# Patient Record
Sex: FEMALE | Race: BLACK OR AFRICAN AMERICAN | NOT HISPANIC OR LATINO | URBAN - METROPOLITAN AREA
[De-identification: names, ages, dates, MRNs, and addresses within clinical notes are randomized per-mention and may not be internally consistent; named-entity substitution may affect disease eponyms.]

---

## 2022-12-04 ENCOUNTER — INPATIENT (INPATIENT)
Facility: HOSPITAL | Age: 57
LOS: 8 days | Discharge: HOME | DRG: 811 | End: 2022-12-13
Attending: STUDENT IN AN ORGANIZED HEALTH CARE EDUCATION/TRAINING PROGRAM | Admitting: HOSPITALIST
Payer: COMMERCIAL

## 2022-12-04 VITALS
DIASTOLIC BLOOD PRESSURE: 94 MMHG | SYSTOLIC BLOOD PRESSURE: 155 MMHG | WEIGHT: 197.98 LBS | OXYGEN SATURATION: 99 % | HEART RATE: 105 BPM | RESPIRATION RATE: 18 BRPM | TEMPERATURE: 98 F | HEIGHT: 64 IN

## 2022-12-04 PROCEDURE — 99285 EMERGENCY DEPT VISIT HI MDM: CPT

## 2022-12-04 RX ORDER — HYDROMORPHONE HYDROCHLORIDE 2 MG/ML
1 INJECTION INTRAMUSCULAR; INTRAVENOUS; SUBCUTANEOUS ONCE
Refills: 0 | Status: DISCONTINUED | OUTPATIENT
Start: 2022-12-04 | End: 2022-12-04

## 2022-12-04 RX ORDER — SODIUM CHLORIDE 9 MG/ML
1000 INJECTION INTRAMUSCULAR; INTRAVENOUS; SUBCUTANEOUS ONCE
Refills: 0 | Status: COMPLETED | OUTPATIENT
Start: 2022-12-04 | End: 2022-12-04

## 2022-12-04 NOTE — ED PROVIDER NOTE - PROGRESS NOTE DETAILS
Marva attending- improvement in pain with second round of pain medication we will continue to monitor and reassess otherwise labs unremarkable thus far Magdi Carr MD PGY-1: Pt reassessed. Pain is back to a 10/10

## 2022-12-04 NOTE — ED PROVIDER NOTE - NS ED ROS FT
ROS:  GENERAL: No fever, no chills  EYES: no change in vision  HEENT: no trouble swallowing, no trouble speaking  CARDIAC: no chest pain  PULMONARY: no cough, no shortness of breath  GI: no abdominal pain, no nausea, no vomiting, no diarrhea, no constipation  : No dysuria, no frequency, no change in appearance, or odor of urine  SKIN: no rashes  NEURO: no headache, no weakness  MSK: left arm left hip pain   ~Daniel Durham D.O. -ED Attending

## 2022-12-04 NOTE — ED PROVIDER NOTE - OBJECTIVE STATEMENT
57yF w/PMHx of sickle cell disease, htn, retinal detachment s/p repair presents for left shoulder, arm, and hip pain thinking it is 2/2 to sickle cell crisis. Pain started a couple hours ago and was not relieved by tramadol, unsure of the dose. Pain is sharp 10/10 and worse with any movement. Pt's last crisis was 2 years ago and had to get admitted for it. Pt states she normally has to get Dilaudid for the pain. Follows with [facility] sickle cell clinic with Karen Bliss 57yF w/PMHx of sickle cell disease, htn, retinal detachment s/p repair presents for left shoulder, arm, and hip pain thinking it is 2/2 to sickle cell crisis. Pain started a couple hours ago and was not relieved by tramadol, unsure of the dose. Pain is sharp 10/10 and worse with any movement. Pt's last crisis was 2 years ago and had to get admitted for it. Pt states she normally has to get Dilaudid for the pain. Follows with [facility] sickle cell clinic with Karen Garcia, NP at [facility]. Pt endorsed congestion, cough, rhinorrhea for 4 days and her left sided arm and hip pain has been going on for 2 days. Denies sob, cp, fever, chills. Pt's mom was also sick.

## 2022-12-04 NOTE — ED PROVIDER NOTE - PHYSICAL EXAMINATION
Physical Exam:  Gen: NAD, AOx3, non-toxic appearing  Head: normal appearing  HEENT: normal conjunctiva, oral mucosa moist  Lung:  no respiratory distress, speaking in full sentences, clear to ascultation bilaterally     CV: regular rate and rhythm   Abd: soft, ND, NT  MSK: no visible deformities, no ttp throughout All extremities with full range of motion chest and pelvis stable neurovascular intact distally in all 4 extremities no leg swelling, No CTL spine tenderness palpation midline  Neuro: No focal deficits  Skin: Warm  Psych: Appearing in pain  ~Daniel Durham D.O. -ED Attending

## 2022-12-04 NOTE — ED PROVIDER NOTE - ATTENDING CONTRIBUTION TO CARE
I, Daniel Durham, performed a history and physical exam of the patient and discussed their management with the resident and/or advanced care provider. I reviewed the resident and/or advanced care provider's note and agree with the documented findings and plan of care. I was present and available for all procedures.    57yF w/PMHx of sickle cell disease, htn, retinal detachment s/p repair presents for left shoulder, arm, and hip pain thinking it is 2/2 to sickle cell crisis. Pain started a couple hours ago and was not relieved by tramadol, unsure of the dose. Pain is sharp 10/10 and worse with any movement. Pt's last crisis was 2 years ago and had to get admitted for it. Pt states she normally has to get Dilaudid for the pain. Follows with [facility] sickle cell clinic, Hemoglobin electrophoresis available on HIV consistent with sickle cell disease    See my full note for review of system and physical exam    Concerning for sickle cell crisis pain otherwise unlikely infectious or metabolic in etiology unlikely ACS PE pneumonia pneumothorax dissection AAA unlikely severe AVN of hip or shoulder otherwise will obtain x-rays screening blood work pain medications reassess for disposition pending work-up and reevaluation's discussed with patient agreeable for plan

## 2022-12-05 DIAGNOSIS — Z98.890 OTHER SPECIFIED POSTPROCEDURAL STATES: Chronic | ICD-10-CM

## 2022-12-05 DIAGNOSIS — Z98.891 HISTORY OF UTERINE SCAR FROM PREVIOUS SURGERY: Chronic | ICD-10-CM

## 2022-12-05 DIAGNOSIS — D57.00 HB-SS DISEASE WITH CRISIS, UNSPECIFIED: ICD-10-CM

## 2022-12-05 DIAGNOSIS — Z98.82 BREAST IMPLANT STATUS: Chronic | ICD-10-CM

## 2022-12-05 DIAGNOSIS — I10 ESSENTIAL (PRIMARY) HYPERTENSION: ICD-10-CM

## 2022-12-05 DIAGNOSIS — U07.1 COVID-19: ICD-10-CM

## 2022-12-05 LAB
ALBUMIN SERPL ELPH-MCNC: 4.4 G/DL — SIGNIFICANT CHANGE UP (ref 3.3–5)
ALP SERPL-CCNC: 111 U/L — SIGNIFICANT CHANGE UP (ref 40–120)
ALT FLD-CCNC: 17 U/L — SIGNIFICANT CHANGE UP (ref 10–45)
ANION GAP SERPL CALC-SCNC: 15 MMOL/L — SIGNIFICANT CHANGE UP (ref 5–17)
APPEARANCE UR: CLEAR — SIGNIFICANT CHANGE UP
AST SERPL-CCNC: 44 U/L — HIGH (ref 10–40)
BACTERIA # UR AUTO: NEGATIVE — SIGNIFICANT CHANGE UP
BILIRUB SERPL-MCNC: 0.8 MG/DL — SIGNIFICANT CHANGE UP (ref 0.2–1.2)
BILIRUB UR-MCNC: NEGATIVE — SIGNIFICANT CHANGE UP
BUN SERPL-MCNC: 12 MG/DL — SIGNIFICANT CHANGE UP (ref 7–23)
CALCIUM SERPL-MCNC: 9.1 MG/DL — SIGNIFICANT CHANGE UP (ref 8.4–10.5)
CHLORIDE SERPL-SCNC: 103 MMOL/L — SIGNIFICANT CHANGE UP (ref 96–108)
CO2 SERPL-SCNC: 21 MMOL/L — LOW (ref 22–31)
COLOR SPEC: SIGNIFICANT CHANGE UP
CREAT SERPL-MCNC: 0.55 MG/DL — SIGNIFICANT CHANGE UP (ref 0.5–1.3)
DIFF PNL FLD: NEGATIVE — SIGNIFICANT CHANGE UP
EGFR: 108 ML/MIN/1.73M2 — SIGNIFICANT CHANGE UP
EPI CELLS # UR: 3 /HPF — SIGNIFICANT CHANGE UP
FLUAV AG NPH QL: SIGNIFICANT CHANGE UP
FLUBV AG NPH QL: SIGNIFICANT CHANGE UP
GLUCOSE SERPL-MCNC: 96 MG/DL — SIGNIFICANT CHANGE UP (ref 70–99)
GLUCOSE UR QL: NEGATIVE — SIGNIFICANT CHANGE UP
HCT VFR BLD CALC: 35.3 % — SIGNIFICANT CHANGE UP (ref 34.5–45)
HGB BLD-MCNC: 13 G/DL — SIGNIFICANT CHANGE UP (ref 11.5–15.5)
HGB S BLD QL: POSITIVE
HYALINE CASTS # UR AUTO: 0 /LPF — SIGNIFICANT CHANGE UP (ref 0–2)
KETONES UR-MCNC: NEGATIVE — SIGNIFICANT CHANGE UP
LEUKOCYTE ESTERASE UR-ACNC: NEGATIVE — SIGNIFICANT CHANGE UP
MCHC RBC-ENTMCNC: 33.5 PG — SIGNIFICANT CHANGE UP (ref 27–34)
MCHC RBC-ENTMCNC: 36.8 GM/DL — HIGH (ref 32–36)
MCV RBC AUTO: 91 FL — SIGNIFICANT CHANGE UP (ref 80–100)
NITRITE UR-MCNC: NEGATIVE — SIGNIFICANT CHANGE UP
NRBC # BLD: 2 /100 WBCS — HIGH (ref 0–0)
PH UR: 6.5 — SIGNIFICANT CHANGE UP (ref 5–8)
PLATELET # BLD AUTO: 234 K/UL — SIGNIFICANT CHANGE UP (ref 150–400)
POTASSIUM SERPL-MCNC: 4 MMOL/L — SIGNIFICANT CHANGE UP (ref 3.5–5.3)
POTASSIUM SERPL-SCNC: 4 MMOL/L — SIGNIFICANT CHANGE UP (ref 3.5–5.3)
PROT SERPL-MCNC: 8.5 G/DL — HIGH (ref 6–8.3)
PROT UR-MCNC: ABNORMAL
RBC # BLD: 3.88 M/UL — SIGNIFICANT CHANGE UP (ref 3.8–5.2)
RBC # BLD: 3.88 M/UL — SIGNIFICANT CHANGE UP (ref 3.8–5.2)
RBC # FLD: 14.9 % — HIGH (ref 10.3–14.5)
RBC CASTS # UR COMP ASSIST: 1 /HPF — SIGNIFICANT CHANGE UP (ref 0–4)
RETICS #: 79.9 K/UL — SIGNIFICANT CHANGE UP (ref 25–125)
RETICS/RBC NFR: 2.1 % — SIGNIFICANT CHANGE UP (ref 0.5–2.5)
RSV RNA NPH QL NAA+NON-PROBE: SIGNIFICANT CHANGE UP
SARS-COV-2 RNA SPEC QL NAA+PROBE: DETECTED
SODIUM SERPL-SCNC: 139 MMOL/L — SIGNIFICANT CHANGE UP (ref 135–145)
SOLUBILITY: POSITIVE
SP GR SPEC: 1.01 — SIGNIFICANT CHANGE UP (ref 1.01–1.02)
UROBILINOGEN FLD QL: NEGATIVE — SIGNIFICANT CHANGE UP
WBC # BLD: 12.04 K/UL — HIGH (ref 3.8–10.5)
WBC # FLD AUTO: 12.04 K/UL — HIGH (ref 3.8–10.5)
WBC UR QL: 1 /HPF — SIGNIFICANT CHANGE UP (ref 0–5)

## 2022-12-05 PROCEDURE — 71045 X-RAY EXAM CHEST 1 VIEW: CPT | Mod: 26

## 2022-12-05 PROCEDURE — 99223 1ST HOSP IP/OBS HIGH 75: CPT

## 2022-12-05 PROCEDURE — 83020 HEMOGLOBIN ELECTROPHORESIS: CPT | Mod: 26

## 2022-12-05 RX ORDER — ENOXAPARIN SODIUM 100 MG/ML
40 INJECTION SUBCUTANEOUS EVERY 24 HOURS
Refills: 0 | Status: DISCONTINUED | OUTPATIENT
Start: 2022-12-05 | End: 2022-12-13

## 2022-12-05 RX ORDER — SODIUM CHLORIDE 9 MG/ML
1000 INJECTION, SOLUTION INTRAVENOUS
Refills: 0 | Status: DISCONTINUED | OUTPATIENT
Start: 2022-12-05 | End: 2022-12-05

## 2022-12-05 RX ORDER — REMDESIVIR 5 MG/ML
INJECTION INTRAVENOUS
Refills: 0 | Status: COMPLETED | OUTPATIENT
Start: 2022-12-05 | End: 2022-12-07

## 2022-12-05 RX ORDER — BUDESONIDE AND FORMOTEROL FUMARATE DIHYDRATE 160; 4.5 UG/1; UG/1
2 AEROSOL RESPIRATORY (INHALATION)
Refills: 0 | Status: DISCONTINUED | OUTPATIENT
Start: 2022-12-05 | End: 2022-12-13

## 2022-12-05 RX ORDER — SENNA PLUS 8.6 MG/1
2 TABLET ORAL AT BEDTIME
Refills: 0 | Status: DISCONTINUED | OUTPATIENT
Start: 2022-12-05 | End: 2022-12-13

## 2022-12-05 RX ORDER — FOLIC ACID 0.8 MG
1 TABLET ORAL DAILY
Refills: 0 | Status: DISCONTINUED | OUTPATIENT
Start: 2022-12-05 | End: 2022-12-13

## 2022-12-05 RX ORDER — REMDESIVIR 5 MG/ML
100 INJECTION INTRAVENOUS EVERY 24 HOURS
Refills: 0 | Status: COMPLETED | OUTPATIENT
Start: 2022-12-06 | End: 2022-12-07

## 2022-12-05 RX ORDER — NICOTINE POLACRILEX 2 MG
1 GUM BUCCAL DAILY
Refills: 0 | Status: DISCONTINUED | OUTPATIENT
Start: 2022-12-05 | End: 2022-12-13

## 2022-12-05 RX ORDER — HYDROMORPHONE HYDROCHLORIDE 2 MG/ML
1 INJECTION INTRAMUSCULAR; INTRAVENOUS; SUBCUTANEOUS ONCE
Refills: 0 | Status: DISCONTINUED | OUTPATIENT
Start: 2022-12-05 | End: 2022-12-05

## 2022-12-05 RX ORDER — HYDROMORPHONE HYDROCHLORIDE 2 MG/ML
6 INJECTION INTRAMUSCULAR; INTRAVENOUS; SUBCUTANEOUS EVERY 4 HOURS
Refills: 0 | Status: DISCONTINUED | OUTPATIENT
Start: 2022-12-05 | End: 2022-12-05

## 2022-12-05 RX ORDER — REMDESIVIR 5 MG/ML
200 INJECTION INTRAVENOUS EVERY 24 HOURS
Refills: 0 | Status: COMPLETED | OUTPATIENT
Start: 2022-12-05 | End: 2022-12-05

## 2022-12-05 RX ORDER — HYDROMORPHONE HYDROCHLORIDE 2 MG/ML
5 INJECTION INTRAMUSCULAR; INTRAVENOUS; SUBCUTANEOUS EVERY 4 HOURS
Refills: 0 | Status: DISCONTINUED | OUTPATIENT
Start: 2022-12-05 | End: 2022-12-05

## 2022-12-05 RX ORDER — KETOROLAC TROMETHAMINE 30 MG/ML
15 SYRINGE (ML) INJECTION ONCE
Refills: 0 | Status: DISCONTINUED | OUTPATIENT
Start: 2022-12-05 | End: 2022-12-05

## 2022-12-05 RX ORDER — LISINOPRIL 2.5 MG/1
40 TABLET ORAL DAILY
Refills: 0 | Status: DISCONTINUED | OUTPATIENT
Start: 2022-12-05 | End: 2022-12-13

## 2022-12-05 RX ORDER — ONDANSETRON 8 MG/1
4 TABLET, FILM COATED ORAL EVERY 6 HOURS
Refills: 0 | Status: DISCONTINUED | OUTPATIENT
Start: 2022-12-05 | End: 2022-12-07

## 2022-12-05 RX ORDER — SODIUM CHLORIDE 9 MG/ML
1000 INJECTION, SOLUTION INTRAVENOUS
Refills: 0 | Status: DISCONTINUED | OUTPATIENT
Start: 2022-12-05 | End: 2022-12-06

## 2022-12-05 RX ORDER — CHLORHEXIDINE GLUCONATE 213 G/1000ML
1 SOLUTION TOPICAL DAILY
Refills: 0 | Status: DISCONTINUED | OUTPATIENT
Start: 2022-12-05 | End: 2022-12-13

## 2022-12-05 RX ORDER — INFLUENZA VIRUS VACCINE 15; 15; 15; 15 UG/.5ML; UG/.5ML; UG/.5ML; UG/.5ML
0.5 SUSPENSION INTRAMUSCULAR ONCE
Refills: 0 | Status: COMPLETED | OUTPATIENT
Start: 2022-12-05 | End: 2022-12-13

## 2022-12-05 RX ORDER — HYDROMORPHONE HYDROCHLORIDE 2 MG/ML
1 INJECTION INTRAMUSCULAR; INTRAVENOUS; SUBCUTANEOUS EVERY 4 HOURS
Refills: 0 | Status: DISCONTINUED | OUTPATIENT
Start: 2022-12-05 | End: 2022-12-05

## 2022-12-05 RX ORDER — AMLODIPINE BESYLATE 2.5 MG/1
10 TABLET ORAL DAILY
Refills: 0 | Status: DISCONTINUED | OUTPATIENT
Start: 2022-12-05 | End: 2022-12-13

## 2022-12-05 RX ORDER — POLYETHYLENE GLYCOL 3350 17 G/17G
17 POWDER, FOR SOLUTION ORAL DAILY
Refills: 0 | Status: DISCONTINUED | OUTPATIENT
Start: 2022-12-05 | End: 2022-12-07

## 2022-12-05 RX ORDER — HYDROXYUREA 500 MG/1
500 CAPSULE ORAL
Refills: 0 | Status: DISCONTINUED | OUTPATIENT
Start: 2022-12-05 | End: 2022-12-13

## 2022-12-05 RX ORDER — HYDROMORPHONE HYDROCHLORIDE 2 MG/ML
6 INJECTION INTRAMUSCULAR; INTRAVENOUS; SUBCUTANEOUS EVERY 4 HOURS
Refills: 0 | Status: DISCONTINUED | OUTPATIENT
Start: 2022-12-05 | End: 2022-12-06

## 2022-12-05 RX ADMIN — Medication 15 MILLIGRAM(S): at 03:51

## 2022-12-05 RX ADMIN — HYDROMORPHONE HYDROCHLORIDE 1 MILLIGRAM(S): 2 INJECTION INTRAMUSCULAR; INTRAVENOUS; SUBCUTANEOUS at 01:06

## 2022-12-05 RX ADMIN — LISINOPRIL 40 MILLIGRAM(S): 2.5 TABLET ORAL at 11:33

## 2022-12-05 RX ADMIN — SODIUM CHLORIDE 75 MILLILITER(S): 9 INJECTION, SOLUTION INTRAVENOUS at 14:08

## 2022-12-05 RX ADMIN — HYDROMORPHONE HYDROCHLORIDE 1 MILLIGRAM(S): 2 INJECTION INTRAMUSCULAR; INTRAVENOUS; SUBCUTANEOUS at 20:04

## 2022-12-05 RX ADMIN — SODIUM CHLORIDE 100 MILLILITER(S): 9 INJECTION, SOLUTION INTRAVENOUS at 10:45

## 2022-12-05 RX ADMIN — Medication 1 PATCH: at 11:32

## 2022-12-05 RX ADMIN — SODIUM CHLORIDE 1000 MILLILITER(S): 9 INJECTION INTRAMUSCULAR; INTRAVENOUS; SUBCUTANEOUS at 00:16

## 2022-12-05 RX ADMIN — Medication 1 MILLIGRAM(S): at 11:34

## 2022-12-05 RX ADMIN — HYDROMORPHONE HYDROCHLORIDE 6 MILLIGRAM(S): 2 INJECTION INTRAMUSCULAR; INTRAVENOUS; SUBCUTANEOUS at 16:04

## 2022-12-05 RX ADMIN — BUDESONIDE AND FORMOTEROL FUMARATE DIHYDRATE 2 PUFF(S): 160; 4.5 AEROSOL RESPIRATORY (INHALATION) at 17:50

## 2022-12-05 RX ADMIN — HYDROMORPHONE HYDROCHLORIDE 1 MILLIGRAM(S): 2 INJECTION INTRAMUSCULAR; INTRAVENOUS; SUBCUTANEOUS at 00:13

## 2022-12-05 RX ADMIN — HYDROMORPHONE HYDROCHLORIDE 1 MILLIGRAM(S): 2 INJECTION INTRAMUSCULAR; INTRAVENOUS; SUBCUTANEOUS at 08:19

## 2022-12-05 RX ADMIN — HYDROMORPHONE HYDROCHLORIDE 1 MILLIGRAM(S): 2 INJECTION INTRAMUSCULAR; INTRAVENOUS; SUBCUTANEOUS at 03:52

## 2022-12-05 RX ADMIN — ONDANSETRON 4 MILLIGRAM(S): 8 TABLET, FILM COATED ORAL at 20:04

## 2022-12-05 RX ADMIN — ENOXAPARIN SODIUM 40 MILLIGRAM(S): 100 INJECTION SUBCUTANEOUS at 21:13

## 2022-12-05 RX ADMIN — SENNA PLUS 2 TABLET(S): 8.6 TABLET ORAL at 21:13

## 2022-12-05 RX ADMIN — HYDROXYUREA 500 MILLIGRAM(S): 500 CAPSULE ORAL at 17:49

## 2022-12-05 RX ADMIN — Medication 15 MILLIGRAM(S): at 01:06

## 2022-12-05 RX ADMIN — REMDESIVIR 500 MILLIGRAM(S): 5 INJECTION INTRAVENOUS at 09:42

## 2022-12-05 RX ADMIN — HYDROMORPHONE HYDROCHLORIDE 1 MILLIGRAM(S): 2 INJECTION INTRAMUSCULAR; INTRAVENOUS; SUBCUTANEOUS at 10:47

## 2022-12-05 NOTE — PROGRESS NOTE ADULT - SUBJECTIVE AND OBJECTIVE BOX
[facility] Division of Hospital Medicine  Rayray Quintero MD  Available via MS Teams  Pager: [phone]    SUBJECTIVE / OVERNIGHT EVENTS: Patient seen and examined at bedside. No acute overnight events. Pt states pain is well controlled with the Dilaudid, reports occasional shoulder/joint pain. Denies chest pain fevers, chills, cough, HA, dizziness, syncope, chest palpitations, abd pain, n/v/d, urinary or bowel changes, active sites of bleeding or any other symptoms at this time.    ADDITIONAL REVIEW OF SYSTEMS: +joint pain     MEDICATIONS  (STANDING):  amLODIPine   Tablet 10 milliGRAM(s) Oral daily  budesonide 160 MICROgram(s)/formoterol 4.5 MICROgram(s) Inhaler 2 Puff(s) Inhalation two times a day  dextrose 5% + sodium chloride 0.45%. 1000 milliLiter(s) (100 mL/Hr) IV Continuous <Continuous>  enoxaparin Injectable 40 milliGRAM(s) SubCutaneous every 24 hours  folic acid 1 milliGRAM(s) Oral daily  hydroxyurea 500 milliGRAM(s) Oral two times a day  lisinopril 40 milliGRAM(s) Oral daily  nicotine - 21 mG/24Hr(s) Patch 1 Patch Transdermal daily  remdesivir  IVPB   IV Intermittent     MEDICATIONS  (PRN):  HYDROmorphone   Tablet 5 milliGRAM(s) Oral every 4 hours PRN Severe Pain (7 - 10)  HYDROmorphone  Injectable 1 milliGRAM(s) IV Push every 4 hours PRN mod to severe pain      I&O's Summary      PHYSICAL EXAM:  Vital Signs Last 24 Hrs  T(C): 36.8 (05 Dec 2022 09:40), Max: 36.8 (04 Dec 2022 20:50)  T(F): 98.2 (05 Dec 2022 09:40), Max: 98.3 (04 Dec 2022 20:50)  HR: 74 (05 Dec 2022 11:30) (69 - 105)  BP: 121/86 (05 Dec 2022 11:30) (121/86 - 160/100)  BP(mean): --  RR: 18 (05 Dec 2022 11:30) (18 - 18)  SpO2: 99% (05 Dec 2022 11:30) (97% - 100%)    Parameters below as of 05 Dec 2022 11:30  Patient On (Oxygen Delivery Method): room air      CONSTITUTIONAL: NAD, well-developed, well-groomed  EYES: PERRLA; conjunctiva and sclera clear  ENMT: Moist oral mucosa, no pharyngeal injection or exudates; normal dentition  NECK: Supple, no palpable masses; no thyromegaly  RESPIRATORY: Normal respiratory effort; lungs are clear to auscultation bilaterally  CARDIOVASCULAR: Regular rate and rhythm, normal S1 and S2, no murmur/rub/gallop; No lower extremity edema; Peripheral pulses are 2+ bilaterally  ABDOMEN: Nontender to palpation, normoactive bowel sounds, no rebound/guarding; No hepatosplenomegaly  MUSCULOSKELETAL:   no clubbing or cyanosis of digits; no joint swelling or tenderness to palpation  PSYCH: A+O to person, place, and time; affect appropriate  NEUROLOGY: CN 2-12 are intact and symmetric; no gross sensory deficits   SKIN: No rashes; no palpable lesions    LABS:                        13.0   12.04 )-----------( 234      ( 05 Dec 2022 00:00 )             35.3     12-    139  |  103  |  12  ----------------------------<  96  4.0   |  21<L>  |  0.55    Ca    9.1      05 Dec 2022 00:00    TPro  8.5<H>  /  Alb  4.4  /  TBili  0.8  /  DBili  x   /  AST  44<H>  /  ALT  17  /  AlkPhos  111  12-05          Urinalysis Basic - ( 05 Dec 2022 01:31 )    Color: Light Yellow / Appearance: Clear / S.010 / pH: x  Gluc: x / Ketone: Negative  / Bili: Negative / Urobili: Negative   Blood: x / Protein: 30 mg/dL / Nitrite: Negative   Leuk Esterase: Negative / RBC: 1 /hpf / WBC 1 /HPF   Sq Epi: x / Non Sq Epi: 3 /hpf / Bacteria: Negative            RADIOLOGY & ADDITIONAL TESTS:  New Results Reviewed Today:   New Imaging Personally Reviewed Today:  New Electrocardiogram Personally Reviewed Today:  Prior or Outpatient Records Reviewed Today:    COMMUNICATION:  Care Discussed with Consultants/Other Providers and Details of Discussion:  Discussions with Patient/Family:  PCP Communication:

## 2022-12-05 NOTE — H&P ADULT - NSICDXFAMILYHX_GEN_ALL_CORE_FT
FAMILY HISTORY:  Child  Still living? Yes, Estimated age: Age Unknown  FHx: cerebral palsy, Age at diagnosis: Age Unknown

## 2022-12-05 NOTE — H&P ADULT - NSHPSOCIALHISTORY_GEN_ALL_CORE
Social History:    Marital Status: (  ) , ( x ) Single, (  ) , (  ) , (  )   # of Children: 4  Lives with: (  ) alone, ( x ) children, (  ) spouse, (  ) parents, (  ) siblings, (  ) friends, (  ) other:   Occupation:     Substance Use/Illicit Drugs: (  ) never used vs other:   Tobacco Usage: (  ) never smoked, (  ) former smoker, ( x ) current smoker and Total Pack-Years: 7 pk yrs  Last Alcohol Usage/Frequency/Amount/Withdrawal/Hx of Abuse:  1 week ago  Foreign travel:   Animal exposure:

## 2022-12-05 NOTE — H&P ADULT - PROBLEM SELECTOR PLAN 1
- IVF  - start dilaudid 1 q4h PRN for now  - will need PCA pump in AM  - no fevers yet, no lung opacities  - cont hydrea, folate

## 2022-12-05 NOTE — PHARMACOTHERAPY INTERVENTION NOTE - COMMENTS
CARSON LEE, 57y Female with admission for COVID-19 infection, symptoms on admission consistent with mild COVID-19, non-hypoxic. Patient started on 5 day course of remdesivir.    Recommendation(s):  1) Discussed with ACP Uttam, suggested to limit remdesivir duration to 3 days given mild presentation and not hypoxic, ACP agreed, I changed the order on his behalf (last dose 12/7).    With kind regards,  Aleksandr Taylor, PharmD, BCIDP  Infectious Diseases Clinical Pharmacist  Available on Microsoft Teams  .

## 2022-12-05 NOTE — PROGRESS NOTE ADULT - ASSESSMENT
57F with PMhx SS disease, HTN, retinal detachment s/p repair admitted for covid and sickle cell crisis.

## 2022-12-05 NOTE — H&P ADULT - HISTORY OF PRESENT ILLNESS
57F c hx SS disease, htn, retinal detachment s/p repair, pw 4 days cough, congestion and 2 days of worsening left arm, left neck, hip, thigh, back pain.    Pt reports her current pain is located where she usually gets her sickle crisis pain. Pt reports having a sickle crisis once every 2-3 yrs. Pt reports being in usual state of health until about 4 days ago, when she started to have a dry cough, runny nose, lightheadedness, chills. Pt states her mom is also sick. Pt is modern vaccinated x4. Denies fevers, diarrhea, urinary symptoms. Pt is not sure if her arm pain is worse with exertion. Pt has never had a stress test before. Pt denies any exacerbating factors. Dilaudid seems to help the pain a little. 57F c hx SS disease, htn, retinal detachment s/p repair, pw 4 days cough, congestion and 2 days of worsening left arm, left neck, hip, thigh, back pain.    Pt reports her current pain is located where she usually gets her sickle crisis pain. Pt reports having a sickle crisis once every 2-3 yrs. Pt reports being in usual state of health until about 4 days ago, when she started to have a dry cough, runny nose, lightheadedness, chills. Pt states her mom is also sick. Pt is modern vaccinated x4. Denies fevers, diarrhea, urinary symptoms. Pt is not sure if her arm pain is worse with exertion. Pt has never had a stress test before. Pt denies any exacerbating factors. Dilaudid seems to help the pain a little.    ISTOP Reference #: [id number]  No meds listed.  Pt has been in [place] in recent past though

## 2022-12-05 NOTE — H&P ADULT - PROBLEM SELECTOR PLAN 2
- will start remdesivir 2/2 high risk factor for progression  - will start lovenox ppx  - no need for decadron yet

## 2022-12-05 NOTE — H&P ADULT - NSHPREVIEWOFSYSTEMS_GEN_ALL_CORE
REVIEW OF SYSTEMS:  CONSTITUTIONAL: +weakness. No fevers. +chills. No rigors. No poor appetite.  EYES: No blurry or double vision. No eye pain.  ENT: No hearing difficulty. No sore throat. +Sinusitis/rhinorrhea.   NECK: No pain. No stiffness/rigidity.  CARDIAC: No chest pain. No palpitations. +lightheadedness. No syncope.  RESPIRATORY: +cough. No SOB.   GASTROINTESTINAL: No abdominal pain. No nausea. No vomiting. No diarrhea. No constipation.   GENITOURINARY: No dysuria. No frequency. No oliguria.  NEUROLOGICAL: No numbness/tingling. No focal weakness. No headache. No unsteady gait.  BACK: +back pain. No flank pain.  EXTREMITIES: No lower extremity edema. Full ROM. +joint pain.   SKIN: No rashes. No itching. No other lesions.  PSYCHIATRIC: No depression. +anxiety.   ALLERGIC: No lip swelling. No hives.  All other review of systems is negative unless indicated above.  Unless indicated above, unable to assess ROS 2/2

## 2022-12-05 NOTE — PROGRESS NOTE ADULT - PROBLEM SELECTOR PLAN 1
Pain well controlled with Dilaudid 1mg Q4h IV PRN in ED, patient not hypoxic. No signs of distress, patient     - C/w Dilaudid 1 q4h IVP PRN in ED and transition to Dilaudid 5mg PO Q4 on floors   - C/w IVF 1/2NS +D5 @ 75cc/hr for net 2L /day   - Encourage hydration   - Patient refusing PCA pump at this time  - Afebrile, CXR negative   - C/w hydroxyurea, folate  - Monitor electrolytes, Cr   - Monitor vitals, O2 sat Pain well controlled with Dilaudid 1mg Q4h IV PRN in ED, patient not hypoxic. No signs of distress, patient     - C/w Dilaudid 1 q4h IVP PRN in ED and transition to Dilaudid 6mg PO Q4 on floors   - C/w IVF 1/2NS +D5 @ 75cc/hr for net 2L /day   - Encourage hydration   - Patient refusing PCA pump at this time  - Afebrile, CXR negative   - C/w hydroxyurea, folate  - Monitor electrolytes, Cr   - Monitor vitals, O2 sat

## 2022-12-05 NOTE — H&P ADULT - NSHPLABSRESULTS_GEN_ALL_CORE
Personally reviewed old records.  Personally reviewed labs.  Personally reviewed imaging.  Personally reviewed EKG. NSR rate 87, qtc 502. No ST change or TWI. Q wave in AVR.                          13.0   12.04 )-----------( 234      ( 05 Dec 2022 00:00 )             35.3           139  |  103  |  12  ----------------------------<  96  4.0   |  21<L>  |  0.55    Ca    9.1      05 Dec 2022 00:00    TPro  8.5<H>  /  Alb  4.4  /  TBili  0.8  /  DBili  x   /  AST  44<H>  /  ALT  17  /  AlkPhos  111              LIVER FUNCTIONS - ( 05 Dec 2022 00:00 )  Alb: 4.4 g/dL / Pro: 8.5 g/dL / ALK PHOS: 111 U/L / ALT: 17 U/L / AST: 44 U/L / GGT: x                 Urinalysis Basic - ( 05 Dec 2022 01:31 )    Color: Light Yellow / Appearance: Clear / S.010 / pH: x  Gluc: x / Ketone: Negative  / Bili: Negative / Urobili: Negative   Blood: x / Protein: 30 mg/dL / Nitrite: Negative   Leuk Esterase: Negative / RBC: 1 /hpf / WBC 1 /HPF   Sq Epi: x / Non Sq Epi: 3 /hpf / Bacteria: Negative

## 2022-12-05 NOTE — PROGRESS NOTE ADULT - PROBLEM SELECTOR PLAN 2
- C/w Remdesivir 2/2 high risk factor for progression  - VTE Ppx: lovenox ppx  - Not hypoxic-> no need for decadron yet

## 2022-12-05 NOTE — H&P ADULT - NSHPPHYSICALEXAM_GEN_ALL_CORE
PHYSICAL EXAM:   GENERAL: Alert. Not confused. No acute distress. Not thin. Not cachectic. Not obese.  HEAD:  Atraumatic. Normocephalic.  EYES: EOMI. PERRLA. Normal conjunctiva/sclera.  ENT: Neck supple. No JVD. Moist oral mucosa. Not edentulous. No thrush.  LYMPH: Normal supraclavicular/cervical lymph nodes.   CARDIAC: Not tachy, Not miguel. Regular rhythm. Not irregularly irregular. S1. S2. No murmur. No rub. No distant heart sounds.  LUNG/CHEST: CTAB. BS equal bilaterally. No wheezes. No rales. No rhonchi.  ABDOMEN: Soft. No tenderness. No distension. No fluid wave. Normal bowel sounds.  BACK: No midline/vertebral tenderness. No flank tenderness.  VASCULAR: +2 b/l radial or ulnar pulses. Palpable DP pulses.  EXTREMITIES:  No clubbing. No cyanosis. No edema. Moving all 4.  NEUROLOGY: A&Ox3. Non-focal exam. Cranial nerves intact. Normal speech. Sensation intact.  PSYCH: Normal behavior. Normal affect.  SKIN: No jaundice. No erythema. No rash/lesion.  ICU Vital Signs Last 24 Hrs  T(C): 36.7 (05 Dec 2022 01:00), Max: 36.8 (04 Dec 2022 20:50)  T(F): 98 (05 Dec 2022 01:00), Max: 98.3 (04 Dec 2022 20:50)  HR: 84 (05 Dec 2022 01:00) (84 - 105)  BP: 160/100 (05 Dec 2022 03:50) (155/94 - 160/100)  BP(mean): --  ABP: --  ABP(mean): --  RR: 18 (05 Dec 2022 01:00) (18 - 18)  SpO2: 99% (05 Dec 2022 01:00) (99% - 99%)    O2 Parameters below as of 05 Dec 2022 01:00  Patient On (Oxygen Delivery Method): room air          I&O's Summary

## 2022-12-05 NOTE — ED ADULT NURSE NOTE - OBJECTIVE STATEMENT
57 year old female with a PMH SCC and HTN comes to the ED c/o left sided body pain, predominantly in the shoulder, arm and hip beginning few hours prior. Pt states pain is 10/10, sharp, radiating throughout left side of body and endorses pain is similar to SCC in past. Last crisis was approximately 2 years ago and resulted in hospital admission for pain control. Pt endorses taking tramadol for pain PTA with minimal results. Pt is a/ox4, VSS, sensory/motor function intact, speaking coherently, follows commands and c/o 10/10 pain on the pain scale at this time. Pt denies CP/SOB, f/c, n/v/d, dizziness/lightheadedness, numbness/tingling/weakness, abdominal pain, back pain at this time.

## 2022-12-06 LAB
ALBUMIN SERPL ELPH-MCNC: 4 G/DL — SIGNIFICANT CHANGE UP (ref 3.3–5)
ALP SERPL-CCNC: 105 U/L — SIGNIFICANT CHANGE UP (ref 40–120)
ALT FLD-CCNC: 16 U/L — SIGNIFICANT CHANGE UP (ref 10–45)
ANION GAP SERPL CALC-SCNC: 12 MMOL/L — SIGNIFICANT CHANGE UP (ref 5–17)
AST SERPL-CCNC: 22 U/L — SIGNIFICANT CHANGE UP (ref 10–40)
BASOPHILS # BLD AUTO: 0.03 K/UL — SIGNIFICANT CHANGE UP (ref 0–0.2)
BASOPHILS NFR BLD AUTO: 0.4 % — SIGNIFICANT CHANGE UP (ref 0–2)
BILIRUB SERPL-MCNC: 1.2 MG/DL — SIGNIFICANT CHANGE UP (ref 0.2–1.2)
BLD GP AB SCN SERPL QL: NEGATIVE — SIGNIFICANT CHANGE UP
BUN SERPL-MCNC: 7 MG/DL — SIGNIFICANT CHANGE UP (ref 7–23)
CALCIUM SERPL-MCNC: 9.2 MG/DL — SIGNIFICANT CHANGE UP (ref 8.4–10.5)
CHLORIDE SERPL-SCNC: 99 MMOL/L — SIGNIFICANT CHANGE UP (ref 96–108)
CO2 SERPL-SCNC: 27 MMOL/L — SIGNIFICANT CHANGE UP (ref 22–31)
CREAT SERPL-MCNC: 0.65 MG/DL — SIGNIFICANT CHANGE UP (ref 0.5–1.3)
EGFR: 104 ML/MIN/1.73M2 — SIGNIFICANT CHANGE UP
EOSINOPHIL # BLD AUTO: 0.06 K/UL — SIGNIFICANT CHANGE UP (ref 0–0.5)
EOSINOPHIL NFR BLD AUTO: 0.8 % — SIGNIFICANT CHANGE UP (ref 0–6)
GLUCOSE SERPL-MCNC: 100 MG/DL — HIGH (ref 70–99)
HAPTOGLOB SERPL-MCNC: 157 MG/DL — SIGNIFICANT CHANGE UP (ref 34–200)
HCT VFR BLD CALC: 34.3 % — LOW (ref 34.5–45)
HGB BLD-MCNC: 12.7 G/DL — SIGNIFICANT CHANGE UP (ref 11.5–15.5)
IMM GRANULOCYTES NFR BLD AUTO: 0.6 % — SIGNIFICANT CHANGE UP (ref 0–0.9)
LDH SERPL L TO P-CCNC: 303 U/L — HIGH (ref 50–242)
LYMPHOCYTES # BLD AUTO: 2.24 K/UL — SIGNIFICANT CHANGE UP (ref 1–3.3)
LYMPHOCYTES # BLD AUTO: 28.9 % — SIGNIFICANT CHANGE UP (ref 13–44)
MAGNESIUM SERPL-MCNC: 2.1 MG/DL — SIGNIFICANT CHANGE UP (ref 1.6–2.6)
MCHC RBC-ENTMCNC: 33.2 PG — SIGNIFICANT CHANGE UP (ref 27–34)
MCHC RBC-ENTMCNC: 37 GM/DL — HIGH (ref 32–36)
MCV RBC AUTO: 89.8 FL — SIGNIFICANT CHANGE UP (ref 80–100)
MONOCYTES # BLD AUTO: 0.7 K/UL — SIGNIFICANT CHANGE UP (ref 0–0.9)
MONOCYTES NFR BLD AUTO: 9 % — SIGNIFICANT CHANGE UP (ref 2–14)
MRSA PCR RESULT.: SIGNIFICANT CHANGE UP
NEUTROPHILS # BLD AUTO: 4.66 K/UL — SIGNIFICANT CHANGE UP (ref 1.8–7.4)
NEUTROPHILS NFR BLD AUTO: 60.3 % — SIGNIFICANT CHANGE UP (ref 43–77)
NRBC # BLD: 2 /100 WBCS — HIGH (ref 0–0)
PHOSPHATE SERPL-MCNC: 3.3 MG/DL — SIGNIFICANT CHANGE UP (ref 2.5–4.5)
PLATELET # BLD AUTO: 289 K/UL — SIGNIFICANT CHANGE UP (ref 150–400)
POTASSIUM SERPL-MCNC: 3.7 MMOL/L — SIGNIFICANT CHANGE UP (ref 3.5–5.3)
POTASSIUM SERPL-SCNC: 3.7 MMOL/L — SIGNIFICANT CHANGE UP (ref 3.5–5.3)
PROT SERPL-MCNC: 8.1 G/DL — SIGNIFICANT CHANGE UP (ref 6–8.3)
RBC # BLD: 3.82 M/UL — SIGNIFICANT CHANGE UP (ref 3.8–5.2)
RBC # BLD: 3.82 M/UL — SIGNIFICANT CHANGE UP (ref 3.8–5.2)
RBC # FLD: 14.7 % — HIGH (ref 10.3–14.5)
RETICS #: 84.8 K/UL — SIGNIFICANT CHANGE UP (ref 25–125)
RETICS/RBC NFR: 2.2 % — SIGNIFICANT CHANGE UP (ref 0.5–2.5)
RH IG SCN BLD-IMP: POSITIVE — SIGNIFICANT CHANGE UP
S AUREUS DNA NOSE QL NAA+PROBE: SIGNIFICANT CHANGE UP
SODIUM SERPL-SCNC: 138 MMOL/L — SIGNIFICANT CHANGE UP (ref 135–145)
WBC # BLD: 7.74 K/UL — SIGNIFICANT CHANGE UP (ref 3.8–10.5)
WBC # FLD AUTO: 7.74 K/UL — SIGNIFICANT CHANGE UP (ref 3.8–10.5)

## 2022-12-06 PROCEDURE — 99233 SBSQ HOSP IP/OBS HIGH 50: CPT

## 2022-12-06 RX ORDER — HYDROMORPHONE HYDROCHLORIDE 2 MG/ML
2 INJECTION INTRAMUSCULAR; INTRAVENOUS; SUBCUTANEOUS ONCE
Refills: 0 | Status: DISCONTINUED | OUTPATIENT
Start: 2022-12-06 | End: 2022-12-06

## 2022-12-06 RX ORDER — HYDROMORPHONE HYDROCHLORIDE 2 MG/ML
1 INJECTION INTRAMUSCULAR; INTRAVENOUS; SUBCUTANEOUS EVERY 4 HOURS
Refills: 0 | Status: DISCONTINUED | OUTPATIENT
Start: 2022-12-06 | End: 2022-12-08

## 2022-12-06 RX ORDER — HYDROMORPHONE HYDROCHLORIDE 2 MG/ML
1 INJECTION INTRAMUSCULAR; INTRAVENOUS; SUBCUTANEOUS EVERY 4 HOURS
Refills: 0 | Status: DISCONTINUED | OUTPATIENT
Start: 2022-12-06 | End: 2022-12-06

## 2022-12-06 RX ORDER — SODIUM CHLORIDE 9 MG/ML
1000 INJECTION, SOLUTION INTRAVENOUS
Refills: 0 | Status: DISCONTINUED | OUTPATIENT
Start: 2022-12-06 | End: 2022-12-07

## 2022-12-06 RX ADMIN — BUDESONIDE AND FORMOTEROL FUMARATE DIHYDRATE 2 PUFF(S): 160; 4.5 AEROSOL RESPIRATORY (INHALATION) at 05:10

## 2022-12-06 RX ADMIN — HYDROMORPHONE HYDROCHLORIDE 2 MILLIGRAM(S): 2 INJECTION INTRAMUSCULAR; INTRAVENOUS; SUBCUTANEOUS at 02:56

## 2022-12-06 RX ADMIN — SODIUM CHLORIDE 75 MILLILITER(S): 9 INJECTION, SOLUTION INTRAVENOUS at 21:20

## 2022-12-06 RX ADMIN — HYDROMORPHONE HYDROCHLORIDE 1 MILLIGRAM(S): 2 INJECTION INTRAMUSCULAR; INTRAVENOUS; SUBCUTANEOUS at 12:05

## 2022-12-06 RX ADMIN — HYDROMORPHONE HYDROCHLORIDE 1 MILLIGRAM(S): 2 INJECTION INTRAMUSCULAR; INTRAVENOUS; SUBCUTANEOUS at 21:40

## 2022-12-06 RX ADMIN — HYDROXYUREA 500 MILLIGRAM(S): 500 CAPSULE ORAL at 17:17

## 2022-12-06 RX ADMIN — HYDROXYUREA 500 MILLIGRAM(S): 500 CAPSULE ORAL at 05:10

## 2022-12-06 RX ADMIN — LISINOPRIL 40 MILLIGRAM(S): 2.5 TABLET ORAL at 05:10

## 2022-12-06 RX ADMIN — BUDESONIDE AND FORMOTEROL FUMARATE DIHYDRATE 2 PUFF(S): 160; 4.5 AEROSOL RESPIRATORY (INHALATION) at 17:18

## 2022-12-06 RX ADMIN — ONDANSETRON 4 MILLIGRAM(S): 8 TABLET, FILM COATED ORAL at 17:45

## 2022-12-06 RX ADMIN — AMLODIPINE BESYLATE 10 MILLIGRAM(S): 2.5 TABLET ORAL at 05:10

## 2022-12-06 RX ADMIN — HYDROMORPHONE HYDROCHLORIDE 1 MILLIGRAM(S): 2 INJECTION INTRAMUSCULAR; INTRAVENOUS; SUBCUTANEOUS at 21:20

## 2022-12-06 RX ADMIN — HYDROMORPHONE HYDROCHLORIDE 1 MILLIGRAM(S): 2 INJECTION INTRAMUSCULAR; INTRAVENOUS; SUBCUTANEOUS at 07:59

## 2022-12-06 RX ADMIN — Medication 1 PATCH: at 12:34

## 2022-12-06 RX ADMIN — HYDROMORPHONE HYDROCHLORIDE 1 MILLIGRAM(S): 2 INJECTION INTRAMUSCULAR; INTRAVENOUS; SUBCUTANEOUS at 17:51

## 2022-12-06 RX ADMIN — Medication 100 MILLIGRAM(S): at 17:47

## 2022-12-06 RX ADMIN — SODIUM CHLORIDE 75 MILLILITER(S): 9 INJECTION, SOLUTION INTRAVENOUS at 12:22

## 2022-12-06 RX ADMIN — HYDROMORPHONE HYDROCHLORIDE 2 MILLIGRAM(S): 2 INJECTION INTRAMUSCULAR; INTRAVENOUS; SUBCUTANEOUS at 03:00

## 2022-12-06 RX ADMIN — ONDANSETRON 4 MILLIGRAM(S): 8 TABLET, FILM COATED ORAL at 03:00

## 2022-12-06 RX ADMIN — ONDANSETRON 4 MILLIGRAM(S): 8 TABLET, FILM COATED ORAL at 09:43

## 2022-12-06 RX ADMIN — HYDROMORPHONE HYDROCHLORIDE 1 MILLIGRAM(S): 2 INJECTION INTRAMUSCULAR; INTRAVENOUS; SUBCUTANEOUS at 08:20

## 2022-12-06 RX ADMIN — Medication 1 PATCH: at 11:56

## 2022-12-06 RX ADMIN — ENOXAPARIN SODIUM 40 MILLIGRAM(S): 100 INJECTION SUBCUTANEOUS at 21:21

## 2022-12-06 RX ADMIN — Medication 1 PATCH: at 20:18

## 2022-12-06 RX ADMIN — SODIUM CHLORIDE 75 MILLILITER(S): 9 INJECTION, SOLUTION INTRAVENOUS at 17:42

## 2022-12-06 RX ADMIN — SENNA PLUS 2 TABLET(S): 8.6 TABLET ORAL at 21:20

## 2022-12-06 RX ADMIN — CHLORHEXIDINE GLUCONATE 1 APPLICATION(S): 213 SOLUTION TOPICAL at 11:57

## 2022-12-06 RX ADMIN — Medication 1 PATCH: at 06:23

## 2022-12-06 RX ADMIN — HYDROMORPHONE HYDROCHLORIDE 1 MILLIGRAM(S): 2 INJECTION INTRAMUSCULAR; INTRAVENOUS; SUBCUTANEOUS at 12:34

## 2022-12-06 RX ADMIN — REMDESIVIR 500 MILLIGRAM(S): 5 INJECTION INTRAVENOUS at 10:27

## 2022-12-06 RX ADMIN — HYDROMORPHONE HYDROCHLORIDE 1 MILLIGRAM(S): 2 INJECTION INTRAMUSCULAR; INTRAVENOUS; SUBCUTANEOUS at 17:19

## 2022-12-06 RX ADMIN — Medication 1 MILLIGRAM(S): at 11:51

## 2022-12-06 NOTE — PROGRESS NOTE ADULT - PROBLEM SELECTOR PLAN 2
- will start remdesivir 2/2 high risk factor for progression  - will start lovenox ppx  - no need for decadron yet - C/w Remdesivir 2/2 high risk factor for progression  - VTE Ppx: lovenox ppx  - Not hypoxic-> no need for decadron yet. -C/w Remdesivir 2/2 high risk factor for progression (Day 2/5)  -VTE Ppx: lovenox ppx  -Not hypoxic-> no need for decadron

## 2022-12-06 NOTE — PROGRESS NOTE ADULT - PROBLEM SELECTOR PLAN 1
- IVF  - start dilaudid 1 q4h PRN for now  - will need PCA pump in AM  - no fevers yet, no lung opacities  - cont hydrea, folate - C/w Dilaudid 1 q4h IVP PRN  - C/w IVF 1/2NS +D5 @ 75cc/hr for net 2L /day   - Encourage hydration   - Patient refusing PCA pump at this time  - Afebrile, CXR negative   - C/w hydroxyurea, folate  - Monitor electrolytes, Cr   - Monitor vitals, O2 sat. -C/w Dilaudid 1 q4h IVP PRN  -C/w IVF 1/2NS +D5 @ 75cc/hr for net 2L /day   -Encourage hydration   -Afebrile, CXR negative   -C/w hydroxyurea, folate  -Monitor electrolytes, Cr   -Monitor vitals, O2 sat.

## 2022-12-06 NOTE — PROGRESS NOTE ADULT - SUBJECTIVE AND OBJECTIVE BOX
SUBJECTIVE  Pt seen bedside. Complaining of pain, wondering why she is having a sickle cell crisis now as she hasn't had one for years. Has a dry cough and nasal congestion. Explained likely 2/2 covid. No other complaints. Denies Fevers, chills, SOB, cough,  dizziness, nausea, vomiting, dysuria, changes in urination, changes in BM     OBJECTIVE:  ICU Vital Signs Last 24 Hrs  T(C): 36.7 (06 Dec 2022 12:00), Max: 37 (05 Dec 2022 19:44)  T(F): 98 (06 Dec 2022 12:00), Max: 98.6 (05 Dec 2022 19:44)  HR: 79 (06 Dec 2022 12:00) (67 - 82)  BP: 135/90 (06 Dec 2022 12:00) (135/90 - 152/90)  BP(mean): --  ABP: --  ABP(mean): --  RR: 16 (06 Dec 2022 12:00) (16 - 18)  SpO2: 100% (06 Dec 2022 12:00) (98% - 100%)    O2 Parameters below as of 06 Dec 2022 12:00  Patient On (Oxygen Delivery Method): room air              - @ 07:01  -  12-06 @ 16:03  --------------------------------------------------------  IN: 475 mL / OUT: 0 mL / NET: 475 mL      CAPILLARY BLOOD GLUCOSE          PHYSICAL EXAM:  General: Well-groomed, NAD, laying in bed, on ___O2  HEENT: PERRLA, EOMI, non-icteric  Neck:  symmetric,  JVD absent  Respiratory: Clear to ascultation bilaterally, no crackles/rales, no Resp distress; no accessory muscle use  Cardiovascular:  RRR, no murmurs/rubs/gallops  Abdomen: Soft, NT, ND  Extremities: No edema noted  Skin: No rashes or lesions noted  Neurological: Sensation grossly intact; strength 5/5 in all extremities.  Psychiatry: AOx3, appropriate insight/judgement, appropriate affect, recent/remote memory intact    PRN Meds:  guaiFENesin Oral Liquid (Sugar-Free) 100 milliGRAM(s) Oral every 6 hours PRN  HYDROmorphone  Injectable 1 milliGRAM(s) IV Push every 4 hours PRN  ondansetron Injectable 4 milliGRAM(s) IV Push every 6 hours PRN  polyethylene glycol 3350 17 Gram(s) Oral daily PRN      LABS:                        12.7   7.74  )-----------( 289      ( 06 Dec 2022 06:55 )             34.3     Hgb Trend: 12.7<--, 13.0<--  12    138  |  99  |  7   ----------------------------<  100<H>  3.7   |  27  |  0.65    Ca    9.2      06 Dec 2022 06:53  Phos  3.3       Mg     2.1         TPro  8.1  /  Alb  4.0  /  TBili  1.2  /  DBili  x   /  AST  22  /  ALT  16  /  AlkPhos  105      Creatinine Trend: 0.65<--, 0.55<--    Urinalysis Basic - ( 05 Dec 2022 01:31 )    Color: Light Yellow / Appearance: Clear / S.010 / pH: x  Gluc: x / Ketone: Negative  / Bili: Negative / Urobili: Negative   Blood: x / Protein: 30 mg/dL / Nitrite: Negative   Leuk Esterase: Negative / RBC: 1 /hpf / WBC 1 /HPF   Sq Epi: x / Non Sq Epi: 3 /hpf / Bacteria: Negative            MICROBIOLOGY:       RADIOLOGY:  [ ] Reviewed and interpreted by me    EKG: SUBJECTIVE  Pt seen bedside. Complaining of pain, wondering why she is having a sickle cell crisis now as she hasn't had one for years. Has a dry cough and nasal congestion. Explained likely 2/2 covid. No other complaints. Denies Fevers, chills, SOB, cough,  dizziness, nausea, vomiting, dysuria, changes in urination, changes in BM     OBJECTIVE:  ICU Vital Signs Last 24 Hrs  T(C): 36.7 (06 Dec 2022 12:00), Max: 37 (05 Dec 2022 19:44)  T(F): 98 (06 Dec 2022 12:00), Max: 98.6 (05 Dec 2022 19:44)  HR: 79 (06 Dec 2022 12:00) (67 - 82)  BP: 135/90 (06 Dec 2022 12:00) (135/90 - 152/90)  BP(mean): --  ABP: --  ABP(mean): --  RR: 16 (06 Dec 2022 12:00) (16 - 18)  SpO2: 100% (06 Dec 2022 12:00) (98% - 100%)    O2 Parameters below as of 06 Dec 2022 12:00  Patient On (Oxygen Delivery Method): room air              - @ 07:01  -  12-06 @ 16:03  --------------------------------------------------------  IN: 475 mL / OUT: 0 mL / NET: 475 mL      CAPILLARY BLOOD GLUCOSE          PHYSICAL EXAM:  General: Well-groomed, NAD, laying in bed, on RA  HEENT: PERRLA, EOMI, non-icteric  Neck:  symmetric,  JVD absent  Respiratory: Clear to ascultation bilaterally, no crackles/rales, no Resp distress; no accessory muscle use  Cardiovascular:  RRR, no murmurs/rubs/gallops  Abdomen: Soft, NT, ND  Extremities: No edema noted  Skin: No rashes or lesions noted  Neurological: Sensation grossly intact; strength 5/5 in all extremities.  Psychiatry: AOx3, appropriate insight/judgement, appropriate affect, recent/remote memory intact    PRN Meds:  guaiFENesin Oral Liquid (Sugar-Free) 100 milliGRAM(s) Oral every 6 hours PRN  HYDROmorphone  Injectable 1 milliGRAM(s) IV Push every 4 hours PRN  ondansetron Injectable 4 milliGRAM(s) IV Push every 6 hours PRN  polyethylene glycol 3350 17 Gram(s) Oral daily PRN      LABS:                        12.7   7.74  )-----------( 289      ( 06 Dec 2022 06:55 )             34.3     Hgb Trend: 12.7<--, 13.0<--  12    138  |  99  |  7   ----------------------------<  100<H>  3.7   |  27  |  0.65    Ca    9.2      06 Dec 2022 06:53  Phos  3.3       Mg     2.1         TPro  8.1  /  Alb  4.0  /  TBili  1.2  /  DBili  x   /  AST  22  /  ALT  16  /  AlkPhos  105      Creatinine Trend: 0.65<--, 0.55<--    Urinalysis Basic - ( 05 Dec 2022 01:31 )    Color: Light Yellow / Appearance: Clear / S.010 / pH: x  Gluc: x / Ketone: Negative  / Bili: Negative / Urobili: Negative   Blood: x / Protein: 30 mg/dL / Nitrite: Negative   Leuk Esterase: Negative / RBC: 1 /hpf / WBC 1 /HPF   Sq Epi: x / Non Sq Epi: 3 /hpf / Bacteria: Negative            MICROBIOLOGY:       RADIOLOGY:  [ ] Reviewed and interpreted by me    EKG:

## 2022-12-07 DIAGNOSIS — Z29.9 ENCOUNTER FOR PROPHYLACTIC MEASURES, UNSPECIFIED: ICD-10-CM

## 2022-12-07 DIAGNOSIS — H54.7 UNSPECIFIED VISUAL LOSS: ICD-10-CM

## 2022-12-07 LAB
ALBUMIN SERPL ELPH-MCNC: 4 G/DL — SIGNIFICANT CHANGE UP (ref 3.3–5)
ALP SERPL-CCNC: 95 U/L — SIGNIFICANT CHANGE UP (ref 40–120)
ALT FLD-CCNC: 14 U/L — SIGNIFICANT CHANGE UP (ref 10–45)
ANION GAP SERPL CALC-SCNC: 12 MMOL/L — SIGNIFICANT CHANGE UP (ref 5–17)
AST SERPL-CCNC: 18 U/L — SIGNIFICANT CHANGE UP (ref 10–40)
BASOPHILS # BLD AUTO: 0.02 K/UL — SIGNIFICANT CHANGE UP (ref 0–0.2)
BASOPHILS NFR BLD AUTO: 0.3 % — SIGNIFICANT CHANGE UP (ref 0–2)
BILIRUB SERPL-MCNC: 1.2 MG/DL — SIGNIFICANT CHANGE UP (ref 0.2–1.2)
BUN SERPL-MCNC: 7 MG/DL — SIGNIFICANT CHANGE UP (ref 7–23)
CALCIUM SERPL-MCNC: 9.1 MG/DL — SIGNIFICANT CHANGE UP (ref 8.4–10.5)
CHLORIDE SERPL-SCNC: 103 MMOL/L — SIGNIFICANT CHANGE UP (ref 96–108)
CO2 SERPL-SCNC: 25 MMOL/L — SIGNIFICANT CHANGE UP (ref 22–31)
CREAT SERPL-MCNC: 0.7 MG/DL — SIGNIFICANT CHANGE UP (ref 0.5–1.3)
EGFR: 102 ML/MIN/1.73M2 — SIGNIFICANT CHANGE UP
EOSINOPHIL # BLD AUTO: 0.07 K/UL — SIGNIFICANT CHANGE UP (ref 0–0.5)
EOSINOPHIL NFR BLD AUTO: 0.9 % — SIGNIFICANT CHANGE UP (ref 0–6)
GLUCOSE SERPL-MCNC: 110 MG/DL — HIGH (ref 70–99)
HCT VFR BLD CALC: 33.2 % — LOW (ref 34.5–45)
HGB BLD-MCNC: 12.4 G/DL — SIGNIFICANT CHANGE UP (ref 11.5–15.5)
IMM GRANULOCYTES NFR BLD AUTO: 0.5 % — SIGNIFICANT CHANGE UP (ref 0–0.9)
LYMPHOCYTES # BLD AUTO: 1.97 K/UL — SIGNIFICANT CHANGE UP (ref 1–3.3)
LYMPHOCYTES # BLD AUTO: 26.5 % — SIGNIFICANT CHANGE UP (ref 13–44)
MAGNESIUM SERPL-MCNC: 2.1 MG/DL — SIGNIFICANT CHANGE UP (ref 1.6–2.6)
MCHC RBC-ENTMCNC: 33.7 PG — SIGNIFICANT CHANGE UP (ref 27–34)
MCHC RBC-ENTMCNC: 37.3 GM/DL — HIGH (ref 32–36)
MCV RBC AUTO: 90.2 FL — SIGNIFICANT CHANGE UP (ref 80–100)
MONOCYTES # BLD AUTO: 0.8 K/UL — SIGNIFICANT CHANGE UP (ref 0–0.9)
MONOCYTES NFR BLD AUTO: 10.8 % — SIGNIFICANT CHANGE UP (ref 2–14)
NEUTROPHILS # BLD AUTO: 4.52 K/UL — SIGNIFICANT CHANGE UP (ref 1.8–7.4)
NEUTROPHILS NFR BLD AUTO: 61 % — SIGNIFICANT CHANGE UP (ref 43–77)
NRBC # BLD: 3 /100 WBCS — HIGH (ref 0–0)
PHOSPHATE SERPL-MCNC: 4 MG/DL — SIGNIFICANT CHANGE UP (ref 2.5–4.5)
PLATELET # BLD AUTO: 265 K/UL — SIGNIFICANT CHANGE UP (ref 150–400)
POTASSIUM SERPL-MCNC: 3.6 MMOL/L — SIGNIFICANT CHANGE UP (ref 3.5–5.3)
POTASSIUM SERPL-SCNC: 3.6 MMOL/L — SIGNIFICANT CHANGE UP (ref 3.5–5.3)
PROT SERPL-MCNC: 7.6 G/DL — SIGNIFICANT CHANGE UP (ref 6–8.3)
RBC # BLD: 3.68 M/UL — LOW (ref 3.8–5.2)
RBC # FLD: 14.7 % — HIGH (ref 10.3–14.5)
SODIUM SERPL-SCNC: 140 MMOL/L — SIGNIFICANT CHANGE UP (ref 135–145)
WBC # BLD: 7.42 K/UL — SIGNIFICANT CHANGE UP (ref 3.8–10.5)
WBC # FLD AUTO: 7.42 K/UL — SIGNIFICANT CHANGE UP (ref 3.8–10.5)

## 2022-12-07 PROCEDURE — 93010 ELECTROCARDIOGRAM REPORT: CPT

## 2022-12-07 PROCEDURE — 99232 SBSQ HOSP IP/OBS MODERATE 35: CPT

## 2022-12-07 RX ORDER — ONDANSETRON 8 MG/1
4 TABLET, FILM COATED ORAL EVERY 6 HOURS
Refills: 0 | Status: DISCONTINUED | OUTPATIENT
Start: 2022-12-07 | End: 2022-12-13

## 2022-12-07 RX ORDER — SODIUM CHLORIDE 0.65 %
1 AEROSOL, SPRAY (ML) NASAL DAILY
Refills: 0 | Status: DISCONTINUED | OUTPATIENT
Start: 2022-12-07 | End: 2022-12-13

## 2022-12-07 RX ORDER — ACETAMINOPHEN 500 MG
650 TABLET ORAL EVERY 6 HOURS
Refills: 0 | Status: DISCONTINUED | OUTPATIENT
Start: 2022-12-07 | End: 2022-12-13

## 2022-12-07 RX ORDER — SODIUM CHLORIDE 9 MG/ML
1000 INJECTION, SOLUTION INTRAVENOUS
Refills: 0 | Status: DISCONTINUED | OUTPATIENT
Start: 2022-12-07 | End: 2022-12-08

## 2022-12-07 RX ORDER — PANTOPRAZOLE SODIUM 20 MG/1
40 TABLET, DELAYED RELEASE ORAL
Refills: 0 | Status: DISCONTINUED | OUTPATIENT
Start: 2022-12-07 | End: 2022-12-13

## 2022-12-07 RX ORDER — CALCIUM CARBONATE 500(1250)
1 TABLET ORAL
Refills: 0 | Status: DISCONTINUED | OUTPATIENT
Start: 2022-12-07 | End: 2022-12-13

## 2022-12-07 RX ORDER — POLYETHYLENE GLYCOL 3350 17 G/17G
17 POWDER, FOR SOLUTION ORAL DAILY
Refills: 0 | Status: DISCONTINUED | OUTPATIENT
Start: 2022-12-07 | End: 2022-12-13

## 2022-12-07 RX ADMIN — HYDROMORPHONE HYDROCHLORIDE 1 MILLIGRAM(S): 2 INJECTION INTRAMUSCULAR; INTRAVENOUS; SUBCUTANEOUS at 18:29

## 2022-12-07 RX ADMIN — HYDROMORPHONE HYDROCHLORIDE 1 MILLIGRAM(S): 2 INJECTION INTRAMUSCULAR; INTRAVENOUS; SUBCUTANEOUS at 09:54

## 2022-12-07 RX ADMIN — CHLORHEXIDINE GLUCONATE 1 APPLICATION(S): 213 SOLUTION TOPICAL at 22:01

## 2022-12-07 RX ADMIN — HYDROMORPHONE HYDROCHLORIDE 1 MILLIGRAM(S): 2 INJECTION INTRAMUSCULAR; INTRAVENOUS; SUBCUTANEOUS at 10:30

## 2022-12-07 RX ADMIN — HYDROMORPHONE HYDROCHLORIDE 1 MILLIGRAM(S): 2 INJECTION INTRAMUSCULAR; INTRAVENOUS; SUBCUTANEOUS at 22:09

## 2022-12-07 RX ADMIN — BUDESONIDE AND FORMOTEROL FUMARATE DIHYDRATE 2 PUFF(S): 160; 4.5 AEROSOL RESPIRATORY (INHALATION) at 18:02

## 2022-12-07 RX ADMIN — AMLODIPINE BESYLATE 10 MILLIGRAM(S): 2.5 TABLET ORAL at 05:11

## 2022-12-07 RX ADMIN — Medication 1 PATCH: at 06:31

## 2022-12-07 RX ADMIN — BUDESONIDE AND FORMOTEROL FUMARATE DIHYDRATE 2 PUFF(S): 160; 4.5 AEROSOL RESPIRATORY (INHALATION) at 05:11

## 2022-12-07 RX ADMIN — HYDROMORPHONE HYDROCHLORIDE 1 MILLIGRAM(S): 2 INJECTION INTRAMUSCULAR; INTRAVENOUS; SUBCUTANEOUS at 05:10

## 2022-12-07 RX ADMIN — ENOXAPARIN SODIUM 40 MILLIGRAM(S): 100 INJECTION SUBCUTANEOUS at 21:46

## 2022-12-07 RX ADMIN — Medication 1 PATCH: at 19:32

## 2022-12-07 RX ADMIN — Medication 1 PATCH: at 13:04

## 2022-12-07 RX ADMIN — Medication 1 TABLET(S): at 23:22

## 2022-12-07 RX ADMIN — SODIUM CHLORIDE 75 MILLILITER(S): 9 INJECTION, SOLUTION INTRAVENOUS at 18:01

## 2022-12-07 RX ADMIN — HYDROXYUREA 500 MILLIGRAM(S): 500 CAPSULE ORAL at 18:02

## 2022-12-07 RX ADMIN — SODIUM CHLORIDE 75 MILLILITER(S): 9 INJECTION, SOLUTION INTRAVENOUS at 05:10

## 2022-12-07 RX ADMIN — Medication 1 PATCH: at 11:56

## 2022-12-07 RX ADMIN — Medication 1 SPRAY(S): at 13:04

## 2022-12-07 RX ADMIN — POLYETHYLENE GLYCOL 3350 17 GRAM(S): 17 POWDER, FOR SOLUTION ORAL at 10:21

## 2022-12-07 RX ADMIN — HYDROMORPHONE HYDROCHLORIDE 1 MILLIGRAM(S): 2 INJECTION INTRAMUSCULAR; INTRAVENOUS; SUBCUTANEOUS at 18:03

## 2022-12-07 RX ADMIN — REMDESIVIR 500 MILLIGRAM(S): 5 INJECTION INTRAVENOUS at 10:18

## 2022-12-07 RX ADMIN — Medication 100 MILLIGRAM(S): at 09:55

## 2022-12-07 RX ADMIN — HYDROMORPHONE HYDROCHLORIDE 1 MILLIGRAM(S): 2 INJECTION INTRAMUSCULAR; INTRAVENOUS; SUBCUTANEOUS at 22:27

## 2022-12-07 RX ADMIN — LISINOPRIL 40 MILLIGRAM(S): 2.5 TABLET ORAL at 05:10

## 2022-12-07 RX ADMIN — HYDROMORPHONE HYDROCHLORIDE 1 MILLIGRAM(S): 2 INJECTION INTRAMUSCULAR; INTRAVENOUS; SUBCUTANEOUS at 14:44

## 2022-12-07 RX ADMIN — HYDROXYUREA 500 MILLIGRAM(S): 500 CAPSULE ORAL at 05:10

## 2022-12-07 RX ADMIN — Medication 1 MILLIGRAM(S): at 13:05

## 2022-12-07 RX ADMIN — HYDROMORPHONE HYDROCHLORIDE 1 MILLIGRAM(S): 2 INJECTION INTRAMUSCULAR; INTRAVENOUS; SUBCUTANEOUS at 15:14

## 2022-12-07 RX ADMIN — HYDROMORPHONE HYDROCHLORIDE 1 MILLIGRAM(S): 2 INJECTION INTRAMUSCULAR; INTRAVENOUS; SUBCUTANEOUS at 05:28

## 2022-12-07 RX ADMIN — SENNA PLUS 2 TABLET(S): 8.6 TABLET ORAL at 21:44

## 2022-12-07 NOTE — CONSULT NOTE ADULT - SUBJECTIVE AND OBJECTIVE BOX
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 Patient reports worsening of vision in the left eye for the last week. She states that she has had bleeding in the eyes before from sickle cell, has had lasers in both eyes to control the sickle cell. Usually she sees blood for a week then it gets better. However this time it has not gotten any better. Endorses floaters, denies eye pain. Seeing "streaks of blood" in the left eye which has happened before.    PAST MEDICAL & SURGICAL HISTORY:  Sickle cell disease      HTN (hypertension)      H/O abdominoplasty      S/P breast augmentation      S/P         Past Ocular History: retinal detachment OD s/p repair; "lasers" in both eyes for sickle cell  Ophthalmic Medications: none  FAMILY HISTORY:  FHx: cerebral palsy (Child)      Social History: none    MEDICATIONS  (STANDING):  amLODIPine   Tablet 10 milliGRAM(s) Oral daily  budesonide 160 MICROgram(s)/formoterol 4.5 MICROgram(s) Inhaler 2 Puff(s) Inhalation two times a day  chlorhexidine 2% Cloths 1 Application(s) Topical daily  dextrose 5% + sodium chloride 0.45%. 1000 milliLiter(s) (75 mL/Hr) IV Continuous <Continuous>  enoxaparin Injectable 40 milliGRAM(s) SubCutaneous every 24 hours  folic acid 1 milliGRAM(s) Oral daily  hydroxyurea 500 milliGRAM(s) Oral two times a day  influenza   Vaccine 0.5 milliLiter(s) IntraMuscular once  lisinopril 40 milliGRAM(s) Oral daily  nicotine - 21 mG/24Hr(s) Patch 1 Patch Transdermal daily  polyethylene glycol 3350 17 Gram(s) Oral daily  senna 2 Tablet(s) Oral at bedtime    MEDICATIONS  (PRN):  acetaminophen     Tablet .. 650 milliGRAM(s) Oral every 6 hours PRN Moderate Pain (4 - 6)  guaiFENesin Oral Liquid (Sugar-Free) 100 milliGRAM(s) Oral every 6 hours PRN Cough  HYDROmorphone  Injectable 1 milliGRAM(s) IV Push every 4 hours PRN Severe Pain (7 - 10)  ondansetron Injectable 4 milliGRAM(s) IV Push every 6 hours PRN Nausea and/or Vomiting  sodium chloride 0.65% Nasal 1 Spray(s) Both Nostrils daily PRN Nasal Congestion    Allergies & Intolerances:   clindamycin (Angioedema)  doxycycline (Angioedema)  linezolid (Angioedema)    Review of Systems:  Constitutional: No fever, chills  Eyes: No blurry vision, flashes, floaters, FBS, erythema, discharge, double vision, OU  Neuro: No tremors  Cardiovascular: No chest pain, palpitations  Respiratory: No SOB, no cough  GI: No nausea, vomiting, abdominal pain  : No dysuria  Skin: no rash  Psych: no depression  Endocrine: no polyuria, polydipsia  Heme/lymph: no swelling    VITALS: T(C): 37.3 (22 @ 04:37)  T(F): 99.2 (22 @ 04:37), Max: 99.2 (22 @ 04:37)  HR: 94 (22 @ 04:37) (88 - 94)  BP: 144/95 (22 @ 04:37) (144/95 - 147/69)  RR:  (18 - 20)  SpO2:  (96% - 99%)  Wt(kg): --  General: AAO x 3, appropriate mood and affect    Ophthalmology Exam:  Visual acuity (sc): 20/50 PH 20/40 OD, HM OS  Pupils: PERRL OU, RAPD OD  Ttono: 19 OD, 18 OS  Extraocular movements (EOMs): Full OU, no pain, no diplopia    Pen Light Exam (PLE)  External: Flat OU  Lids/Lashes/Lacrimal Ducts: Flat OU    Sclera/Conjunctiva: W+Q OU  Cornea: Cl OU  Anterior Chamber: D+F OU    Iris: Flat OU; iridectomy 6'o clock OD  Lens: PCIOL OD, NS OS    Fundus Exam: dilated with 1% tropicamide and 2.5% phenylephrine  Approval obtained from primary team for dilation  Patient aware that pupils can remained dilated for at least 4-6 hours  Exam performed with 20D lens    Vitreous: wnl OU  Disc, cup/disc: sharp and pink, 0.4 OU  Macula: wnl OU  Vessels: wnl OU  Periphery: wnl OU   [facility] DEPARTMENT OF OPHTHALMOLOGY - INITIAL ADULT CONSULT  -----------------------------------------------------------------------------------------------------------------  Errol Hayward MD PGY 3  [phone]  -----------------------------------------------------------------------------------------------------------------    HPI: 57F with history of sickle cell disease, retinal detachment right eye s/p repair, 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 Patient reports worsening of vision in the left eye for the last week. She states that she has had bleeding in the eyes before from sickle cell, has had lasers in both eyes to control the sickle cell. Usually she sees blood for a week then it gets better. However this time it has not gotten any better. Endorses floaters, denies eye pain. Seeing "streaks of blood" in the left eye which has happened before.    PAST MEDICAL & SURGICAL HISTORY:  Sickle cell disease      HTN (hypertension)      H/O abdominoplasty      S/P breast augmentation      S/P         Past Ocular History: retinal detachment OD s/p repair; "lasers" in both eyes for sickle cell  Ophthalmic Medications: none  FAMILY HISTORY:  FHx: cerebral palsy (Child)      Social History: none    MEDICATIONS  (STANDING):  amLODIPine   Tablet 10 milliGRAM(s) Oral daily  budesonide 160 MICROgram(s)/formoterol 4.5 MICROgram(s) Inhaler 2 Puff(s) Inhalation two times a day  chlorhexidine 2% Cloths 1 Application(s) Topical daily  dextrose 5% + sodium chloride 0.45%. 1000 milliLiter(s) (75 mL/Hr) IV Continuous <Continuous>  enoxaparin Injectable 40 milliGRAM(s) SubCutaneous every 24 hours  folic acid 1 milliGRAM(s) Oral daily  hydroxyurea 500 milliGRAM(s) Oral two times a day  influenza   Vaccine 0.5 milliLiter(s) IntraMuscular once  lisinopril 40 milliGRAM(s) Oral daily  nicotine - 21 mG/24Hr(s) Patch 1 Patch Transdermal daily  polyethylene glycol 3350 17 Gram(s) Oral daily  senna 2 Tablet(s) Oral at bedtime    MEDICATIONS  (PRN):  acetaminophen     Tablet .. 650 milliGRAM(s) Oral every 6 hours PRN Moderate Pain (4 - 6)  guaiFENesin Oral Liquid (Sugar-Free) 100 milliGRAM(s) Oral every 6 hours PRN Cough  HYDROmorphone  Injectable 1 milliGRAM(s) IV Push every 4 hours PRN Severe Pain (7 - 10)  ondansetron Injectable 4 milliGRAM(s) IV Push every 6 hours PRN Nausea and/or Vomiting  sodium chloride 0.65% Nasal 1 Spray(s) Both Nostrils daily PRN Nasal Congestion    Allergies & Intolerances:   clindamycin (Angioedema)  doxycycline (Angioedema)  linezolid (Angioedema)    Review of Systems:  Constitutional: No fever, chills  Eyes: No blurry vision, flashes, floaters, FBS, erythema, discharge, double vision, OU  Neuro: No tremors  Cardiovascular: No chest pain, palpitations  Respiratory: No SOB, no cough  GI: No nausea, vomiting, abdominal pain  : No dysuria  Skin: no rash  Psych: no depression  Endocrine: no polyuria, polydipsia  Heme/lymph: no swelling    VITALS: T(C): 37.3 (22 @ 04:37)  T(F): 99.2 (22 @ 04:37), Max: 99.2 (22 @ 04:37)  HR: 94 (22 @ 04:37) (88 - 94)  BP: 144/95 (22 @ 04:37) (144/95 - 147/69)  RR:  (18 - 20)  SpO2:  (96% - 99%)  Wt(kg): --  General: AAO x 3, appropriate mood and affect    Ophthalmology Exam:  Visual acuity (sc): 20/50 PH 20/30 OD, HM OS  Pupils: PERRL OU, RAPD OD  Ttono: 19 OD, 18 OS  Extraocular movements (EOMs): Full OU, no pain, no diplopia    Pen Light Exam (PLE)  External: Flat OU  Lids/Lashes/Lacrimal Ducts: Flat OU    Sclera/Conjunctiva: W+Q OU  Cornea: Cl OU  Anterior Chamber: D+F OU    Iris: Flat OU; iridectomy 6'o clock OD  Lens: PCIOL OD, NS OS    Fundus Exam: dilated with 1% tropicamide and 2.5% phenylephrine  Approval obtained from primary team for dilation  Patient aware that pupils can remained dilated for at least 4-6 hours  Exam performed with 20D lens    Vitreous: wnl OU  Disc, cup/disc: sharp and pink, 0.4 OU  Macula: wnl OU  Vessels: wnl OU  Periphery: wnl OU   [facility] DEPARTMENT OF OPHTHALMOLOGY - INITIAL ADULT CONSULT  -----------------------------------------------------------------------------------------------------------------  Errol Hayward MD PGY 3  [phone]  -----------------------------------------------------------------------------------------------------------------    HPI: 57F with history of sickle cell disease, retinal detachment right eye s/p repair, 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 Patient reports worsening of vision in the left eye for the last week. She states that she has had bleeding in the eyes before from sickle cell, has had lasers in both eyes to control the sickle cell. Usually she sees blood for a week then it gets better. However this time it has not gotten any better. Endorses floaters, denies eye pain. Seeing "streaks of blood" in the left eye which has happened before.    PAST MEDICAL & SURGICAL HISTORY:  Sickle cell disease      HTN (hypertension)      H/O abdominoplasty      S/P breast augmentation      S/P         Past Ocular History: retinal detachment OD s/p repair; "lasers" in both eyes for sickle cell  Ophthalmic Medications: none  FAMILY HISTORY:  FHx: cerebral palsy (Child)      Social History: none    MEDICATIONS  (STANDING):  amLODIPine   Tablet 10 milliGRAM(s) Oral daily  budesonide 160 MICROgram(s)/formoterol 4.5 MICROgram(s) Inhaler 2 Puff(s) Inhalation two times a day  chlorhexidine 2% Cloths 1 Application(s) Topical daily  dextrose 5% + sodium chloride 0.45%. 1000 milliLiter(s) (75 mL/Hr) IV Continuous <Continuous>  enoxaparin Injectable 40 milliGRAM(s) SubCutaneous every 24 hours  folic acid 1 milliGRAM(s) Oral daily  hydroxyurea 500 milliGRAM(s) Oral two times a day  influenza   Vaccine 0.5 milliLiter(s) IntraMuscular once  lisinopril 40 milliGRAM(s) Oral daily  nicotine - 21 mG/24Hr(s) Patch 1 Patch Transdermal daily  polyethylene glycol 3350 17 Gram(s) Oral daily  senna 2 Tablet(s) Oral at bedtime    MEDICATIONS  (PRN):  acetaminophen     Tablet .. 650 milliGRAM(s) Oral every 6 hours PRN Moderate Pain (4 - 6)  guaiFENesin Oral Liquid (Sugar-Free) 100 milliGRAM(s) Oral every 6 hours PRN Cough  HYDROmorphone  Injectable 1 milliGRAM(s) IV Push every 4 hours PRN Severe Pain (7 - 10)  ondansetron Injectable 4 milliGRAM(s) IV Push every 6 hours PRN Nausea and/or Vomiting  sodium chloride 0.65% Nasal 1 Spray(s) Both Nostrils daily PRN Nasal Congestion    Allergies & Intolerances:   clindamycin (Angioedema)  doxycycline (Angioedema)  linezolid (Angioedema)    Review of Systems:  Constitutional: No fever, chills  Eyes: No blurry vision, flashes, floaters, FBS, erythema, discharge, double vision, OU  Neuro: No tremors  Cardiovascular: No chest pain, palpitations  Respiratory: No SOB, no cough  GI: No nausea, vomiting, abdominal pain  : No dysuria  Skin: no rash  Psych: no depression  Endocrine: no polyuria, polydipsia  Heme/lymph: no swelling    VITALS: T(C): 37.3 (22 @ 04:37)  T(F): 99.2 (22 @ 04:37), Max: 99.2 (22 @ 04:37)  HR: 94 (22 @ 04:37) (88 - 94)  BP: 144/95 (22 @ 04:37) (144/95 - 147/69)  RR:  (18 - 20)  SpO2:  (96% - 99%)  Wt(kg): --  General: AAO x 3, appropriate mood and affect    Ophthalmology Exam:  Visual acuity (sc): 20/50 PH 20/30 OD, HM OS  Pupils: PERRL OU, RAPD OD  Ttono: 19 OD, 18 OS  Extraocular movements (EOMs): Full OU, no pain, no diplopia    Pen Light Exam (PLE)  External: Flat OU  Lids/Lashes/Lacrimal Ducts: Flat OU    Sclera/Conjunctiva: W+Q OU  Cornea: Cl OU  Anterior Chamber: D+F OU    Iris: Flat OU; iridectomy 6'o clock OD  Lens: PCIOL OD, NS OS    Fundus Exam: dilated with 1% tropicamide and 2.5% phenylephrine  Approval obtained from primary team for dilation  Patient aware that pupils can remained dilated for at least 4-6 hours  Exam performed with 20D lens    Vitreous: wnl OD, poor view OS  Disc, cup/disc: sharp and pink, 0.4 OD, poor view OS  Macula: wnl OD, poor view OS  Vessels: wnl OD, poor view OS  Periphery: 360 PRP scars OD, poor view OS    B-scan OS: vitreous debris likely hemorrhage, no RD or mass 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 Patient reports worsening of vision in the left eye for the last week. She states that she has had bleeding in the eyes before from sickle cell, has had lasers in both eyes to control the sickle cell. Usually she sees blood for a week then it gets better. However this time it has not gotten any better. Endorses floaters, denies eye pain. Seeing "streaks of blood" in the left eye which has happened before.    PAST MEDICAL & SURGICAL HISTORY:  Sickle cell disease      HTN (hypertension)      H/O abdominoplasty      S/P breast augmentation      S/P         Past Ocular History: retinal detachment OD s/p repair; "lasers" in both eyes for sickle cell  Ophthalmic Medications: none  FAMILY HISTORY:  FHx: cerebral palsy (Child)      Social History: none    MEDICATIONS  (STANDING):  amLODIPine   Tablet 10 milliGRAM(s) Oral daily  budesonide 160 MICROgram(s)/formoterol 4.5 MICROgram(s) Inhaler 2 Puff(s) Inhalation two times a day  chlorhexidine 2% Cloths 1 Application(s) Topical daily  dextrose 5% + sodium chloride 0.45%. 1000 milliLiter(s) (75 mL/Hr) IV Continuous <Continuous>  enoxaparin Injectable 40 milliGRAM(s) SubCutaneous every 24 hours  folic acid 1 milliGRAM(s) Oral daily  hydroxyurea 500 milliGRAM(s) Oral two times a day  influenza   Vaccine 0.5 milliLiter(s) IntraMuscular once  lisinopril 40 milliGRAM(s) Oral daily  nicotine - 21 mG/24Hr(s) Patch 1 Patch Transdermal daily  polyethylene glycol 3350 17 Gram(s) Oral daily  senna 2 Tablet(s) Oral at bedtime    MEDICATIONS  (PRN):  acetaminophen     Tablet .. 650 milliGRAM(s) Oral every 6 hours PRN Moderate Pain (4 - 6)  guaiFENesin Oral Liquid (Sugar-Free) 100 milliGRAM(s) Oral every 6 hours PRN Cough  HYDROmorphone  Injectable 1 milliGRAM(s) IV Push every 4 hours PRN Severe Pain (7 - 10)  ondansetron Injectable 4 milliGRAM(s) IV Push every 6 hours PRN Nausea and/or Vomiting  sodium chloride 0.65% Nasal 1 Spray(s) Both Nostrils daily PRN Nasal Congestion    Allergies & Intolerances:   clindamycin (Angioedema)  doxycycline (Angioedema)  linezolid (Angioedema)    Review of Systems:  Constitutional: No fever, chills  Eyes: No blurry vision, flashes, floaters, FBS, erythema, discharge, double vision, OU  Neuro: No tremors  Cardiovascular: No chest pain, palpitations  Respiratory: No SOB, no cough  GI: No nausea, vomiting, abdominal pain  : No dysuria  Skin: no rash  Psych: no depression  Endocrine: no polyuria, polydipsia  Heme/lymph: no swelling    VITALS: T(C): 37.3 (22 @ 04:37)  T(F): 99.2 (22 @ 04:37), Max: 99.2 (22 @ 04:37)  HR: 94 (22 @ 04:37) (88 - 94)  BP: 144/95 (22 @ 04:37) (144/95 - 147/69)  RR:  (18 - 20)  SpO2:  (96% - 99%)  Wt(kg): --  General: AAO x 3, appropriate mood and affect    Ophthalmology Exam:  Visual acuity (sc): 20/50 PH 20/30 OD, HM OS  Pupils: PERRL OU, RAPD OD  Ttono: 19 OD, 18 OS  Extraocular movements (EOMs): Full OU, no pain, no diplopia    Pen Light Exam (PLE)  External: Flat OU  Lids/Lashes/Lacrimal Ducts: Flat OU    Sclera/Conjunctiva: W+Q OU  Cornea: Cl OU  Anterior Chamber: D+F OU    Iris: Flat OU; iridectomy 6'o clock OD  Lens: PCIOL OD, NS OS    Fundus Exam: dilated with 1% tropicamide and 2.5% phenylephrine  Approval obtained from primary team for dilation  Patient aware that pupils can remained dilated for at least 4-6 hours  Exam performed with 20D lens    Vitreous: wnl OD, poor view OS  Disc, cup/disc: sharp and pink, 0.4 OD, poor view OS  Macula: wnl OD, poor view OS  Vessels: wnl OD, poor view OS  Periphery: 360 PRP scars and chorioretinal atrophy superotemporally OD, poor view OS    B-scan OS: vitreous debris likely hemorrhage, no RD or mass

## 2022-12-07 NOTE — PROGRESS NOTE ADULT - PROBLEM SELECTOR PLAN 2
-C/w Remdesivir 2/2 high risk factor for progression (Day 2/5)  -VTE Ppx: lovenox ppx  -Not hypoxic-> no need for decadron 5 days of decreased visual acuity in left eye, worsening  Episodes in past associated with SC crises  Concern for vaso-occlusive crisis  -ophtho consulted

## 2022-12-07 NOTE — PROGRESS NOTE ADULT - PROBLEM SELECTOR PLAN 1
-C/w Dilaudid 1 q4h IVP PRN  -C/w IVF 1/2NS +D5 @ 75cc/hr for net 2L /day   -Encourage hydration   -Afebrile, CXR negative   -C/w hydroxyurea, folate  -Monitor electrolytes, Cr   -Monitor vitals, O2 sat. -C/w Dilaudid 1 q4h IVP PRN     -bowel regimen while on opioids  -C/w IVF 1/2NS +D5 @ 75cc/hr for net 2L /day   -Encourage hydration   -Afebrile, CXR negative   -C/w hydroxyurea, folate  -Monitor electrolytes, Cr   -Monitor vitals, O2 sat.

## 2022-12-07 NOTE — PROGRESS NOTE ADULT - SUBJECTIVE AND OBJECTIVE BOX
Sultan Moy MD  PGY 1 Department of Internal Medicine        Patient is a 57y old  Female who presents with a chief complaint of cough, congestion, left arm, left neck, hip, thigh, back pain (06 Dec 2022 09:26)      SUBJECTIVE / OVERNIGHT EVENTS: Pt seen and examined. No acute overnight events. Denies fevers, chills, CP, SOB, Abdominal pain, N/V, Constipation, Diarrhea        MEDICATIONS  (STANDING):  amLODIPine   Tablet 10 milliGRAM(s) Oral daily  budesonide 160 MICROgram(s)/formoterol 4.5 MICROgram(s) Inhaler 2 Puff(s) Inhalation two times a day  chlorhexidine 2% Cloths 1 Application(s) Topical daily  dextrose 5% + sodium chloride 0.45%. 1000 milliLiter(s) (75 mL/Hr) IV Continuous <Continuous>  enoxaparin Injectable 40 milliGRAM(s) SubCutaneous every 24 hours  folic acid 1 milliGRAM(s) Oral daily  hydroxyurea 500 milliGRAM(s) Oral two times a day  influenza   Vaccine 0.5 milliLiter(s) IntraMuscular once  lisinopril 40 milliGRAM(s) Oral daily  nicotine - 21 mG/24Hr(s) Patch 1 Patch Transdermal daily  remdesivir  IVPB 100 milliGRAM(s) IV Intermittent every 24 hours  remdesivir  IVPB   IV Intermittent   senna 2 Tablet(s) Oral at bedtime    MEDICATIONS  (PRN):  guaiFENesin Oral Liquid (Sugar-Free) 100 milliGRAM(s) Oral every 6 hours PRN Cough  HYDROmorphone  Injectable 1 milliGRAM(s) IV Push every 4 hours PRN Severe Pain (7 - 10)  ondansetron Injectable 4 milliGRAM(s) IV Push every 6 hours PRN Nausea and/or Vomiting  polyethylene glycol 3350 17 Gram(s) Oral daily PRN Constipation      I&O's Summary    06 Dec 2022 07:01  -  07 Dec 2022 04:22  --------------------------------------------------------  IN: 475 mL / OUT: 0 mL / NET: 475 mL        Vital Signs Last 24 Hrs  T(C): 37.2 (06 Dec 2022 21:36), Max: 37.2 (06 Dec 2022 21:36)  T(F): 99 (06 Dec 2022 21:36), Max: 99 (06 Dec 2022 21:36)  HR: 88 (06 Dec 2022 21:36) (78 - 88)  BP: 147/69 (06 Dec 2022 21:36) (135/90 - 147/69)  BP(mean): --  RR: 20 (06 Dec 2022 21:36) (16 - 20)  SpO2: 96% (06 Dec 2022 21:36) (96% - 100%)    Parameters below as of 06 Dec 2022 21:36  Patient On (Oxygen Delivery Method): room air        CAPILLARY BLOOD GLUCOSE          PHYSICAL EXAM:  General: Well-groomed, NAD, laying in bed, on RA  HEENT: PERRLA, EOMI, non-icteric  Neck:  symmetric,  JVD absent  Respiratory: Clear to ascultation bilaterally, no crackles/rales, no Resp distress; no accessory muscle use  Cardiovascular:  RRR, no murmurs/rubs/gallops  Abdomen: Soft, NT, ND  Extremities: No edema noted  Skin: No rashes or lesions noted  Neurological: Sensation grossly intact; strength 5/5 in all extremities.  Psychiatry: AOx3, appropriate insight/judgement, appropriate affect, recent/remote memory intact       LABS:                        12.7   7.74  )-----------( 289      ( 06 Dec 2022 06:55 )             34.3     Auto Eosinophil # 0.06  / Auto Eosinophil % 0.8   / Auto Neutrophil # 4.66  / Auto Neutrophil % 60.3  / BANDS % x        12-06    138  |  99  |  7   ----------------------------<  100<H>  3.7   |  27  |  0.65    Ca    9.2      06 Dec 2022 06:53  Mg     2.1     12-06  Phos  3.3     12-06  TPro  8.1  /  Alb  4.0  /  TBili  1.2  /  DBili  x   /  AST  22  /  ALT  16  /  AlkPhos  105  12-06                  RADIOLOGY & ADDITIONAL TESTS:    Imaging Personally Reviewed:    Consultant(s) Notes Reviewed:      Care Discussed with Consultants/Other Providers:   Sultan Moy MD  PGY 1 Department of Internal Medicine        Patient is a 57y old  Female who presents with a chief complaint of cough, congestion, left arm, left neck, hip, thigh, back pain (06 Dec 2022 09:26)      SUBJECTIVE / OVERNIGHT EVENTS: Pt seen and examined. No acute overnight events. Notes vomited multiple times yesterday but nothing overnight. Still nauseous. Has only been eating liquid due to nausea but will try solid food today. Pain is improved but still significant. Still has cough and nasal congestion though improving. Notes ear congestion as well. 5 days ago, started to have decreased visual acuity in left eye that has now worsened. Has had prior episodes like this in past associated with sickle cell crises.         MEDICATIONS  (STANDING):  amLODIPine   Tablet 10 milliGRAM(s) Oral daily  budesonide 160 MICROgram(s)/formoterol 4.5 MICROgram(s) Inhaler 2 Puff(s) Inhalation two times a day  chlorhexidine 2% Cloths 1 Application(s) Topical daily  dextrose 5% + sodium chloride 0.45%. 1000 milliLiter(s) (75 mL/Hr) IV Continuous <Continuous>  enoxaparin Injectable 40 milliGRAM(s) SubCutaneous every 24 hours  folic acid 1 milliGRAM(s) Oral daily  hydroxyurea 500 milliGRAM(s) Oral two times a day  influenza   Vaccine 0.5 milliLiter(s) IntraMuscular once  lisinopril 40 milliGRAM(s) Oral daily  nicotine - 21 mG/24Hr(s) Patch 1 Patch Transdermal daily  remdesivir  IVPB 100 milliGRAM(s) IV Intermittent every 24 hours  remdesivir  IVPB   IV Intermittent   senna 2 Tablet(s) Oral at bedtime    MEDICATIONS  (PRN):  guaiFENesin Oral Liquid (Sugar-Free) 100 milliGRAM(s) Oral every 6 hours PRN Cough  HYDROmorphone  Injectable 1 milliGRAM(s) IV Push every 4 hours PRN Severe Pain (7 - 10)  ondansetron Injectable 4 milliGRAM(s) IV Push every 6 hours PRN Nausea and/or Vomiting  polyethylene glycol 3350 17 Gram(s) Oral daily PRN Constipation      I&O's Summary    06 Dec 2022 07:01  -  07 Dec 2022 04:22  --------------------------------------------------------  IN: 475 mL / OUT: 0 mL / NET: 475 mL        Vital Signs Last 24 Hrs  T(C): 37.2 (06 Dec 2022 21:36), Max: 37.2 (06 Dec 2022 21:36)  T(F): 99 (06 Dec 2022 21:36), Max: 99 (06 Dec 2022 21:36)  HR: 88 (06 Dec 2022 21:36) (78 - 88)  BP: 147/69 (06 Dec 2022 21:36) (135/90 - 147/69)  BP(mean): --  RR: 20 (06 Dec 2022 21:36) (16 - 20)  SpO2: 96% (06 Dec 2022 21:36) (96% - 100%)    Parameters below as of 06 Dec 2022 21:36  Patient On (Oxygen Delivery Method): room air        CAPILLARY BLOOD GLUCOSE          PHYSICAL EXAM:  General: Well-groomed, NAD, laying in bed, on RA  HEENT: PERRLA, EOMI, non-icteric  Neck:  symmetric,  JVD absent  Respiratory: Clear to ascultation bilaterally, no crackles/rales, no Resp distress; no accessory muscle use  Cardiovascular:  RRR, no murmurs/rubs/gallops  Abdomen: Soft, NT, ND  Extremities: No edema noted  Skin: No rashes or lesions noted  Neurological: Sensation grossly intact; strength 5/5 in all extremities.  Psychiatry: AOx3, appropriate insight/judgement, appropriate affect, recent/remote memory intact       LABS:                        12.7   7.74  )-----------( 289      ( 06 Dec 2022 06:55 )             34.3     Auto Eosinophil # 0.06  / Auto Eosinophil % 0.8   / Auto Neutrophil # 4.66  / Auto Neutrophil % 60.3  / BANDS % x        12-06    138  |  99  |  7   ----------------------------<  100<H>  3.7   |  27  |  0.65    Ca    9.2      06 Dec 2022 06:53  Mg     2.1     12-06  Phos  3.3     12-06  TPro  8.1  /  Alb  4.0  /  TBili  1.2  /  DBili  x   /  AST  22  /  ALT  16  /  AlkPhos  105  12-06                  RADIOLOGY & ADDITIONAL TESTS:    Imaging Personally Reviewed:    Consultant(s) Notes Reviewed:      Care Discussed with Consultants/Other Providers:

## 2022-12-07 NOTE — CONSULT NOTE ADULT - ASSESSMENT
INCOMPLETE NOTE, FINAL RECS TO FOLLOW    Assessment and Recommendations:  57y female w/ pmhx/ochx of sickle cell disease, retinal detachment right eye s/p repair, retinal lasers in both eyes for sickle cell retinopathy consulted for blurry vision of the left eye  # Sickle cell retinopathy, both eyes  - s/p laser treatment both eyes and retinal detachment repair of right eye  - Vision 20/50 right eye pinhole 20/30, hand motion in the left eye  - Anterior segment exam unremarkable  - Posterior segment exam shows ***  - Avoid blood thinners/NSAIDs unless medically necessary  - Findings and plan discussed with patient and primary team.    Patient seen and discussed with  ***    Outpatient follow-up: Patient should follow-up with his/her ophthalmologist or with [facility] Department of Ophthalmology at the address below     [address] [place] [address]. [address]  [place] [address]  [phone]     Assessment and Recommendations:  57y female w/ pmhx/ochx of sickle cell disease, retinal detachment right eye s/p repair, retinal lasers in both eyes for sickle cell retinopathy consulted for blurry vision of the left eye.  # Sickle cell retinopathy, both eyes  # Vitreous hemorrhage, left eye  - s/p laser treatment both eyes and retinal detachment repair of right eye  - Vision 20/50 right eye pinhole 20/30, hand motion in the left eye  - Anterior segment exam unremarkable  - Posterior segment exam shows haze view in the left eye, PRP scars in the right eye  - B-scan OS: vitreous debris likely hemorrhage, no RD or mass  - Head of bed elevation  - Avoid blood thinners/NSAIDs unless medically necessary  - Findings and plan discussed with patient and primary team.    Patient seen and discussed with Dr. Kelly; discussed with Dr Lindo (retina)    Outpatient follow-up: Patient should follow-up with his/her ophthalmologist or with [facility] Department of Ophthalmology at the address below     [address] [place] [address]. [address]  [place] [address]  [phone]

## 2022-12-07 NOTE — CONSULT NOTE ADULT - ATTENDING COMMENTS
I have seen and examined patient and agree with the note above. Patient with vitreous hemorrhage left eye without evidence for retinal detachment on B-scan. Advised that patient see her retina specialist within 1 week of discharge. 60 min spent on this patient encounter.

## 2022-12-07 NOTE — PROGRESS NOTE ADULT - PROBLEM SELECTOR PLAN 3
-cont lisinopril, norvasc -C/w Remdesivir 2/2 high risk factor for progression (Day 3/5)  -VTE Ppx: lovenox ppx  -Not hypoxic-> no need for decadron

## 2022-12-08 LAB
ANION GAP SERPL CALC-SCNC: 11 MMOL/L — SIGNIFICANT CHANGE UP (ref 5–17)
BUN SERPL-MCNC: 7 MG/DL — SIGNIFICANT CHANGE UP (ref 7–23)
CALCIUM SERPL-MCNC: 9.2 MG/DL — SIGNIFICANT CHANGE UP (ref 8.4–10.5)
CHLORIDE SERPL-SCNC: 102 MMOL/L — SIGNIFICANT CHANGE UP (ref 96–108)
CO2 SERPL-SCNC: 24 MMOL/L — SIGNIFICANT CHANGE UP (ref 22–31)
CREAT SERPL-MCNC: 0.61 MG/DL — SIGNIFICANT CHANGE UP (ref 0.5–1.3)
EGFR: 106 ML/MIN/1.73M2 — SIGNIFICANT CHANGE UP
GLUCOSE SERPL-MCNC: 106 MG/DL — HIGH (ref 70–99)
HCT VFR BLD CALC: 31 % — LOW (ref 34.5–45)
HEMOGLOBIN INTERPRETATION: SIGNIFICANT CHANGE UP
HGB A MFR BLD: 0 % — LOW (ref 95.8–98)
HGB A2 MFR BLD: 4.3 % — HIGH (ref 2–3.2)
HGB BLD-MCNC: 11.5 G/DL — SIGNIFICANT CHANGE UP (ref 11.5–15.5)
HGB C MFR BLD: 42.8 % — HIGH
HGB F MFR BLD: 2.9 % — HIGH (ref 0–1)
HGB S MFR BLD: 50 % — HIGH
MAGNESIUM SERPL-MCNC: 2 MG/DL — SIGNIFICANT CHANGE UP (ref 1.6–2.6)
MCHC RBC-ENTMCNC: 33 PG — SIGNIFICANT CHANGE UP (ref 27–34)
MCHC RBC-ENTMCNC: 37.1 GM/DL — HIGH (ref 32–36)
MCV RBC AUTO: 89.1 FL — SIGNIFICANT CHANGE UP (ref 80–100)
NRBC # BLD: 2 /100 WBCS — HIGH (ref 0–0)
PHOSPHATE SERPL-MCNC: 4.2 MG/DL — SIGNIFICANT CHANGE UP (ref 2.5–4.5)
PLATELET # BLD AUTO: 239 K/UL — SIGNIFICANT CHANGE UP (ref 150–400)
POTASSIUM SERPL-MCNC: 3.6 MMOL/L — SIGNIFICANT CHANGE UP (ref 3.5–5.3)
POTASSIUM SERPL-SCNC: 3.6 MMOL/L — SIGNIFICANT CHANGE UP (ref 3.5–5.3)
RBC # BLD: 3.48 M/UL — LOW (ref 3.8–5.2)
RBC # FLD: 14.6 % — HIGH (ref 10.3–14.5)
SODIUM SERPL-SCNC: 137 MMOL/L — SIGNIFICANT CHANGE UP (ref 135–145)
WBC # BLD: 7.86 K/UL — SIGNIFICANT CHANGE UP (ref 3.8–10.5)
WBC # FLD AUTO: 7.86 K/UL — SIGNIFICANT CHANGE UP (ref 3.8–10.5)

## 2022-12-08 PROCEDURE — 99233 SBSQ HOSP IP/OBS HIGH 50: CPT

## 2022-12-08 RX ORDER — KETOROLAC TROMETHAMINE 30 MG/ML
15 SYRINGE (ML) INJECTION EVERY 6 HOURS
Refills: 0 | Status: DISCONTINUED | OUTPATIENT
Start: 2022-12-08 | End: 2022-12-08

## 2022-12-08 RX ORDER — HYDROMORPHONE HYDROCHLORIDE 2 MG/ML
1 INJECTION INTRAMUSCULAR; INTRAVENOUS; SUBCUTANEOUS ONCE
Refills: 0 | Status: DISCONTINUED | OUTPATIENT
Start: 2022-12-08 | End: 2022-12-08

## 2022-12-08 RX ORDER — HYDROMORPHONE HYDROCHLORIDE 2 MG/ML
1 INJECTION INTRAMUSCULAR; INTRAVENOUS; SUBCUTANEOUS
Refills: 0 | Status: DISCONTINUED | OUTPATIENT
Start: 2022-12-08 | End: 2022-12-08

## 2022-12-08 RX ORDER — HYDROMORPHONE HYDROCHLORIDE 2 MG/ML
4 INJECTION INTRAMUSCULAR; INTRAVENOUS; SUBCUTANEOUS EVERY 4 HOURS
Refills: 0 | Status: DISCONTINUED | OUTPATIENT
Start: 2022-12-08 | End: 2022-12-09

## 2022-12-08 RX ORDER — ACETAMINOPHEN 500 MG
1000 TABLET ORAL ONCE
Refills: 0 | Status: DISCONTINUED | OUTPATIENT
Start: 2022-12-08 | End: 2022-12-13

## 2022-12-08 RX ORDER — DIPHENHYDRAMINE HCL 50 MG
25 CAPSULE ORAL EVERY 6 HOURS
Refills: 0 | Status: DISCONTINUED | OUTPATIENT
Start: 2022-12-08 | End: 2022-12-08

## 2022-12-08 RX ADMIN — Medication 25 MILLIGRAM(S): at 06:00

## 2022-12-08 RX ADMIN — HYDROXYUREA 500 MILLIGRAM(S): 500 CAPSULE ORAL at 06:01

## 2022-12-08 RX ADMIN — Medication 10 MILLIGRAM(S): at 09:43

## 2022-12-08 RX ADMIN — SENNA PLUS 2 TABLET(S): 8.6 TABLET ORAL at 23:17

## 2022-12-08 RX ADMIN — PANTOPRAZOLE SODIUM 40 MILLIGRAM(S): 20 TABLET, DELAYED RELEASE ORAL at 06:05

## 2022-12-08 RX ADMIN — HYDROMORPHONE HYDROCHLORIDE 4 MILLIGRAM(S): 2 INJECTION INTRAMUSCULAR; INTRAVENOUS; SUBCUTANEOUS at 15:50

## 2022-12-08 RX ADMIN — HYDROXYUREA 500 MILLIGRAM(S): 500 CAPSULE ORAL at 18:16

## 2022-12-08 RX ADMIN — Medication 25 MILLIGRAM(S): at 12:13

## 2022-12-08 RX ADMIN — SODIUM CHLORIDE 75 MILLILITER(S): 9 INJECTION, SOLUTION INTRAVENOUS at 06:02

## 2022-12-08 RX ADMIN — HYDROMORPHONE HYDROCHLORIDE 1 MILLIGRAM(S): 2 INJECTION INTRAMUSCULAR; INTRAVENOUS; SUBCUTANEOUS at 04:58

## 2022-12-08 RX ADMIN — HYDROMORPHONE HYDROCHLORIDE 1 MILLIGRAM(S): 2 INJECTION INTRAMUSCULAR; INTRAVENOUS; SUBCUTANEOUS at 09:44

## 2022-12-08 RX ADMIN — BUDESONIDE AND FORMOTEROL FUMARATE DIHYDRATE 2 PUFF(S): 160; 4.5 AEROSOL RESPIRATORY (INHALATION) at 06:02

## 2022-12-08 RX ADMIN — Medication 1 PATCH: at 12:14

## 2022-12-08 RX ADMIN — HYDROMORPHONE HYDROCHLORIDE 1 MILLIGRAM(S): 2 INJECTION INTRAMUSCULAR; INTRAVENOUS; SUBCUTANEOUS at 18:30

## 2022-12-08 RX ADMIN — HYDROMORPHONE HYDROCHLORIDE 1 MILLIGRAM(S): 2 INJECTION INTRAMUSCULAR; INTRAVENOUS; SUBCUTANEOUS at 03:18

## 2022-12-08 RX ADMIN — Medication 100 MILLIGRAM(S): at 18:16

## 2022-12-08 RX ADMIN — HYDROMORPHONE HYDROCHLORIDE 1 MILLIGRAM(S): 2 INJECTION INTRAMUSCULAR; INTRAVENOUS; SUBCUTANEOUS at 03:51

## 2022-12-08 RX ADMIN — HYDROMORPHONE HYDROCHLORIDE 1 MILLIGRAM(S): 2 INJECTION INTRAMUSCULAR; INTRAVENOUS; SUBCUTANEOUS at 12:40

## 2022-12-08 RX ADMIN — ENOXAPARIN SODIUM 40 MILLIGRAM(S): 100 INJECTION SUBCUTANEOUS at 23:17

## 2022-12-08 RX ADMIN — HYDROMORPHONE HYDROCHLORIDE 4 MILLIGRAM(S): 2 INJECTION INTRAMUSCULAR; INTRAVENOUS; SUBCUTANEOUS at 21:00

## 2022-12-08 RX ADMIN — BUDESONIDE AND FORMOTEROL FUMARATE DIHYDRATE 2 PUFF(S): 160; 4.5 AEROSOL RESPIRATORY (INHALATION) at 18:16

## 2022-12-08 RX ADMIN — HYDROMORPHONE HYDROCHLORIDE 1 MILLIGRAM(S): 2 INJECTION INTRAMUSCULAR; INTRAVENOUS; SUBCUTANEOUS at 18:15

## 2022-12-08 RX ADMIN — Medication 1 PATCH: at 06:15

## 2022-12-08 RX ADMIN — HYDROMORPHONE HYDROCHLORIDE 1 MILLIGRAM(S): 2 INJECTION INTRAMUSCULAR; INTRAVENOUS; SUBCUTANEOUS at 05:12

## 2022-12-08 RX ADMIN — HYDROMORPHONE HYDROCHLORIDE 1 MILLIGRAM(S): 2 INJECTION INTRAMUSCULAR; INTRAVENOUS; SUBCUTANEOUS at 12:16

## 2022-12-08 RX ADMIN — HYDROMORPHONE HYDROCHLORIDE 4 MILLIGRAM(S): 2 INJECTION INTRAMUSCULAR; INTRAVENOUS; SUBCUTANEOUS at 20:23

## 2022-12-08 RX ADMIN — Medication 100 MILLIGRAM(S): at 09:43

## 2022-12-08 RX ADMIN — CHLORHEXIDINE GLUCONATE 1 APPLICATION(S): 213 SOLUTION TOPICAL at 22:23

## 2022-12-08 RX ADMIN — Medication 1 MILLIGRAM(S): at 12:15

## 2022-12-08 RX ADMIN — AMLODIPINE BESYLATE 10 MILLIGRAM(S): 2.5 TABLET ORAL at 06:05

## 2022-12-08 RX ADMIN — HYDROMORPHONE HYDROCHLORIDE 1 MILLIGRAM(S): 2 INJECTION INTRAMUSCULAR; INTRAVENOUS; SUBCUTANEOUS at 10:00

## 2022-12-08 RX ADMIN — HYDROMORPHONE HYDROCHLORIDE 4 MILLIGRAM(S): 2 INJECTION INTRAMUSCULAR; INTRAVENOUS; SUBCUTANEOUS at 15:21

## 2022-12-08 RX ADMIN — LISINOPRIL 40 MILLIGRAM(S): 2.5 TABLET ORAL at 06:01

## 2022-12-08 RX ADMIN — POLYETHYLENE GLYCOL 3350 17 GRAM(S): 17 POWDER, FOR SOLUTION ORAL at 12:15

## 2022-12-08 NOTE — PROGRESS NOTE ADULT - PROBLEM SELECTOR PLAN 2
5 days of decreased visual acuity in left eye, worsening  Episodes in past associated with SC crises  Concern for vaso-occlusive crisis  -ophtho consulted # SS retinopathy   # L vitreous hemorrhage   5 days of decreased visual acuity in left eye, worsening  Episodes in past associated with SC crises  Concern for vaso-occlusive crisis  -ophtho consulted- recommend OP followup, avoid NSAIDs

## 2022-12-08 NOTE — PROGRESS NOTE ADULT - ASSESSMENT
57F c hx SS disease, htn, retinal detachment s/p repair, pw covid and sickle cell crisis 57F c hx SS disease, htn, retinal detachment s/p repair, pw covid and sickle cell crisis. Dispo pending optimized pain control.

## 2022-12-08 NOTE — PROGRESS NOTE ADULT - PROBLEM SELECTOR PLAN 1
-C/w Dilaudid 1 q4h IVP PRN     -bowel regimen while on opioids  -C/w IVF 1/2NS +D5 @ 75cc/hr for net 2L /day   -Encourage hydration   -Afebrile, CXR negative   -C/w hydroxyurea, folate  -Monitor electrolytes, Cr   -Monitor vitals, O2 sat. -s/p IVF 1/2NS +D5 @ 75cc/hr for net 2L /day   -C/w Dilaudid 1 q4h IVP PRN-- will attempt to transition to PO today; patient not interested in PCA pump at this time      -bowel regimen while on opioids  -Encourage hydration   -Afebrile, CXR negative   -C/w hydroxyurea, folate  -Monitor electrolytes, Cr   -Monitor vitals, O2 sat.

## 2022-12-08 NOTE — PROGRESS NOTE ADULT - SUBJECTIVE AND OBJECTIVE BOX
PROGRESS NOTE:     Patient is a 57y old  Female who presents with a chief complaint of cough, congestion, left arm, left neck, hip, thigh, back pain (07 Dec 2022 13:18)      SUBJECTIVE / OVERNIGHT EVENTS:    ADDITIONAL REVIEW OF SYSTEMS:    MEDICATIONS  (STANDING):  acetaminophen   IVPB .. 1000 milliGRAM(s) IV Intermittent once  amLODIPine   Tablet 10 milliGRAM(s) Oral daily  budesonide 160 MICROgram(s)/formoterol 4.5 MICROgram(s) Inhaler 2 Puff(s) Inhalation two times a day  chlorhexidine 2% Cloths 1 Application(s) Topical daily  dextrose 5% + sodium chloride 0.45%. 1000 milliLiter(s) (75 mL/Hr) IV Continuous <Continuous>  enoxaparin Injectable 40 milliGRAM(s) SubCutaneous every 24 hours  folic acid 1 milliGRAM(s) Oral daily  hydroxyurea 500 milliGRAM(s) Oral two times a day  influenza   Vaccine 0.5 milliLiter(s) IntraMuscular once  lisinopril 40 milliGRAM(s) Oral daily  nicotine - 21 mG/24Hr(s) Patch 1 Patch Transdermal daily  pantoprazole    Tablet 40 milliGRAM(s) Oral before breakfast  polyethylene glycol 3350 17 Gram(s) Oral daily  senna 2 Tablet(s) Oral at bedtime    MEDICATIONS  (PRN):  acetaminophen     Tablet .. 650 milliGRAM(s) Oral every 6 hours PRN Moderate Pain (4 - 6)  calcium carbonate    500 mG (Tums) Chewable 1 Tablet(s) Chew four times a day PRN Dyspepsia  diphenhydrAMINE 25 milliGRAM(s) Oral every 6 hours PRN Rash and/or Itching  guaiFENesin Oral Liquid (Sugar-Free) 100 milliGRAM(s) Oral every 6 hours PRN Cough  HYDROmorphone  Injectable 1 milliGRAM(s) IV Push every 4 hours PRN Severe Pain (7 - 10)  HYDROmorphone  Injectable 1 milliGRAM(s) IV Push every 2 hours PRN For breakthrough pain  ketorolac   Injectable 15 milliGRAM(s) IV Push every 6 hours PRN Severe Pain (7 - 10)  ondansetron Injectable 4 milliGRAM(s) IV Push every 6 hours PRN Nausea and/or Vomiting  sodium chloride 0.65% Nasal 1 Spray(s) Both Nostrils daily PRN Nasal Congestion      CAPILLARY BLOOD GLUCOSE        I&O's Summary    07 Dec 2022 07:01  -  08 Dec 2022 07:00  --------------------------------------------------------  IN: 240 mL / OUT: 0 mL / NET: 240 mL        PHYSICAL EXAM:  Vital Signs Last 24 Hrs  T(C): 36.8 (08 Dec 2022 03:34), Max: 37.2 (07 Dec 2022 21:24)  T(F): 98.2 (08 Dec 2022 03:34), Max: 98.9 (07 Dec 2022 21:24)  HR: 70 (08 Dec 2022 03:34) (62 - 78)  BP: 130/90 (08 Dec 2022 03:34) (130/90 - 145/95)  BP(mean): --  RR: 20 (08 Dec 2022 03:34) (18 - 20)  SpO2: 99% (08 Dec 2022 03:34) (98% - 99%)    Parameters below as of 08 Dec 2022 03:34  Patient On (Oxygen Delivery Method): room air        GENERAL: No acute distress, well-developed  HEAD:  Atraumatic, Normocephalic  EYES: EOMI, PERRLA, conjunctiva and sclera clear  NECK: Supple, no lymphadenopathy, no JVD  CHEST/LUNG: CTAB; No wheezes, rales, or rhonchi  HEART: Regular rate and rhythm; No murmurs, rubs, or gallops  ABDOMEN: Soft, non-tender, non-distended; normal bowel sounds, no organomegaly  EXTREMITIES:  2+ peripheral pulses b/l, No clubbing, cyanosis, or edema  NEUROLOGY: A&O x 3, no focal deficits  SKIN: No rashes or lesions    LABS:                        12.4   7.42  )-----------( 265      ( 07 Dec 2022 07:12 )             33.2     12-07    140  |  103  |  7   ----------------------------<  110<H>  3.6   |  25  |  0.70    Ca    9.1      07 Dec 2022 07:11  Phos  4.0     12-07  Mg     2.1     12-07    TPro  7.6  /  Alb  4.0  /  TBili  1.2  /  DBili  x   /  AST  18  /  ALT  14  /  AlkPhos  95  12-07                RADIOLOGY & ADDITIONAL TESTS:  Results Reviewed:   Imaging Personally Reviewed:  Electrocardiogram Personally Reviewed:    COORDINATION OF CARE:  Care Discussed with Consultants/Other Providers [Y/N]:  Prior or Outpatient Records Reviewed [Y/N]:   PROGRESS NOTE:     Patient is a 57y old  Female who presents with a chief complaint of cough, congestion, left arm, left neck, hip, thigh, back pain (07 Dec 2022 13:18)      SUBJECTIVE / OVERNIGHT EVENTS:  No acute events overnight. Itchy- resolved with benadryl. Patient examined at bedside this AM. Report pain down L side of body and R upper extremity unchanged from yesterday. Reports inability to see out of left eye unchanged from yesterday. Reports congestion and productive cough. No BM reported. Denies CP, SOB, abdominal pain, leg pain, n/v/d. All other ROS negative.     MEDICATIONS  (STANDING):  acetaminophen   IVPB .. 1000 milliGRAM(s) IV Intermittent once  amLODIPine   Tablet 10 milliGRAM(s) Oral daily  budesonide 160 MICROgram(s)/formoterol 4.5 MICROgram(s) Inhaler 2 Puff(s) Inhalation two times a day  chlorhexidine 2% Cloths 1 Application(s) Topical daily  dextrose 5% + sodium chloride 0.45%. 1000 milliLiter(s) (75 mL/Hr) IV Continuous <Continuous>  enoxaparin Injectable 40 milliGRAM(s) SubCutaneous every 24 hours  folic acid 1 milliGRAM(s) Oral daily  hydroxyurea 500 milliGRAM(s) Oral two times a day  influenza   Vaccine 0.5 milliLiter(s) IntraMuscular once  lisinopril 40 milliGRAM(s) Oral daily  nicotine - 21 mG/24Hr(s) Patch 1 Patch Transdermal daily  pantoprazole    Tablet 40 milliGRAM(s) Oral before breakfast  polyethylene glycol 3350 17 Gram(s) Oral daily  senna 2 Tablet(s) Oral at bedtime    MEDICATIONS  (PRN):  acetaminophen     Tablet .. 650 milliGRAM(s) Oral every 6 hours PRN Moderate Pain (4 - 6)  calcium carbonate    500 mG (Tums) Chewable 1 Tablet(s) Chew four times a day PRN Dyspepsia  diphenhydrAMINE 25 milliGRAM(s) Oral every 6 hours PRN Rash and/or Itching  guaiFENesin Oral Liquid (Sugar-Free) 100 milliGRAM(s) Oral every 6 hours PRN Cough  HYDROmorphone  Injectable 1 milliGRAM(s) IV Push every 4 hours PRN Severe Pain (7 - 10)  HYDROmorphone  Injectable 1 milliGRAM(s) IV Push every 2 hours PRN For breakthrough pain  ketorolac   Injectable 15 milliGRAM(s) IV Push every 6 hours PRN Severe Pain (7 - 10)  ondansetron Injectable 4 milliGRAM(s) IV Push every 6 hours PRN Nausea and/or Vomiting  sodium chloride 0.65% Nasal 1 Spray(s) Both Nostrils daily PRN Nasal Congestion      CAPILLARY BLOOD GLUCOSE        I&O's Summary    07 Dec 2022 07:01  -  08 Dec 2022 07:00  --------------------------------------------------------  IN: 240 mL / OUT: 0 mL / NET: 240 mL        PHYSICAL EXAM:  Vital Signs Last 24 Hrs  T(C): 36.8 (08 Dec 2022 03:34), Max: 37.2 (07 Dec 2022 21:24)  T(F): 98.2 (08 Dec 2022 03:34), Max: 98.9 (07 Dec 2022 21:24)  HR: 70 (08 Dec 2022 03:34) (62 - 78)  BP: 130/90 (08 Dec 2022 03:34) (130/90 - 145/95)  BP(mean): --  RR: 20 (08 Dec 2022 03:34) (18 - 20)  SpO2: 99% (08 Dec 2022 03:34) (98% - 99%)    Parameters below as of 08 Dec 2022 03:34  Patient On (Oxygen Delivery Method): room air        GENERAL: No acute distress, well-developed  HEAD:  Atraumatic, Normocephalic  EYES: EOMI, PERRLA, conjunctiva and sclera clear  NECK: Supple, no lymphadenopathy, no JVD  CHEST/LUNG: CTAB; No wheezes, rales, or rhonchi  HEART: Regular rate and rhythm; No murmurs, rubs, or gallops  ABDOMEN: Soft, non-tender, non-distended; normal bowel sounds, no organomegaly  EXTREMITIES:  2+ peripheral pulses b/l, No clubbing, cyanosis, or edema  NEUROLOGY: A&O x 3, no focal deficits  SKIN: No rashes or lesions    LABS:                        12.4   7.42  )-----------( 265      ( 07 Dec 2022 07:12 )             33.2     12-07    140  |  103  |  7   ----------------------------<  110<H>  3.6   |  25  |  0.70    Ca    9.1      07 Dec 2022 07:11  Phos  4.0     12-07  Mg     2.1     12-07    TPro  7.6  /  Alb  4.0  /  TBili  1.2  /  DBili  x   /  AST  18  /  ALT  14  /  AlkPhos  95  12-07                RADIOLOGY & ADDITIONAL TESTS:  Results Reviewed:   Imaging Personally Reviewed:  Electrocardiogram Personally Reviewed:    COORDINATION OF CARE:  Care Discussed with Consultants/Other Providers [Y/N]:  Prior or Outpatient Records Reviewed [Y/N]:

## 2022-12-08 NOTE — PROGRESS NOTE ADULT - PROBLEM SELECTOR PLAN 3
-C/w Remdesivir 2/2 high risk factor for progression (Day 3/5)  -VTE Ppx: lovenox ppx  -Not hypoxic-> no need for decadron -C/w Remdesivir 2/2 high risk factor for progression (Day 4/5)  -VTE Ppx: lovenox ppx  -Not hypoxic-> no need for decadron

## 2022-12-09 LAB
ALBUMIN SERPL ELPH-MCNC: 4 G/DL — SIGNIFICANT CHANGE UP (ref 3.3–5)
ALP SERPL-CCNC: 102 U/L — SIGNIFICANT CHANGE UP (ref 40–120)
ALT FLD-CCNC: 14 U/L — SIGNIFICANT CHANGE UP (ref 10–45)
ANION GAP SERPL CALC-SCNC: 12 MMOL/L — SIGNIFICANT CHANGE UP (ref 5–17)
AST SERPL-CCNC: 18 U/L — SIGNIFICANT CHANGE UP (ref 10–40)
BASOPHILS # BLD AUTO: 0 K/UL — SIGNIFICANT CHANGE UP (ref 0–0.2)
BASOPHILS NFR BLD AUTO: 0 % — SIGNIFICANT CHANGE UP (ref 0–2)
BILIRUB SERPL-MCNC: 1.1 MG/DL — SIGNIFICANT CHANGE UP (ref 0.2–1.2)
BUN SERPL-MCNC: 13 MG/DL — SIGNIFICANT CHANGE UP (ref 7–23)
CALCIUM SERPL-MCNC: 9.3 MG/DL — SIGNIFICANT CHANGE UP (ref 8.4–10.5)
CHLORIDE SERPL-SCNC: 101 MMOL/L — SIGNIFICANT CHANGE UP (ref 96–108)
CO2 SERPL-SCNC: 24 MMOL/L — SIGNIFICANT CHANGE UP (ref 22–31)
CREAT SERPL-MCNC: 0.71 MG/DL — SIGNIFICANT CHANGE UP (ref 0.5–1.3)
EGFR: 100 ML/MIN/1.73M2 — SIGNIFICANT CHANGE UP
EOSINOPHIL # BLD AUTO: 0.19 K/UL — SIGNIFICANT CHANGE UP (ref 0–0.5)
EOSINOPHIL NFR BLD AUTO: 2.6 % — SIGNIFICANT CHANGE UP (ref 0–6)
GLUCOSE SERPL-MCNC: 91 MG/DL — SIGNIFICANT CHANGE UP (ref 70–99)
HCT VFR BLD CALC: 31 % — LOW (ref 34.5–45)
HGB BLD-MCNC: 11.3 G/DL — LOW (ref 11.5–15.5)
LYMPHOCYTES # BLD AUTO: 2.63 K/UL — SIGNIFICANT CHANGE UP (ref 1–3.3)
LYMPHOCYTES # BLD AUTO: 35.1 % — SIGNIFICANT CHANGE UP (ref 13–44)
MAGNESIUM SERPL-MCNC: 2.1 MG/DL — SIGNIFICANT CHANGE UP (ref 1.6–2.6)
MANUAL SMEAR VERIFICATION: SIGNIFICANT CHANGE UP
MCHC RBC-ENTMCNC: 33.6 PG — SIGNIFICANT CHANGE UP (ref 27–34)
MCHC RBC-ENTMCNC: 36.5 GM/DL — HIGH (ref 32–36)
MCV RBC AUTO: 92.3 FL — SIGNIFICANT CHANGE UP (ref 80–100)
MONOCYTES # BLD AUTO: 1.05 K/UL — HIGH (ref 0–0.9)
MONOCYTES NFR BLD AUTO: 14 % — SIGNIFICANT CHANGE UP (ref 2–14)
NEUTROPHILS # BLD AUTO: 3.48 K/UL — SIGNIFICANT CHANGE UP (ref 1.8–7.4)
NEUTROPHILS NFR BLD AUTO: 46.5 % — SIGNIFICANT CHANGE UP (ref 43–77)
NRBC # BLD: 3 /100 — HIGH (ref 0–0)
PHOSPHATE SERPL-MCNC: 4.5 MG/DL — SIGNIFICANT CHANGE UP (ref 2.5–4.5)
PLAT MORPH BLD: NORMAL — SIGNIFICANT CHANGE UP
PLATELET # BLD AUTO: 290 K/UL — SIGNIFICANT CHANGE UP (ref 150–400)
POTASSIUM SERPL-MCNC: 3.7 MMOL/L — SIGNIFICANT CHANGE UP (ref 3.5–5.3)
POTASSIUM SERPL-SCNC: 3.7 MMOL/L — SIGNIFICANT CHANGE UP (ref 3.5–5.3)
PROT SERPL-MCNC: 7.5 G/DL — SIGNIFICANT CHANGE UP (ref 6–8.3)
RBC # BLD: 3.36 M/UL — LOW (ref 3.8–5.2)
RBC # FLD: 14.6 % — HIGH (ref 10.3–14.5)
RBC BLD AUTO: SIGNIFICANT CHANGE UP
SODIUM SERPL-SCNC: 137 MMOL/L — SIGNIFICANT CHANGE UP (ref 135–145)
VARIANT LYMPHS # BLD: 1.8 % — SIGNIFICANT CHANGE UP (ref 0–6)
WBC # BLD: 7.48 K/UL — SIGNIFICANT CHANGE UP (ref 3.8–10.5)
WBC # FLD AUTO: 7.48 K/UL — SIGNIFICANT CHANGE UP (ref 3.8–10.5)

## 2022-12-09 PROCEDURE — 99232 SBSQ HOSP IP/OBS MODERATE 35: CPT

## 2022-12-09 RX ORDER — HYDROMORPHONE HYDROCHLORIDE 2 MG/ML
4 INJECTION INTRAMUSCULAR; INTRAVENOUS; SUBCUTANEOUS ONCE
Refills: 0 | Status: DISCONTINUED | OUTPATIENT
Start: 2022-12-09 | End: 2022-12-09

## 2022-12-09 RX ORDER — MORPHINE SULFATE 50 MG/1
2 CAPSULE, EXTENDED RELEASE ORAL ONCE
Refills: 0 | Status: DISCONTINUED | OUTPATIENT
Start: 2022-12-09 | End: 2022-12-09

## 2022-12-09 RX ORDER — HYDROMORPHONE HYDROCHLORIDE 2 MG/ML
1 INJECTION INTRAMUSCULAR; INTRAVENOUS; SUBCUTANEOUS ONCE
Refills: 0 | Status: DISCONTINUED | OUTPATIENT
Start: 2022-12-09 | End: 2022-12-09

## 2022-12-09 RX ORDER — HYDROMORPHONE HYDROCHLORIDE 2 MG/ML
2 INJECTION INTRAMUSCULAR; INTRAVENOUS; SUBCUTANEOUS EVERY 4 HOURS
Refills: 0 | Status: DISCONTINUED | OUTPATIENT
Start: 2022-12-09 | End: 2022-12-10

## 2022-12-09 RX ORDER — HYDROMORPHONE HYDROCHLORIDE 2 MG/ML
4 INJECTION INTRAMUSCULAR; INTRAVENOUS; SUBCUTANEOUS EVERY 4 HOURS
Refills: 0 | Status: DISCONTINUED | OUTPATIENT
Start: 2022-12-09 | End: 2022-12-10

## 2022-12-09 RX ADMIN — MORPHINE SULFATE 2 MILLIGRAM(S): 50 CAPSULE, EXTENDED RELEASE ORAL at 22:43

## 2022-12-09 RX ADMIN — Medication 100 MILLIGRAM(S): at 01:18

## 2022-12-09 RX ADMIN — HYDROMORPHONE HYDROCHLORIDE 4 MILLIGRAM(S): 2 INJECTION INTRAMUSCULAR; INTRAVENOUS; SUBCUTANEOUS at 01:19

## 2022-12-09 RX ADMIN — HYDROMORPHONE HYDROCHLORIDE 4 MILLIGRAM(S): 2 INJECTION INTRAMUSCULAR; INTRAVENOUS; SUBCUTANEOUS at 14:22

## 2022-12-09 RX ADMIN — BUDESONIDE AND FORMOTEROL FUMARATE DIHYDRATE 2 PUFF(S): 160; 4.5 AEROSOL RESPIRATORY (INHALATION) at 07:05

## 2022-12-09 RX ADMIN — ENOXAPARIN SODIUM 40 MILLIGRAM(S): 100 INJECTION SUBCUTANEOUS at 20:23

## 2022-12-09 RX ADMIN — HYDROXYUREA 500 MILLIGRAM(S): 500 CAPSULE ORAL at 07:05

## 2022-12-09 RX ADMIN — POLYETHYLENE GLYCOL 3350 17 GRAM(S): 17 POWDER, FOR SOLUTION ORAL at 11:16

## 2022-12-09 RX ADMIN — AMLODIPINE BESYLATE 10 MILLIGRAM(S): 2.5 TABLET ORAL at 07:03

## 2022-12-09 RX ADMIN — HYDROMORPHONE HYDROCHLORIDE 4 MILLIGRAM(S): 2 INJECTION INTRAMUSCULAR; INTRAVENOUS; SUBCUTANEOUS at 22:18

## 2022-12-09 RX ADMIN — Medication 1 MILLIGRAM(S): at 11:16

## 2022-12-09 RX ADMIN — Medication 1 PATCH: at 09:52

## 2022-12-09 RX ADMIN — HYDROMORPHONE HYDROCHLORIDE 4 MILLIGRAM(S): 2 INJECTION INTRAMUSCULAR; INTRAVENOUS; SUBCUTANEOUS at 22:43

## 2022-12-09 RX ADMIN — HYDROMORPHONE HYDROCHLORIDE 4 MILLIGRAM(S): 2 INJECTION INTRAMUSCULAR; INTRAVENOUS; SUBCUTANEOUS at 12:38

## 2022-12-09 RX ADMIN — Medication 1 PATCH: at 17:08

## 2022-12-09 RX ADMIN — LISINOPRIL 40 MILLIGRAM(S): 2.5 TABLET ORAL at 07:04

## 2022-12-09 RX ADMIN — Medication 1 PATCH: at 11:00

## 2022-12-09 RX ADMIN — HYDROMORPHONE HYDROCHLORIDE 1 MILLIGRAM(S): 2 INJECTION INTRAMUSCULAR; INTRAVENOUS; SUBCUTANEOUS at 03:18

## 2022-12-09 RX ADMIN — HYDROMORPHONE HYDROCHLORIDE 4 MILLIGRAM(S): 2 INJECTION INTRAMUSCULAR; INTRAVENOUS; SUBCUTANEOUS at 09:57

## 2022-12-09 RX ADMIN — Medication 1 PATCH: at 11:17

## 2022-12-09 RX ADMIN — Medication 100 MILLIGRAM(S): at 14:42

## 2022-12-09 RX ADMIN — MORPHINE SULFATE 2 MILLIGRAM(S): 50 CAPSULE, EXTENDED RELEASE ORAL at 22:07

## 2022-12-09 RX ADMIN — HYDROMORPHONE HYDROCHLORIDE 4 MILLIGRAM(S): 2 INJECTION INTRAMUSCULAR; INTRAVENOUS; SUBCUTANEOUS at 16:14

## 2022-12-09 RX ADMIN — PANTOPRAZOLE SODIUM 40 MILLIGRAM(S): 20 TABLET, DELAYED RELEASE ORAL at 07:03

## 2022-12-09 NOTE — PROGRESS NOTE ADULT - SUBJECTIVE AND OBJECTIVE BOX
PROGRESS NOTE:     Patient is a 57y old  Female who presents with a chief complaint of cough, congestion, left arm, left neck, hip, thigh, back pain (08 Dec 2022 07:08)      SUBJECTIVE / OVERNIGHT EVENTS:    ADDITIONAL REVIEW OF SYSTEMS:    MEDICATIONS  (STANDING):  acetaminophen   IVPB .. 1000 milliGRAM(s) IV Intermittent once  amLODIPine   Tablet 10 milliGRAM(s) Oral daily  budesonide 160 MICROgram(s)/formoterol 4.5 MICROgram(s) Inhaler 2 Puff(s) Inhalation two times a day  chlorhexidine 2% Cloths 1 Application(s) Topical daily  enoxaparin Injectable 40 milliGRAM(s) SubCutaneous every 24 hours  folic acid 1 milliGRAM(s) Oral daily  hydroxyurea 500 milliGRAM(s) Oral two times a day  influenza   Vaccine 0.5 milliLiter(s) IntraMuscular once  lisinopril 40 milliGRAM(s) Oral daily  nicotine - 21 mG/24Hr(s) Patch 1 Patch Transdermal daily  pantoprazole    Tablet 40 milliGRAM(s) Oral before breakfast  polyethylene glycol 3350 17 Gram(s) Oral daily  senna 2 Tablet(s) Oral at bedtime    MEDICATIONS  (PRN):  acetaminophen     Tablet .. 650 milliGRAM(s) Oral every 6 hours PRN Moderate Pain (4 - 6)  bisacodyl Suppository 10 milliGRAM(s) Rectal daily PRN Constipation  calcium carbonate    500 mG (Tums) Chewable 1 Tablet(s) Chew four times a day PRN Dyspepsia  guaiFENesin Oral Liquid (Sugar-Free) 100 milliGRAM(s) Oral every 6 hours PRN Cough  HYDROmorphone   Tablet 4 milliGRAM(s) Oral once PRN Severe Pain (7 - 10)  HYDROmorphone   Tablet 4 milliGRAM(s) Oral every 4 hours PRN Severe Pain (7 - 10)  ondansetron Injectable 4 milliGRAM(s) IV Push every 6 hours PRN Nausea and/or Vomiting  sodium chloride 0.65% Nasal 1 Spray(s) Both Nostrils daily PRN Nasal Congestion      CAPILLARY BLOOD GLUCOSE        I&O's Summary      PHYSICAL EXAM:  Vital Signs Last 24 Hrs  T(C): 36.6 (09 Dec 2022 04:53), Max: 37.6 (08 Dec 2022 21:37)  T(F): 97.8 (09 Dec 2022 04:53), Max: 99.6 (08 Dec 2022 21:37)  HR: 75 (09 Dec 2022 04:53) (75 - 93)  BP: 115/83 (09 Dec 2022 04:53) (115/83 - 147/93)  BP(mean): --  RR: 18 (09 Dec 2022 04:53) (18 - 20)  SpO2: 94% (09 Dec 2022 04:53) (94% - 97%)    Parameters below as of 09 Dec 2022 04:53  Patient On (Oxygen Delivery Method): room air        GENERAL: No acute distress, well-developed  HEAD:  Atraumatic, Normocephalic  EYES: EOMI, PERRLA, conjunctiva and sclera clear  NECK: Supple, no lymphadenopathy, no JVD  CHEST/LUNG: CTAB; No wheezes, rales, or rhonchi  HEART: Regular rate and rhythm; No murmurs, rubs, or gallops  ABDOMEN: Soft, non-tender, non-distended; normal bowel sounds, no organomegaly  EXTREMITIES:  2+ peripheral pulses b/l, No clubbing, cyanosis, or edema  NEUROLOGY: A&O x 3, no focal deficits  SKIN: No rashes or lesions    LABS:                        11.5   7.86  )-----------( 239      ( 08 Dec 2022 07:12 )             31.0     12-08    137  |  102  |  7   ----------------------------<  106<H>  3.6   |  24  |  0.61    Ca    9.2      08 Dec 2022 07:12  Phos  4.2     12-08  Mg     2.0     12-08                  RADIOLOGY & ADDITIONAL TESTS:  Results Reviewed:   Imaging Personally Reviewed:  Electrocardiogram Personally Reviewed:    COORDINATION OF CARE:  Care Discussed with Consultants/Other Providers [Y/N]:  Prior or Outpatient Records Reviewed [Y/N]:   PROGRESS NOTE:     Patient is a 57y old  Female who presents with a chief complaint of cough, congestion, left arm, left neck, hip, thigh, back pain (08 Dec 2022 07:08)      SUBJECTIVE / OVERNIGHT EVENTS:  No events overnight. Examined at bedside this AM. Patient states she is feeling much better compared to yesterday. Reports persistent cough and L sided body pain that is improved from yesterday. BM yesterday. Denies CP, SOB, abdominal pain, leg pain, n/v/c/d. All other ROS negative.       MEDICATIONS  (STANDING):  acetaminophen   IVPB .. 1000 milliGRAM(s) IV Intermittent once  amLODIPine   Tablet 10 milliGRAM(s) Oral daily  budesonide 160 MICROgram(s)/formoterol 4.5 MICROgram(s) Inhaler 2 Puff(s) Inhalation two times a day  chlorhexidine 2% Cloths 1 Application(s) Topical daily  enoxaparin Injectable 40 milliGRAM(s) SubCutaneous every 24 hours  folic acid 1 milliGRAM(s) Oral daily  hydroxyurea 500 milliGRAM(s) Oral two times a day  influenza   Vaccine 0.5 milliLiter(s) IntraMuscular once  lisinopril 40 milliGRAM(s) Oral daily  nicotine - 21 mG/24Hr(s) Patch 1 Patch Transdermal daily  pantoprazole    Tablet 40 milliGRAM(s) Oral before breakfast  polyethylene glycol 3350 17 Gram(s) Oral daily  senna 2 Tablet(s) Oral at bedtime    MEDICATIONS  (PRN):  acetaminophen     Tablet .. 650 milliGRAM(s) Oral every 6 hours PRN Moderate Pain (4 - 6)  bisacodyl Suppository 10 milliGRAM(s) Rectal daily PRN Constipation  calcium carbonate    500 mG (Tums) Chewable 1 Tablet(s) Chew four times a day PRN Dyspepsia  guaiFENesin Oral Liquid (Sugar-Free) 100 milliGRAM(s) Oral every 6 hours PRN Cough  HYDROmorphone   Tablet 4 milliGRAM(s) Oral once PRN Severe Pain (7 - 10)  HYDROmorphone   Tablet 4 milliGRAM(s) Oral every 4 hours PRN Severe Pain (7 - 10)  ondansetron Injectable 4 milliGRAM(s) IV Push every 6 hours PRN Nausea and/or Vomiting  sodium chloride 0.65% Nasal 1 Spray(s) Both Nostrils daily PRN Nasal Congestion      CAPILLARY BLOOD GLUCOSE        I&O's Summary      PHYSICAL EXAM:  Vital Signs Last 24 Hrs  T(C): 36.6 (09 Dec 2022 04:53), Max: 37.6 (08 Dec 2022 21:37)  T(F): 97.8 (09 Dec 2022 04:53), Max: 99.6 (08 Dec 2022 21:37)  HR: 75 (09 Dec 2022 04:53) (75 - 93)  BP: 115/83 (09 Dec 2022 04:53) (115/83 - 147/93)  BP(mean): --  RR: 18 (09 Dec 2022 04:53) (18 - 20)  SpO2: 94% (09 Dec 2022 04:53) (94% - 97%)    Parameters below as of 09 Dec 2022 04:53  Patient On (Oxygen Delivery Method): room air        GENERAL: No acute distress, well-developed  HEAD:  Atraumatic, Normocephalic  EYES: EOMI, PERRLA, conjunctiva and sclera clear  NECK: Supple, no lymphadenopathy, no JVD  CHEST/LUNG: CTAB; No wheezes, rales, or rhonchi  HEART: Regular rate and rhythm; No murmurs, rubs, or gallops  ABDOMEN: Soft, non-tender, non-distended; normal bowel sounds, no organomegaly  EXTREMITIES:  2+ peripheral pulses b/l, No clubbing, cyanosis, or edema  NEUROLOGY: A&O x 3, no focal deficits  SKIN: No rashes or lesions    LABS:                        11.5   7.86  )-----------( 239      ( 08 Dec 2022 07:12 )             31.0     12-08    137  |  102  |  7   ----------------------------<  106<H>  3.6   |  24  |  0.61    Ca    9.2      08 Dec 2022 07:12  Phos  4.2     12-08  Mg     2.0     12-08                  RADIOLOGY & ADDITIONAL TESTS:  Results Reviewed:   Imaging Personally Reviewed:  Electrocardiogram Personally Reviewed:    COORDINATION OF CARE:  Care Discussed with Consultants/Other Providers [Y/N]:  Prior or Outpatient Records Reviewed [Y/N]:

## 2022-12-09 NOTE — PROGRESS NOTE ADULT - PROBLEM SELECTOR PLAN 3
-C/w Remdesivir 2/2 high risk factor for progression (Day 4/5)  -VTE Ppx: lovenox ppx  -Not hypoxic-> no need for decadron -C/w Remdesivir 2/2 high risk factor for progression (Day 5/5)  -VTE Ppx: lovenox ppx  -Not hypoxic-> no need for decadron

## 2022-12-09 NOTE — PROGRESS NOTE ADULT - PROBLEM SELECTOR PLAN 1
-s/p IVF 1/2NS +D5 @ 75cc/hr for net 2L /day   -C/w Dilaudid 1 q4h IVP PRN-- will attempt to transition to PO today; patient not interested in PCA pump at this time      -bowel regimen while on opioids  -Encourage hydration   -Afebrile, CXR negative   -C/w hydroxyurea, folate  -Monitor electrolytes, Cr   -Monitor vitals, O2 sat. -s/p IVF 1/2NS +D5 @ 75cc/hr for net 2L /day   -C/w Dilaudid 4 q4h PO PRN      -bowel regimen while on opioids  -Encourage hydration   -Afebrile, CXR negative   -C/w hydroxyurea, folate  -Monitor electrolytes, Cr   -Monitor vitals, O2 sat.

## 2022-12-09 NOTE — PROGRESS NOTE ADULT - ASSESSMENT
57F c hx SS disease, htn, retinal detachment s/p repair, pw covid and sickle cell crisis. Dispo pending optimized pain control.

## 2022-12-09 NOTE — PROGRESS NOTE ADULT - PROBLEM SELECTOR PLAN 2
# SS retinopathy   # L vitreous hemorrhage   5 days of decreased visual acuity in left eye, worsening  Episodes in past associated with SC crises  Concern for vaso-occlusive crisis  -ophtho consulted- recommend OP followup, avoid NSAIDs

## 2022-12-10 ENCOUNTER — TRANSCRIPTION ENCOUNTER (OUTPATIENT)
Age: 57
End: 2022-12-10

## 2022-12-10 LAB
ANION GAP SERPL CALC-SCNC: 11 MMOL/L — SIGNIFICANT CHANGE UP (ref 5–17)
BUN SERPL-MCNC: 17 MG/DL — SIGNIFICANT CHANGE UP (ref 7–23)
CALCIUM SERPL-MCNC: 9.2 MG/DL — SIGNIFICANT CHANGE UP (ref 8.4–10.5)
CHLORIDE SERPL-SCNC: 103 MMOL/L — SIGNIFICANT CHANGE UP (ref 96–108)
CO2 SERPL-SCNC: 24 MMOL/L — SIGNIFICANT CHANGE UP (ref 22–31)
CREAT SERPL-MCNC: 0.74 MG/DL — SIGNIFICANT CHANGE UP (ref 0.5–1.3)
EGFR: 95 ML/MIN/1.73M2 — SIGNIFICANT CHANGE UP
GLUCOSE SERPL-MCNC: 91 MG/DL — SIGNIFICANT CHANGE UP (ref 70–99)
HCT VFR BLD CALC: 32 % — LOW (ref 34.5–45)
HGB BLD-MCNC: 11.8 G/DL — SIGNIFICANT CHANGE UP (ref 11.5–15.5)
MAGNESIUM SERPL-MCNC: 2.3 MG/DL — SIGNIFICANT CHANGE UP (ref 1.6–2.6)
MCHC RBC-ENTMCNC: 33.1 PG — SIGNIFICANT CHANGE UP (ref 27–34)
MCHC RBC-ENTMCNC: 36.9 GM/DL — HIGH (ref 32–36)
MCV RBC AUTO: 89.6 FL — SIGNIFICANT CHANGE UP (ref 80–100)
NRBC # BLD: 1 /100 WBCS — HIGH (ref 0–0)
PHOSPHATE SERPL-MCNC: 4.4 MG/DL — SIGNIFICANT CHANGE UP (ref 2.5–4.5)
PLATELET # BLD AUTO: 289 K/UL — SIGNIFICANT CHANGE UP (ref 150–400)
POTASSIUM SERPL-MCNC: 4.5 MMOL/L — SIGNIFICANT CHANGE UP (ref 3.5–5.3)
POTASSIUM SERPL-SCNC: 4.5 MMOL/L — SIGNIFICANT CHANGE UP (ref 3.5–5.3)
RBC # BLD: 3.57 M/UL — LOW (ref 3.8–5.2)
RBC # FLD: 14.6 % — HIGH (ref 10.3–14.5)
SODIUM SERPL-SCNC: 138 MMOL/L — SIGNIFICANT CHANGE UP (ref 135–145)
WBC # BLD: 7.95 K/UL — SIGNIFICANT CHANGE UP (ref 3.8–10.5)
WBC # FLD AUTO: 7.95 K/UL — SIGNIFICANT CHANGE UP (ref 3.8–10.5)

## 2022-12-10 PROCEDURE — 99232 SBSQ HOSP IP/OBS MODERATE 35: CPT | Mod: GC

## 2022-12-10 RX ORDER — HYDROMORPHONE HYDROCHLORIDE 2 MG/ML
1 INJECTION INTRAMUSCULAR; INTRAVENOUS; SUBCUTANEOUS ONCE
Refills: 0 | Status: DISCONTINUED | OUTPATIENT
Start: 2022-12-10 | End: 2022-12-10

## 2022-12-10 RX ORDER — HYDROMORPHONE HYDROCHLORIDE 2 MG/ML
4 INJECTION INTRAMUSCULAR; INTRAVENOUS; SUBCUTANEOUS EVERY 4 HOURS
Refills: 0 | Status: DISCONTINUED | OUTPATIENT
Start: 2022-12-10 | End: 2022-12-10

## 2022-12-10 RX ORDER — FLUTICASONE PROPIONATE 50 MCG
1 SPRAY, SUSPENSION NASAL
Refills: 0 | Status: DISCONTINUED | OUTPATIENT
Start: 2022-12-10 | End: 2022-12-13

## 2022-12-10 RX ORDER — SODIUM CHLORIDE 9 MG/ML
1000 INJECTION, SOLUTION INTRAVENOUS
Refills: 0 | Status: DISCONTINUED | OUTPATIENT
Start: 2022-12-10 | End: 2022-12-11

## 2022-12-10 RX ORDER — HYDROMORPHONE HYDROCHLORIDE 2 MG/ML
4 INJECTION INTRAMUSCULAR; INTRAVENOUS; SUBCUTANEOUS EVERY 4 HOURS
Refills: 0 | Status: DISCONTINUED | OUTPATIENT
Start: 2022-12-10 | End: 2022-12-13

## 2022-12-10 RX ORDER — DIPHENHYDRAMINE HCL 50 MG
25 CAPSULE ORAL ONCE
Refills: 0 | Status: COMPLETED | OUTPATIENT
Start: 2022-12-10 | End: 2022-12-10

## 2022-12-10 RX ADMIN — HYDROMORPHONE HYDROCHLORIDE 1 MILLIGRAM(S): 2 INJECTION INTRAMUSCULAR; INTRAVENOUS; SUBCUTANEOUS at 22:33

## 2022-12-10 RX ADMIN — BUDESONIDE AND FORMOTEROL FUMARATE DIHYDRATE 2 PUFF(S): 160; 4.5 AEROSOL RESPIRATORY (INHALATION) at 18:00

## 2022-12-10 RX ADMIN — ENOXAPARIN SODIUM 40 MILLIGRAM(S): 100 INJECTION SUBCUTANEOUS at 21:54

## 2022-12-10 RX ADMIN — Medication 1 PATCH: at 19:53

## 2022-12-10 RX ADMIN — Medication 1 SPRAY(S): at 18:00

## 2022-12-10 RX ADMIN — PANTOPRAZOLE SODIUM 40 MILLIGRAM(S): 20 TABLET, DELAYED RELEASE ORAL at 06:04

## 2022-12-10 RX ADMIN — HYDROMORPHONE HYDROCHLORIDE 4 MILLIGRAM(S): 2 INJECTION INTRAMUSCULAR; INTRAVENOUS; SUBCUTANEOUS at 06:35

## 2022-12-10 RX ADMIN — Medication 25 MILLIGRAM(S): at 06:03

## 2022-12-10 RX ADMIN — SODIUM CHLORIDE 75 MILLILITER(S): 9 INJECTION, SOLUTION INTRAVENOUS at 17:59

## 2022-12-10 RX ADMIN — HYDROMORPHONE HYDROCHLORIDE 4 MILLIGRAM(S): 2 INJECTION INTRAMUSCULAR; INTRAVENOUS; SUBCUTANEOUS at 12:06

## 2022-12-10 RX ADMIN — HYDROXYUREA 500 MILLIGRAM(S): 500 CAPSULE ORAL at 17:59

## 2022-12-10 RX ADMIN — HYDROMORPHONE HYDROCHLORIDE 1 MILLIGRAM(S): 2 INJECTION INTRAMUSCULAR; INTRAVENOUS; SUBCUTANEOUS at 15:26

## 2022-12-10 RX ADMIN — BUDESONIDE AND FORMOTEROL FUMARATE DIHYDRATE 2 PUFF(S): 160; 4.5 AEROSOL RESPIRATORY (INHALATION) at 06:04

## 2022-12-10 RX ADMIN — HYDROMORPHONE HYDROCHLORIDE 1 MILLIGRAM(S): 2 INJECTION INTRAMUSCULAR; INTRAVENOUS; SUBCUTANEOUS at 22:53

## 2022-12-10 RX ADMIN — LISINOPRIL 40 MILLIGRAM(S): 2.5 TABLET ORAL at 06:04

## 2022-12-10 RX ADMIN — HYDROMORPHONE HYDROCHLORIDE 4 MILLIGRAM(S): 2 INJECTION INTRAMUSCULAR; INTRAVENOUS; SUBCUTANEOUS at 02:33

## 2022-12-10 RX ADMIN — Medication 1 PATCH: at 11:57

## 2022-12-10 RX ADMIN — HYDROMORPHONE HYDROCHLORIDE 4 MILLIGRAM(S): 2 INJECTION INTRAMUSCULAR; INTRAVENOUS; SUBCUTANEOUS at 06:02

## 2022-12-10 RX ADMIN — AMLODIPINE BESYLATE 10 MILLIGRAM(S): 2.5 TABLET ORAL at 06:03

## 2022-12-10 RX ADMIN — Medication 1 PATCH: at 11:11

## 2022-12-10 RX ADMIN — Medication 1 MILLIGRAM(S): at 11:58

## 2022-12-10 RX ADMIN — HYDROXYUREA 500 MILLIGRAM(S): 500 CAPSULE ORAL at 06:02

## 2022-12-10 RX ADMIN — POLYETHYLENE GLYCOL 3350 17 GRAM(S): 17 POWDER, FOR SOLUTION ORAL at 11:58

## 2022-12-10 RX ADMIN — HYDROMORPHONE HYDROCHLORIDE 4 MILLIGRAM(S): 2 INJECTION INTRAMUSCULAR; INTRAVENOUS; SUBCUTANEOUS at 03:04

## 2022-12-10 RX ADMIN — Medication 1 PATCH: at 06:09

## 2022-12-10 RX ADMIN — CHLORHEXIDINE GLUCONATE 1 APPLICATION(S): 213 SOLUTION TOPICAL at 11:43

## 2022-12-10 RX ADMIN — Medication 200 MILLIGRAM(S): at 21:50

## 2022-12-10 RX ADMIN — Medication 100 MILLIGRAM(S): at 17:59

## 2022-12-10 RX ADMIN — SENNA PLUS 2 TABLET(S): 8.6 TABLET ORAL at 21:50

## 2022-12-10 RX ADMIN — Medication 100 MILLIGRAM(S): at 03:06

## 2022-12-10 NOTE — DISCHARGE NOTE PROVIDER - NSDCCPCAREPLAN_GEN_ALL_CORE_FT
PRINCIPAL DISCHARGE DIAGNOSIS  Diagnosis: Sickle cell pain crisis  Assessment and Plan of Treatment: You came to the hospital for pain and were admitted for sickle cell pain crisis. You were given IV fluids and IV pain medications and your symptoms improved. You will be discharged with a short course of opioid medication that you can take for pain. Please follow up with your primary care provider within 3-5 days of discharge. If you develop worsening of your symptoms or any other concerning symptoms, please return to the nearest emergency department.      SECONDARY DISCHARGE DIAGNOSES  Diagnosis: Reduced visual acuity  Assessment and Plan of Treatment: While you were in the hospital, you had blurry vision of your left eye. This is likely due to your underlying sickle cell disease and vitreous hemorrhage. The eye doctor saw you in the hospital and recommended you follow up with them for this condition at their clinic. Please avoid taking any blood thinner medications or NSAIDs as this could make your symptoms worse. Please follow up with the ophthalmologist within 2-4 weeks. If you develop any sudden worsening of your symptoms, please return to the nearest emergency department.    Diagnosis: 2019 novel coronavirus disease (COVID-19)  Assessment and Plan of Treatment: While you were in the hospital, you were found to be positive for COVID-19. You completed a 5 day course of REMDESIVIR with improvment in your symptoms. If you develop any worsening shortness of breath or difficulty breathing, please return to your nearest emergency department.     PRINCIPAL DISCHARGE DIAGNOSIS  Diagnosis: Sickle cell pain crisis  Assessment and Plan of Treatment: You came to the hospital for pain and were admitted for sickle cell pain crisis. You were given IV fluids and IV pain medications and your symptoms improved. You will be discharged with a short course of opioid medication that you can take for pain. Please avoid NSAIDs such as motrin or advil, as per the eye doctors because of the vitreous hemorrhage they saw on your exam. It is stable and you would have to follow up with the eye doctor after you leave the hospital. Please follow up with your primary care provider within 3-5 days of discharge. If you develop worsening of your symptoms or any other concerning symptoms, please return to the nearest emergency department.      SECONDARY DISCHARGE DIAGNOSES  Diagnosis: Reduced visual acuity  Assessment and Plan of Treatment: While you were in the hospital, you had blurry vision of your left eye. This is likely due to your underlying sickle cell disease and vitreous hemorrhage. The eye doctor saw you in the hospital and recommended you follow up with them for this condition at their clinic. Please avoid taking any blood thinner medications or NSAIDs as this could make your symptoms worse. Please follow up with the ophthalmologist within 2-4 weeks. If you develop any sudden worsening of your symptoms, please return to the nearest emergency department.    Diagnosis: 2019 novel coronavirus disease (COVID-19)  Assessment and Plan of Treatment: While you were in the hospital, you were found to be positive for COVID-19. You completed a 5 day course of REMDESIVIR with improvment in your symptoms. If you develop any worsening shortness of breath or difficulty breathing, please return to your nearest emergency department.

## 2022-12-10 NOTE — DISCHARGE NOTE PROVIDER - NSFOLLOWUPCLINICS_GEN_ALL_ED_FT
[facility]  Ophthalmology  [address] [place] [address] [address]  [place] [address]  Phone: [phone]  Fax:   Follow Up Time: 2 weeks

## 2022-12-10 NOTE — DISCHARGE NOTE PROVIDER - HOSPITAL COURSE
57F c hx SS disease, htn, retinal detachment s/p repair, pw 4 days cough, congestion and 2 days of worsening left arm, left neck, hip, thigh, back pain.    Pt reports her current pain is located where she usually gets her sickle crisis pain. Pt reports having a sickle crisis once every 2-3 yrs. Pt reports being in usual state of health until about 4 days ago, when she started to have a dry cough, runny nose, lightheadedness, chills. Pt states her mom is also sick. Pt is modern vaccinated x4. Denies fevers, diarrhea, urinary symptoms. Pt is not sure if her arm pain is worse with exertion. Pt has never had a stress test before. Pt denies any exacerbating factors. Dilaudid seems to help the pain a little.    In ED, patient found incidentally to be COVID -19 +. Admitted for Sickle Cell Pain Crisis. Continued with home doses of hydroxurea and folate. Given IVF and IV Dilaudid - transitioned to PO. Patient's symptoms improved and discharged on short course of po Dilaudid with plan for close outpatient follow up. Given 5 day course of remdesivir for COVID-19 infection- no indication for steroids during this hospitalization. Seen by ophthalmology for blurry vision in left eye secondary to sickle cell retinopathy and chronic vitreous hemorrhage - recommended outpatient follow up. Patient clinically stable and ready for discharge.

## 2022-12-10 NOTE — DISCHARGE NOTE PROVIDER - NSDCMRMEDTOKEN_GEN_ALL_CORE_FT
folic acid 1 mg oral tablet: 1 tab(s) orally once a day  Hydrea 500 mg oral capsule: 1 cap(s) orally 2 times a day  lisinopril 40 mg oral tablet: 1 tab(s) orally once a day  Norvasc 10 mg oral tablet: 1 tab(s) orally once a day  Symbicort 160 mcg-4.5 mcg/inh inhalation aerosol: 2 puff(s) inhaled 2 times a day   folic acid 1 mg oral tablet: 1 tab(s) orally once a day  Hydrea 500 mg oral capsule: 1 cap(s) orally 2 times a day  HYDROmorphone 4 mg oral tablet: 1 tab(s) orally every 6 to 8 hours, As Needed -Severe Pain (7 - 10) MDD:16 mg  lisinopril 40 mg oral tablet: 1 tab(s) orally once a day  Norvasc 10 mg oral tablet: 1 tab(s) orally once a day  Symbicort 160 mcg-4.5 mcg/inh inhalation aerosol: 2 puff(s) inhaled 2 times a day   folic acid 1 mg oral tablet: 1 tab(s) orally once a day  Hydrea 500 mg oral capsule: 1 cap(s) orally 2 times a day  HYDROmorphone 4 mg oral tablet: 1 tab(s) orally every 6 to 8 hours, As Needed -Severe Pain (7 - 10) MDD:16 mg  lisinopril 40 mg oral tablet: 1 tab(s) orally once a day  Nicoderm C-Q Clear 21 mg/24 hr transdermal film, extended release: 1 patch transdermal once a day   Norvasc 10 mg oral tablet: 1 tab(s) orally once a day  Symbicort 160 mcg-4.5 mcg/inh inhalation aerosol: 2 puff(s) inhaled 2 times a day

## 2022-12-10 NOTE — PROGRESS NOTE ADULT - PROBLEM SELECTOR PLAN 3
-C/w Remdesivir 2/2 high risk factor for progression (Day 5/5)  -VTE Ppx: lovenox ppx  -Not hypoxic-> no need for decadron -s/p 5 day course of Remdesivir 2/2 high risk factor for progression   -VTE Ppx: lovenox ppx  -Not hypoxic-> no need for decadron

## 2022-12-10 NOTE — CHART NOTE - NSCHARTNOTEFT_GEN_A_CORE
Called to pt's bedside due to pain. Pt stated she was in 9/10 pain and did not receive her 7 pm dose. Pt insisted that because her pain was not controlled, she needs IV dilaudid. Was signed out that it is OK to give IV dilaudid, so ordered 1 mg IV dilaudid for pt to be comfortable through the night.

## 2022-12-10 NOTE — PROGRESS NOTE ADULT - PROBLEM SELECTOR PLAN 5
Diet: DASH  DVT PPx: Lovenox  Dispo: pending clinical course Diet: DASH  DVT PPx: Lovenox  Dispo: pending clinical course, likely today

## 2022-12-10 NOTE — DISCHARGE NOTE PROVIDER - CARE PROVIDER_API CALL
MARY ORONA  Internal Medicine  [address] [place] [address]  Phone: [phone]  Fax: [phone]  Follow Up Time: 1 week

## 2022-12-10 NOTE — PROGRESS NOTE ADULT - PROBLEM SELECTOR PLAN 1
-s/p IVF 1/2NS +D5 @ 75cc/hr for net 2L /day   -C/w Dilaudid 4 q4h PO PRN      -bowel regimen while on opioids  -Encourage hydration   -Afebrile, CXR negative   -C/w hydroxyurea, folate  -Monitor electrolytes, Cr   -Monitor vitals, O2 sat.

## 2022-12-10 NOTE — DISCHARGE NOTE PROVIDER - NSDCFUADDAPPT_GEN_ALL_CORE_FT
Please see your primary care provider after you leave the hospital.    Please see the eye doctor after you leave the hospital.

## 2022-12-10 NOTE — PROGRESS NOTE ADULT - SUBJECTIVE AND OBJECTIVE BOX
PROGRESS NOTE:     Patient is a 57y old  Female who presents with a chief complaint of cough, congestion, left arm, left neck, hip, thigh, back pain (09 Dec 2022 07:12)      SUBJECTIVE / OVERNIGHT EVENTS:    ADDITIONAL REVIEW OF SYSTEMS:    MEDICATIONS  (STANDING):  acetaminophen   IVPB .. 1000 milliGRAM(s) IV Intermittent once  amLODIPine   Tablet 10 milliGRAM(s) Oral daily  budesonide 160 MICROgram(s)/formoterol 4.5 MICROgram(s) Inhaler 2 Puff(s) Inhalation two times a day  chlorhexidine 2% Cloths 1 Application(s) Topical daily  enoxaparin Injectable 40 milliGRAM(s) SubCutaneous every 24 hours  folic acid 1 milliGRAM(s) Oral daily  HYDROmorphone   Tablet 4 milliGRAM(s) Oral every 4 hours  hydroxyurea 500 milliGRAM(s) Oral two times a day  influenza   Vaccine 0.5 milliLiter(s) IntraMuscular once  lisinopril 40 milliGRAM(s) Oral daily  nicotine - 21 mG/24Hr(s) Patch 1 Patch Transdermal daily  pantoprazole    Tablet 40 milliGRAM(s) Oral before breakfast  polyethylene glycol 3350 17 Gram(s) Oral daily  senna 2 Tablet(s) Oral at bedtime    MEDICATIONS  (PRN):  acetaminophen     Tablet .. 650 milliGRAM(s) Oral every 6 hours PRN Moderate Pain (4 - 6)  bisacodyl Suppository 10 milliGRAM(s) Rectal daily PRN Constipation  calcium carbonate    500 mG (Tums) Chewable 1 Tablet(s) Chew four times a day PRN Dyspepsia  guaiFENesin Oral Liquid (Sugar-Free) 100 milliGRAM(s) Oral every 6 hours PRN Cough  HYDROmorphone   Tablet 2 milliGRAM(s) Oral every 4 hours PRN Breakthrough  ondansetron Injectable 4 milliGRAM(s) IV Push every 6 hours PRN Nausea and/or Vomiting  sodium chloride 0.65% Nasal 1 Spray(s) Both Nostrils daily PRN Nasal Congestion      CAPILLARY BLOOD GLUCOSE        I&O's Summary    09 Dec 2022 07:01  -  10 Dec 2022 07:00  --------------------------------------------------------  IN: 240 mL / OUT: 0 mL / NET: 240 mL        PHYSICAL EXAM:  Vital Signs Last 24 Hrs  T(C): 36.4 (10 Dec 2022 04:13), Max: 37.1 (09 Dec 2022 21:13)  T(F): 97.5 (10 Dec 2022 04:13), Max: 98.7 (09 Dec 2022 21:13)  HR: 78 (10 Dec 2022 04:13) (78 - 96)  BP: 116/74 (10 Dec 2022 04:13) (116/74 - 139/88)  BP(mean): --  RR: 18 (10 Dec 2022 04:13) (18 - 20)  SpO2: 98% (10 Dec 2022 04:13) (94% - 98%)    Parameters below as of 10 Dec 2022 04:13  Patient On (Oxygen Delivery Method): room air        GENERAL: No acute distress, well-developed  HEAD:  Atraumatic, Normocephalic  EYES: EOMI, PERRLA, conjunctiva and sclera clear  NECK: Supple, no lymphadenopathy, no JVD  CHEST/LUNG: CTAB; No wheezes, rales, or rhonchi  HEART: Regular rate and rhythm; No murmurs, rubs, or gallops  ABDOMEN: Soft, non-tender, non-distended; normal bowel sounds, no organomegaly  EXTREMITIES:  2+ peripheral pulses b/l, No clubbing, cyanosis, or edema  NEUROLOGY: A&O x 3, no focal deficits  SKIN: No rashes or lesions    LABS:                        11.3   7.48  )-----------( 290      ( 09 Dec 2022 07:14 )             31.0     12-09    137  |  101  |  13  ----------------------------<  91  3.7   |  24  |  0.71    Ca    9.3      09 Dec 2022 07:14  Phos  4.5     12-09  Mg     2.1     12-09    TPro  7.5  /  Alb  4.0  /  TBili  1.1  /  DBili  x   /  AST  18  /  ALT  14  /  AlkPhos  102  12-09                RADIOLOGY & ADDITIONAL TESTS:  Results Reviewed:   Imaging Personally Reviewed:  Electrocardiogram Personally Reviewed:    COORDINATION OF CARE:  Care Discussed with Consultants/Other Providers [Y/N]:  Prior or Outpatient Records Reviewed [Y/N]:   PROGRESS NOTE:     Patient is a 57y old  Female who presents with a chief complaint of cough, congestion, left arm, left neck, hip, thigh, back pain (09 Dec 2022 07:12)      SUBJECTIVE / OVERNIGHT EVENTS:  No events overnight. Patient examined at bedside this morning. Reports bilateral myalgias and cough. Denies CP, SOB, abdominal pain, leg pain, n/v/d/c. All other ROS negative.       MEDICATIONS  (STANDING):  acetaminophen   IVPB .. 1000 milliGRAM(s) IV Intermittent once  amLODIPine   Tablet 10 milliGRAM(s) Oral daily  budesonide 160 MICROgram(s)/formoterol 4.5 MICROgram(s) Inhaler 2 Puff(s) Inhalation two times a day  chlorhexidine 2% Cloths 1 Application(s) Topical daily  enoxaparin Injectable 40 milliGRAM(s) SubCutaneous every 24 hours  folic acid 1 milliGRAM(s) Oral daily  HYDROmorphone   Tablet 4 milliGRAM(s) Oral every 4 hours  hydroxyurea 500 milliGRAM(s) Oral two times a day  influenza   Vaccine 0.5 milliLiter(s) IntraMuscular once  lisinopril 40 milliGRAM(s) Oral daily  nicotine - 21 mG/24Hr(s) Patch 1 Patch Transdermal daily  pantoprazole    Tablet 40 milliGRAM(s) Oral before breakfast  polyethylene glycol 3350 17 Gram(s) Oral daily  senna 2 Tablet(s) Oral at bedtime    MEDICATIONS  (PRN):  acetaminophen     Tablet .. 650 milliGRAM(s) Oral every 6 hours PRN Moderate Pain (4 - 6)  bisacodyl Suppository 10 milliGRAM(s) Rectal daily PRN Constipation  calcium carbonate    500 mG (Tums) Chewable 1 Tablet(s) Chew four times a day PRN Dyspepsia  guaiFENesin Oral Liquid (Sugar-Free) 100 milliGRAM(s) Oral every 6 hours PRN Cough  HYDROmorphone   Tablet 2 milliGRAM(s) Oral every 4 hours PRN Breakthrough  ondansetron Injectable 4 milliGRAM(s) IV Push every 6 hours PRN Nausea and/or Vomiting  sodium chloride 0.65% Nasal 1 Spray(s) Both Nostrils daily PRN Nasal Congestion      CAPILLARY BLOOD GLUCOSE        I&O's Summary    09 Dec 2022 07:01  -  10 Dec 2022 07:00  --------------------------------------------------------  IN: 240 mL / OUT: 0 mL / NET: 240 mL        PHYSICAL EXAM:  Vital Signs Last 24 Hrs  T(C): 36.4 (10 Dec 2022 04:13), Max: 37.1 (09 Dec 2022 21:13)  T(F): 97.5 (10 Dec 2022 04:13), Max: 98.7 (09 Dec 2022 21:13)  HR: 78 (10 Dec 2022 04:13) (78 - 96)  BP: 116/74 (10 Dec 2022 04:13) (116/74 - 139/88)  BP(mean): --  RR: 18 (10 Dec 2022 04:13) (18 - 20)  SpO2: 98% (10 Dec 2022 04:13) (94% - 98%)    Parameters below as of 10 Dec 2022 04:13  Patient On (Oxygen Delivery Method): room air        GENERAL: No acute distress, well-developed  HEAD:  Atraumatic, Normocephalic  EYES: EOMI, PERRLA, conjunctiva and sclera clear  NECK: Supple, no lymphadenopathy, no JVD  CHEST/LUNG: CTAB; No wheezes, rales, or rhonchi  HEART: Regular rate and rhythm; No murmurs, rubs, or gallops  ABDOMEN: Soft, non-tender, non-distended; normal bowel sounds, no organomegaly  EXTREMITIES:  2+ peripheral pulses b/l, No clubbing, cyanosis, or edema  NEUROLOGY: A&O x 3, no focal deficits  SKIN: No rashes or lesions    LABS:                        11.3   7.48  )-----------( 290      ( 09 Dec 2022 07:14 )             31.0     12-09    137  |  101  |  13  ----------------------------<  91  3.7   |  24  |  0.71    Ca    9.3      09 Dec 2022 07:14  Phos  4.5     12-09  Mg     2.1     12-09    TPro  7.5  /  Alb  4.0  /  TBili  1.1  /  DBili  x   /  AST  18  /  ALT  14  /  AlkPhos  102  12-09

## 2022-12-11 LAB
ANION GAP SERPL CALC-SCNC: 11 MMOL/L — SIGNIFICANT CHANGE UP (ref 5–17)
BUN SERPL-MCNC: 11 MG/DL — SIGNIFICANT CHANGE UP (ref 7–23)
CALCIUM SERPL-MCNC: 9.2 MG/DL — SIGNIFICANT CHANGE UP (ref 8.4–10.5)
CHLORIDE SERPL-SCNC: 105 MMOL/L — SIGNIFICANT CHANGE UP (ref 96–108)
CO2 SERPL-SCNC: 24 MMOL/L — SIGNIFICANT CHANGE UP (ref 22–31)
CREAT SERPL-MCNC: 0.7 MG/DL — SIGNIFICANT CHANGE UP (ref 0.5–1.3)
EGFR: 102 ML/MIN/1.73M2 — SIGNIFICANT CHANGE UP
GLUCOSE SERPL-MCNC: 104 MG/DL — HIGH (ref 70–99)
HCT VFR BLD CALC: 32 % — LOW (ref 34.5–45)
HGB BLD-MCNC: 11.7 G/DL — SIGNIFICANT CHANGE UP (ref 11.5–15.5)
MAGNESIUM SERPL-MCNC: 2 MG/DL — SIGNIFICANT CHANGE UP (ref 1.6–2.6)
MCHC RBC-ENTMCNC: 33.1 PG — SIGNIFICANT CHANGE UP (ref 27–34)
MCHC RBC-ENTMCNC: 36.6 GM/DL — HIGH (ref 32–36)
MCV RBC AUTO: 90.7 FL — SIGNIFICANT CHANGE UP (ref 80–100)
NRBC # BLD: 2 /100 WBCS — HIGH (ref 0–0)
PHOSPHATE SERPL-MCNC: 4.2 MG/DL — SIGNIFICANT CHANGE UP (ref 2.5–4.5)
PLATELET # BLD AUTO: 300 K/UL — SIGNIFICANT CHANGE UP (ref 150–400)
POTASSIUM SERPL-MCNC: 4.1 MMOL/L — SIGNIFICANT CHANGE UP (ref 3.5–5.3)
POTASSIUM SERPL-SCNC: 4.1 MMOL/L — SIGNIFICANT CHANGE UP (ref 3.5–5.3)
RBC # BLD: 3.53 M/UL — LOW (ref 3.8–5.2)
RBC # FLD: 14.8 % — HIGH (ref 10.3–14.5)
SODIUM SERPL-SCNC: 140 MMOL/L — SIGNIFICANT CHANGE UP (ref 135–145)
WBC # BLD: 7.53 K/UL — SIGNIFICANT CHANGE UP (ref 3.8–10.5)
WBC # FLD AUTO: 7.53 K/UL — SIGNIFICANT CHANGE UP (ref 3.8–10.5)

## 2022-12-11 PROCEDURE — 99232 SBSQ HOSP IP/OBS MODERATE 35: CPT | Mod: GC

## 2022-12-11 RX ORDER — HYDROMORPHONE HYDROCHLORIDE 2 MG/ML
0.5 INJECTION INTRAMUSCULAR; INTRAVENOUS; SUBCUTANEOUS EVERY 4 HOURS
Refills: 0 | Status: DISCONTINUED | OUTPATIENT
Start: 2022-12-11 | End: 2022-12-12

## 2022-12-11 RX ORDER — SODIUM CHLORIDE 9 MG/ML
1000 INJECTION, SOLUTION INTRAVENOUS
Refills: 0 | Status: DISCONTINUED | OUTPATIENT
Start: 2022-12-11 | End: 2022-12-12

## 2022-12-11 RX ORDER — HYDROMORPHONE HYDROCHLORIDE 2 MG/ML
0.5 INJECTION INTRAMUSCULAR; INTRAVENOUS; SUBCUTANEOUS ONCE
Refills: 0 | Status: DISCONTINUED | OUTPATIENT
Start: 2022-12-11 | End: 2022-12-11

## 2022-12-11 RX ADMIN — SODIUM CHLORIDE 75 MILLILITER(S): 9 INJECTION, SOLUTION INTRAVENOUS at 17:52

## 2022-12-11 RX ADMIN — SODIUM CHLORIDE 75 MILLILITER(S): 9 INJECTION, SOLUTION INTRAVENOUS at 05:15

## 2022-12-11 RX ADMIN — HYDROXYUREA 500 MILLIGRAM(S): 500 CAPSULE ORAL at 17:46

## 2022-12-11 RX ADMIN — Medication 1 PATCH: at 11:34

## 2022-12-11 RX ADMIN — BUDESONIDE AND FORMOTEROL FUMARATE DIHYDRATE 2 PUFF(S): 160; 4.5 AEROSOL RESPIRATORY (INHALATION) at 05:12

## 2022-12-11 RX ADMIN — CHLORHEXIDINE GLUCONATE 1 APPLICATION(S): 213 SOLUTION TOPICAL at 11:07

## 2022-12-11 RX ADMIN — Medication 200 MILLIGRAM(S): at 22:07

## 2022-12-11 RX ADMIN — PANTOPRAZOLE SODIUM 40 MILLIGRAM(S): 20 TABLET, DELAYED RELEASE ORAL at 05:14

## 2022-12-11 RX ADMIN — LISINOPRIL 40 MILLIGRAM(S): 2.5 TABLET ORAL at 05:11

## 2022-12-11 RX ADMIN — HYDROMORPHONE HYDROCHLORIDE 4 MILLIGRAM(S): 2 INJECTION INTRAMUSCULAR; INTRAVENOUS; SUBCUTANEOUS at 17:54

## 2022-12-11 RX ADMIN — Medication 200 MILLIGRAM(S): at 13:30

## 2022-12-11 RX ADMIN — BUDESONIDE AND FORMOTEROL FUMARATE DIHYDRATE 2 PUFF(S): 160; 4.5 AEROSOL RESPIRATORY (INHALATION) at 17:48

## 2022-12-11 RX ADMIN — Medication 1 MILLIGRAM(S): at 11:04

## 2022-12-11 RX ADMIN — HYDROMORPHONE HYDROCHLORIDE 4 MILLIGRAM(S): 2 INJECTION INTRAMUSCULAR; INTRAVENOUS; SUBCUTANEOUS at 11:11

## 2022-12-11 RX ADMIN — HYDROMORPHONE HYDROCHLORIDE 0.5 MILLIGRAM(S): 2 INJECTION INTRAMUSCULAR; INTRAVENOUS; SUBCUTANEOUS at 22:07

## 2022-12-11 RX ADMIN — Medication 1 PATCH: at 11:06

## 2022-12-11 RX ADMIN — SENNA PLUS 2 TABLET(S): 8.6 TABLET ORAL at 22:07

## 2022-12-11 RX ADMIN — POLYETHYLENE GLYCOL 3350 17 GRAM(S): 17 POWDER, FOR SOLUTION ORAL at 11:04

## 2022-12-11 RX ADMIN — HYDROMORPHONE HYDROCHLORIDE 0.5 MILLIGRAM(S): 2 INJECTION INTRAMUSCULAR; INTRAVENOUS; SUBCUTANEOUS at 14:10

## 2022-12-11 RX ADMIN — HYDROMORPHONE HYDROCHLORIDE 4 MILLIGRAM(S): 2 INJECTION INTRAMUSCULAR; INTRAVENOUS; SUBCUTANEOUS at 11:40

## 2022-12-11 RX ADMIN — HYDROXYUREA 500 MILLIGRAM(S): 500 CAPSULE ORAL at 05:13

## 2022-12-11 RX ADMIN — Medication 1 SPRAY(S): at 05:12

## 2022-12-11 RX ADMIN — HYDROMORPHONE HYDROCHLORIDE 4 MILLIGRAM(S): 2 INJECTION INTRAMUSCULAR; INTRAVENOUS; SUBCUTANEOUS at 18:34

## 2022-12-11 RX ADMIN — HYDROMORPHONE HYDROCHLORIDE 4 MILLIGRAM(S): 2 INJECTION INTRAMUSCULAR; INTRAVENOUS; SUBCUTANEOUS at 06:27

## 2022-12-11 RX ADMIN — HYDROMORPHONE HYDROCHLORIDE 4 MILLIGRAM(S): 2 INJECTION INTRAMUSCULAR; INTRAVENOUS; SUBCUTANEOUS at 05:55

## 2022-12-11 RX ADMIN — Medication 1 PATCH: at 06:00

## 2022-12-11 RX ADMIN — SODIUM CHLORIDE 75 MILLILITER(S): 9 INJECTION, SOLUTION INTRAVENOUS at 13:31

## 2022-12-11 RX ADMIN — Medication 1 PATCH: at 19:55

## 2022-12-11 RX ADMIN — HYDROMORPHONE HYDROCHLORIDE 0.5 MILLIGRAM(S): 2 INJECTION INTRAMUSCULAR; INTRAVENOUS; SUBCUTANEOUS at 22:28

## 2022-12-11 RX ADMIN — ENOXAPARIN SODIUM 40 MILLIGRAM(S): 100 INJECTION SUBCUTANEOUS at 22:07

## 2022-12-11 RX ADMIN — Medication 200 MILLIGRAM(S): at 05:11

## 2022-12-11 RX ADMIN — HYDROMORPHONE HYDROCHLORIDE 0.5 MILLIGRAM(S): 2 INJECTION INTRAMUSCULAR; INTRAVENOUS; SUBCUTANEOUS at 13:48

## 2022-12-11 RX ADMIN — Medication 1 SPRAY(S): at 17:47

## 2022-12-11 RX ADMIN — AMLODIPINE BESYLATE 10 MILLIGRAM(S): 2.5 TABLET ORAL at 05:11

## 2022-12-11 NOTE — PROGRESS NOTE ADULT - PROBLEM SELECTOR PLAN 5
Diet: DASH  DVT PPx: Lovenox  Dispo: pending clinical course, likely today Diet: DASH  DVT PPx: Lovenox  Dispo: pending clinical course

## 2022-12-11 NOTE — PROGRESS NOTE ADULT - SUBJECTIVE AND OBJECTIVE BOX
PROGRESS NOTE:     Patient is a 57y old  Female who presents with a chief complaint of cough, congestion, left arm, left neck, hip, thigh, back pain (10 Dec 2022 12:38)      SUBJECTIVE / OVERNIGHT EVENTS:    ADDITIONAL REVIEW OF SYSTEMS:    MEDICATIONS  (STANDING):  acetaminophen   IVPB .. 1000 milliGRAM(s) IV Intermittent once  amLODIPine   Tablet 10 milliGRAM(s) Oral daily  benzonatate 200 milliGRAM(s) Oral every 8 hours  budesonide 160 MICROgram(s)/formoterol 4.5 MICROgram(s) Inhaler 2 Puff(s) Inhalation two times a day  chlorhexidine 2% Cloths 1 Application(s) Topical daily  dextrose 5% + sodium chloride 0.45%. 1000 milliLiter(s) (75 mL/Hr) IV Continuous <Continuous>  enoxaparin Injectable 40 milliGRAM(s) SubCutaneous every 24 hours  fluticasone propionate 50 MICROgram(s)/spray Nasal Spray 1 Spray(s) Both Nostrils two times a day  folic acid 1 milliGRAM(s) Oral daily  hydroxyurea 500 milliGRAM(s) Oral two times a day  influenza   Vaccine 0.5 milliLiter(s) IntraMuscular once  lisinopril 40 milliGRAM(s) Oral daily  nicotine - 21 mG/24Hr(s) Patch 1 Patch Transdermal daily  pantoprazole    Tablet 40 milliGRAM(s) Oral before breakfast  polyethylene glycol 3350 17 Gram(s) Oral daily  senna 2 Tablet(s) Oral at bedtime    MEDICATIONS  (PRN):  acetaminophen     Tablet .. 650 milliGRAM(s) Oral every 6 hours PRN Moderate Pain (4 - 6)  bisacodyl Suppository 10 milliGRAM(s) Rectal daily PRN Constipation  calcium carbonate    500 mG (Tums) Chewable 1 Tablet(s) Chew four times a day PRN Dyspepsia  guaiFENesin Oral Liquid (Sugar-Free) 100 milliGRAM(s) Oral every 6 hours PRN Cough  HYDROmorphone   Tablet 4 milliGRAM(s) Oral every 4 hours PRN Severe Pain (7 - 10)  ondansetron Injectable 4 milliGRAM(s) IV Push every 6 hours PRN Nausea and/or Vomiting  sodium chloride 0.65% Nasal 1 Spray(s) Both Nostrils daily PRN Nasal Congestion      CAPILLARY BLOOD GLUCOSE        I&O's Summary    10 Dec 2022 07:01  -  11 Dec 2022 07:00  --------------------------------------------------------  IN: 900 mL / OUT: 0 mL / NET: 900 mL        PHYSICAL EXAM:  Vital Signs Last 24 Hrs  T(C): 36.9 (11 Dec 2022 05:10), Max: 37.1 (10 Dec 2022 14:40)  T(F): 98.5 (11 Dec 2022 05:10), Max: 98.8 (10 Dec 2022 14:40)  HR: 95 (11 Dec 2022 05:10) (88 - 95)  BP: 140/95 (11 Dec 2022 05:10) (130/80 - 140/95)  BP(mean): --  RR: 18 (11 Dec 2022 05:10) (18 - 18)  SpO2: 100% (11 Dec 2022 05:10) (97% - 100%)    Parameters below as of 11 Dec 2022 05:10  Patient On (Oxygen Delivery Method): room air        CONSTITUTIONAL: NAD, well-developed  RESPIRATORY: Normal respiratory effort; lungs are clear to auscultation bilaterally  CARDIOVASCULAR: Regular rate and rhythm, normal S1 and S2, no murmur/rub/gallop; No lower extremity edema; Peripheral pulses are 2+ bilaterally  ABDOMEN: Nontender to palpation, normoactive bowel sounds, no rebound/guarding  MUSCULOSKELETAL: no clubbing or cyanosis of digits; no joint swelling or tenderness to palpation  PSYCH: A+O to person, place, and time; affect appropriate    LABS:                        11.7   7.53  )-----------( 300      ( 11 Dec 2022 07:15 )             32.0     12-11    140  |  105  |  11  ----------------------------<  104<H>  4.1   |  24  |  0.70    Ca    9.2      11 Dec 2022 07:17  Phos  4.2     12-11  Mg     2.0     12-11                  RADIOLOGY & ADDITIONAL TESTS:  Results Reviewed:   Imaging Personally Reviewed:  Electrocardiogram Personally Reviewed:    COORDINATION OF CARE:  Care Discussed with Consultants/Other Providers [Y/N]:  Prior or Outpatient Records Reviewed [Y/N]:   PROGRESS NOTE:     Patient is a 57y old  Female who presents with a chief complaint of cough, congestion, left arm, left neck, hip, thigh, back pain (10 Dec 2022 12:38)      SUBJECTIVE / OVERNIGHT EVENTS: Patient received IV dilaudidx1 overnight. This AM patient seen and examined at bedside. Patient reports continued pain and states that the IV pain medications overnight helped.     ADDITIONAL REVIEW OF SYSTEMS: Negative     MEDICATIONS  (STANDING):  acetaminophen   IVPB .. 1000 milliGRAM(s) IV Intermittent once  amLODIPine   Tablet 10 milliGRAM(s) Oral daily  benzonatate 200 milliGRAM(s) Oral every 8 hours  budesonide 160 MICROgram(s)/formoterol 4.5 MICROgram(s) Inhaler 2 Puff(s) Inhalation two times a day  chlorhexidine 2% Cloths 1 Application(s) Topical daily  dextrose 5% + sodium chloride 0.45%. 1000 milliLiter(s) (75 mL/Hr) IV Continuous <Continuous>  enoxaparin Injectable 40 milliGRAM(s) SubCutaneous every 24 hours  fluticasone propionate 50 MICROgram(s)/spray Nasal Spray 1 Spray(s) Both Nostrils two times a day  folic acid 1 milliGRAM(s) Oral daily  hydroxyurea 500 milliGRAM(s) Oral two times a day  influenza   Vaccine 0.5 milliLiter(s) IntraMuscular once  lisinopril 40 milliGRAM(s) Oral daily  nicotine - 21 mG/24Hr(s) Patch 1 Patch Transdermal daily  pantoprazole    Tablet 40 milliGRAM(s) Oral before breakfast  polyethylene glycol 3350 17 Gram(s) Oral daily  senna 2 Tablet(s) Oral at bedtime    MEDICATIONS  (PRN):  acetaminophen     Tablet .. 650 milliGRAM(s) Oral every 6 hours PRN Moderate Pain (4 - 6)  bisacodyl Suppository 10 milliGRAM(s) Rectal daily PRN Constipation  calcium carbonate    500 mG (Tums) Chewable 1 Tablet(s) Chew four times a day PRN Dyspepsia  guaiFENesin Oral Liquid (Sugar-Free) 100 milliGRAM(s) Oral every 6 hours PRN Cough  HYDROmorphone   Tablet 4 milliGRAM(s) Oral every 4 hours PRN Severe Pain (7 - 10)  ondansetron Injectable 4 milliGRAM(s) IV Push every 6 hours PRN Nausea and/or Vomiting  sodium chloride 0.65% Nasal 1 Spray(s) Both Nostrils daily PRN Nasal Congestion      CAPILLARY BLOOD GLUCOSE        I&O's Summary    10 Dec 2022 07:01  -  11 Dec 2022 07:00  --------------------------------------------------------  IN: 900 mL / OUT: 0 mL / NET: 900 mL        PHYSICAL EXAM:  Vital Signs Last 24 Hrs  T(C): 36.9 (11 Dec 2022 05:10), Max: 37.1 (10 Dec 2022 14:40)  T(F): 98.5 (11 Dec 2022 05:10), Max: 98.8 (10 Dec 2022 14:40)  HR: 95 (11 Dec 2022 05:10) (88 - 95)  BP: 140/95 (11 Dec 2022 05:10) (130/80 - 140/95)  BP(mean): --  RR: 18 (11 Dec 2022 05:10) (18 - 18)  SpO2: 100% (11 Dec 2022 05:10) (97% - 100%)    Parameters below as of 11 Dec 2022 05:10  Patient On (Oxygen Delivery Method): room air    GENERAL: No acute distress, well-developed  HEAD:  Atraumatic, Normocephalic  EYES: EOMI, PERRLA, conjunctiva and sclera clear  NECK: Supple, no lymphadenopathy, no JVD  CHEST/LUNG: CTAB; No wheezes, rales, or rhonchi  HEART: Regular rate and rhythm; No murmurs, rubs, or gallops  ABDOMEN: Soft, non-tender, non-distended; normal bowel sounds, no organomegaly  EXTREMITIES:  2+ peripheral pulses b/l, No clubbing, cyanosis, or edema  NEUROLOGY: A&O x 3, no focal deficits  SKIN: No rashes or lesions    LABS:                        11.7   7.53  )-----------( 300      ( 11 Dec 2022 07:15 )             32.0     12-11    140  |  105  |  11  ----------------------------<  104<H>  4.1   |  24  |  0.70    Ca    9.2      11 Dec 2022 07:17  Phos  4.2     12-11  Mg     2.0     12-11                  RADIOLOGY & ADDITIONAL TESTS:  Results Reviewed:   Imaging Personally Reviewed:  Electrocardiogram Personally Reviewed:    COORDINATION OF CARE:  Care Discussed with Consultants/Other Providers [Y/N]:  Prior or Outpatient Records Reviewed [Y/N]:

## 2022-12-11 NOTE — PROGRESS NOTE ADULT - PROBLEM SELECTOR PLAN 3
-s/p 5 day course of Remdesivir 2/2 high risk factor for progression   -VTE Ppx: lovenox ppx  -Not hypoxic-> no need for decadron

## 2022-12-12 ENCOUNTER — TRANSCRIPTION ENCOUNTER (OUTPATIENT)
Age: 57
End: 2022-12-12

## 2022-12-12 LAB
ANION GAP SERPL CALC-SCNC: 11 MMOL/L — SIGNIFICANT CHANGE UP (ref 5–17)
BUN SERPL-MCNC: 10 MG/DL — SIGNIFICANT CHANGE UP (ref 7–23)
CALCIUM SERPL-MCNC: 9.3 MG/DL — SIGNIFICANT CHANGE UP (ref 8.4–10.5)
CHLORIDE SERPL-SCNC: 103 MMOL/L — SIGNIFICANT CHANGE UP (ref 96–108)
CO2 SERPL-SCNC: 24 MMOL/L — SIGNIFICANT CHANGE UP (ref 22–31)
CREAT SERPL-MCNC: 0.83 MG/DL — SIGNIFICANT CHANGE UP (ref 0.5–1.3)
EGFR: 83 ML/MIN/1.73M2 — SIGNIFICANT CHANGE UP
GLUCOSE SERPL-MCNC: 95 MG/DL — SIGNIFICANT CHANGE UP (ref 70–99)
HCT VFR BLD CALC: 31.7 % — LOW (ref 34.5–45)
HGB BLD-MCNC: 11.7 G/DL — SIGNIFICANT CHANGE UP (ref 11.5–15.5)
MAGNESIUM SERPL-MCNC: 2.2 MG/DL — SIGNIFICANT CHANGE UP (ref 1.6–2.6)
MCHC RBC-ENTMCNC: 33 PG — SIGNIFICANT CHANGE UP (ref 27–34)
MCHC RBC-ENTMCNC: 36.9 GM/DL — HIGH (ref 32–36)
MCV RBC AUTO: 89.3 FL — SIGNIFICANT CHANGE UP (ref 80–100)
NRBC # BLD: 2 /100 WBCS — HIGH (ref 0–0)
PHOSPHATE SERPL-MCNC: 4.3 MG/DL — SIGNIFICANT CHANGE UP (ref 2.5–4.5)
PLATELET # BLD AUTO: 308 K/UL — SIGNIFICANT CHANGE UP (ref 150–400)
POTASSIUM SERPL-MCNC: 4.3 MMOL/L — SIGNIFICANT CHANGE UP (ref 3.5–5.3)
POTASSIUM SERPL-SCNC: 4.3 MMOL/L — SIGNIFICANT CHANGE UP (ref 3.5–5.3)
RBC # BLD: 3.55 M/UL — LOW (ref 3.8–5.2)
RBC # FLD: 14.8 % — HIGH (ref 10.3–14.5)
SODIUM SERPL-SCNC: 138 MMOL/L — SIGNIFICANT CHANGE UP (ref 135–145)
WBC # BLD: 7.19 K/UL — SIGNIFICANT CHANGE UP (ref 3.8–10.5)
WBC # FLD AUTO: 7.19 K/UL — SIGNIFICANT CHANGE UP (ref 3.8–10.5)

## 2022-12-12 PROCEDURE — 99232 SBSQ HOSP IP/OBS MODERATE 35: CPT | Mod: GC

## 2022-12-12 RX ORDER — HYDROMORPHONE HYDROCHLORIDE 2 MG/ML
0.25 INJECTION INTRAMUSCULAR; INTRAVENOUS; SUBCUTANEOUS EVERY 4 HOURS
Refills: 0 | Status: DISCONTINUED | OUTPATIENT
Start: 2022-12-12 | End: 2022-12-13

## 2022-12-12 RX ORDER — SODIUM CHLORIDE 9 MG/ML
1000 INJECTION, SOLUTION INTRAVENOUS
Refills: 0 | Status: DISCONTINUED | OUTPATIENT
Start: 2022-12-12 | End: 2022-12-13

## 2022-12-12 RX ADMIN — HYDROMORPHONE HYDROCHLORIDE 4 MILLIGRAM(S): 2 INJECTION INTRAMUSCULAR; INTRAVENOUS; SUBCUTANEOUS at 05:51

## 2022-12-12 RX ADMIN — HYDROMORPHONE HYDROCHLORIDE 4 MILLIGRAM(S): 2 INJECTION INTRAMUSCULAR; INTRAVENOUS; SUBCUTANEOUS at 20:44

## 2022-12-12 RX ADMIN — Medication 1 PATCH: at 11:06

## 2022-12-12 RX ADMIN — HYDROXYUREA 500 MILLIGRAM(S): 500 CAPSULE ORAL at 05:50

## 2022-12-12 RX ADMIN — HYDROMORPHONE HYDROCHLORIDE 4 MILLIGRAM(S): 2 INJECTION INTRAMUSCULAR; INTRAVENOUS; SUBCUTANEOUS at 12:35

## 2022-12-12 RX ADMIN — Medication 1 SPRAY(S): at 05:52

## 2022-12-12 RX ADMIN — HYDROMORPHONE HYDROCHLORIDE 4 MILLIGRAM(S): 2 INJECTION INTRAMUSCULAR; INTRAVENOUS; SUBCUTANEOUS at 16:12

## 2022-12-12 RX ADMIN — SENNA PLUS 2 TABLET(S): 8.6 TABLET ORAL at 21:26

## 2022-12-12 RX ADMIN — ENOXAPARIN SODIUM 40 MILLIGRAM(S): 100 INJECTION SUBCUTANEOUS at 21:27

## 2022-12-12 RX ADMIN — Medication 1 MILLIGRAM(S): at 09:30

## 2022-12-12 RX ADMIN — PANTOPRAZOLE SODIUM 40 MILLIGRAM(S): 20 TABLET, DELAYED RELEASE ORAL at 05:50

## 2022-12-12 RX ADMIN — LISINOPRIL 40 MILLIGRAM(S): 2.5 TABLET ORAL at 05:51

## 2022-12-12 RX ADMIN — HYDROMORPHONE HYDROCHLORIDE 0.25 MILLIGRAM(S): 2 INJECTION INTRAMUSCULAR; INTRAVENOUS; SUBCUTANEOUS at 22:49

## 2022-12-12 RX ADMIN — BUDESONIDE AND FORMOTEROL FUMARATE DIHYDRATE 2 PUFF(S): 160; 4.5 AEROSOL RESPIRATORY (INHALATION) at 05:52

## 2022-12-12 RX ADMIN — POLYETHYLENE GLYCOL 3350 17 GRAM(S): 17 POWDER, FOR SOLUTION ORAL at 09:30

## 2022-12-12 RX ADMIN — HYDROMORPHONE HYDROCHLORIDE 0.25 MILLIGRAM(S): 2 INJECTION INTRAMUSCULAR; INTRAVENOUS; SUBCUTANEOUS at 22:34

## 2022-12-12 RX ADMIN — HYDROMORPHONE HYDROCHLORIDE 4 MILLIGRAM(S): 2 INJECTION INTRAMUSCULAR; INTRAVENOUS; SUBCUTANEOUS at 20:14

## 2022-12-12 RX ADMIN — Medication 1 PATCH: at 05:58

## 2022-12-12 RX ADMIN — BUDESONIDE AND FORMOTEROL FUMARATE DIHYDRATE 2 PUFF(S): 160; 4.5 AEROSOL RESPIRATORY (INHALATION) at 17:15

## 2022-12-12 RX ADMIN — SODIUM CHLORIDE 75 MILLILITER(S): 9 INJECTION, SOLUTION INTRAVENOUS at 17:16

## 2022-12-12 RX ADMIN — Medication 1 PATCH: at 09:32

## 2022-12-12 RX ADMIN — Medication 200 MILLIGRAM(S): at 05:51

## 2022-12-12 RX ADMIN — HYDROMORPHONE HYDROCHLORIDE 4 MILLIGRAM(S): 2 INJECTION INTRAMUSCULAR; INTRAVENOUS; SUBCUTANEOUS at 06:26

## 2022-12-12 RX ADMIN — HYDROMORPHONE HYDROCHLORIDE 4 MILLIGRAM(S): 2 INJECTION INTRAMUSCULAR; INTRAVENOUS; SUBCUTANEOUS at 13:06

## 2022-12-12 RX ADMIN — HYDROMORPHONE HYDROCHLORIDE 4 MILLIGRAM(S): 2 INJECTION INTRAMUSCULAR; INTRAVENOUS; SUBCUTANEOUS at 15:33

## 2022-12-12 RX ADMIN — Medication 200 MILLIGRAM(S): at 12:35

## 2022-12-12 RX ADMIN — AMLODIPINE BESYLATE 10 MILLIGRAM(S): 2.5 TABLET ORAL at 05:51

## 2022-12-12 RX ADMIN — Medication 200 MILLIGRAM(S): at 21:26

## 2022-12-12 RX ADMIN — HYDROXYUREA 500 MILLIGRAM(S): 500 CAPSULE ORAL at 17:17

## 2022-12-12 RX ADMIN — CHLORHEXIDINE GLUCONATE 1 APPLICATION(S): 213 SOLUTION TOPICAL at 09:33

## 2022-12-12 RX ADMIN — Medication 1 SPRAY(S): at 17:15

## 2022-12-12 NOTE — PROGRESS NOTE ADULT - SUBJECTIVE AND OBJECTIVE BOX
Patient is a 57y old  Female who presents with a chief complaint of cough, congestion, left arm, left neck, hip, thigh, back pain (11 Dec 2022 08:20)      INTERVAL HPI/OVERNIGHT EVENTS:      REVIEW OF SYSTEMS:  CONSTITUTIONAL: No fever, weight loss, or fatigue  EYES: No eye pain, visual disturbances, or discharge  ENMT:  No difficulty hearing, tinnitus, vertigo; No sinus or throat pain  NECK: No pain or stiffness  BREASTS: No pain, masses, or nipple discharge  RESPIRATORY: No cough, wheezing, chills or hemoptysis; No shortness of breath  CARDIOVASCULAR: No chest pain, palpitations, dizziness, or leg swelling  GASTROINTESTINAL: No abdominal or epigastric pain. No nausea, vomiting, or hematemesis; No diarrhea or constipation. No melena or hematochezia.  GENITOURINARY: No dysuria, frequency, hematuria, or incontinence  NEUROLOGICAL: No headaches, memory loss, loss of strength, numbness, or tremors  SKIN: No itching, burning, rashes, or lesions   LYMPH NODES: No enlarged glands  ENDOCRINE: No heat or cold intolerance; No hair loss  MUSCULOSKELETAL: No joint pain or swelling; No muscle, back, or extremity pain  PSYCHIATRIC: No depression, anxiety, mood swings, or difficulty sleeping  HEME/LYMPH: No easy bruising, or bleeding gums  ALLERY AND IMMUNOLOGIC: No hives or eczema  FAMILY HISTORY:  FHx: cerebral palsy (Child)      T(C): 36.9 (12-12-22 @ 04:34), Max: 36.9 (12-12-22 @ 04:34)  HR: 82 (12-12-22 @ 04:34) (82 - 97)  BP: 135/91 (12-12-22 @ 04:34) (135/91 - 148/91)  RR: 18 (12-12-22 @ 04:34) (18 - 18)  SpO2: 98% (12-12-22 @ 04:34) (98% - 100%)  Wt(kg): --Vital Signs Last 24 Hrs  T(C): 36.9 (12 Dec 2022 04:34), Max: 36.9 (12 Dec 2022 04:34)  T(F): 98.5 (12 Dec 2022 04:34), Max: 98.5 (12 Dec 2022 04:34)  HR: 82 (12 Dec 2022 04:34) (82 - 97)  BP: 135/91 (12 Dec 2022 04:34) (135/91 - 148/91)  BP(mean): --  RR: 18 (12 Dec 2022 04:34) (18 - 18)  SpO2: 98% (12 Dec 2022 04:34) (98% - 100%)    Parameters below as of 12 Dec 2022 04:34  Patient On (Oxygen Delivery Method): room air        PHYSICAL EXAM:  GENERAL: NAD, well-groomed, well-developed  HEAD:  Atraumatic, Normocephalic  EYES: EOMI, PERRLA, conjunctiva and sclera clear  ENMT: No tonsillar erythema, exudates, or enlargement; Moist mucous membranes, Good dentition, No lesions  NECK: Supple, No JVD, Normal thyroid  NERVOUS SYSTEM:  Alert & Oriented X3, Good concentration; Motor Strength 5/5 B/L upper and lower extremities; DTRs 2+ intact and symmetric  CHEST/LUNG: Clear to percussion bilaterally; No rales, rhonchi, wheezing, or rubs  HEART: Regular rate and rhythm; No murmurs, rubs, or gallops  ABDOMEN: Soft, Nontender, Nondistended; Bowel sounds present  EXTREMITIES:  2+ Peripheral Pulses, No clubbing, cyanosis, or edema  LYMPH: No lymphadenopathy noted  SKIN: No rashes or lesions    Consultant(s) Notes Reviewed:  [x ] YES  [ ] NO  Care Discussed with Consultants/Other Providers [ x] YES  [ ] NO    LABS:          RADIOLOGY & ADDITIONAL TESTS:    Imaging Personally Reviewed:  [ ] YES  [ ] NO  acetaminophen     Tablet .. 650 milliGRAM(s) Oral every 6 hours PRN  acetaminophen   IVPB .. 1000 milliGRAM(s) IV Intermittent once  amLODIPine   Tablet 10 milliGRAM(s) Oral daily  benzonatate 200 milliGRAM(s) Oral every 8 hours  bisacodyl Suppository 10 milliGRAM(s) Rectal daily PRN  budesonide 160 MICROgram(s)/formoterol 4.5 MICROgram(s) Inhaler 2 Puff(s) Inhalation two times a day  calcium carbonate    500 mG (Tums) Chewable 1 Tablet(s) Chew four times a day PRN  chlorhexidine 2% Cloths 1 Application(s) Topical daily  dextrose 5% + sodium chloride 0.45%. 1000 milliLiter(s) IV Continuous <Continuous>  enoxaparin Injectable 40 milliGRAM(s) SubCutaneous every 24 hours  fluticasone propionate 50 MICROgram(s)/spray Nasal Spray 1 Spray(s) Both Nostrils two times a day  folic acid 1 milliGRAM(s) Oral daily  guaiFENesin Oral Liquid (Sugar-Free) 100 milliGRAM(s) Oral every 6 hours PRN  HYDROmorphone   Tablet 4 milliGRAM(s) Oral every 4 hours PRN  HYDROmorphone  Injectable 0.5 milliGRAM(s) IV Push every 4 hours PRN  hydroxyurea 500 milliGRAM(s) Oral two times a day  influenza   Vaccine 0.5 milliLiter(s) IntraMuscular once  lisinopril 40 milliGRAM(s) Oral daily  nicotine - 21 mG/24Hr(s) Patch 1 Patch Transdermal daily  ondansetron Injectable 4 milliGRAM(s) IV Push every 6 hours PRN  pantoprazole    Tablet 40 milliGRAM(s) Oral before breakfast  polyethylene glycol 3350 17 Gram(s) Oral daily  senna 2 Tablet(s) Oral at bedtime  sodium chloride 0.65% Nasal 1 Spray(s) Both Nostrils daily PRN      HEALTH ISSUES - PROBLEM Dx:  Sickle cell crisis    2019 novel coronavirus disease (COVID-19)    HTN (hypertension)    Reduced visual acuity    Need for prophylactic measure           Patient is a 57y old  Female who presents with a chief complaint of cough, congestion, left arm, left neck, hip, thigh, back pain (11 Dec 2022 08:20)      INTERVAL HPI/OVERNIGHT EVENTS: Pt required IV dilaudid at night. This morning the pt feels hopeful that she would be able to leave the hospital today.      REVIEW OF SYSTEMS:  CONSTITUTIONAL: No fever, weight loss, or fatigue  EYES: No eye pain, visual disturbances, or discharge  ENMT:  No difficulty hearing, tinnitus, vertigo; No sinus or throat pain  NECK: No pain or stiffness  BREASTS: No pain, masses, or nipple discharge  RESPIRATORY: No cough, wheezing, chills or hemoptysis; No shortness of breath  CARDIOVASCULAR: No chest pain, palpitations, dizziness, or leg swelling  GASTROINTESTINAL: No abdominal or epigastric pain. No nausea, vomiting, or hematemesis; No diarrhea or constipation. No melena or hematochezia.  GENITOURINARY: No dysuria, frequency, hematuria, or incontinence  NEUROLOGICAL: No headaches, memory loss, loss of strength, numbness, or tremors  SKIN: No itching, burning, rashes, or lesions   LYMPH NODES: No enlarged glands  ENDOCRINE: No heat or cold intolerance; No hair loss  MUSCULOSKELETAL: No joint pain or swelling; No muscle, back, or extremity pain  PSYCHIATRIC: No depression, anxiety, mood swings, or difficulty sleeping  HEME/LYMPH: No easy bruising, or bleeding gums  ALLERY AND IMMUNOLOGIC: No hives or eczema  FAMILY HISTORY:  FHx: cerebral palsy (Child)      T(C): 36.9 (12-12-22 @ 04:34), Max: 36.9 (12-12-22 @ 04:34)  HR: 82 (12-12-22 @ 04:34) (82 - 97)  BP: 135/91 (12-12-22 @ 04:34) (135/91 - 148/91)  RR: 18 (12-12-22 @ 04:34) (18 - 18)  SpO2: 98% (12-12-22 @ 04:34) (98% - 100%)  Wt(kg): --Vital Signs Last 24 Hrs  T(C): 36.9 (12 Dec 2022 04:34), Max: 36.9 (12 Dec 2022 04:34)  T(F): 98.5 (12 Dec 2022 04:34), Max: 98.5 (12 Dec 2022 04:34)  HR: 82 (12 Dec 2022 04:34) (82 - 97)  BP: 135/91 (12 Dec 2022 04:34) (135/91 - 148/91)  BP(mean): --  RR: 18 (12 Dec 2022 04:34) (18 - 18)  SpO2: 98% (12 Dec 2022 04:34) (98% - 100%)    Parameters below as of 12 Dec 2022 04:34  Patient On (Oxygen Delivery Method): room air        PHYSICAL EXAM:  GENERAL: NAD, well-groomed, well-developed  HEAD:  Atraumatic, Normocephalic  EYES: EOMI, PERRLA, conjunctiva and sclera clear  ENMT: No tonsillar erythema, exudates, or enlargement; Moist mucous membranes, Good dentition, No lesions  NECK: Supple, No JVD, Normal thyroid  NERVOUS SYSTEM:  Alert & Oriented X3, Good concentration; Motor Strength 5/5 B/L upper and lower extremities; DTRs 2+ intact and symmetric  CHEST/LUNG: Clear to percussion bilaterally; No rales, rhonchi, wheezing, or rubs  HEART: Regular rate and rhythm; No murmurs, rubs, or gallops  ABDOMEN: Soft, Nontender, Nondistended; Bowel sounds present  EXTREMITIES:  2+ Peripheral Pulses, No clubbing, cyanosis, or edema  LYMPH: No lymphadenopathy noted  SKIN: No rashes or lesions    Consultant(s) Notes Reviewed:  [x ] YES  [ ] NO  Care Discussed with Consultants/Other Providers [ x] YES  [ ] NO    LABS:          RADIOLOGY & ADDITIONAL TESTS:    Imaging Personally Reviewed:  [ ] YES  [ ] NO  acetaminophen     Tablet .. 650 milliGRAM(s) Oral every 6 hours PRN  acetaminophen   IVPB .. 1000 milliGRAM(s) IV Intermittent once  amLODIPine   Tablet 10 milliGRAM(s) Oral daily  benzonatate 200 milliGRAM(s) Oral every 8 hours  bisacodyl Suppository 10 milliGRAM(s) Rectal daily PRN  budesonide 160 MICROgram(s)/formoterol 4.5 MICROgram(s) Inhaler 2 Puff(s) Inhalation two times a day  calcium carbonate    500 mG (Tums) Chewable 1 Tablet(s) Chew four times a day PRN  chlorhexidine 2% Cloths 1 Application(s) Topical daily  dextrose 5% + sodium chloride 0.45%. 1000 milliLiter(s) IV Continuous <Continuous>  enoxaparin Injectable 40 milliGRAM(s) SubCutaneous every 24 hours  fluticasone propionate 50 MICROgram(s)/spray Nasal Spray 1 Spray(s) Both Nostrils two times a day  folic acid 1 milliGRAM(s) Oral daily  guaiFENesin Oral Liquid (Sugar-Free) 100 milliGRAM(s) Oral every 6 hours PRN  HYDROmorphone   Tablet 4 milliGRAM(s) Oral every 4 hours PRN  HYDROmorphone  Injectable 0.5 milliGRAM(s) IV Push every 4 hours PRN  hydroxyurea 500 milliGRAM(s) Oral two times a day  influenza   Vaccine 0.5 milliLiter(s) IntraMuscular once  lisinopril 40 milliGRAM(s) Oral daily  nicotine - 21 mG/24Hr(s) Patch 1 Patch Transdermal daily  ondansetron Injectable 4 milliGRAM(s) IV Push every 6 hours PRN  pantoprazole    Tablet 40 milliGRAM(s) Oral before breakfast  polyethylene glycol 3350 17 Gram(s) Oral daily  senna 2 Tablet(s) Oral at bedtime  sodium chloride 0.65% Nasal 1 Spray(s) Both Nostrils daily PRN      HEALTH ISSUES - PROBLEM Dx:  Sickle cell crisis    2019 novel coronavirus disease (COVID-19)    HTN (hypertension)    Reduced visual acuity    Need for prophylactic measure

## 2022-12-12 NOTE — DISCHARGE NOTE NURSING/CASE MANAGEMENT/SOCIAL WORK - NSDCPEEMAIL_GEN_ALL_CORE
[facility] for Tobacco Control email [email or web address]

## 2022-12-12 NOTE — DISCHARGE NOTE NURSING/CASE MANAGEMENT/SOCIAL WORK - NSDCVIVACCINE_GEN_ALL_CORE_FT
No Vaccines Administered. influenza, injectable, quadrivalent, preservative free; 13-Dec-2022 10:45; Gina Gambino (RN); [employer]; [id number] (Exp. Date: 23-Jun-2023); IntraMuscular; Deltoid Left.; 0.5 milliLiter(s); VIS (VIS Published: 06-Aug-2021, VIS Presented: 13-Dec-2022);

## 2022-12-12 NOTE — DISCHARGE NOTE NURSING/CASE MANAGEMENT/SOCIAL WORK - NSDCPEFALRISK_GEN_ALL_CORE
For information on Fall & Injury Prevention, visit: [email or web address] OR  [email or web address] OR  https://www.cdc.gov/steadi/patient.html

## 2022-12-12 NOTE — DISCHARGE NOTE NURSING/CASE MANAGEMENT/SOCIAL WORK - PATIENT PORTAL LINK FT
You can access the FollowMyHealth Patient Portal offered by [facility] by registering at the following website: [email or web address]. By joining [facility]’s FollowMyHealth portal, you will also be able to view your health information using other applications (apps) compatible with our system.

## 2022-12-12 NOTE — DISCHARGE NOTE NURSING/CASE MANAGEMENT/SOCIAL WORK - NSDCPEWEB_GEN_ALL_CORE
[facility] for Tobacco Control website --- [email or web address] --- [email or web address]

## 2022-12-13 VITALS
RESPIRATION RATE: 20 BRPM | TEMPERATURE: 99 F | HEART RATE: 93 BPM | OXYGEN SATURATION: 97 % | SYSTOLIC BLOOD PRESSURE: 145 MMHG | DIASTOLIC BLOOD PRESSURE: 89 MMHG

## 2022-12-13 LAB
ANION GAP SERPL CALC-SCNC: 12 MMOL/L — SIGNIFICANT CHANGE UP (ref 5–17)
BUN SERPL-MCNC: 11 MG/DL — SIGNIFICANT CHANGE UP (ref 7–23)
CALCIUM SERPL-MCNC: 9.4 MG/DL — SIGNIFICANT CHANGE UP (ref 8.4–10.5)
CHLORIDE SERPL-SCNC: 103 MMOL/L — SIGNIFICANT CHANGE UP (ref 96–108)
CO2 SERPL-SCNC: 22 MMOL/L — SIGNIFICANT CHANGE UP (ref 22–31)
CREAT SERPL-MCNC: 0.63 MG/DL — SIGNIFICANT CHANGE UP (ref 0.5–1.3)
EGFR: 105 ML/MIN/1.73M2 — SIGNIFICANT CHANGE UP
GLUCOSE SERPL-MCNC: 94 MG/DL — SIGNIFICANT CHANGE UP (ref 70–99)
HCT VFR BLD CALC: 32.5 % — LOW (ref 34.5–45)
HGB BLD-MCNC: 12 G/DL — SIGNIFICANT CHANGE UP (ref 11.5–15.5)
MAGNESIUM SERPL-MCNC: 2 MG/DL — SIGNIFICANT CHANGE UP (ref 1.6–2.6)
MCHC RBC-ENTMCNC: 33.2 PG — SIGNIFICANT CHANGE UP (ref 27–34)
MCHC RBC-ENTMCNC: 36.9 GM/DL — HIGH (ref 32–36)
MCV RBC AUTO: 90 FL — SIGNIFICANT CHANGE UP (ref 80–100)
NRBC # BLD: 1 /100 WBCS — HIGH (ref 0–0)
PHOSPHATE SERPL-MCNC: 4 MG/DL — SIGNIFICANT CHANGE UP (ref 2.5–4.5)
PLATELET # BLD AUTO: 293 K/UL — SIGNIFICANT CHANGE UP (ref 150–400)
POTASSIUM SERPL-MCNC: 4.2 MMOL/L — SIGNIFICANT CHANGE UP (ref 3.5–5.3)
POTASSIUM SERPL-SCNC: 4.2 MMOL/L — SIGNIFICANT CHANGE UP (ref 3.5–5.3)
RBC # BLD: 3.61 M/UL — LOW (ref 3.8–5.2)
RBC # FLD: 15 % — HIGH (ref 10.3–14.5)
SODIUM SERPL-SCNC: 137 MMOL/L — SIGNIFICANT CHANGE UP (ref 135–145)
WBC # BLD: 9.35 K/UL — SIGNIFICANT CHANGE UP (ref 3.8–10.5)
WBC # FLD AUTO: 9.35 K/UL — SIGNIFICANT CHANGE UP (ref 3.8–10.5)

## 2022-12-13 PROCEDURE — 80048 BASIC METABOLIC PNL TOTAL CA: CPT

## 2022-12-13 PROCEDURE — 93005 ELECTROCARDIOGRAM TRACING: CPT

## 2022-12-13 PROCEDURE — 96374 THER/PROPH/DIAG INJ IV PUSH: CPT

## 2022-12-13 PROCEDURE — 96375 TX/PRO/DX INJ NEW DRUG ADDON: CPT

## 2022-12-13 PROCEDURE — 99285 EMERGENCY DEPT VISIT HI MDM: CPT

## 2022-12-13 PROCEDURE — 86901 BLOOD TYPING SEROLOGIC RH(D): CPT

## 2022-12-13 PROCEDURE — 71045 X-RAY EXAM CHEST 1 VIEW: CPT

## 2022-12-13 PROCEDURE — 80053 COMPREHEN METABOLIC PANEL: CPT

## 2022-12-13 PROCEDURE — 83020 HEMOGLOBIN ELECTROPHORESIS: CPT

## 2022-12-13 PROCEDURE — 99239 HOSP IP/OBS DSCHRG MGMT >30: CPT | Mod: GC

## 2022-12-13 PROCEDURE — 86900 BLOOD TYPING SEROLOGIC ABO: CPT

## 2022-12-13 PROCEDURE — 85045 AUTOMATED RETICULOCYTE COUNT: CPT

## 2022-12-13 PROCEDURE — 86850 RBC ANTIBODY SCREEN: CPT

## 2022-12-13 PROCEDURE — 90686 IIV4 VACC NO PRSV 0.5 ML IM: CPT

## 2022-12-13 PROCEDURE — 84100 ASSAY OF PHOSPHORUS: CPT

## 2022-12-13 PROCEDURE — 83735 ASSAY OF MAGNESIUM: CPT

## 2022-12-13 PROCEDURE — 94640 AIRWAY INHALATION TREATMENT: CPT

## 2022-12-13 PROCEDURE — 87637 SARSCOV2&INF A&B&RSV AMP PRB: CPT

## 2022-12-13 PROCEDURE — 87641 MR-STAPH DNA AMP PROBE: CPT

## 2022-12-13 PROCEDURE — 83615 LACTATE (LD) (LDH) ENZYME: CPT

## 2022-12-13 PROCEDURE — 85025 COMPLETE CBC W/AUTO DIFF WBC: CPT

## 2022-12-13 PROCEDURE — 85027 COMPLETE CBC AUTOMATED: CPT

## 2022-12-13 PROCEDURE — 36415 COLL VENOUS BLD VENIPUNCTURE: CPT

## 2022-12-13 PROCEDURE — 81001 URINALYSIS AUTO W/SCOPE: CPT

## 2022-12-13 PROCEDURE — 83010 ASSAY OF HAPTOGLOBIN QUANT: CPT

## 2022-12-13 PROCEDURE — 87640 STAPH A DNA AMP PROBE: CPT

## 2022-12-13 RX ORDER — NICOTINE POLACRILEX 2 MG
1 GUM BUCCAL
Qty: 14 | Refills: 0
Start: 2022-12-13 | End: 2022-12-26

## 2022-12-13 RX ORDER — HYDROMORPHONE HYDROCHLORIDE 2 MG/ML
1 INJECTION INTRAMUSCULAR; INTRAVENOUS; SUBCUTANEOUS
Qty: 12 | Refills: 0
Start: 2022-12-13 | End: 2022-12-15

## 2022-12-13 RX ADMIN — HYDROMORPHONE HYDROCHLORIDE 4 MILLIGRAM(S): 2 INJECTION INTRAMUSCULAR; INTRAVENOUS; SUBCUTANEOUS at 13:00

## 2022-12-13 RX ADMIN — BUDESONIDE AND FORMOTEROL FUMARATE DIHYDRATE 2 PUFF(S): 160; 4.5 AEROSOL RESPIRATORY (INHALATION) at 18:07

## 2022-12-13 RX ADMIN — Medication 1 PATCH: at 18:04

## 2022-12-13 RX ADMIN — Medication 1 PATCH: at 07:00

## 2022-12-13 RX ADMIN — BUDESONIDE AND FORMOTEROL FUMARATE DIHYDRATE 2 PUFF(S): 160; 4.5 AEROSOL RESPIRATORY (INHALATION) at 06:13

## 2022-12-13 RX ADMIN — HYDROXYUREA 500 MILLIGRAM(S): 500 CAPSULE ORAL at 18:08

## 2022-12-13 RX ADMIN — Medication 1 SPRAY(S): at 18:06

## 2022-12-13 RX ADMIN — HYDROXYUREA 500 MILLIGRAM(S): 500 CAPSULE ORAL at 06:12

## 2022-12-13 RX ADMIN — INFLUENZA VIRUS VACCINE 0.5 MILLILITER(S): 15; 15; 15; 15 SUSPENSION INTRAMUSCULAR at 10:45

## 2022-12-13 RX ADMIN — CHLORHEXIDINE GLUCONATE 1 APPLICATION(S): 213 SOLUTION TOPICAL at 12:59

## 2022-12-13 RX ADMIN — Medication 200 MILLIGRAM(S): at 06:11

## 2022-12-13 RX ADMIN — POLYETHYLENE GLYCOL 3350 17 GRAM(S): 17 POWDER, FOR SOLUTION ORAL at 12:59

## 2022-12-13 RX ADMIN — SODIUM CHLORIDE 75 MILLILITER(S): 9 INJECTION, SOLUTION INTRAVENOUS at 13:01

## 2022-12-13 RX ADMIN — LISINOPRIL 40 MILLIGRAM(S): 2.5 TABLET ORAL at 06:11

## 2022-12-13 RX ADMIN — Medication 1 MILLIGRAM(S): at 12:58

## 2022-12-13 RX ADMIN — Medication 200 MILLIGRAM(S): at 12:59

## 2022-12-13 RX ADMIN — Medication 1 SPRAY(S): at 06:13

## 2022-12-13 RX ADMIN — Medication 1 PATCH: at 12:59

## 2022-12-13 RX ADMIN — AMLODIPINE BESYLATE 10 MILLIGRAM(S): 2.5 TABLET ORAL at 06:12

## 2022-12-13 RX ADMIN — HYDROMORPHONE HYDROCHLORIDE 4 MILLIGRAM(S): 2 INJECTION INTRAMUSCULAR; INTRAVENOUS; SUBCUTANEOUS at 14:00

## 2022-12-13 RX ADMIN — PANTOPRAZOLE SODIUM 40 MILLIGRAM(S): 20 TABLET, DELAYED RELEASE ORAL at 06:11

## 2022-12-13 RX ADMIN — HYDROMORPHONE HYDROCHLORIDE 4 MILLIGRAM(S): 2 INJECTION INTRAMUSCULAR; INTRAVENOUS; SUBCUTANEOUS at 06:12

## 2022-12-13 RX ADMIN — HYDROMORPHONE HYDROCHLORIDE 4 MILLIGRAM(S): 2 INJECTION INTRAMUSCULAR; INTRAVENOUS; SUBCUTANEOUS at 06:42

## 2022-12-13 RX ADMIN — Medication 1 PATCH: at 09:30

## 2022-12-13 NOTE — PROGRESS NOTE ADULT - REASON FOR ADMISSION
cough, congestion, left arm, left neck, hip, thigh, back pain

## 2022-12-13 NOTE — PROGRESS NOTE ADULT - PROBLEM SELECTOR PROBLEM 1
Sickle cell crisis

## 2022-12-13 NOTE — PROGRESS NOTE ADULT - PROBLEM SELECTOR PLAN 4
-cont lisinopril, norvasc

## 2022-12-13 NOTE — PROGRESS NOTE ADULT - SUBJECTIVE AND OBJECTIVE BOX
PROGRESS NOTE:     Patient is a 57y old  Female who presents with a chief complaint of cough, congestion, left arm, left neck, hip, thigh, back pain (12 Dec 2022 06:16)      SUBJECTIVE / OVERNIGHT EVENTS:    ADDITIONAL REVIEW OF SYSTEMS:    MEDICATIONS  (STANDING):  acetaminophen   IVPB .. 1000 milliGRAM(s) IV Intermittent once  amLODIPine   Tablet 10 milliGRAM(s) Oral daily  benzonatate 200 milliGRAM(s) Oral every 8 hours  budesonide 160 MICROgram(s)/formoterol 4.5 MICROgram(s) Inhaler 2 Puff(s) Inhalation two times a day  chlorhexidine 2% Cloths 1 Application(s) Topical daily  dextrose 5% + sodium chloride 0.45%. 1000 milliLiter(s) (75 mL/Hr) IV Continuous <Continuous>  enoxaparin Injectable 40 milliGRAM(s) SubCutaneous every 24 hours  fluticasone propionate 50 MICROgram(s)/spray Nasal Spray 1 Spray(s) Both Nostrils two times a day  folic acid 1 milliGRAM(s) Oral daily  hydroxyurea 500 milliGRAM(s) Oral two times a day  influenza   Vaccine 0.5 milliLiter(s) IntraMuscular once  lisinopril 40 milliGRAM(s) Oral daily  nicotine - 21 mG/24Hr(s) Patch 1 Patch Transdermal daily  pantoprazole    Tablet 40 milliGRAM(s) Oral before breakfast  polyethylene glycol 3350 17 Gram(s) Oral daily  senna 2 Tablet(s) Oral at bedtime    MEDICATIONS  (PRN):  acetaminophen     Tablet .. 650 milliGRAM(s) Oral every 6 hours PRN Moderate Pain (4 - 6)  bisacodyl Suppository 10 milliGRAM(s) Rectal daily PRN Constipation  calcium carbonate    500 mG (Tums) Chewable 1 Tablet(s) Chew four times a day PRN Dyspepsia  guaiFENesin Oral Liquid (Sugar-Free) 100 milliGRAM(s) Oral every 6 hours PRN Cough  HYDROmorphone   Tablet 4 milliGRAM(s) Oral every 4 hours PRN Severe Pain (7 - 10)  HYDROmorphone  Injectable 0.25 milliGRAM(s) IV Push every 4 hours PRN breakthrough  ondansetron Injectable 4 milliGRAM(s) IV Push every 6 hours PRN Nausea and/or Vomiting  sodium chloride 0.65% Nasal 1 Spray(s) Both Nostrils daily PRN Nasal Congestion      CAPILLARY BLOOD GLUCOSE        I&O's Summary      PHYSICAL EXAM:  Vital Signs Last 24 Hrs  T(C): 37.2 (13 Dec 2022 05:03), Max: 37.2 (13 Dec 2022 05:03)  T(F): 99 (13 Dec 2022 05:03), Max: 99 (13 Dec 2022 05:03)  HR: 98 (13 Dec 2022 05:03) (86 - 98)  BP: 136/82 (13 Dec 2022 05:03) (136/82 - 137/97)  BP(mean): --  RR: 18 (13 Dec 2022 05:03) (18 - 20)  SpO2: 96% (13 Dec 2022 05:03) (95% - 96%)    Parameters below as of 13 Dec 2022 05:03  Patient On (Oxygen Delivery Method): room air        GENERAL: No acute distress, well-developed  HEAD:  Atraumatic, Normocephalic  EYES: EOMI, PERRLA, conjunctiva and sclera clear  NECK: Supple, no lymphadenopathy, no JVD  CHEST/LUNG: CTAB; No wheezes, rales, or rhonchi  HEART: Regular rate and rhythm; No murmurs, rubs, or gallops  ABDOMEN: Soft, non-tender, non-distended; normal bowel sounds, no organomegaly  EXTREMITIES:  2+ peripheral pulses b/l, No clubbing, cyanosis, or edema  NEUROLOGY: A&O x 3, no focal deficits  SKIN: No rashes or lesions    LABS:                        11.7   7.19  )-----------( 308      ( 12 Dec 2022 07:17 )             31.7     12-12    138  |  103  |  10  ----------------------------<  95  4.3   |  24  |  0.83    Ca    9.3      12 Dec 2022 07:14  Phos  4.3     12-12  Mg     2.2     12-12                  RADIOLOGY & ADDITIONAL TESTS:  Results Reviewed:   Imaging Personally Reviewed:  Electrocardiogram Personally Reviewed:    COORDINATION OF CARE:  Care Discussed with Consultants/Other Providers [Y/N]:  Prior or Outpatient Records Reviewed [Y/N]:

## 2022-12-13 NOTE — PROGRESS NOTE ADULT - ATTENDING COMMENTS
57F with PMH of SS disease, HTN, presents with pain and URI symptoms admitted for sickle cell crisis, also found to be covid positive.    - satting well on room air, plan for remdesivir 3-5 day course  - for SS crisis, CXR clear. Continue pain control with IV meds, will transition to po. Refused pca pump    D/W HS4
above plans discussed with Dr. Petty    # SCD crisis  # COVID 19 PNA    - sickle cell crisis 2/2 COVID 19 PNA s/p course of Remdesivir with overall improvement in pain control    Patient is stable for discharge with close follow up with hematology  41 minutes spent on discharge process    Corina Mueller MD  Division of Hospital Medicine  Contact via Microsoft Teams  Office: [phone]
above plans discussed with Dr. Petty/Dr. Gonzalez    # SCD crisis  # COVID 19 PNA    - sickle cell crisis 2/2 COVID 19 PNA s/p course of Remdesivir with overall improvement in pain control  - daughter will be home with the patient to help     Patient is stable for discharge with close follow up with hematology  41 minutes spent on discharge process    Corina Mueller MD  Division of Hospital Medicine  Contact via Microsoft Teams  Office: [phone]
57F with PMH of SS disease, HTN, presents with pain and URI symptoms admitted for sickle cell crisis, also found to be covid positive.    - satting well on room air, s/p 3 day course of remdesivir  - for SS crisis, CXR clear. Continue pain control with pain meds, tolerating po pain meds with IV for breakthrough  - Blurry vision stable, ophtho recs appreciated  - will restarted IV hydration  - Incentive spirometry  - hopeful for discharge tomorrow
57F with PMH of SS disease, HTN, presents with pain and URI symptoms admitted for sickle cell crisis, also found to be covid positive.    - satting well on room air, s/p 3 day course of remdesivir  - for SS crisis, CXR clear. Continue pain control with pain meds, tolerating po pain meds with IV for breakthrough  - Blurry vision stable, ophtho recs appreciated  - plan for dc once pain is under control    D/W HS4
57F with PMH of SS disease, HTN, presents with pain and URI symptoms admitted for sickle cell crisis, also found to be covid positive.    - satting well on room air, s/p 3 day course of remdesivir  - for SS crisis, CXR clear. Continue pain control with pain meds, tolerating po pain meds with IV for breakthrough  - Blurry vision stable, ophtho recs appreciated  - will restart IV hydration  - Incentive spirometry
57F with PMH of SS disease, HTN, presents with pain and URI symptoms admitted for sickle cell crisis, also found to be covid positive.    - satting well on room air, plan for remdesivir 5 day course  - for SS crisis, CXR clear. Continue pain control with pain meds, transition from IV to po today  - Blurry vision stable, ophtho recs appreciated    D/W HS4
57F with PMH of SS disease, HTN, presents with pain and URI symptoms admitted for sickle cell crisis, also found to be covid positive.    - satting well on room air, plan for remdesivir 3-5 day course  - for SS crisis, CXR clear. Continue pain control with IV meds, will transition to po  - Ophtho consult for blurry vision    D/W HS4

## 2023-05-05 PROBLEM — Z00.00 ENCOUNTER FOR PREVENTIVE HEALTH EXAMINATION: Status: ACTIVE | Noted: 2023-05-05

## 2023-05-12 ENCOUNTER — APPOINTMENT (OUTPATIENT)
Dept: CT IMAGING | Facility: CLINIC | Age: 58
End: 2023-05-12
Payer: COMMERCIAL

## 2023-05-12 ENCOUNTER — OUTPATIENT (OUTPATIENT)
Dept: OUTPATIENT SERVICES | Facility: HOSPITAL | Age: 58
LOS: 1 days | End: 2023-05-12
Payer: COMMERCIAL

## 2023-05-12 DIAGNOSIS — Z00.8 ENCOUNTER FOR OTHER GENERAL EXAMINATION: ICD-10-CM

## 2023-05-12 DIAGNOSIS — Z98.82 BREAST IMPLANT STATUS: Chronic | ICD-10-CM

## 2023-05-12 DIAGNOSIS — Z98.890 OTHER SPECIFIED POSTPROCEDURAL STATES: Chronic | ICD-10-CM

## 2023-05-12 DIAGNOSIS — Z98.891 HISTORY OF UTERINE SCAR FROM PREVIOUS SURGERY: Chronic | ICD-10-CM

## 2023-05-12 PROCEDURE — 75574 CT ANGIO HRT W/3D IMAGE: CPT

## 2023-05-12 PROCEDURE — 75574 CT ANGIO HRT W/3D IMAGE: CPT | Mod: 26

## 2023-05-12 RX ORDER — BUDESONIDE AND FORMOTEROL FUMARATE DIHYDRATE 160; 4.5 UG/1; UG/1
2 AEROSOL RESPIRATORY (INHALATION)
Qty: 0 | Refills: 0 | DISCHARGE

## 2023-05-12 RX ORDER — LISINOPRIL 2.5 MG/1
1 TABLET ORAL
Qty: 0 | Refills: 0 | DISCHARGE

## 2023-05-12 RX ORDER — HYDROXYUREA 500 MG/1
1 CAPSULE ORAL
Qty: 0 | Refills: 0 | DISCHARGE

## 2023-05-12 RX ORDER — AMLODIPINE BESYLATE 2.5 MG/1
1 TABLET ORAL
Qty: 0 | Refills: 0 | DISCHARGE

## 2023-05-12 RX ORDER — FOLIC ACID 0.8 MG
1 TABLET ORAL
Qty: 0 | Refills: 0 | DISCHARGE

## 2023-05-22 PROBLEM — D57.1 SICKLE-CELL DISEASE WITHOUT CRISIS: Chronic | Status: ACTIVE | Noted: 2022-12-05

## 2023-05-22 PROBLEM — I10 ESSENTIAL (PRIMARY) HYPERTENSION: Chronic | Status: ACTIVE | Noted: 2022-12-05

## 2023-05-30 ENCOUNTER — TRANSCRIPTION ENCOUNTER (OUTPATIENT)
Age: 58
End: 2023-05-30

## 2023-05-30 ENCOUNTER — OUTPATIENT (OUTPATIENT)
Dept: OUTPATIENT SERVICES | Facility: HOSPITAL | Age: 58
LOS: 1 days | End: 2023-05-30
Payer: COMMERCIAL

## 2023-05-30 ENCOUNTER — APPOINTMENT (OUTPATIENT)
Dept: MRI IMAGING | Facility: CLINIC | Age: 58
End: 2023-05-30

## 2023-05-30 DIAGNOSIS — Z98.82 BREAST IMPLANT STATUS: Chronic | ICD-10-CM

## 2023-05-30 DIAGNOSIS — Z00.8 ENCOUNTER FOR OTHER GENERAL EXAMINATION: ICD-10-CM

## 2023-05-30 DIAGNOSIS — Z98.89 OTHER SPECIFIED POSTPROCEDURAL STATES: Chronic | ICD-10-CM

## 2023-05-30 DIAGNOSIS — Z98.891 HISTORY OF UTERINE SCAR FROM PREVIOUS SURGERY: Chronic | ICD-10-CM

## 2023-05-30 DIAGNOSIS — Z98.890 OTHER SPECIFIED POSTPROCEDURAL STATES: Chronic | ICD-10-CM

## 2023-05-30 PROCEDURE — 75561 CARDIAC MRI FOR MORPH W/DYE: CPT | Mod: 26

## 2023-07-26 ENCOUNTER — APPOINTMENT (OUTPATIENT)
Dept: HEART FAILURE | Facility: CLINIC | Age: 58
End: 2023-07-26
Payer: COMMERCIAL

## 2023-07-26 ENCOUNTER — NON-APPOINTMENT (OUTPATIENT)
Age: 58
End: 2023-07-26

## 2023-07-26 VITALS — BODY MASS INDEX: 34.49 KG/M2 | HEIGHT: 64 IN | WEIGHT: 202 LBS

## 2023-07-26 VITALS — HEART RATE: 94 BPM | SYSTOLIC BLOOD PRESSURE: 130 MMHG | OXYGEN SATURATION: 97 % | DIASTOLIC BLOOD PRESSURE: 89 MMHG

## 2023-07-26 PROCEDURE — 36415 COLL VENOUS BLD VENIPUNCTURE: CPT

## 2023-07-26 PROCEDURE — 93000 ELECTROCARDIOGRAM COMPLETE: CPT

## 2023-07-26 PROCEDURE — 99205 OFFICE O/P NEW HI 60 MIN: CPT | Mod: 25

## 2023-07-26 RX ORDER — HYDROXYUREA 500 MG/1
500 CAPSULE ORAL DAILY
Refills: 0 | Status: ACTIVE | COMMUNITY
Start: 2023-07-26

## 2023-08-01 NOTE — CARDIOLOGY SUMMARY
[de-identified] : 7/26/23 - NSR, HR 93, no Q waves noted [de-identified] : 5/23/23 (Dr. Raza's office) - EF approx 45% (although appears closer to 50%), LVEDD 4.7 cm, E/e' 11, nl IVC, no TR to assess RVSP [de-identified] : 5/30/23 - MRI - EF 40%, no LGE  5/23 - CTA negative

## 2023-08-01 NOTE — ASSESSMENT
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 Currently reports NYHA class II symptoms but appears compensated and normotensive. Unclear etiology of her dyspnea but possibly driven by cardiomyopathy; low suspicion of pulmonary HTN (considering sickle cell can cause this) as no RV dysfunction/severe TR. There's a discrepancy in her EF as her TTE on 5/23 appears closer to 50% and cardiac MRI is 40%.   1. HFmrEF  - on Entresto 24/26 twice/day; will increase to 49/51 twice/day pending labs - c/w efe 25mg daily  - on coreg 3.125 mg twice/day; increase to 6.25 mg twice/day - discussed disease process - keep log of BP/weight - labs checked in office and will review with patient - will review cardiac MRI further to further clarify EF  RTC with NP 6 weeks and me in 12 weeks

## 2023-08-01 NOTE — HISTORY OF PRESENT ILLNESS
[FreeTextEntry1] : Ms. Rose is a 56 y/o F w/ h/o Sickle cell disease (has crisis every 2-3 years; on hydroxyurea), prior Covid (12/22) c/b Sickle cell crisis s/p Remdesivir, HFmrEF/NICM (EF 40%; Dx 5/2023) who presents for establishment of care. Referred by Dr. Raza. Accompanied over phone with sister-in-law; Dr. Rose (cardiologist at [facility]).  Per patient, had a sickle crisis several years prior with troponin release. Hadn't had any follow-up with a cardiologist. Reports having persistent cough last May in [place] and was admitted to the hospital. Was admitted to the ICU and was found to have unilateral pleural effusion with concern for empyema. Underwent right sided thoracotomy. Reports having recurrent effusions during that time. Was hospitalized for approx 3 months. Hadn't seen pulmonary since.  In December 2022, noted to have significant pain throughout body and was found to have Sickle crisis. Incidentally noted to have Covid. CXR notably was clear. Was given Remdesivir.   Had incidentally seen Dr. Raza recently when had brought her son for cardiac clearance. Had an echo that showed LV dysfunction recently with segmental LV dysfunction. Underwent CTA 2 months prior showed no CAD. Underwent a cardiac MRI 5/30 which showed EF 40% w/o LGE. Was started on GDMT which she is tolerating.   Last saw Dr. Raza 2 weeks prior.   Reports having some progressively dyspnea starting January. Able to walk fairly unlimited but at a slower pace. Has occasional orthopnea and uses 2 pillows (for comfort). Denies bendopnea but does get lightheaded. Does report having dyspnea with stairs (11 stairs).   Checks BP at home - ranges 120-145/. Adherent to low sodium diet.

## 2023-09-05 LAB
ALBUMIN SERPL ELPH-MCNC: 4.6 G/DL
ALP BLD-CCNC: 108 U/L
ALT SERPL-CCNC: 15 U/L
ANION GAP SERPL CALC-SCNC: 16 MMOL/L
AST SERPL-CCNC: 19 U/L
BILIRUB SERPL-MCNC: 0.7 MG/DL
BUN SERPL-MCNC: 11 MG/DL
CALCIUM SERPL-MCNC: 9.6 MG/DL
CHLORIDE SERPL-SCNC: 103 MMOL/L
CHOLEST SERPL-MCNC: 201 MG/DL
CO2 SERPL-SCNC: 21 MMOL/L
CREAT SERPL-MCNC: 0.88 MG/DL
EGFR: 77 ML/MIN/1.73M2
ESTIMATED AVERAGE GLUCOSE: 77 MG/DL
GLUCOSE SERPL-MCNC: 70 MG/DL
HBA1C MFR BLD HPLC: 4.3 %
HDLC SERPL-MCNC: 54 MG/DL
HIV1+2 AB SPEC QL IA.RAPID: NONREACTIVE
LDLC SERPL CALC-MCNC: 121 MG/DL
NONHDLC SERPL-MCNC: 147 MG/DL
NT-PROBNP SERPL-MCNC: <36 PG/ML
POTASSIUM SERPL-SCNC: 5.1 MMOL/L
PROT SERPL-MCNC: 8.1 G/DL
SODIUM SERPL-SCNC: 140 MMOL/L
TRIGL SERPL-MCNC: 150 MG/DL
TSH SERPL-ACNC: 0.76 UIU/ML

## 2023-09-20 ENCOUNTER — APPOINTMENT (OUTPATIENT)
Dept: HEART FAILURE | Facility: CLINIC | Age: 58
End: 2023-09-20

## 2023-09-20 ENCOUNTER — NON-APPOINTMENT (OUTPATIENT)
Age: 58
End: 2023-09-20

## 2023-10-12 ENCOUNTER — NON-APPOINTMENT (OUTPATIENT)
Age: 58
End: 2023-10-12

## 2023-10-12 LAB
ALBUMIN SERPL ELPH-MCNC: 4.2 G/DL
ALP BLD-CCNC: 84 U/L
ALT SERPL-CCNC: 10 U/L
ANION GAP SERPL CALC-SCNC: 13 MMOL/L
AST SERPL-CCNC: 13 U/L
BILIRUB SERPL-MCNC: 0.9 MG/DL
BUN SERPL-MCNC: 17 MG/DL
CALCIUM SERPL-MCNC: 9.4 MG/DL
CHLORIDE SERPL-SCNC: 107 MMOL/L
CO2 SERPL-SCNC: 22 MMOL/L
CREAT SERPL-MCNC: 0.92 MG/DL
EGFR: 73 ML/MIN/1.73M2
NT-PROBNP SERPL-MCNC: 55 PG/ML
POTASSIUM SERPL-SCNC: 4.5 MMOL/L
PROT SERPL-MCNC: 7.1 G/DL
SODIUM SERPL-SCNC: 142 MMOL/L

## 2023-10-18 ENCOUNTER — APPOINTMENT (OUTPATIENT)
Dept: HEART FAILURE | Facility: CLINIC | Age: 58
End: 2023-10-18

## 2023-11-15 ENCOUNTER — NON-APPOINTMENT (OUTPATIENT)
Age: 58
End: 2023-11-15

## 2023-11-15 ENCOUNTER — APPOINTMENT (OUTPATIENT)
Dept: HEART FAILURE | Facility: CLINIC | Age: 58
End: 2023-11-15
Payer: COMMERCIAL

## 2023-11-15 VITALS
OXYGEN SATURATION: 99 % | DIASTOLIC BLOOD PRESSURE: 87 MMHG | SYSTOLIC BLOOD PRESSURE: 126 MMHG | HEIGHT: 64 IN | HEART RATE: 91 BPM

## 2023-11-15 VITALS — BODY MASS INDEX: 34.67 KG/M2 | WEIGHT: 202 LBS

## 2023-11-15 PROCEDURE — 36415 COLL VENOUS BLD VENIPUNCTURE: CPT

## 2023-11-15 PROCEDURE — 93000 ELECTROCARDIOGRAM COMPLETE: CPT

## 2023-11-15 PROCEDURE — 99214 OFFICE O/P EST MOD 30 MIN: CPT | Mod: 25

## 2023-11-15 RX ORDER — FOLIC ACID 1 MG/1
1 TABLET ORAL DAILY
Qty: 90 | Refills: 1 | Status: ACTIVE | COMMUNITY
Start: 2023-07-26 | End: 1900-01-01

## 2023-11-16 LAB
ALBUMIN SERPL ELPH-MCNC: 4.5 G/DL
ALP BLD-CCNC: 87 U/L
ALT SERPL-CCNC: 16 U/L
ANION GAP SERPL CALC-SCNC: 13 MMOL/L
AST SERPL-CCNC: 18 U/L
BILIRUB SERPL-MCNC: 0.9 MG/DL
BUN SERPL-MCNC: 17 MG/DL
CALCIUM SERPL-MCNC: 9.4 MG/DL
CHLORIDE SERPL-SCNC: 106 MMOL/L
CO2 SERPL-SCNC: 21 MMOL/L
CREAT SERPL-MCNC: 0.84 MG/DL
EGFR: 82 ML/MIN/1.73M2
ESTIMATED AVERAGE GLUCOSE: 77 MG/DL
HBA1C MFR BLD HPLC: 4.3 %
HCT VFR BLD CALC: 34.8 %
HGB BLD-MCNC: 12.6 G/DL
MCHC RBC-ENTMCNC: 35.7 PG
MCHC RBC-ENTMCNC: 36.2 GM/DL
MCV RBC AUTO: 98.6 FL
NT-PROBNP SERPL-MCNC: 43 PG/ML
PLATELET # BLD AUTO: 184 K/UL
POTASSIUM SERPL-SCNC: 5.1 MMOL/L
PROT SERPL-MCNC: 7.5 G/DL
RBC # BLD: 3.53 M/UL
RBC # FLD: 15.2 %
SODIUM SERPL-SCNC: 141 MMOL/L
WBC # FLD AUTO: 7.78 K/UL

## 2023-11-16 RX ORDER — SPIRONOLACTONE 25 MG/1
25 TABLET ORAL
Qty: 90 | Refills: 2 | Status: ACTIVE | COMMUNITY
Start: 2023-07-26 | End: 1900-01-01

## 2024-01-24 ENCOUNTER — LABORATORY RESULT (OUTPATIENT)
Age: 59
End: 2024-01-24

## 2024-01-24 ENCOUNTER — APPOINTMENT (OUTPATIENT)
Dept: HEART FAILURE | Facility: CLINIC | Age: 59
End: 2024-01-24
Payer: COMMERCIAL

## 2024-01-24 ENCOUNTER — NON-APPOINTMENT (OUTPATIENT)
Age: 59
End: 2024-01-24

## 2024-01-24 VITALS — BODY MASS INDEX: 33.29 KG/M2 | HEIGHT: 64 IN | WEIGHT: 195 LBS

## 2024-01-24 VITALS — DIASTOLIC BLOOD PRESSURE: 76 MMHG | HEART RATE: 75 BPM | OXYGEN SATURATION: 98 % | SYSTOLIC BLOOD PRESSURE: 106 MMHG

## 2024-01-24 DIAGNOSIS — I50.20 UNSPECIFIED SYSTOLIC (CONGESTIVE) HEART FAILURE: ICD-10-CM

## 2024-01-24 PROCEDURE — 93000 ELECTROCARDIOGRAM COMPLETE: CPT

## 2024-01-24 PROCEDURE — 99214 OFFICE O/P EST MOD 30 MIN: CPT | Mod: 25

## 2024-01-24 NOTE — HISTORY OF PRESENT ILLNESS
[FreeTextEntry1] : Ms. 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 Other comorbidity includes unliateral pleural effusion s/p thoracotomy (5/22). Referred by Dr. Raza. Of note, sister-in-law is Dr. Rose (cardiologist at [facility]).  For full initial details, please refer to note from 7/26/23.   Since last visit in November, has been well. Unfortunately her son had a cardiac arrest at [facility] required ECMO but ultimately passed.   Has not had any ER visits or hospitalizations.   Reports having some progressively dyspnea starting January but has improved. Reports some fatigue but notes she doesn't sleep well at night and has not gone for sleep studies. Sine her son's passing, has been going through grief process.   Able to walk fairly unlimited greater than 5 blocks and can climb 2 flights of stairs without dyspnea. Sleeps with 2 pillows with no orthopnea an Denies bendopnea but does get lightheaded.Feels off-balance when walking.   Checks BP at home - ranges 128-136. Adherent to low sodium diet.   Denies chest pain, palpitations, dizziness/LH, syncope and she does not have an ICD.

## 2024-01-24 NOTE — ADDENDUM
[FreeTextEntry1] : Labs reviewed and called with results notable for K 5.1, normal BUN/creat 17/0.84 and low serum pro BNP 43. Will decrease efe to 12.5 mg daily from 25 mg daily and will increase Entresto to  mg bid. Instructed to decrease K in diet and will repeat labs in 1-2 weeks on higher dose of Entresto. If K is till elevated, will d/c efe. Will schedule TTE for the same day as next appt.

## 2024-01-24 NOTE — ASSESSMENT
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 Currently reports NYHA class II symptoms but appears compensated and normotensive. Unclear etiology of her dyspnea but possibly driven by cardiomyopathy; low suspicion of pulmonary HTN (considering sickle cell can cause this) as no RV dysfunction/severe TR. There's a discrepancy in her EF as her TTE on 5/23 appears closer to 50% and cardiac MRI is 40%.  1. HFmrEF - on Entresto 49/51 twice/day, will check labs today and if K and renal function are stable, will increase to  mg bid  - c/w efe 25mg daily - on Coreg 25 mg twice/day - discussed disease process - keep log of BP/weight - Dr. Leo reviewed cardiac MRI further to further clarify EF with no LGE - pt asked for a referral for GI for colonoscopy due to family history  - will schedule home sleep studies next visit as patient states she snores - will repeat TTE on GDMT  Follow up in office with Dr. Leo in 2 months, will call with labs  RTC with NP 6 weeks and me in 12 weeks.

## 2024-01-24 NOTE — PHYSICAL EXAM

## 2024-01-24 NOTE — CARDIOLOGY SUMMARY
[de-identified] : 1/24/24 -  10/18/23 - NSR 76, NSST  7/26/23 - NSR, HR 93, no Q waves noted [de-identified] : 5/23/23 (Dr. Raza's office) - EF approx 45% (although appears closer to 50%), LVEDD 4.7 cm, E/e' 11, nl IVC, no TR to assess RVSP [de-identified] : 5/30/23 - MRI - EF 40%, no LGE (reviewed and closer to 45%)  5/23 - coronary CT negative for CAD

## 2024-01-24 NOTE — REVIEW OF SYSTEMS
[FreeTextEntry2] : see HPI [FreeTextEntry1] : 14 point ROS negative or noncontributory except as stated in HPI

## 2024-01-25 LAB
ALBUMIN SERPL ELPH-MCNC: 4.4 G/DL
ALP BLD-CCNC: 73 U/L
ALT SERPL-CCNC: 24 U/L
ANION GAP SERPL CALC-SCNC: 16 MMOL/L
AST SERPL-CCNC: 23 U/L
BASOPHILS # BLD AUTO: 0.06 K/UL
BASOPHILS NFR BLD AUTO: 0.9 %
BILIRUB SERPL-MCNC: 1 MG/DL
BUN SERPL-MCNC: 18 MG/DL
CALCIUM SERPL-MCNC: 8.8 MG/DL
CHLORIDE SERPL-SCNC: 103 MMOL/L
CO2 SERPL-SCNC: 21 MMOL/L
CREAT SERPL-MCNC: 1.05 MG/DL
EGFR: 62 ML/MIN/1.73M2
EOSINOPHIL # BLD AUTO: 0.06 K/UL
EOSINOPHIL NFR BLD AUTO: 0.9 %
GLUCOSE SERPL-MCNC: 71 MG/DL
HCT VFR BLD CALC: 31 %
HGB BLD-MCNC: 11.4 G/DL
LYMPHOCYTES # BLD AUTO: 2.45 K/UL
LYMPHOCYTES NFR BLD AUTO: 38.2 %
MAN DIFF?: NORMAL
MCHC RBC-ENTMCNC: 36.7 PG
MCHC RBC-ENTMCNC: 36.8 GM/DL
MCV RBC AUTO: 99.7 FL
MONOCYTES # BLD AUTO: 0.56 K/UL
MONOCYTES NFR BLD AUTO: 8.7 %
NEUTROPHILS # BLD AUTO: 3.18 K/UL
NEUTROPHILS NFR BLD AUTO: 49.5 %
NT-PROBNP SERPL-MCNC: 127 PG/ML
PLATELET # BLD AUTO: 194 K/UL
POTASSIUM SERPL-SCNC: 4.9 MMOL/L
PROT SERPL-MCNC: 7.3 G/DL
RBC # BLD: 3.11 M/UL
RBC # FLD: 14.4 %
SODIUM SERPL-SCNC: 140 MMOL/L
WBC # FLD AUTO: 6.42 K/UL

## 2024-01-31 ENCOUNTER — APPOINTMENT (OUTPATIENT)
Dept: CV DIAGNOSITCS | Facility: HOSPITAL | Age: 59
End: 2024-01-31

## 2024-02-07 RX ORDER — CARVEDILOL 12.5 MG/1
12.5 TABLET, FILM COATED ORAL TWICE DAILY
Qty: 180 | Refills: 3 | Status: ACTIVE | COMMUNITY
Start: 2023-07-26 | End: 1900-01-01

## 2024-04-22 ENCOUNTER — APPOINTMENT (OUTPATIENT)
Dept: HEART FAILURE | Facility: CLINIC | Age: 59
End: 2024-04-22

## 2024-06-24 RX ORDER — SACUBITRIL AND VALSARTAN 49; 51 MG/1; MG/1
49-51 TABLET, FILM COATED ORAL
Qty: 60 | Refills: 5 | Status: ACTIVE | COMMUNITY
Start: 2023-07-26 | End: 1900-01-01

## 2024-07-24 ENCOUNTER — APPOINTMENT (OUTPATIENT)
Dept: HEART FAILURE | Facility: CLINIC | Age: 59
End: 2024-07-24

## 2024-07-24 NOTE — HISTORY OF PRESENT ILLNESS
[FreeTextEntry1] : Ms. Rose is a 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%; LVEDD 4.7 cm Dx 5/2023) who presents for follow-up of care. Other comorbidity includes unilateral pleural effusion s/p thoracotomy (5/22). Referred by Dr. Raza. Of note, sister-in-law is Dr. Rose (cardiologist at [facility]).  For full initial details, please refer to note from 7/26/23.   Since last visit in January had been fairly well. Noted some fatigue in April and low BP so Entresto was reduced to 49/51 twice/day. Underwent colonoscopy w/o issues.   Has not had any ER visits or hospitalizations.   Reports some fatigue but notes she doesn't sleep well at night and has not gone for sleep studies. Since her son's passing (November 2023; cardiac arrest d/t unknown cause), has been going through grief process.   Able to walk fairly unlimited greater than 5 blocks and can climb 2 flights of stairs without dyspnea. Sleeps with 2 pillows with no orthopnea an Denies bendopnea but does get lightheaded.Feels off-balance when walking.   Checks BP at home - ranges 128-136. Adherent to low sodium diet.   Denies chest pain, palpitations, dizziness/LH, syncope and she does not have an ICD.

## 2024-07-24 NOTE — ASSESSMENT
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 Currently reports NYHA class II symptoms but appears compensated and normotensive. Unclear etiology of her dyspnea but possibly driven by cardiomyopathy; low suspicion of pulmonary HTN (considering sickle cell can cause this) as no RV dysfunction/severe TR. There's a discrepancy in her EF as her TTE on 5/23 appears closer to 50% and cardiac MRI is 40%.  1. HFmrEF - on Entresto  mg bid  - c/w efe 25mg daily - on Coreg 25 mg twice/day - discussed disease process - keep log of BP/weight - repeat TTE on GDMT  RTC with NP 3 months and me in 6 months

## 2024-07-24 NOTE — CARDIOLOGY SUMMARY
[de-identified] : 7/24/24 -  1/24/24 - unchanged 10/18/23 - NSR 76, NSST  7/26/23 - NSR, HR 93, no Q waves noted [de-identified] : 5/23/23 (Dr. Raza's office) - EF approx 45% (although appears closer to 50%), LVEDD 4.7 cm, E/e' 11, nl IVC, no TR to assess RVSP [de-identified] : 5/30/23 - MRI - EF 40%, no LGE (reviewed and closer to 45%)  5/23 - coronary CT negative for CAD

## 2024-07-24 NOTE — PHYSICAL EXAM

## 2024-09-08 ENCOUNTER — NON-APPOINTMENT (OUTPATIENT)
Age: 59
End: 2024-09-08

## 2024-09-09 ENCOUNTER — NON-APPOINTMENT (OUTPATIENT)
Age: 59
End: 2024-09-09

## 2024-09-09 ENCOUNTER — APPOINTMENT (OUTPATIENT)
Dept: HEART FAILURE | Facility: CLINIC | Age: 59
End: 2024-09-09
Payer: COMMERCIAL

## 2024-09-09 VITALS
HEART RATE: 54 BPM | TEMPERATURE: 98 F | RESPIRATION RATE: 16 BRPM | OXYGEN SATURATION: 99 % | BODY MASS INDEX: 29.87 KG/M2 | WEIGHT: 174 LBS | DIASTOLIC BLOOD PRESSURE: 85 MMHG | SYSTOLIC BLOOD PRESSURE: 128 MMHG

## 2024-09-09 PROCEDURE — 93000 ELECTROCARDIOGRAM COMPLETE: CPT

## 2024-09-09 PROCEDURE — 99214 OFFICE O/P EST MOD 30 MIN: CPT | Mod: 25

## 2024-09-09 NOTE — PHYSICAL EXAM
[Well Developed] : well developed [Well Nourished] : well nourished [No Acute Distress] : no acute distress [Normal Venous Pressure] : normal venous pressure [No Carotid Bruit] : no carotid bruit [Normal S1, S2] : normal S1, S2 [No Murmur] : no murmur [No Rub] : no rub [No Gallop] : no gallop [Clear Lung Fields] : clear lung fields [Good Air Entry] : good air entry [No Respiratory Distress] : no respiratory distress  [Soft] : abdomen soft [Non Tender] : non-tender [No Masses/organomegaly] : no masses/organomegaly [Normal Bowel Sounds] : normal bowel sounds [Normal Gait] : normal gait [No Edema] : no edema [No Cyanosis] : no cyanosis [No Clubbing] : no clubbing [No Varicosities] : no varicosities [No Skin Lesions] : no skin lesions [Alert and Oriented] : alert and oriented [Normal memory] : normal memory

## 2024-09-10 LAB
ANION GAP SERPL CALC-SCNC: 12 MMOL/L
BUN SERPL-MCNC: 20 MG/DL
CALCIUM SERPL-MCNC: 9.9 MG/DL
CHLORIDE SERPL-SCNC: 109 MMOL/L
CO2 SERPL-SCNC: 22 MMOL/L
CREAT SERPL-MCNC: 1.07 MG/DL
EGFR: 61 ML/MIN/1.73M2
GLUCOSE SERPL-MCNC: 85 MG/DL
NT-PROBNP SERPL-MCNC: 117 PG/ML
POTASSIUM SERPL-SCNC: 4.6 MMOL/L
SODIUM SERPL-SCNC: 144 MMOL/L

## 2024-09-10 RX ORDER — SACUBITRIL AND VALSARTAN 97; 103 MG/1; MG/1
97-103 TABLET, FILM COATED ORAL
Qty: 60 | Refills: 5 | Status: ACTIVE | COMMUNITY
Start: 2024-09-09 | End: 1900-01-01

## 2024-09-10 NOTE — CARDIOLOGY SUMMARY
[de-identified] : 9/9/24 Sinus Bradycardia HR 54 7/24/24 -  1/24/24 - unchanged 10/18/23 - NSR 76, NSST  7/26/23 - NSR, HR 93, no Q waves noted [de-identified] : 5/23/23 (Dr. Raza's office) - EF approx 45% (although appears closer to 50%), LVEDD 4.7 cm, E/e' 11, nl IVC, no TR to assess RVSP [de-identified] : 5/30/23 - MRI - EF 40%, no LGE (reviewed and closer to 45%)  5/23 - coronary CT negative for CAD

## 2024-09-10 NOTE — ASSESSMENT
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 Currently reports NYHA class II symptoms but appears compensated and normotensive. Unclear etiology of her dyspnea but possibly driven by cardiomyopathy; low suspicion of pulmonary HTN (considering sickle cell can cause this) as no RV dysfunction/severe TR. There's a discrepancy in her EF as her TTE on 5/23 appears closer to 50% and cardiac MRI is 40%.  1. HFmrEF - retrial higher dose of Entresto  mg bid - advised to increase fluids - c/w efe 12.5mg daily (previously K 5.1 on higher dose) Reviewed low potassium diet  - on Coreg 25 mg twice/day - discussed disease process - keep log of BP/weight - repeat TTE on GDMT reordered for eval  RTC with Dr Leo in 3 months

## 2024-09-10 NOTE — HISTORY OF PRESENT ILLNESS
[FreeTextEntry1] : Ms. Rose is a 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%; LVEDD 4.7 cm Dx 5/2023) who presents for follow-up of care. Other comorbidity includes unilateral pleural effusion s/p thoracotomy (5/22). Referred by Dr. Raza. Of note, sister-in-law is Dr. Rose (cardiologist at [facility]).  For full initial details, please refer to note from 7/26/23.   Since last visit Entresto was reduced to 49/51 twice/day ^ hypotension on higher dose. She checks her BP at home with readings in 120s to 140s.  Adherent to low sodium diet. Her appetite decreased drinking fluids 2L.  She received rx for efe 25 mg which she instead of her usual 12.5 mg dose (unclear where rx change was initiated). 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 Still with fatigue but noted with anemia and referred by PCP to Seeing hematology for anemia- planned iron infusions and she is in process of scheduling colonoscopy   Able to walk fairly unlimited greater than 6-10 blocks and can climb 2 flights of stairs without dyspnea. Sleeps with 2 pillows with no orthopnea an Denies bendopnea but does get lightheaded. Feels off-balance when walking.   Has not had any ER visits or hospitalizations.   Denies chest pain, palpitations, dizziness/LH, syncope and she does not have an ICD.

## 2024-09-10 NOTE — ADDENDUM
[FreeTextEntry1] : 9/10/2024: Called pt with f/u labs K 4.6 Cr 1.07 . Advised to increase Entresto  mg  BID and con spironolactone 12.5  QD. Low potassium diet reviewed. Recheck BMP 2 weeks after starting higher dose of entresto

## 2024-09-10 NOTE — ASSESSMENT
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 Currently reports NYHA class II symptoms but appears compensated and normotensive. Unclear etiology of her dyspnea but possibly driven by cardiomyopathy; low suspicion of pulmonary HTN (considering sickle cell can cause this) as no RV dysfunction/severe TR. There's a discrepancy in her EF as her TTE on 5/23 appears closer to 50% and cardiac MRI is 40%.  1. HFmrEF - retrial higher dose of Entresto  mg bid - advised to increase fluids - c/w efe 12.5mg daily (previously K 5.1 on higher dose) Reviewed low potassium diet  - on Coreg 25 mg twice/day - discussed disease process - keep log of BP/weight - repeat TTE on GDMT reordered for eval  RTC with Dr Leo in 3 months

## 2024-09-10 NOTE — CARDIOLOGY SUMMARY
[de-identified] : 9/9/24 Sinus Bradycardia HR 54 7/24/24 -  1/24/24 - unchanged 10/18/23 - NSR 76, NSST  7/26/23 - NSR, HR 93, no Q waves noted [de-identified] : 5/23/23 (Dr. Raza's office) - EF approx 45% (although appears closer to 50%), LVEDD 4.7 cm, E/e' 11, nl IVC, no TR to assess RVSP [de-identified] : 5/30/23 - MRI - EF 40%, no LGE (reviewed and closer to 45%)  5/23 - coronary CT negative for CAD

## 2024-09-10 NOTE — HISTORY OF PRESENT ILLNESS
[FreeTextEntry1] : Ms. Rose is a 58 y/o F w/ h/o Sickle cell disease (has crisis every 2-3 years; on hydroxyurea), prior Covid (12/22) c/b Sickle cell crisis s/p Remdesivir, HFmrEF/NICM (EF 40%; LVEDD 4.7 cm Dx 5/2023) who presents for follow-up of care. Other comorbidity includes unilateral pleural effusion s/p thoracotomy (5/22). Referred by Dr. Raza. Of note, sister-in-law is Dr. Rose (cardiologist at [facility]).  For full initial details, please refer to note from 7/26/23.   Since last visit Entresto was reduced to 49/51 twice/day ^ hypotension on higher dose. She checks her BP at home with readings in 120s to 140s.  Adherent to low sodium diet. Her appetite decreased drinking fluids 2L.  She received rx for efe 25 mg which she instead of her usual 12.5 mg dose (unclear where rx change was initiated). 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 Still with fatigue but noted with anemia and referred by PCP to Seeing hematology for anemia- planned iron infusions and she is in process of scheduling colonoscopy   Able to walk fairly unlimited greater than 6-10 blocks and can climb 2 flights of stairs without dyspnea. Sleeps with 2 pillows with no orthopnea an Denies bendopnea but does get lightheaded. Feels off-balance when walking.   Has not had any ER visits or hospitalizations.   Denies chest pain, palpitations, dizziness/LH, syncope and she does not have an ICD.

## 2024-09-19 NOTE — CONSULT NOTE ADULT - ATTENDING SUPERVISION STATEMENT
COLBY IS DOING VERY WELL!  SCHEDULE WITH ENDOCRINOLOGY FOR DARK PUBIC HAIR.    APPLY AQUAPHOR TO ADHESIONS WITH GENTLE PRESSURE.  FOLLOW UP WITH UROLOGY as RECOMMENDED.      FLU VACCINE DECLINED.    SAFETY DISCUSSED.      YOUR CHILD'S EXAM SHOWS FEATURES OF SEASONAL ALLERGIES.  YOU MAY USE AN ORAL ANTIHISTAMINE (LIKE CLARITIN OR ZYRTEC (GENERICS ARE OKAY)).  GIVE 5 MG ONCE DAILY IF YOUR CHILD IS LESS THAN 6 YEARS OLD.  GIVE 10 MG ONCE DAILY IF YOU CHILD IS 6 YEARS OLD OR OLDER.  ZYRTEC SHOULD BE GIVEN IN THE EVENING.  YOU ALSO MAY TREAT THE ALLERGY SYMPTOMS WITH A STEROID NASAL SPRAY (LIKE FLONASE OR SENSIMIST).  INSTILL 1 SPRAY IN EACH NOSTRIL ONCE DAILY.    YOU MAY USE ALLERGY EYE DROPS (LIKE ZADITOR) FOR BOTHERSOME EYE SYMPTOMS (FOLLOW PACKAGE DIRECTIONS).  CONTINUE THE MEDICATIONS DAILY THROUGH THE ALLERGY SEASON IF HELPING.  STOP THE MEDICATIONS ONCE THE ALLERGY SEASON IS OVER.  YOU MAY RESTART THE MEDICATIONS FOR THE NEXT ALLERGY SEASON.  PLEASE CALL IF NOT IMPROVING IN 1-2 WEEKS, HAVING INCREASING SYMPTOMS, OR YOU HAVE QUESTIONS/CONCERNS.    THE FOLLOWING ARE WAYS TO MINIMIZE THE SPREAD OF ALLERGENS:  -REMOVE SHOES/FOOTWEAR AT THE DOOR (TO AVOID TRACKING ALLERGENS THROUGHOUT THE HOME)  -CHANGE CLOTHES ONCE INSIDE TO AVOID TRANSFERRING ALLERGENS ONTO FURNITURE/PEPE AND TO LIMIT ONGOING EXPOSURE ONCE INSIDE (TOUCHING CLOTHES THEN TOUCHING FACE)  -WASH HANDS BEFORE EATING (TO AVOID INGESTING ALLERGENS)  -BATHE NIGHTLY BEFORE GOING INTO BED (TO AVOID GETTING ALLERGENS INTO BED/ONTO SHEETS)  -USE AIR CONDITIONING WHENEVER POSSIBLE INSTEAD OF OPENING WINDOWS (TO KEEP ALLERGENS OUTSIDE)    KEEP UP THE GREAT WORK AND CALL WITH CONCERNS.           Resident

## 2024-10-29 ENCOUNTER — APPOINTMENT (OUTPATIENT)
Dept: CARDIOLOGY | Facility: CLINIC | Age: 59
End: 2024-10-29
Payer: COMMERCIAL

## 2024-10-29 PROCEDURE — 93306 TTE W/DOPPLER COMPLETE: CPT

## 2024-12-09 ENCOUNTER — APPOINTMENT (OUTPATIENT)
Dept: CV DIAGNOSITCS | Facility: HOSPITAL | Age: 59
End: 2024-12-09

## 2025-01-03 ENCOUNTER — INPATIENT (INPATIENT)
Facility: HOSPITAL | Age: 60
LOS: 0 days | Discharge: HOME | End: 2025-01-03
Attending: STUDENT IN AN ORGANIZED HEALTH CARE EDUCATION/TRAINING PROGRAM | Admitting: STUDENT IN AN ORGANIZED HEALTH CARE EDUCATION/TRAINING PROGRAM
Payer: COMMERCIAL

## 2025-01-03 ENCOUNTER — RESULT REVIEW (OUTPATIENT)
Age: 60
End: 2025-01-03

## 2025-01-03 ENCOUNTER — TRANSCRIPTION ENCOUNTER (OUTPATIENT)
Age: 60
End: 2025-01-03

## 2025-01-03 ENCOUNTER — INPATIENT (INPATIENT)
Facility: HOSPITAL | Age: 60
LOS: 33 days | Discharge: NURSING, REHAB OR LONG-TERM CARE | DRG: 293 | End: 2025-02-06
Attending: THORACIC SURGERY (CARDIOTHORACIC VASCULAR SURGERY) | Admitting: THORACIC SURGERY (CARDIOTHORACIC VASCULAR SURGERY)
Payer: COMMERCIAL

## 2025-01-03 VITALS — HEART RATE: 124 BPM | OXYGEN SATURATION: 100 %

## 2025-01-03 VITALS — HEART RATE: 127 BPM | TEMPERATURE: 99 F | RESPIRATION RATE: 20 BRPM | OXYGEN SATURATION: 96 %

## 2025-01-03 VITALS — OXYGEN SATURATION: 97 % | HEART RATE: 121 BPM

## 2025-01-03 DIAGNOSIS — Z98.890 OTHER SPECIFIED POSTPROCEDURAL STATES: Chronic | ICD-10-CM

## 2025-01-03 DIAGNOSIS — R41.82 ALTERED MENTAL STATUS, UNSPECIFIED: ICD-10-CM

## 2025-01-03 DIAGNOSIS — Z98.891 HISTORY OF UTERINE SCAR FROM PREVIOUS SURGERY: Chronic | ICD-10-CM

## 2025-01-03 DIAGNOSIS — Z98.89 OTHER SPECIFIED POSTPROCEDURAL STATES: Chronic | ICD-10-CM

## 2025-01-03 DIAGNOSIS — R57.0 CARDIOGENIC SHOCK: ICD-10-CM

## 2025-01-03 DIAGNOSIS — Z98.82 BREAST IMPLANT STATUS: Chronic | ICD-10-CM

## 2025-01-03 DIAGNOSIS — R09.89 OTHER SPECIFIED SYMPTOMS AND SIGNS INVOLVING THE CIRCULATORY AND RESPIRATORY SYSTEMS: ICD-10-CM

## 2025-01-03 LAB
ADD ON TEST-SPECIMEN IN LAB: SIGNIFICANT CHANGE UP
ALBUMIN SERPL ELPH-MCNC: 2.9 G/DL — LOW (ref 3.3–5)
ALBUMIN SERPL ELPH-MCNC: 3 G/DL — LOW (ref 3.3–5)
ALBUMIN SERPL ELPH-MCNC: 3 G/DL — LOW (ref 3.3–5)
ALP SERPL-CCNC: 208 U/L — HIGH (ref 40–120)
ALP SERPL-CCNC: 222 U/L — HIGH (ref 40–120)
ALP SERPL-CCNC: 223 U/L — HIGH (ref 40–120)
ALT FLD-CCNC: 51 U/L — HIGH (ref 4–33)
ALT FLD-CCNC: 51 U/L — HIGH (ref 4–33)
ALT FLD-CCNC: 65 U/L — HIGH (ref 10–45)
AMMONIA BLD-MCNC: 19 UMOL/L — SIGNIFICANT CHANGE UP (ref 11–55)
ANION GAP SERPL CALC-SCNC: 16 MMOL/L — HIGH (ref 7–14)
ANION GAP SERPL CALC-SCNC: 17 MMOL/L — HIGH (ref 7–14)
ANION GAP SERPL CALC-SCNC: 17 MMOL/L — SIGNIFICANT CHANGE UP (ref 5–17)
ANISOCYTOSIS BLD QL: SIGNIFICANT CHANGE UP
APPEARANCE UR: ABNORMAL
APPEARANCE UR: CLEAR — SIGNIFICANT CHANGE UP
APTT BLD: 24.2 SEC — LOW (ref 24.5–35.6)
APTT BLD: 24.6 SEC — SIGNIFICANT CHANGE UP (ref 24.5–35.6)
APTT BLD: 25.7 SEC — SIGNIFICANT CHANGE UP (ref 24.5–35.6)
AST SERPL-CCNC: 68 U/L — HIGH (ref 4–32)
AST SERPL-CCNC: 74 U/L — HIGH (ref 4–32)
AST SERPL-CCNC: 85 U/L — HIGH (ref 10–40)
BACTERIA # UR AUTO: NEGATIVE /HPF — SIGNIFICANT CHANGE UP
BACTERIA # UR AUTO: NEGATIVE /HPF — SIGNIFICANT CHANGE UP
BASE EXCESS BLDMV CALC-SCNC: -6.2 MMOL/L — LOW (ref -3–3)
BASE EXCESS BLDMV CALC-SCNC: -6.4 MMOL/L — LOW (ref -3–3)
BASE EXCESS BLDMV CALC-SCNC: -7.3 MMOL/L — LOW (ref -3–3)
BASE EXCESS BLDV CALC-SCNC: -6.8 MMOL/L — LOW (ref -2–3)
BASOPHILS # BLD AUTO: 0 K/UL — SIGNIFICANT CHANGE UP (ref 0–0.2)
BASOPHILS # BLD AUTO: 0.03 K/UL — SIGNIFICANT CHANGE UP (ref 0–0.2)
BASOPHILS NFR BLD AUTO: 0 % — SIGNIFICANT CHANGE UP (ref 0–2)
BASOPHILS NFR BLD AUTO: 0.2 % — SIGNIFICANT CHANGE UP (ref 0–2)
BILIRUB DIRECT SERPL-MCNC: 0.7 MG/DL — HIGH (ref 0–0.3)
BILIRUB INDIRECT FLD-MCNC: 0.9 MG/DL — SIGNIFICANT CHANGE UP (ref 0–1)
BILIRUB SERPL-MCNC: 1.6 MG/DL — HIGH (ref 0.2–1.2)
BILIRUB SERPL-MCNC: 1.6 MG/DL — HIGH (ref 0.2–1.2)
BILIRUB SERPL-MCNC: 2.2 MG/DL — HIGH (ref 0.2–1.2)
BILIRUB SERPL-MCNC: 2.4 MG/DL — HIGH (ref 0.2–1.2)
BILIRUB UR-MCNC: NEGATIVE — SIGNIFICANT CHANGE UP
BILIRUB UR-MCNC: NEGATIVE — SIGNIFICANT CHANGE UP
BLD GP AB SCN SERPL QL: NEGATIVE — SIGNIFICANT CHANGE UP
BLOOD GAS ARTERIAL - LYTES,HGB,ICA,LACT RESULT: SIGNIFICANT CHANGE UP
BLOOD GAS VENOUS COMPREHENSIVE RESULT: SIGNIFICANT CHANGE UP
BUN SERPL-MCNC: 33 MG/DL — HIGH (ref 7–23)
BUN SERPL-MCNC: 34 MG/DL — HIGH (ref 7–23)
BUN SERPL-MCNC: 35 MG/DL — HIGH (ref 7–23)
CALCIUM SERPL-MCNC: 7.4 MG/DL — LOW (ref 8.4–10.5)
CALCIUM SERPL-MCNC: 7.7 MG/DL — LOW (ref 8.4–10.5)
CALCIUM SERPL-MCNC: 7.8 MG/DL — LOW (ref 8.4–10.5)
CAST: 7 /LPF — HIGH (ref 0–4)
CAST: 8 /LPF — HIGH (ref 0–4)
CHLORIDE SERPL-SCNC: 103 MMOL/L — SIGNIFICANT CHANGE UP (ref 96–108)
CHLORIDE SERPL-SCNC: 103 MMOL/L — SIGNIFICANT CHANGE UP (ref 98–107)
CHLORIDE SERPL-SCNC: 104 MMOL/L — SIGNIFICANT CHANGE UP (ref 98–107)
CK MB BLD-MCNC: 2.7 % — HIGH (ref 0–2.5)
CK MB CFR SERPL CALC: 4.6 NG/ML — SIGNIFICANT CHANGE UP
CK SERPL-CCNC: 173 U/L — HIGH (ref 25–170)
CO2 BLDMV-SCNC: 18 MMOL/L — LOW (ref 21–29)
CO2 BLDMV-SCNC: 19 MMOL/L — LOW (ref 21–29)
CO2 BLDMV-SCNC: 19 MMOL/L — LOW (ref 21–29)
CO2 BLDV-SCNC: 19 MMOL/L — LOW (ref 22–26)
CO2 SERPL-SCNC: 15 MMOL/L — LOW (ref 22–31)
CO2 SERPL-SCNC: 15 MMOL/L — LOW (ref 22–31)
CO2 SERPL-SCNC: 17 MMOL/L — LOW (ref 22–31)
COLOR SPEC: YELLOW — SIGNIFICANT CHANGE UP
COLOR SPEC: YELLOW — SIGNIFICANT CHANGE UP
CORTIS F PM SERPL-MCNC: 27.4 UG/DL — HIGH (ref 2.7–10.5)
CREAT SERPL-MCNC: 1.47 MG/DL — HIGH (ref 0.5–1.3)
CREAT SERPL-MCNC: 1.49 MG/DL — HIGH (ref 0.5–1.3)
CREAT SERPL-MCNC: 1.57 MG/DL — HIGH (ref 0.5–1.3)
D DIMER BLD IA.RAPID-MCNC: 4738 NG/ML DDU — HIGH
DACRYOCYTES BLD QL SMEAR: SLIGHT — SIGNIFICANT CHANGE UP
DIFF PNL FLD: ABNORMAL
DIFF PNL FLD: ABNORMAL
EGFR: 38 ML/MIN/1.73M2 — LOW
EGFR: 41 ML/MIN/1.73M2 — LOW
EGFR: 41 ML/MIN/1.73M2 — LOW
EOSINOPHIL # BLD AUTO: 0 K/UL — SIGNIFICANT CHANGE UP (ref 0–0.5)
EOSINOPHIL # BLD AUTO: 0.03 K/UL — SIGNIFICANT CHANGE UP (ref 0–0.5)
EOSINOPHIL NFR BLD AUTO: 0 % — SIGNIFICANT CHANGE UP (ref 0–6)
EOSINOPHIL NFR BLD AUTO: 0.2 % — SIGNIFICANT CHANGE UP (ref 0–6)
FIBRINOGEN PPP-MCNC: 476 MG/DL — HIGH (ref 200–465)
FLUAV AG NPH QL: SIGNIFICANT CHANGE UP
FLUBV AG NPH QL: SIGNIFICANT CHANGE UP
GAS PNL BLDA: SIGNIFICANT CHANGE UP
GAS PNL BLDMV: SIGNIFICANT CHANGE UP
GAS PNL BLDV: SIGNIFICANT CHANGE UP
GAS PNL BLDV: SIGNIFICANT CHANGE UP
GIANT PLATELETS BLD QL SMEAR: PRESENT — SIGNIFICANT CHANGE UP
GLUCOSE BLDC GLUCOMTR-MCNC: 156 MG/DL — HIGH (ref 70–99)
GLUCOSE SERPL-MCNC: 121 MG/DL — HIGH (ref 70–99)
GLUCOSE SERPL-MCNC: 143 MG/DL — HIGH (ref 70–99)
GLUCOSE SERPL-MCNC: 152 MG/DL — HIGH (ref 70–99)
GLUCOSE UR QL: NEGATIVE MG/DL — SIGNIFICANT CHANGE UP
GLUCOSE UR QL: NEGATIVE MG/DL — SIGNIFICANT CHANGE UP
HAPTOGLOB SERPL-MCNC: 47 MG/DL — SIGNIFICANT CHANGE UP (ref 34–200)
HCO3 BLDMV-SCNC: 17 MMOL/L — LOW (ref 20–28)
HCO3 BLDMV-SCNC: 18 MMOL/L — LOW (ref 20–28)
HCO3 BLDMV-SCNC: 18 MMOL/L — LOW (ref 20–28)
HCO3 BLDV-SCNC: 18 MMOL/L — LOW (ref 22–29)
HCT VFR BLD CALC: 20.1 % — CRITICAL LOW (ref 34.5–45)
HCT VFR BLD CALC: 21.1 % — LOW (ref 34.5–45)
HCT VFR BLD CALC: 22.5 % — LOW (ref 34.5–45)
HEPARIN-PF4 AB RESULT: <0.6 U/ML — SIGNIFICANT CHANGE UP (ref 0–0.9)
HGB BLD-MCNC: 7.5 G/DL — LOW (ref 11.5–15.5)
HGB BLD-MCNC: 7.6 G/DL — LOW (ref 11.5–15.5)
HGB BLD-MCNC: 8.3 G/DL — LOW (ref 11.5–15.5)
HOROWITZ INDEX BLDMV+IHG-RTO: 30 — SIGNIFICANT CHANGE UP
HOROWITZ INDEX BLDMV+IHG-RTO: 30 — SIGNIFICANT CHANGE UP
HOROWITZ INDEX BLDMV+IHG-RTO: 50 — SIGNIFICANT CHANGE UP
HOROWITZ INDEX BLDV+IHG-RTO: 30 — SIGNIFICANT CHANGE UP
HYPOCHROMIA BLD QL: SLIGHT — SIGNIFICANT CHANGE UP
IANC: 9.3 K/UL — HIGH (ref 1.8–7.4)
IMM GRANULOCYTES NFR BLD AUTO: 1.9 % — HIGH (ref 0–0.9)
INR BLD: 1.17 RATIO — HIGH (ref 0.85–1.16)
INR BLD: 1.24 RATIO — HIGH (ref 0.85–1.16)
INR BLD: 1.29 RATIO — HIGH (ref 0.85–1.16)
KETONES UR-MCNC: NEGATIVE MG/DL — SIGNIFICANT CHANGE UP
KETONES UR-MCNC: NEGATIVE MG/DL — SIGNIFICANT CHANGE UP
LDH SERPL L TO P-CCNC: 1156 U/L — HIGH (ref 50–242)
LDH SERPL L TO P-CCNC: 1197 U/L — HIGH (ref 135–225)
LEUKOCYTE ESTERASE UR-ACNC: ABNORMAL
LEUKOCYTE ESTERASE UR-ACNC: NEGATIVE — SIGNIFICANT CHANGE UP
LYMPHOCYTES # BLD AUTO: 14 % — SIGNIFICANT CHANGE UP (ref 13–44)
LYMPHOCYTES # BLD AUTO: 2.23 K/UL — SIGNIFICANT CHANGE UP (ref 1–3.3)
LYMPHOCYTES # BLD AUTO: 2.7 K/UL — SIGNIFICANT CHANGE UP (ref 1–3.3)
LYMPHOCYTES # BLD AUTO: 20.2 % — SIGNIFICANT CHANGE UP (ref 13–44)
MACROCYTES BLD QL: SLIGHT — SIGNIFICANT CHANGE UP
MAGNESIUM SERPL-MCNC: 1.9 MG/DL — SIGNIFICANT CHANGE UP (ref 1.6–2.6)
MAGNESIUM SERPL-MCNC: 2.2 MG/DL — SIGNIFICANT CHANGE UP (ref 1.6–2.6)
MAGNESIUM SERPL-MCNC: 2.6 MG/DL — SIGNIFICANT CHANGE UP (ref 1.6–2.6)
MANUAL SMEAR VERIFICATION: SIGNIFICANT CHANGE UP
MCHC RBC-ENTMCNC: 30.4 PG — SIGNIFICANT CHANGE UP (ref 27–34)
MCHC RBC-ENTMCNC: 30.7 PG — SIGNIFICANT CHANGE UP (ref 27–34)
MCHC RBC-ENTMCNC: 31 PG — SIGNIFICANT CHANGE UP (ref 27–34)
MCHC RBC-ENTMCNC: 36 G/DL — SIGNIFICANT CHANGE UP (ref 32–36)
MCHC RBC-ENTMCNC: 36.9 G/DL — HIGH (ref 32–36)
MCHC RBC-ENTMCNC: 37.3 G/DL — HIGH (ref 32–36)
MCV RBC AUTO: 83.1 FL — SIGNIFICANT CHANGE UP (ref 80–100)
MCV RBC AUTO: 83.3 FL — SIGNIFICANT CHANGE UP (ref 80–100)
MCV RBC AUTO: 84.4 FL — SIGNIFICANT CHANGE UP (ref 80–100)
METAMYELOCYTES # FLD: 0.9 % — HIGH (ref 0–0)
METAMYELOCYTES NFR BLD: 0.9 % — HIGH (ref 0–0)
MICROCYTES BLD QL: SLIGHT — SIGNIFICANT CHANGE UP
MONOCYTES # BLD AUTO: 1.05 K/UL — HIGH (ref 0–0.9)
MONOCYTES # BLD AUTO: 1.2 K/UL — HIGH (ref 0–0.9)
MONOCYTES NFR BLD AUTO: 7.5 % — SIGNIFICANT CHANGE UP (ref 2–14)
MONOCYTES NFR BLD AUTO: 7.9 % — SIGNIFICANT CHANGE UP (ref 2–14)
MYELOCYTES NFR BLD: 2.8 % — HIGH (ref 0–0)
NEUTROPHILS # BLD AUTO: 11.63 K/UL — HIGH (ref 1.8–7.4)
NEUTROPHILS # BLD AUTO: 9.3 K/UL — HIGH (ref 1.8–7.4)
NEUTROPHILS NFR BLD AUTO: 67.3 % — SIGNIFICANT CHANGE UP (ref 43–77)
NEUTROPHILS NFR BLD AUTO: 69.6 % — SIGNIFICANT CHANGE UP (ref 43–77)
NEUTS BAND # BLD: 5.6 % — SIGNIFICANT CHANGE UP (ref 0–8)
NEUTS BAND NFR BLD: 5.6 % — SIGNIFICANT CHANGE UP (ref 0–8)
NITRITE UR-MCNC: NEGATIVE — SIGNIFICANT CHANGE UP
NITRITE UR-MCNC: NEGATIVE — SIGNIFICANT CHANGE UP
NRBC # BLD: 18 /100 WBCS — HIGH (ref 0–0)
NRBC # BLD: 24 /100 WBCS — HIGH (ref 0–0)
NRBC # BLD: 38 /100 WBCS — HIGH (ref 0–0)
NRBC # FLD: 2.45 K/UL — HIGH (ref 0–0)
NRBC # FLD: 3.31 K/UL — HIGH (ref 0–0)
NRBC BLD-RTO: 38 /100 WBCS — HIGH (ref 0–0)
NT-PROBNP SERPL-SCNC: HIGH PG/ML
O2 CT VFR BLD CALC: 30 MMHG — SIGNIFICANT CHANGE UP (ref 30–65)
O2 CT VFR BLD CALC: 32 MMHG — SIGNIFICANT CHANGE UP (ref 30–65)
O2 CT VFR BLD CALC: 34 MMHG — SIGNIFICANT CHANGE UP (ref 30–65)
OVALOCYTES BLD QL SMEAR: SLIGHT — SIGNIFICANT CHANGE UP
PCO2 BLDMV: 30 MMHG — SIGNIFICANT CHANGE UP (ref 30–65)
PCO2 BLDMV: 30 MMHG — SIGNIFICANT CHANGE UP (ref 30–65)
PCO2 BLDMV: 33 MMHG — SIGNIFICANT CHANGE UP (ref 30–65)
PCO2 BLDV: 32 MMHG — LOW (ref 39–42)
PF4 HEPARIN CMPLX AB SER-ACNC: NEGATIVE — SIGNIFICANT CHANGE UP
PH BLDMV: 7.35 — SIGNIFICANT CHANGE UP (ref 7.32–7.45)
PH BLDMV: 7.36 — SIGNIFICANT CHANGE UP (ref 7.32–7.45)
PH BLDMV: 7.38 — SIGNIFICANT CHANGE UP (ref 7.32–7.45)
PH BLDV: 7.35 — SIGNIFICANT CHANGE UP (ref 7.32–7.43)
PH UR: 5.5 — SIGNIFICANT CHANGE UP (ref 5–8)
PH UR: 6 — SIGNIFICANT CHANGE UP (ref 5–8)
PHOSPHATE SERPL-MCNC: 4.1 MG/DL — SIGNIFICANT CHANGE UP (ref 2.5–4.5)
PHOSPHATE SERPL-MCNC: 4.6 MG/DL — HIGH (ref 2.5–4.5)
PHOSPHATE SERPL-MCNC: 5.4 MG/DL — HIGH (ref 2.5–4.5)
PLAT MORPH BLD: ABNORMAL
PLATELET # BLD AUTO: 43 K/UL — LOW (ref 150–400)
PLATELET # BLD AUTO: 44 K/UL — LOW (ref 150–400)
PLATELET # BLD AUTO: 47 K/UL — LOW (ref 150–400)
PO2 BLDV: 41 MMHG — SIGNIFICANT CHANGE UP (ref 25–45)
POLYCHROMASIA BLD QL SMEAR: SLIGHT — SIGNIFICANT CHANGE UP
POTASSIUM SERPL-MCNC: 4 MMOL/L — SIGNIFICANT CHANGE UP (ref 3.5–5.3)
POTASSIUM SERPL-MCNC: 4.3 MMOL/L — SIGNIFICANT CHANGE UP (ref 3.5–5.3)
POTASSIUM SERPL-MCNC: 4.5 MMOL/L — SIGNIFICANT CHANGE UP (ref 3.5–5.3)
POTASSIUM SERPL-SCNC: 4 MMOL/L — SIGNIFICANT CHANGE UP (ref 3.5–5.3)
POTASSIUM SERPL-SCNC: 4.3 MMOL/L — SIGNIFICANT CHANGE UP (ref 3.5–5.3)
POTASSIUM SERPL-SCNC: 4.5 MMOL/L — SIGNIFICANT CHANGE UP (ref 3.5–5.3)
PROMYELOCYTES # FLD: 0.9 % — HIGH (ref 0–0)
PROMYELOCYTES NFR BLD: 0.9 % — HIGH (ref 0–0)
PROT SERPL-MCNC: 5.9 G/DL — LOW (ref 6–8.3)
PROT SERPL-MCNC: 6 G/DL — SIGNIFICANT CHANGE UP (ref 6–8.3)
PROT SERPL-MCNC: 6 G/DL — SIGNIFICANT CHANGE UP (ref 6–8.3)
PROT UR-MCNC: 100 MG/DL
PROT UR-MCNC: 30 MG/DL
PROTHROM AB SERPL-ACNC: 13.5 SEC — HIGH (ref 9.9–13.4)
PROTHROM AB SERPL-ACNC: 14.3 SEC — HIGH (ref 9.9–13.4)
PROTHROM AB SERPL-ACNC: 14.8 SEC — HIGH (ref 9.9–13.4)
RBC # BLD: 2.42 M/UL — LOW (ref 3.8–5.2)
RBC # BLD: 2.5 M/UL — LOW (ref 3.8–5.2)
RBC # BLD: 2.7 M/UL — LOW (ref 3.8–5.2)
RBC # FLD: 18.2 % — HIGH (ref 10.3–14.5)
RBC # FLD: 18.6 % — HIGH (ref 10.3–14.5)
RBC # FLD: 18.6 % — HIGH (ref 10.3–14.5)
RBC BLD AUTO: ABNORMAL
RBC CASTS # UR COMP ASSIST: 1 /HPF — SIGNIFICANT CHANGE UP (ref 0–4)
RBC CASTS # UR COMP ASSIST: 3 /HPF — SIGNIFICANT CHANGE UP (ref 0–4)
RETICS #: 409.6 K/UL — HIGH (ref 25–125)
RETICS/RBC NFR: 15.1 % — HIGH (ref 0.5–2.5)
REVIEW: SIGNIFICANT CHANGE UP
REVIEW: SIGNIFICANT CHANGE UP
RH IG SCN BLD-IMP: POSITIVE — SIGNIFICANT CHANGE UP
RSV RNA NPH QL NAA+NON-PROBE: SIGNIFICANT CHANGE UP
SAO2 % BLDMV: 39.1 — LOW (ref 60–90)
SAO2 % BLDMV: 39.2 — LOW (ref 60–90)
SAO2 % BLDMV: 44.8 — LOW (ref 60–90)
SAO2 % BLDV: 57.8 % — LOW (ref 67–88)
SARS-COV-2 RNA SPEC QL NAA+PROBE: SIGNIFICANT CHANGE UP
SODIUM SERPL-SCNC: 135 MMOL/L — SIGNIFICANT CHANGE UP (ref 135–145)
SODIUM SERPL-SCNC: 136 MMOL/L — SIGNIFICANT CHANGE UP (ref 135–145)
SODIUM SERPL-SCNC: 136 MMOL/L — SIGNIFICANT CHANGE UP (ref 135–145)
SP GR SPEC: 1.01 — SIGNIFICANT CHANGE UP (ref 1–1.03)
SP GR SPEC: 1.02 — SIGNIFICANT CHANGE UP (ref 1–1.03)
SQUAMOUS # UR AUTO: 1 /HPF — SIGNIFICANT CHANGE UP (ref 0–5)
SQUAMOUS # UR AUTO: 2 /HPF — SIGNIFICANT CHANGE UP (ref 0–5)
TARGETS BLD QL SMEAR: SLIGHT — SIGNIFICANT CHANGE UP
TROPONIN T, HIGH SENSITIVITY RESULT: 273 NG/L — CRITICAL HIGH
TROPONIN T, HIGH SENSITIVITY RESULT: 314 NG/L — CRITICAL HIGH
TSH SERPL-MCNC: 0.55 UIU/ML — SIGNIFICANT CHANGE UP (ref 0.27–4.2)
UROBILINOGEN FLD QL: 1 MG/DL — SIGNIFICANT CHANGE UP (ref 0.2–1)
UROBILINOGEN FLD QL: 1 MG/DL — SIGNIFICANT CHANGE UP (ref 0.2–1)
VANCOMYCIN FLD-MCNC: 16.8 UG/ML — SIGNIFICANT CHANGE UP
VANCOMYCIN TROUGH SERPL-MCNC: 11 UG/ML — SIGNIFICANT CHANGE UP (ref 10–20)
VARIANT LYMPHS # BLD: 1 % — SIGNIFICANT CHANGE UP (ref 0–6)
VARIANT LYMPHS NFR BLD MANUAL: 1 % — SIGNIFICANT CHANGE UP (ref 0–6)
WBC # BLD: 13.36 K/UL — HIGH (ref 3.8–10.5)
WBC # BLD: 13.72 K/UL — HIGH (ref 3.8–10.5)
WBC # BLD: 15.95 K/UL — HIGH (ref 3.8–10.5)
WBC # FLD AUTO: 13.36 K/UL — HIGH (ref 3.8–10.5)
WBC # FLD AUTO: 13.72 K/UL — HIGH (ref 3.8–10.5)
WBC # FLD AUTO: 15.95 K/UL — HIGH (ref 3.8–10.5)
WBC UR QL: 1 /HPF — SIGNIFICANT CHANGE UP (ref 0–5)
WBC UR QL: 1 /HPF — SIGNIFICANT CHANGE UP (ref 0–5)

## 2025-01-03 PROCEDURE — 36620 INSERTION CATHETER ARTERY: CPT

## 2025-01-03 PROCEDURE — 71045 X-RAY EXAM CHEST 1 VIEW: CPT | Mod: 26,77

## 2025-01-03 PROCEDURE — 99292 CRITICAL CARE ADDL 30 MIN: CPT

## 2025-01-03 PROCEDURE — 36556 INSERT NON-TUNNEL CV CATH: CPT

## 2025-01-03 PROCEDURE — 83020 HEMOGLOBIN ELECTROPHORESIS: CPT | Mod: 26

## 2025-01-03 PROCEDURE — 71045 X-RAY EXAM CHEST 1 VIEW: CPT | Mod: 26,76,77

## 2025-01-03 PROCEDURE — 99292 CRITICAL CARE ADDL 30 MIN: CPT | Mod: GC,25

## 2025-01-03 PROCEDURE — 99291 CRITICAL CARE FIRST HOUR: CPT | Mod: GC,25

## 2025-01-03 PROCEDURE — 99291 CRITICAL CARE FIRST HOUR: CPT

## 2025-01-03 PROCEDURE — 93010 ELECTROCARDIOGRAM REPORT: CPT

## 2025-01-03 PROCEDURE — 93010 ELECTROCARDIOGRAM REPORT: CPT | Mod: 77

## 2025-01-03 PROCEDURE — 71045 X-RAY EXAM CHEST 1 VIEW: CPT | Mod: 26

## 2025-01-03 PROCEDURE — 93306 TTE W/DOPPLER COMPLETE: CPT | Mod: 26

## 2025-01-03 RX ORDER — NOREPINEPHRINE BITARTRATE 1 MG/ML
0.56 INJECTION INTRAVENOUS
Qty: 0 | Refills: 0 | DISCHARGE

## 2025-01-03 RX ORDER — FUROSEMIDE 20 MG
40 TABLET ORAL ONCE
Refills: 0 | Status: COMPLETED | OUTPATIENT
Start: 2025-01-03 | End: 2025-01-03

## 2025-01-03 RX ORDER — SACUBITRIL AND VALSARTAN 24; 26 MG/1; MG/1
1 TABLET, FILM COATED ORAL
Refills: 0 | DISCHARGE

## 2025-01-03 RX ORDER — AZTREONAM 500 MG
VIAL (EA) INJECTION
Refills: 0 | Status: DISCONTINUED | OUTPATIENT
Start: 2025-01-03 | End: 2025-01-03

## 2025-01-03 RX ORDER — BACTERIOSTATIC SODIUM CHLORIDE 0.9 %
3 VIAL (ML) INJECTION EVERY 8 HOURS
Refills: 0 | Status: DISCONTINUED | OUTPATIENT
Start: 2025-01-03 | End: 2025-01-07

## 2025-01-03 RX ORDER — DOBUTAMINE 12.5 MG/ML
2.5 INJECTION, SOLUTION, CONCENTRATE INTRAVENOUS
Qty: 0 | Refills: 0 | DISCHARGE

## 2025-01-03 RX ORDER — CHLORHEXIDINE GLUCONATE 1.2 MG/ML
1 RINSE ORAL
Refills: 0 | Status: DISCONTINUED | OUTPATIENT
Start: 2025-01-03 | End: 2025-01-03

## 2025-01-03 RX ORDER — BUMETANIDE 2 MG/1
2 TABLET ORAL ONCE
Refills: 0 | Status: COMPLETED | OUTPATIENT
Start: 2025-01-03 | End: 2025-01-03

## 2025-01-03 RX ORDER — VITAMIN A 10000 UNIT
1 TABLET ORAL DAILY
Refills: 0 | Status: DISCONTINUED | OUTPATIENT
Start: 2025-01-03 | End: 2025-01-03

## 2025-01-03 RX ORDER — HYDROXYUREA 500 MG/1
1 CAPSULE ORAL
Refills: 0 | DISCHARGE

## 2025-01-03 RX ORDER — DM/PSEUDOEPHED/ACETAMINOPHEN 10-30-250
25 CAPSULE ORAL
Refills: 0 | Status: DISCONTINUED | OUTPATIENT
Start: 2025-01-03 | End: 2025-01-07

## 2025-01-03 RX ORDER — CHLORHEXIDINE GLUCONATE 1.2 MG/ML
15 RINSE ORAL EVERY 12 HOURS
Refills: 0 | Status: DISCONTINUED | OUTPATIENT
Start: 2025-01-03 | End: 2025-01-03

## 2025-01-03 RX ORDER — PROPOFOL 10 MG/ML
20 INJECTION, EMULSION INTRAVENOUS
Qty: 1000 | Refills: 0 | Status: DISCONTINUED | OUTPATIENT
Start: 2025-01-03 | End: 2025-01-03

## 2025-01-03 RX ORDER — DOBUTAMINE 12.5 MG/ML
2.5 INJECTION, SOLUTION, CONCENTRATE INTRAVENOUS
Qty: 1000 | Refills: 0 | Status: DISCONTINUED | OUTPATIENT
Start: 2025-01-03 | End: 2025-01-03

## 2025-01-03 RX ORDER — LORAZEPAM 1 MG/1
2 TABLET ORAL ONCE
Refills: 0 | Status: DISCONTINUED | OUTPATIENT
Start: 2025-01-03 | End: 2025-01-03

## 2025-01-03 RX ORDER — HEPARIN SODIUM,PORCINE 10000/ML
5000 VIAL (ML) INJECTION ONCE
Refills: 0 | Status: COMPLETED | OUTPATIENT
Start: 2025-01-03 | End: 2025-01-03

## 2025-01-03 RX ORDER — AZTREONAM 500 MG
1000 VIAL (EA) INJECTION EVERY 12 HOURS
Refills: 0 | Status: DISCONTINUED | OUTPATIENT
Start: 2025-01-04 | End: 2025-01-04

## 2025-01-03 RX ORDER — DM/PSEUDOEPHED/ACETAMINOPHEN 10-30-250
50 CAPSULE ORAL
Refills: 0 | Status: DISCONTINUED | OUTPATIENT
Start: 2025-01-03 | End: 2025-01-07

## 2025-01-03 RX ORDER — DOBUTAMINE 12.5 MG/ML
2.5 INJECTION, SOLUTION, CONCENTRATE INTRAVENOUS
Qty: 500 | Refills: 0 | Status: DISCONTINUED | OUTPATIENT
Start: 2025-01-03 | End: 2025-01-03

## 2025-01-03 RX ORDER — BUMETANIDE 2 MG/1
2 TABLET ORAL
Qty: 20 | Refills: 0 | Status: DISCONTINUED | OUTPATIENT
Start: 2025-01-03 | End: 2025-01-03

## 2025-01-03 RX ORDER — VASOPRESSIN 20 UNIT/ML
0.04 AMPUL (ML) INJECTION
Qty: 0 | Refills: 0 | DISCHARGE

## 2025-01-03 RX ORDER — CARVEDILOL 25 MG/1
1 TABLET, FILM COATED ORAL
Refills: 0 | DISCHARGE

## 2025-01-03 RX ORDER — FENTANYL 75 UG/H
50 PATCH, EXTENDED RELEASE TRANSDERMAL ONCE
Refills: 0 | Status: DISCONTINUED | OUTPATIENT
Start: 2025-01-03 | End: 2025-01-03

## 2025-01-03 RX ORDER — PHENYLEPHRINE HCL IN 0.9% NACL 0.4MG/10ML
200 SYRINGE (ML) INTRAVENOUS ONCE
Refills: 0 | Status: COMPLETED | OUTPATIENT
Start: 2025-01-03 | End: 2025-01-03

## 2025-01-03 RX ORDER — MIDAZOLAM HYDROCHLORIDE 1 MG/ML
0.07 INJECTION INTRAMUSCULAR; INTRAVENOUS
Qty: 100 | Refills: 0 | Status: DISCONTINUED | OUTPATIENT
Start: 2025-01-03 | End: 2025-01-03

## 2025-01-03 RX ORDER — CHLOROTHIAZIDE 250 MG
1000 TABLET ORAL ONCE
Refills: 0 | Status: COMPLETED | OUTPATIENT
Start: 2025-01-03 | End: 2025-01-03

## 2025-01-03 RX ORDER — HEPARIN SODIUM 1000 [USP'U]/ML
5000 INJECTION, SOLUTION INTRAVENOUS; SUBCUTANEOUS EVERY 8 HOURS
Refills: 0 | Status: DISCONTINUED | OUTPATIENT
Start: 2025-01-03 | End: 2025-01-03

## 2025-01-03 RX ORDER — BUMETANIDE 2 MG/1
2 TABLET ORAL
Qty: 0 | Refills: 0 | DISCHARGE

## 2025-01-03 RX ORDER — CHOLECALCIFEROL (VITAMIN D3) 10 MCG
1 TABLET ORAL
Refills: 0 | DISCHARGE

## 2025-01-03 RX ORDER — ANTISEPTIC SURGICAL SCRUB 0.04 MG/ML
15 SOLUTION TOPICAL EVERY 12 HOURS
Refills: 0 | Status: DISCONTINUED | OUTPATIENT
Start: 2025-01-03 | End: 2025-01-04

## 2025-01-03 RX ORDER — AZTREONAM 500 MG
1000 VIAL (EA) INJECTION ONCE
Refills: 0 | Status: DISCONTINUED | OUTPATIENT
Start: 2025-01-03 | End: 2025-01-04

## 2025-01-03 RX ORDER — HYDROXYUREA 500 MG/1
500 CAPSULE ORAL
Refills: 0 | Status: DISCONTINUED | OUTPATIENT
Start: 2025-01-03 | End: 2025-01-03

## 2025-01-03 RX ORDER — CLONAZEPAM 2 MG
1 TABLET ORAL
Refills: 0 | DISCHARGE

## 2025-01-03 RX ORDER — POLYSORBATE 80 100 MG/10ML
1 SOLUTION/ DROPS OPHTHALMIC EVERY 12 HOURS
Refills: 0 | Status: DISCONTINUED | OUTPATIENT
Start: 2025-01-03 | End: 2025-01-03

## 2025-01-03 RX ORDER — MAGNESIUM SULFATE 500 MG/ML
1 INJECTION, SOLUTION INTRAMUSCULAR; INTRAVENOUS ONCE
Refills: 0 | Status: COMPLETED | OUTPATIENT
Start: 2025-01-03 | End: 2025-01-03

## 2025-01-03 RX ORDER — PHENYLEPHRINE HCL IN 0.9% NACL 0.4MG/10ML
200 SYRINGE (ML) INTRAVENOUS ONCE
Refills: 0 | Status: DISCONTINUED | OUTPATIENT
Start: 2025-01-03 | End: 2025-01-03

## 2025-01-03 RX ORDER — MIDAZOLAM HYDROCHLORIDE 1 MG/ML
0.15 INJECTION INTRAMUSCULAR; INTRAVENOUS
Qty: 0 | Refills: 0 | DISCHARGE

## 2025-01-03 RX ORDER — AZTREONAM 1 G/1
INJECTION, POWDER, LYOPHILIZED, FOR SOLUTION INTRAMUSCULAR; INTRAVENOUS
Refills: 0 | Status: DISCONTINUED | OUTPATIENT
Start: 2025-01-03 | End: 2025-01-03

## 2025-01-03 RX ORDER — SPIRONOLACTONE 50 MG/1
12.5 TABLET ORAL
Refills: 0 | DISCHARGE

## 2025-01-03 RX ORDER — AZTREONAM 1 G/1
1000 INJECTION, POWDER, LYOPHILIZED, FOR SOLUTION INTRAMUSCULAR; INTRAVENOUS ONCE
Refills: 0 | Status: DISCONTINUED | OUTPATIENT
Start: 2025-01-03 | End: 2025-01-03

## 2025-01-03 RX ORDER — VITAMIN A 10000 UNIT
1 TABLET ORAL
Refills: 0 | DISCHARGE

## 2025-01-03 RX ORDER — SODIUM CHLORIDE 9 MG/ML
10 INJECTION, SOLUTION INTRAMUSCULAR; INTRAVENOUS; SUBCUTANEOUS
Refills: 0 | Status: DISCONTINUED | OUTPATIENT
Start: 2025-01-03 | End: 2025-01-03

## 2025-01-03 RX ORDER — FENTANYL CITRATE 50 UG/ML
50 INJECTION INTRAMUSCULAR; INTRAVENOUS ONCE
Refills: 0 | Status: DISCONTINUED | OUTPATIENT
Start: 2025-01-03 | End: 2025-01-03

## 2025-01-03 RX ORDER — PROPOFOL 10 MG/ML
25 INJECTION, EMULSION INTRAVENOUS
Qty: 500 | Refills: 0 | Status: DISCONTINUED | OUTPATIENT
Start: 2025-01-03 | End: 2025-01-04

## 2025-01-03 RX ORDER — VANCOMYCIN HYDROCHLORIDE 50 MG/ML
1000 KIT ORAL ONCE
Refills: 0 | Status: COMPLETED | OUTPATIENT
Start: 2025-01-03 | End: 2025-01-03

## 2025-01-03 RX ORDER — PROPOFOL 10 MG/ML
30 INJECTION, EMULSION INTRAVENOUS
Qty: 0 | Refills: 0 | DISCHARGE

## 2025-01-03 RX ORDER — NOREPINEPHRINE BITARTRATE 1 MG/ML
0.58 INJECTION, SOLUTION, CONCENTRATE INTRAVENOUS
Qty: 16 | Refills: 0 | Status: DISCONTINUED | OUTPATIENT
Start: 2025-01-03 | End: 2025-01-05

## 2025-01-03 RX ORDER — LORATADINE 5 MG/5ML
1 SOLUTION ORAL
Refills: 0 | DISCHARGE

## 2025-01-03 RX ORDER — AZTREONAM 1 G/1
1 INJECTION, POWDER, LYOPHILIZED, FOR SOLUTION INTRAMUSCULAR; INTRAVENOUS
Qty: 0 | Refills: 0 | DISCHARGE

## 2025-01-03 RX ORDER — BUMETANIDE 2 MG/1
2 TABLET ORAL
Qty: 20 | Refills: 0 | Status: DISCONTINUED | OUTPATIENT
Start: 2025-01-03 | End: 2025-01-05

## 2025-01-03 RX ORDER — AZTREONAM 1 G/1
1000 INJECTION, POWDER, LYOPHILIZED, FOR SOLUTION INTRAMUSCULAR; INTRAVENOUS EVERY 12 HOURS
Refills: 0 | Status: DISCONTINUED | OUTPATIENT
Start: 2025-01-04 | End: 2025-01-03

## 2025-01-03 RX ORDER — DOBUTAMINE HCL 250MG/20ML
4 VIAL (ML) INTRAVENOUS
Qty: 500 | Refills: 0 | Status: DISCONTINUED | OUTPATIENT
Start: 2025-01-03 | End: 2025-01-07

## 2025-01-03 RX ORDER — SODIUM BICARBONATE 84 MG/ML
50 INJECTION, SOLUTION INTRAVENOUS ONCE
Refills: 0 | Status: DISCONTINUED | OUTPATIENT
Start: 2025-01-03 | End: 2025-01-03

## 2025-01-03 RX ORDER — ACETAMINOPHEN 80 MG/.8ML
1000 SOLUTION/ DROPS ORAL ONCE
Refills: 0 | Status: COMPLETED | OUTPATIENT
Start: 2025-01-03 | End: 2025-01-03

## 2025-01-03 RX ORDER — AZTREONAM 500 MG
VIAL (EA) INJECTION
Refills: 0 | Status: DISCONTINUED | OUTPATIENT
Start: 2025-01-03 | End: 2025-01-04

## 2025-01-03 RX ORDER — CITALOPRAM HYDROBROMIDE 40 MG
1 TABLET ORAL
Refills: 0 | DISCHARGE

## 2025-01-03 RX ORDER — EPINEPHRINE 5 MG/ML
0.02 VIAL (ML) INJECTION
Qty: 4 | Refills: 0 | Status: DISCONTINUED | OUTPATIENT
Start: 2025-01-03 | End: 2025-01-04

## 2025-01-03 RX ORDER — NOREPINEPHRINE BITARTRATE 1 MG/ML
0.24 INJECTION INTRAVENOUS
Qty: 16 | Refills: 0 | Status: DISCONTINUED | OUTPATIENT
Start: 2025-01-03 | End: 2025-01-03

## 2025-01-03 RX ORDER — ROCURONIUM BROMIDE 10 MG/ML
100 INJECTION INTRAVENOUS ONCE
Refills: 0 | Status: COMPLETED | OUTPATIENT
Start: 2025-01-03 | End: 2025-01-04

## 2025-01-03 RX ADMIN — CHLORHEXIDINE GLUCONATE 1 APPLICATION(S): 1.2 RINSE ORAL at 14:44

## 2025-01-03 RX ADMIN — FENTANYL CITRATE 50 MICROGRAM(S): 50 INJECTION INTRAMUSCULAR; INTRAVENOUS at 22:30

## 2025-01-03 RX ADMIN — Medication 40 MILLIGRAM(S): at 17:35

## 2025-01-03 RX ADMIN — DOBUTAMINE 6.27 MICROGRAM(S)/KG/MIN: 12.5 INJECTION, SOLUTION, CONCENTRATE INTRAVENOUS at 14:42

## 2025-01-03 RX ADMIN — ACETAMINOPHEN 400 MILLIGRAM(S): 80 SOLUTION/ DROPS ORAL at 17:36

## 2025-01-03 RX ADMIN — Medication 3 MILLILITER(S): at 22:47

## 2025-01-03 RX ADMIN — MIDAZOLAM HYDROCHLORIDE 5.85 MG/KG/HR: 1 INJECTION INTRAMUSCULAR; INTRAVENOUS at 14:43

## 2025-01-03 RX ADMIN — FENTANYL 50 MICROGRAM(S): 75 PATCH, EXTENDED RELEASE TRANSDERMAL at 17:56

## 2025-01-03 RX ADMIN — FENTANYL CITRATE 50 MICROGRAM(S): 50 INJECTION INTRAMUSCULAR; INTRAVENOUS at 22:45

## 2025-01-03 RX ADMIN — FENTANYL 50 MICROGRAM(S): 75 PATCH, EXTENDED RELEASE TRANSDERMAL at 17:41

## 2025-01-03 RX ADMIN — LORAZEPAM 2 MILLIGRAM(S): 1 TABLET ORAL at 14:44

## 2025-01-03 RX ADMIN — MAGNESIUM SULFATE 100 GRAM(S): 500 INJECTION, SOLUTION INTRAMUSCULAR; INTRAVENOUS at 17:42

## 2025-01-03 RX ADMIN — Medication 100 MILLIGRAM(S): at 22:00

## 2025-01-03 RX ADMIN — BUMETANIDE 2 MILLIGRAM(S): 2 TABLET ORAL at 17:37

## 2025-01-03 RX ADMIN — Medication 200 MICROGRAM(S): at 18:52

## 2025-01-03 RX ADMIN — ACETAMINOPHEN 1000 MILLIGRAM(S): 80 SOLUTION/ DROPS ORAL at 17:51

## 2025-01-03 NOTE — H&P ADULT - HISTORY OF PRESENT ILLNESS
59F PMH Sickle cell disease (on hydroxyurea), HTN, 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 Pt initially presented to [facility] for SOB and SS crisis; course c/b witnessed seizure, intubated and sedated, and transferred to [facility] for further management. Keppra was discontinued at [facility] due to negative EEG. At [facility] MICU pt was found to have RV failure possibly iso pulm HTN 2/2 increased tidal volumes on the ventilator. On addition, pt possibly received a lot of volume iso SS crisis. Neuro was consulted for seizures and EEG technician was called for vEEG placement. Pt is in profound cardiogenic shock. Pt has been decompensating, despite Dobutamine gtt, Bumex gtt, Levophed, and addition of Vasopressin. Vasopressors requirements have gone up. NS shock team was called and pt is being transferred to NS for further management.

## 2025-01-03 NOTE — H&P ADULT - NSHPPHYSICALEXAM_GEN_ALL_CORE
VITALS:   T(C): 38 (01-03-25 @ 13:26), Max: 38 (01-03-25 @ 13:26)  HR: 120 (01-03-25 @ 14:56) (120 - 124)  BP: 116/100 (01-03-25 @ 13:30) (116/100 - 118/94)  RR: 25 (01-03-25 @ 13:30) (25 - 25)  SpO2: 100% (01-03-25 @ 14:56) (100% - 100%)      GEN: intubated and sedated  HEENT: downward gaze  CV: tachycardic, regular rhythm, no mrg  PULM: lungs CTAB, no rales, rhonchi, or wheezes, on volume A/C  GI: soft, nontender, mildly distended, BSx4  MSK: extremities atraumatic, no cyanosis or clubbing, no edema  SKIN: warm, dry, no rashes or lesions  VASC: 2+ peripheral pulses  NEURO: sedated

## 2025-01-03 NOTE — H&P ADULT - ASSESSMENT
59F PMH Sickle cell disease (on hydroxyurea), HTN, HFimpEF/NICM (EF 58% 10/2024) presenting as a transfer from [facility]. Pt initially presented to [facility] for SOB and SS crisis; course c/b witnessed seizure, intubated and sedated, and transferred to [facility] for further management. Keppra was discontinued at [facility] due to negative EEG. At [facility] MICU pt was found to have RV failure possibly iso pulm HTN 2/2 increased tidal volumes on the ventilator. On addition, pt possibly received a lot of volume iso SS crisis. Neuro was consulted for seizures and EEG technician was called for vEEG placement. Pt is in profound cardiogenic shock. Pt has been decompensating, despite Dobutamine gtt, Bumex gtt, Levophed, and addition of Vasopressin. Vasopressors requirements have gone up. NS shock team was called and pt is being transferred to NS for further management. Patient was cannulated on peripheral VA ECMO.     #Neuro  - Trend Neuro exam  - Hold sedation after procedure    #CV  - Continue Dobutamine  - Cannulating for VA ECMO  - Cont Levo/Vaso    #Pulm   - ACVC  - Mara @ 20    #Renal  - ATN   - Continue Bumex gtt @ 4    #Heme  - AC when able    #ID  - Avycaz and Vanco   - PAN Cultured

## 2025-01-03 NOTE — PROCEDURE NOTE - NSPOSTCAREGUIDE_GEN_A_CORE
Keep the cast/splint/dressing clean and dry/Care for catheter as per unit/ICU protocols
Keep the cast/splint/dressing clean and dry/Care for catheter as per unit/ICU protocols

## 2025-01-03 NOTE — H&P ADULT - NSICDXPASTMEDICALHX_GEN_ALL_CORE_FT
PAST MEDICAL HISTORY:  Essential hypertension     HTN (hypertension)     Sickle cell disease     Sickle-cell-hemoglobin C disease with crisis

## 2025-01-03 NOTE — AIRWAY PLACEMENT NOTE ADULT - AIRWAY COMMENTS:
Received patient from OS with Dr. Guzman at bedside  - intubated with ETT 7.5, 24cm @ lip. No skin breakdown noted.

## 2025-01-03 NOTE — H&P ADULT - ASSESSMENT
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%; LVEDD 4.7 cm Dx 5/2023)    NEURO    CARD  #HFmrEF    PULM    GI    RENAL    ENDO    HEME    ID    ETHICS 59F PMH Sickle cell disease (on hydroxyurea), HTN, HFimpEF/NICM (EF 58% 10/2024) presenting as a transfer from [facility] course c/b witnessed seizure, intubated and sedated, now found to have RV failure possibly iso pulm HTN 2/2 increased tidal volumes    NEURO  #Intubated/Sedated  - pt arrived on versed, plan for EEG, will wean and transition to propofol as pt currently overbreathing vent    #Seizure  - pt had witnessed seizure with tongue biting and urinary incontinence  - was started on keppra at OSH, now d/c'ed due to no seizures on EEG at OSH  - plan for vEEG  - Neuro consult  - Will upload imaging  - TSH, ammonia wnl    CARD  #HFimpEF  #RV failure  - prior outpatient echo 10/2024 with EF 58%, normal RV function, no significant valvular abnormalities  - Follows outpatient with Dr. Zi Leo, on GDMT (entresto, coreg, spironolactone)  - POCUS 1/3 demonstrating LV wall thickening, RV dysfunction, PASP ~50mmhg  - Trops elevated 900s at OSH, will trend in our labs  - pt euvolemic on exam, will keep net even    #Shock 2/2 RV failure  #vasoplegic shock  - on dobutamine   - on levophed  - pt will require A-line    PULM  #Pulm HTN  #r/o PE  #RV failure  - initially presented with shortness of breath, former smoker, not on any home meds  - OSH echo w/ positive Bourgeois sign  - CTA from OSH reports no evidence of PE or parenchymal disease, will review with house radiology  - on arrival patient on 10cc/kg tidal volume (500cc for ideal body weight 50kg), possibly leading to RV failure 2/2 increased intravascular pulmonary pressure  - will decrease TV to 6cc/kg ideal body weight, decreased PEEP and increased RR  - RR24, , PEEP 5, fio2 30%  - will check serial gases    GI  - Diet: Tube feeds via NG tube (nepro)  - trend LFTs    RENAL  #DYLON 2/2 cardiorenal syndrome   #Lactic Acidosis  - has yanci, will replace  - f/u U/A    ENDO  - A1c  - FS    HEME  - sub q heparin  - c/w folic acid, hydroxyurea  - Anemia, transfuse > 7  - Thrombocytopenia, will trend  - trend hemolysis labs (retic, ldh, hapto, bili, cbc)    ID  - Full RVP  - vanc level  - Aztreonam  - f/u UCx, BCx, sputum Cx, MRSA  - Pt with anaphylaxis to penicillin, clindamycin, doxycycline    ETHICS  - Full Code  - Daughter: Chilango [phone] 59F PMH Sickle cell disease (on hydroxyurea), HTN, HFimpEF/NICM (EF 58% 10/2024) presenting as a transfer from [facility] initially presented for SOB course c/b witnessed seizure, intubated and sedated, now found to have RV failure possibly iso pulm HTN 2/2 increased tidal volumes    NEURO  #Intubated/Sedated  - pt arrived on versed, plan for EEG, will wean and transition to propofol as pt currently overbreathing vent    #Seizure  - pt had witnessed seizure with tongue biting and urinary incontinence  - was started on keppra at OSH, now d/c'ed due to no seizures on EEG at OSH  - plan for vEEG  - Neuro consult  - Will upload imaging  - TSH, ammonia wnl    #Urine tox  - positive for cannabinoids    CARD  #HFimpEF  #RV failure  - prior outpatient echo 10/2024 with EF 58%, normal RV function, no significant valvular abnormalities  - Follows outpatient with Dr. Zi Leo, on GDMT (entresto, coreg, spironolactone)  - POCUS 1/3 demonstrating LV wall thickening, RV dysfunction, PASP ~50mmhg  - Trops elevated 900s at OSH, will trend in our labs  - pt euvolemic on exam, will keep net even    #Shock 2/2 RV failure  #vasoplegic shock  - on dobutamine   - on levophed  - Left Axillary A-Line 1/3    PULM  #Pulm HTN  #r/o PE  #RV failure  - initially presented with shortness of breath, former smoker, not on any home meds  - OSH echo w/ positive Bourgeois sign  - CTA from OSH reports no evidence of PE or parenchymal disease, will review with house radiology  - on arrival patient on 10cc/kg tidal volume (500cc for ideal body weight 50kg), possibly leading to RV failure 2/2 increased intravascular pulmonary pressure  - will decrease TV to 6cc/kg ideal body weight, decreased PEEP and increased RR  - RR24, , PEEP 5, fio2 30%  - will check serial gases    GI  - Diet: Tube feeds via NG tube (nepro)  - trend LFTs    RENAL  #DYLON 2/2 cardiorenal syndrome   #Lactic Acidosis  - has pete, will replace  - f/u U/A    ENDO  - A1c  - FS    HEME  - sub q heparin  - c/w folic acid, hydroxyurea  - Anemia, transfuse > 7  - Thrombocytopenia, will trend  - trend hemolysis labs (retic, ldh, hapto, bili, cbc)    ID  - Full RVP  - vanc level  - Aztreonam  - f/u UCx, BCx, sputum Cx, MRSA  - Pt with anaphylaxis to penicillin, clindamycin, doxycycline    LINES  - Left IJ Central Line OSH  - Left Axillary A-Line placed 1/3  - PIV  - NG Tube  - Pete plan to exchange 1/3    ETHICS  - Full Code  - Daughter: Chilango [phone] 59F PMH Sickle cell disease (on hydroxyurea), HTN, HFimpEF/NICM (EF 58% 10/2024) presenting as a transfer from [facility] initially presented for SOB course c/b witnessed seizure, intubated and sedated, now found to have RV failure possibly iso pulm HTN 2/2 increased tidal volumes    NEURO  #Intubated/Sedated  - pt arrived on versed, plan for EEG, will wean and transition to propofol as pt currently overbreathing vent    #Seizure  - pt had witnessed seizure with tongue biting and urinary incontinence  - was started on keppra at OSH, now d/c'ed due to no seizures on EEG at OSH  - plan for vEEG  - Neuro consult  - Will upload imaging  - TSH, ammonia wnl    #Urine tox  - positive for cannabinoids    CARD  #HFimpEF  #RV failure  - prior outpatient echo 10/2024 with EF 58%, normal RV function, no significant valvular abnormalities  - Follows outpatient with Dr. Zi Leo, on GDMT (entresto, coreg, spironolactone)  - will reach out to Dr. Raza  - POCUS 1/3 demonstrating LV wall thickening, RV dysfunction, PASP ~50mmhg  - Trops elevated 900s at OSH, will trend in our labs  - pt euvolemic on exam, will keep net even  - elevated proBNP    #Shock 2/2 RV failure  #vasoplegic shock  - on dobutamine   - on levophed  - on vaso  - Left Axillary A-Line 1/3    PULM  #likely Pulm HTN  #r/o PE  #RV failure  - initially presented with shortness of breath, former smoker, not on any COPD meds  - OSH echo w/ positive Bourgeois sign  - CTA from OSH reports no evidence of PE or parenchymal disease, will review with house radiology  - on arrival patient on 10cc/kg tidal volume (500cc for ideal body weight 50kg), possibly leading to RV failure 2/2 increased intravascular pulmonary pressure  - will decrease TV to 6cc/kg ideal body weight, decreased PEEP and increased RR  - RR24, , PEEP 5, fio2 30%  - will check serial gases    GI  - Diet: Tube feeds via NG tube (nepro)  - trend LFTs  - trend bili  - repeat RUQ sono    RENAL  #DYLON 2/2 cardiorenal syndrome   #Lactic Acidosis  - has pete, will replace  - U/A w/ trace LEC, no bacteria    ENDO  - A1c, TSH  - FS achs    HEME  - sub q heparin  - c/w folic acid, hydroxyurea  - Anemia, transfuse > 7, s/p 1u prbc at OSH (6.5 -> 8.3)  - Thrombocytopenia, will trend  - trend hemolysis labs (retic, ldh, hapto, bili, cbc)    ID  - Full RVP  - vanc level  - Aztreonam  - f/u UCx, BCx, sputum Cx, MRSA  - Pt with anaphylaxis to penicillin, clindamycin, doxycycline  - trend fever, WBC    LINES  - Left IJ Central Line OSH  - Left Axillary A-Line placed 1/3  - PIV  - NG Tube  - Pete plan to exchange 1/3    ETHICS  - Full Code  - Daughter: Chilango [phone]

## 2025-01-03 NOTE — H&P ADULT - NSVTERISKASSESS_GEN_ALL_CORE FT
Continue PTA levothyroxine 50 mcg daily   Medical Assessment Completed on: 03-Jan-2025 23:23 Bed/Stretcher in lowest position, wheels locked, appropriate side rails in place/Call bell, personal items and telephone in reach/Instruct patient to call for assistance before getting out of bed/chair/stretcher/Non-slip footwear applied when patient is off stretcher/[place] to call system/Physically safe environment - no spills, clutter or unnecessary equipment/Purposeful proactive rounding/Room/bathroom lighting operational, light cord in reach

## 2025-01-03 NOTE — H&P ADULT - ATTENDING COMMENTS
59F PMH Sickle cell disease (on hydroxyurea), HTN, HFimpEF/NICM (EF 58% 10/2024), previous thoracotomy but otherwise highly functional patient who presented to an OSH for SS crisis.     Patient presented to [facility] hospital, initially for SS pain,. was discharged from the ED and returned the next day for SS crisis but also some SOB. Patient was admitted to [facility], She had a RRT for worsening SOB, followed by Sz likely activity and post ictal status with worsening mental status and respiratory status. patient was subsequently intubated.     Post intubation patient was found to be in shock. TTE with RV dysfunction and Bourgeois's sign. CTA from OSH performed without significant pulmonary disease, and No PE. Of note patient was on 10mg/kg TV on ventilator with a peep of 8 at this time. Patient was transferred to [facility] as a family request with family concern for poor ICU care at [facility]    Additional workup at [facility], patient was started on versed gtt and keppra, MRI/MRV was negative. EEG with sz and keppra was discontinued. US of liver with acute pathology.     On arrival to [facility] MICU patient was found have severe RV dilation, and decreased RV function with moderate pHTN but with very low PV acceleration time. On arrival to the ICU was in shock on Levo of .3 and  of 5 with tachycarida. However with increasing pressor requirement despite decreased in TV from 10cc/kg to more lung protective ventilation. CTA was uploaded and reviewed with radiology. Stat TTE confirming pocus. Also in worsening renal failure and lactic acidosis. Currently on minimal vent settings with normal PaO2. Given acute RV failure, Attempted to transition to milrinone from  but with worsening hypotension. Given acute RV failure and likely cardiogenic shock [facility] shock team was consulted. Patient is now pending transfer to [facility] for potential [facility] ecmo.     # Acute RV failure  # pHTN  # DLYON  # Shock on pressors, likely cardiogenic from RV failure  # Acute hypoxemic respiratory failure  # Seizures  # Lactic acidosis  # Transaminitis  # Thrombocytopenia  - Seizures, reported MRA/MRV negative. 59F PMH Sickle cell disease (on hydroxyurea), HTN, HFimpEF/NICM (EF 58% 10/2024), previous thoracotomy but otherwise highly functional patient who presented to an OSH for SS crisis.     Patient presented to [facility] hospital, initially for SS pain,. was discharged from the ED and returned the next day for SS crisis but also some SOB. Patient was admitted to [facility], She had a RRT for worsening SOB, followed by Sz likely activity and post ictal status with worsening mental status and respiratory status. patient was subsequently intubated.     Post intubation patient was found to be in shock. TTE with RV dysfunction and Bourgeois's sign. CTA from OSH performed without significant pulmonary disease, and No PE. Of note patient was on 10mg/kg TV on ventilator with a peep of 8 at this time. Patient was transferred to [facility] as a family request with family concern for poor ICU care at [facility]    Additional workup at [facility], patient was started on versed gtt and keppra, MRI/MRV was negative. EEG with sz and keppra was discontinued. US of liver with acute pathology.     On arrival to [facility] MICU patient was found have severe RV dilation, and decreased RV function with moderate pHTN but with very low PV acceleration time. On arrival to the ICU was in shock on Levo of .3 and  of 5 with tachycarida. However with increasing pressor requirement despite decreased in TV from 10cc/kg to more lung protective ventilation. CTA was uploaded and reviewed with radiology. Stat TTE confirming pocus. Also in worsening renal failure and lactic acidosis. Currently on minimal vent settings with normal PaO2. Given acute RV failure, Attempted to transition to milrinone from  but with worsening hypotension. Given acute RV failure and likely cardiogenic shock [facility] shock team was consulted. Patient is now pending transfer to [facility] for potential [facility] ecmo.     # Acute RV failure  # pHTN  # DYLON  # Shock on pressors, likely cardiogenic from RV failure  # Acute hypoxemic respiratory failure  # Seizures  # Lactic acidosis  # Transaminitis  # Thrombocytopenia  # Fever  - Seizures, reported MRA/MRV negative. EEG at OSH also negative. --> will obtain neuro consult, vEEG  - C/W versed, propofol for vent dysnchrony.   - Decreased TV from 10cc/kg to 6cc/kg. C/W vent at current setting. Adjusted baseline on blood gas. Minimal FIO2 requirement  - Unclear etiology for acute RV failure, possibly 2/2 to high TV ventilation, no PE on CTA, reviewed with our chest radiology,   - C/W levo, , Vaso. Attempted milrinone but was hypotensive. Titrate off as possible but with increasing pressure requirements. POCUS with severe RV dysfunction, and moderate pulmonary pressures. Confirmed with official TTE.  - Acute renal failure likely 2/2 to cardio renal, given TTE findings will attempt to remove fluids. Start bumex gtt. Costa in place. strict IOS  - Permissive tachycardia given severe RV failure  - Trend lactate likely 2/2 to cardiogenic shock.  - Transaminitis, will repeat US here, maybe 2/2 to hemolysis from SSD. Check Hg electrophoresis  - Transfuse PRN  - Low plt, was normal on admission, will check HIT, JODIE. Hold SQH for now. Transfuse PRN.   - DVT ppx- SCD  - POCUS, clear lungs. Severe RV failure, pHTN, low PV acceleration time.   - Dispo- full code. D/W multiple family at bedside. Brother who is an [phone] Mother [phone]. Family aware of transfer to [facility] and potential need for mechanical support. 59F PMH Sickle cell disease (on hydroxyurea), HTN, HFimpEF/NICM (EF 58% 10/2024), previous thoracotomy but otherwise highly functional patient who presented to an OSH for SS crisis.     Patient presented to [facility] hospital, initially for SS pain,. was discharged from the ED and returned the next day for SS crisis but also some SOB. Patient was admitted to [facility], She had a RRT for worsening SOB, followed by Sz likely activity and post ictal status with worsening mental status and respiratory status. patient was subsequently intubated.     Post intubation patient was found to be in shock. TTE with RV dysfunction and Bourgeois's sign. CTA from OSH performed without significant pulmonary disease, and No PE. Of note patient was on 10mg/kg TV on ventilator with a peep of 8 at this time. Patient was transferred to [facility] as a family request with family concern for poor ICU care at [facility]    Additional workup at [facility], patient was started on versed gtt and keppra, MRI/MRV was negative. EEG with sz and keppra was discontinued. US of liver with acute pathology.     On arrival to [facility] MICU patient was found have severe RV dilation, and decreased RV function with moderate pHTN but with very low PV acceleration time. On arrival to the ICU was in shock on Levo of .3 and  of 5 with tachycarida. However with increasing pressor requirement despite decreased in TV from 10cc/kg to more lung protective ventilation. CTA was uploaded and reviewed with radiology. Stat TTE confirming pocus. Also in worsening renal failure and lactic acidosis. Currently on minimal vent settings with normal PaO2. Given acute RV failure, Attempted to transition to milrinone from  but with worsening hypotension. Given acute RV failure and likely cardiogenic shock [facility] shock team was consulted. Patient is now pending transfer to [facility] for potential VA ecmo.     # Acute RV failure  # pHTN  # DYLON  # Shock on pressors, likely cardiogenic from RV failure  # Acute hypoxemic respiratory failure  # Seizures  # Lactic acidosis  # Transaminitis  # Thrombocytopenia  # Fever  - Seizures, reported MRA/MRV negative. EEG at OSH also negative. --> will obtain neuro consult, vEEG  - C/W versed, propofol for vent dysnchrony.   - Decreased TV from 10cc/kg to 6cc/kg. C/W vent at current setting. Adjusted baseline on blood gas. Minimal FIO2 requirement  - Unclear etiology for acute RV failure, possibly 2/2 to high TV ventilation, no PE on CTA, reviewed with our chest radiology,   - C/W levo, , Vaso. Attempted milrinone but was hypotensive. Titrate off as possible but with increasing pressure requirements. POCUS with severe RV dysfunction, and moderate pulmonary pressures. Confirmed with official TTE.  - Alines placed for invasive hemodynamic monitoring.   - Acute renal failure likely 2/2 to cardio renal, given TTE findings will attempt to remove fluids. Start bumex gtt. Costa in place. strict IOS  - Permissive tachycardia given severe RV failure  - Trend lactate likely 2/2 to cardiogenic shock.  - Transaminitis, will repeat US here, maybe 2/2 to hemolysis from SSD. Check Hg electrophoresis  - Transfuse PRN  - Low plt, was normal on admission, will check HIT, JODIE. Hold SQH for now. Transfuse PRN.   - Low grade fevers, was on vanc and aztreonam. Will c/w, check vanc level given DYLON. Will repeat cx, RVP.   - DVT ppx- SCD  - POCUS, clear lungs. Severe RV failure, pHTN, low PV acceleration time.   - Dispo- full code. D/W multiple family at bedside. Brother who is an -[phone] Mother [phone]. Family aware of transfer to [facility] and potential need for mechanical support. 59F PMH Sickle cell disease (on hydroxyurea), HTN, HFimpEF/NICM (EF 58% 10/2024), previous thoracotomy but otherwise highly functional patient who presented to an OSH for SS crisis.     Patient presented to [facility] hospital, initially for SS pain,. was discharged from the ED and returned the next day for SS crisis but also some SOB. Patient was admitted to [facility], She had a RRT for worsening SOB, followed by Sz likely activity and post ictal status with worsening mental status and respiratory status. patient was subsequently intubated.     Post intubation patient was found to be in shock. TTE with RV dysfunction and Bourgeois's sign. CTA from OSH performed without significant pulmonary disease, and No PE. Of note patient was on 10mg/kg TV on ventilator with a peep of 8 at this time. Patient was transferred to [facility] as a family request with family concern for poor ICU care at [facility]    Additional workup at [facility], patient was started on versed gtt and keppra, CTH/CTH angio was negative. EEG with sz and keppra was discontinued. US of liver with acute pathology.     On arrival to [facility] MICU patient was found have severe RV dilation, and decreased RV function with moderate pHTN but with very low PV acceleration time. On arrival to the ICU was in shock on Levo of .3 and  of 5 with tachycarida. However with increasing pressor requirement despite decreased in TV from 10cc/kg to more lung protective ventilation. CTA was uploaded and reviewed with radiology. Stat TTE confirming pocus. Also in worsening renal failure and lactic acidosis. Currently on minimal vent settings with normal PaO2. Given acute RV failure, Attempted to transition to milrinone from  but with worsening hypotension. Given acute RV failure and likely cardiogenic shock [facility] shock team was consulted. Patient is now pending transfer to [facility] for potential VA ecmo.     # Acute RV failure  # pHTN  # DYLON  # Shock on pressors, likely cardiogenic from RV failure  # Acute hypoxemic respiratory failure  # Seizures  # Lactic acidosis  # Transaminitis  # Thrombocytopenia  # Fever  - Seizures, reported CTH and CT Angio negative. EEG at OSH also negative. --> will obtain neuro consult, vEEG  - C/W versed, propofol for vent dysnchrony.   - Decreased TV from 10cc/kg to 6cc/kg. C/W vent at current setting. Adjusted baseline on blood gas. Minimal FIO2 requirement  - Unclear etiology for acute RV failure, possibly 2/2 to high TV ventilation, no PE on CTA, reviewed with our chest radiology,   - C/W levo, , Vaso. Attempted milrinone but was hypotensive. Titrate off as possible but with increasing pressure requirements. POCUS with severe RV dysfunction, and moderate pulmonary pressures. Confirmed with official TTE.  - Alines placed for invasive hemodynamic monitoring.   - Acute renal failure likely 2/2 to cardio renal, given TTE findings will attempt to remove fluids. Start bumex gtt. Costa in place. strict IOS  - Permissive tachycardia given severe RV failure  - Trend lactate likely 2/2 to cardiogenic shock.  - Transaminitis, will repeat US here, maybe 2/2 to hemolysis from SSD. Check Hg electrophoresis  - Transfuse PRN  - Low plt, was normal on admission, will check HIT, JODIE. Hold SQH for now. Transfuse PRN.   - Low grade fevers, was on vanc and aztreonam. Will c/w, check vanc level given DYLON. Will repeat cx, RVP.   - DVT ppx- SCD  - POCUS, clear lungs. Severe RV failure, pHTN, low PV acceleration time.   - Dispo- full code. D/W multiple family at bedside. Brother who is an -[phone] Mother [phone]. Family aware of transfer to [facility] and potential need for mechanical support.

## 2025-01-03 NOTE — H&P ADULT - NSHPPHYSICALEXAM_GEN_ALL_CORE
General: Intubated and sedated  HEENT:  NC/AT  Neuro: Sedated, over breathing the ventilator   Respiratory: B/L BS CTA, no wheeze, no rhonchi noted  Cardiovascular: ST, normal S1S2, no murmur noted  GI: Abd soft, NT/ND, +BSx4Q +BM  Peripheral Vascular:  B/L LE neg edema, 2+ peripheral pulses, no cyanosis, varicosities/PVD noted  Skin: Normal exam to inspection and palpation. no bleeding, no hematoma.

## 2025-01-03 NOTE — DISCHARGE NOTE PROVIDER - NSDCMRMEDTOKEN_GEN_ALL_CORE_FT
bumetanide: 2 milligram(s) by continuous intravenous infusion every 1 to 2 hours  DOBUTamine: 2.5 by continuous intravenous infusion  Midazolam: 1.5 by continuous intravenous infusion  norepinephrine: 0.56 by continuous intravenous infusion  Propofol: 30 mcg/kg by continuous intravenous infusion every 1 to 2 hours  vasopressin: 0.04 by continuous intravenous infusion   Aztreonam: 1 gram(s) intravenous every 8 hours Empiric dosing  bumetanide: 2 milligram(s) by continuous intravenous infusion every 1 to 2 hours  DOBUTamine: 2.5 mcg/kg by continuous intravenous infusion every 1 to 2 hours  Midazolam: 0.15 mg/kg by continuous intravenous infusion every 1 to 2 hours  norepinephrine: 0.56 mcg/kg by continuous intravenous infusion every 1 to 2 hours  Propofol: 30 mcg/kg by continuous intravenous infusion every 1 to 2 hours  vasopressin: 0.04 unit(s) by continuous intravenous infusion every 1 to 2 hours   Aztreonam: 1 gram(s) intravenous every 12 hours Empiric dosing  bumetanide: 2 milligram(s) by continuous intravenous infusion every 1 to 2 hours  DOBUTamine: 2.5 mcg/kg by continuous intravenous infusion every 1 to 2 hours  Midazolam: 0.15 mg/kg by continuous intravenous infusion every 1 to 2 hours  norepinephrine: 0.56 mcg/kg by continuous intravenous infusion every 1 to 2 hours  Propofol: 30 mcg/kg by continuous intravenous infusion every 1 to 2 hours  vasopressin: 0.04 unit(s) by continuous intravenous infusion every 1 to 2 hours

## 2025-01-03 NOTE — PROCEDURE NOTE - NSPROCDETAILS_GEN_ALL_CORE
US used for guidance/location identified, draped/prepped, sterile technique used, needle inserted/introduced/positive blood return obtained via catheter/connected to a pressurized flush line/sutured in place/hemostasis with direct pressure, dressing applied/Seldinger technique/all materials/supplies accounted for at end of procedure
guidewire recovered/lumen(s) aspirated and flushed/sterile dressing applied/sterile technique, catheter placed/ultrasound guidance with use of sterile gel and probe cove

## 2025-01-03 NOTE — DISCHARGE NOTE PROVIDER - NSDCCPCAREPLAN_GEN_ALL_CORE_FT
PRINCIPAL DISCHARGE DIAGNOSIS  Diagnosis: RVF (right ventricular failure)  Assessment and Plan of Treatment:       SECONDARY DISCHARGE DIAGNOSES  Diagnosis: Cardiogenic shock  Assessment and Plan of Treatment:     Diagnosis: NICM (nonischemic cardiomyopathy)  Assessment and Plan of Treatment:     Diagnosis: Sickle cell anemia  Assessment and Plan of Treatment:     Diagnosis: Hypertension  Assessment and Plan of Treatment:

## 2025-01-03 NOTE — DISCHARGE NOTE PROVIDER - CARE PROVIDER_API CALL
Wu Parra  Thoracic and Cardiac Surgery  [address] [place] [id number]  Phone: [phone]  Fax: [phone]  Follow Up Time:

## 2025-01-03 NOTE — H&P ADULT - HISTORY OF PRESENT ILLNESS
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 On 12/31, patient returned to hospital for similar complaints. On 01/02 was found unresponsive requiring intubation. Concern for seizure given she had tongue biting and urinary incontinence. Seizure was possibly caused by clonazepam withdrawal per OSH. Was keppra loaded. Found to have elevated troponins ~900s iso demand ischemia, no acute ekg changes. Pt was found to be hypotensive, tachycardic, TTE with Bourgeois sign, CTA negative for PE or parenchymal disease. Pt also anemic 6.5 s/p 1 u prbcs. Started on levo, versed, precedex w/ volume AC rr 14, tv 500, 50%, peep 8.

## 2025-01-03 NOTE — DISCHARGE NOTE PROVIDER - HOSPITAL COURSE
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 Pt initially presented to [facility] for SOB and SS crisis; course c/b witnessed seizure, intubated and sedated, and transferred to [facility] for further management. Keppra was discontinued at [facility] due to negative EEG. At [facility] MICU pt was found to have RV failure possibly iso pulm HTN 2/2 increased tidal volumes on the ventilator. On addition, pt possibly received a lot of volume iso SS crisis. Neuro was consulted for seizures and EEG technician was called for vEEG placement. Pt is in profound cardiogenic shock. Pt has been decompensating, despite Dobutamine gtt, Bumex gtt, Levophed, and addition of Vasopressin. Vasopressors requirements have gone up. NS shock team was called and pt is being transferred to NS for R heart cath and possible VA ECMO.

## 2025-01-03 NOTE — H&P ADULT - NSHPLABSRESULTS_GEN_ALL_CORE
LABS:                          8.3    13.36 )-----------( 44       ( 03 Jan 2025 14:00 )             22.5     01-03    136  |  103  |  33[H]  ----------------------------<  121[H]  4.0   |  17[L]  |  1.47[H]    Ca    7.8[L]      03 Jan 2025 14:00  Phos  4.1     01-03  Mg     1.90     01-03    TPro  6.0  /  Alb  3.0[L]  /  TBili  1.6[H]  /  DBili  x   /  AST  68[H]  /  ALT  51[H]  /  AlkPhos  222[H]  01-03    PT/INR - ( 03 Jan 2025 14:00 )   PT: 13.5 sec;   INR: 1.17 ratio         PTT - ( 03 Jan 2025 14:00 )  PTT:24.2 sec

## 2025-01-03 NOTE — PROGRESS NOTE ADULT - SUBJECTIVE AND OBJECTIVE BOX
Patient seen and examined at the bedside.    Remains critically ill on continuous ICU monitoring.      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 Pt initially presented to [facility] for SOB and SS crisis; course c/b witnessed seizure, intubated and sedated, and transferred to [facility] for further management. Keppra was discontinued at [facility] due to negative EEG. At [facility] MICU pt was found to have RV failure possibly iso pulm HTN 2/2 increased tidal volumes on the ventilator. On addition, pt possibly received a lot of volume iso SS crisis. Neuro was consulted for seizures and EEG technician was called for vEEG placement. Pt is in profound cardiogenic shock. Pt has been decompensating, despite Dobutamine gtt, Bumex gtt, Levophed, and addition of Vasopressin. Vasopressors requirements have gone up. NS shock team was called and pt is being transferred to NS for further management.       24 Hour events:    Required escalation to VA ECMO  Pressors titrated down but still on vaso@0.1, on @5, epi@0.02.   Lactate trending to 3 from 6, Mvo2 77 from 39 post ECMO.  Versed and propofol drips were discontinued on arrival - breathing over the vent with RR going to 35 to 40 with asynchrony but not moving to pain, pupils pinpoint, no cough/gag  Placed on VEEG, plans for repeat CTH this morning  Broad spectrum ABX with vanco/cefepime  Hematology consulted - plans for RBC exchange transfusion today    Brother, sister and daughter updated     Objective:  Vital Signs Last 24 Hrs  T(C): 37.1 (2025 04:00), Max: 38.3 (2025 16:00)  T(F): 98.8 (2025 04:00), Max: 100.9 (2025 16:00)  HR: 114 (2025 07:15) (105 - 133)  BP: 124/71 (2025 16:00) (82/71 - 124/71)  BP(mean): 85 (2025 16:00) (66 - 107)  RR: 17 (2025 07:15) (5 - 52)  SpO2: 96% (2025 06:23) (79% - 100%)    Parameters below as of 2025 05:09  Patient On (Oxygen Delivery Method): ventilator        Mode: AC/ CMV (Assist Control/ Continuous Mandatory Ventilation)  RR (machine): 20  TV (machine): 380  FiO2: 40  PEEP: 8  ITime: 1  MAP: 11  PIP: 20              Physical Exam:   General: off sedation , breathing over the vent but other no cough/gag,  Neurology: breathing over the vent but other no cough/gag, not moving to pain, pupils pinpoint  Respiratory: Clear bilaterally   CV: Sinus tachy  Abdominal: Soft, Nondistended  Extremities: cold ext  -------------------------------------------------------------------------------------------------------------------------------    Labs:                        8.1    16.61 )-----------( 108      ( 2025 01:12 )             22.4     01-04    142  |  103  |  34[H]  ----------------------------<  130[H]  3.9   |  22  |  1.41[H]    Ca    8.7      2025 01:12  Phos  6.3     -04  Mg     2.0     -04    TPro  6.1  /  Alb  3.0[L]  /  TBili  3.2[H]  /  DBili  x   /  AST  108[H]  /  ALT  89[H]  /  AlkPhos  209[H]  -04    LIVER FUNCTIONS - ( 2025 01:12 )  Alb: 3.0 g/dL / Pro: 6.1 g/dL / ALK PHOS: 209 U/L / ALT: 89 U/L / AST: 108 U/L / GGT: x           PT/INR - ( 2025 01:12 )   PT: 15.5 sec;   INR: 1.35 ratio         PTT - ( 2025 01:12 )  PTT:34.1 sec  ABG - ( 2025 06:03 )  pH, Arterial: 7.54  pH, Blood: x     /  pCO2: 25    /  pO2: 183   / HCO3: 21    / Base Excess: -0.7  /  SaO2: 100.0             ------------------------------------------------------------------------------------------------------------------------------  Assessment:    Sickle cell disease  Cardiogenic shock  RV failure  Pulmonary hypertension  Septic shock  Encephalopathy  h/o seizure at outside hospital  Acute kidney injury  Lactic acidosis  Trasnaminitis    Plan:   ***Neuro***  Breathing over the vent but otherwise not moving to pain, no gag/cough reflex  Versed drip was discontinued  CTH at OSH was negative per report  Consult neurology, f/u VEEG results, repeat CT head today. Did require sedation subsequently for vent asynchrony    ***Cardiovascular***  Cardiogenic shock, RV failure, pulmonary HTN  Required escalation to VA ECMO support - 3300pm, 3.2lpm sweep 1.5  @5mcg/kg/min, epi added @0.02mcg/kg//min  Required frequent titrating of pressors on vasopressin@0.1u/min and norepinephrine weaned to off after ECMO.  Lactate trending down, MVo2 77 after ECMO cannulation from 39, thermodilution CI numbers are very low  CTA chest negative for PE, unclear etiology for cardiogenic shock, planned for RBC exchange after discussion with hematology team.        ***Pulmonary***  Acute hypoxemic respiratory failure - AC/PC  with Vt 350ml FIo2 40%, ideal body weight 50kg  Monitor ABG, CXR  Vent bundle, f/u sputum cultures  Required fentanyl, propofol and dose rocuronium for vent asynchrony      ***GI***  NPO for now.  OGT to low continuous suction  Protonix stress ulcer prophylaxis       ***Renal***  Started on Bumex drip with elevated CVP, RV failure.   Trend Creatinine   Costa catheter for strict I/O measurements.       ***ID***  f/u blood and sputum cultures  Broad spectrum ABX with vanco/cefepime for now.        ***Endocrine***  Hyperglycemia- Insulin infusion.         ***Hematology***    H/o sickle cell disease - monitor LDH, retic, haptoglobin  Hematology team consulted to assess if patient needs RBC exchange - LIJ catheter placed, planned for RBC exchange  F/u Hb electrophoresis           Patient requires continuous monitoring with bedside rhythm monitoring, pulse oximetry monitoring, and continuous central venous and arterial pressure monitoring; and intermittent blood gas analysis. Care plan discussed with the ICU care team.   Patient remains critical, at risk for life threatening decompensation.    I have spent 108 minutes providing critical care management to this patient.   Patient seen and examined at the bedside.    Remains critically ill on continuous ICU monitoring.      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 Pt initially presented to [facility] for SOB and SS crisis; course c/b witnessed seizure, intubated and sedated, and transferred to [facility] for further management. Keppra was discontinued at [facility] due to negative EEG. At [facility] MICU pt was found to have RV failure possibly iso pulm HTN 2/2 increased tidal volumes on the ventilator. On addition, pt possibly received a lot of volume iso SS crisis. Neuro was consulted for seizures and EEG technician was called for vEEG placement. Pt is in profound cardiogenic shock. Pt has been decompensating, despite Dobutamine gtt, Bumex gtt, Levophed, and addition of Vasopressin. Vasopressors requirements have gone up. NS shock team was called and pt is being transferred to NS for further management.       24 Hour events:  Mvo2 remains ~44 after trial of Mara with ongoing  and epi drips for ionotropic support. BUmex drip was increased with urine output ~300ml/h, CVP and PA pressures trended down with Mara however Mvo2 remained low with elevated lactate.    Required escalation to VA ECMO upon arrival, Pressors titrated down post cannulation - but still on vaso@0.1, on @5, epi@0.02.   Lactate trending to 3 from 6, Mvo2 77 from 39 post ECMO.  Versed and propofol drips were discontinued on arrival - breathing over the vent with RR going to 35 to 40 with asynchrony but not moving to pain, pupils pinpoint, no cough/gag  Placed on VEEG, plans for repeat CTH this morning  Broad spectrum ABX with vanco/cefepime  Hematology consulted - plans for RBC exchange transfusion today    Brother, sister and daughter updated     Objective:  Vital Signs Last 24 Hrs  T(C): 37.1 (2025 04:00), Max: 38.3 (2025 16:00)  T(F): 98.8 (2025 04:00), Max: 100.9 (2025 16:00)  HR: 114 (2025 07:15) (105 - 133)  BP: 124/71 (2025 16:00) (82/71 - 124/71)  BP(mean): 85 (2025 16:00) (66 - 107)  RR: 17 (2025 07:15) (5 - 52)  SpO2: 96% (2025 06:23) (79% - 100%)    Parameters below as of 2025 05:09  Patient On (Oxygen Delivery Method): ventilator        Mode: AC/ CMV (Assist Control/ Continuous Mandatory Ventilation)  RR (machine): 20  TV (machine): 380  FiO2: 40  PEEP: 8  ITime: 1  MAP: 11  PIP: 20              Physical Exam:   General: off sedation , breathing over the vent but other no cough/gag,  Neurology: breathing over the vent but other no cough/gag, not moving to pain, pupils pinpoint  Respiratory: Clear bilaterally   CV: Sinus tachy  Abdominal: Soft, Nondistended  Extremities: cold ext  -------------------------------------------------------------------------------------------------------------------------------    Labs:                        8.1    16.61 )-----------( 108      ( 2025 01:12 )             22.4     01-04    142  |  103  |  34[H]  ----------------------------<  130[H]  3.9   |  22  |  1.41[H]    Ca    8.7      2025 01:12  Phos  6.3     01-04  Mg     2.0     01-04    TPro  6.1  /  Alb  3.0[L]  /  TBili  3.2[H]  /  DBili  x   /  AST  108[H]  /  ALT  89[H]  /  AlkPhos  209[H]  01-04    LIVER FUNCTIONS - ( 2025 01:12 )  Alb: 3.0 g/dL / Pro: 6.1 g/dL / ALK PHOS: 209 U/L / ALT: 89 U/L / AST: 108 U/L / GGT: x           PT/INR - ( 2025 01:12 )   PT: 15.5 sec;   INR: 1.35 ratio         PTT - ( 2025 01:12 )  PTT:34.1 sec  ABG - ( 2025 06:03 )  pH, Arterial: 7.54  pH, Blood: x     /  pCO2: 25    /  pO2: 183   / HCO3: 21    / Base Excess: -0.7  /  SaO2: 100.0             ------------------------------------------------------------------------------------------------------------------------------  Assessment:    Sickle cell disease  Cardiogenic shock  RV failure  Pulmonary hypertension  Septic shock  Encephalopathy  h/o seizure at outside hospital  Acute kidney injury  Lactic acidosis  Trasnaminitis    Plan:   ***Neuro***  Breathing over the vent but otherwise not moving to pain, no gag/cough reflex  Versed drip was discontinued  CTH at OSH was negative per report  Consult neurology, f/u VEEG results, repeat CT head today. Did require sedation subsequently for vent asynchrony    ***Cardiovascular***  Cardiogenic shock, RV failure, pulmonary HTN  Required escalation to VA ECMO support - 3300pm, 3.2lpm sweep 1.5  @5mcg/kg/min, epi added @0.02mcg/kg//min  Required frequent titrating of pressors on vasopressin@0.1u/min and norepinephrine weaned to off after ECMO.  Lactate trending down, MVo2 77 after ECMO cannulation from 39, thermodilution CI numbers are very low  CTA chest negative for PE, unclear etiology for cardiogenic shock, planned for RBC exchange after discussion with hematology team.        ***Pulmonary***  Acute hypoxemic respiratory failure - AC/PC  with Vt 350ml FIo2 40%, ideal body weight 50kg  Monitor ABG, CXR  Vent bundle, f/u sputum cultures  Required fentanyl, propofol and dose rocuronium for vent asynchrony      ***GI***  NPO for now.  OGT to low continuous suction  Protonix stress ulcer prophylaxis       ***Renal***  Started on Bumex drip with elevated CVP, RV failure.   Trend Creatinine   Costa catheter for strict I/O measurements.       ***ID***  f/u blood and sputum cultures  Broad spectrum ABX with vanco/cefepime for now.        ***Endocrine***  Hyperglycemia- Insulin infusion.         ***Hematology***    H/o sickle cell disease - monitor LDH, retic, haptoglobin  Hematology team consulted to assess if patient needs RBC exchange - LIJ catheter placed, planned for RBC exchange  F/u Hb electrophoresis           Patient requires continuous monitoring with bedside rhythm monitoring, pulse oximetry monitoring, and continuous central venous and arterial pressure monitoring; and intermittent blood gas analysis. Care plan discussed with the ICU care team.   Patient remains critical, at risk for life threatening decompensation.    I have spent 108 minutes providing critical care management to this patient.

## 2025-01-04 ENCOUNTER — RESULT REVIEW (OUTPATIENT)
Age: 60
End: 2025-01-04

## 2025-01-04 DIAGNOSIS — R56.9 UNSPECIFIED CONVULSIONS: ICD-10-CM

## 2025-01-04 DIAGNOSIS — R57.0 CARDIOGENIC SHOCK: ICD-10-CM

## 2025-01-04 DIAGNOSIS — D57.1 SICKLE-CELL DISEASE WITHOUT CRISIS: ICD-10-CM

## 2025-01-04 DIAGNOSIS — D72.829 ELEVATED WHITE BLOOD CELL COUNT, UNSPECIFIED: ICD-10-CM

## 2025-01-04 LAB
A1C WITH ESTIMATED AVERAGE GLUCOSE RESULT: 4.6 % — SIGNIFICANT CHANGE UP (ref 4–5.6)
ADD ON TEST-SPECIMEN IN LAB: SIGNIFICANT CHANGE UP
ALBUMIN SERPL ELPH-MCNC: 3 G/DL — LOW (ref 3.3–5)
ALBUMIN SERPL ELPH-MCNC: 3 G/DL — LOW (ref 3.3–5)
ALP SERPL-CCNC: 125 U/L — HIGH (ref 40–120)
ALP SERPL-CCNC: 209 U/L — HIGH (ref 40–120)
ALT FLD-CCNC: 62 U/L — HIGH (ref 10–45)
ALT FLD-CCNC: 89 U/L — HIGH (ref 10–45)
ANION GAP SERPL CALC-SCNC: 17 MMOL/L — SIGNIFICANT CHANGE UP (ref 5–17)
ANION GAP SERPL CALC-SCNC: 18 MMOL/L — HIGH (ref 5–17)
APTT BLD: 34.1 SEC — SIGNIFICANT CHANGE UP (ref 24.5–35.6)
APTT BLD: 37.2 SEC — HIGH (ref 24.5–35.6)
APTT BLD: 39.2 SEC — HIGH (ref 24.5–35.6)
APTT BLD: 40.4 SEC — HIGH (ref 24.5–35.6)
AST SERPL-CCNC: 108 U/L — HIGH (ref 10–40)
AST SERPL-CCNC: 80 U/L — HIGH (ref 10–40)
BASE EXCESS BLDMV CALC-SCNC: -1.3 MMOL/L — SIGNIFICANT CHANGE UP (ref -3–3)
BASE EXCESS BLDMV CALC-SCNC: -2.7 MMOL/L — SIGNIFICANT CHANGE UP (ref -3–3)
BASE EXCESS BLDMV CALC-SCNC: -5.6 MMOL/L — LOW (ref -3–3)
BASE EXCESS BLDMV CALC-SCNC: 0 MMOL/L — SIGNIFICANT CHANGE UP (ref -3–3)
BASE EXCESS BLDMV CALC-SCNC: 1.3 MMOL/L — SIGNIFICANT CHANGE UP (ref -3–3)
BASE EXCESS BLDMV CALC-SCNC: 2 MMOL/L — SIGNIFICANT CHANGE UP (ref -3–3)
BASE EXCESS BLDMV CALC-SCNC: 3.9 MMOL/L — HIGH (ref -3–3)
BASOPHILS # BLD AUTO: 0 K/UL — SIGNIFICANT CHANGE UP (ref 0–0.2)
BASOPHILS # BLD AUTO: 0.1 K/UL — SIGNIFICANT CHANGE UP (ref 0–0.2)
BASOPHILS NFR BLD AUTO: 0 % — SIGNIFICANT CHANGE UP (ref 0–2)
BASOPHILS NFR BLD AUTO: 0.6 % — SIGNIFICANT CHANGE UP (ref 0–2)
BILIRUB SERPL-MCNC: 3.2 MG/DL — HIGH (ref 0.2–1.2)
BILIRUB SERPL-MCNC: 3.9 MG/DL — HIGH (ref 0.2–1.2)
BLOOD GAS ECMO POST MEMBRANE - ARTERIAL RESULT: SIGNIFICANT CHANGE UP
BLOOD GAS ECMO POST MEMBRANE - ARTERIAL RESULT: SIGNIFICANT CHANGE UP
BUN SERPL-MCNC: 34 MG/DL — HIGH (ref 7–23)
BUN SERPL-MCNC: 37 MG/DL — HIGH (ref 7–23)
CA-I BLD-SCNC: 1.07 MMOL/L — LOW (ref 1.15–1.33)
CALCIUM SERPL-MCNC: 8.7 MG/DL — SIGNIFICANT CHANGE UP (ref 8.4–10.5)
CALCIUM SERPL-MCNC: 9 MG/DL — SIGNIFICANT CHANGE UP (ref 8.4–10.5)
CHLORIDE SERPL-SCNC: 100 MMOL/L — SIGNIFICANT CHANGE UP (ref 96–108)
CHLORIDE SERPL-SCNC: 103 MMOL/L — SIGNIFICANT CHANGE UP (ref 96–108)
CO2 BLDMV-SCNC: 24 MMOL/L — SIGNIFICANT CHANGE UP (ref 21–29)
CO2 BLDMV-SCNC: 26 MMOL/L — SIGNIFICANT CHANGE UP (ref 21–29)
CO2 BLDMV-SCNC: 26 MMOL/L — SIGNIFICANT CHANGE UP (ref 21–29)
CO2 BLDMV-SCNC: 27 MMOL/L — SIGNIFICANT CHANGE UP (ref 21–29)
CO2 BLDMV-SCNC: 27 MMOL/L — SIGNIFICANT CHANGE UP (ref 21–29)
CO2 BLDMV-SCNC: 29 MMOL/L — SIGNIFICANT CHANGE UP (ref 21–29)
CO2 BLDMV-SCNC: 30 MMOL/L — HIGH (ref 21–29)
CO2 SERPL-SCNC: 20 MMOL/L — LOW (ref 22–31)
CO2 SERPL-SCNC: 22 MMOL/L — SIGNIFICANT CHANGE UP (ref 22–31)
CREAT SERPL-MCNC: 1.25 MG/DL — SIGNIFICANT CHANGE UP (ref 0.5–1.3)
CREAT SERPL-MCNC: 1.41 MG/DL — HIGH (ref 0.5–1.3)
EGFR: 44 ML/MIN/1.73M2 — LOW
EGFR: 50 ML/MIN/1.73M2 — LOW
EOSINOPHIL # BLD AUTO: 0 K/UL — SIGNIFICANT CHANGE UP (ref 0–0.5)
EOSINOPHIL # BLD AUTO: 0.06 K/UL — SIGNIFICANT CHANGE UP (ref 0–0.5)
EOSINOPHIL NFR BLD AUTO: 0 % — SIGNIFICANT CHANGE UP (ref 0–6)
EOSINOPHIL NFR BLD AUTO: 0.4 % — SIGNIFICANT CHANGE UP (ref 0–6)
ESTIMATED AVERAGE GLUCOSE: 85 MG/DL — SIGNIFICANT CHANGE UP (ref 68–114)
FIBRINOGEN PPP-MCNC: 417 MG/DL — SIGNIFICANT CHANGE UP (ref 200–445)
FIBRINOGEN PPP-MCNC: 483 MG/DL — HIGH (ref 200–445)
FIBRINOGEN PPP-MCNC: 565 MG/DL — HIGH (ref 200–445)
GAS PNL BLDA: SIGNIFICANT CHANGE UP
GAS PNL BLDMV: SIGNIFICANT CHANGE UP
GIANT PLATELETS BLD QL SMEAR: PRESENT — SIGNIFICANT CHANGE UP
GLUCOSE BLDC GLUCOMTR-MCNC: 101 MG/DL — HIGH (ref 70–99)
GLUCOSE BLDC GLUCOMTR-MCNC: 106 MG/DL — HIGH (ref 70–99)
GLUCOSE BLDC GLUCOMTR-MCNC: 107 MG/DL — HIGH (ref 70–99)
GLUCOSE BLDC GLUCOMTR-MCNC: 108 MG/DL — HIGH (ref 70–99)
GLUCOSE BLDC GLUCOMTR-MCNC: 111 MG/DL — HIGH (ref 70–99)
GLUCOSE BLDC GLUCOMTR-MCNC: 115 MG/DL — HIGH (ref 70–99)
GLUCOSE BLDC GLUCOMTR-MCNC: 120 MG/DL — HIGH (ref 70–99)
GLUCOSE BLDC GLUCOMTR-MCNC: 125 MG/DL — HIGH (ref 70–99)
GLUCOSE BLDC GLUCOMTR-MCNC: 130 MG/DL — HIGH (ref 70–99)
GLUCOSE BLDC GLUCOMTR-MCNC: 140 MG/DL — HIGH (ref 70–99)
GLUCOSE BLDC GLUCOMTR-MCNC: 146 MG/DL — HIGH (ref 70–99)
GLUCOSE BLDC GLUCOMTR-MCNC: 150 MG/DL — HIGH (ref 70–99)
GLUCOSE BLDC GLUCOMTR-MCNC: 169 MG/DL — HIGH (ref 70–99)
GLUCOSE BLDC GLUCOMTR-MCNC: 176 MG/DL — HIGH (ref 70–99)
GLUCOSE BLDC GLUCOMTR-MCNC: 176 MG/DL — HIGH (ref 70–99)
GLUCOSE BLDC GLUCOMTR-MCNC: 187 MG/DL — HIGH (ref 70–99)
GLUCOSE BLDC GLUCOMTR-MCNC: 98 MG/DL — SIGNIFICANT CHANGE UP (ref 70–99)
GLUCOSE SERPL-MCNC: 130 MG/DL — HIGH (ref 70–99)
GLUCOSE SERPL-MCNC: 132 MG/DL — HIGH (ref 70–99)
GRAM STN FLD: ABNORMAL
HAPTOGLOB SERPL-MCNC: 34 MG/DL — SIGNIFICANT CHANGE UP (ref 34–200)
HAPTOGLOB SERPL-MCNC: 42 MG/DL — SIGNIFICANT CHANGE UP (ref 34–200)
HAPTOGLOB SERPL-MCNC: 54 MG/DL — SIGNIFICANT CHANGE UP (ref 34–200)
HAV IGM SER-ACNC: SIGNIFICANT CHANGE UP
HBV CORE IGM SER-ACNC: SIGNIFICANT CHANGE UP
HBV SURFACE AG SER-ACNC: SIGNIFICANT CHANGE UP
HCO3 BLDMV-SCNC: 23 MMOL/L — SIGNIFICANT CHANGE UP (ref 20–28)
HCO3 BLDMV-SCNC: 24 MMOL/L — SIGNIFICANT CHANGE UP (ref 20–28)
HCO3 BLDMV-SCNC: 24 MMOL/L — SIGNIFICANT CHANGE UP (ref 20–28)
HCO3 BLDMV-SCNC: 25 MMOL/L — SIGNIFICANT CHANGE UP (ref 20–28)
HCO3 BLDMV-SCNC: 26 MMOL/L — SIGNIFICANT CHANGE UP (ref 20–28)
HCO3 BLDMV-SCNC: 28 MMOL/L — SIGNIFICANT CHANGE UP (ref 20–28)
HCO3 BLDMV-SCNC: 28 MMOL/L — SIGNIFICANT CHANGE UP (ref 20–28)
HCT VFR BLD CALC: 22.4 % — LOW (ref 34.5–45)
HCT VFR BLD CALC: 29.5 % — LOW (ref 34.5–45)
HCT VFR BLD CALC: 29.6 % — LOW (ref 34.5–45)
HCV AB S/CO SERPL IA: 0.09 S/CO — SIGNIFICANT CHANGE UP (ref 0–0.99)
HCV AB SERPL-IMP: SIGNIFICANT CHANGE UP
HEPARINASE TEG R TIME: >17 MIN — HIGH (ref 4.3–8.3)
HGB BLD-MCNC: 10.2 G/DL — LOW (ref 11.5–15.5)
HGB BLD-MCNC: 10.6 G/DL — LOW (ref 11.5–15.5)
HGB BLD-MCNC: 8.1 G/DL — LOW (ref 11.5–15.5)
HOROWITZ INDEX BLDMV+IHG-RTO: 100 — SIGNIFICANT CHANGE UP
IMM GRANULOCYTES NFR BLD AUTO: 5.8 % — HIGH (ref 0–0.9)
INR BLD: 1.28 RATIO — HIGH (ref 0.85–1.16)
INR BLD: 1.35 RATIO — HIGH (ref 0.85–1.16)
INR BLD: 1.44 RATIO — HIGH (ref 0.85–1.16)
LDH SERPL L TO P-CCNC: 1165 U/L — HIGH (ref 50–242)
LDH SERPL L TO P-CCNC: 792 U/L — HIGH (ref 50–242)
LYMPHOCYTES # BLD AUTO: 1.54 K/UL — SIGNIFICANT CHANGE UP (ref 1–3.3)
LYMPHOCYTES # BLD AUTO: 16.6 % — SIGNIFICANT CHANGE UP (ref 13–44)
LYMPHOCYTES # BLD AUTO: 2.75 K/UL — SIGNIFICANT CHANGE UP (ref 1–3.3)
LYMPHOCYTES # BLD AUTO: 21.1 % — SIGNIFICANT CHANGE UP (ref 13–44)
MAGNESIUM SERPL-MCNC: 1.9 MG/DL — SIGNIFICANT CHANGE UP (ref 1.6–2.6)
MAGNESIUM SERPL-MCNC: 2 MG/DL — SIGNIFICANT CHANGE UP (ref 1.6–2.6)
MANUAL SMEAR VERIFICATION: SIGNIFICANT CHANGE UP
MCHC RBC-ENTMCNC: 30.1 PG — SIGNIFICANT CHANGE UP (ref 27–34)
MCHC RBC-ENTMCNC: 30.8 PG — SIGNIFICANT CHANGE UP (ref 27–34)
MCHC RBC-ENTMCNC: 31.4 PG — SIGNIFICANT CHANGE UP (ref 27–34)
MCHC RBC-ENTMCNC: 34.6 G/DL — SIGNIFICANT CHANGE UP (ref 32–36)
MCHC RBC-ENTMCNC: 35.8 G/DL — SIGNIFICANT CHANGE UP (ref 32–36)
MCHC RBC-ENTMCNC: 36.2 G/DL — HIGH (ref 32–36)
MCV RBC AUTO: 83.3 FL — SIGNIFICANT CHANGE UP (ref 80–100)
MCV RBC AUTO: 87.6 FL — SIGNIFICANT CHANGE UP (ref 80–100)
MCV RBC AUTO: 89.1 FL — SIGNIFICANT CHANGE UP (ref 80–100)
METAMYELOCYTES # FLD: 1.8 % — HIGH (ref 0–0)
METAMYELOCYTES NFR BLD: 1.8 % — HIGH (ref 0–0)
MONOCYTES # BLD AUTO: 0.07 K/UL — SIGNIFICANT CHANGE UP (ref 0–0.9)
MONOCYTES # BLD AUTO: 1.01 K/UL — HIGH (ref 0–0.9)
MONOCYTES NFR BLD AUTO: 0.9 % — LOW (ref 2–14)
MONOCYTES NFR BLD AUTO: 6.1 % — SIGNIFICANT CHANGE UP (ref 2–14)
MRSA PCR RESULT.: SIGNIFICANT CHANGE UP
MRSA PCR RESULT.: SIGNIFICANT CHANGE UP
MYELOCYTES NFR BLD: 0.9 % — HIGH (ref 0–0)
NEUTROPHILS # BLD AUTO: 11.73 K/UL — HIGH (ref 1.8–7.4)
NEUTROPHILS # BLD AUTO: 5.5 K/UL — SIGNIFICANT CHANGE UP (ref 1.8–7.4)
NEUTROPHILS NFR BLD AUTO: 61.5 % — SIGNIFICANT CHANGE UP (ref 43–77)
NEUTROPHILS NFR BLD AUTO: 70.5 % — SIGNIFICANT CHANGE UP (ref 43–77)
NEUTS BAND # BLD: 13.8 % — HIGH (ref 0–8)
NEUTS BAND NFR BLD: 13.8 % — HIGH (ref 0–8)
NRBC # BLD: 28 /100 WBCS — HIGH (ref 0–0)
NRBC # BLD: 32 /100 WBCS — HIGH (ref 0–0)
NRBC # BLD: 63 /100 WBCS — HIGH (ref 0–0)
NRBC BLD-RTO: 28 /100 WBCS — HIGH (ref 0–0)
NRBC BLD-RTO: 32 /100 WBCS — HIGH (ref 0–0)
NRBC BLD-RTO: 63 /100 WBCS — HIGH (ref 0–0)
O2 CT VFR BLD CALC: 40 MMHG — SIGNIFICANT CHANGE UP (ref 30–65)
O2 CT VFR BLD CALC: 45 MMHG — SIGNIFICANT CHANGE UP (ref 30–65)
O2 CT VFR BLD CALC: 47 MMHG — SIGNIFICANT CHANGE UP (ref 30–65)
O2 CT VFR BLD CALC: 48 MMHG — SIGNIFICANT CHANGE UP (ref 30–65)
O2 CT VFR BLD CALC: 50 MMHG — SIGNIFICANT CHANGE UP (ref 30–65)
O2 CT VFR BLD CALC: 51 MMHG — SIGNIFICANT CHANGE UP (ref 30–65)
O2 CT VFR BLD CALC: 54 MMHG — SIGNIFICANT CHANGE UP (ref 30–65)
PCO2 BLDMV: 40 MMHG — SIGNIFICANT CHANGE UP (ref 30–65)
PCO2 BLDMV: 40 MMHG — SIGNIFICANT CHANGE UP (ref 30–65)
PCO2 BLDMV: 42 MMHG — SIGNIFICANT CHANGE UP (ref 30–65)
PCO2 BLDMV: 43 MMHG — SIGNIFICANT CHANGE UP (ref 30–65)
PCO2 BLDMV: 43 MMHG — SIGNIFICANT CHANGE UP (ref 30–65)
PCO2 BLDMV: 47 MMHG — SIGNIFICANT CHANGE UP (ref 30–65)
PCO2 BLDMV: 64 MMHG — SIGNIFICANT CHANGE UP (ref 30–65)
PH BLDMV: 7.18 — CRITICAL LOW (ref 7.32–7.45)
PH BLDMV: 7.36 — SIGNIFICANT CHANGE UP (ref 7.32–7.45)
PH BLDMV: 7.36 — SIGNIFICANT CHANGE UP (ref 7.32–7.45)
PH BLDMV: 7.38 — SIGNIFICANT CHANGE UP (ref 7.32–7.45)
PH BLDMV: 7.38 — SIGNIFICANT CHANGE UP (ref 7.32–7.45)
PH BLDMV: 7.42 — SIGNIFICANT CHANGE UP (ref 7.32–7.45)
PH BLDMV: 7.44 — SIGNIFICANT CHANGE UP (ref 7.32–7.45)
PHOSPHATE SERPL-MCNC: 6.2 MG/DL — HIGH (ref 2.5–4.5)
PHOSPHATE SERPL-MCNC: 6.3 MG/DL — HIGH (ref 2.5–4.5)
PLAT MORPH BLD: NORMAL — SIGNIFICANT CHANGE UP
PLATELET # BLD AUTO: 108 K/UL — LOW (ref 150–400)
PLATELET # BLD AUTO: 34 K/UL — LOW (ref 150–400)
PLATELET # BLD AUTO: 35 K/UL — LOW (ref 150–400)
POTASSIUM SERPL-MCNC: 3.9 MMOL/L — SIGNIFICANT CHANGE UP (ref 3.5–5.3)
POTASSIUM SERPL-MCNC: 4.3 MMOL/L — SIGNIFICANT CHANGE UP (ref 3.5–5.3)
POTASSIUM SERPL-SCNC: 3.9 MMOL/L — SIGNIFICANT CHANGE UP (ref 3.5–5.3)
POTASSIUM SERPL-SCNC: 4.3 MMOL/L — SIGNIFICANT CHANGE UP (ref 3.5–5.3)
PROCALCITONIN SERPL-MCNC: 5.23 NG/ML — HIGH (ref 0.02–0.1)
PROT SERPL-MCNC: 5.5 G/DL — LOW (ref 6–8.3)
PROT SERPL-MCNC: 6.1 G/DL — SIGNIFICANT CHANGE UP (ref 6–8.3)
PROTHROM AB SERPL-ACNC: 14.6 SEC — HIGH (ref 9.9–13.4)
PROTHROM AB SERPL-ACNC: 15.5 SEC — HIGH (ref 9.9–13.4)
PROTHROM AB SERPL-ACNC: 16.4 SEC — HIGH (ref 9.9–13.4)
RAPIDTEG MAXIMUM AMPLITUDE: <40 MM — LOW (ref 52–70)
RBC # BLD: 2.69 M/UL — LOW (ref 3.8–5.2)
RBC # BLD: 2.78 M/UL — LOW (ref 3.8–5.2)
RBC # BLD: 3.27 M/UL — LOW (ref 3.8–5.2)
RBC # BLD: 3.31 M/UL — LOW (ref 3.8–5.2)
RBC # BLD: 3.38 M/UL — LOW (ref 3.8–5.2)
RBC # FLD: 15.5 % — HIGH (ref 10.3–14.5)
RBC # FLD: 15.7 % — HIGH (ref 10.3–14.5)
RBC # FLD: 17.3 % — HIGH (ref 10.3–14.5)
RBC BLD AUTO: SIGNIFICANT CHANGE UP
RETICS #: 139 K/UL — HIGH (ref 25–125)
RETICS #: 322.2 K/UL — HIGH (ref 25–125)
RETICS/RBC NFR: 11.6 % — HIGH (ref 0.5–2.5)
RETICS/RBC NFR: 4.3 % — HIGH (ref 0.5–2.5)
S AUREUS DNA NOSE QL NAA+PROBE: DETECTED
S AUREUS DNA NOSE QL NAA+PROBE: DETECTED
SAO2 % BLDMV: 74.1 — SIGNIFICANT CHANGE UP (ref 60–90)
SAO2 % BLDMV: 75.3 — SIGNIFICANT CHANGE UP (ref 60–90)
SAO2 % BLDMV: 76.9 — SIGNIFICANT CHANGE UP (ref 60–90)
SAO2 % BLDMV: 77.9 — SIGNIFICANT CHANGE UP (ref 60–90)
SAO2 % BLDMV: 81.4 — SIGNIFICANT CHANGE UP (ref 60–90)
SAO2 % BLDMV: 84.2 — SIGNIFICANT CHANGE UP (ref 60–90)
SAO2 % BLDMV: 84.6 — SIGNIFICANT CHANGE UP (ref 60–90)
SODIUM SERPL-SCNC: 138 MMOL/L — SIGNIFICANT CHANGE UP (ref 135–145)
SODIUM SERPL-SCNC: 142 MMOL/L — SIGNIFICANT CHANGE UP (ref 135–145)
SPECIMEN SOURCE: SIGNIFICANT CHANGE UP
TEG FUNCTIONAL FIBRINOGEN: 14 MM — LOW (ref 15–32)
TEG MAXIMUM AMPLITUDE: <40 MM — LOW (ref 52–69)
TEG REACTION TIME: >17 MIN — HIGH (ref 4.6–9.1)
TROPONIN T, HIGH SENSITIVITY RESULT: 201 NG/L — HIGH (ref 0–51)
VANCOMYCIN TROUGH SERPL-MCNC: 11.7 UG/ML — SIGNIFICANT CHANGE UP (ref 10–20)
WBC # BLD: 16.61 K/UL — HIGH (ref 3.8–10.5)
WBC # BLD: 6.67 K/UL — SIGNIFICANT CHANGE UP (ref 3.8–10.5)
WBC # BLD: 7.31 K/UL — SIGNIFICANT CHANGE UP (ref 3.8–10.5)
WBC # FLD AUTO: 16.61 K/UL — HIGH (ref 3.8–10.5)
WBC # FLD AUTO: 6.67 K/UL — SIGNIFICANT CHANGE UP (ref 3.8–10.5)
WBC # FLD AUTO: 7.31 K/UL — SIGNIFICANT CHANGE UP (ref 3.8–10.5)

## 2025-01-04 PROCEDURE — 99221 1ST HOSP IP/OBS SF/LOW 40: CPT | Mod: 25

## 2025-01-04 PROCEDURE — 36620 INSERTION CATHETER ARTERY: CPT | Mod: RT

## 2025-01-04 PROCEDURE — 33952 ECMO/ECLS INSJ PRPH CANNULA: CPT

## 2025-01-04 PROCEDURE — 93970 EXTREMITY STUDY: CPT | Mod: 26,59

## 2025-01-04 PROCEDURE — 36556 INSERT NON-TUNNEL CV CATH: CPT | Mod: RT

## 2025-01-04 PROCEDURE — 95720 EEG PHY/QHP EA INCR W/VEEG: CPT

## 2025-01-04 PROCEDURE — 74018 RADEX ABDOMEN 1 VIEW: CPT | Mod: 26

## 2025-01-04 PROCEDURE — 99292 CRITICAL CARE ADDL 30 MIN: CPT

## 2025-01-04 PROCEDURE — 93306 TTE W/DOPPLER COMPLETE: CPT | Mod: 26

## 2025-01-04 PROCEDURE — 99291 CRITICAL CARE FIRST HOUR: CPT | Mod: 25

## 2025-01-04 PROCEDURE — 99291 CRITICAL CARE FIRST HOUR: CPT

## 2025-01-04 PROCEDURE — 33947 ECMO/ECLS INITIATION ARTERY: CPT

## 2025-01-04 PROCEDURE — 71045 X-RAY EXAM CHEST 1 VIEW: CPT | Mod: 26,76

## 2025-01-04 PROCEDURE — 93970 EXTREMITY STUDY: CPT | Mod: 26

## 2025-01-04 PROCEDURE — 36512 APHERESIS RBC: CPT

## 2025-01-04 PROCEDURE — 99223 1ST HOSP IP/OBS HIGH 75: CPT

## 2025-01-04 PROCEDURE — 70450 CT HEAD/BRAIN W/O DYE: CPT | Mod: 26

## 2025-01-04 RX ORDER — POTASSIUM CHLORIDE 750 MG/1
10 TABLET, EXTENDED RELEASE ORAL
Refills: 0 | Status: COMPLETED | OUTPATIENT
Start: 2025-01-04 | End: 2025-01-04

## 2025-01-04 RX ORDER — FENTANYL CITRATE 50 UG/ML
100 INJECTION INTRAMUSCULAR; INTRAVENOUS ONCE
Refills: 0 | Status: DISCONTINUED | OUTPATIENT
Start: 2025-01-04 | End: 2025-01-04

## 2025-01-04 RX ORDER — HEPARIN SODIUM,PORCINE 10000/ML
600 VIAL (ML) INJECTION
Qty: 25000 | Refills: 0 | Status: DISCONTINUED | OUTPATIENT
Start: 2025-01-04 | End: 2025-01-04

## 2025-01-04 RX ORDER — POTASSIUM CHLORIDE 750 MG/1
10 TABLET, EXTENDED RELEASE ORAL ONCE
Refills: 0 | Status: COMPLETED | OUTPATIENT
Start: 2025-01-04 | End: 2025-01-04

## 2025-01-04 RX ORDER — FENTANYL CITRATE 50 UG/ML
25 INJECTION INTRAMUSCULAR; INTRAVENOUS
Refills: 0 | Status: DISCONTINUED | OUTPATIENT
Start: 2025-01-04 | End: 2025-01-05

## 2025-01-04 RX ORDER — ALBUMIN HUMAN 50 G/1000ML
100 SOLUTION INTRAVENOUS ONCE
Refills: 0 | Status: COMPLETED | OUTPATIENT
Start: 2025-01-04 | End: 2025-01-04

## 2025-01-04 RX ORDER — ANTISEPTIC SURGICAL SCRUB 0.04 MG/ML
15 SOLUTION TOPICAL EVERY 12 HOURS
Refills: 0 | Status: DISCONTINUED | OUTPATIENT
Start: 2025-01-04 | End: 2025-01-06

## 2025-01-04 RX ORDER — PIPERACILLIN SODIUM AND TAZOBACTAM SODIUM 2; 250 G/50ML; MG/50ML
3.38 INJECTION, POWDER, FOR SOLUTION INTRAVENOUS ONCE
Refills: 0 | Status: DISCONTINUED | OUTPATIENT
Start: 2025-01-04 | End: 2025-01-04

## 2025-01-04 RX ORDER — PANTOPRAZOLE 20 MG/1
40 TABLET, DELAYED RELEASE ORAL EVERY 12 HOURS
Refills: 0 | Status: DISCONTINUED | OUTPATIENT
Start: 2025-01-04 | End: 2025-01-07

## 2025-01-04 RX ORDER — PIPERACILLIN SODIUM AND TAZOBACTAM SODIUM 2; 250 G/50ML; MG/50ML
3.38 INJECTION, POWDER, FOR SOLUTION INTRAVENOUS ONCE
Refills: 0 | Status: COMPLETED | OUTPATIENT
Start: 2025-01-04 | End: 2025-01-04

## 2025-01-04 RX ORDER — MEROPENEM 500 MG/20ML
1000 INJECTION INTRAVENOUS EVERY 12 HOURS
Refills: 0 | Status: DISCONTINUED | OUTPATIENT
Start: 2025-01-04 | End: 2025-01-05

## 2025-01-04 RX ORDER — SUGAMMADEX 100 MG/ML
100 INJECTION, SOLUTION INTRAVENOUS ONCE
Refills: 0 | Status: COMPLETED | OUTPATIENT
Start: 2025-01-04 | End: 2025-01-04

## 2025-01-04 RX ORDER — CEFEPIME HCL 1 G
2000 IV SOLUTION, PIGGYBACK, BOTTLE (EA) INTRAVENOUS ONCE
Refills: 0 | Status: COMPLETED | OUTPATIENT
Start: 2025-01-04 | End: 2025-01-04

## 2025-01-04 RX ORDER — IPRATROPIUM BROMIDE AND ALBUTEROL SULFATE .5; 2.5 MG/3ML; MG/3ML
3 SOLUTION RESPIRATORY (INHALATION) EVERY 6 HOURS
Refills: 0 | Status: DISCONTINUED | OUTPATIENT
Start: 2025-01-04 | End: 2025-01-07

## 2025-01-04 RX ORDER — CEFEPIME HCL 1 G
2000 IV SOLUTION, PIGGYBACK, BOTTLE (EA) INTRAVENOUS EVERY 12 HOURS
Refills: 0 | Status: DISCONTINUED | OUTPATIENT
Start: 2025-01-04 | End: 2025-01-04

## 2025-01-04 RX ORDER — FENTANYL CITRATE 50 UG/ML
25 INJECTION INTRAMUSCULAR; INTRAVENOUS ONCE
Refills: 0 | Status: DISCONTINUED | OUTPATIENT
Start: 2025-01-04 | End: 2025-01-04

## 2025-01-04 RX ORDER — PROPOFOL 10 MG/ML
35 INJECTION, EMULSION INTRAVENOUS
Qty: 1000 | Refills: 0 | Status: DISCONTINUED | OUTPATIENT
Start: 2025-01-04 | End: 2025-01-05

## 2025-01-04 RX ORDER — CEFEPIME HCL 1 G
IV SOLUTION, PIGGYBACK, BOTTLE (EA) INTRAVENOUS
Refills: 0 | Status: DISCONTINUED | OUTPATIENT
Start: 2025-01-04 | End: 2025-01-04

## 2025-01-04 RX ORDER — ANTISEPTIC SURGICAL SCRUB 0.04 MG/ML
15 SOLUTION TOPICAL EVERY 12 HOURS
Refills: 0 | Status: DISCONTINUED | OUTPATIENT
Start: 2025-01-04 | End: 2025-01-04

## 2025-01-04 RX ORDER — SODIUM BICARBONATE 42 MG/ML
50 INJECTION, SOLUTION INTRAVENOUS ONCE
Refills: 0 | Status: COMPLETED | OUTPATIENT
Start: 2025-01-04 | End: 2025-01-04

## 2025-01-04 RX ORDER — ANTISEPTIC SURGICAL SCRUB 0.04 MG/ML
1 SOLUTION TOPICAL DAILY
Refills: 0 | Status: DISCONTINUED | OUTPATIENT
Start: 2025-01-04 | End: 2025-01-07

## 2025-01-04 RX ORDER — PIPERACILLIN SODIUM AND TAZOBACTAM SODIUM 2; 250 G/50ML; MG/50ML
3.38 INJECTION, POWDER, FOR SOLUTION INTRAVENOUS EVERY 8 HOURS
Refills: 0 | Status: DISCONTINUED | OUTPATIENT
Start: 2025-01-04 | End: 2025-01-04

## 2025-01-04 RX ORDER — ALBUMIN HUMAN 50 G/1000ML
250 SOLUTION INTRAVENOUS ONCE
Refills: 0 | Status: COMPLETED | OUTPATIENT
Start: 2025-01-04 | End: 2025-01-04

## 2025-01-04 RX ORDER — BACTERIOSTATIC SODIUM CHLORIDE 0.9 %
1000 VIAL (ML) INJECTION
Refills: 0 | Status: DISCONTINUED | OUTPATIENT
Start: 2025-01-04 | End: 2025-01-07

## 2025-01-04 RX ORDER — HEPARIN SODIUM,PORCINE 10000/ML
650 VIAL (ML) INJECTION
Qty: 25000 | Refills: 0 | Status: DISCONTINUED | OUTPATIENT
Start: 2025-01-04 | End: 2025-01-05

## 2025-01-04 RX ORDER — EPINEPHRINE 5 MG/ML
0.01 VIAL (ML) INJECTION
Qty: 4 | Refills: 0 | Status: DISCONTINUED | OUTPATIENT
Start: 2025-01-04 | End: 2025-01-07

## 2025-01-04 RX ORDER — DEXMEDETOMIDINE HYDROCHLORIDE 4 UG/ML
0.5 INJECTION, SOLUTION INTRAVENOUS
Qty: 200 | Refills: 0 | Status: DISCONTINUED | OUTPATIENT
Start: 2025-01-04 | End: 2025-01-05

## 2025-01-04 RX ORDER — VANCOMYCIN HYDROCHLORIDE 50 MG/ML
1250 KIT ORAL EVERY 12 HOURS
Refills: 0 | Status: DISCONTINUED | OUTPATIENT
Start: 2025-01-04 | End: 2025-01-05

## 2025-01-04 RX ADMIN — Medication 12.5 MICROGRAM(S)/KG/MIN: at 07:35

## 2025-01-04 RX ADMIN — Medication 3 MILLILITER(S): at 13:36

## 2025-01-04 RX ADMIN — POTASSIUM CHLORIDE 50 MILLIEQUIVALENT(S): 750 TABLET, EXTENDED RELEASE ORAL at 20:35

## 2025-01-04 RX ADMIN — DEXMEDETOMIDINE HYDROCHLORIDE 10.5 MICROGRAM(S)/KG/HR: 4 INJECTION, SOLUTION INTRAVENOUS at 07:34

## 2025-01-04 RX ADMIN — IPRATROPIUM BROMIDE AND ALBUTEROL SULFATE 3 MILLILITER(S): .5; 2.5 SOLUTION RESPIRATORY (INHALATION) at 05:02

## 2025-01-04 RX ADMIN — Medication 3 MILLILITER(S): at 07:18

## 2025-01-04 RX ADMIN — SUGAMMADEX 100 MILLIGRAM(S): 100 INJECTION, SOLUTION INTRAVENOUS at 16:30

## 2025-01-04 RX ADMIN — VANCOMYCIN HYDROCHLORIDE 250 MILLIGRAM(S): KIT at 01:30

## 2025-01-04 RX ADMIN — Medication 6 UNIT(S)/MIN: at 07:34

## 2025-01-04 RX ADMIN — Medication 1 DROP(S): at 23:00

## 2025-01-04 RX ADMIN — Medication 5000 UNIT(S): at 01:28

## 2025-01-04 RX ADMIN — ALBUMIN HUMAN 125 MILLILITER(S): 50 SOLUTION INTRAVENOUS at 04:30

## 2025-01-04 RX ADMIN — Medication 1 UNIT(S)/HR: at 07:32

## 2025-01-04 RX ADMIN — ALBUMIN HUMAN 125 MILLILITER(S): 50 SOLUTION INTRAVENOUS at 04:41

## 2025-01-04 RX ADMIN — Medication 12.5 MICROGRAM(S)/KG/MIN: at 20:00

## 2025-01-04 RX ADMIN — POTASSIUM CHLORIDE 50 MILLIEQUIVALENT(S): 750 TABLET, EXTENDED RELEASE ORAL at 09:01

## 2025-01-04 RX ADMIN — Medication 6 UNIT(S)/HR: at 07:30

## 2025-01-04 RX ADMIN — SODIUM BICARBONATE 50 MILLIEQUIVALENT(S): 42 INJECTION, SOLUTION INTRAVENOUS at 01:21

## 2025-01-04 RX ADMIN — POTASSIUM CHLORIDE 50 MILLIEQUIVALENT(S): 750 TABLET, EXTENDED RELEASE ORAL at 02:35

## 2025-01-04 RX ADMIN — IPRATROPIUM BROMIDE AND ALBUTEROL SULFATE 3 MILLILITER(S): .5; 2.5 SOLUTION RESPIRATORY (INHALATION) at 11:10

## 2025-01-04 RX ADMIN — POTASSIUM CHLORIDE 50 MILLIEQUIVALENT(S): 750 TABLET, EXTENDED RELEASE ORAL at 10:00

## 2025-01-04 RX ADMIN — ANTISEPTIC SURGICAL SCRUB 15 MILLILITER(S): 0.04 SOLUTION TOPICAL at 07:18

## 2025-01-04 RX ADMIN — NOREPINEPHRINE BITARTRATE 45.5 MICROGRAM(S)/KG/MIN: 1 INJECTION, SOLUTION, CONCENTRATE INTRAVENOUS at 07:35

## 2025-01-04 RX ADMIN — FENTANYL CITRATE 100 MICROGRAM(S): 50 INJECTION INTRAMUSCULAR; INTRAVENOUS at 00:30

## 2025-01-04 RX ADMIN — Medication 6 UNIT(S)/MIN: at 20:00

## 2025-01-04 RX ADMIN — FENTANYL CITRATE 100 MICROGRAM(S): 50 INJECTION INTRAMUSCULAR; INTRAVENOUS at 04:30

## 2025-01-04 RX ADMIN — FENTANYL CITRATE 100 MICROGRAM(S): 50 INJECTION INTRAMUSCULAR; INTRAVENOUS at 00:00

## 2025-01-04 RX ADMIN — ALBUMIN HUMAN 50 MILLILITER(S): 50 SOLUTION INTRAVENOUS at 08:25

## 2025-01-04 RX ADMIN — MEROPENEM 100 MILLIGRAM(S): 500 INJECTION INTRAVENOUS at 18:31

## 2025-01-04 RX ADMIN — IPRATROPIUM BROMIDE AND ALBUTEROL SULFATE 3 MILLILITER(S): .5; 2.5 SOLUTION RESPIRATORY (INHALATION) at 23:43

## 2025-01-04 RX ADMIN — Medication 200 GRAM(S): at 01:21

## 2025-01-04 RX ADMIN — Medication 6.27 MICROGRAM(S)/KG/MIN: at 07:31

## 2025-01-04 RX ADMIN — POTASSIUM CHLORIDE 50 MILLIEQUIVALENT(S): 750 TABLET, EXTENDED RELEASE ORAL at 21:57

## 2025-01-04 RX ADMIN — Medication 200 GRAM(S): at 09:01

## 2025-01-04 RX ADMIN — FENTANYL CITRATE 100 MICROGRAM(S): 50 INJECTION INTRAMUSCULAR; INTRAVENOUS at 04:00

## 2025-01-04 RX ADMIN — VANCOMYCIN HYDROCHLORIDE 166.67 MILLIGRAM(S): KIT at 20:36

## 2025-01-04 RX ADMIN — Medication 1 DROP(S): at 18:31

## 2025-01-04 RX ADMIN — FENTANYL CITRATE 25 MICROGRAM(S): 50 INJECTION INTRAMUSCULAR; INTRAVENOUS at 22:09

## 2025-01-04 RX ADMIN — ANTISEPTIC SURGICAL SCRUB 1 APPLICATION(S): 0.04 SOLUTION TOPICAL at 20:36

## 2025-01-04 RX ADMIN — PANTOPRAZOLE 40 MILLIGRAM(S): 20 TABLET, DELAYED RELEASE ORAL at 08:15

## 2025-01-04 RX ADMIN — PROPOFOL 17.6 MICROGRAM(S)/KG/MIN: 10 INJECTION, EMULSION INTRAVENOUS at 08:02

## 2025-01-04 RX ADMIN — PROPOFOL 17.6 MICROGRAM(S)/KG/MIN: 10 INJECTION, EMULSION INTRAVENOUS at 08:00

## 2025-01-04 RX ADMIN — Medication 1 DROP(S): at 07:17

## 2025-01-04 RX ADMIN — Medication 3 MILLILITER(S): at 21:00

## 2025-01-04 RX ADMIN — Medication 1 DROP(S): at 11:59

## 2025-01-04 RX ADMIN — PROPOFOL 17.6 MICROGRAM(S)/KG/MIN: 10 INJECTION, EMULSION INTRAVENOUS at 08:01

## 2025-01-04 RX ADMIN — PANTOPRAZOLE 40 MILLIGRAM(S): 20 TABLET, DELAYED RELEASE ORAL at 20:36

## 2025-01-04 RX ADMIN — ANTISEPTIC SURGICAL SCRUB 15 MILLILITER(S): 0.04 SOLUTION TOPICAL at 18:33

## 2025-01-04 RX ADMIN — POTASSIUM CHLORIDE 50 MILLIEQUIVALENT(S): 750 TABLET, EXTENDED RELEASE ORAL at 21:02

## 2025-01-04 RX ADMIN — FENTANYL CITRATE 25 MICROGRAM(S): 50 INJECTION INTRAMUSCULAR; INTRAVENOUS at 21:39

## 2025-01-04 RX ADMIN — ALBUMIN HUMAN 50 MILLILITER(S): 50 SOLUTION INTRAVENOUS at 12:00

## 2025-01-04 RX ADMIN — IPRATROPIUM BROMIDE AND ALBUTEROL SULFATE 3 MILLILITER(S): .5; 2.5 SOLUTION RESPIRATORY (INHALATION) at 17:16

## 2025-01-04 RX ADMIN — Medication 200 GRAM(S): at 18:30

## 2025-01-04 RX ADMIN — Medication 100 MILLIGRAM(S): at 01:30

## 2025-01-04 RX ADMIN — ROCURONIUM BROMIDE 100 MILLIGRAM(S): 10 INJECTION INTRAVENOUS at 04:00

## 2025-01-04 NOTE — CONSULT NOTE ADULT - ASSESSMENT
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 Pt initially presented to [facility] for SOB and SS crisis; course c/b witnessed seizure, intubated and sedated, and transferred to [facility] for further management. Keppra was discontinued at [facility] due to negative EEG. At [facility] MICU pt was found to have RV failure possibly iso pulm HTN 2/2 increased tidal volumes on the ventilator. possibly due to volume overload due to IVF iso SS crisis.     Pt is in profound cardiogenic shock. Pt has been decompensating, despite Dobutamine gtt, Bumex gtt, Levophed, and addition of Vasopressin. Vasopressors requirements have gone up. NS shock team was called and pt transferred to NS for further management. Patient was cannulated on peripheral VA ECMO.     Our Transfusion Medicine team is consulted for emergent RBC exchange to minimize the risk of complications related to SCD as this patient is critically ill with multi-organ failure, on ECMO. The patient also has a history of having a seizure, etiology unclear.    We will initiate emergent RBC exchange with a target FCR of approximately 30% and an end hematocrit of 30%. Informed consent obtained from the patient's brother (who is a physician).    The patient does have a history of previous RBC transfusion but no history of transfusion reactions.

## 2025-01-04 NOTE — CONSULT NOTE ADULT - ASSESSMENT
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 Pt initially presented to [facility] for SOB and SS crisis; course c/b witnessed seizure, intubated and sedated, and transferred to [facility] for further management. Keppra was discontinued at [facility] due to negative EEG. At [facility] MICU pt was found to have RV failure possibly iso pulm HTN 2/2 increased tidal volumes on the ventilator, possibly due to volume overload due to IVF iso SS crisis.     Pt is in profound cardiogenic shock. Pt has been decompensating, despite Dobutamine gtt, Bumex gtt, Levophed, and addition of Vasopressin. Vasopressors requirements have gone up. NS shock team was called and pt transferred to NS for further management. Patient was cannulated on peripheral VA ECMO.                                                                                                            # Hb SC disease  - patient with 1-2 sickle pain crisis per year and on hydrea, had retinal detachment s/p repair                           - patient had f/up with cardio in Sept 2024: per the note, unclear etiology of her dyspnea but possibly driven by cardiomyopathy; low suspicion of pulmonary HTN as no RV dysfunction/severe TR.  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 Pt initially presented to [facility] for SOB and SS crisis; course c/b witnessed seizure, intubated and sedated, and transferred to [facility] for further management. Keppra was discontinued at [facility] due to negative EEG. At [facility] MICU pt was found to have RV failure possibly iso pulm HTN 2/2 increased tidal volumes on the ventilator, possibly due to volume overload due to IVF iso SS crisis.     Pt is in profound cardiogenic shock. Pt has been decompensating, despite Dobutamine gtt, Bumex gtt, Levophed, and addition of Vasopressin. Vasopressors requirements have gone up. NS shock team was called and pt transferred to NS for further management. Patient was cannulated on peripheral VA ECMO.                                                                                                            # Hb SC disease  - patient with 1-2 sickle pain crisis per year and on hydrea, had retinal detachment s/p repair                           - patient had f/up with cardio in Sept 2024: per the note, unclear etiology of her dyspnea but possibly driven by cardiomyopathy; low suspicion of pulmonary HTN as no RV dysfunction/severe TR.  She is found to have new RV failure, requiring ECMO  - Blood bank transfusion medicine team consulted for emergent RBC exchange given critically ill with multi-organ failure  - Retic count elevated to 11.6%, LDH 1000s, bili 3.9. CXR was clear not indicative of acute chest. Smear (prior to exchange) was reviewed: Hypochromic RBCs with normochromic RBC reflecting transfused blood. Severeal nucleated RBC suggesting stressed bone marrow. Very few target cells and rare sickle cell. No schistocytes to suggest hemolysis.      Recommendations  - Follow up Hb electrophoresis prior to RBC exchange  - Repeat Hgb electrophoresis after RBC exchange  - Repeat labs, retic, LDH, Hgb with improvement. Bili is 3.9, stable. Labs and smear does not seem to indicate hemolysis to suggest sickle cell crisis  - Thrombocytopenia from acute illness and ECMO. Given patient is on heparin gtt for ECOM, recommend 1U plt transfusion to maintain plt > 50k   - Hold hydroxyurea for now while thrombocytopenic      Discussed with ICU team, as well as family at bedside     Jonny Gabriel MD MS  Hematology/Oncology Fellow PGY-4  [employer] at [facility] | [facility] | [facility]  Available on Teams

## 2025-01-04 NOTE — PHYSICAL THERAPY INITIAL EVALUATION ADULT - PERTINENT HX OF CURRENT PROBLEM, REHAB EVAL
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 Pt initially presented to [facility] for SOB and SS crisis; course c/b witnessed seizure, intubated and sedated, and transferred to [facility] for further management. Keppra was discontinued at [facility] due to negative EEG. At [facility] MICU pt was found to have RV failure possibly iso pulm HTN 2/2 increased tidal volumes on the ventilator. On addition, pt possibly received a lot of volume iso SS crisis. Neuro was consulted for seizures and EEG technician was called for vEEG placement. Pt is in profound cardiogenic shock. Pt has been decompensating, despite Dobutamine gtt, Bumex gtt, Levophed, and addition of Vasopressin. Vasopressors requirements have gone up. NS shock team was called and pt is being transferred to NS for further management. Patient was cannulated on peripheral VA ECMO. 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 Pt initially presented to [facility] for SOB and SS crisis; course c/b witnessed seizure, intubated and sedated, and transferred to [facility] for further management. Keppra was discontinued at [facility] due to negative EEG. At [facility] MICU pt was found to have RV failure possibly iso pulm HTN 2/2 increased tidal volumes on the ventilator. On addition, pt possibly received a lot of volume iso SS crisis. Neuro was consulted for seizures and EEG technician was called for vEEG placement. Pt is in profound cardiogenic shock. Pt has been decompensating, despite Dobutamine gtt, Bumex gtt, Levophed, and addition of Vasopressin. Vasopressors requirements have gone up. NS shock team was called and pt is being transferred to NS for further management. Patient was cannulated on peripheral VA ECMO on 1/4. Now s/p VA ECMO decannulation on 1/7/25, planned for trach on 1/16

## 2025-01-04 NOTE — PATIENT PROFILE ADULT - FALL HARM RISK - HARM RISK INTERVENTIONS

## 2025-01-04 NOTE — PROVIDER CONTACT NOTE (CHANGE IN STATUS NOTIFICATION) - ACTION/TREATMENT ORDERED:
No further interventions at this time, routine checks of pulses and skin assessment, pt maintained on inotropes and pressors as ordered, RPM maintained 3300 Sweep 1 Flows 2.8-3.2L

## 2025-01-04 NOTE — PHYSICAL THERAPY INITIAL EVALUATION ADULT - STRENGTHENING, PT EVAL
GOAL: Pt will improve bilateral LE strength to ~3-/5 for increased limb stability, to improve gait and facilitate stair negotiation in 4 weeks.

## 2025-01-04 NOTE — CONSULT NOTE ADULT - ASSESSMENT
59y (1965) man with a PMHx significant for sickle cell disease (on hydroxyurea), HTN, HFimpEF/NICM (EF 58% 10/2024)originally presented to [facility] (1/3) as a transfer from [facility]. Pt initially presented to [facility] (1/2) for SOB and SS crisis; course c/b witnessed seizure, intubated and sedated, and transferred to [facility] hospital for further management. Keppra was discontinued at [facility] due to negative EEG. At [facility] MICU pt was found to have RV failure possibly iso pulm HTN 2/2 increased tidal volumes on the ventilator. On addition, pt possibly received a lot of volume iso SS crisis. Patient in profound cardiogenic shock. Pt has been decompensating, despite Dobutamine gtt, Bumex gtt, Levophed, and addition of Vasopressin. Vasopressors requirements have gone up. Patient transferred to [facility] for R heart cath and possible VA ECMO. Patient was cannulated on peripheral VA ECMO (1/4). Neurology consulted  (1/4) for alleged witnessed seizure at [facility]. During encounter patient is on Epinephrine vasopressin, dobutamine, insulin, heparin and precedex gtt. Neuro exam is limited due to intubated and on sedation (propofol stopped upon exam & on precedex 0.5). No response to verbal, tactile, noxious stimuli. no verbal ouput. B/l Light reflex intact but no blink to threat b/l, absent corneal reflex, oculocephalic,cough or gag reflex. CTH & CTA H/N reviewed from [facility] (no report). CTH at [facility] is a poor study and unable to reliably interpret. CTA H/N to my eye vessels seem patient. EEG shows burst-attenuation/suppression pattern (R>L) but no clear epileptic discharges recorded. No seizures recorded.    Impression: reported seizure like activity at [facility] though apparent EEG was unremarkable and keppra was discontinued. Patient found down for unknown reason, seizure is on the differential. First 5 hrs of eeg at [facility] shows no epileptiform findings    Plan  [] f/u CT head ordered by CTICU  [] continue video EEG  [] taper off sedation when able, reassess at that time   [] MRI brain with and without with able    Case discussed with Dr. Lagos (read the EEG)  To be seen in AM by neuro team

## 2025-01-04 NOTE — PROVIDER CONTACT NOTE (CHANGE IN STATUS NOTIFICATION) - ASSESSMENT
MIXED ON ADMISSION: 39.1   MIXED POST DIURESIS & NITRIC OXIDE 44.8   INCREASING PRESSOR REQUIREMENTS, SEE IN FLOW SHEET AS DOCUMENTED, DOUBLE INOTROPES  NITRIC OXIDE 40ppm   Intubated, unresponsive, unequal pupils
Left Dorsal Pedal pulse, Posterior Tibial and Popiteal absent on doppler, unable to palpate. Cool below knee. No low flow alarms, oxygenator clear, maintained on 650u of heparin at this time. Low dose vasopressin, maintained on inotropes as ordered.
Pulsatility loss of L arterial line, pending placement of right arterial line, no low flow alarms at this time. Pulses are not dopplerable, skin color pale, increasingly dusky and mottled.

## 2025-01-04 NOTE — PHYSICAL THERAPY INITIAL EVALUATION ADULT - MANUAL MUSCLE TESTING RESULTS, REHAB EVAL
No purposeful movement noted in all 4 extremities, pt responsive to pain to B/L UEs and LEs/grossly assessed due to

## 2025-01-04 NOTE — EEG REPORT - NS EEG TEXT BOX
REPORT OF CONTINUOUS VIDEO EEG    [facility]: [address], [place] [address], Phone: [phone]  [facility]: [address] [place] [address], Phone: [phone]  [facility]: [address] [place] [address], Phone: [phone]    Patient Name: Katerin Rose   Age: 59 year, : 1965  Olson: 4 CTU 1    Study Date: 2025	Start Time: 02:36     End Date:  2025	End Time: 08:00     Study Duration: 4 hr  46 min    Study Information:  -------------------------------------------------------------------------------------------------------  EEG Recording Technique:  The patient underwent continuous Video-EEG monitoring, using Telemetry System hardware on the XLTek Digital System. EEG and video data were stored on a computer hard drive with important events saved in digital archive files. The material was reviewed by a physician (electroencephalographer / epileptologist) on a daily basis. Moreno and seizure detection algorithms were utilized and reviewed. An EEG Technician attended to the patient, and was available throughout daytime work hours.  The epilepsy center neurologist was available in person or on call 24-hours per day.    EEG Placement and Labeling of Electrodes:  The EEG was performed utilizing 20 channel referential EEG connections (coronal over temporal over parasagittal montage) using all standard 10-20 electrode placements with EKG, with additional electrodes placed in the inferior temporal region using the modified 10-10 montage electrode placements for elective admissions, or if deemed necessary. Recording was at a sampling rate of 256 samples per second per channel. Time synchronized digital video recording was done simultaneously with EEG recording. A low light infrared camera was used for low light recording.     -------------------------------------------------------------------------------------------------------  History: sickle crisis, cardiogenic shock, reported seizure.    Medication  Vaseline  Zemuron  Diprivan (Propofol)  Levophed    -------------------------------------------------------------------------------------------------------  Interpretation:    [[[Abbreviation Key:  PDR=alpha rhythm/posterior dominant rhythm. A-P=anterior posterior.  Amplitude: ‘very low’:<20; ‘low’:20-49; ‘medium’:; ‘high’:>150uV.  Persistence for periodic/rhythmic patterns (% of epoch) ‘rare’:<1%; ‘occasional’:1-10%; ‘frequent’:10-50%; ‘abundant’:50-90%; ‘continuous’:>90%.  Persistence for sporadic discharges: ‘rare’:<1/hr; ‘occasional’:1/min-1/hr; ‘frequent’:>1/min; ‘abundant’:>1/10 sec.  RPP=rhythmic and periodic patterns; GRDA=generalized rhythmic delta activity; FIRDA=frontal intermittent GRDA; LRDA=lateralized rhythmic delta activity; TIRDA=temporal intermittent rhythmic delta activity;  LPD=PLED=lateralized periodic discharges; GPD=generalized periodic discharges; BIPDs =bilateral independent periodic discharges; Mf=multifocal; SIRPDs=stimulus induced rhythmic, periodic, or ictal appearing discharges; BIRDs=brief potentially ictal rhythmic discharges >4 Hz, lasting .5-10s; PFA (paroxysmal bursts >13 Hz or =8 Hz <10s).  Modifiers: +F=with fast component; +S=with spike component; +R=with rhythmic component.  S-B=burst suppression pattern.  Max=maximal. N1-drowsy; N2-stage II sleep; N3-slow wave sleep. SSS/BETS=small sharp spikes/benign epileptiform transients of sleep. HV=hyperventilation; PS=photic stimulation]]]    Daily EEG Visual Analysis    FINDINGS:      Background: the background is predominantly burst-attenuation pattern, more pronounced over the right hemisphere. Attenuation become more pronounced over course of recording. By end of recording at 08:00, 1 sec burst followed by 7-10 sec attenuation.   Symmetry: asymmetric, relative absence of faster frequencies over right hemisphere  Continuous: discontinuous  PDR: absent  Reactivity: absent  Voltage: normal, [defined typically between 20-150uV]  Anterior Posterior Gradient: absent  Breach: absent    Background Slowing:  Generalized slowing: predominantly mix of delta and theta  Focal slowing: greater discontinuity and periods of attenuation over the right hemisphere.    State Changes:   Drowsiness and sleep did not occur    Sporadic Epileptiform Discharges:   None    Rhythmic and Periodic Patterns (RPPs):  None     Electrographic and Electroclinical seizures:  None    Other Clinical Events:  None    Activation Procedures:   -Hyperventilation was not performed.    -Photic stimulation was not performed.    Artifacts:  Intermittent myogenic and movement artifacts were noted.    ECG:  The heart rate on single channel ECG was predominantly between 110-120 BPM.  -------------------------------------------------------------------------------------------------------  EEG Classification:  Abnormal EEG in comatose state(s).  1.	Burst-attenuation/suppression pattern  2.	Asymmetry, greater discontinuity right hemisphere  3.	Asymmetry, relative absence of faster frequencies, right hemisphere.    -------------------------------------------------------------------------------------------------------  EEG Impression / Clinical Correlate:  Abnormal prolonged EEG study. At the outset there is evidence of moderately severe diffuse cerebral dysfunction, worse in the right hemisphere.  Burst suppression background and corresponding degree of cerebral dysfunction increase over course of the almost 5h recording. Finding of diffuse cerebral dysfunction is not specific for etiology and may be result – at least in part – of sedative medication use.  Finding of asymmetries affecting right hemisphere may reflect underlying structural abnormalities, but may also be seen postictally ipsilateral to the seizure focus.  No clear epileptic discharges recorded. No seizures recorded.    -------------------------------------------------------------------------------------------------------     Tarik Lagos MD, PhD  Director, Epilepsy Division, [facility]  ------------------------------------  EEG Reading Room: [phone]  On Call Service After Hours: [phone]

## 2025-01-04 NOTE — PHYSICAL THERAPY INITIAL EVALUATION ADULT - ADDITIONAL COMMENTS
Social hx obtained from chart review/case management note. Patient is single and resides in a house with her daughter and son. +2-3 DONNA with a railing. Prior to hospitalization, patient was independent with ADLS. Patient was ambulating (I) with SC

## 2025-01-04 NOTE — PHYSICAL THERAPY INITIAL EVALUATION ADULT - PASSIVE RANGE OF MOTION EXAMINATION, REHAB EVAL
Left UE Passive ROM was WFL (within functional limits)/Right UE Passive ROM was WFL (within functional limits)/Left LE Passive ROM was WFL (within functional limits)/Right LE Passive ROM was WFL (within functional limits)

## 2025-01-04 NOTE — PATIENT PROFILE ADULT - PACKS YRS CALCULATION
[address], [address] [place] [address]  [phone]    H&P Note   Belkys Marquez   MRN: [id number]     : 1975        HPI: Belkys Marquez is a 41 y.o. female who presents for initial screening colonoscopy via direct scheduling. Patient was given a 1 day prep. The patient is not on anticoagulation. Prior colonoscopies: none  PSH: no abdominal surgeries  FH: negative for colon cancer, colon polyps, GI cancers, IBD        Past Medical History:   Diagnosis Date    Anxiety     Asthma     has prn inhaler- last use reported 2022    COVID-19 2022    denies hospitalization    GERD (gastroesophageal reflux disease)     protonix daily, well controlled    History of anemia 2019    corrected after 12 months oral Fe- no hx of blood transfusions    Hypertension, essential     Palpitations     Dr Yoav Olivera    Seizure [facility])     1 episode, never happened again     History reviewed. No pertinent surgical history. Current Facility-Administered Medications   Medication Dose Route Frequency    lidocaine 1 % injection 1 mL  1 mL IntraDERmal Once PRN    lactated ringers IV soln infusion   IntraVENous Continuous    sodium chloride flush 0.9 % injection 5-40 mL  5-40 mL IntraVENous 2 times per day    sodium chloride flush 0.9 % injection 5-40 mL  5-40 mL IntraVENous PRN    0.9 % sodium chloride infusion   IntraVENous PRN    sodium chloride flush 0.9 % injection 5-40 mL  5-40 mL IntraVENous 2 times per day    sodium chloride flush 0.9 % injection 5-40 mL  5-40 mL IntraVENous PRN    0.9 % sodium chloride infusion   IntraVENous PRN     ALLERGIES:  Patient has no known allergies.     Social History     Socioeconomic History    Marital status:      Spouse name: None    Number of children: None    Years of education: None    Highest education level: None   Tobacco Use    Smoking status: Never    Smokeless tobacco: Never   Vaping Use 1

## 2025-01-04 NOTE — PHYSICAL THERAPY INITIAL EVALUATION ADULT - FOLLOWS COMMANDS/ANSWERS QUESTIONS, REHAB EVAL
pt alert eyes open t/o however unable to shake head yes/no to commands at this time, responsive to pain only/unable to follow commands/unable to answer questions

## 2025-01-04 NOTE — CONSULT NOTE ADULT - SUBJECTIVE AND OBJECTIVE BOX
HPI (admission)    59F PMH Sickle cell disease (on hydroxyurea), HTN, 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 Pt initially presented to [facility] for SOB and SS crisis; course c/b witnessed seizure, intubated and sedated, and transferred to [facility] for further management. Keppra was discontinued at [facility] due to negative EEG. At [facility] MICU pt was found to have RV failure possibly iso pulm HTN 2/2 increased tidal volumes on the ventilator. On addition, pt possibly received a lot of volume iso SS crisis. Neuro was consulted for seizures and EEG technician was called for vEEG placement. Pt is in profound cardiogenic shock. Pt has been decompensating, despite Dobutamine gtt, Bumex gtt, Levophed, and addition of Vasopressin. Vasopressors requirements have gone up. NS shock team was called and pt is being transferred to NS for further management.  (2025 23:18)      PAST MEDICAL & SURGICAL HISTORY:    Sickle-cell-hemoglobin C disease with crisis      Essential hypertension      Sickle cell disease      HTN (hypertension)      H/O:       H/O abdominoplasty      S/P breast augmentation      S/P           Allergies    doxycycline (Angioedema)  clindamycin (Angioedema)  linezolid (Angioedema)    Intolerances        MEDICATIONS  (STANDING):  albuterol/ipratropium for Nebulization 3 milliLiter(s) Nebulizer every 6 hours  artificial tears (preservative free) Ophthalmic Solution 1 Drop(s) Both EYES four times a day  buMETAnide Infusion 2 mG/Hr (10 mL/Hr) IV Continuous <Continuous>  chlorhexidine 0.12% Liquid 15 milliLiter(s) Oral Mucosa every 12 hours  dexMEDEtomidine Infusion 0.5 MICROgram(s)/kG/Hr (10.5 mL/Hr) IV Continuous <Continuous>  dextrose 50% Injectable 50 milliLiter(s) IV Push every 15 minutes  dextrose 50% Injectable 25 milliLiter(s) IV Push every 15 minutes  DOBUTamine Infusion 5 MICROgram(s)/kG/Min (12.5 mL/Hr) IV Continuous <Continuous>  EPINEPHrine    Infusion 0.02 MICROgram(s)/kG/Min (6.27 mL/Hr) IV Continuous <Continuous>  heparin  Infusion 600 Unit(s)/Hr (6 mL/Hr) IV Continuous <Continuous>  insulin regular Infusion 1 Unit(s)/Hr (1 mL/Hr) IV Continuous <Continuous>  norepinephrine Infusion 0.58 MICROgram(s)/kG/Min (45.5 mL/Hr) IV Continuous <Continuous>  pantoprazole  Injectable 40 milliGRAM(s) IV Push every 12 hours  piperacillin/tazobactam IVPB. 3.375 Gram(s) IV Intermittent once  piperacillin/tazobactam IVPB.- 3.375 Gram(s) IV Intermittent once  piperacillin/tazobactam IVPB.. 3.375 Gram(s) IV Intermittent every 8 hours  propofol Infusion 35 MICROgram(s)/kG/Min (17.6 mL/Hr) IV Continuous <Continuous>  sodium chloride 0.9% lock flush 3 milliLiter(s) IV Push every 8 hours  sodium chloride 0.9%. 1000 milliLiter(s) (10 mL/Hr) IV Continuous <Continuous>  vasopressin Infusion 0.04 Unit(s)/Min (6 mL/Hr) IV Continuous <Continuous>    MEDICATIONS  (PRN):  fentaNYL    Injectable 25 MICROGram(s) IV Push every 2 hours PRN Severe Pain (7 - 10)      FAMILY HISTORY:  FHx: cerebral palsy (Child)        SOCIAL HISTORY: No EtOH, no tobacco    REVIEW OF SYSTEMS: Unable to obtain as patient intubated and sedated     Height (cm): 162 ( @ 20:47)  Weight (kg): 83.6 ( @ 20:47), 83.6 ( @ 13:35)  BMI (kg/m2): 31.9 ( @ 20:47), 31.9 ( @ 20:47)  BSA (m2): 1.88 ( @ 20:47), 1.88 ( @ 20:47)    T(F): 98.6 (25 @ 08:00), Max: 100.9 (25 @ 16:00)  HR: 112 (25 @ 10:15)  BP: 124/71 (25 @ 16:00)  RR: 10 (25 @ 10:15)  SpO2: 96% (25 @ 09:06)  Wt(kg): --    GENERAL: NAD   HEAD:  Atraumatic, Normocephalic  EYES: EOMI, PERRLA, conjunctiva and sclera clear  NECK: Left and right IJ   CHEST/LUNG: Clear to auscultation bilaterally  HEART: Regular rate and rhythm; No murmurs, rubs, or gallops  ABDOMEN: Soft, Nontender, Nondistended  NEUROLOGY: intubated and sedated   SKIN: No rashes or lesions                          8.1    16.61 )-----------( 108      ( 2025 01:12 )               22.4     142  |  103  |  34[H]  ----------------------------<  130[H]  3.9   |  22  |  1.41[H]    Ca    8.7      2025 01:12  Phos  6.3       Mg     2.0         TPro  6.1  /  Alb  3.0[L]  /  TBili  3.2[H]  /  DBili  x   /  AST  108[H]  /  ALT  89[H]  /  AlkPhos  209[H]        Magnesium: 2.0 mg/dL ( @ 01:12)  Phosphorus: 6.3 mg/dL ( @ 01:12)  Lactate Dehydrogenase, Serum: 1165 U/L ( @ 01:12)  Lactate Dehydrogenase, Serum: 1156 U/L ( @ 21:34)  Magnesium: 2.2 mg/dL ( @ 21:34)  Phosphorus: 5.4 mg/dL ( @ 21:34)  Magnesium: 2.60 mg/dL ( @ 18:45)  Phosphorus: 4.6 mg/dL ( @ 18:45)  Magnesium: 1.90 mg/dL ( @ 14:00)  Phosphorus: 4.1 mg/dL ( @ 14:00)  Lactate Dehydrogenase, Serum: 1197 U/L ( @ 14:00)      PT/INR - ( 2025 01:12 )   PT: 15.5 sec;   INR: 1.35 ratio         PTT - ( 2025 01:12 )  PTT:34.1 sec

## 2025-01-04 NOTE — PROVIDER CONTACT NOTE (CHANGE IN STATUS NOTIFICATION) - SITUATION
Pulsatility loss off L arterial line, pending placement of right arterial line, no low flow alarms at this time. Pulses are not dopplerable, skin color pale, increasingly dusky and mottled.
Left Dorsal Pedal pulse, Posterior Tibial and Popiteal absent on doppler, unable to palpate.
MIXED ON ADMISSION: 39.1   MIXED POST DIURESIS & NITRIC OXIDE 44.8

## 2025-01-04 NOTE — CONSULT NOTE ADULT - CRITICAL CARE ATTENDING COMMENT
59/F with Sicklecell disease, H/o HF midrange EF, with improved EF on GDMT, admitted with sickle cell crisis to [facility] and c/b episode of unresponsiveness with worsening hemodynamics and concern for shock. 59/F with Sicklecell disease, H/o HF midrange EF, with improved EF on GDMT, admitted with sickle cell crisis to [facility] and c/b episode of unresponsiveness with worsening hemodynamics and concern for shock.  Shock call initiated due to increasing doses of pressor requirement with inotropes, DYLON/Transaminitis and lactic acidosis,.   Pt transferred from [facility] to [facility] last night, given continued worsening of hemodynamics  placed on VA ECMO    Imp: Mixed shock  Severe RV failure  ? etiology  SIckle cell crisis  DYLON  Transaminitis  Lactic acidosis  Thrombocytopenia    Plan:  on VA ECMO at 3300 with flow 3lits  wean pressors for MAP 65  cont   and low dose epi, cont Mara  A line with good pulsatility  TTE this am with decompressed RV, LV with good contractility, AV opening every beat , no LV apical thrombus  [place] RA 7, PA: (26)  good urine outpt  cont heparin for AC  cont broad spectrum abx  cont vent sup[port  Heme onc recs appreciated  planned for RBC exchange  cont supportive care    plan discussed in MDT with CTS and CTICU team

## 2025-01-04 NOTE — PROGRESS NOTE ADULT - SUBJECTIVE AND OBJECTIVE BOX
----------------------------------------------------------------------------------------------------  ECMO Daily Management Note   ----------------------------------------------------------------------------------------------------  INTERVAL EVENTS:   Transfused with prbcs & plts overnight   Continue on inotropes & pressors     ----------------------------------------------------------------------------------------------------  60 yo F with Refractory cardiogenic shock s/p VA ECMO initiated on 1/4/25            Cannulae:   25Fr R Fem Venous,  19Fr L Fem Arterial with 6Fr Distal reperfusion catheter     Continue current support  Wean trial as tolerated   Anticoagulation w/ Heparin gtt (PTT 50-60) goal 0.2-0.4    =============================================================  Weight (kg): 83.6 (01-03-25)        Height (cm): 162 (01-03-25)   BSA (m2): 1.88 (01-03-25)        BMI (kg/m2): 31.9 (01-03-25)    ECMO  RPM:  3300 (04 Jan 2025 06:00)      Pump Flow (Lpm):  2.89 (04 Jan 2025 06:00)              Sweep  (L/min):   1 (04 Jan 2025 06:00)       FiO2 (%):  1 (04 Jan 2025 06:00)  Pre-membrane -  Pressure (mm/Hg):   172 (04 Jan 2025 06:00)      Post-membrane - Pressure (mm/Hg):  157 (04 Jan 2025 06:00)   ( 04 Jan 2025 00:29 )  pH: 7.31  /  pCO2: 31    / pO2: 388   /  HCO3: 16    /  Base Excess: -9.4  /  SaO2: 100.0          buMETAnide Infusion 2 mG/Hr IV Continuous <Continuous>  DOBUTamine Infusion 5 MICROgram(s)/kG/Min IV Continuous <Continuous>  EPINEPHrine    Infusion 0.02 MICROgram(s)/kG/Min IV Continuous <Continuous>  norepinephrine Infusion 0.58 MICROgram(s)/kG/Min IV Continuous <Continuous>  vasopressin Infusion 0.04 Unit(s)/Min IV Continuous <Continuous>    Vent Mode: AC/ CMV (Assist Control/ Continuous Mandatory Ventilation)  RR (machine): 20, TV (machine): 380, FiO2: 40, PEEP: 8, MAP: 11, PIP: 20    (04 Jan 2025 06:03) pH, Art: 7.54/pCO2: 25/pO2: 183/HCO3: 21/BE: -0.7/SaO2: 100.0/LAC: 2.7, --   (04 Jan 2025 04:07) pH, Art: 7.38/pCO2: 38/pO2: 342/HCO3: 22/BE: -2.4/SaO2: 99.3/LAC: 3.1, --   (04 Jan 2025 01:10) pH, Art: 7.49/pCO2: 32/pO2: 194/HCO3: 24/BE: 1.3/SaO2: 99.8/LAC: 4.2, --   (03 Jan 2025 23:33) pH, Art: 7.39/pCO2: 27/pO2: 143/HCO3: 16/BE: -7.8/SaO2: 98.6/LAC: 4.1, --   (03 Jan 2025 22:55) pH, Art: 7.41/pCO2: 25/pO2: 116/HCO3: 16/BE: -7.9/SaO2: 97.6/LAC: 4.7, --     (04 Jan 2025 06:03) pH, Clifford: 7.36/pCO2: 40/pO2:  47/HCO3: 23/BE: -2.7/MVO2: 75.3/SvO2: /LAC: ,   (04 Jan 2025 04:07) pH, Clifford: 7.18/pCO2: 64/pO2:  54/HCO3: 24/BE: -5.6/MVO2: 76.9/SvO2: /LAC: ,   (04 Jan 2025 01:10) pH, Clifford: 7.38/pCO2: 43/pO2:  51/HCO3: 25/BE: 0.0/MVO2: 77.9/SvO2: /LAC: ,   (03 Jan 2025 23:33) pH, Clifford: 7.38/pCO2: 30/pO2:  34/HCO3: 18/BE: -6.2/MVO2: 44.8/SvO2: /LAC: ,   (03 Jan 2025 22:55) pH, Clifford: 7.36/pCO2: 30/pO2:  32/HCO3: 17/BE: -7.3/MVO2: 39.2/SvO2: /LAC: ,     ----------------------------------------------------------------------------------------------------  ANTICOAGULATION  heparin  Infusion 600 Unit(s)/Hr IV Continuous <Continuous>    (04 Jan 2025 01:12)  aPTT: 34.1  Xa: 0.25  PT: 15.5  INR: 1.35   Fibrinogen: 565   Platelets: 108   (03 Jan 2025 21:34)  aPTT: 25.7  Xa: 0.06  PT: 14.8  INR: 1.29   Fibrinogen: -----  Platelets: 47    (03 Jan 2025 18:45)  aPTT: 24.6  Xa: -----  PT: 14.3  INR: 1.24   Fibrinogen: -----  Platelets: 43    (03 Jan 2025 14:00)  aPTT: 24.2  Xa: -----  PT: 13.5  INR: 1.17   Fibrinogen: 476   Platelets: 44      (04 Jan 2025 01:12)  Hemoglobin: 8.1     LDH: 1165    Haptoglobin: -----   PFH:  -----  (03 Jan 2025 21:34)  Hemoglobin: 7.5     LDH: 1156    Haptoglobin: 54     PFH:  -----  (03 Jan 2025 18:45)  Hemoglobin: 7.6     LDH: -----    Haptoglobin: -----   PFH:  -----  (03 Jan 2025 14:00)  Hemoglobin: 8.3     LDH: 1197    Haptoglobin: 47     PFH:  -----    ----------------------------------------------------------------------------------------------------  Daily ECMO Checklist:   Progress report received from perfusionist   Circuit checked/inspected   Emergency equipment and supplies checked   Cannulation site inspection     I have reviewed all of the above information as well as pertinent labs/imaging/testing and care plan with the bedside nurse, perfusionist and care team members and provided my recommendations for support.   ECMO Management was separate from critical care billing time.

## 2025-01-04 NOTE — PROVIDER CONTACT NOTE (CHANGE IN STATUS NOTIFICATION) - ACTION/TREATMENT ORDERED:
VA ECMO inserted @0000, Mixed 77.9 post insertion. Decreasing pressors requirements at this time. Flow 3.2 L 3300 RPM SWEEP OF 1.5 at this time. Will trend numbers as ordered per provider. 2 RNs at bedside.

## 2025-01-04 NOTE — PROVIDER CONTACT NOTE (CHANGE IN STATUS NOTIFICATION) - ACTION/TREATMENT ORDERED:
Per MD Danielson, duplex of L lower extremity ordered, up titrate 750u heparin, send coags now. JODIE to be sent. Midnight labs to be sent. Assess extremities per policy. Per MD Danielson, duplex of L lower extremity ordered, up titrate 750u heparin, send coags now. JODIE to be sent. MD Danielson at bedside assess reperfusion cannula, blood return present, no resistance. Midnight labs to be sent. Assess extremities per policy.

## 2025-01-04 NOTE — CONSULT NOTE ADULT - ASSESSMENT
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 Course c/b episodes of unresponsiveness, seizure requiring intubation. Was transferred to [facility] for further workup. Gordy was d/c at [facility] d/t negative EEG. At [facility] MICU, patient was found to have RV failure possibly iso pulm HTN 2/2 increased tidal volumes on the ventilator. On addition, pt possibly received a lot of volume i/s/o SS crisis. Neuro was consulted for seizures and was placed on vEEG. She was found to have worsening hemoydtnamics, despite on inotrope, pressors and diuretics, and concerns for shocks. Shock call was made and patient was transferred to [facility] 1/3/25 for further workup.     She is now cannulated for VA ECMO on 1/4/25. Currently volume up and is on bumex gtt. Being supported with , vasopressin and epinephrine. Also noted to have leukocytosis and is on antibiotics. Has EEG going and will continue to monitor neuro status.   Prognosis is guarded    Hemodynamics  1/4/25 (VA ECMO,  CVP 12-14, PA 31/21/28)    Cardiac Studies:  -TTE 1/4/25: LVEF <20%, ECMO cannula in RV, mildly enlarged RV size and reduced RV systolic function, no pericardial effusion, no MR, trace TR, IVC nml size   -TTE 1/3/25: LVIDd 3.1cm, LVEF appears grossly normal, RV severly enlarged size, reduced function, TAPSE 1.1cm, nml RA, trace pericardial effusion, mod/severe TR PASP 45,

## 2025-01-04 NOTE — CHART NOTE - NSCHARTNOTEFT_GEN_A_CORE
60 yo F transferred to CTU with cardiogenic shock   TTE findings consistent with severe RV dysfunction; CTA at OSH negative for PE  Patient with significant vasopressor requirement and elevated lactate  Neuro status unclear; but documented recent seizure  Discussed with family the clinical picture and rationale regarding possible VA-ECMO as well as its limitations.  After this discussion, consent was obtained   Please see operative dictation for procedural details.

## 2025-01-04 NOTE — PHYSICAL THERAPY INITIAL EVALUATION ADULT - CRITERIA FOR SKILLED THERAPEUTIC INTERVENTIONS
impairments found/functional limitations in following categories impairments found/functional limitations in following categories/anticipated equipment needs at discharge/anticipated discharge recommendation

## 2025-01-04 NOTE — PROVIDER CONTACT NOTE (CHANGE IN STATUS NOTIFICATION) - BACKGROUND
Pt transferred from [facility] s/p seizure febrile SICKLE CELL CRISIS w/ RV failure, reduced EF
Pt VA ECMO
Pt transferred from [facility] for eval for severe RV failure, Hx of sickle cell disease

## 2025-01-04 NOTE — CONSULT NOTE ADULT - SUBJECTIVE AND OBJECTIVE BOX
Patient is a 59y old  Female who presents with a chief complaint of Mixed Shock (2025 07:37)      HPI:  59F PMH Sickle cell disease (on hydroxyurea), HTN, HFimpEF/NICM (EF 58% 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 Pt initially presented to [facility] for SOB and SS crisis; course c/b witnessed seizure, intubated and sedated, and transferred to [facility] for further management. Keppra was discontinued at [facility] due to negative EEG. At [facility] MICU pt was found to have RV failure possibly iso pulm HTN 2/2 increased tidal volumes on the ventilator. On addition, pt possibly received a lot of volume iso SS crisis. Neuro was consulted for seizures and EEG technician was called for vEEG placement. Pt is in profound cardiogenic shock. Pt has been decompensating, despite Dobutamine gtt, Bumex gtt, Levophed, and addition of Vasopressin. Vasopressors requirements have gone up. [facility] shock team was called and pt is being transferred to [facility] for further management.  (2025 23:18)        PAST MEDICAL & SURGICAL HISTORY:  Sickle-cell-hemoglobin C disease with crisis      Essential hypertension      Sickle cell disease      HTN (hypertension)      H/O:       H/O abdominoplasty      S/P breast augmentation      S/P           MEDICATIONS  (STANDING):  albuterol/ipratropium for Nebulization 3 milliLiter(s) Nebulizer every 6 hours  artificial tears (preservative free) Ophthalmic Solution 1 Drop(s) Both EYES four times a day  buMETAnide Infusion 2 mG/Hr (10 mL/Hr) IV Continuous <Continuous>  cefepime   IVPB 2000 milliGRAM(s) IV Intermittent every 12 hours  cefepime   IVPB      chlorhexidine 0.12% Liquid 15 milliLiter(s) Oral Mucosa every 12 hours  dexMEDEtomidine Infusion 0.5 MICROgram(s)/kG/Hr (10.5 mL/Hr) IV Continuous <Continuous>  dextrose 50% Injectable 50 milliLiter(s) IV Push every 15 minutes  dextrose 50% Injectable 25 milliLiter(s) IV Push every 15 minutes  DOBUTamine Infusion 5 MICROgram(s)/kG/Min (12.5 mL/Hr) IV Continuous <Continuous>  EPINEPHrine    Infusion 0.02 MICROgram(s)/kG/Min (6.27 mL/Hr) IV Continuous <Continuous>  heparin  Infusion 600 Unit(s)/Hr (6 mL/Hr) IV Continuous <Continuous>  insulin regular Infusion 1 Unit(s)/Hr (1 mL/Hr) IV Continuous <Continuous>  norepinephrine Infusion 0.58 MICROgram(s)/kG/Min (45.5 mL/Hr) IV Continuous <Continuous>  pantoprazole  Injectable 40 milliGRAM(s) IV Push every 12 hours  propofol Infusion 35 MICROgram(s)/kG/Min (17.6 mL/Hr) IV Continuous <Continuous>  sodium chloride 0.9% lock flush 3 milliLiter(s) IV Push every 8 hours  sodium chloride 0.9%. 1000 milliLiter(s) (10 mL/Hr) IV Continuous <Continuous>  vasopressin Infusion 0.04 Unit(s)/Min (6 mL/Hr) IV Continuous <Continuous>    MEDICATIONS  (PRN):  fentaNYL    Injectable 25 MICROGram(s) IV Push every 2 hours PRN Severe Pain (7 - 10)      FAMILY HISTORY:  FHx: cerebral palsy (Child)          Allergies    doxycycline (Angioedema)  clindamycin (Angioedema)  linezolid (Angioedema)    Intolerances        REVIEW OF SYSTEMS:  Unable to obtain      Vital Signs Last 24 Hrs  T(C): 37 (2025 08:00), Max: 38.3 (2025 16:00)  T(F): 98.6 (2025 08:00), Max: 100.9 (2025 16:00)  HR: 114 (2025 09:06) (105 - 133)  BP: 124/71 (2025 16:00) (82/71 - 124/71)  BP(mean): 85 (2025 16:00) (66 - 107)  RR: 14 (2025 09:00) (5 - 52)  SpO2: 96% (2025 09:06) (79% - 100%)    Parameters below as of 2025 08:00  Patient On (Oxygen Delivery Method): ventilator    O2 Concentration (%): 40                          8.1    16.61 )-----------( 108      ( 2025 01:12 )             22.4     Reticulocyte Percent: 11.6 % ( @ 02:36)  Reticulocyte Percent: 15.1 % ( @ 14:00)    Hematocrit: 22.4 % ( @ 01:12)  Hematocrit: 20.1 % ( @ 21:34)  Hematocrit: 21.1 % ( @ 18:45)  Hematocrit: 22.5 % ( @ 14:00)        142  |  103  |  34[H]  ----------------------------<  130[H]  3.9   |  22  |  1.41[H]    Ca    8.7      2025 01:12  Phos  6.3       Mg     2.0         TPro  6.1  /  Alb  3.0[L]  /  TBili  3.2[H]  /  DBili  x   /  AST  108[H]  /  ALT  89[H]  /  AlkPhos  209[H]        Bilirubin Direct: 0.7 mg/dL ( @ 14:00)    Lactate Dehydrogenase, Serum: 1165 U/L ( @ 01:12)  Lactate Dehydrogenase, Serum: 1156 U/L ( @ 21:34)  Lactate Dehydrogenase, Serum: 1197 U/L ( @ 14:00)    Haptoglobin, Serum: 54 mg/dL ( @ 21:34)  Haptoglobin, Serum: 47 mg/dL ( @ 14:00)        PT/INR - ( 2025 01:12 )   PT: 15.5 sec;   INR: 1.35 ratio         PTT - ( 2025 01:12 )  PTT:34.1 sec

## 2025-01-04 NOTE — PROGRESS NOTE ADULT - SUBJECTIVE AND OBJECTIVE BOX
Patient seen and examined at the bedside.    Remained critically ill on continuous ICU monitoring.      Brief Summary:  60 yo F with Sickle cell disease and NICM now with Cardiogenic shock s/p VA ECMO on 1/4/25.      24 Hour events:  Transfused with pltx1 and Albumin 50mLx2 in CTU      OBJECTIVE:  Vital Signs Last 24 Hrs  T(C): 37.2 (04 Jan 2025 16:00), Max: 37.4 (03 Jan 2025 20:45)  T(F): 99 (04 Jan 2025 16:00), Max: 99.3 (03 Jan 2025 20:45)  HR: 124 (04 Jan 2025 19:00) (94 - 133)  BP: --  BP(mean): --  RR: 17 (04 Jan 2025 19:00) (5 - 52)  SpO2: 100% (04 Jan 2025 19:00) (79% - 100%)    Parameters below as of 04 Jan 2025 17:36  Patient On (Oxygen Delivery Method): ventilator        Mode: AC/ CMV (Assist Control/ Continuous Mandatory Ventilation)  RR (machine): 10  TV (machine): 380  FiO2: 40  PEEP: 8  ITime: 1  MAP: 10  PC: 8  PIP: 16              Physical Exam:   General: sedated, OOBTC  Neurology: intact  ENT: trachea midline  Respiratory: on vent  CV: Sinus Tachycardia  Abdominal: Soft, NT  : Costa  Extremities: warm, well perfused, moving all extremities   Skin: No Rashes, Hematoma, Ecchymosis         -------------------------------------------------------------------------------------------------------------------------------    Labs:                        10.6   7.31  )-----------( 35       ( 04 Jan 2025 12:10 )             29.6     01-04    138  |  100  |  37[H]  ----------------------------<  132[H]  4.3   |  20[L]  |  1.25    Ca    9.0      04 Jan 2025 12:10  Phos  6.2     01-04  Mg     1.9     01-04    TPro  5.5[L]  /  Alb  3.0[L]  /  TBili  3.9[H]  /  DBili  x   /  AST  80[H]  /  ALT  62[H]  /  AlkPhos  125[H]  01-04    LIVER FUNCTIONS - ( 04 Jan 2025 12:10 )  Alb: 3.0 g/dL / Pro: 5.5 g/dL / ALK PHOS: 125 U/L / ALT: 62 U/L / AST: 80 U/L / GGT: x           PT/INR - ( 04 Jan 2025 12:10 )   PT: 16.4 sec;   INR: 1.44 ratio         PTT - ( 04 Jan 2025 18:17 )  PTT:37.2 sec  ABG - ( 04 Jan 2025 18:35 )  pH, Arterial: 7.48  pH, Blood: x     /  pCO2: 33    /  pO2: 160   / HCO3: 25    / Base Excess: 1.4   /  SaO2: 99.2              ------------------------------------------------------------------------------------------------------------------------------  Assessment:  60 yo F w/ PMHx of Sickle cell disease (on hydroxyurea), HTN, 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 Pt initially presented to [facility] for SOB and SS crisis; course c/b witnessed seizure, intubated and sedated, and transferred to [facility] for further management. Now in cardiogenic shock, transferred to [facility] for further management. Cannulated for VA ECMO on 1/4/25.    Cardiogenic shock  Acute respiratory failure  Acute blood loss anemia  Thrombocytopenia  Hyperglycemia  Plan:   ***Neuro***  Sedation as needed while intubated.   Postoperative analgesia as appropriate   Monitor mental status, optimize sleep/wake schedule   1/3 - CT Head performed today, Negative     ***Cardiovascular***  Invasive hemodynamic monitoring, assess perfusion indices   At risk for hemodynamic instability and cardiac arrhythmias. Monitor hemodynamics   IVF for perioperative hypovolemia.  Ongoing titration of vasopressors and norepinephrine to maintain SB  Dobutamine and Epinephrine for inotropic support, titrate for central venous O2 saturation  Restart home meds when able   1/3 - ECHO performed today, f/u results  Send labs for Myocarditis workup, f/u results  Electrophoresis to be repeated, f/u results    ***Pulmonary***  Pressure Control 8/8/10 40%, Continue vent settings  Nitric started at 20PPM, weaned and is now at 10PPM  Postoperative acute respiratory Failure  Wean supplemental O2 to maintain SPO2  Wean ventilator as tolerated.   Encourage IS/deep breathing  Continue bronchodilator as needed.    ***GI***  Start Trickle Feeds (Vital 1.5) at 10p  Stress ulcer prophylaxis as appropriate   Bowel regimen.    ***Renal***  Costa catheter for strict I/O measurements.   Replete electrolytes as indicated.  Ongoing diuresis with Bumex gtt    ***ID***  Empiric Meropenem  MRSA swab came back positive, send vanco levels to assess standing vanco    ***Endocrine***  Stress Hyperglycemia, insulin as needed to maintain euglycemia.   Monitor blood sugars      ***Hematology***  At risk for bleeding and coagulopathy s/p cardiac surgery   Normocytic anemia, likely due to acute blood loss, trend H&H and transfuse as needed   VTE prophylaxis as appropriate    ***Misc***  Encourage OOB  PT/OT      Patient requires continuous monitoring with bedside rhythm monitoring, pulse oximetry monitoring, and continuous central venous and arterial pressure monitoring; and intermittent blood gas analysis. Care plan discussed with the ICU care team.   Patient remained critical, at risk for life threatening decompensation.    I have spent 50 minutes providing critical care management to this patient.    By signing my name below, I, Edith Ledesma, attest that this documentation has been prepared under the direction and in the presence of Presley Danielson MD  Electronically signed: Edith Ledesma, 01-04-25 @ 19:11    IPresley MD, personally performed the services described in this documentation. all medical record entries made by the scribe were at my direction and in my presence. I have reviewed the chart and agree that the record reflects my personal performance and is accurate and complete  Electronically signed: Presley Danielson MD Patient seen and examined at the bedside.    Remained critically ill on continuous ICU monitoring.      Brief Summary:  58 yo F with Sickle cell disease and NICM now with Cardiogenic shock s/p VA ECMO on 1/4/25.      24 Hour events:  Transfused with pltx1 and Albumin 50mLx2 in CTU  Remains on vent and VA ECMO, asynchronous was paralyzed last night and reversed during the day  Adjusted vent for better vent synchrony  Overnight, lost signals on LLE, LE cool, distal perfusion cannula not kinked  LUE twitching, still on EEG    OBJECTIVE:  Vital Signs Last 24 Hrs  T(C): 37.2 (04 Jan 2025 16:00), Max: 37.4 (03 Jan 2025 20:45)  T(F): 99 (04 Jan 2025 16:00), Max: 99.3 (03 Jan 2025 20:45)  HR: 124 (04 Jan 2025 19:00) (94 - 133)  BP: --  BP(mean): --  RR: 17 (04 Jan 2025 19:00) (5 - 52)  SpO2: 100% (04 Jan 2025 19:00) (79% - 100%)    Parameters below as of 04 Jan 2025 17:36  Patient On (Oxygen Delivery Method): ventilator        Mode: AC/ CMV (Assist Control/ Continuous Mandatory Ventilation)  RR (machine): 10  TV (machine): 380  FiO2: 40  PEEP: 8  ITime: 1  MAP: 10  PC: 8  PIP: 16              Physical Exam:   General: sedated  Neurology: no corneal, slight gag, not moving extremities  ENT: trachea midline  Respiratory: on vent  CV: Sinus Tachycardia  Abdominal: Soft, NT  : Costa  Extremities: LLE warm and perfused, RLE cool with no signals  ECMO Sites:  right fem venous with some oozing around site, right fem arterial with no hematoma  Skin: No Rashes, Hematoma, Ecchymosis         -------------------------------------------------------------------------------------------------------------------------------    Labs:                        10.6   7.31  )-----------( 35       ( 04 Jan 2025 12:10 )             29.6     01-04    138  |  100  |  37[H]  ----------------------------<  132[H]  4.3   |  20[L]  |  1.25    Ca    9.0      04 Jan 2025 12:10  Phos  6.2     01-04  Mg     1.9     01-04    TPro  5.5[L]  /  Alb  3.0[L]  /  TBili  3.9[H]  /  DBili  x   /  AST  80[H]  /  ALT  62[H]  /  AlkPhos  125[H]  01-04    LIVER FUNCTIONS - ( 04 Jan 2025 12:10 )  Alb: 3.0 g/dL / Pro: 5.5 g/dL / ALK PHOS: 125 U/L / ALT: 62 U/L / AST: 80 U/L / GGT: x           PT/INR - ( 04 Jan 2025 12:10 )   PT: 16.4 sec;   INR: 1.44 ratio         PTT - ( 04 Jan 2025 18:17 )  PTT:37.2 sec  ABG - ( 04 Jan 2025 18:35 )  pH, Arterial: 7.48  pH, Blood: x     /  pCO2: 33    /  pO2: 160   / HCO3: 25    / Base Excess: 1.4   /  SaO2: 99.2              ------------------------------------------------------------------------------------------------------------------------------  Assessment:  58 yo F w/ PMHx of Sickle cell disease (on hydroxyurea), HTN, 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 Pt initially presented to [facility] for SOB and SS crisis; course c/b witnessed seizure, intubated and sedated, and transferred to [facility] for further management. Now in cardiogenic shock, transferred to [facility] for further management. Cannulated for VA ECMO on 1/4/25.    Cardiogenic shock  Acute respiratory failure  Acute blood loss anemia  Thrombocytopenia  Hyperglycemia    Plan:   ***Neuro***  Sedation off pending neuro assessment  Monitor mental status, optimize sleep/wake schedule   CT Head performed today, no acute findings    ***Cardiovascular***  Invasive hemodynamic monitoring, assess perfusion indices    At risk for hemodynamic instability and cardiac arrhythmias, monitor hemodynamics and telemetry   Shock, ongoing resuscitation, volume/titration of vasopressors to maintain MAP > 65mmHg   On inotropes, ongoing titration to maintain C.I. > 2, mixed venous O2 saturation > 55, central venous O2 saturation > 65   On VA ECMO with flows 2.8-3.0, 3300 RPM, sweep 1, 100% FiO2, delta P 18  Reviewed images of TTE, RV dilated with McConnells sign    ***Pulm***  Acute hypoxic respiratory failure  Wean ventilator as able   Lung protective ventilation   Wean supplemental O2 to maintain SpO2 > 92%   On Mara, currently weaning  Chest PT/pulmonary toilet    Continue bronchodilators    ***GI***  Starting trickle TEN  Stress ulcer prophylaxis as appropriate   Bowel regimen   PRN antiemetics as needed     ***Renal***  Costa catheter for strict I/O measurements, monitor urine output   Monitor and replete electrolytes as needed   Elevated Cr, unclear if baseline CKD or DYLON, avoid nephrotoxic agents, trend BUN/Cr   Diuresis as appropriate, bumex infusion off    ***ID***  On empiric vancomycin and sean, follow cultures and discontinue as appropriate   Follow up cultures     ***Endocrine***  Stress Hyperglycemia, insulin as needed to maintain euglycemia.   Monitor blood sugars      ***Hematology***  At risk for bleeding and coagulopathy while anticoagulated on ECMO  Hx sickle cell disease s/p exchange transfusion, heme recs appreciated  Concern for fat emoblism given acute heart failure requiring mechanical support, neurologic findings, and thrombocytopenia, no obvious evidence of petechial rash  Normocytic anemia, likely due to acute blood loss and chronic anemia from sickle cell, trend H&H and transfuse as needed   VTE prophylaxis as appropriate    ***Misc***  Encourage OOB  PT/OT      Patient requires continuous monitoring with bedside rhythm monitoring, pulse oximetry monitoring, and continuous central venous and arterial pressure monitoring; and intermittent blood gas analysis. Care plan discussed with the ICU care team.   Patient remained critical, at risk for life threatening decompensation.    I have spent 50 minutes providing critical care management to this patient.    By signing my name below, I, Edithstevie Ledesma, attest that this documentation has been prepared under the direction and in the presence of Presley Danielson MD  Electronically signed: Edith Ledesma, 01-04-25 @ 19:11    I, Presley Danielson MD, personally performed the services described in this documentation. all medical record entries made by the scribe were at my direction and in my presence. I have reviewed the chart and agree that the record reflects my personal performance and is accurate and complete  Electronically signed: Presley Danielson MD Patient seen and examined at the bedside.    Remained critically ill on continuous ICU monitoring.      Brief Summary:  58 yo F with Sickle cell disease and NICM now with Cardiogenic shock s/p VA ECMO on 1/4/25.      24 Hour events:  Transfused with pltx1 and Albumin 50mLx2 in CTU  Remains on vent and VA ECMO, asynchronous was paralyzed last night and reversed during the day  Adjusted vent for better vent synchrony  Overnight, lost signals on LLE, LE cool, distal perfusion cannula not kinked  LUE twitching, still on EEG    OBJECTIVE:  Vital Signs Last 24 Hrs  T(C): 37.2 (04 Jan 2025 16:00), Max: 37.4 (03 Jan 2025 20:45)  T(F): 99 (04 Jan 2025 16:00), Max: 99.3 (03 Jan 2025 20:45)  HR: 124 (04 Jan 2025 19:00) (94 - 133)  BP: --  BP(mean): --  RR: 17 (04 Jan 2025 19:00) (5 - 52)  SpO2: 100% (04 Jan 2025 19:00) (79% - 100%)    Parameters below as of 04 Jan 2025 17:36  Patient On (Oxygen Delivery Method): ventilator        Mode: AC/ CMV (Assist Control/ Continuous Mandatory Ventilation)  RR (machine): 10  TV (machine): 380  FiO2: 40  PEEP: 8  ITime: 1  MAP: 10  PC: 8  PIP: 16              Physical Exam:   General: sedated  Neurology: no corneal, slight gag, not moving extremities  ENT: trachea midline  Respiratory: on vent  CV: Sinus Tachycardia  Abdominal: Soft, NT  : Costa  Extremities: LLE warm and perfused, RLE cool with no signals  ECMO Sites:  right fem venous with some oozing around site, right fem arterial with no hematoma  Skin: No Rashes, Hematoma, Ecchymosis         -------------------------------------------------------------------------------------------------------------------------------    Labs:                        10.6   7.31  )-----------( 35       ( 04 Jan 2025 12:10 )             29.6     01-04    138  |  100  |  37[H]  ----------------------------<  132[H]  4.3   |  20[L]  |  1.25    Ca    9.0      04 Jan 2025 12:10  Phos  6.2     01-04  Mg     1.9     01-04    TPro  5.5[L]  /  Alb  3.0[L]  /  TBili  3.9[H]  /  DBili  x   /  AST  80[H]  /  ALT  62[H]  /  AlkPhos  125[H]  01-04    LIVER FUNCTIONS - ( 04 Jan 2025 12:10 )  Alb: 3.0 g/dL / Pro: 5.5 g/dL / ALK PHOS: 125 U/L / ALT: 62 U/L / AST: 80 U/L / GGT: x           PT/INR - ( 04 Jan 2025 12:10 )   PT: 16.4 sec;   INR: 1.44 ratio         PTT - ( 04 Jan 2025 18:17 )  PTT:37.2 sec  ABG - ( 04 Jan 2025 18:35 )  pH, Arterial: 7.48  pH, Blood: x     /  pCO2: 33    /  pO2: 160   / HCO3: 25    / Base Excess: 1.4   /  SaO2: 99.2              ------------------------------------------------------------------------------------------------------------------------------  Assessment:  58 yo F w/ PMHx of Sickle cell disease (on hydroxyurea), HTN, 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 Pt initially presented to [facility] for SOB and SS crisis; course c/b witnessed seizure, intubated and sedated, and transferred to [facility] for further management. Now in cardiogenic shock, transferred to [facility] for further management. Cannulated for VA ECMO on 1/4/25.    Cardiogenic shock  Acute respiratory failure  Acute blood loss anemia  Thrombocytopenia  Hyperglycemia    Plan:   ***Neuro***  Sedation off pending neuro assessment  Monitor mental status, optimize sleep/wake schedule   CT Head performed today, no acute findings    ***Cardiovascular***  Invasive hemodynamic monitoring, assess perfusion indices    At risk for hemodynamic instability and cardiac arrhythmias, monitor hemodynamics and telemetry   Shock, ongoing resuscitation, volume/titration of vasopressors to maintain MAP > 65mmHg   On inotropes, ongoing titration to maintain C.I. > 2, mixed venous O2 saturation > 55, central venous O2 saturation > 65   On VA ECMO with flows 2.8-3.0, 3300 RPM, sweep 1, 100% FiO2, delta P 18  Reviewed images of TTE, RV dilated with McConnells sign  New findings of LLE ischemia, will get arterial duplex of extremities and aim for higher PTT goal, will trend CK and lactates    ***Pulm***  Acute hypoxic respiratory failure  Wean ventilator as able   Lung protective ventilation   Wean supplemental O2 to maintain SpO2 > 92%   On Mara, currently weaning  Chest PT/pulmonary toilet    Continue bronchodilators    ***GI***  Starting trickle TEN  Stress ulcer prophylaxis as appropriate   Bowel regimen   PRN antiemetics as needed     ***Renal***  Costa catheter for strict I/O measurements, monitor urine output   Monitor and replete electrolytes as needed   Elevated Cr, unclear if baseline CKD or DYLON, avoid nephrotoxic agents, trend BUN/Cr   Diuresis as appropriate, bumex infusion off    ***ID***  On empiric vancomycin and sean, follow cultures and discontinue as appropriate   Follow up cultures     ***Endocrine***  Stress Hyperglycemia, insulin as needed to maintain euglycemia.   Monitor blood sugars      ***Hematology***  At risk for bleeding and coagulopathy while anticoagulated on ECMO  Hx sickle cell disease s/p exchange transfusion, heme recs appreciated  Holding home hydroxyurea in setting of thrombocytopenia  Concern for fat emoblism given acute heart failure requiring mechanical support, neurologic findings, and thrombocytopenia, no obvious evidence of petechial rash  Normocytic anemia, likely due to acute blood loss and chronic anemia from sickle cell, trend H&H and transfuse as needed   Thrombocytopenia, trend platelets and monitor for bleeding       Patient requires continuous monitoring with bedside rhythm monitoring, pulse oximetry monitoring, and continuous central venous and arterial pressure monitoring; and intermittent blood gas analysis. Care plan discussed with the ICU care team.   Patient remained critical, at risk for life threatening decompensation.    I have spent 65 minutes providing critical care management to this patient.    By signing my name below, I, Edith Ledesma, attest that this documentation has been prepared under the direction and in the presence of Presley Danielson MD  Electronically signed: Edith Ledesma, 01-04-25 @ 19:11    I, Presley Danielson MD, personally performed the services described in this documentation. all medical record entries made by the scribe were at my direction and in my presence. I have reviewed the chart and agree that the record reflects my personal performance and is accurate and complete  Electronically signed: Presley Danielson MD

## 2025-01-04 NOTE — CONSULT NOTE ADULT - PROBLEM SELECTOR RECOMMENDATION 4
-was noted to have seizure at [facility], EEG neg and Keppra d/c  -EEG currently on in the CTU  -neuro to f/u Detail Level: Simple Plan: Stop all current prescription and OTC topicals except gentle face wash and moisturizer. \\n\\nGiven severity of acne and to prevent flare, will plan to start 30 mg isotretinoin once daily with largest meal of the day (rather than 40 mg which would be 0.5 mg/kg). Discussed benefit of low dose prednisone during first month but he and mother declined.\\n\\nRTC 31 days and will plan to increase to 60 mg daily if tolerating then eventually 80 mg. Render In Strict Bullet Format?: No

## 2025-01-04 NOTE — PROGRESS NOTE ADULT - SUBJECTIVE AND OBJECTIVE BOX
Patient seen and examined at the bedside.    Remains critically ill on continuous ICU monitoring.      Brief Summary:  60 yo F with Cardiogenic shock s/p VA ECMO on 1/4/25.    24 Hour events:  Transfused with 1u prbcs & 1u plts overnight in CTU    Objective:  Vital Signs Last 24 Hrs  T(C): 37.1 (04 Jan 2025 04:00), Max: 38.3 (03 Jan 2025 16:00)  T(F): 98.8 (04 Jan 2025 04:00), Max: 100.9 (03 Jan 2025 16:00)  HR: 116 (04 Jan 2025 06:23) (105 - 133)  BP: 124/71 (03 Jan 2025 16:00) (82/71 - 124/71)  BP(mean): 85 (03 Jan 2025 16:00) (66 - 107)  RR: 19 (04 Jan 2025 06:00) (11 - 52)  SpO2: 96% (04 Jan 2025 06:23) (79% - 100%)    Parameters below as of 04 Jan 2025 05:09  Patient On (Oxygen Delivery Method): ventilator    Mode: AC/ CMV (Assist Control/ Continuous Mandatory Ventilation)  RR (machine): 20  TV (machine): 380  FiO2: 40  PEEP: 8  ITime: 1  MAP: 11  PIP: 20              Physical Exam:   General: Intubated  Neurology: Sedated.  Respiratory: Clear bilaterally   CV: Sinus Tachycardia   Abdominal: Soft, Nontender  Extremities: Warm, well-perfused  -------------------------------------------------------------------------------------------------------------------------------    Labs:                        8.1    16.61 )-----------( 108      ( 04 Jan 2025 01:12 )             22.4     01-04    142  |  103  |  34[H]  ----------------------------<  130[H]  3.9   |  22  |  1.41[H]    Ca    8.7      04 Jan 2025 01:12  Phos  6.3     01-04  Mg     2.0     01-04    TPro  6.1  /  Alb  3.0[L]  /  TBili  3.2[H]  /  DBili  x   /  AST  108[H]  /  ALT  89[H]  /  AlkPhos  209[H]  01-04    LIVER FUNCTIONS - ( 04 Jan 2025 01:12 )  Alb: 3.0 g/dL / Pro: 6.1 g/dL / ALK PHOS: 209 U/L / ALT: 89 U/L / AST: 108 U/L / GGT: x           PT/INR - ( 04 Jan 2025 01:12 )   PT: 15.5 sec;   INR: 1.35 ratio         PTT - ( 04 Jan 2025 01:12 )  PTT:34.1 sec  ABG - ( 04 Jan 2025 06:03 )  pH, Arterial: 7.54  pH, Blood: x     /  pCO2: 25    /  pO2: 183   / HCO3: 21    / Base Excess: -0.7  /  SaO2: 100.0     ------------------------------------------------------------------------------------------------------------------------------  Assessment:  60 yo F w/ PMHx of Sickle cell disease (on hydroxyurea), HTN, HFimpEF/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 Pt initially presented to [facility] for SOB and SS crisis; course c/b witnessed seizure, intubated and sedated, and transferred to [facility] for further management.    Cardiogenic shock s/p VA ECMO on 1/4/25  Hemodynamic instability  Hypovolemia  Lactic acidosis  Post op respiratory insufficiency  Acute blood loss anemia  Thrombocytopenia  Stress hyperglycemia    Plan:   ***Neuro***  Sedated with Precedex & Propofol  Postoperative acute pain control with prns.    ***Cardiovascular***  At high risk for hemodynamic instability and cardiac arrhythmias.  Trend Lactate  Wean pressors for MAP > 65 mm Hg  Continue Dobutamine & Epi  VA ECMO 3300 RPMs, flow of 2.89, sweep of 1    ***Pulmonary***  Mara 20 ppms  Wean ventilator as tolerated.   Deep breathing and coughing exercises.  Wean oxygen as able.  Bronchodilator as needed.    ***GI***  NPO for now.  Protonix for stress ulcer prophylaxis     ***Renal***  Trend Creatinine   Costa catheter for strict I/O measurements.   Replete electrolytes as indicated.  Diuresing with Bumex infusion    ***ID***  cefepime  for .   WBC 16.61, continue to trend leukocytosis    ***Endocrine***  Hyperglycemia- Insulin infusion.     ***Hematology***  Acute blood loss anemia and thrombocytopenia - transfused with multiple products overnight, 1u pbrcs & 1u plts in CTU  Heparin infusion for DVT prophylaxis             Patient requires continuous monitoring with bedside rhythm monitoring, pulse oximetry monitoring, and continuous central venous and arterial pressure monitoring; and intermittent blood gas analysis. Care plan discussed with the ICU care team.   Patient remains critical, at risk for life threatening decompensation.    I have spent 30 minutes providing critical care management to this patient.    By signing my name below, I, Kiet Ramirez, attest that this documentation has been prepared under the direction and in the presence of Jyothi Comer MD  Electronically signed: Kiet Ramirez, 01-04-25 @ 06:25    I, Jyothi Comer MD, personally performed the services described in this documentation. all medical record entries made by the scribe were at my direction and in my presence. I have reviewed the chart and agree that the record reflects my personal performance and is accurate and complete  Electronically signed: Jyothi Comer MD Patient seen and examined at the bedside.    Remains critically ill on continuous ICU monitoring.      Brief Summary:  60 yo F with Cardiogenic shock s/p VA ECMO on 1/4/25.    24 Hour events:  Transfused with 1u prbcs & 1u plts overnight in CTU    Objective:  Vital Signs Last 24 Hrs  T(C): 37.1 (04 Jan 2025 04:00), Max: 38.3 (03 Jan 2025 16:00)  T(F): 98.8 (04 Jan 2025 04:00), Max: 100.9 (03 Jan 2025 16:00)  HR: 116 (04 Jan 2025 06:23) (105 - 133)  BP: 124/71 (03 Jan 2025 16:00) (82/71 - 124/71)  BP(mean): 85 (03 Jan 2025 16:00) (66 - 107)  RR: 19 (04 Jan 2025 06:00) (11 - 52)  SpO2: 96% (04 Jan 2025 06:23) (79% - 100%)    Parameters below as of 04 Jan 2025 05:09  Patient On (Oxygen Delivery Method): ventilator    Mode: AC/ CMV (Assist Control/ Continuous Mandatory Ventilation)  RR (machine): 20  TV (machine): 380  FiO2: 40  PEEP: 8  ITime: 1  MAP: 11  PIP: 20              Physical Exam:   General: Intubated  Neurology: Sedated.  Respiratory: Clear bilaterally   CV: Sinus Tachycardia   Abdominal: Soft, Nontender  Extremities: Warm, well-perfused  -------------------------------------------------------------------------------------------------------------------------------    Labs:                        8.1    16.61 )-----------( 108      ( 04 Jan 2025 01:12 )             22.4     01-04    142  |  103  |  34[H]  ----------------------------<  130[H]  3.9   |  22  |  1.41[H]    Ca    8.7      04 Jan 2025 01:12  Phos  6.3     01-04  Mg     2.0     01-04    TPro  6.1  /  Alb  3.0[L]  /  TBili  3.2[H]  /  DBili  x   /  AST  108[H]  /  ALT  89[H]  /  AlkPhos  209[H]  01-04    LIVER FUNCTIONS - ( 04 Jan 2025 01:12 )  Alb: 3.0 g/dL / Pro: 6.1 g/dL / ALK PHOS: 209 U/L / ALT: 89 U/L / AST: 108 U/L / GGT: x           PT/INR - ( 04 Jan 2025 01:12 )   PT: 15.5 sec;   INR: 1.35 ratio         PTT - ( 04 Jan 2025 01:12 )  PTT:34.1 sec  ABG - ( 04 Jan 2025 06:03 )  pH, Arterial: 7.54  pH, Blood: x     /  pCO2: 25    /  pO2: 183   / HCO3: 21    / Base Excess: -0.7  /  SaO2: 100.0     ------------------------------------------------------------------------------------------------------------------------------  Assessment:  60 yo F w/ PMHx of Sickle cell disease (on hydroxyurea), HTN, 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 Pt initially presented to [facility] for SOB and SS crisis; course c/b witnessed seizure, intubated and sedated, and transferred to [facility] for further management.    Cardiogenic shock s/p VA ECMO on 1/4/25  Hemodynamic instability  Hypovolemia  Lactic acidosis  Post op respiratory insufficiency  Acute blood loss anemia  Thrombocytopenia  Stress hyperglycemia    Plan:   ***Neuro***  Sedated with Precedex & Propofol  Postoperative acute pain control with prns.    ***Cardiovascular***  At high risk for hemodynamic instability and cardiac arrhythmias.  Trend Lactate  Wean pressors for MAP > 65 mm Hg  Continue Dobutamine & Epi  VA ECMO 3300 RPMs, flow of 2.89, sweep of 1    ***Pulmonary***  Mara 20 ppms  Wean ventilator as tolerated.   Deep breathing and coughing exercises.  Wean oxygen as able.  Bronchodilator as needed.    ***GI***  NPO for now.  Protonix for stress ulcer prophylaxis     ***Renal***  Trend Creatinine   Costa catheter for strict I/O measurements.   Replete electrolytes as indicated.  Diuresing with Bumex infusion    ***ID***  Empiric dosing with Cefepime for VA ECMO.   WBC 16.61, continue to trend leukocytosis    ***Endocrine***  Hyperglycemia - Insulin infusion.     ***Hematology***  Acute blood loss anemia and thrombocytopenia - transfused with multiple products overnight, 1u pbrcs & 1u plts in CTU  Heparin infusion for DVT prophylaxis             Patient requires continuous monitoring with bedside rhythm monitoring, pulse oximetry monitoring, and continuous central venous and arterial pressure monitoring; and intermittent blood gas analysis. Care plan discussed with the ICU care team.   Patient remains critical, at risk for life threatening decompensation.    I have spent 30 minutes providing critical care management to this patient.    By signing my name below, I, Kiet Ramirez, attest that this documentation has been prepared under the direction and in the presence of Jyothi Comer MD  Electronically signed: Kiet Ramirez, 01-04-25 @ 06:25    I, Jyothi Comer MD, personally performed the services described in this documentation. all medical record entries made by the scribe were at my direction and in my presence. I have reviewed the chart and agree that the record reflects my personal performance and is accurate and complete  Electronically signed: Jyothi Comer MD Patient seen and examined at the bedside.    Remains critically ill on continuous ICU monitoring.      Brief Summary:  60 yo F with Sickle cell anemia and NICM now with Cardiogenic shock s/p VA ECMO on 1/4/25.    24 Hour events:  Transfused with 1u prbcs & 1u plts overnight in CTU    Objective:  Vital Signs Last 24 Hrs  T(C): 37.1 (04 Jan 2025 04:00), Max: 38.3 (03 Jan 2025 16:00)  T(F): 98.8 (04 Jan 2025 04:00), Max: 100.9 (03 Jan 2025 16:00)  HR: 116 (04 Jan 2025 06:23) (105 - 133)  BP: 124/71 (03 Jan 2025 16:00) (82/71 - 124/71)  BP(mean): 85 (03 Jan 2025 16:00) (66 - 107)  RR: 19 (04 Jan 2025 06:00) (11 - 52)  SpO2: 96% (04 Jan 2025 06:23) (79% - 100%)    Parameters below as of 04 Jan 2025 05:09  Patient On (Oxygen Delivery Method): ventilator    Mode: AC/ CMV (Assist Control/ Continuous Mandatory Ventilation)  RR (machine): 20  TV (machine): 380  FiO2: 40  PEEP: 8  ITime: 1  MAP: 11  PIP: 20              Physical Exam:   General: Intubated  Neurology: Sedated.  Respiratory: Breath sounds bilaterally   CV: Sinus Tachycardia   Abdominal: Soft, Nontender  Extremities: Warm, well-perfused  Costa  -------------------------------------------------------------------------------------------------------------------------------    Labs:                        8.1    16.61 )-----------( 108      ( 04 Jan 2025 01:12 )             22.4     01-04    142  |  103  |  34[H]  ----------------------------<  130[H]  3.9   |  22  |  1.41[H]    Ca    8.7      04 Jan 2025 01:12  Phos  6.3     01-04  Mg     2.0     01-04    TPro  6.1  /  Alb  3.0[L]  /  TBili  3.2[H]  /  DBili  x   /  AST  108[H]  /  ALT  89[H]  /  AlkPhos  209[H]  01-04    LIVER FUNCTIONS - ( 04 Jan 2025 01:12 )  Alb: 3.0 g/dL / Pro: 6.1 g/dL / ALK PHOS: 209 U/L / ALT: 89 U/L / AST: 108 U/L / GGT: x           PT/INR - ( 04 Jan 2025 01:12 )   PT: 15.5 sec;   INR: 1.35 ratio         PTT - ( 04 Jan 2025 01:12 )  PTT:34.1 sec  ABG - ( 04 Jan 2025 06:03 )  pH, Arterial: 7.54  pH, Blood: x     /  pCO2: 25    /  pO2: 183   / HCO3: 21    / Base Excess: -0.7  /  SaO2: 100.0     ------------------------------------------------------------------------------------------------------------------------------  Assessment:  60 yo F w/ PMHx of Sickle cell disease (on hydroxyurea), HTN, 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 Pt initially presented to [facility] for SOB and SS crisis; course c/b witnessed seizure, intubated and sedated, and transferred to [facility] for further management. Now in cardiogenic shock, transferred to [facility] for furtherm management. Cannulated for VA ECMO on 1/4/25.    Cardiogenic shock  Acute respiratory failure  Acute blood loss anemia  Thrombocytopenia  Hyperglycemia    Plan:   ***Neuro***  Sedated with Precedex and Propofol  Paralyzed with Rocuronium for vent synchrony.  Will assess neuro status when able.  EEG today.  Postoperative acute pain control with prns.    ***Cardiovascular***  Remains on Dobutamine, Epinephrine, and VA ECMO support.  Trend Lactate  Wean pressors for MAP > 65 mm Hg  VA ECMO 3300 RPMs, flow of 2.89, sweep of 1  Wean nitric oxide.    ***Pulmonary***  Full vent support.  Deep breathing and coughing exercises.    ***GI***  NPO for now. Possible trickle tube feeds later today.  Protonix for stress ulcer prophylaxis     ***Renal***  Trend Creatinine   Costa catheter for strict I/O measurements.   Replete electrolytes as indicated.  Diuresed with Bumex infusion - now on hold.    ***ID***  Empiric Vanco / Zosyn  WBC 16.61, trend leukocytosis    ***Endocrine***  Hyperglycemia - Insulin infusion.     ***Hematology***  Acute blood loss anemia and thrombocytopenia - transfused overnight.  Sickle Cell - Red cell exchange this morning.  Will follow up Heme recs - appreciate emergent consult.  Heparin infusion for anticoagulation.      Care plan discussed with the ICU care team.   Patient remains critical, at risk for life threatening decompensation.    I have spent 55 minutes providing critical care management to this patient.    By signing my name below, I, Kiet Ramirez, attest that this documentation has been prepared under the direction and in the presence of Jyothi Comer MD  Electronically signed: Kiet Ramirez, 01-04-25 @ 06:25    I, Jyothi Comer MD, personally performed the services described in this documentation. All medical record entries made by the scribe were at my direction and in my presence. I have reviewed the chart and agree that the record reflects my personal performance and is accurate and complete  Electronically signed: Jyothi Comer MD Patient seen and examined at the bedside.    Remains critically ill on continuous ICU monitoring.      Brief Summary:  58 yo F with Sickle cell disease and NICM now with Cardiogenic shock s/p VA ECMO on 1/4/25.    24 Hour events:  Transfused with 1u prbcs & 1u plts overnight in CTU    Objective:  Vital Signs Last 24 Hrs  T(C): 37.1 (04 Jan 2025 04:00), Max: 38.3 (03 Jan 2025 16:00)  T(F): 98.8 (04 Jan 2025 04:00), Max: 100.9 (03 Jan 2025 16:00)  HR: 116 (04 Jan 2025 06:23) (105 - 133)  BP: 124/71 (03 Jan 2025 16:00) (82/71 - 124/71)  BP(mean): 85 (03 Jan 2025 16:00) (66 - 107)  RR: 19 (04 Jan 2025 06:00) (11 - 52)  SpO2: 96% (04 Jan 2025 06:23) (79% - 100%)    Parameters below as of 04 Jan 2025 05:09  Patient On (Oxygen Delivery Method): ventilator    Mode: AC/ CMV (Assist Control/ Continuous Mandatory Ventilation)  RR (machine): 20  TV (machine): 380  FiO2: 40  PEEP: 8  ITime: 1  MAP: 11  PIP: 20              Physical Exam:   General: Intubated  Neurology: Sedated.  Respiratory: Breath sounds bilaterally   CV: Sinus Tachycardia   Abdominal: Soft, Nontender  Extremities: Warm, well-perfused  Costa  -------------------------------------------------------------------------------------------------------------------------------    Labs:                        8.1    16.61 )-----------( 108      ( 04 Jan 2025 01:12 )             22.4     01-04    142  |  103  |  34[H]  ----------------------------<  130[H]  3.9   |  22  |  1.41[H]    Ca    8.7      04 Jan 2025 01:12  Phos  6.3     01-04  Mg     2.0     01-04    TPro  6.1  /  Alb  3.0[L]  /  TBili  3.2[H]  /  DBili  x   /  AST  108[H]  /  ALT  89[H]  /  AlkPhos  209[H]  01-04    LIVER FUNCTIONS - ( 04 Jan 2025 01:12 )  Alb: 3.0 g/dL / Pro: 6.1 g/dL / ALK PHOS: 209 U/L / ALT: 89 U/L / AST: 108 U/L / GGT: x           PT/INR - ( 04 Jan 2025 01:12 )   PT: 15.5 sec;   INR: 1.35 ratio         PTT - ( 04 Jan 2025 01:12 )  PTT:34.1 sec  ABG - ( 04 Jan 2025 06:03 )  pH, Arterial: 7.54  pH, Blood: x     /  pCO2: 25    /  pO2: 183   / HCO3: 21    / Base Excess: -0.7  /  SaO2: 100.0     ------------------------------------------------------------------------------------------------------------------------------  Assessment:  58 yo F w/ PMHx of Sickle cell disease (on hydroxyurea), HTN, 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 Pt initially presented to [facility] for SOB and SS crisis; course c/b witnessed seizure, intubated and sedated, and transferred to [facility] for further management. Now in cardiogenic shock, transferred to [facility] for further management. Cannulated for VA ECMO on 1/4/25.    Cardiogenic shock  Acute respiratory failure  Acute blood loss anemia  Thrombocytopenia  Hyperglycemia    Plan:   ***Neuro***  Sedated with Precedex and Propofol  Paralyzed with Rocuronium for vent synchrony.  Will assess neuro status when able. Plan for portable head CT this afternoon.  EEG today.  Postoperative acute pain control with prns.    ***Cardiovascular***  Remains on Dobutamine, Epinephrine, and VA ECMO support.  Trend Lactate  Wean pressors for MAP > 65 mm Hg  VA ECMO 3300 RPMs, flow of 2.89, sweep of 1  Wean nitric oxide.    ***Pulmonary***  Full vent support.  Deep breathing and coughing exercises.    ***GI***  NPO for now. Possible trickle tube feeds later today.  Protonix for stress ulcer prophylaxis     ***Renal***  Trend Creatinine   Costa catheter for strict I/O measurements.   Replete electrolytes as indicated.  Diuresed with Bumex infusion - now on hold.    ***ID***  Empiric Vanco / Zosyn  WBC 16.61, trend leukocytosis    ***Endocrine***  Hyperglycemia - Insulin infusion.     ***Hematology***  Acute blood loss anemia and thrombocytopenia - transfused overnight.  Sickle Cell - Red cell exchange this morning.  Will follow up Heme recs - appreciate emergent consult. Will continue to hold Hydroxyurea in setting of thrombocytopenia.  Heparin infusion for anticoagulation.      Care plan discussed with the ICU care team.   Patient remains critical, at risk for life threatening decompensation.    I have spent 55 minutes providing critical care management to this patient.    By signing my name below, I, Kiet Ramirez, attest that this documentation has been prepared under the direction and in the presence of Jyothi Comer MD  Electronically signed: Kiet Ramirez, 01-04-25 @ 06:25    I, Jyothi Comer MD, personally performed the services described in this documentation. All medical record entries made by the scribe were at my direction and in my presence. I have reviewed the chart and agree that the record reflects my personal performance and is accurate and complete  Electronically signed: Jyothi Comer MD

## 2025-01-04 NOTE — PHYSICAL THERAPY INITIAL EVALUATION ADULT - TRANSFER TRAINING, PT EVAL
GOAL: Pt will perform ALL transfers with Min A w/use of appropriate assistive device as needed, in 4 weeks.

## 2025-01-04 NOTE — CONSULT NOTE ADULT - SUBJECTIVE AND OBJECTIVE BOX
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 Pt initially presented to [facility] for SOB and SS crisis; course c/b witnessed seizure, intubated and sedated, and transferred to [facility] for further management. Keppra was discontinued at [facility] due to negative EEG. At [facility] MICU pt was found to have RV failure possibly iso pulm HTN 2/2 increased tidal volumes on the ventilator. On addition, pt possibly received a lot of volume iso SS crisis. Neuro was consulted for seizures and EEG technician was called for vEEG placement. Pt is in profound cardiogenic shock. Pt has been decompensating, despite Dobutamine gtt, Bumex gtt, Levophed, and addition of Vasopressin. Vasopressors requirements have gone up. NS shock team was called and pt is being transferred to NS for further management.  (2025 23:18)      PAST MEDICAL & SURGICAL HISTORY:  Sickle-cell-hemoglobin C disease with crisis      Essential hypertension      Sickle cell disease      HTN (hypertension)      H/O:       H/O abdominoplasty      S/P breast augmentation      S/P           Review of Systems:  14 point ROS done and found to be negative or noncontributory other than noted in HPI.    MEDICATIONS  (STANDING):  albuterol/ipratropium for Nebulization 3 milliLiter(s) Nebulizer every 6 hours  artificial tears (preservative free) Ophthalmic Solution 1 Drop(s) Both EYES four times a day  buMETAnide Infusion 2 mG/Hr (10 mL/Hr) IV Continuous <Continuous>  cefepime   IVPB 2000 milliGRAM(s) IV Intermittent every 12 hours  cefepime   IVPB      chlorhexidine 0.12% Liquid 15 milliLiter(s) Oral Mucosa every 12 hours  dexMEDEtomidine Infusion 0.5 MICROgram(s)/kG/Hr (10.5 mL/Hr) IV Continuous <Continuous>  dextrose 50% Injectable 50 milliLiter(s) IV Push every 15 minutes  dextrose 50% Injectable 25 milliLiter(s) IV Push every 15 minutes  DOBUTamine Infusion 5 MICROgram(s)/kG/Min (12.5 mL/Hr) IV Continuous <Continuous>  EPINEPHrine    Infusion 0.02 MICROgram(s)/kG/Min (6.27 mL/Hr) IV Continuous <Continuous>  fentaNYL    Injectable 25 MICROGram(s) IV Push once  heparin  Infusion 600 Unit(s)/Hr (6 mL/Hr) IV Continuous <Continuous>  insulin regular Infusion 1 Unit(s)/Hr (1 mL/Hr) IV Continuous <Continuous>  norepinephrine Infusion 0.58 MICROgram(s)/kG/Min (45.5 mL/Hr) IV Continuous <Continuous>  pantoprazole  Injectable 40 milliGRAM(s) IV Push every 12 hours  propofol Infusion 35 MICROgram(s)/kG/Min (17.6 mL/Hr) IV Continuous <Continuous>  sodium chloride 0.9% lock flush 3 milliLiter(s) IV Push every 8 hours  sodium chloride 0.9%. 1000 milliLiter(s) (10 mL/Hr) IV Continuous <Continuous>  vasopressin Infusion 0.04 Unit(s)/Min (6 mL/Hr) IV Continuous <Continuous>    MEDICATIONS  (PRN):  fentaNYL    Injectable 25 MICROGram(s) IV Push every 2 hours PRN Severe Pain (7 - 10)      HOME MEDICATIONS:    Allergies    doxycycline (Angioedema)  clindamycin (Angioedema)  linezolid (Angioedema)    Intolerances        SOCIAL HISTORY:    FAMILY HISTORY:  FHx: cerebral palsy (Child)        Vital Signs Last 24 Hrs  T(C): 37.1 (2025 04:00), Max: 38.3 (2025 16:00)  T(F): 98.8 (2025 04:00), Max: 100.9 (2025 16:00)  HR: 114 (2025 07:15) (105 - 133)  BP: 124/71 (2025 16:00) (82/71 - 124/71)  BP(mean): 85 (2025 16:00) (66 - 107)  RR: 17 (2025 07:15) (5 - 52)  SpO2: 96% (2025 06:23) (79% - 100%)    Parameters below as of 2025 05:09  Patient On (Oxygen Delivery Method): ventilator        Tele:    General: No distress. Comfortable.  HEENT: EOM intact.  Neck: Neck supple. JVP not elevated. No masses  Chest: Clear to auscultation bilaterally  CV: Normal S1 and S2. No murmurs, rub, or gallops. Radial pulses normal.  Abdomen: Soft, non-distended, non-tender  Skin: No rashes or skin breakdown  Neurology: Alert and oriented times three. Sensation intact  Psych: Affect normal    LABS:                        8.1    16.61 )-----------( 108      ( 2025 01:12 )             22.4     -    142  |  103  |  34[H]  ----------------------------<  130[H]  3.9   |  22  |  1.41[H]    Ca    8.7      2025 01:12  Phos  6.3     -  Mg     2.0     -    TPro  6.1  /  Alb  3.0[L]  /  TBili  3.2[H]  /  DBili  x   /  AST  108[H]  /  ALT  89[H]  /  AlkPhos  209[H]  -04    PT/INR - ( 2025 01:12 )   PT: 15.5 sec;   INR: 1.35 ratio         PTT - ( 2025 01:12 )  PTT:34.1 sec  Urinalysis Basic - ( 2025 01:12 )    Color: x / Appearance: x / SG: x / pH: x  Gluc: 130 mg/dL / Ketone: x  / Bili: x / Urobili: x   Blood: x / Protein: x / Nitrite: x   Leuk Esterase: x / RBC: x / WBC x   Sq Epi: x / Non Sq Epi: x / Bacteria: x        RADIOLOGY & ADDITIONAL STUDIES: HPI:  59F PMH Sickle cell disease (on hydroxyurea), HTN, 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 Pt initially presented to [facility] for SOB and SS crisis; course c/b witnessed seizure, intubated and sedated, and transferred to [facility] for further management. Keppra was discontinued at [facility] due to negative EEG. At [facility] MICU pt was found to have RV failure possibly iso pulm HTN 2/2 increased tidal volumes on the ventilator. On addition, pt possibly received a lot of volume iso SS crisis. Neuro was consulted for seizures and EEG technician was called for vEEG placement. Pt is in profound cardiogenic shock. Pt has been decompensating, despite Dobutamine gtt, Bumex gtt, Levophed, and addition of Vasopressin. Vasopressors requirements have gone up. NS shock team was called and pt is being transferred to NS for further management.  (2025 23:18)      PAST MEDICAL & SURGICAL HISTORY:  Sickle-cell-hemoglobin C disease with crisis      Essential hypertension      Sickle cell disease      HTN (hypertension)      H/O:       H/O abdominoplasty      S/P breast augmentation      S/P           Review of Systems:  14 point ROS done and found to be negative or noncontributory other than noted in HPI.    MEDICATIONS  (STANDING):  albuterol/ipratropium for Nebulization 3 milliLiter(s) Nebulizer every 6 hours  artificial tears (preservative free) Ophthalmic Solution 1 Drop(s) Both EYES four times a day  buMETAnide Infusion 2 mG/Hr (10 mL/Hr) IV Continuous <Continuous>  cefepime   IVPB 2000 milliGRAM(s) IV Intermittent every 12 hours  cefepime   IVPB      chlorhexidine 0.12% Liquid 15 milliLiter(s) Oral Mucosa every 12 hours  dexMEDEtomidine Infusion 0.5 MICROgram(s)/kG/Hr (10.5 mL/Hr) IV Continuous <Continuous>  dextrose 50% Injectable 50 milliLiter(s) IV Push every 15 minutes  dextrose 50% Injectable 25 milliLiter(s) IV Push every 15 minutes  DOBUTamine Infusion 5 MICROgram(s)/kG/Min (12.5 mL/Hr) IV Continuous <Continuous>  EPINEPHrine    Infusion 0.02 MICROgram(s)/kG/Min (6.27 mL/Hr) IV Continuous <Continuous>  fentaNYL    Injectable 25 MICROGram(s) IV Push once  heparin  Infusion 600 Unit(s)/Hr (6 mL/Hr) IV Continuous <Continuous>  insulin regular Infusion 1 Unit(s)/Hr (1 mL/Hr) IV Continuous <Continuous>  norepinephrine Infusion 0.58 MICROgram(s)/kG/Min (45.5 mL/Hr) IV Continuous <Continuous>  pantoprazole  Injectable 40 milliGRAM(s) IV Push every 12 hours  propofol Infusion 35 MICROgram(s)/kG/Min (17.6 mL/Hr) IV Continuous <Continuous>  sodium chloride 0.9% lock flush 3 milliLiter(s) IV Push every 8 hours  sodium chloride 0.9%. 1000 milliLiter(s) (10 mL/Hr) IV Continuous <Continuous>  vasopressin Infusion 0.04 Unit(s)/Min (6 mL/Hr) IV Continuous <Continuous>    MEDICATIONS  (PRN):  fentaNYL    Injectable 25 MICROGram(s) IV Push every 2 hours PRN Severe Pain (7 - 10)      HOME MEDICATIONS:    Allergies    doxycycline (Angioedema)  clindamycin (Angioedema)  linezolid (Angioedema)    Intolerances        SOCIAL HISTORY:    FAMILY HISTORY:  FHx: cerebral palsy (Child)        Vital Signs Last 24 Hrs  T(C): 37.1 (2025 04:00), Max: 38.3 (2025 16:00)  T(F): 98.8 (2025 04:00), Max: 100.9 (2025 16:00)  HR: 114 (2025 07:15) (105 - 133)  BP: 124/71 (2025 16:00) (82/71 - 124/71)  BP(mean): 85 (2025 16:00) (66 - 107)  RR: 17 (2025 07:15) (5 - 52)  SpO2: 96% (2025 06:23) (79% - 100%)    Parameters below as of 2025 05:09  Patient On (Oxygen Delivery Method): ventilator    VA ECMO, 25, RPM 2200    Tele: ST 115s    General: Sedated.  Neck: Difficult to asssess, but appears JVP elevated  Chest: Mechanically vented  CV: on VA ECMO  Abdomen: Soft, non-distended, non-tender  Skin: No rashes or skin breakdown  Neurology: sedated  Psych: sedated    LABS:                        8.1    16.61 )-----------( 108      ( 2025 01:12 )             22.4     -    142  |  103  |  34[H]  ----------------------------<  130[H]  3.9   |  22  |  1.41[H]    Ca    8.7      2025 01:12  Phos  6.3     -  Mg     2.0         TPro  6.1  /  Alb  3.0[L]  /  TBili  3.2[H]  /  DBili  x   /  AST  108[H]  /  ALT  89[H]  /  AlkPhos  209[H]  -    PT/INR - ( 2025 01:12 )   PT: 15.5 sec;   INR: 1.35 ratio         PTT - ( 2025 01:12 )  PTT:34.1 sec  Urinalysis Basic - ( 2025 01:12 )    Color: x / Appearance: x / SG: x / pH: x  Gluc: 130 mg/dL / Ketone: x  / Bili: x / Urobili: x   Blood: x / Protein: x / Nitrite: x   Leuk Esterase: x / RBC: x / WBC x   Sq Epi: x / Non Sq Epi: x / Bacteria: x        RADIOLOGY & ADDITIONAL STUDIES:

## 2025-01-04 NOTE — PHYSICAL THERAPY INITIAL EVALUATION ADULT - GAIT TRAINING, PT EVAL
GOAL: Pt will ambulate 5 ft bed to chair with Min A w/use of appropriate assistive device in 4 weeks

## 2025-01-04 NOTE — CHART NOTE - NSCHARTNOTEFT_GEN_A_CORE
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 Unclear cause of shock but patient has had h/o non ischemic cardiomyopathy which might have been tipped over from possible fluid overload at outside hospital.          Initially presented to [place] ED on 12/30 for back, head and shoulder pain resembling sickle cell pain crisis, was given benadyl, dilaudid and tylenol and was discharged. On 12/31, patient returned to hospital for similar complaints. On 01/02 was found unresponsive requiring intubation. There was a concern for seizure given she had tongue biting and urinary incontinence. Seizure was thought to be caused by clonazepam withdrawal per OSH. She was also found to have elevated troponins ~900s likely due to demand ischemia. She was placed on ECMO on after admission to [facility] and intubated on pressors.                                                                                                                       # Hb SC disease  - patient with 1-2 sickle pain crisis per year and on hydrea, had retinal detachment s/p repair                           - patient had f/up with cardio in Sept 2024: per the note, unclear etiology of her dyspnea but possibly driven by cardiomyopathy; low suspicion of pulmonary HTN as no RV dysfunction/severe TR. However, this admission, she had signs of RV failure; unclear why. Recommended checking Hb electrophoresis.   - there is no clear cause for sudden onset RV failure and seizure; will initiate exchange transfusion.  - Case discussed with blood bank and Dr. Carissa Cole catheter placed by the CTU team; pending confirmation

## 2025-01-04 NOTE — CHART NOTE - NSCHARTNOTEFT_GEN_A_CORE
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 Pt initially presented to [facility] for SOB and SS crisis; course c/b witnessed seizure, intubated and sedated, and transferred to [facility] for further management. Keppra was discontinued at [facility] due to negative EEG. At [facility] MICU pt was found to have RV failure.     Pt was in profound cardiogenic shock. Pt has been decompensating, despite Dobutamine gtt, Bumex gtt, Levophed, and addition of Vasopressin. Vasopressors requirements have gone up. NS shock team was called and pt transferred to NS for further management. Patient was cannulated on peripheral VA ECMO.     Our Transfusion Medicine team is consulted for emergent RBC exchange to minimize the risk of complications related to SCD as this patient is critically ill with multi-organ failure, on ECMO. The patient also has a history of having a seizure, etiology unclear.    An emergent RBC exchange with a target FCR of 30% and an end hematocrit of 30% was performed.    The patient tolerated the procedure well. No additional RBC exchanges are scheduled at this time.

## 2025-01-04 NOTE — CONSULT NOTE ADULT - SUBJECTIVE AND OBJECTIVE BOX
Neurology - Consult Note    -  Spectra: [phone] ([facility]), [phone] ([facility])  -    HPI: Patient CARSON LEE is a 59y (1965) man with a PMHx significant for sickle cell disease (on hydroxyurea), HTN, HFimpEF/NICM (EF 58% 10/2024)originally presented to [facility] (1/3) as a transfer from [facility]. Pt initially presented to [facility] (1/2) for SOB and SS crisis; course c/b witnessed seizure, intubated and sedated, and transferred to [facility] for further management. Keppra was discontinued at [facility] due to negative EEG. At [facility] MICU pt was found to have RV failure possibly iso pulm HTN 2/2 increased tidal volumes on the ventilator. On addition, pt possibly received a lot of volume iso SS crisis. Patient in profound cardiogenic shock. Pt has been decompensating, despite Dobutamine gtt, Bumex gtt, Levophed, and addition of Vasopressin. Vasopressors requirements have gone up. Patient transferred to [facility] for R heart cath and possible VA ECMO. Patient was cannulated on peripheral [facility] ECMO (1/4). Neurology consulted  (1/4) for alleged witnessed seizure at [facility].     Review of Systems: unable to obtain due to intubated and recently off sedation    Allergies:  doxycycline (Angioedema)  clindamycin (Angioedema)  linezolid (Angioedema)    PMHx/PSHx/Family Hx: As above, otherwise see below   Sickle-cell-hemoglobin C disease with crisis  Essential hypertension  Sickle cell trait  Sickle cell disease  HTN (hypertension)    Social Hx: No current use of tobacco, alcohol, or illicit drugs    Medications:  MEDICATIONS  (STANDING):  albuterol/ipratropium for Nebulization 3 milliLiter(s) Nebulizer every 6 hours  artificial tears (preservative free) Ophthalmic Solution 1 Drop(s) Both EYES four times a day  buMETAnide Infusion 2 mG/Hr (10 mL/Hr) IV Continuous <Continuous>  chlorhexidine 0.12% Liquid 15 milliLiter(s) Oral Mucosa every 12 hours  dexMEDEtomidine Infusion 0.5 MICROgram(s)/kG/Hr (10.5 mL/Hr) IV Continuous <Continuous>  dextrose 50% Injectable 50 milliLiter(s) IV Push every 15 minutes  dextrose 50% Injectable 25 milliLiter(s) IV Push every 15 minutes  DOBUTamine Infusion 5 MICROgram(s)/kG/Min (12.5 mL/Hr) IV Continuous <Continuous>  EPINEPHrine    Infusion 0.02 MICROgram(s)/kG/Min (6.27 mL/Hr) IV Continuous <Continuous>  heparin  Infusion 600 Unit(s)/Hr (6 mL/Hr) IV Continuous <Continuous>  insulin regular Infusion 1 Unit(s)/Hr (1 mL/Hr) IV Continuous <Continuous>  meropenem  IVPB 1000 milliGRAM(s) IV Intermittent every 12 hours  norepinephrine Infusion 0.58 MICROgram(s)/kG/Min (45.5 mL/Hr) IV Continuous <Continuous>  pantoprazole  Injectable 40 milliGRAM(s) IV Push every 12 hours  propofol Infusion 35 MICROgram(s)/kG/Min (17.6 mL/Hr) IV Continuous <Continuous>  sodium chloride 0.9% lock flush 3 milliLiter(s) IV Push every 8 hours  sodium chloride 0.9%. 1000 milliLiter(s) (10 mL/Hr) IV Continuous <Continuous>  vasopressin Infusion 0.04 Unit(s)/Min (6 mL/Hr) IV Continuous <Continuous>  MEDICATIONS  (PRN):  fentaNYL    Injectable 25 MICROGram(s) IV Push every 2 hours PRN Severe Pain (7 - 10)    Vitals:  T(C): 37.1 (01-04-25 @ 12:00), Max: 38.3 (01-03-25 @ 16:00)  HR: 114 (01-04-25 @ 13:45) (102 - 133)  BP: 124/71 (01-03-25 @ 16:00) (82/71 - 124/71)  RR: 26 (01-04-25 @ 13:45) (5 - 52)  SpO2: 96% (01-04-25 @ 11:29) (79% - 100%)    INCOMPLETE  Neurologic Exam: limited due to intubated and recently off sedation  Mental status - Awake, Alert, Oriented to person, place, and time. Speech fluent, repetition and naming intact. Follows simple and complex commands. Attention/concentration, recent and remote memory (including registration and recall), and fund of knowledge intact    Cranial nerves - PERRLA, VFF, EOMI, face sensation (V1-V3) intact b/l, facial strength intact without asymmetry b/l, hearing intact b/l, palate with symmetric elevation, trapezius OR sternocleidomastiod 5/5 strength b/l, tongue midline on protrusion with full lateral movement    Motor - Normal bulk and tone throughout. No pronator drift.  Strength testing            Deltoid      Biceps      Triceps     Wrist Extension    Wrist Flexion     Interossei         R            5                 5               5                     5                              5                        5                 5  L             5                 5               5                     5                              5                        5                 5              Hip Flexion    Hip Extension    Knee Flexion    Knee Extension    Dorsiflexion    Plantar Flexion  R              5                           5                       5                           5                            5                          5  L              5                           5                        5                           5                            5                          5    Sensation - Light touch/temperature OR pain/vibration intact throughout    DTR's -             Biceps      Triceps     Brachioradialis      Patellar    Ankle    Toes/plantar response  R             2+             2+                  2+                       2+            2+                 Down  L              2+             2+                 2+                        2+           2+                 Down    Coordination - Finger to Nose intact b/l. No tremors appreciated    Gait and station - Normal casual gait. Romberg (-)    Labs:                        10.6   7.31  )-----------( 35       ( 04 Jan 2025 12:10 )             29.6     01-04    138  |  100  |  37[H]  ----------------------------<  132[H]  4.3   |  20[L]  |  1.25    Ca    9.0      04 Jan 2025 12:10  Phos  6.2     01-04  Mg     1.9     01-04    TPro  5.5[L]  /  Alb  3.0[L]  /  TBili  3.9[H]  /  DBili  x   /  AST  80[H]  /  ALT  62[H]  /  AlkPhos  125[H]  01-04    CAPILLARY BLOOD GLUCOSE  107 (04 Jan 2025 14:00)    POCT Blood Glucose.: 107 mg/dL (04 Jan 2025 13:48)    LIVER FUNCTIONS - ( 04 Jan 2025 12:10 )  Alb: 3.0 g/dL / Pro: 5.5 g/dL / ALK PHOS: 125 U/L / ALT: 62 U/L / AST: 80 U/L / GGT: x           Culture - Sputum (collected 04 Jan 2025 03:38)  Source: ET Tube ET Tube  Gram Stain (04 Jan 2025 13:03):    Moderate polymorphonuclear leukocytes per low power field    Rare Squamous epithelial cells per low power field    Few Gram Positive Cocci in Clusters seen per oil power field    Rare Gram Negative Rods seen per oil power field    Rare Gram Positive Rods seen per oil power field    PT/INR - ( 04 Jan 2025 12:10 )   PT: 16.4 sec;   INR: 1.44 ratio    PTT - ( 04 Jan 2025 12:10 )  PTT:40.4 sec    Radiology:  TTE 1/4/25: LVEF <20%, ECMO cannula in RV, mildly enlarged RV size and reduced RV systolic function, no pericardial effusion, no MR, trace TR, IVC nml size   TTE 1/3/25: LVIDd 3.1cm, LVEF appears grossly normal, RV severly enlarged size, reduced function, TAPSE 1.1cm, nml RA, trace pericardial effusion, mod/severe TR PASP 45,       EEG 1/4/25 5 hr study  1.	Burst-attenuation/suppression pattern  2.	Asymmetry, greater discontinuity right hemisphere  3.	Asymmetry, relative absence of faster frequencies, right hemisphere.  Impression:  Abnormal prolonged EEG study. At the outset there is evidence of moderately severe diffuse cerebral dysfunction, worse in the right hemisphere.  Burst suppression background and corresponding degree of cerebral dysfunction increase over course of the almost 5h recording. Finding of diffuse cerebral dysfunction is not specific for etiology and may be result – at least in part – of sedative medication use.  Finding of asymmetries affecting right hemisphere may reflect underlying structural abnormalities, but may also be seen postictally ipsilateral to the seizure focus.  No clear epileptic discharges recorded. No seizures recorded.   Neurology - Consult Note    -  Spectra: [phone] ([facility]), [phone] ([facility])  -    HPI: Patient CARSON LEE is a 59y (1965) man with a PMHx significant for sickle cell disease (on hydroxyurea), HTN, HFimpEF/NICM (EF 58% 10/2024)[facility] as a transfer from [facility]. Pt initially presented to [facility] (1/2) for SOB and SS crisis; course c/b witnessed seizure, intubated and sedated, and transferred to [facility] for further management. Keppra was discontinued at [facility] due to negative EEG. At [facility] MICU pt was found to have RV failure possibly iso pulm HTN 2/2 increased tidal volumes on the ventilator. On addition, pt possibly received a lot of volume iso SS crisis. Patient in profound cardiogenic shock. Pt has been decompensating, despite Dobutamine gtt, Bumex gtt, Levophed, and addition of Vasopressin. Vasopressors requirements have gone up. Patient transferred to [facility] for R heart cath and possible VA ECMO. Patient was cannulated on peripheral [facility] ECMO (1/4). Neurology consulted  (1/4) for alleged witnessed seizure at [facility].     Review of Systems: unable to obtain due to intubated & on sedation    Allergies:  doxycycline (Angioedema)  clindamycin (Angioedema)  linezolid (Angioedema)    PMHx/PSHx/Family Hx: As above, otherwise see below   Sickle-cell-hemoglobin C disease with crisis  Essential hypertension  Sickle cell trait  Sickle cell disease  HTN (hypertension)    Social Hx: No current use of tobacco, alcohol, or illicit drugs    Medications:  MEDICATIONS  (STANDING):  albuterol/ipratropium for Nebulization 3 milliLiter(s) Nebulizer every 6 hours  artificial tears (preservative free) Ophthalmic Solution 1 Drop(s) Both EYES four times a day  buMETAnide Infusion 2 mG/Hr (10 mL/Hr) IV Continuous <Continuous>  chlorhexidine 0.12% Liquid 15 milliLiter(s) Oral Mucosa every 12 hours  dexMEDEtomidine Infusion 0.5 MICROgram(s)/kG/Hr (10.5 mL/Hr) IV Continuous <Continuous>  dextrose 50% Injectable 50 milliLiter(s) IV Push every 15 minutes  dextrose 50% Injectable 25 milliLiter(s) IV Push every 15 minutes  DOBUTamine Infusion 5 MICROgram(s)/kG/Min (12.5 mL/Hr) IV Continuous <Continuous>  EPINEPHrine    Infusion 0.02 MICROgram(s)/kG/Min (6.27 mL/Hr) IV Continuous <Continuous>  heparin  Infusion 600 Unit(s)/Hr (6 mL/Hr) IV Continuous <Continuous>  insulin regular Infusion 1 Unit(s)/Hr (1 mL/Hr) IV Continuous <Continuous>  meropenem  IVPB 1000 milliGRAM(s) IV Intermittent every 12 hours  norepinephrine Infusion 0.58 MICROgram(s)/kG/Min (45.5 mL/Hr) IV Continuous <Continuous>  pantoprazole  Injectable 40 milliGRAM(s) IV Push every 12 hours  propofol Infusion 35 MICROgram(s)/kG/Min (17.6 mL/Hr) IV Continuous <Continuous>  sodium chloride 0.9% lock flush 3 milliLiter(s) IV Push every 8 hours  sodium chloride 0.9%. 1000 milliLiter(s) (10 mL/Hr) IV Continuous <Continuous>  vasopressin Infusion 0.04 Unit(s)/Min (6 mL/Hr) IV Continuous <Continuous>  MEDICATIONS  (PRN):  fentaNYL    Injectable 25 MICROGram(s) IV Push every 2 hours PRN Severe Pain (7 - 10)    Vitals:  T(C): 37.1 (01-04-25 @ 12:00), Max: 38.3 (01-03-25 @ 16:00)  HR: 114 (01-04-25 @ 13:45) (102 - 133)  BP: 124/71 (01-03-25 @ 16:00) (82/71 - 124/71)  RR: 26 (01-04-25 @ 13:45) (5 - 52)  SpO2: 96% (01-04-25 @ 11:29) (79% - 100%)      Neurologic Exam: limited due to intubated and on sedation (propofol stopped upon exam & on precedex 0.5)  Mental status - right eye slightly open. no response to verbal, tactile, noxious stimuli. no verbal ouput  Cranial nerves - PERRLA, no blink to threat b/l, absent corneal reflex, oculocephalic,cough or gag reflex    Motor - flaccid tone & no movement in all extremities,  Sensation - no response to noxious stimuli in all extremities & supraoribtal b/l    DTR's -             Biceps      Brachioradialis      Patellar    Ankle    Toes/plantar response  R             0                     1+               0             0              mute  L            0                    1+                  0           0                mtue    Coordination - MENDEL    Gait and station - MENDEL  Labs:                        10.6   7.31  )-----------( 35       ( 04 Jan 2025 12:10 )             29.6     01-04    138  |  100  |  37[H]  ----------------------------<  132[H]  4.3   |  20[L]  |  1.25    Ca    9.0      04 Jan 2025 12:10  Phos  6.2     01-04  Mg     1.9     01-04    TPro  5.5[L]  /  Alb  3.0[L]  /  TBili  3.9[H]  /  DBili  x   /  AST  80[H]  /  ALT  62[H]  /  AlkPhos  125[H]  01-04    CAPILLARY BLOOD GLUCOSE  107 (04 Jan 2025 14:00)    POCT Blood Glucose.: 107 mg/dL (04 Jan 2025 13:48)    LIVER FUNCTIONS - ( 04 Jan 2025 12:10 )  Alb: 3.0 g/dL / Pro: 5.5 g/dL / ALK PHOS: 125 U/L / ALT: 62 U/L / AST: 80 U/L / GGT: x           Culture - Sputum (collected 04 Jan 2025 03:38)  Source: ET Tube ET Tube  Gram Stain (04 Jan 2025 13:03):    Moderate polymorphonuclear leukocytes per low power field    Rare Squamous epithelial cells per low power field    Few Gram Positive Cocci in Clusters seen per oil power field    Rare Gram Negative Rods seen per oil power field    Rare Gram Positive Rods seen per oil power field    PT/INR - ( 04 Jan 2025 12:10 )   PT: 16.4 sec;   INR: 1.44 ratio    PTT - ( 04 Jan 2025 12:10 )  PTT:40.4 sec    Radiology:  TTE 1/4/25: LVEF <20%, ECMO cannula in RV, mildly enlarged RV size and reduced RV systolic function, no pericardial effusion, no MR, trace TR, IVC nml size   TTE 1/3/25: LVIDd 3.1cm, LVEF appears grossly normal, RV severly enlarged size, reduced function, TAPSE 1.1cm, nml RA, trace pericardial effusion, mod/severe TR PASP 45,       EEG 1/4/25 5 hr study  1.	Burst-attenuation/suppression pattern  2.	Asymmetry, greater discontinuity right hemisphere  3.	Asymmetry, relative absence of faster frequencies, right hemisphere.  Impression:  Abnormal prolonged EEG study. At the outset there is evidence of moderately severe diffuse cerebral dysfunction, worse in the right hemisphere.  Burst suppression background and corresponding degree of cerebral dysfunction increase over course of the almost 5h recording. Finding of diffuse cerebral dysfunction is not specific for etiology and may be result – at least in part – of sedative medication use.  Finding of asymmetries affecting right hemisphere may reflect underlying structural abnormalities, but may also be seen postictally ipsilateral to the seizure focus.  No clear epileptic discharges recorded. No seizures recorded.

## 2025-01-04 NOTE — PROVIDER CONTACT NOTE (CHANGE IN STATUS NOTIFICATION) - RECOMMENDATIONS
MD Tenure to bedside, reassess with doppler, unable to find. Possible duplex to be ordered.
Adhere to PA reccs, ECMO warmer put to 36 C, PA Iliana to bedside, pressors maintained.
PROVIDERS TO BEDSIDE, consulted with MD Parra, planned for VA ECMO cannulation at bedside. See documentation.

## 2025-01-05 LAB
ADD ON TEST-SPECIMEN IN LAB: SIGNIFICANT CHANGE UP
ALBUMIN SERPL ELPH-MCNC: 2.9 G/DL — LOW (ref 3.3–5)
ALBUMIN SERPL ELPH-MCNC: 3.4 G/DL — SIGNIFICANT CHANGE UP (ref 3.3–5)
ALP SERPL-CCNC: 124 U/L — HIGH (ref 40–120)
ALP SERPL-CCNC: 127 U/L — HIGH (ref 40–120)
ALT FLD-CCNC: 69 U/L — HIGH (ref 10–45)
ALT FLD-CCNC: 72 U/L — HIGH (ref 10–45)
ANION GAP SERPL CALC-SCNC: 14 MMOL/L — SIGNIFICANT CHANGE UP (ref 5–17)
ANION GAP SERPL CALC-SCNC: 18 MMOL/L — HIGH (ref 5–17)
APTT BLD: 41.8 SEC — HIGH (ref 24.5–35.6)
APTT BLD: 42.2 SEC — HIGH (ref 24.5–35.6)
APTT BLD: 43.1 SEC — HIGH (ref 24.5–35.6)
APTT BLD: 45.4 SEC — HIGH (ref 24.5–35.6)
AST SERPL-CCNC: 76 U/L — HIGH (ref 10–40)
AST SERPL-CCNC: 80 U/L — HIGH (ref 10–40)
BASE EXCESS BLDMV CALC-SCNC: 0.8 MMOL/L — SIGNIFICANT CHANGE UP (ref -3–3)
BASE EXCESS BLDMV CALC-SCNC: 2.9 MMOL/L — SIGNIFICANT CHANGE UP (ref -3–3)
BASE EXCESS BLDMV CALC-SCNC: 5.6 MMOL/L — HIGH (ref -3–3)
BASE EXCESS BLDMV CALC-SCNC: 5.8 MMOL/L — HIGH (ref -3–3)
BASE EXCESS BLDMV CALC-SCNC: 6.4 MMOL/L — HIGH (ref -3–3)
BASE EXCESS BLDV CALC-SCNC: 5.8 MMOL/L — HIGH (ref -2–3)
BASOPHILS # BLD AUTO: 0 K/UL — SIGNIFICANT CHANGE UP (ref 0–0.2)
BASOPHILS # BLD AUTO: 0.05 K/UL — SIGNIFICANT CHANGE UP (ref 0–0.2)
BASOPHILS NFR BLD AUTO: 0 % — SIGNIFICANT CHANGE UP (ref 0–2)
BASOPHILS NFR BLD AUTO: 0.4 % — SIGNIFICANT CHANGE UP (ref 0–2)
BILIRUB DIRECT SERPL-MCNC: 3.7 MG/DL — HIGH (ref 0–0.3)
BILIRUB INDIRECT FLD-MCNC: 1.7 MG/DL — HIGH (ref 0.2–1)
BILIRUB SERPL-MCNC: 5.2 MG/DL — HIGH (ref 0.2–1.2)
BILIRUB SERPL-MCNC: 5.4 MG/DL — HIGH (ref 0.2–1.2)
BILIRUB SERPL-MCNC: 6.1 MG/DL — HIGH (ref 0.2–1.2)
BLOOD GAS ECMO POST MEMBRANE - ARTERIAL RESULT: SIGNIFICANT CHANGE UP
BLOOD GAS ECMO PRE MEMBRANE - VENOUS RESULT: SIGNIFICANT CHANGE UP
BUN SERPL-MCNC: 33 MG/DL — HIGH (ref 7–23)
BUN SERPL-MCNC: 35 MG/DL — HIGH (ref 7–23)
CALCIUM SERPL-MCNC: 9 MG/DL — SIGNIFICANT CHANGE UP (ref 8.4–10.5)
CALCIUM SERPL-MCNC: 9 MG/DL — SIGNIFICANT CHANGE UP (ref 8.4–10.5)
CHLORIDE SERPL-SCNC: 102 MMOL/L — SIGNIFICANT CHANGE UP (ref 96–108)
CHLORIDE SERPL-SCNC: 104 MMOL/L — SIGNIFICANT CHANGE UP (ref 96–108)
CK SERPL-CCNC: 1219 U/L — HIGH (ref 25–170)
CK SERPL-CCNC: 1656 U/L — HIGH (ref 25–170)
CK SERPL-CCNC: 1738 U/L — HIGH (ref 25–170)
CK SERPL-CCNC: 931 U/L — HIGH (ref 25–170)
CLOSURE TME COLL+EPINEP BLD: 85 K/UL — LOW (ref 150–400)
CO2 BLDMV-SCNC: 26 MMOL/L — SIGNIFICANT CHANGE UP (ref 21–29)
CO2 BLDMV-SCNC: 29 MMOL/L — SIGNIFICANT CHANGE UP (ref 21–29)
CO2 BLDMV-SCNC: 31 MMOL/L — HIGH (ref 21–29)
CO2 BLDMV-SCNC: 31 MMOL/L — HIGH (ref 21–29)
CO2 BLDMV-SCNC: 33 MMOL/L — HIGH (ref 21–29)
CO2 BLDV-SCNC: 31 MMOL/L — HIGH (ref 22–26)
CO2 SERPL-SCNC: 22 MMOL/L — SIGNIFICANT CHANGE UP (ref 22–31)
CO2 SERPL-SCNC: 25 MMOL/L — SIGNIFICANT CHANGE UP (ref 22–31)
CREAT SERPL-MCNC: 1.08 MG/DL — SIGNIFICANT CHANGE UP (ref 0.5–1.3)
CREAT SERPL-MCNC: 1.2 MG/DL — SIGNIFICANT CHANGE UP (ref 0.5–1.3)
EGFR: 53 ML/MIN/1.73M2 — LOW
EGFR: 60 ML/MIN/1.73M2 — SIGNIFICANT CHANGE UP
EOSINOPHIL # BLD AUTO: 0 K/UL — SIGNIFICANT CHANGE UP (ref 0–0.5)
EOSINOPHIL # BLD AUTO: 0.01 K/UL — SIGNIFICANT CHANGE UP (ref 0–0.5)
EOSINOPHIL NFR BLD AUTO: 0 % — SIGNIFICANT CHANGE UP (ref 0–6)
EOSINOPHIL NFR BLD AUTO: 0.1 % — SIGNIFICANT CHANGE UP (ref 0–6)
FIBRINOGEN PPP-MCNC: 518 MG/DL — HIGH (ref 200–445)
FIBRINOGEN PPP-MCNC: 668 MG/DL — HIGH (ref 200–445)
GAS PNL BLDA: SIGNIFICANT CHANGE UP
GAS PNL BLDMV: SIGNIFICANT CHANGE UP
GIANT PLATELETS BLD QL SMEAR: PRESENT — SIGNIFICANT CHANGE UP
GLUCOSE BLDC GLUCOMTR-MCNC: 102 MG/DL — HIGH (ref 70–99)
GLUCOSE BLDC GLUCOMTR-MCNC: 108 MG/DL — HIGH (ref 70–99)
GLUCOSE BLDC GLUCOMTR-MCNC: 109 MG/DL — HIGH (ref 70–99)
GLUCOSE BLDC GLUCOMTR-MCNC: 110 MG/DL — HIGH (ref 70–99)
GLUCOSE BLDC GLUCOMTR-MCNC: 110 MG/DL — HIGH (ref 70–99)
GLUCOSE BLDC GLUCOMTR-MCNC: 112 MG/DL — HIGH (ref 70–99)
GLUCOSE BLDC GLUCOMTR-MCNC: 113 MG/DL — HIGH (ref 70–99)
GLUCOSE BLDC GLUCOMTR-MCNC: 116 MG/DL — HIGH (ref 70–99)
GLUCOSE BLDC GLUCOMTR-MCNC: 119 MG/DL — HIGH (ref 70–99)
GLUCOSE BLDC GLUCOMTR-MCNC: 122 MG/DL — HIGH (ref 70–99)
GLUCOSE BLDC GLUCOMTR-MCNC: 123 MG/DL — HIGH (ref 70–99)
GLUCOSE BLDC GLUCOMTR-MCNC: 135 MG/DL — HIGH (ref 70–99)
GLUCOSE BLDC GLUCOMTR-MCNC: 142 MG/DL — HIGH (ref 70–99)
GLUCOSE BLDC GLUCOMTR-MCNC: 145 MG/DL — HIGH (ref 70–99)
GLUCOSE BLDC GLUCOMTR-MCNC: 147 MG/DL — HIGH (ref 70–99)
GLUCOSE BLDC GLUCOMTR-MCNC: 148 MG/DL — HIGH (ref 70–99)
GLUCOSE BLDC GLUCOMTR-MCNC: 166 MG/DL — HIGH (ref 70–99)
GLUCOSE BLDC GLUCOMTR-MCNC: 99 MG/DL — SIGNIFICANT CHANGE UP (ref 70–99)
GLUCOSE SERPL-MCNC: 124 MG/DL — HIGH (ref 70–99)
GLUCOSE SERPL-MCNC: 97 MG/DL — SIGNIFICANT CHANGE UP (ref 70–99)
GRAM STN FLD: SIGNIFICANT CHANGE UP
HAPTOGLOB SERPL-MCNC: 70 MG/DL — SIGNIFICANT CHANGE UP (ref 34–200)
HCO3 BLDMV-SCNC: 25 MMOL/L — SIGNIFICANT CHANGE UP (ref 20–28)
HCO3 BLDMV-SCNC: 28 MMOL/L — SIGNIFICANT CHANGE UP (ref 20–28)
HCO3 BLDMV-SCNC: 30 MMOL/L — HIGH (ref 20–28)
HCO3 BLDMV-SCNC: 30 MMOL/L — HIGH (ref 20–28)
HCO3 BLDMV-SCNC: 31 MMOL/L — HIGH (ref 20–28)
HCO3 BLDV-SCNC: 30 MMOL/L — HIGH (ref 22–29)
HCT VFR BLD CALC: 23.9 % — LOW (ref 34.5–45)
HCT VFR BLD CALC: 27.5 % — LOW (ref 34.5–45)
HGB BLD-MCNC: 10.1 G/DL — LOW (ref 11.5–15.5)
HGB BLD-MCNC: 8.8 G/DL — LOW (ref 11.5–15.5)
HOROWITZ INDEX BLDMV+IHG-RTO: 100 — SIGNIFICANT CHANGE UP
HOROWITZ INDEX BLDV+IHG-RTO: 100 — SIGNIFICANT CHANGE UP
IMM GRANULOCYTES NFR BLD AUTO: 6.1 % — HIGH (ref 0–0.9)
INR BLD: 1.1 RATIO — SIGNIFICANT CHANGE UP (ref 0.85–1.16)
INR BLD: 1.19 RATIO — HIGH (ref 0.85–1.16)
LDH SERPL L TO P-CCNC: 879 U/L — HIGH (ref 50–242)
LYMPHOCYTES # BLD AUTO: 0.77 K/UL — LOW (ref 1–3.3)
LYMPHOCYTES # BLD AUTO: 0.8 K/UL — LOW (ref 1–3.3)
LYMPHOCYTES # BLD AUTO: 6.5 % — LOW (ref 13–44)
LYMPHOCYTES # BLD AUTO: 7.5 % — LOW (ref 13–44)
MAGNESIUM SERPL-MCNC: 1.8 MG/DL — SIGNIFICANT CHANGE UP (ref 1.6–2.6)
MANUAL SMEAR VERIFICATION: SIGNIFICANT CHANGE UP
MCHC RBC-ENTMCNC: 30.3 PG — SIGNIFICANT CHANGE UP (ref 27–34)
MCHC RBC-ENTMCNC: 30.9 PG — SIGNIFICANT CHANGE UP (ref 27–34)
MCHC RBC-ENTMCNC: 36.7 G/DL — HIGH (ref 32–36)
MCHC RBC-ENTMCNC: 36.8 G/DL — HIGH (ref 32–36)
MCV RBC AUTO: 82.6 FL — SIGNIFICANT CHANGE UP (ref 80–100)
MCV RBC AUTO: 83.9 FL — SIGNIFICANT CHANGE UP (ref 80–100)
MONOCYTES # BLD AUTO: 0.75 K/UL — SIGNIFICANT CHANGE UP (ref 0–0.9)
MONOCYTES # BLD AUTO: 0.89 K/UL — SIGNIFICANT CHANGE UP (ref 0–0.9)
MONOCYTES NFR BLD AUTO: 6.4 % — SIGNIFICANT CHANGE UP (ref 2–14)
MONOCYTES NFR BLD AUTO: 8.4 % — SIGNIFICANT CHANGE UP (ref 2–14)
NEUTROPHILS # BLD AUTO: 8.91 K/UL — HIGH (ref 1.8–7.4)
NEUTROPHILS # BLD AUTO: 9.46 K/UL — HIGH (ref 1.8–7.4)
NEUTROPHILS NFR BLD AUTO: 75.7 % — SIGNIFICANT CHANGE UP (ref 43–77)
NEUTROPHILS NFR BLD AUTO: 80.5 % — HIGH (ref 43–77)
NEUTS BAND # BLD: 8.4 % — HIGH (ref 0–8)
NEUTS BAND NFR BLD: 8.4 % — HIGH (ref 0–8)
NRBC # BLD: 114 /100 WBCS — HIGH (ref 0–0)
NRBC # BLD: 42 /100 WBCS — HIGH (ref 0–0)
NRBC BLD-RTO: 114 /100 WBCS — HIGH (ref 0–0)
NRBC BLD-RTO: 42 /100 WBCS — HIGH (ref 0–0)
O2 CT VFR BLD CALC: 43 MMHG — SIGNIFICANT CHANGE UP (ref 30–65)
O2 CT VFR BLD CALC: 44 MMHG — SIGNIFICANT CHANGE UP (ref 30–65)
O2 CT VFR BLD CALC: 45 MMHG — SIGNIFICANT CHANGE UP (ref 30–65)
O2 CT VFR BLD CALC: 47 MMHG — SIGNIFICANT CHANGE UP (ref 30–65)
O2 CT VFR BLD CALC: 48 MMHG — SIGNIFICANT CHANGE UP (ref 30–65)
PCO2 BLDMV: 39 MMHG — SIGNIFICANT CHANGE UP (ref 30–65)
PCO2 BLDMV: 40 MMHG — SIGNIFICANT CHANGE UP (ref 30–65)
PCO2 BLDMV: 41 MMHG — SIGNIFICANT CHANGE UP (ref 30–65)
PCO2 BLDMV: 43 MMHG — SIGNIFICANT CHANGE UP (ref 30–65)
PCO2 BLDMV: 45 MMHG — SIGNIFICANT CHANGE UP (ref 30–65)
PCO2 BLDV: 40 MMHG — SIGNIFICANT CHANGE UP (ref 39–42)
PH BLDMV: 7.42 — SIGNIFICANT CHANGE UP (ref 7.32–7.45)
PH BLDMV: 7.42 — SIGNIFICANT CHANGE UP (ref 7.32–7.45)
PH BLDMV: 7.45 — SIGNIFICANT CHANGE UP (ref 7.32–7.45)
PH BLDMV: 7.47 — HIGH (ref 7.32–7.45)
PH BLDMV: 7.48 — HIGH (ref 7.32–7.45)
PH BLDV: 7.48 — HIGH (ref 7.32–7.43)
PHOSPHATE SERPL-MCNC: 5 MG/DL — HIGH (ref 2.5–4.5)
PLAT MORPH BLD: ABNORMAL
PLATELET # BLD AUTO: 79 K/UL — LOW (ref 150–400)
PLATELET # BLD AUTO: 89 K/UL — LOW (ref 150–400)
PO2 BLDV: 49 MMHG — HIGH (ref 25–45)
POTASSIUM SERPL-MCNC: 3.9 MMOL/L — SIGNIFICANT CHANGE UP (ref 3.5–5.3)
POTASSIUM SERPL-MCNC: 4.1 MMOL/L — SIGNIFICANT CHANGE UP (ref 3.5–5.3)
POTASSIUM SERPL-SCNC: 3.9 MMOL/L — SIGNIFICANT CHANGE UP (ref 3.5–5.3)
POTASSIUM SERPL-SCNC: 4.1 MMOL/L — SIGNIFICANT CHANGE UP (ref 3.5–5.3)
PROT SERPL-MCNC: 5.4 G/DL — LOW (ref 6–8.3)
PROT SERPL-MCNC: 6.1 G/DL — SIGNIFICANT CHANGE UP (ref 6–8.3)
PROTHROM AB SERPL-ACNC: 12.6 SEC — SIGNIFICANT CHANGE UP (ref 9.9–13.4)
PROTHROM AB SERPL-ACNC: 13.5 SEC — HIGH (ref 9.9–13.4)
RBC # BLD: 2.85 M/UL — LOW (ref 3.8–5.2)
RBC # BLD: 3.33 M/UL — LOW (ref 3.8–5.2)
RBC # FLD: 15.5 % — HIGH (ref 10.3–14.5)
RBC # FLD: 15.9 % — HIGH (ref 10.3–14.5)
RBC BLD AUTO: SIGNIFICANT CHANGE UP
SAO2 % BLDMV: 76 — SIGNIFICANT CHANGE UP (ref 60–90)
SAO2 % BLDMV: 76.9 — SIGNIFICANT CHANGE UP (ref 60–90)
SAO2 % BLDMV: 80.6 — SIGNIFICANT CHANGE UP (ref 60–90)
SAO2 % BLDMV: 80.6 — SIGNIFICANT CHANGE UP (ref 60–90)
SAO2 % BLDMV: 84.2 — SIGNIFICANT CHANGE UP (ref 60–90)
SAO2 % BLDV: 83.3 % — SIGNIFICANT CHANGE UP (ref 67–88)
SODIUM SERPL-SCNC: 142 MMOL/L — SIGNIFICANT CHANGE UP (ref 135–145)
SODIUM SERPL-SCNC: 143 MMOL/L — SIGNIFICANT CHANGE UP (ref 135–145)
SPECIMEN SOURCE: SIGNIFICANT CHANGE UP
UFH PPP CHRO-ACNC: 0.28 IU/ML — LOW (ref 0.3–0.7)
VANCOMYCIN TROUGH SERPL-MCNC: 13.7 UG/ML — SIGNIFICANT CHANGE UP (ref 10–20)
VANCOMYCIN TROUGH SERPL-MCNC: 20 UG/ML — SIGNIFICANT CHANGE UP (ref 10–20)
WBC # BLD: 10.6 K/UL — HIGH (ref 3.8–10.5)
WBC # BLD: 11.76 K/UL — HIGH (ref 3.8–10.5)
WBC # FLD AUTO: 10.6 K/UL — HIGH (ref 3.8–10.5)
WBC # FLD AUTO: 11.76 K/UL — HIGH (ref 3.8–10.5)

## 2025-01-05 PROCEDURE — 99291 CRITICAL CARE FIRST HOUR: CPT | Mod: 25

## 2025-01-05 PROCEDURE — 93308 TTE F-UP OR LMTD: CPT | Mod: 26

## 2025-01-05 PROCEDURE — 93010 ELECTROCARDIOGRAM REPORT: CPT

## 2025-01-05 PROCEDURE — 93325 DOPPLER ECHO COLOR FLOW MAPG: CPT | Mod: 26

## 2025-01-05 PROCEDURE — 71045 X-RAY EXAM CHEST 1 VIEW: CPT | Mod: 26

## 2025-01-05 PROCEDURE — 33949 ECMO/ECLS DAILY MGMT ARTERY: CPT

## 2025-01-05 PROCEDURE — 93321 DOPPLER ECHO F-UP/LMTD STD: CPT | Mod: 26

## 2025-01-05 PROCEDURE — 93926 LOWER EXTREMITY STUDY: CPT | Mod: 26,LT

## 2025-01-05 PROCEDURE — 99223 1ST HOSP IP/OBS HIGH 75: CPT

## 2025-01-05 PROCEDURE — 99291 CRITICAL CARE FIRST HOUR: CPT

## 2025-01-05 PROCEDURE — 99292 CRITICAL CARE ADDL 30 MIN: CPT

## 2025-01-05 PROCEDURE — 95720 EEG PHY/QHP EA INCR W/VEEG: CPT

## 2025-01-05 RX ORDER — HEPARIN SODIUM,PORCINE 10000/ML
850 VIAL (ML) INJECTION
Qty: 25000 | Refills: 0 | Status: DISCONTINUED | OUTPATIENT
Start: 2025-01-05 | End: 2025-01-07

## 2025-01-05 RX ORDER — POTASSIUM CHLORIDE 750 MG/1
10 TABLET, EXTENDED RELEASE ORAL ONCE
Refills: 0 | Status: COMPLETED | OUTPATIENT
Start: 2025-01-05 | End: 2025-01-05

## 2025-01-05 RX ORDER — FENTANYL CITRATE 50 UG/ML
25 INJECTION INTRAMUSCULAR; INTRAVENOUS ONCE
Refills: 0 | Status: DISCONTINUED | OUTPATIENT
Start: 2025-01-05 | End: 2025-01-05

## 2025-01-05 RX ORDER — DEXMEDETOMIDINE HYDROCHLORIDE 4 UG/ML
0.2 INJECTION, SOLUTION INTRAVENOUS
Qty: 200 | Refills: 0 | Status: DISCONTINUED | OUTPATIENT
Start: 2025-01-05 | End: 2025-01-07

## 2025-01-05 RX ORDER — ACETAMINOPHEN 160 MG/5ML
1000 SUSPENSION ORAL ONCE
Refills: 0 | Status: COMPLETED | OUTPATIENT
Start: 2025-01-05 | End: 2025-01-05

## 2025-01-05 RX ORDER — VANCOMYCIN HYDROCHLORIDE 50 MG/ML
1000 KIT ORAL EVERY 12 HOURS
Refills: 0 | Status: DISCONTINUED | OUTPATIENT
Start: 2025-01-05 | End: 2025-01-06

## 2025-01-05 RX ORDER — SENNOSIDES 8.6 MG
2 TABLET ORAL AT BEDTIME
Refills: 0 | Status: DISCONTINUED | OUTPATIENT
Start: 2025-01-05 | End: 2025-01-07

## 2025-01-05 RX ORDER — MAGNESIUM SULFATE 0.8 MEQ/ML
2 AMPUL (ML) INJECTION ONCE
Refills: 0 | Status: COMPLETED | OUTPATIENT
Start: 2025-01-05 | End: 2025-01-05

## 2025-01-05 RX ORDER — MEROPENEM 500 MG/20ML
1000 INJECTION INTRAVENOUS EVERY 8 HOURS
Refills: 0 | Status: DISCONTINUED | OUTPATIENT
Start: 2025-01-05 | End: 2025-01-07

## 2025-01-05 RX ORDER — MUPIROCIN 2 G/100G
1 CREAM TOPICAL
Refills: 0 | Status: DISCONTINUED | OUTPATIENT
Start: 2025-01-05 | End: 2025-01-07

## 2025-01-05 RX ORDER — POLYETHYLENE GLYCOL 3350 17 G/17G
17 POWDER, FOR SOLUTION ORAL DAILY
Refills: 0 | Status: DISCONTINUED | OUTPATIENT
Start: 2025-01-05 | End: 2025-01-07

## 2025-01-05 RX ADMIN — FENTANYL CITRATE 25 MICROGRAM(S): 50 INJECTION INTRAMUSCULAR; INTRAVENOUS at 06:41

## 2025-01-05 RX ADMIN — POTASSIUM CHLORIDE 50 MILLIEQUIVALENT(S): 750 TABLET, EXTENDED RELEASE ORAL at 09:40

## 2025-01-05 RX ADMIN — POTASSIUM CHLORIDE 50 MILLIEQUIVALENT(S): 750 TABLET, EXTENDED RELEASE ORAL at 15:32

## 2025-01-05 RX ADMIN — MUPIROCIN 1 APPLICATION(S): 2 CREAM TOPICAL at 18:00

## 2025-01-05 RX ADMIN — VANCOMYCIN HYDROCHLORIDE 250 MILLIGRAM(S): KIT at 20:06

## 2025-01-05 RX ADMIN — ANTISEPTIC SURGICAL SCRUB 1 APPLICATION(S): 0.04 SOLUTION TOPICAL at 21:00

## 2025-01-05 RX ADMIN — Medication 1 DROP(S): at 05:07

## 2025-01-05 RX ADMIN — DEXMEDETOMIDINE HYDROCHLORIDE 10.5 MICROGRAM(S)/KG/HR: 4 INJECTION, SOLUTION INTRAVENOUS at 05:11

## 2025-01-05 RX ADMIN — Medication 8.5 UNIT(S)/HR: at 05:11

## 2025-01-05 RX ADMIN — MEROPENEM 100 MILLIGRAM(S): 500 INJECTION INTRAVENOUS at 21:03

## 2025-01-05 RX ADMIN — FENTANYL CITRATE 25 MICROGRAM(S): 50 INJECTION INTRAMUSCULAR; INTRAVENOUS at 20:00

## 2025-01-05 RX ADMIN — ANTISEPTIC SURGICAL SCRUB 15 MILLILITER(S): 0.04 SOLUTION TOPICAL at 05:07

## 2025-01-05 RX ADMIN — Medication 25 GRAM(S): at 01:24

## 2025-01-05 RX ADMIN — Medication 1 UNIT(S)/HR: at 20:04

## 2025-01-05 RX ADMIN — FENTANYL CITRATE 25 MICROGRAM(S): 50 INJECTION INTRAMUSCULAR; INTRAVENOUS at 19:30

## 2025-01-05 RX ADMIN — Medication 6 UNIT(S)/MIN: at 20:04

## 2025-01-05 RX ADMIN — FENTANYL CITRATE 25 MICROGRAM(S): 50 INJECTION INTRAMUSCULAR; INTRAVENOUS at 00:35

## 2025-01-05 RX ADMIN — POTASSIUM CHLORIDE 50 MILLIEQUIVALENT(S): 750 TABLET, EXTENDED RELEASE ORAL at 16:16

## 2025-01-05 RX ADMIN — ANTISEPTIC SURGICAL SCRUB 15 MILLILITER(S): 0.04 SOLUTION TOPICAL at 18:00

## 2025-01-05 RX ADMIN — Medication 1 DROP(S): at 18:00

## 2025-01-05 RX ADMIN — IPRATROPIUM BROMIDE AND ALBUTEROL SULFATE 3 MILLILITER(S): .5; 2.5 SOLUTION RESPIRATORY (INHALATION) at 23:17

## 2025-01-05 RX ADMIN — Medication 8.5 UNIT(S)/HR: at 07:41

## 2025-01-05 RX ADMIN — FENTANYL CITRATE 25 MICROGRAM(S): 50 INJECTION INTRAMUSCULAR; INTRAVENOUS at 22:15

## 2025-01-05 RX ADMIN — FENTANYL CITRATE 25 MICROGRAM(S): 50 INJECTION INTRAMUSCULAR; INTRAVENOUS at 04:00

## 2025-01-05 RX ADMIN — ACETAMINOPHEN 1000 MILLIGRAM(S): 160 SUSPENSION ORAL at 13:30

## 2025-01-05 RX ADMIN — FENTANYL CITRATE 25 MICROGRAM(S): 50 INJECTION INTRAMUSCULAR; INTRAVENOUS at 21:59

## 2025-01-05 RX ADMIN — Medication 1 UNIT(S)/HR: at 07:42

## 2025-01-05 RX ADMIN — FENTANYL CITRATE 25 MICROGRAM(S): 50 INJECTION INTRAMUSCULAR; INTRAVENOUS at 08:45

## 2025-01-05 RX ADMIN — Medication 3.14 MICROGRAM(S)/KG/MIN: at 20:05

## 2025-01-05 RX ADMIN — FENTANYL CITRATE 25 MICROGRAM(S): 50 INJECTION INTRAMUSCULAR; INTRAVENOUS at 04:30

## 2025-01-05 RX ADMIN — IPRATROPIUM BROMIDE AND ALBUTEROL SULFATE 3 MILLILITER(S): .5; 2.5 SOLUTION RESPIRATORY (INHALATION) at 05:00

## 2025-01-05 RX ADMIN — MEROPENEM 100 MILLIGRAM(S): 500 INJECTION INTRAVENOUS at 16:57

## 2025-01-05 RX ADMIN — Medication 1 DROP(S): at 23:00

## 2025-01-05 RX ADMIN — FENTANYL CITRATE 25 MICROGRAM(S): 50 INJECTION INTRAMUSCULAR; INTRAVENOUS at 06:51

## 2025-01-05 RX ADMIN — Medication 12.5 MICROGRAM(S)/KG/MIN: at 20:05

## 2025-01-05 RX ADMIN — Medication 3 MILLILITER(S): at 21:00

## 2025-01-05 RX ADMIN — Medication 200 GRAM(S): at 04:20

## 2025-01-05 RX ADMIN — Medication 1 DROP(S): at 12:35

## 2025-01-05 RX ADMIN — NOREPINEPHRINE BITARTRATE 45.5 MICROGRAM(S)/KG/MIN: 1 INJECTION, SOLUTION, CONCENTRATE INTRAVENOUS at 05:09

## 2025-01-05 RX ADMIN — Medication 1 UNIT(S)/HR: at 05:11

## 2025-01-05 RX ADMIN — Medication 12.5 MICROGRAM(S)/KG/MIN: at 07:41

## 2025-01-05 RX ADMIN — Medication 100 GRAM(S): at 12:35

## 2025-01-05 RX ADMIN — FENTANYL CITRATE 25 MICROGRAM(S): 50 INJECTION INTRAMUSCULAR; INTRAVENOUS at 02:51

## 2025-01-05 RX ADMIN — Medication 6 UNIT(S)/MIN: at 07:41

## 2025-01-05 RX ADMIN — POLYETHYLENE GLYCOL 3350 17 GRAM(S): 17 POWDER, FOR SOLUTION ORAL at 21:03

## 2025-01-05 RX ADMIN — ACETAMINOPHEN 400 MILLIGRAM(S): 160 SUSPENSION ORAL at 13:00

## 2025-01-05 RX ADMIN — PANTOPRAZOLE 40 MILLIGRAM(S): 20 TABLET, DELAYED RELEASE ORAL at 08:09

## 2025-01-05 RX ADMIN — Medication 3 MILLILITER(S): at 13:50

## 2025-01-05 RX ADMIN — POTASSIUM CHLORIDE 50 MILLIEQUIVALENT(S): 750 TABLET, EXTENDED RELEASE ORAL at 10:11

## 2025-01-05 RX ADMIN — MEROPENEM 100 MILLIGRAM(S): 500 INJECTION INTRAVENOUS at 05:07

## 2025-01-05 RX ADMIN — Medication 2 TABLET(S): at 21:03

## 2025-01-05 RX ADMIN — NOREPINEPHRINE BITARTRATE 45.5 MICROGRAM(S)/KG/MIN: 1 INJECTION, SOLUTION, CONCENTRATE INTRAVENOUS at 20:05

## 2025-01-05 RX ADMIN — Medication 6.27 MICROGRAM(S)/KG/MIN: at 07:42

## 2025-01-05 RX ADMIN — IPRATROPIUM BROMIDE AND ALBUTEROL SULFATE 3 MILLILITER(S): .5; 2.5 SOLUTION RESPIRATORY (INHALATION) at 11:21

## 2025-01-05 RX ADMIN — FENTANYL CITRATE 25 MICROGRAM(S): 50 INJECTION INTRAMUSCULAR; INTRAVENOUS at 09:15

## 2025-01-05 RX ADMIN — FENTANYL CITRATE 25 MICROGRAM(S): 50 INJECTION INTRAMUSCULAR; INTRAVENOUS at 00:20

## 2025-01-05 RX ADMIN — Medication 6.27 MICROGRAM(S)/KG/MIN: at 05:10

## 2025-01-05 RX ADMIN — PANTOPRAZOLE 40 MILLIGRAM(S): 20 TABLET, DELAYED RELEASE ORAL at 20:21

## 2025-01-05 RX ADMIN — POTASSIUM CHLORIDE 50 MILLIEQUIVALENT(S): 750 TABLET, EXTENDED RELEASE ORAL at 08:40

## 2025-01-05 RX ADMIN — FENTANYL CITRATE 25 MICROGRAM(S): 50 INJECTION INTRAMUSCULAR; INTRAVENOUS at 02:21

## 2025-01-05 RX ADMIN — Medication 8.5 UNIT(S)/HR: at 21:03

## 2025-01-05 RX ADMIN — Medication 3 MILLILITER(S): at 05:00

## 2025-01-05 NOTE — PROGRESS NOTE ADULT - PROBLEM SELECTOR PLAN 1
-on VA ECMO 1/4/25   -currently on  5 mcg/kg/min  -also on vaso, and epinephrine  -Monitor strict I/Os and keep lytes K>4 and Mg>2  -continue to monitor perfusion markers (LFTs, lactate, renal function, etc)  -currently mechanically vented  -diuretics: on bumex gtt  -wean nitric oxide. -on VA ECMO 1/4/25   -currently on  5 mcg/kg/min  -also on vaso, and epinephrine  -Monitor strict I/Os and keep lytes K>4 and Mg>2  -continue to monitor perfusion markers (LFTs, lactate, renal function, etc)  -currently mechanically vented  -diuretics: on bumex gtt  -c/w Mara -on VA ECMO 1/4/25   -inotrope: currently on  5 mcg/kg/min  -also on Mara, vaso, and epinephrine - wean epi d/t tachycardia  -Monitor strict I/Os and keep lytes K>4 and Mg>2  -continue to monitor perfusion markers (LFTs, lactate, renal function, etc)  -currently mechanically vented  -diuretics: on bumex gtt, continue with goal CVP <10

## 2025-01-05 NOTE — PROGRESS NOTE ADULT - SUBJECTIVE AND OBJECTIVE BOX
Subjective:    Medications:  albuterol/ipratropium for Nebulization 3 milliLiter(s) Nebulizer every 6 hours  artificial tears (preservative free) Ophthalmic Solution 1 Drop(s) Both EYES four times a day  buMETAnide Infusion 2 mG/Hr IV Continuous <Continuous>  chlorhexidine 0.12% Liquid 15 milliLiter(s) Oral Mucosa every 12 hours  chlorhexidine 2% Cloths 1 Application(s) Topical daily  dexMEDEtomidine Infusion 0.5 MICROgram(s)/kG/Hr IV Continuous <Continuous>  dextrose 50% Injectable 50 milliLiter(s) IV Push every 15 minutes  dextrose 50% Injectable 25 milliLiter(s) IV Push every 15 minutes  DOBUTamine Infusion 5 MICROgram(s)/kG/Min IV Continuous <Continuous>  EPINEPHrine    Infusion 0.02 MICROgram(s)/kG/Min IV Continuous <Continuous>  fentaNYL    Injectable 25 MICROGram(s) IV Push every 2 hours PRN  heparin  Infusion 850 Unit(s)/Hr IV Continuous <Continuous>  insulin regular Infusion 1 Unit(s)/Hr IV Continuous <Continuous>  meropenem  IVPB 1000 milliGRAM(s) IV Intermittent every 12 hours  norepinephrine Infusion 0.58 MICROgram(s)/kG/Min IV Continuous <Continuous>  pantoprazole  Injectable 40 milliGRAM(s) IV Push every 12 hours  propofol Infusion 35 MICROgram(s)/kG/Min IV Continuous <Continuous>  sodium chloride 0.9% lock flush 3 milliLiter(s) IV Push every 8 hours  sodium chloride 0.9%. 1000 milliLiter(s) IV Continuous <Continuous>  vancomycin  IVPB 1250 milliGRAM(s) IV Intermittent every 12 hours  vasopressin Infusion 0.04 Unit(s)/Min IV Continuous <Continuous>      Physical Exam:    Vitals:  Vital Signs Last 24 Hours  T(C): 37.8 (25 @ 04:00), Max: 37.8 (25 @ 04:00)  HR: 122 (25 @ 07:00) (55 - 133)  BP: --  RR: 10 (25 @ 07:00) (9 - 34)  SpO2: 85% (25 @ 07:00) (78% - 100%)    Weight in k.4 ( @ 00:00)    I&O's Summary    2025 07:01  -  2025 07:00  --------------------------------------------------------  IN: 2801.5 mL / OUT: 4625 mL / NET: -1823.5 mL        Tele:    General: No distress. Comfortable.  HEENT: EOM intact.  Neck: Neck supple. JVP not elevated. No masses  Chest: Clear to auscultation bilaterally  CV: Normal S1 and S2. No murmurs, rub, or gallops. Radial pulses normal.  Abdomen: Soft, non-distended, non-tender  Skin: No rashes or skin breakdown  Neurology: Alert and oriented times three. Sensation intact  Psych: Affect normal    Labs:                        10.1   11.76 )-----------( 89       ( 2025 00:31 )             27.5         142  |  102  |  35[H]  ----------------------------<  97  4.1   |  22  |  1.20    Ca    9.0      2025 00:37  Phos  5.0       Mg     1.8         TPro  6.1  /  Alb  3.4  /  TBili  6.1[H]  /  DBili  x   /  AST  76[H]  /  ALT  69[H]  /  AlkPhos  127[H]      PT/INR - ( 2025 04:06 )   PT: 13.5 sec;   INR: 1.19 ratio         PTT - ( 2025 04:06 )  PTT:42.2 sec  CARDIAC MARKERS ( 2025 14:00 )  x     / x     / x     / x     / 4.6 ng/mL        Oxygen Saturation, Mixed: 76.9 ( @ 06:04)  Oxygen Saturation, Mixed: 80.6 ( @ 00:00)  Oxygen Saturation, Mixed: 81.4 ( @ 20:03)  Oxygen Saturation, Mixed: 84.6 ( @ 18:35)  Oxygen Saturation, Mixed: 84.2 ( @ 16:10)  Oxygen Saturation, Mixed: 74.1 ( @ 12:00)  Oxygen Saturation, Mixed: 75.3 ( @ 06:03)  Oxygen Saturation, Mixed: 76.9 ( @ 04:07)  Oxygen Saturation, Mixed: 77.9 ( @ 01:10)  Oxygen Saturation, Mixed: 44.8 ( @ 23:33)  Oxygen Saturation, Mixed: 39.2 ( @ 22:55)  Oxygen Saturation, Mixed: 39.1 ( @ 21:55)    Lactate Dehydrogenase, Serum: 879 U/L ( @ 00:37)  Lactate Dehydrogenase, Serum: 792 U/L ( @ 12:10)  Lactate Dehydrogenase, Serum: 1165 U/L ( @ 01:12)  Lactate Dehydrogenase, Serum: 1156 U/L ( @ 21:34)  Lactate Dehydrogenase, Serum: 1197 U/L ( @ 14:00)       Subjective:  -intubated and on VA ECMO    Medications:  albuterol/ipratropium for Nebulization 3 milliLiter(s) Nebulizer every 6 hours  artificial tears (preservative free) Ophthalmic Solution 1 Drop(s) Both EYES four times a day  buMETAnide Infusion 2 mG/Hr IV Continuous <Continuous>  chlorhexidine 0.12% Liquid 15 milliLiter(s) Oral Mucosa every 12 hours  chlorhexidine 2% Cloths 1 Application(s) Topical daily  dexMEDEtomidine Infusion 0.5 MICROgram(s)/kG/Hr IV Continuous <Continuous>  dextrose 50% Injectable 50 milliLiter(s) IV Push every 15 minutes  dextrose 50% Injectable 25 milliLiter(s) IV Push every 15 minutes  DOBUTamine Infusion 5 MICROgram(s)/kG/Min IV Continuous <Continuous>  EPINEPHrine    Infusion 0.02 MICROgram(s)/kG/Min IV Continuous <Continuous>  fentaNYL    Injectable 25 MICROGram(s) IV Push every 2 hours PRN  heparin  Infusion 850 Unit(s)/Hr IV Continuous <Continuous>  insulin regular Infusion 1 Unit(s)/Hr IV Continuous <Continuous>  meropenem  IVPB 1000 milliGRAM(s) IV Intermittent every 12 hours  norepinephrine Infusion 0.58 MICROgram(s)/kG/Min IV Continuous <Continuous>  pantoprazole  Injectable 40 milliGRAM(s) IV Push every 12 hours  propofol Infusion 35 MICROgram(s)/kG/Min IV Continuous <Continuous>  sodium chloride 0.9% lock flush 3 milliLiter(s) IV Push every 8 hours  sodium chloride 0.9%. 1000 milliLiter(s) IV Continuous <Continuous>  vancomycin  IVPB 1250 milliGRAM(s) IV Intermittent every 12 hours  vasopressin Infusion 0.04 Unit(s)/Min IV Continuous <Continuous>      Physical Exam:    Vitals:  Vital Signs Last 24 Hours  T(C): 37.8 (25 @ 04:00), Max: 37.8 (25 @ 04:00)  HR: 122 (25 @ 07:00) (55 - 133)  BP: --  RR: 10 (25 @ 07:00) (9 - 34)  SpO2: 85% (25 @ 07:00) (78% - 100%)    Weight in k.4 ( @ 00:00)    I&O's Summary    2025 07:01  -  2025 07:00  --------------------------------------------------------  IN: 2801.5 mL / OUT: 4625 mL / NET: -1823.5 mL      VA ECMO, 25, RPM 2200    Tele: ST 115s    General: Sedated.  Neck: Difficult to asssess, but appears JVP elevated  Chest: Mechanically vented  CV: on VA ECMO  Abdomen: Soft, non-distended, non-tender  Skin: No rashes or skin breakdown  Neurology: sedated  Psych: sedated    Labs:                        10.1   11.76 )-----------( 89       ( 2025 00:31 )             27.5         142  |  102  |  35[H]  ----------------------------<  97  4.1   |  22  |  1.20    Ca    9.0      2025 00:37  Phos  5.0       Mg     1.8         TPro  6.1  /  Alb  3.4  /  TBili  6.1[H]  /  DBili  x   /  AST  76[H]  /  ALT  69[H]  /  AlkPhos  127[H]      PT/INR - ( 2025 04:06 )   PT: 13.5 sec;   INR: 1.19 ratio         PTT - ( 2025 04:06 )  PTT:42.2 sec  CARDIAC MARKERS ( 2025 14:00 )  x     / x     / x     / x     / 4.6 ng/mL        Oxygen Saturation, Mixed: 76.9 ( @ 06:04)  Oxygen Saturation, Mixed: 80.6 ( @ 00:00)  Oxygen Saturation, Mixed: 81.4 ( @ 20:03)  Oxygen Saturation, Mixed: 84.6 ( @ 18:35)  Oxygen Saturation, Mixed: 84.2 ( @ 16:10)  Oxygen Saturation, Mixed: 74.1 ( @ 12:00)  Oxygen Saturation, Mixed: 75.3 ( @ 06:03)  Oxygen Saturation, Mixed: 76.9 ( @ 04:07)  Oxygen Saturation, Mixed: 77.9 ( @ 01:10)  Oxygen Saturation, Mixed: 44.8 ( @ 23:33)  Oxygen Saturation, Mixed: 39.2 ( @ 22:55)  Oxygen Saturation, Mixed: 39.1 ( @ 21:55)    Lactate Dehydrogenase, Serum: 879 U/L ( @ 00:37)  Lactate Dehydrogenase, Serum: 792 U/L ( @ 12:10)  Lactate Dehydrogenase, Serum: 1165 U/L ( @ 01:12)  Lactate Dehydrogenase, Serum: 1156 U/L ( @ 21:34)  Lactate Dehydrogenase, Serum: 1197 U/L ( @ 14:00)

## 2025-01-05 NOTE — PROGRESS NOTE ADULT - PROBLEM SELECTOR PLAN 2
-has know hx of SCD and takes hydroxyurea at home  -received 1pRBC and 1pPLT   -plan for red cell exchange today  -heme to f/u  -on heparin for AC. -has know hx of SCD and takes hydroxyurea at home  -received 1pRBC and 1pPLT 1/4/25  -had RBC exchanged 1/4/25  -heme to f/u  -on heparin for AC.

## 2025-01-05 NOTE — PROGRESS NOTE ADULT - SUBJECTIVE AND OBJECTIVE BOX
Patient seen and examined at the bedside.    Remains critically ill on continuous ICU monitoring.      Brief Summary:  58 yo F with Sickle cell disease and NICM now with Cardiogenic shock s/p VA ECMO on 1/4/25.    24 Hour events:  Transfused with 1u prbcs & 1u plts yesterday in CTU        Objective:  Vital Signs Last 24 Hrs  T(C): 37.8 (05 Jan 2025 04:00), Max: 37.8 (05 Jan 2025 04:00)  T(F): 100 (05 Jan 2025 04:00), Max: 100 (05 Jan 2025 04:00)  HR: 133 (05 Jan 2025 06:00) (55 - 133)  BP: --  BP(mean): --  RR: 27 (05 Jan 2025 06:00) (5 - 34)  SpO2: 85% (05 Jan 2025 06:00) (78% - 100%)    Parameters below as of 05 Jan 2025 05:30  Patient On (Oxygen Delivery Method): ventilator        Mode: AC/ CMV (Assist Control/ Continuous Mandatory Ventilation)  RR (machine): 12  TV (machine): 420  FiO2: 40  PEEP: 8  ITime: 1  MAP: 11  PC: 8  PIP: 21              Physical Exam:   General: Intubated  Neurology: Sedated.  Respiratory: Breath sounds bilaterally   CV: Sinus Tachycardia   VA ECMO 3300 rpms, flow of 3.2, sweep of 1   Abdominal: Soft, Nontender  Extremities: Warm, well-perfused  Costa  -------------------------------------------------------------------------------------------------------------------------------    Labs:                        10.1   11.76 )-----------( 89       ( 05 Jan 2025 00:31 )             27.5     01-05    142  |  102  |  35[H]  ----------------------------<  97  4.1   |  22  |  1.20    Ca    9.0      05 Jan 2025 00:37  Phos  5.0     01-05  Mg     1.8     01-05    TPro  6.1  /  Alb  3.4  /  TBili  6.1[H]  /  DBili  x   /  AST  76[H]  /  ALT  69[H]  /  AlkPhos  127[H]  01-05    LIVER FUNCTIONS - ( 05 Jan 2025 00:37 )  Alb: 3.4 g/dL / Pro: 6.1 g/dL / ALK PHOS: 127 U/L / ALT: 69 U/L / AST: 76 U/L / GGT: x           PT/INR - ( 05 Jan 2025 04:06 )   PT: 13.5 sec;   INR: 1.19 ratio         PTT - ( 05 Jan 2025 04:06 )  PTT:42.2 sec  ABG - ( 05 Jan 2025 06:04 )  pH, Arterial: 7.49  pH, Blood: x     /  pCO2: 35    /  pO2: 106   / HCO3: 27    / Base Excess: 3.3   /  SaO2: 99.9    ------------------------------------------------------------------------------------------------------------------------------  Assessment:  58 yo F w/ PMHx of Sickle cell disease (on hydroxyurea), HTN, 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 Pt initially presented to [facility] for SOB and SS crisis; course c/b witnessed seizure, intubated and sedated, and transferred to [facility] for further management. Now in cardiogenic shock, transferred to [facility] for further management. Cannulated for VA ECMO on 1/4/25.    Cardiogenic shock  Acute respiratory failure  Acute blood loss anemia  Thrombocytopenia  Hyperglycemia    Plan:   ***Neuro***  Sedated with Precedex and Propofol  Paralyzed with Rocuronium for vent synchrony.  Will assess neuro status when able. Plan for portable head CT this afternoon.  EEG today.  Postoperative acute pain control with prns.    ***Cardiovascular***  Remains on Dobutamine, Epinephrine and VA ECMO support.  Trend Lactate  Wean pressors for MAP > 65 mm Hg  VA ECMO 3300 RPMs, flow of 2.89, sweep of 1  Wean nitric oxide.    ***Pulmonary***  Full vent support.  Deep breathing and coughing exercises.    ***GI***  NPO for now. On trickle feeds.   Protonix for stress ulcer prophylaxis     ***Renal***  Trend Creatinine   Costa catheter for strict I/O measurements.   Replete electrolytes as indicated.  Diuresed with Bumex infusion - now on hold.    ***ID***  Empiric Vanco / Meropenum   WBC 16.61, trend leukocytosis    ***Endocrine***  Hyperglycemia - Insulin infusion.     ***Hematology***  Acute blood loss anemia and thrombocytopenia - transfused overnight.  Sickle Cell - Red cell exchange this morning.  Will follow up Heme recs - appreciate emergent consult. Will continue to hold Hydroxyurea in setting of thrombocytopenia.  Heparin infusion for anticoagulation.          Care plan discussed with the ICU care team.   Patient remains critical, at risk for life threatening decompensation.    I have spent 30 minutes providing critical care management to this patient.    By signing my name below, I, Gabriella Lopez, attest that this documentation has been prepared under the direction and in the presence of Jyothi Comer MD.  Electronically signed: Gabriella Lopez, 01-05-25 @ 06:25    IJyothi,  personally performed the services described in this documentation. all medical record entries made by the scribe were at my direction and in my presence. I have reviewed the chart and agree that the record reflects my personal performance and is accurate and complete  Electronically signed: Jyothi Comer MD. Patient seen and examined at the bedside.    Remains critically ill on continuous ICU monitoring.      Brief Summary:  58 yo F with Sickle cell disease and NICM now with Cardiogenic shock s/p VA ECMO on 1/4/25.    24 Hour events:  Sedation held for neuro exam - moving some to noxious stimuli  CT head negative for acute pathology  Lost signals in left leg overnight - Heparin increased, now with signals.      Objective:  Vital Signs Last 24 Hrs  T(C): 37.8 (05 Jan 2025 04:00), Max: 37.8 (05 Jan 2025 04:00)  T(F): 100 (05 Jan 2025 04:00), Max: 100 (05 Jan 2025 04:00)  HR: 133 (05 Jan 2025 06:00) (55 - 133)  BP: --  BP(mean): --  RR: 27 (05 Jan 2025 06:00) (5 - 34)  SpO2: 85% (05 Jan 2025 06:00) (78% - 100%)    Parameters below as of 05 Jan 2025 05:30  Patient On (Oxygen Delivery Method): ventilator      Mode: AC/ CMV (Assist Control/ Continuous Mandatory Ventilation)  RR (machine): 12  TV (machine): 420  FiO2: 40  PEEP: 8  ITime: 1  MAP: 11  PC: 8  PIP: 21              Physical Exam:   General: Intubated  Neurology: Withdrawing to noxious stimuli in all extremities, +cough, +gag  Respiratory: Breath sounds bilaterally   CV: Sinus Tachycardia   VA ECMO 3300 rpms, flow of 3.2, sweep of 1   Abdominal: Soft, Nontender  Extremities: Warm, well-perfused  Costa  -------------------------------------------------------------------------------------------------------------------------------    Labs:                        10.1   11.76 )-----------( 89       ( 05 Jan 2025 00:31 )             27.5     01-05    142  |  102  |  35[H]  ----------------------------<  97  4.1   |  22  |  1.20    Ca    9.0      05 Jan 2025 00:37  Phos  5.0     01-05  Mg     1.8     01-05    TPro  6.1  /  Alb  3.4  /  TBili  6.1[H]  /  DBili  x   /  AST  76[H]  /  ALT  69[H]  /  AlkPhos  127[H]  01-05    LIVER FUNCTIONS - ( 05 Jan 2025 00:37 )  Alb: 3.4 g/dL / Pro: 6.1 g/dL / ALK PHOS: 127 U/L / ALT: 69 U/L / AST: 76 U/L / GGT: x           PT/INR - ( 05 Jan 2025 04:06 )   PT: 13.5 sec;   INR: 1.19 ratio         PTT - ( 05 Jan 2025 04:06 )  PTT:42.2 sec  ABG - ( 05 Jan 2025 06:04 )  pH, Arterial: 7.49  pH, Blood: x     /  pCO2: 35    /  pO2: 106   / HCO3: 27    / Base Excess: 3.3   /  SaO2: 99.9    ------------------------------------------------------------------------------------------------------------------------------  Assessment:  58 yo F w/ PMHx of Sickle cell disease (on hydroxyurea), HTN, 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 Pt initially presented to [facility] for SOB and SS crisis; course c/b witnessed seizure, intubated and sedated, and transferred to [facility] for further management. Now in cardiogenic shock, transferred to [facility] for further management. Cannulated for VA ECMO on 1/4/25.    Cardiogenic shock  Acute respiratory failure  Acute blood loss anemia  Thrombocytopenia  Hyperglycemia      Plan:   ***Neuro***  Off sedation currently.  Head CT without abnormality.  EEG today.  Postoperative acute pain control with prns.    ***Cardiovascular***  Remains on Dobutamine, Epinephrine and VA ECMO support.  Will wean Epinephrine in setting of tachycardia.  Trend Lactate  Wean pressors for MAP > 65 mm Hg  VA ECMO decreased to 3000, Flow 2.6  Continue nitric oxide at 10 ppm  Monitor extremity pulses, nonivasive LE arterial studies pending.    ***Pulmonary***  Full vent support.    ***GI***  Increase tube feeds slowly  Protonix for stress ulcer prophylaxis     ***Renal***  Trend Creatinine   Costa catheter for strict I/O measurements.   Hypomagnesemia - repleted.  Diuresing without diuretics.    ***ID***  Empiric Vanco / Meropenum   Trend leukocytosis    ***Endocrine***  Hyperglycemia - Insulin infusion.     ***Hematology***  Acute blood loss anemia and thrombocytopenia  Sickle Cell - s/p Red cell exchange yesterday  Will follow up Heme recs - appreciate emergent consult. Will continue to hold Hydroxyurea in setting of thrombocytopenia.  Heparin infusion for anticoagulation.        Care plan discussed with the ICU care team.   Patient remains critical, at risk for life threatening decompensation.    I have spent 55 minutes providing critical care management to this patient.    By signing my name below, I, Gabriella Lopez, attest that this documentation has been prepared under the direction and in the presence of Jyothi Comer MD.  Electronically signed: Gabriella Lopez, 01-05-25 @ 06:25    I, Jyothi Comer,  personally performed the services described in this documentation. all medical record entries made by the scribe were at my direction and in my presence. I have reviewed the chart and agree that the record reflects my personal performance and is accurate and complete  Electronically signed: Jyothi Comer MD. Patient seen and examined at the bedside.    Remains critically ill on continuous ICU monitoring.      Brief Summary:  60 yo F with Sickle cell disease and NICM now with Cardiogenic shock s/p VA ECMO on 1/4/25.    24 Hour events:  Sedation held for neuro exam - reacting some to noxious stimuli  CT head negative for acute pathology  Lost signals in left leg overnight - Heparin increased, now with signals.      Objective:  Vital Signs Last 24 Hrs  T(C): 37.8 (05 Jan 2025 04:00), Max: 37.8 (05 Jan 2025 04:00)  T(F): 100 (05 Jan 2025 04:00), Max: 100 (05 Jan 2025 04:00)  HR: 133 (05 Jan 2025 06:00) (55 - 133)  BP: --  BP(mean): --  RR: 27 (05 Jan 2025 06:00) (5 - 34)  SpO2: 85% (05 Jan 2025 06:00) (78% - 100%)    Parameters below as of 05 Jan 2025 05:30  Patient On (Oxygen Delivery Method): ventilator      Mode: AC/ CMV (Assist Control/ Continuous Mandatory Ventilation)  RR (machine): 12  TV (machine): 420  FiO2: 40  PEEP: 8  ITime: 1  MAP: 11  PC: 8  PIP: 21              Physical Exam:   General: Intubated  Neurology: Withdrawing to noxious stimuli in all extremities, +cough, +gag  Respiratory: Breath sounds bilaterally   CV: Sinus Tachycardia   VA ECMO 3300 rpms, flow of 3.2, sweep of 1   Abdominal: Soft, Nontender  Extremities: Warm, well-perfused  Costa  -------------------------------------------------------------------------------------------------------------------------------    Labs:                        10.1   11.76 )-----------( 89       ( 05 Jan 2025 00:31 )             27.5     01-05    142  |  102  |  35[H]  ----------------------------<  97  4.1   |  22  |  1.20    Ca    9.0      05 Jan 2025 00:37  Phos  5.0     01-05  Mg     1.8     01-05    TPro  6.1  /  Alb  3.4  /  TBili  6.1[H]  /  DBili  x   /  AST  76[H]  /  ALT  69[H]  /  AlkPhos  127[H]  01-05    LIVER FUNCTIONS - ( 05 Jan 2025 00:37 )  Alb: 3.4 g/dL / Pro: 6.1 g/dL / ALK PHOS: 127 U/L / ALT: 69 U/L / AST: 76 U/L / GGT: x           PT/INR - ( 05 Jan 2025 04:06 )   PT: 13.5 sec;   INR: 1.19 ratio         PTT - ( 05 Jan 2025 04:06 )  PTT:42.2 sec  ABG - ( 05 Jan 2025 06:04 )  pH, Arterial: 7.49  pH, Blood: x     /  pCO2: 35    /  pO2: 106   / HCO3: 27    / Base Excess: 3.3   /  SaO2: 99.9    ------------------------------------------------------------------------------------------------------------------------------  Assessment:  60 yo F w/ PMHx of Sickle cell disease (on hydroxyurea), HTN, 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 Pt initially presented to [facility] for SOB and SS crisis; course c/b witnessed seizure, intubated and sedated, and transferred to [facility] for further management. Now in cardiogenic shock, transferred to [facility] for further management. Cannulated for VA ECMO on 1/4/25.    Cardiogenic shock  Acute respiratory failure  Acute blood loss anemia  Thrombocytopenia  Hyperglycemia      Plan:   ***Neuro***  Off sedation currently.  Head CT without abnormality.  EEG today.  Postoperative acute pain control with prns.    ***Cardiovascular***  Remains on Dobutamine, Epinephrine and VA ECMO support.  Will wean Epinephrine in setting of tachycardia.  Trend Lactate  Wean pressors for MAP > 65 mm Hg  VA ECMO decreased to 3000, Flow 2.6  Continue nitric oxide at 10 ppm  Monitor extremity pulses, nonivasive LE arterial studies pending.    ***Pulmonary***  Full vent support.    ***GI***  Increase tube feeds slowly  Protonix for stress ulcer prophylaxis     ***Renal***  Trend Creatinine   Costa catheter for strict I/O measurements.   Hypomagnesemia - repleted.  Diuresing without diuretics.    ***ID***  Empiric Vanco / Meropenum   Trend leukocytosis    ***Endocrine***  Hyperglycemia - Insulin infusion.     ***Hematology***  Acute blood loss anemia and thrombocytopenia  Sickle Cell - s/p Red cell exchange yesterday  Will follow up Heme recs - appreciate emergent consult. Will continue to hold Hydroxyurea in setting of thrombocytopenia.  Heparin infusion for anticoagulation.        Care plan discussed with the ICU care team.   Patient remains critical, at risk for life threatening decompensation.    I have spent 55 minutes providing critical care management to this patient.    By signing my name below, I, Gabriella Lopez, attest that this documentation has been prepared under the direction and in the presence of Jyothi Comer MD.  Electronically signed: Gabriella Lopez, 01-05-25 @ 06:25    I, Jyothi Comer,  personally performed the services described in this documentation. all medical record entries made by the scribe were at my direction and in my presence. I have reviewed the chart and agree that the record reflects my personal performance and is accurate and complete  Electronically signed: Jyothi Comer MD.

## 2025-01-05 NOTE — PROGRESS NOTE ADULT - SUBJECTIVE AND OBJECTIVE BOX
----------------------------------------------------------------------------------------------------  ECMO Daily Management Note   ----------------------------------------------------------------------------------------------------  INTERVAL EVENTS:   Transfused with 1u prbcs & 1u plts yesterday in CTU  ----------------------------------------------------------------------------------------------------  59yF   VA ECMO  for:  Refractory cardiogenic shock       Date of initiation:  1/4/25      Cannulae:   25Fr R Fem Venous,  19Fr L Fem Arterial with 6Fr Distal reperfusion catheter     Continue current support,   Wean trial as tolerated   Anticoagulation w/hep gtt, goal Xa 0.2-0.4, PTT 50-60  =============================================================  Weight (kg): 83.6 (01-03-25)        Height (cm): 162 (01-03-25)   BSA (m2): 1.88 (01-03-25)        BMI (kg/m2): 31.9 (01-03-25)    ECMO  RPM:  3300 (05 Jan 2025 06:00)      Pump Flow (Lpm):  3.2 (05 Jan 2025 06:00)              Sweep  (L/min):   1 (05 Jan 2025 06:00)       FiO2 (%):  1 (05 Jan 2025 06:00)  Pre-membrane -  Pressure (mm/Hg):   148 (05 Jan 2025 06:00)      Post-membrane - Pressure (mm/Hg):  129 (05 Jan 2025 06:00)   ( 04 Jan 2025 07:29 )  pH: 7.42  /  pCO2: 33    / pO2: 445   /  HCO3: 21    /  Base Excess: -2.3  /  SaO2: 99.9           buMETAnide Infusion 2 mG/Hr IV Continuous <Continuous>  DOBUTamine Infusion 5 MICROgram(s)/kG/Min IV Continuous <Continuous>  EPINEPHrine    Infusion 0.02 MICROgram(s)/kG/Min IV Continuous <Continuous>  norepinephrine Infusion 0.58 MICROgram(s)/kG/Min IV Continuous <Continuous>  vasopressin Infusion 0.04 Unit(s)/Min IV Continuous <Continuous>    Vent Mode: AC/ CMV (Assist Control/ Continuous Mandatory Ventilation)  RR (machine): 12, TV (machine): 420, FiO2: 40, PEEP: 8, MAP: 11, PC: 8, PIP: 21    (05 Jan 2025 06:04) pH, Art: 7.49/pCO2: 35/pO2: 106/HCO3: 27/BE: 3.3/SaO2: 99.9/LAC: 1.9, --   (05 Jan 2025 04:00) pH, Art: 7.51/pCO2: 35/pO2: 157/HCO3: 28/BE: 4.7/SaO2: 98.6/LAC: 1.7, --   (05 Jan 2025 02:05) pH, Art: 7.52/pCO2: 31/pO2: 156/HCO3: 25/BE: 2.7/SaO2: 99.3/LAC: 2.0, --   (05 Jan 2025 00:00) pH, Art: 7.51/pCO2: 32/pO2: 398/HCO3: 26/BE: 2.7/SaO2: 99.0/LAC: 2.1, --   (04 Jan 2025 20:03) pH, Art: 7.52/pCO2: 33/pO2: 155/HCO3: 27/BE: 4.1/SaO2: 98.6/LAC: 1.5, --     (05 Jan 2025 06:04) pH, Clifford: 7.42/pCO2: 39/pO2:  45/HCO3: 25/BE: 0.8/MVO2: 76.9/SvO2: /LAC: ,   (05 Jan 2025 00:00) pH, Clifford: 7.42/pCO2: 43/pO2:  47/HCO3: 28/BE: 2.9/MVO2: 80.6/SvO2: /LAC: ,   (04 Jan 2025 20:03) pH, Clifford: 7.44/pCO2: 42/pO2:  45/HCO3: 28/BE: 3.9/MVO2: 81.4/SvO2: /LAC: ,   (04 Jan 2025 18:35) pH, Clifford: 7.42/pCO2: 40/pO2:  48/HCO3: 26/BE: 1.3/MVO2: 84.6/SvO2: /LAC: ,   (04 Jan 2025 16:10) pH, Clifford: 7.38/pCO2: 47/pO2:  50/HCO3: 28/BE: 2.0/MVO2: 84.2/SvO2: /LAC: ,     ----------------------------------------------------------------------------------------------------  ANTICOAGULATION  heparin  Infusion 850 Unit(s)/Hr IV Continuous <Continuous>    (05 Jan 2025 04:06)  aPTT: 42.2  Xa: 0.28  PT: 13.5  INR: 1.19   Fibrinogen: 518   Platelets: -----  (05 Jan 2025 00:31)  aPTT: -----  Xa: -----  PT: -----  INR: -----   Fibrinogen: -----  Platelets: 89    (04 Jan 2025 23:26)  aPTT: 39.2  Xa: 0.25  PT: 14.6  INR: 1.28   Fibrinogen: 483   Platelets: -----  (04 Jan 2025 18:17)  aPTT: 37.2  Xa: 0.21  PT: -----  INR: -----   Fibrinogen: -----  Platelets: -----    (05 Jan 2025 00:51)  Hemoglobin: -----    LDH: -----    Haptoglobin: 70     PFH:  -----  (05 Jan 2025 00:37)  Hemoglobin: -----    LDH: 879     Haptoglobin: -----   PFH:  -----  (05 Jan 2025 00:31)  Hemoglobin: 10.1    LDH: -----    Haptoglobin: -----   PFH:  -----  (04 Jan 2025 12:10)  Hemoglobin: 10.6    LDH: 792     Haptoglobin: 34     PFH:  -----    ----------------------------------------------------------------------------------------------------  Daily ECMO Checklist:   Progress report received from perfusionist   Circuit checked/inspected   Emergency equipment and supplies checked   Cannulation site inspection     I have reviewed all of the above information as well as pertinent labs/imaging/testing and care plan with the bedside nurse, perfusionist and care team members and provided my recommendations for support.   ECMO Management was separate from critical care billing time.     ----------------------------------------------------------------------------------------------------  ECMO Daily Management Note   ----------------------------------------------------------------------------------------------------  INTERVAL EVENTS:     Loss of signals in left leg overnight - present this morning.  More reflexes today.  Weaning pressors.  Oozing from cannula sites - sutures placed.  ----------------------------------------------------------------------------------------------------  60 yo F with Sickle Cell Disease   VA ECMO for:  Refractory cardiogenic shock       Date of initiation:  1/4/25      Cannulae:  25Fr R Fem Venous,  17Fr L Fem Arterial with 6Fr Distal reperfusion catheter     Flow decreased to ~ 2.6  Wean pressors as able.  Likely will transition to RVAD support this week.  Anticoagulation with Heparin infusion, goal anti-Xa 0.2-0.3  =============================================================  Weight (kg): 83.6 (01-03-25)        Height (cm): 162 (01-03-25)   BSA (m2): 1.88 (01-03-25)        BMI (kg/m2): 31.9 (01-03-25)    ECMO  RPM:  3300 (05 Jan 2025 06:00)      Pump Flow (Lpm):  3.2 (05 Jan 2025 06:00)              Sweep  (L/min):   1 (05 Jan 2025 06:00)       FiO2 (%):  1 (05 Jan 2025 06:00)  Pre-membrane -  Pressure (mm/Hg):   148 (05 Jan 2025 06:00)      Post-membrane - Pressure (mm/Hg):  129 (05 Jan 2025 06:00)   ( 04 Jan 2025 07:29 )  pH: 7.42  /  pCO2: 33    / pO2: 445   /  HCO3: 21    /  Base Excess: -2.3  /  SaO2: 99.9           buMETAnide Infusion 2 mG/Hr IV Continuous <Continuous>  DOBUTamine Infusion 5 MICROgram(s)/kG/Min IV Continuous <Continuous>  EPINEPHrine    Infusion 0.02 MICROgram(s)/kG/Min IV Continuous <Continuous>  norepinephrine Infusion 0.58 MICROgram(s)/kG/Min IV Continuous <Continuous>  vasopressin Infusion 0.04 Unit(s)/Min IV Continuous <Continuous>    Vent Mode: AC/ CMV (Assist Control/ Continuous Mandatory Ventilation)  RR (machine): 12, TV (machine): 420, FiO2: 40, PEEP: 8, MAP: 11, PC: 8, PIP: 21    (05 Jan 2025 06:04) pH, Art: 7.49/pCO2: 35/pO2: 106/HCO3: 27/BE: 3.3/SaO2: 99.9/LAC: 1.9, --   (05 Jan 2025 04:00) pH, Art: 7.51/pCO2: 35/pO2: 157/HCO3: 28/BE: 4.7/SaO2: 98.6/LAC: 1.7, --   (05 Jan 2025 02:05) pH, Art: 7.52/pCO2: 31/pO2: 156/HCO3: 25/BE: 2.7/SaO2: 99.3/LAC: 2.0, --   (05 Jan 2025 00:00) pH, Art: 7.51/pCO2: 32/pO2: 398/HCO3: 26/BE: 2.7/SaO2: 99.0/LAC: 2.1, --   (04 Jan 2025 20:03) pH, Art: 7.52/pCO2: 33/pO2: 155/HCO3: 27/BE: 4.1/SaO2: 98.6/LAC: 1.5, --     (05 Jan 2025 06:04) pH, Clifford: 7.42/pCO2: 39/pO2:  45/HCO3: 25/BE: 0.8/MVO2: 76.9/SvO2: /LAC: ,   (05 Jan 2025 00:00) pH, Clifford: 7.42/pCO2: 43/pO2:  47/HCO3: 28/BE: 2.9/MVO2: 80.6/SvO2: /LAC: ,   (04 Jan 2025 20:03) pH, Clifford: 7.44/pCO2: 42/pO2:  45/HCO3: 28/BE: 3.9/MVO2: 81.4/SvO2: /LAC: ,   (04 Jan 2025 18:35) pH, Clifford: 7.42/pCO2: 40/pO2:  48/HCO3: 26/BE: 1.3/MVO2: 84.6/SvO2: /LAC: ,   (04 Jan 2025 16:10) pH, Clifford: 7.38/pCO2: 47/pO2:  50/HCO3: 28/BE: 2.0/MVO2: 84.2/SvO2: /LAC: ,     ----------------------------------------------------------------------------------------------------  ANTICOAGULATION  heparin  Infusion 850 Unit(s)/Hr IV Continuous <Continuous>    (05 Jan 2025 04:06)  aPTT: 42.2  Xa: 0.28  PT: 13.5  INR: 1.19   Fibrinogen: 518   Platelets: -----  (05 Jan 2025 00:31)  aPTT: -----  Xa: -----  PT: -----  INR: -----   Fibrinogen: -----  Platelets: 89    (04 Jan 2025 23:26)  aPTT: 39.2  Xa: 0.25  PT: 14.6  INR: 1.28   Fibrinogen: 483   Platelets: -----  (04 Jan 2025 18:17)  aPTT: 37.2  Xa: 0.21  PT: -----  INR: -----   Fibrinogen: -----  Platelets: -----    (05 Jan 2025 00:51)  Hemoglobin: -----    LDH: -----    Haptoglobin: 70     PFH:  -----  (05 Jan 2025 00:37)  Hemoglobin: -----    LDH: 879     Haptoglobin: -----   PFH:  -----  (05 Jan 2025 00:31)  Hemoglobin: 10.1    LDH: -----    Haptoglobin: -----   PFH:  -----  (04 Jan 2025 12:10)  Hemoglobin: 10.6    LDH: 792     Haptoglobin: 34     PFH:  -----    ----------------------------------------------------------------------------------------------------  Daily ECMO Checklist:   Progress report received from perfusionist   Circuit checked/inspected   Emergency equipment and supplies checked   Cannulation site inspection     I have reviewed all of the above information as well as pertinent labs/imaging/testing and care plan with the bedside nurse, perfusionist and care team members and provided my recommendations for support.   ECMO Management was separate from critical care billing time.

## 2025-01-05 NOTE — PROGRESS NOTE ADULT - SUBJECTIVE AND OBJECTIVE BOX
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 Pt initially presented to [facility] for SOB and SS crisis; course c/b witnessed seizure, intubated and sedated, and transferred to [facility] for further management. Keppra was discontinued at [facility] due to negative EEG. At [facility] MICU pt was found to have RV failure possibly iso pulm HTN 2/2 increased tidal volumes on the ventilator. On addition, pt possibly received a lot of volume iso SS crisis. Neuro was consulted for seizures and EEG technician was called for vEEG placement. Pt is in profound cardiogenic shock. Pt has been decompensating, despite Dobutamine gtt, Bumex gtt, Levophed, and addition of Vasopressin. Vasopressors requirements have gone up. NS shock team was called and pt is being transferred to NS for further management.  (03 Jan 2025 23:18)      Patient seen and examined at the bedside.    Remained critically ill on continuous ICU monitoring.    OBJECTIVE:  Vital Signs Last 24 Hrs  T(C): 37.9 (05 Jan 2025 16:00), Max: 38.2 (05 Jan 2025 12:00)  T(F): 100.2 (05 Jan 2025 16:00), Max: 100.8 (05 Jan 2025 12:00)  HR: 112 (05 Jan 2025 18:45) (55 - 137)  BP: --  BP(mean): --  RR: 15 (05 Jan 2025 18:45) (4 - 34)  SpO2: 100% (05 Jan 2025 18:45) (74% - 100%)    Parameters below as of 05 Jan 2025 16:00  Patient On (Oxygen Delivery Method): ventilator    O2 Concentration (%): 40    Physical Exam:   General: Intubated  Neurology: Withdrawing to noxious stimuli in all extremities, +cough, +gag  Respiratory: Breath sounds bilaterally   CV: Sinus Tachycardia   VA ECMO 3000 rpms, flow of 2.2, sweep of 1   Abdominal: Soft, Nontender  Extremities: Warm, well-perfused  Costa      Assessment:  58 yo F w/ PMHx of Sickle cell disease (on hydroxyurea), HTN, HFimpEF/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 Pt initially presented to [facility] for SOB and SS crisis; course c/b witnessed seizure, intubated and sedated, and transferred to [facility] for further management. Now in cardiogenic shock, transferred to [facility] for further management. Cannulated for VA ECMO on 1/4/25.    Cardiogenic shock  Acute respiratory failure  Acute blood loss anemia  Thrombocytopenia  Hyperglycemia       Plan:   ***Neuro***  Addressed analgesic regimen to optimize function and sedated for ventilatory synchrony.    ***Cardiovascular***  Patient admitted for cardiogenic shock s/p VA ECMO insertion 1/4/25. Inotropic support with IV Dobutamine and Epinephrine infusion for acute systolic heart failure/cardiogenic shock/vasogenic shock. IV Vasopressin and Levophed for hypovolemic/vasogenic shock. Invasive hemodynamic monitoring with a central venous catheter & an A-line were required for the continuous central venous and MAP/BP monitoring to ensure adequate cardiovascular support. Currently on VA ECMO at 3000 rpms, flow of 2.2, sweep of 1.       ***Pulmonary***  Respiratory status required full ventilatory support, close monitoring of respiratory rate and breathing pattern, the following of ABG’s with A-line monitoring, continuous pulse oximetry monitoring    Mode: AC/ CMV (Assist Control/ Continuous Mandatory Ventilation)  RR (machine): 12  TV (machine): 420  FiO2: 40  PEEP: 8  ITime: 1  MAP: 11  PIP: 21      ***Heme***  Transfused 1 plt and 1 prbc yesterday, underwent platelet exchange today. Monitor hemoglobin and hematocrit levels. Heparin continued for venous thromboembolism prophylaxis in addition to sequential compression devices      ***GI***  On TF's. Protonix for stress ulcer prophylaxis.       ***Renal***  Optimize renal perfusion with adequate volume resuscitation and continued monitoring of urine output, fluid balance, electrolytes, and BUN/Creatinine.     ***ID***  Afebrile, white blood count within normal limits. Continue trending white blood count and monitoring fever curve. On Meropenum and Vancomycin infusions for       ***Endocrine***  Metabolic stability, stress hyperglycemia required an IV regular Insulin drip while following serial glucose levels to help achieve and maintain euglycemia.          Patient requires continuous monitoring with bedside rhythm monitoring, pulse oximetry monitoring, and continuous central venous and arterial pressure monitoring; and intermittent blood gas analysis. Care plan discussed with the ICU care team.   Patient remained critical, at risk for life threatening decompensation.    I have spent 30 minutes providing critical care management to this patient.    By signing my name below, I, Gabriella Lopez, attest that this documentation has been prepared under the direction and in the presence of Susan Goodrich MD   Electronically signed: Omar Anton, 01-05-25 @ 18:52    I, Susan Goodrich, personally performed the services described in this documentation. all medical record entries made by the scribe were at my direction and in my presence. 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 Pt initially presented to [facility] for SOB and SS crisis; course c/b witnessed seizure, intubated and sedated, and transferred to [facility] for further management. Keppra was discontinued at [facility] due to negative EEG. At [facility] MICU pt was found to have RV failure possibly iso pulm HTN 2/2 increased tidal volumes on the ventilator. On addition, pt possibly received a lot of volume iso SS crisis. Neuro was consulted for seizures and EEG technician was called for vEEG placement. Pt is in profound cardiogenic shock. Pt has been decompensating, despite Dobutamine gtt, Bumex gtt, Levophed, and addition of Vasopressin. Vasopressors requirements have gone up. NS shock team was called and pt is being transferred to NS for further management.  (03 Jan 2025 23:18)      Patient seen and examined at the bedside.    Remained critically ill on continuous ICU monitoring.    OBJECTIVE:  Vital Signs Last 24 Hrs  T(C): 37.9 (05 Jan 2025 16:00), Max: 38.2 (05 Jan 2025 12:00)  T(F): 100.2 (05 Jan 2025 16:00), Max: 100.8 (05 Jan 2025 12:00)  HR: 112 (05 Jan 2025 18:45) (55 - 137)  BP: --  BP(mean): --  RR: 15 (05 Jan 2025 18:45) (4 - 34)  SpO2: 100% (05 Jan 2025 18:45) (74% - 100%)    Parameters below as of 05 Jan 2025 16:00  Patient On (Oxygen Delivery Method): ventilator    O2 Concentration (%): 40    Physical Exam:   General: Intubated  Neurology: Withdrawing to noxious stimuli in all extremities, +cough, +gag  Respiratory: Breath sounds bilaterally   CV: Sinus Tachycardia   VA ECMO 3000 rpms, flow of 2.2, sweep of 1   Abdominal: Soft, Nontender  Extremities: Warm, well-perfused  Costa      Assessment:  58 yo F w/ PMHx of Sickle cell disease (on hydroxyurea), HTN, HFimpEF/NICM (EF 58% 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 Pt initially presented to [facility] for SOB and SS crisis; course c/b witnessed seizure, intubated and sedated, and transferred to [facility] for further management. Now in cardiogenic shock, transferred to [facility] for further management. Cannulated for VA ECMO on 1/4/25.    Cardiogenic shock  Acute respiratory failure  Acute blood loss anemia  Thrombocytopenia  Hyperglycemia       Plan:   ***Neuro***  Addressed analgesic regimen to optimize function and sedated for ventilatory synchrony.    ***Cardiovascular***  Patient admitted for cardiogenic shock s/p VA ECMO insertion 1/4/25. Inotropic support with IV Dobutamine and Epinephrine infusion for acute systolic heart failure/cardiogenic shock/vasogenic shock. IV Vasopressin and Levophed for hypovolemic/vasogenic shock. Invasive hemodynamic monitoring with a central venous catheter & an A-line were required for the continuous central venous and MAP/BP monitoring to ensure adequate cardiovascular support. Currently on VA ECMO at 3000 rpms, flow of 2.2, sweep of 1.       ***Pulmonary***  Respiratory status required full ventilatory support, close monitoring of respiratory rate and breathing pattern, the following of ABG’s with A-line monitoring, continuous pulse oximetry monitoring    Mode: AC/ CMV (Assist Control/ Continuous Mandatory Ventilation)  RR (machine): 12  TV (machine): 420  FiO2: 40  PEEP: 8  ITime: 1  MAP: 11  PIP: 21      ***Heme***  Transfused 1 plt and 1 prbc yesterday, underwent platelet exchange today. Monitor hemoglobin and hematocrit levels. Heparin continued for venous thromboembolism prophylaxis in addition to sequential compression devices      ***GI***  On TF's. Protonix for stress ulcer prophylaxis.       ***Renal***  Optimize renal perfusion with adequate volume resuscitation and continued monitoring of urine output, fluid balance, electrolytes, and BUN/Creatinine.     ***ID***  Afebrile, white blood count within normal limits. Continue trending white blood count and monitoring fever curve. On Meropenum and Vancomycin infusions for       ***Endocrine***  Metabolic stability, stress hyperglycemia required an IV regular Insulin drip while following serial glucose levels to help achieve and maintain euglycemia.          Patient requires continuous monitoring with bedside rhythm monitoring, pulse oximetry monitoring, and continuous central venous and arterial pressure monitoring; and intermittent blood gas analysis. Care plan discussed with the ICU care team.   Patient remained critical, at risk for life threatening decompensation.    I have spent 50 minutes providing critical care management to this patient.    By signing my name below, I, Gabriella Lopez, attest that this documentation has been prepared under the direction and in the presence of Susan Goodrich MD   Electronically signed: Omar Anton, 01-05-25 @ 18:52    I, Susan Goodrich, personally performed the services described in this documentation. all medical record entries made by the scribe were at my direction and in my presence. 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 Pt initially presented to [facility] for SOB and SS crisis; course c/b witnessed seizure, intubated and sedated, and transferred to [facility] for further management. Keppra was discontinued at [facility] due to negative EEG. At [facility] MICU pt was found to have RV failure possibly iso pulm HTN 2/2 increased tidal volumes on the ventilator. On addition, pt possibly received a lot of volume iso SS crisis. Neuro was consulted for seizures and EEG technician was called for vEEG placement. Pt is in profound cardiogenic shock. Pt has been decompensating, despite Dobutamine gtt, Bumex gtt, Levophed, and addition of Vasopressin. Vasopressors requirements have gone up. NS shock team was called and pt is being transferred to NS for further management.  (03 Jan 2025 23:18)    Patient brought to CTU 1/03 and cannulated for VA ECMO. TTE showedan EF of 20% with a dilated RV with reduced function. Patient has been off of continuous sedation, but has received Fentanyl intermittently for perceived pain. Patient exhibits withdrawal to painful stimuli, she does not follow commands.    Patient seen and examined at the bedside.    Remained critically ill on continuous ICU monitoring.    OBJECTIVE:  Vital Signs Last 24 Hrs  T(C): 37.9 (05 Jan 2025 16:00), Max: 38.2 (05 Jan 2025 12:00)  T(F): 100.2 (05 Jan 2025 16:00), Max: 100.8 (05 Jan 2025 12:00)  HR: 112 (05 Jan 2025 18:45) (55 - 137)  BP: --  BP(mean): --  RR: 15 (05 Jan 2025 18:45) (4 - 34)  SpO2: 100% (05 Jan 2025 18:45) (74% - 100%)    Parameters below as of 05 Jan 2025 16:00  Patient On (Oxygen Delivery Method): ventilator    O2 Concentration (%): 40    Physical Exam:   General: Obese female, breathing in sync with the ventilator  Neurology: Pupils 2.5 mm reactive, Withdrawing to noxious stimuli in all extremities, +cough, +gag  Respiratory: Breath sounds bilaterally   CV: Sinus Tachycardia   VA ECMO 3000 rpms, flow of 2.2, sweep of 1   Abdominal: Soft, Nontender  Extremities: Warm, well-perfused  [place]      Assessment:  58 yo F w/ PMHx of Sickle cell disease (on hydroxyurea), HTN, HFimpEF/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 Pt initially presented to [facility] for SOB and SS crisis; course c/b witnessed seizure, intubated and sedated, and transferred to [facility] for further management. Now in cardiogenic shock, transferred to [facility] for further management. Cannulated for VA ECMO on 1/4/25.    Cardiogenic shock  Acute respiratory failure  Acute blood loss anemia  Thrombocytopenia  Hyperglycemia       Plan:   ***Neuro***  Unclear etiology for abnormal mental status. Patient does not appear conscious and I have discontinued Fentanyl in order to better monitor neurologic status. CT of head and EEG negative. This still could be related to sickle cell disease of fat embolism. Plan to follow up with hematology and neurology.    ***Cardiovascular***  Patient admitted for cardiogenic shock s/p VA ECMO insertion 1/4/25. Patient continues on inotropic support with IV Dobutamine and Epinephrine infusions for acute systolic heart failure/cardiogenic shock. IV Vasopressin also required to maintain adequate blood pressure. Etiology of cardiogenic shock unclear, recent ECHO shows improved LV function. Patients with sickle cell disease can have diastolic dysfunction, pulmonary hypertension and RV dysfunction as well as fat embolism which could be a contributing cause. Invasive hemodynamic monitoring with a central venous catheter & an A-line were required for the continuous central venous and MAP/BP monitoring to ensure adequate cardiovascular support. Currently on VA ECMO at 3000 rpms, flow of 2.2, sweep of 1. Plan to follow up with heart failure service regarding etiology of heart failure and shock.      ***Pulmonary***  Respiratory status required full ventilatory support, close monitoring of respiratory rate and breathing pattern, the following of ABG’s with A-line monitoring, continuous pulse oximetry monitoring    Mode: AC/ CMV (Assist Control/ Continuous Mandatory Ventilation)  RR (machine): 12  TV (machine): 420  FiO2: 40  PEEP: 8  ITime: 1  MAP: 11  PIP: 21      ***Heme***  Patient with sickle cell disease which has been managed on hydroxyurea. Recent illness thought possibly be due to SS crisis, although as per hematology, current smear is not consistent with hemolysis. Transfused 1 plt and 1 prbc yesterday, underwent red cell exchange today. Monitor hemoglobin and hematocrit levels. On IV Heparin for ECMO and also will suffice for venous thromboembolism prophylaxis in addition to sequential compression devices      ***GI***  On enteral feeding at 20 cc/hr. Protonix for stress ulcer prophylaxis.       ***Renal***  Optimize renal perfusion with hemodynamic support and continued monitoring of urine output, fluid balance, electrolytes, and BUN/Creatinine. Bumex discontinued due to negative fluid balance and concern for DYLON.    ***ID***  Afebrile, white blood count within normal limits. Continue trending white blood count and monitoring fever curve. On treatment with empiric Meropenum and Vancomycin.      ***Endocrine***  Metabolic stability, stress hyperglycemia required an IV regular Insulin drip while following serial glucose levels to help achieve and maintain euglycemia.          Patient requires continuous monitoring with bedside rhythm monitoring, pulse oximetry monitoring, and continuous central venous and arterial pressure monitoring; and intermittent blood gas analysis. Care plan discussed with the ICU care team.   Patient remained critical, at risk for life threatening decompensation.    I have spent 55 minutes providing critical care management to this patient.    By signing my name below, I, Gabriella Lopez, attest that this documentation has been prepared under the direction and in the presence of Susan Goodrich MD   Electronically signed: Omar Anton, 01-05-25 @ 18:52    I, Susan Goodrich, personally performed the services described in this documentation. all medical record entries made by the blasibe were at my direction and in my presence. I have reviewed the chart and agree that the record reflects my personal performance and is accurate and complete  Electronically signed: Susan Goodrich MD

## 2025-01-05 NOTE — PROGRESS NOTE ADULT - ASSESSMENT
She is a 59yr F, with hx of NICM/HFimpEF (LVEF 58% 10/2024), with PMH of SCD (on hydroxyurea) initially presented to [facility] for SOB and sickle cell crisis. Course c/b episodes of unresponsiveness, seizure requiring intubation. Was transferred to [facility] for further workup. Gordy was d/c at [facility] d/t negative EEG. At [facility] MICU, patient was found to have RV failure possibly iso pulm HTN 2/2 increased tidal volumes on the ventilator. On addition, pt possibly received a lot of volume i/s/o SS crisis. Neuro was consulted for seizures and was placed on vEEG. She was found to have worsening hemoydtnamics, despite on inotrope, pressors and diuretics, and concerns for shocks. Shock call was made and patient was transferred to [facility] 1/3/25 for further workup.     She is now cannulated for VA ECMO on 1/4/25. Currently volume up and is on bumex gtt. Being supported with , vasopressin and epinephrine. Also noted to have leukocytosis and is on antibiotics. Has EEG going and will continue to monitor neuro status.   Prognosis is guarded    Hemodynamics  1/4/25 (VA ECMO,  CVP 12-14, PA 31/21/28)    Cardiac Studies:  -TTE 1/4/25: LVEF <20%, ECMO cannula in RV, mildly enlarged RV size and reduced RV systolic function, no pericardial effusion, no MR, trace TR, IVC nml size   -TTE 1/3/25: LVIDd 3.1cm, LVEF appears grossly normal, RV severly enlarged size, reduced function, TAPSE 1.1cm, nml RA, trace pericardial effusion, mod/severe TR PASP 45,  She is a 59yr F, with hx of NICM/HFimpEF (LVEF 58% 10/2024), with PMH of SCD (on hydroxyurea) initially presented to [facility] for SOB and sickle cell crisis. Course c/b episodes of unresponsiveness, seizure requiring intubation. Was transferred to [facility] for further workup. Gordy was d/c at [facility] d/t negative EEG. At [facility] MICU, patient was found to have RV failure possibly iso pulm HTN 2/2 increased tidal volumes on the ventilator. On addition, pt possibly received a lot of volume i/s/o SS crisis. Neuro was consulted for seizures and was placed on vEEG. She was found to have worsening hemoydtnamics, despite on inotrope, pressors and diuretics, and concerns for shocks. Shock call was made and patient was transferred to [facility] 1/3/25 for further workup.     She is now cannulated for VA ECMO on 1/4/25. on bumex gtt. Being supported with , vasopressin and epinephrine. Also noted to have leukocytosis and is on antibiotics. Has EEG going and will continue to monitor neuro status.   Prognosis is guarded    Hemodynamics  1/4/25 (VA ECMO,  CVP 12-14, PA 31/21/28)    Cardiac Studies:  -TTE 1/4/25: LVEF <20%, ECMO cannula in RV, mildly enlarged RV size and reduced RV systolic function, no pericardial effusion, no MR, trace TR, IVC nml size   -TTE 1/3/25: LVIDd 3.1cm, LVEF appears grossly normal, RV severly enlarged size, reduced function, TAPSE 1.1cm, nml RA, trace pericardial effusion, mod/severe TR PASP 45,  She is a 59yr F, with hx of NICM/HFimpEF (LVEF 58% 10/2024), with PMH of SCD (on hydroxyurea) initially presented to [facility] for SOB and sickle cell crisis. Course c/b episodes of unresponsiveness, seizure requiring intubation. Was transferred to [facility] for further workup. Gordy was d/c at [facility] d/t negative EEG. At [facility] MICU, patient was found to have RV failure possibly iso pulm HTN 2/2 increased tidal volumes on the ventilator. On addition, pt possibly received a lot of volume i/s/o SS crisis. Neuro was consulted for seizures and was placed on vEEG. She was found to have worsening hemoydtnamics, despite on inotrope, pressors and diuretics, and concerns for shocks. Shock call was made and patient was transferred to [facility] 1/3/25 for further workup.     She is now cannulated for VA ECMO on 1/4/25. on bumex gtt making good UOP. Being supported with , vasopressin and epinephrine. Noted to have tachycardia in the 130s and plan to wean epi. Also noted to have leukocytosis and is on antibiotics. Has EEG ongoing. Continue supportive care at this time.       Hemodynamics  1/5/25 (VA ECMO, CVP 7, PA 36/18/26)  1/4/25 (VA ECMO,  CVP 12-14, PA 31/21/28)    Cardiac Studies:  -TTE 1/4/25: LVEF <20%, ECMO cannula in RV, mildly enlarged RV size and reduced RV systolic function, no pericardial effusion, no MR, trace TR, IVC nml size   -TTE 1/3/25: LVIDd 3.1cm, LVEF appears grossly normal, RV severly enlarged size, reduced function, TAPSE 1.1cm, nml RA, trace pericardial effusion, mod/severe TR PASP 45,

## 2025-01-05 NOTE — PROGRESS NOTE ADULT - PROBLEM SELECTOR PLAN 3
-noted to have WBC 16.61  -on antibiotics cefepime and vanco  -full infectious workup. -noted to have WBC 16.61, downtrending. Today 11.76  -on antibiotics cefepime and vanco  -full infectious workup

## 2025-01-05 NOTE — EEG REPORT - NS EEG TEXT BOX
CARSON LEE [id number]     Study Date: 		01-04-25 (0800) - 01-05-25 (0400)   Duration: 18hr   --------------------------------------------------------------------------------------------------  History:  CC/ HPI Patient is a 59y old  Female who presents with a chief complaint of Mixed Shock (05 Jan 2025 07:26)    MEDICATIONS  (STANDING):      --------------------------------------------------------------------------------------------------  Study Interpretation:    [[[Abbreviation Key:  PDR=alpha rhythm/posterior dominant rhythm. A-P=anterior posterior gradient.  Amplitude: ‘very low’:<20; ‘low’:20-50; ‘medium’:; ‘high’:>200uV.  Persistence for periodic/rhythmic patterns (% of epoch) ‘rare’:<1%; ‘occasional’:1-10%; ‘frequent’:10-50%; ‘abundant’:50-90%; ‘continuous’:>90%.  Persistence for sporadic discharges: ‘rare’:<1/hr; ‘occasional’:1/min-1/hr; ‘frequent’:>1/min; ‘abundant’:>1/10 sec.  GRDA=generalized rhythmic delta activity; FIRDA=frontal intermittent GRDA; LRDA=lateralized rhythmic delta activity; TIRDA=temporal intermittent rhythmic delta activity;  LPD=PLED=lateralized periodic discharges; GPD=generalized periodic discharges; BiPDs=BiPLEDs=bilateral independent periodic epileptiform discharges; SIRPID=stimulus induced rhythmic, periodic, or ictal appearing discharges; BIRDs=brief potentially ictal rhythmic discharges >4 Hz, lasting .5-10s; PFA=paroxysmal bursts of beta/gamma; LVFA=low voltage fast activity.  Modifiers: +F=with fast component; +S=with spike component; +R=with rhythmic component.  S-B=burst suppression pattern.  Max=maximal. N1-drowsy; N2-stage II sleep; N3-slow wave sleep. SSS/BETS=small sharp spikes/benign epileptiform transients of sleep. HV=hyperventilation; PS=photic stimulation]]]    Daily EEG Visual Analysis    FINDINGS:      Background:  Continuity:  initially burst-suppression pattern, with up to 8 seconds periods of suppression  Symmetry: asymmetric   PDR: none  Reactivity: present  Voltage: voltage attenuation over the right hemisphere   Anterior Posterior Gradient: absent  Other background findings: none  Breach: absent    Background Slowing:  Generalized slowing: diffuse polymorphic delta>theta slowing; initial burst-suppression pattern   Focal slowing: voltage attenuation over the right hemisphere    State Changes:   -N2 sleep transients were not recorded.    Sporadic Epileptiform Discharges:    None    Rhythmic and Periodic Patterns (RPPs):  Intermittent generalized periodic discharges with triphasic morphology at times sharply contoured, 1-1.5Hz, blunted over the right hemisphere    Electrographic and Electroclinical seizures:  None    Other Clinical Events:  Event button push from 2132 for shivering occurred without associated epileptiform correlate     Activation Procedures:   -Hyperventilation was not performed.    -Photic stimulation was not performed.      Artifacts:  Intermittent myogenic and movement artifacts were noted.    ECG:  The heart rate on single channel ECG was unable to be discerned     EEG Classification / Summary:  Abnormal  EEG in a lethargic patient:   1. Intermittent generalized periodic discharges with triphasic morphology, at times sharply contoured, 1-1.5hz, blunted over the right hemisphere    2. Continuous voltage attenuation over the right hemisphere   3. Generalized background slowing, moderate-severe, with initial burst-suppression pattern, subsequently resolved       Clinical Impression:   1. Triphasic waves are classically associated with encephalopathy (infectious, metabolic, toxic in etiology), however, can carry epileptiform potential at frequencies 2+Hz (not seen on this recording).   2. Structural abnormality in the right hemisphere   3. Moderate-severe generalized background slowing, resolving, likely in the setting of weaning sedation.   4. Event button push from 2132 for shivering occurred without associated epileptiform correlate   5. There were no seizures recorded.     *PRELIM READ, ATTENDING REVIEW PENDING*           -------------------------------------------------------------------------------------------------------  [facility] EEG Reading Room Ph#: [phone]  Epilepsy Answering Service after 5PM and before 8:30AM: Ph#: [phone]    Woodrow Vernon DO   Epilepsy Fellow    CARSON LEE [facility]-[id number]     Study Date: 		01-04-25 (0800) - 01-05-25 (0400)   Duration: 18hr   --------------------------------------------------------------------------------------------------  History:  CC/ HPI Patient is a 59y old  Female who presents with a chief complaint of Mixed Shock (05 Jan 2025 07:26)    --------------------------------------------------------------------------------------------------  Study Interpretation:    [[[Abbreviation Key:  PDR=alpha rhythm/posterior dominant rhythm. A-P=anterior posterior gradient.  Amplitude: ‘very low’:<20; ‘low’:20-50; ‘medium’:; ‘high’:>200uV.  Persistence for periodic/rhythmic patterns (% of epoch) ‘rare’:<1%; ‘occasional’:1-10%; ‘frequent’:10-50%; ‘abundant’:50-90%; ‘continuous’:>90%.  Persistence for sporadic discharges: ‘rare’:<1/hr; ‘occasional’:1/min-1/hr; ‘frequent’:>1/min; ‘abundant’:>1/10 sec.  GRDA=generalized rhythmic delta activity; FIRDA=frontal intermittent GRDA; LRDA=lateralized rhythmic delta activity; TIRDA=temporal intermittent rhythmic delta activity;  LPD=PLED=lateralized periodic discharges; GPD=generalized periodic discharges; BiPDs=BiPLEDs=bilateral independent periodic epileptiform discharges; SIRPID=stimulus induced rhythmic, periodic, or ictal appearing discharges; BIRDs=brief potentially ictal rhythmic discharges >4 Hz, lasting .5-10s; PFA=paroxysmal bursts of beta/gamma; LVFA=low voltage fast activity.  Modifiers: +F=with fast component; +S=with spike component; +R=with rhythmic component.  S-B=burst suppression pattern.  Max=maximal. N1-drowsy; N2-stage II sleep; N3-slow wave sleep. SSS/BETS=small sharp spikes/benign epileptiform transients of sleep. HV=hyperventilation; PS=photic stimulation]]]    Daily EEG Visual Analysis    FINDINGS:      Background:  Continuity:  initially burst-suppression pattern, with up to 8 seconds periods of suppression  Symmetry: asymmetric   PDR: none  Reactivity: present  Voltage: voltage attenuation over the right hemisphere   Anterior Posterior Gradient: absent  Other background findings: none  Breach: absent    Background Slowing:  Generalized slowing: diffuse polymorphic delta>theta slowing; initial burst-suppression pattern from 0800~1400; resolved when record resumes at ~1600.  Focal slowing: voltage attenuation over the right hemisphere    State Changes:   -N2 sleep transients were not recorded.    Sporadic Epileptiform Discharges:    None    Rhythmic and Periodic Patterns (RPPs):  Intermittent generalized periodic discharges with triphasic morphology at times sharply contoured, 1-1.5Hz, blunted over the right hemisphere    Electrographic and Electroclinical seizures:  None    Other Clinical Events:  Event button push from 2132 for shivering occurred without associated epileptiform correlate     Activation Procedures:   -Hyperventilation was not performed.    -Photic stimulation was not performed.      Artifacts:  Intermittent myogenic and movement artifacts were noted.    ECG:  The heart rate on single channel ECG was unable to be discerned     -------------------------------------------------------------------------------------------------------  EEG Classification / Summary:  Abnormal  EEG in a lethargic patient:   1. Intermittent generalized periodic discharges with triphasic morphology, at times sharply contoured, 1-1.5hz, blunted over the right hemisphere    2. Continuous voltage attenuation over the right hemisphere   3. Generalized background slowing, moderate-severe  4. resolved - burst-suppression   -------------------------------------------------------------------------------------------------------  Clinical Impression:   1. Triphasic waves are classically associated with encephalopathy (infectious, metabolic, toxic in etiology), however, can carry epileptiform potential at frequencies 2+Hz (not seen on this recording).   2. Structural abnormality in the right hemisphere   3. Moderate-severe generalized background slowing, resolving, likely in the setting of weaning sedation.   4. Event button push from 2132 for shivering occurred without associated epileptiform correlate   5. There were no seizures recorded.     Resolved findings:  *burst supression from 0800~1400, record is interrupted, and background appears continuous when record resumes at ~1600.  -------------------------------------------------------------------------------------------------------  [facility] EEG Reading Room Ph#: [phone]  Epilepsy Answering Service after 5PM and before 8:30AM: Ph#: [phone]    Woodrow Vernon DO   Epilepsy Fellow     Tarik Lagos MD PhD  Director, Epilepsy Division, [facility]

## 2025-01-05 NOTE — PROGRESS NOTE ADULT - PROBLEM SELECTOR PLAN 4
-was noted to have seizure at [facility], EEG neg and Keppra d/c  -EEG currently on in the CTU  -neuro to f/u. -was noted to have seizure at [facility], EEG neg and Keppra d/c  -EEG currently on   -neuro to f/u.

## 2025-01-05 NOTE — PROGRESS NOTE ADULT - CRITICAL CARE ATTENDING COMMENT
59/F with Sicklecell disease, H/o HF midrange EF, with improved EF on GDMT, admitted with sickle cell crisis to [facility] and c/b episode of unresponsiveness with worsening hemodynamics and concern for shock.  Shock call initiated due to increasing doses of pressor requirement with inotropes, DYLON/Transaminitis and lactic acidosis,.   Pt transferred from [facility] to [facility] last night, given continued worsening of hemodynamics  placed on VA ECMO    Imp: Mixed shock  Severe RV failure  ? etiology PE vs Fat embolism vs sickle cell crisis  Sickle cell crisis  DYLON  Transaminitis  Lactic acidosis  Thrombocytopenia  Anemia    Plan:  on VA ECMO at 3000 with flow 2.64lits with stable MAP  wean pressors for MAP 65  cont   and low dose epi, cont Mara  TTE  with hyperdynamic LV and RV still dilated, AV opening everybeat  - wean epi for tachycardia   A line with good pulsatility    Nash RA 7, PA: 36/18(26)  good urine outpt on bumex drip  cont heparin for AC  cont broad spectrum abx  cont vent support  Heme onc recs appreciated  completed  RBC exchange  cont supportive care    plan discussed in MDT with CTS and CTICU team

## 2025-01-06 ENCOUNTER — RESULT REVIEW (OUTPATIENT)
Age: 60
End: 2025-01-06

## 2025-01-06 DIAGNOSIS — Z51.5 ENCOUNTER FOR PALLIATIVE CARE: ICD-10-CM

## 2025-01-06 LAB
-  CLINDAMYCIN: SIGNIFICANT CHANGE UP
-  ERYTHROMYCIN: SIGNIFICANT CHANGE UP
-  GENTAMICIN: SIGNIFICANT CHANGE UP
-  OXACILLIN: SIGNIFICANT CHANGE UP
-  PENICILLIN: SIGNIFICANT CHANGE UP
-  RIFAMPIN: SIGNIFICANT CHANGE UP
-  STAPHYLOCOCCUS EPIDERMIDIS: SIGNIFICANT CHANGE UP
-  TETRACYCLINE: SIGNIFICANT CHANGE UP
-  TRIMETHOPRIM/SULFAMETHOXAZOLE: SIGNIFICANT CHANGE UP
-  VANCOMYCIN: SIGNIFICANT CHANGE UP
ALBUMIN SERPL ELPH-MCNC: 2.8 G/DL — LOW (ref 3.3–5)
ALP SERPL-CCNC: 127 U/L — HIGH (ref 40–120)
ALT FLD-CCNC: 84 U/L — HIGH (ref 10–45)
ANION GAP SERPL CALC-SCNC: 13 MMOL/L — SIGNIFICANT CHANGE UP (ref 5–17)
APTT BLD: 39.7 SEC — HIGH (ref 24.5–35.6)
AST SERPL-CCNC: 91 U/L — HIGH (ref 10–40)
BASE EXCESS BLDMV CALC-SCNC: 6.6 MMOL/L — HIGH (ref -3–3)
BASE EXCESS BLDMV CALC-SCNC: 7 MMOL/L — HIGH (ref -3–3)
BASOPHILS # BLD AUTO: 0.07 K/UL — SIGNIFICANT CHANGE UP (ref 0–0.2)
BASOPHILS NFR BLD AUTO: 0.6 % — SIGNIFICANT CHANGE UP (ref 0–2)
BILIRUB SERPL-MCNC: 4 MG/DL — HIGH (ref 0.2–1.2)
BLOOD GAS ECMO POST MEMBRANE - ARTERIAL RESULT: SIGNIFICANT CHANGE UP
BUN SERPL-MCNC: 32 MG/DL — HIGH (ref 7–23)
CALCIUM SERPL-MCNC: 8.7 MG/DL — SIGNIFICANT CHANGE UP (ref 8.4–10.5)
CHLORIDE SERPL-SCNC: 105 MMOL/L — SIGNIFICANT CHANGE UP (ref 96–108)
CK MB BLD-MCNC: NEGATIVE TITER — SIGNIFICANT CHANGE UP
CK SERPL-CCNC: 1561 U/L — HIGH (ref 25–170)
CK SERPL-CCNC: 1662 U/L — HIGH (ref 25–170)
CO2 BLDMV-SCNC: 33 MMOL/L — HIGH (ref 21–29)
CO2 BLDMV-SCNC: 34 MMOL/L — HIGH (ref 21–29)
CO2 SERPL-SCNC: 25 MMOL/L — SIGNIFICANT CHANGE UP (ref 22–31)
COXSACKIE TYPE A-24: NEGATIVE TITER — SIGNIFICANT CHANGE UP
CREAT SERPL-MCNC: 1 MG/DL — SIGNIFICANT CHANGE UP (ref 0.5–1.3)
CULTURE RESULTS: ABNORMAL
CV A24 IGG TITR SER IF: NEGATIVE TITER — SIGNIFICANT CHANGE UP
CV A7 AB SER-ACNC: NEGATIVE TITER — SIGNIFICANT CHANGE UP
CV A9 AB TITR FLD: NEGATIVE TITER — SIGNIFICANT CHANGE UP
EGFR: 66 ML/MIN/1.73M2 — SIGNIFICANT CHANGE UP
EOSINOPHIL # BLD AUTO: 0.03 K/UL — SIGNIFICANT CHANGE UP (ref 0–0.5)
EOSINOPHIL NFR BLD AUTO: 0.3 % — SIGNIFICANT CHANGE UP (ref 0–6)
FIBRINOGEN PPP-MCNC: 648 MG/DL — HIGH (ref 200–445)
GAS PNL BLDA: SIGNIFICANT CHANGE UP
GAS PNL BLDMV: SIGNIFICANT CHANGE UP
GLUCOSE BLDC GLUCOMTR-MCNC: 105 MG/DL — HIGH (ref 70–99)
GLUCOSE BLDC GLUCOMTR-MCNC: 108 MG/DL — HIGH (ref 70–99)
GLUCOSE BLDC GLUCOMTR-MCNC: 110 MG/DL — HIGH (ref 70–99)
GLUCOSE BLDC GLUCOMTR-MCNC: 111 MG/DL — HIGH (ref 70–99)
GLUCOSE BLDC GLUCOMTR-MCNC: 112 MG/DL — HIGH (ref 70–99)
GLUCOSE BLDC GLUCOMTR-MCNC: 118 MG/DL — HIGH (ref 70–99)
GLUCOSE BLDC GLUCOMTR-MCNC: 119 MG/DL — HIGH (ref 70–99)
GLUCOSE BLDC GLUCOMTR-MCNC: 127 MG/DL — HIGH (ref 70–99)
GLUCOSE BLDC GLUCOMTR-MCNC: 130 MG/DL — HIGH (ref 70–99)
GLUCOSE BLDC GLUCOMTR-MCNC: 141 MG/DL — HIGH (ref 70–99)
GLUCOSE BLDC GLUCOMTR-MCNC: 161 MG/DL — HIGH (ref 70–99)
GLUCOSE BLDC GLUCOMTR-MCNC: 169 MG/DL — HIGH (ref 70–99)
GLUCOSE BLDC GLUCOMTR-MCNC: 170 MG/DL — HIGH (ref 70–99)
GLUCOSE BLDC GLUCOMTR-MCNC: 78 MG/DL — SIGNIFICANT CHANGE UP (ref 70–99)
GLUCOSE SERPL-MCNC: 99 MG/DL — SIGNIFICANT CHANGE UP (ref 70–99)
GRAM STN FLD: ABNORMAL
HAPTOGLOB SERPL-MCNC: 122 MG/DL — SIGNIFICANT CHANGE UP (ref 34–200)
HCO3 BLDMV-SCNC: 31 MMOL/L — HIGH (ref 20–28)
HCO3 BLDMV-SCNC: 31 MMOL/L — HIGH (ref 20–28)
HCO3 BLDMV-SCNC: 32 MMOL/L — HIGH (ref 20–28)
HCO3 BLDMV-SCNC: 32 MMOL/L — HIGH (ref 20–28)
HCT VFR BLD CALC: 25.4 % — LOW (ref 34.5–45)
HCT VFR BLD CALC: 27.6 % — LOW (ref 34.5–45)
HGB A MFR BLD: 84 % — LOW (ref 95.8–98)
HGB A2 MFR BLD: 3 % — SIGNIFICANT CHANGE UP (ref 2–3.2)
HGB BLD-MCNC: 9.1 G/DL — LOW (ref 11.5–15.5)
HGB BLD-MCNC: 9.7 G/DL — LOW (ref 11.5–15.5)
HGB C MFR BLD: 6.3 % — HIGH
HGB F MFR BLD: 0 % — SIGNIFICANT CHANGE UP (ref 0–1)
HGB OTHER MFR BLD ELPH: 0 % — SIGNIFICANT CHANGE UP
HGB S MFR BLD: 6.7 % — HIGH
HOROWITZ INDEX BLDMV+IHG-RTO: 100 — SIGNIFICANT CHANGE UP
IMM GRANULOCYTES NFR BLD AUTO: 6.3 % — HIGH (ref 0–0.9)
INR BLD: 1.05 RATIO — SIGNIFICANT CHANGE UP (ref 0.85–1.16)
LDH SERPL L TO P-CCNC: 865 U/L — HIGH (ref 50–242)
LYMPHOCYTES # BLD AUTO: 0.83 K/UL — LOW (ref 1–3.3)
LYMPHOCYTES # BLD AUTO: 7.1 % — LOW (ref 13–44)
MAGNESIUM SERPL-MCNC: 2.6 MG/DL — SIGNIFICANT CHANGE UP (ref 1.6–2.6)
MCHC RBC-ENTMCNC: 28.8 PG — SIGNIFICANT CHANGE UP (ref 27–34)
MCHC RBC-ENTMCNC: 29.6 PG — SIGNIFICANT CHANGE UP (ref 27–34)
MCHC RBC-ENTMCNC: 35.1 G/DL — SIGNIFICANT CHANGE UP (ref 32–36)
MCHC RBC-ENTMCNC: 35.8 G/DL — SIGNIFICANT CHANGE UP (ref 32–36)
MCV RBC AUTO: 81.9 FL — SIGNIFICANT CHANGE UP (ref 80–100)
MCV RBC AUTO: 82.7 FL — SIGNIFICANT CHANGE UP (ref 80–100)
METHOD TYPE: SIGNIFICANT CHANGE UP
METHOD TYPE: SIGNIFICANT CHANGE UP
MONOCYTES # BLD AUTO: 0.73 K/UL — SIGNIFICANT CHANGE UP (ref 0–0.9)
MONOCYTES NFR BLD AUTO: 6.3 % — SIGNIFICANT CHANGE UP (ref 2–14)
NEUTROPHILS # BLD AUTO: 9.23 K/UL — HIGH (ref 1.8–7.4)
NEUTROPHILS NFR BLD AUTO: 79.4 % — HIGH (ref 43–77)
NRBC # BLD: 38 /100 WBCS — HIGH (ref 0–0)
NRBC # BLD: 45 /100 WBCS — HIGH (ref 0–0)
NRBC BLD-RTO: 38 /100 WBCS — HIGH (ref 0–0)
NRBC BLD-RTO: 45 /100 WBCS — HIGH (ref 0–0)
O2 CT VFR BLD CALC: 41 MMHG — SIGNIFICANT CHANGE UP (ref 30–65)
O2 CT VFR BLD CALC: 44 MMHG — SIGNIFICANT CHANGE UP (ref 30–65)
O2 CT VFR BLD CALC: 46 MMHG — SIGNIFICANT CHANGE UP (ref 30–65)
O2 CT VFR BLD CALC: 50 MMHG — SIGNIFICANT CHANGE UP (ref 30–65)
ORGANISM # SPEC MICROSCOPIC CNT: ABNORMAL
ORGANISM # SPEC MICROSCOPIC CNT: ABNORMAL
PCO2 BLDMV: 42 MMHG — SIGNIFICANT CHANGE UP (ref 30–65)
PCO2 BLDMV: 44 MMHG — SIGNIFICANT CHANGE UP (ref 30–65)
PCO2 BLDMV: 47 MMHG — SIGNIFICANT CHANGE UP (ref 30–65)
PCO2 BLDMV: 50 MMHG — SIGNIFICANT CHANGE UP (ref 30–65)
PH BLDMV: 7.42 — SIGNIFICANT CHANGE UP (ref 7.32–7.45)
PH BLDMV: 7.44 — SIGNIFICANT CHANGE UP (ref 7.32–7.45)
PH BLDMV: 7.46 — HIGH (ref 7.32–7.45)
PH BLDMV: 7.48 — HIGH (ref 7.32–7.45)
PHOSPHATE SERPL-MCNC: 3.1 MG/DL — SIGNIFICANT CHANGE UP (ref 2.5–4.5)
PLATELET # BLD AUTO: 71 K/UL — LOW (ref 150–400)
PLATELET # BLD AUTO: 75 K/UL — LOW (ref 150–400)
POTASSIUM SERPL-MCNC: 3.6 MMOL/L — SIGNIFICANT CHANGE UP (ref 3.5–5.3)
POTASSIUM SERPL-SCNC: 3.6 MMOL/L — SIGNIFICANT CHANGE UP (ref 3.5–5.3)
PROT SERPL-MCNC: 5.5 G/DL — LOW (ref 6–8.3)
PROTHROM AB SERPL-ACNC: 12.1 SEC — SIGNIFICANT CHANGE UP (ref 9.9–13.4)
RBC # BLD: 3.07 M/UL — LOW (ref 3.8–5.2)
RBC # BLD: 3.37 M/UL — LOW (ref 3.8–5.2)
RBC # FLD: 16.5 % — HIGH (ref 10.3–14.5)
RBC # FLD: 17.7 % — HIGH (ref 10.3–14.5)
SAO2 % BLDMV: 73.3 — SIGNIFICANT CHANGE UP (ref 60–90)
SAO2 % BLDMV: 78.9 — SIGNIFICANT CHANGE UP (ref 60–90)
SAO2 % BLDMV: 79.6 — SIGNIFICANT CHANGE UP (ref 60–90)
SAO2 % BLDMV: 84.4 — SIGNIFICANT CHANGE UP (ref 60–90)
SODIUM SERPL-SCNC: 143 MMOL/L — SIGNIFICANT CHANGE UP (ref 135–145)
SPECIMEN SOURCE: SIGNIFICANT CHANGE UP
VANCOMYCIN TROUGH SERPL-MCNC: 17.2 UG/ML — SIGNIFICANT CHANGE UP (ref 10–20)
VANCOMYCIN TROUGH SERPL-MCNC: 19.2 UG/ML — SIGNIFICANT CHANGE UP (ref 10–20)
WBC # BLD: 11.62 K/UL — HIGH (ref 3.8–10.5)
WBC # BLD: 12.23 K/UL — HIGH (ref 3.8–10.5)
WBC # FLD AUTO: 11.62 K/UL — HIGH (ref 3.8–10.5)
WBC # FLD AUTO: 12.23 K/UL — HIGH (ref 3.8–10.5)

## 2025-01-06 PROCEDURE — 99291 CRITICAL CARE FIRST HOUR: CPT

## 2025-01-06 PROCEDURE — 93325 DOPPLER ECHO COLOR FLOW MAPG: CPT | Mod: 26

## 2025-01-06 PROCEDURE — 99292 CRITICAL CARE ADDL 30 MIN: CPT

## 2025-01-06 PROCEDURE — G0545: CPT

## 2025-01-06 PROCEDURE — 95720 EEG PHY/QHP EA INCR W/VEEG: CPT

## 2025-01-06 PROCEDURE — 93308 TTE F-UP OR LMTD: CPT | Mod: 26

## 2025-01-06 PROCEDURE — 33949 ECMO/ECLS DAILY MGMT ARTERY: CPT

## 2025-01-06 PROCEDURE — 71045 X-RAY EXAM CHEST 1 VIEW: CPT | Mod: 26

## 2025-01-06 PROCEDURE — 99223 1ST HOSP IP/OBS HIGH 75: CPT

## 2025-01-06 PROCEDURE — 99233 SBSQ HOSP IP/OBS HIGH 50: CPT

## 2025-01-06 PROCEDURE — 93010 ELECTROCARDIOGRAM REPORT: CPT

## 2025-01-06 PROCEDURE — 99291 CRITICAL CARE FIRST HOUR: CPT | Mod: 25

## 2025-01-06 RX ORDER — ANTISEPTIC SURGICAL SCRUB 0.04 MG/ML
15 SOLUTION TOPICAL EVERY 12 HOURS
Refills: 0 | Status: DISCONTINUED | OUTPATIENT
Start: 2025-01-06 | End: 2025-01-07

## 2025-01-06 RX ORDER — POTASSIUM CHLORIDE 750 MG/1
10 TABLET, EXTENDED RELEASE ORAL
Refills: 0 | Status: COMPLETED | OUTPATIENT
Start: 2025-01-06 | End: 2025-01-06

## 2025-01-06 RX ORDER — BUMETANIDE 2 MG/1
2 TABLET ORAL ONCE
Refills: 0 | Status: COMPLETED | OUTPATIENT
Start: 2025-01-06 | End: 2025-01-06

## 2025-01-06 RX ORDER — POTASSIUM CHLORIDE 750 MG/1
10 TABLET, EXTENDED RELEASE ORAL
Refills: 0 | Status: DISCONTINUED | OUTPATIENT
Start: 2025-01-06 | End: 2025-01-06

## 2025-01-06 RX ORDER — CLONAZEPAM 2 MG
0.5 TABLET ORAL AT BEDTIME
Refills: 0 | Status: DISCONTINUED | OUTPATIENT
Start: 2025-01-06 | End: 2025-01-07

## 2025-01-06 RX ORDER — VANCOMYCIN HYDROCHLORIDE 50 MG/ML
750 KIT ORAL EVERY 12 HOURS
Refills: 0 | Status: DISCONTINUED | OUTPATIENT
Start: 2025-01-06 | End: 2025-01-07

## 2025-01-06 RX ORDER — FENTANYL CITRATE 50 UG/ML
25 INJECTION INTRAMUSCULAR; INTRAVENOUS ONCE
Refills: 0 | Status: DISCONTINUED | OUTPATIENT
Start: 2025-01-06 | End: 2025-01-06

## 2025-01-06 RX ORDER — VANCOMYCIN HYDROCHLORIDE 50 MG/ML
750 KIT ORAL ONCE
Refills: 0 | Status: COMPLETED | OUTPATIENT
Start: 2025-01-06 | End: 2025-01-06

## 2025-01-06 RX ORDER — SODIUM NITROPRUSSIDE 0.2 MG/ML
0.3 INJECTION, SOLUTION INTRAVENOUS
Qty: 100 | Refills: 0 | Status: DISCONTINUED | OUTPATIENT
Start: 2025-01-06 | End: 2025-01-07

## 2025-01-06 RX ADMIN — MEROPENEM 100 MILLIGRAM(S): 500 INJECTION INTRAVENOUS at 13:45

## 2025-01-06 RX ADMIN — PANTOPRAZOLE 40 MILLIGRAM(S): 20 TABLET, DELAYED RELEASE ORAL at 06:01

## 2025-01-06 RX ADMIN — MUPIROCIN 1 APPLICATION(S): 2 CREAM TOPICAL at 05:16

## 2025-01-06 RX ADMIN — Medication 3 MILLILITER(S): at 21:00

## 2025-01-06 RX ADMIN — MEROPENEM 100 MILLIGRAM(S): 500 INJECTION INTRAVENOUS at 05:16

## 2025-01-06 RX ADMIN — SODIUM NITROPRUSSIDE 3.76 MICROGRAM(S)/KG/MIN: 0.2 INJECTION, SOLUTION INTRAVENOUS at 21:16

## 2025-01-06 RX ADMIN — DEXMEDETOMIDINE HYDROCHLORIDE 4.18 MICROGRAM(S)/KG/HR: 4 INJECTION, SOLUTION INTRAVENOUS at 21:16

## 2025-01-06 RX ADMIN — PANTOPRAZOLE 40 MILLIGRAM(S): 20 TABLET, DELAYED RELEASE ORAL at 17:02

## 2025-01-06 RX ADMIN — Medication 6 UNIT(S)/MIN: at 21:16

## 2025-01-06 RX ADMIN — Medication 1 DROP(S): at 05:16

## 2025-01-06 RX ADMIN — ANTISEPTIC SURGICAL SCRUB 1 APPLICATION(S): 0.04 SOLUTION TOPICAL at 21:00

## 2025-01-06 RX ADMIN — Medication 0.5 MILLIGRAM(S): at 21:15

## 2025-01-06 RX ADMIN — Medication 3 MILLILITER(S): at 05:30

## 2025-01-06 RX ADMIN — Medication 1 DROP(S): at 13:13

## 2025-01-06 RX ADMIN — POTASSIUM CHLORIDE 50 MILLIEQUIVALENT(S): 750 TABLET, EXTENDED RELEASE ORAL at 02:03

## 2025-01-06 RX ADMIN — VANCOMYCIN HYDROCHLORIDE 250 MILLIGRAM(S): KIT at 22:03

## 2025-01-06 RX ADMIN — MUPIROCIN 1 APPLICATION(S): 2 CREAM TOPICAL at 17:02

## 2025-01-06 RX ADMIN — IPRATROPIUM BROMIDE AND ALBUTEROL SULFATE 3 MILLILITER(S): .5; 2.5 SOLUTION RESPIRATORY (INHALATION) at 17:26

## 2025-01-06 RX ADMIN — BUMETANIDE 2 MILLIGRAM(S): 2 TABLET ORAL at 08:43

## 2025-01-06 RX ADMIN — IPRATROPIUM BROMIDE AND ALBUTEROL SULFATE 3 MILLILITER(S): .5; 2.5 SOLUTION RESPIRATORY (INHALATION) at 12:22

## 2025-01-06 RX ADMIN — IPRATROPIUM BROMIDE AND ALBUTEROL SULFATE 3 MILLILITER(S): .5; 2.5 SOLUTION RESPIRATORY (INHALATION) at 23:01

## 2025-01-06 RX ADMIN — FENTANYL CITRATE 25 MICROGRAM(S): 50 INJECTION INTRAMUSCULAR; INTRAVENOUS at 08:13

## 2025-01-06 RX ADMIN — POTASSIUM CHLORIDE 50 MILLIEQUIVALENT(S): 750 TABLET, EXTENDED RELEASE ORAL at 18:10

## 2025-01-06 RX ADMIN — ANTISEPTIC SURGICAL SCRUB 15 MILLILITER(S): 0.04 SOLUTION TOPICAL at 05:17

## 2025-01-06 RX ADMIN — MEROPENEM 100 MILLIGRAM(S): 500 INJECTION INTRAVENOUS at 21:15

## 2025-01-06 RX ADMIN — Medication 8.5 UNIT(S)/HR: at 21:16

## 2025-01-06 RX ADMIN — VANCOMYCIN HYDROCHLORIDE 250 MILLIGRAM(S): KIT at 12:06

## 2025-01-06 RX ADMIN — POTASSIUM CHLORIDE 50 MILLIEQUIVALENT(S): 750 TABLET, EXTENDED RELEASE ORAL at 01:30

## 2025-01-06 RX ADMIN — BUMETANIDE 2 MILLIGRAM(S): 2 TABLET ORAL at 17:01

## 2025-01-06 RX ADMIN — Medication 2 TABLET(S): at 21:15

## 2025-01-06 RX ADMIN — FENTANYL CITRATE 25 MICROGRAM(S): 50 INJECTION INTRAMUSCULAR; INTRAVENOUS at 08:45

## 2025-01-06 RX ADMIN — POLYETHYLENE GLYCOL 3350 17 GRAM(S): 17 POWDER, FOR SOLUTION ORAL at 13:13

## 2025-01-06 RX ADMIN — Medication 3 MILLILITER(S): at 13:13

## 2025-01-06 RX ADMIN — Medication 12.5 MICROGRAM(S)/KG/MIN: at 21:16

## 2025-01-06 RX ADMIN — Medication 1 DROP(S): at 17:01

## 2025-01-06 RX ADMIN — POTASSIUM CHLORIDE 50 MILLIEQUIVALENT(S): 750 TABLET, EXTENDED RELEASE ORAL at 18:11

## 2025-01-06 RX ADMIN — Medication 1 UNIT(S)/HR: at 21:15

## 2025-01-06 RX ADMIN — IPRATROPIUM BROMIDE AND ALBUTEROL SULFATE 3 MILLILITER(S): .5; 2.5 SOLUTION RESPIRATORY (INHALATION) at 05:39

## 2025-01-06 RX ADMIN — ANTISEPTIC SURGICAL SCRUB 15 MILLILITER(S): 0.04 SOLUTION TOPICAL at 17:02

## 2025-01-06 NOTE — ADVANCED PRACTICE NURSE CONSULT - RECOMMEDATIONS
Impression         B/L Buttocks & Sacrum- deep tissue injury, present on admission.    B/L Heels - Blanchable erythema, intact skin with no open wounds or ulcerations       Recommendations           1. Bilateral sacrum/buttock - deep tissue injury             Topical therapy- sacral/bilateral buttocks- cleanse w/incontinent cleanser, pat dry & apply Triad twice daily & PRN Soiling. Monitor for changes             2. Right and Left heel       Elevate heels; apply Complete Cair air fluidized boots; ensure that the soles of the feet are not resting on the foot board of the bed.           3. Incontinent management - incontinent cleanser, pads, chris care BID         4. Maintain on an alternating air with low air loss surface          5. Turn & reposition every 2 hr; Use positioning pillow to turn and reposition, soft pillow between bony prominences; continue measures to decrease friction/shear/pressure.          6. Nutrition optimization.      7. Place waffle cushion when out of bed to chair

## 2025-01-06 NOTE — PROGRESS NOTE ADULT - CRITICAL CARE ATTENDING COMMENT
59yrs,   Known sickle cell disease ? Sc. Patient has history of 2-3 per year sickle crisis with bone pain generally at time of concurrent URTI.   Managed on hydroxurea.   Patient known to have low EF since May 2023. Followed by Dr Leo. Coronary CT negative. cMRI no LGE. LVEF 45%.   home meds; entresto mod dose, corg 12.5 bid.   Admitted [facility] SOB, sickle cells crisis. Acute neurological event- unresponsive. Some question of seizure.   Intubated, Kepra. EEG negative.   Jan 3rd Transferred [facility]: TTE showed normal LV and severe RV dysfunction. Dilated IVC> PASP 45.   Developed CS. Inotropes pressors diuretics. TTE shows severe BIV dysfunction.   Jan 4th Transferred to [facility]: VA ECMO no venting.   Emergent RBC exchange.   Now: hemodynamically stable. Off pressors low dose . Fully pulsatile. TTE 12.5 shows recovered LV function.   Low grade fever 1.5: Terrell/vanco. Of note BC from 1.3.25 Staph Epi   Patient has not woken up since initial presentation. Head CT and EEG negative. (MRI at [facility] reportedly normal)   meds:  5, heparin (39.7), terrell, vanco, PPI, GABRIELLA 10  ECMO 3000, Flow 2.3-2.5   CVP 10, PA 33/19 25, 122/90 105  I/O: +1.3  01-06    143  |  105  |  32[H]  ----------------------------<  99  3.6   |  25  |  1.00    Ca    8.7      06 Jan 2025 00:31  Phos  3.1     01-06  Mg     2.6     01-06    TPro  5.5[L]  /  Alb  2.8[L]  /  TBili  4.0[H]  /  DBili  x   /  AST  91[H]  /  ALT  84[H]  /  AlkPhos  127[H]  01-06                        9.1    11.62 )-----------( 75       ( 06 Jan 2025 00:30 )             25.4   CK: 1560, peak 1738  Hs trop 201,   ECQ580  Lactate 1.4  Discussed on MDR.   Likely sickle cell crisis leading to systemic deterioration. ? Fat embolus.   Cardiac function has improved on limited TTE. patient is pulsatile.   Plan:   Proposed ECMO wean.   No change in .   HDZN/Nitrates for BP control.   Maintain I=O  Antibiotics as per ID  Appreciate hematology f/u.   Alexei Lepe

## 2025-01-06 NOTE — CONSULT NOTE ADULT - ASSESSMENT
59F PMH Sickle cell disease (on hydroxyurea), HTN, 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 Pt initially presented to [facility] for SOB and SS crisis; course c/b witnessed seizure, intubated and sedated, and transferred to [facility] for further management. Keppra was discontinued at [facility] due to negative EEG. At [facility] MICU pt was found to have RV failure possibly iso pulm HTN 2/2 increased tidal volumes on the ventilator. On addition, pt possibly received a lot of volume iso SS crisis. Neuro was consulted for seizures and EEG technician was called for vEEG placement. Pt is in profound cardiogenic shock. Pt has been decompensating, despite Dobutamine gtt, Bumex gtt, Levophed, and addition of Vasopressin. Vasopressors requirements have gone up. NS shock team was called and pt is being transferred to NS for further management.     Patient with multiple medications allergies listed  MSSA in sputum culture from ET tube  Coag negative Staph epi. in single blood culture drawn 1/3 with repeat blood cultures x2 sets pending  given dose of Vancomycin    # sickle cell SC disease  # multiple medication allergies and patient is intubated and unable to answer specific questions about allergy types  # multiple lines, ECMO,    Would:  Continue Vancomycin dosing by level trough 10-15 for next dose  Repeat blood specimens to look for bacteremia clearance  Source- lines?          Pedro Shepherd MD  Can be called via Teams  After 5pm/weekends [phone]   59F PMH Sickle cell disease (on hydroxyurea), HTN, HFimpEF/NICM (EF 58% 10/2024) presenting as a transfer from [facility]. Pt initially presented to [facility] for SOB and SS crisis; course c/b witnessed seizure, intubated and sedated, and transferred to [facility] for further management. Keppra was discontinued at [facility] due to negative EEG. At [facility] MICU pt was found to have RV failure possibly iso pulm HTN 2/2 increased tidal volumes on the ventilator. On addition, pt possibly received a lot of volume iso SS crisis. Neuro was consulted for seizures and EEG technician was called for vEEG placement. Pt is in profound cardiogenic shock. Pt has been decompensating, despite Dobutamine gtt, Bumex gtt, Levophed, and addition of Vasopressin. Vasopressors requirements have gone up. NS shock team was called and pt is being transferred to NS for further management.     Patient with multiple medications allergies listed  MSSA in sputum culture from ET tube  Coag negative Staph epi. in single blood culture drawn 1/3 with repeat blood cultures x2 sets pending  given dose of Vancomycin    # sickle cell SC disease  # multiple medication allergies and patient is intubated and unable to answer specific questions about allergy types  # multiple lines, ECMO,    Would:  Continue Vancomycin dosing by level trough 10-15 for next dose  Repeat blood specimens to look for bacteremia clearance  Source- lines?  change lines as feasible  would try to obtain CT lung/abdomen/pelvis to help identify source          Pedro Shepherd MD  Can be called via Teams  After 5pm/weekends [phone]

## 2025-01-06 NOTE — DIETITIAN INITIAL EVALUATION ADULT - OTHER INFO
Dosing weight: 184.3lb/83.6kg (1/4, bed).  Daily weights: 74.1kg (1/6), 78.4kg (1/5).  IBW: 120lb.  Weight history per [facility]: 174lb (9/9/24), 195lb (1/24/24), 202lb (7/26/23).  Current weight fluctuations likely in setting of fluid shifts. RD to continue to monitor weight trends as available/able.    Nutrition-Related Concerns  -shock. VA ECMO cannulated 1/4.  -Epinephrine and Vasopressin currently @ 0 per flowsheets.  -dobutamine  -insulin infusion for glycemic control  -hx sickle cell disease

## 2025-01-06 NOTE — CONSULT NOTE ADULT - SUBJECTIVE AND OBJECTIVE BOX
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 Pt initially presented to [facility] for SOB and SS crisis; course c/b witnessed seizure, intubated and sedated, and transferred to [facility] for further management. Keppra was discontinued at [facility] due to negative EEG. At [facility] MICU pt was found to have RV failure possibly iso pulm HTN 2/2 increased tidal volumes on the ventilator. On addition, pt possibly received a lot of volume iso SS crisis. Neuro was consulted for seizures and EEG technician was called for vEEG placement. Pt is in profound cardiogenic shock. Pt has been decompensating, despite Dobutamine gtt, Bumex gtt, Levophed, and addition of Vasopressin. Vasopressors requirements have gone up. [facility] shock team was called and pt is being transferred to [facility] for further management.  (2025 23:18)      PAST MEDICAL & SURGICAL HISTORY:  Sickle-cell-hemoglobin C disease with crisis      Essential hypertension      Sickle cell disease      HTN (hypertension)      H/O:       H/O abdominoplasty      S/P breast augmentation      S/P           SOCIAL:    FAMILY HISTORY:  FHx: cerebral palsy (Child)      REVIEW OF SYSTEMS  General:	Denies any malaise fatigue or chills. Fevers absent    Skin:No rash  	  Ophthalmologic:Denies any visual complaints,discharge redness or photophobia  	  ENMT:No nasal discharge,headache,sinus congestion or throat pain.No dental complaints    Respiratory and Thorax:No cough,sputum or chest pain.Denies shortness of breath  	  Cardiovascular:	No chest pain,palpitaions or dizziness    Gastrointestinal:	NO nausea,abdominal pain or diarrhea.    Genitourinary:	No dysuria,frequency. No flank pain    Musculoskeletal:	No joint swelling or pain.No weakness    Neurological:No confusion,diziness.No extremity weakness.No bladder or bowel incontinence	    Psychiatric:No delusions or hallucinations	    Hematology/Lymphatics:	No LN swelling.No gum bleeding     Endocrine:	No recent weight gain or loss.No abnormal heat/cold intolerance    Allergic/Immunologic:	No hives or rash   Allergies    doxycycline (Angioedema)  penicillin (Unknown)  clindamycin (Angioedema)  linezolid (Angioedema)    Intolerances        ANTIMICROBIALS:    meropenem  IVPB 1000 milliGRAM(s) IV Intermittent every 8 hours  vancomycin  IVPB 750 milliGRAM(s) IV Intermittent every 12 hours      Vital Signs Last 24 Hrs  T(C): 37.4 (2025 16:00), Max: 37.5 (2025 20:00)  T(F): 99.3 (2025 16:00), Max: 99.5 (2025 20:00)  HR: 102 (2025 16:00) (72 - 124)  BP: --  BP(mean): --  RR: 14 (2025 16:00) (4 - 29)  SpO2: 99% (2025 16:00) (86% - 100%)    Parameters below as of 2025 12:47  Patient On (Oxygen Delivery Method): ventilator        PHYSICAL EXAM:Pleasant patient in no acute distress.      Constitutional:Comfortable.Awake and alert  No cachexia     Eyes:PERRL EOMI.NO discharge or conjunctival injection    ENMT:No sinus tenderness.No thrush.No pharyngeal exudate or erythema.Fair dental hygiene    Neck:Supple,No LN,no JVD      Respiratory:Good air entry bilaterally,CTA    Cardiovascular:S1 S2 wnl, No murmurs,rub or gallops    Gastrointestinal:Soft BS(+) no tenderness no masses ,No rebound or guarding    Genitourinary:No CVA tendereness     Rectal:    Extremities:No cyanosis,clubbing or edema.    Vascular:peripheral pulses felt    Neurological:AAO X 3,No grossly focal deficits    Skin:No rash     Lymph Nodes:No palpable LNs    Musculoskeletal:No joint swelling or LOM    Psychiatric:Affect normal.                              9.7    12.23 )-----------( 71       ( 2025 14:26 )             27.6       01-06    143  |  105  |  32[H]  ----------------------------<  99  3.6   |  25  |  1.00    Ca    8.7      2025 00:31  Phos  3.1     01-06  Mg     2.6     01-06    TPro  5.5[L]  /  Alb  2.8[L]  /  TBili  4.0[H]  /  DBili  x   /  AST  91[H]  /  ALT  84[H]  /  AlkPhos  127[H]        RECENT CULTURES:   @ 14:23  ET Tube ET Tube  --  --  --    No growth to date  --   @ 03:38  ET Tube ET Tube  Staphylococcus aureus  Staphylococcus aureus  NESHA    Moderate Staphylococcus aureus  Commensal fredi consistent with body site  --   @ 21:56  .Blood BLOOD  Blood Culture PCR  Blood Culture PCR  PCR    Growth in aerobic bottle: Gram Positive Cocci in Clusters  Direct identification is available within approximately 3-5  hours either by Blood Panel Multiplexed PCR or Direct  MALDI-TOF. Details: [email or web address]  --   @ 17:50  .Blood BLOOD  --  --  --    No growth at 48 Hours  --   @ 14:00  .Blood BLOOD  --  --  --    No growth at 48 Hours  --      RADIOLOGY:  < from: Xray Chest 1 View- PORTABLE-Routine (25 @ 02:38) >  FINDINGS:  An endotracheal tube is seen with its tip 3 cm above the kendal. An NG   tube is seen in the stomach.  A right IJ Warren-Renetta catheter seen with its tip in the right pulmonary   artery. An ECMO catheter seen overlying the right atrium.  A left IJ dialysis catheter seen with its tip at the SVC/RI junction.  The heart is not well assessed on an AP film.  Mild hazy opacity at the right lung base.  The left lung is clear.  There is no pneumothorax.    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 Pt initially presented to [facility] for SOB and SS crisis; course c/b witnessed seizure, intubated and sedated, and transferred to [facility] for further management. Keppra was discontinued at [facility] due to negative EEG. At [facility] MICU pt was found to have RV failure possibly iso pulm HTN 2/2 increased tidal volumes on the ventilator. On addition, pt possibly received a lot of volume iso SS crisis. Neuro was consulted for seizures and EEG technician was called for vEEG placement. Pt is in profound cardiogenic shock. Pt has been decompensating, despite Dobutamine gtt, Bumex gtt, Levophed, and addition of Vasopressin. Vasopressors requirements have gone up. NS shock team was called and pt is being transferred to NS for further management.  (2025 23:18)      PAST MEDICAL & SURGICAL HISTORY:  Sickle-cell-hemoglobin C disease with crisis      Essential hypertension      Sickle cell disease      HTN (hypertension)      H/O:       H/O abdominoplasty      S/P breast augmentation      S/P           SOCIAL:    FAMILY HISTORY:  FHx: cerebral palsy (Child)      REVIEW OF SYSTEMS  General:	Denies any malaise fatigue or chills. Fevers absent    Skin:No rash  	  Ophthalmologic:Denies any visual complaints,discharge redness or photophobia  	  ENMT:No nasal discharge,headache,sinus congestion or throat pain.No dental complaints    Respiratory and Thorax:No cough,sputum or chest pain.Denies shortness of breath  	  Cardiovascular:	No chest pain,palpitaions or dizziness    Gastrointestinal:	NO nausea,abdominal pain or diarrhea.    Genitourinary:	No dysuria,frequency. No flank pain    Musculoskeletal:	No joint swelling or pain.No weakness    Neurological:No confusion,diziness.No extremity weakness.No bladder or bowel incontinence	    Psychiatric:No delusions or hallucinations	    Hematology/Lymphatics:	No LN swelling.No gum bleeding     Endocrine:	No recent weight gain or loss.No abnormal heat/cold intolerance    Allergic/Immunologic:	No hives or rash   Allergies    doxycycline (Angioedema)  penicillin (Unknown)  clindamycin (Angioedema)  linezolid (Angioedema)    Intolerances        ANTIMICROBIALS:    meropenem  IVPB 1000 milliGRAM(s) IV Intermittent every 8 hours  vancomycin  IVPB 750 milliGRAM(s) IV Intermittent every 12 hours      Vital Signs Last 24 Hrs  T(C): 37.4 (2025 16:00), Max: 37.5 (2025 20:00)  T(F): 99.3 (2025 16:00), Max: 99.5 (2025 20:00)  HR: 102 (2025 16:00) (72 - 124)  BP: --  BP(mean): --  RR: 14 (2025 16:00) (4 - 29)  SpO2: 99% (2025 16:00) (86% - 100%)    Parameters below as of 2025 12:47  Patient On (Oxygen Delivery Method): ventilator        PHYSICAL EXAM: intubated, sedated, EEG in progress      Constitutional:Comfortable.sedated  No cachexia     Eyes:PERRL EOMI.NO discharge or conjunctival injection    ENMT:  et tube  Ruy HD catheter    Neck:Supple,No LN,no JVD      Respiratory:Good air entry bilaterally,CTA    Cardiovascular:S1 S2 wnl, No murmurs,rub or gallops    Gastrointestinal:Soft BS(+) no tenderness no masses ,No rebound or guarding    Genitourinary:No CVA tendereness     Rectal:    Extremities:No cyanosis,clubbing or edema.  ECMO cannula    Vascular:peripheral pulses felt    Neurological EEG in progress    Skin:No rash     Lymph Nodes:No palpable LNs    Musculoskeletal:No joint swelling or LOM    Psychiatric:Affect normal.                              9.7    12.23 )-----------( 71       ( 2025 14:26 )             27.6       01-06    143  |  105  |  32[H]  ----------------------------<  99  3.6   |  25  |  1.00    Ca    8.7      2025 00:31  Phos  3.1     01-06  Mg     2.6     01-06    TPro  5.5[L]  /  Alb  2.8[L]  /  TBili  4.0[H]  /  DBili  x   /  AST  91[H]  /  ALT  84[H]  /  AlkPhos  127[H]        RECENT CULTURES:   @ 14:23  ET Tube ET Tube  --  --  --    No growth to date  --   @ 03:38  ET Tube ET Tube  Staphylococcus aureus  Staphylococcus aureus  NESHA    Moderate Staphylococcus aureus  Commensal fredi consistent with body site  --   @ 21:56  .Blood BLOOD  Blood Culture PCR  Blood Culture PCR  PCR    Growth in aerobic bottle: Gram Positive Cocci in Clusters  Direct identification is available within approximately 3-5  hours either by Blood Panel Multiplexed PCR or Direct  MALDI-TOF. Details: [email or web address]  --   @ 17:50  .Blood BLOOD  --  --  --    No growth at 48 Hours  --   @ 14:00  .Blood BLOOD  --  --  --    No growth at 48 Hours  --      RADIOLOGY:  < from: Xray Chest 1 View- PORTABLE-Routine (25 @ 02:38) >  FINDINGS:  An endotracheal tube is seen with its tip 3 cm above the kendal. An NG   tube is seen in the stomach.  A right IJ Hamilton-Renetta catheter seen with its tip in the right pulmonary   artery. An ECMO catheter seen overlying the right atrium.  A left IJ dialysis catheter seen with its tip at the SVC/RI junction.  The heart is not well assessed on an AP film.  Mild hazy opacity at the right lung base.  The left lung is clear.  There is no pneumothorax.    IMPRESSION:    Hazy opacity at the right lung base which may represent layering effusion   or atelectasis    --- End of Report ---    < end of copied text >      IMPRESSION:    Rx:

## 2025-01-06 NOTE — PROGRESS NOTE ADULT - SUBJECTIVE AND OBJECTIVE BOX
Patient seen and examined at the bedside.    Remains critically ill on continuous ICU monitoring, at risk for life threatening decompensation.  All Labs, data reviewed. Plan of care discussed in length during multi-disciplinary ICU rounds.       Brief Summary:  60 yo F with Sickle cell disease and NICM now with Cardiogenic shock s/p VA ECMO on 1/4/25.      24 Hour events:  Sedation held for neuro exam - reacting some to noxious stimuli  CT head negative for acute pathology  Lost signals in left leg overnight - Heparin increased, now with signals.      Objective:  Vital Signs Last 24 Hrs  T(C): 37.4 (06 Jan 2025 04:00), Max: 38.2 (05 Jan 2025 12:00)  T(F): 99.3 (06 Jan 2025 04:00), Max: 100.8 (05 Jan 2025 12:00)  HR: 87 (06 Jan 2025 06:15) (72 - 137)  BP: --  BP(mean): --  RR: 12 (06 Jan 2025 06:15) (4 - 34)  SpO2: 99% (06 Jan 2025 06:15) (74% - 100%)    Parameters below as of 06 Jan 2025 06:02  Patient On (Oxygen Delivery Method): ventilator    Mode: AC/ CMV (Assist Control/ Continuous Mandatory Ventilation)  RR (machine): 12  TV (machine): 420  FiO2: 40  PEEP: 8  ITime: 0.8  MAP: 10  PIP: 19              Physical Exam:   General: Intubated  Neurology: Withdrawing to noxious stimuli in all extremities, +cough, +gag  Respiratory: Breath sounds bilaterally   CV: Sinus Tachycardia   VA ECMO 3300 rpms, flow of 3.2, sweep of 1   Abdominal: Soft, Nontender  Extremities: Warm, well-perfused  Costa  -------------------------------------------------------------------------------------------------------------------------------    Labs:                        9.1    11.62 )-----------( 75       ( 06 Jan 2025 00:30 )             25.4     01-06    143  |  105  |  32[H]  ----------------------------<  99  3.6   |  25  |  1.00    Ca    8.7      06 Jan 2025 00:31  Phos  3.1     01-06  Mg     2.6     01-06    TPro  5.5[L]  /  Alb  2.8[L]  /  TBili  4.0[H]  /  DBili  x   /  AST  91[H]  /  ALT  84[H]  /  AlkPhos  127[H]  01-06    LIVER FUNCTIONS - ( 06 Jan 2025 00:31 )  Alb: 2.8 g/dL / Pro: 5.5 g/dL / ALK PHOS: 127 U/L / ALT: 84 U/L / AST: 91 U/L / GGT: x           PT/INR - ( 06 Jan 2025 00:30 )   PT: 12.1 sec;   INR: 1.05 ratio         PTT - ( 06 Jan 2025 00:30 )  PTT:39.7 sec  ABG - ( 05 Jan 2025 23:33 )  pH, Arterial: 7.51  pH, Blood: x     /  pCO2: 36    /  pO2: 186   / HCO3: 29    / Base Excess: 5.4   /  SaO2: 98.7      ALL MEDICATIONS   MEDICATIONS  (STANDING):  albuterol/ipratropium for Nebulization 3 milliLiter(s) Nebulizer every 6 hours  artificial tears (preservative free) Ophthalmic Solution 1 Drop(s) Both EYES four times a day  chlorhexidine 0.12% Liquid 15 milliLiter(s) Oral Mucosa every 12 hours  chlorhexidine 2% Cloths 1 Application(s) Topical daily  dexMEDEtomidine Infusion 0.2 MICROgram(s)/kG/Hr (4.18 mL/Hr) IV Continuous <Continuous>  dextrose 50% Injectable 50 milliLiter(s) IV Push every 15 minutes  dextrose 50% Injectable 25 milliLiter(s) IV Push every 15 minutes  DOBUTamine Infusion 5 MICROgram(s)/kG/Min (12.5 mL/Hr) IV Continuous <Continuous>  EPINEPHrine    Infusion 0.01 MICROgram(s)/kG/Min (3.14 mL/Hr) IV Continuous <Continuous>  heparin  Infusion 850 Unit(s)/Hr (8.5 mL/Hr) IV Continuous <Continuous>  insulin regular Infusion 1 Unit(s)/Hr (1 mL/Hr) IV Continuous <Continuous>  meropenem  IVPB 1000 milliGRAM(s) IV Intermittent every 8 hours  mupirocin 2% Nasal 1 Application(s) Both Nostrils two times a day  pantoprazole  Injectable 40 milliGRAM(s) IV Push every 12 hours  polyethylene glycol 3350 17 Gram(s) Oral daily  senna 2 Tablet(s) Oral at bedtime  sodium chloride 0.9% lock flush 3 milliLiter(s) IV Push every 8 hours  sodium chloride 0.9%. 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. Pt initially presented to [facility] for SOB and SS crisis; course c/b witnessed seizure, intubated and sedated, and transferred to [facility] for further management. Now in cardiogenic shock, transferred to [facility] for further management. Cannulated for VA ECMO on 1/4/25.    Cardiogenic shock  Acute respiratory failure  Acute blood loss anemia  Thrombocytopenia  Hyperglycemia      Plan:   ***Neuro***  Off sedation currently.  Head CT without abnormality.  EEG today.  Postoperative acute pain control with prns.    ***Cardiovascular***  Remains on Dobutamine, Epinephrine and VA ECMO support.  Will wean Epinephrine in setting of tachycardia.  Trend Lactate  Wean pressors for MAP > 65 mm Hg  VA ECMO decreased to 3000, Flow 2.6  Continue nitric oxide at 10 ppm  Monitor extremity pulses, nonivasive LE arterial studies pending.    ***Pulmonary***  Full vent support.    ***GI***  Increase tube feeds slowly  Protonix for stress ulcer prophylaxis     ***Renal***  Trend Creatinine   Costa catheter for strict I/O measurements.   Hypomagnesemia - repleted.  Diuresing without diuretics.    ***ID***  Empiric Vanco / Meropenum   Trend leukocytosis    ***Endocrine***  Hyperglycemia - Insulin infusion.     ***Hematology***  Acute blood loss anemia and thrombocytopenia  Sickle Cell - s/p Red cell exchange yesterday  Will follow up Heme recs - appreciate emergent consult. Will continue to hold Hydroxyurea in setting of thrombocytopenia.  Heparin infusion for anticoagulation.        IYana MD, personally performed the services described in this documentation. all medical record entries made by the scribe were at my direction and in my presence. I have reviewed the chart and agree that the record reflects my personal performance and is accurate and complete  Electronically signed:   Yana Patricia MD  CT ICU attending     ICU time: ** mins     By signing my name below, I, Jacky Brown, attest that this documentation has been prepared under the direction and in the presence of Yana Patricia MD  Electronically signed: Jacky Brown, 01-06-25 @ 06:21             Patient seen and examined at the bedside.    Remains critically ill on continuous ICU monitoring, at risk for life threatening decompensation.  All Labs, data reviewed. Plan of care discussed in length during multi-disciplinary ICU rounds.     Brief Summary:  58 yo F with Sickle cell disease and NICM now with Cardiogenic shock s/p VA ECMO on 1/4/25.    24 Hour events:  mental status, slowly improving  +corneal, gag and cough, responds to name  Tolerated wean on ecmo at the bed site  Possible decannulation tomorrow in OR    Objective:  Vital Signs Last 24 Hrs  T(C): 37.4 (06 Jan 2025 04:00), Max: 38.2 (05 Jan 2025 12:00)  T(F): 99.3 (06 Jan 2025 04:00), Max: 100.8 (05 Jan 2025 12:00)  HR: 87 (06 Jan 2025 06:15) (72 - 137)  BP: --  BP(mean): --  RR: 12 (06 Jan 2025 06:15) (4 - 34)  SpO2: 99% (06 Jan 2025 06:15) (74% - 100%)    Parameters below as of 06 Jan 2025 06:02  Patient On (Oxygen Delivery Method): ventilator    Mode: AC/ CMV (Assist Control/ Continuous Mandatory Ventilation)  RR (machine): 12  TV (machine): 420  FiO2: 40  PEEP: 8  ITime: 0.8  MAP: 10  PIP: 19              Physical Exam:   General: Intubated  Neurology: Withdrawing to noxious stimuli in all extremities, +cough, +gag  Respiratory: Breath sounds bilaterally   CV: Sinus Tachycardia   VA ECMO 3300 rpms, flow of 3.2, sweep of 1   Abdominal: Soft, Nontender  Extremities: Warm, well-perfused  Costa  -------------------------------------------------------------------------------------------------------------------------------    Labs:                        9.1    11.62 )-----------( 75       ( 06 Jan 2025 00:30 )             25.4     01-06    143  |  105  |  32[H]  ----------------------------<  99  3.6   |  25  |  1.00    Ca    8.7      06 Jan 2025 00:31  Phos  3.1     01-06  Mg     2.6     01-06    TPro  5.5[L]  /  Alb  2.8[L]  /  TBili  4.0[H]  /  DBili  x   /  AST  91[H]  /  ALT  84[H]  /  AlkPhos  127[H]  01-06    LIVER FUNCTIONS - ( 06 Jan 2025 00:31 )  Alb: 2.8 g/dL / Pro: 5.5 g/dL / ALK PHOS: 127 U/L / ALT: 84 U/L / AST: 91 U/L / GGT: x           PT/INR - ( 06 Jan 2025 00:30 )   PT: 12.1 sec;   INR: 1.05 ratio         PTT - ( 06 Jan 2025 00:30 )  PTT:39.7 sec  ABG - ( 05 Jan 2025 23:33 )  pH, Arterial: 7.51  pH, Blood: x     /  pCO2: 36    /  pO2: 186   / HCO3: 29    / Base Excess: 5.4   /  SaO2: 98.7      ALL MEDICATIONS   MEDICATIONS  (STANDING):  albuterol/ipratropium for Nebulization 3 milliLiter(s) Nebulizer every 6 hours  artificial tears (preservative free) Ophthalmic Solution 1 Drop(s) Both EYES four times a day  chlorhexidine 0.12% Liquid 15 milliLiter(s) Oral Mucosa every 12 hours  chlorhexidine 2% Cloths 1 Application(s) Topical daily  dexMEDEtomidine Infusion 0.2 MICROgram(s)/kG/Hr (4.18 mL/Hr) IV Continuous <Continuous>  dextrose 50% Injectable 50 milliLiter(s) IV Push every 15 minutes  dextrose 50% Injectable 25 milliLiter(s) IV Push every 15 minutes  DOBUTamine Infusion 5 MICROgram(s)/kG/Min (12.5 mL/Hr) IV Continuous <Continuous>  EPINEPHrine    Infusion 0.01 MICROgram(s)/kG/Min (3.14 mL/Hr) IV Continuous <Continuous>  heparin  Infusion 850 Unit(s)/Hr (8.5 mL/Hr) IV Continuous <Continuous>  insulin regular Infusion 1 Unit(s)/Hr (1 mL/Hr) IV Continuous <Continuous>  meropenem  IVPB 1000 milliGRAM(s) IV Intermittent every 8 hours  mupirocin 2% Nasal 1 Application(s) Both Nostrils two times a day  pantoprazole  Injectable 40 milliGRAM(s) IV Push every 12 hours  polyethylene glycol 3350 17 Gram(s) Oral daily  senna 2 Tablet(s) Oral at bedtime  sodium chloride 0.9% lock flush 3 milliLiter(s) IV Push every 8 hours  sodium chloride 0.9%. 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 Pt initially presented to [facility] for SOB and SS crisis; course c/b witnessed seizure, intubated and sedated, and transferred to [facility] for further management. Now in cardiogenic shock, transferred to [facility] for further management. Cannulated for VA ECMO on 1/4/25.    Cardiogenic shock  Acute respiratory failure  Acute blood loss anemia  Thrombocytopenia  Hyperglycemia      Plan:   ***Neuro***  On low dose prcedex  +corneal, gag and cough, responds to name  CT and MRI unremarkable  neurology following  EEG negative for seizure activity    ***Cardiovascular***  Remains on Dobutamine,  VA ECMO support.  stable on current settings  epi gtt is off, Mara wean to 5 ppm  Tolerated wean trail at bed site  Possible decannulation tomorrow  Cannula sites oozing, monitor closely     ***Pulmonary***  Full vent support  PS as tolerates  Nebs    ***GI***  Increase tube feeds slowly  Protonix for stress ulcer prophylaxis   bowel regimen  Tren LFTs, shock liver improving    ***Renal***  DYLON  Trend Creatinine   Costa catheter for strict I/O measurements.   Bumex BID    ***ID***  MSSA bacteremia, BAL + MSSA  On vancomycin  On empiric meropenum  Trend leukocytosis    ***Endocrine***  Hyperglycemia - Insulin infusion.     ***Hematology***  Acute blood loss anemia and thrombocytopenia  Sickle Cell - s/p Red cell exchange , pending electrophoresis results  Keep Plats >50, Hg >8  Heparin infusion for anticoagulation.    I, Yana Patricia MD, personally performed the services described in this documentation. all medical record entries made by the scribe were at my direction and in my presence. I have reviewed the chart and agree that the record reflects my personal performance and is accurate and complete  Electronically signed:   Yana Patricia MD  CT ICU attending     ICU time: 50 mins     By signing my name below, I, Jacky Brown, attest that this documentation has been prepared under the direction and in the presence of Yana Patricia MD  Electronically signed: Jacky Brown, 01-06-25 @ 06:21

## 2025-01-06 NOTE — CONSULT NOTE ADULT - ASSESSMENT
59F PMH Sickle cell disease (on hydroxyurea), HTN, HFimpEF/NICM (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 Pt initially presented to [facility] for SOB and SS crisis; course c/b witnessed seizure, intubated and sedated, and transferred to [facility] for further management. Keppra was discontinued at [facility] due to negative EEG. At [facility] MICU pt was found to have RV failure possibly iso pulm HTN 2/2 increased tidal volumes on the ventilator, possibly due to volume overload due to IVF iso SS crisis.     Pt is in profound cardiogenic shock. Pt has been decompensating, despite Dobutamine gtt, Bumex gtt, Levophed, and addition of Vasopressin. Vasopressors requirements have gone up. NS shock team was called and pt transferred to NS for further management. Patient was cannulated on peripheral [facility] ECMO.

## 2025-01-06 NOTE — EEG REPORT - NS EEG TEXT BOX
This is a Continuous Video EEG.     Recording Technique: The patient underwent continuous video-EEG monitoring, using Telemetry System hardware on the [employer].  EEG and video data were stored on a computer hard drive with important events saved in digital archive files. The material was reviewed by a physician (electroencephalographer/epileptologist) on a daily basis.  [place] and seizure detection algorithms were utilized and reviewed.  An EEG Technician attended to the patient for 8 to 10 hours per day. The patient was observed by the epilepsy nursing staff 24 hours per day. The epilepsy center neurologist was available in person or on call 24 hours per day..     Placement and Labeling of Electrodes: The EEG was performed utilizing at least 20 channel referential EEG connections (coronal over temporal over parasagittal montage) with inferior temporal electrodes when indicting and using all standard 10-20 electrode placements with EKG, with additional electrodes placed in the inferior temporal region using the modified 10-10 montage electrode placements for elective admissions, or if deemed necessary.  Recording was at  a sampling rate of 256 samples per second per channel. Time synchronized digital video recording was done simultaneously with EEG recording.  A low light infrared camera was used for low light recording..    EEG REPORT:     CARSON LEE MRN-[id number]     Study Date: 		01-05-25 (0800) - 01-06-25 (0800)   Duration: 24hr   --------------------------------------------------------------------------------------------------  History:  CC/ HPI Patient is a 59y old  Female who presents with a chief complaint of Mixed Shock (05 Jan 2025 07:26)    --------------------------------------------------------------------------------------------------  Study Interpretation:    [[[Abbreviation Key:  PDR=alpha rhythm/posterior dominant rhythm. A-P=anterior posterior gradient.  Amplitude: ‘very low’:<20; ‘low’:20-50; ‘medium’:; ‘high’:>200uV.  Persistence for periodic/rhythmic patterns (% of epoch) ‘rare’:<1%; ‘occasional’:1-10%; ‘frequent’:10-50%; ‘abundant’:50-90%; ‘continuous’:>90%.  Persistence for sporadic discharges: ‘rare’:<1/hr; ‘occasional’:1/min-1/hr; ‘frequent’:>1/min; ‘abundant’:>1/10 sec.  GRDA=generalized rhythmic delta activity; FIRDA=frontal intermittent GRDA; LRDA=lateralized rhythmic delta activity; TIRDA=temporal intermittent rhythmic delta activity;  LPD=PLED=lateralized periodic discharges; GPD=generalized periodic discharges; BiPDs=BiPLEDs=bilateral independent periodic epileptiform discharges; SIRPID=stimulus induced rhythmic, periodic, or ictal appearing discharges; BIRDs=brief potentially ictal rhythmic discharges >4 Hz, lasting .5-10s; PFA=paroxysmal bursts of beta/gamma; LVFA=low voltage fast activity.  Modifiers: +F=with fast component; +S=with spike component; +R=with rhythmic component.  S-B=burst suppression pattern.  Max=maximal. N1-drowsy; N2-stage II sleep; N3-slow wave sleep. SSS/BETS=small sharp spikes/benign epileptiform transients of sleep. HV=hyperventilation; PS=photic stimulation]]]    Daily EEG Visual Analysis    FINDINGS:      Background:  Continuity: Continuous  Symmetry: asymmetric   PDR: none  Reactivity: present  Voltage: voltage attenuation over the right hemisphere  Anterior Posterior Gradient: absent  Other background findings: none  Breach: absent    Background Slowing:  Generalized slowing: Background is diffusely slow consisting of polymorphic delta theta activity up to 5 Hz    Focal slowing: voltage attenuation over the right hemisphere    State Changes:   -N2 sleep transients were not recorded.    Sporadic Epileptiform Discharges:    None    Rhythmic and Periodic Patterns (RPPs):  Intermittent generalized periodic discharges with triphasic morphology at times sharply contoured, 1-1.5Hz, blunted over the right hemisphere    Electrographic and Electroclinical seizures:  None    Other Clinical Events:  Event button pushed multiple times but without EEG correlate    Activation Procedures:   -Hyperventilation was not performed.    -Photic stimulation was not performed.      Artifacts:  Intermittent myogenic and movement artifacts were noted.    ECG:  The heart rate on single channel ECG was unable to be discerned     -------------------------------------------------------------------------------------------------------  EEG Classification / Summary:  Abnormal  EEG in a lethargic patient:   1. Intermittent generalized periodic discharges with triphasic morphology, at times sharply contoured, 1-1.5hz, blunted over the right hemisphere    2. Continuous voltage attenuation over the right hemisphere   3. Generalized background slowing, moderate    -------------------------------------------------------------------------------------------------------  Clinical Impression:   1. Triphasic waves are classically associated with encephalopathy (infectious, metabolic, toxic in etiology), however, can carry epileptiform potential at frequencies 2+Hz (not seen on this recording).   2. Structural abnormality in the right hemisphere   3. Moderate  generalized background slowing, resolving, likely in the setting of weaning sedation.   4. There were no seizures recorded.       -------------------------------------------------------------------------------------------------------  ENID Landa  Attending Physician, [facility]    ------------------------------------  EEG Reading Room: [phone]  On Call Service After Hours: [phone]

## 2025-01-06 NOTE — CONSULT NOTE ADULT - PROBLEM SELECTOR RECOMMENDATION 9
-on VA ECMO 1/4/25   -currently on  5 mcg/kg/min  -also on vaso, and epinephrine  -Monitor strict I/Os and keep lytes K>4 and Mg>2  -continue to monitor perfusion markers (LFTs, lactate, renal function, etc)  -currently mechanically vented  -diuretics: on bumex gtt  -wean nitric oxide
pt requiring VA ECMO  management per heart failure and CT surgery

## 2025-01-06 NOTE — ADVANCED PRACTICE NURSE CONSULT - ASSESSMENT
Arrived on unit, patient was found lying in a low air loss pressure redistribution support surface style bed.  Mrs. Rose was unable to turn independently, staff assist x 2 achieved turning, once turned, able to view her skin. Patient with a Costa urinary catheter.    Skin assessment reveals; B/L Buttocks/ Sacrum non-blanchable deep red / maroon / purple discoloration, consistent with a deep tissue injury, size approximately 9.0 cm x 8.5 cm x 0.0 cm, present on admission. Cleansed with incontinence cleanser, pat dry, and applied Triad wound paste at bed side.     Bilateral heels with blanchable erythema and intact skin. No open wounds or ulcerations. Wearing Complete Cair air fluidized boots and Allevyn foam borders for padded protection.    RNs were educated on the importance of turning and positioning every 2 hours. The importance of keeping her skin clean and dry and to offload feet/heels, and optimal nutrition.        When the consultation was completed, the patient was left in an up- right position receiving hemodialysis, with side rails up, call bell within reach, and bed in lowest position. Discussed plan of care with RN  Arrived on unit, patient was found lying in a low air loss pressure redistribution support surface style bed.  Mrs. Rose was unable to turn independently, staff assist x 2 achieved turning, once turned, able to view her skin. Patient with a Costa urinary catheter.    Skin assessment reveals; B/L Buttocks/ Sacrum non-blanchable deep red / maroon / purple discoloration, consistent with a deep tissue injury, size approximately 9.0 cm x 8.5 cm x 0.0 cm, present on admission. Cleansed with incontinence cleanser, pat dry, and applied Triad wound paste at bed side.     Bilateral heels with blanchable erythema and intact skin. No open wounds or ulcerations. Wearing Complete Cair air fluidized boots and Allevyn foam borders for padded protection.    RNs were educated on the importance of turning and positioning every 2 hours. The importance of keeping her skin clean and dry and to offload feet/heels, and optimal nutrition.        When the consultation was completed, the patient was left in an up- right position receiving hemodialysis, with side rails up, call bell within reach, and bed in lowest position. Discussed plan of care with Jia Carbajal (RN).  Arrived on unit, patient was found lying in a low air loss pressure redistribution support surface style bed.  Mrs. Rose was unable to turn independently, staff assist x 2 achieved turning, once turned, chris care was provided, and able to view her skin. Patient with a Costa urinary catheter.    Skin assessment reveals; B/L Buttocks/ Sacrum non-blanchable deep red / maroon / purple discoloration, consistent with a deep tissue injury, size approximately 9.0 cm x 8.5 cm x 0.0 cm, present on admission. Cleansed with incontinence cleanser, pat dry, and applied Triad wound paste at bed side.   Discussed the importance to RN of close monitoring due to the potential for rapid deterioration and the development of a full-thickness wound, and strategies to completely offload pressure from the affected area. RN in understanding of same.  Bilateral heels with blanchable erythema and intact skin. No open wounds or ulcerations. Wearing Complete Cair air fluidized boots and Allevyn foam borders for padded protection.    RNs were educated on the importance of turning and positioning every 2 hours. The importance of keeping her skin clean and dry and to offload feet/heels, and optimal nutrition.        When the consultation was completed, the patient was left in an up- right position receiving hemodialysis, with side rails up, call bell within reach, and bed in lowest position. Discussed plan of care with Jia Carbajal (RN).  Arrived on unit, patient was found lying in a low air loss pressure redistribution support surface style bed, on ECMO.  Mrs. Rose was unable to turn independently, staff assist x 2 achieved turning, once turned, chris care was provided, and able to view her skin. Patient with a Costa urinary catheter.    Skin assessment reveals; B/L Buttocks/ Sacrum non-blanchable deep red / maroon / purple discoloration, consistent with a deep tissue injury, size approximately 9.0 cm x 8.5 cm x 0.0 cm, present on admission. Cleansed with incontinence cleanser, pat dry, and applied Triad wound paste at bed side.   Discussed the importance to RN of close monitoring due to the potential for rapid deterioration and the development of a full-thickness wound, and strategies to completely offload pressure from the affected area. RN in understanding of same.  Bilateral heels with blanchable erythema and intact skin. No open wounds or ulcerations. Wearing Complete Cair air fluidized boots and Allevyn foam borders for padded protection.    RNs were educated on the importance of turning and positioning every 2 hours. The importance of keeping her skin clean and dry and to offload feet/heels, and optimal nutrition.        When the consultation was completed, the patient was left in an up- right position receiving hemodialysis, with side rails up, call bell within reach, and bed in lowest position. Discussed plan of care with Jia Carbajal (RN).

## 2025-01-06 NOTE — ADVANCED PRACTICE NURSE CONSULT - REASON FOR CONSULT
Wound consultation requested to assess; sacrum suspected deep tissue injury, present on admission .  bilateral heel suspected deep tissue injury, present on admission .  right buttocks suspected deep tissue injury, present on admission .  left buttocks suspected deep tissue injury, present on admission .  HPI:  Wound consultation requested to assess; sacrum suspected deep tissue injury, present on admission .  bilateral heel suspected deep tissue injury, present on admission .  right buttocks suspected deep tissue injury, present on admission .  left buttocks suspected deep tissue injury, present on admission .  HPI: 59F PMH Sickle cell disease (on hydroxyurea), HTN, HFimpEF/NICM (EF 58% 10/2024) presenting as a transfer from [facility]. Pt initially presented to [facility] for SOB and SS crisis; course c/b witnessed seizure, intubated and sedated, and transferred to [facility] for further management. Keppra was discontinued at [facility] due to negative EEG. At [facility] MICU pt was found to have RV failure possibly iso pulm HTN 2/2 increased tidal volumes on the ventilator. On addition, pt possibly received a lot of volume iso SS crisis. Neuro was consulted for seizures and EEG technician was called for vEEG placement. Pt is in profound cardiogenic shock. Pt has been decompensating, despite Dobutamine gtt, Bumex gtt, Levophed, and addition of Vasopressin. Vasopressors requirements have gone up. NS shock team was called and pt is being transferred to NS for further management.

## 2025-01-06 NOTE — CONSULT NOTE ADULT - PROBLEM SELECTOR RECOMMENDATION 2
-has know hx of SCD and takes hydroxyurea at home  -received 1pRBC and 1pPLT   -plan for red cell exchange today  -heme to f/u  -on heparin for AC
management per primary team and hematology

## 2025-01-06 NOTE — DIETITIAN INITIAL EVALUATION ADULT - ORAL INTAKE PTA/DIET HISTORY
Unable to obtain diet/weight history from patient at this time. Unclear if patient following any specific diets at home, taking any vitamins/supplements/oral nutrition supplements. No known food allergies per chart.

## 2025-01-06 NOTE — PROGRESS NOTE ADULT - ATTENDING COMMENTS
59F PMH Sickle cell disease (on hydroxyurea), HTN, HFimpEF/NICM (EF 58% 10/2024) presenting as a transfer from [facility]. Pt initially presented to [facility] for SOB and SS crisis; course c/b witnessed seizure, intubated and sedated, and transferred to [facility] for further management. Keppra was discontinued at [facility] due to negative EEG. At [facility] MICU pt was found to have RV failure possibly iso pulm HTN 2/2 increased tidal volumes on the ventilator, possibly due to volume overload due to IVF iso SS crisis.     Pt is in profound cardiogenic shock. Pt has been decompensating, despite Dobutamine gtt, Bumex gtt, Levophed, and addition of Vasopressin. Vasopressors requirements have gone up. NS shock team was called and pt transferred to NS for further management. Patient was cannulated on peripheral [facility] ECMO.                                                                                                            #Hb SC disease  #Cardiogenic shock on ECMO  - patient with 1-2 sickle pain crisis per year and on hydrea, had retinal detachment s/p repair                           - patient had f/up with cardio in Sept 2024: per the note, unclear etiology of her dyspnea but possibly driven by cardiomyopathy; low suspicion of pulmonary HTN as no RV dysfunction/severe TR.  She is found to have new RV failure, requiring ECMO  - Blood bank transfusion medicine team consulted for emergent RBC exchange given critically ill with multi-organ failure  - Retic count elevated to 11.6%, LDH 1000s, bili 3.9. CXR was clear not indicative of acute chest.   - S/p exchange 1/4/2024    Recommendations  - pending hemoglobin electrophoresis, no plan for repeat exchange today; will follow up results of hemoglobin electrophoresis post red cell exchange 1/4/24  - Repeat hemolysis labs, (CBC, CMP, LDH, reticulocyte counts)   - Thrombocytopenia from acute illness and ECMO. Given patient is on heparin gtt for ECOM, recommend transfusion to maintain plt > 50k   - Discuss with blood bank and heme team prior to RBC transfusions to minimize risk of antibody development/transfusion reactions, aim to keep hb >7-8  - Agree with holding hydroxyurea for now while thrombocytopenic   - Hematology team to follow

## 2025-01-06 NOTE — DIETITIAN INITIAL EVALUATION ADULT - REASON INDICATOR FOR ASSESSMENT
RD assessment warranted for: ICU length of stay. Chart reviewed, events noted.  Source: medical record, RN.  Patient unable to participate in nutrition interview at this time.

## 2025-01-06 NOTE — CONSULT NOTE ADULT - PROBLEM SELECTOR RECOMMENDATION 3
Plan for family meeting at 12:30 PM tomorrow.    Selene Lott MD  Geriatrics and Palliative Medicine Attending  [facility] pager: [phone] Plan for family meeting at 12:30 PM tomorrow. Will continue to follow for GOC and support in setting of VA ECMO.    Selene Lott MD  Geriatrics and Palliative Medicine Attending  [facility] pager: [phone]

## 2025-01-06 NOTE — DIETITIAN INITIAL EVALUATION ADULT - ENTERAL
To better meet pt's estimated protein-energy needs, recommend changing formula to Vital AF 1.2. When enteral nutrition ready to be advanced, recommend advancing by 10ml q6hr as tolerated to goal rate Vital AF 1.2 @ 60ml/hr x 24hr to provide 1440ml formula, 1728kcal, 108g protein, and 1168ml free water; would meet 31.8kcal/kg and 1.99g/kg protein based on IBW 120lb/54.4kg. Defer additional free water flushes to medical team.

## 2025-01-06 NOTE — PROGRESS NOTE ADULT - SUBJECTIVE AND OBJECTIVE BOX
----------------------------------------------------------------------------------------------------  ECMO Daily Management Note   ----------------------------------------------------------------------------------------------------  INTERVAL EVENTS:     Loss of signals in left leg overnight - present this morning.  More reflexes today.  Weaning pressors.  Oozing from cannula sites - sutures placed.  ----------------------------------------------------------------------------------------------------  60 yo F with Sickle Cell Disease   VA ECMO for:  Refractory cardiogenic shock       Date of initiation:  1/4/25      Cannulae:  25Fr R Fem Venous,  17Fr L Fem Arterial with 6Fr Distal reperfusion catheter     Flow decreased to ~ 2.6  Wean pressors as able.  Likely will transition to RVAD support this week.  Anticoagulation with Heparin infusion, goal anti-Xa 0.2-0.3  =============================================================  Weight (kg): 83.6 (01-03-25)        Height (cm): 162 (01-03-25)   BSA (m2): 1.88 (01-03-25)        BMI (kg/m2): 31.9 (01-03-25)    ECMO  RPM:  3000 (06 Jan 2025 06:00)      Pump Flow (Lpm):  2.54 (06 Jan 2025 06:00)              Sweep  (L/min):   1 (06 Jan 2025 06:00)       FiO2 (%):  1 (06 Jan 2025 06:00)  Pre-membrane -  Pressure (mm/Hg):   134 (06 Jan 2025 06:00)  05 Jan 2025 10:29  pH: 7.37  / pCO2: 51    /  pO2: 66    /  HCO3: 30    /   Base Excess: 3.5   / SvO2: 93.6       Post-membrane - Pressure (mm/Hg):  118 (06 Jan 2025 06:00)   ( 05 Jan 2025 10:29 )  pH: 7.43  /  pCO2: 42    / pO2: 509   /  HCO3: 28    /  Base Excess: 3.2   /  SaO2: 99.1           DOBUTamine Infusion 5 MICROgram(s)/kG/Min IV Continuous <Continuous>  EPINEPHrine    Infusion 0.01 MICROgram(s)/kG/Min IV Continuous <Continuous>  vasopressin Infusion 0.04 Unit(s)/Min IV Continuous <Continuous>    Vent Mode: AC/ CMV (Assist Control/ Continuous Mandatory Ventilation)  RR (machine): 12, TV (machine): 420, FiO2: 40, PEEP: 8, MAP: 10, PIP: 19    (05 Jan 2025 23:33) pH, Art: 7.51/pCO2: 36/pO2: 186/HCO3: 29/BE: 5.4/SaO2: 98.7/LAC: 1.2, --   (05 Jan 2025 16:09) pH, Art: 7.49/pCO2: 37/pO2: 169/HCO3: 28/BE: 4.6/SaO2: 99.3/LAC: 1.1, --   (05 Jan 2025 12:13) pH, Art: 7.51/pCO2: 34/pO2: 154/HCO3: 27/BE: 4.0/SaO2: 100.0/LAC: 1.2, --   (05 Jan 2025 11:15) pH, Art: 7.51/pCO2: 35/pO2: 163/HCO3: 28/BE: 4.7/SaO2: 99.9/LAC: 1.0, --   (05 Jan 2025 07:55) pH, Art: 7.50/pCO2: 36/pO2: 158/HCO3: 28/BE: 4.7/SaO2: 99.7/LAC: 1.3, --     (05 Jan 2025 23:33) pH, Clifford: 7.48/pCO2: 42/pO2:  50/HCO3: 31/BE: 7.0/MVO2: 84.4/SvO2: /LAC: ,   (05 Jan 2025 16:09) pH, Clifford: 7.47/pCO2: 41/pO2:  44/HCO3: 30/BE: 5.6/MVO2: 80.6/SvO2: /LAC: ,   (05 Jan 2025 12:13) pH, Clifford: 7.48/pCO2: 40/pO2: 49 /HCO3: 30/BE: 5.8/MVO2: /SvO2: 83.3/LAC: ,   (05 Jan 2025 11:15) pH, Clifford: 7.48/pCO2: 40/pO2:  48/HCO3: 30/BE: 5.8/MVO2: 84.2/SvO2: /LAC: ,   (05 Jan 2025 07:55) pH, Clifford: 7.45/pCO2: 45/pO2:  43/HCO3: 31/BE: 6.4/MVO2: 76.0/SvO2: /LAC: ,     ----------------------------------------------------------------------------------------------------  ANTICOAGULATION  heparin  Infusion 850 Unit(s)/Hr IV Continuous <Continuous>    (06 Jan 2025 00:30)  aPTT: 39.7  Xa: 0.31  PT: 12.1  INR: 1.05   Fibrinogen: 648   Platelets: 75    (05 Jan 2025 22:01)  aPTT: 41.8  Xa: 0.28  PT: 12.6  INR: 1.10   Fibrinogen: 668   Platelets: -----  (05 Jan 2025 16:12)  aPTT: 43.1  Xa: 0.28  PT: -----  INR: -----   Fibrinogen: -----  Platelets: -----  (05 Jan 2025 12:40)  aPTT: -----  Xa: -----  PT: -----  INR: -----   Fibrinogen: -----  Platelets: 79      (06 Jan 2025 00:31)  Hemoglobin: -----    LDH: 865     Haptoglobin: 122    PFH:  -----  (06 Jan 2025 00:30)  Hemoglobin: 9.1     LDH: -----    Haptoglobin: -----   PFH:  -----  (05 Jan 2025 12:40)  Hemoglobin: 8.8     LDH: -----    Haptoglobin: -----   PFH:  -----  (05 Jan 2025 00:51)  Hemoglobin: -----    LDH: -----    Haptoglobin: 70     PFH:  -----    ----------------------------------------------------------------------------------------------------  Daily ECMO Checklist:   Progress report received from perfusionist   Circuit checked/inspected   Emergency equipment and supplies checked   Cannulation site inspection     I have reviewed all of the above information as well as pertinent labs/imaging/testing and care plan with the bedside nurse, perfusionist and care team members and provided my recommendations for support.   ECMO Management was separate from critical care billing time.     ----------------------------------------------------------------------------------------------------  ECMO Daily Management Note   ----------------------------------------------------------------------------------------------------  INTERVAL EVENTS:   Off Epi gtt, off pressors  Oozing from cannula sites - sutures placed.  ----------------------------------------------------------------------------------------------------  58 yo F with Sickle Cell Disease   VA ECMO for:  Refractory cardiogenic shock       Date of initiation:  1/4/25      Cannulae:  25Fr R Fem Venous,  17Fr L Fem Arterial with 6Fr Distal reperfusion catheter     Flow decreased to ~ 2.6  Wean pressors as able.  Likely will transition to RVAD support this week.  Anticoagulation with Heparin infusion, goal anti-Xa 0.2-0.3  =============================================================  Weight (kg): 83.6 (01-03-25)        Height (cm): 162 (01-03-25)   BSA (m2): 1.88 (01-03-25)        BMI (kg/m2): 31.9 (01-03-25)    ECMO  RPM:  3000 (06 Jan 2025 06:00)      Pump Flow (Lpm):  2.54 (06 Jan 2025 06:00)              Sweep  (L/min):   1 (06 Jan 2025 06:00)       FiO2 (%):  1 (06 Jan 2025 06:00)  Pre-membrane -  Pressure (mm/Hg):   134 (06 Jan 2025 06:00)  05 Jan 2025 10:29  pH: 7.37  / pCO2: 51    /  pO2: 66    /  HCO3: 30    /   Base Excess: 3.5   / SvO2: 93.6       Post-membrane - Pressure (mm/Hg):  118 (06 Jan 2025 06:00)   ( 05 Jan 2025 10:29 )  pH: 7.43  /  pCO2: 42    / pO2: 509   /  HCO3: 28    /  Base Excess: 3.2   /  SaO2: 99.1           DOBUTamine Infusion 5 MICROgram(s)/kG/Min IV Continuous <Continuous>  EPINEPHrine    Infusion 0.01 MICROgram(s)/kG/Min IV Continuous <Continuous>  vasopressin Infusion 0.04 Unit(s)/Min IV Continuous <Continuous>    Vent Mode: AC/ CMV (Assist Control/ Continuous Mandatory Ventilation)  RR (machine): 12, TV (machine): 420, FiO2: 40, PEEP: 8, MAP: 10, PIP: 19    (05 Jan 2025 23:33) pH, Art: 7.51/pCO2: 36/pO2: 186/HCO3: 29/BE: 5.4/SaO2: 98.7/LAC: 1.2, --   (05 Jan 2025 16:09) pH, Art: 7.49/pCO2: 37/pO2: 169/HCO3: 28/BE: 4.6/SaO2: 99.3/LAC: 1.1, --   (05 Jan 2025 12:13) pH, Art: 7.51/pCO2: 34/pO2: 154/HCO3: 27/BE: 4.0/SaO2: 100.0/LAC: 1.2, --   (05 Jan 2025 11:15) pH, Art: 7.51/pCO2: 35/pO2: 163/HCO3: 28/BE: 4.7/SaO2: 99.9/LAC: 1.0, --   (05 Jan 2025 07:55) pH, Art: 7.50/pCO2: 36/pO2: 158/HCO3: 28/BE: 4.7/SaO2: 99.7/LAC: 1.3, --     (05 Jan 2025 23:33) pH, Clifford: 7.48/pCO2: 42/pO2:  50/HCO3: 31/BE: 7.0/MVO2: 84.4/SvO2: /LAC: ,   (05 Jan 2025 16:09) pH, Clifford: 7.47/pCO2: 41/pO2:  44/HCO3: 30/BE: 5.6/MVO2: 80.6/SvO2: /LAC: ,   (05 Jan 2025 12:13) pH, Clifford: 7.48/pCO2: 40/pO2: 49 /HCO3: 30/BE: 5.8/MVO2: /SvO2: 83.3/LAC: ,   (05 Jan 2025 11:15) pH, Clifford: 7.48/pCO2: 40/pO2:  48/HCO3: 30/BE: 5.8/MVO2: 84.2/SvO2: /LAC: ,   (05 Jan 2025 07:55) pH, Clifford: 7.45/pCO2: 45/pO2:  43/HCO3: 31/BE: 6.4/MVO2: 76.0/SvO2: /LAC: ,     ----------------------------------------------------------------------------------------------------  ANTICOAGULATION  heparin  Infusion 850 Unit(s)/Hr IV Continuous <Continuous>    (06 Jan 2025 00:30)  aPTT: 39.7  Xa: 0.31  PT: 12.1  INR: 1.05   Fibrinogen: 648   Platelets: 75    (05 Jan 2025 22:01)  aPTT: 41.8  Xa: 0.28  PT: 12.6  INR: 1.10   Fibrinogen: 668   Platelets: -----  (05 Jan 2025 16:12)  aPTT: 43.1  Xa: 0.28  PT: -----  INR: -----   Fibrinogen: -----  Platelets: -----  (05 Jan 2025 12:40)  aPTT: -----  Xa: -----  PT: -----  INR: -----   Fibrinogen: -----  Platelets: 79      (06 Jan 2025 00:31)  Hemoglobin: -----    LDH: 865     Haptoglobin: 122    PFH:  -----  (06 Jan 2025 00:30)  Hemoglobin: 9.1     LDH: -----    Haptoglobin: -----   PFH:  -----  (05 Jan 2025 12:40)  Hemoglobin: 8.8     LDH: -----    Haptoglobin: -----   PFH:  -----  (05 Jan 2025 00:51)  Hemoglobin: -----    LDH: -----    Haptoglobin: 70     PFH:  -----    ----------------------------------------------------------------------------------------------------  Daily ECMO Checklist:   Progress report received from perfusionist   Circuit checked/inspected   Emergency equipment and supplies checked   Cannulation site inspection     I have reviewed all of the above information as well as pertinent labs/imaging/testing and care plan with the bedside nurse, perfusionist and care team members and provided my recommendations for support.   ECMO Management was separate from critical care billing time.

## 2025-01-06 NOTE — DIETITIAN INITIAL EVALUATION ADULT - HEIGHT FOR BMI (CENTIMETERS)
Hypertension is stable.  Continue current treatment regimen.  Blood pressure will be reassessed at the next regular appointment.   165.1

## 2025-01-06 NOTE — DIETITIAN INITIAL EVALUATION ADULT - NSFNSPHYEXAMSKINFT_GEN_A_CORE
left earlobe suspected deep tissue injury  left forehead suspected deep tissue injury  right forehead suspected deep tissue injury  sacrum suspected deep tissue injury  bilateral heel suspected deep tissue injury  right buttocks suspected deep tissue injury  left buttocks suspected deep tissue injury

## 2025-01-06 NOTE — PROGRESS NOTE ADULT - SUBJECTIVE AND OBJECTIVE BOX
ADVANCED HEART FAILURE & TRANSPLANT  - PROGRESS NOTE  *To reach the NS1 Team from 8am to 5pm, please call [phone].    ___________________________________________________________________________    Medications:  albuterol/ipratropium for Nebulization 3 milliLiter(s) Nebulizer every 6 hours  artificial tears (preservative free) Ophthalmic Solution 1 Drop(s) Both EYES four times a day  chlorhexidine 0.12% Liquid 15 milliLiter(s) Oral Mucosa every 12 hours  chlorhexidine 2% Cloths 1 Application(s) Topical daily  dexMEDEtomidine Infusion 0.2 MICROgram(s)/kG/Hr IV Continuous <Continuous>  dextrose 50% Injectable 50 milliLiter(s) IV Push every 15 minutes  dextrose 50% Injectable 25 milliLiter(s) IV Push every 15 minutes  DOBUTamine Infusion 5 MICROgram(s)/kG/Min IV Continuous <Continuous>  EPINEPHrine    Infusion 0.01 MICROgram(s)/kG/Min IV Continuous <Continuous>  heparin  Infusion 850 Unit(s)/Hr IV Continuous <Continuous>  insulin regular Infusion 1 Unit(s)/Hr IV Continuous <Continuous>  meropenem  IVPB 1000 milliGRAM(s) IV Intermittent every 8 hours  mupirocin 2% Nasal 1 Application(s) Both Nostrils two times a day  pantoprazole  Injectable 40 milliGRAM(s) IV Push every 12 hours  polyethylene glycol 3350 17 Gram(s) Oral daily  senna 2 Tablet(s) Oral at bedtime  sodium chloride 0.9% lock flush 3 milliLiter(s) IV Push every 8 hours  sodium chloride 0.9%. 1000 milliLiter(s) IV Continuous <Continuous>  vancomycin  IVPB 1000 milliGRAM(s) IV Intermittent every 12 hours  vasopressin Infusion 0.04 Unit(s)/Min IV Continuous <Continuous>      Physical Exam:    Vitals:  Vital Signs Last 24 Hours  T(C): 37.5 (25 @ 08:00), Max: 38.2 (25 @ 12:00)  HR: 115 (25 @ 08:15) (72 - 137)  BP: --  RR: 9 (25 @ 08:15) (4 - 34)  SpO2: 100% (25 @ 08:15) (75% - 100%)    Weight in k.1 ( @ 00:00)    I&O's Summary    2025 07:01  -  2025 07:00  --------------------------------------------------------  IN: 2930.4 mL / OUT: 1642 mL / NET: 1288.4 mL    2025 07:01  -  2025 08:17  --------------------------------------------------------  IN: 66.6 mL / OUT: 100 mL / NET: -33.4 mL      Labs:                        9.1    11.62 )-----------( 75       ( 2025 00:30 )             25.4         143  |  105  |  32[H]  ----------------------------<  99  3.6   |  25  |  1.00    Ca    8.7      2025 00:31  Phos  3.1       Mg     2.6         TPro  5.5[L]  /  Alb  2.8[L]  /  TBili  4.0[H]  /  DBili  x   /  AST  91[H]  /  ALT  84[H]  /  AlkPhos  127[H]      PT/INR - ( 2025 00:30 )   PT: 12.1 sec;   INR: 1.05 ratio       PTT - ( 2025 00:30 )  PTT:39.7 sec    Oxygen Saturation, Mixed: 84.4 ( @ 23:33)  Oxygen Saturation, Mixed: 80.6 ( @ 16:09)  Oxygen Saturation, Mixed: 84.2 ( @ 11:15)  Oxygen Saturation, Mixed: 76.0 ( @ 07:55)  Oxygen Saturation, Mixed: 76.9 ( @ 06:04)    Lactate Dehydrogenase, Serum: 865 U/L ( @ 00:31)  Lactate Dehydrogenase, Serum: 879 U/L ( @ 00:37)  Lactate Dehydrogenase, Serum: 792 U/L ( @ 12:10)  Lactate Dehydrogenase, Serum: 1165 U/L ( @ 01:12)  Lactate Dehydrogenase, Serum: 1156 U/L ( @ 21:34)  Lactate Dehydrogenase, Serum: 1197 U/L ( @ 14:00)

## 2025-01-06 NOTE — PROGRESS NOTE ADULT - SUBJECTIVE AND OBJECTIVE BOX
Hematology Follow-up    INTERVAL HPI/OVERNIGHT EVENTS:  Patient S&E at bedside. No o/n events, patient resting comfortably. No complaints at this time. Patient denies fever, chills, dizziness, weakness, CP, palpitations, SOB, cough, N/V/D/C, dysuria, changes in bowel movements, LE edema.    VITAL SIGNS:  T(F): 99.1 (01-06-25 @ 12:00)  HR: 86 (01-06-25 @ 11:45)  BP: --  RR: 14 (01-06-25 @ 11:45)  SpO2: 99% (01-06-25 @ 11:45)  Wt(kg): --    PHYSICAL EXAM:    Constitutional: AAOx3, NAD,   Eyes: PERRL, EOMI, sclera non-icteric  Neck: supple, no masses, no JVD  Respiratory: CTA b/l, good air entry b/l, no wheezing, rhonchi, rales, with normal respiratory effort and no intercostal retractions  Cardiovascular: RRR, normal S1S2, no M/R/G  Gastrointestinal: soft, NTND, no masses palpable, BS normal in all four quadrants, no HSM  Extremities:  no c/c/e  Neurological: Grossly intact  Skin: Normal temperature    MEDICATIONS  (STANDING):  albuterol/ipratropium for Nebulization 3 milliLiter(s) Nebulizer every 6 hours  artificial tears (preservative free) Ophthalmic Solution 1 Drop(s) Both EYES four times a day  chlorhexidine 0.12% Liquid 15 milliLiter(s) Oral Mucosa every 12 hours  chlorhexidine 2% Cloths 1 Application(s) Topical daily  clonazePAM  Tablet 0.5 milliGRAM(s) Oral at bedtime  dexMEDEtomidine Infusion 0.2 MICROgram(s)/kG/Hr (4.18 mL/Hr) IV Continuous <Continuous>  dextrose 50% Injectable 50 milliLiter(s) IV Push every 15 minutes  dextrose 50% Injectable 25 milliLiter(s) IV Push every 15 minutes  DOBUTamine Infusion 5 MICROgram(s)/kG/Min (12.5 mL/Hr) IV Continuous <Continuous>  EPINEPHrine    Infusion 0.01 MICROgram(s)/kG/Min (3.14 mL/Hr) IV Continuous <Continuous>  heparin  Infusion 850 Unit(s)/Hr (8.5 mL/Hr) IV Continuous <Continuous>  insulin regular Infusion 1 Unit(s)/Hr (1 mL/Hr) IV Continuous <Continuous>  meropenem  IVPB 1000 milliGRAM(s) IV Intermittent every 8 hours  mupirocin 2% Nasal 1 Application(s) Both Nostrils two times a day  pantoprazole  Injectable 40 milliGRAM(s) IV Push every 12 hours  polyethylene glycol 3350 17 Gram(s) Oral daily  senna 2 Tablet(s) Oral at bedtime  sodium chloride 0.9% lock flush 3 milliLiter(s) IV Push every 8 hours  sodium chloride 0.9%. 1000 milliLiter(s) (10 mL/Hr) IV Continuous <Continuous>  vancomycin  IVPB 750 milliGRAM(s) IV Intermittent every 12 hours  vasopressin Infusion 0.04 Unit(s)/Min (6 mL/Hr) IV Continuous <Continuous>    MEDICATIONS  (PRN):      doxycycline (Angioedema)  penicillin (Unknown)  clindamycin (Angioedema)  linezolid (Angioedema)      LABS:                        9.1    11.62 )-----------( 75       ( 06 Jan 2025 00:30 )             25.4     01-06    143  |  105  |  32[H]  ----------------------------<  99  3.6   |  25  |  1.00    Ca    8.7      06 Jan 2025 00:31  Phos  3.1     01-06  Mg     2.6     01-06    TPro  5.5[L]  /  Alb  2.8[L]  /  TBili  4.0[H]  /  DBili  x   /  AST  91[H]  /  ALT  84[H]  /  AlkPhos  127[H]  01-06    PT/INR - ( 06 Jan 2025 00:30 )   PT: 12.1 sec;   INR: 1.05 ratio         PTT - ( 06 Jan 2025 00:30 )  PTT:39.7 sec Lactate Dehydrogenase, Serum: 865 U/L (01-06 @ 00:31)  Haptoglobin, Serum: 122 mg/dL (01-06 @ 00:31)    Urinalysis Basic - ( 06 Jan 2025 00:31 )    Color: x / Appearance: x / SG: x / pH: x  Gluc: 99 mg/dL / Ketone: x  / Bili: x / Urobili: x   Blood: x / Protein: x / Nitrite: x   Leuk Esterase: x / RBC: x / WBC x   Sq Epi: x / Non Sq Epi: x / Bacteria: x        RADIOLOGY & ADDITIONAL TESTS:  Studies reviewed.   Hematology Follow-up    INTERVAL HPI/OVERNIGHT EVENTS:  Patient seen and evaluated at bedside, ill appearing, on ecmo, multiple pressors.     VITAL SIGNS:  T(F): 99.1 (01-06-25 @ 12:00)  HR: 86 (01-06-25 @ 11:45)  BP: --  RR: 14 (01-06-25 @ 11:45)  SpO2: 99% (01-06-25 @ 11:45)  Wt(kg): --    GENERAL: ill appearing  HEAD:  Atraumatic, Normocephalic  EYES: EOMI, conjunctiva and sclera clear  NECK: Left and right IJ   CHEST/LUNG: Clear to auscultation bilaterally  HEART: Regular rate and rhythm  ABDOMEN: Soft, Nontender, Nondistended  NEUROLOGY: intubated and sedated     SKIN: No rashes or lesions  MEDICATIONS  (STANDING):  albuterol/ipratropium for Nebulization 3 milliLiter(s) Nebulizer every 6 hours  artificial tears (preservative free) Ophthalmic Solution 1 Drop(s) Both EYES four times a day  chlorhexidine 0.12% Liquid 15 milliLiter(s) Oral Mucosa every 12 hours  chlorhexidine 2% Cloths 1 Application(s) Topical daily  clonazePAM  Tablet 0.5 milliGRAM(s) Oral at bedtime  dexMEDEtomidine Infusion 0.2 MICROgram(s)/kG/Hr (4.18 mL/Hr) IV Continuous <Continuous>  dextrose 50% Injectable 50 milliLiter(s) IV Push every 15 minutes  dextrose 50% Injectable 25 milliLiter(s) IV Push every 15 minutes  DOBUTamine Infusion 5 MICROgram(s)/kG/Min (12.5 mL/Hr) IV Continuous <Continuous>  EPINEPHrine    Infusion 0.01 MICROgram(s)/kG/Min (3.14 mL/Hr) IV Continuous <Continuous>  heparin  Infusion 850 Unit(s)/Hr (8.5 mL/Hr) IV Continuous <Continuous>  insulin regular Infusion 1 Unit(s)/Hr (1 mL/Hr) IV Continuous <Continuous>  meropenem  IVPB 1000 milliGRAM(s) IV Intermittent every 8 hours  mupirocin 2% Nasal 1 Application(s) Both Nostrils two times a day  pantoprazole  Injectable 40 milliGRAM(s) IV Push every 12 hours  polyethylene glycol 3350 17 Gram(s) Oral daily  senna 2 Tablet(s) Oral at bedtime  sodium chloride 0.9% lock flush 3 milliLiter(s) IV Push every 8 hours  sodium chloride 0.9%. 1000 milliLiter(s) (10 mL/Hr) IV Continuous <Continuous>  vancomycin  IVPB 750 milliGRAM(s) IV Intermittent every 12 hours  vasopressin Infusion 0.04 Unit(s)/Min (6 mL/Hr) IV Continuous <Continuous>    MEDICATIONS  (PRN):      doxycycline (Angioedema)  penicillin (Unknown)  clindamycin (Angioedema)  linezolid (Angioedema)      LABS:                        9.1    11.62 )-----------( 75       ( 06 Jan 2025 00:30 )             25.4     01-06    143  |  105  |  32[H]  ----------------------------<  99  3.6   |  25  |  1.00    Ca    8.7      06 Jan 2025 00:31  Phos  3.1     01-06  Mg     2.6     01-06    TPro  5.5[L]  /  Alb  2.8[L]  /  TBili  4.0[H]  /  DBili  x   /  AST  91[H]  /  ALT  84[H]  /  AlkPhos  127[H]  01-06    PT/INR - ( 06 Jan 2025 00:30 )   PT: 12.1 sec;   INR: 1.05 ratio         PTT - ( 06 Jan 2025 00:30 )  PTT:39.7 sec Lactate Dehydrogenase, Serum: 865 U/L (01-06 @ 00:31)  Haptoglobin, Serum: 122 mg/dL (01-06 @ 00:31)    Urinalysis Basic - ( 06 Jan 2025 00:31 )    Color: x / Appearance: x / SG: x / pH: x  Gluc: 99 mg/dL / Ketone: x  / Bili: x / Urobili: x   Blood: x / Protein: x / Nitrite: x   Leuk Esterase: x / RBC: x / WBC x   Sq Epi: x / Non Sq Epi: x / Bacteria: x        RADIOLOGY & ADDITIONAL TESTS:  Studies reviewed.

## 2025-01-06 NOTE — PROGRESS NOTE ADULT - REASON FOR ADMISSION
Mixed Shock Transfer to [facility], VA ECMO cannulation VA ECMO, cardiogenic shock, respiratory failure

## 2025-01-06 NOTE — DIETITIAN INITIAL EVALUATION ADULT - ADD RECOMMEND
-Recommend adding multivitamin and vitamin C for wound healing pending no medical contraindications.

## 2025-01-06 NOTE — CONSULT NOTE ADULT - SUBJECTIVE AND OBJECTIVE BOX
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 Pt initially presented to [facility] for SOB and SS crisis; course c/b witnessed seizure, intubated and sedated, and transferred to [facility] for further management. Keppra was discontinued at [facility] due to negative EEG. At [facility] MICU pt was found to have RV failure possibly iso pulm HTN 2/2 increased tidal volumes on the ventilator. On addition, pt possibly received a lot of volume iso SS crisis. Neuro was consulted for seizures and EEG technician was called for vEEG placement. Pt is in profound cardiogenic shock. Pt has been decompensating, despite Dobutamine gtt, Bumex gtt, Levophed, and addition of Vasopressin. Vasopressors requirements have gone up. NS shock team was called and pt is being transferred to NS for further management.  (2025 23:18)    PERTINENT PM/SXH:   Sickle-cell-hemoglobin C disease with crisis    Essential hypertension    No pertinent past medical history    Sickle cell trait    Sickle cell disease    HTN (hypertension)    H/O:     H/O abdominoplasty    S/P breast augmentation    S/P     FAMILY HISTORY:  FHx: cerebral palsy (Child)    SOCIAL HISTORY:   Significant other/partner[ ]  Children[x]  Sabianism/Spirituality:  Substance hx:  [ ]   Tobacco hx:  [ ]   Alcohol hx: [ ]   Home Opioid hx:  [ ] I-Stop Reference No:  Living Situation: [ x]Home  [ ]Long term care  [ ]Rehab [ ]Other    ADVANCE DIRECTIVES:    DNR/MOLST  [ ]  Living Will  [ ]   DECISION MAKER(s):  [ ] Health Care Proxy(s)  [x ] Surrogate(s)  [ ] Guardian           Name(s): Phone Number(s):  Farhat Quintana [phone]  BASELINE (I)ADL(s) (prior to admission):  [place]: [x ]Total  [ ] Moderate [ ]Dependent    Allergies    doxycycline (Angioedema)  penicillin (Unknown)  clindamycin (Angioedema)  linezolid (Angioedema)    Intolerances    MEDICATIONS  (STANDING):  albuterol/ipratropium for Nebulization 3 milliLiter(s) Nebulizer every 6 hours  artificial tears (preservative free) Ophthalmic Solution 1 Drop(s) Both EYES four times a day  buMETAnide Injectable 2 milliGRAM(s) IV Push once  chlorhexidine 0.12% Liquid 15 milliLiter(s) Oral Mucosa every 12 hours  chlorhexidine 2% Cloths 1 Application(s) Topical daily  clonazePAM  Tablet 0.5 milliGRAM(s) Oral at bedtime  dexMEDEtomidine Infusion 0.2 MICROgram(s)/kG/Hr (4.18 mL/Hr) IV Continuous <Continuous>  dextrose 50% Injectable 50 milliLiter(s) IV Push every 15 minutes  dextrose 50% Injectable 25 milliLiter(s) IV Push every 15 minutes  DOBUTamine Infusion 5 MICROgram(s)/kG/Min (12.5 mL/Hr) IV Continuous <Continuous>  EPINEPHrine    Infusion 0.01 MICROgram(s)/kG/Min (3.14 mL/Hr) IV Continuous <Continuous>  heparin  Infusion 850 Unit(s)/Hr (8.5 mL/Hr) IV Continuous <Continuous>  insulin regular Infusion 1 Unit(s)/Hr (1 mL/Hr) IV Continuous <Continuous>  meropenem  IVPB 1000 milliGRAM(s) IV Intermittent every 8 hours  mupirocin 2% Nasal 1 Application(s) Both Nostrils two times a day  nitroprusside Infusion 0.3 MICROgram(s)/kG/Min (3.76 mL/Hr) IV Continuous <Continuous>  pantoprazole  Injectable 40 milliGRAM(s) IV Push every 12 hours  polyethylene glycol 3350 17 Gram(s) Oral daily  potassium chloride  10 mEq/100 mL IVPB 10 milliEquivalent(s) IV Intermittent every 1 hour  senna 2 Tablet(s) Oral at bedtime  sodium chloride 0.9% lock flush 3 milliLiter(s) IV Push every 8 hours  sodium chloride 0.9%. 1000 milliLiter(s) (10 mL/Hr) IV Continuous <Continuous>  vancomycin  IVPB 750 milliGRAM(s) IV Intermittent every 12 hours  vasopressin Infusion 0.04 Unit(s)/Min (6 mL/Hr) IV Continuous <Continuous>    MEDICATIONS  (PRN):      ITEMS NOT CHECKED ARE NOT PRESENT  PRESENT SYMPTOMS: [ x]Unable to self-report see CPOT, PAINADs, RDOS  Source if other than patient:  [ ]Family   [ ]Team     Pain: [ ]yes [ ]no  QOL impact -   Location -                    Aggravating factors -  Quality -  Radiation -  Timing-  Severity (0-10 scale):  Minimal acceptable level (0-10 scale):       Dyspnea:                           [ ]Mild [ ]Moderate [ ]Severe  Anxiety:                             [ ]Mild [ ]Moderate [ ]Severe  Fatigue:                             [ ]Mild [ ]Moderate [ ]Severe  Nausea:                             [ ]Mild [ ]Moderate [ ]Severe  Loss of appetite:              [ ]Mild [ ]Moderate [ ]Severe  Constipation:                    [ ]Mild [ ]Moderate [ ]Severe    PCSSQ [Palliative Care Spiritual Screening Question]   Severity (0-10):  Score of 4 or > indicate consideration of Chaplaincy referral.  Chaplaincy Referral: [ ] yes [ ] refused [ ] following [x ] deferred    Caregiver [place]? : [ ] yes [ ] no [x ] deferred:  Social work referral [ ] Patient & Family Centered Care Referral [ ]     Anticipatory Grief Present?: [ ] yes [ ] no  [x ] deferred: Palliative Social work referral [ ]  Patient & Family Centered Care Referral [ ]       Other Symptoms:  [ ]All other review of systems negative   [ x] Unable to obtain due to poor mentation    PHYSICAL EXAM:  Vital Signs Last 24 Hrs  T(C): 37.4 (2025 16:00), Max: 37.5 (2025 20:00)  T(F): 99.3 (2025 16:00), Max: 99.5 (2025 20:00)  HR: 102 (2025 16:00) (72 - 124)  BP: --  BP(mean): --  RR: 14 (2025 16:00) (5 - 29)  SpO2: 99% (2025 16:00) (86% - 100%)    Parameters below as of 2025 12:47  Patient On (Oxygen Delivery Method): ventilator     I&O's Summary    2025 07:01  -  2025 07:00  --------------------------------------------------------  IN: 2930.4 mL / OUT: 1642 mL / NET: 1288.4 mL    2025 07:01  -  2025 16:16  --------------------------------------------------------  IN: 1051.1 mL / OUT: 1895 mL / NET: -843.9 mL        GENERAL:  [x]Alert  [x]Oriented x 3  [ ]Lethargic  [ ]Cachexia  [ ]Unarousable  [x]Verbal  [ ]Non-Verbal  Behavioral:   [ ]Anxiety  [ ]Delirium [ ]Agitation [ ]Other  HEENT:  [x]Normal   [ ]Dry mouth   [ ]ET Tube/Trach  [ ]Oral lesions  PULMONARY:   [x]Clear [ ]Tachypnea  [ ]Audible excessive secretions   [ ]Rhonchi        [ ]Right [ ]Left [ ]Bilateral  [ ]Crackles        [ ]Right [ ]Left [ ]Bilateral  [ ]Wheezing     [ ]Right [ ]Left [ ]Bilateral  [ ]Diminished BS [ ] Right [ ]Left [ ]Bilateral  CARDIOVASCULAR:    [x]Regular [ ]Irregular [ ]Tachy  [ ]Marek [ ]Murmur [ ]Other  GASTROINTESTINAL:  [x]Soft  [ ]Distended   [x]+BS  [x]Non tender [ ]Tender  [ ]PEG [ ]OGT/ NGT   Last BM:    GENITOURINARY:  [x]Normal [ ]Incontinent   [ ]Oliguria/Anuria   [ ]Costa  MUSCULOSKELETAL:   [ ]Normal   [x]Weakness  [ ]Bed/Wheelchair bound [ ]Edema  NEUROLOGIC:   [x]No focal deficits  [ ] Cognitive impairment  [ ] Dysphagia [ ]Dysarthria [ ] Paresis [ ]Other   SKIN:   [x]Normal  [ ]Rash   [ ]Pressure ulcer(s) [ ]y [ ]n present on admission    CRITICAL CARE:  [ ] Shock Present  [ ]Septic [ ]Cardiogenic [ ]Neurologic [ ]Hypovolemic  [ ]  Vasopressors [ ]  Inotropes   [ ]Respiratory failure present [ ]Mechanical ventilation [ ]Non-invasive ventilatory support [ ]High flow  Mode: AC/ CMV (Assist Control/ Continuous Mandatory Ventilation), RR (machine): 14, TV (machine): 420, FiO2: 40, PEEP: 10, ITime: 1.2, MAP: 13, PC: 10, PIP: 20  [ ]Acute  [ ]Chronic [ ]Hypoxic  [ ]Hypercarbic [ ]Other  [ ]Other organ failure     LABS:                        9.7    12.23 )-----------( 71       ( 2025 14:26 )             27.6       143  |  105  |  32[H]  ----------------------------<  99  3.6   |  25  |  1.00    Ca    8.7      2025 00:31  Phos  3.1       Mg     2.6         TPro  5.5[L]  /  Alb  2.8[L]  /  TBili  4.0[H]  /  DBili  x   /  AST  91[H]  /  ALT  84[H]  /  AlkPhos  127[H]    PT/INR - ( 2025 00:30 )   PT: 12.1 sec;   INR: 1.05 ratio         PTT - ( 2025 00:30 )  PTT:39.7 sec    Urinalysis Basic - ( 2025 00:31 )    Color: x / Appearance: x / SG: x / pH: x  Gluc: 99 mg/dL / Ketone: x  / Bili: x / Urobili: x   Blood: x / Protein: x / Nitrite: x   Leuk Esterase: x / RBC: x / WBC x   Sq Epi: x / Non Sq Epi: x / Bacteria: x      RADIOLOGY & ADDITIONAL STUDIES:  < from: Xray Chest 1 View- PORTABLE-Routine (25 @ 02:38) >  IMPRESSION:    Hazy opacity at the right lung base which may represent layering effusion   or atelectasis    --- End of Report ---            TAJ OTT MD; Attending Radiologist  This document has been electronically signed.   2:08PM    < end of copied text >      PROTEIN CALORIE MALNUTRITION PRESENT: [ ]mild [ ]moderate [ ]severe [ ]underweight [ ]morbid obesity  https://www.andeal.org/vault/8470/web/files/ONC/Table_Clinical%20Characteristics%20to%20Document%20Malnutrition-White%20JV%20et%20al%2020.pdf    Height (cm): 162 (25 @ 20:47)  Weight (kg): 83.6 (25 @ 20:47), 83.6 (25 @ 13:35)  BMI (kg/m2): 31.9 (25 @ 20:47), 31.9 (25 @ 20:47)    [ ]PPSV2 < or = to 30% [ ]significant weight loss  [ ]poor nutritional intake  [ ]anasarca[ ]Artificial Nutrition      Other REFERRALS:  [ ]Hospice  [ ]Child Life  [ ]Social Work  [ ]Case management [ ]Holistic Therapy    Date of Service: 25 @ 16:16    HPI:  59F PMH Sickle cell disease (on hydroxyurea), HTN, HFimpEF/NICM (EF 58% 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 Pt initially presented to [facility] for SOB and SS crisis; course c/b witnessed seizure, intubated and sedated, and transferred to [facility] for further management. Keppra was discontinued at [facility] due to negative EEG. At [facility] MICU pt was found to have RV failure possibly iso pulm HTN 2/2 increased tidal volumes on the ventilator. On addition, pt possibly received a lot of volume iso SS crisis. Neuro was consulted for seizures and EEG technician was called for vEEG placement. Pt is in profound cardiogenic shock. Pt has been decompensating, despite Dobutamine gtt, Bumex gtt, Levophed, and addition of Vasopressin. Vasopressors requirements have gone up. NS shock team was called and pt is being transferred to NS for further management.  (2025 23:18)    PERTINENT PM/SXH:   Sickle-cell-hemoglobin C disease with crisis    Essential hypertension    No pertinent past medical history    Sickle cell trait    Sickle cell disease    HTN (hypertension)    H/O:     H/O abdominoplasty    S/P breast augmentation    S/P     FAMILY HISTORY:  FHx: cerebral palsy (Child)    SOCIAL HISTORY:   Significant other/partner[ ]  Children[x]  Rastafarian/Spirituality:  Substance hx:  [ ]   Tobacco hx:  [ ]   Alcohol hx: [ ]   Home Opioid hx:  [ ] I-Stop Reference No:  Living Situation: [ x]Home  [ ]Long term care  [ ]Rehab [ ]Other    ADVANCE DIRECTIVES:    DNR/MOLST  [ ]  Living Will  [ ]   DECISION MAKER(s):  [ ] Health Care Proxy(s)  [x ] Surrogate(s)  [ ] Guardian           Name(s): Phone Number(s):  Farhat Quintana [phone]  BASELINE (I)ADL(s) (prior to admission):  [place]: [x ]Total  [ ] Moderate [ ]Dependent    Allergies    doxycycline (Angioedema)  penicillin (Unknown)  clindamycin (Angioedema)  linezolid (Angioedema)    Intolerances    MEDICATIONS  (STANDING):  albuterol/ipratropium for Nebulization 3 milliLiter(s) Nebulizer every 6 hours  artificial tears (preservative free) Ophthalmic Solution 1 Drop(s) Both EYES four times a day  buMETAnide Injectable 2 milliGRAM(s) IV Push once  chlorhexidine 0.12% Liquid 15 milliLiter(s) Oral Mucosa every 12 hours  chlorhexidine 2% Cloths 1 Application(s) Topical daily  clonazePAM  Tablet 0.5 milliGRAM(s) Oral at bedtime  dexMEDEtomidine Infusion 0.2 MICROgram(s)/kG/Hr (4.18 mL/Hr) IV Continuous <Continuous>  dextrose 50% Injectable 50 milliLiter(s) IV Push every 15 minutes  dextrose 50% Injectable 25 milliLiter(s) IV Push every 15 minutes  DOBUTamine Infusion 5 MICROgram(s)/kG/Min (12.5 mL/Hr) IV Continuous <Continuous>  EPINEPHrine    Infusion 0.01 MICROgram(s)/kG/Min (3.14 mL/Hr) IV Continuous <Continuous>  heparin  Infusion 850 Unit(s)/Hr (8.5 mL/Hr) IV Continuous <Continuous>  insulin regular Infusion 1 Unit(s)/Hr (1 mL/Hr) IV Continuous <Continuous>  meropenem  IVPB 1000 milliGRAM(s) IV Intermittent every 8 hours  mupirocin 2% Nasal 1 Application(s) Both Nostrils two times a day  nitroprusside Infusion 0.3 MICROgram(s)/kG/Min (3.76 mL/Hr) IV Continuous <Continuous>  pantoprazole  Injectable 40 milliGRAM(s) IV Push every 12 hours  polyethylene glycol 3350 17 Gram(s) Oral daily  potassium chloride  10 mEq/100 mL IVPB 10 milliEquivalent(s) IV Intermittent every 1 hour  senna 2 Tablet(s) Oral at bedtime  sodium chloride 0.9% lock flush 3 milliLiter(s) IV Push every 8 hours  sodium chloride 0.9%. 1000 milliLiter(s) (10 mL/Hr) IV Continuous <Continuous>  vancomycin  IVPB 750 milliGRAM(s) IV Intermittent every 12 hours  vasopressin Infusion 0.04 Unit(s)/Min (6 mL/Hr) IV Continuous <Continuous>    MEDICATIONS  (PRN):      ITEMS NOT CHECKED ARE NOT PRESENT  PRESENT SYMPTOMS: [ x]Unable to self-report see CPOT, PAINADs, RDOS  Source if other than patient:  [ ]Family   [ ]Team     Pain: [ ]yes [ ]no  QOL impact -   Location -                    Aggravating factors -  Quality -  Radiation -  Timing-  Severity (0-10 scale):  Minimal acceptable level (0-10 scale):       Dyspnea:                           [ ]Mild [ ]Moderate [ ]Severe  Anxiety:                             [ ]Mild [ ]Moderate [ ]Severe  Fatigue:                             [ ]Mild [ ]Moderate [ ]Severe  Nausea:                             [ ]Mild [ ]Moderate [ ]Severe  Loss of appetite:              [ ]Mild [ ]Moderate [ ]Severe  Constipation:                    [ ]Mild [ ]Moderate [ ]Severe    PCSSQ [Palliative Care Spiritual Screening Question]   Severity (0-10):  Score of 4 or > indicate consideration of Chaplaincy referral.  Chaplaincy Referral: [ ] yes [ ] refused [ ] following [x ] deferred    Caregiver [place]? : [ ] yes [ ] no [x ] deferred:  Social work referral [ ] Patient & Family Centered Care Referral [ ]     Anticipatory Grief Present?: [ ] yes [ ] no  [x ] deferred: Palliative Social work referral [ ]  Patient & Family Centered Care Referral [ ]       Other Symptoms:  [ ]All other review of systems negative   [ x] Unable to obtain due to poor mentation    PHYSICAL EXAM:  Vital Signs Last 24 Hrs  T(C): 37.4 (2025 16:00), Max: 37.5 (2025 20:00)  T(F): 99.3 (2025 16:00), Max: 99.5 (2025 20:00)  HR: 102 (2025 16:00) (72 - 124)  BP: --  BP(mean): --  RR: 14 (2025 16:00) (5 - 29)  SpO2: 99% (2025 16:00) (86% - 100%)    Parameters below as of 2025 12:47  Patient On (Oxygen Delivery Method): ventilator     I&O's Summary    2025 07:01  -  2025 07:00  --------------------------------------------------------  IN: 2930.4 mL / OUT: 1642 mL / NET: 1288.4 mL    2025 07:01  -  2025 16:16  --------------------------------------------------------  IN: 1051.1 mL / OUT: 1895 mL / NET: -843.9 mL        GENERAL:  Intubated and sedated  [ ]Alert  []Oriented x 3  [ ]Lethargic  [ ]Cachexia  [ ]Unarousable  [ ]Verbal  [ ]Non-Verbal  Behavioral:   [ ]Anxiety  [ ]Delirium [ ]Agitation [ ]Other  HEENT:  [x]Normal   [ ]Dry mouth   [x ]ET Tube/Trach  [ ]Oral lesions  PULMONARY:   [x]Clear [ ]Tachypnea  [ ]Audible excessive secretions   [ ]Rhonchi        [ ]Right [ ]Left [ ]Bilateral  [ ]Crackles        [ ]Right [ ]Left [ ]Bilateral  [ ]Wheezing     [ ]Right [ ]Left [ ]Bilateral  [ ]Diminished BS [ ] Right [ ]Left [ ]Bilateral  CARDIOVASCULAR:    +VA ECMO  [ ]Regular [ ]Irregular [ ]Tachy  [ ]Marek [ ]Murmur [ ]Other  GASTROINTESTINAL:  [x]Soft  [ ]Distended   [x]+BS  [x]Non tender [ ]Tender  [ ]PEG [ ]OGT/ NGT   Last BM:    GENITOURINARY:  []Normal [ ]Incontinent   [ ]Oliguria/Anuria   [x ]Costa  MUSCULOSKELETAL:   [ ]Normal   [x]Weakness  [ ]Bed/Wheelchair bound [ ]Edema  NEUROLOGIC:   [ ]No focal deficits  [ ] Cognitive impairment  [ ] Dysphagia [ ]Dysarthria [ ] Paresis [ ]Other   SKIN:   [x]Normal  [ ]Rash   [ ]Pressure ulcer(s) [ ]y [ ]n present on admission    CRITICAL CARE:  [ ] Shock Present  [ ]Septic [ ]Cardiogenic [ ]Neurologic [ ]Hypovolemic  [ ]  Vasopressors [ ]  Inotropes   [x ]Respiratory failure present [x ]Mechanical ventilation [ ]Non-invasive ventilatory support [ ]High flow  Mode: AC/ CMV (Assist Control/ Continuous Mandatory Ventilation), RR (machine): 14, TV (machine): 420, FiO2: 40, PEEP: 10, ITime: 1.2, MAP: 13, PC: 10, PIP: 20  [ ]Acute  [ ]Chronic [ ]Hypoxic  [ ]Hypercarbic [ ]Other  [ ]Other organ failure     LABS:                        9.7    12.23 )-----------( 71       ( 2025 14:26 )             27.6       143  |  105  |  32[H]  ----------------------------<  99  3.6   |  25  |  1.00    Ca    8.7      2025 00:31  Phos  3.1       Mg     2.6         TPro  5.5[L]  /  Alb  2.8[L]  /  TBili  4.0[H]  /  DBili  x   /  AST  91[H]  /  ALT  84[H]  /  AlkPhos  127[H]    PT/INR - ( 2025 00:30 )   PT: 12.1 sec;   INR: 1.05 ratio         PTT - ( 2025 00:30 )  PTT:39.7 sec    Urinalysis Basic - ( 2025 00:31 )    Color: x / Appearance: x / SG: x / pH: x  Gluc: 99 mg/dL / Ketone: x  / Bili: x / Urobili: x   Blood: x / Protein: x / Nitrite: x   Leuk Esterase: x / RBC: x / WBC x   Sq Epi: x / Non Sq Epi: x / Bacteria: x      RADIOLOGY & ADDITIONAL STUDIES:  < from: Xray Chest 1 View- PORTABLE-Routine (25 @ 02:38) >  IMPRESSION:    Hazy opacity at the right lung base which may represent layering effusion   or atelectasis    --- End of Report ---            TAJ OTT MD; Attending Radiologist  This document has been electronically signed.   2:08PM    < end of copied text >      PROTEIN CALORIE MALNUTRITION PRESENT: [ ]mild [ ]moderate [ ]severe [ ]underweight [ ]morbid obesity  https://www.andeal.org/vault/2440/web/files/ONC/Table_Clinical%20Characteristics%20to%20Document%20Malnutrition-White%20JV%20et%20al%2020.pdf    Height (cm): 162 (25 @ 20:47)  Weight (kg): 83.6 (25 @ 20:47), 83.6 (25 @ 13:35)  BMI (kg/m2): 31.9 (25 @ 20:47), 31.9 (25 @ 20:47)    [ ]PPSV2 < or = to 30% [ ]significant weight loss  [ ]poor nutritional intake  [ ]anasarca[ ]Artificial Nutrition      Other REFERRALS:  [ ]Hospice  [ ]Child Life  [ ]Social Work  [ ]Case management [ ]Holistic Therapy

## 2025-01-06 NOTE — DIETITIAN INITIAL EVALUATION ADULT - PERTINENT LABORATORY DATA
01-06    143  |  105  |  32[H]  ----------------------------<  99  3.6   |  25  |  1.00    Ca    8.7      06 Jan 2025 00:31  Phos  3.1     01-06  Mg     2.6     01-06    TPro  5.5[L]  /  Alb  2.8[L]  /  TBili  4.0[H]  /  DBili  x   /  AST  91[H]  /  ALT  84[H]  /  AlkPhos  127[H]  01-06    POCT Blood Glucose.: 127 mg/dL (01-06-25 @ 09:04)  POCT Blood Glucose.: 141 mg/dL (01-06-25 @ 08:00)  POCT Blood Glucose.: 78 mg/dL (01-06-25 @ 06:40)  POCT Blood Glucose.: 112 mg/dL (01-06-25 @ 06:07)  POCT Blood Glucose.: 111 mg/dL (01-06-25 @ 05:11)  POCT Blood Glucose.: 130 mg/dL (01-06-25 @ 03:49)  POCT Blood Glucose.: 118 mg/dL (01-06-25 @ 02:56)  POCT Blood Glucose.: 119 mg/dL (01-06-25 @ 01:48)  POCT Blood Glucose.: 108 mg/dL (01-06-25 @ 00:47)  POCT Blood Glucose.: 113 mg/dL (01-05-25 @ 23:47)  POCT Blood Glucose.: 110 mg/dL (01-05-25 @ 22:46)  POCT Blood Glucose.: 166 mg/dL (01-05-25 @ 21:49)  POCT Blood Glucose.: 145 mg/dL (01-05-25 @ 20:11)  POCT Blood Glucose.: 147 mg/dL (01-05-25 @ 18:04)  POCT Blood Glucose.: 148 mg/dL (01-05-25 @ 16:05)  POCT Blood Glucose.: 142 mg/dL (01-05-25 @ 14:00)  POCT Blood Glucose.: 135 mg/dL (01-05-25 @ 12:12)  A1C with Estimated Average Glucose Result: 4.6 % (01-03-25 @ 21:34)

## 2025-01-06 NOTE — DIETITIAN INITIAL EVALUATION ADULT - PERTINENT MEDS FT
MEDICATIONS  (STANDING):  albuterol/ipratropium for Nebulization 3 milliLiter(s) Nebulizer every 6 hours  artificial tears (preservative free) Ophthalmic Solution 1 Drop(s) Both EYES four times a day  chlorhexidine 0.12% Liquid 15 milliLiter(s) Oral Mucosa every 12 hours  chlorhexidine 2% Cloths 1 Application(s) Topical daily  clonazePAM  Tablet 0.5 milliGRAM(s) Oral at bedtime  dexMEDEtomidine Infusion 0.2 MICROgram(s)/kG/Hr (4.18 mL/Hr) IV Continuous <Continuous>  dextrose 50% Injectable 50 milliLiter(s) IV Push every 15 minutes  dextrose 50% Injectable 25 milliLiter(s) IV Push every 15 minutes  DOBUTamine Infusion 5 MICROgram(s)/kG/Min (12.5 mL/Hr) IV Continuous <Continuous>  EPINEPHrine    Infusion 0.01 MICROgram(s)/kG/Min (3.14 mL/Hr) IV Continuous <Continuous>  heparin  Infusion 850 Unit(s)/Hr (8.5 mL/Hr) IV Continuous <Continuous>  insulin regular Infusion 1 Unit(s)/Hr (1 mL/Hr) IV Continuous <Continuous>  meropenem  IVPB 1000 milliGRAM(s) IV Intermittent every 8 hours  mupirocin 2% Nasal 1 Application(s) Both Nostrils two times a day  pantoprazole  Injectable 40 milliGRAM(s) IV Push every 12 hours  polyethylene glycol 3350 17 Gram(s) Oral daily  senna 2 Tablet(s) Oral at bedtime  sodium chloride 0.9% lock flush 3 milliLiter(s) IV Push every 8 hours  sodium chloride 0.9%. 1000 milliLiter(s) (10 mL/Hr) IV Continuous <Continuous>  vancomycin  IVPB 750 milliGRAM(s) IV Intermittent every 12 hours  vasopressin Infusion 0.04 Unit(s)/Min (6 mL/Hr) IV Continuous <Continuous>

## 2025-01-06 NOTE — DIETITIAN INITIAL EVALUATION ADULT - OTHER CALCULATIONS
Yimi state equation: 1512 kcal.  Defer fluid needs to team.  Estimated nutrient needs based on IBW 120lb/54.4kg with consideration for BMI

## 2025-01-06 NOTE — DIETITIAN INITIAL EVALUATION ADULT - NSFNSGIIOFT_GEN_A_CORE
No documented BMs since admit.   01-05-25 @ 07:01  -  01-06-25 @ 07:00  --------------------------------------------------------  OUT:  Total OUT: 0 mL    Total NET: 440 mL      01-06-25 @ 07:01  -  01-06-25 @ 10:45  --------------------------------------------------------  OUT:  Total OUT: 0 mL    Total NET: 60 mL

## 2025-01-06 NOTE — DIETITIAN INITIAL EVALUATION ADULT - NS FNS DIET ORDER
Diet, NPO with Tube Feed:   Tube Feeding Modality: Orogastric  Vital 1.5 [place] (VITAL1.5RTH)  Total Volume for 24 Hours (mL): 480  Continuous  Starting Tube Feed Rate {mL per Hour}: 20  Increase Tube Feed Rate by (mL): 0  Until Goal Tube Feed Rate (mL per Hour): 20  Tube Feed Duration (in Hours): 24  Tube Feed Start Time: 10:15 (01-05-25 @ 10:08) [Active]

## 2025-01-06 NOTE — PROGRESS NOTE ADULT - ASSESSMENT
59F PMH Sickle cell disease (on hydroxyurea), HTN, HFimpEF/NICM (EF 58% 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 Pt initially presented to [facility] for SOB and SS crisis; course c/b witnessed seizure, intubated and sedated, and transferred to [facility] for further management. Keppra was discontinued at [facility] due to negative EEG. At [facility] MICU pt was found to have RV failure possibly iso pulm HTN 2/2 increased tidal volumes on the ventilator, possibly due to volume overload due to IVF iso SS crisis.     Pt is in profound cardiogenic shock. Pt has been decompensating, despite Dobutamine gtt, Bumex gtt, Levophed, and addition of Vasopressin. Vasopressors requirements have gone up. NS shock team was called and pt transferred to NS for further management. Patient was cannulated on peripheral VA ECMO.                                                                                                            # Hb SC disease  - patient with 1-2 sickle pain crisis per year and on hydrea, had retinal detachment s/p repair                           - patient had f/up with cardio in Sept 2024: per the note, unclear etiology of her dyspnea but possibly driven by cardiomyopathy; low suspicion of pulmonary HTN as no RV dysfunction/severe TR.  She is found to have new RV failure, requiring ECMO  - Blood bank transfusion medicine team consulted for emergent RBC exchange given critically ill with multi-organ failure  - Retic count elevated to 11.6%, LDH 1000s, bili 3.9. CXR was clear not indicative of acute chest. Smear (prior to exchange) was reviewed: Hypochromic RBCs with normochromic RBC reflecting transfused blood. Severeal nucleated RBC suggesting stressed bone marrow. Very few target cells and rare sickle cell. No schistocytes to suggest hemolysis.    - S/p exchange 1/4/2024    Recommendations  - pending hemoglobin electrophoresis   - Repeat labs, retic, LDH, Hgb with improvement. Bili is 3.9, stable. Labs and smear does not seem to indicate hemolysis to suggest sickle cell crisis  - Thrombocytopenia from acute illness and ECMO. Given patient is on heparin gtt for ECOM, recommend 1U plt transfusion to maintain plt > 50k   - Hold hydroxyurea for now while thrombocytopenic        **INCOMPLETE NOTE***     59F PMH Sickle cell disease (on hydroxyurea), HTN, HFimpEF/NICM (EF 58% 10/2024) presenting as a transfer from [facility]. Pt initially presented to [facility] for SOB and SS crisis; course c/b witnessed seizure, intubated and sedated, and transferred to [facility] for further management. Keppra was discontinued at [facility] due to negative EEG. At [facility] MICU pt was found to have RV failure possibly iso pulm HTN 2/2 increased tidal volumes on the ventilator, possibly due to volume overload due to IVF iso SS crisis.     Pt is in profound cardiogenic shock. Pt has been decompensating, despite Dobutamine gtt, Bumex gtt, Levophed, and addition of Vasopressin. Vasopressors requirements have gone up. NS shock team was called and pt transferred to NS for further management. Patient was cannulated on peripheral VA ECMO.                                                                                                            #Hb SC disease  #Cardiogenic shock on ECMO  - patient with 1-2 sickle pain crisis per year and on hydrea, had retinal detachment s/p repair                           - patient had f/up with cardio in Sept 2024: per the note, unclear etiology of her dyspnea but possibly driven by cardiomyopathy; low suspicion of pulmonary HTN as no RV dysfunction/severe TR.  She is found to have new RV failure, requiring ECMO  - Blood bank transfusion medicine team consulted for emergent RBC exchange given critically ill with multi-organ failure  - Retic count elevated to 11.6%, LDH 1000s, bili 3.9. CXR was clear not indicative of acute chest. Smear (prior to exchange) was reviewed: Hypochromic RBCs with normochromic RBC reflecting transfused blood. Several nucleated RBC suggesting stressed bone marrow. Very few target cells and rare sickle cell. No schistocytes to suggest hemolysis.    - S/p exchange 1/4/2024    Recommendations  - pending hemoglobin electrophoresis, no plan for repeat exchange today; will follow up results of hemoglobin electrophoresis post red cell exchange 1/4/24  - Repeat hemolysis labs, (CBC, CMP, LDH, reticulocyte counts)   - Thrombocytopenia from acute illness and ECMO. Given patient is on heparin gtt for ECOM, recommend transfusion to maintain plt > 50k   - Discuss with blood bank and heme team prior to RBC transfusions to minimize risk of antibody development/transfusion reactions, aim to keep hb >7-8  - Agree with holding hydroxyurea for now while thrombocytopenic   - Hematology team to follow    Note not finalized until signed by attending.   Please do not hesitate to page with questions.     Veronica Haas MD  Hematology/Oncology Fellow PGY5  Available on [employer] Teams   Pager: [phone]  For weekends and evenings (5 pm - 8 am), please page fellow on call.

## 2025-01-06 NOTE — PROGRESS NOTE ADULT - SUBJECTIVE AND OBJECTIVE BOX
Patient seen and examined at the bedside.    Remained critically ill on continuous ICU monitoring.    OBJECTIVE:  Vital Signs Last 24 Hrs  T(C): 37.1 (06 Jan 2025 18:00), Max: 37.5 (05 Jan 2025 20:00)  T(F): 98.8 (06 Jan 2025 18:00), Max: 99.5 (05 Jan 2025 20:00)  HR: 108 (06 Jan 2025 19:15) (72 - 123)  BP: --  BP(mean): --  RR: 15 (06 Jan 2025 19:15) (5 - 30)  SpO2: 100% (06 Jan 2025 19:15) (86% - 100%)    Parameters below as of 06 Jan 2025 20:00  Patient On (Oxygen Delivery Method): ventilator    O2 Concentration (%): 40      Physical Exam:   General: Obese female, breathing in sync with the ventilator  Neurology: Pupils 2.5 mm reactive, Withdrawing to noxious stimuli in all extremities, +cough, +gag  Respiratory: Breath sounds bilaterally   CV: Sinus Tachycardia   VA ECMO 3000 rpms, flow of 2.3, sweep of 1   Abdominal: Soft, Nontender  Extremities: Warm, well-perfused  Costa      Assessment:  60 yo F w/ PMHx of Sickle cell disease (on hydroxyurea), HTN, HFimpEF/NICM (EF 58% 10/2024) presenting as a transfer from [facility]. Pt initially presented to [facility] for SOB and SS crisis; course c/b witnessed seizure, intubated and sedated, and transferred to [facility] for further management. Now in cardiogenic shock, transferred to [facility] for further management. Cannulated for VA ECMO on 1/4/25.    Cardiogenic shock  Acute respiratory failure  Acute blood loss anemia  Thrombocytopenia  Hyperglycemia        Plan:   ***Neuro***  [x] Sedated with  [x] Precedex   Post operative neuro assessment   On clonazepam     ***Cardiovascular***  Invasive hemodynamic monitoring, assess perfusion indices   SR / CVP 2 / MAP 96/ PAP 14 / Hct 27.6 / Lactate 1.3  [x] Dobutamine 5 mcg/kg/min  [x] Nipride 0.3 mcg/kg/min   [x] Epinephrine 0.01 mcg/kg/min  [x] Vasopressin 0.04 units/min   [x] Heparin gtt for AC therapy   [x] VA ECMO 3300 rpms, flow of 2.3, sweep of 1   Reassessment of hemodynamics post resuscitation   Monitor chest tube outputs  Serial EKG and cardiac enzymes     ***Pulmonary***  Post op vent management   Titration of FiO2 and PEEP, follow SpO2, CXR, blood gasses     Mode: AC/ CMV (Assist Control/ Continuous Mandatory Ventilation)  RR (machine): 14  TV (machine): 420  FiO2: 40  PEEP: 10  ITime: 1.2  MAP: 13  PC: 10  PIP: 20              ***GI***  [x] Diet: On TF's   [x] Protonix   Bowel regimen with Miralax and Senna     ***Renal***  On Bumex bid for diuresis   Continue to monitor I/Os, BUN/Creatinine.   Replete lytes PRN  Costa present     ***ID***  MSSA bacteremia, BAL + MSSA  On vancomycin  On empiric meropenum  Trend leukocytosis      ***Endocrine***  [x] Stress Hyperglycemia : HbA1c  4.6%                - [x] Insulin gtt             - Need tight glycemic control to prevent wound infection.      Patient requires continuous monitoring with bedside rhythm monitoring, pulse oximetry monitoring, and continuous central venous and arterial pressure monitoring; and intermittent blood gas analysis. Care plan discussed with the ICU care team.   Patient remained critical, at risk for life threatening decompensation.    I have spent 55 minutes providing critical care management to this patient.    By signing my name below, I, Gabriella Lopez, attest that this documentation has been prepared under the direction and in the presence of Conor Pressley NP.   Electronically signed: Omar Anton, 01-06-25 @ 19:29    I, Conor Pressley , personally performed the services described in this documentation. all medical record entries made by the blasibe were at my direction and in my presence. I have reviewed the chart and agree that the record reflects my personal performance and is accurate and complete  Electronically signed: Conor Pressley NP

## 2025-01-07 ENCOUNTER — APPOINTMENT (OUTPATIENT)
Dept: CARDIOTHORACIC SURGERY | Facility: HOSPITAL | Age: 60
End: 2025-01-07

## 2025-01-07 ENCOUNTER — RESULT REVIEW (OUTPATIENT)
Age: 60
End: 2025-01-07

## 2025-01-07 ENCOUNTER — TRANSCRIPTION ENCOUNTER (OUTPATIENT)
Age: 60
End: 2025-01-07

## 2025-01-07 LAB
ALBUMIN SERPL ELPH-MCNC: 2.5 G/DL — LOW (ref 3.3–5)
ALBUMIN SERPL ELPH-MCNC: 2.5 G/DL — LOW (ref 3.3–5)
ALP SERPL-CCNC: 112 U/L — SIGNIFICANT CHANGE UP (ref 40–120)
ALP SERPL-CCNC: 132 U/L — HIGH (ref 40–120)
ALT FLD-CCNC: 105 U/L — HIGH (ref 10–45)
ALT FLD-CCNC: 79 U/L — HIGH (ref 10–45)
ANION GAP SERPL CALC-SCNC: 11 MMOL/L — SIGNIFICANT CHANGE UP (ref 5–17)
ANION GAP SERPL CALC-SCNC: 12 MMOL/L — SIGNIFICANT CHANGE UP (ref 5–17)
ANISOCYTOSIS BLD QL: SLIGHT — SIGNIFICANT CHANGE UP
APTT BLD: 34.8 SEC — SIGNIFICANT CHANGE UP (ref 24.5–35.6)
APTT BLD: 40.7 SEC — HIGH (ref 24.5–35.6)
AST SERPL-CCNC: 60 U/L — HIGH (ref 10–40)
AST SERPL-CCNC: 90 U/L — HIGH (ref 10–40)
BASE EXCESS BLDMV CALC-SCNC: 4.2 MMOL/L — HIGH (ref -3–3)
BASE EXCESS BLDMV CALC-SCNC: 6.6 MMOL/L — HIGH (ref -3–3)
BASE EXCESS BLDMV CALC-SCNC: 7.1 MMOL/L — HIGH (ref -3–3)
BASE EXCESS BLDMV CALC-SCNC: 9.4 MMOL/L — HIGH (ref -3–3)
BASOPHILS # BLD AUTO: 0.01 K/UL — SIGNIFICANT CHANGE UP (ref 0–0.2)
BASOPHILS NFR BLD AUTO: 0.1 % — SIGNIFICANT CHANGE UP (ref 0–2)
BILIRUB SERPL-MCNC: 2.2 MG/DL — HIGH (ref 0.2–1.2)
BILIRUB SERPL-MCNC: 2.3 MG/DL — HIGH (ref 0.2–1.2)
BLOOD GAS ECMO POST MEMBRANE - ARTERIAL RESULT: SIGNIFICANT CHANGE UP
BLOOD GAS ECMO POST MEMBRANE - ARTERIAL RESULT: SIGNIFICANT CHANGE UP
BUN SERPL-MCNC: 30 MG/DL — HIGH (ref 7–23)
BUN SERPL-MCNC: 30 MG/DL — HIGH (ref 7–23)
BURR CELLS BLD QL SMEAR: PRESENT — SIGNIFICANT CHANGE UP
CALCIUM SERPL-MCNC: 8.5 MG/DL — SIGNIFICANT CHANGE UP (ref 8.4–10.5)
CALCIUM SERPL-MCNC: 8.6 MG/DL — SIGNIFICANT CHANGE UP (ref 8.4–10.5)
CHLORIDE SERPL-SCNC: 105 MMOL/L — SIGNIFICANT CHANGE UP (ref 96–108)
CHLORIDE SERPL-SCNC: 106 MMOL/L — SIGNIFICANT CHANGE UP (ref 96–108)
CK SERPL-CCNC: 925 U/L — HIGH (ref 25–170)
CO2 BLDMV-SCNC: 31 MMOL/L — HIGH (ref 21–29)
CO2 BLDMV-SCNC: 33 MMOL/L — HIGH (ref 21–29)
CO2 BLDMV-SCNC: 33 MMOL/L — HIGH (ref 21–29)
CO2 BLDMV-SCNC: 37 MMOL/L — HIGH (ref 21–29)
CO2 SERPL-SCNC: 27 MMOL/L — SIGNIFICANT CHANGE UP (ref 22–31)
CO2 SERPL-SCNC: 28 MMOL/L — SIGNIFICANT CHANGE UP (ref 22–31)
CREAT SERPL-MCNC: 0.89 MG/DL — SIGNIFICANT CHANGE UP (ref 0.5–1.3)
CREAT SERPL-MCNC: 0.92 MG/DL — SIGNIFICANT CHANGE UP (ref 0.5–1.3)
CULTURE RESULTS: ABNORMAL
CULTURE RESULTS: NO GROWTH — SIGNIFICANT CHANGE UP
CV B1 AB TITR FLD: NEGATIVE — SIGNIFICANT CHANGE UP
CV B2 AB TITR FLD: NEGATIVE — SIGNIFICANT CHANGE UP
CV B3 AB TITR FLD: NEGATIVE — SIGNIFICANT CHANGE UP
CV B4 AB TITR FLD: HIGH
CV B5 AB TITR FLD: HIGH
CV B6 AB TITR FLD: NEGATIVE — SIGNIFICANT CHANGE UP
EGFR: 73 ML/MIN/1.73M2 — SIGNIFICANT CHANGE UP
EGFR: 76 ML/MIN/1.73M2 — SIGNIFICANT CHANGE UP
ELLIPTOCYTES BLD QL SMEAR: SLIGHT — SIGNIFICANT CHANGE UP
EOSINOPHIL # BLD AUTO: 0.04 K/UL — SIGNIFICANT CHANGE UP (ref 0–0.5)
EOSINOPHIL NFR BLD AUTO: 0.3 % — SIGNIFICANT CHANGE UP (ref 0–6)
FIBRINOGEN PPP-MCNC: 565 MG/DL — HIGH (ref 200–445)
FIBRINOGEN PPP-MCNC: 702 MG/DL — HIGH (ref 200–445)
GAS PNL BLDA: SIGNIFICANT CHANGE UP
GAS PNL BLDMV: SIGNIFICANT CHANGE UP
GAS PNL BLDV: SIGNIFICANT CHANGE UP
GIANT PLATELETS BLD QL SMEAR: PRESENT — SIGNIFICANT CHANGE UP
GLUCOSE BLDC GLUCOMTR-MCNC: 104 MG/DL — HIGH (ref 70–99)
GLUCOSE BLDC GLUCOMTR-MCNC: 111 MG/DL — HIGH (ref 70–99)
GLUCOSE BLDC GLUCOMTR-MCNC: 112 MG/DL — HIGH (ref 70–99)
GLUCOSE BLDC GLUCOMTR-MCNC: 117 MG/DL — HIGH (ref 70–99)
GLUCOSE BLDC GLUCOMTR-MCNC: 121 MG/DL — HIGH (ref 70–99)
GLUCOSE BLDC GLUCOMTR-MCNC: 124 MG/DL — HIGH (ref 70–99)
GLUCOSE BLDC GLUCOMTR-MCNC: 125 MG/DL — HIGH (ref 70–99)
GLUCOSE BLDC GLUCOMTR-MCNC: 125 MG/DL — HIGH (ref 70–99)
GLUCOSE BLDC GLUCOMTR-MCNC: 129 MG/DL — HIGH (ref 70–99)
GLUCOSE BLDC GLUCOMTR-MCNC: 131 MG/DL — HIGH (ref 70–99)
GLUCOSE BLDC GLUCOMTR-MCNC: 134 MG/DL — HIGH (ref 70–99)
GLUCOSE BLDC GLUCOMTR-MCNC: 141 MG/DL — HIGH (ref 70–99)
GLUCOSE BLDC GLUCOMTR-MCNC: 142 MG/DL — HIGH (ref 70–99)
GLUCOSE BLDC GLUCOMTR-MCNC: 142 MG/DL — HIGH (ref 70–99)
GLUCOSE BLDC GLUCOMTR-MCNC: 148 MG/DL — HIGH (ref 70–99)
GLUCOSE BLDC GLUCOMTR-MCNC: 158 MG/DL — HIGH (ref 70–99)
GLUCOSE BLDC GLUCOMTR-MCNC: 158 MG/DL — HIGH (ref 70–99)
GLUCOSE BLDC GLUCOMTR-MCNC: 91 MG/DL — SIGNIFICANT CHANGE UP (ref 70–99)
GLUCOSE SERPL-MCNC: 116 MG/DL — HIGH (ref 70–99)
GLUCOSE SERPL-MCNC: 145 MG/DL — HIGH (ref 70–99)
HAPTOGLOB SERPL-MCNC: 157 MG/DL — SIGNIFICANT CHANGE UP (ref 34–200)
HCO3 BLDMV-SCNC: 29 MMOL/L — HIGH (ref 20–28)
HCO3 BLDMV-SCNC: 32 MMOL/L — HIGH (ref 20–28)
HCO3 BLDMV-SCNC: 32 MMOL/L — HIGH (ref 20–28)
HCO3 BLDMV-SCNC: 35 MMOL/L — HIGH (ref 20–28)
HCT VFR BLD CALC: 22.6 % — LOW (ref 34.5–45)
HCT VFR BLD CALC: 25.7 % — LOW (ref 34.5–45)
HEMOGLOBIN INTERPRETATION: SIGNIFICANT CHANGE UP
HEMOGLOBIN INTERPRETATION: SIGNIFICANT CHANGE UP
HEPARINASE TEG R TIME: 8.5 MIN — HIGH (ref 4.3–8.3)
HGB A MFR BLD: 34.2 % — LOW (ref 95.8–98)
HGB A MFR BLD: 83.4 % — LOW (ref 95.8–98)
HGB A2 MFR BLD: 2.9 % — SIGNIFICANT CHANGE UP (ref 2–3.2)
HGB A2 MFR BLD: 3.7 % — HIGH (ref 2–3.2)
HGB BLD-MCNC: 7.9 G/DL — LOW (ref 11.5–15.5)
HGB BLD-MCNC: 9 G/DL — LOW (ref 11.5–15.5)
HGB C MFR BLD: 28.9 % — HIGH
HGB C MFR BLD: 6.6 % — HIGH
HGB F MFR BLD: 0.5 % — SIGNIFICANT CHANGE UP (ref 0–1)
HGB S MFR BLD: 32.7 % — HIGH
HGB S MFR BLD: 7.1 % — HIGH
HOROWITZ INDEX BLDMV+IHG-RTO: 40 — SIGNIFICANT CHANGE UP
HOROWITZ INDEX BLDMV+IHG-RTO: 50 — SIGNIFICANT CHANGE UP
IMM GRANULOCYTES NFR BLD AUTO: 9.3 % — HIGH (ref 0–0.9)
INR BLD: 0.97 RATIO — SIGNIFICANT CHANGE UP (ref 0.85–1.16)
INR BLD: 1.04 RATIO — SIGNIFICANT CHANGE UP (ref 0.85–1.16)
LDH SERPL L TO P-CCNC: 828 U/L — HIGH (ref 50–242)
LYMPHOCYTES # BLD AUTO: 0.45 K/UL — LOW (ref 1–3.3)
LYMPHOCYTES # BLD AUTO: 3.6 % — LOW (ref 13–44)
MACROCYTES BLD QL: SLIGHT — SIGNIFICANT CHANGE UP
MAGNESIUM SERPL-MCNC: 2.3 MG/DL — SIGNIFICANT CHANGE UP (ref 1.6–2.6)
MANUAL SMEAR VERIFICATION: SIGNIFICANT CHANGE UP
MCHC RBC-ENTMCNC: 28.8 PG — SIGNIFICANT CHANGE UP (ref 27–34)
MCHC RBC-ENTMCNC: 29 PG — SIGNIFICANT CHANGE UP (ref 27–34)
MCHC RBC-ENTMCNC: 35 G/DL — SIGNIFICANT CHANGE UP (ref 32–36)
MCHC RBC-ENTMCNC: 35 G/DL — SIGNIFICANT CHANGE UP (ref 32–36)
MCV RBC AUTO: 82.5 FL — SIGNIFICANT CHANGE UP (ref 80–100)
MCV RBC AUTO: 82.9 FL — SIGNIFICANT CHANGE UP (ref 80–100)
METAMYELOCYTES # FLD: 2.6 % — HIGH (ref 0–0)
METAMYELOCYTES NFR BLD: 2.6 % — HIGH (ref 0–0)
MICROCYTES BLD QL: SLIGHT — SIGNIFICANT CHANGE UP
MONOCYTES # BLD AUTO: 1.06 K/UL — HIGH (ref 0–0.9)
MONOCYTES NFR BLD AUTO: 8.6 % — SIGNIFICANT CHANGE UP (ref 2–14)
MYELOCYTES NFR BLD: 3.5 % — HIGH (ref 0–0)
NEUTROPHILS # BLD AUTO: 9.68 K/UL — HIGH (ref 1.8–7.4)
NEUTROPHILS NFR BLD AUTO: 78.1 % — HIGH (ref 43–77)
NEUTS BAND # BLD: 3.5 % — SIGNIFICANT CHANGE UP (ref 0–8)
NEUTS BAND NFR BLD: 3.5 % — SIGNIFICANT CHANGE UP (ref 0–8)
NRBC # BLD: 19 /100 WBCS — HIGH (ref 0–0)
NRBC # BLD: 32 /100 WBCS — HIGH (ref 0–0)
NRBC # BLD: 52 /100 WBCS — HIGH (ref 0–0)
NRBC BLD-RTO: 19 /100 WBCS — HIGH (ref 0–0)
NRBC BLD-RTO: 32 /100 WBCS — HIGH (ref 0–0)
NRBC BLD-RTO: 52 /100 WBCS — HIGH (ref 0–0)
O2 CT VFR BLD CALC: 44 MMHG — SIGNIFICANT CHANGE UP (ref 30–65)
O2 CT VFR BLD CALC: 45 MMHG — SIGNIFICANT CHANGE UP (ref 30–65)
ORGANISM # SPEC MICROSCOPIC CNT: ABNORMAL
ORGANISM # SPEC MICROSCOPIC CNT: ABNORMAL
OVALOCYTES BLD QL SMEAR: SLIGHT — SIGNIFICANT CHANGE UP
PCO2 BLDMV: 44 MMHG — SIGNIFICANT CHANGE UP (ref 30–65)
PCO2 BLDMV: 45 MMHG — SIGNIFICANT CHANGE UP (ref 30–65)
PCO2 BLDMV: 47 MMHG — SIGNIFICANT CHANGE UP (ref 30–65)
PCO2 BLDMV: 52 MMHG — SIGNIFICANT CHANGE UP (ref 30–65)
PH BLDMV: 7.43 — SIGNIFICANT CHANGE UP (ref 7.32–7.45)
PH BLDMV: 7.44 — SIGNIFICANT CHANGE UP (ref 7.32–7.45)
PH BLDMV: 7.44 — SIGNIFICANT CHANGE UP (ref 7.32–7.45)
PH BLDMV: 7.46 — HIGH (ref 7.32–7.45)
PHOSPHATE SERPL-MCNC: 3.1 MG/DL — SIGNIFICANT CHANGE UP (ref 2.5–4.5)
PLAT MORPH BLD: NORMAL — SIGNIFICANT CHANGE UP
PLATELET # BLD AUTO: 179 K/UL — SIGNIFICANT CHANGE UP (ref 150–400)
PLATELET # BLD AUTO: 64 K/UL — LOW (ref 150–400)
POLYCHROMASIA BLD QL SMEAR: SLIGHT — SIGNIFICANT CHANGE UP
POTASSIUM SERPL-MCNC: 4.1 MMOL/L — SIGNIFICANT CHANGE UP (ref 3.5–5.3)
POTASSIUM SERPL-MCNC: 4.1 MMOL/L — SIGNIFICANT CHANGE UP (ref 3.5–5.3)
POTASSIUM SERPL-SCNC: 4.1 MMOL/L — SIGNIFICANT CHANGE UP (ref 3.5–5.3)
POTASSIUM SERPL-SCNC: 4.1 MMOL/L — SIGNIFICANT CHANGE UP (ref 3.5–5.3)
PROMYELOCYTES # FLD: 0.9 % — HIGH (ref 0–0)
PROMYELOCYTES NFR BLD: 0.9 % — HIGH (ref 0–0)
PROT SERPL-MCNC: 5.1 G/DL — LOW (ref 6–8.3)
PROT SERPL-MCNC: 5.7 G/DL — LOW (ref 6–8.3)
PROTHROM AB SERPL-ACNC: 11.1 SEC — SIGNIFICANT CHANGE UP (ref 9.9–13.4)
PROTHROM AB SERPL-ACNC: 11.9 SEC — SIGNIFICANT CHANGE UP (ref 9.9–13.4)
RAPIDTEG MAXIMUM AMPLITUDE: 64.2 MM — SIGNIFICANT CHANGE UP (ref 52–70)
RBC # BLD: 2.74 M/UL — LOW (ref 3.8–5.2)
RBC # BLD: 3.1 M/UL — LOW (ref 3.8–5.2)
RBC # FLD: 16.7 % — HIGH (ref 10.3–14.5)
RBC # FLD: 17.8 % — HIGH (ref 10.3–14.5)
RBC BLD AUTO: ABNORMAL
SAO2 % BLDMV: 75.6 — SIGNIFICANT CHANGE UP (ref 60–90)
SAO2 % BLDMV: 77.4 — SIGNIFICANT CHANGE UP (ref 60–90)
SAO2 % BLDMV: 77.6 — SIGNIFICANT CHANGE UP (ref 60–90)
SAO2 % BLDMV: 84.1 — SIGNIFICANT CHANGE UP (ref 60–90)
SCHISTOCYTES BLD QL AUTO: SLIGHT — SIGNIFICANT CHANGE UP
SODIUM SERPL-SCNC: 143 MMOL/L — SIGNIFICANT CHANGE UP (ref 135–145)
SODIUM SERPL-SCNC: 146 MMOL/L — HIGH (ref 135–145)
SPECIMEN SOURCE: SIGNIFICANT CHANGE UP
SPECIMEN SOURCE: SIGNIFICANT CHANGE UP
TARGETS BLD QL SMEAR: SIGNIFICANT CHANGE UP
TEG FUNCTIONAL FIBRINOGEN: 34.1 MM — HIGH (ref 15–32)
TEG MAXIMUM AMPLITUDE: 56.9 MM — SIGNIFICANT CHANGE UP (ref 52–69)
TEG REACTION TIME: >17 MIN — HIGH (ref 4.6–9.1)
VANCOMYCIN TROUGH SERPL-MCNC: 17.5 UG/ML — SIGNIFICANT CHANGE UP (ref 10–20)
VANCOMYCIN TROUGH SERPL-MCNC: 17.5 UG/ML — SIGNIFICANT CHANGE UP (ref 10–20)
WBC # BLD: 10.76 K/UL — HIGH (ref 3.8–10.5)
WBC # BLD: 12.57 K/UL — HIGH (ref 3.8–10.5)
WBC # FLD AUTO: 10.76 K/UL — HIGH (ref 3.8–10.5)
WBC # FLD AUTO: 12.57 K/UL — HIGH (ref 3.8–10.5)

## 2025-01-07 PROCEDURE — 99291 CRITICAL CARE FIRST HOUR: CPT

## 2025-01-07 PROCEDURE — 33984 ECMO/ECLS RMVL PRPH CANNULA: CPT

## 2025-01-07 PROCEDURE — 99292 CRITICAL CARE ADDL 30 MIN: CPT

## 2025-01-07 PROCEDURE — 71045 X-RAY EXAM CHEST 1 VIEW: CPT | Mod: 26

## 2025-01-07 PROCEDURE — 95720 EEG PHY/QHP EA INCR W/VEEG: CPT

## 2025-01-07 PROCEDURE — 88305 TISSUE EXAM BY PATHOLOGIST: CPT | Mod: 26

## 2025-01-07 PROCEDURE — 99233 SBSQ HOSP IP/OBS HIGH 50: CPT | Mod: GC

## 2025-01-07 PROCEDURE — 83020 HEMOGLOBIN ELECTROPHORESIS: CPT | Mod: 26

## 2025-01-07 PROCEDURE — 99233 SBSQ HOSP IP/OBS HIGH 50: CPT

## 2025-01-07 PROCEDURE — 93010 ELECTROCARDIOGRAM REPORT: CPT

## 2025-01-07 DEVICE — LIGATING CLIPS WECK HORIZON MEDIUM (BLUE) 24
Type: IMPLANTABLE DEVICE | Status: NON-FUNCTIONAL
Removed: 2025-01-07

## 2025-01-07 DEVICE — SURGICEL FIBRILLAR 2 X 4"
Type: IMPLANTABLE DEVICE | Status: NON-FUNCTIONAL
Removed: 2025-01-07

## 2025-01-07 DEVICE — LIGATING CLIPS WECK HORIZON SMALL-WIDE (RED) 24
Type: IMPLANTABLE DEVICE | Status: NON-FUNCTIONAL
Removed: 2025-01-07

## 2025-01-07 RX ORDER — LEVETIRACETAM 750 MG/1
1000 TABLET, FILM COATED ORAL ONCE
Refills: 0 | Status: DISCONTINUED | OUTPATIENT
Start: 2025-01-07 | End: 2025-01-07

## 2025-01-07 RX ORDER — SENNOSIDES 8.6 MG
2 TABLET ORAL AT BEDTIME
Refills: 0 | Status: DISCONTINUED | OUTPATIENT
Start: 2025-01-07 | End: 2025-01-10

## 2025-01-07 RX ORDER — BUMETANIDE 2 MG/1
1 TABLET ORAL ONCE
Refills: 0 | Status: COMPLETED | OUTPATIENT
Start: 2025-01-07 | End: 2025-01-07

## 2025-01-07 RX ORDER — DOBUTAMINE HCL 250MG/20ML
2 VIAL (ML) INTRAVENOUS
Qty: 500 | Refills: 0 | Status: DISCONTINUED | OUTPATIENT
Start: 2025-01-07 | End: 2025-01-09

## 2025-01-07 RX ORDER — POLYETHYLENE GLYCOL 3350 17 G/17G
17 POWDER, FOR SOLUTION ORAL DAILY
Refills: 0 | Status: DISCONTINUED | OUTPATIENT
Start: 2025-01-07 | End: 2025-01-10

## 2025-01-07 RX ORDER — LEVETIRACETAM 750 MG/1
500 TABLET, FILM COATED ORAL ONCE
Refills: 0 | Status: DISCONTINUED | OUTPATIENT
Start: 2025-01-07 | End: 2025-01-07

## 2025-01-07 RX ORDER — IPRATROPIUM BROMIDE AND ALBUTEROL SULFATE .5; 2.5 MG/3ML; MG/3ML
3 SOLUTION RESPIRATORY (INHALATION) EVERY 6 HOURS
Refills: 0 | Status: DISCONTINUED | OUTPATIENT
Start: 2025-01-07 | End: 2025-01-20

## 2025-01-07 RX ORDER — LEVETIRACETAM 750 MG/1
1000 TABLET, FILM COATED ORAL EVERY 12 HOURS
Refills: 0 | Status: DISCONTINUED | OUTPATIENT
Start: 2025-01-08 | End: 2025-01-15

## 2025-01-07 RX ORDER — PANTOPRAZOLE 20 MG/1
40 TABLET, DELAYED RELEASE ORAL EVERY 12 HOURS
Refills: 0 | Status: DISCONTINUED | OUTPATIENT
Start: 2025-01-07 | End: 2025-02-06

## 2025-01-07 RX ORDER — LEVETIRACETAM 750 MG/1
500 TABLET, FILM COATED ORAL EVERY 12 HOURS
Refills: 0 | Status: DISCONTINUED | OUTPATIENT
Start: 2025-01-07 | End: 2025-01-07

## 2025-01-07 RX ORDER — VANCOMYCIN HYDROCHLORIDE 50 MG/ML
750 KIT ORAL EVERY 12 HOURS
Refills: 0 | Status: DISCONTINUED | OUTPATIENT
Start: 2025-01-07 | End: 2025-01-10

## 2025-01-07 RX ORDER — BUMETANIDE 2 MG/1
2 TABLET ORAL ONCE
Refills: 0 | Status: COMPLETED | OUTPATIENT
Start: 2025-01-07 | End: 2025-01-07

## 2025-01-07 RX ORDER — VANCOMYCIN HYDROCHLORIDE 50 MG/ML
1000 KIT ORAL EVERY 12 HOURS
Refills: 0 | Status: DISCONTINUED | OUTPATIENT
Start: 2025-01-07 | End: 2025-01-07

## 2025-01-07 RX ORDER — CLONAZEPAM 2 MG
0.5 TABLET ORAL AT BEDTIME
Refills: 0 | Status: DISCONTINUED | OUTPATIENT
Start: 2025-01-07 | End: 2025-01-07

## 2025-01-07 RX ORDER — SODIUM NITROPRUSSIDE 0.2 MG/ML
0.3 INJECTION, SOLUTION INTRAVENOUS
Qty: 100 | Refills: 0 | Status: DISCONTINUED | OUTPATIENT
Start: 2025-01-07 | End: 2025-01-11

## 2025-01-07 RX ORDER — DM/PSEUDOEPHED/ACETAMINOPHEN 10-30-250
25 CAPSULE ORAL
Refills: 0 | Status: DISCONTINUED | OUTPATIENT
Start: 2025-02-04 | End: 2025-02-06

## 2025-01-07 RX ORDER — LEVETIRACETAM 750 MG/1
1000 TABLET, FILM COATED ORAL EVERY 12 HOURS
Refills: 0 | Status: CANCELLED | OUTPATIENT
Start: 2025-01-08 | End: 2025-01-07

## 2025-01-07 RX ORDER — ANTISEPTIC SURGICAL SCRUB 0.04 MG/ML
15 SOLUTION TOPICAL EVERY 12 HOURS
Refills: 0 | Status: DISCONTINUED | OUTPATIENT
Start: 2025-01-07 | End: 2025-01-09

## 2025-01-07 RX ORDER — DEXMEDETOMIDINE HYDROCHLORIDE 4 UG/ML
0.2 INJECTION, SOLUTION INTRAVENOUS
Qty: 200 | Refills: 0 | Status: DISCONTINUED | OUTPATIENT
Start: 2025-01-07 | End: 2025-01-14

## 2025-01-07 RX ORDER — MEROPENEM 500 MG/20ML
1000 INJECTION INTRAVENOUS EVERY 8 HOURS
Refills: 0 | Status: COMPLETED | OUTPATIENT
Start: 2025-01-07 | End: 2025-01-14

## 2025-01-07 RX ORDER — ACETAMINOPHEN 160 MG/5ML
1000 SUSPENSION ORAL ONCE
Refills: 0 | Status: COMPLETED | OUTPATIENT
Start: 2025-01-07 | End: 2025-01-07

## 2025-01-07 RX ADMIN — POLYETHYLENE GLYCOL 3350 17 GRAM(S): 17 POWDER, FOR SOLUTION ORAL at 18:06

## 2025-01-07 RX ADMIN — Medication 1 DROP(S): at 11:01

## 2025-01-07 RX ADMIN — IPRATROPIUM BROMIDE AND ALBUTEROL SULFATE 3 MILLILITER(S): .5; 2.5 SOLUTION RESPIRATORY (INHALATION) at 23:02

## 2025-01-07 RX ADMIN — ANTISEPTIC SURGICAL SCRUB 15 MILLILITER(S): 0.04 SOLUTION TOPICAL at 05:00

## 2025-01-07 RX ADMIN — Medication 1 DROP(S): at 00:30

## 2025-01-07 RX ADMIN — Medication 4 MILLIGRAM(S): at 14:00

## 2025-01-07 RX ADMIN — IPRATROPIUM BROMIDE AND ALBUTEROL SULFATE 3 MILLILITER(S): .5; 2.5 SOLUTION RESPIRATORY (INHALATION) at 05:09

## 2025-01-07 RX ADMIN — Medication 12.5 MICROGRAM(S)/KG/MIN: at 20:16

## 2025-01-07 RX ADMIN — Medication 3 MILLILITER(S): at 13:58

## 2025-01-07 RX ADMIN — PANTOPRAZOLE 40 MILLIGRAM(S): 20 TABLET, DELAYED RELEASE ORAL at 18:05

## 2025-01-07 RX ADMIN — MEROPENEM 100 MILLIGRAM(S): 500 INJECTION INTRAVENOUS at 05:00

## 2025-01-07 RX ADMIN — BUMETANIDE 1 MILLIGRAM(S): 2 TABLET ORAL at 03:36

## 2025-01-07 RX ADMIN — MEROPENEM 100 MILLIGRAM(S): 500 INJECTION INTRAVENOUS at 22:25

## 2025-01-07 RX ADMIN — IPRATROPIUM BROMIDE AND ALBUTEROL SULFATE 3 MILLILITER(S): .5; 2.5 SOLUTION RESPIRATORY (INHALATION) at 17:49

## 2025-01-07 RX ADMIN — VANCOMYCIN HYDROCHLORIDE 250 MILLIGRAM(S): KIT at 09:02

## 2025-01-07 RX ADMIN — LEVETIRACETAM 500 MILLIGRAM(S): 750 TABLET, FILM COATED ORAL at 12:43

## 2025-01-07 RX ADMIN — Medication 1 DROP(S): at 23:24

## 2025-01-07 RX ADMIN — ANTISEPTIC SURGICAL SCRUB 15 MILLILITER(S): 0.04 SOLUTION TOPICAL at 18:06

## 2025-01-07 RX ADMIN — Medication 8.5 UNIT(S)/HR: at 09:03

## 2025-01-07 RX ADMIN — SODIUM NITROPRUSSIDE 3.76 MICROGRAM(S)/KG/MIN: 0.2 INJECTION, SOLUTION INTRAVENOUS at 09:04

## 2025-01-07 RX ADMIN — VANCOMYCIN HYDROCHLORIDE 150 MILLIGRAM(S): KIT at 20:16

## 2025-01-07 RX ADMIN — Medication 6 UNIT(S)/MIN: at 20:16

## 2025-01-07 RX ADMIN — DEXMEDETOMIDINE HYDROCHLORIDE 4.18 MICROGRAM(S)/KG/HR: 4 INJECTION, SOLUTION INTRAVENOUS at 09:03

## 2025-01-07 RX ADMIN — Medication 2 TABLET(S): at 23:25

## 2025-01-07 RX ADMIN — LEVETIRACETAM 1000 MILLIGRAM(S): 750 TABLET, FILM COATED ORAL at 14:11

## 2025-01-07 RX ADMIN — MUPIROCIN 1 APPLICATION(S): 2 CREAM TOPICAL at 05:01

## 2025-01-07 RX ADMIN — FENTANYL CITRATE 50 MICROGRAM(S): 50 INJECTION INTRAMUSCULAR; INTRAVENOUS at 22:00

## 2025-01-07 RX ADMIN — Medication 1 DROP(S): at 18:06

## 2025-01-07 RX ADMIN — Medication 200 GRAM(S): at 00:30

## 2025-01-07 RX ADMIN — FENTANYL CITRATE 50 MICROGRAM(S): 50 INJECTION INTRAMUSCULAR; INTRAVENOUS at 22:15

## 2025-01-07 RX ADMIN — Medication 1 UNIT(S)/HR: at 09:02

## 2025-01-07 RX ADMIN — Medication 2 MILLIGRAM(S): at 20:16

## 2025-01-07 RX ADMIN — Medication 1 DROP(S): at 05:01

## 2025-01-07 RX ADMIN — ACETAMINOPHEN 1000 MILLIGRAM(S): 160 SUSPENSION ORAL at 20:35

## 2025-01-07 RX ADMIN — Medication 12.5 MICROGRAM(S)/KG/MIN: at 09:03

## 2025-01-07 RX ADMIN — Medication 3 MILLILITER(S): at 05:21

## 2025-01-07 RX ADMIN — MEROPENEM 100 MILLIGRAM(S): 500 INJECTION INTRAVENOUS at 13:32

## 2025-01-07 RX ADMIN — Medication 1 UNIT(S)/HR: at 20:17

## 2025-01-07 RX ADMIN — ACETAMINOPHEN 400 MILLIGRAM(S): 160 SUSPENSION ORAL at 20:15

## 2025-01-07 RX ADMIN — Medication 6 UNIT(S)/MIN: at 09:02

## 2025-01-07 RX ADMIN — BUMETANIDE 2 MILLIGRAM(S): 2 TABLET ORAL at 18:28

## 2025-01-07 RX ADMIN — PANTOPRAZOLE 40 MILLIGRAM(S): 20 TABLET, DELAYED RELEASE ORAL at 05:00

## 2025-01-07 RX ADMIN — IPRATROPIUM BROMIDE AND ALBUTEROL SULFATE 3 MILLILITER(S): .5; 2.5 SOLUTION RESPIRATORY (INHALATION) at 11:25

## 2025-01-07 NOTE — PROGRESS NOTE ADULT - SUBJECTIVE AND OBJECTIVE BOX
Neurology Progress Note    SUBJECTIVE/OBJECTIVE/INTERVAL EVENTS: Patient seen and examined at bedside w/ neuro attending and team.     INTERVAL HISTORY: Pt continues to be intubated and on mild sedation with Precedex. Pt prepped for OR today for decannulation of ECMO.     REVIEW OF SYSTEMS: Unable to assess  VITALS & EXAMINATION:  Vital Signs Last 24 Hrs  T(C): 37.8 (07 Jan 2025 12:00), Max: 37.8 (07 Jan 2025 12:00)  T(F): 100 (07 Jan 2025 12:00), Max: 100 (07 Jan 2025 12:00)  HR: 133 (07 Jan 2025 14:00) (76 - 146)  BP: --  BP(mean): --  RR: 19 (07 Jan 2025 14:00) (13 - 30)  SpO2: 100% (07 Jan 2025 14:00) (96% - 100%)    Parameters below as of 07 Jan 2025 12:00  Patient On (Oxygen Delivery Method): ventilator    O2 Concentration (%): 40    General:  Constitutional: Female, appears stated age, nontoxic, not in distress,    Head: Normocephalic;   Eyes: clear sclera;   Extremities: No cyanosis;   Resp: breathing comfortably  Neck: no nuchal rigidity  Fundoscopic exam:      Neurological (>12):  Neurologic Exam: limited due to intubation and on sedation ( precedex 0.2)  Mental status - eyes mildly open with right gaze preference  Cranial nerves - PERRLA, no blink to threat b/l,     Motor - flaccid tone   Sensation - mild response to noxious stimuli in all extremities & supraoribtal b/l    DTR's -             Biceps      Brachioradialis      Patellar    Ankle    Toes/plantar response  R             0                     1+               0             0              mute  L            0                    1+                  0           0                mtue    Coordination - MENDEL  Gait and station - MENDEL        LABORATORY:  CBC                       9.0    10.76 )-----------( 64       ( 07 Jan 2025 00:20 )             25.7     Chem 01-07    143  |  105  |  30[H]  ----------------------------<  116[H]  4.1   |  27  |  0.92    Ca    8.5      07 Jan 2025 00:26  Phos  3.1     01-07  Mg     2.3     01-07    TPro  5.7[L]  /  Alb  2.5[L]  /  TBili  2.2[H]  /  DBili  x   /  AST  90[H]  /  ALT  105[H]  /  AlkPhos  132[H]  01-07    LFTs LIVER FUNCTIONS - ( 07 Jan 2025 00:26 )  Alb: 2.5 g/dL / Pro: 5.7 g/dL / ALK PHOS: 132 U/L / ALT: 105 U/L / AST: 90 U/L / GGT: x           Coagulopathy PT/INR - ( 07 Jan 2025 00:20 )   PT: 11.1 sec;   INR: 0.97 ratio         PTT - ( 07 Jan 2025 00:20 )  PTT:34.8 sec  Lipid Panel   A1c   Cardiac enzymes     U/A Urinalysis Basic - ( 07 Jan 2025 00:26 )    Color: x / Appearance: x / SG: x / pH: x  Gluc: 116 mg/dL / Ketone: x  / Bili: x / Urobili: x   Blood: x / Protein: x / Nitrite: x   Leuk Esterase: x / RBC: x / WBC x   Sq Epi: x / Non Sq Epi: x / Bacteria: x      CSF  Immunological  Other    STUDIES & IMAGING: (EEG, CT, MR, U/S, TTE/QUENTIN):    EEG Classification / Summary:  Abnormal  EEG in a lethargic patient:   Sharp waves independently more frequent over the left frontal, and at times right frontal regions.  Bilateral independent posterior quadrant quasiperiodic spiking with arousal/stimulation near 1.5hz  Intermittent left temporal LRDA to 1.5hz  Intermittent generalized periodic discharges with triphasic morphology, at times sharply contoured, 1-1.5hz, blunted over the right hemisphere    Continuous voltage attenuation over the right hemisphere   Generalized background slowing, moderate    -------------------------------------------------------------------------------------------------------  Clinical Impression:   Multifocal cortical irritability and risk of seizures from multiple regions (bilateral frontal, posterior quadrant regions).  More apparent likely due to reduction of sedation.  Triphasic waves are classically associated with encephalopathy (infectious, metabolic, toxic in etiology)  Structural abnormality in the right hemisphere   Moderate  generalized background slowing

## 2025-01-07 NOTE — PROGRESS NOTE ADULT - PROBLEM SELECTOR PLAN 2
-has know hx of SCD and takes hydroxyurea at home  -received 1pRBC and 1pPLT 1/4/25  -had RBC exchanged 1/4/25  -heme to f/u  -on heparin for AC.

## 2025-01-07 NOTE — PROGRESS NOTE ADULT - ATTENDING COMMENTS
59F PMH Sickle cell disease (on hydroxyurea), HTN, 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 Pt initially presented to [facility] for SOB and SS crisis; course c/b witnessed seizure, intubated and sedated, and transferred to [facility] for further management. Keppra was discontinued at [facility] due to negative EEG. At [facility] MICU pt was found to have RV failure possibly iso pulm HTN 2/2 increased tidal volumes on the ventilator, possibly due to volume overload due to IVF iso SS crisis.     Pt is in profound cardiogenic shock. Pt has been decompensating, despite Dobutamine gtt, Bumex gtt, Levophed, and addition of Vasopressin. Vasopressors requirements have gone up. NS shock team was called and pt transferred to NS for further management. Patient was cannulated on peripheral VA ECMO.                                                                                                            #Hb SC disease  #Cardiogenic shock   #Unclear trigger for unresponsiveness   - patient with 1-2 sickle pain crisis per year and on hydrea, had retinal detachment s/p repair                             Recommendations  - pending hemoglobin electrophoresis, no plan for repeat exchange today; will follow up results of hemoglobin electrophoresis post red cell exchange 1/4/24,  - Discuss with blood bank and heme team prior to RBC transfusions to minimize risk of antibody development/transfusion reactions, aim to keep hb >7-8  - Agree with holding hydroxyurea for now while thrombocytopenic   - Repeat hemolysis labs, (CBC, CMP, LDH, reticulocyte count) daily, prefer pediatric tubes.   - Hematology team to follow.

## 2025-01-07 NOTE — PROGRESS NOTE ADULT - PROBLEM SELECTOR PLAN 1
-on VA ECMO 1/4/25   -inotrope: currently on  5 mcg/kg/min  -also on Mara, vaso, and epinephrine - wean epi d/t tachycardia  -Monitor strict I/Os and keep lytes K>4 and Mg>2  -continue to monitor perfusion markers (LFTs, lactate, renal function, etc)  -currently mechanically vented  -diuretics: on bumex gtt, continue with goal CVP <10

## 2025-01-07 NOTE — PRE-ANESTHESIA EVALUATION ADULT - NSRADCARDRESULTSFT_GEN_ALL_CORE
< from: TTE Limited W or WO Ultrasound Enhancing Agent (01.06.25 @ 10:18) >    CONCLUSIONS:      1. Limited TTE performed for ECMO weaning.   2. Technically difficult image quality.   3. At baseline ECMO settings (3000 RPM, 2.4 LPM): LVIDD measures 4.3 cm, interventricular septum appears midline and the aortic valve appears to open with every beat. RV systolic function is mild to moderately reduced.   4. When weaned to 2500 RPM and 1.6 LPM, the LVIDD measures 4.03 cm, the interventricular septum appears midline. The aortic valve is not well visualized but appears to be opening with every beat. RV systolic function is mild to moderately reduced.   5. When weaned to 2100 RPM and 1.0 LPM, the LVIDD measures 4.11 cm, the interventricular septum appears midline. The aortic valve is not well visualized. RV systolic function remains mild to moderately reduced.   6. Findings were reviewed with the team in real time.    < end of copied text >

## 2025-01-07 NOTE — PROGRESS NOTE ADULT - ASSESSMENT
59y (1965) man with a PMHx significant for sickle cell disease (on hydroxyurea), HTN, HFimpEF/NICM (EF 58% 10/2024)originally presented to [facility] (1/3) as a transfer from [facility]. Pt initially presented to [facility] (1/2) for SOB and SS crisis; course c/b witnessed seizure, intubated and sedated, and transferred to [facility] hospital for further management. Keppra was discontinued at [facility] due to negative EEG. At [facility] MICU pt was found to have RV failure possibly iso pulm HTN 2/2 increased tidal volumes on the ventilator. On addition, pt possibly received a lot of volume iso SS crisis. Patient in profound cardiogenic shock. Pt has been decompensating, despite Dobutamine gtt, Bumex gtt, Levophed, and addition of Vasopressin. Vasopressors requirements have gone up. Patient transferred to [facility] for R heart cath and possible VA ECMO. Patient was cannulated on peripheral VA ECMO (1/4). Neurology consulted  (1/4) for possible seizure at [facility].     Impression:   Reported seizure like activity at [facility] and worsening cardiac function at OSH causing RV strain and requiring ECMO at [facility]. Pt planned for decannulation today and will follow neurologically.      Recommendations  [] Resume video EEG after procedure  [] Give ativan 4mg once now  [] Increase Keppra: Keppra 1500mg now, and Keppra 1000mg BID moving forward  [] taper off sedation when able, reassess at that time   [] MRI brain with and without after procedure  [] Monitor gaze preference and seizure activity clinically    Seen with general neurology attending.   Pending chart attestation.    59y (1965) man with a PMHx significant for sickle cell disease (on hydroxyurea), HTN, HFimpEF/NICM (EF 58% 10/2024)originally presented to [facility] (1/3) as a transfer from [facility]. Pt initially presented to [facility] (1/2) for SOB and SS crisis; course c/b witnessed seizure, intubated and sedated, and transferred to [facility] hospital for further management. Keppra was discontinued at [facility] due to negative EEG. At [facility] MICU pt was found to have RV failure possibly iso pulm HTN 2/2 increased tidal volumes on the ventilator. On addition, pt possibly received a lot of volume iso SS crisis. Patient in profound cardiogenic shock. Pt has been decompensating, despite Dobutamine gtt, Bumex gtt, Levophed, and addition of Vasopressin. Vasopressors requirements have gone up. Patient transferred to [facility] for R heart cath and possible VA ECMO. Patient was cannulated on peripheral VA ECMO (1/4). Neurology consulted  (1/4) for possible seizure at [facility].     Impression:   Reported seizure like activity at [facility] and worsening cardiac function at OSH causing RV strain and requiring ECMO at [facility]. Pt planned for decannulation today and will follow neurologically.      Recommendations  [] Resume video EEG after procedure  [] Give ativan 4mg once now  [] Keppra 1500 mg extras dose and patient already received 500 mg and increase to 1000mg BID moving forward  [] taper off sedation when able, reassess at that time   [] MRI brain with and without after procedure  [] Monitor gaze preference and seizure activity clinically    Seen with general neurology attending.   Pending chart attestation.

## 2025-01-07 NOTE — PROGRESS NOTE ADULT - SUBJECTIVE AND OBJECTIVE BOX
Patient seen and examined at the bedside.    Remains critically ill on continuous ICU monitoring, at risk for life threatening decompensation.  All Labs, data reviewed. Plan of care discussed in length during multi-disciplinary ICU rounds.       Brief Summary:  58 yo F with Sickle cell disease and NICM now with Cardiogenic shock s/p VA ECMO on 1/4/25.    24 Hour events:  mental status, slowly improving  +corneal, gag and cough, responds to name  Tolerated wean on ecmo at the bed site  Possible decannulation tomorrow in OR    Objective:  Vital Signs Last 24 Hrs  T(C): 37.2 (07 Jan 2025 04:00), Max: 37.5 (06 Jan 2025 08:00)  T(F): 99 (07 Jan 2025 04:00), Max: 99.5 (06 Jan 2025 08:00)  HR: 126 (07 Jan 2025 06:00) (72 - 126)  BP: --  BP(mean): --  RR: 22 (07 Jan 2025 06:00) (6 - 30)  SpO2: 100% (07 Jan 2025 06:00) (86% - 100%)    Parameters below as of 07 Jan 2025 05:36  Patient On (Oxygen Delivery Method): ventilator    Mode: AC/ CMV (Assist Control/ Continuous Mandatory Ventilation)  RR (machine): 14  FiO2: 40  PEEP: 10  ITime: 1.1  MAP: 12  PC: 10  PIP: 22    Physical Exam:  General: Intubated  Neurology: Withdrawing to noxious stimuli in all extremities, +cough, +gag  Respiratory: Breath sounds bilaterally   CV: Sinus Tachycardia   VA ECMO 3300 rpms, flow of 3.2, sweep of 1   Abdominal: Soft, Nontender  Extremities: Warm, well-perfused  Costa  -------------------------------------------------------------------------------------------------------------------------------    Labs:                        9.0    10.76 )-----------( 64       ( 07 Jan 2025 00:20 )             25.7     01-07    143  |  105  |  30[H]  ----------------------------<  116[H]  4.1   |  27  |  0.92    Ca    8.5      07 Jan 2025 00:26  Phos  3.1     01-07  Mg     2.3     01-07    TPro  5.7[L]  /  Alb  2.5[L]  /  TBili  2.2[H]  /  DBili  x   /  AST  90[H]  /  ALT  105[H]  /  AlkPhos  132[H]  01-07    LIVER FUNCTIONS - ( 07 Jan 2025 00:26 )  Alb: 2.5 g/dL / Pro: 5.7 g/dL / ALK PHOS: 132 U/L / ALT: 105 U/L / AST: 90 U/L / GGT: x           PT/INR - ( 07 Jan 2025 00:20 )   PT: 11.1 sec;   INR: 0.97 ratio       PTT - ( 07 Jan 2025 00:20 )  PTT:34.8 sec  ABG - ( 07 Jan 2025 05:35 )  pH, Arterial: 7.55  pH, Blood: x     /  pCO2: 37    /  pO2: 88    / HCO3: 32    / Base Excess: 9.3   /  SaO2: 98.7      ALL MEDICATIONS   MEDICATIONS  (STANDING):  albuterol/ipratropium for Nebulization 3 milliLiter(s) Nebulizer every 6 hours  artificial tears (preservative free) Ophthalmic Solution 1 Drop(s) Both EYES four times a day  chlorhexidine 0.12% Liquid 15 milliLiter(s) Oral Mucosa every 12 hours  chlorhexidine 2% Cloths 1 Application(s) Topical daily  clonazePAM  Tablet 0.5 milliGRAM(s) Oral at bedtime  dexMEDEtomidine Infusion 0.2 MICROgram(s)/kG/Hr (4.18 mL/Hr) IV Continuous <Continuous>  dextrose 50% Injectable 50 milliLiter(s) IV Push every 15 minutes  dextrose 50% Injectable 25 milliLiter(s) IV Push every 15 minutes  DOBUTamine Infusion 5 MICROgram(s)/kG/Min (12.5 mL/Hr) IV Continuous <Continuous>  EPINEPHrine    Infusion 0.01 MICROgram(s)/kG/Min (3.14 mL/Hr) IV Continuous <Continuous>  heparin  Infusion 850 Unit(s)/Hr (8.5 mL/Hr) IV Continuous <Continuous>  insulin regular Infusion 1 Unit(s)/Hr (1 mL/Hr) IV Continuous <Continuous>  meropenem  IVPB 1000 milliGRAM(s) IV Intermittent every 8 hours  mupirocin 2% Nasal 1 Application(s) Both Nostrils two times a day  nitroprusside Infusion 0.3 MICROgram(s)/kG/Min (3.76 mL/Hr) IV Continuous <Continuous>  pantoprazole  Injectable 40 milliGRAM(s) IV Push every 12 hours  polyethylene glycol 3350 17 Gram(s) Oral daily  senna 2 Tablet(s) Oral at bedtime  sodium chloride 0.9% lock flush 3 milliLiter(s) IV Push every 8 hours  sodium chloride 0.9%. 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 Pt initially presented to [facility] for SOB and SS crisis; course c/b witnessed seizure, intubated and sedated, and transferred to [facility] for further management. Now in cardiogenic shock, transferred to [facility] for further management. Cannulated for VA ECMO on 1/4/25.    Cardiogenic shock  Acute respiratory failure  Acute blood loss anemia  Thrombocytopenia  Hyperglycemia    Plan:   ***Neuro***  On low dose prcedex  +corneal, gag and cough, responds to name  CT and MRI unremarkable  neurology following  EEG negative for seizure activity    ***Cardiovascular***  Remains on Dobutamine,  VA ECMO support.  stable on current settings  epi gtt is off, Mara wean to 5 ppm  Tolerated wean trail at bed site  Possible decannulation tomorrow  Cannula sites oozing, monitor closely     ***Pulmonary***  Full vent support  PS as tolerates  Nebs    ***GI***  Increase tube feeds slowly  Protonix for stress ulcer prophylaxis   bowel regimen  Tren LFTs, shock liver improving    ***Renal***  DYLON  Trend Creatinine   Costa catheter for strict I/O measurements.   Bumex BID    ***ID***  MSSA bacteremia, BAL + MSSA  On vancomycin  On empiric meropenum  Trend leukocytosis    ***Endocrine***  Hyperglycemia - Insulin infusion.     ***Hematology***  Acute blood loss anemia and thrombocytopenia  Sickle Cell - s/p Red cell exchange , pending electrophoresis results  Keep Plats >50, Hg >8  Heparin infusion for anticoagulation.        I, Yana Patricia MD, personally performed the services described in this documentation. All medical record entries made by the scribe were at my direction and in my presence. I have reviewed the chart and agree that the record reflects my personal performance and is accurate and complete  Electronically signed:   Yana Patricia MD  CT ICU attending     ICU time: ** mins     By signing my name below, I, Norris Oliver, attest that this documentation has been prepared under the direction and in the presence of Yana Patricia MD  Electronically signed: Norris Oliver, 01-07-25 @ 06:19             Patient seen and examined at the bedside.    Remains critically ill on continuous ICU monitoring, at risk for life threatening decompensation.  All Labs, data reviewed. Plan of care discussed in length during multi-disciplinary ICU rounds.     Brief Summary:  58 yo F with Sickle cell disease and NICM now with Cardiogenic shock s/p VA ECMO on 1/4/25.    24 Hour events:  EEG recorded epileptiform spikes, started on Keppra  As per Neurology ativan 4 mg added.  VA ecmo decannulation in OR    Objective:  Vital Signs Last 24 Hrs  T(C): 37.2 (07 Jan 2025 04:00), Max: 37.5 (06 Jan 2025 08:00)  T(F): 99 (07 Jan 2025 04:00), Max: 99.5 (06 Jan 2025 08:00)  HR: 126 (07 Jan 2025 06:00) (72 - 126)  BP: --  BP(mean): --  RR: 22 (07 Jan 2025 06:00) (6 - 30)  SpO2: 100% (07 Jan 2025 06:00) (86% - 100%)    Parameters below as of 07 Jan 2025 05:36  Patient On (Oxygen Delivery Method): ventilator    Mode: AC/ CMV (Assist Control/ Continuous Mandatory Ventilation)  RR (machine): 14  FiO2: 40  PEEP: 10  ITime: 1.1  MAP: 12  PC: 10  PIP: 22    Physical Exam:  General: Intubated  Neurology: Withdrawing to noxious stimuli in all extremities, +cough, +gag  Respiratory: Breath sounds bilaterally   CV: Sinus Tachycardia   VA ECMO 3300 rpms, flow of 3.2, sweep of 1   Abdominal: Soft, Nontender  Extremities: Warm, well-perfused  Costa  -------------------------------------------------------------------------------------------------------------------------------    Labs:                        9.0    10.76 )-----------( 64       ( 07 Jan 2025 00:20 )             25.7     01-07    143  |  105  |  30[H]  ----------------------------<  116[H]  4.1   |  27  |  0.92    Ca    8.5      07 Jan 2025 00:26  Phos  3.1     01-07  Mg     2.3     01-07    TPro  5.7[L]  /  Alb  2.5[L]  /  TBili  2.2[H]  /  DBili  x   /  AST  90[H]  /  ALT  105[H]  /  AlkPhos  132[H]  01-07    LIVER FUNCTIONS - ( 07 Jan 2025 00:26 )  Alb: 2.5 g/dL / Pro: 5.7 g/dL / ALK PHOS: 132 U/L / ALT: 105 U/L / AST: 90 U/L / GGT: x           PT/INR - ( 07 Jan 2025 00:20 )   PT: 11.1 sec;   INR: 0.97 ratio       PTT - ( 07 Jan 2025 00:20 )  PTT:34.8 sec  ABG - ( 07 Jan 2025 05:35 )  pH, Arterial: 7.55  pH, Blood: x     /  pCO2: 37    /  pO2: 88    / HCO3: 32    / Base Excess: 9.3   /  SaO2: 98.7      ALL MEDICATIONS   MEDICATIONS  (STANDING):  albuterol/ipratropium for Nebulization 3 milliLiter(s) Nebulizer every 6 hours  artificial tears (preservative free) Ophthalmic Solution 1 Drop(s) Both EYES four times a day  chlorhexidine 0.12% Liquid 15 milliLiter(s) Oral Mucosa every 12 hours  chlorhexidine 2% Cloths 1 Application(s) Topical daily  clonazePAM  Tablet 0.5 milliGRAM(s) Oral at bedtime  dexMEDEtomidine Infusion 0.2 MICROgram(s)/kG/Hr (4.18 mL/Hr) IV Continuous <Continuous>  dextrose 50% Injectable 50 milliLiter(s) IV Push every 15 minutes  dextrose 50% Injectable 25 milliLiter(s) IV Push every 15 minutes  DOBUTamine Infusion 5 MICROgram(s)/kG/Min (12.5 mL/Hr) IV Continuous <Continuous>  EPINEPHrine    Infusion 0.01 MICROgram(s)/kG/Min (3.14 mL/Hr) IV Continuous <Continuous>  heparin  Infusion 850 Unit(s)/Hr (8.5 mL/Hr) IV Continuous <Continuous>  insulin regular Infusion 1 Unit(s)/Hr (1 mL/Hr) IV Continuous <Continuous>  meropenem  IVPB 1000 milliGRAM(s) IV Intermittent every 8 hours  mupirocin 2% Nasal 1 Application(s) Both Nostrils two times a day  nitroprusside Infusion 0.3 MICROgram(s)/kG/Min (3.76 mL/Hr) IV Continuous <Continuous>  pantoprazole  Injectable 40 milliGRAM(s) IV Push every 12 hours  polyethylene glycol 3350 17 Gram(s) Oral daily  senna 2 Tablet(s) Oral at bedtime  sodium chloride 0.9% lock flush 3 milliLiter(s) IV Push every 8 hours  sodium chloride 0.9%. 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 Pt initially presented to [facility] for SOB and SS crisis; course c/b witnessed seizure, intubated and sedated, and transferred to [facility] for further management. Now in cardiogenic shock, transferred to [facility] for further management. Cannulated for VA ECMO on 1/4/25.    Cardiogenic shock  Acute respiratory failure  Acute blood loss anemia  Thrombocytopenia  Hyperglycemia    Plan:   ***Neuro***  On low dose prcedex  +corneal, gag and cough, responds to name  Previus CT and MRI unremerkable  Seizure activity recorded today while off sedation  Started on Keppra and Ativan dose  EEG continue, Possible MRI tomorrow    ***Cardiovascular***  Remains on Dobutamine,   VA ecmo decannulation in OR  On Lo, will wean slowly , now on 20 ppm    ***Pulmonary***  Acute hypoxic respiratory failure  Full vent support  PS as tolerates  Nebs    ***GI***  Tube feeds at goal after OR  Protonix for stress ulcer prophylaxis   bowel regimen  Tren LFTs, shock liver improving    ***Renal***  DYLON  Trend Creatinine   Costa catheter for strict I/O measurements.   Bumex BID    ***ID***  MSSA bacteremia, BAL + MSSA  On vancomycin  On empiric meropenum  Trend leukocytosis    ***Endocrine***  Hyperglycemia - Insulin infusion.     ***Hematology***  Acute blood loss anemia and thrombocytopenia  Sickle Cell - s/p Red cell exchange , pending electrophoresis results  Keep Plats >50, Hg >8  2U prbc and 2 U platelets in OR  Heparin infusion on hold      I, Yana Patricia MD, personally performed the services described in this documentation. All medical record entries made by the scribe were at my direction and in my presence. I have reviewed the chart and agree that the record reflects my personal performance and is accurate and complete  Electronically signed:   Yana Patricia MD  CT ICU attending     ICU time: 50 mins     By signing my name below, I, Norris Oliver, attest that this documentation has been prepared under the direction and in the presence of Yana Patricia MD  Electronically signed: Norris Oliver, 01-07-25 @ 06:19

## 2025-01-07 NOTE — PROGRESS NOTE ADULT - ASSESSMENT
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 Pt initially presented to [facility] for SOB and SS crisis; course c/b witnessed seizure, intubated and sedated, and transferred to [facility] for further management. Keppra was discontinued at [facility] due to negative EEG. At [facility] MICU pt was found to have RV failure possibly iso pulm HTN 2/2 increased tidal volumes on the ventilator, possibly due to volume overload due to IVF iso SS crisis.     Pt is in profound cardiogenic shock. Pt has been decompensating, despite Dobutamine gtt, Bumex gtt, Levophed, and addition of Vasopressin. Vasopressors requirements have gone up. NS shock team was called and pt transferred to NS for further management. Patient was cannulated on peripheral VA ECMO.                                                                                                            #Hb SC disease  #Cardiogenic shock on ECMO  #Unclear trigger for unresponsiveness   - patient with 1-2 sickle pain crisis per year and on hydrea, had retinal detachment s/p repair                           - patient had f/up with cardio in Sept 2024: per the note, unclear etiology of her dyspnea but possibly driven by cardiomyopathy; low suspicion of pulmonary HTN as no RV dysfunction/severe TR.  She is found to have new RV failure, requiring ECMO  - Blood bank transfusion medicine team consulted for emergent RBC exchange given critically ill with multi-organ failure  - Retic count elevated to 11.6%, LDH 1000s, bili 3.9. CXR was clear not indicative of acute chest. Smear (prior to exchange) was reviewed: Hypochromic RBCs with normochromic RBC reflecting transfused blood. Several nucleated RBC suggesting stressed bone marrow. Very few target cells and rare sickle cell. No schistocytes to suggest hemolysis.    - S/p exchange 1/4/2024, pending decannulation likely today 1/7/2025  - Pending neuro workup; thus far EEG negative  - thus far CTA chest negative, prior CT head and MR head negative,    Recommendations  - pending hemoglobin electrophoresis, no plan for repeat exchange today; will follow up results of hemoglobin electrophoresis post red cell exchange 1/4/24 and consider repeat exchange post decannulation   - Thrombocytopenia from acute illness and ECMO. Given patient is on heparin gtt for ECOM, recommend transfusion to maintain plt > 50k   - Discuss with blood bank and heme team prior to RBC transfusions to minimize risk of antibody development/transfusion reactions, aim to keep hb >7-8  - Agree with holding hydroxyurea for now while thrombocytopenic   - Repeat hemolysis labs, (CBC, CMP, LDH, reticulocyte count) daily, prefer pediatric tubes.   - Hematology team to follow; plan discussed with brother Jojo at bedside.     **INCOMPLETE NOTE**    Note not finalized until signed by attending.   Please do not hesitate to page with questions.     Veronica Haas MD  Hematology/Oncology Fellow PGY5  Available on [employer] Teams   Pager: [phone]  For weekends and evenings (5 pm - 8 am), please page fellow on call.  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 Pt initially presented to [facility] for SOB and SS crisis; course c/b witnessed seizure, intubated and sedated, and transferred to [facility] for further management. Keppra was discontinued at [facility] due to negative EEG. At [facility] MICU pt was found to have RV failure possibly iso pulm HTN 2/2 increased tidal volumes on the ventilator, possibly due to volume overload due to IVF iso SS crisis.     Pt is in profound cardiogenic shock. Pt has been decompensating, despite Dobutamine gtt, Bumex gtt, Levophed, and addition of Vasopressin. Vasopressors requirements have gone up. NS shock team was called and pt transferred to NS for further management. Patient was cannulated on peripheral VA ECMO.                                                                                                            #Hb SC disease  #Cardiogenic shock on ECMO  #Unclear trigger for unresponsiveness   - patient with 1-2 sickle pain crisis per year and on hydrea, had retinal detachment s/p repair                           - patient had f/up with cardio in Sept 2024: per the note, unclear etiology of her dyspnea but possibly driven by cardiomyopathy; low suspicion of pulmonary HTN as no RV dysfunction/severe TR.  She is found to have new RV failure, requiring ECMO  - Blood bank transfusion medicine team consulted for emergent RBC exchange given critically ill with multi-organ failure  - Retic count elevated to 11.6%, LDH 1000s, bili 3.9. CXR was clear not indicative of acute chest. Smear (prior to exchange) was reviewed: Hypochromic RBCs with normochromic RBC reflecting transfused blood. Several nucleated RBC suggesting stressed bone marrow. Very few target cells and rare sickle cell. No schistocytes to suggest hemolysis.    - S/p exchange 1/4/2024, pending decannulation likely today 1/7/2025  - Pending neuro workup; thus far EEG negative  - thus far CTA chest negative, prior CT head and MR head negative,    Recommendations  - pending hemoglobin electrophoresis, no plan for repeat exchange today; will follow up results of hemoglobin electrophoresis post red cell exchange 1/4/24 and consider repeat exchange post decannulation   - Thrombocytopenia from acute illness and ECMO. Given patient is on heparin gtt for ECOM, recommend transfusion to maintain plt > 50k   - Discuss with blood bank and heme team prior to RBC transfusions to minimize risk of antibody development/transfusion reactions, aim to keep hb >7-8  - Agree with holding hydroxyurea for now while thrombocytopenic   - Repeat hemolysis labs, (CBC, CMP, LDH, reticulocyte count) daily, prefer pediatric tubes.   - Hematology team to follow; plan discussed with brother Jojo at bedside.     Note not finalized until signed by attending.   Please do not hesitate to page with questions.     Veronica Haas MD  Hematology/Oncology Fellow PGY5  Available on [employer] Teams   Pager: [phone]  For weekends and evenings (5 pm - 8 am), please page fellow on call.

## 2025-01-07 NOTE — PROGRESS NOTE ADULT - SUBJECTIVE AND OBJECTIVE BOX
Hematology Follow-up    INTERVAL HPI/OVERNIGHT EVENTS:  Patient seen and evaluated at bedside, accompanied by brother. Pending ecmo decannulation today, appears very ill.    VITAL SIGNS:  T(F): 100 (01-07-25 @ 12:00)  HR: 130 (01-07-25 @ 12:00)  BP: --  RR: 24 (01-07-25 @ 12:00)  SpO2: 100% (01-07-25 @ 12:00)  Wt(kg): --    PHYSICAL EXAM:    Constitutional: AAOx3, NAD,   Eyes: PERRL, EOMI, sclera non-icteric  Neck: supple, no masses, no JVD  Respiratory: CTA b/l, good air entry b/l, no wheezing, rhonchi, rales, with normal respiratory effort and no intercostal retractions  Cardiovascular: RRR, normal S1S2, no M/R/G  Gastrointestinal: soft, NTND, no masses palpable, BS normal in all four quadrants, no HSM  Extremities:  no c/c/e  Neurological: Grossly intact  Skin: Normal temperature    MEDICATIONS  (STANDING):  albuterol/ipratropium for Nebulization 3 milliLiter(s) Nebulizer every 6 hours  artificial tears (preservative free) Ophthalmic Solution 1 Drop(s) Both EYES four times a day  chlorhexidine 0.12% Liquid 15 milliLiter(s) Oral Mucosa every 12 hours  chlorhexidine 2% Cloths 1 Application(s) Topical daily  clonazePAM  Tablet 0.5 milliGRAM(s) Oral at bedtime  dexMEDEtomidine Infusion 0.2 MICROgram(s)/kG/Hr (4.18 mL/Hr) IV Continuous <Continuous>  dextrose 50% Injectable 50 milliLiter(s) IV Push every 15 minutes  dextrose 50% Injectable 25 milliLiter(s) IV Push every 15 minutes  DOBUTamine Infusion 4 MICROgram(s)/kG/Min (10 mL/Hr) IV Continuous <Continuous>  heparin  Infusion 850 Unit(s)/Hr (8.5 mL/Hr) IV Continuous <Continuous>  insulin regular Infusion 1 Unit(s)/Hr (1 mL/Hr) IV Continuous <Continuous>  levETIRAcetam   Injectable 500 milliGRAM(s) IV Push every 12 hours  meropenem  IVPB 1000 milliGRAM(s) IV Intermittent every 8 hours  mupirocin 2% Nasal 1 Application(s) Both Nostrils two times a day  nitroprusside Infusion 0.3 MICROgram(s)/kG/Min (3.76 mL/Hr) IV Continuous <Continuous>  pantoprazole  Injectable 40 milliGRAM(s) IV Push every 12 hours  polyethylene glycol 3350 17 Gram(s) Oral daily  senna 2 Tablet(s) Oral at bedtime  sodium chloride 0.9% lock flush 3 milliLiter(s) IV Push every 8 hours  sodium chloride 0.9%. 1000 milliLiter(s) (10 mL/Hr) IV Continuous <Continuous>  vancomycin  IVPB 750 milliGRAM(s) IV Intermittent every 12 hours  vasopressin Infusion 0.04 Unit(s)/Min (6 mL/Hr) IV Continuous <Continuous>    MEDICATIONS  (PRN):      doxycycline (Angioedema)  penicillin (Unknown)  clindamycin (Angioedema)  linezolid (Angioedema)      LABS:                        9.0    10.76 )-----------( 64       ( 07 Jan 2025 00:20 )             25.7     01-07    143  |  105  |  30[H]  ----------------------------<  116[H]  4.1   |  27  |  0.92    Ca    8.5      07 Jan 2025 00:26  Phos  3.1     01-07  Mg     2.3     01-07    TPro  5.7[L]  /  Alb  2.5[L]  /  TBili  2.2[H]  /  DBili  x   /  AST  90[H]  /  ALT  105[H]  /  AlkPhos  132[H]  01-07    PT/INR - ( 07 Jan 2025 00:20 )   PT: 11.1 sec;   INR: 0.97 ratio         PTT - ( 07 Jan 2025 00:20 )  PTT:34.8 sec Lactate Dehydrogenase, Serum: 828 U/L (01-07 @ 00:26)    Urinalysis Basic - ( 07 Jan 2025 00:26 )    Color: x / Appearance: x / SG: x / pH: x  Gluc: 116 mg/dL / Ketone: x  / Bili: x / Urobili: x   Blood: x / Protein: x / Nitrite: x   Leuk Esterase: x / RBC: x / WBC x   Sq Epi: x / Non Sq Epi: x / Bacteria: x        RADIOLOGY & ADDITIONAL TESTS:  Studies reviewed.   Hematology Follow-up    INTERVAL HPI/OVERNIGHT EVENTS:  Patient seen and evaluated at bedside, accompanied by brother. Pending ecmo decannulation today, appears very ill.    VITAL SIGNS:  T(F): 100 (01-07-25 @ 12:00)  HR: 130 (01-07-25 @ 12:00)  BP: --  RR: 24 (01-07-25 @ 12:00)  SpO2: 100% (01-07-25 @ 12:00)  Wt(kg): --    PHYSICAL EXAM:    GENERAL: ill appearing  HEAD:  Atraumatic, Normocephalic  EYES: EOMI, conjunctiva and sclera clear  NECK: Left and right IJ   CHEST/LUNG: Clear to auscultation bilaterally  HEART: Regular rate and rhythm  ABDOMEN: Soft, Nontender, Nondistended  NEUROLOGY: intubated and sedated     MEDICATIONS  (STANDING):  albuterol/ipratropium for Nebulization 3 milliLiter(s) Nebulizer every 6 hours  artificial tears (preservative free) Ophthalmic Solution 1 Drop(s) Both EYES four times a day  chlorhexidine 0.12% Liquid 15 milliLiter(s) Oral Mucosa every 12 hours  chlorhexidine 2% Cloths 1 Application(s) Topical daily  clonazePAM  Tablet 0.5 milliGRAM(s) Oral at bedtime  dexMEDEtomidine Infusion 0.2 MICROgram(s)/kG/Hr (4.18 mL/Hr) IV Continuous <Continuous>  dextrose 50% Injectable 50 milliLiter(s) IV Push every 15 minutes  dextrose 50% Injectable 25 milliLiter(s) IV Push every 15 minutes  DOBUTamine Infusion 4 MICROgram(s)/kG/Min (10 mL/Hr) IV Continuous <Continuous>  heparin  Infusion 850 Unit(s)/Hr (8.5 mL/Hr) IV Continuous <Continuous>  insulin regular Infusion 1 Unit(s)/Hr (1 mL/Hr) IV Continuous <Continuous>  levETIRAcetam   Injectable 500 milliGRAM(s) IV Push every 12 hours  meropenem  IVPB 1000 milliGRAM(s) IV Intermittent every 8 hours  mupirocin 2% Nasal 1 Application(s) Both Nostrils two times a day  nitroprusside Infusion 0.3 MICROgram(s)/kG/Min (3.76 mL/Hr) IV Continuous <Continuous>  pantoprazole  Injectable 40 milliGRAM(s) IV Push every 12 hours  polyethylene glycol 3350 17 Gram(s) Oral daily  senna 2 Tablet(s) Oral at bedtime  sodium chloride 0.9% lock flush 3 milliLiter(s) IV Push every 8 hours  sodium chloride 0.9%. 1000 milliLiter(s) (10 mL/Hr) IV Continuous <Continuous>  vancomycin  IVPB 750 milliGRAM(s) IV Intermittent every 12 hours  vasopressin Infusion 0.04 Unit(s)/Min (6 mL/Hr) IV Continuous <Continuous>    MEDICATIONS  (PRN):      doxycycline (Angioedema)  penicillin (Unknown)  clindamycin (Angioedema)  linezolid (Angioedema)      LABS:                        9.0    10.76 )-----------( 64       ( 07 Jan 2025 00:20 )             25.7     01-07    143  |  105  |  30[H]  ----------------------------<  116[H]  4.1   |  27  |  0.92    Ca    8.5      07 Jan 2025 00:26  Phos  3.1     01-07  Mg     2.3     01-07    TPro  5.7[L]  /  Alb  2.5[L]  /  TBili  2.2[H]  /  DBili  x   /  AST  90[H]  /  ALT  105[H]  /  AlkPhos  132[H]  01-07    PT/INR - ( 07 Jan 2025 00:20 )   PT: 11.1 sec;   INR: 0.97 ratio         PTT - ( 07 Jan 2025 00:20 )  PTT:34.8 sec Lactate Dehydrogenase, Serum: 828 U/L (01-07 @ 00:26)    Urinalysis Basic - ( 07 Jan 2025 00:26 )    Color: x / Appearance: x / SG: x / pH: x  Gluc: 116 mg/dL / Ketone: x  / Bili: x / Urobili: x   Blood: x / Protein: x / Nitrite: x   Leuk Esterase: x / RBC: x / WBC x   Sq Epi: x / Non Sq Epi: x / Bacteria: x        RADIOLOGY & ADDITIONAL TESTS:  Studies reviewed.

## 2025-01-07 NOTE — EEG REPORT - NS EEG TEXT BOX
This is a Continuous Video EEG.     Recording Technique: The patient underwent continuous video-EEG monitoring, using Telemetry System hardware on the [employer].  EEG and video data were stored on a computer hard drive with important events saved in digital archive files. The material was reviewed by a physician (electroencephalographer/epileptologist) on a daily basis.  [place] and seizure detection algorithms were utilized and reviewed.  An EEG Technician attended to the patient for 8 to 10 hours per day. The patient was observed by the epilepsy nursing staff 24 hours per day. The epilepsy center neurologist was available in person or on call 24 hours per day..     Placement and Labeling of Electrodes: The EEG was performed utilizing at least 20 channel referential EEG connections (coronal over temporal over parasagittal montage) with inferior temporal electrodes when indicting and using all standard 10-20 electrode placements with EKG, with additional electrodes placed in the inferior temporal region using the modified 10-10 montage electrode placements for elective admissions, or if deemed necessary.  Recording was at  a sampling rate of 256 samples per second per channel. Time synchronized digital video recording was done simultaneously with EEG recording.  A low light infrared camera was used for low light recording..    EEG REPORT:     CARSON LEE MRN-[id number]     Study Date: 		01-06-25 (0800) - 01-07-25 (0800)   Duration: 24hr   --------------------------------------------------------------------------------------------------  History:  CC/ HPI Patient is a 59y old  Female who presents with a chief complaint of Mixed Shock (05 Jan 2025 07:26)    MEDICATIONS  (STANDING):  clonazePAM  Tablet 0.5 milliGRAM(s) Oral at bedtime  dexMEDEtomidine Infusion 0.2 MICROgram(s)/kG/Hr (4.18 mL/Hr) IV Continuous <Continuous>  DOBUTamine Infusion 5 MICROgram(s)/kG/Min (12.5 mL/Hr) IV Continuous <Continuous>  heparin  Infusion 850 Unit(s)/Hr (8.5 mL/Hr) IV Continuous <Continuous>  insulin regular Infusion 1 Unit(s)/Hr (1 mL/Hr) IV Continuous <Continuous>  meropenem  IVPB 1000 milliGRAM(s) IV Intermittent every 8 hours  mupirocin 2% Nasal 1 Application(s) Both Nostrils two times a day  nitroprusside Infusion 0.3 MICROgram(s)/kG/Min (3.76 mL/Hr) IV Continuous <Continuous>  pantoprazole  Injectable 40 milliGRAM(s) IV Push every 12 hours  polyethylene glycol 3350 17 Gram(s) Oral daily  senna 2 Tablet(s) Oral at bedtime  sodium chloride 0.9% lock flush 3 milliLiter(s) IV Push every 8 hours  sodium chloride 0.9%. 1000 milliLiter(s) (10 mL/Hr) IV Continuous <Continuous>  vancomycin  IVPB 750 milliGRAM(s) IV Intermittent every 12 hours  vasopressin Infusion 0.04 Unit(s)/Min (6 mL/Hr) IV Continuous <Continuous>      --------------------------------------------------------------------------------------------------  Study Interpretation:    [[[Abbreviation Key:  PDR=alpha rhythm/posterior dominant rhythm. A-P=anterior posterior gradient.  Amplitude: ‘very low’:<20; ‘low’:20-50; ‘medium’:; ‘high’:>200uV.  Persistence for periodic/rhythmic patterns (% of epoch) ‘rare’:<1%; ‘occasional’:1-10%; ‘frequent’:10-50%; ‘abundant’:50-90%; ‘continuous’:>90%.  Persistence for sporadic discharges: ‘rare’:<1/hr; ‘occasional’:1/min-1/hr; ‘frequent’:>1/min; ‘abundant’:>1/10 sec.  GRDA=generalized rhythmic delta activity; FIRDA=frontal intermittent GRDA; LRDA=lateralized rhythmic delta activity; TIRDA=temporal intermittent rhythmic delta activity;  LPD=PLED=lateralized periodic discharges; GPD=generalized periodic discharges; BiPDs=BiPLEDs=bilateral independent periodic epileptiform discharges; SIRPID=stimulus induced rhythmic, periodic, or ictal appearing discharges; BIRDs=brief potentially ictal rhythmic discharges >4 Hz, lasting .5-10s; PFA=paroxysmal bursts of beta/gamma; LVFA=low voltage fast activity.  Modifiers: +F=with fast component; +S=with spike component; +R=with rhythmic component.  S-B=burst suppression pattern.  Max=maximal. N1-drowsy; N2-stage II sleep; N3-slow wave sleep. SSS/BETS=small sharp spikes/benign epileptiform transients of sleep. HV=hyperventilation; PS=photic stimulation]]]    Daily EEG Visual Analysis    FINDINGS:      Background:  Continuity: Continuous  Symmetry: asymmetric   PDR: none  Reactivity: present  Voltage: nl  Anterior Posterior Gradient: absent  Other background findings: none  Breach: absent    Background Slowing:  Generalized slowing: Background is diffusely slow consisting of polymorphic delta theta activity     Focal slowing: voltage attenuation over the right hemisphere    State Changes:   -Clinical sleep with attenuation of periodic/rhythmic activity, mostly theta, and rudimentary spindles    Sporadic Epileptiform Discharges:    Frequent left frontal maximal sharp waves Fp1 F3  Occasional right frontal sharp waves max  F4  Bilateral independent posterior quadrant spiking with arousal/stimulation quasiperiodic near 1.5hz    Rhythmic and Periodic Patterns (RPPs):  With arousal: Intermittent generalized periodic discharges with triphasic morphology at times sharply contoured, 1-1.5Hz, blunted over the right hemisphere  Left LRDA to 1.5hz max temporally    Electrographic and Electroclinical seizures:  None    Other Clinical Events:  Event button pushed multiple times but without EEG correlate    Activation Procedures:   -Hyperventilation was not performed.    -Photic stimulation was not performed.      Artifacts:  Intermittent myogenic and movement artifacts were noted.    ECG:  The heart rate on single channel ECG was unable to be discerned     -------------------------------------------------------------------------------------------------------  EEG Classification / Summary:  Abnormal  EEG in a lethargic patient:   Sharp waves independently more frequent over the left frontal, and at times right frontal regions.  Bilateral independent posterior quadrant quasiperiodic spiking with arousal/stimulation near 1.5hz  Intermittent left temporal LRDA to 1.5hz  Intermittent generalized periodic discharges with triphasic morphology, at times sharply contoured, 1-1.5hz, blunted over the right hemisphere    Continuous voltage attenuation over the right hemisphere   Generalized background slowing, moderate    -------------------------------------------------------------------------------------------------------  Clinical Impression:   Multifocal cortical irritability and risk of seizures from multiple regions (bilateral frontal, posterior quadrant regions).  More apparent likely due to reduction of sedation.  Triphasic waves are classically associated with encephalopathy (infectious, metabolic, toxic in etiology)  Structural abnormality in the right hemisphere   Moderate  generalized background slowing  There were no seizures recorded.     MD LEIF Cartagena  Director, Continuous EEG Monitoring Program, [facility]   and Epilepsy Fellowship ,   Department of Neurology, [employer] [employer]    [facility] EEG Reading Room Ph#: [phone]  Epilepsy Answering Service after 5PM and before 8:30AM: Ph#: [phone]

## 2025-01-07 NOTE — PROGRESS NOTE ADULT - ATTENDING COMMENTS
During the evaluation, the patient's multiple family members were present, and I appreciated their involvement and support.    Detailed neuro exam:  ROS: Unable to obtain due to the patient is currently orally intubated.  Please note, neuro exam is very limited due to the patient on mechanical ventilation via orally intubated and on the Precedex.  General: Patient is comfortably lying on bed without any acute distress.  Mental status: On verbal command, patient unable to follow any simple or complex commands.  Cranial exams: Brainstem reflexes are intact cornea, conjunctiva and gag reflexes. Face looks symmetric. Pupils are symmetric, reactive to light bilaterally.  Power: On noxious stimuli the patient had 0/5 bilateral four extremities.  Sensation: On noxious stimuli patient grimace at all four extremities.  Coordination and gait: Patient did not participate in the setting of comatose.     A/P:  Neurology consulted because of seizure. Etiology of seizure is uncertain and patient requires further workup to rule out treatable etiologies.  During my evaluation, patient found to have right gaze deviation and EEG consistent with bilateral multifocal epileptiform potentials. Clinically suspicious for seizure. Ativan 4 mg stat, Keppra 1500 mg once as extras dose and patient already received 500 mg and increase to 1000mg BID as maintenance dose.   Patient would benefit from MRI brain with and without contrast once patient stable.  Patient would benefit repeat VEEG 24 hours.   Continue medical management, neuro- check and fall precaution.  GI and DVT prophylaxis.    Patient is at high risk for complications and morbidity or mortality. There is high probability of imminent or life threatening deterioration in the patient's condition.  Patient is unable or incompetent to participate in giving a history and/or making decisions and discussion is necessary for determining treatment decisions.  I discussed the diagnosis, treatment plan and prognosis with the patient's family. Risk benefit and alternatives to the treatment were discussed. All questions and concerns were addressed. The patient's family demonstrated good understanding of the treatment plan.  My cumulative time taking care of this critically ill patient is 60 minutes  If you have any further questions, please do not hesitate to contact our team.  Thank you for allowing us to participate in this patient care.

## 2025-01-07 NOTE — PRE-ANESTHESIA EVALUATION ADULT - NSANTHPMHFT_GEN_ALL_CORE
8 yo F with Sickle cell disease and NICM now with Cardiogenic shock s/p VA ECMO on 1/4/25.    VA ECMO 3300 rpms, flow of 3.2, sweep of 1    on DObutamine and GABRIELLA 5 ppm 10 yo F with Sickle cell disease and NICM now with Cardiogenic shock s/p VA ECMO on 1/4/25.    VA ECMO 3300 rpms, flow of 3.2, sweep of 1    on DObutamine and GABRIELLA 5 ppm, Vaso

## 2025-01-07 NOTE — PROGRESS NOTE ADULT - ASSESSMENT
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 Course c/b episodes of unresponsiveness, seizure requiring intubation. Was transferred to [facility] for further workup. Gordy was d/c at [facility] d/t negative EEG. At [facility] MICU, patient was found to have RV failure possibly iso pulm HTN 2/2 increased tidal volumes on the ventilator. On addition, pt possibly received a lot of volume i/s/o SS crisis. Neuro was consulted for seizures and was placed on vEEG. She was found to have worsening hemodynamics despite on inotrope, pressors and diuretics, and concerns for shocks. Shock call was made and patient was transferred to [facility] 1/3/25 for further workup.     She was cannulated for VA ECMO on 25. on bumex gtt making good UOP. Remains on , weaned off vaso/epi. Also noted to have leukocytosis and is on antibiotics. Has EEG ongoing. Continue supportive care at this time.       Hemodynamics  25 (VA ECMO, CVP 7, PA 36/18/26)  25 (VA ECMO,  CVP 12-14, PA 31//28)    Cardiac Studies:  -TTE 25: LVEF <20%, ECMO cannula in RV, mildly enlarged RV size and reduced RV systolic function, no pericardial effusion, no MR, trace TR, IVC nml size   -TTE 1/3/25: LVIDd 3.1cm, LVEF appears grossly normal, RV severly enlarged size, reduced function, TAPSE 1.1cm, nml RA, trace pericardial effusion, mod/severe TR PASP 45,

## 2025-01-07 NOTE — PRE-ANESTHESIA EVALUATION ADULT - NSANTHPEFT_GEN_ALL_CORE
Physical Exam:  General: Intubated  Neurology: Withdrawing to noxious stimuli in all extremities, +cough, +gag  Respiratory: Breath sounds bilaterally   CV: Sinus Tachycardia   Extremities: Warm, well-perfused General: Intubated  Neurology: sedated, not following commands.   Respiratory: Breath sounds bilaterally   CV: Sinus Tachycardia   Extremities: Warm, well-perfused  peripheral ECMO

## 2025-01-07 NOTE — PROGRESS NOTE ADULT - PROBLEM SELECTOR PLAN 4
-was noted to have seizure at [facility], EEG neg and Keppra d/c  -EEG currently on   -neuro to f/u.

## 2025-01-07 NOTE — PRE-ANESTHESIA EVALUATION ADULT - NSANTHADDINFOFT_GEN_ALL_CORE
discussed with neurology, concern for seizure activity on EEG. patient given antiseizure medications including keppra and ativan.  Ok to proceed to OR per neurology.

## 2025-01-07 NOTE — BRIEF OPERATIVE NOTE - OPERATION/FINDINGS
Pt is now s/p VA ECMO decannulation. Case was complicated by hematoma formation in Left thigh (site of arterial cannula). 2U pRBC, 2 platelet and 5U cryo given. Lines unchanged.

## 2025-01-07 NOTE — PROGRESS NOTE ADULT - CRITICAL CARE ATTENDING COMMENT
meds:  5, nipride alt vaso, heparin (34), sean, vanco, gay 5ppm bumex PRN   no significant change in MS. Spontaneous eye movements. Corneal and gag reflexes.   Dr Patricia discussed case with neurology  and neuroradiology: CT scan and MRI not consistent with fat embolus (both scans unremarkable).   EEG: encephalopathy.   Afebrile , MAPS , 92/-133/ afebrile  ECMO 3000 Flow 2.6-2.7   CVP 7, PA 27/12 19,  I/O: -1.2  01-07    143  |  105  |  30[H]  ----------------------------<  116[H]  4.1   |  27  |  0.92    Ca    8.5      07 Jan 2025 00:26  Phos  3.1     01-07  Mg     2.3     01-07    TPro  5.7[L]  /  Alb  2.5[L]  /  TBili  2.2[H]  /  DBili  x   /  AST  90[H]  /  ALT  105[H]  /  AlkPhos  132[H]  01-07  LFTS improving                           9.0    10.76 )-----------( 64       ( 07 Jan 2025 00:20 )             25.7   , 865   1.5 BC X2 negative   Discussed on MDR.   Plan:   For ECMO decannulation today.   Continue to use nipride to target goal MAPS 70-75.   Continue  and GAY for now.   PRN diuretics to keep CVP 8-10 after decannulation.   TTE off ECMO tomorrow  Alexei Lepe

## 2025-01-07 NOTE — PROGRESS NOTE ADULT - PROBLEM SELECTOR PLAN 3
-noted to have WBC 16.61, downtrending. Today 11.76  -on antibiotics cefepime and vanco  -full infectious workup

## 2025-01-07 NOTE — PROGRESS NOTE ADULT - SUBJECTIVE AND OBJECTIVE BOX
ADVANCED HEART FAILURE & TRANSPLANT  - PROGRESS NOTE  *To reach the NS1 Team from 8am to 5pm, please call [phone].    ___________________________________________________________________________  Subjective:    Medications:  albuterol/ipratropium for Nebulization 3 milliLiter(s) Nebulizer every 6 hours  artificial tears (preservative free) Ophthalmic Solution 1 Drop(s) Both EYES four times a day  chlorhexidine 0.12% Liquid 15 milliLiter(s) Oral Mucosa every 12 hours  chlorhexidine 2% Cloths 1 Application(s) Topical daily  clonazePAM  Tablet 0.5 milliGRAM(s) Oral at bedtime  dexMEDEtomidine Infusion 0.2 MICROgram(s)/kG/Hr IV Continuous <Continuous>  dextrose 50% Injectable 50 milliLiter(s) IV Push every 15 minutes  dextrose 50% Injectable 25 milliLiter(s) IV Push every 15 minutes  DOBUTamine Infusion 5 MICROgram(s)/kG/Min IV Continuous <Continuous>  heparin  Infusion 850 Unit(s)/Hr IV Continuous <Continuous>  insulin regular Infusion 1 Unit(s)/Hr IV Continuous <Continuous>  meropenem  IVPB 1000 milliGRAM(s) IV Intermittent every 8 hours  mupirocin 2% Nasal 1 Application(s) Both Nostrils two times a day  nitroprusside Infusion 0.3 MICROgram(s)/kG/Min IV Continuous <Continuous>  pantoprazole  Injectable 40 milliGRAM(s) IV Push every 12 hours  polyethylene glycol 3350 17 Gram(s) Oral daily  senna 2 Tablet(s) Oral at bedtime  sodium chloride 0.9% lock flush 3 milliLiter(s) IV Push every 8 hours  sodium chloride 0.9%. 1000 milliLiter(s) IV Continuous <Continuous>  vancomycin  IVPB 750 milliGRAM(s) IV Intermittent every 12 hours  vasopressin Infusion 0.04 Unit(s)/Min IV Continuous <Continuous>      Physical Exam:    Vitals:  Vital Signs Last 24 Hours  T(C): 37.5 (25 @ 08:00), Max: 37.5 (25 @ 08:00)  HR: 110 (25 @ 08:15) (72 - 136)  BP: --  RR: 19 (25 @ 08:15) (12 - 30)  SpO2: 100% (25 @ 08:15) (86% - 100%)    Weight in k.8 ( @ 00:00)    I&O's Summary    2025 07:01  -  2025 07:00  --------------------------------------------------------  IN: 2726.2 mL / OUT: 3935 mL / NET: -1208.8 mL        Tele:    General: No distress. Comfortable.  HEENT: EOM intact.  Neck: Neck supple. JVP not elevated. No masses  Chest: Clear to auscultation bilaterally  CV: Normal S1 and S2. No murmurs, rub, or gallops. Radial pulses normal.  Abdomen: Soft, non-distended, non-tender  Skin: No rashes or skin breakdown  Neurology: Alert and oriented times three. Sensation intact  Psych: Affect normal    Labs:                        9.0    10.76 )-----------( 64       ( 2025 00:20 )             25.7         143  |  105  |  30[H]  ----------------------------<  116[H]  4.1   |  27  |  0.92    Ca    8.5      2025 00:26  Phos  3.1       Mg     2.3         TPro  5.7[L]  /  Alb  2.5[L]  /  TBili  2.2[H]  /  DBili  x   /  AST  90[H]  /  ALT  105[H]  /  AlkPhos  132[H]      PT/INR - ( 2025 00:20 )   PT: 11.1 sec;   INR: 0.97 ratio       PTT - ( 2025 00:20 )  PTT:34.8 sec    Oxygen Saturation, Mixed: 77.6 ( @ 23:55)  Oxygen Saturation, Mixed: 78.9 ( @ 19:30)  Oxygen Saturation, Mixed: 73.3 ( @ 10:35)  Oxygen Saturation, Mixed: 79.6 ( @ 09:00)  Oxygen Saturation, Mixed: 84.4 ( @ 23:33)  Oxygen Saturation, Mixed: 80.6 ( @ 16:09)  Oxygen Saturation, Mixed: 84.2 ( @ 11:15)  Oxygen Saturation, Mixed: 76.0 ( @ 07:55)    Lactate Dehydrogenase, Serum: 828 U/L ( @ 00:26)  Lactate Dehydrogenase, Serum: 865 U/L ( @ 00:31)  Lactate Dehydrogenase, Serum: 879 U/L ( @ 00:37)  Lactate Dehydrogenase, Serum: 792 U/L ( @ 12:10)   ADVANCED HEART FAILURE & TRANSPLANT  - PROGRESS NOTE  *To reach the NS1 Team from 8am to 5pm, please call [phone].    ___________________________________________________________________________  Subjective:  - s/p ecmo turndown  with plan for OR today for ecmo decannulation  - oozing at cannula sites  - not following commands off precedex, remains intubated    Medications:  albuterol/ipratropium for Nebulization 3 milliLiter(s) Nebulizer every 6 hours  artificial tears (preservative free) Ophthalmic Solution 1 Drop(s) Both EYES four times a day  chlorhexidine 0.12% Liquid 15 milliLiter(s) Oral Mucosa every 12 hours  chlorhexidine 2% Cloths 1 Application(s) Topical daily  clonazePAM  Tablet 0.5 milliGRAM(s) Oral at bedtime  dexMEDEtomidine Infusion 0.2 MICROgram(s)/kG/Hr IV Continuous <Continuous>  dextrose 50% Injectable 50 milliLiter(s) IV Push every 15 minutes  dextrose 50% Injectable 25 milliLiter(s) IV Push every 15 minutes  DOBUTamine Infusion 5 MICROgram(s)/kG/Min IV Continuous <Continuous>  heparin  Infusion 850 Unit(s)/Hr IV Continuous <Continuous>  insulin regular Infusion 1 Unit(s)/Hr IV Continuous <Continuous>  meropenem  IVPB 1000 milliGRAM(s) IV Intermittent every 8 hours  mupirocin 2% Nasal 1 Application(s) Both Nostrils two times a day  nitroprusside Infusion 0.3 MICROgram(s)/kG/Min IV Continuous <Continuous>  pantoprazole  Injectable 40 milliGRAM(s) IV Push every 12 hours  polyethylene glycol 3350 17 Gram(s) Oral daily  senna 2 Tablet(s) Oral at bedtime  sodium chloride 0.9% lock flush 3 milliLiter(s) IV Push every 8 hours  sodium chloride 0.9%. 1000 milliLiter(s) IV Continuous <Continuous>  vancomycin  IVPB 750 milliGRAM(s) IV Intermittent every 12 hours  vasopressin Infusion 0.04 Unit(s)/Min IV Continuous <Continuous>      Physical Exam:    Vitals:  Vital Signs Last 24 Hours  T(C): 37.5 (25 @ 08:00), Max: 37.5 (25 @ 08:00)  HR: 110 (25 @ 08:15) (72 - 136)  BP: 92//91 MAPs   RR: 19 (25 @ 08:15) (12 - 30)  SpO2: 100% (25 @ 08:15) (86% - 100%) on Mara 5 ppm    Weight in k.8 ( @ 00:00)    I&O's Summary    2025 07:01  -  2025 07:00  --------------------------------------------------------  IN: 2726.2 mL / OUT: 3935 mL / NET: -1208.8 mL    Tele: ST    General: Intubated, no acute distress  HEENT: moving eyes spontaneously  Neck: Neck supple. JVP not elevated. No masses  Chest: Clear to auscultation bilaterally, compliant with vent  CV: S1S2, No LE edema, dopplerable LE pulses, L slightly cooler than R  Abdomen: Soft, non-distended, non-tender  Skin: oozing at cannulation sites  Neurology: not withdrawing to painful stimuli or following commands, some spontaneous eye movements  Psych: MENDEL    Labs:                        9.0    10.76 )-----------( 64       ( 2025 00:20 )             25.7         143  |  105  |  30[H]  ----------------------------<  116[H]  4.1   |  27  |  0.92    Ca    8.5      2025 00:26  Phos  3.1       Mg     2.3         TPro  5.7[L]  /  Alb  2.5[L]  /  TBili  2.2[H]  /  DBili  x   /  AST  90[H]  /  ALT  105[H]  /  AlkPhos  132[H]      PT/INR - ( 2025 00:20 )   PT: 11.1 sec;   INR: 0.97 ratio       PTT - ( 2025 00:20 )  PTT:34.8 sec    Oxygen Saturation, Mixed: 77.6 ( @ 23:55)  Oxygen Saturation, Mixed: 78.9 ( @ 19:30)  Oxygen Saturation, Mixed: 73.3 ( @ 10:35)  Oxygen Saturation, Mixed: 79.6 ( @ 09:00)  Oxygen Saturation, Mixed: 84.4 ( @ 23:33)  Oxygen Saturation, Mixed: 80.6 ( @ 16:09)  Oxygen Saturation, Mixed: 84.2 ( @ 11:15)  Oxygen Saturation, Mixed: 76.0 ( @ 07:55)    Lactate Dehydrogenase, Serum: 828 U/L ( @ 00:26)  Lactate Dehydrogenase, Serum: 865 U/L ( @ 00:31)  Lactate Dehydrogenase, Serum: 879 U/L ( @ 00:37)  Lactate Dehydrogenase, Serum: 792 U/L ( @ 12:10)

## 2025-01-08 ENCOUNTER — RESULT REVIEW (OUTPATIENT)
Age: 60
End: 2025-01-08

## 2025-01-08 LAB
ALBUMIN SERPL ELPH-MCNC: 2.7 G/DL — LOW (ref 3.3–5)
ALBUMIN SERPL ELPH-MCNC: 3.1 G/DL — LOW (ref 3.3–5)
ALP SERPL-CCNC: 120 U/L — SIGNIFICANT CHANGE UP (ref 40–120)
ALP SERPL-CCNC: 135 U/L — HIGH (ref 40–120)
ALT FLD-CCNC: 75 U/L — HIGH (ref 10–45)
ALT FLD-CCNC: 87 U/L — HIGH (ref 10–45)
ANION GAP SERPL CALC-SCNC: 11 MMOL/L — SIGNIFICANT CHANGE UP (ref 5–17)
ANION GAP SERPL CALC-SCNC: 13 MMOL/L — SIGNIFICANT CHANGE UP (ref 5–17)
APTT BLD: 23.4 SEC — LOW (ref 24.5–35.6)
APTT BLD: 24.4 SEC — LOW (ref 24.5–35.6)
AST SERPL-CCNC: 50 U/L — HIGH (ref 10–40)
AST SERPL-CCNC: 63 U/L — HIGH (ref 10–40)
BASE EXCESS BLDMV CALC-SCNC: 10 MMOL/L — HIGH (ref -3–3)
BASE EXCESS BLDMV CALC-SCNC: 10.6 MMOL/L — HIGH (ref -3–3)
BASE EXCESS BLDMV CALC-SCNC: 10.6 MMOL/L — HIGH (ref -3–3)
BASE EXCESS BLDMV CALC-SCNC: 11.2 MMOL/L — HIGH (ref -3–3)
BASE EXCESS BLDMV CALC-SCNC: 11.2 MMOL/L — HIGH (ref -3–3)
BASE EXCESS BLDMV CALC-SCNC: 13.7 MMOL/L — HIGH (ref -3–3)
BASE EXCESS BLDMV CALC-SCNC: 9.7 MMOL/L — HIGH (ref -3–3)
BASE EXCESS BLDV CALC-SCNC: 8.8 MMOL/L — HIGH (ref -2–3)
BILIRUB SERPL-MCNC: 2.4 MG/DL — HIGH (ref 0.2–1.2)
BILIRUB SERPL-MCNC: 2.4 MG/DL — HIGH (ref 0.2–1.2)
BUN SERPL-MCNC: 27 MG/DL — HIGH (ref 7–23)
BUN SERPL-MCNC: 29 MG/DL — HIGH (ref 7–23)
CALCIUM SERPL-MCNC: 8.4 MG/DL — SIGNIFICANT CHANGE UP (ref 8.4–10.5)
CALCIUM SERPL-MCNC: 8.7 MG/DL — SIGNIFICANT CHANGE UP (ref 8.4–10.5)
CHLORIDE SERPL-SCNC: 104 MMOL/L — SIGNIFICANT CHANGE UP (ref 96–108)
CHLORIDE SERPL-SCNC: 106 MMOL/L — SIGNIFICANT CHANGE UP (ref 96–108)
CO2 BLDMV-SCNC: 36 MMOL/L — HIGH (ref 21–29)
CO2 BLDMV-SCNC: 36 MMOL/L — HIGH (ref 21–29)
CO2 BLDMV-SCNC: 37 MMOL/L — HIGH (ref 21–29)
CO2 BLDMV-SCNC: 37 MMOL/L — HIGH (ref 21–29)
CO2 BLDMV-SCNC: 38 MMOL/L — HIGH (ref 21–29)
CO2 BLDMV-SCNC: 38 MMOL/L — HIGH (ref 21–29)
CO2 BLDMV-SCNC: 40 MMOL/L — HIGH (ref 21–29)
CO2 BLDV-SCNC: 36 MMOL/L — HIGH (ref 22–26)
CO2 SERPL-SCNC: 28 MMOL/L — SIGNIFICANT CHANGE UP (ref 22–31)
CO2 SERPL-SCNC: 31 MMOL/L — SIGNIFICANT CHANGE UP (ref 22–31)
CREAT SERPL-MCNC: 0.93 MG/DL — SIGNIFICANT CHANGE UP (ref 0.5–1.3)
CREAT SERPL-MCNC: 0.98 MG/DL — SIGNIFICANT CHANGE UP (ref 0.5–1.3)
CULTURE RESULTS: SIGNIFICANT CHANGE UP
CULTURE RESULTS: SIGNIFICANT CHANGE UP
EGFR: 67 ML/MIN/1.73M2 — SIGNIFICANT CHANGE UP
EGFR: 72 ML/MIN/1.73M2 — SIGNIFICANT CHANGE UP
GAS PNL BLDA: SIGNIFICANT CHANGE UP
GAS PNL BLDMV: SIGNIFICANT CHANGE UP
GAS PNL BLDV: SIGNIFICANT CHANGE UP
GLUCOSE BLDC GLUCOMTR-MCNC: 115 MG/DL — HIGH (ref 70–99)
GLUCOSE BLDC GLUCOMTR-MCNC: 123 MG/DL — HIGH (ref 70–99)
GLUCOSE BLDC GLUCOMTR-MCNC: 125 MG/DL — HIGH (ref 70–99)
GLUCOSE BLDC GLUCOMTR-MCNC: 128 MG/DL — HIGH (ref 70–99)
GLUCOSE BLDC GLUCOMTR-MCNC: 128 MG/DL — HIGH (ref 70–99)
GLUCOSE BLDC GLUCOMTR-MCNC: 130 MG/DL — HIGH (ref 70–99)
GLUCOSE BLDC GLUCOMTR-MCNC: 135 MG/DL — HIGH (ref 70–99)
GLUCOSE BLDC GLUCOMTR-MCNC: 140 MG/DL — HIGH (ref 70–99)
GLUCOSE BLDC GLUCOMTR-MCNC: 142 MG/DL — HIGH (ref 70–99)
GLUCOSE SERPL-MCNC: 108 MG/DL — HIGH (ref 70–99)
GLUCOSE SERPL-MCNC: 117 MG/DL — HIGH (ref 70–99)
HCO3 BLDMV-SCNC: 34 MMOL/L — HIGH (ref 20–28)
HCO3 BLDMV-SCNC: 35 MMOL/L — HIGH (ref 20–28)
HCO3 BLDMV-SCNC: 36 MMOL/L — HIGH (ref 20–28)
HCO3 BLDMV-SCNC: 39 MMOL/L — HIGH (ref 20–28)
HCO3 BLDV-SCNC: 34 MMOL/L — HIGH (ref 22–29)
HCT VFR BLD CALC: 23.9 % — LOW (ref 34.5–45)
HCT VFR BLD CALC: 26.3 % — LOW (ref 34.5–45)
HEMOGLOBIN INTERPRETATION: SIGNIFICANT CHANGE UP
HGB A MFR BLD: 76.8 % — LOW (ref 95.8–98)
HGB A2 MFR BLD: 3.6 % — HIGH (ref 2–3.2)
HGB BLD-MCNC: 8.2 G/DL — LOW (ref 11.5–15.5)
HGB BLD-MCNC: 9 G/DL — LOW (ref 11.5–15.5)
HGB C MFR BLD: 10.3 % — HIGH
HGB S MFR BLD: 9.3 % — HIGH
HOROWITZ INDEX BLDMV+IHG-RTO: 40 — SIGNIFICANT CHANGE UP
HOROWITZ INDEX BLDMV+IHG-RTO: 50 — SIGNIFICANT CHANGE UP
INR BLD: 1.01 RATIO — SIGNIFICANT CHANGE UP (ref 0.85–1.16)
INR BLD: 1.02 RATIO — SIGNIFICANT CHANGE UP (ref 0.85–1.16)
MAGNESIUM SERPL-MCNC: 2.3 MG/DL — SIGNIFICANT CHANGE UP (ref 1.6–2.6)
MCHC RBC-ENTMCNC: 28.4 PG — SIGNIFICANT CHANGE UP (ref 27–34)
MCHC RBC-ENTMCNC: 28.7 PG — SIGNIFICANT CHANGE UP (ref 27–34)
MCHC RBC-ENTMCNC: 34.2 G/DL — SIGNIFICANT CHANGE UP (ref 32–36)
MCHC RBC-ENTMCNC: 34.3 G/DL — SIGNIFICANT CHANGE UP (ref 32–36)
MCV RBC AUTO: 83 FL — SIGNIFICANT CHANGE UP (ref 80–100)
MCV RBC AUTO: 83.6 FL — SIGNIFICANT CHANGE UP (ref 80–100)
NRBC # BLD: 12 /100 WBCS — HIGH (ref 0–0)
NRBC # BLD: 19 /100 WBCS — HIGH (ref 0–0)
NRBC BLD-RTO: 12 /100 WBCS — HIGH (ref 0–0)
NRBC BLD-RTO: 19 /100 WBCS — HIGH (ref 0–0)
O2 CT VFR BLD CALC: 42 MMHG — SIGNIFICANT CHANGE UP (ref 30–65)
O2 CT VFR BLD CALC: 42 MMHG — SIGNIFICANT CHANGE UP (ref 30–65)
O2 CT VFR BLD CALC: 43 MMHG — SIGNIFICANT CHANGE UP (ref 30–65)
O2 CT VFR BLD CALC: 43 MMHG — SIGNIFICANT CHANGE UP (ref 30–65)
O2 CT VFR BLD CALC: 46 MMHG — SIGNIFICANT CHANGE UP (ref 30–65)
PCO2 BLDMV: 45 MMHG — SIGNIFICANT CHANGE UP (ref 30–65)
PCO2 BLDMV: 47 MMHG — SIGNIFICANT CHANGE UP (ref 30–65)
PCO2 BLDMV: 48 MMHG — SIGNIFICANT CHANGE UP (ref 30–65)
PCO2 BLDMV: 48 MMHG — SIGNIFICANT CHANGE UP (ref 30–65)
PCO2 BLDMV: 49 MMHG — SIGNIFICANT CHANGE UP (ref 30–65)
PCO2 BLDMV: 49 MMHG — SIGNIFICANT CHANGE UP (ref 30–65)
PCO2 BLDMV: 50 MMHG — SIGNIFICANT CHANGE UP (ref 30–65)
PCO2 BLDV: 48 MMHG — HIGH (ref 39–42)
PH BLDMV: 7.48 — HIGH (ref 7.32–7.45)
PH BLDMV: 7.49 — HIGH (ref 7.32–7.45)
PH BLDMV: 7.5 — HIGH (ref 7.32–7.45)
PH BLDV: 7.46 — HIGH (ref 7.32–7.43)
PHOSPHATE SERPL-MCNC: 2.5 MG/DL — SIGNIFICANT CHANGE UP (ref 2.5–4.5)
PLATELET # BLD AUTO: 142 K/UL — LOW (ref 150–400)
PLATELET # BLD AUTO: 162 K/UL — SIGNIFICANT CHANGE UP (ref 150–400)
PO2 BLDV: 48 MMHG — HIGH (ref 25–45)
POTASSIUM SERPL-MCNC: 3.8 MMOL/L — SIGNIFICANT CHANGE UP (ref 3.5–5.3)
POTASSIUM SERPL-MCNC: 4 MMOL/L — SIGNIFICANT CHANGE UP (ref 3.5–5.3)
POTASSIUM SERPL-SCNC: 3.8 MMOL/L — SIGNIFICANT CHANGE UP (ref 3.5–5.3)
POTASSIUM SERPL-SCNC: 4 MMOL/L — SIGNIFICANT CHANGE UP (ref 3.5–5.3)
PROCALCITONIN SERPL-MCNC: 1.97 NG/ML — HIGH (ref 0.02–0.1)
PROT SERPL-MCNC: 5.6 G/DL — LOW (ref 6–8.3)
PROT SERPL-MCNC: 5.7 G/DL — LOW (ref 6–8.3)
PROTHROM AB SERPL-ACNC: 11.6 SEC — SIGNIFICANT CHANGE UP (ref 9.9–13.4)
PROTHROM AB SERPL-ACNC: 11.7 SEC — SIGNIFICANT CHANGE UP (ref 9.9–13.4)
RBC # BLD: 2.86 M/UL — LOW (ref 3.8–5.2)
RBC # BLD: 3.17 M/UL — LOW (ref 3.8–5.2)
RBC # FLD: 16.7 % — HIGH (ref 10.3–14.5)
RBC # FLD: 17 % — HIGH (ref 10.3–14.5)
SAO2 % BLDMV: 73.9 — SIGNIFICANT CHANGE UP (ref 60–90)
SAO2 % BLDMV: 75.5 — SIGNIFICANT CHANGE UP (ref 60–90)
SAO2 % BLDMV: 76.2 — SIGNIFICANT CHANGE UP (ref 60–90)
SAO2 % BLDMV: 76.5 — SIGNIFICANT CHANGE UP (ref 60–90)
SAO2 % BLDMV: 78.9 — SIGNIFICANT CHANGE UP (ref 60–90)
SAO2 % BLDMV: 79.2 — SIGNIFICANT CHANGE UP (ref 60–90)
SAO2 % BLDMV: 84.1 — SIGNIFICANT CHANGE UP (ref 60–90)
SAO2 % BLDV: 82.2 % — SIGNIFICANT CHANGE UP (ref 67–88)
SODIUM SERPL-SCNC: 145 MMOL/L — SIGNIFICANT CHANGE UP (ref 135–145)
SODIUM SERPL-SCNC: 148 MMOL/L — HIGH (ref 135–145)
SPECIMEN SOURCE: SIGNIFICANT CHANGE UP
SPECIMEN SOURCE: SIGNIFICANT CHANGE UP
UNFRACTIONATED HEPARIN INTERPRETATION: SIGNIFICANT CHANGE UP
UNFRACTIONATED HEPARIN INTERPRETATION: SIGNIFICANT CHANGE UP
UNFRACTIONATED HEPARIN RESULT: NEGATIVE — SIGNIFICANT CHANGE UP
UNFRACTIONATED HEPARIN RESULT: NEGATIVE — SIGNIFICANT CHANGE UP
UNFRACTIONATED HEPARIN-HIGH DOSE: 3 % — SIGNIFICANT CHANGE UP
UNFRACTIONATED HEPARIN-HIGH DOSE: 9 % — SIGNIFICANT CHANGE UP
UNFRACTIONATED HEPARIN-LOW DOSE: 1 % — SIGNIFICANT CHANGE UP
UNFRACTIONATED HEPARIN-LOW DOSE: 2 % — SIGNIFICANT CHANGE UP
VANCOMYCIN TROUGH SERPL-MCNC: 14.6 UG/ML — SIGNIFICANT CHANGE UP (ref 10–20)
VANCOMYCIN TROUGH SERPL-MCNC: 22 UG/ML — HIGH (ref 10–20)
WBC # BLD: 11.58 K/UL — HIGH (ref 3.8–10.5)
WBC # BLD: 11.87 K/UL — HIGH (ref 3.8–10.5)
WBC # FLD AUTO: 11.58 K/UL — HIGH (ref 3.8–10.5)
WBC # FLD AUTO: 11.87 K/UL — HIGH (ref 3.8–10.5)

## 2025-01-08 PROCEDURE — 99233 SBSQ HOSP IP/OBS HIGH 50: CPT

## 2025-01-08 PROCEDURE — 71045 X-RAY EXAM CHEST 1 VIEW: CPT | Mod: 26

## 2025-01-08 PROCEDURE — 99233 SBSQ HOSP IP/OBS HIGH 50: CPT | Mod: GC

## 2025-01-08 PROCEDURE — 93308 TTE F-UP OR LMTD: CPT | Mod: 26

## 2025-01-08 PROCEDURE — 95720 EEG PHY/QHP EA INCR W/VEEG: CPT

## 2025-01-08 PROCEDURE — 93325 DOPPLER ECHO COLOR FLOW MAPG: CPT | Mod: 26

## 2025-01-08 PROCEDURE — 93931 UPPER EXTREMITY STUDY: CPT | Mod: 26,RT

## 2025-01-08 PROCEDURE — 99291 CRITICAL CARE FIRST HOUR: CPT

## 2025-01-08 PROCEDURE — G0545: CPT

## 2025-01-08 PROCEDURE — 93010 ELECTROCARDIOGRAM REPORT: CPT

## 2025-01-08 PROCEDURE — 99223 1ST HOSP IP/OBS HIGH 75: CPT

## 2025-01-08 PROCEDURE — 74018 RADEX ABDOMEN 1 VIEW: CPT | Mod: 26

## 2025-01-08 PROCEDURE — 93321 DOPPLER ECHO F-UP/LMTD STD: CPT | Mod: 26

## 2025-01-08 PROCEDURE — 71045 X-RAY EXAM CHEST 1 VIEW: CPT | Mod: 26,77,76

## 2025-01-08 PROCEDURE — 31624 DX BRONCHOSCOPE/LAVAGE: CPT

## 2025-01-08 RX ORDER — FENTANYL CITRATE 50 UG/ML
50 INJECTION INTRAMUSCULAR; INTRAVENOUS ONCE
Refills: 0 | Status: DISCONTINUED | OUTPATIENT
Start: 2025-01-08 | End: 2025-01-08

## 2025-01-08 RX ORDER — FENTANYL CITRATE 50 UG/ML
25 INJECTION INTRAMUSCULAR; INTRAVENOUS ONCE
Refills: 0 | Status: DISCONTINUED | OUTPATIENT
Start: 2025-01-08 | End: 2025-01-08

## 2025-01-08 RX ORDER — VANCOMYCIN HYDROCHLORIDE 50 MG/ML
125 KIT ORAL EVERY 12 HOURS
Refills: 0 | Status: DISCONTINUED | OUTPATIENT
Start: 2025-01-08 | End: 2025-01-15

## 2025-01-08 RX ORDER — MUPIROCIN 2 G/100G
1 CREAM TOPICAL
Refills: 0 | Status: COMPLETED | OUTPATIENT
Start: 2025-01-08 | End: 2025-01-11

## 2025-01-08 RX ORDER — FENTANYL CITRATE 50 UG/ML
100 INJECTION INTRAMUSCULAR; INTRAVENOUS ONCE
Refills: 0 | Status: DISCONTINUED | OUTPATIENT
Start: 2025-01-08 | End: 2025-01-08

## 2025-01-08 RX ORDER — FENTANYL CITRATE 50 UG/ML
50 INJECTION INTRAMUSCULAR; INTRAVENOUS ONCE
Refills: 0 | Status: DISCONTINUED | OUTPATIENT
Start: 2025-01-08 | End: 2025-01-07

## 2025-01-08 RX ORDER — PROPOFOL 10 MG/ML
5 INJECTION, EMULSION INTRAVENOUS
Qty: 500 | Refills: 0 | Status: DISCONTINUED | OUTPATIENT
Start: 2025-01-08 | End: 2025-01-08

## 2025-01-08 RX ORDER — POTASSIUM CHLORIDE 750 MG/1
10 TABLET, EXTENDED RELEASE ORAL
Refills: 0 | Status: COMPLETED | OUTPATIENT
Start: 2025-01-08 | End: 2025-01-09

## 2025-01-08 RX ORDER — BUMETANIDE 2 MG/1
2 TABLET ORAL EVERY 12 HOURS
Refills: 0 | Status: DISCONTINUED | OUTPATIENT
Start: 2025-01-08 | End: 2025-01-10

## 2025-01-08 RX ORDER — BACTERIOSTATIC SODIUM CHLORIDE 0.9 %
4 VIAL (ML) INJECTION EVERY 12 HOURS
Refills: 0 | Status: COMPLETED | OUTPATIENT
Start: 2025-01-08 | End: 2025-01-11

## 2025-01-08 RX ORDER — ANTISEPTIC SURGICAL SCRUB 0.04 MG/ML
1 SOLUTION TOPICAL
Refills: 0 | Status: DISCONTINUED | OUTPATIENT
Start: 2025-01-08 | End: 2025-02-06

## 2025-01-08 RX ORDER — ALBUMIN HUMAN 50 G/1000ML
100 SOLUTION INTRAVENOUS EVERY 6 HOURS
Refills: 0 | Status: COMPLETED | OUTPATIENT
Start: 2025-01-08 | End: 2025-01-09

## 2025-01-08 RX ORDER — POTASSIUM CHLORIDE 750 MG/1
10 TABLET, EXTENDED RELEASE ORAL
Refills: 0 | Status: COMPLETED | OUTPATIENT
Start: 2025-01-08 | End: 2025-01-08

## 2025-01-08 RX ADMIN — FENTANYL CITRATE 25 MICROGRAM(S): 50 INJECTION INTRAMUSCULAR; INTRAVENOUS at 09:20

## 2025-01-08 RX ADMIN — IPRATROPIUM BROMIDE AND ALBUTEROL SULFATE 3 MILLILITER(S): .5; 2.5 SOLUTION RESPIRATORY (INHALATION) at 05:07

## 2025-01-08 RX ADMIN — Medication 4 MILLILITER(S): at 17:44

## 2025-01-08 RX ADMIN — FENTANYL CITRATE 50 MICROGRAM(S): 50 INJECTION INTRAMUSCULAR; INTRAVENOUS at 11:10

## 2025-01-08 RX ADMIN — VANCOMYCIN HYDROCHLORIDE 125 MILLIGRAM(S): KIT at 20:57

## 2025-01-08 RX ADMIN — MEROPENEM 100 MILLIGRAM(S): 500 INJECTION INTRAVENOUS at 13:26

## 2025-01-08 RX ADMIN — Medication 1 DROP(S): at 17:35

## 2025-01-08 RX ADMIN — DEXMEDETOMIDINE HYDROCHLORIDE 4.18 MICROGRAM(S)/KG/HR: 4 INJECTION, SOLUTION INTRAVENOUS at 19:52

## 2025-01-08 RX ADMIN — ALBUMIN HUMAN 50 MILLILITER(S): 50 SOLUTION INTRAVENOUS at 18:32

## 2025-01-08 RX ADMIN — Medication 1 DROP(S): at 13:25

## 2025-01-08 RX ADMIN — IPRATROPIUM BROMIDE AND ALBUTEROL SULFATE 3 MILLILITER(S): .5; 2.5 SOLUTION RESPIRATORY (INHALATION) at 23:02

## 2025-01-08 RX ADMIN — BUMETANIDE 2 MILLIGRAM(S): 2 TABLET ORAL at 10:30

## 2025-01-08 RX ADMIN — FENTANYL CITRATE 100 MICROGRAM(S): 50 INJECTION INTRAMUSCULAR; INTRAVENOUS at 11:20

## 2025-01-08 RX ADMIN — FENTANYL CITRATE 50 MICROGRAM(S): 50 INJECTION INTRAMUSCULAR; INTRAVENOUS at 11:40

## 2025-01-08 RX ADMIN — ANTISEPTIC SURGICAL SCRUB 15 MILLILITER(S): 0.04 SOLUTION TOPICAL at 17:34

## 2025-01-08 RX ADMIN — Medication 12.5 MICROGRAM(S)/KG/MIN: at 07:40

## 2025-01-08 RX ADMIN — FENTANYL CITRATE 25 MICROGRAM(S): 50 INJECTION INTRAMUSCULAR; INTRAVENOUS at 09:05

## 2025-01-08 RX ADMIN — PANTOPRAZOLE 40 MILLIGRAM(S): 20 TABLET, DELAYED RELEASE ORAL at 17:34

## 2025-01-08 RX ADMIN — FENTANYL CITRATE 100 MICROGRAM(S): 50 INJECTION INTRAMUSCULAR; INTRAVENOUS at 11:35

## 2025-01-08 RX ADMIN — ANTISEPTIC SURGICAL SCRUB 15 MILLILITER(S): 0.04 SOLUTION TOPICAL at 05:10

## 2025-01-08 RX ADMIN — LEVETIRACETAM 1000 MILLIGRAM(S): 750 TABLET, FILM COATED ORAL at 13:27

## 2025-01-08 RX ADMIN — ANTISEPTIC SURGICAL SCRUB 1 APPLICATION(S): 0.04 SOLUTION TOPICAL at 20:58

## 2025-01-08 RX ADMIN — FENTANYL CITRATE 50 MICROGRAM(S): 50 INJECTION INTRAMUSCULAR; INTRAVENOUS at 11:25

## 2025-01-08 RX ADMIN — Medication 6 UNIT(S)/MIN: at 07:40

## 2025-01-08 RX ADMIN — BUMETANIDE 2 MILLIGRAM(S): 2 TABLET ORAL at 17:34

## 2025-01-08 RX ADMIN — MEROPENEM 100 MILLIGRAM(S): 500 INJECTION INTRAVENOUS at 05:10

## 2025-01-08 RX ADMIN — VANCOMYCIN HYDROCHLORIDE 250 MILLIGRAM(S): KIT at 18:31

## 2025-01-08 RX ADMIN — IPRATROPIUM BROMIDE AND ALBUTEROL SULFATE 3 MILLILITER(S): .5; 2.5 SOLUTION RESPIRATORY (INHALATION) at 11:32

## 2025-01-08 RX ADMIN — MEROPENEM 100 MILLIGRAM(S): 500 INJECTION INTRAVENOUS at 21:13

## 2025-01-08 RX ADMIN — LEVETIRACETAM 1000 MILLIGRAM(S): 750 TABLET, FILM COATED ORAL at 02:04

## 2025-01-08 RX ADMIN — Medication 2 TABLET(S): at 21:11

## 2025-01-08 RX ADMIN — POTASSIUM CHLORIDE 50 MILLIEQUIVALENT(S): 750 TABLET, EXTENDED RELEASE ORAL at 23:18

## 2025-01-08 RX ADMIN — POTASSIUM CHLORIDE 50 MILLIEQUIVALENT(S): 750 TABLET, EXTENDED RELEASE ORAL at 18:31

## 2025-01-08 RX ADMIN — Medication 12.5 MICROGRAM(S)/KG/MIN: at 19:52

## 2025-01-08 RX ADMIN — POTASSIUM CHLORIDE 50 MILLIEQUIVALENT(S): 750 TABLET, EXTENDED RELEASE ORAL at 18:55

## 2025-01-08 RX ADMIN — Medication 1 UNIT(S)/HR: at 19:51

## 2025-01-08 RX ADMIN — PANTOPRAZOLE 40 MILLIGRAM(S): 20 TABLET, DELAYED RELEASE ORAL at 05:10

## 2025-01-08 RX ADMIN — Medication 6 UNIT(S)/MIN: at 19:51

## 2025-01-08 RX ADMIN — POLYETHYLENE GLYCOL 3350 17 GRAM(S): 17 POWDER, FOR SOLUTION ORAL at 13:26

## 2025-01-08 RX ADMIN — Medication 1 DROP(S): at 05:11

## 2025-01-08 RX ADMIN — IPRATROPIUM BROMIDE AND ALBUTEROL SULFATE 3 MILLILITER(S): .5; 2.5 SOLUTION RESPIRATORY (INHALATION) at 17:43

## 2025-01-08 RX ADMIN — MUPIROCIN 1 APPLICATION(S): 2 CREAM TOPICAL at 17:35

## 2025-01-08 RX ADMIN — ALBUMIN HUMAN 50 MILLILITER(S): 50 SOLUTION INTRAVENOUS at 10:30

## 2025-01-08 NOTE — PROGRESS NOTE ADULT - ASSESSMENT
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 Pt initially presented to [facility] for SOB and SS crisis; course c/b witnessed seizure, intubated and sedated, and transferred to [facility] for further management. Keppra was discontinued at [facility] due to negative EEG. At [facility] MICU pt was found to have RV failure possibly iso pulm HTN 2/2 increased tidal volumes on the ventilator. On addition, pt possibly received a lot of volume iso SS crisis. Neuro was consulted for seizures and EEG technician was called for vEEG placement. Pt is in profound cardiogenic shock. Pt has been decompensating, despite Dobutamine gtt, Bumex gtt, Levophed, and addition of Vasopressin. Vasopressors requirements have gone up. NS shock team was called and pt is being transferred to NS for further management.     Patient with multiple medications allergies listed  MSSA in sputum culture from ET tube  Coag negative Staph epi. in single blood culture drawn 1/3 with repeat blood cultures x2 sets pending  given dose of Vancomycin    # sickle cell SC disease  # multiple medication allergies and patient is intubated and unable to answer specific questions about allergy types  # multiple lines, ECMO,    Would:  Continue Vancomycin dosing by level trough 10-15 for next dose  Repeat blood specimens to look for bacteremia clearance  Source- lines?  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 Pt initially presented to [facility] for SOB and SS crisis; course c/b witnessed seizure, intubated and sedated, and transferred to [facility] for further management. Keppra was discontinued at [facility] due to negative EEG. At [facility] MICU pt was found to have RV failure possibly iso pulm HTN 2/2 increased tidal volumes on the ventilator. On addition, pt possibly received a lot of volume iso SS crisis. Neuro was consulted for seizures and EEG technician was called for vEEG placement. Pt is in profound cardiogenic shock. Pt has been decompensating, despite Dobutamine gtt, Bumex gtt, Levophed, and addition of Vasopressin. Vasopressors requirements have gone up. NS shock team was called and pt is being transferred to NS for further management.     Patient with multiple medications allergies listed  MSSA in sputum culture from ET tube  Coag negative Staph epi. in single blood culture drawn 1/3 with repeat blood cultures x2 sets pending  given dose of Vancomycin    # sickle cell SC disease  # multiple medication allergies and patient is intubated and unable to answer specific questions about allergy types  # multiple lines, ECMO,  # mental status- secondary to small CVAs?, doubt infection but encephalitis studies sent and pending    Would:  Encephalitis studies sent:  CMV viral load  EBV viral load  West Nile serology and PCR  Lyme serology  Babesia PCR  acute hepatitis serology negative  Coxsackie A nonreactive, B low titers    Staph epi bacteremia in a single set of cultures  repeat specimens sent on 1/7 are no growth  likely procurement contaminant          Pedro Shepherd MD  Can be called via Teams  After 5pm/weekends [phone]   59F PMH Sickle cell disease (on hydroxyurea), HTN, HFimpEF/NICM (EF 58% 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 Pt initially presented to [facility] for SOB and SS crisis; course c/b witnessed seizure, intubated and sedated, and transferred to [facility] for further management. Keppra was discontinued at [facility] due to negative EEG. At [facility] MICU pt was found to have RV failure possibly iso pulm HTN 2/2 increased tidal volumes on the ventilator. On addition, pt possibly received a lot of volume iso SS crisis. Neuro was consulted for seizures and EEG technician was called for vEEG placement. Pt is in profound cardiogenic shock. Pt has been decompensating, despite Dobutamine gtt, Bumex gtt, Levophed, and addition of Vasopressin. Vasopressors requirements have gone up. NS shock team was called and pt is being transferred to NS for further management.     Patient with multiple medications allergies listed  MSSA in sputum culture from ET tube  Coag negative Staph epi. in single blood culture drawn 1/3 with repeat blood cultures x2 sets pending  given dose of Vancomycin    # sickle cell SC disease  # multiple medication allergies and patient is intubated and unable to answer specific questions about allergy types  # multiple lines, ECMO,  # mental status- secondary to small CVAs?, doubt infection but encephalitis studies sent and pending    Would:  Encephalitis studies sent:  CMV viral load  HSV 1/2 PCR  EBV viral load  West Nile serology and PCR  Lyme serology  Babesia PCR    acute hepatitis serology negative  Coxsackie A nonreactive, B low titers    Staph epi bacteremia in a single set of cultures  repeat specimens sent on 1/7 are no growth  likely procurement contaminant          Pedro Shepherd MD  Can be called via Teams  After 5pm/weekends [phone]

## 2025-01-08 NOTE — PROGRESS NOTE ADULT - ASSESSMENT
She is a 59yr F, with hx of NICM/HFimpEF (LVEF 58% 10/2024), with PMH of SCD (on hydroxyurea) initially presented to [facility] for SOB and sickle cell crisis. Course c/b episodes of unresponsiveness, seizure requiring intubation. Was transferred to [facility] for further workup. Gordy was d/c at [facility] d/t negative EEG. At [facility] MICU, patient was found to have RV failure possibly iso pulm HTN 2/2 increased tidal volumes on the ventilator. On addition, pt possibly received a lot of volume i/s/o SS crisis. Neuro was consulted for seizures and was placed on vEEG. She was found to have worsening hemoydtnamics, despite on inotrope, pressors and diuretics, and concerns for shocks. Shock call was made and patient was transferred to [facility] 1/3/25 for further workup.     She is was cannulated for VA ECMO on 25. She's was requiring epi, vaso and dobutamine. She was diuresed with a bumex gtt. She underwent emergent RBC exchange on , following which pressors were able to be weaned off. She underwent turn down study and was decannulated from ECMO on . She remains on dobutamine. Post ECMO decannulation noted to have normal LV function with LVEF 55% on QUENTIN. She's additionally being treated with broad spectrum antibiotics for 1/2 BCx of 1/3 growing Staph epi, subsequent cultures negative. sputum  showing staph aureus.     She's hypertensive this morning off ecmo, on dobutamine with repeat TTE  showing normal biventricular function. She's euvolemic with CVP 7. She remains intubated with poor neurologic status, now with suspected new onset seizures per neuro based upon EEG findings and R gaze deviation on . Head CT/MRI unremarkable, no radiological evidence of fat embolism. She's awaiting repeat head MRI.      Hemodynamics  25 ( 5) CVP 7, PA 28/15/21, mVO2 79, CO/CI 7.2/3.8,   25 (VA ECMO, CVP 7, PA 36/18/26)  25 (VA ECMO,  CVP 12-14, PA 31/21/28)    Cardiac Studies:  25 TTE: normal LV size and function, normal RV size and function, TAPSE 2, normal appearing aortic valve, mild TR  -TTE 25: LVEF <20%, ECMO cannula in RV, mildly enlarged RV size and reduced RV systolic function, no pericardial effusion, no MR, trace TR, IVC nml size   -TTE 1/3/25: LVIDd 3.1cm, LVEF appears grossly normal, RV severly enlarged size, reduced function, TAPSE 1.1cm, nml RA, trace pericardial effusion, mod/severe TR PASP 45,

## 2025-01-08 NOTE — PROGRESS NOTE ADULT - PROBLEM SELECTOR PLAN 1
-on VA ECMO 1/4/25, decannulated 1/7/25  -inotrope: currently on  5 mcg/kg/min  -also on Mara with plan to wean  - can d/c swan and keep central line to monitor CVP and central saturation  -currently mechanically vented  -diuretics: diuretics PRN to keep CVP 8-10

## 2025-01-08 NOTE — PROGRESS NOTE ADULT - SUBJECTIVE AND OBJECTIVE BOX
Patient seen and examined at the bedside.    Remains critically ill on continuous ICU monitoring, at risk for life threatening decompensation.  All Labs, data reviewed. Plan of care discussed in length during multi-disciplinary ICU rounds.       Brief Summary:  58 yo F with Sickle cell disease and NICM now with Cardiogenic shock s/p VA ECMO on 1/4/25.    24 Hour events:  EEG recorded epileptiform spikes, started on Keppra  As per Neurology ativan 4 mg added.  VA ecmo decannulation in OR yesterday        Objective:  Vital Signs Last 24 Hrs  T(C): 37.9 (08 Jan 2025 04:00), Max: 37.9 (08 Jan 2025 04:00)  T(F): 100.2 (08 Jan 2025 04:00), Max: 100.2 (08 Jan 2025 04:00)  HR: 114 (08 Jan 2025 06:08) (100 - 146)  BP: --  BP(mean): --  RR: 28 (08 Jan 2025 06:00) (11 - 29)  SpO2: 100% (08 Jan 2025 06:08) (97% - 100%)    Parameters below as of 08 Jan 2025 06:08  Patient On (Oxygen Delivery Method): ventilator        Mode: AC/ CMV (Assist Control/ Continuous Mandatory Ventilation)  RR (machine): 14  TV (machine): 450  FiO2: 50  PEEP: 8  ITime: 1  MAP: 12  PC: 10  PIP: 21              Physical Exam:   General: Intubated  Neurology: Withdrawing to noxious stimuli in all extremities, +cough, +gag  Respiratory: Breath sounds bilaterally   CV: Sinus Tachycardia   VA ECMO 3300 rpms, flow of 3.2, sweep of 1   Abdominal: Soft, Nontender  Extremities: Warm, well-perfused  Costa  -------------------------------------------------------------------------------------------------------------------------------    Labs:                        9.0    11.58 )-----------( 162      ( 08 Jan 2025 01:03 )             26.3     01-08    145  |  104  |  29[H]  ----------------------------<  108[H]  4.0   |  28  |  0.98    Ca    8.4      08 Jan 2025 01:01  Phos  2.5     01-08  Mg     2.3     01-08    TPro  5.6[L]  /  Alb  2.7[L]  /  TBili  2.4[H]  /  DBili  x   /  AST  63[H]  /  ALT  87[H]  /  AlkPhos  120  01-08    LIVER FUNCTIONS - ( 08 Jan 2025 01:01 )  Alb: 2.7 g/dL / Pro: 5.6 g/dL / ALK PHOS: 120 U/L / ALT: 87 U/L / AST: 63 U/L / GGT: x           PT/INR - ( 08 Jan 2025 03:25 )   PT: 11.7 sec;   INR: 1.02 ratio         PTT - ( 08 Jan 2025 03:25 )  PTT:24.4 sec  ABG - ( 08 Jan 2025 04:18 )  pH, Arterial: 7.53  pH, Blood: x     /  pCO2: 42    /  pO2: 146   / HCO3: 35    / Base Excess: 11.3  /  SaO2: 99.1        ALL MEDICATIONS   MEDICATIONS  (STANDING):  albuterol/ipratropium for Nebulization 3 milliLiter(s) Nebulizer every 6 hours  artificial tears (preservative free) Ophthalmic Solution 1 Drop(s) Both EYES four times a day  chlorhexidine 0.12% Liquid 15 milliLiter(s) Oral Mucosa every 12 hours  dexMEDEtomidine Infusion 0.2 MICROgram(s)/kG/Hr (4.18 mL/Hr) IV Continuous <Continuous>  DOBUTamine Infusion 5 MICROgram(s)/kG/Min (12.5 mL/Hr) IV Continuous <Continuous>  insulin regular Infusion 1 Unit(s)/Hr (1 mL/Hr) IV Continuous <Continuous>  levETIRAcetam   Injectable 1000 milliGRAM(s) IV Push every 12 hours  meropenem  IVPB 1000 milliGRAM(s) IV Intermittent every 8 hours  nitroprusside Infusion 0.3 MICROgram(s)/kG/Min (3.76 mL/Hr) IV Continuous <Continuous>  pantoprazole  Injectable 40 milliGRAM(s) IV Push every 12 hours  polyethylene glycol 3350 17 Gram(s) Oral daily  senna 2 Tablet(s) Oral at bedtime  vancomycin  IVPB 750 milliGRAM(s) IV Intermittent every 12 hours  vasopressin Infusion 0.04 Unit(s)/Min (6 mL/Hr) IV Continuous <Continuous>    MEDICATIONS  (PRN):    ------------------------------------------------------------------------------------------------------------------------------  Assessment:  58 yo F w/ PMHx of Sickle cell disease (on hydroxyurea), HTN, 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 Pt initially presented to [facility] for SOB and SS crisis; course c/b witnessed seizure, intubated and sedated, and transferred to [facility] for further management. Now in cardiogenic shock, transferred to [facility] for further management. Cannulated for VA ECMO on 1/4/25.    Cardiogenic shock  Acute respiratory failure  Acute blood loss anemia  Thrombocytopenia  Hyperglycemia    Plan:   ***Neuro***  On low dose prcedex  +corneal, gag and cough, responds to name  Previus CT and MRI unremerkable  Seizure activity recorded today while off sedation  Started on Keppra and Ativan dose  EEG continue, Possible MRI tomorrow    ***Cardiovascular***  Remains on Dobutamine,   On Vaso and Nipride  VA ecmo decannulation in OR  On Lo, will wean slowly , now on 20 ppm    ***Pulmonary***  Acute hypoxic respiratory failure  Full vent support  PS as tolerates  Nebs    ***GI***  Tube feeds at goal after OR  Protonix for stress ulcer prophylaxis   bowel regimen  Tren LFTs, shock liver improving    ***Renal***  DYLON  Trend Creatinine   Costa catheter for strict I/O measurements.   Bumex BID    ***ID***  MSSA bacteremia, BAL + MSSA  On vancomycin  On empiric meropenum  Trend leukocytosis    ***Endocrine***  Hyperglycemia - Insulin infusion.     ***Hematology***  Acute blood loss anemia and thrombocytopenia  Sickle Cell - s/p Red cell exchange , pending electrophoresis results  Keep Plats >50, Hg >8  2U prbc and 2 U platelets in OR  Heparin infusion on hold        I, Yana Patricia MD, personally performed the services described in this documentation. all medical record entries made by the scribe were at my direction and in my presence. I have reviewed the chart and agree that the record reflects my personal performance and is accurate and complete  Electronically signed:   Yana Patricia MD  CT ICU attending     ICU time: ** mins     By signing my name below, I, Barrett Sanchez, attest that this documentation has been prepared under the direction and in the presence of Yana Patricia MD  Electronically signed: Barrett Sanchez, 01-08-25 @ 06:21             Patient seen and examined at the bedside.    Remains critically ill on continuous ICU monitoring, at risk for life threatening decompensation.  All Labs, data reviewed. Plan of care discussed in length during multi-disciplinary ICU rounds.     Brief Summary:  60 yo F with Sickle cell disease and NICM now with Cardiogenic shock s/p VA ECMO on 1/4/25.  VA ecmo decannulation on 1/7/25     24 Hour events:  EEG recorded epileptiform spikes, started on Keppra, Ativan  MRI will schedule for tomorrow  Will send CMV, HSV, PCR  On dobutamine, wean as able  Mara at 5 ppm wean as able  S/p bronch, follow BAL    Objective:  Vital Signs Last 24 Hrs  T(C): 37.9 (08 Jan 2025 04:00), Max: 37.9 (08 Jan 2025 04:00)  T(F): 100.2 (08 Jan 2025 04:00), Max: 100.2 (08 Jan 2025 04:00)  HR: 114 (08 Jan 2025 06:08) (100 - 146)  BP: --  BP(mean): --  RR: 28 (08 Jan 2025 06:00) (11 - 29)  SpO2: 100% (08 Jan 2025 06:08) (97% - 100%)    Parameters below as of 08 Jan 2025 06:08  Patient On (Oxygen Delivery Method): ventilator    Mode: AC/ CMV (Assist Control/ Continuous Mandatory Ventilation)  RR (machine): 14  TV (machine): 450  FiO2: 50  PEEP: 8  ITime: 1  MAP: 12  PC: 10  PIP: 21            Physical Exam:   General: Intubated  Neurology: Withdrawing to noxious stimuli in all extremities, +cough, +gag  Respiratory: Breath sounds bilaterally   CV: Sinus Tachycardia   VA ECMO 3300 rpms, flow of 3.2, sweep of 1   Abdominal: Soft, Nontender  Extremities: Warm, well-perfused  Costa  -------------------------------------------------------------------------------------------------------------------------------    Labs:                        9.0    11.58 )-----------( 162      ( 08 Jan 2025 01:03 )             26.3     01-08    145  |  104  |  29[H]  ----------------------------<  108[H]  4.0   |  28  |  0.98    Ca    8.4      08 Jan 2025 01:01  Phos  2.5     01-08  Mg     2.3     01-08    TPro  5.6[L]  /  Alb  2.7[L]  /  TBili  2.4[H]  /  DBili  x   /  AST  63[H]  /  ALT  87[H]  /  AlkPhos  120  01-08    LIVER FUNCTIONS - ( 08 Jan 2025 01:01 )  Alb: 2.7 g/dL / Pro: 5.6 g/dL / ALK PHOS: 120 U/L / ALT: 87 U/L / AST: 63 U/L / GGT: x         PT/INR - ( 08 Jan 2025 03:25 )   PT: 11.7 sec;   INR: 1.02 ratio     PTT - ( 08 Jan 2025 03:25 )  PTT:24.4 sec  ABG - ( 08 Jan 2025 04:18 )  pH, Arterial: 7.53  pH, Blood: x     /  pCO2: 42    /  pO2: 146   / HCO3: 35    / Base Excess: 11.3  /  SaO2: 99.1      ALL MEDICATIONS   MEDICATIONS  (STANDING):  albuterol/ipratropium for Nebulization 3 milliLiter(s) Nebulizer every 6 hours  artificial tears (preservative free) Ophthalmic Solution 1 Drop(s) Both EYES four times a day  chlorhexidine 0.12% Liquid 15 milliLiter(s) Oral Mucosa every 12 hours  dexMEDEtomidine Infusion 0.2 MICROgram(s)/kG/Hr (4.18 mL/Hr) IV Continuous <Continuous>  DOBUTamine Infusion 5 MICROgram(s)/kG/Min (12.5 mL/Hr) IV Continuous <Continuous>  insulin regular Infusion 1 Unit(s)/Hr (1 mL/Hr) IV Continuous <Continuous>  levETIRAcetam   Injectable 1000 milliGRAM(s) IV Push every 12 hours  meropenem  IVPB 1000 milliGRAM(s) IV Intermittent every 8 hours  nitroprusside Infusion 0.3 MICROgram(s)/kG/Min (3.76 mL/Hr) IV Continuous <Continuous>  pantoprazole  Injectable 40 milliGRAM(s) IV Push every 12 hours  polyethylene glycol 3350 17 Gram(s) Oral daily  senna 2 Tablet(s) Oral at bedtime  vancomycin  IVPB 750 milliGRAM(s) IV Intermittent every 12 hours  vasopressin Infusion 0.04 Unit(s)/Min (6 mL/Hr) IV Continuous <Continuous>    MEDICATIONS  (PRN):    ------------------------------------------------------------------------------------------------------------------------------  Assessment:  60 yo F w/ PMHx of Sickle cell disease (on hydroxyurea), HTN, 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 Pt initially presented to [facility] for SOB and SS crisis; course c/b witnessed seizure, intubated and sedated, and transferred to [facility] for further management. Now in cardiogenic shock, transferred to [facility] for further management. Cannulated for VA ECMO on 1/4/25.    Cardiogenic shock  Acute respiratory failure  Acute blood loss anemia  Thrombocytopenia  Hyperglycemia    Plan:   ***Neuro***  Multifactorial encephalopathy  Seizure activity on EEG  +corneal, gag and cough, responds to name  Previus CT and MRI unremarkable  Will repeat MRI  Keppra  BID, received Ativan    ***Cardiovascular***  Remains on Dobutamine wean as able   On Mara for RV support, wean by tomorrow am  On vaso on and off, Keep Maps >65   Albumin 25 %  and Bumex  Monitor hemodynamic closely, signs of hypoperfusion     ***Pulmonary***  Acute hypoxic respiratory failure  Full vent support  PS as tolerates  Nebs  Follow BAL, thick secretion on a left side, suctioned lavaged    ***GI***  Tube feeds at goal  Protonix for stress ulcer prophylaxis   bowel regimen optimise  Tren LFTs, shock liver improving    ***Renal***  DYLON  Trend Creatinine   Costa catheter for strict I/O measurements.   Bumex BID  Albumin 25% for hypoalbuminemia    ***ID***  MSSA bacteremia, BAL + MSSA  On vancomycin  On empiric meropenum  Trend leukocytosis    ***Endocrine***  Hyperglycemia - Insulin infusion.     ***Hematology***  Acute blood loss anemia and thrombocytopenia  Sickle Cell - s/p Red cell exchange , pending electrophoresis results  Keep Plats >50, Hg >8  2U prbc and 2 U platelets in OR  Heparin SC for ppx    I, Yana Patricia MD, personally performed the services described in this documentation. all medical record entries made by the scribe were at my direction and in my presence. I have reviewed the chart and agree that the record reflects my personal performance and is accurate and complete  Electronically signed:   Yana Patricia MD  CT ICU attending     ICU time: 55 mins     By signing my name below, I, Barrett Sanchez, attest that this documentation has been prepared under the direction and in the presence of Yana Patricia MD  Electronically signed: Barrett Sanchez, 01-08-25 @ 06:21             well-nourished/well-developed/distress due to pain

## 2025-01-08 NOTE — CONSULT NOTE ADULT - ATTENDING COMMENTS
as above  duplex neg for underlying hematoma  no signs malperfusion  cont monitor
59-yr-old woman with SCD (Hgb-SS disease) transferred from [facility] to [facility] to [facility] for worsening overall condition, RV failure, seizure, multiple pressors support on ECHMO, HD, consulted for need for red cell exchange. Patient's LDH was 1100, high retic counts, mild transaminitis, insignificant bilirubinemia. The cause(s) of her worsening could not be explained well, decision of an exchange transfusion is made. Patient is s/p exchange. Not sure if it has helped her.  F/u the hemoglobin electrophoresis. Management as per ICU team. Of note, peripheral smear showed a lot of nucleated RBC and remarkable hypochromia; target cells as expected, and rare sickle cells.
Patient seen and examined - history reviewed - agree with documented exam. EEG continues to show no electrographic seizures - triphasic wave, generalized slowing and some asymmetry.  Head CT 1/4 reviewed - no mass, blood, or swelling.  Will continue to monitor neurologic exam as sedation (remains on Precedex) weans and medical condition evolves.  Agree with recommendation for MRI when medically stable.

## 2025-01-08 NOTE — PROGRESS NOTE ADULT - ASSESSMENT
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 Pt initially presented to [facility] for SOB and SS crisis; course c/b witnessed seizure, intubated and sedated, and transferred to [facility] for further management. Keppra was discontinued at [facility] due to negative EEG. At [facility] MICU pt was found to have RV failure possibly iso pulm HTN 2/2 increased tidal volumes on the ventilator, possibly due to volume overload due to IVF iso SS crisis.     Pt is in profound cardiogenic shock. Pt has been decompensating, despite Dobutamine gtt, Bumex gtt, Levophed, and addition of Vasopressin. Vasopressors requirements have gone up. NS shock team was called and pt transferred to NS for further management. Patient was cannulated on peripheral VA ECMO.                                                                                                            #Hb SC disease  #Cardiogenic shock on ECMO  #Unclear trigger for unresponsiveness   - patient with 1-2 sickle pain crisis per year and on hydrea, had retinal detachment s/p repair                           - patient had f/up with cardio in Sept 2024: per the note, unclear etiology of her dyspnea but possibly driven by cardiomyopathy; low suspicion of pulmonary HTN as no RV dysfunction/severe TR.  She is found to have new RV failure, requiring ECMO  - Blood bank transfusion medicine team consulted for emergent RBC exchange given critically ill with multi-organ failure  - Retic count elevated to 11.6%, LDH 1000s, bili 3.9. CXR was clear not indicative of acute chest. Smear (prior to exchange) was reviewed: Hypochromic RBCs with normochromic RBC reflecting transfused blood. Several nucleated RBC suggesting stressed bone marrow. Very few target cells and rare sickle cell. No schistocytes to suggest hemolysis.    - S/p exchange 1/4/2024, pending decannulation likely today 1/7/2025  - Pending neuro workup; thus far EEG negative  - thus far CTA chest negative, prior CT head and MR head negative,    Recommendations  - pending hemoglobin electrophoresis, no plan for repeat exchange today; will follow up results of hemoglobin electrophoresis post red cell exchange 1/4/24 and consider repeat exchange post decannulation   - Thrombocytopenia from acute illness and ECMO. Given patient is on heparin gtt for ECOM, recommend transfusion to maintain plt > 50k   - Discuss with blood bank and heme team prior to RBC transfusions to minimize risk of antibody development/transfusion reactions, aim to keep hb >7-8  - Agree with holding hydroxyurea for now while thrombocytopenic   - Repeat hemolysis labs, (CBC, CMP, LDH, reticulocyte count) daily, prefer pediatric tubes.   - Hematology team to follow; plan discussed with brother Jojo at bedside.     **INCOMPLETE NOTE***    Note not finalized until signed by attending.   Please do not hesitate to page with questions.     Veronica Haas MD  Hematology/Oncology Fellow PGY5  Available on Microsoft Teams   Pager: [phone]  For weekends and evenings (5 pm - 8 am), please page fellow on call.    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 Pt initially presented to [facility] for SOB and SS crisis; course c/b witnessed seizure, intubated and sedated, and transferred to [facility] for further management. Keppra was discontinued at [facility] due to negative EEG. At [facility] MICU pt was found to have RV failure possibly iso pulm HTN 2/2 increased tidal volumes on the ventilator, possibly due to volume overload due to IVF iso SS crisis.     Pt is in profound cardiogenic shock. Pt has been decompensating, despite Dobutamine gtt, Bumex gtt, Levophed, and addition of Vasopressin. Vasopressors requirements have gone up. NS shock team was called and pt transferred to NS for further management. Patient was cannulated on peripheral VA ECMO.                                                                                                            #Hb SC disease  #Cardiogenic shock on ECMO  #Unclear trigger for unresponsiveness   - patient with 1-2 sickle pain crisis per year and on hydrea, had retinal detachment s/p repair                           - patient had f/up with cardio in Sept 2024: per the note, unclear etiology of her dyspnea but possibly driven by cardiomyopathy; low suspicion of pulmonary HTN as no RV dysfunction/severe TR.  She is found to have new RV failure, requiring ECMO  - Blood bank transfusion medicine team consulted for emergent RBC exchange given critically ill with multi-organ failure  - Retic count elevated to 11.6%, LDH 1000s, bili 3.9. CXR was clear not indicative of acute chest. Smear (prior to exchange) was reviewed: Hypochromic RBCs with normochromic RBC reflecting transfused blood. Several nucleated RBC suggesting stressed bone marrow. Very few target cells and rare sickle cell. No schistocytes to suggest hemolysis.    - S/p exchange 1/4/2024, s/p ECMO decannulation 1/7/2025 c/b groin hematoma and received 2 unit prbc, platelet and cryo.    Recommendations  - Pending hemoglobin electrophoresis, no plan for repeat exchange today; will follow up results of hemoglobin electrophoresis post red cell exchange 1/4/24 and consider repeat exchange post decannulation; blood bank aware to help with exchange recommendations.   - Thrombocytopenia from acute illness and ECMO. Given patient is on heparin gtt for ECOM, recommend transfusion to maintain plt > 50k   - Discuss with blood bank and heme team prior to RBC transfusions to minimize risk of antibody development/transfusion reactions, aim to keep hb >7-8  - Pending full neurology eval, appreciate care by critical care, cards/heart failure   - Agree with holding hydroxyurea for now while thrombocytopenic   - Repeat hemolysis labs, (CBC, CMP, LDH, reticulocyte count) daily, prefer pediatric tubes.   - Hematology team to follow; plan discussed with brother Jojo at bedside.     Note not finalized until signed by attending.   Please do not hesitate to page with questions.     Veronica Haas MD  Hematology/Oncology Fellow PGY5  Available on Microsoft Teams   Pager: [phone]  For weekends and evenings (5 pm - 8 am), please page fellow on call.    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 Pt initially presented to [facility] for SOB and SS crisis; course c/b witnessed seizure, intubated and sedated, and transferred to [facility] for further management. Keppra was discontinued at [facility] due to negative EEG. At [facility] MICU pt was found to have RV failure possibly iso pulm HTN 2/2 increased tidal volumes on the ventilator, possibly due to volume overload due to IVF iso SS crisis.     Pt is in profound cardiogenic shock. Pt has been decompensating, despite Dobutamine gtt, Bumex gtt, Levophed, and addition of Vasopressin. Vasopressors requirements have gone up. NS shock team was called and pt transferred to NS for further management. Patient was cannulated on peripheral VA ECMO.                                                                                                            #Hb SC disease  #Cardiogenic shock  ECMO DC 1/7/25.  #Unclear trigger for unresponsiveness   - patient with 1-2 sickle pain crisis per year and on hydrea, had retinal detachment s/p repair                           - patient had f/up with cardio in Sept 2024: per the note, unclear etiology of her dyspnea but possibly driven by cardiomyopathy; low suspicion of pulmonary HTN as no RV dysfunction/severe TR.  She is found to have new RV failure, requiring ECMO  - S/p exchange 1/4/2024, s/p ECMO decannulation 1/7/2025 c/b groin hematoma and received 2 unit prbc, platelet and cryo.    Recommendations  - Pending hemoglobin electrophoresis, no plan for repeat exchange yet.  - Discuss with blood bank and heme team prior to RBC transfusions to minimize risk of antibody development/transfusion reactions, aim to keep hb >7-8  -Neurology consult appreciated.   - Agree with holding hydroxyurea for now while thrombocytopenic   - Repeat hemolysis labs, (CBC, CMP, LDH, reticulocyte count) daily, prefer pediatric tubes.   - Hematology team to follow; plan discussed with brother Jojo at bedside.

## 2025-01-08 NOTE — CONSULT NOTE ADULT - ASSESSMENT
59yFemale sickle cell and NICM now with cardiogenic shock s/p VA ECMO on 1/4/25, which has now been decannulated on 1/7/25, with b/l UE axillary A lines. Vascular surgery consulted for c/f RUE axillary swelling.     Recommendations  - monitor RUE  59yFemale sickle cell and NICM now with cardiogenic shock s/p VA ECMO on 1/4/25, which has now been decannulated on 1/7/25, with b/l UE axillary A lines. Vascular surgery consulted for c/f RUE axillary swelling.     Recommendations  - monitor RUE, currently with dopplerable signals  - duplex without signs of peudoaneurysm or hematoma  - keep axillary a line today  - appreciate care per CTICU    Patient seen and examined and plan discussed with fellow on behalf of attending    Vascular Surgery   [phone]

## 2025-01-08 NOTE — PROGRESS NOTE ADULT - PROBLEM SELECTOR PLAN 2
-has know hx of SC sickle cell disease and takes hydroxyurea at home, currently off  -received 1pRBC and 1pPLT 1/4/25  -had RBC exchanged 1/4/25, electrophorsis with 6% HgbS, improved from 50%  -heme to f/u

## 2025-01-08 NOTE — CONSULT NOTE ADULT - SUBJECTIVE AND OBJECTIVE BOX
Surgery Consult Note  Attending: Sukhdeep   Service: Vascular Surgery    HPI: 59yFemale sickle cell and NICM now with cardiogenic shock s/p VA ECMO on 25, which has now been decannulated on 25. Per the primary, the pt was on therapeutic AC until yesterday but today the family noticed swelling in the R arm near the axilla. Given the presence of R ax A line and new swelling, vascular surgery consulted for c/f axillary hematoma and limb ischemia.    Patient seen and examined at bedside. Patient is currently on an EEG monitor and is unable to follow commands.      PAST MEDICAL HISTORY:  PAST MEDICAL & SURGICAL HISTORY:  Sickle-cell-hemoglobin C disease with crisis      Essential hypertension      Sickle cell disease      HTN (hypertension)      H/O:       H/O abdominoplasty      S/P breast augmentation      S/P           ALLERGIES:  Allergies    doxycycline (Angioedema)  penicillin (Unknown)  clindamycin (Angioedema)  linezolid (Angioedema)    Intolerances        SOCIAL HISTORY: Negative for tobacco, etoh, or drug use    FAMILY HISTORY:  FAMILY HISTORY:  FHx: cerebral palsy (Child)        PHYSICAL EXAM:  General: lying in bed   Pulmonary: intubated   Extremities: appears warm and well perfused, R anterior axilla with overlying ecchymosis, but no palpable hematoma, RUE brachial, radial, ulnar, palmar arch signals, LUE radial, brachial pulse, b/l UE good cap refill, warm, LE groin site soft,     VITAL SIGNS:  Vital Signs Last 24 Hrs  T(C): 36.9 (2025 16:00), Max: 37.9 (2025 04:00)  T(F): 98.4 (2025 16:00), Max: 100.2 (2025 04:00)  HR: 58 (2025 16:45) (58 - 144)  BP: --  BP(mean): --  RR: 14 (2025 16:45) (10 - 41)  SpO2: 100% (2025 16:45) (97% - 100%)    Parameters below as of 2025 16:00  Patient On (Oxygen Delivery Method): ventilator    O2 Concentration (%): 50    I&O's Summary    2025 07:01  -  2025 07:00  --------------------------------------------------------  IN: 1676.7 mL / OUT: 2495 mL / NET: -818.3 mL    2025 07:01  -  2025 16:57  --------------------------------------------------------  IN: 401.3 mL / OUT: 1735 mL / NET: -1333.7 mL        LABS:                        8.2    11.87 )-----------( 142      ( 2025 14:21 )             23.9     01-08    148[H]  |  106  |  27[H]  ----------------------------<  117[H]  3.8   |  31  |  0.93    Ca    8.7      2025 14:21  Phos  2.5     01-08  Mg     2.3     01-08    TPro  5.7[L]  /  Alb  3.1[L]  /  TBili  2.4[H]  /  DBili  x   /  AST  50[H]  /  ALT  75[H]  /  AlkPhos  135[H]  01-08    PT/INR - ( 2025 05:23 )   PT: 11.6 sec;   INR: 1.01 ratio         PTT - ( 2025 05:23 )  PTT:23.4 sec  Urinalysis Basic - ( 2025 14:21 )    Color: x / Appearance: x / SG: x / pH: x  Gluc: 117 mg/dL / Ketone: x  / Bili: x / Urobili: x   Blood: x / Protein: x / Nitrite: x   Leuk Esterase: x / RBC: x / WBC x   Sq Epi: x / Non Sq Epi: x / Bacteria: x      CAPILLARY BLOOD GLUCOSE  123 (2025 16:00)  128 (2025 14:00)  135 (2025 12:00)  140 (2025 08:00)  125 (2025 19:00)  134 (2025 18:00)      POCT Blood Glucose.: 123 mg/dL (2025 16:21)  POCT Blood Glucose.: 128 mg/dL (2025 14:02)  POCT Blood Glucose.: 135 mg/dL (2025 12:10)  POCT Blood Glucose.: 130 mg/dL (2025 10:02)  POCT Blood Glucose.: 140 mg/dL (2025 07:50)  POCT Blood Glucose.: 142 mg/dL (2025 05:56)  POCT Blood Glucose.: 128 mg/dL (2025 02:10)  POCT Blood Glucose.: 125 mg/dL (2025 18:47)  POCT Blood Glucose.: 134 mg/dL (2025 17:58)    LIVER FUNCTIONS - ( 2025 14:21 )  Alb: 3.1 g/dL / Pro: 5.7 g/dL / ALK PHOS: 135 U/L / ALT: 75 U/L / AST: 50 U/L / GGT: x             CULTURES:  Culture Results:   No growth at 24 hours ( @ 10:17)  Culture Results:   No growth at 24 hours ( @ 10:17)      RADIOLOGY & ADDITIONAL STUDIES:

## 2025-01-08 NOTE — PROGRESS NOTE ADULT - CRITICAL CARE ATTENDING COMMENT
meds:  5, vaso/nipride meds:  5, vaso/nipride bumex infusion sean vanco GABRIELLA  s/p decannulation. post QUENTIN shows normal LV function mild mod RV dysfunction   concern for new seizure activity overnight.   no change in MS overall  green tinge sputum.   diuretics initiated.   CVP 7, PA 28/15 21 PA 79   , 90/-105/ afebrile.   I/O: -800  01-08    145  |  104  |  29[H]  ----------------------------<  108[H]  4.0   |  28  |  0.98    Ca    8.4      08 Jan 2025 01:01  Phos  2.5     01-08  Mg     2.3     01-08    TPro  5.6[L]  /  Alb  2.7[L]  /  TBili  2.4[H]  /  DBili  x   /  AST  63[H]  /  ALT  87[H]  /  AlkPhos  120  01-08                          9.0    11.58 )-----------( 162      ( 08 Jan 2025 01:03 )             26.3   discussed on MDR  Continue supportive care  Neurology recommending repeat MRI   continue  slow wean HARJEET Lepe

## 2025-01-08 NOTE — EEG REPORT - NS EEG TEXT BOX
This is a Continuous Video EEG.     Recording Technique: The patient underwent continuous video-EEG monitoring, using Telemetry System hardware on the [employer].  EEG and video data were stored on a computer hard drive with important events saved in digital archive files. The material was reviewed by a physician (electroencephalographer/epileptologist) on a daily basis.  [place] and seizure detection algorithms were utilized and reviewed.  An EEG Technician attended to the patient for 8 to 10 hours per day. The patient was observed by the epilepsy nursing staff 24 hours per day. The epilepsy center neurologist was available in person or on call 24 hours per day..     Placement and Labeling of Electrodes: The EEG was performed utilizing at least 20 channel referential EEG connections (coronal over temporal over parasagittal montage) with inferior temporal electrodes when indicting and using all standard 10-20 electrode placements with EKG, with additional electrodes placed in the inferior temporal region using the modified 10-10 montage electrode placements for elective admissions, or if deemed necessary.  Recording was at  a sampling rate of 256 samples per second per channel. Time synchronized digital video recording was done simultaneously with EEG recording.  A low light infrared camera was used for low light recording..    EEG REPORT:     CARSON LEE MRN-[id number]     Study Date: 		01-07-25 (0800-10:30) - 01-07-25 (18:07-08:00)   Duration: 16hr   --------------------------------------------------------------------------------------------------  History:  CC/ HPI Patient is a 59y old  Female who presents with a chief complaint of Mixed Shock (05 Jan 2025 07:26)    MEDICATIONS  (STANDING):  albumin human 25% IVPB 100 milliLiter(s) IV Intermittent every 6 hours  albuterol/ipratropium for Nebulization 3 milliLiter(s) Nebulizer every 6 hours  artificial tears (preservative free) Ophthalmic Solution 1 Drop(s) Both EYES four times a day  buMETAnide Injectable 2 milliGRAM(s) IV Push every 12 hours  dexMEDEtomidine Infusion 0.2 MICROgram(s)/kG/Hr (4.18 mL/Hr) IV Continuous <Continuous>  DOBUTamine Infusion 5 MICROgram(s)/kG/Min (12.5 mL/Hr) IV Continuous <Continuous>  fentaNYL    Injectable 50 MICROGram(s) IV Push once  insulin regular Infusion 1 Unit(s)/Hr (1 mL/Hr) IV Continuous <Continuous>  levETIRAcetam   Injectable 1000 milliGRAM(s) IV Push every 12 hours  meropenem  IVPB 1000 milliGRAM(s) IV Intermittent every 8 hours  mupirocin 2% Nasal 1 Application(s) Both Nostrils two times a day  nitroprusside Infusion 0.3 MICROgram(s)/kG/Min (3.76 mL/Hr) IV Continuous <Continuous>  pantoprazole  Injectable 40 milliGRAM(s) IV Push every 12 hours  polyethylene glycol 3350 17 Gram(s) Oral daily  propofol Infusion 5 MICROgram(s)/kG/Min (2.51 mL/Hr) IV Continuous <Continuous>  senna 2 Tablet(s) Oral at bedtime  vancomycin    Solution 125 milliGRAM(s) Oral every 12 hours  vancomycin  IVPB 750 milliGRAM(s) IV Intermittent every 12 hours  vasopressin Infusion 0.04 Unit(s)/Min (6 mL/Hr) IV Continuous <Continuous>        --------------------------------------------------------------------------------------------------  Study Interpretation:    [[[Abbreviation Key:  PDR=alpha rhythm/posterior dominant rhythm. A-P=anterior posterior gradient.  Amplitude: ‘very low’:<20; ‘low’:20-50; ‘medium’:; ‘high’:>200uV.  Persistence for periodic/rhythmic patterns (% of epoch) ‘rare’:<1%; ‘occasional’:1-10%; ‘frequent’:10-50%; ‘abundant’:50-90%; ‘continuous’:>90%.  Persistence for sporadic discharges: ‘rare’:<1/hr; ‘occasional’:1/min-1/hr; ‘frequent’:>1/min; ‘abundant’:>1/10 sec.  GRDA=generalized rhythmic delta activity; FIRDA=frontal intermittent GRDA; LRDA=lateralized rhythmic delta activity; TIRDA=temporal intermittent rhythmic delta activity;  LPD=PLED=lateralized periodic discharges; GPD=generalized periodic discharges; BiPDs=BiPLEDs=bilateral independent periodic epileptiform discharges; SIRPID=stimulus induced rhythmic, periodic, or ictal appearing discharges; BIRDs=brief potentially ictal rhythmic discharges >4 Hz, lasting .5-10s; PFA=paroxysmal bursts of beta/gamma; LVFA=low voltage fast activity.  Modifiers: +F=with fast component; +S=with spike component; +R=with rhythmic component.  S-B=burst suppression pattern.  Max=maximal. N1-drowsy; N2-stage II sleep; N3-slow wave sleep. SSS/BETS=small sharp spikes/benign epileptiform transients of sleep. HV=hyperventilation; PS=photic stimulation]]]    Daily EEG Visual Analysis    FINDINGS:      Background:  Continuity: Continuous  Symmetry: yes  PDR: none  Reactivity: present  Voltage: nl  Anterior Posterior Gradient: absent  Other background findings: none  Breach: absent    Background Slowing:  Generalized slowing: Background is diffusely slow consisting of polymorphic delta theta activity   Focal slowing:  shifting delta/theta, voltages appear more symmetric    State Changes:   -Clinical sleep with attenuation of periodic/rhythmic activity, mostly theta, and rudimentary spindles    Sporadic Epileptiform Discharges:    Frequent left frontal maximal sharp waves Fp1 F3  Occasional right frontal sharp waves max  F4  Bilateral independent posterior quadrant spiking with arousal/stimulation quasiperiodic near 1.5hz    Rhythmic and Periodic Patterns (RPPs):  With arousal: Intermittent generalized periodic discharges with triphasic morphology at times sharply contoured, 1-1.5Hz, blunted over the right hemisphere  Left LRDA to 1.5hz max temporally    Electrographic and Electroclinical seizures:  None    Other Clinical Events:  None    Activation Procedures:   -Hyperventilation was not performed.    -Photic stimulation was not performed.      Artifacts:  Intermittent myogenic and movement artifacts were noted.    ECG:  The heart rate on single channel ECG was unable to be discerned     -------------------------------------------------------------------------------------------------------  EEG Classification / Summary:  Abnormal  EEG in a lethargic patient:   Sharp waves independently more frequent over the left frontal, and at times right frontal regions.  Bilateral independent posterior quadrant quasiperiodic spiking with arousal/stimulation near 1.5hz  Intermittent left temporal LRDA to 1.5hz  Intermittent generalized periodic discharges with triphasic morphology, at times sharply contoured, 1-1.5hz, blunted over the right hemisphere    Generalized background slowing, moderate.   Background / spike burden somewhat improved in latter portions of study.    -------------------------------------------------------------------------------------------------------  Clinical Impression:   Multifocal cortical irritability and risk of seizures from multiple regions (bilateral frontal, posterior quadrant regions).    Triphasic waves are classically associated with encephalopathy (infectious, metabolic, toxic in etiology).  Moderate  generalized background slowing  Voltage asymmetry in the right hemisphere improved  There were no seizures recorded.     Background / periodic activity / spike burden somewhat improved in latter portions of study and overnight.    MD LEIF Cartagena  Director, Continuous EEG Monitoring Program, [facility]   and Epilepsy Fellowship ,   Department of Neurology, [facility] [facility] [employer]    [facility] EEG Reading Room Ph#: [phone]  Epilepsy Answering Service after 5PM and before 8:30AM: Ph#: [phone]

## 2025-01-08 NOTE — CHART NOTE - NSCHARTNOTEFT_GEN_A_CORE
GAP continues to follow this case of patient s/p VA ECMO decannulation with overall complex status. FM arranged x2 this week but family unable to attend either meeting at time scheduled. GAP remains available to participate in GOC discussion and support as needed, and will remain available for FM should follow up be scheduled.

## 2025-01-08 NOTE — PROGRESS NOTE ADULT - ATTENDING COMMENTS
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 Pt initially presented to [facility] for SOB and SS crisis; course c/b witnessed seizure, intubated and sedated, and transferred to [facility] for further management. Keppra was discontinued at [facility] due to negative EEG. At [facility] MICU pt was found to have RV failure possibly iso pulm HTN 2/2 increased tidal volumes on the ventilator, possibly due to volume overload due to IVF iso SS crisis.     Pt is in profound cardiogenic shock. Pt has been decompensating, despite Dobutamine gtt, Bumex gtt, Levophed, and addition of Vasopressin. Vasopressors requirements have gone up. NS shock team was called and pt transferred to NS for further management. Patient was cannulated on peripheral VA ECMO.                                                                                                            #Hb SC disease  #Cardiogenic shock  ECMO DC 1/7/25.  #Unclear trigger for unresponsiveness   - patient with 1-2 sickle pain crisis per year and on hydrea, had retinal detachment s/p repair                           - patient had f/up with cardio in Sept 2024: per the note, unclear etiology of her dyspnea but possibly driven by cardiomyopathy; low suspicion of pulmonary HTN as no RV dysfunction/severe TR.  She is found to have new RV failure, requiring ECMO  - S/p exchange 1/4/2024, s/p ECMO decannulation 1/7/2025 c/b groin hematoma and received 2 unit prbc, platelet and cryo.    Recommendations  - Pending hemoglobin electrophoresis, no plan for repeat exchange yet.  - Discuss with blood bank and heme team prior to RBC transfusions to minimize risk of antibody development/transfusion reactions, aim to keep hb >7-8  -Neurology consult appreciated.   - Agree with holding hydroxyurea for now while thrombocytopenic   - Repeat hemolysis labs, (CBC, CMP, LDH, reticulocyte count) daily, prefer pediatric tubes.   - Hematology team to follow; plan discussed with brother Jojo at bedside.

## 2025-01-08 NOTE — PROGRESS NOTE ADULT - SUBJECTIVE AND OBJECTIVE BOX
Neurology Progress Note    SUBJECTIVE/OBJECTIVE/INTERVAL EVENTS: Patient seen and examined at bedside w/ neuro attending and team.     INTERVAL HISTORY: Pt s/p ECMO decannulation. Pt continues to be intubated on Precedex drip .5. Exam marginally improved from yesterday.     REVIEW OF SYSTEMS: Unable to assess as pt is intubated and sedated    VITALS & EXAMINATION:  Vital Signs Last 24 Hrs  T(C): 37.8 (08 Jan 2025 08:00), Max: 37.9 (08 Jan 2025 04:00)  T(F): 100 (08 Jan 2025 08:00), Max: 100.2 (08 Jan 2025 04:00)  HR: 106 (08 Jan 2025 10:00) (100 - 146)  BP: --  BP(mean): --  RR: 14 (08 Jan 2025 10:00) (10 - 41)  SpO2: 100% (08 Jan 2025 10:00) (97% - 100%)    Parameters below as of 08 Jan 2025 08:00  Patient On (Oxygen Delivery Method): ventilator    O2 Concentration (%): 50    General:  Constitutional: Female, appears stated age, nontoxic, not in distress,    Head: Normocephalic;   Eyes: clear sclera;   Extremities: No cyanosis;   Resp: breathing comfortably  Neck: no nuchal rigidity       Neurological (>12):  Neurologic Exam: limited due to intubation and on sedation ( precedex 0.5), Oculocephalic reflex present  Mental status - eyes mildly open with improved and no longer has right gaze preference.   Cranial nerves - PERRLA,     Motor - flaccid tone   Sensation - mild response to noxious stimuli in all extremities, greater in lower extremities    DTR's -             Biceps      Brachioradialis      Patellar    Ankle    Toes/plantar response  R             0                     1+               0             0              mute  L            0                    1+                  0           0                mtue    Coordination - MENDEL  Gait and station - MENDEL        LABORATORY:  CBC                       9.0    11.58 )-----------( 162      ( 08 Jan 2025 01:03 )             26.3     Chem 01-08    145  |  104  |  29[H]  ----------------------------<  108[H]  4.0   |  28  |  0.98    Ca    8.4      08 Jan 2025 01:01  Phos  2.5     01-08  Mg     2.3     01-08    TPro  5.6[L]  /  Alb  2.7[L]  /  TBili  2.4[H]  /  DBili  x   /  AST  63[H]  /  ALT  87[H]  /  AlkPhos  120  01-08    LFTs LIVER FUNCTIONS - ( 08 Jan 2025 01:01 )  Alb: 2.7 g/dL / Pro: 5.6 g/dL / ALK PHOS: 120 U/L / ALT: 87 U/L / AST: 63 U/L / GGT: x           Coagulopathy PT/INR - ( 08 Jan 2025 05:23 )   PT: 11.6 sec;   INR: 1.01 ratio         PTT - ( 08 Jan 2025 05:23 )  PTT:23.4 sec  Lipid Panel   A1c   Cardiac enzymes     U/A Urinalysis Basic - ( 08 Jan 2025 01:01 )    Color: x / Appearance: x / SG: x / pH: x  Gluc: 108 mg/dL / Ketone: x  / Bili: x / Urobili: x   Blood: x / Protein: x / Nitrite: x   Leuk Esterase: x / RBC: x / WBC x   Sq Epi: x / Non Sq Epi: x / Bacteria: x      CSF  Immunological  Other    STUDIES & IMAGING: (EEG, CT, MR, U/S, TTE/QUENTIN):    EEG 1/8  EEG Classification / Summary:  Abnormal  EEG in a lethargic patient:   Sharp waves independently more frequent over the left frontal, and at times right frontal regions.  Bilateral independent posterior quadrant quasiperiodic spiking with arousal/stimulation near 1.5hz  Intermittent left temporal LRDA to 1.5hz  Intermittent generalized periodic discharges with triphasic morphology, at times sharply contoured, 1-1.5hz, blunted over the right hemisphere    Generalized background slowing, moderate.   Background / spike burden somewhat improved in latter portions of study.    -------------------------------------------------------------------------------------------------------  Clinical Impression:   Multifocal cortical irritability and risk of seizures from multiple regions (bilateral frontal, posterior quadrant regions).    Triphasic waves are classically associated with encephalopathy (infectious, metabolic, toxic in etiology).  Moderate  generalized background slowing  Voltage asymmetry in the right hemisphere improved  There were no seizures recorded.     Background / periodic activity / spike burden somewhat improved in latter portions of study and overnight.

## 2025-01-08 NOTE — PROGRESS NOTE ADULT - ATTENDING COMMENTS
During the evaluation, the patient's nurse present, and I appreciate that.     Detailed neuro exam:  ROS: Unable to obtain due to the patient is currently orally intubated.  Please note, neuro exam is very limited due to the patient on mechanical ventilation via orally intubated and on the sedation.  General: Patient is comfortably lying on bed without any acute distress.  Mental status: On verbal command, patient unable to follow any simple or complex commands.  Cranial exams: Brainstem reflexes are intact cornea, conjunctiva and gag reflexes. Face looks symmetric. Pupils are symmetric, reactive to light bilaterally.  Power: On noxious stimuli the patient had 1/5 bilateral four extremities.  Sensation: On noxious stimuli patient grimace at all four extremities.  Coordination and gait: Patient did not participate in the setting of comatose.     A/P:  Neurology consulted because of seizure. Etiology of seizure is uncertain and patient requires further workup to rule out treatable etiologies.  During my evaluation on 01/07/2024, patient found to have right gaze deviation and EEG consistent with bilateral multifocal epileptiform potentials. Clinically suspicious for seizure. Ativan 4 mg stat, Keppra 1500 mg once as extras dose and patient already received 500 mg and increase to 1000mg BID as maintenance dose. Today on 01/08/2024, on exam no gaze deviation and neuro exam in limited due to the sedation.  EEG is improved in term of Background / periodic activity / spike burden.   Patient would benefit from MRI brain with and without contrast once patient stable.  EEG can be discontinue.   Continue Keppra 1 gm BID.   Continue medical management, neuro- check and fall precaution.  GI and DVT prophylaxis.    Patient is at high risk for complications and morbidity or mortality. There is high probability of imminent or life threatening deterioration in the patient's condition.  Patient is unable or incompetent to participate in giving a history and/or making decisions and discussion is necessary for determining treatment decisions.  My cumulative time taking care of this critically ill patient is 55 minutes  If you have any further questions, please do not hesitate to contact our team.  Thank you for allowing us to participate in this patient care.

## 2025-01-08 NOTE — PROGRESS NOTE ADULT - PROBLEM SELECTOR PLAN 4
-was noted to have seizure at [facility], EEG neg and Keppra d/c  -EEG had been negative with findings on 1/7 suspicious for sz  - given ativan and keppra  - awaiting repeat head MRI   -neuro to f/u.

## 2025-01-08 NOTE — PROGRESS NOTE ADULT - SUBJECTIVE AND OBJECTIVE BOX
Hematology Follow-up    INTERVAL HPI/OVERNIGHT EVENTS:  Patient S&E at bedside. No o/n events, patient resting comfortably. No complaints at this time. Patient denies fever, chills, dizziness, weakness, CP, palpitations, SOB, cough, N/V/D/C, dysuria, changes in bowel movements, LE edema.    VITAL SIGNS:  T(F): 100 (01-08-25 @ 08:00)  HR: 106 (01-08-25 @ 10:00)  BP: --  RR: 14 (01-08-25 @ 10:00)  SpO2: 100% (01-08-25 @ 10:00)  Wt(kg): --    PHYSICAL EXAM:    Constitutional: AAOx3, NAD,   Eyes: PERRL, EOMI, sclera non-icteric  Neck: supple, no masses, no JVD  Respiratory: CTA b/l, good air entry b/l, no wheezing, rhonchi, rales, with normal respiratory effort and no intercostal retractions  Cardiovascular: RRR, normal S1S2, no M/R/G  Gastrointestinal: soft, NTND, no masses palpable, BS normal in all four quadrants, no HSM  Extremities:  no c/c/e  Neurological: Grossly intact  Skin: Normal temperature    MEDICATIONS  (STANDING):  albuterol/ipratropium for Nebulization 3 milliLiter(s) Nebulizer every 6 hours  artificial tears (preservative free) Ophthalmic Solution 1 Drop(s) Both EYES four times a day  chlorhexidine 0.12% Liquid 15 milliLiter(s) Oral Mucosa every 12 hours  chlorhexidine 2% Cloths 1 Application(s) Topical <User Schedule>  dexMEDEtomidine Infusion 0.2 MICROgram(s)/kG/Hr (4.18 mL/Hr) IV Continuous <Continuous>  DOBUTamine Infusion 5 MICROgram(s)/kG/Min (12.5 mL/Hr) IV Continuous <Continuous>  fentaNYL    Injectable 50 MICROGram(s) IV Push once  insulin regular Infusion 1 Unit(s)/Hr (1 mL/Hr) IV Continuous <Continuous>  levETIRAcetam   Injectable 1000 milliGRAM(s) IV Push every 12 hours  meropenem  IVPB 1000 milliGRAM(s) IV Intermittent every 8 hours  mupirocin 2% Nasal 1 Application(s) Both Nostrils two times a day  nitroprusside Infusion 0.3 MICROgram(s)/kG/Min (3.76 mL/Hr) IV Continuous <Continuous>  pantoprazole  Injectable 40 milliGRAM(s) IV Push every 12 hours  polyethylene glycol 3350 17 Gram(s) Oral daily  propofol Infusion 5 MICROgram(s)/kG/Min (2.51 mL/Hr) IV Continuous <Continuous>  senna 2 Tablet(s) Oral at bedtime  vancomycin    Solution 125 milliGRAM(s) Oral every 12 hours  vancomycin  IVPB 750 milliGRAM(s) IV Intermittent every 12 hours  vasopressin Infusion 0.04 Unit(s)/Min (6 mL/Hr) IV Continuous <Continuous>    MEDICATIONS  (PRN):      doxycycline (Angioedema)  penicillin (Unknown)  clindamycin (Angioedema)  linezolid (Angioedema)      LABS:                        9.0    11.58 )-----------( 162      ( 08 Jan 2025 01:03 )             26.3     01-08    145  |  104  |  29[H]  ----------------------------<  108[H]  4.0   |  28  |  0.98    Ca    8.4      08 Jan 2025 01:01  Phos  2.5     01-08  Mg     2.3     01-08    TPro  5.6[L]  /  Alb  2.7[L]  /  TBili  2.4[H]  /  DBili  x   /  AST  63[H]  /  ALT  87[H]  /  AlkPhos  120  01-08    PT/INR - ( 08 Jan 2025 05:23 )   PT: 11.6 sec;   INR: 1.01 ratio         PTT - ( 08 Jan 2025 05:23 )  PTT:23.4 sec   Urinalysis Basic - ( 08 Jan 2025 01:01 )    Color: x / Appearance: x / SG: x / pH: x  Gluc: 108 mg/dL / Ketone: x  / Bili: x / Urobili: x   Blood: x / Protein: x / Nitrite: x   Leuk Esterase: x / RBC: x / WBC x   Sq Epi: x / Non Sq Epi: x / Bacteria: x        RADIOLOGY & ADDITIONAL TESTS:  Studies reviewed.   Hematology Follow-up    INTERVAL HPI/OVERNIGHT EVENTS:  Patient seen and evaluated at bedside, s/p decannulation 1/8 c/b hematoma requiring prbc, platelet and cryo, sedated appears very ill.    VITAL SIGNS:  T(F): 100 (01-08-25 @ 08:00)  HR: 106 (01-08-25 @ 10:00)  BP: --  RR: 14 (01-08-25 @ 10:00)  SpO2: 100% (01-08-25 @ 10:00)  Wt(kg): --    GENERAL: ill appearing  HEAD:  Atraumatic, Normocephalic  EYES: EOMI, conjunctiva and sclera clear  NECK:+ IJ   CHEST/LUNG: Clear to auscultation bilaterally  HEART: Regular rate and rhythm  ABDOMEN: Soft, Nontender, Nondistended  NEUROLOGY: intubated and sedated     MEDICATIONS  (STANDING):  albuterol/ipratropium for Nebulization 3 milliLiter(s) Nebulizer every 6 hours  artificial tears (preservative free) Ophthalmic Solution 1 Drop(s) Both EYES four times a day  chlorhexidine 0.12% Liquid 15 milliLiter(s) Oral Mucosa every 12 hours  chlorhexidine 2% Cloths 1 Application(s) Topical <User Schedule>  dexMEDEtomidine Infusion 0.2 MICROgram(s)/kG/Hr (4.18 mL/Hr) IV Continuous <Continuous>  DOBUTamine Infusion 5 MICROgram(s)/kG/Min (12.5 mL/Hr) IV Continuous <Continuous>  fentaNYL    Injectable 50 MICROGram(s) IV Push once  insulin regular Infusion 1 Unit(s)/Hr (1 mL/Hr) IV Continuous <Continuous>  levETIRAcetam   Injectable 1000 milliGRAM(s) IV Push every 12 hours  meropenem  IVPB 1000 milliGRAM(s) IV Intermittent every 8 hours  mupirocin 2% Nasal 1 Application(s) Both Nostrils two times a day  nitroprusside Infusion 0.3 MICROgram(s)/kG/Min (3.76 mL/Hr) IV Continuous <Continuous>  pantoprazole  Injectable 40 milliGRAM(s) IV Push every 12 hours  polyethylene glycol 3350 17 Gram(s) Oral daily  propofol Infusion 5 MICROgram(s)/kG/Min (2.51 mL/Hr) IV Continuous <Continuous>  senna 2 Tablet(s) Oral at bedtime  vancomycin    Solution 125 milliGRAM(s) Oral every 12 hours  vancomycin  IVPB 750 milliGRAM(s) IV Intermittent every 12 hours  vasopressin Infusion 0.04 Unit(s)/Min (6 mL/Hr) IV Continuous <Continuous>    MEDICATIONS  (PRN):      doxycycline (Angioedema)  penicillin (Unknown)  clindamycin (Angioedema)  linezolid (Angioedema)      LABS:                        9.0    11.58 )-----------( 162      ( 08 Jan 2025 01:03 )             26.3     01-08    145  |  104  |  29[H]  ----------------------------<  108[H]  4.0   |  28  |  0.98    Ca    8.4      08 Jan 2025 01:01  Phos  2.5     01-08  Mg     2.3     01-08    TPro  5.6[L]  /  Alb  2.7[L]  /  TBili  2.4[H]  /  DBili  x   /  AST  63[H]  /  ALT  87[H]  /  AlkPhos  120  01-08    PT/INR - ( 08 Jan 2025 05:23 )   PT: 11.6 sec;   INR: 1.01 ratio         PTT - ( 08 Jan 2025 05:23 )  PTT:23.4 sec   Urinalysis Basic - ( 08 Jan 2025 01:01 )    Color: x / Appearance: x / SG: x / pH: x  Gluc: 108 mg/dL / Ketone: x  / Bili: x / Urobili: x   Blood: x / Protein: x / Nitrite: x   Leuk Esterase: x / RBC: x / WBC x   Sq Epi: x / Non Sq Epi: x / Bacteria: x        RADIOLOGY & ADDITIONAL TESTS:  Studies reviewed.

## 2025-01-08 NOTE — PROGRESS NOTE ADULT - SUBJECTIVE AND OBJECTIVE BOX
INFECTIOUS DISEASES FOLLOW UP-- Ruthie Shepherd  [phone]    This is a follow up note for this  59yFemale with  Cardiogenic shock        ROS:  CONSTITUTIONAL:  No fever, good appetite  CARDIOVASCULAR:  No chest pain or palpitations  RESPIRATORY:  No dyspnea  GASTROINTESTINAL:  No nausea, vomiting, diarrhea, or abdominal pain  GENITOURINARY:  No dysuria  NEUROLOGIC:  No headache,     Allergies    doxycycline (Angioedema)  penicillin (Unknown)  clindamycin (Angioedema)  linezolid (Angioedema)    Intolerances        ANTIBIOTICS/RELEVANT:  antimicrobials  meropenem  IVPB 1000 milliGRAM(s) IV Intermittent every 8 hours  vancomycin    Solution 125 milliGRAM(s) Oral every 12 hours  vancomycin  IVPB 750 milliGRAM(s) IV Intermittent every 12 hours    immunologic:    OTHER:  albumin human 25% IVPB 100 milliLiter(s) IV Intermittent every 6 hours  albuterol/ipratropium for Nebulization 3 milliLiter(s) Nebulizer every 6 hours  artificial tears (preservative free) Ophthalmic Solution 1 Drop(s) Both EYES four times a day  buMETAnide Injectable 2 milliGRAM(s) IV Push every 12 hours  chlorhexidine 0.12% Liquid 15 milliLiter(s) Oral Mucosa every 12 hours  chlorhexidine 2% Cloths 1 Application(s) Topical <User Schedule>  dexMEDEtomidine Infusion 0.2 MICROgram(s)/kG/Hr IV Continuous <Continuous>  DOBUTamine Infusion 5 MICROgram(s)/kG/Min IV Continuous <Continuous>  insulin regular Infusion 1 Unit(s)/Hr IV Continuous <Continuous>  levETIRAcetam   Injectable 1000 milliGRAM(s) IV Push every 12 hours  mupirocin 2% Nasal 1 Application(s) Both Nostrils two times a day  nitroprusside Infusion 0.3 MICROgram(s)/kG/Min IV Continuous <Continuous>  pantoprazole  Injectable 40 milliGRAM(s) IV Push every 12 hours  polyethylene glycol 3350 17 Gram(s) Oral daily  senna 2 Tablet(s) Oral at bedtime  sodium chloride 3%  Inhalation 4 milliLiter(s) Inhalation every 12 hours  vasopressin Infusion 0.04 Unit(s)/Min IV Continuous <Continuous>      Objective:  Vital Signs Last 24 Hrs  T(C): 36.9 (08 Jan 2025 16:00), Max: 37.9 (08 Jan 2025 04:00)  T(F): 98.4 (08 Jan 2025 16:00), Max: 100.2 (08 Jan 2025 04:00)  HR: 82 (08 Jan 2025 16:15) (68 - 144)  BP: --  BP(mean): --  RR: 14 (08 Jan 2025 16:15) (10 - 41)  SpO2: 100% (08 Jan 2025 16:15) (97% - 100%)    Parameters below as of 08 Jan 2025 16:00  Patient On (Oxygen Delivery Method): ventilator    O2 Concentration (%): 50    PHYSICAL EXAM:  Constitutional:no acute distress  Eyes:VICKY, EOMI  Ear/Nose/Throat: no oral lesions, 	  Respiratory: clear BL  Cardiovascular: S1S2  Gastrointestinal:soft, (+) BS, no tenderness  Extremities:no e/e/c  No Lymphadenopathy  IV sites not inflammed.    LABS:                        8.2    11.87 )-----------( 142      ( 08 Jan 2025 14:21 )             23.9     01-08    148[H]  |  106  |  27[H]  ----------------------------<  117[H]  3.8   |  31  |  0.93    Ca    8.7      08 Jan 2025 14:21  Phos  2.5     01-08  Mg     2.3     01-08    TPro  5.7[L]  /  Alb  3.1[L]  /  TBili  2.4[H]  /  DBili  x   /  AST  50[H]  /  ALT  75[H]  /  AlkPhos  135[H]  01-08    PT/INR - ( 08 Jan 2025 05:23 )   PT: 11.6 sec;   INR: 1.01 ratio         PTT - ( 08 Jan 2025 05:23 )  PTT:23.4 sec  Urinalysis Basic - ( 08 Jan 2025 14:21 )    Color: x / Appearance: x / SG: x / pH: x  Gluc: 117 mg/dL / Ketone: x  / Bili: x / Urobili: x   Blood: x / Protein: x / Nitrite: x   Leuk Esterase: x / RBC: x / WBC x   Sq Epi: x / Non Sq Epi: x / Bacteria: x        MICROBIOLOGY:            RECENT CULTURES:  01-07 @ 10:17  .Blood BLOOD  --  --  --    No growth at 24 hours  --  01-05 @ 14:23  ET Tube ET Tube  --  --  --    No growth  --  01-05 @ 14:00  .Blood BLOOD  --  --  --    No growth at 48 Hours  --  01-05 @ 12:00  .Blood BLOOD  --  --  --    No growth at 48 Hours  --  01-04 @ 03:38  ET Tube ET Tube  Staphylococcus aureus  Staphylococcus aureus  NESHA    Moderate Staphylococcus aureus  Commensal fredi consistent with body site  --  01-03 @ 21:56  .Blood BLOOD  Blood Culture PCR  Blood Culture PCR  PCR    Growth in aerobic bottle: Staphylococcus epidermidis  Isolation of Coagulase negative Staphylococcus from single blood culture  sets may represent  contamination. Contact the Microbiology Department at [phone] if  susceptibility testing is needed.  clinically indicated.  Direct identification is available within approximately 3-5  hours either by Blood Panel Multiplexed PCR or Direct  MALDI-TOF. Details: [email or web address]  --  01-03 @ 17:50  .Blood BLOOD  --  --  --    No growth at 4 days  --  01-03 @ 14:00  .Blood BLOOD  --  --  --    No growth at 4 days  --      RADIOLOGY & ADDITIONAL STUDIES:    < from: Xray Chest 1 View- PORTABLE-Urgent (Xray Chest 1 View- PORTABLE-Urgent .) (01.08.25 @ 07:04) >    IMPRESSION:  Endotracheal tube tip  is 1.3 cm above the level of the kendal.    --- End of Report ---    < end of copied text >   INFECTIOUS DISEASES FOLLOW UP-- Ruthie Shepherd  [phone]    This is a follow up note for this  59yFemale with  Cardiogenic shock  remains intubated, non interactive EEG in place  right Ruy LIJ HD catheter  low grade fevers      ROS:  CONSTITUTIONAL:  No fever, good appetite  CARDIOVASCULAR:  No chest pain or palpitations  RESPIRATORY:  No dyspnea  GASTROINTESTINAL:  No nausea, vomiting, diarrhea, or abdominal pain  GENITOURINARY:  No dysuria  NEUROLOGIC:  No headache,     Allergies    doxycycline (Angioedema)  penicillin (Unknown)  clindamycin (Angioedema)  linezolid (Angioedema)    Intolerances        ANTIBIOTICS/RELEVANT:  antimicrobials  meropenem  IVPB 1000 milliGRAM(s) IV Intermittent every 8 hours  vancomycin    Solution 125 milliGRAM(s) Oral every 12 hours  vancomycin  IVPB 750 milliGRAM(s) IV Intermittent every 12 hours    immunologic:    OTHER:  albumin human 25% IVPB 100 milliLiter(s) IV Intermittent every 6 hours  albuterol/ipratropium for Nebulization 3 milliLiter(s) Nebulizer every 6 hours  artificial tears (preservative free) Ophthalmic Solution 1 Drop(s) Both EYES four times a day  buMETAnide Injectable 2 milliGRAM(s) IV Push every 12 hours  chlorhexidine 0.12% Liquid 15 milliLiter(s) Oral Mucosa every 12 hours  chlorhexidine 2% Cloths 1 Application(s) Topical <User Schedule>  dexMEDEtomidine Infusion 0.2 MICROgram(s)/kG/Hr IV Continuous <Continuous>  DOBUTamine Infusion 5 MICROgram(s)/kG/Min IV Continuous <Continuous>  insulin regular Infusion 1 Unit(s)/Hr IV Continuous <Continuous>  levETIRAcetam   Injectable 1000 milliGRAM(s) IV Push every 12 hours  mupirocin 2% Nasal 1 Application(s) Both Nostrils two times a day  nitroprusside Infusion 0.3 MICROgram(s)/kG/Min IV Continuous <Continuous>  pantoprazole  Injectable 40 milliGRAM(s) IV Push every 12 hours  polyethylene glycol 3350 17 Gram(s) Oral daily  senna 2 Tablet(s) Oral at bedtime  sodium chloride 3%  Inhalation 4 milliLiter(s) Inhalation every 12 hours  vasopressin Infusion 0.04 Unit(s)/Min IV Continuous <Continuous>      Objective:  Vital Signs Last 24 Hrs  T(C): 36.9 (08 Jan 2025 16:00), Max: 37.9 (08 Jan 2025 04:00)  T(F): 98.4 (08 Jan 2025 16:00), Max: 100.2 (08 Jan 2025 04:00)  HR: 82 (08 Jan 2025 16:15) (68 - 144)  BP: --  BP(mean): --  RR: 14 (08 Jan 2025 16:15) (10 - 41)  SpO2: 100% (08 Jan 2025 16:15) (97% - 100%)    Parameters below as of 08 Jan 2025 16:00  Patient On (Oxygen Delivery Method): ventilator    O2 Concentration (%): 50    PHYSICAL EXAM:  Constitutional:no acute distress  Eyes:VICKY, EOMI  Ear/Nose/Throat: no oral lesions, 	  Respiratory: clear BL  Cardiovascular: S1S2  Gastrointestinal:soft, (+) BS, no tenderness  Extremities:no e/e/c  No Lymphadenopathy  IV sites not inflammed.    LABS:                        8.2    11.87 )-----------( 142      ( 08 Jan 2025 14:21 )             23.9     01-08    148[H]  |  106  |  27[H]  ----------------------------<  117[H]  3.8   |  31  |  0.93    Ca    8.7      08 Jan 2025 14:21  Phos  2.5     01-08  Mg     2.3     01-08    TPro  5.7[L]  /  Alb  3.1[L]  /  TBili  2.4[H]  /  DBili  x   /  AST  50[H]  /  ALT  75[H]  /  AlkPhos  135[H]  01-08    PT/INR - ( 08 Jan 2025 05:23 )   PT: 11.6 sec;   INR: 1.01 ratio         PTT - ( 08 Jan 2025 05:23 )  PTT:23.4 sec  Urinalysis Basic - ( 08 Jan 2025 14:21 )    Color: x / Appearance: x / SG: x / pH: x  Gluc: 117 mg/dL / Ketone: x  / Bili: x / Urobili: x   Blood: x / Protein: x / Nitrite: x   Leuk Esterase: x / RBC: x / WBC x   Sq Epi: x / Non Sq Epi: x / Bacteria: x        MICROBIOLOGY:            RECENT CULTURES:  01-07 @ 10:17  .Blood BLOOD  --  --  --    No growth at 24 hours  --  01-05 @ 14:23  ET Tube ET Tube  --  --  --    No growth  --  01-05 @ 14:00  .Blood BLOOD  --  --  --    No growth at 48 Hours  --  01-05 @ 12:00  .Blood BLOOD  --  --  --    No growth at 48 Hours  --  01-04 @ 03:38  ET Tube ET Tube  Staphylococcus aureus  Staphylococcus aureus  NESHA    Moderate Staphylococcus aureus  Commensal fredi consistent with body site  --  01-03 @ 21:56  .Blood BLOOD  Blood Culture PCR  Blood Culture PCR  PCR    Growth in aerobic bottle: Staphylococcus epidermidis  Isolation of Coagulase negative Staphylococcus from single blood culture  sets may represent  contamination. Contact the Microbiology Department at [phone] if  susceptibility testing is needed.  clinically indicated.  Direct identification is available within approximately 3-5  hours either by Blood Panel Multiplexed PCR or Direct  MALDI-TOF. Details: [email or web address]  --  01-03 @ 17:50  .Blood BLOOD  --  --  --    No growth at 4 days  --  01-03 @ 14:00  .Blood BLOOD  --  --  --    No growth at 4 days  --      RADIOLOGY & ADDITIONAL STUDIES:    < from: Xray Chest 1 View- PORTABLE-Urgent (Xray Chest 1 View- PORTABLE-Urgent .) (01.08.25 @ 07:04) >    IMPRESSION:  Endotracheal tube tip  is 1.3 cm above the level of the kendal.    --- End of Report ---    < end of copied text >

## 2025-01-08 NOTE — PROGRESS NOTE ADULT - PROBLEM SELECTOR PLAN 3
-noted to have WBC 16.61, downtrending. Today 11.76  -on antibiotics cefepime and vanco, changed to sean and vanc  - BCx 1/3 with one bottle staph epi, BCx 1/5 NGTD, Sputum cx 1/4 staph aureus  - resend sputum culture with worsening secretions via ETT  - ID following

## 2025-01-08 NOTE — PROVIDER CONTACT NOTE (OTHER) - ASSESSMENT
Bilateral axiliary arterial lines, left is WDL and right appears to have hematoma. Site is ecchymotic and firm to touch. Site has been marked to monitor for size change. Hemoglobin and hematocrit also decreased on blood gas and CBC

## 2025-01-08 NOTE — PROGRESS NOTE ADULT - SUBJECTIVE AND OBJECTIVE BOX
Subjective:    Medications:  albumin human 25% IVPB 100 milliLiter(s) IV Intermittent every 6 hours  albuterol/ipratropium for Nebulization 3 milliLiter(s) Nebulizer every 6 hours  artificial tears (preservative free) Ophthalmic Solution 1 Drop(s) Both EYES four times a day  buMETAnide Injectable 2 milliGRAM(s) IV Push every 12 hours  chlorhexidine 0.12% Liquid 15 milliLiter(s) Oral Mucosa every 12 hours  chlorhexidine 2% Cloths 1 Application(s) Topical <User Schedule>  dexMEDEtomidine Infusion 0.2 MICROgram(s)/kG/Hr IV Continuous <Continuous>  DOBUTamine Infusion 5 MICROgram(s)/kG/Min IV Continuous <Continuous>  fentaNYL    Injectable 50 MICROGram(s) IV Push once  insulin regular Infusion 1 Unit(s)/Hr IV Continuous <Continuous>  levETIRAcetam   Injectable 1000 milliGRAM(s) IV Push every 12 hours  meropenem  IVPB 1000 milliGRAM(s) IV Intermittent every 8 hours  mupirocin 2% Nasal 1 Application(s) Both Nostrils two times a day  nitroprusside Infusion 0.3 MICROgram(s)/kG/Min IV Continuous <Continuous>  pantoprazole  Injectable 40 milliGRAM(s) IV Push every 12 hours  polyethylene glycol 3350 17 Gram(s) Oral daily  propofol Infusion 5 MICROgram(s)/kG/Min IV Continuous <Continuous>  senna 2 Tablet(s) Oral at bedtime  vancomycin    Solution 125 milliGRAM(s) Oral every 12 hours  vancomycin  IVPB 750 milliGRAM(s) IV Intermittent every 12 hours  vasopressin Infusion 0.04 Unit(s)/Min IV Continuous <Continuous>      Physical Exam:    Vitals:  Vital Signs Last 24 Hours  T(C): 37.8 (01-08-25 @ 08:00), Max: 37.9 (01-08-25 @ 04:00)  HR: 116 (01-08-25 @ 11:15) (99 - 146)  BP: --  RR: 30 (01-08-25 @ 11:15) (10 - 41)  SpO2: 100% (01-08-25 @ 11:15) (97% - 100%)        I&O's Summary    07 Jan 2025 07:01  -  08 Jan 2025 07:00  --------------------------------------------------------  IN: 1676.7 mL / OUT: 2495 mL / NET: -818.3 mL    08 Jan 2025 07:01  -  08 Jan 2025 11:38  --------------------------------------------------------  IN: 203.3 mL / OUT: 600 mL / NET: -396.7 mL        Tele:    General: No distress. Comfortable.  HEENT: EOM intact.  Neck: Neck supple. JVP not elevated. No masses  Chest: Clear to auscultation bilaterally  CV: Normal S1 and S2. No murmurs, rub, or gallops. Radial pulses normal.  Abdomen: Soft, non-distended, non-tender  Skin: No rashes or skin breakdown  Neurology: Alert and oriented times three. Sensation intact  Psych: Affect normal    Labs:                        9.0    11.58 )-----------( 162      ( 08 Jan 2025 01:03 )             26.3     01-08    145  |  104  |  29[H]  ----------------------------<  108[H]  4.0   |  28  |  0.98    Ca    8.4      08 Jan 2025 01:01  Phos  2.5     01-08  Mg     2.3     01-08    TPro  5.6[L]  /  Alb  2.7[L]  /  TBili  2.4[H]  /  DBili  x   /  AST  63[H]  /  ALT  87[H]  /  AlkPhos  120  01-08    PT/INR - ( 08 Jan 2025 05:23 )   PT: 11.6 sec;   INR: 1.01 ratio      PTT - ( 08 Jan 2025 05:23 )  PTT:23.4 sec    Oxygen Saturation, Mixed: 75.5 (01-08 @ 10:55)  Oxygen Saturation, Mixed: 76.5 (01-08 @ 08:50)  Oxygen Saturation, Mixed: 79.2 (01-08 @ 04:18)  Oxygen Saturation, Mixed: 84.1 (01-08 @ 00:14)  Oxygen Saturation, Mixed: 77.4 (01-07 @ 20:57)  Oxygen Saturation, Mixed: 75.6 (01-07 @ 17:33)  Oxygen Saturation, Mixed: 84.1 (01-07 @ 16:01)  Oxygen Saturation, Mixed: 77.6 (01-06 @ 23:55)  Oxygen Saturation, Mixed: 78.9 (01-06 @ 19:30)  Oxygen Saturation, Mixed: 73.3 (01-06 @ 10:35)  Oxygen Saturation, Mixed: 79.6 (01-06 @ 09:00)  Oxygen Saturation, Mixed: 84.4 (01-05 @ 23:33)  Oxygen Saturation, Mixed: 80.6 (01-05 @ 16:09)    Lactate Dehydrogenase, Serum: 828 U/L (01-07 @ 00:26)  Lactate Dehydrogenase, Serum: 865 U/L (01-06 @ 00:31)       Subjective:  - s/p ecmo decannulation  - remains intubated  - noted to have c/f seizures on EEG per neuro, given ativan and keppra    Medications:  albumin human 25% IVPB 100 milliLiter(s) IV Intermittent every 6 hours  albuterol/ipratropium for Nebulization 3 milliLiter(s) Nebulizer every 6 hours  artificial tears (preservative free) Ophthalmic Solution 1 Drop(s) Both EYES four times a day  buMETAnide Injectable 2 milliGRAM(s) IV Push every 12 hours  chlorhexidine 0.12% Liquid 15 milliLiter(s) Oral Mucosa every 12 hours  chlorhexidine 2% Cloths 1 Application(s) Topical <User Schedule>  dexMEDEtomidine Infusion 0.2 MICROgram(s)/kG/Hr IV Continuous <Continuous>  DOBUTamine Infusion 5 MICROgram(s)/kG/Min IV Continuous <Continuous>  fentaNYL    Injectable 50 MICROGram(s) IV Push once  insulin regular Infusion 1 Unit(s)/Hr IV Continuous <Continuous>  levETIRAcetam   Injectable 1000 milliGRAM(s) IV Push every 12 hours  meropenem  IVPB 1000 milliGRAM(s) IV Intermittent every 8 hours  mupirocin 2% Nasal 1 Application(s) Both Nostrils two times a day  nitroprusside Infusion 0.3 MICROgram(s)/kG/Min IV Continuous <Continuous>  pantoprazole  Injectable 40 milliGRAM(s) IV Push every 12 hours  polyethylene glycol 3350 17 Gram(s) Oral daily  propofol Infusion 5 MICROgram(s)/kG/Min IV Continuous <Continuous>  senna 2 Tablet(s) Oral at bedtime  vancomycin    Solution 125 milliGRAM(s) Oral every 12 hours  vancomycin  IVPB 750 milliGRAM(s) IV Intermittent every 12 hours  vasopressin Infusion 0.04 Unit(s)/Min IV Continuous <Continuous>      Physical Exam:    Vitals:  Vital Signs Last 24 Hours  T(C): 37.8 (01-08-25 @ 08:00), Max: 37.9 (01-08-25 @ 04:00)  HR: 116 (01-08-25 @ 11:15) (99 - 146)  BP: 122//81 MAP   RR: 30 (01-08-25 @ 11:15) (10 - 41)  SpO2: 100% (01-08-25 @ 11:15) (97% - 100%)        I&O's Summary    07 Jan 2025 07:01  -  08 Jan 2025 07:00  --------------------------------------------------------  IN: 1676.7 mL / OUT: 2495 mL / NET: -818.3 mL    08 Jan 2025 07:01  -  08 Jan 2025 11:38  --------------------------------------------------------  IN: 203.3 mL / OUT: 600 mL / NET: -396.7 mL    Tele: ST    General: No distress. Comfortable. Intubated  HEENT: not tracking  Neck: Neck supple. JVP not elevated. No masses  Chest: Clear to auscultation bilaterally, compliant with vent  CV: Normal S1 and S2. No murmurs, rub, or gallops. Radial pulses normal. No LE edema.   Abdomen: Soft, non-distended, non-tender  Skin: No rashes or skin breakdown  Neurology: Not withdrawing from pain, not following commands  Psych: MEDNEL 2/2 AMS    Labs:                        9.0    11.58 )-----------( 162      ( 08 Jan 2025 01:03 )             26.3     01-08    145  |  104  |  29[H]  ----------------------------<  108[H]  4.0   |  28  |  0.98    Ca    8.4      08 Jan 2025 01:01  Phos  2.5     01-08  Mg     2.3     01-08    TPro  5.6[L]  /  Alb  2.7[L]  /  TBili  2.4[H]  /  DBili  x   /  AST  63[H]  /  ALT  87[H]  /  AlkPhos  120  01-08    PT/INR - ( 08 Jan 2025 05:23 )   PT: 11.6 sec;   INR: 1.01 ratio      PTT - ( 08 Jan 2025 05:23 )  PTT:23.4 sec    Oxygen Saturation, Mixed: 75.5 (01-08 @ 10:55)  Oxygen Saturation, Mixed: 76.5 (01-08 @ 08:50)  Oxygen Saturation, Mixed: 79.2 (01-08 @ 04:18)  Oxygen Saturation, Mixed: 84.1 (01-08 @ 00:14)  Oxygen Saturation, Mixed: 77.4 (01-07 @ 20:57)  Oxygen Saturation, Mixed: 75.6 (01-07 @ 17:33)  Oxygen Saturation, Mixed: 84.1 (01-07 @ 16:01)  Oxygen Saturation, Mixed: 77.6 (01-06 @ 23:55)  Oxygen Saturation, Mixed: 78.9 (01-06 @ 19:30)  Oxygen Saturation, Mixed: 73.3 (01-06 @ 10:35)  Oxygen Saturation, Mixed: 79.6 (01-06 @ 09:00)  Oxygen Saturation, Mixed: 84.4 (01-05 @ 23:33)  Oxygen Saturation, Mixed: 80.6 (01-05 @ 16:09)    Lactate Dehydrogenase, Serum: 828 U/L (01-07 @ 00:26)  Lactate Dehydrogenase, Serum: 865 U/L (01-06 @ 00:31)

## 2025-01-08 NOTE — CHART NOTE - NSCHARTNOTEFT_GEN_A_CORE
NUTRITION FOLLOW UP NOTE    PATIENT SEEN FOR: verbal consult for tube feeding    SOURCE: [] Patient  [x] Current Medical Record  [x] Nursing  [] Family/support person at bedside  [x] Patient unavailable/inappropriate  [x] Other: PA    CHART REVIEWED/EVENTS NOTED.  [] No changes to nutrition care plan to note  [x] Nutrition Status:  -VA ecmo decannulation in OR yesterday    DIET ORDER:   none ordered     CURRENT DIET ORDER IS:  [] Appropriate:  [x] Inadequate:  [] Other:    NUTRITION INTAKE/PROVISION:  [] PO:  [x] Enteral Nutrition: see below for recommendations  -->Previously ordered for Vital 1.5  -->EN provision x past 5 days per nursing flow sheets: ~300kcal/day (~18% EER). of note, trickle feeds originally ordered, made NPO .   [] Parenteral Nutrition:    ANTHROPOMETRICS:  Drug Dosing Weight  Height (cm): 162 (2025 20:47)  Weight (kg): 83.6 (2025 20:47)  BMI (kg/m2): 31.9 (2025 20:47)  BSA (m2): 1.88 (2025 20:47)  Weights:   Daily Weight in k.8 (), Weight in k.1 (), Weight in k.4 (), Weight in k.6 ()   Fluctuations may be due to fluid shifts, changes in scales. RD will continue to monitor.     MEDICATIONS:  MEDICATIONS  (STANDING):  albumin human 25% IVPB 100 milliLiter(s) IV Intermittent every 6 hours  albuterol/ipratropium for Nebulization 3 milliLiter(s) Nebulizer every 6 hours  artificial tears (preservative free) Ophthalmic Solution 1 Drop(s) Both EYES four times a day  buMETAnide Injectable 2 milliGRAM(s) IV Push every 12 hours  chlorhexidine 0.12% Liquid 15 milliLiter(s) Oral Mucosa every 12 hours  chlorhexidine 2% Cloths 1 Application(s) Topical <User Schedule>  dexMEDEtomidine Infusion 0.2 MICROgram(s)/kG/Hr (4.18 mL/Hr) IV Continuous <Continuous>  DOBUTamine Infusion 5 MICROgram(s)/kG/Min (12.5 mL/Hr) IV Continuous <Continuous>  fentaNYL    Injectable 50 MICROGram(s) IV Push once  fentaNYL    Injectable 100 MICROGram(s) IV Push once  insulin regular Infusion 1 Unit(s)/Hr (1 mL/Hr) IV Continuous <Continuous>  levETIRAcetam   Injectable 1000 milliGRAM(s) IV Push every 12 hours  meropenem  IVPB 1000 milliGRAM(s) IV Intermittent every 8 hours  mupirocin 2% Nasal 1 Application(s) Both Nostrils two times a day  nitroprusside Infusion 0.3 MICROgram(s)/kG/Min (3.76 mL/Hr) IV Continuous <Continuous>  pantoprazole  Injectable 40 milliGRAM(s) IV Push every 12 hours  polyethylene glycol 3350 17 Gram(s) Oral daily  propofol Infusion 5 MICROgram(s)/kG/Min (2.51 mL/Hr) IV Continuous <Continuous>  senna 2 Tablet(s) Oral at bedtime  vancomycin    Solution 125 milliGRAM(s) Oral every 12 hours  vancomycin  IVPB 750 milliGRAM(s) IV Intermittent every 12 hours  vasopressin Infusion 0.04 Unit(s)/Min (6 mL/Hr) IV Continuous <Continuous>    NUTRITIONALLY PERTINENT LABS:   Na145 mmol/L Glu 108 mg/dL[H] K+ 4.0 mmol/L Cr  0.98 mg/dL BUN 29 mg/dL[H]  Phos 2.5 mg/dL  Alb 2.7 g/dL[L] ALT 87 U/L[H] AST 63 U/L[H] Alkaline Phosphatase 120 U/L  25 @ 21:34 a1c 4.6    A1C with Estimated Average Glucose Result: 4.6 % (25 @ 21:34)    Finger Sticks:  POCT Blood Glucose.: 130 mg/dL ( @ 10:02)  POCT Blood Glucose.: 140 mg/dL ( @ 07:50)  POCT Blood Glucose.: 142 mg/dL ( @ 05:56)  POCT Blood Glucose.: 128 mg/dL ( @ 02:10)  POCT Blood Glucose.: 125 mg/dL ( @ 18:47)  POCT Blood Glucose.: 134 mg/dL ( @ 17:58)  POCT Blood Glucose.: 148 mg/dL ( @ 13:57)  POCT Blood Glucose.: 141 mg/dL ( @ 12:50)  POCT Blood Glucose.: 129 mg/dL ( @ 12:03)  POCT Blood Glucose.: 121 mg/dL ( @ 11:29)    NUTRITIONALLY PERTINENT MEDICATIONS/LABS:  [x] Reviewed  [x] Relevant notes on medications/labs:  -vasopressin ordered   - ~66kcal propofol daily if remains at current rate    EDEMA:  [x] Reviewed  [x] Relevant notes: 2+ generalized per flow sheets    GI/ I&O:  [x] Reviewed  [] Relevant notes:  [] Other:    SKIN:   [] No pressure injuries documented, per nursing flowsheet  [x] Pressure injury previously noted  [] Change in pressure injury documentation:  [] Other:    ESTIMATED NEEDS:  Based on: IBW 54.4kg as pt with edema, increased needs  30-35kcal/kg 1632-1904kcal/day  1.5-1.7g/kg 82-92g protein/day  defer free water flush to team  [place] [employer]: 1507kcal/day    NUTRITION DIAGNOSIS:  [x] Prior Dx: Increased Nutrient Needs  [x] New Dx: severe acute malnutrition related to acuteness of illness, not meeting EER as evidenced by </= 50% EER x >/= 5 days, 2+ edema as above     EDUCATION:  [] Yes:  [x] Not appropriate/warranted    NUTRITION CARE PLAN:  1. Diet: Can continue Vital 1.5 as pt remains critically ill in ICU. Consider 50ml/hr x 24 hours to provide a total volume of 1200ml, 1866kcal (34kcal/kg) and 82g protein (1.5g/kg).   -->Defer free water flush to team  -->Maintain strict aspiration precautions   2. Supplements: n/a  3. Multivitamin/mineral supplementation: add Multivitamin  4:     [] Achieved - Continue current nutrition intervention(s)  [] Current medical condition precludes nutrition intervention at this time.    MONITORING AND EVALUATION:   RD remains available upon request and will follow up per protocol.    Name  Available on [facility] TEAMS NUTRITION FOLLOW UP NOTE    PATIENT SEEN FOR: verbal consult for tube feeding    SOURCE: [] Patient  [x] Current Medical Record  [x] Nursing  [] Family/support person at bedside  [x] Patient unavailable/inappropriate  [x] Other: PA    CHART REVIEWED/EVENTS NOTED.  [] No changes to nutrition care plan to note  [x] Nutrition Status:  -VA ecmo decannulation in OR yesterday    DIET ORDER:   none ordered     CURRENT DIET ORDER IS:  [] Appropriate:  [x] Inadequate:  [] Other:    NUTRITION INTAKE/PROVISION:  [] PO:  [x] Enteral Nutrition: see below for recommendations  -->Previously ordered for Vital 1.5  -->EN provision x past 5 days per nursing flow sheets: ~300kcal/day (~18% EER). of note, trickle feeds originally ordered, made NPO .   [] Parenteral Nutrition:    ANTHROPOMETRICS:  Drug Dosing Weight  Height (cm): 162 (2025 20:47)  Weight (kg): 83.6 (2025 20:47)  BMI (kg/m2): 31.9 (2025 20:47)  BSA (m2): 1.88 (2025 20:47)  Weights:   Daily Weight in k.8 (), Weight in k.1 (), Weight in k.4 (), Weight in k.6 ()   Fluctuations may be due to fluid shifts, changes in scales. RD will continue to monitor.     MEDICATIONS:  MEDICATIONS  (STANDING):  albumin human 25% IVPB 100 milliLiter(s) IV Intermittent every 6 hours  albuterol/ipratropium for Nebulization 3 milliLiter(s) Nebulizer every 6 hours  artificial tears (preservative free) Ophthalmic Solution 1 Drop(s) Both EYES four times a day  buMETAnide Injectable 2 milliGRAM(s) IV Push every 12 hours  chlorhexidine 0.12% Liquid 15 milliLiter(s) Oral Mucosa every 12 hours  chlorhexidine 2% Cloths 1 Application(s) Topical <User Schedule>  dexMEDEtomidine Infusion 0.2 MICROgram(s)/kG/Hr (4.18 mL/Hr) IV Continuous <Continuous>  DOBUTamine Infusion 5 MICROgram(s)/kG/Min (12.5 mL/Hr) IV Continuous <Continuous>  fentaNYL    Injectable 50 MICROGram(s) IV Push once  fentaNYL    Injectable 100 MICROGram(s) IV Push once  insulin regular Infusion 1 Unit(s)/Hr (1 mL/Hr) IV Continuous <Continuous>  levETIRAcetam   Injectable 1000 milliGRAM(s) IV Push every 12 hours  meropenem  IVPB 1000 milliGRAM(s) IV Intermittent every 8 hours  mupirocin 2% Nasal 1 Application(s) Both Nostrils two times a day  nitroprusside Infusion 0.3 MICROgram(s)/kG/Min (3.76 mL/Hr) IV Continuous <Continuous>  pantoprazole  Injectable 40 milliGRAM(s) IV Push every 12 hours  polyethylene glycol 3350 17 Gram(s) Oral daily  propofol Infusion 5 MICROgram(s)/kG/Min (2.51 mL/Hr) IV Continuous <Continuous>  senna 2 Tablet(s) Oral at bedtime  vancomycin    Solution 125 milliGRAM(s) Oral every 12 hours  vancomycin  IVPB 750 milliGRAM(s) IV Intermittent every 12 hours  vasopressin Infusion 0.04 Unit(s)/Min (6 mL/Hr) IV Continuous <Continuous>    NUTRITIONALLY PERTINENT LABS:   Na145 mmol/L Glu 108 mg/dL[H] K+ 4.0 mmol/L Cr  0.98 mg/dL BUN 29 mg/dL[H]  Phos 2.5 mg/dL  Alb 2.7 g/dL[L] ALT 87 U/L[H] AST 63 U/L[H] Alkaline Phosphatase 120 U/L  25 @ 21:34 a1c 4.6    A1C with Estimated Average Glucose Result: 4.6 % (25 @ 21:34)    Finger Sticks:  POCT Blood Glucose.: 130 mg/dL ( @ 10:02)  POCT Blood Glucose.: 140 mg/dL ( @ 07:50)  POCT Blood Glucose.: 142 mg/dL ( @ 05:56)  POCT Blood Glucose.: 128 mg/dL ( @ 02:10)  POCT Blood Glucose.: 125 mg/dL ( @ 18:47)  POCT Blood Glucose.: 134 mg/dL ( @ 17:58)  POCT Blood Glucose.: 148 mg/dL ( @ 13:57)  POCT Blood Glucose.: 141 mg/dL ( @ 12:50)  POCT Blood Glucose.: 129 mg/dL ( @ 12:03)  POCT Blood Glucose.: 121 mg/dL ( @ 11:29)    NUTRITIONALLY PERTINENT MEDICATIONS/LABS:  [x] Reviewed  [x] Relevant notes on medications/labs:  -vasopressin ordered   - ~66kcal propofol daily if remains at current rate    EDEMA:  [x] Reviewed  [x] Relevant notes: 2+ generalized per flow sheets    GI/ I&O:  [x] Reviewed  [] Relevant notes:  [] Other:    SKIN:   [] No pressure injuries documented, per nursing flowsheet  [x] Pressure injury previously noted  [] Change in pressure injury documentation:  [] Other:    ESTIMATED NEEDS:  Based on: IBW 54.4kg as pt with edema, increased needs  30-35kcal/kg 1632-1904kcal/day  1.5-1.7g/kg 82-92g protein/day  defer free water flush to team  [place] [employer]: 1507kcal/day    NUTRITION DIAGNOSIS:  [x] Prior Dx: Increased Nutrient Needs  [x] New Dx: severe acute malnutrition related to acuteness of illness, not meeting EER as evidenced by </= 50% EER x >/= 5 days, 2+ edema as above     EDUCATION:  [] Yes:  [x] Not appropriate/warranted    NUTRITION CARE PLAN:  1. Diet: Can continue Vital 1.5 as pt remains critically ill in ICU. Consider 50ml/hr x 24 hours to provide a total volume of 1200ml, 1866kcal with propofol (34kcal/kg) and 82g protein (1.5g/kg).   -->Defer free water flush to team  -->Maintain strict aspiration precautions   2. Supplements: n/a  3. Multivitamin/mineral supplementation: add Multivitamin to aid in wound healing if no contraindications   4. Nutrition risk placed in chart     [] Achieved - Continue current nutrition intervention(s)  [] Current medical condition precludes nutrition intervention at this time.    MONITORING AND EVALUATION:   RD remains available upon request and will follow up per protocol.    Rosemarie Arriaza, MS, RD, CDN / Teams

## 2025-01-08 NOTE — PROGRESS NOTE ADULT - ASSESSMENT
59y (1965) man with a PMHx significant for sickle cell disease (on hydroxyurea), HTN, HFimpEF/NICM (EF 58% 10/2024)originally presented to [facility] (1/3) as a transfer from [facility]. Pt initially presented to [facility] (1/2) for SOB and SS crisis; course c/b witnessed seizure, intubated and sedated, and transferred to [facility] hospital for further management. Keppra was discontinued at [facility] due to negative EEG. At [facility] MICU pt was found to have RV failure possibly iso pulm HTN 2/2 increased tidal volumes on the ventilator. On addition, pt possibly received a lot of volume iso SS crisis. Patient in profound cardiogenic shock. Pt has been decompensating, despite Dobutamine gtt, Bumex gtt, Levophed, and addition of Vasopressin. Vasopressors requirements have gone up. Patient transferred to [facility] for R heart cath and possible VA ECMO. Patient was cannulated on peripheral VA ECMO (1/4). Decannulated 1/7 PM. Neurology consulted.      Impression:   Reported seizure like activity at [facility] and worsening cardiac function at OSH causing RV strain and requiring ECMO at [facility]. Pt s/p decannulation yesterday and gaze preference improved. Neurologic workup pending MR Brain w/wo contrast    Recommendations  [] Video EEG reviewed, mutifocal areas of irritability with increased risk of seizures  [] Keppra 1000mg BID moving forward  [] taper off sedation when able, reassess at that time   [] MRI brain with and without planned for 1/9  [] Monitor gaze preference and seizure activity clinically    Seen with general neurology attending.   Pending chart attestation.

## 2025-01-09 LAB
ALBUMIN SERPL ELPH-MCNC: 3.6 G/DL — SIGNIFICANT CHANGE UP (ref 3.3–5)
ALP SERPL-CCNC: 139 U/L — HIGH (ref 40–120)
ALT FLD-CCNC: 69 U/L — HIGH (ref 10–45)
ANION GAP SERPL CALC-SCNC: 11 MMOL/L — SIGNIFICANT CHANGE UP (ref 5–17)
ANISOCYTOSIS BLD QL: SLIGHT — SIGNIFICANT CHANGE UP
APTT BLD: 24 SEC — LOW (ref 24.5–35.6)
AST SERPL-CCNC: 46 U/L — HIGH (ref 10–40)
B BURGDOR C6 AB SER-ACNC: NEGATIVE — SIGNIFICANT CHANGE UP
B BURGDOR IGG+IGM SER-ACNC: 0.12 INDEX — SIGNIFICANT CHANGE UP (ref 0.01–0.9)
BASE EXCESS BLDMV CALC-SCNC: 12.3 MMOL/L — HIGH (ref -3–3)
BASE EXCESS BLDMV CALC-SCNC: 13 MMOL/L — HIGH (ref -3–3)
BASE EXCESS BLDMV CALC-SCNC: 13.7 MMOL/L — HIGH (ref -3–3)
BASE EXCESS BLDMV CALC-SCNC: 9.9 MMOL/L — HIGH (ref -3–3)
BASE EXCESS BLDV CALC-SCNC: 12.3 MMOL/L — HIGH (ref -2–3)
BASE EXCESS BLDV CALC-SCNC: 13.7 MMOL/L — HIGH (ref -2–3)
BASOPHILS # BLD AUTO: 0 K/UL — SIGNIFICANT CHANGE UP (ref 0–0.2)
BASOPHILS NFR BLD AUTO: 0 % — SIGNIFICANT CHANGE UP (ref 0–2)
BILIRUB SERPL-MCNC: 2.8 MG/DL — HIGH (ref 0.2–1.2)
BUN SERPL-MCNC: 27 MG/DL — HIGH (ref 7–23)
CALCIUM SERPL-MCNC: 9.2 MG/DL — SIGNIFICANT CHANGE UP (ref 8.4–10.5)
CHLORIDE SERPL-SCNC: 105 MMOL/L — SIGNIFICANT CHANGE UP (ref 96–108)
CO2 BLDMV-SCNC: 36 MMOL/L — HIGH (ref 21–29)
CO2 BLDMV-SCNC: 38 MMOL/L — HIGH (ref 21–29)
CO2 BLDMV-SCNC: 39 MMOL/L — HIGH (ref 21–29)
CO2 BLDMV-SCNC: 39 MMOL/L — HIGH (ref 21–29)
CO2 BLDV-SCNC: 38 MMOL/L — HIGH (ref 22–26)
CO2 BLDV-SCNC: 40 MMOL/L — HIGH (ref 22–26)
CO2 SERPL-SCNC: 31 MMOL/L — SIGNIFICANT CHANGE UP (ref 22–31)
CREAT SERPL-MCNC: 0.93 MG/DL — SIGNIFICANT CHANGE UP (ref 0.5–1.3)
EGFR: 72 ML/MIN/1.73M2 — SIGNIFICANT CHANGE UP
EOSINOPHIL # BLD AUTO: 0.19 K/UL — SIGNIFICANT CHANGE UP (ref 0–0.5)
EOSINOPHIL NFR BLD AUTO: 1.8 % — SIGNIFICANT CHANGE UP (ref 0–6)
FIBRINOGEN PPP-MCNC: 392 MG/DL — SIGNIFICANT CHANGE UP (ref 200–445)
FOLATE SERPL-MCNC: 16.5 NG/ML — SIGNIFICANT CHANGE UP
GAS PNL BLDA: SIGNIFICANT CHANGE UP
GAS PNL BLDMV: SIGNIFICANT CHANGE UP
GAS PNL BLDV: SIGNIFICANT CHANGE UP
GAS PNL BLDV: SIGNIFICANT CHANGE UP
GLUCOSE BLDC GLUCOMTR-MCNC: 103 MG/DL — HIGH (ref 70–99)
GLUCOSE BLDC GLUCOMTR-MCNC: 110 MG/DL — HIGH (ref 70–99)
GLUCOSE BLDC GLUCOMTR-MCNC: 122 MG/DL — HIGH (ref 70–99)
GLUCOSE BLDC GLUCOMTR-MCNC: 129 MG/DL — HIGH (ref 70–99)
GLUCOSE BLDC GLUCOMTR-MCNC: 148 MG/DL — HIGH (ref 70–99)
GLUCOSE BLDC GLUCOMTR-MCNC: 154 MG/DL — HIGH (ref 70–99)
GLUCOSE BLDC GLUCOMTR-MCNC: 162 MG/DL — HIGH (ref 70–99)
GLUCOSE SERPL-MCNC: 110 MG/DL — HIGH (ref 70–99)
GRAM STN FLD: SIGNIFICANT CHANGE UP
HAPTOGLOB SERPL-MCNC: 162 MG/DL — SIGNIFICANT CHANGE UP (ref 34–200)
HCO3 BLDMV-SCNC: 34 MMOL/L — HIGH (ref 20–28)
HCO3 BLDMV-SCNC: 37 MMOL/L — HIGH (ref 20–28)
HCO3 BLDMV-SCNC: 38 MMOL/L — HIGH (ref 20–28)
HCO3 BLDMV-SCNC: 38 MMOL/L — HIGH (ref 20–28)
HCO3 BLDV-SCNC: 37 MMOL/L — HIGH (ref 22–29)
HCO3 BLDV-SCNC: 39 MMOL/L — HIGH (ref 22–29)
HCT VFR BLD CALC: 27.2 % — LOW (ref 34.5–45)
HGB BLD-MCNC: 9 G/DL — LOW (ref 11.5–15.5)
HOROWITZ INDEX BLDMV+IHG-RTO: 30 — SIGNIFICANT CHANGE UP
HOROWITZ INDEX BLDMV+IHG-RTO: 50 — SIGNIFICANT CHANGE UP
HOROWITZ INDEX BLDV+IHG-RTO: 30 — SIGNIFICANT CHANGE UP
HSV 1/2 SOURCE: SIGNIFICANT CHANGE UP
HSV1 DNA BLD QL NAA+PROBE: SIGNIFICANT CHANGE UP
HSV2 DNA BLD QL NAA+PROBE: SIGNIFICANT CHANGE UP
INR BLD: 0.94 RATIO — SIGNIFICANT CHANGE UP (ref 0.85–1.16)
LDH SERPL L TO P-CCNC: 554 U/L — HIGH (ref 50–242)
LYMPHOCYTES # BLD AUTO: 1.1 K/UL — SIGNIFICANT CHANGE UP (ref 1–3.3)
LYMPHOCYTES # BLD AUTO: 10.6 % — LOW (ref 13–44)
MAGNESIUM SERPL-MCNC: 2.4 MG/DL — SIGNIFICANT CHANGE UP (ref 1.6–2.6)
MANUAL SMEAR VERIFICATION: SIGNIFICANT CHANGE UP
MCHC RBC-ENTMCNC: 28 PG — SIGNIFICANT CHANGE UP (ref 27–34)
MCHC RBC-ENTMCNC: 33.1 G/DL — SIGNIFICANT CHANGE UP (ref 32–36)
MCV RBC AUTO: 84.7 FL — SIGNIFICANT CHANGE UP (ref 80–100)
MICROCYTES BLD QL: SLIGHT — SIGNIFICANT CHANGE UP
MONOCYTES # BLD AUTO: 0.46 K/UL — SIGNIFICANT CHANGE UP (ref 0–0.9)
MONOCYTES NFR BLD AUTO: 4.4 % — SIGNIFICANT CHANGE UP (ref 2–14)
MYELOCYTES NFR BLD: 2.7 % — HIGH (ref 0–0)
NEUTROPHILS # BLD AUTO: 8.29 K/UL — HIGH (ref 1.8–7.4)
NEUTROPHILS NFR BLD AUTO: 79.6 % — HIGH (ref 43–77)
NRBC # BLD: 12 /100 WBCS — HIGH (ref 0–0)
NRBC BLD-RTO: 12 /100 WBCS — HIGH (ref 0–0)
O2 CT VFR BLD CALC: 108 MMHG — HIGH (ref 30–65)
O2 CT VFR BLD CALC: 45 MMHG — SIGNIFICANT CHANGE UP (ref 30–65)
O2 CT VFR BLD CALC: 48 MMHG — SIGNIFICANT CHANGE UP (ref 30–65)
O2 CT VFR BLD CALC: 49 MMHG — SIGNIFICANT CHANGE UP (ref 30–65)
PCO2 BLDMV: 43 MMHG — SIGNIFICANT CHANGE UP (ref 30–65)
PCO2 BLDMV: 44 MMHG — SIGNIFICANT CHANGE UP (ref 30–65)
PCO2 BLDMV: 45 MMHG — SIGNIFICANT CHANGE UP (ref 30–65)
PCO2 BLDMV: 46 MMHG — SIGNIFICANT CHANGE UP (ref 30–65)
PCO2 BLDV: 45 MMHG — HIGH (ref 39–42)
PCO2 BLDV: 50 MMHG — HIGH (ref 39–42)
PH BLDMV: 7.5 — HIGH (ref 7.32–7.45)
PH BLDMV: 7.52 — HIGH (ref 7.32–7.45)
PH BLDMV: 7.52 — HIGH (ref 7.32–7.45)
PH BLDMV: 7.55 — HIGH (ref 7.32–7.45)
PH BLDV: 7.5 — HIGH (ref 7.32–7.43)
PH BLDV: 7.52 — HIGH (ref 7.32–7.43)
PHOSPHATE SERPL-MCNC: 2.4 MG/DL — LOW (ref 2.5–4.5)
PLAT MORPH BLD: NORMAL — SIGNIFICANT CHANGE UP
PLATELET # BLD AUTO: 130 K/UL — LOW (ref 150–400)
PO2 BLDV: 43 MMHG — SIGNIFICANT CHANGE UP (ref 25–45)
PO2 BLDV: 53 MMHG — HIGH (ref 25–45)
POIKILOCYTOSIS BLD QL AUTO: SLIGHT — SIGNIFICANT CHANGE UP
POLYCHROMASIA BLD QL SMEAR: SLIGHT — SIGNIFICANT CHANGE UP
POTASSIUM SERPL-MCNC: 3.9 MMOL/L — SIGNIFICANT CHANGE UP (ref 3.5–5.3)
POTASSIUM SERPL-SCNC: 3.9 MMOL/L — SIGNIFICANT CHANGE UP (ref 3.5–5.3)
PROMYELOCYTES # FLD: 0.9 % — HIGH (ref 0–0)
PROMYELOCYTES NFR BLD: 0.9 % — HIGH (ref 0–0)
PROT SERPL-MCNC: 6.2 G/DL — SIGNIFICANT CHANGE UP (ref 6–8.3)
PROTHROM AB SERPL-ACNC: 10.8 SEC — SIGNIFICANT CHANGE UP (ref 9.9–13.4)
RBC # BLD: 3.21 M/UL — LOW (ref 3.8–5.2)
RBC # FLD: 16.7 % — HIGH (ref 10.3–14.5)
RBC BLD AUTO: SIGNIFICANT CHANGE UP
SAO2 % BLDMV: 80.9 — SIGNIFICANT CHANGE UP (ref 60–90)
SAO2 % BLDMV: 83.8 — SIGNIFICANT CHANGE UP (ref 60–90)
SAO2 % BLDMV: 85.2 — SIGNIFICANT CHANGE UP (ref 60–90)
SAO2 % BLDMV: 99.2 — HIGH (ref 60–90)
SAO2 % BLDV: 75.6 % — SIGNIFICANT CHANGE UP (ref 67–88)
SAO2 % BLDV: 87.7 % — SIGNIFICANT CHANGE UP (ref 67–88)
SODIUM SERPL-SCNC: 147 MMOL/L — HIGH (ref 135–145)
SPECIMEN SOURCE: SIGNIFICANT CHANGE UP
UFH PPP CHRO-ACNC: 0.04 IU/ML — LOW (ref 0.3–0.7)
VANCOMYCIN TROUGH SERPL-MCNC: 17.4 UG/ML — SIGNIFICANT CHANGE UP (ref 10–20)
VANCOMYCIN TROUGH SERPL-MCNC: 18.8 UG/ML — SIGNIFICANT CHANGE UP (ref 10–20)
VIT B12 SERPL-MCNC: 681 PG/ML — SIGNIFICANT CHANGE UP (ref 232–1245)
WBC # BLD: 10.42 K/UL — SIGNIFICANT CHANGE UP (ref 3.8–10.5)
WBC # FLD AUTO: 10.42 K/UL — SIGNIFICANT CHANGE UP (ref 3.8–10.5)

## 2025-01-09 PROCEDURE — 93010 ELECTROCARDIOGRAM REPORT: CPT

## 2025-01-09 PROCEDURE — 83020 HEMOGLOBIN ELECTROPHORESIS: CPT | Mod: 26

## 2025-01-09 PROCEDURE — 95718 EEG PHYS/QHP 2-12 HR W/VEEG: CPT

## 2025-01-09 PROCEDURE — 99233 SBSQ HOSP IP/OBS HIGH 50: CPT

## 2025-01-09 PROCEDURE — 70553 MRI BRAIN STEM W/O & W/DYE: CPT | Mod: 26

## 2025-01-09 PROCEDURE — 99232 SBSQ HOSP IP/OBS MODERATE 35: CPT

## 2025-01-09 PROCEDURE — 99232 SBSQ HOSP IP/OBS MODERATE 35: CPT | Mod: GC

## 2025-01-09 PROCEDURE — G0545: CPT

## 2025-01-09 PROCEDURE — 99291 CRITICAL CARE FIRST HOUR: CPT

## 2025-01-09 PROCEDURE — 71045 X-RAY EXAM CHEST 1 VIEW: CPT | Mod: 26

## 2025-01-09 PROCEDURE — 99292 CRITICAL CARE ADDL 30 MIN: CPT

## 2025-01-09 RX ORDER — DM/PSEUDOEPHED/ACETAMINOPHEN 10-30-250
12.5 CAPSULE ORAL ONCE
Refills: 0 | Status: DISCONTINUED | OUTPATIENT
Start: 2025-01-09 | End: 2025-01-20

## 2025-01-09 RX ORDER — DM/PSEUDOEPHED/ACETAMINOPHEN 10-30-250
25 CAPSULE ORAL ONCE
Refills: 0 | Status: DISCONTINUED | OUTPATIENT
Start: 2025-01-09 | End: 2025-01-20

## 2025-01-09 RX ORDER — GLUCAGON 3 MG/1
1 POWDER NASAL ONCE
Refills: 0 | Status: DISCONTINUED | OUTPATIENT
Start: 2025-01-09 | End: 2025-01-23

## 2025-01-09 RX ORDER — MAGNESIUM CITRATE
296 SOLUTION, ORAL ORAL ONCE
Refills: 0 | Status: COMPLETED | OUTPATIENT
Start: 2025-01-09 | End: 2025-01-09

## 2025-01-09 RX ORDER — INSULIN LISPRO 100/ML
VIAL (ML) SUBCUTANEOUS
Refills: 0 | Status: DISCONTINUED | OUTPATIENT
Start: 2025-01-09 | End: 2025-01-09

## 2025-01-09 RX ORDER — SOD PHOSPHATE,MONOBASIC-DIBAS 3MMOL/ML
15 VIAL (ML) INTRAVENOUS ONCE
Refills: 0 | Status: COMPLETED | OUTPATIENT
Start: 2025-01-09 | End: 2025-01-09

## 2025-01-09 RX ORDER — PROPOFOL 10 MG/ML
100 INJECTION, EMULSION INTRAVENOUS ONCE
Refills: 0 | Status: COMPLETED | OUTPATIENT
Start: 2025-01-09 | End: 2025-01-09

## 2025-01-09 RX ORDER — POTASSIUM CHLORIDE 750 MG/1
10 TABLET, EXTENDED RELEASE ORAL
Refills: 0 | Status: COMPLETED | OUTPATIENT
Start: 2025-01-09 | End: 2025-01-09

## 2025-01-09 RX ORDER — HEPARIN SODIUM,PORCINE 10000/ML
2500 VIAL (ML) INJECTION EVERY 12 HOURS
Refills: 0 | Status: DISCONTINUED | OUTPATIENT
Start: 2025-01-09 | End: 2025-01-10

## 2025-01-09 RX ORDER — SODIUM CHLORIDE 9 G/ML
1000 INJECTION, SOLUTION INTRAVENOUS
Refills: 0 | Status: DISCONTINUED | OUTPATIENT
Start: 2025-01-09 | End: 2025-01-20

## 2025-01-09 RX ORDER — INSULIN LISPRO 100/ML
VIAL (ML) SUBCUTANEOUS AT BEDTIME
Refills: 0 | Status: DISCONTINUED | OUTPATIENT
Start: 2025-01-09 | End: 2025-01-09

## 2025-01-09 RX ORDER — DM/PSEUDOEPHED/ACETAMINOPHEN 10-30-250
15 CAPSULE ORAL ONCE
Refills: 0 | Status: DISCONTINUED | OUTPATIENT
Start: 2025-01-09 | End: 2025-01-23

## 2025-01-09 RX ORDER — ANTISEPTIC SURGICAL SCRUB 0.04 MG/ML
15 SOLUTION TOPICAL EVERY 12 HOURS
Refills: 0 | Status: DISCONTINUED | OUTPATIENT
Start: 2025-01-09 | End: 2025-01-25

## 2025-01-09 RX ORDER — DM/PSEUDOEPHED/ACETAMINOPHEN 10-30-250
25 CAPSULE ORAL ONCE
Refills: 0 | Status: DISCONTINUED | OUTPATIENT
Start: 2025-01-09 | End: 2025-01-23

## 2025-01-09 RX ORDER — ACETAMINOPHEN 160 MG/5ML
1000 SUSPENSION ORAL ONCE
Refills: 0 | Status: COMPLETED | OUTPATIENT
Start: 2025-01-09 | End: 2025-01-09

## 2025-01-09 RX ORDER — INSULIN LISPRO 100/ML
VIAL (ML) SUBCUTANEOUS EVERY 6 HOURS
Refills: 0 | Status: DISCONTINUED | OUTPATIENT
Start: 2025-01-09 | End: 2025-01-23

## 2025-01-09 RX ORDER — FENTANYL CITRATE 50 UG/ML
50 INJECTION INTRAMUSCULAR; INTRAVENOUS ONCE
Refills: 0 | Status: DISCONTINUED | OUTPATIENT
Start: 2025-01-09 | End: 2025-01-09

## 2025-01-09 RX ADMIN — IPRATROPIUM BROMIDE AND ALBUTEROL SULFATE 3 MILLILITER(S): .5; 2.5 SOLUTION RESPIRATORY (INHALATION) at 11:32

## 2025-01-09 RX ADMIN — POTASSIUM CHLORIDE 50 MILLIEQUIVALENT(S): 750 TABLET, EXTENDED RELEASE ORAL at 04:11

## 2025-01-09 RX ADMIN — Medication 296 MILLILITER(S): at 11:35

## 2025-01-09 RX ADMIN — VANCOMYCIN HYDROCHLORIDE 125 MILLIGRAM(S): KIT at 07:33

## 2025-01-09 RX ADMIN — ALBUMIN HUMAN 50 MILLILITER(S): 50 SOLUTION INTRAVENOUS at 00:34

## 2025-01-09 RX ADMIN — MUPIROCIN 1 APPLICATION(S): 2 CREAM TOPICAL at 05:40

## 2025-01-09 RX ADMIN — Medication 6 UNIT(S)/MIN: at 19:47

## 2025-01-09 RX ADMIN — ANTISEPTIC SURGICAL SCRUB 15 MILLILITER(S): 0.04 SOLUTION TOPICAL at 05:42

## 2025-01-09 RX ADMIN — DEXMEDETOMIDINE HYDROCHLORIDE 4.18 MICROGRAM(S)/KG/HR: 4 INJECTION, SOLUTION INTRAVENOUS at 07:31

## 2025-01-09 RX ADMIN — VANCOMYCIN HYDROCHLORIDE 250 MILLIGRAM(S): KIT at 18:20

## 2025-01-09 RX ADMIN — PANTOPRAZOLE 40 MILLIGRAM(S): 20 TABLET, DELAYED RELEASE ORAL at 05:39

## 2025-01-09 RX ADMIN — ACETAMINOPHEN 1000 MILLIGRAM(S): 160 SUSPENSION ORAL at 20:15

## 2025-01-09 RX ADMIN — BUMETANIDE 2 MILLIGRAM(S): 2 TABLET ORAL at 18:23

## 2025-01-09 RX ADMIN — BUMETANIDE 2 MILLIGRAM(S): 2 TABLET ORAL at 04:12

## 2025-01-09 RX ADMIN — Medication 12.5 MICROGRAM(S)/KG/MIN: at 07:30

## 2025-01-09 RX ADMIN — Medication 5.02 MICROGRAM(S)/KG/MIN: at 11:15

## 2025-01-09 RX ADMIN — ALBUMIN HUMAN 50 MILLILITER(S): 50 SOLUTION INTRAVENOUS at 05:38

## 2025-01-09 RX ADMIN — Medication 2: at 12:09

## 2025-01-09 RX ADMIN — LEVETIRACETAM 1000 MILLIGRAM(S): 750 TABLET, FILM COATED ORAL at 01:04

## 2025-01-09 RX ADMIN — Medication 1 UNIT(S)/HR: at 07:28

## 2025-01-09 RX ADMIN — Medication 7.52 MICROGRAM(S)/KG/MIN: at 09:30

## 2025-01-09 RX ADMIN — MEROPENEM 100 MILLIGRAM(S): 500 INJECTION INTRAVENOUS at 22:00

## 2025-01-09 RX ADMIN — ANTISEPTIC SURGICAL SCRUB 15 MILLILITER(S): 0.04 SOLUTION TOPICAL at 18:23

## 2025-01-09 RX ADMIN — FENTANYL CITRATE 50 MICROGRAM(S): 50 INJECTION INTRAMUSCULAR; INTRAVENOUS at 12:31

## 2025-01-09 RX ADMIN — DEXMEDETOMIDINE HYDROCHLORIDE 4.18 MICROGRAM(S)/KG/HR: 4 INJECTION, SOLUTION INTRAVENOUS at 19:46

## 2025-01-09 RX ADMIN — VANCOMYCIN HYDROCHLORIDE 125 MILLIGRAM(S): KIT at 22:00

## 2025-01-09 RX ADMIN — FENTANYL CITRATE 50 MICROGRAM(S): 50 INJECTION INTRAMUSCULAR; INTRAVENOUS at 12:16

## 2025-01-09 RX ADMIN — Medication 1 DROP(S): at 18:21

## 2025-01-09 RX ADMIN — Medication 6 UNIT(S)/MIN: at 07:30

## 2025-01-09 RX ADMIN — MEROPENEM 100 MILLIGRAM(S): 500 INJECTION INTRAVENOUS at 05:42

## 2025-01-09 RX ADMIN — MEROPENEM 100 MILLIGRAM(S): 500 INJECTION INTRAVENOUS at 13:33

## 2025-01-09 RX ADMIN — Medication 2: at 23:55

## 2025-01-09 RX ADMIN — IPRATROPIUM BROMIDE AND ALBUTEROL SULFATE 3 MILLILITER(S): .5; 2.5 SOLUTION RESPIRATORY (INHALATION) at 05:05

## 2025-01-09 RX ADMIN — POTASSIUM CHLORIDE 50 MILLIEQUIVALENT(S): 750 TABLET, EXTENDED RELEASE ORAL at 11:01

## 2025-01-09 RX ADMIN — PANTOPRAZOLE 40 MILLIGRAM(S): 20 TABLET, DELAYED RELEASE ORAL at 18:23

## 2025-01-09 RX ADMIN — POTASSIUM CHLORIDE 50 MILLIEQUIVALENT(S): 750 TABLET, EXTENDED RELEASE ORAL at 04:16

## 2025-01-09 RX ADMIN — SODIUM NITROPRUSSIDE 3.76 MICROGRAM(S)/KG/MIN: 0.2 INJECTION, SOLUTION INTRAVENOUS at 19:47

## 2025-01-09 RX ADMIN — Medication 63.75 MILLIMOLE(S): at 02:19

## 2025-01-09 RX ADMIN — ACETAMINOPHEN 400 MILLIGRAM(S): 160 SUSPENSION ORAL at 20:00

## 2025-01-09 RX ADMIN — Medication 1 DROP(S): at 11:35

## 2025-01-09 RX ADMIN — Medication 1 DROP(S): at 23:56

## 2025-01-09 RX ADMIN — POLYETHYLENE GLYCOL 3350 17 GRAM(S): 17 POWDER, FOR SOLUTION ORAL at 11:36

## 2025-01-09 RX ADMIN — LEVETIRACETAM 1000 MILLIGRAM(S): 750 TABLET, FILM COATED ORAL at 13:30

## 2025-01-09 RX ADMIN — VANCOMYCIN HYDROCHLORIDE 250 MILLIGRAM(S): KIT at 05:37

## 2025-01-09 RX ADMIN — Medication 1 DROP(S): at 05:39

## 2025-01-09 RX ADMIN — POTASSIUM CHLORIDE 50 MILLIEQUIVALENT(S): 750 TABLET, EXTENDED RELEASE ORAL at 05:36

## 2025-01-09 RX ADMIN — POTASSIUM CHLORIDE 50 MILLIEQUIVALENT(S): 750 TABLET, EXTENDED RELEASE ORAL at 11:50

## 2025-01-09 RX ADMIN — MUPIROCIN 1 APPLICATION(S): 2 CREAM TOPICAL at 18:21

## 2025-01-09 RX ADMIN — POTASSIUM CHLORIDE 50 MILLIEQUIVALENT(S): 750 TABLET, EXTENDED RELEASE ORAL at 00:33

## 2025-01-09 RX ADMIN — POTASSIUM CHLORIDE 50 MILLIEQUIVALENT(S): 750 TABLET, EXTENDED RELEASE ORAL at 13:23

## 2025-01-09 RX ADMIN — ANTISEPTIC SURGICAL SCRUB 1 APPLICATION(S): 0.04 SOLUTION TOPICAL at 05:39

## 2025-01-09 RX ADMIN — SODIUM NITROPRUSSIDE 3.76 MICROGRAM(S)/KG/MIN: 0.2 INJECTION, SOLUTION INTRAVENOUS at 07:30

## 2025-01-09 RX ADMIN — Medication 4 MILLILITER(S): at 05:05

## 2025-01-09 RX ADMIN — Medication 1 DROP(S): at 00:34

## 2025-01-09 RX ADMIN — IPRATROPIUM BROMIDE AND ALBUTEROL SULFATE 3 MILLILITER(S): .5; 2.5 SOLUTION RESPIRATORY (INHALATION) at 23:38

## 2025-01-09 NOTE — PROGRESS NOTE ADULT - ATTENDING COMMENTS
59F PMH Sickle cell disease (on hydroxyurea), HTN, 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. Pt initially presented to [facility] for SOB and SS crisis; course c/b witnessed seizure, intubated and sedated, and transferred to [facility] for further management. Keppra was discontinued at [facility] due to negative EEG. At [facility] MICU pt was found to have RV failure possibly iso pulm HTN 2/2 increased tidal volumes on the ventilator, possibly due to volume overload due to IVF iso SS crisis.     Pt is in profound cardiogenic shock. Pt has been decompensating, despite Dobutamine gtt, Bumex gtt, Levophed, and addition of Vasopressin. Vasopressors requirements have gone up. [facility] shock team was called and pt transferred to [facility] for further management. Patient was cannulated on peripheral VA ECMO.                                                                                                            #Hb SC disease  #Cardiogenic shock  ECMO DC 1/7/25.  #Unclear trigger for unresponsiveness   - patient with 1-2 sickle pain crisis per year and on hydrea, had retinal detachment s/p repair                           - patient had f/up with cardio in Sept 2024: per the note, unclear etiology of her dyspnea but possibly driven by cardiomyopathy; low suspicion of pulmonary HTN as no RV dysfunction/severe TR.  She is found to have new RV failure, requiring ECMO  - S/p exchange 1/4/2024, s/p ECMO decannulation 1/7/2025 c/b groin hematoma and received 2 unit prbc, platelet and cryo.    Recommendations  - Pending hemoglobin electrophoresis, no plan for repeat exchange yet.  - Discuss with blood bank and heme team prior to RBC transfusions to minimize risk of antibody development/transfusion reactions, aim to keep hb >7-8  -Neurology consult appreciated.   - Agree with holding hydroxyurea for now while thrombocytopenic   - Repeat hemolysis labs, (CBC, CMP, LDH, reticulocyte count) daily, prefer pediatric tubes.   - Hematology team to follow; plan discussed with brother Jojo at bedside.

## 2025-01-09 NOTE — PROGRESS NOTE ADULT - SUBJECTIVE AND OBJECTIVE BOX
Vascular Surgery Progress Note     Subjective:  Patient seen and examined.       Vital Signs:  Vital Signs Last 24 Hrs  T(C): 37.5 (09 Jan 2025 08:00), Max: 38 (09 Jan 2025 00:00)  T(F): 99.5 (09 Jan 2025 08:00), Max: 100.4 (09 Jan 2025 00:00)  HR: 110 (09 Jan 2025 09:30) (58 - 122)  RR: 15 (09 Jan 2025 09:30) (9 - 34)  SpO2: 96% (09 Jan 2025 09:30) (95% - 100%)    Parameters below as of 09 Jan 2025 09:00      O2 Concentration (%): 30    CAPILLARY BLOOD GLUCOSE  122 (09 Jan 2025 08:00)  110 (09 Jan 2025 04:00)  105 (09 Jan 2025 01:00)  115 (08 Jan 2025 20:00)  124 (08 Jan 2025 18:00)  123 (08 Jan 2025 16:00)  128 (08 Jan 2025 14:00)  135 (08 Jan 2025 12:00)      POCT Blood Glucose.: 122 mg/dL (09 Jan 2025 08:07)  POCT Blood Glucose.: 110 mg/dL (09 Jan 2025 04:06)  POCT Blood Glucose.: 103 mg/dL (09 Jan 2025 00:53)  POCT Blood Glucose.: 125 mg/dL (08 Jan 2025 21:47)  POCT Blood Glucose.: 115 mg/dL (08 Jan 2025 19:59)  POCT Blood Glucose.: 123 mg/dL (08 Jan 2025 16:21)  POCT Blood Glucose.: 128 mg/dL (08 Jan 2025 14:02)  POCT Blood Glucose.: 135 mg/dL (08 Jan 2025 12:10)      I&O's Detail    08 Jan 2025 07:01  -  09 Jan 2025 07:00  --------------------------------------------------------  IN:    Albumin 5% - 50 mL: 400 mL    Dexmedetomidine: 278.9 mL    DOBUTamine: 260 mL    Enteral Tube Flush: 90 mL    IV PiggyBack: 200 mL    IV PiggyBack: 1050 mL    Nitroprusside: 54.1 mL    PRBCs (Packed Red Blood Cells): 300 mL    Propofol: 5 mL    Vasopressin: 13.5 mL    Vital1.5: 600 mL  Total IN: 3251.5 mL    OUT:    Indwelling Catheter - Urethral (mL): 4840 mL    Insulin: 0 mL  Total OUT: 4840 mL    Total NET: -1588.5 mL      09 Jan 2025 07:01  -  09 Jan 2025 10:04  --------------------------------------------------------  IN:    Dexmedetomidine: 21 mL    DOBUTamine: 20 mL    Enteral Tube Flush: 30 mL    Nitroprusside: 7.5 mL    Vital1.5: 100 mL  Total IN: 178.5 mL    OUT:    Indwelling Catheter - Urethral (mL): 385 mL    Insulin: 0 mL    Vasopressin: 0 mL  Total OUT: 385 mL    Total NET: -206.5 mL            Physical Exam:  General: lying in bed   Pulmonary: intubated   Extremities: appears warm and well perfused, R anterior axilla with overlying ecchymosis, but no palpable hematoma, RUE brachial, radial, ulnar, palmar arch signals, LUE radial, brachial pulse, b/l UE good cap refill, warm, LE groin site soft,         Labs:    01-09    147[H]  |  105  |  27[H]  ----------------------------<  110[H]  3.9   |  31  |  0.93    Ca    9.2      09 Jan 2025 01:21  Phos  2.4     01-09  Mg     2.4     01-09    TPro  6.2  /  Alb  3.6  /  TBili  2.8[H]  /  DBili  x   /  AST  46[H]  /  ALT  69[H]  /  AlkPhos  139[H]  01-09    LIVER FUNCTIONS - ( 09 Jan 2025 01:21 )  Alb: 3.6 g/dL / Pro: 6.2 g/dL / ALK PHOS: 139 U/L / ALT: 69 U/L / AST: 46 U/L / GGT: x                                 9.0    10.42 )-----------( 130      ( 09 Jan 2025 01:21 )             27.2     PT/INR - ( 08 Jan 2025 05:23 )   PT: 11.6 sec;   INR: 1.01 ratio         PTT - ( 08 Jan 2025 05:23 )  PTT:23.4 sec

## 2025-01-09 NOTE — PROGRESS NOTE ADULT - SUBJECTIVE AND OBJECTIVE BOX
Patient seen and examined at the bedside.    Remained critically ill on continuous ICU monitoring.    OBJECTIVE:  Vital Signs Last 24 Hrs  T(C): 37.7 (09 Jan 2025 12:00), Max: 38 (09 Jan 2025 00:00)  T(F): 99.9 (09 Jan 2025 12:00), Max: 100.4 (09 Jan 2025 00:00)  HR: 98 (09 Jan 2025 18:30) (64 - 122)  BP: --  BP(mean): --  RR: 26 (09 Jan 2025 18:30) (9 - 39)  SpO2: 98% (09 Jan 2025 18:30) (94% - 100%)    Parameters below as of 09 Jan 2025 16:00      O2 Concentration (%): 30      Physical Exam:  General: intubated multiple lines gtt & tubes   Neurology: Sedated, Withdraws to pain in R & L UE, unable to follow commands  Eyes: bilateral pupils equal and reactive   ENT/Neck: +ETT midline, Neck supple, trachea midline, No JVD   Respiratory: Rales noted bilaterally   CV: RRR, S1S2, no murmurs        [x] Sinus tachycardia  Abdominal: Soft, ND +BS,   Extremities: no pedal edema noted, + peripheral pulses, left groin hematoma  Skin: No Rashes, Hematoma, Ecchymosis                             Assessment:  58 yo F w/ PMHx of Sickle cell disease (on hydroxyurea), HTN, HFimpEF/NICM (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 Pt initially presented to [facility] for SOB and SS crisis; course c/b witnessed seizure, intubated and sedated, and transferred to [facility] for further management.     Cardiogenic shock  S/P VA ECMO cannulation on 1/4/25.  S/P VA ECMO decannulation on 1/7/25  Encephalopathy  Possible seizures  Acute respiratory failure  Acute blood loss anemia  Sickle cell disease  Transaminitis  DYLON improving  Thrombocytopenia improving  Hyperglycemia      Plan:   ***Neuro***  [x] Sedated with [x] Precedex  Post operative neuro assessment   +corneal, gag and cough, f/u neuro recs  CT and MRI unremarkable  EEG today 1/7 reported to have possible epileptiform discharges  They were informed regarding the abnormal roving eye movements  Keppra for seizure activity per neuro  Neurology following  Possible repeat MRI tomorrow 1/8    ***Cardiovascular***  Invasive hemodynamic monitoring, assess perfusion indices   ST / CVP 64 / MAP 96 / PAP 26 / CI 3.7 / SvO2 ___ / Hct 27.2/ Lactate 1.5  [x] Nipride 0.3 mcg/kg/min   [x] Dobutamine 2 mcgs/kG/Min   [x] Vasopressin 0.04 Units/Min  [x] VA ECMO decannulated 1/7  Wean Mara to 20ppm  Volume:  [x] 2u PRBC [x] 2u Plts [x] 5 Cryo in OR, [x] 1u PRBC in CTU   Reassessment of hemodynamics post resuscitation   [x] Heparin gtt - off   [x] Nonbleeding   Serial EKG and cardiac enzymes     Sickle Cell - s/p Red cell exchange , pending electrophoresis results      ***Pulmonary***  [x] Mara 40 ppms wean to 20  Post op vent management - on full support   Titration of FiO2 and PEEP, follow SpO2, CXR, blood gases   Continue on duonebs as needed    Mode: AC/ CMV (Assist Control/ Continuous Mandatory Ventilation)  RR (machine): 12  TV (machine): 450  FiO2: 30  PEEP: 7  ITime: 1  MAP: 10  PC: 12  PIP: 20              Will plan to wean & extubate once pt is hemodynamically stable and not bleeding    ***GI***  Restart tube feeds  [x] Protonix for stress ulcer ppx  Bowel regimen with Miralax and Senna     ***Renal***  GFR 72 / [x] DYLON   Continue to monitor I/Os, BUN/Creatinine.   Replete lytes PRN  Costa present [x] positive   Diuresed with Bumex 2 mgs    ***ID***  MSSA bacteremia, BAL + MSSA  Empiric dosing with Meropenem   PO Vanco for c.diff prophylaxis   F/u all cultures     ***Endocrine***  [x] Hyperglycemia: HbA1c 4.6%              - [x] ISS             - Need tight glycemic control to prevent wound infection.    ***Hematology***  F/u Hb electrophoresis and hematology recs  Monitor left groin site for hematoma, trend H/h, given 1 pRBC post procedure in ICU  No heparin for anticoagulation tonight with risk of worsening left groin bleed.      Patient requires continuous monitoring with bedside rhythm monitoring, pulse oximetry monitoring, and continuous central venous and arterial pressure monitoring; and intermittent blood gas analysis. Care plan discussed with the ICU care team.   Patient remained critical, at risk for life threatening decompensation.    I have spent 30 minutes providing critical care management to this patient.    By signing my name below, I, Gabriella Lopez, attest that this documentation has been prepared under the direction and in the presence of Latonya Guzman MD   Electronically signed: Omar Anton, 01-09-25 @ 18:43    I, Latonya Guzman, personally performed the services described in this documentation. all medical record entries made by the scribe were at my direction and in my presence. I have reviewed the chart and agree that the record reflects my personal performance and is accurate and complete  Electronically signed: Latonya Guzman MD  Patient seen and examined at the bedside.    Remained critically ill on continuous ICU monitoring.    OBJECTIVE:  Vital Signs Last 24 Hrs  T(C): 37.7 (09 Jan 2025 12:00), Max: 38 (09 Jan 2025 00:00)  T(F): 99.9 (09 Jan 2025 12:00), Max: 100.4 (09 Jan 2025 00:00)  HR: 98 (09 Jan 2025 18:30) (64 - 122)  BP: --  BP(mean): --  RR: 26 (09 Jan 2025 18:30) (9 - 39)  SpO2: 98% (09 Jan 2025 18:30) (94% - 100%)    Parameters below as of 09 Jan 2025 16:00    O2 Concentration (%): 30      Physical Exam:  General: intubated multiple lines gtt & tubes   Neurology: Sedated, Withdraws to pain in R & L UE, unable to follow commands  Eyes: bilateral pupils equal and reactive   ENT/Neck: +ETT midline, Neck supple, trachea midline, No JVD   Respiratory: Rales noted bilaterally   CV: RRR, S1S2, no murmurs        [x] Sinus tachycardia  Abdominal: Soft, ND +BS,   Extremities: no pedal edema noted, + peripheral pulses, left groin hematoma  Skin: No Rashes, Hematoma, Ecchymosis                             Assessment:  60 yo F w/ PMHx of Sickle cell disease (on hydroxyurea), HTN, HFimpEF/NICM (EF 58% 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 Pt initially presented to [facility] for SOB and SS crisis; course c/b witnessed seizure, intubated and sedated, and transferred to [facility] for further management.     Cardiogenic shock  S/P VA ECMO cannulation on 1/4/25.  S/P VA ECMO decannulation on 1/7/25  Encephalopathy  Possible seizures  Acute respiratory failure  Acute blood loss anemia  Sickle cell disease  Transaminitis  DYLON improving  Thrombocytopenia improving  Hyperglycemia      Plan:   ***Neuro***  [x] Sedated with [x] Precedex  Post operative neuro assessment   +corneal, gag and cough, f/u neuro recs  CT and MRI unremarkable  EEG today 1/7 reported to have possible epileptiform discharges  They were informed regarding the abnormal roving eye movements  Keppra for seizure activity per neuro  Neurology following  Possible repeat MRI tomorrow 1/8    ***Cardiovascular***  Invasive hemodynamic monitoring, assess perfusion indices   ST / CVP 64 / MAP 96 / Hct 27.2/ Lactate 1.5  [x] Nipride 0.3 mcg/kg/min   [x] Dobutamine 2 mcgs/kG/Min   [x] Vasopressin 0.04 Units/Min  [x] VA ECMO decannulated 1/7  Wean Mara to 20ppm  Volume:  [x] 2u PRBC [x] 2u Plts [x] 5 Cryo in OR, [x] 1u PRBC in CTU   Reassessment of hemodynamics post resuscitation   [x] Heparin gtt - off   [x] Nonbleeding   Serial EKG and cardiac enzymes     Sickle Cell - s/p Red cell exchange , pending electrophoresis results      ***Pulmonary***  Post op vent management - on full support   Titration of FiO2 and PEEP, follow SpO2, CXR, blood gases   Continue on duonebs as needed    Mode: AC/ CMV (Assist Control/ Continuous Mandatory Ventilation)  RR (machine): 12  TV (machine): 450  FiO2: 30  PEEP: 7  ITime: 1  MAP: 10  PC: 12  PIP: 20              Will plan to wean & extubate once pt is hemodynamically stable and not bleeding    ***GI***  Restart tube feeds  [x] Protonix for stress ulcer prophylaxis   Bowel regimen with Miralax and Senna     ***Renal***  GFR 72 / [x] DYLON   Continue to monitor I/Os, BUN/Creatinine.   Replete lytes PRN  Costa present [x] positive   Diuresed with Bumex 2 mgs    ***ID***  MSSA bacteremia, BAL + MSSA  Empiric dosing with Meropenem   PO Vanco for c.diff prophylaxis   F/u all cultures     ***Endocrine***  [x] Hyperglycemia: HbA1c 4.6%              - [x] ISS             - Need tight glycemic control to prevent wound infection.    ***Hematology***  F/u Hb electrophoresis and hematology recs  Monitor left groin site for hematoma, trend H/h, given 1 PRBC post procedure in ICU  No heparin for anticoagulation tonight with risk of worsening left groin bleed.      Patient requires continuous monitoring with bedside rhythm monitoring, pulse oximetry monitoring, and continuous central venous and arterial pressure monitoring; and intermittent blood gas analysis. Care plan discussed with the ICU care team.   Patient remained critical, at risk for life threatening decompensation.    I have spent 55 minutes providing critical care management to this patient.    By signing my name below, I, Gabriella Lopez, attest that this documentation has been prepared under the direction and in the presence of Latonya Guzman MD   Electronically signed: Omar Anton, 01-09-25 @ 18:43    I, Latonya Guzman, personally performed the services described in this documentation. all medical record entries made by the scribe were at my direction and in my presence. I have reviewed the chart and agree that the record reflects my personal performance and is accurate and complete  Electronically signed: Latonya Guzman MD  Patient seen and examined at the bedside.    Remained critically ill on continuous ICU monitoring.    MRI today - Innumerable foci susceptibility artifact scattered throughout the brain, tiny acute/subacute infarcts within the bilateral basal ganglia, thalami, and chandu with disproportionate ovoid area of T2/FLAIR prolongation in the right ventral thalamus. Findings may indicate the presence of embolic acute/subacute infarcts.  Holding off LP with MRI results as above.  Remains with brainstem reflexes, opened eyes when called, not moving extremities to pain.  Remains intubated 12/7 on 40% fio2  CVP 8 /50, off dobutamine since this afternoon, on and off nipride  On tube feeds, BMs+  Remains on sean, vanco - cultures negative   Left groin soft - improving hematoma, RUE with previous axillary arterial line soft. Hb stable.      OBJECTIVE:  Vital Signs Last 24 Hrs  T(C): 37.7 (09 Jan 2025 12:00), Max: 38 (09 Jan 2025 00:00)  T(F): 99.9 (09 Jan 2025 12:00), Max: 100.4 (09 Jan 2025 00:00)  HR: 98 (09 Jan 2025 18:30) (64 - 122)  BP: --  BP(mean): --  RR: 26 (09 Jan 2025 18:30) (9 - 39)  SpO2: 98% (09 Jan 2025 18:30) (94% - 100%)    Parameters below as of 09 Jan 2025 16:00    O2 Concentration (%): 30    _________________________  VITAL SIGNS:  Vital Signs Last 24 Hrs  T(C): 37.9 (10 Javed 2025 00:00), Max: 38 (09 Jan 2025 20:00)  T(F): 100.2 (10 Javed 2025 00:00), Max: 100.4 (09 Jan 2025 20:00)  HR: 88 (10 Javed 2025 00:00) (64 - 122)  BP: --  BP(mean): --  RR: 12 (10 Javed 2025 00:00) (9 - 39)  SpO2: 98% (10 Javed 2025 00:00) (94% - 100%)    Parameters below as of 10 Javed 2025 00:00  Patient On (Oxygen Delivery Method): ventilator    O2 Concentration (%): 30  I/Os:   I&O's Detail    08 Jan 2025 07:01  -  09 Jan 2025 07:00  --------------------------------------------------------  IN:    Albumin 5% - 50 mL: 400 mL    Dexmedetomidine: 278.9 mL    DOBUTamine: 260 mL    Enteral Tube Flush: 90 mL    IV PiggyBack: 200 mL    IV PiggyBack: 1050 mL    Nitroprusside: 54.1 mL    PRBCs (Packed Red Blood Cells): 300 mL    Propofol: 5 mL    Vasopressin: 13.5 mL    Vital1.5: 600 mL  Total IN: 3251.5 mL    OUT:    Indwelling Catheter - Urethral (mL): 4840 mL    Insulin: 0 mL  Total OUT: 4840 mL    Total NET: -1588.5 mL      09 Jan 2025 07:01  -  10 Javed 2025 00:38  --------------------------------------------------------  IN:    Dexmedetomidine: 178.5 mL    DOBUTamine: 20 mL    DOBUTamine: 15 mL    DOBUTamine: 7.5 mL    Enteral Tube Flush: 390 mL    IV PiggyBack: 650 mL    Nitroprusside: 60.2 mL    Vital1.5: 800 mL  Total IN: 2121.2 mL    OUT:    Indwelling Catheter - Urethral (mL): 2275 mL    Insulin: 0 mL    Vasopressin: 0 mL  Total OUT: 2275 mL    Total NET: -153.8 mL          Mode: AC/ CMV (Assist Control/ Continuous Mandatory Ventilation)  RR (machine): 12  TV (machine): 450  FiO2: 30  PEEP: 7  ITime: 1  MAP: 10  PC: 12  PIP: 19      MEDICATIONS:  MEDICATIONS  (STANDING):  albuterol/ipratropium for Nebulization 3 milliLiter(s) Nebulizer every 6 hours  artificial tears (preservative free) Ophthalmic Solution 1 Drop(s) Both EYES four times a day  buMETAnide Injectable 2 milliGRAM(s) IV Push every 12 hours  chlorhexidine 0.12% Liquid 15 milliLiter(s) Oral Mucosa every 12 hours  chlorhexidine 2% Cloths 1 Application(s) Topical <User Schedule>  dexMEDEtomidine Infusion 0.2 MICROgram(s)/kG/Hr (4.18 mL/Hr) IV Continuous <Continuous>  dextrose 5%. 1000 milliLiter(s) (100 mL/Hr) IV Continuous <Continuous>  dextrose 5%. 1000 milliLiter(s) (50 mL/Hr) IV Continuous <Continuous>  dextrose 50% Injectable 25 Gram(s) IV Push once  dextrose 50% Injectable 12.5 Gram(s) IV Push once  dextrose 50% Injectable 25 Gram(s) IV Push once  glucagon  Injectable 1 milliGRAM(s) IntraMuscular once  heparin   Injectable 2500 Unit(s) SubCutaneous every 12 hours  insulin lispro (ADMELOG) corrective regimen sliding scale   SubCutaneous every 6 hours  levETIRAcetam   Injectable 1000 milliGRAM(s) IV Push every 12 hours  meropenem  IVPB 1000 milliGRAM(s) IV Intermittent every 8 hours  mupirocin 2% Nasal 1 Application(s) Both Nostrils two times a day  nitroprusside Infusion 0.3 MICROgram(s)/kG/Min (3.76 mL/Hr) IV Continuous <Continuous>  pantoprazole  Injectable 40 milliGRAM(s) IV Push every 12 hours  polyethylene glycol 3350 17 Gram(s) Oral daily  potassium chloride  10 mEq/50 mL IVPB 10 milliEquivalent(s) IV Intermittent every 1 hour  senna 2 Tablet(s) Oral at bedtime  sodium chloride 3%  Inhalation 4 milliLiter(s) Inhalation every 12 hours  vancomycin    Solution 125 milliGRAM(s) Oral every 12 hours  vancomycin  IVPB 750 milliGRAM(s) IV Intermittent every 12 hours  vasopressin Infusion 0.04 Unit(s)/Min (6 mL/Hr) IV Continuous <Continuous>    MEDICATIONS  (PRN):  dextrose Oral Gel 15 Gram(s) Oral once PRN Blood Glucose LESS THAN 70 milliGRAM(s)/deciliter      LABS:  Laboratory data was independently reviewed by me today.                           9.0    10.42 )-----------( 130      ( 09 Jan 2025 01:21 )             27.2     01-09    147[H]  |  105  |  27[H]  ----------------------------<  110[H]  3.9   |  31  |  0.93    Ca    9.2      09 Jan 2025 01:21  Phos  2.4     01-09  Mg     2.4     01-09    TPro  6.2  /  Alb  3.6  /  TBili  2.8[H]  /  DBili  x   /  AST  46[H]  /  ALT  69[H]  /  AlkPhos  139[H]  01-09    LIVER FUNCTIONS - ( 09 Jan 2025 01:21 )  Alb: 3.6 g/dL / Pro: 6.2 g/dL / ALK PHOS: 139 U/L / ALT: 69 U/L / AST: 46 U/L / GGT: x           PT/INR - ( 09 Jan 2025 20:19 )   PT: 10.8 sec;   INR: 0.94 ratio         PTT - ( 09 Jan 2025 20:19 )  PTT:24.0 sec  ABG - ( 10 Javed 2025 00:07 )  pH, Arterial: 7.57  pH, Blood: x     /  pCO2: 43    /  pO2: 149   / HCO3: 39    / Base Excess: 15.8  /  SaO2: 99.5              Urinalysis Basic - ( 09 Jan 2025 01:21 )    Color: x / Appearance: x / SG: x / pH: x  Gluc: 110 mg/dL / Ketone: x  / Bili: x / Urobili: x   Blood: x / Protein: x / Nitrite: x   Leuk Esterase: x / RBC: x / WBC x   Sq Epi: x / Non Sq Epi: x / Bacteria: x        RADIOLOGY:   Radiology images were independently reviewed by me today. Reports were reviewed by me today.    Xray Chest 1 View- PORTABLE-Routine:   ACC: [id number] EXAM:  XR CHEST PORTABLE ROUTINE 1V   ORDERED BY: LIANNA JONES     ACC: [id number] EXAM:  XR CHEST PORTABLE URGENT 1V   ORDERED BY: LIANNA JONES     ACC: [id number] EXAM:  XR CHEST PORTABLE URGENT 1V   ORDERED BY: LIANNA JONES     PROCEDURE DATE:  01/08/2025          INTERPRETATION:  EXAMINATION: XR CHEST URGENT, XR CHEST URGENT, XR CHEST    CLINICAL INDICATION: ogt placement    TECHNIQUE: Single frontal, portable view of the chest was obtained from   1/8/2020 1:49 PM, 1/8/2025 at 2:34 PM, and 1/9/2025 at 2:55 AM.    COMPARISON: Chest x-ray 1/8/2025 at 6:01 AM.    FINDINGS:  1/8/2020 1:49 PM  Endotracheal tube tip 2.6 cm above the kendal.  Enteric tube tip in the stomach.  Left IJ dialysis catheter in the SVC/right atrium.  Right IJ [place]-Renetta catheter, with tip in the main pulmonary artery..  The heart is normal in size.  No focal consolidation.  There is no pneumothorax or pleural effusion.    1/8/2025 at 2:34 PM  Enteric tube tip in stomach.    1/9/2025 at 2:55 AM  No significant interval changes.    IMPRESSION:  Endotracheal tube tip 2.6 cm above the kendal.    Enteric tube tip in the stomach.    Other lines and tubes as above.    --- End of Report ---          WILLIE LLANES MD; Resident Radiologist  This document has been electronically signed.  TAJ OTT MD; Attending Radiologist  This document has been electronically signed. Jan 9 2025  4:20PM (01-09-25 @ 04:33)  Xray Chest 1 View- PORTABLE-Urgent:   ACC: [id number] EXAM:  XR CHEST PORTABLE ROUTINE 1V   ORDERED BY: LIANNA JONES     ACC: [id number] EXAM:  XR CHEST PORTABLE URGENT 1V   ORDERED BY: LIANNA JONES     ACC: [id number] EXAM:  XR CHEST PORTABLE URGENT 1V   ORDERED BY: LIANNA JONES     PROCEDURE DATE:  01/08/2025          INTERPRETATION:  EXAMINATION: XR CHEST URGENT, XR CHEST URGENT, XR CHEST    CLINICAL INDICATION: ogt placement    TECHNIQUE: Single frontal, portable view of the chest was obtained from   1/8/2020 1:49 PM, 1/8/2025 at 2:34 PM, and 1/9/2025 at 2:55 AM.    COMPARISON: Chest x-ray 1/8/2025 at 6:01 AM.    FINDINGS:  1/8/2020 1:49 PM  Endotracheal tube tip 2.6 cm above the kendal.  Enteric tube tip in the stomach.  Left IJ dialysis catheter in the SVC/right atrium.  Right IJ [place]-Renetta catheter, with tip in the main pulmonary artery..  The heart is normal in size.  No focal consolidation.  There is no pneumothorax or pleural effusion.    1/8/2025 at 2:34 PM  Enteric tube tip in stomach.    1/9/2025 at 2:55 AM  No significant interval changes.    IMPRESSION:  Endotracheal tube tip 2.6 cm above the kendal.    Enteric tube tip in the stomach.    Other lines and tubes as above.    --- End of Report ---          WILLIE LLANES MD; Resident Radiologist  This document has been electronically signed.  TAJ OTT MD; Attending Radiologist  This document has been electronically signed. Jan 9 2025  4:20PM (01-08-25 @ 15:49)  Xray Chest 1 View- PORTABLE-Urgent:   ACC: [id number] EXAM:  XR CHEST PORTABLE ROUTINE 1V   ORDERED BY: LIANNA JONES     ACC: [id number] EXAM:  XR CHEST PORTABLE URGENT 1V   ORDERED BY: LIANNA JONES     ACC: [id number] EXAM:  XR CHEST PORTABLE URGENT 1V   ORDERED BY: LIANNA JONES     PROCEDURE DATE:  01/08/2025          INTERPRETATION:  EXAMINATION: XR CHEST URGENT, XR CHEST URGENT, XR CHEST    CLINICAL INDICATION: ogt placement    TECHNIQUE: Single frontal, portable view of the chest was obtained from   1/8/2020 1:49 PM, 1/8/2025 at 2:34 PM, and 1/9/2025 at 2:55 AM.    COMPARISON: Chest x-ray 1/8/2025 at 6:01 AM.    FINDINGS:  1/8/2020 1:49 PM  Endotracheal tube tip 2.6 cm above the kendal.  Enteric tube tip in the stomach.  Left IJ dialysis catheter in the SVC/right atrium.  Right IJ [place]-Renetta catheter, with tip in the main pulmonary artery..  The heart is normal in size.  No focal consolidation.  There is no pneumothorax or pleural effusion.    1/8/2025 at 2:34 PM  Enteric tube tip in stomach.    1/9/2025 at 2:55 AM  No significant interval changes.    IMPRESSION:  Endotracheal tube tip 2.6 cm above the kendal.    Enteric tube tip in the stomach.    Other lines and tubes as above.    --- End of Report ---          WILLIE LLANES MD; Resident Radiologist  This document has been electronically signed.  TAJ OTT MD; Attending Radiologist  This document has been electronically signed. Jan 9 2025  4:20PM (01-08-25 @ 15:49)      Physical Exam:  General: intubated multiple lines gtt & tubes   Neurology: Sedated, Withdraws to pain in R & L UE, unable to follow commands  Eyes: bilateral pupils equal and reactive   ENT/Neck: +ETT midline, Neck supple, trachea midline, No JVD   Respiratory: Rales noted bilaterally   CV: RRR, S1S2, no murmurs        [x] Sinus tachycardia  Abdominal: Soft, ND +BS,   Extremities: no pedal edema noted, + peripheral pulses, left groin hematoma resolving  Skin: No Rashes, RUE swelling noted, Ecchymosis                             Assessment:  60 yo F w/ PMHx of Sickle cell disease (on hydroxyurea), HTN, 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 Pt initially presented to [facility] for SOB and SS crisis; course c/b witnessed seizure, intubated and sedated, and transferred to [facility] for further management.     Cardiogenic shock  S/P VA ECMO cannulation on 1/4/25.  S/P VA ECMO decannulation on 1/7/25  Encephalopathy  Possible seizures  Acute respiratory failure  Acute blood loss anemia  Sickle cell disease  Transaminitis  DYLON improving  Thrombocytopenia improving  Hyperglycemia      Plan:   ***Neuro***  [x] Sedated with [x] Precedex  Post operative neuro assessment   +corneal, gag and cough, f/u neuro recs  CT and MRI unremarkable  EEG today 1/7 reported to have possible epileptiform discharges  They were informed regarding the abnormal roving eye movements  Keppra for seizure activity per neuro  Neurology following  Possible repeat MRI tomorrow 1/8    ***Cardiovascular***  Invasive hemodynamic monitoring, assess perfusion indices   ST / CVP 64 / MAP 96 / Hct 27.2/ Lactate 1.5  [x] Nipride 0.3 mcg/kg/min   [x] Dobutamine 2 mcgs/kG/Min   [x] Vasopressin 0.04 Units/Min  [x] VA ECMO decannulated 1/7  Wean Mara to 20ppm  Volume:  [x] 2u PRBC [x] 2u Plts [x] 5 Cryo in OR, [x] 1u PRBC in CTU   Reassessment of hemodynamics post resuscitation   [x] Heparin gtt - off   [x] Nonbleeding   Serial EKG and cardiac enzymes     Sickle Cell - s/p Red cell exchange on 1/4/25 ,      ***Pulmonary***  Post op vent management - on full support   Titration of FiO2 and PEEP, follow SpO2, CXR, blood gases   Continue on duonebs as needed    Mode: AC/ CMV (Assist Control/ Continuous Mandatory Ventilation)  RR (machine): 12  TV (machine): 450  FiO2: 30  PEEP: 7  ITime: 1  MAP: 10  PC: 12  PIP: 20                  ***GI***  Restart tube feeds  [x] Protonix for stress ulcer prophylaxis   Bowel regimen with Miralax and Senna     ***Renal***  GFR 72 / [x] DYLON   Continue to monitor I/Os, BUN/Creatinine.   Replete lytes PRN  Costa present [x] positive   Diuresed with Bumex 2 mgs    ***ID***  MSSA bacteremia, BAL + MSSA  Empiric dosing with Meropenem   PO Vanco for c.diff prophylaxis   F/u all cultures     ***Endocrine***  [x] Hyperglycemia: HbA1c 4.6%              - [x] ISS             - Need tight glycemic control to prevent wound infection.    ***Hematology***  F/u Hb electrophoresis and hematology recs  Heparin SC for DVT prophylxais  Monitor RUE and Left groin      Patient requires continuous monitoring with bedside rhythm monitoring, pulse oximetry monitoring, and continuous central venous and arterial pressure monitoring; and intermittent blood gas analysis. Care plan discussed with the ICU care team.   Patient remained critical, at risk for life threatening decompensation.    I have spent 55 minutes providing critical care management to this patient.    By signing my name below, I, Gabriella Lopez, attest that this documentation has been prepared under the direction and in the presence of Latonya Guzman MD   Electronically signed: Omar Anton, 01-09-25 @ 18:43    I, Latonya Guzman, personally performed the services described in this documentation. all medical record entries made by the blasibe were at my direction and in my presence. I have reviewed the chart and agree that the record reflects my personal performance and is accurate and complete  Electronically signed: Latonya Guzman MD

## 2025-01-09 NOTE — PROGRESS NOTE ADULT - SUBJECTIVE AND OBJECTIVE BOX
Patient seen and examined at the bedside.    Remains critically ill on continuous ICU monitoring, at risk for life threatening decompensation.  All Labs, data reviewed. Plan of care discussed in length during multi-disciplinary ICU rounds.       Brief Summary:  58 yo F with Sickle cell disease and NICM now with Cardiogenic shock s/p VA ECMO on 1/4/25.  VA ecmo decannulation on 1/7/25     24 Hour events:  EEG recorded epileptiform spikes, started on Keppra, Ativan  MRI will schedule for tomorrow  Will send CMV, HSV, PCR  On dobutamine, wean as able  Mara at 5 ppm wean as able  S/p bronch, follow BAL    Objective:  Vital Signs Last 24 Hrs  T(C): 37.5 (09 Jan 2025 08:00), Max: 38 (09 Jan 2025 00:00)  T(F): 99.5 (09 Jan 2025 08:00), Max: 100.4 (09 Jan 2025 00:00)  HR: 112 (09 Jan 2025 08:15) (58 - 130)  BP: --  BP(mean): --  RR: 15 (09 Jan 2025 08:15) (9 - 37)  SpO2: 96% (09 Jan 2025 08:15) (95% - 100%)    Parameters below as of 09 Jan 2025 08:00  Patient On (Oxygen Delivery Method): ventilator    O2 Concentration (%): 50    Mode: AC/ CMV (Assist Control/ Continuous Mandatory Ventilation)  RR (machine): 14  TV (machine): 450  FiO2: 50  PEEP: 8  ITime: 1  MAP: 12  PIP: 23    Physical Exam:  General: Intubated  Neurology: Withdrawing to noxious stimuli in all extremities, +cough, +gag  Respiratory: Breath sounds bilaterally   CV: Sinus Tachycardia   VA ECMO 3300 rpms, flow of 3.2, sweep of 1   Abdominal: Soft, Nontender  Extremities: Warm, well-perfused  Costa  -------------------------------------------------------------------------------------------------------------------------------    Labs:                        9.0    10.42 )-----------( 130      ( 09 Jan 2025 01:21 )             27.2     01-09    147[H]  |  105  |  27[H]  ----------------------------<  110[H]  3.9   |  31  |  0.93    Ca    9.2      09 Jan 2025 01:21  Phos  2.4     01-09  Mg     2.4     01-09    TPro  6.2  /  Alb  3.6  /  TBili  2.8[H]  /  DBili  x   /  AST  46[H]  /  ALT  69[H]  /  AlkPhos  139[H]  01-09    LIVER FUNCTIONS - ( 09 Jan 2025 01:21 )  Alb: 3.6 g/dL / Pro: 6.2 g/dL / ALK PHOS: 139 U/L / ALT: 69 U/L / AST: 46 U/L / GGT: x           PT/INR - ( 08 Jan 2025 05:23 )   PT: 11.6 sec;   INR: 1.01 ratio       PTT - ( 08 Jan 2025 05:23 )  PTT:23.4 sec  ABG - ( 09 Jan 2025 04:39 )  pH, Arterial: 7.53  pH, Blood: x     /  pCO2: 45    /  pO2: 235   / HCO3: 38    / Base Excess: 13.5  /  SaO2: 99.8      ALL MEDICATIONS   MEDICATIONS  (STANDING):  albuterol/ipratropium for Nebulization 3 milliLiter(s) Nebulizer every 6 hours  artificial tears (preservative free) Ophthalmic Solution 1 Drop(s) Both EYES four times a day  buMETAnide Injectable 2 milliGRAM(s) IV Push every 12 hours  chlorhexidine 0.12% Liquid 15 milliLiter(s) Oral Mucosa every 12 hours  chlorhexidine 2% Cloths 1 Application(s) Topical <User Schedule>  dexMEDEtomidine Infusion 0.2 MICROgram(s)/kG/Hr (4.18 mL/Hr) IV Continuous <Continuous>  DOBUTamine Infusion 5 MICROgram(s)/kG/Min (12.5 mL/Hr) IV Continuous <Continuous>  insulin regular Infusion 1 Unit(s)/Hr (1 mL/Hr) IV Continuous <Continuous>  levETIRAcetam   Injectable 1000 milliGRAM(s) IV Push every 12 hours  meropenem  IVPB 1000 milliGRAM(s) IV Intermittent every 8 hours  mupirocin 2% Nasal 1 Application(s) Both Nostrils two times a day  nitroprusside Infusion 0.3 MICROgram(s)/kG/Min (3.76 mL/Hr) IV Continuous <Continuous>  pantoprazole  Injectable 40 milliGRAM(s) IV Push every 12 hours  polyethylene glycol 3350 17 Gram(s) Oral daily  senna 2 Tablet(s) Oral at bedtime  sodium chloride 3%  Inhalation 4 milliLiter(s) Inhalation every 12 hours  vancomycin    Solution 125 milliGRAM(s) Oral every 12 hours  vancomycin  IVPB 750 milliGRAM(s) IV Intermittent every 12 hours  vasopressin Infusion 0.04 Unit(s)/Min (6 mL/Hr) IV Continuous <Continuous>    MEDICATIONS  (PRN):  ------------------------------------------------------------------------------------------------------------------------------  Assessment:  58 yo F w/ PMHx of Sickle cell disease (on hydroxyurea), HTN, 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 Pt initially presented to [facility] for SOB and SS crisis; course c/b witnessed seizure, intubated and sedated, and transferred to [facility] for further management. Now in cardiogenic shock, transferred to [facility] for further management. Cannulated for VA ECMO on 1/4/25.    Cardiogenic shock  Acute respiratory failure  Acute blood loss anemia  Thrombocytopenia  Hyperglycemia    Plan:   ***Neuro***  Multifactorial encephalopathy  Seizure activity on EEG  +corneal, gag and cough, responds to name  Previus CT and MRI unremarkable  Will repeat MRI  Keppra  BID, received Ativan    ***Cardiovascular***  Remains on Dobutamine wean as able   On Mara for RV support, wean by tomorrow am  On vaso on and off, Keep Maps >65   Albumin 25 %  and Bumex  Monitor hemodynamic closely, signs of hypoperfusion     ***Pulmonary***  Acute hypoxic respiratory failure  Full vent support  PS as tolerates  Nebs  Follow BAL, thick secretion on a left side, suctioned lavaged    ***GI***  Tube feeds at goal  Protonix for stress ulcer prophylaxis   bowel regimen optimise  Tren LFTs, shock liver improving    ***Renal***  DYLON  Trend Creatinine   Costa catheter for strict I/O measurements.   Bumex BID  Albumin 25% for hypoalbuminemia    ***ID***  MSSA bacteremia, BAL + MSSA  On vancomycin  On empiric meropenum  Trend leukocytosis    ***Endocrine***  Hyperglycemia - Insulin infusion.     ***Hematology***  Acute blood loss anemia and thrombocytopenia  Sickle Cell - s/p Red cell exchange , pending electrophoresis results  Keep Plats >50, Hg >8  2U prbc and 2 U platelets in OR  Heparin SC for ppx        I, Yana Patricia MD, personally performed the services described in this documentation. All medical record entries made by the scribe were at my direction and in my presence. I have reviewed the chart and agree that the record reflects my personal performance and is accurate and complete  Electronically signed:   Yana Patricia MD  CT ICU attending     ICU time: ** mins     By signing my name below, I, Norris Oliver, attest that this documentation has been prepared under the direction and in the presence of Yana Patricia MD  Electronically signed: Norris Oliver, 01-09-25 @ 08:27             Patient seen and examined at the bedside.    Remains critically ill on continuous ICU monitoring, at risk for life threatening decompensation.  All Labs, data reviewed. Plan of care discussed in length during multi-disciplinary ICU rounds.     Brief Summary:  60 yo F with Sickle cell disease and NICM now with Cardiogenic shock s/p VA ECMO on 1/4/25.  VA ecmo decannulation on 1/7/25     24 Hour events:  EEG recording  Mental status remains poor  MRI will schedule   Weaning dobutamine  S/p bronch, follow BAL      Objective:  Vital Signs Last 24 Hrs  T(C): 37.5 (09 Jan 2025 08:00), Max: 38 (09 Jan 2025 00:00)  T(F): 99.5 (09 Jan 2025 08:00), Max: 100.4 (09 Jan 2025 00:00)  HR: 112 (09 Jan 2025 08:15) (58 - 130)  BP: --  BP(mean): --  RR: 15 (09 Jan 2025 08:15) (9 - 37)  SpO2: 96% (09 Jan 2025 08:15) (95% - 100%)    Parameters below as of 09 Jan 2025 08:00  Patient On (Oxygen Delivery Method): ventilator    O2 Concentration (%): 50    Mode: AC/ CMV (Assist Control/ Continuous Mandatory Ventilation)  RR (machine): 14  TV (machine): 450  FiO2: 50  PEEP: 8  ITime: 1  MAP: 12  PIP: 23    Physical Exam:  General: Intubated  Neurology: Withdrawing to noxious stimuli in all extremities, +cough, +gag  Respiratory: Breath sounds bilaterally   CV: Sinus Tachycardia   Abdominal: Soft, Nontender  Extremities: Warm, well-perfused  pete  -------------------------------------------------------------------------------------------------------------------------------    Labs:                        9.0    10.42 )-----------( 130      ( 09 Jan 2025 01:21 )             27.2     01-09    147[H]  |  105  |  27[H]  ----------------------------<  110[H]  3.9   |  31  |  0.93    Ca    9.2      09 Jan 2025 01:21  Phos  2.4     01-09  Mg     2.4     01-09    TPro  6.2  /  Alb  3.6  /  TBili  2.8[H]  /  DBili  x   /  AST  46[H]  /  ALT  69[H]  /  AlkPhos  139[H]  01-09    LIVER FUNCTIONS - ( 09 Jan 2025 01:21 )  Alb: 3.6 g/dL / Pro: 6.2 g/dL / ALK PHOS: 139 U/L / ALT: 69 U/L / AST: 46 U/L / GGT: x           PT/INR - ( 08 Jan 2025 05:23 )   PT: 11.6 sec;   INR: 1.01 ratio       PTT - ( 08 Jan 2025 05:23 )  PTT:23.4 sec  ABG - ( 09 Jan 2025 04:39 )  pH, Arterial: 7.53  pH, Blood: x     /  pCO2: 45    /  pO2: 235   / HCO3: 38    / Base Excess: 13.5  /  SaO2: 99.8      ALL MEDICATIONS   MEDICATIONS  (STANDING):  albuterol/ipratropium for Nebulization 3 milliLiter(s) Nebulizer every 6 hours  artificial tears (preservative free) Ophthalmic Solution 1 Drop(s) Both EYES four times a day  buMETAnide Injectable 2 milliGRAM(s) IV Push every 12 hours  chlorhexidine 0.12% Liquid 15 milliLiter(s) Oral Mucosa every 12 hours  chlorhexidine 2% Cloths 1 Application(s) Topical <User Schedule>  dexMEDEtomidine Infusion 0.2 MICROgram(s)/kG/Hr (4.18 mL/Hr) IV Continuous <Continuous>  DOBUTamine Infusion 5 MICROgram(s)/kG/Min (12.5 mL/Hr) IV Continuous <Continuous>  insulin regular Infusion 1 Unit(s)/Hr (1 mL/Hr) IV Continuous <Continuous>  levETIRAcetam   Injectable 1000 milliGRAM(s) IV Push every 12 hours  meropenem  IVPB 1000 milliGRAM(s) IV Intermittent every 8 hours  mupirocin 2% Nasal 1 Application(s) Both Nostrils two times a day  nitroprusside Infusion 0.3 MICROgram(s)/kG/Min (3.76 mL/Hr) IV Continuous <Continuous>  pantoprazole  Injectable 40 milliGRAM(s) IV Push every 12 hours  polyethylene glycol 3350 17 Gram(s) Oral daily  senna 2 Tablet(s) Oral at bedtime  sodium chloride 3%  Inhalation 4 milliLiter(s) Inhalation every 12 hours  vancomycin    Solution 125 milliGRAM(s) Oral every 12 hours  vancomycin  IVPB 750 milliGRAM(s) IV Intermittent every 12 hours  vasopressin Infusion 0.04 Unit(s)/Min (6 mL/Hr) IV Continuous <Continuous>    MEDICATIONS  (PRN):  ------------------------------------------------------------------------------------------------------------------------------  Assessment:  60 yo F w/ PMHx of Sickle cell disease (on hydroxyurea), HTN, 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 Pt initially presented to [facility] for SOB and SS crisis; course c/b witnessed seizure, intubated and sedated, and transferred to [facility] for further management. Now in cardiogenic shock, transferred to [facility] for further management. Cannulated for VA ECMO on 1/4/25.    Cardiogenic shock  Acute respiratory failure  Acute blood loss anemia  Thrombocytopenia  Hyperglycemia    Plan:   ***Neuro***  Multifactorial encephalopathy  Seizure activity on EEG  +corneal, gag and cough, responds to name  Previus CT and MRI unremarkable  Will repeat MRI  Keppra  BID, received Ativan 2 days ago    ***Cardiovascular***  Dobutamine wean as able   D/v PA cathter  On vaso on and off, Keep Maps >65   Albumin 25 %  and Bumex  Monitor hemodynamic closely, signs of hypoperfusion     ***Pulmonary***  Acute hypoxic respiratory failure  Full vent support  PS as tolerates  Nebs  Follow BAL, thick secretion on a left side, suctioned lavaged    ***GI***  Tube feeds at goal  Protonix for stress ulcer prophylaxis   bowel regimen optimise  Tren LFTs, shock liver improving    ***Renal***  DYLON  Trend Creatinine   Pete catheter for strict I/O measurements.   Bumex BID  Albumin 25% for hypoalbuminemia    ***ID***  MSSA bacteremia, BAL + MSSA  On vancomycin  On empiric meropenum  Leukocytosis improving  Send:  CMV viral load  HSV 1/2 PCR  EBV viral load  West Nile serology and PCR  Lyme serology  Babesia PCR    ***Endocrine***  Hyperglycemia - Insulin infusion.     ***Hematology***  Acute blood loss anemia and thrombocytopenia  Sickle Cell - s/p Red cell exchange , pending electrophoresis results  Keep Plats >50, Hg >8  Heparin SC for ppx, after axiliary Milena removed      I, Yana Patricia MD, personally performed the services described in this documentation. All medical record entries made by the scribe were at my direction and in my presence. I have reviewed the chart and agree that the record reflects my personal performance and is accurate and complete  Electronically signed:   Yana Patricia MD  CT ICU attending     ICU time: 50 mins     By signing my name below, I, Norris Oliver, attest that this documentation has been prepared under the direction and in the presence of Yana Patricia MD  Electronically signed: Norris Oliver, 01-09-25 @ 08:27

## 2025-01-09 NOTE — PROGRESS NOTE ADULT - SUBJECTIVE AND OBJECTIVE BOX
Neurology Progress Note    SUBJECTIVE/OBJECTIVE/INTERVAL EVENTS: Patient seen and examined at bedside w/ neuro attending and team.     INTERVAL HISTORY: No acute events overnight. Pt continues to be intubated and ventilated. Pt pending MRI Brain and dobutamine is being weaned. Bronchoscopy completed and BAL pending.     REVIEW OF SYSTEMS: Unable to assess as pt is intubated and sedated    VITALS & EXAMINATION:  Vital Signs Last 24 Hrs  T(C): 37.7 (09 Jan 2025 12:00), Max: 38 (09 Jan 2025 00:00)  T(F): 99.9 (09 Jan 2025 12:00), Max: 100.4 (09 Jan 2025 00:00)  HR: 94 (09 Jan 2025 15:00) (58 - 122)  BP: --  BP(mean): --  RR: 24 (09 Jan 2025 15:00) (9 - 34)  SpO2: 99% (09 Jan 2025 15:00) (94% - 100%)    Parameters below as of 09 Jan 2025 12:00  Patient On (Oxygen Delivery Method): ventilator    O2 Concentration (%): 30    Constitutional: Female, appears stated age, nontoxic, not in distress,    Head: Normocephalic;   Eyes: clear sclera;   Extremities: No cyanosis;   Resp: breathing comfortably  Neck: no nuchal rigidity       Neurological (>12):  Neurologic Exam: limited due to intubation and on sedation ( precedex 0.5), Oculocephalic reflex present  Mental status - eyes mildly open with improved and no longer has right gaze preference.   Cranial nerves - PERRLA     Motor - flaccid tone   Sensation - mild response to noxious stimuli in all extremities, greater in lower extremities    DTR's -             Biceps      Brachioradialis      Patellar    Ankle    Toes/plantar response  R             0                     1+               0             0              mute  L            0                    1+                  0           0                mtue    Coordination - MENDEL  Gait and station - MENDEL    LABORATORY:  CBC                       9.0    10.42 )-----------( 130      ( 09 Jan 2025 01:21 )             27.2     Chem 01-09    147[H]  |  105  |  27[H]  ----------------------------<  110[H]  3.9   |  31  |  0.93    Ca    9.2      09 Jan 2025 01:21  Phos  2.4     01-09  Mg     2.4     01-09    TPro  6.2  /  Alb  3.6  /  TBili  2.8[H]  /  DBili  x   /  AST  46[H]  /  ALT  69[H]  /  AlkPhos  139[H]  01-09    LFTs LIVER FUNCTIONS - ( 09 Jan 2025 01:21 )  Alb: 3.6 g/dL / Pro: 6.2 g/dL / ALK PHOS: 139 U/L / ALT: 69 U/L / AST: 46 U/L / GGT: x           Coagulopathy PT/INR - ( 08 Jan 2025 05:23 )   PT: 11.6 sec;   INR: 1.01 ratio         PTT - ( 08 Jan 2025 05:23 )  PTT:23.4 sec  Lipid Panel   A1c   Cardiac enzymes     U/A Urinalysis Basic - ( 09 Jan 2025 01:21 )    Color: x / Appearance: x / SG: x / pH: x  Gluc: 110 mg/dL / Ketone: x  / Bili: x / Urobili: x   Blood: x / Protein: x / Nitrite: x   Leuk Esterase: x / RBC: x / WBC x   Sq Epi: x / Non Sq Epi: x / Bacteria: x      CSF  Immunological  Other    STUDIES & IMAGING: (EEG, CT, MR, U/S, TTE/QUENTIN):    EEG Classification / Summary:  Abnormal  EEG in a lethargic patient:   Frequent left temporal LRDA to 1.5hz  Less frequent Right temporal LRDA 1-1.5Hz   Generalized background slowing, moderate.   Background / spike burden improved vs prior days studies.    -------------------------------------------------------------------------------------------------------  Clinical Impression:   Risk of seizures from the temporal regions  Moderate  generalized background slowing  There were no seizures recorded.   Background / periodic activity / spike burden improved vs prior days    In absence of additional clinical concerns, recommend consideration for discontinuation of current EEG study with reconnection in future if warranted.  No seizures x ~6days

## 2025-01-09 NOTE — PROGRESS NOTE ADULT - SUBJECTIVE AND OBJECTIVE BOX
ADVANCED HEART FAILURE & TRANSPLANT  - PROGRESS NOTE  *To reach the NS1 Team from 8am to 5pm (MON-FRI), please call [phone],   _______________________________________________________________________________________________________     Subjective:    Medications:  albuterol/ipratropium for Nebulization 3 milliLiter(s) Nebulizer every 6 hours  artificial tears (preservative free) Ophthalmic Solution 1 Drop(s) Both EYES four times a day  buMETAnide Injectable 2 milliGRAM(s) IV Push every 12 hours  chlorhexidine 0.12% Liquid 15 milliLiter(s) Oral Mucosa every 12 hours  chlorhexidine 2% Cloths 1 Application(s) Topical <User Schedule>  dexMEDEtomidine Infusion 0.2 MICROgram(s)/kG/Hr IV Continuous <Continuous>  DOBUTamine Infusion 5 MICROgram(s)/kG/Min IV Continuous <Continuous>  insulin regular Infusion 1 Unit(s)/Hr IV Continuous <Continuous>  levETIRAcetam   Injectable 1000 milliGRAM(s) IV Push every 12 hours  meropenem  IVPB 1000 milliGRAM(s) IV Intermittent every 8 hours  mupirocin 2% Nasal 1 Application(s) Both Nostrils two times a day  nitroprusside Infusion 0.3 MICROgram(s)/kG/Min IV Continuous <Continuous>  pantoprazole  Injectable 40 milliGRAM(s) IV Push every 12 hours  polyethylene glycol 3350 17 Gram(s) Oral daily  senna 2 Tablet(s) Oral at bedtime  sodium chloride 3%  Inhalation 4 milliLiter(s) Inhalation every 12 hours  vancomycin    Solution 125 milliGRAM(s) Oral every 12 hours  vancomycin  IVPB 750 milliGRAM(s) IV Intermittent every 12 hours  vasopressin Infusion 0.04 Unit(s)/Min IV Continuous <Continuous>      Physical Exam:    Vitals:  Vital Signs Last 24 Hours  T(C): 37.5 (25 @ 04:00), Max: 38 (25 @ 00:00)  HR: 96 (25 @ 06:45) (58 - 130)  BP: --  RR: 15 (25 @ 06:45) (9 - 37)  SpO2: 100% (25 @ 06:45) (95% - 100%)    Weight in k.9 ( @ 00:00)    I&O's Summary    2025 07:01  -  2025 07:00  --------------------------------------------------------  IN: 3251.5 mL / OUT: 4840 mL / NET: -1588.5 mL      Labs:                        9.0    10.42 )-----------( 130      ( 2025 01:21 )             27.2         147[H]  |  105  |  27[H]  ----------------------------<  110[H]  3.9   |  31  |  0.93    Ca    9.2      2025 01:21  Phos  2.4       Mg     2.4         TPro  6.2  /  Alb  3.6  /  TBili  2.8[H]  /  DBili  x   /  AST  46[H]  /  ALT  69[H]  /  AlkPhos  139[H]      PT/INR - ( 2025 05:23 )   PT: 11.6 sec;   INR: 1.01 ratio         PTT - ( 2025 05:23 )  PTT:23.4 sec        Oxygen Saturation, Mixed: 83.8 ( @ 03:06)  Oxygen Saturation, Mixed: 80.9 ( @ 01:35)  Oxygen Saturation, Mixed: 78.9 ( @ 20:04)  Oxygen Saturation, Mixed: 73.9 ( @ 17:13)  Oxygen Saturation, Mixed: 76.2 ( @ 13:25)  Oxygen Saturation, Mixed: 75.5 ( @ 10:55)  Oxygen Saturation, Mixed: 76.5 ( @ 08:50)  Oxygen Saturation, Mixed: 79.2 ( @ 04:18)  Oxygen Saturation, Mixed: 84.1 ( @ 00:14)  Oxygen Saturation, Mixed: 77.4 ( @ 20:57)  Oxygen Saturation, Mixed: 75.6 ( @ 17:33)  Oxygen Saturation, Mixed: 84.1 ( @ 16:01)  Oxygen Saturation, Mixed: 77.6 ( @ 23:55)  Oxygen Saturation, Mixed: 78.9 ( @ 19:30)  Oxygen Saturation, Mixed: 73.3 ( @ 10:35)  Oxygen Saturation, Mixed: 79.6 ( @ 09:00)    Lactate Dehydrogenase, Serum: 554 U/L ( @ 01:21)  Lactate Dehydrogenase, Serum: 828 U/L ( @ 00:26)

## 2025-01-09 NOTE — PROGRESS NOTE ADULT - ASSESSMENT
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 Pt initially presented to [facility] for SOB and SS crisis; course c/b witnessed seizure, intubated and sedated, and transferred to [facility] for further management. Keppra was discontinued at [facility] due to negative EEG. At [facility] MICU pt was found to have RV failure possibly iso pulm HTN 2/2 increased tidal volumes on the ventilator. On addition, pt possibly received a lot of volume iso SS crisis. Neuro was consulted for seizures and EEG technician was called for vEEG placement. Pt is in profound cardiogenic shock. Pt has been decompensating, despite Dobutamine gtt, Bumex gtt, Levophed, and addition of Vasopressin. Vasopressors requirements have gone up. NS shock team was called and pt is being transferred to NS for further management.     Patient with multiple medications allergies listed  MSSA in sputum culture from ET tube  Coag negative Staph epi. in single blood culture drawn 1/3 with repeat blood cultures x2 sets pending  given dose of Vancomycin    # sickle cell SC disease  # multiple medication allergies and patient is intubated and unable to answer specific questions about allergy types  # multiple lines, ECMO,  # mental status- secondary to small CVAs?, doubt infection but encephalitis studies sent and pending    Would:  Encephalitis studies sent:  CMV viral load  HSV 1/2 PCR  EBV viral load  West Nile serology and PCR  Lyme serology  Babesia PCR    acute hepatitis serology negative  Coxsackie A nonreactive, B low titers    Staph epi bacteremia in a single set of cultures  repeat specimens sent on 1/7 are no growth  likely procurement contaminant          Pedro Shepherd MD  Can be called via Teams  After 5pm/weekends [phone]

## 2025-01-09 NOTE — PROGRESS NOTE ADULT - ASSESSMENT
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 Pt initially presented to [facility] (1/2) for SOB and SS crisis; course c/b witnessed seizure, intubated and sedated, and transferred to [facility] hospital for further management. Keppra was discontinued at [facility] due to negative EEG. At [facility] MICU pt was found to have RV failure possibly iso pulm HTN 2/2 increased tidal volumes on the ventilator. On addition, pt possibly received a lot of volume iso SS crisis. Patient in profound cardiogenic shock. Pt has been decompensating, despite Dobutamine gtt, Bumex gtt, Levophed, and addition of Vasopressin. Vasopressors requirements have gone up. Patient transferred to [facility] for R heart cath and possible VA ECMO. Patient was cannulated on peripheral VA ECMO (1/4). Decannulated 1/7 PM. Neurology consulted.      Impression:   Reported seizure like activity at [facility] and worsening cardiac function at OSH causing RV strain and requiring ECMO at [facility]. Pt s/p decannulation. Neurologic workup pending MR Brain w/wo contrast    Recommendations  [] Video EEG reviewed, mutifocal areas of irritability with increased risk of seizures, can be discontinued  [] Keppra 1000mg BID   [] taper off sedation when able, reassess at that time   [] MRI brain with and without planned when pt stable   [] Monitor gaze preference and seizure activity clinically    Seen with general neurology attending.   Pending chart attestation.

## 2025-01-09 NOTE — PROGRESS NOTE ADULT - ASSESSMENT
59F PMH Sickle cell disease (on hydroxyurea), HTN, HFimpEF/NICM (EF 58% 10/2024) presenting as a transfer from [facility]. Pt initially presented to [facility] for SOB and SS crisis; course c/b witnessed seizure, intubated and sedated, and transferred to [facility] for further management. Keppra was discontinued at [facility] due to negative EEG. At [facility] MICU pt was found to have RV failure possibly iso pulm HTN 2/2 increased tidal volumes on the ventilator, possibly due to volume overload due to IVF iso SS crisis.     Pt is in profound cardiogenic shock. Pt has been decompensating, despite Dobutamine gtt, Bumex gtt, Levophed, and addition of Vasopressin. Vasopressors requirements have gone up. [facility] shock team was called and pt transferred to [facility] for further management. Patient was cannulated on peripheral [facility] ECMO.                                                                                                            #Hb SC disease  #Cardiogenic shock on ECMO  #Unclear trigger for unresponsiveness   - patient with 1-2 sickle pain crisis per year and on hydrea, had retinal detachment s/p repair                           - patient had f/up with cardio in Sept 2024: per the note, unclear etiology of her dyspnea but possibly driven by cardiomyopathy; low suspicion of pulmonary HTN as no RV dysfunction/severe TR.  She is found to have new RV failure, requiring ECMO  - Blood bank transfusion medicine team consulted for emergent RBC exchange given critically ill with multi-organ failure  - Retic count elevated to 11.6%, LDH 1000s, bili 3.9. CXR was clear not indicative of acute chest. Smear (prior to exchange) was reviewed: Hypochromic RBCs with normochromic RBC reflecting transfused blood. Several nucleated RBC suggesting stressed bone marrow. Very few target cells and rare sickle cell. No schistocytes to suggest hemolysis.    - S/p exchange 1/4/2024, s/p ECMO decannulation 1/7/2025 c/b groin hematoma and received 2 unit prbc, platelet and cryo.  -Hb electrophoresis on 01/07: Hb A 76.8%, Hb A2 3.6%, Hb S 9.3%, HbC 10.3%    Recommendations  - Hb electrophoresis ordered today. consider repeat exchange if Hb C and Hb S fraction>30% on Hb electrophoresis; blood bank aware to help with exchange recommendations.   -  recommend transfusion to maintain plt > 50k in the setting of bleeding, otherwise Monitor CBC with differential daily and transfuse for platelet count >10 minimum, >20 if febrile.  - Discuss with blood bank and heme team prior to RBC transfusions to minimize risk of antibody development/transfusion reactions, aim to keep hb >7-8  - Pending full neurology eval, appreciate care by critical care, cards/heart failure -neurology suspect that seizures are not related to presentation and plan to continue keppra for now   - Agree with holding hydroxyurea for now while thrombocytopenic -will discuss when to restart   - Repeat hemolysis labs, (CBC, CMP, LDH, reticulocyte count) daily, prefer pediatric tubes.   - Hematology team to follow; plan discussed with CTICU and neurology teams      Sita Lin MD  Hematology Oncology Fellow, PGY-4

## 2025-01-09 NOTE — PROGRESS NOTE ADULT - ASSESSMENT
59yFemale sickle cell and NICM now with cardiogenic shock s/p [facility] ECMO on 1/4/25, which has now been decannulated on 1/7/25, with b/l UE axillary A lines. Vascular surgery consulted for c/f RUE axillary swelling.     Recommendations  - monitor RUE, currently with dopplerable signals  - duplex without signs of peudoaneurysm or hematoma  - A line site is stable. ok for R axillary a line to be removed. Please hold at least 30 minutes of pressure after A line removal  - appreciate care per CTICU    Plan discussed with fellow    Vascular Surgery   [phone]

## 2025-01-09 NOTE — EEG REPORT - NS EEG TEXT BOX
This is a Continuous Video EEG.     Recording Technique: The patient underwent continuous video-EEG monitoring, using Telemetry System hardware on the [employer]yt[employer].  EEG and video data were stored on a computer hard drive with important events saved in digital archive files. The material was reviewed by a physician (electroencephalographer/epileptologist) on a daily basis.  Moreno and seizure detection algorithms were utilized and reviewed.  An EEG Technician attended to the patient for 8 to 10 hours per day. The patient was observed by the epilepsy nursing staff 24 hours per day. The epilepsy center neurologist was available in person or on call 24 hours per day..     Placement and Labeling of Electrodes: The EEG was performed utilizing at least 20 channel referential EEG connections (coronal over temporal over parasagittal montage) with inferior temporal electrodes when indicting and using all standard 10-20 electrode placements with EKG, with additional electrodes placed in the inferior temporal region using the modified 10-10 montage electrode placements for elective admissions, or if deemed necessary.  Recording was at  a sampling rate of 256 samples per second per channel. Time synchronized digital video recording was done simultaneously with EEG recording.  A low light infrared camera was used for low light recording..    EEG REPORT:     CARSON LEE [id number]     Study Date: 		01-08-25 (0800) - 01-09-25 (08:00)   Duration: 24hr   --------------------------------------------------------------------------------------------------  History:  CC/ HPI Patient is a 59y old  Female who presents with a chief complaint of Mixed Shock (05 Jan 2025 07:26)    MEDICATIONS  (STANDING):  albuterol/ipratropium for Nebulization 3 milliLiter(s) Nebulizer every 6 hours  artificial tears (preservative free) Ophthalmic Solution 1 Drop(s) Both EYES four times a day  buMETAnide Injectable 2 milliGRAM(s) IV Push every 12 hours  chlorhexidine 0.12% Liquid 15 milliLiter(s) Oral Mucosa every 12 hours  chlorhexidine 2% Cloths 1 Application(s) Topical <User Schedule>  dexMEDEtomidine Infusion 0.2 MICROgram(s)/kG/Hr (4.18 mL/Hr) IV Continuous <Continuous>  DOBUTamine Infusion 3 MICROgram(s)/kG/Min (7.52 mL/Hr) IV Continuous <Continuous>  insulin regular Infusion 1 Unit(s)/Hr (1 mL/Hr) IV Continuous <Continuous>  levETIRAcetam   Injectable 1000 milliGRAM(s) IV Push every 12 hours  magnesium citrate Oral Solution 296 milliLiter(s) Oral once  meropenem  IVPB 1000 milliGRAM(s) IV Intermittent every 8 hours  mupirocin 2% Nasal 1 Application(s) Both Nostrils two times a day  nitroprusside Infusion 0.3 MICROgram(s)/kG/Min (3.76 mL/Hr) IV Continuous <Continuous>  pantoprazole  Injectable 40 milliGRAM(s) IV Push every 12 hours  polyethylene glycol 3350 17 Gram(s) Oral daily  senna 2 Tablet(s) Oral at bedtime  sodium chloride 3%  Inhalation 4 milliLiter(s) Inhalation every 12 hours  vancomycin    Solution 125 milliGRAM(s) Oral every 12 hours  vancomycin  IVPB 750 milliGRAM(s) IV Intermittent every 12 hours  vasopressin Infusion 0.04 Unit(s)/Min (6 mL/Hr) IV Continuous <Continuous>      --------------------------------------------------------------------------------------------------  Study Interpretation:    [[[Abbreviation Key:  PDR=alpha rhythm/posterior dominant rhythm. A-P=anterior posterior gradient.  Amplitude: ‘very low’:<20; ‘low’:20-50; ‘medium’:; ‘high’:>200uV.  Persistence for periodic/rhythmic patterns (% of epoch) ‘rare’:<1%; ‘occasional’:1-10%; ‘frequent’:10-50%; ‘abundant’:50-90%; ‘continuous’:>90%.  Persistence for sporadic discharges: ‘rare’:<1/hr; ‘occasional’:1/min-1/hr; ‘frequent’:>1/min; ‘abundant’:>1/10 sec.  GRDA=generalized rhythmic delta activity; FIRDA=frontal intermittent GRDA; LRDA=lateralized rhythmic delta activity; TIRDA=temporal intermittent rhythmic delta activity;  LPD=PLED=lateralized periodic discharges; GPD=generalized periodic discharges; BiPDs=BiPLEDs=bilateral independent periodic epileptiform discharges; SIRPID=stimulus induced rhythmic, periodic, or ictal appearing discharges; BIRDs=brief potentially ictal rhythmic discharges >4 Hz, lasting .5-10s; PFA=paroxysmal bursts of beta/gamma; LVFA=low voltage fast activity.  Modifiers: +F=with fast component; +S=with spike component; +R=with rhythmic component.  S-B=burst suppression pattern.  Max=maximal. N1-drowsy; N2-stage II sleep; N3-slow wave sleep. SSS/BETS=small sharp spikes/benign epileptiform transients of sleep. HV=hyperventilation; PS=photic stimulation]]]    Daily EEG Visual Analysis    FINDINGS:      Background:  Continuity: Continuous  Symmetry: yes  PDR: 7.5-8hz 30uV  Reactivity: present  Voltage: nl  Anterior Posterior Gradient: poor  Other background findings: none  Breach: absent    Background Slowing:  Generalized slowing: Background is diffusely slow consisting of polymorphic delta theta activity   Focal slowing:  shifting delta/theta, voltages appear nearly symmetric    State Changes:   -Clinical sleep with attenuation of periodic/rhythmic activity, mostly theta, and rudimentary spindles    Sporadic Epileptiform Discharges:    None    Rhythmic and Periodic Patterns (RPPs):  Frequent Left LRDA to 1.5hz max temporally  Less frequent Right 1-1.5Hz, temporal max LRDA    Electrographic and Electroclinical seizures:  None    Other Clinical Events:  None    Activation Procedures:   -Hyperventilation was not performed.    -Photic stimulation was not performed.      Artifacts:  Intermittent myogenic and movement artifacts were noted.    ECG:  The heart rate on single channel ECG was unable to be discerned     -------------------------------------------------------------------------------------------------------  EEG Classification / Summary:  Abnormal  EEG in a lethargic patient:   Frequent left temporal LRDA to 1.5hz  Less frequent Right temporal LRDA 1-1.5Hz   Generalized background slowing, moderate.   Background / spike burden improved vs prior days studies.    -------------------------------------------------------------------------------------------------------  Clinical Impression:   Risk of seizures from the temporal regions  Moderate  generalized background slowing  There were no seizures recorded.   Background / periodic activity / spike burden improved vs prior days    In absence of additional clinical concerns, recommend consideration for discontinuation of current EEG study with reconnection in future if warranted.  No seizures x ~6days    MD LEIF Cartagena  Director, Continuous EEG Monitoring Program, [facility]   and Epilepsy Fellowship ,   Department of Neurology, [facility] [employer]    [facility] EEG Reading Room Ph#: [phone]  Epilepsy Answering Service after 5PM and before 8:30AM: Ph#: [phone]       This is a Continuous Video EEG.     Recording Technique: The patient underwent continuous video-EEG monitoring, using Telemetry System hardware on the [employer] Sytem.  EEG and video data were stored on a computer hard drive with important events saved in digital archive files. The material was reviewed by a physician (electroencephalographer/epileptologist) on a daily basis.  Moreno and seizure detection algorithms were utilized and reviewed.  An EEG Technician attended to the patient for 8 to 10 hours per day. The patient was observed by the epilepsy nursing staff 24 hours per day. The epilepsy center neurologist was available in person or on call 24 hours per day..     Placement and Labeling of Electrodes: The EEG was performed utilizing at least 20 channel referential EEG connections (coronal over temporal over parasagittal montage) with inferior temporal electrodes when indicting and using all standard 10-20 electrode placements with EKG, with additional electrodes placed in the inferior temporal region using the modified 10-10 montage electrode placements for elective admissions, or if deemed necessary.  Recording was at  a sampling rate of 256 samples per second per channel. Time synchronized digital video recording was done simultaneously with EEG recording.  A low light infrared camera was used for low light recording..    EEG REPORT:     CARSON LEE [id number]     Study Date: 		01-08-25 (0800) - 01-09-25 (12:40)   Duration: 28:40hr   --------------------------------------------------------------------------------------------------  History:  CC/ HPI Patient is a 59y old  Female who presents with a chief complaint of Mixed Shock (05 Jan 2025 07:26)    MEDICATIONS  (STANDING):  albuterol/ipratropium for Nebulization 3 milliLiter(s) Nebulizer every 6 hours  artificial tears (preservative free) Ophthalmic Solution 1 Drop(s) Both EYES four times a day  buMETAnide Injectable 2 milliGRAM(s) IV Push every 12 hours  chlorhexidine 0.12% Liquid 15 milliLiter(s) Oral Mucosa every 12 hours  chlorhexidine 2% Cloths 1 Application(s) Topical <User Schedule>  dexMEDEtomidine Infusion 0.2 MICROgram(s)/kG/Hr (4.18 mL/Hr) IV Continuous <Continuous>  DOBUTamine Infusion 3 MICROgram(s)/kG/Min (7.52 mL/Hr) IV Continuous <Continuous>  insulin regular Infusion 1 Unit(s)/Hr (1 mL/Hr) IV Continuous <Continuous>  levETIRAcetam   Injectable 1000 milliGRAM(s) IV Push every 12 hours  magnesium citrate Oral Solution 296 milliLiter(s) Oral once  meropenem  IVPB 1000 milliGRAM(s) IV Intermittent every 8 hours  mupirocin 2% Nasal 1 Application(s) Both Nostrils two times a day  nitroprusside Infusion 0.3 MICROgram(s)/kG/Min (3.76 mL/Hr) IV Continuous <Continuous>  pantoprazole  Injectable 40 milliGRAM(s) IV Push every 12 hours  polyethylene glycol 3350 17 Gram(s) Oral daily  senna 2 Tablet(s) Oral at bedtime  sodium chloride 3%  Inhalation 4 milliLiter(s) Inhalation every 12 hours  vancomycin    Solution 125 milliGRAM(s) Oral every 12 hours  vancomycin  IVPB 750 milliGRAM(s) IV Intermittent every 12 hours  vasopressin Infusion 0.04 Unit(s)/Min (6 mL/Hr) IV Continuous <Continuous>      --------------------------------------------------------------------------------------------------  Study Interpretation:    [[[Abbreviation Key:  PDR=alpha rhythm/posterior dominant rhythm. A-P=anterior posterior gradient.  Amplitude: ‘very low’:<20; ‘low’:20-50; ‘medium’:; ‘high’:>200uV.  Persistence for periodic/rhythmic patterns (% of epoch) ‘rare’:<1%; ‘occasional’:1-10%; ‘frequent’:10-50%; ‘abundant’:50-90%; ‘continuous’:>90%.  Persistence for sporadic discharges: ‘rare’:<1/hr; ‘occasional’:1/min-1/hr; ‘frequent’:>1/min; ‘abundant’:>1/10 sec.  GRDA=generalized rhythmic delta activity; FIRDA=frontal intermittent GRDA; LRDA=lateralized rhythmic delta activity; TIRDA=temporal intermittent rhythmic delta activity;  LPD=PLED=lateralized periodic discharges; GPD=generalized periodic discharges; BiPDs=BiPLEDs=bilateral independent periodic epileptiform discharges; SIRPID=stimulus induced rhythmic, periodic, or ictal appearing discharges; BIRDs=brief potentially ictal rhythmic discharges >4 Hz, lasting .5-10s; PFA=paroxysmal bursts of beta/gamma; LVFA=low voltage fast activity.  Modifiers: +F=with fast component; +S=with spike component; +R=with rhythmic component.  S-B=burst suppression pattern.  Max=maximal. N1-drowsy; N2-stage II sleep; N3-slow wave sleep. SSS/BETS=small sharp spikes/benign epileptiform transients of sleep. HV=hyperventilation; PS=photic stimulation]]]    Daily EEG Visual Analysis    FINDINGS:      Background:  Continuity: Continuous  Symmetry: yes  PDR: 7.5-8hz 30uV  Reactivity: present  Voltage: nl  Anterior Posterior Gradient: poor  Other background findings: none  Breach: absent    Background Slowing:  Generalized slowing: Background is diffusely slow consisting of polymorphic delta theta activity   Focal slowing:  shifting delta/theta, voltages appear nearly symmetric    State Changes:   -Clinical sleep with attenuation of periodic/rhythmic activity, mostly theta, and rudimentary spindles    Sporadic Epileptiform Discharges:    None    Rhythmic and Periodic Patterns (RPPs):  Frequent Left LRDA to 1.5hz max temporally  Less frequent Right 1-1.5Hz, temporal max LRDA    Electrographic and Electroclinical seizures:  None    Other Clinical Events:  None    Activation Procedures:   -Hyperventilation was not performed.    -Photic stimulation was not performed.      Artifacts:  Intermittent myogenic and movement artifacts were noted.    ECG:  The heart rate on single channel ECG was unable to be discerned     -------------------------------------------------------------------------------------------------------  EEG Classification / Summary:  Abnormal  EEG in a lethargic patient:   Frequent left temporal LRDA to 1.5hz  Less frequent Right temporal LRDA 1-1.5Hz   Generalized background slowing, moderate.   Background / spike burden improved vs prior days studies.    -------------------------------------------------------------------------------------------------------  Clinical Impression:   Risk of seizures from the left temporal regions  Moderate  generalized background slowing  There were no seizures recorded.   Background / periodic activity / spike burden improved vs prior days    In absence of additional clinical concerns, recommend consideration for discontinuation of current EEG study with reconnection in future if warranted.  No seizures x ~6days    MD LEIF Cartagena  Director, Continuous EEG Monitoring Program, [facility]   and Epilepsy Fellowship ,   Department of Neurology, [facility] [employer]    [facility] EEG Reading Room Ph#: [phone]  Epilepsy Answering Service after 5PM and before 8:30AM: Ph#: [phone]       This is a Continuous Video EEG.     Recording Technique: The patient underwent continuous video-EEG monitoring, using Telemetry System hardware on the [employer] Digital Sytem.  EEG and video data were stored on a computer hard drive with important events saved in digital archive files. The material was reviewed by a physician (electroencephalographer/epileptologist) on a daily basis.  Moreno and seizure detection algorithms were utilized and reviewed.  An EEG Technician attended to the patient for 8 to 10 hours per day. The patient was observed by the epilepsy nursing staff 24 hours per day. The epilepsy center neurologist was available in person or on call 24 hours per day..     Placement and Labeling of Electrodes: The EEG was performed utilizing at least 20 channel referential EEG connections (coronal over temporal over parasagittal montage) with inferior temporal electrodes when indicting and using all standard 10-20 electrode placements with EKG, with additional electrodes placed in the inferior temporal region using the modified 10-10 montage electrode placements for elective admissions, or if deemed necessary.  Recording was at  a sampling rate of 256 samples per second per channel. Time synchronized digital video recording was done simultaneously with EEG recording.  A low light infrared camera was used for low light recording..    EEG REPORT:     CARSON LEE [id number]     Study Date: 		01-08-25 (0800) - 01-09-25 (12:40)   Duration: 28:40hr   --------------------------------------------------------------------------------------------------  History:  CC/ HPI Patient is a 59y old  Female who presents with a chief complaint of Mixed Shock (05 Jan 2025 07:26)    MEDICATIONS  (STANDING):  albuterol/ipratropium for Nebulization 3 milliLiter(s) Nebulizer every 6 hours  artificial tears (preservative free) Ophthalmic Solution 1 Drop(s) Both EYES four times a day  buMETAnide Injectable 2 milliGRAM(s) IV Push every 12 hours  chlorhexidine 0.12% Liquid 15 milliLiter(s) Oral Mucosa every 12 hours  chlorhexidine 2% Cloths 1 Application(s) Topical <User Schedule>  dexMEDEtomidine Infusion 0.2 MICROgram(s)/kG/Hr (4.18 mL/Hr) IV Continuous <Continuous>  DOBUTamine Infusion 3 MICROgram(s)/kG/Min (7.52 mL/Hr) IV Continuous <Continuous>  insulin regular Infusion 1 Unit(s)/Hr (1 mL/Hr) IV Continuous <Continuous>  levETIRAcetam   Injectable 1000 milliGRAM(s) IV Push every 12 hours  magnesium citrate Oral Solution 296 milliLiter(s) Oral once  meropenem  IVPB 1000 milliGRAM(s) IV Intermittent every 8 hours  mupirocin 2% Nasal 1 Application(s) Both Nostrils two times a day  nitroprusside Infusion 0.3 MICROgram(s)/kG/Min (3.76 mL/Hr) IV Continuous <Continuous>  pantoprazole  Injectable 40 milliGRAM(s) IV Push every 12 hours  polyethylene glycol 3350 17 Gram(s) Oral daily  senna 2 Tablet(s) Oral at bedtime  sodium chloride 3%  Inhalation 4 milliLiter(s) Inhalation every 12 hours  vancomycin    Solution 125 milliGRAM(s) Oral every 12 hours  vancomycin  IVPB 750 milliGRAM(s) IV Intermittent every 12 hours  vasopressin Infusion 0.04 Unit(s)/Min (6 mL/Hr) IV Continuous <Continuous>      --------------------------------------------------------------------------------------------------  Study Interpretation:    [[[Abbreviation Key:  PDR=alpha rhythm/posterior dominant rhythm. A-P=anterior posterior gradient.  Amplitude: ‘very low’:<20; ‘low’:20-50; ‘medium’:; ‘high’:>200uV.  Persistence for periodic/rhythmic patterns (% of epoch) ‘rare’:<1%; ‘occasional’:1-10%; ‘frequent’:10-50%; ‘abundant’:50-90%; ‘continuous’:>90%.  Persistence for sporadic discharges: ‘rare’:<1/hr; ‘occasional’:1/min-1/hr; ‘frequent’:>1/min; ‘abundant’:>1/10 sec.  GRDA=generalized rhythmic delta activity; FIRDA=frontal intermittent GRDA; LRDA=lateralized rhythmic delta activity; TIRDA=temporal intermittent rhythmic delta activity;  LPD=PLED=lateralized periodic discharges; GPD=generalized periodic discharges; BiPDs=BiPLEDs=bilateral independent periodic epileptiform discharges; SIRPID=stimulus induced rhythmic, periodic, or ictal appearing discharges; BIRDs=brief potentially ictal rhythmic discharges >4 Hz, lasting .5-10s; PFA=paroxysmal bursts of beta/gamma; LVFA=low voltage fast activity.  Modifiers: +F=with fast component; +S=with spike component; +R=with rhythmic component.  S-B=burst suppression pattern.  Max=maximal. N1-drowsy; N2-stage II sleep; N3-slow wave sleep. SSS/BETS=small sharp spikes/benign epileptiform transients of sleep. HV=hyperventilation; PS=photic stimulation]]]    Daily EEG Visual Analysis    FINDINGS:      Background:  Continuity: Continuous  Symmetry: yes  PDR: 7.5-8hz 30uV  Reactivity: present  Voltage: nl  Anterior Posterior Gradient: poor  Other background findings: none  Breach: absent    Background Slowing:  Generalized slowing: Background is diffusely slow consisting of polymorphic delta theta activity   Focal slowing:  shifting delta/theta, voltages appear nearly symmetric    State Changes:   -Clinical sleep with attenuation of periodic/rhythmic activity, mostly theta, and rudimentary spindles    Sporadic Epileptiform Discharges:    None    Rhythmic and Periodic Patterns (RPPs):  Frequent Left LRDA to 1.5hz max temporally  Less frequent Right 1-1.5Hz, temporal max LRDA    Electrographic and Electroclinical seizures:  None    Other Clinical Events:  None    Activation Procedures:   -Hyperventilation was not performed.    -Photic stimulation was not performed.      Artifacts:  Intermittent myogenic and movement artifacts were noted.    ECG:  The heart rate on single channel ECG was unable to be discerned     -------------------------------------------------------------------------------------------------------  EEG Classification / Summary:  Abnormal  EEG in a lethargic patient:   Frequent left temporal LRDA to 1.5hz  Less frequent Right temporal LRDA 1-1.5Hz   Generalized background slowing, moderate.   Background / spike burden improved vs prior days studies.    -------------------------------------------------------------------------------------------------------  Clinical Impression:   Risk of seizures from the left > right temporal regions  Moderate  generalized background slowing  There were no seizures recorded.   Background / periodic activity / spike burden improved vs prior days    In absence of additional clinical concerns, recommend consideration for discontinuation of current EEG study with reconnection in future if warranted.  No seizures x ~6days    MD LEIF Cartagena  Director, Continuous EEG Monitoring Program, [facility]   and Epilepsy Fellowship ,   Department of Neurology, [facility] [employer]    [facility] EEG Reading Room Ph#: [phone]  Epilepsy Answering Service after 5PM and before 8:30AM: Ph#: [phone]

## 2025-01-09 NOTE — PROGRESS NOTE ADULT - SUBJECTIVE AND OBJECTIVE BOX
Hematology Follow-up    INTERVAL HPI/OVERNIGHT EVENTS:  Patient has been more responsive on ventilator.     VITAL SIGNS:  T(F): 99.5 (01-09-25 @ 08:00)  HR: 100 (01-09-25 @ 11:26)  BP: --  RR: 26 (01-09-25 @ 11:00)  SpO2: 100% (01-09-25 @ 11:26)  Wt(kg): --    PHYSICAL EXAM:    GENERAL: ill appearing  HEAD:  Atraumatic, Normocephalic  EYES: EOMI, conjunctiva and sclera clear  NECK:+ IJ   CHEST/LUNG: Clear to auscultation bilaterally  HEART: Regular rate and rhythm  ABDOMEN: Soft, Nontender, Nondistended  NEUROLOGY: intubated and sedated     MEDICATIONS  (STANDING):  albuterol/ipratropium for Nebulization 3 milliLiter(s) Nebulizer every 6 hours  artificial tears (preservative free) Ophthalmic Solution 1 Drop(s) Both EYES four times a day  buMETAnide Injectable 2 milliGRAM(s) IV Push every 12 hours  chlorhexidine 0.12% Liquid 15 milliLiter(s) Oral Mucosa every 12 hours  chlorhexidine 2% Cloths 1 Application(s) Topical <User Schedule>  dexMEDEtomidine Infusion 0.2 MICROgram(s)/kG/Hr (4.18 mL/Hr) IV Continuous <Continuous>  dextrose 5%. 1000 milliLiter(s) (50 mL/Hr) IV Continuous <Continuous>  dextrose 5%. 1000 milliLiter(s) (100 mL/Hr) IV Continuous <Continuous>  dextrose 50% Injectable 25 Gram(s) IV Push once  dextrose 50% Injectable 12.5 Gram(s) IV Push once  dextrose 50% Injectable 25 Gram(s) IV Push once  DOBUTamine Infusion 2 MICROgram(s)/kG/Min (5.02 mL/Hr) IV Continuous <Continuous>  glucagon  Injectable 1 milliGRAM(s) IntraMuscular once  insulin lispro (ADMELOG) corrective regimen sliding scale   SubCutaneous three times a day before meals  insulin lispro (ADMELOG) corrective regimen sliding scale   SubCutaneous at bedtime  insulin regular Infusion 1 Unit(s)/Hr (1 mL/Hr) IV Continuous <Continuous>  levETIRAcetam   Injectable 1000 milliGRAM(s) IV Push every 12 hours  meropenem  IVPB 1000 milliGRAM(s) IV Intermittent every 8 hours  mupirocin 2% Nasal 1 Application(s) Both Nostrils two times a day  nitroprusside Infusion 0.3 MICROgram(s)/kG/Min (3.76 mL/Hr) IV Continuous <Continuous>  pantoprazole  Injectable 40 milliGRAM(s) IV Push every 12 hours  polyethylene glycol 3350 17 Gram(s) Oral daily  senna 2 Tablet(s) Oral at bedtime  sodium chloride 3%  Inhalation 4 milliLiter(s) Inhalation every 12 hours  vancomycin    Solution 125 milliGRAM(s) Oral every 12 hours  vancomycin  IVPB 750 milliGRAM(s) IV Intermittent every 12 hours  vasopressin Infusion 0.04 Unit(s)/Min (6 mL/Hr) IV Continuous <Continuous>    MEDICATIONS  (PRN):  dextrose Oral Gel 15 Gram(s) Oral once PRN Blood Glucose LESS THAN 70 milliGRAM(s)/deciliter      doxycycline (Angioedema)  penicillin (Unknown)  clindamycin (Angioedema)  linezolid (Angioedema)      LABS:                        9.0    10.42 )-----------( 130      ( 09 Jan 2025 01:21 )             27.2     01-09    147[H]  |  105  |  27[H]  ----------------------------<  110[H]  3.9   |  31  |  0.93    Ca    9.2      09 Jan 2025 01:21  Phos  2.4     01-09  Mg     2.4     01-09    TPro  6.2  /  Alb  3.6  /  TBili  2.8[H]  /  DBili  x   /  AST  46[H]  /  ALT  69[H]  /  AlkPhos  139[H]  01-09    PT/INR - ( 08 Jan 2025 05:23 )   PT: 11.6 sec;   INR: 1.01 ratio         PTT - ( 08 Jan 2025 05:23 )  PTT:23.4 sec Lactate Dehydrogenase, Serum: 554 U/L (01-09 @ 01:21)    Urinalysis Basic - ( 09 Jan 2025 01:21 )    Color: x / Appearance: x / SG: x / pH: x  Gluc: 110 mg/dL / Ketone: x  / Bili: x / Urobili: x   Blood: x / Protein: x / Nitrite: x   Leuk Esterase: x / RBC: x / WBC x   Sq Epi: x / Non Sq Epi: x / Bacteria: x        RADIOLOGY & ADDITIONAL TESTS:  Studies reviewed.

## 2025-01-09 NOTE — PROGRESS NOTE ADULT - SUBJECTIVE AND OBJECTIVE BOX
INFECTIOUS DISEASES FOLLOW UP-- Ruthie Shepherd  [phone]    This is a follow up note for this  59yFemale with  Cardiogenic shock        ROS:  CONSTITUTIONAL:  No fever, good appetite  CARDIOVASCULAR:  No chest pain or palpitations  RESPIRATORY:  No dyspnea  GASTROINTESTINAL:  No nausea, vomiting, diarrhea, or abdominal pain  GENITOURINARY:  No dysuria  NEUROLOGIC:  No headache,     Allergies    doxycycline (Angioedema)  penicillin (Unknown)  clindamycin (Angioedema)  linezolid (Angioedema)    Intolerances        ANTIBIOTICS/RELEVANT:  antimicrobials  meropenem  IVPB 1000 milliGRAM(s) IV Intermittent every 8 hours  vancomycin    Solution 125 milliGRAM(s) Oral every 12 hours  vancomycin  IVPB 750 milliGRAM(s) IV Intermittent every 12 hours    immunologic:    OTHER:  albuterol/ipratropium for Nebulization 3 milliLiter(s) Nebulizer every 6 hours  artificial tears (preservative free) Ophthalmic Solution 1 Drop(s) Both EYES four times a day  buMETAnide Injectable 2 milliGRAM(s) IV Push every 12 hours  chlorhexidine 0.12% Liquid 15 milliLiter(s) Oral Mucosa every 12 hours  chlorhexidine 2% Cloths 1 Application(s) Topical <User Schedule>  dexMEDEtomidine Infusion 0.2 MICROgram(s)/kG/Hr IV Continuous <Continuous>  dextrose 5%. 1000 milliLiter(s) IV Continuous <Continuous>  dextrose 5%. 1000 milliLiter(s) IV Continuous <Continuous>  dextrose 50% Injectable 25 Gram(s) IV Push once  dextrose 50% Injectable 12.5 Gram(s) IV Push once  dextrose 50% Injectable 25 Gram(s) IV Push once  dextrose Oral Gel 15 Gram(s) Oral once PRN  DOBUTamine Infusion 2 MICROgram(s)/kG/Min IV Continuous <Continuous>  glucagon  Injectable 1 milliGRAM(s) IntraMuscular once  insulin lispro (ADMELOG) corrective regimen sliding scale   SubCutaneous three times a day before meals  insulin lispro (ADMELOG) corrective regimen sliding scale   SubCutaneous at bedtime  levETIRAcetam   Injectable 1000 milliGRAM(s) IV Push every 12 hours  mupirocin 2% Nasal 1 Application(s) Both Nostrils two times a day  nitroprusside Infusion 0.3 MICROgram(s)/kG/Min IV Continuous <Continuous>  pantoprazole  Injectable 40 milliGRAM(s) IV Push every 12 hours  polyethylene glycol 3350 17 Gram(s) Oral daily  senna 2 Tablet(s) Oral at bedtime  sodium chloride 3%  Inhalation 4 milliLiter(s) Inhalation every 12 hours  vasopressin Infusion 0.04 Unit(s)/Min IV Continuous <Continuous>      Objective:  Vital Signs Last 24 Hrs  T(C): 37.7 (09 Jan 2025 12:00), Max: 38 (09 Jan 2025 00:00)  T(F): 99.9 (09 Jan 2025 12:00), Max: 100.4 (09 Jan 2025 00:00)  HR: 98 (09 Jan 2025 18:30) (64 - 122)  BP: --  BP(mean): --  RR: 26 (09 Jan 2025 18:30) (9 - 39)  SpO2: 98% (09 Jan 2025 18:30) (94% - 100%)    Parameters below as of 09 Jan 2025 16:00      O2 Concentration (%): 30    PHYSICAL EXAM:  Constitutional:no acute distress  Eyes:VICKY, EOMI  Ear/Nose/Throat: no oral lesions, 	  Respiratory: clear BL  Cardiovascular: S1S2  Gastrointestinal:soft, (+) BS, no tenderness  Extremities:no e/e/c  No Lymphadenopathy  IV sites not inflammed.    LABS:                        9.0    10.42 )-----------( 130      ( 09 Jan 2025 01:21 )             27.2     01-09    147[H]  |  105  |  27[H]  ----------------------------<  110[H]  3.9   |  31  |  0.93    Ca    9.2      09 Jan 2025 01:21  Phos  2.4     01-09  Mg     2.4     01-09    TPro  6.2  /  Alb  3.6  /  TBili  2.8[H]  /  DBili  x   /  AST  46[H]  /  ALT  69[H]  /  AlkPhos  139[H]  01-09    PT/INR - ( 08 Jan 2025 05:23 )   PT: 11.6 sec;   INR: 1.01 ratio         PTT - ( 08 Jan 2025 05:23 )  PTT:23.4 sec  Urinalysis Basic - ( 09 Jan 2025 01:21 )    Color: x / Appearance: x / SG: x / pH: x  Gluc: 110 mg/dL / Ketone: x  / Bili: x / Urobili: x   Blood: x / Protein: x / Nitrite: x   Leuk Esterase: x / RBC: x / WBC x   Sq Epi: x / Non Sq Epi: x / Bacteria: x        MICROBIOLOGY:            RECENT CULTURES:  01-08 @ 16:08  Bronchial  --  --  --  --  --  01-07 @ 10:17  .Blood BLOOD  --  --  --    No growth at 48 Hours  --  01-05 @ 14:23  ET Tube ET Tube  --  --  --    No growth  --  01-05 @ 14:00  .Blood BLOOD  --  --  --    No growth at 72 Hours  --  01-05 @ 12:00  .Blood BLOOD  --  --  --    No growth at 72 Hours  --  01-04 @ 03:38  ET Tube ET Tube  Staphylococcus aureus  Staphylococcus aureus  NESHA    Moderate Staphylococcus aureus  Commensal fredi consistent with body site  --  01-03 @ 21:56  .Blood BLOOD  Blood Culture PCR  Blood Culture PCR  PCR    Growth in aerobic bottle: Staphylococcus epidermidis  Isolation of Coagulase negative Staphylococcus from single blood culture  sets may represent  contamination. Contact the Microbiology Department at [phone] if  susceptibility testing is needed.  clinically indicated.  Direct identification is available within approximately 3-5  hours either by Blood Panel Multiplexed PCR or Direct  MALDI-TOF. Details: [email or web address]  --  01-03 @ 17:50  .Blood BLOOD  --  --  --    No growth at 5 days  --  01-03 @ 14:00  .Blood BLOOD  --  --  --    No growth at 5 days  --      RADIOLOGY & ADDITIONAL STUDIES:    < from: Xray Chest 1 View- PORTABLE-Routine (Xray Chest 1 View- PORTABLE-Routine in AM.) (01.09.25 @ 04:33) >  IMPRESSION:  Endotracheal tube tip 2.6 cm above the kendal.    Enteric tube tip in the stomach.    Other lines and tubes as above.    --- End of Report ---    < end of copied text >   INFECTIOUS DISEASES FOLLOW UP-- Ruthie Shepherd  [phone]    This is a follow up note for this  59yFemale with  Cardiogenic shock  no improvement in mental status  pupils sluggish  posturing?        ROS:  CONSTITUTIONAL:  withdraws to pain      Allergies    doxycycline (Angioedema)  penicillin (Unknown)  clindamycin (Angioedema)  linezolid (Angioedema)    Intolerances        ANTIBIOTICS/RELEVANT:  antimicrobials  meropenem  IVPB 1000 milliGRAM(s) IV Intermittent every 8 hours  vancomycin    Solution 125 milliGRAM(s) Oral every 12 hours  vancomycin  IVPB 750 milliGRAM(s) IV Intermittent every 12 hours    immunologic:    OTHER:  albuterol/ipratropium for Nebulization 3 milliLiter(s) Nebulizer every 6 hours  artificial tears (preservative free) Ophthalmic Solution 1 Drop(s) Both EYES four times a day  buMETAnide Injectable 2 milliGRAM(s) IV Push every 12 hours  chlorhexidine 0.12% Liquid 15 milliLiter(s) Oral Mucosa every 12 hours  chlorhexidine 2% Cloths 1 Application(s) Topical <User Schedule>  dexMEDEtomidine Infusion 0.2 MICROgram(s)/kG/Hr IV Continuous <Continuous>  dextrose 5%. 1000 milliLiter(s) IV Continuous <Continuous>  dextrose 5%. 1000 milliLiter(s) IV Continuous <Continuous>  dextrose 50% Injectable 25 Gram(s) IV Push once  dextrose 50% Injectable 12.5 Gram(s) IV Push once  dextrose 50% Injectable 25 Gram(s) IV Push once  dextrose Oral Gel 15 Gram(s) Oral once PRN  DOBUTamine Infusion 2 MICROgram(s)/kG/Min IV Continuous <Continuous>  glucagon  Injectable 1 milliGRAM(s) IntraMuscular once  insulin lispro (ADMELOG) corrective regimen sliding scale   SubCutaneous three times a day before meals  insulin lispro (ADMELOG) corrective regimen sliding scale   SubCutaneous at bedtime  levETIRAcetam   Injectable 1000 milliGRAM(s) IV Push every 12 hours  mupirocin 2% Nasal 1 Application(s) Both Nostrils two times a day  nitroprusside Infusion 0.3 MICROgram(s)/kG/Min IV Continuous <Continuous>  pantoprazole  Injectable 40 milliGRAM(s) IV Push every 12 hours  polyethylene glycol 3350 17 Gram(s) Oral daily  senna 2 Tablet(s) Oral at bedtime  sodium chloride 3%  Inhalation 4 milliLiter(s) Inhalation every 12 hours  vasopressin Infusion 0.04 Unit(s)/Min IV Continuous <Continuous>      Objective:  Vital Signs Last 24 Hrs  T(C): 37.7 (09 Jan 2025 12:00), Max: 38 (09 Jan 2025 00:00)  T(F): 99.9 (09 Jan 2025 12:00), Max: 100.4 (09 Jan 2025 00:00)  HR: 98 (09 Jan 2025 18:30) (64 - 122)  BP: --  BP(mean): --  RR: 26 (09 Jan 2025 18:30) (9 - 39)  SpO2: 98% (09 Jan 2025 18:30) (94% - 100%)    Parameters below as of 09 Jan 2025 16:00      O2 Concentration (%): 30    PHYSICAL EXAM:  Constitutional:non interactive  head with some bruises  Eyes:VICKY, EOMI  Ear/Nose/Throat: Ruy HD catheter  Respiratory: coarse BS   Cardiovascular: S1S2 tachy  Gastrointestinal:soft, (+) BS, no tenderness  Extremities:no e/e/c  prior ECMO cannulas  No Lymphadenopathy  IV sites not inflammed.    LABS:                        9.0    10.42 )-----------( 130      ( 09 Jan 2025 01:21 )             27.2     01-09    147[H]  |  105  |  27[H]  ----------------------------<  110[H]  3.9   |  31  |  0.93    Ca    9.2      09 Jan 2025 01:21  Phos  2.4     01-09  Mg     2.4     01-09    TPro  6.2  /  Alb  3.6  /  TBili  2.8[H]  /  DBili  x   /  AST  46[H]  /  ALT  69[H]  /  AlkPhos  139[H]  01-09    PT/INR - ( 08 Jan 2025 05:23 )   PT: 11.6 sec;   INR: 1.01 ratio         PTT - ( 08 Jan 2025 05:23 )  PTT:23.4 sec  Urinalysis Basic - ( 09 Jan 2025 01:21 )    Color: x / Appearance: x / SG: x / pH: x  Gluc: 110 mg/dL / Ketone: x  / Bili: x / Urobili: x   Blood: x / Protein: x / Nitrite: x   Leuk Esterase: x / RBC: x / WBC x   Sq Epi: x / Non Sq Epi: x / Bacteria: x        MICROBIOLOGY:            RECENT CULTURES:  01-08 @ 16:08  Bronchial  --  --  --  --  --  01-07 @ 10:17  .Blood BLOOD  --  --  --    No growth at 48 Hours  --  01-05 @ 14:23  ET Tube ET Tube  --  --  --    No growth  --  01-05 @ 14:00  .Blood BLOOD  --  --  --    No growth at 72 Hours  --  01-05 @ 12:00  .Blood BLOOD  --  --  --    No growth at 72 Hours  --  01-04 @ 03:38  ET Tube ET Tube  Staphylococcus aureus  Staphylococcus aureus  NESHA    Moderate Staphylococcus aureus  Commensal fredi consistent with body site  --  01-03 @ 21:56  .Blood BLOOD  Blood Culture PCR  Blood Culture PCR  PCR    Growth in aerobic bottle: Staphylococcus epidermidis  Isolation of Coagulase negative Staphylococcus from single blood culture  sets may represent  contamination. Contact the Microbiology Department at [phone] if  susceptibility testing is needed.  clinically indicated.  Direct identification is available within approximately 3-5  hours either by Blood Panel Multiplexed PCR or Direct  MALDI-TOF. Details: [email or web address]  --  01-03 @ 17:50  .Blood BLOOD  --  --  --    No growth at 5 days  --  01-03 @ 14:00  .Blood BLOOD  --  --  --    No growth at 5 days  --      RADIOLOGY & ADDITIONAL STUDIES:    < from: Xray Chest 1 View- PORTABLE-Routine (Xray Chest 1 View- PORTABLE-Routine in AM.) (01.09.25 @ 04:33) >  IMPRESSION:  Endotracheal tube tip 2.6 cm above the kendal.    Enteric tube tip in the stomach.    Other lines and tubes as above.    --- End of Report ---    < end of copied text >

## 2025-01-09 NOTE — PROGRESS NOTE ADULT - ATTENDING COMMENTS
Provider E-Visit time total (minutes): 13  Dominick,    Loperimide is how we treat diarrhea, but we use it in a different manner. Take Loperamide A-D initial- 4 mg orally, followed by 2 mg after each loose stool (maximum 16 mg/ day).  Additionally, you will follow a BRAT diet (see discharge instructions).  If symptoms last longer than 5 days or become severe, you will need to come into clinic for further stool and blood testing.     Sincerely,    MEDHAT Perez, [facility]  ----------    Diarrhea  3/18/2020 11:19 AM Reply    To: Maria Tate NP      From: Dominick Knight      Created: 3/18/2020 11:19 AM        *-*-*This message has not been handled.*-*-*    Diarrhea  --------------------------------     Question: When did the diarrhea begin?  Answer:   More than two days ago but less than a week ago     Question: How many stools have you passed in the last 24 hours?  Answer:   More than eight     Question: Do you have a fever?  Answer:   No, I do not have a fever     Question: Is there blood in your stool, or is your stool dark red or black?  Answer:   None of the above     Question: Does your stool contain pus or mucus?  Answer:   No, my stool does not contain pus or mucus     Question: Are you vomiting?  Answer:   No, I am not vomiting.     Question: Do you have belly pain?  Answer:   I have little or no pain     Question: Are you feeling dizzy or like you might pass out?  Answer:   No     Question: Are you having trouble walking or lifting yourself due to weakness from this illness?  Answer:   No     Question: Do any of the following apply to you?  Answer:   My urine is normal     Question: Did the diarrhea begin after a specific meal that may have caused the illness?  Answer:   Not clearly related to a meal     Question: Have you taken antibiotics recently?  Answer:   I have not been on any antibiotics     Question: Have you been hospitalized in the past 2 months?  Answer:   No, I have not been hospitalized  recently     Question: Have you taken a laxative or a medicine to help you move your bowels lately?  Answer:   No     Question: Do you work in a  center or healthcare environment?  Answer:   No     Question: Are there people you know with similar symptoms?  Answer:   No     Question: Have you had a meal consisting of raw meat or fish in the week prior to your illness?  Answer:   No     Question: Have you recently travelled to a place where you may have caught an illness?  Answer:   No     Question: Have you tried any medication or other treatment for your symptoms?  Answer:   Yes     Question: Please list the treatments you tried, and the result of those treatments.  Answer:   Took max recommended dosage of OTC anti-diarrheal pills (loperamide hydrochloride) with little to no effect.     Question: Wrap up  Answer:        Question: Anything else you would like to add?  Answer:   Occasional (once every couple hours) upper stomach cramps and nausea/feelings of wanting to vomit that pass within a minute or two.           Detailed neuro exam:  ROS: Unable to obtain due to the patient is currently orally intubated.  Please note, neuro exam is very limited due to the patient on mechanical ventilation via orally intubated and on the sedation.  General: Patient is comfortably lying on bed without any acute distress.  Mental status: On verbal command, patient unable to follow any simple or complex commands.  Cranial exams: Brainstem reflexes are intact cornea, conjunctiva and gag reflexes. Face looks symmetric. Pupils are symmetric, reactive to light bilaterally.  Power: On noxious stimuli the patient had 1/5 bilateral four extremities.  Sensation: On noxious stimuli patient grimace at all four extremities.  Coordination and gait: Patient did not participate in the setting of comatose.     A/P:  Neurology consulted because of seizure. Etiology of seizure is uncertain and patient requires further workup to rule out treatable etiologies.  During my evaluation on 01/07/2024, patient found to have right gaze deviation and EEG consistent with bilateral multifocal epileptiform potentials. Clinically suspicious for seizure. Ativan 4 mg stat, Keppra 1500 mg once as extras dose and patient already received 500 mg and increase to 1000mg BID as maintenance dose. Today on 01/08/2024, on exam no gaze deviation and neuro exam in limited due to the sedation.  EEG is improved in term of Background / periodic activity / spike burden. Patient remains seizure free on EEG.     Patient would benefit from MRI brain with and without contrast once patient stable.  Again EEG can be discontinue.   Continue Keppra 1 gm BID.   Continue medical management, neuro- check and fall precaution.  GI and DVT prophylaxis.    Patient is at high risk for complications and morbidity or mortality. There is high probability of imminent or life threatening deterioration in the patient's condition.  Patient is unable or incompetent to participate in giving a history and/or making decisions and discussion is necessary for determining treatment decisions.  My cumulative time taking care of this critically ill patient is 40 minutes  If you have any further questions, please do not hesitate to contact our team.  Thank you for allowing us to participate in this patient care.

## 2025-01-10 LAB
ALBUMIN SERPL ELPH-MCNC: 3.7 G/DL — SIGNIFICANT CHANGE UP (ref 3.3–5)
ALP SERPL-CCNC: 156 U/L — HIGH (ref 40–120)
ALT FLD-CCNC: 74 U/L — HIGH (ref 10–45)
ANION GAP SERPL CALC-SCNC: 14 MMOL/L — SIGNIFICANT CHANGE UP (ref 5–17)
AST SERPL-CCNC: 69 U/L — HIGH (ref 10–40)
BASOPHILS # BLD AUTO: 0.03 K/UL — SIGNIFICANT CHANGE UP (ref 0–0.2)
BASOPHILS NFR BLD AUTO: 0.3 % — SIGNIFICANT CHANGE UP (ref 0–2)
BILIRUB SERPL-MCNC: 2.4 MG/DL — HIGH (ref 0.2–1.2)
BUN SERPL-MCNC: 28 MG/DL — HIGH (ref 7–23)
CALCIUM SERPL-MCNC: 9.5 MG/DL — SIGNIFICANT CHANGE UP (ref 8.4–10.5)
CHLORIDE SERPL-SCNC: 107 MMOL/L — SIGNIFICANT CHANGE UP (ref 96–108)
CLOSURE TME COLL+EPINEP BLD: 134 K/UL — LOW (ref 150–400)
CMV DNA CSF QL NAA+PROBE: SIGNIFICANT CHANGE UP IU/ML
CMV DNA SPEC NAA+PROBE-LOG#: SIGNIFICANT CHANGE UP LOG10IU/ML
CO2 SERPL-SCNC: 32 MMOL/L — HIGH (ref 22–31)
CREAT SERPL-MCNC: 0.9 MG/DL — SIGNIFICANT CHANGE UP (ref 0.5–1.3)
CULTURE RESULTS: NO GROWTH — SIGNIFICANT CHANGE UP
CULTURE RESULTS: SIGNIFICANT CHANGE UP
CULTURE RESULTS: SIGNIFICANT CHANGE UP
EGFR: 75 ML/MIN/1.73M2 — SIGNIFICANT CHANGE UP
EOSINOPHIL # BLD AUTO: 0.15 K/UL — SIGNIFICANT CHANGE UP (ref 0–0.5)
EOSINOPHIL NFR BLD AUTO: 1.4 % — SIGNIFICANT CHANGE UP (ref 0–6)
GAS PNL BLDA: SIGNIFICANT CHANGE UP
GLUCOSE BLDC GLUCOMTR-MCNC: 111 MG/DL — HIGH (ref 70–99)
GLUCOSE BLDC GLUCOMTR-MCNC: 117 MG/DL — HIGH (ref 70–99)
GLUCOSE BLDC GLUCOMTR-MCNC: 135 MG/DL — HIGH (ref 70–99)
GLUCOSE BLDC GLUCOMTR-MCNC: 166 MG/DL — HIGH (ref 70–99)
GLUCOSE SERPL-MCNC: 155 MG/DL — HIGH (ref 70–99)
HCT VFR BLD CALC: 29.1 % — LOW (ref 34.5–45)
HEMOGLOBIN INTERPRETATION: SIGNIFICANT CHANGE UP
HGB A MFR BLD: 82.3 % — LOW (ref 95.8–98)
HGB A2 MFR BLD: 2.8 % — SIGNIFICANT CHANGE UP (ref 2–3.2)
HGB BLD-MCNC: 9.8 G/DL — LOW (ref 11.5–15.5)
HGB C MFR BLD: 8.1 % — HIGH
HGB S MFR BLD: 6.8 % — HIGH
IMM GRANULOCYTES NFR BLD AUTO: 5.9 % — HIGH (ref 0–0.9)
LYMPHOCYTES # BLD AUTO: 1.08 K/UL — SIGNIFICANT CHANGE UP (ref 1–3.3)
LYMPHOCYTES # BLD AUTO: 10 % — LOW (ref 13–44)
MAGNESIUM SERPL-MCNC: 2.8 MG/DL — HIGH (ref 1.6–2.6)
MCHC RBC-ENTMCNC: 29.6 PG — SIGNIFICANT CHANGE UP (ref 27–34)
MCHC RBC-ENTMCNC: 33.7 G/DL — SIGNIFICANT CHANGE UP (ref 32–36)
MCV RBC AUTO: 87.9 FL — SIGNIFICANT CHANGE UP (ref 80–100)
MONOCYTES # BLD AUTO: 0.78 K/UL — SIGNIFICANT CHANGE UP (ref 0–0.9)
MONOCYTES NFR BLD AUTO: 7.2 % — SIGNIFICANT CHANGE UP (ref 2–14)
NEUTROPHILS # BLD AUTO: 8.13 K/UL — HIGH (ref 1.8–7.4)
NEUTROPHILS NFR BLD AUTO: 75.2 % — SIGNIFICANT CHANGE UP (ref 43–77)
NRBC # BLD: 4 /100 WBCS — HIGH (ref 0–0)
NRBC BLD-RTO: 4 /100 WBCS — HIGH (ref 0–0)
PHOSPHATE SERPL-MCNC: 2.6 MG/DL — SIGNIFICANT CHANGE UP (ref 2.5–4.5)
PLATELET # BLD AUTO: SIGNIFICANT CHANGE UP K/UL (ref 150–400)
POTASSIUM BLDA-SCNC: 4 MMOL/L — SIGNIFICANT CHANGE UP (ref 3.5–5.1)
POTASSIUM BLDA-SCNC: 4.3 MMOL/L — SIGNIFICANT CHANGE UP (ref 3.5–5.1)
POTASSIUM SERPL-MCNC: 3.5 MMOL/L — SIGNIFICANT CHANGE UP (ref 3.5–5.3)
POTASSIUM SERPL-SCNC: 3.5 MMOL/L — SIGNIFICANT CHANGE UP (ref 3.5–5.3)
PROT SERPL-MCNC: 6.4 G/DL — SIGNIFICANT CHANGE UP (ref 6–8.3)
RBC # BLD: 3.31 M/UL — LOW (ref 3.8–5.2)
RBC # FLD: 16.7 % — HIGH (ref 10.3–14.5)
SODIUM SERPL-SCNC: 153 MMOL/L — HIGH (ref 135–145)
SPECIMEN SOURCE: SIGNIFICANT CHANGE UP
SURGICAL PATHOLOGY STUDY: SIGNIFICANT CHANGE UP
VANCOMYCIN TROUGH SERPL-MCNC: 17.5 UG/ML — SIGNIFICANT CHANGE UP (ref 10–20)
VANCOMYCIN TROUGH SERPL-MCNC: 18.4 UG/ML — SIGNIFICANT CHANGE UP (ref 10–20)
WBC # BLD: 10.81 K/UL — HIGH (ref 3.8–10.5)
WBC # FLD AUTO: 10.81 K/UL — HIGH (ref 3.8–10.5)

## 2025-01-10 PROCEDURE — 71045 X-RAY EXAM CHEST 1 VIEW: CPT | Mod: 26

## 2025-01-10 PROCEDURE — 99291 CRITICAL CARE FIRST HOUR: CPT

## 2025-01-10 PROCEDURE — 83020 HEMOGLOBIN ELECTROPHORESIS: CPT | Mod: 26

## 2025-01-10 PROCEDURE — 99221 1ST HOSP IP/OBS SF/LOW 40: CPT

## 2025-01-10 PROCEDURE — 99233 SBSQ HOSP IP/OBS HIGH 50: CPT

## 2025-01-10 PROCEDURE — 99232 SBSQ HOSP IP/OBS MODERATE 35: CPT

## 2025-01-10 PROCEDURE — 99292 CRITICAL CARE ADDL 30 MIN: CPT

## 2025-01-10 PROCEDURE — G0545: CPT

## 2025-01-10 RX ORDER — HEPARIN SODIUM,PORCINE 10000/ML
5000 VIAL (ML) INJECTION EVERY 8 HOURS
Refills: 0 | Status: DISCONTINUED | OUTPATIENT
Start: 2025-01-10 | End: 2025-01-14

## 2025-01-10 RX ORDER — VANCOMYCIN HYDROCHLORIDE 50 MG/ML
500 KIT ORAL EVERY 12 HOURS
Refills: 0 | Status: DISCONTINUED | OUTPATIENT
Start: 2025-01-10 | End: 2025-01-14

## 2025-01-10 RX ORDER — POTASSIUM CHLORIDE 750 MG/1
40 TABLET, EXTENDED RELEASE ORAL ONCE
Refills: 0 | Status: COMPLETED | OUTPATIENT
Start: 2025-01-10 | End: 2025-01-10

## 2025-01-10 RX ORDER — ALBUMIN HUMAN 50 G/1000ML
100 SOLUTION INTRAVENOUS ONCE
Refills: 0 | Status: COMPLETED | OUTPATIENT
Start: 2025-01-10 | End: 2025-01-10

## 2025-01-10 RX ORDER — MECOBAL/LEVOMEFOLAT CA/B6 PHOS 2-3-35 MG
1 TABLET ORAL DAILY
Refills: 0 | Status: DISCONTINUED | OUTPATIENT
Start: 2025-01-10 | End: 2025-01-25

## 2025-01-10 RX ORDER — BUMETANIDE 2 MG/1
1 TABLET ORAL EVERY 12 HOURS
Refills: 0 | Status: DISCONTINUED | OUTPATIENT
Start: 2025-01-10 | End: 2025-01-11

## 2025-01-10 RX ORDER — ALBUMIN HUMAN 50 G/1000ML
250 SOLUTION INTRAVENOUS ONCE
Refills: 0 | Status: COMPLETED | OUTPATIENT
Start: 2025-01-10 | End: 2025-01-10

## 2025-01-10 RX ORDER — ASCORBIC ACID 500 MG/ML
500 VIAL (ML) INJECTION DAILY
Refills: 0 | Status: DISCONTINUED | OUTPATIENT
Start: 2025-01-10 | End: 2025-02-06

## 2025-01-10 RX ORDER — POTASSIUM CHLORIDE 750 MG/1
10 TABLET, EXTENDED RELEASE ORAL
Refills: 0 | Status: COMPLETED | OUTPATIENT
Start: 2025-01-10 | End: 2025-01-10

## 2025-01-10 RX ADMIN — BUMETANIDE 1 MILLIGRAM(S): 2 TABLET ORAL at 05:07

## 2025-01-10 RX ADMIN — POTASSIUM CHLORIDE 50 MILLIEQUIVALENT(S): 750 TABLET, EXTENDED RELEASE ORAL at 05:56

## 2025-01-10 RX ADMIN — VANCOMYCIN HYDROCHLORIDE 250 MILLIGRAM(S): KIT at 06:33

## 2025-01-10 RX ADMIN — MUPIROCIN 1 APPLICATION(S): 2 CREAM TOPICAL at 17:39

## 2025-01-10 RX ADMIN — ANTISEPTIC SURGICAL SCRUB 15 MILLILITER(S): 0.04 SOLUTION TOPICAL at 05:08

## 2025-01-10 RX ADMIN — POTASSIUM CHLORIDE 50 MILLIEQUIVALENT(S): 750 TABLET, EXTENDED RELEASE ORAL at 06:33

## 2025-01-10 RX ADMIN — VANCOMYCIN HYDROCHLORIDE 125 MILLIGRAM(S): KIT at 21:23

## 2025-01-10 RX ADMIN — Medication 2: at 05:55

## 2025-01-10 RX ADMIN — Medication 5000 UNIT(S): at 21:23

## 2025-01-10 RX ADMIN — DEXMEDETOMIDINE HYDROCHLORIDE 4.18 MICROGRAM(S)/KG/HR: 4 INJECTION, SOLUTION INTRAVENOUS at 07:19

## 2025-01-10 RX ADMIN — ANTISEPTIC SURGICAL SCRUB 1 APPLICATION(S): 0.04 SOLUTION TOPICAL at 05:55

## 2025-01-10 RX ADMIN — Medication 1 TABLET(S): at 11:46

## 2025-01-10 RX ADMIN — POTASSIUM CHLORIDE 40 MILLIEQUIVALENT(S): 750 TABLET, EXTENDED RELEASE ORAL at 14:11

## 2025-01-10 RX ADMIN — PANTOPRAZOLE 40 MILLIGRAM(S): 20 TABLET, DELAYED RELEASE ORAL at 05:07

## 2025-01-10 RX ADMIN — LEVETIRACETAM 1000 MILLIGRAM(S): 750 TABLET, FILM COATED ORAL at 01:10

## 2025-01-10 RX ADMIN — POTASSIUM CHLORIDE 50 MILLIEQUIVALENT(S): 750 TABLET, EXTENDED RELEASE ORAL at 01:39

## 2025-01-10 RX ADMIN — Medication 500 MILLIGRAM(S): at 11:45

## 2025-01-10 RX ADMIN — DEXMEDETOMIDINE HYDROCHLORIDE 4.18 MICROGRAM(S)/KG/HR: 4 INJECTION, SOLUTION INTRAVENOUS at 10:21

## 2025-01-10 RX ADMIN — Medication 1 DROP(S): at 17:39

## 2025-01-10 RX ADMIN — Medication 1 DROP(S): at 11:46

## 2025-01-10 RX ADMIN — MUPIROCIN 1 APPLICATION(S): 2 CREAM TOPICAL at 05:08

## 2025-01-10 RX ADMIN — SODIUM NITROPRUSSIDE 3.76 MICROGRAM(S)/KG/MIN: 0.2 INJECTION, SOLUTION INTRAVENOUS at 07:18

## 2025-01-10 RX ADMIN — POTASSIUM CHLORIDE 50 MILLIEQUIVALENT(S): 750 TABLET, EXTENDED RELEASE ORAL at 07:21

## 2025-01-10 RX ADMIN — IPRATROPIUM BROMIDE AND ALBUTEROL SULFATE 3 MILLILITER(S): .5; 2.5 SOLUTION RESPIRATORY (INHALATION) at 11:15

## 2025-01-10 RX ADMIN — IPRATROPIUM BROMIDE AND ALBUTEROL SULFATE 3 MILLILITER(S): .5; 2.5 SOLUTION RESPIRATORY (INHALATION) at 17:14

## 2025-01-10 RX ADMIN — ANTISEPTIC SURGICAL SCRUB 15 MILLILITER(S): 0.04 SOLUTION TOPICAL at 17:39

## 2025-01-10 RX ADMIN — Medication 4 MILLILITER(S): at 17:15

## 2025-01-10 RX ADMIN — LEVETIRACETAM 1000 MILLIGRAM(S): 750 TABLET, FILM COATED ORAL at 13:50

## 2025-01-10 RX ADMIN — Medication 500 MILLIGRAM(S): at 09:52

## 2025-01-10 RX ADMIN — Medication 1 DROP(S): at 05:55

## 2025-01-10 RX ADMIN — Medication 4 MILLILITER(S): at 05:45

## 2025-01-10 RX ADMIN — MEROPENEM 100 MILLIGRAM(S): 500 INJECTION INTRAVENOUS at 13:48

## 2025-01-10 RX ADMIN — IPRATROPIUM BROMIDE AND ALBUTEROL SULFATE 3 MILLILITER(S): .5; 2.5 SOLUTION RESPIRATORY (INHALATION) at 23:04

## 2025-01-10 RX ADMIN — Medication 5000 UNIT(S): at 13:48

## 2025-01-10 RX ADMIN — MEROPENEM 100 MILLIGRAM(S): 500 INJECTION INTRAVENOUS at 05:08

## 2025-01-10 RX ADMIN — BUMETANIDE 1 MILLIGRAM(S): 2 TABLET ORAL at 17:39

## 2025-01-10 RX ADMIN — VANCOMYCIN HYDROCHLORIDE 125 MILLIGRAM(S): KIT at 07:59

## 2025-01-10 RX ADMIN — ALBUMIN HUMAN 125 MILLILITER(S): 50 SOLUTION INTRAVENOUS at 13:45

## 2025-01-10 RX ADMIN — ALBUMIN HUMAN 50 MILLILITER(S): 50 SOLUTION INTRAVENOUS at 09:53

## 2025-01-10 RX ADMIN — Medication 2500 UNIT(S): at 05:08

## 2025-01-10 RX ADMIN — IPRATROPIUM BROMIDE AND ALBUTEROL SULFATE 3 MILLILITER(S): .5; 2.5 SOLUTION RESPIRATORY (INHALATION) at 05:45

## 2025-01-10 RX ADMIN — POTASSIUM CHLORIDE 50 MILLIEQUIVALENT(S): 750 TABLET, EXTENDED RELEASE ORAL at 00:21

## 2025-01-10 RX ADMIN — VANCOMYCIN HYDROCHLORIDE 100 MILLIGRAM(S): KIT at 17:40

## 2025-01-10 RX ADMIN — MEROPENEM 100 MILLIGRAM(S): 500 INJECTION INTRAVENOUS at 21:22

## 2025-01-10 RX ADMIN — Medication 500 MILLIGRAM(S): at 03:05

## 2025-01-10 RX ADMIN — POTASSIUM CHLORIDE 50 MILLIEQUIVALENT(S): 750 TABLET, EXTENDED RELEASE ORAL at 01:07

## 2025-01-10 NOTE — PROGRESS NOTE ADULT - SUBJECTIVE AND OBJECTIVE BOX
INFECTIOUS DISEASES FOLLOW UP-- Ruthie Shepherd  [phone]    This is a follow up note for this  59yFemale with  Cardiogenic shock    remains intubated  opens eyes, withdraws to painful stimuli    ROS:  CONSTITUTIONAL:  non purposeful movements  ?posturing    Allergies    doxycycline (Angioedema)  penicillin (Unknown)  clindamycin (Angioedema)  linezolid (Angioedema)    Intolerances        ANTIBIOTICS/RELEVANT:  antimicrobials  meropenem  IVPB 1000 milliGRAM(s) IV Intermittent every 8 hours  vancomycin    Solution 125 milliGRAM(s) Oral every 12 hours  vancomycin  IVPB 500 milliGRAM(s) IV Intermittent every 12 hours    immunologic:    OTHER:  albuterol/ipratropium for Nebulization 3 milliLiter(s) Nebulizer every 6 hours  artificial tears (preservative free) Ophthalmic Solution 1 Drop(s) Both EYES four times a day  ascorbic acid 500 milliGRAM(s) Oral daily  buMETAnide Injectable 1 milliGRAM(s) IV Push every 12 hours  chlorhexidine 0.12% Liquid 15 milliLiter(s) Oral Mucosa every 12 hours  chlorhexidine 2% Cloths 1 Application(s) Topical <User Schedule>  dexMEDEtomidine Infusion 0.2 MICROgram(s)/kG/Hr IV Continuous <Continuous>  dextrose 5%. 1000 milliLiter(s) IV Continuous <Continuous>  dextrose 5%. 1000 milliLiter(s) IV Continuous <Continuous>  dextrose 50% Injectable 25 Gram(s) IV Push once  dextrose 50% Injectable 12.5 Gram(s) IV Push once  dextrose 50% Injectable 25 Gram(s) IV Push once  dextrose Oral Gel 15 Gram(s) Oral once PRN  glucagon  Injectable 1 milliGRAM(s) IntraMuscular once  heparin   Injectable 5000 Unit(s) SubCutaneous every 8 hours  insulin lispro (ADMELOG) corrective regimen sliding scale   SubCutaneous every 6 hours  levETIRAcetam   Injectable 1000 milliGRAM(s) IV Push every 12 hours  multivitamin 1 Tablet(s) Oral daily  mupirocin 2% Nasal 1 Application(s) Both Nostrils two times a day  nitroprusside Infusion 0.3 MICROgram(s)/kG/Min IV Continuous <Continuous>  pantoprazole  Injectable 40 milliGRAM(s) IV Push daily  sodium chloride 3%  Inhalation 4 milliLiter(s) Inhalation every 12 hours  vasopressin Infusion 0.04 Unit(s)/Min IV Continuous <Continuous>      Objective:  Vital Signs Last 24 Hrs  T(C): 38.1 (10 Javed 2025 20:00), Max: 38.1 (10 Javed 2025 20:00)  T(F): 100.6 (10 Javed 2025 20:00), Max: 100.6 (10 Javed 2025 20:00)  HR: 105 (10 Javed 2025 20:00) (66 - 114)  BP: --  BP(mean): --  RR: 26 (10 Javed 2025 20:00) (12 - 30)  SpO2: 98% (10 Javed 2025 20:00) (96% - 100%)    Parameters below as of 10 Javed 2025 20:00  Patient On (Oxygen Delivery Method): ventilator        PHYSICAL EXAM:  Constitutional:no change in status   Eyes closed  Ear/Nose/Throat: no oral lesions, ET tube with CHF like secretions	  CVc sites CDI  Respiratory: coarse BL  Cardiovascular: S1S2  Gastrointestinal:soft, (+) BS, no tenderness  Extremities:no e/e/c  posturing  No Lymphadenopathy  IV sites not inflammed.    LABS:                        9.8    10.81 )-----------( Clumped    ( 10 Javed 2025 00:31 )             29.1     01-10    153[H]  |  107  |  28[H]  ----------------------------<  155[H]  3.5   |  32[H]  |  0.90    Ca    9.5      10 Javed 2025 00:31  Phos  2.6     01-10  Mg     2.8     01-10    TPro  6.4  /  Alb  3.7  /  TBili  2.4[H]  /  DBili  x   /  AST  69[H]  /  ALT  74[H]  /  AlkPhos  156[H]  01-10    PT/INR - ( 09 Jan 2025 20:19 )   PT: 10.8 sec;   INR: 0.94 ratio         PTT - ( 09 Jan 2025 20:19 )  PTT:24.0 sec  Urinalysis Basic - ( 10 Javed 2025 00:31 )    Color: x / Appearance: x / SG: x / pH: x  Gluc: 155 mg/dL / Ketone: x  / Bili: x / Urobili: x   Blood: x / Protein: x / Nitrite: x   Leuk Esterase: x / RBC: x / WBC x   Sq Epi: x / Non Sq Epi: x / Bacteria: x        MICROBIOLOGY:    Herpes Simplex Virus 1/2 by PCR, Blood (01.08.25 @ 14:11)    HSV DNA1: NotDetec   HSV DNA2: NotDetec: DiaSoarin Simplexa HSV-1 and 2 Direct is a real-time PCR test for the  qualitative detection and differentiation of HSV-1 and/or HSV-2 viral  DNA. Testing on whole blood has not been approved or cleared by the FDA.  The performance characteristics ofthe assay on whole blood have been  determined by [facility].  The results should be interpreted in the context of all clinical and  laboratory findings.   HSV 1/2 Source: Blood            RECENT CULTURES:  01-08 @ 16:08  Bronchial  --  --  --    No growth  --  01-07 @ 10:17  .Blood BLOOD  --  --  --    No growth at 72 Hours  --  01-05 @ 14:23  ET Tube ET Tube  --  --  --    No growth  --  01-05 @ 14:00  .Blood BLOOD  --  --  --    No growth at 5 days  --  01-05 @ 12:00  .Blood BLOOD  --  --  --    No growth at 5 days  --  01-04 @ 03:38  ET Tube ET Tube  Staphylococcus aureus  Staphylococcus aureus  NESHA    Moderate Staphylococcus aureus  Commensal fredi consistent with body site  --  01-03 @ 21:56  .Blood BLOOD  Blood Culture PCR  Blood Culture PCR  PCR    Growth in aerobic bottle: Staphylococcus epidermidis  Isolation of Coagulase negative Staphylococcus from single blood culture  sets may represent  contamination. Contact the Microbiology Department at [phone] if  susceptibility testing is needed.  clinically indicated.  Direct identification is available within approximately 3-5  hours either by Blood Panel Multiplexed PCR or Direct  MALDI-TOF. Details: [email or web address]  --      RADIOLOGY & ADDITIONAL STUDIES:    < from: MR Head w/wo IV Cont (01.09.25 @ 18:07) >  IMPRESSION:    1. Innumerable foci susceptibility artifact scattered throughout the   brain which can be seen with critically ill patients on ECMO. Diffuse fat   embolization is also part of the differential diagnosis but is considered   less likely.    2. Tiny acute/subacute infarcts within the bilateral basal ganglia,   thalami, and chandu with disproportionate ovoid area of T2/FLAIR   prolongation in the right ventral thalamus. Findings may indicate the   presence of embolic acute/subacute infarcts.    3. No abnormal intracranial enhancement.    < end of copied text >

## 2025-01-10 NOTE — CONSULT NOTE ADULT - ASSESSMENT
acute respiratory failure  -plan bedside percutaneous tracheostomy on Saturday 1/11   acute respiratory failure  -The risks (bleeding, infection, anoxia, death), benefits and alternatives of percutaneous tracheostomy with flexible bronchoscopy were discussed with the patient's family at bedside in the CTU. Written informed consent was obtained for surgery from her daughter, but she would like to wait for prediction of neurologic prognosis from the neurology team prior to proceeding with surgery  -plan bedside percutaneous tracheostomy on Saturday 1/11 if her family desires this procedure.

## 2025-01-10 NOTE — PROGRESS NOTE ADULT - SUBJECTIVE AND OBJECTIVE BOX
Vascular Surgery Progress Note     Subjective:  Patient seen and examined.       Vital Signs:  Vital Signs Last 24 Hrs  T(C): 37.6 (10 Javed 2025 07:15), Max: 38 (09 Jan 2025 20:00)  T(F): 99.7 (10 Javed 2025 07:15), Max: 100.4 (09 Jan 2025 20:00)  HR: 101 (10 Javed 2025 07:30) (66 - 121)  BP: --  BP(mean): --  RR: 23 (10 Javed 2025 07:30) (10 - 39)  SpO2: 98% (10 Javed 2025 07:30) (94% - 100%)    Parameters below as of 10 Javed 2025 08:00  Patient On (Oxygen Delivery Method): conventional ventilator    O2 Concentration (%): 30    CAPILLARY BLOOD GLUCOSE  162 (10 Javed 2025 00:00)  154 (09 Jan 2025 12:00)  148 (09 Jan 2025 10:00)      POCT Blood Glucose.: 166 mg/dL (10 Javed 2025 05:04)  POCT Blood Glucose.: 162 mg/dL (09 Jan 2025 23:49)  POCT Blood Glucose.: 129 mg/dL (09 Jan 2025 15:04)  POCT Blood Glucose.: 154 mg/dL (09 Jan 2025 11:56)  POCT Blood Glucose.: 148 mg/dL (09 Jan 2025 10:04)      I&O's Detail    09 Jan 2025 07:01  -  10 Javed 2025 07:00  --------------------------------------------------------  IN:    Dexmedetomidine: 252 mL    DOBUTamine: 20 mL    DOBUTamine: 15 mL    DOBUTamine: 7.5 mL    Enteral Tube Flush: 390 mL    IV PiggyBack: 900 mL    IV PiggyBack: 250 mL    Nitroprusside: 64.1 mL    Vital1.5: 1150 mL  Total IN: 3048.6 mL    OUT:    Indwelling Catheter - Urethral (mL): 3865 mL    Insulin: 0 mL    Rectal Tube (mL): 750 mL    Vasopressin: 0 mL  Total OUT: 4615 mL    Total NET: -1566.4 mL      10 Javed 2025 07:01  -  10 Javed 2025 08:15  --------------------------------------------------------  IN:    Dexmedetomidine: 10.5 mL    IV PiggyBack: 50 mL    Nitroprusside: 4.4 mL    Vital1.5: 50 mL  Total IN: 114.9 mL    OUT:    Indwelling Catheter - Urethral (mL): 500 mL    Vasopressin: 0 mL  Total OUT: 500 mL    Total NET: -385.1 mL            Physical Exam:  General: lying in bed   Pulmonary: intubated   Extremities: appears warm and well perfused, R anterior axilla with overlying ecchymosis, but no palpable hematoma, RUE a line removed. Site soft, c/d/i, RUE brachial, radial, ulnar, palmar arch signals, LUE radial, brachial pulse, b/l UE good cap refill, warm, LE groin site soft,       Labs:    01-10    153[H]  |  107  |  28[H]  ----------------------------<  155[H]  3.5   |  32[H]  |  0.90    Ca    9.5      10 Javed 2025 00:31  Phos  2.6     01-10  Mg     2.8     01-10    TPro  6.4  /  Alb  3.7  /  TBili  2.4[H]  /  DBili  x   /  AST  69[H]  /  ALT  74[H]  /  AlkPhos  156[H]  01-10    LIVER FUNCTIONS - ( 10 Javed 2025 00:31 )  Alb: 3.7 g/dL / Pro: 6.4 g/dL / ALK PHOS: 156 U/L / ALT: 74 U/L / AST: 69 U/L / GGT: x                                 9.8    10.81 )-----------( Clumped    ( 10 Javed 2025 00:31 )             29.1     PT/INR - ( 09 Jan 2025 20:19 )   PT: 10.8 sec;   INR: 0.94 ratio         PTT - ( 09 Jan 2025 20:19 )  PTT:24.0 sec

## 2025-01-10 NOTE — PROGRESS NOTE ADULT - ASSESSMENT
59F PMH Sickle cell disease (on hydroxyurea), HTN, 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 Pt initially presented to [facility] for SOB and SS crisis; course c/b witnessed seizure, intubated and sedated, and transferred to [facility] for further management. Keppra was discontinued at [facility] due to negative EEG. At [facility] MICU pt was found to have RV failure possibly iso pulm HTN 2/2 increased tidal volumes on the ventilator. On addition, pt possibly received a lot of volume iso SS crisis. Neuro was consulted for seizures and EEG technician was called for vEEG placement. Pt is in profound cardiogenic shock. Pt has been decompensating, despite Dobutamine gtt, Bumex gtt, Levophed, and addition of Vasopressin. Vasopressors requirements have gone up. NS shock team was called and pt is being transferred to NS for further management.     Patient with multiple medications allergies listed  MSSA in sputum culture from ET tube  Coag negative Staph epi. in single blood culture drawn 1/3 with repeat blood cultures x2 sets pending  given dose of Vancomycin    # sickle cell SC disease  # multiple medication allergies and patient is intubated and unable to answer specific questions about allergy types  # multiple lines, ECMO,  # mental status- secondary to small CVAs?, doubt infection but encephalitis studies sent and pending    Would:  Encephalitis studies sent:  CMV viral load negative in serum  HSV 1/2 PCR negative in serum  EBV viral load  West Nile serology and PCR  Lyme serology  Babesia PCR    acute hepatitis serology negative  Coxsackie A nonreactive, B low titers    Staph epi bacteremia in a single set of cultures  repeat specimens sent on 1/7 are no growth  likely procurement contaminant    sputum with MSSA colonization      will plan to complete an empiric course of antibiotics for 7-10days    Pedro Shepherd MD  Can be called via Teams  After 5pm/weekends [phone]

## 2025-01-10 NOTE — PROGRESS NOTE ADULT - ASSESSMENT
59yFemale sickle cell and NICM now with cardiogenic shock s/p VA ECMO on 1/4/25, which has now been decannulated on 1/7/25, with b/l UE axillary A lines. Vascular surgery consulted for c/f RUE axillary swelling.     Recommendations  - monitor RUE, currently with dopplerable signals  - duplex without signs of peudoaneurysm or hematoma  - A line site is stable  - appreciate care per CTICU  - please contact us with questions or concerns    Plan discussed with fellow    Vascular Surgery   [phone]

## 2025-01-10 NOTE — PROGRESS NOTE ADULT - ASSESSMENT
59F PMH Sickle cell disease (on hydroxyurea), HTN, 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 Pt initially presented to [facility] for SOB and SS crisis; course c/b witnessed seizure, intubated and sedated, and transferred to [facility] for further management. Keppra was discontinued at [facility] due to negative EEG. At [facility] MICU pt was found to have RV failure possibly iso pulm HTN 2/2 increased tidal volumes on the ventilator, possibly due to volume overload due to IVF iso SS crisis.     Pt is in profound cardiogenic shock. Pt has been decompensating, despite Dobutamine gtt, Bumex gtt, Levophed, and addition of Vasopressin. Vasopressors requirements have gone up. NS shock team was called and pt transferred to NS for further management. Patient was cannulated on peripheral VA ECMO.                                                                                                            #Hb SC disease  #Cardiogenic shock on ECMO  #Unclear trigger for unresponsiveness   - patient with 1-2 sickle pain crisis per year and on hydrea, had retinal detachment s/p repair                           - patient had f/up with cardio in Sept 2024: per the note, unclear etiology of her dyspnea but possibly driven by cardiomyopathy; low suspicion of pulmonary HTN as no RV dysfunction/severe TR.  She is found to have new RV failure, requiring ECMO  - Blood bank transfusion medicine team consulted for emergent RBC exchange given critically ill with multi-organ failure  - Retic count elevated to 11.6%, LDH 1000s, bili 3.9. CXR was clear not indicative of acute chest. Smear (prior to exchange) was reviewed: Hypochromic RBCs with normochromic RBC reflecting transfused blood. Several nucleated RBC suggesting stressed bone marrow. Very few target cells and rare sickle cell. No schistocytes to suggest hemolysis.    - S/p exchange 1/4/2024, s/p ECMO decannulation 1/7/2025 c/b groin hematoma and received 2 unit prbc, platelet and cryo.  -Hb electrophoresis on 01/07: Hb A 76.8%, Hb A2 3.6%, Hb S 9.3%, HbC 10.3%    Recommendations  - Hb electrophoresis ordered today. consider repeat exchange if Hb C and Hb S fraction>30% on Hb electrophoresis; blood bank aware to help with exchange recommendations. Discussed at conference yesterday. Recommend to order Hb electrophoresis every week while critically ill.   -  recommend transfusion to maintain plt > 50k in the setting of bleeding, otherwise Monitor CBC with differential daily and transfuse for platelet count >10 minimum, >20 if febrile.  - Discuss with blood bank and heme team prior to RBC transfusions to minimize risk of antibody development/transfusion reactions, aim to keep hb >7-8  - Pending full neurology eval, appreciate care by critical care, cards/heart failure -neurology suspect that seizures are not related to presentation and plan to continue keppra for now, neurology following for neurologic status.   -continue to hold hydroxyurea in the setting of thrombocytopenia.   - Repeat hemolysis labs, (CBC, CMP, LDH, reticulocyte count) daily, prefer pediatric tubes.   - Hematology team to follow; plan discussed with CTICU and neurology teams      Sita Lin MD  Hematology Oncology Fellow, PGY-4

## 2025-01-10 NOTE — PROGRESS NOTE ADULT - SUBJECTIVE AND OBJECTIVE BOX
Hematology Follow-up    INTERVAL HPI/OVERNIGHT EVENTS:  Patient seen and examined.     VITAL SIGNS:  T(F): 100 (01-10-25 @ 12:00)  HR: 77 (01-10-25 @ 12:45)  BP: --  RR: 24 (01-10-25 @ 12:45)  SpO2: 98% (01-10-25 @ 12:45)  Wt(kg): --    PHYSICAL EXAM:    GENERAL: ill appearing  HEAD:  Atraumatic, Normocephalic  EYES: EOMI, conjunctiva and sclera clear  NECK:+ IJ   CHEST/LUNG: Clear to auscultation bilaterally  HEART: Regular rate and rhythm  ABDOMEN: Soft, Nontender, Nondistended  NEUROLOGY: intubated and sedated     MEDICATIONS  (STANDING):  albuterol/ipratropium for Nebulization 3 milliLiter(s) Nebulizer every 6 hours  artificial tears (preservative free) Ophthalmic Solution 1 Drop(s) Both EYES four times a day  ascorbic acid 500 milliGRAM(s) Oral daily  buMETAnide Injectable 1 milliGRAM(s) IV Push every 12 hours  chlorhexidine 0.12% Liquid 15 milliLiter(s) Oral Mucosa every 12 hours  chlorhexidine 2% Cloths 1 Application(s) Topical <User Schedule>  dexMEDEtomidine Infusion 0.2 MICROgram(s)/kG/Hr (4.18 mL/Hr) IV Continuous <Continuous>  dextrose 5%. 1000 milliLiter(s) (100 mL/Hr) IV Continuous <Continuous>  dextrose 5%. 1000 milliLiter(s) (50 mL/Hr) IV Continuous <Continuous>  dextrose 50% Injectable 25 Gram(s) IV Push once  dextrose 50% Injectable 12.5 Gram(s) IV Push once  dextrose 50% Injectable 25 Gram(s) IV Push once  glucagon  Injectable 1 milliGRAM(s) IntraMuscular once  heparin   Injectable 5000 Unit(s) SubCutaneous every 8 hours  insulin lispro (ADMELOG) corrective regimen sliding scale   SubCutaneous every 6 hours  levETIRAcetam   Injectable 1000 milliGRAM(s) IV Push every 12 hours  meropenem  IVPB 1000 milliGRAM(s) IV Intermittent every 8 hours  multivitamin 1 Tablet(s) Oral daily  mupirocin 2% Nasal 1 Application(s) Both Nostrils two times a day  nitroprusside Infusion 0.3 MICROgram(s)/kG/Min (3.76 mL/Hr) IV Continuous <Continuous>  pantoprazole  Injectable 40 milliGRAM(s) IV Push daily  sodium chloride 3%  Inhalation 4 milliLiter(s) Inhalation every 12 hours  vancomycin    Solution 125 milliGRAM(s) Oral every 12 hours  vancomycin  IVPB 500 milliGRAM(s) IV Intermittent every 12 hours  vasopressin Infusion 0.04 Unit(s)/Min (6 mL/Hr) IV Continuous <Continuous>    MEDICATIONS  (PRN):  dextrose Oral Gel 15 Gram(s) Oral once PRN Blood Glucose LESS THAN 70 milliGRAM(s)/deciliter      doxycycline (Angioedema)  penicillin (Unknown)  clindamycin (Angioedema)  linezolid (Angioedema)      LABS:                        9.8    10.81 )-----------( Clumped    ( 10 Javed 2025 00:31 )             29.1     01-10    153[H]  |  107  |  28[H]  ----------------------------<  155[H]  3.5   |  32[H]  |  0.90    Ca    9.5      10 Javed 2025 00:31  Phos  2.6     01-10  Mg     2.8     01-10    TPro  6.4  /  Alb  3.7  /  TBili  2.4[H]  /  DBili  x   /  AST  69[H]  /  ALT  74[H]  /  AlkPhos  156[H]  01-10    PT/INR - ( 09 Jan 2025 20:19 )   PT: 10.8 sec;   INR: 0.94 ratio         PTT - ( 09 Jan 2025 20:19 )  PTT:24.0 sec   Urinalysis Basic - ( 10 Javed 2025 00:31 )    Color: x / Appearance: x / SG: x / pH: x  Gluc: 155 mg/dL / Ketone: x  / Bili: x / Urobili: x   Blood: x / Protein: x / Nitrite: x   Leuk Esterase: x / RBC: x / WBC x   Sq Epi: x / Non Sq Epi: x / Bacteria: x        RADIOLOGY & ADDITIONAL TESTS:  Studies reviewed.

## 2025-01-10 NOTE — PROGRESS NOTE ADULT - ATTENDING COMMENTS
I reviewed available diagnostic studies, and reviewed images personally. I agree with resident's history, exam, orders placed, and plan of care. 48 minutes of critical care time was spent in caring for this patient who has concern of sudden and clinically significant deterioration requiring a high level of physician preparedness, management of brain supporting interventions, and frequent physician assessments. This excludes any time spent on separate procedures or teaching.    Medical issues needing to be addressed include: Cerebral infarction, abnormal MRI findings, sickle cell disease (on hydroxyurea), HTN, HFimpEF/NICM (EF 58% 10/2024). Originally at [facility], presented with SOB/SS crisis, then seizures ->LIJ, found to have RV failure, transferred to [facility], Placed on ECMO, now off. Neurology has been following for AMS and seizures. MRI completed which shows several punctate acute/subacute appearing infarcts and numerous SWI hypointense signal often seen post cardiac bypass/ECMO. Of note, T2 flair R thalamic signal w/ hyperintense signal on ADC, likely chronic finding unrelated to current events. Some questionable b/l temporal, R>L mild edema on [id number] also may be consistent with prior reported sz  (possibly nonclinical and prolonged)/ cardiogenic shock (GPDs also may support this). Infarcts, given pt's current cardiac state, highly suspicious for cardiac in etiology. Alternatively, sickle, periprocedural (microemboli from vascular manipulation), thromboembolism (in setting of +PFO seen on previous intraop QUENTIN) cannot be ruled out. Recommend obtaining b/l LE dopplers, lipid panel, A1C, and AC/AP per cardiology. sz/EEG per general neurology, and agree with pursuing LP for further assessment if pt is not improving. However, pt is medically critically ill, toxic/metabolic/infectious encephalopathy may largely be clouding her true neuro exam. Would allow for some time to recover w/ continued assessment off sedation, and use of minimum amount of sedation as she can tolerate (currently intubated) as the team weans her off vent. Stroke does not explain her current exam findings. Exchange transfusion may be considered, will defer to hematology/medicine.   Case was discussed extensively with family - sister (NP), brother (MD), daughter, and other family members who were present on phone via [employer]. Scans reviewed, labs/tests including echo reviewed. Family expresses good understanding of discussion and all questions were answered. If LP is pursued for further encephalopathy/encephalitis work up, general neurology will follow and will recommend appropriate tests.   Service provided on 1/10/2025. I reviewed available diagnostic studies, and reviewed images personally. I agree with resident's history, exam, orders placed, and plan of care. 48 minutes of critical care time was spent in caring for this patient who has concern of sudden and clinically significant deterioration requiring a high level of physician preparedness, management of brain supporting interventions, and frequent physician assessments. This excludes any time spent on separate procedures or teaching.    Medical issues needing to be addressed include: Cerebral infarction, abnormal MRI findings, sickle cell disease (on hydroxyurea), HTN, HFimpEF/NICM (EF 58% 10/2024). Originally at [facility], presented with SOB/SS crisis, then seizures ->LIJ, found to have RV failure, transferred to [facility], Placed on ECMO, now off. Neurology has been following for AMS and seizures. MRI completed which shows several punctate acute/subacute appearing infarcts and numerous SWI hypointense signal often seen post cardiac bypass/ECMO. Of note, T2 flair R thalamic signal w/ hyperintense signal on ADC, likely chronic finding unrelated to current events. Some questionable b/l temporal, R>L mild edema on N2ciccs also may be consistent with prior reported sz  (possibly nonclinical and prolonged)/ cardiogenic shock (GPDs also may support this). Infarcts, given pt's current cardiac state, highly suspicious for cardiac in etiology. Alternatively, sickle, periprocedural (microemboli from vascular manipulation), thromboembolism (in setting of +PFO seen on previous intraop QUENTIN) cannot be ruled out.     Recommend obtaining b/l LE dopplers, lipid panel, A1C, and AC/AP per cardiology. atorvastatin, once LE dopplers negative, mechanical DVT ppx PLUS chemo DVT ppx (unless anticoagulated), sz/EEG per general neurology, and agree with pursuing LP for further assessment if pt is not improving. However, pt is medically critically ill, toxic/metabolic/infectious encephalopathy may largely be clouding her true neuro exam. Would allow for some time to recover w/ continued assessment off sedation, and use of minimum amount of sedation as she can tolerate (currently intubated) as the team weans her off vent. Stroke does not explain her current exam findings. Exchange transfusion may be considered, will defer to hematology/medicine.     Case was discussed extensively with family - sister (NP), brother (MD), daughter, and other family members who were present on phone via [employer]. Scans reviewed, labs/tests including echo reviewed. Family expresses good understanding of discussion and all questions were answered. If LP is pursued for further encephalopathy/encephalitis work up, general neurology will follow and will recommend appropriate tests.   Service provided on 1/10/2025. I reviewed available diagnostic studies, and reviewed images personally. I agree with resident's history, exam, orders placed, and plan of care. 48 minutes of critical care time was spent in caring for this patient who has concern of sudden and clinically significant deterioration requiring a high level of physician preparedness, management of brain supporting interventions, and frequent physician assessments. This excludes any time spent on separate procedures or teaching.    Medical issues needing to be addressed include: Cerebral infarction, abnormal MRI findings, sickle cell disease (on hydroxyurea), HTN, HFimpEF/NICM (EF 58% 10/2024). Originally at [facility], presented with SOB/SS crisis, then seizures ->LIJ, found to have RV failure, transferred to [facility], Placed on ECMO, now off. General Neurology has been following for AMS and seizures. I was called for stroke findings on MRI. MRI completed shows several punctate acute/subacute appearing infarcts and numerous SWI hypointense signal often seen post cardiac bypass/ECMO. Of note, T2 flair R thalamic signal w/ hyperintense signal on ADC, likely chronic finding unrelated to current events. Some questionable b/l temporal, R>L mild edema on Q3deaib also may be consistent with prior reported sz  (possibly nonclinical and prolonged)/ cardiogenic shock (GPDs also may support this). Infarcts, given pt's current cardiac state, highly suspicious for cardiac in etiology. Alternatively, sickle, periprocedural (microemboli from vascular manipulation), thromboembolism (in setting of +PFO seen on previous intraop QUENTIN) cannot be ruled out.     Recommend obtaining b/l LE dopplers, lipid panel, A1C, and AC/AP per cardiology. atorvastatin, once LE dopplers negative, mechanical DVT ppx PLUS chemo DVT ppx (unless anticoagulated), sz/EEG per general neurology, and agree with pursuing LP for further assessment if pt is not improving. However, pt is medically critically ill, toxic/metabolic/infectious encephalopathy may largely be clouding her true neuro exam. Would allow for some time to recover w/ continued assessment off sedation, and use of minimum amount of sedation as she can tolerate (currently intubated) as the team weans her off vent. Stroke does not explain her current exam findings. Exchange transfusion may be considered, will defer to hematology/medicine.     Case was discussed extensively with family - sister (NP), brother (MD), daughter, and other family members who were present on phone via [employer]. Scans reviewed, labs/tests including echo reviewed. Family expresses good understanding of discussion and all questions were answered. If LP is pursued for further encephalopathy/encephalitis work up, general neurology will follow and will recommend appropriate tests.   Service provided on 1/10/2025. I reviewed available diagnostic studies, and reviewed images personally. I agree with resident's history, exam, orders placed, and plan of care. 48 minutes of critical care time was spent in caring for this patient who has concern of sudden and clinically significant deterioration requiring a high level of physician preparedness, management of brain supporting interventions, and frequent physician assessments. This excludes any time spent on separate procedures or teaching.    Medical issues needing to be addressed include: Cerebral infarction, abnormal MRI findings, sickle cell disease (on hydroxyurea), HTN, HFimpEF/NICM (EF 58% 10/2024). Originally at [facility], presented with SOB/SS crisis, then seizures ->LIJ, found to have RV failure, transferred to [facility], Placed on ECMO, now off. General Neurology has been following for AMS and seizures. I was called for stroke findings on MRI. MRI completed shows several punctate acute/subacute appearing infarcts and numerous SWI hypointense signal often seen post cardiac bypass/ECMO. Of note, T2 flair R thalamic signal w/ hyperintense signal on ADC, likely chronic finding unrelated to current events. Some questionable b/l temporal, R>L mild edema on R0fbqak also may be consistent with prior reported sz  (possibly nonclinical and prolonged)/ cardiogenic shock (GPDs also may support this). Infarcts, given pt's current cardiac state, highly suspicious for cardiac in etiology. Alternatively, sickle, periprocedural (microemboli from vascular manipulation), thromboembolism (in setting of +PFO seen on previous intraop QUENTIN) cannot be ruled out.     Based on MRI w/ above explained punctate strokes, I do not feel these are significantly contributing to her current state / exam findings. Stroke are minimal. Toxic/metabolic/infectious encephalopathy may largely be clouding her true neuro exam, though prolonged prior sz (possibly-subclinical/clinical at OSH?) / cardiogenic shock may also play a part. Recommend obtaining b/l LE dopplers, lipid panel, A1C, and AC/AP per cardiology. atorvastatin, once LE dopplers negative, mechanical DVT ppx PLUS chemo DVT ppx (unless anticoagulated), sz/EEG per general neurology, and agree with pursuing LP for further assessment if pt is not improving. However, pt is medically critically ill, would address all toxic/metabolic/infectious abnormalities and would allow for some time to recover w/ continued assessment off sedation, and use of minimum amount of sedation as she can tolerate (currently intubated) as the team weans her off vent. Stroke does not explain her current exam findings. Exchange transfusion may be considered, will defer to hematology/medicine.     Case was discussed extensively with family - sister (NP), brother (MD), daughter, and other family members who were present on phone via [employer]. Scans reviewed, labs/tests including echo reviewed. Family expresses good understanding of discussion and all questions were answered. If LP is pursued for further encephalopathy/encephalitis work up, general neurology will follow and will recommend appropriate tests.   Service provided on 1/10/2025. I reviewed available diagnostic studies, and reviewed images personally. I agree with resident's history, exam, orders placed, and plan of care. 48 minutes of critical care time was spent in caring for this patient who has concern of sudden and clinically significant deterioration requiring a high level of physician preparedness, management of brain supporting interventions, and frequent physician assessments. This excludes any time spent on separate procedures or teaching.    Medical issues needing to be addressed include: Cerebral infarction, abnormal MRI findings, sickle cell disease (on hydroxyurea), HTN, HFimpEF/NICM (EF 58% 10/2024). Originally at [facility], presented with SOB/SS crisis, then seizures ->LIJ, found to have RV failure, transferred to [facility], Placed on ECMO, now off. General Neurology has been following for AMS and seizures. I was called for stroke findings on MRI. MRI completed shows several punctate acute/subacute appearing infarcts and numerous SWI hypointense signal often seen post cardiac bypass/ECMO. Of note, T2 flair R thalamic signal w/ hyperintense signal on ADC, likely chronic finding unrelated to current events. Some questionable b/l temporal, R>L mild edema on V1fpeym also may be consistent with prior reported sz  (possibly nonclinical and prolonged)/ cardiogenic shock (GPDs also may support this). Infarcts, given pt's current cardiac state, highly suspicious for cardiac in etiology. Alternatively, sickle, periprocedural (microemboli from vascular manipulation), thromboembolism (in setting of +PFO seen on previous intraop QUENTIN) cannot be ruled out.     Based on MRI w/ above explained punctate strokes, I do not feel these are significantly contributing to her current state / exam findings. Stroke are minimal. Toxic/metabolic/infectious encephalopathy may largely be clouding her true neuro exam, though prolonged prior sz (possibly-subclinical/clinical at OSH?) / cardiogenic shock may also play a part. Recommend obtaining b/l LE dopplers, lipid panel, A1C, and AC/AP per cardiology. atorvastatin, once LE dopplers negative, mechanical DVT ppx PLUS chemo DVT ppx (unless anticoagulated), sz/EEG per general neurology, and agree with pursuing LP for further assessment if pt is not improving. However, pt is medically critically ill, would address all toxic/metabolic/infectious abnormalities and would allow for some time to recover w/ continued assessment off sedation, and use of minimum amount of sedation as she can tolerate (currently intubated) as the team weans her off vent. Exchange transfusion may be considered, will defer to hematology/medicine.     All of this was discussed extensively with family - sister (NP), brother (MD), daughter, and other family members who were present on phone via [employer] Scans reviewed, labs/tests including echo reviewed. Family expresses good understanding of discussion and all questions were answered. If LP is pursued for further encephalopathy/encephalitis work up, general neurology will follow and will recommend appropriate tests.   Service provided on 1/10/2025. I reviewed available diagnostic studies, and reviewed images personally. I agree with resident's history, exam, orders placed, and plan of care. 48 minutes of critical care time was spent in caring for this patient who has concern of sudden and clinically significant deterioration requiring a high level of physician preparedness, management of brain supporting interventions, and frequent physician assessments. This excludes any time spent on separate procedures or teaching.    Medical issues needing to be addressed include: Cerebral infarction, abnormal MRI findings, sickle cell disease (on hydroxyurea), HTN, HFimpEF/NICM (EF 58% 10/2024). Originally at [facility], presented with SOB/SS crisis, then seizures ->LIJ, found to have RV failure, transferred to [facility], Placed on ECMO, now off. General Neurology has been following for AMS and seizures. I was called for stroke findings on MRI. MRI completed shows several punctate acute/subacute appearing infarcts and numerous SWI hypointense signal often seen post cardiac bypass/ECMO. Of note, T2 flair R thalamic signal w/ hyperintense signal on ADC, likely chronic finding unrelated to current events, though some component of recrudescence is possible. Some questionable b/l temporal, R>L mild edema on O1kewmk also may be consistent with prior reported sz  (possibly nonclinical and prolonged)/ cardiogenic shock (GPDs also may support this). Infarcts, given pt's current cardiac state, highly suspicious for cardiac in etiology. Alternatively, sickle, periprocedural (microemboli from vascular manipulation), thromboembolism (in setting of +PFO seen on previous intraop QUENTIN) cannot be ruled out.     Based on MRI w/ above explained punctate strokes, I do not feel these are significantly contributing to her current state / exam findings. Stroke are minimal. Toxic/metabolic/infectious encephalopathy may largely be clouding her true neuro exam, though prolonged prior sz (possibly-subclinical/clinical at OSH?) / cardiogenic shock may also play a part. Recommend obtaining b/l LE dopplers, lipid panel, A1C, and AC/AP per cardiology. atorvastatin, once LE dopplers negative, mechanical DVT ppx PLUS chemo DVT ppx (unless anticoagulated), sz/EEG per general neurology, and agree with pursuing LP for further assessment if pt is not improving. However, pt is medically critically ill, would address all toxic/metabolic/infectious abnormalities and would allow for some time to recover w/ continued assessment off sedation, and use of minimum amount of sedation as she can tolerate (currently intubated) as the team weans her off vent. Exchange transfusion may be considered, will defer to hematology/medicine.     All of this was discussed extensively with family - sister (NP), brother (MD), daughter, and other family members who were present on phone via [employer]. Scans reviewed, labs/tests including echo reviewed. Family expresses good understanding of discussion and all questions were answered. If LP is pursued for further encephalopathy/encephalitis work up, general neurology will follow and will recommend appropriate tests.   Service provided on 1/10/2025.

## 2025-01-10 NOTE — CHART NOTE - NSCHARTNOTEFT_GEN_A_CORE
NUTRITION FOLLOW UP NOTE    PATIENT SEEN FOR: ICU malnutrition initial follow up    SOURCE: [] Patient  [x] Current Medical Record  [x] RN  [] Family/support person at bedside  [x] Patient unavailable/inappropriate  [x] Other: TOD Beckford    CHART REVIEWED/EVENTS NOTED.  [] No changes to nutrition care plan to note  [x] Nutrition Status:  -cardiogenic shock  -VA ECMO decannulated   -diarrhea    DIET ORDER:   Diet, NPO with Tube Feed:   Tube Feeding Modality: Orogastric  Vital 1.5 [place] (VITAL1.5RTH)  Total Volume for 24 Hours (mL): 1200  Continuous  Starting Tube Feed Rate {mL per Hour}: 20  Until Goal Tube Feed Rate (mL per Hour): 50  Tube Feed Duration (in Hours): 24  Tube Feed Start Time: 14:50 (25)      CURRENT DIET ORDER IS:  [x] Appropriate:  [] Inadequate:  [x] Other: See recommendation below in setting of diarrhea    NUTRITION INTAKE/PROVISION:  [] PO:  [x] Enteral Nutrition:  EN Order Provides:  1200 ml formula, 1800 kcal, 81 g protein, 917 ml free water; meets 33.1 kcal/kg and 1.49 g protein/kg, based on wt: IBW 120lb/54.4kg    Current Pump Rate: 50ml/hr at time of RD visit  5-Day Average Enteral Provision per RN flowsheet: 574 mL, 861 kcals, 39 g protein   [] Parenteral Nutrition:    ANTHROPOMETRICS:  Drug Dosing Weight  Height (cm): 162 (2025 20:47)  Weight (kg): 83.6 (2025 20:47)  BMI (kg/m2): 31.9 (2025 20:47)  Weights:   Daily Weight in k.9 (01-10), 86.9 (), 75.8 (), 74.1 (), 78.4 (), 83.6 ()   Weight fluctuations likely in setting of fluid shifts vs scale inaccuracies and/or ongoing malnutrition. note patient ordered for bumex. RD to continue to monitor weight trends as available/able.     NUTRITIONALLY PERTINENT MEDICATIONS:  MEDICATIONS  (STANDING):  buMETAnide Injectable  dextrose 5%.  dextrose 5%.  dextrose 50% Injectable  dextrose 50% Injectable  dextrose 50% Injectable  glucagon  Injectable  insulin lispro (ADMELOG) corrective regimen sliding scale  meropenem  IVPB  nitroprusside Infusion  pantoprazole  Injectable  polyethylene glycol 3350  senna  vancomycin    Solution  vancomycin  IVPB  vasopressin Infusion       NUTRITIONALLY PERTINENT LABS:  01-10 Na153 mmol/L[H] Glu 155 mg/dL[H] K+ 3.5 mmol/L Cr  0.90 mg/dL BUN 28 mg/dL[H] 01-10 Phos 2.6 mg/dL 01-10 Alb 3.7 g/dL01-10 ALT 74 U/L[H] AST 69 U/L[H] Alkaline Phosphatase 156 U/L[H]  25 @ 21:34 a1c 4.6    A1C with Estimated Average Glucose Result: 4.6 % (25 @ 21:34)          Finger Sticks:  POCT Blood Glucose.: 166 mg/dL (01-10 @ 05:04)  POCT Blood Glucose.: 162 mg/dL ( @ 23:49)  POCT Blood Glucose.: 129 mg/dL ( @ 15:04)  POCT Blood Glucose.: 154 mg/dL ( @ 11:56)  POCT Blood Glucose.: 148 mg/dL ( @ 10:04)      NUTRITIONALLY PERTINENT MEDICATIONS/LABS:  [x] Reviewed  [x] Relevant notes on medications/labs:  -off propofol  -bumex  -sliding scale insulin for glycemic control  -Vasopressin @ 0 currently    EDEMA:  [x] Reviewed  [x] Relevant notes: 1+ generalized; 2+ right foot, left foor, 3+ right arm     GI/ I&O:  [x] Reviewed  [x] Relevant notes: significant diarrhea per RN and PA, rectal tube in place  [x] Other:  I&O's Detail    2025 07:01  -  10 Javed 2025 07:00  --------------------------------------------------------  IN:    Dexmedetomidine: 252 mL    DOBUTamine: 20 mL    DOBUTamine: 15 mL    DOBUTamine: 7.5 mL    Enteral Tube Flush: 390 mL    IV PiggyBack: 900 mL    IV PiggyBack: 250 mL    Nitroprusside: 64.1 mL    Vital1.5: 1150 mL  Total IN: 3048.6 mL    OUT:    Indwelling Catheter - Urethral (mL): 3865 mL    Insulin: 0 mL    Rectal Tube (mL): 750 mL    Vasopressin: 0 mL  Total OUT: 4615 mL    Total NET: -1566.4 mL      SKIN:   [] No pressure injuries documented, per nursing flowsheet  [x] Pressure injury previously noted  left earlobe suspected deep tissue injury  left forehead suspected deep tissue injury  right forehead suspected deep tissue injury  sacrum suspected deep tissue injury  bilateral heel suspected deep tissue injury  right buttocks suspected deep tissue injury  left buttocks suspected deep tissue injury  [] Change in pressure injury documentation:  [] Other:    ESTIMATED NEEDS:  [] No change:  [x] Updated:  Energy:  1291-6651 kcal/day (30-35 kcal/kg)  Protein:   g/day (1.5-2.0 g/kg)  Fluid:   ml/day or [x] defer to team  Based on: IBW 120lb/54.4kg      NUTRITION DIAGNOSIS:  [x] Prior Dx: Increased Nutrient Needs; Severe acute malnutrition  [] New Dx:    EDUCATION:  [] Yes:  [x] Not appropriate/warranted    NUTRITION CARE PLAN:  1. Enteral nutrition: in setting of ongoing diarrhea, will trial Vivonex @ 70ml/hr x 24hr to provide 1680ml, 1680kcal, 84g, 1425ml free water. Provides 30.9kcal/kg and 1.54g/kg protein based on IBW 120lb/54.4kg. Defer additional free water flushes to medical team.  2. Supplements: Add Mehdi 2x/day (provides 180kcal and 5g protein) for wound healing.  3. Multivitamin/mineral supplementation: Add multivitamin and vitamin C for wound healing pending no medical contraindications.     [] Achieved - Continue current nutrition intervention(s)  [] Current medical condition precludes nutrition intervention at this time.    MONITORING AND EVALUATION:   RD remains available upon request and will follow up per protocol.    Corinne Lima RDN, CDN - available via [facility] TEAMS

## 2025-01-10 NOTE — PROGRESS NOTE ADULT - SUBJECTIVE AND OBJECTIVE BOX
ADVANCED HEART FAILURE & TRANSPLANT  - PROGRESS NOTE  *To reach the NS1 Team from 8am to 5pm, please call [phone].    ___________________________________________________________________________    Medications:  albuterol/ipratropium for Nebulization 3 milliLiter(s) Nebulizer every 6 hours  artificial tears (preservative free) Ophthalmic Solution 1 Drop(s) Both EYES four times a day  buMETAnide Injectable 1 milliGRAM(s) IV Push every 12 hours  chlorhexidine 0.12% Liquid 15 milliLiter(s) Oral Mucosa every 12 hours  chlorhexidine 2% Cloths 1 Application(s) Topical <User Schedule>  dexMEDEtomidine Infusion 0.2 MICROgram(s)/kG/Hr IV Continuous <Continuous>  dextrose 5%. 1000 milliLiter(s) IV Continuous <Continuous>  dextrose 5%. 1000 milliLiter(s) IV Continuous <Continuous>  dextrose 50% Injectable 25 Gram(s) IV Push once  dextrose 50% Injectable 12.5 Gram(s) IV Push once  dextrose 50% Injectable 25 Gram(s) IV Push once  dextrose Oral Gel 15 Gram(s) Oral once PRN  glucagon  Injectable 1 milliGRAM(s) IntraMuscular once  heparin   Injectable 5000 Unit(s) SubCutaneous every 8 hours  insulin lispro (ADMELOG) corrective regimen sliding scale   SubCutaneous every 6 hours  levETIRAcetam   Injectable 1000 milliGRAM(s) IV Push every 12 hours  meropenem  IVPB 1000 milliGRAM(s) IV Intermittent every 8 hours  mupirocin 2% Nasal 1 Application(s) Both Nostrils two times a day  nitroprusside Infusion 0.3 MICROgram(s)/kG/Min IV Continuous <Continuous>  pantoprazole  Injectable 40 milliGRAM(s) IV Push daily  sodium chloride 3%  Inhalation 4 milliLiter(s) Inhalation every 12 hours  vancomycin    Solution 125 milliGRAM(s) Oral every 12 hours  vancomycin  IVPB 500 milliGRAM(s) IV Intermittent every 12 hours  vasopressin Infusion 0.04 Unit(s)/Min IV Continuous <Continuous>      Physical Exam:    Vitals:  Vital Signs Last 24 Hours  T(C): 37.6 (01-10-25 @ 09:00), Max: 38 (25 @ 20:00)  HR: 101 (01-10-25 @ 09:45) (66 - 121)  BP: --  RR: 24 (01-10-25 @ 09:45) (12 - 39)  SpO2: 98% (01-10-25 @ 09:45) (94% - 100%)    Weight in k.9 (01-10 @ 00:00)    I&O's Summary    2025 07:01  -  10 Javed 2025 07:00  --------------------------------------------------------  IN: 3048.6 mL / OUT: 4615 mL / NET: -1566.4 mL    10 Javed 2025 07:01  -  10 Javed 2025 10:13  --------------------------------------------------------  IN: 177.9 mL / OUT: 750 mL / NET: -572.1 mL      Labs:                        9.8    10.81 )-----------( Clumped    ( 10 Javed 2025 00:31 )             29.1     10    153[H]  |  107  |  28[H]  ----------------------------<  155[H]  3.5   |  32[H]  |  0.90    Ca    9.5      10 Javed 2025 00:31  Phos  2.6     01-10  Mg     2.8     01-10    TPro  6.4  /  Alb  3.7  /  TBili  2.4[H]  /  DBili  x   /  AST  69[H]  /  ALT  74[H]  /  AlkPhos  156[H]  01-10    PT/INR - ( 2025 20:19 )   PT: 10.8 sec;   INR: 0.94 ratio         PTT - ( 2025 20:19 )  PTT:24.0 sec    Oxygen Saturation, Mixed: 85.2 ( @ 12:05)  Oxygen Saturation, Mixed: 99.2 ( @ 10:50)  Oxygen Saturation, Mixed: 83.8 ( @ 03:06)  Oxygen Saturation, Mixed: 80.9 ( @ 01:35)  Oxygen Saturation, Mixed: 78.9 ( @ 20:04)  Oxygen Saturation, Mixed: 73.9 ( @ 17:13)  Oxygen Saturation, Mixed: 76.2 ( @ 13:25)  Oxygen Saturation, Mixed: 75.5 ( @ 10:55)  Oxygen Saturation, Mixed: 76.5 ( @ 08:50)  Oxygen Saturation, Mixed: 79.2 ( @ 04:18)  Oxygen Saturation, Mixed: 84.1 ( @ 00:14)  Oxygen Saturation, Mixed: 77.4 ( @ 20:57)  Oxygen Saturation, Mixed: 75.6 ( @ 17:33)  Oxygen Saturation, Mixed: 84.1 ( @ 16:01)    Lactate Dehydrogenase, Serum: 554 U/L ( @ 01:21)

## 2025-01-10 NOTE — PROGRESS NOTE ADULT - ATTENDING COMMENTS
59F PMH Sickle cell disease (on hydroxyurea), HTN, HFimpEF/NICM (EF 58% 10/2024) presenting as a transfer from [facility]. Pt initially presented to [facility] for SOB and SS crisis; course c/b witnessed seizure, intubated and sedated, and transferred to [facility] for further management. Keppra was discontinued at [facility] due to negative EEG. At [facility] MICU pt was found to have RV failure possibly iso pulm HTN 2/2 increased tidal volumes on the ventilator, possibly due to volume overload due to IVF iso SS crisis.     Pt is in profound cardiogenic shock. Pt has been decompensating, despite Dobutamine gtt, Bumex gtt, Levophed, and addition of Vasopressin. Vasopressors requirements have gone up. NS shock team was called and pt transferred to NS for further management. Patient was cannulated on peripheral [facility] ECMO.                                                                                                            #Hb SC disease  #Cardiogenic shock on ECMO  #Unclear trigger for unresponsiveness   - patient with 1-2 sickle pain crisis per year and on hydrea, had retinal detachment s/p repair                           - patient had f/up with cardio in Sept 2024: per the note, unclear etiology of her dyspnea but possibly driven by cardiomyopathy; low suspicion of pulmonary HTN as no RV dysfunction/severe TR.  She is found to have new RV failure, requiring ECMO  - Blood bank transfusion medicine team consulted for emergent RBC exchange given critically ill with multi-organ failure  - Retic count elevated to 11.6%, LDH 1000s, bili 3.9. CXR was clear not indicative of acute chest. Smear (prior to exchange) was reviewed: Hypochromic RBCs with normochromic RBC reflecting transfused blood. Several nucleated RBC suggesting stressed bone marrow. Very few target cells and rare sickle cell. No schistocytes to suggest hemolysis.    - S/p exchange 1/4/2024, s/p ECMO decannulation 1/7/2025 c/b groin hematoma and received 2 unit prbc, platelet and cryo.  -Hb electrophoresis on 01/07: Hb A 76.8%, Hb A2 3.6%, Hb S 9.3%, HbC 10.3%    Recommendations  - Hb electrophoresis ordered today. consider repeat exchange if Hb C and Hb S fraction>30% on Hb electrophoresis; blood bank aware to help with exchange recommendations. Discussed at conference yesterday. Recommend to order Hb electrophoresis every week while critically ill.   -  recommend transfusion to maintain plt > 50k in the setting of bleeding, otherwise Monitor CBC with differential daily and transfuse for platelet count >10 minimum, >20 if febrile.  - Discuss with blood bank and heme team prior to RBC transfusions to minimize risk of antibody development/transfusion reactions, aim to keep hb >7-8  - Pending full neurology eval, appreciate care by critical care, cards/heart failure -neurology suspect that seizures are not related to presentation and plan to continue keppra for now, neurology following for neurologic status.   -continue to hold hydroxyurea in the setting of thrombocytopenia.   - Repeat hemolysis labs, (CBC, CMP, LDH, reticulocyte count) daily, prefer pediatric tubes.   - Hematology team to follow; plan discussed with CTICU and neurology teams

## 2025-01-10 NOTE — PROGRESS NOTE ADULT - CRITICAL CARE ATTENDING COMMENT
meds: vaso, off nipride, presedex, bumex 1 IV bid, diamox, kepra, sean, vanco IV,   1.9 MRI shows pattern possibly consistent with fat embolus / critically patients. Also has tiny infarcts in basal ganglia, thalamus, chandu.   Some improvement in overall MS.   Tmax 100.4, HR , MAps 70s, (labile)   I/O : -1.6  01-10    153[H]  |  107  |  28[H]  ----------------------------<  155[H]  3.5   |  32[H]  |  0.90    Ca    9.5      10 Javed 2025 00:31  Phos  2.6     01-10  Mg     2.8     01-10    TPro  6.4  /  Alb  3.7  /  TBili  2.4[H]  /  DBili  x   /  AST  69[H]  /  ALT  74[H]  /  AlkPhos  156[H]  01-10                        9.8    10.81 )-----------( Clumped    ( 10 Javed 2025 00:31 )             29.1   Discussed on MDR.   Patient will likely need trach ongoing discussion with family.   Neurology to give input re repeat MRI.   Await hematology recommendations.   Alexei Lepe

## 2025-01-10 NOTE — PROGRESS NOTE ADULT - ASSESSMENT
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 Pt initially presented to [facility] (1/2) for SOB and SS crisis; course c/b witnessed seizure; however, no description of the seizure was provided in documentation. At that time patient was given keppra and intubated and sedated then transferred to [facility] for further management. Keppra was discontinued at [facility] due to negative EEG. At [facility] MICU pt was found to have RV failure possibly iso pulm HTN 2/2 increased tidal volumes on the ventilator. In addition, pt possibly received a lot of volume iso SS crisis. Patient in profound cardiogenic shock. Pt has been decompensating, despite Dobutamine gtt, Bumex gtt, Levophed, and addition of Vasopressin. Vasopressors requirements have gone up. Patient transferred to [facility] for R heart cath and possible VA ECMO. Patient was cannulated on peripheral VA ECMO (1/4). Neurology consulted  (1/4) for alleged witnessed seizure at [facility].  Neurology had continued to follow case for AMS. EEG showed time periods of intermittent heneralized periodic Discharges (GPDs) w triphasix morphology and at times sharply contoured, 1-1.5 Hz blunted over R hemisphere. Hospital course at [facility] complicated by continued cardiopulmonary tenuous status necessitating ECMO. Patient continued on keppra 1000 mg BID for abnormal EEG findings and eeg discontinued 1/9/2025 given no recorded seizures or change in GPDs. Patient underwent MRI for AMS work up which revealed multiple foci susceptibility artifact w a few of them correlating to infarction on ADC (R thalamus, BL BG).   stroke consulted for the MRI findings.  Vitals during time of MRI: 110s-120s w a few spikes in SBP to 140  Exam: ***.      mRS: 0  LKN: prior to current hospital admission  NIHSS: ***    NOT a tenecteplase candidate due to oow and on ecmo  NOT a mechanical thrombectomy candidate due to no LVO and on ecmo    Impression:   multiple foci of diffusion restriction w a few areas correlation on ADC suggesting infarction in BL. Additionaly MRI findings are diffuse innumerable SWI hypointensities. BG-thalamic areas. Patient currently on ecmo and intubated. Infarctions could be from anoxic brain injury vs susceptibility artifact commonly seen in critically ill ecmo patients.    Recommendations:    Diagnostics:  [x] MRI brain without contrast   [x] TTE w/ bubble    1. Technically difficult image quality.   2. Left ventricular cavity is normal in size. Left ventricular systolic function is normal.   3. The right ventricle is not well visualized.    [] Implantable loop recorder  [] Lipid panel, HbA1c (4.6)  [] primary following CBC, CMP, coagulation panel, troponin    Medications:  [] antiplatelet and anticoagulation per primary   [] Atorvastatin 80mg (titrate to LDL < 70)     Miscellaneous:  [] NPO unless passes dysphagia screen; swallow eval if fails  [] BP goals per primary given on ecmo for mixed shock  [] Telemonitoring  [] Neurological checks and vital signs per unit protocol  [] BGM goals 140-180  [] PT/OT; S/S evaluation when able    * Case and plan not final until Attending attestation * Assessment:    Patient CARSON LEE is a 59y (1965) man with a PMHx significant for sickle cell disease (on hydroxyurea), HTN, HFimpEF/NICM (EF 58% 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 Pt initially presented to [facility] (1/2) for SOB and SS crisis; course c/b witnessed seizure; however, no description of the seizure was provided in documentation. At that time patient was given keppra and intubated and sedated then transferred to [facility] for further management. Keppra was discontinued at [facility] due to negative EEG. At [facility] MICU pt was found to have RV failure possibly iso pulm HTN 2/2 increased tidal volumes on the ventilator. In addition, pt possibly received a lot of volume iso SS crisis. Patient in profound cardiogenic shock. Pt has been decompensating, despite Dobutamine gtt, Bumex gtt, Levophed, and addition of Vasopressin. Vasopressors requirements have gone up. Patient transferred to [facility] for R heart cath and possible VA ECMO. Patient was cannulated on peripheral VA ECMO (1/4). Neurology consulted  (1/4) for alleged witnessed seizure at [facility].  Neurology had continued to follow case for AMS. EEG showed time periods of intermittent heneralized periodic Discharges (GPDs) w triphasix morphology and at times sharply contoured, 1-1.5 Hz blunted over R hemisphere. Hospital course at [facility] complicated by continued cardiopulmonary tenuous status necessitating ECMO. Patient continued on keppra 1000 mg BID for abnormal EEG findings and eeg discontinued 1/9/2025 given no recorded seizures or change in GPDs. Patient underwent MRI for AMS work up which revealed multiple foci susceptibility artifact w a few of them correlating to infarction on ADC (R thalamus, BL BG).   stroke consulted for the MRI findings.  Vitals during time of MRI: 110s-120s w a few spikes in SBP to 140    mRS: 0  LKN: prior to current hospital admission  NIHSS: 24 however unable to accurately assess given intubated and sedated    NOT a tenecteplase candidate due to oow and on ecmo  NOT a mechanical thrombectomy candidate due to no LVO and on ecmo    Impression:   multiple foci of diffusion restriction w a few areas correlation on ADC suggesting infarction in BL. Additionaly MRI findings are diffuse innumerable SWI hypointensities. BG-thalamic areas. Patient currently on ecmo and intubated. Infarctions could be from anoxic brain injury vs susceptibility artifact commonly seen in critically ill ecmo patients.    Recommendations:    Diagnostics:  [x] MRI brain without contrast   [x] TTE w/ bubble    1. Technically difficult image quality.   2. Left ventricular cavity is normal in size. Left ventricular systolic function is normal.   3. The right ventricle is not well visualized.  [] Lipid panel, HbA1c (4.6)  [] primary following CBC, CMP, coagulation panel, troponin    Medications:  [] antiplatelet and anticoagulation per primary   [] Atorvastatin 80mg (titrate to LDL < 70)     Miscellaneous:  [] NPO unless passes dysphagia screen; swallow eval if fails  [] BP goals per primary given on ecmo for mixed shock  [] Telemonitoring  [] Neurological checks and vital signs per unit protocol  [] BGM goals 140-180  [] PT/OT; S/S evaluation when able    * Case and plan not final until Attending attestation * Assessment:    Patient CARSON LEE is a 59y (1965) man with a PMHx significant for sickle cell disease (on hydroxyurea), HTN, HFimpEF/NICM (EF 58% 10/2024)originally presented to [facility] (1/3) as a transfer from [facility]. Pt initially presented to [facility] (1/2) for SOB and SS crisis; course c/b witnessed seizure; however, no description of the seizure was provided in documentation. At that time patient was given keppra and intubated and sedated then transferred to [facility] for further management. Keppra was discontinued at [facility] due to negative EEG. At [facility] MICU pt was found to have RV failure possibly iso pulm HTN 2/2 increased tidal volumes on the ventilator. In addition, pt possibly received a lot of volume iso SS crisis. Patient in profound cardiogenic shock. Pt has been decompensating, despite Dobutamine gtt, Bumex gtt, Levophed, and addition of Vasopressin. Vasopressors requirements have gone up. Patient transferred to [facility] for R heart cath and possible VA ECMO. Patient was cannulated on peripheral VA ECMO (1/4). Neurology consulted  (1/4) for alleged witnessed seizure at [facility].  Neurology had continued to follow case for AMS. EEG showed time periods of intermittent heneralized periodic Discharges (GPDs) w triphasix morphology and at times sharply contoured, 1-1.5 Hz blunted over R hemisphere. Hospital course at [facility] complicated by continued cardiopulmonary tenuous status necessitating ECMO. Patient continued on keppra 1000 mg BID for abnormal EEG findings and eeg discontinued 1/9/2025 given no recorded seizures or change in GPDs. Patient underwent MRI for AMS work up which revealed multiple foci susceptibility artifact w a few of them correlating to infarction on ADC (R thalamus, BL BG).   stroke consulted for the MRI findings.  Vitals during time of MRI: 110s-120s w a few spikes in SBP to 140    mRS: 0  LKN: prior to current hospital admission  NIHSS: 24 however unable to accurately assess given intubated and sedated    NOT a tenecteplase candidate due to oow  NOT a mechanical thrombectomy candidate due to no LVO    Impression:   multiple foci of diffusion restriction w a few areas correlation on ADC suggesting infarction in BL. Additionaly MRI findings are diffuse innumerable SWI hypointensities. BG-thalamic areas. Patient currently intubated. Infarctions could be from anoxic brain injury. Additional findings on MRI: Susceptibility artifact commonly seen in critically ill ecmo patients.    Recommendations:    Diagnostics:  [x] MRI brain without contrast   [/] CT vessel imaging head pending read  [x] TTE w/ bubble    1. Technically difficult image quality.   2. Left ventricular cavity is normal in size. Left ventricular systolic function is normal.   3. The right ventricle is not well visualized.  [] Lipid panel, HbA1c (4.6)  [] primary following CBC, CMP, coagulation panel, troponin    Medications:  [] antiplatelet and anticoagulation per primary   [] Atorvastatin 80mg (titrate to LDL < 70)     Miscellaneous:  [] NPO unless passes dysphagia screen; swallow eval if fails  [] BP goals per primary given on ecmo for mixed shock  [] Telemonitoring  [] Neurological checks and vital signs per unit protocol  [] BGM goals 140-180  [] PT/OT; S/S evaluation when able    * Case and plan not final until Attending attestation * Assessment:    Patient CARSON LEE is a 59y (1965) man with a PMHx significant for sickle cell disease (on hydroxyurea), HTN, HFimpEF/NICM (EF 58% 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 Pt initially presented to [facility] (1/2) for SOB and SS crisis; course c/b witnessed seizure; however, no description of the seizure was provided in documentation. At that time patient was given keppra and intubated and sedated then transferred to [facility] for further management. Keppra was discontinued at [facility] due to negative EEG. At [facility] MICU pt was found to have RV failure possibly iso pulm HTN 2/2 increased tidal volumes on the ventilator. In addition, pt possibly received a lot of volume iso SS crisis. Patient in profound cardiogenic shock. Pt has been decompensating, despite Dobutamine gtt, Bumex gtt, Levophed, and addition of Vasopressin. Vasopressors requirements have gone up. Patient transferred to [facility] for R heart cath and possible VA ECMO. Patient was cannulated on peripheral VA ECMO (1/4). Neurology consulted  (1/4) for alleged witnessed seizure at [facility].  Neurology had continued to follow case for AMS. EEG showed time periods of intermittent heneralized periodic Discharges (GPDs) w triphasix morphology and at times sharply contoured, 1-1.5 Hz blunted over R hemisphere. Hospital course at [facility] complicated by continued cardiopulmonary tenuous status necessitating ECMO. Patient continued on keppra 1000 mg BID for abnormal EEG findings and eeg discontinued 1/9/2025 given no recorded seizures or change in GPDs. Patient underwent MRI for AMS work up which revealed multiple foci susceptibility artifact w a few of them correlating to infarction on ADC (R thalamus, BL BG).   stroke consulted for the MRI findings.  Vitals during time of MRI: 110s-120s w a few spikes in SBP to 140    mRS: 0  LKN: prior to current hospital admission  NIHSS: 24 however unable to accurately assess given intubated and sedated    NOT a tenecteplase candidate due to oow  NOT a mechanical thrombectomy candidate due to no LVO    Impression:   1) multiple foci of diffusion restriction w a few areas correlation on ADC suggesting infarction in BG-thalmic region. Patient currently intubated and not following commands. DWI-ADC infarctions in BG-thalamic regions could be from small vessel disease given age of appearance vs old HTN related infarct given age of appearance. Patient current presentation and decreased sensorium likely unrelated to infarction given how the infarction appears subacute to chronic dt FLAIR and ADC appearance.   2) Additional findings on MRI: Susceptibility artifact commonly seen in critically ill ecmo patients.    Recommendations:    Diagnostics:  [x] MRI brain without contrast   [/] CT vessel imaging head pending read  [x] TTE w/ bubble    1. Technically difficult image quality.   2. Left ventricular cavity is normal in size. Left ventricular systolic function is normal.   3. The right ventricle is not well visualized.  [] Lipid panel, HbA1c (4.6)  [] primary following CBC, CMP, coagulation panel, troponin    Medications:  [] antiplatelet and anticoagulation per primary   [] Atorvastatin 80mg (titrate to LDL < 70)     Miscellaneous:  [] NPO unless passes dysphagia screen; swallow eval if fails  [] BP goals per primary given on ecmo for mixed shock  [] Telemonitoring  [] Neurological checks and vital signs per unit protocol  [] BGM goals 140-180  [] PT/OT; S/S evaluation when able    * Case and plan not final until Attending attestation * Assessment:    Patient CARSON LEE is a 59y (1965) man with a PMHx significant for sickle cell disease (on hydroxyurea), HTN, HFimpEF/NICM (EF 58% 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 Pt initially presented to [facility] (1/2) for SOB and SS crisis; course c/b witnessed seizure; however, no description of the seizure was provided in documentation. At that time patient was given keppra and intubated and sedated then transferred to [facility] for further management. Keppra was discontinued at [facility] due to negative EEG. At [facility] MICU pt was found to have RV failure possibly iso pulm HTN 2/2 increased tidal volumes on the ventilator. In addition, pt possibly received a lot of volume iso SS crisis. Patient in profound cardiogenic shock. Pt has been decompensating, despite Dobutamine gtt, Bumex gtt, Levophed, and addition of Vasopressin. Vasopressors requirements have gone up. Patient transferred to [facility] for R heart cath and possible VA ECMO. Patient was cannulated on peripheral VA ECMO (1/4). Neurology consulted  (1/4) for alleged witnessed seizure at [facility].  Neurology had continued to follow case for AMS. EEG showed time periods of intermittent heneralized periodic Discharges (GPDs) w triphasix morphology and at times sharply contoured, 1-1.5 Hz blunted over R hemisphere. Hospital course at [facility] complicated by continued cardiopulmonary tenuous status necessitating ECMO. Patient continued on keppra 1000 mg BID for abnormal EEG findings and eeg discontinued 1/9/2025 given no recorded seizures or change in GPDs. Patient underwent MRI for AMS work up which revealed multiple foci susceptibility artifact w a few of them correlating to infarction on ADC (R thalamus, BL BG).   stroke consulted for the MRI findings.  Vitals during time of MRI: 110s-120s w a few spikes in SBP to 140    mRS: 0  LKN: prior to current hospital admission  NIHSS: 24 however unable to accurately assess given intubated and sedated    NOT a tenecteplase candidate due to oow  NOT a mechanical thrombectomy candidate due to no LVO    Impression:   1) multiple foci of diffusion restriction w a few areas correlation on ADC suggesting infarction in BG-thalmic region. Patient currently intubated and not following commands. DWI-ADC infarctions in BG-thalamic regions could be from small vessel disease given age of appearance vs old HTN related infarct given age of appearance. Patient current presentation and decreased sensorium likely unrelated to infarction given how the infarction appears subacute to chronic dt FLAIR and ADC appearance.   2) Additional findings on MRI: Susceptibility artifact commonly seen in critically ill ecmo patients.    Recommendations:    Diagnostics:  [x] MRI brain without contrast   [] BLE doppler ultrasound for DVT  [/] CT vessel imaging head pending read  [x] TTE w/ bubble   *PFO w shunting   1. Technically difficult image quality.   2. Left ventricular cavity is normal in size. Left ventricular systolic function is normal.   3. The right ventricle is not well visualized.  [] Lipid panel, HbA1c (4.6)  [] primary following CBC, CMP, coagulation panel, troponin    Medications:  [] given PFO patient would benefit from anticoagulation; however, patient clinical course at this time remains tenuous so antiplatelet and anticoagulation per primary  [] Atorvastatin 80mg (titrate to LDL < 70)     Miscellaneous:  [] NPO unless passes dysphagia screen; swallow eval if fails  [] BP goals per primary given on ecmo for mixed shock  [] Telemonitoring  [] Neurological checks and vital signs per unit protocol  [] BGM goals 140-180  [] PT/OT; S/S evaluation when able    * Case and plan not final until Attending attestation * Assessment:    Patient CARSON LEE is a 59y (1965) man with a PMHx significant for sickle cell disease (on hydroxyurea), HTN, HFimpEF/NICM (EF 58% 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. Pt initially presented to [facility] (1/2) for SOB and SS crisis; course c/b witnessed seizure; however, no description of the seizure was provided in documentation. At that time patient was given keppra and intubated and sedated then transferred to [facility] for further management. Keppra was discontinued at [facility] due to negative EEG. At [facility] MICU pt was found to have RV failure possibly iso pulm HTN 2/2 increased tidal volumes on the ventilator. In addition, pt possibly received a lot of volume iso SS crisis. Patient in profound cardiogenic shock. Pt has been decompensating, despite Dobutamine gtt, Bumex gtt, Levophed, and addition of Vasopressin. Vasopressors requirements have gone up. Patient transferred to [facility] for R heart cath and possible VA ECMO. Patient was cannulated on peripheral VA ECMO (1/4). Neurology consulted  (1/4) for alleged witnessed seizure at [facility].  Neurology had continued to follow case for AMS. EEG showed time periods of intermittent heneralized periodic Discharges (GPDs) w triphasix morphology and at times sharply contoured, 1-1.5 Hz blunted over R hemisphere. Hospital course at [facility] complicated by continued cardiopulmonary tenuous status necessitating ECMO. Patient continued on keppra 1000 mg BID for abnormal EEG findings and eeg discontinued 1/9/2025 given no recorded seizures or change in GPDs. Patient underwent MRI for AMS work up which revealed multiple foci susceptibility artifact w a few of them correlating to infarction on ADC (R thalamus, BL BG).   stroke consulted for the MRI findings.  Vitals during time of MRI: 110s-120s w a few spikes in SBP to 140    mRS: 0  LKN: prior to current hospital admission  NIHSS: 24 however unable to accurately assess given intubated and sedated    NOT a tenecteplase candidate due to oow  NOT a mechanical thrombectomy candidate due to no LVO    Impression:   1) multiple foci of diffusion restriction w a few areas correlation on ADC suggesting infarction in BG-thalmic region. Patient currently intubated and not following commands. DWI-ADC infarctions in BG-thalamic regions could be from small vessel disease given age of appearance vs old HTN related infarct given age of appearance. Patient current presentation and decreased sensorium likely unrelated to infarction given how the infarction appears subacute to chronic dt FLAIR and ADC appearance.   2) Additional findings on MRI: Susceptibility artifact commonly seen in critically ill ecmo patients.    Recommendations:    Diagnostics:  [x] MRI brain without contrast   [] BLE doppler ultrasound for DVT  [/] CT vessel imaging head pending read  [x] TTE w/ bubble   *PFO w shunting   1. Technically difficult image quality.   2. Left ventricular cavity is normal in size. Left ventricular systolic function is normal.   3. The right ventricle is not well visualized.  [] Lipid panel, HbA1c (4.6)  [] primary following CBC, CMP, coagulation panel, troponin  [] agree w general neurology team that unclear etiology for current presentation justifies CSF studies    Medications:  [] given PFO patient would benefit from anticoagulation; however, patient clinical course at this time remains tenuous so antiplatelet and anticoagulation per primary  [] Atorvastatin 80mg (titrate to LDL < 70)     Miscellaneous:  [] NPO unless passes dysphagia screen; swallow eval if fails  [] BP goals per primary given on ecmo for mixed shock  [] Telemonitoring  [] Neurological checks and vital signs per unit protocol  [] BGM goals 140-180  [] PT/OT; S/S evaluation when able    * Case and plan not final until Attending attestation * Assessment:    Patient CARSON LEE is a 59y (1965) man with a PMHx significant for sickle cell disease (on hydroxyurea), HTN, HFimpEF/NICM (EF 58% 10/2024)originally presented to [facility] (1/3) as a transfer from [facility]. Pt initially presented to [facility] (1/2) for SOB and SS crisis; course c/b witnessed seizure; however, no description of the seizure was provided in documentation. At that time patient was given keppra and intubated and sedated then transferred to [facility] for further management. Keppra was discontinued at [facility] due to negative EEG. At [facility] MICU pt was found to have RV failure possibly iso pulm HTN 2/2 increased tidal volumes on the ventilator. In addition, pt possibly received a lot of volume iso SS crisis. Patient in profound cardiogenic shock. Pt has been decompensating, despite Dobutamine gtt, Bumex gtt, Levophed, and addition of Vasopressin. Vasopressors requirements have gone up. Patient transferred to [facility] for R heart cath and possible VA ECMO. Patient was cannulated on peripheral VA ECMO (1/4). Neurology consulted  (1/4) for alleged witnessed seizure at [facility].  Neurology had continued to follow case for AMS. EEG showed time periods of intermittent heneralized periodic Discharges (GPDs) w triphasix morphology and at times sharply contoured, 1-1.5 Hz blunted over R hemisphere. Hospital course at [facility] complicated by continued cardiopulmonary tenuous status necessitating ECMO. Patient continued on keppra 1000 mg BID for abnormal EEG findings and eeg discontinued 1/9/2025 given no recorded seizures or change in GPDs. Patient underwent MRI for AMS work up which revealed multiple foci susceptibility artifact w a few of them correlating to infarction on ADC (R thalamus, BL BG).   stroke consulted for the MRI findings.  Vitals during time of MRI: 110s-120s w a few spikes in SBP to 140    mRS: 0  LKN: prior to current hospital admission  NIHSS: 24 however unable to accurately assess given intubated and sedated    NOT a tenecteplase candidate due to oow  NOT a mechanical thrombectomy candidate due to no LVO    Impression:   1) multiple foci of diffusion restriction w a few areas correlation on ADC suggesting acute/subacute punctate infarcts Patient currently intubated and not following commands. micro emboli small vessel disease. Patient current presentation and decreased sensorium likely unrelated to T2 flair findings in thalamus given chronic appearance on FLAIR and ADC, though recrudescence is possible.   2) Additional findings on MRI: Susceptibility artifact commonly seen in critically ill ecmo patients.    Recommendations:    Diagnostics:  [x] MRI brain without contrast   [] Recommend BLE doppler ultrasound for DVT (+PFO, previously done on 1/4, one leg)  [/] CT vessel imaging head pending read  [x] TTE w/ bubble, intraop QUENTIN reviewed  *PFO w shunting   1. Technically difficult image quality.   2. Left ventricular cavity is normal in size. Left ventricular systolic function is normal.   3. The right ventricle is not well visualized.  [] Lipid panel, HbA1c (4.6)  [] primary following CBC, CMP, coagulation panel, troponin  [] agree w general neurology team that unclear etiology for current presentation justifies CSF studies    Medications:  [] given PFO/decreased cardiac function, assess need for anticoagulation per cardiology. Infarcts however could be incidental given ECMO related procedures; however, patient clinical course at this time remains tenuous so antiplatelet and anticoagulation per primary  [] Atorvastatin 80mg (titrate to LDL < 70)     Miscellaneous:  [] NPO unless passes dysphagia screen; swallow eval if fails  [] BP goals per primary given on ecmo for mixed shock  [] Telemonitoring  [] Neurological checks and vital signs per unit protocol  [] BGM goals 140-180  [] PT/OT; S/S evaluation when able    * Case and plan not final until Attending attestation *

## 2025-01-10 NOTE — CONSULT NOTE ADULT - SUBJECTIVE AND OBJECTIVE BOX
1/4/2025 - V-A ECMO cannulation  1/4/2025 - V-A ECMO decannulation    on dexmedetomidine infusion    on vancomycin / meropenem  not on therapeutic anticoagulation  not on antiplatelet medications    intubated with 7.5 ET tube  on mechanical vent (pressure control 12 / 8/+7 / 30%)  trachea palpable with neck extended  no innominate artery palpable    on Vivonex @ 20 mL/hr via OG tube      plats - 130    1/9 coags  -INR - 0.9  -ptt - 24.0 sec    CTA chest (1/2/2025) - entire stomach is in abdomen (I personally reviewed the images)

## 2025-01-10 NOTE — PROGRESS NOTE ADULT - SUBJECTIVE AND OBJECTIVE BOX
Patient seen and examined at the bedside.    Remains critically ill on continuous ICU monitoring, at risk for life threatening decompensation.  All Labs, data reviewed. Plan of care discussed in length during multi-disciplinary ICU rounds.       Brief Summary:  60 yo F with Sickle cell disease and NICM now with Cardiogenic shock s/p VA ECMO on 1/4/25.  VA ecmo decannulation on 1/7/25     24 Hour events:  EEG recording  Mental status remains poor  MRI will schedule   Weaning dobutamine  S/p bronch, follow BAL    Objective:  Vital Signs Last 24 Hrs  T(C): 37.6 (10 Javed 2025 07:15), Max: 38 (09 Jan 2025 20:00)  T(F): 99.7 (10 Javed 2025 07:15), Max: 100.4 (09 Jan 2025 20:00)  HR: 101 (10 Javed 2025 07:30) (66 - 122)  BP: --  BP(mean): --  RR: 23 (10 Javed 2025 07:30) (10 - 39)  SpO2: 98% (10 Javed 2025 07:30) (94% - 100%)    Parameters below as of 10 Javed 2025 08:00  Patient On (Oxygen Delivery Method): conventional ventilator    O2 Concentration (%): 30    Mode: AC/ CMV (Assist Control/ Continuous Mandatory Ventilation)  RR (machine): 12  TV (machine): 450  FiO2: 30  PEEP: 7  ITime: 1  MAP: 11  PC: 12  PIP: 16    Physical Exam:  General: Intubated  Neurology: Withdrawing to noxious stimuli in all extremities, +cough, +gag  Respiratory: Breath sounds bilaterally   CV: Sinus Tachycardia   Abdominal: Soft, Nontender  Extremities: Warm, well-perfused  Costa  -------------------------------------------------------------------------------------------------------------------------------    Labs:                        9.8    10.81 )-----------( Clumped    ( 10 Javed 2025 00:31 )             29.1     01-10    153[H]  |  107  |  28[H]  ----------------------------<  155[H]  3.5   |  32[H]  |  0.90    Ca    9.5      10 Javed 2025 00:31  Phos  2.6     01-10  Mg     2.8     01-10    TPro  6.4  /  Alb  3.7  /  TBili  2.4[H]  /  DBili  x   /  AST  69[H]  /  ALT  74[H]  /  AlkPhos  156[H]  01-10    LIVER FUNCTIONS - ( 10 Javed 2025 00:31 )  Alb: 3.7 g/dL / Pro: 6.4 g/dL / ALK PHOS: 156 U/L / ALT: 74 U/L / AST: 69 U/L / GGT: x           PT/INR - ( 09 Jan 2025 20:19 )   PT: 10.8 sec;   INR: 0.94 ratio       PTT - ( 09 Jan 2025 20:19 )  PTT:24.0 sec  ABG - ( 10 Javed 2025 05:19 )  pH, Arterial: 7.51  pH, Blood: x     /  pCO2: 49    /  pO2: 123   / HCO3: 39    / Base Excess: 14.4  /  SaO2: 99.6      ALL MEDICATIONS   MEDICATIONS  (STANDING):  albuterol/ipratropium for Nebulization 3 milliLiter(s) Nebulizer every 6 hours  artificial tears (preservative free) Ophthalmic Solution 1 Drop(s) Both EYES four times a day  buMETAnide Injectable 1 milliGRAM(s) IV Push every 12 hours  chlorhexidine 0.12% Liquid 15 milliLiter(s) Oral Mucosa every 12 hours  chlorhexidine 2% Cloths 1 Application(s) Topical <User Schedule>  dexMEDEtomidine Infusion 0.2 MICROgram(s)/kG/Hr (4.18 mL/Hr) IV Continuous <Continuous>  dextrose 5%. 1000 milliLiter(s) (100 mL/Hr) IV Continuous <Continuous>  dextrose 5%. 1000 milliLiter(s) (50 mL/Hr) IV Continuous <Continuous>  dextrose 50% Injectable 25 Gram(s) IV Push once  dextrose 50% Injectable 12.5 Gram(s) IV Push once  dextrose 50% Injectable 25 Gram(s) IV Push once  glucagon  Injectable 1 milliGRAM(s) IntraMuscular once  heparin   Injectable 2500 Unit(s) SubCutaneous every 12 hours  insulin lispro (ADMELOG) corrective regimen sliding scale   SubCutaneous every 6 hours  levETIRAcetam   Injectable 1000 milliGRAM(s) IV Push every 12 hours  meropenem  IVPB 1000 milliGRAM(s) IV Intermittent every 8 hours  mupirocin 2% Nasal 1 Application(s) Both Nostrils two times a day  nitroprusside Infusion 0.3 MICROgram(s)/kG/Min (3.76 mL/Hr) IV Continuous <Continuous>  pantoprazole  Injectable 40 milliGRAM(s) IV Push every 12 hours  polyethylene glycol 3350 17 Gram(s) Oral daily  senna 2 Tablet(s) Oral at bedtime  sodium chloride 3%  Inhalation 4 milliLiter(s) Inhalation every 12 hours  vancomycin    Solution 125 milliGRAM(s) Oral every 12 hours  vancomycin  IVPB 750 milliGRAM(s) IV Intermittent every 12 hours  vasopressin Infusion 0.04 Unit(s)/Min (6 mL/Hr) IV Continuous <Continuous>    MEDICATIONS  (PRN):  dextrose Oral Gel 15 Gram(s) Oral once PRN Blood Glucose LESS THAN 70 milliGRAM(s)/deciliter  ------------------------------------------------------------------------------------------------------------------------------  Assessment:  60 yo F w/ PMHx of Sickle cell disease (on hydroxyurea), HTN, 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 Pt initially presented to [facility] for SOB and SS crisis; course c/b witnessed seizure, intubated and sedated, and transferred to [facility] for further management. Now in cardiogenic shock, transferred to [facility] for further management. Cannulated for VA ECMO on 1/4/25.    Cardiogenic shock  Acute respiratory failure  Acute blood loss anemia  Thrombocytopenia  Hyperglycemia    Plan:   ***Neuro***  Multifactorial encephalopathy  Seizure activity on EEG  +corneal, gag and cough, responds to name  Previus CT and MRI unremarkable  Will repeat MRI  Keppra  BID, received Ativan 2 days ago    ***Cardiovascular***  Dobutamine wean as able   D/v PA cathter  On vaso on and off, Keep Maps >65   Albumin 25 %  and Bumex  Monitor hemodynamic closely, signs of hypoperfusion     ***Pulmonary***  Acute hypoxic respiratory failure  Full vent support  PS as tolerates  Nebs  Follow BAL, thick secretion on a left side, suctioned lavaged    ***GI***  Tube feeds at goal  Protonix for stress ulcer prophylaxis   bowel regimen optimise  Tren LFTs, shock liver improving    ***Renal***  DYLON  Trend Creatinine   Costa catheter for strict I/O measurements.   Bumex BID  Albumin 25% for hypoalbuminemia    ***ID***  MSSA bacteremia, BAL + MSSA  On vancomycin  On empiric meropenum  Leukocytosis improving  Send:  CMV viral load  HSV 1/2 PCR  EBV viral load  West Nile serology and PCR  Lyme serology  Babesia PCR    ***Endocrine***  Hyperglycemia - Insulin infusion.     ***Hematology***  Acute blood loss anemia and thrombocytopenia  Sickle Cell - s/p Red cell exchange , pending electrophoresis results  Keep Plats >50, Hg >8  Heparin SC for ppx, after axiliary [place] removed        I, Yana Patricia MD, personally performed the services described in this documentation. All medical record entries made by the scribe were at my direction and in my presence. I have reviewed the chart and agree that the record reflects my personal performance and is accurate and complete  Electronically signed:   Yana Patricia MD  CT ICU attending     ICU time: ** mins     By signing my name below, I, Norris Oliver, attest that this documentation has been prepared under the direction and in the presence of Yana Patricia MD  Electronically signed: Norris Oliver, 01-10-25 @ 07:44             Patient seen and examined at the bedside.    Remains critically ill on continuous ICU monitoring, at risk for life threatening decompensation.  All Labs, data reviewed. Plan of care discussed in length during multi-disciplinary ICU rounds.     Brief Summary:  58 yo F with Sickle cell disease and NICM now with Cardiogenic shock s/p VA ECMO on 1/4/25.  VA ecmo decannulation on 1/7/25     24 Hour events:  Mental status remains poor  Not tolerating PS  MRI shows multiple acute/subacte infarcts in thalamic, basal ganglia and chandu region  Stroke team following    Objective:  Vital Signs Last 24 Hrs  T(C): 37.6 (10 Javed 2025 07:15), Max: 38 (09 Jan 2025 20:00)  T(F): 99.7 (10 Javed 2025 07:15), Max: 100.4 (09 Jan 2025 20:00)  HR: 101 (10 Javed 2025 07:30) (66 - 122)  BP: --  BP(mean): --  RR: 23 (10 Javed 2025 07:30) (10 - 39)  SpO2: 98% (10 Javed 2025 07:30) (94% - 100%)    Parameters below as of 10 Javed 2025 08:00  Patient On (Oxygen Delivery Method): conventional ventilator    O2 Concentration (%): 30    Mode: AC/ CMV (Assist Control/ Continuous Mandatory Ventilation)  RR (machine): 12  TV (machine): 450  FiO2: 30  PEEP: 7  ITime: 1  MAP: 11  PC: 12  PIP: 16    Physical Exam:  General: Intubated  Neurology: Withdrawing to noxious stimuli in all extremities, +cough, +gag  Respiratory: Breath sounds bilaterally   CV: Sinus Tachycardia   Abdominal: Soft, Nontender  Extremities: Warm, well-perfused    -------------------------------------------------------------------------------------------------------------------------------    Labs:                        9.8    10.81 )-----------( Clumped    ( 10 Javed 2025 00:31 )             29.1     01-10    153[H]  |  107  |  28[H]  ----------------------------<  155[H]  3.5   |  32[H]  |  0.90    Ca    9.5      10 Javed 2025 00:31  Phos  2.6     01-10  Mg     2.8     01-10    TPro  6.4  /  Alb  3.7  /  TBili  2.4[H]  /  DBili  x   /  AST  69[H]  /  ALT  74[H]  /  AlkPhos  156[H]  01-10    LIVER FUNCTIONS - ( 10 Javed 2025 00:31 )  Alb: 3.7 g/dL / Pro: 6.4 g/dL / ALK PHOS: 156 U/L / ALT: 74 U/L / AST: 69 U/L / GGT: x           PT/INR - ( 09 Jan 2025 20:19 )   PT: 10.8 sec;   INR: 0.94 ratio       PTT - ( 09 Jan 2025 20:19 )  PTT:24.0 sec  ABG - ( 10 Javed 2025 05:19 )  pH, Arterial: 7.51  pH, Blood: x     /  pCO2: 49    /  pO2: 123   / HCO3: 39    / Base Excess: 14.4  /  SaO2: 99.6      ALL MEDICATIONS   MEDICATIONS  (STANDING):  albuterol/ipratropium for Nebulization 3 milliLiter(s) Nebulizer every 6 hours  artificial tears (preservative free) Ophthalmic Solution 1 Drop(s) Both EYES four times a day  buMETAnide Injectable 1 milliGRAM(s) IV Push every 12 hours  chlorhexidine 0.12% Liquid 15 milliLiter(s) Oral Mucosa every 12 hours  chlorhexidine 2% Cloths 1 Application(s) Topical <User Schedule>  dexMEDEtomidine Infusion 0.2 MICROgram(s)/kG/Hr (4.18 mL/Hr) IV Continuous <Continuous>  dextrose 5%. 1000 milliLiter(s) (100 mL/Hr) IV Continuous <Continuous>  dextrose 5%. 1000 milliLiter(s) (50 mL/Hr) IV Continuous <Continuous>  dextrose 50% Injectable 25 Gram(s) IV Push once  dextrose 50% Injectable 12.5 Gram(s) IV Push once  dextrose 50% Injectable 25 Gram(s) IV Push once  glucagon  Injectable 1 milliGRAM(s) IntraMuscular once  heparin   Injectable 2500 Unit(s) SubCutaneous every 12 hours  insulin lispro (ADMELOG) corrective regimen sliding scale   SubCutaneous every 6 hours  levETIRAcetam   Injectable 1000 milliGRAM(s) IV Push every 12 hours  meropenem  IVPB 1000 milliGRAM(s) IV Intermittent every 8 hours  mupirocin 2% Nasal 1 Application(s) Both Nostrils two times a day  nitroprusside Infusion 0.3 MICROgram(s)/kG/Min (3.76 mL/Hr) IV Continuous <Continuous>  pantoprazole  Injectable 40 milliGRAM(s) IV Push every 12 hours  polyethylene glycol 3350 17 Gram(s) Oral daily  senna 2 Tablet(s) Oral at bedtime  sodium chloride 3%  Inhalation 4 milliLiter(s) Inhalation every 12 hours  vancomycin    Solution 125 milliGRAM(s) Oral every 12 hours  vancomycin  IVPB 750 milliGRAM(s) IV Intermittent every 12 hours  vasopressin Infusion 0.04 Unit(s)/Min (6 mL/Hr) IV Continuous <Continuous>    MEDICATIONS  (PRN):  dextrose Oral Gel 15 Gram(s) Oral once PRN Blood Glucose LESS THAN 70 milliGRAM(s)/deciliter  ------------------------------------------------------------------------------------------------------------------------------  Assessment:  58 yo F w/ PMHx of Sickle cell disease (on hydroxyurea), HTN, 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 Pt initially presented to [facility] for SOB and SS crisis; course c/b witnessed seizure, intubated and sedated, and transferred to [facility] for further management. Now in cardiogenic shock, transferred to [facility] for further management. Cannulated for VA ECMO on 1/4/25.    Cardiogenic shock  Acute respiratory failure  Acute blood loss anemia  Thrombocytopenia  Hyperglycemia    Plan:   ***Neuro***  Multifactorial encephalopathy  S/p embolic stroke on MRI  Seizure activity on EEG  +corneal, gag and cough, responds to name  Keppra  BID  Neurology/ stroke team input appriciated    ***Cardiovascular***  On vaso on and off, Keep Maps >65   Albumin 25 %  and Bumex  Monitor hemodynamic closely, signs of hypoperfusion     ***Pulmonary***  Acute hypoxic respiratory failure  Full vent support, not tolerating PS  Discussed tracheostomy tube placment  Nebs prn    ***GI***  protein calorie malnutrition  Tube feeds at goal  Protonix for stress ulcer prophylaxis   bowel regimen, d/c rectal tube    ***Renal***  DYLON  Trend Creatinine   Costa catheter for strict I/O measurements.   Bumex BID  Albumin 25% for hypoalbuminemia    ***ID***  MSSA bacteremia, BAL + MSSA  On vancomycin  On empiric meropenum  Leukocytosis improving    Send:  CMV viral load  HSV 1/2 PCR  EBV viral load  West Nile serology and PCR  Lyme serology  Babesia PCR    ***Endocrine***  Hyperglycemia - Insulin infusion.     ***Hematology***  Acute blood loss anemia and thrombocytopenia  Sickle Cell - s/p Red cell exchange, Hg electrophoresis daily  Hematology team still not clear about red cells exchange  Keep Plats >50, Hg >8  Heparin SC for ppx,     I, Yana Patricia MD, personally performed the services described in this documentation. All medical record entries made by the scribe were at my direction and in my presence. I have reviewed the chart and agree that the record reflects my personal performance and is accurate and complete  Electronically signed:   Yana Patricia MD  CT ICU attending     ICU time: 45 mins     By signing my name below, I, Norris Oliver, attest that this documentation has been prepared under the direction and in the presence of Yana Patricia MD  Electronically signed: Norris Oliver, 01-10-25 @ 07:44

## 2025-01-10 NOTE — PROGRESS NOTE ADULT - SUBJECTIVE AND OBJECTIVE BOX
**STROKE CODE CONSULT NOTE**    Last known well time/Time of onset of symptoms:    HPI from neurology consult note 2025   Patient CARSON LEE is a 59y (1965) man with a PMHx significant for sickle cell disease (on hydroxyurea), HTN, HFimpEF/NICM (EF 58% 10/2024)originally presented to [facility] (1/3) as a transfer from [facility]. Pt initially presented to [facility] () for SOB and SS crisis; course c/b witnessed seizure; however, no description of the seizure was provided in documentation. At that time patient was given keppra and intubated and sedated then transferred to [facility] hospital for further management. Keppra was discontinued at [facility] due to negative EEG. At [facility] MICU pt was found to have RV failure possibly iso pulm HTN 2/2 increased tidal volumes on the ventilator. In addition, pt possibly received a lot of volume iso SS crisis. Patient in profound cardiogenic shock. Pt has been decompensating, despite Dobutamine gtt, Bumex gtt, Levophed, and addition of Vasopressin. Vasopressors requirements have gone up. Patient transferred to [facility] for R heart cath and possible VA ECMO. Patient was cannulated on peripheral VA ECMO (). Neurology consulted  () for alleged witnessed seizure at [facility].  Neurology had continued to follow case for AMS. EEG showed time periods of intermittent heneralized periodic Discharges (GPDs) w triphasix morphology and at times sharply contoured, 1-1.5 Hz blunted over R hemisphere. Hospital course at [facility] complicated by continued cardiopulmonary tenuous status necessitating ECMO. Patient continued on keppra 1000 mg BID for abnormal EEG findings and eeg discontinued 2025 given no recorded seizures or change in GPDs. Patient underwent MRI for AMS work up which revealed multiple foci susceptibility artifact w a few of them correlating to infarction on ADC (R thalamus, BL BG).   stroke consulted for the MRI findings.    PAST MEDICAL & SURGICAL HISTORY:  Sickle-cell-hemoglobin C disease with crisis      Essential hypertension      Sickle cell disease      HTN (hypertension)      H/O:       H/O abdominoplasty      S/P breast augmentation      S/P           FAMILY HISTORY:  FHx: cerebral palsy (Child)        SOCIAL HISTORY:  Smoking Cessation: Discussed    ROS:  Constitutional: No fever, weight loss or fatigue  Eyes: No eye pain, visual disturbances, or discharge  ENMT:  No difficulty hearing, tinnitus, vertigo; No sinus or throat pain  Neck: No pain or stiffness  Respiratory: No cough, wheezing, chills or hemoptysis  Cardiovascular: No chest pain, palpitations, shortness of breath, dizziness or leg swelling  Gastrointestinal: No abdominal pain. No nausea, vomiting or hematemesis; No diarrhea or constipation. Nohematochezia.  Genitourinary: No dysuria, frequency, hematuria or incontinence  Neurological: As per HPI  Skin: No itching, burning, rashes or lesions   Endocrine: No heat or cold intolerance; No hair loss  Musculoskeletal: No joint pain or swelling; No muscle, back or extremity pain  Psychiatric: No depression, anxiety, mood swings or difficulty sleeping  Heme/Lymph: No easy bruising or bleeding gums    MEDICATIONS  (STANDING):  albumin human  5% IVPB 250 milliLiter(s) IV Intermittent once  albuterol/ipratropium for Nebulization 3 milliLiter(s) Nebulizer every 6 hours  artificial tears (preservative free) Ophthalmic Solution 1 Drop(s) Both EYES four times a day  ascorbic acid 500 milliGRAM(s) Oral daily  buMETAnide Injectable 1 milliGRAM(s) IV Push every 12 hours  chlorhexidine 0.12% Liquid 15 milliLiter(s) Oral Mucosa every 12 hours  chlorhexidine 2% Cloths 1 Application(s) Topical <User Schedule>  dexMEDEtomidine Infusion 0.2 MICROgram(s)/kG/Hr (4.18 mL/Hr) IV Continuous <Continuous>  dextrose 5%. 1000 milliLiter(s) (100 mL/Hr) IV Continuous <Continuous>  dextrose 5%. 1000 milliLiter(s) (50 mL/Hr) IV Continuous <Continuous>  dextrose 50% Injectable 25 Gram(s) IV Push once  dextrose 50% Injectable 12.5 Gram(s) IV Push once  dextrose 50% Injectable 25 Gram(s) IV Push once  glucagon  Injectable 1 milliGRAM(s) IntraMuscular once  heparin   Injectable 5000 Unit(s) SubCutaneous every 8 hours  insulin lispro (ADMELOG) corrective regimen sliding scale   SubCutaneous every 6 hours  levETIRAcetam   Injectable 1000 milliGRAM(s) IV Push every 12 hours  meropenem  IVPB 1000 milliGRAM(s) IV Intermittent every 8 hours  multivitamin 1 Tablet(s) Oral daily  mupirocin 2% Nasal 1 Application(s) Both Nostrils two times a day  nitroprusside Infusion 0.3 MICROgram(s)/kG/Min (3.76 mL/Hr) IV Continuous <Continuous>  pantoprazole  Injectable 40 milliGRAM(s) IV Push daily  sodium chloride 3%  Inhalation 4 milliLiter(s) Inhalation every 12 hours  vancomycin    Solution 125 milliGRAM(s) Oral every 12 hours  vancomycin  IVPB 500 milliGRAM(s) IV Intermittent every 12 hours  vasopressin Infusion 0.04 Unit(s)/Min (6 mL/Hr) IV Continuous <Continuous>    MEDICATIONS  (PRN):  dextrose Oral Gel 15 Gram(s) Oral once PRN Blood Glucose LESS THAN 70 milliGRAM(s)/deciliter      Allergies    doxycycline (Angioedema)  penicillin (Unknown)  clindamycin (Angioedema)  linezolid (Angioedema)    Intolerances        Vital Signs Last 24 Hrs  T(C): 37.8 (10 Javed 2025 12:00), Max: 38 (2025 20:00)  T(F): 100 (10 Javed 2025 12:00), Max: 100.4 (2025 20:00)  HR: 77 (10 Javed 2025 12:45) (66 - 114)  BP: --  BP(mean): --  RR: 24 (10 Javed 2025 12:45) (12 - 39)  SpO2: 98% (10 Javed 2025 12:45) (95% - 100%)    Parameters below as of 10 Javed 2025 12:00  Patient On (Oxygen Delivery Method): conventional ventilator    O2 Concentration (%): 30    PHYSICAL EXAM:  Constitutional: WDWN; NAD  Cardiovascular: RRR, no appreciable murmurs; no carotid bruits  Neurologic:  Mental status: Awake, alert and oriented x3.  Recent and remote memory intact.  Naming, repetition and comprehension intact.  Attention/concentration intact. Speech fluent and no errors on phoneme assessment.  Fund of knowledge appropriate.    Cranial nerves: pupils equally round and reactive to light, visual fields full, no nystagmus, extraocular muscles intact, V1 through V3 intact bilaterally and symmetric, face symmetric, hearing intact to finger rub, palate elevation symmetric, tongue was midline, sternocleidomastoid/shoulder shrug strength bilaterally 5/5.    Motor:  Normal bulk and tone, strength 5/5 in bilateral upper and lower extremities.   strength 5/5.  Rapid alternating movements intact and symmetric.   Sensation: Intact to light touch, proprioception, and pinprick.  No neglect.   Coordination: No dysmetria on finger-to-nose and heel-to-shin.  No clumsiness.  Reflexes: 2+ in upper and lower extremities, downgoing toes bilaterally  Gait: Narrow and steady. No ataxia.  Romberg negative    NIHSS:    Fingerstick Blood Glucose: CAPILLARY BLOOD GLUCOSE  117 (10 Javed 2025 12:00)      POCT Blood Glucose.: 117 mg/dL (10 Javed 2025 11:58)       LABS:                        9.8    10.81 )-----------( Clumped    ( 10 Javed 2025 00:31 )             29.1     01-10    153[H]  |  107  |  28[H]  ----------------------------<  155[H]  3.5   |  32[H]  |  0.90    Ca    9.5      10 Javed 2025 00:31  Phos  2.6     01-10  Mg     2.8     01-10    TPro  6.4  /  Alb  3.7  /  TBili  2.4[H]  /  DBili  x   /  AST  69[H]  /  ALT  74[H]  /  AlkPhos  156[H]  10    PT/INR - ( 2025 20:19 )   PT: 10.8 sec;   INR: 0.94 ratio         PTT - ( 2025 20:19 )  PTT:24.0 sec      Urinalysis Basic - ( 10 Javed 2025 00:31 )    Color: x / Appearance: x / SG: x / pH: x  Gluc: 155 mg/dL / Ketone: x  / Bili: x / Urobili: x   Blood: x / Protein: x / Nitrite: x   Leuk Esterase: x / RBC: x / WBC x   Sq Epi: x / Non Sq Epi: x / Bacteria: x        RADIOLOGY & ADDITIONAL STUDIES:    < from: MR Head w/wo IV Cont (25 @ 18:07) >  IMPRESSION:    1. Innumerable foci susceptibility artifact scattered throughout the   brain which can be seen with critically ill patients on ECMO. Diffuse fat   embolization is also part of the differential diagnosis but is considered   less likely.    2. Tiny acute/subacute infarcts within the bilateral basal ganglia,   thalami, and chandu with disproportionate ovoid area of T2/FLAIR   prolongation in the right ventral thalamus. Findings may indicate the   presence of embolic acute/subacute infarcts.    3. No abnormal intracranial enhancement.    < end of copied text >    CT vessel imaging pending read reviewed by neurology resident:  no critical stenosis, occlusion, and intracranial vascular abnormality    IV-tenecteplase(Y/N):              N  Reason IV-tenecteplase not given: oow and on ecmo   **STROKE CODE CONSULT NOTE**    Last known well time/Time of onset of symptoms:    HPI from neurology consult note 2025   Patient CARSON LEE is a 59y (1965) man with a PMHx significant for sickle cell disease (on hydroxyurea), HTN, HFimpEF/NICM (EF 58% 10/2024)originally presented to [facility] (1/3) as a transfer from [facility]. Pt initially presented to [facility] () for SOB and SS crisis; course c/b witnessed seizure; however, no description of the seizure was provided in documentation. At that time patient was given keppra and intubated and sedated then transferred to [facility] hospital for further management. Keppra was discontinued at [facility] due to negative EEG. At [facility] MICU pt was found to have RV failure possibly iso pulm HTN 2/2 increased tidal volumes on the ventilator. In addition, pt possibly received a lot of volume iso SS crisis. Patient in profound cardiogenic shock. Pt has been decompensating, despite Dobutamine gtt, Bumex gtt, Levophed, and addition of Vasopressin. Vasopressors requirements have gone up. Patient transferred to [facility] for R heart cath and possible VA ECMO. Patient was cannulated on peripheral VA ECMO (). Neurology consulted  () for alleged witnessed seizure at [facility].  Neurology had continued to follow case for AMS. EEG showed time periods of intermittent heneralized periodic Discharges (GPDs) w triphasix morphology and at times sharply contoured, 1-1.5 Hz blunted over R hemisphere. Hospital course at [facility] complicated by continued cardiopulmonary tenuous status necessitating ECMO. Patient continued on keppra 1000 mg BID for abnormal EEG findings and eeg discontinued 2025 given no recorded seizures or change in GPDs. Patient underwent MRI for AMS work up which revealed multiple foci susceptibility artifact w a few of them correlating to infarction on ADC (R thalamus, BL BG).  Patient came off ecmo 2025  stroke consulted for the MRI findings.    01/10/2025: Patient examined by neurology resident. Patient on 0.3 Precedex and intubated. Patient brain stem reflexes intact. Grimacing to pain thru out, triple reflex in BLE.    PAST MEDICAL & SURGICAL HISTORY:  Sickle-cell-hemoglobin C disease with crisis      Essential hypertension      Sickle cell disease      HTN (hypertension)      H/O:       H/O abdominoplasty      S/P breast augmentation      S/P           FAMILY HISTORY:  FHx: cerebral palsy (Child)        SOCIAL HISTORY:  unable to assess dt mentation    ROS:  unable to assess dt mentation    MEDICATIONS  (STANDING):  albumin human  5% IVPB 250 milliLiter(s) IV Intermittent once  albuterol/ipratropium for Nebulization 3 milliLiter(s) Nebulizer every 6 hours  artificial tears (preservative free) Ophthalmic Solution 1 Drop(s) Both EYES four times a day  ascorbic acid 500 milliGRAM(s) Oral daily  buMETAnide Injectable 1 milliGRAM(s) IV Push every 12 hours  chlorhexidine 0.12% Liquid 15 milliLiter(s) Oral Mucosa every 12 hours  chlorhexidine 2% Cloths 1 Application(s) Topical <User Schedule>  dexMEDEtomidine Infusion 0.2 MICROgram(s)/kG/Hr (4.18 mL/Hr) IV Continuous <Continuous>  dextrose 5%. 1000 milliLiter(s) (100 mL/Hr) IV Continuous <Continuous>  dextrose 5%. 1000 milliLiter(s) (50 mL/Hr) IV Continuous <Continuous>  dextrose 50% Injectable 25 Gram(s) IV Push once  dextrose 50% Injectable 12.5 Gram(s) IV Push once  dextrose 50% Injectable 25 Gram(s) IV Push once  glucagon  Injectable 1 milliGRAM(s) IntraMuscular once  heparin   Injectable 5000 Unit(s) SubCutaneous every 8 hours  insulin lispro (ADMELOG) corrective regimen sliding scale   SubCutaneous every 6 hours  levETIRAcetam   Injectable 1000 milliGRAM(s) IV Push every 12 hours  meropenem  IVPB 1000 milliGRAM(s) IV Intermittent every 8 hours  multivitamin 1 Tablet(s) Oral daily  mupirocin 2% Nasal 1 Application(s) Both Nostrils two times a day  nitroprusside Infusion 0.3 MICROgram(s)/kG/Min (3.76 mL/Hr) IV Continuous <Continuous>  pantoprazole  Injectable 40 milliGRAM(s) IV Push daily  sodium chloride 3%  Inhalation 4 milliLiter(s) Inhalation every 12 hours  vancomycin    Solution 125 milliGRAM(s) Oral every 12 hours  vancomycin  IVPB 500 milliGRAM(s) IV Intermittent every 12 hours  vasopressin Infusion 0.04 Unit(s)/Min (6 mL/Hr) IV Continuous <Continuous>    MEDICATIONS  (PRN):  dextrose Oral Gel 15 Gram(s) Oral once PRN Blood Glucose LESS THAN 70 milliGRAM(s)/deciliter      Allergies    doxycycline (Angioedema)  penicillin (Unknown)  clindamycin (Angioedema)  linezolid (Angioedema)    Intolerances        Vital Signs Last 24 Hrs  T(C): 37.8 (10 Javed 2025 12:00), Max: 38 (2025 20:00)  T(F): 100 (10 Javed 2025 12:00), Max: 100.4 (2025 20:00)  HR: 77 (10 Javed 2025 12:45) (66 - 114)  BP: --  BP(mean): --  RR: 24 (10 Javed 2025 12:45) (12 - 39)  SpO2: 98% (10 Javed 2025 12:45) (95% - 100%)    Parameters below as of 10 Javed 2025 12:00  Patient On (Oxygen Delivery Method): conventional ventilator    O2 Concentration (%): 30    PHYSICAL EXAM:  Patient on 0.3 Precedex and intubated. Patient brain stem reflexes intact. , triple reflex in BLE.  Neurologic:  - Mental Status: Grimacing to pain thru out  - Cranial Nerves II-XII:  2mm and briskly reactive pupils, roving eyes w R side preference but able to be overcome, gag present  - Motor: hypertonic in BUE, flaccid in BLE, triple reflex in BLE, grimaces to pain thru out, withdraws to pain in BUE  - Reflexes: 2+ in BUE but with pectoral spread on R, 3+ in patella w cross adduction BLE (R>L), achilles 3+ w 3-4 beats myoclonus BL, toes mute  - Sensory: Grimaces to pain in all extremities.  - Coordination & Gait: Unable to obtain.      Fingerstick Blood Glucose: CAPILLARY BLOOD GLUCOSE  117 (10 Javed 2025 12:00)      POCT Blood Glucose.: 117 mg/dL (10 Javed 2025 11:58)       LABS:                        9.8    10.81 )-----------( Clumped    ( 10 Javed 2025 00:31 )             29.1     01-10    153[H]  |  107  |  28[H]  ----------------------------<  155[H]  3.5   |  32[H]  |  0.90    Ca    9.5      10 Javed 2025 00:31  Phos  2.6     01-10  Mg     2.8     01-10    TPro  6.4  /  Alb  3.7  /  TBili  2.4[H]  /  DBili  x   /  AST  69[H]  /  ALT  74[H]  /  AlkPhos  156[H]  01-10    PT/INR - ( 2025 20:19 )   PT: 10.8 sec;   INR: 0.94 ratio         PTT - ( 2025 20:19 )  PTT:24.0 sec      Urinalysis Basic - ( 10 Javed 2025 00:31 )    Color: x / Appearance: x / SG: x / pH: x  Gluc: 155 mg/dL / Ketone: x  / Bili: x / Urobili: x   Blood: x / Protein: x / Nitrite: x   Leuk Esterase: x / RBC: x / WBC x   Sq Epi: x / Non Sq Epi: x / Bacteria: x        RADIOLOGY & ADDITIONAL STUDIES:    < from: MR Head w/wo IV Cont (25 @ 18:07) >  IMPRESSION:    1. Innumerable foci susceptibility artifact scattered throughout the   brain which can be seen with critically ill patients on ECMO. Diffuse fat   embolization is also part of the differential diagnosis but is considered   less likely.    2. Tiny acute/subacute infarcts within the bilateral basal ganglia,   thalami, and chandu with disproportionate ovoid area of T2/FLAIR   prolongation in the right ventral thalamus. Findings may indicate the   presence of embolic acute/subacute infarcts.    3. No abnormal intracranial enhancement.    < end of copied text >    CT vessel imaging pending read reviewed by neurology resident:  no critical stenosis, occlusion, and intracranial vascular abnormality    IV-tenecteplase(Y/N):              N  Reason IV-tenecteplase not given: oow and on ecmo   **STROKE CODE CONSULT NOTE**    Last known well time/Time of onset of symptoms:    HPI from neurology consult note 2025   Patient CARSON LEE is a 59y (1965) man with a PMHx significant for sickle cell disease (on hydroxyurea), HTN, HFimpEF/NICM (EF 58% 10/2024)originally presented to [facility] (1/3) as a transfer from [facility]. Pt initially presented to [facility] () for SOB and SS crisis; course c/b witnessed seizure; however, no description of the seizure was provided in documentation. At that time patient was given keppra and intubated and sedated then transferred to [facility] hospital for further management. Keppra was discontinued at [facility] due to negative EEG. At [facility] MICU pt was found to have RV failure possibly iso pulm HTN 2/2 increased tidal volumes on the ventilator. In addition, pt possibly received a lot of volume iso SS crisis. Patient in profound cardiogenic shock. Pt has been decompensating, despite Dobutamine gtt, Bumex gtt, Levophed, and addition of Vasopressin. Vasopressors requirements have gone up. Patient transferred to [facility] for R heart cath and possible VA ECMO. Patient was cannulated on peripheral VA ECMO (). Neurology consulted  () for alleged witnessed seizure at [facility].  Neurology had continued to follow case for AMS. EEG showed time periods of intermittent heneralized periodic Discharges (GPDs) w triphasix morphology and at times sharply contoured, 1-1.5 Hz blunted over R hemisphere. Hospital course at [facility] complicated by continued cardiopulmonary tenuous status necessitating ECMO. Patient continued on keppra 1000 mg BID for abnormal EEG findings and eeg discontinued 2025 given no recorded seizures or change in GPDs. Patient underwent MRI for AMS work up which revealed multiple foci susceptibility artifact w a few of them correlating to infarction on ADC (R thalamus, BL BG).  Patient came off ecmo 2025  stroke consulted for the MRI findings.    01/10/2025: Patient examined by neurology resident. Patient on 0.3 Precedex and intubated. Patient brain stem reflexes intact. Grimacing to pain thru out, triple reflex in BLE.    PAST MEDICAL & SURGICAL HISTORY:  Sickle-cell-hemoglobin C disease with crisis      Essential hypertension      Sickle cell disease      HTN (hypertension)      H/O:       H/O abdominoplasty      S/P breast augmentation      S/P           FAMILY HISTORY:  FHx: cerebral palsy (Child)        SOCIAL HISTORY:  unable to assess dt mentation    ROS:  unable to assess dt mentation    MEDICATIONS  (STANDING):  albumin human  5% IVPB 250 milliLiter(s) IV Intermittent once  albuterol/ipratropium for Nebulization 3 milliLiter(s) Nebulizer every 6 hours  artificial tears (preservative free) Ophthalmic Solution 1 Drop(s) Both EYES four times a day  ascorbic acid 500 milliGRAM(s) Oral daily  buMETAnide Injectable 1 milliGRAM(s) IV Push every 12 hours  chlorhexidine 0.12% Liquid 15 milliLiter(s) Oral Mucosa every 12 hours  chlorhexidine 2% Cloths 1 Application(s) Topical <User Schedule>  dexMEDEtomidine Infusion 0.2 MICROgram(s)/kG/Hr (4.18 mL/Hr) IV Continuous <Continuous>  dextrose 5%. 1000 milliLiter(s) (100 mL/Hr) IV Continuous <Continuous>  dextrose 5%. 1000 milliLiter(s) (50 mL/Hr) IV Continuous <Continuous>  dextrose 50% Injectable 25 Gram(s) IV Push once  dextrose 50% Injectable 12.5 Gram(s) IV Push once  dextrose 50% Injectable 25 Gram(s) IV Push once  glucagon  Injectable 1 milliGRAM(s) IntraMuscular once  heparin   Injectable 5000 Unit(s) SubCutaneous every 8 hours  insulin lispro (ADMELOG) corrective regimen sliding scale   SubCutaneous every 6 hours  levETIRAcetam   Injectable 1000 milliGRAM(s) IV Push every 12 hours  meropenem  IVPB 1000 milliGRAM(s) IV Intermittent every 8 hours  multivitamin 1 Tablet(s) Oral daily  mupirocin 2% Nasal 1 Application(s) Both Nostrils two times a day  nitroprusside Infusion 0.3 MICROgram(s)/kG/Min (3.76 mL/Hr) IV Continuous <Continuous>  pantoprazole  Injectable 40 milliGRAM(s) IV Push daily  sodium chloride 3%  Inhalation 4 milliLiter(s) Inhalation every 12 hours  vancomycin    Solution 125 milliGRAM(s) Oral every 12 hours  vancomycin  IVPB 500 milliGRAM(s) IV Intermittent every 12 hours  vasopressin Infusion 0.04 Unit(s)/Min (6 mL/Hr) IV Continuous <Continuous>    MEDICATIONS  (PRN):  dextrose Oral Gel 15 Gram(s) Oral once PRN Blood Glucose LESS THAN 70 milliGRAM(s)/deciliter      Allergies    doxycycline (Angioedema)  penicillin (Unknown)  clindamycin (Angioedema)  linezolid (Angioedema)    Intolerances        Vital Signs Last 24 Hrs  T(C): 37.8 (10 Javed 2025 12:00), Max: 38 (2025 20:00)  T(F): 100 (10 Javed 2025 12:00), Max: 100.4 (2025 20:00)  HR: 77 (10 Javed 2025 12:45) (66 - 114)  BP: --  BP(mean): --  RR: 24 (10 Javed 2025 12:45) (12 - 39)  SpO2: 98% (10 Javed 2025 12:45) (95% - 100%)    Parameters below as of 10 Javed 2025 12:00  Patient On (Oxygen Delivery Method): conventional ventilator    O2 Concentration (%): 30    PHYSICAL EXAM:  Patient on 0.3 Precedex and intubated. Patient brain stem reflexes intact. , triple reflex in BLE.  Neurologic:  - Mental Status: Grimacing to pain thru out  - Cranial Nerves II-XII:  2mm and briskly reactive pupils, roving eyes w R side preference but able to be overcome, gag present  - Motor: hypertonic in BUE, flaccid in BLE, triple reflex in BLE, grimaces to pain thru out, withdraws to pain in BUE  - Reflexes: 2+ in BUE but with pectoral spread on R, 3+ in patella w cross adduction BLE (R>L), achilles 3+ w 3-4 beats myoclonus BL, toes mute  - Sensory: Grimaces to pain in all extremities.  - Coordination & Gait: Unable to obtain.      Fingerstick Blood Glucose: CAPILLARY BLOOD GLUCOSE  117 (10 Javed 2025 12:00)      POCT Blood Glucose.: 117 mg/dL (10 Javed 2025 11:58)       LABS:                        9.8    10.81 )-----------( Clumped    ( 10 Javed 2025 00:31 )             29.1     01-10    153[H]  |  107  |  28[H]  ----------------------------<  155[H]  3.5   |  32[H]  |  0.90    Ca    9.5      10 Javed 2025 00:31  Phos  2.6     01-10  Mg     2.8     01-10    TPro  6.4  /  Alb  3.7  /  TBili  2.4[H]  /  DBili  x   /  AST  69[H]  /  ALT  74[H]  /  AlkPhos  156[H]  01-10    PT/INR - ( 2025 20:19 )   PT: 10.8 sec;   INR: 0.94 ratio         PTT - ( 2025 20:19 )  PTT:24.0 sec      Urinalysis Basic - ( 10 Javed 2025 00:31 )    Color: x / Appearance: x / SG: x / pH: x  Gluc: 155 mg/dL / Ketone: x  / Bili: x / Urobili: x   Blood: x / Protein: x / Nitrite: x   Leuk Esterase: x / RBC: x / WBC x   Sq Epi: x / Non Sq Epi: x / Bacteria: x        RADIOLOGY & ADDITIONAL STUDIES:    < from: MR Head w/wo IV Cont (25 @ 18:07) >  IMPRESSION:    1. Innumerable foci susceptibility artifact scattered throughout the   brain which can be seen with critically ill patients on ECMO. Diffuse fat   embolization is also part of the differential diagnosis but is considered   less likely.    2. Tiny acute/subacute infarcts within the bilateral basal ganglia,   thalami, and chandu with disproportionate ovoid area of T2/FLAIR   prolongation in the right ventral thalamus. Findings may indicate the   presence of embolic acute/subacute infarcts.    3. No abnormal intracranial enhancement.    < end of copied text >    CT vessel imaging pending read reviewed by neurology resident:  no critical stenosis, occlusion, and intracranial vascular abnormality    IV-tenecteplase(Y/N):              N  Reason IV-tenecteplase not given: oow **STROKE CODE CONSULT NOTE**    Last known well time/Time of onset of symptoms:    HPI from neurology consult note 2025   Patient CARSON LEE is a 59y (1965) man with a PMHx significant for sickle cell disease (on hydroxyurea), HTN, HFimpEF/NICM (EF 58% 10/2024)originally presented to [facility] (1/3) as a transfer from [facility]. Pt initially presented to [facility] () for SOB and SS crisis; course c/b witnessed seizure; however, no description of the seizure was provided in documentation. At that time patient was given keppra and intubated and sedated then transferred to [facility] hospital for further management. Keppra was discontinued at [facility] due to negative EEG. At [facility] MICU pt was found to have RV failure possibly iso pulm HTN 2/2 increased tidal volumes on the ventilator. In addition, pt possibly received a lot of volume iso SS crisis. Patient in profound cardiogenic shock. Pt has been decompensating, despite Dobutamine gtt, Bumex gtt, Levophed, and addition of Vasopressin. Vasopressors requirements have gone up. Patient transferred to [facility] for R heart cath and possible VA ECMO. Patient was cannulated on peripheral VA ECMO (). Neurology consulted  () for alleged witnessed seizure at [facility].  Neurology had continued to follow case for AMS. EEG showed time periods of intermittent heneralized periodic Discharges (GPDs) w triphasix morphology and at times sharply contoured, 1-1.5 Hz blunted over R hemisphere. Hospital course at [facility] complicated by continued cardiopulmonary tenuous status necessitating ECMO. Patient continued on keppra 1000 mg BID for abnormal EEG findings and eeg discontinued 2025 given no recorded seizures or change in GPDs. Patient underwent MRI for AMS work up which revealed multiple foci susceptibility artifact w a few of them correlating to infarction on ADC (R thalamus, BL BG).  Patient came off ecmo 2025  stroke consulted for the MRI findings.    01/10/2025: Patient examined by neurology resident. Patient on 0.3 Precedex and intubated. Patient brain stem reflexes intact. Grimacing to pain thru out and withdraws in all four extremities 1/5    PAST MEDICAL & SURGICAL HISTORY:  Sickle-cell-hemoglobin C disease with crisis      Essential hypertension      Sickle cell disease      HTN (hypertension)      H/O:       H/O abdominoplasty      S/P breast augmentation      S/P           FAMILY HISTORY:  FHx: cerebral palsy (Child)        SOCIAL HISTORY:  unable to assess dt mentation    ROS:  unable to assess dt mentation    MEDICATIONS  (STANDING):  albumin human  5% IVPB 250 milliLiter(s) IV Intermittent once  albuterol/ipratropium for Nebulization 3 milliLiter(s) Nebulizer every 6 hours  artificial tears (preservative free) Ophthalmic Solution 1 Drop(s) Both EYES four times a day  ascorbic acid 500 milliGRAM(s) Oral daily  buMETAnide Injectable 1 milliGRAM(s) IV Push every 12 hours  chlorhexidine 0.12% Liquid 15 milliLiter(s) Oral Mucosa every 12 hours  chlorhexidine 2% Cloths 1 Application(s) Topical <User Schedule>  dexMEDEtomidine Infusion 0.2 MICROgram(s)/kG/Hr (4.18 mL/Hr) IV Continuous <Continuous>  dextrose 5%. 1000 milliLiter(s) (100 mL/Hr) IV Continuous <Continuous>  dextrose 5%. 1000 milliLiter(s) (50 mL/Hr) IV Continuous <Continuous>  dextrose 50% Injectable 25 Gram(s) IV Push once  dextrose 50% Injectable 12.5 Gram(s) IV Push once  dextrose 50% Injectable 25 Gram(s) IV Push once  glucagon  Injectable 1 milliGRAM(s) IntraMuscular once  heparin   Injectable 5000 Unit(s) SubCutaneous every 8 hours  insulin lispro (ADMELOG) corrective regimen sliding scale   SubCutaneous every 6 hours  levETIRAcetam   Injectable 1000 milliGRAM(s) IV Push every 12 hours  meropenem  IVPB 1000 milliGRAM(s) IV Intermittent every 8 hours  multivitamin 1 Tablet(s) Oral daily  mupirocin 2% Nasal 1 Application(s) Both Nostrils two times a day  nitroprusside Infusion 0.3 MICROgram(s)/kG/Min (3.76 mL/Hr) IV Continuous <Continuous>  pantoprazole  Injectable 40 milliGRAM(s) IV Push daily  sodium chloride 3%  Inhalation 4 milliLiter(s) Inhalation every 12 hours  vancomycin    Solution 125 milliGRAM(s) Oral every 12 hours  vancomycin  IVPB 500 milliGRAM(s) IV Intermittent every 12 hours  vasopressin Infusion 0.04 Unit(s)/Min (6 mL/Hr) IV Continuous <Continuous>    MEDICATIONS  (PRN):  dextrose Oral Gel 15 Gram(s) Oral once PRN Blood Glucose LESS THAN 70 milliGRAM(s)/deciliter      Allergies    doxycycline (Angioedema)  penicillin (Unknown)  clindamycin (Angioedema)  linezolid (Angioedema)    Intolerances        Vital Signs Last 24 Hrs  T(C): 37.8 (10 Javed 2025 12:00), Max: 38 (2025 20:00)  T(F): 100 (10 Javed 2025 12:00), Max: 100.4 (2025 20:00)  HR: 77 (10 Javed 2025 12:45) (66 - 114)  BP: --  BP(mean): --  RR: 24 (10 Javed 2025 12:45) (12 - 39)  SpO2: 98% (10 Javed 2025 12:45) (95% - 100%)    Parameters below as of 10 Javed 2025 12:00  Patient On (Oxygen Delivery Method): conventional ventilator    O2 Concentration (%): 30    PHYSICAL EXAM:  around 1400 Patient on 0.3 Precedex and intubated. Patient brain stem reflexes intact  Neurologic:  - Mental Status: Grimacing to pain thru out  - Cranial Nerves II-XII:  2mm and briskly reactive pupils, roving eyes w R side preference but able to be overcome, gag present  - Motor: hypertonic in BUE, flaccid in BLE, withdrawal, grimaces to pain thru out, withdraws to pain in BUE (1/5 in BUE and BLE)  - Reflexes: 2+ in BUE but with pectoral spread on R, 3+ in patella w cross adduction BLE (R>L), achilles 3+ w 3-4 beats myoclonus BL, toes mute  - Sensory: Grimaces to pain in all extremities.  - Coordination & Gait: Unable to obtain.    Patient re-examined w Stroke attending off of sedation 1730  Exam unchanged w exception of BL blink to threat and eyes open thru out more of the exam      Fingerstick Blood Glucose: CAPILLARY BLOOD GLUCOSE  117 (10 Javed 2025 12:00)      POCT Blood Glucose.: 117 mg/dL (10 Javed 2025 11:58)       LABS:                        9.8    10.81 )-----------( Clumped    ( 10 Javed 2025 00:31 )             29.1     01-10    153[H]  |  107  |  28[H]  ----------------------------<  155[H]  3.5   |  32[H]  |  0.90    Ca    9.5      10 Javed 2025 00:31  Phos  2.6     01-10  Mg     2.8     01-10    TPro  6.4  /  Alb  3.7  /  TBili  2.4[H]  /  DBili  x   /  AST  69[H]  /  ALT  74[H]  /  AlkPhos  156[H]  -10    PT/INR - ( 2025 20:19 )   PT: 10.8 sec;   INR: 0.94 ratio         PTT - ( 2025 20:19 )  PTT:24.0 sec      Urinalysis Basic - ( 10 Javed 2025 00:31 )    Color: x / Appearance: x / SG: x / pH: x  Gluc: 155 mg/dL / Ketone: x  / Bili: x / Urobili: x   Blood: x / Protein: x / Nitrite: x   Leuk Esterase: x / RBC: x / WBC x   Sq Epi: x / Non Sq Epi: x / Bacteria: x        RADIOLOGY & ADDITIONAL STUDIES:    < from: MR Head w/wo IV Cont (25 @ 18:07) >  IMPRESSION:    1. Innumerable foci susceptibility artifact scattered throughout the   brain which can be seen with critically ill patients on ECMO. Diffuse fat   embolization is also part of the differential diagnosis but is considered   less likely.    2. Tiny acute/subacute infarcts within the bilateral basal ganglia,   thalami, and chandu with disproportionate ovoid area of T2/FLAIR   prolongation in the right ventral thalamus. Findings may indicate the   presence of embolic acute/subacute infarcts.    3. No abnormal intracranial enhancement.    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 Pt initially presented to [facility] () for SOB and SS crisis; course c/b witnessed seizure; however, no description of the seizure was provided in documentation. At that time patient was given keppra and intubated and sedated then transferred to [facility] hospital for further management. Keppra was discontinued at [facility] due to negative EEG. At [facility] MICU pt was found to have RV failure possibly iso pulm HTN 2/2 increased tidal volumes on the ventilator. In addition, pt possibly received a lot of volume iso SS crisis. Patient in profound cardiogenic shock. Pt has been decompensating, despite Dobutamine gtt, Bumex gtt, Levophed, and addition of Vasopressin. Vasopressors requirements have gone up. Patient transferred to [facility] for R heart cath and possible VA ECMO. Patient was cannulated on peripheral VA ECMO (). Neurology consulted  () for alleged witnessed seizure at [facility].  Neurology had continued to follow case for AMS. EEG showed time periods of intermittent heneralized periodic Discharges (GPDs) w triphasix morphology and at times sharply contoured, 1-1.5 Hz blunted over R hemisphere. Hospital course at [facility] complicated by continued cardiopulmonary tenuous status necessitating ECMO. Patient continued on keppra 1000 mg BID for abnormal EEG findings and eeg discontinued 2025 given no recorded seizures or change in GPDs. Patient underwent MRI for AMS work up which revealed multiple foci susceptibility artifact w a few of them correlating to infarction on ADC (R thalamus, BL BG).  Patient came off ecmo 2025  stroke consulted for the MRI findings.    01/10/2025: Patient examined by neurology resident. Patient on 0.3 Precedex and intubated. Patient brain stem reflexes intact. Grimacing to pain thru out and withdraws in all four extremities 1/5    PAST MEDICAL & SURGICAL HISTORY:  Sickle-cell-hemoglobin C disease with crisis      Essential hypertension      Sickle cell disease      HTN (hypertension)      H/O:       H/O abdominoplasty      S/P breast augmentation      S/P           FAMILY HISTORY:  FHx: cerebral palsy (Child)        SOCIAL HISTORY:  unable to assess dt mentation    ROS:  unable to assess dt mentation    MEDICATIONS  (STANDING):  albumin human  5% IVPB 250 milliLiter(s) IV Intermittent once  albuterol/ipratropium for Nebulization 3 milliLiter(s) Nebulizer every 6 hours  artificial tears (preservative free) Ophthalmic Solution 1 Drop(s) Both EYES four times a day  ascorbic acid 500 milliGRAM(s) Oral daily  buMETAnide Injectable 1 milliGRAM(s) IV Push every 12 hours  chlorhexidine 0.12% Liquid 15 milliLiter(s) Oral Mucosa every 12 hours  chlorhexidine 2% Cloths 1 Application(s) Topical <User Schedule>  dexMEDEtomidine Infusion 0.2 MICROgram(s)/kG/Hr (4.18 mL/Hr) IV Continuous <Continuous>  dextrose 5%. 1000 milliLiter(s) (100 mL/Hr) IV Continuous <Continuous>  dextrose 5%. 1000 milliLiter(s) (50 mL/Hr) IV Continuous <Continuous>  dextrose 50% Injectable 25 Gram(s) IV Push once  dextrose 50% Injectable 12.5 Gram(s) IV Push once  dextrose 50% Injectable 25 Gram(s) IV Push once  glucagon  Injectable 1 milliGRAM(s) IntraMuscular once  heparin   Injectable 5000 Unit(s) SubCutaneous every 8 hours  insulin lispro (ADMELOG) corrective regimen sliding scale   SubCutaneous every 6 hours  levETIRAcetam   Injectable 1000 milliGRAM(s) IV Push every 12 hours  meropenem  IVPB 1000 milliGRAM(s) IV Intermittent every 8 hours  multivitamin 1 Tablet(s) Oral daily  mupirocin 2% Nasal 1 Application(s) Both Nostrils two times a day  nitroprusside Infusion 0.3 MICROgram(s)/kG/Min (3.76 mL/Hr) IV Continuous <Continuous>  pantoprazole  Injectable 40 milliGRAM(s) IV Push daily  sodium chloride 3%  Inhalation 4 milliLiter(s) Inhalation every 12 hours  vancomycin    Solution 125 milliGRAM(s) Oral every 12 hours  vancomycin  IVPB 500 milliGRAM(s) IV Intermittent every 12 hours  vasopressin Infusion 0.04 Unit(s)/Min (6 mL/Hr) IV Continuous <Continuous>    MEDICATIONS  (PRN):  dextrose Oral Gel 15 Gram(s) Oral once PRN Blood Glucose LESS THAN 70 milliGRAM(s)/deciliter      Allergies    doxycycline (Angioedema)  penicillin (Unknown)  clindamycin (Angioedema)  linezolid (Angioedema)    Intolerances        Vital Signs Last 24 Hrs  T(C): 37.8 (10 Javed 2025 12:00), Max: 38 (2025 20:00)  T(F): 100 (10 Javed 2025 12:00), Max: 100.4 (2025 20:00)  HR: 77 (10 Javed 2025 12:45) (66 - 114)  BP: --  BP(mean): --  RR: 24 (10 Javed 2025 12:45) (12 - 39)  SpO2: 98% (10 Javed 2025 12:45) (95% - 100%)    Parameters below as of 10 Javed 2025 12:00  Patient On (Oxygen Delivery Method): conventional ventilator    O2 Concentration (%): 30    PHYSICAL EXAM:  around 1400 Patient on 0.3 Precedex and intubated. Patient brain stem reflexes intact  Neurologic:  - Mental Status: Grimacing to pain thru out  - Cranial Nerves II-XII:  2mm and briskly reactive pupils, roving eyes w R side preference but able to be overcome, gag present  - Motor: hypertonic in BUE, flaccid in BLE, withdrawal, grimaces to pain thru out, withdraws to pain in BUE (1/5 in BUE and BLE)  - Reflexes: 2+ in BUE but with pectoral spread on R, 3+ in patella w cross adduction BLE (R>L), achilles 3+ w 3-4 beats myoclonus BL, toes mute  - Sensory: Grimaces to pain in all extremities.  - Coordination & Gait: Unable to obtain.    Patient re-examined w Stroke attending off of sedation for 25 mins 1730  Exam unchanged w exception of BL blink to threat and eyes open thru out more of the exam 2/5 R side w/d to pain, 1/5 L side to pain.      Fingerstick Blood Glucose: CAPILLARY BLOOD GLUCOSE  117 (10 Javed 2025 12:00)      POCT Blood Glucose.: 117 mg/dL (10 Javed 2025 11:58)       LABS:                        9.8    10.81 )-----------( Clumped    ( 10 Javed 2025 00:31 )             29.1     10    153[H]  |  107  |  28[H]  ----------------------------<  155[H]  3.5   |  32[H]  |  0.90    Ca    9.5      10 Javed 2025 00:31  Phos  2.6     01-10  Mg     2.8     01-10    TPro  6.4  /  Alb  3.7  /  TBili  2.4[H]  /  DBili  x   /  AST  69[H]  /  ALT  74[H]  /  AlkPhos  156[H]  10    PT/INR - ( 2025 20:19 )   PT: 10.8 sec;   INR: 0.94 ratio         PTT - ( 2025 20:19 )  PTT:24.0 sec      Urinalysis Basic - ( 10 Javed 2025 00:31 )    Color: x / Appearance: x / SG: x / pH: x  Gluc: 155 mg/dL / Ketone: x  / Bili: x / Urobili: x   Blood: x / Protein: x / Nitrite: x   Leuk Esterase: x / RBC: x / WBC x   Sq Epi: x / Non Sq Epi: x / Bacteria: x        RADIOLOGY & ADDITIONAL STUDIES:    < from: MR Head w/wo IV Cont (25 @ 18:07) >  IMPRESSION:    1. Innumerable foci susceptibility artifact scattered throughout the   brain which can be seen with critically ill patients on ECMO. Diffuse fat   embolization is also part of the differential diagnosis but is considered   less likely.    2. Tiny acute/subacute infarcts within the bilateral basal ganglia,   thalami, and chandu with disproportionate ovoid area of T2/FLAIR   prolongation in the right ventral thalamus. Findings may indicate the   presence of embolic acute/subacute infarcts.    3. No abnormal intracranial enhancement.    < end of copied text >    CT vessel imaging pending read reviewed by neurology resident:  no critical stenosis, occlusion, and intracranial vascular abnormality    IV-tenecteplase(Y/N):              N  Reason IV-tenecteplase not given: oow **STROKE CONSULT NOTE**    Last known well time/Time of onset of symptoms:    HPI from neurology consult note 2025   Patient CARSON LEE is a 59y (1965) man with a PMHx significant for sickle cell disease (on hydroxyurea), HTN, HFimpEF/NICM (EF 58% 10/2024)originally presented to [facility] (1/3) as a transfer from [facility]. Pt initially presented to [facility] () for SOB and SS crisis; course c/b witnessed seizure; however, no description of the seizure was provided in documentation. At that time patient was given keppra and intubated and sedated then transferred to [facility] hospital for further management. Keppra was discontinued at [facility] due to negative EEG. At [facility] MICU pt was found to have RV failure possibly iso pulm HTN 2/2 increased tidal volumes on the ventilator. In addition, pt possibly received a lot of volume iso SS crisis. Patient in profound cardiogenic shock. Pt has been decompensating, despite Dobutamine gtt, Bumex gtt, Levophed, and addition of Vasopressin. Vasopressors requirements have gone up. Patient transferred to [facility] for R heart cath and possible VA ECMO. Patient was cannulated on peripheral VA ECMO (). Neurology consulted  () for alleged witnessed seizure at [facility].  Neurology had continued to follow case for AMS. EEG showed time periods of intermittent heneralized periodic Discharges (GPDs) w triphasix morphology and at times sharply contoured, 1-1.5 Hz blunted over R hemisphere. Hospital course at [facility] complicated by continued cardiopulmonary tenuous status necessitating ECMO. Patient continued on keppra 1000 mg BID for abnormal EEG findings and eeg discontinued 2025 given no recorded seizures or change in GPDs. Patient underwent MRI for AMS work up which revealed multiple foci susceptibility artifact w a few of them correlating to infarction on ADC (R thalamus, BL BG).  Patient came off ecmo 2025  stroke consulted for the MRI findings.    01/10/2025: Patient examined by neurology resident. Patient on 0.3 Precedex and intubated. Patient brain stem reflexes intact. Grimacing to pain thru out and withdraws in all four extremities 1/5    PAST MEDICAL & SURGICAL HISTORY:  Sickle-cell-hemoglobin C disease with crisis      Essential hypertension      Sickle cell disease      HTN (hypertension)      H/O:       H/O abdominoplasty      S/P breast augmentation      S/P           FAMILY HISTORY:  FHx: cerebral palsy (Child)        SOCIAL HISTORY:  unable to assess dt mentation    ROS:  unable to assess dt mentation    MEDICATIONS  (STANDING):  albumin human  5% IVPB 250 milliLiter(s) IV Intermittent once  albuterol/ipratropium for Nebulization 3 milliLiter(s) Nebulizer every 6 hours  artificial tears (preservative free) Ophthalmic Solution 1 Drop(s) Both EYES four times a day  ascorbic acid 500 milliGRAM(s) Oral daily  buMETAnide Injectable 1 milliGRAM(s) IV Push every 12 hours  chlorhexidine 0.12% Liquid 15 milliLiter(s) Oral Mucosa every 12 hours  chlorhexidine 2% Cloths 1 Application(s) Topical <User Schedule>  dexMEDEtomidine Infusion 0.2 MICROgram(s)/kG/Hr (4.18 mL/Hr) IV Continuous <Continuous>  dextrose 5%. 1000 milliLiter(s) (100 mL/Hr) IV Continuous <Continuous>  dextrose 5%. 1000 milliLiter(s) (50 mL/Hr) IV Continuous <Continuous>  dextrose 50% Injectable 25 Gram(s) IV Push once  dextrose 50% Injectable 12.5 Gram(s) IV Push once  dextrose 50% Injectable 25 Gram(s) IV Push once  glucagon  Injectable 1 milliGRAM(s) IntraMuscular once  heparin   Injectable 5000 Unit(s) SubCutaneous every 8 hours  insulin lispro (ADMELOG) corrective regimen sliding scale   SubCutaneous every 6 hours  levETIRAcetam   Injectable 1000 milliGRAM(s) IV Push every 12 hours  meropenem  IVPB 1000 milliGRAM(s) IV Intermittent every 8 hours  multivitamin 1 Tablet(s) Oral daily  mupirocin 2% Nasal 1 Application(s) Both Nostrils two times a day  nitroprusside Infusion 0.3 MICROgram(s)/kG/Min (3.76 mL/Hr) IV Continuous <Continuous>  pantoprazole  Injectable 40 milliGRAM(s) IV Push daily  sodium chloride 3%  Inhalation 4 milliLiter(s) Inhalation every 12 hours  vancomycin    Solution 125 milliGRAM(s) Oral every 12 hours  vancomycin  IVPB 500 milliGRAM(s) IV Intermittent every 12 hours  vasopressin Infusion 0.04 Unit(s)/Min (6 mL/Hr) IV Continuous <Continuous>    MEDICATIONS  (PRN):  dextrose Oral Gel 15 Gram(s) Oral once PRN Blood Glucose LESS THAN 70 milliGRAM(s)/deciliter      Allergies    doxycycline (Angioedema)  penicillin (Unknown)  clindamycin (Angioedema)  linezolid (Angioedema)    Intolerances        Vital Signs Last 24 Hrs  T(C): 37.8 (10 Javed 2025 12:00), Max: 38 (2025 20:00)  T(F): 100 (10 Javed 2025 12:00), Max: 100.4 (2025 20:00)  HR: 77 (10 Javed 2025 12:45) (66 - 114)  BP: --  BP(mean): --  RR: 24 (10 Javed 2025 12:45) (12 - 39)  SpO2: 98% (10 Javed 2025 12:45) (95% - 100%)    Parameters below as of 10 Javed 2025 12:00  Patient On (Oxygen Delivery Method): conventional ventilator    O2 Concentration (%): 30    PHYSICAL EXAM:  around 1400 Patient on 0.3 Precedex and intubated. Patient brain stem reflexes intact  Neurologic:  - Mental Status: Grimacing to pain thru out  - Cranial Nerves II-XII:  2mm and briskly reactive pupils, roving eyes w R side preference but able to be overcome, gag present  - Motor: hypertonic in BUE, flaccid in BLE, withdrawal, grimaces to pain thru out, withdraws to pain in BUE (1/5 in BUE and BLE)  - Reflexes: 2+ in BUE but with pectoral spread on R, 3+ in patella w cross adduction BLE (R>L), achilles 3+ w 3-4 beats myoclonus BL, toes mute  - Sensory: Grimaces to pain in all extremities.  - Coordination & Gait: Unable to obtain.    Patient re-examined w Stroke attending off of sedation for 25 mins 1730  Exam unchanged w exception of BL blink to threat and eyes open thru out more of the exam 2/5 R side w/d to pain, 1/5 L side to pain.      Fingerstick Blood Glucose: CAPILLARY BLOOD GLUCOSE  117 (10 Javed 2025 12:00)      POCT Blood Glucose.: 117 mg/dL (10 Javed 2025 11:58)       LABS:                        9.8    10.81 )-----------( Clumped    ( 10 Javed 2025 00:31 )             29.1     01-10    153[H]  |  107  |  28[H]  ----------------------------<  155[H]  3.5   |  32[H]  |  0.90    Ca    9.5      10 Javed 2025 00:31  Phos  2.6     01-10  Mg     2.8     01-10    TPro  6.4  /  Alb  3.7  /  TBili  2.4[H]  /  DBili  x   /  AST  69[H]  /  ALT  74[H]  /  AlkPhos  156[H]  01-10    PT/INR - ( 2025 20:19 )   PT: 10.8 sec;   INR: 0.94 ratio         PTT - ( 2025 20:19 )  PTT:24.0 sec      Urinalysis Basic - ( 10 Javed 2025 00:31 )    Color: x / Appearance: x / SG: x / pH: x  Gluc: 155 mg/dL / Ketone: x  / Bili: x / Urobili: x   Blood: x / Protein: x / Nitrite: x   Leuk Esterase: x / RBC: x / WBC x   Sq Epi: x / Non Sq Epi: x / Bacteria: x        RADIOLOGY & ADDITIONAL STUDIES:    < from: MR Head w/wo IV Cont (25 @ 18:07) >  IMPRESSION:    1. Innumerable foci susceptibility artifact scattered throughout the   brain which can be seen with critically ill patients on ECMO. Diffuse fat   embolization is also part of the differential diagnosis but is considered   less likely.    2. Tiny acute/subacute infarcts within the bilateral basal ganglia,   thalami, and chandu with disproportionate ovoid area of T2/FLAIR   prolongation in the right ventral thalamus. Findings may indicate the   presence of embolic acute/subacute infarcts.    3. No abnormal intracranial enhancement.    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 Pt initially presented to [facility] () for SOB and SS crisis; course c/b witnessed seizure; however, no description of the seizure was provided in documentation. At that time patient was given keppra and intubated and sedated then transferred to [facility] hospital for further management. Keppra was discontinued at [facility] due to negative EEG. At [facility] MICU pt was found to have RV failure possibly iso pulm HTN 2/2 increased tidal volumes on the ventilator. In addition, pt possibly received a lot of volume iso SS crisis. Patient in profound cardiogenic shock. Pt has been decompensating, despite Dobutamine gtt, Bumex gtt, Levophed, and addition of Vasopressin. Vasopressors requirements have gone up. Patient transferred to [facility] for R heart cath and possible VA ECMO. Patient was cannulated on peripheral VA ECMO (). Neurology consulted  () for alleged witnessed seizure at [facility].  Neurology had continued to follow case for AMS. EEG showed time periods of intermittent heneralized periodic Discharges (GPDs) w triphasix morphology and at times sharply contoured, 1-1.5 Hz blunted over R hemisphere. Hospital course at [facility] complicated by continued cardiopulmonary tenuous status necessitating ECMO. Patient continued on keppra 1000 mg BID for abnormal EEG findings and eeg discontinued 2025 given no recorded seizures or change in GPDs. Patient underwent MRI for AMS work up which revealed multiple foci susceptibility artifact w a few of them correlating to infarction on ADC (R thalamus, BL BG).  Patient came off ecmo 2025  stroke consulted for the MRI findings.    01/10/2025: Patient examined by neurology resident. Patient on 0.3 Precedex and intubated. Patient brain stem reflexes intact. Grimacing to pain thru out and withdraws in all four extremities 1/5    PAST MEDICAL & SURGICAL HISTORY:  Sickle-cell-hemoglobin C disease with crisis      Essential hypertension      Sickle cell disease      HTN (hypertension)      H/O:       H/O abdominoplasty      S/P breast augmentation      S/P           FAMILY HISTORY:  FHx: cerebral palsy (Child)        SOCIAL HISTORY:  unable to assess dt mentation    ROS:  unable to assess dt mentation    MEDICATIONS  (STANDING):  albumin human  5% IVPB 250 milliLiter(s) IV Intermittent once  albuterol/ipratropium for Nebulization 3 milliLiter(s) Nebulizer every 6 hours  artificial tears (preservative free) Ophthalmic Solution 1 Drop(s) Both EYES four times a day  ascorbic acid 500 milliGRAM(s) Oral daily  buMETAnide Injectable 1 milliGRAM(s) IV Push every 12 hours  chlorhexidine 0.12% Liquid 15 milliLiter(s) Oral Mucosa every 12 hours  chlorhexidine 2% Cloths 1 Application(s) Topical <User Schedule>  dexMEDEtomidine Infusion 0.2 MICROgram(s)/kG/Hr (4.18 mL/Hr) IV Continuous <Continuous>  dextrose 5%. 1000 milliLiter(s) (100 mL/Hr) IV Continuous <Continuous>  dextrose 5%. 1000 milliLiter(s) (50 mL/Hr) IV Continuous <Continuous>  dextrose 50% Injectable 25 Gram(s) IV Push once  dextrose 50% Injectable 12.5 Gram(s) IV Push once  dextrose 50% Injectable 25 Gram(s) IV Push once  glucagon  Injectable 1 milliGRAM(s) IntraMuscular once  heparin   Injectable 5000 Unit(s) SubCutaneous every 8 hours  insulin lispro (ADMELOG) corrective regimen sliding scale   SubCutaneous every 6 hours  levETIRAcetam   Injectable 1000 milliGRAM(s) IV Push every 12 hours  meropenem  IVPB 1000 milliGRAM(s) IV Intermittent every 8 hours  multivitamin 1 Tablet(s) Oral daily  mupirocin 2% Nasal 1 Application(s) Both Nostrils two times a day  nitroprusside Infusion 0.3 MICROgram(s)/kG/Min (3.76 mL/Hr) IV Continuous <Continuous>  pantoprazole  Injectable 40 milliGRAM(s) IV Push daily  sodium chloride 3%  Inhalation 4 milliLiter(s) Inhalation every 12 hours  vancomycin    Solution 125 milliGRAM(s) Oral every 12 hours  vancomycin  IVPB 500 milliGRAM(s) IV Intermittent every 12 hours  vasopressin Infusion 0.04 Unit(s)/Min (6 mL/Hr) IV Continuous <Continuous>    MEDICATIONS  (PRN):  dextrose Oral Gel 15 Gram(s) Oral once PRN Blood Glucose LESS THAN 70 milliGRAM(s)/deciliter      Allergies    doxycycline (Angioedema)  penicillin (Unknown)  clindamycin (Angioedema)  linezolid (Angioedema)    Intolerances        Vital Signs Last 24 Hrs  T(C): 37.8 (10 Javed 2025 12:00), Max: 38 (2025 20:00)  T(F): 100 (10 Javed 2025 12:00), Max: 100.4 (2025 20:00)  HR: 77 (10 Javed 2025 12:45) (66 - 114)  BP: --  BP(mean): --  RR: 24 (10 Javed 2025 12:45) (12 - 39)  SpO2: 98% (10 Javed 2025 12:45) (95% - 100%)    Parameters below as of 10 Javed 2025 12:00  Patient On (Oxygen Delivery Method): conventional ventilator    O2 Concentration (%): 30    PHYSICAL EXAM:  around 1400 Patient on 0.3 Precedex and intubated. Patient brain stem reflexes intact  Neurologic:  - Mental Status: Grimacing to pain thru out  - Cranial Nerves II-XII:  2mm and briskly reactive pupils, roving eyes w R side preference but able to be overcome, gag present  - Motor: hypertonic in BUE, flaccid in BLE, withdrawal, grimaces to pain thru out, withdraws to pain in BUE (1/5 in BUE and BLE)  - Reflexes: 2+ in BUE but with pectoral spread on R, 3+ in patella w cross adduction BLE (R>L), achilles 3+ w 3-4 beats myoclonus BL, toes mute  - Sensory: Grimaces to pain in all extremities.  - Coordination & Gait: Unable to obtain.    Patient re-examined w Stroke attending off of sedation for 25 mins 1730  Exam unchanged w exception of BL blink to threat and eyes open thru out more of the exam 2/5 R side w/d to pain, 1/5 L side to pain.      Fingerstick Blood Glucose: CAPILLARY BLOOD GLUCOSE  117 (10 Javed 2025 12:00)      POCT Blood Glucose.: 117 mg/dL (10 Javed 2025 11:58)       LABS:                        9.8    10.81 )-----------( Clumped    ( 10 Javed 2025 00:31 )             29.1     01-10    153[H]  |  107  |  28[H]  ----------------------------<  155[H]  3.5   |  32[H]  |  0.90    Ca    9.5      10 Javed 2025 00:31  Phos  2.6     01-10  Mg     2.8     01-10    TPro  6.4  /  Alb  3.7  /  TBili  2.4[H]  /  DBili  x   /  AST  69[H]  /  ALT  74[H]  /  AlkPhos  156[H]  01-10    PT/INR - ( 2025 20:19 )   PT: 10.8 sec;   INR: 0.94 ratio         PTT - ( 2025 20:19 )  PTT:24.0 sec      Urinalysis Basic - ( 10 Javed 2025 00:31 )    Color: x / Appearance: x / SG: x / pH: x  Gluc: 155 mg/dL / Ketone: x  / Bili: x / Urobili: x   Blood: x / Protein: x / Nitrite: x   Leuk Esterase: x / RBC: x / WBC x   Sq Epi: x / Non Sq Epi: x / Bacteria: x        RADIOLOGY & ADDITIONAL STUDIES:    < from: MR Head w/wo IV Cont (25 @ 18:07) >  IMPRESSION:    1. Innumerable foci susceptibility artifact scattered throughout the   brain which can be seen with critically ill patients on ECMO. Diffuse fat   embolization is also part of the differential diagnosis but is considered   less likely.    2. Tiny acute/subacute infarcts within the bilateral basal ganglia,   thalami, and chandu with disproportionate ovoid area of T2/FLAIR   prolongation in the right ventral thalamus. Findings may indicate the   presence of embolic acute/subacute infarcts.    3. No abnormal intracranial enhancement.    < end of copied text >    CT vessel imaging pending read reviewed by neurology resident:  no critical stenosis, occlusion, and intracranial vascular abnormality    IV-tenecteplase(Y/N):              N  Reason IV-tenecteplase not given: oow

## 2025-01-10 NOTE — PROGRESS NOTE ADULT - NUTRITIONAL ASSESSMENT
I reviewed available diagnostic studies, and reviewed images personally. I agree with resident's history, exam, orders placed, and plan of care. 48 minutes of critical care time was spent in caring for this patient who has concern of sudden and clinically significant deterioration requiring a high level of physician preparedness, management of brain supporting interventions, and frequent physician assessments. This excludes any time spent on separate procedures or teaching.    Medical issues needing to be addressed include: Cerebral infarction, abnormal MRI findings, sickle cell disease (on hydroxyurea), HTN, HFimpEF/NICM (EF 58% 10/2024). Originally at [facility], presented with SOB/SS crisis, then seizures ->[facility], found to have RV failure, transferred to [facility], Placed on ECMO, now off. Neurology has been following for AMS and seizures. MRI completed which shows several punctate acute/subacute appearing infarcts and numerous SWI hypointense signal often seen post cardiac bypass/ECMO. Of note, T2 flair R thalamic signal w/ hyperintense signal on ADC, likely chronic finding unrelated to current events. Some questionable b/l temporal, R>L mild edema on Q1wpnoq also may be consistent with prior reported sz  (possibly nonclinical and prolonged)/ cardiogenic shock (GPDs also may support this). Infarcts, given pt's current cardiac state, highly suspicious for cardiac in etiology. Alternatively, sickle, periprocedural (microemboli from vascular manipulation), thromboembolism (in setting of +PFO seen on previous intraop QUENTIN) cannot be ruled out. Recommend obtaining b/l LE dopplers, lipid panel, A1C, and AC/AP per cardiology. sz/EEG per general neurology, and agree with pursuing LP for further assessment if pt is not improving. However, pt is medically critically ill, toxic/metabolic/infectious encephalopathy may largely be clouding her true neuro exam. Would allow for some time to recover w/ continued assessment off sedation, and use of minimum amount of sedation as she can tolerate (currently intubated) as the team weans her off vent. Stroke does not explain her current exam findings. Exchange transfusion may be considered, will defer to hematology/medicine.   Case was discussed extensively with family - sister (NP), brother (MD), daughter, and other family members who were present on phone via [employer]. Scans reviewed, labs/tests including echo reviewed. Family expresses good understanding of discussion and all questions were answered. If LP is pursued for further encephalopathy/encephalitis work up, general neurology will follow and will recommend appropriate tests.   Service provided on 1/10/2025.

## 2025-01-11 LAB
ALBUMIN SERPL ELPH-MCNC: 4.1 G/DL — SIGNIFICANT CHANGE UP (ref 3.3–5)
ALBUMIN SERPL ELPH-MCNC: 4.3 G/DL — SIGNIFICANT CHANGE UP (ref 3.3–5)
ALP SERPL-CCNC: 126 U/L — HIGH (ref 40–120)
ALP SERPL-CCNC: 143 U/L — HIGH (ref 40–120)
ALT FLD-CCNC: 83 U/L — HIGH (ref 10–45)
ALT FLD-CCNC: 93 U/L — HIGH (ref 10–45)
AMMONIA BLD-MCNC: 25 UMOL/L — SIGNIFICANT CHANGE UP (ref 11–55)
ANION GAP SERPL CALC-SCNC: 14 MMOL/L — SIGNIFICANT CHANGE UP (ref 5–17)
ANION GAP SERPL CALC-SCNC: 15 MMOL/L — SIGNIFICANT CHANGE UP (ref 5–17)
ANISOCYTOSIS BLD QL: SLIGHT — SIGNIFICANT CHANGE UP
APTT BLD: 23.2 SEC — LOW (ref 24.5–35.6)
AST SERPL-CCNC: 57 U/L — HIGH (ref 10–40)
AST SERPL-CCNC: 75 U/L — HIGH (ref 10–40)
BASOPHILS # BLD AUTO: 0 K/UL — SIGNIFICANT CHANGE UP (ref 0–0.2)
BASOPHILS NFR BLD AUTO: 0 % — SIGNIFICANT CHANGE UP (ref 0–2)
BILIRUB SERPL-MCNC: 2.3 MG/DL — HIGH (ref 0.2–1.2)
BILIRUB SERPL-MCNC: 2.4 MG/DL — HIGH (ref 0.2–1.2)
BLD GP AB SCN SERPL QL: NEGATIVE — SIGNIFICANT CHANGE UP
BUN SERPL-MCNC: 32 MG/DL — HIGH (ref 7–23)
BUN SERPL-MCNC: 35 MG/DL — HIGH (ref 7–23)
CALCIUM SERPL-MCNC: 9.8 MG/DL — SIGNIFICANT CHANGE UP (ref 8.4–10.5)
CALCIUM SERPL-MCNC: 9.9 MG/DL — SIGNIFICANT CHANGE UP (ref 8.4–10.5)
CHLORIDE SERPL-SCNC: 109 MMOL/L — HIGH (ref 96–108)
CHLORIDE SERPL-SCNC: 110 MMOL/L — HIGH (ref 96–108)
CO2 SERPL-SCNC: 25 MMOL/L — SIGNIFICANT CHANGE UP (ref 22–31)
CO2 SERPL-SCNC: 26 MMOL/L — SIGNIFICANT CHANGE UP (ref 22–31)
CREAT SERPL-MCNC: 0.87 MG/DL — SIGNIFICANT CHANGE UP (ref 0.5–1.3)
CREAT SERPL-MCNC: 0.95 MG/DL — SIGNIFICANT CHANGE UP (ref 0.5–1.3)
EGFR: 70 ML/MIN/1.73M2 — SIGNIFICANT CHANGE UP
EGFR: 78 ML/MIN/1.73M2 — SIGNIFICANT CHANGE UP
ELLIPTOCYTES BLD QL SMEAR: SLIGHT — SIGNIFICANT CHANGE UP
EOSINOPHIL # BLD AUTO: 0.22 K/UL — SIGNIFICANT CHANGE UP (ref 0–0.5)
EOSINOPHIL NFR BLD AUTO: 1.8 % — SIGNIFICANT CHANGE UP (ref 0–6)
FIBRINOGEN PPP-MCNC: 355 MG/DL — SIGNIFICANT CHANGE UP (ref 200–445)
FLUAV AG NPH QL: SIGNIFICANT CHANGE UP
FLUBV AG NPH QL: SIGNIFICANT CHANGE UP
GAS PNL BLDA: SIGNIFICANT CHANGE UP
GLUCOSE BLDC GLUCOMTR-MCNC: 118 MG/DL — HIGH (ref 70–99)
GLUCOSE BLDC GLUCOMTR-MCNC: 137 MG/DL — HIGH (ref 70–99)
GLUCOSE BLDC GLUCOMTR-MCNC: 138 MG/DL — HIGH (ref 70–99)
GLUCOSE BLDC GLUCOMTR-MCNC: 145 MG/DL — HIGH (ref 70–99)
GLUCOSE SERPL-MCNC: 109 MG/DL — HIGH (ref 70–99)
GLUCOSE SERPL-MCNC: 109 MG/DL — HIGH (ref 70–99)
HAPTOGLOB SERPL-MCNC: 159 MG/DL — SIGNIFICANT CHANGE UP (ref 34–200)
HCT VFR BLD CALC: 32.5 % — LOW (ref 34.5–45)
HGB BLD-MCNC: 10.4 G/DL — LOW (ref 11.5–15.5)
HYPOCHROMIA BLD QL: SLIGHT — SIGNIFICANT CHANGE UP
INR BLD: 0.97 RATIO — SIGNIFICANT CHANGE UP (ref 0.85–1.16)
LDH SERPL L TO P-CCNC: 571 U/L — HIGH (ref 50–242)
LDH SERPL L TO P-CCNC: 650 U/L — HIGH (ref 50–242)
LYMPHOCYTES # BLD AUTO: 2.88 K/UL — SIGNIFICANT CHANGE UP (ref 1–3.3)
LYMPHOCYTES # BLD AUTO: 23.2 % — SIGNIFICANT CHANGE UP (ref 13–44)
MACROCYTES BLD QL: SLIGHT — SIGNIFICANT CHANGE UP
MAGNESIUM SERPL-MCNC: 2.9 MG/DL — HIGH (ref 1.6–2.6)
MAGNESIUM SERPL-MCNC: 3.1 MG/DL — HIGH (ref 1.6–2.6)
MANUAL SMEAR VERIFICATION: SIGNIFICANT CHANGE UP
MCHC RBC-ENTMCNC: 28.7 PG — SIGNIFICANT CHANGE UP (ref 27–34)
MCHC RBC-ENTMCNC: 32 G/DL — SIGNIFICANT CHANGE UP (ref 32–36)
MCV RBC AUTO: 89.5 FL — SIGNIFICANT CHANGE UP (ref 80–100)
MICROCYTES BLD QL: SLIGHT — SIGNIFICANT CHANGE UP
MONOCYTES # BLD AUTO: 0.45 K/UL — SIGNIFICANT CHANGE UP (ref 0–0.9)
MONOCYTES NFR BLD AUTO: 3.6 % — SIGNIFICANT CHANGE UP (ref 2–14)
MYELOCYTES NFR BLD: 0.9 % — HIGH (ref 0–0)
NEUTROPHILS # BLD AUTO: 8.75 K/UL — HIGH (ref 1.8–7.4)
NEUTROPHILS NFR BLD AUTO: 70.5 % — SIGNIFICANT CHANGE UP (ref 43–77)
NRBC # BLD: 2 /100 WBCS — HIGH (ref 0–0)
NRBC BLD-RTO: 2 /100 WBCS — HIGH (ref 0–0)
OSMOLALITY SERPL: 314 MOSMOL/KG — HIGH (ref 275–300)
OVALOCYTES BLD QL SMEAR: SLIGHT — SIGNIFICANT CHANGE UP
PHOSPHATE SERPL-MCNC: 3.2 MG/DL — SIGNIFICANT CHANGE UP (ref 2.5–4.5)
PHOSPHATE SERPL-MCNC: 3.7 MG/DL — SIGNIFICANT CHANGE UP (ref 2.5–4.5)
PLAT MORPH BLD: NORMAL — SIGNIFICANT CHANGE UP
PLATELET # BLD AUTO: 143 K/UL — LOW (ref 150–400)
POIKILOCYTOSIS BLD QL AUTO: SLIGHT — SIGNIFICANT CHANGE UP
POLYCHROMASIA BLD QL SMEAR: SLIGHT — SIGNIFICANT CHANGE UP
POTASSIUM BLDA-SCNC: 4 MMOL/L — SIGNIFICANT CHANGE UP (ref 3.5–5.1)
POTASSIUM SERPL-MCNC: 3.5 MMOL/L — SIGNIFICANT CHANGE UP (ref 3.5–5.3)
POTASSIUM SERPL-MCNC: 3.9 MMOL/L — SIGNIFICANT CHANGE UP (ref 3.5–5.3)
POTASSIUM SERPL-SCNC: 3.5 MMOL/L — SIGNIFICANT CHANGE UP (ref 3.5–5.3)
POTASSIUM SERPL-SCNC: 3.9 MMOL/L — SIGNIFICANT CHANGE UP (ref 3.5–5.3)
PROCALCITONIN SERPL-MCNC: 0.61 NG/ML — HIGH (ref 0.02–0.1)
PROT SERPL-MCNC: 7.3 G/DL — SIGNIFICANT CHANGE UP (ref 6–8.3)
PROT SERPL-MCNC: 7.5 G/DL — SIGNIFICANT CHANGE UP (ref 6–8.3)
PROTHROM AB SERPL-ACNC: 11.1 SEC — SIGNIFICANT CHANGE UP (ref 9.9–13.4)
RBC # BLD: 3.63 M/UL — LOW (ref 3.8–5.2)
RBC # FLD: 17.2 % — HIGH (ref 10.3–14.5)
RBC BLD AUTO: ABNORMAL
RH IG SCN BLD-IMP: POSITIVE — SIGNIFICANT CHANGE UP
RSV RNA NPH QL NAA+NON-PROBE: SIGNIFICANT CHANGE UP
SARS-COV-2 RNA SPEC QL NAA+PROBE: SIGNIFICANT CHANGE UP
SCHISTOCYTES BLD QL AUTO: SLIGHT — SIGNIFICANT CHANGE UP
SODIUM SERPL-SCNC: 149 MMOL/L — HIGH (ref 135–145)
SODIUM SERPL-SCNC: 150 MMOL/L — HIGH (ref 135–145)
TARGETS BLD QL SMEAR: SLIGHT — SIGNIFICANT CHANGE UP
VANCOMYCIN TROUGH SERPL-MCNC: 15.7 UG/ML — SIGNIFICANT CHANGE UP (ref 10–20)
VANCOMYCIN TROUGH SERPL-MCNC: 17.2 UG/ML — SIGNIFICANT CHANGE UP (ref 10–20)
WBC # BLD: 12.41 K/UL — HIGH (ref 3.8–10.5)
WBC # FLD AUTO: 12.41 K/UL — HIGH (ref 3.8–10.5)

## 2025-01-11 PROCEDURE — 99232 SBSQ HOSP IP/OBS MODERATE 35: CPT | Mod: GC

## 2025-01-11 PROCEDURE — 99233 SBSQ HOSP IP/OBS HIGH 50: CPT | Mod: GC

## 2025-01-11 PROCEDURE — G0545: CPT

## 2025-01-11 PROCEDURE — 71045 X-RAY EXAM CHEST 1 VIEW: CPT | Mod: 26,76

## 2025-01-11 PROCEDURE — 36556 INSERT NON-TUNNEL CV CATH: CPT | Mod: LT

## 2025-01-11 PROCEDURE — 99232 SBSQ HOSP IP/OBS MODERATE 35: CPT

## 2025-01-11 PROCEDURE — 99233 SBSQ HOSP IP/OBS HIGH 50: CPT

## 2025-01-11 PROCEDURE — 99291 CRITICAL CARE FIRST HOUR: CPT | Mod: 25

## 2025-01-11 RX ORDER — FENTANYL CITRATE 50 UG/ML
50 INJECTION INTRAMUSCULAR; INTRAVENOUS ONCE
Refills: 0 | Status: DISCONTINUED | OUTPATIENT
Start: 2025-01-11 | End: 2025-01-11

## 2025-01-11 RX ORDER — ALBUMIN HUMAN 50 G/1000ML
250 SOLUTION INTRAVENOUS ONCE
Refills: 0 | Status: COMPLETED | OUTPATIENT
Start: 2025-01-11 | End: 2025-01-11

## 2025-01-11 RX ORDER — HYDRALAZINE HCL 100 MG
5 TABLET ORAL EVERY 6 HOURS
Refills: 0 | Status: DISCONTINUED | OUTPATIENT
Start: 2025-01-11 | End: 2025-02-06

## 2025-01-11 RX ORDER — POTASSIUM CHLORIDE 750 MG/1
10 TABLET, EXTENDED RELEASE ORAL ONCE
Refills: 0 | Status: COMPLETED | OUTPATIENT
Start: 2025-01-11 | End: 2025-01-11

## 2025-01-11 RX ORDER — CYANOCOBALAMIN (VITAMIN B-12) 1000MCG/ML
1000 VIAL (ML) INJECTION DAILY
Refills: 0 | Status: COMPLETED | OUTPATIENT
Start: 2025-01-11 | End: 2025-01-13

## 2025-01-11 RX ORDER — POTASSIUM CHLORIDE 750 MG/1
10 TABLET, EXTENDED RELEASE ORAL
Refills: 0 | Status: COMPLETED | OUTPATIENT
Start: 2025-01-11 | End: 2025-01-11

## 2025-01-11 RX ADMIN — ALBUMIN HUMAN 125 MILLILITER(S): 50 SOLUTION INTRAVENOUS at 09:01

## 2025-01-11 RX ADMIN — ANTISEPTIC SURGICAL SCRUB 15 MILLILITER(S): 0.04 SOLUTION TOPICAL at 05:01

## 2025-01-11 RX ADMIN — IPRATROPIUM BROMIDE AND ALBUTEROL SULFATE 3 MILLILITER(S): .5; 2.5 SOLUTION RESPIRATORY (INHALATION) at 17:17

## 2025-01-11 RX ADMIN — VANCOMYCIN HYDROCHLORIDE 100 MILLIGRAM(S): KIT at 18:52

## 2025-01-11 RX ADMIN — LEVETIRACETAM 1000 MILLIGRAM(S): 750 TABLET, FILM COATED ORAL at 02:05

## 2025-01-11 RX ADMIN — ANTISEPTIC SURGICAL SCRUB 1 APPLICATION(S): 0.04 SOLUTION TOPICAL at 05:26

## 2025-01-11 RX ADMIN — Medication 1000 MICROGRAM(S): at 12:46

## 2025-01-11 RX ADMIN — LEVETIRACETAM 1000 MILLIGRAM(S): 750 TABLET, FILM COATED ORAL at 13:10

## 2025-01-11 RX ADMIN — Medication 500 MILLIGRAM(S): at 12:46

## 2025-01-11 RX ADMIN — VANCOMYCIN HYDROCHLORIDE 125 MILLIGRAM(S): KIT at 19:20

## 2025-01-11 RX ADMIN — IPRATROPIUM BROMIDE AND ALBUTEROL SULFATE 3 MILLILITER(S): .5; 2.5 SOLUTION RESPIRATORY (INHALATION) at 05:32

## 2025-01-11 RX ADMIN — Medication 1 DROP(S): at 12:46

## 2025-01-11 RX ADMIN — IPRATROPIUM BROMIDE AND ALBUTEROL SULFATE 3 MILLILITER(S): .5; 2.5 SOLUTION RESPIRATORY (INHALATION) at 23:21

## 2025-01-11 RX ADMIN — VANCOMYCIN HYDROCHLORIDE 100 MILLIGRAM(S): KIT at 06:21

## 2025-01-11 RX ADMIN — BUMETANIDE 1 MILLIGRAM(S): 2 TABLET ORAL at 05:01

## 2025-01-11 RX ADMIN — Medication 6 UNIT(S)/MIN: at 08:11

## 2025-01-11 RX ADMIN — Medication 5000 UNIT(S): at 05:01

## 2025-01-11 RX ADMIN — POTASSIUM CHLORIDE 50 MILLIEQUIVALENT(S): 750 TABLET, EXTENDED RELEASE ORAL at 00:59

## 2025-01-11 RX ADMIN — FENTANYL CITRATE 50 MICROGRAM(S): 50 INJECTION INTRAMUSCULAR; INTRAVENOUS at 08:45

## 2025-01-11 RX ADMIN — Medication 5000 UNIT(S): at 13:06

## 2025-01-11 RX ADMIN — POTASSIUM CHLORIDE 50 MILLIEQUIVALENT(S): 750 TABLET, EXTENDED RELEASE ORAL at 13:29

## 2025-01-11 RX ADMIN — MEROPENEM 100 MILLIGRAM(S): 500 INJECTION INTRAVENOUS at 13:06

## 2025-01-11 RX ADMIN — IPRATROPIUM BROMIDE AND ALBUTEROL SULFATE 3 MILLILITER(S): .5; 2.5 SOLUTION RESPIRATORY (INHALATION) at 11:08

## 2025-01-11 RX ADMIN — MEROPENEM 100 MILLIGRAM(S): 500 INJECTION INTRAVENOUS at 05:02

## 2025-01-11 RX ADMIN — Medication 1 DROP(S): at 02:12

## 2025-01-11 RX ADMIN — Medication 1 DROP(S): at 17:35

## 2025-01-11 RX ADMIN — SODIUM NITROPRUSSIDE 3.76 MICROGRAM(S)/KG/MIN: 0.2 INJECTION, SOLUTION INTRAVENOUS at 09:02

## 2025-01-11 RX ADMIN — Medication 4 MILLILITER(S): at 05:32

## 2025-01-11 RX ADMIN — VANCOMYCIN HYDROCHLORIDE 125 MILLIGRAM(S): KIT at 08:11

## 2025-01-11 RX ADMIN — POTASSIUM CHLORIDE 50 MILLIEQUIVALENT(S): 750 TABLET, EXTENDED RELEASE ORAL at 15:20

## 2025-01-11 RX ADMIN — ANTISEPTIC SURGICAL SCRUB 15 MILLILITER(S): 0.04 SOLUTION TOPICAL at 17:36

## 2025-01-11 RX ADMIN — FENTANYL CITRATE 50 MICROGRAM(S): 50 INJECTION INTRAMUSCULAR; INTRAVENOUS at 09:00

## 2025-01-11 RX ADMIN — MEROPENEM 100 MILLIGRAM(S): 500 INJECTION INTRAVENOUS at 22:05

## 2025-01-11 RX ADMIN — Medication 1 DROP(S): at 23:36

## 2025-01-11 RX ADMIN — ALBUMIN HUMAN 125 MILLILITER(S): 50 SOLUTION INTRAVENOUS at 06:38

## 2025-01-11 RX ADMIN — MUPIROCIN 1 APPLICATION(S): 2 CREAM TOPICAL at 05:02

## 2025-01-11 RX ADMIN — Medication 1 TABLET(S): at 12:46

## 2025-01-11 RX ADMIN — POTASSIUM CHLORIDE 50 MILLIEQUIVALENT(S): 750 TABLET, EXTENDED RELEASE ORAL at 16:43

## 2025-01-11 RX ADMIN — DEXMEDETOMIDINE HYDROCHLORIDE 4.18 MICROGRAM(S)/KG/HR: 4 INJECTION, SOLUTION INTRAVENOUS at 08:11

## 2025-01-11 RX ADMIN — Medication 5 MILLIGRAM(S): at 20:06

## 2025-01-11 RX ADMIN — Medication 5000 UNIT(S): at 22:05

## 2025-01-11 RX ADMIN — PANTOPRAZOLE 40 MILLIGRAM(S): 20 TABLET, DELAYED RELEASE ORAL at 12:46

## 2025-01-11 RX ADMIN — Medication 1 DROP(S): at 05:02

## 2025-01-11 NOTE — PROGRESS NOTE ADULT - SUBJECTIVE AND OBJECTIVE BOX
PROGRESS NOTE:     Patient is a 59y old  Female who presents with a chief complaint of Mixed Shock (11 Jan 2025 10:12)    INTERVAL HISTORY: Seen and discussed with brother, who is a hospitalist. Patient is currently off sedation and pressors.     MEDICATIONS  (STANDING):  albuterol/ipratropium for Nebulization 3 milliLiter(s) Nebulizer every 6 hours  artificial tears (preservative free) Ophthalmic Solution 1 Drop(s) Both EYES four times a day  ascorbic acid 500 milliGRAM(s) Oral daily  chlorhexidine 0.12% Liquid 15 milliLiter(s) Oral Mucosa every 12 hours  chlorhexidine 2% Cloths 1 Application(s) Topical <User Schedule>  cyanocobalamin 1000 MICROGram(s) Oral daily  dexMEDEtomidine Infusion 0.2 MICROgram(s)/kG/Hr (4.18 mL/Hr) IV Continuous <Continuous>  dextrose 5%. 1000 milliLiter(s) (50 mL/Hr) IV Continuous <Continuous>  dextrose 5%. 1000 milliLiter(s) (100 mL/Hr) IV Continuous <Continuous>  dextrose 50% Injectable 25 Gram(s) IV Push once  dextrose 50% Injectable 25 Gram(s) IV Push once  dextrose 50% Injectable 12.5 Gram(s) IV Push once  glucagon  Injectable 1 milliGRAM(s) IntraMuscular once  heparin   Injectable 5000 Unit(s) SubCutaneous every 8 hours  insulin lispro (ADMELOG) corrective regimen sliding scale   SubCutaneous every 6 hours  levETIRAcetam   Injectable 1000 milliGRAM(s) IV Push every 12 hours  meropenem  IVPB 1000 milliGRAM(s) IV Intermittent every 8 hours  multivitamin 1 Tablet(s) Oral daily  pantoprazole  Injectable 40 milliGRAM(s) IV Push daily  vancomycin    Solution 125 milliGRAM(s) Oral every 12 hours  vancomycin  IVPB 500 milliGRAM(s) IV Intermittent every 12 hours  vasopressin Infusion 0.04 Unit(s)/Min (6 mL/Hr) IV Continuous <Continuous>    MEDICATIONS  (PRN):  dextrose Oral Gel 15 Gram(s) Oral once PRN Blood Glucose LESS THAN 70 milliGRAM(s)/deciliter  hydrALAZINE Injectable 5 milliGRAM(s) IV Push every 6 hours PRN HTN      CAPILLARY BLOOD GLUCOSE  118 (11 Jan 2025 12:00)      POCT Blood Glucose.: 118 mg/dL (11 Jan 2025 11:41)  POCT Blood Glucose.: 138 mg/dL (11 Jan 2025 05:08)  POCT Blood Glucose.: 111 mg/dL (10 Javed 2025 23:22)  POCT Blood Glucose.: 135 mg/dL (10 Javed 2025 17:57)    I&O's Summary    10 Javed 2025 07:01  -  11 Jan 2025 07:00  --------------------------------------------------------  IN: 2474.3 mL / OUT: 4340 mL / NET: -1865.7 mL    11 Jan 2025 07:01  -  11 Jan 2025 13:01  --------------------------------------------------------  IN: 290 mL / OUT: 490 mL / NET: -200 mL        PHYSICAL EXAM:  Vital Signs Last 24 Hrs  T(C): 37.7 (11 Jan 2025 12:00), Max: 38.4 (10 Javed 2025 22:00)  T(F): 99.9 (11 Jan 2025 12:00), Max: 101.1 (10 Javed 2025 22:00)  HR: 74 (11 Jan 2025 11:56) (69 - 118)  BP: --  BP(mean): --  RR: 24 (11 Jan 2025 11:45) (18 - 45)  SpO2: 100% (11 Jan 2025 11:56) (96% - 100%)    Parameters below as of 11 Jan 2025 12:00  Patient On (Oxygen Delivery Method): ventilator    O2 Concentration (%): 30    CONSTITUTIONAL: Intubated,   RESPIRATORY: Normal respiratory effort;   Neurologic Exam:  Mental status - Does not follow commands,     Cranial nerves - PERRL, blinks to threat, does not attend examiner but attends to light,     Motor - withdraws a bit all extremities, might be reflexic     Sensation - gramicaes to noxious stim    DTR's -             Biceps      Triceps     Brachioradialis      Patellar    Ankle    Toes/plantar response  R             2+             2+                  2+                       2+            2+                 Down  L              2+             2+                 2+                        2+           2+                 Down    Coordination - Finger to Nose intact b/l. No tremors appreciated    Gait and station - Normal casual gait. Romberg (-)        LABS:                        10.4   12.41 )-----------( 143      ( 11 Jan 2025 00:32 )             32.5     01-11    150[H]  |  109[H]  |  35[H]  ----------------------------<  109[H]  3.5   |  26  |  0.87    Ca    9.8      11 Jan 2025 12:10  Phos  3.7     01-11  Mg     2.9     01-11    TPro  7.5  /  Alb  4.3  /  TBili  2.3[H]  /  DBili  x   /  AST  57[H]  /  ALT  83[H]  /  AlkPhos  126[H]  01-11    PT/INR - ( 11 Jan 2025 00:32 )   PT: 11.1 sec;   INR: 0.97 ratio         PTT - ( 11 Jan 2025 00:32 )  PTT:23.2 sec      Urinalysis Basic - ( 11 Jan 2025 12:10 )    Color: x / Appearance: x / SG: x / pH: x  Gluc: 109 mg/dL / Ketone: x  / Bili: x / Urobili: x   Blood: x / Protein: x / Nitrite: x   Leuk Esterase: x / RBC: x / WBC x   Sq Epi: x / Non Sq Epi: x / Bacteria: x        Culture - Bronchial (collected 08 Jan 2025 16:08)  Source: Bronchial  Gram Stain (09 Jan 2025 06:50):    Rare polymorphonuclear leukocytes seen per low power field    No Squamous epithelial cells seen per low power field    No organisms seen per oil power field  Final Report (10 Javed 2025 11:31):    No growth        RADIOLOGY & ADDITIONAL TESTS:  Results Reviewed:   Imaging Personally Reviewed:  Electrocardiogram Personally Reviewed:    COORDINATION OF CARE:  Care Discussed with Consultants/Other Providers [Y/N]:  Prior or Outpatient Records Reviewed [Y/N]:   PROGRESS NOTE:     Patient is a 59y old  Female who presents with a chief complaint of Mixed Shock (11 Jan 2025 10:12)    INTERVAL HISTORY: Seen and discussed with brother, who is a hospitalist. Patient is currently off sedation and pressors.     MEDICATIONS  (STANDING):  albuterol/ipratropium for Nebulization 3 milliLiter(s) Nebulizer every 6 hours  artificial tears (preservative free) Ophthalmic Solution 1 Drop(s) Both EYES four times a day  ascorbic acid 500 milliGRAM(s) Oral daily  chlorhexidine 0.12% Liquid 15 milliLiter(s) Oral Mucosa every 12 hours  chlorhexidine 2% Cloths 1 Application(s) Topical <User Schedule>  cyanocobalamin 1000 MICROGram(s) Oral daily  dexMEDEtomidine Infusion 0.2 MICROgram(s)/kG/Hr (4.18 mL/Hr) IV Continuous <Continuous>  dextrose 5%. 1000 milliLiter(s) (50 mL/Hr) IV Continuous <Continuous>  dextrose 5%. 1000 milliLiter(s) (100 mL/Hr) IV Continuous <Continuous>  dextrose 50% Injectable 25 Gram(s) IV Push once  dextrose 50% Injectable 25 Gram(s) IV Push once  dextrose 50% Injectable 12.5 Gram(s) IV Push once  glucagon  Injectable 1 milliGRAM(s) IntraMuscular once  heparin   Injectable 5000 Unit(s) SubCutaneous every 8 hours  insulin lispro (ADMELOG) corrective regimen sliding scale   SubCutaneous every 6 hours  levETIRAcetam   Injectable 1000 milliGRAM(s) IV Push every 12 hours  meropenem  IVPB 1000 milliGRAM(s) IV Intermittent every 8 hours  multivitamin 1 Tablet(s) Oral daily  pantoprazole  Injectable 40 milliGRAM(s) IV Push daily  vancomycin    Solution 125 milliGRAM(s) Oral every 12 hours  vancomycin  IVPB 500 milliGRAM(s) IV Intermittent every 12 hours  vasopressin Infusion 0.04 Unit(s)/Min (6 mL/Hr) IV Continuous <Continuous>    MEDICATIONS  (PRN):  dextrose Oral Gel 15 Gram(s) Oral once PRN Blood Glucose LESS THAN 70 milliGRAM(s)/deciliter  hydrALAZINE Injectable 5 milliGRAM(s) IV Push every 6 hours PRN HTN      CAPILLARY BLOOD GLUCOSE  118 (11 Jan 2025 12:00)      POCT Blood Glucose.: 118 mg/dL (11 Jan 2025 11:41)  POCT Blood Glucose.: 138 mg/dL (11 Jan 2025 05:08)  POCT Blood Glucose.: 111 mg/dL (10 Javed 2025 23:22)  POCT Blood Glucose.: 135 mg/dL (10 Javed 2025 17:57)    I&O's Summary    10 Javed 2025 07:01  -  11 Jan 2025 07:00  --------------------------------------------------------  IN: 2474.3 mL / OUT: 4340 mL / NET: -1865.7 mL    11 Jan 2025 07:01  -  11 Jan 2025 13:01  --------------------------------------------------------  IN: 290 mL / OUT: 490 mL / NET: -200 mL        PHYSICAL EXAM:  Vital Signs Last 24 Hrs  T(C): 37.7 (11 Jan 2025 12:00), Max: 38.4 (10 Javed 2025 22:00)  T(F): 99.9 (11 Jan 2025 12:00), Max: 101.1 (10 Javed 2025 22:00)  HR: 74 (11 Jan 2025 11:56) (69 - 118)  BP: --  BP(mean): --  RR: 24 (11 Jan 2025 11:45) (18 - 45)  SpO2: 100% (11 Jan 2025 11:56) (96% - 100%)    Parameters below as of 11 Jan 2025 12:00  Patient On (Oxygen Delivery Method): ventilator    O2 Concentration (%): 30    CONSTITUTIONAL: Intubated,   RESPIRATORY: Normal respiratory effort;   Neurologic Exam:  Mental status - Does not follow commands,     Cranial nerves - PERRL, blinks to threat, does not attend examiner but attends to light,     Motor - withdraws a bit all extremities, might be reflexic     Sensation - grimaces to noxious stim    DTR's - 1+ in all extremities    Coordination - Does not follow commands    Gait and station - Untested        LABS:                        10.4   12.41 )-----------( 143      ( 11 Jan 2025 00:32 )             32.5     01-11    150[H]  |  109[H]  |  35[H]  ----------------------------<  109[H]  3.5   |  26  |  0.87    Ca    9.8      11 Jan 2025 12:10  Phos  3.7     01-11  Mg     2.9     01-11    TPro  7.5  /  Alb  4.3  /  TBili  2.3[H]  /  DBili  x   /  AST  57[H]  /  ALT  83[H]  /  AlkPhos  126[H]  01-11    PT/INR - ( 11 Jan 2025 00:32 )   PT: 11.1 sec;   INR: 0.97 ratio         PTT - ( 11 Jan 2025 00:32 )  PTT:23.2 sec      Urinalysis Basic - ( 11 Jan 2025 12:10 )    Color: x / Appearance: x / SG: x / pH: x  Gluc: 109 mg/dL / Ketone: x  / Bili: x / Urobili: x   Blood: x / Protein: x / Nitrite: x   Leuk Esterase: x / RBC: x / WBC x   Sq Epi: x / Non Sq Epi: x / Bacteria: x        Culture - Bronchial (collected 08 Jan 2025 16:08)  Source: Bronchial  Gram Stain (09 Jan 2025 06:50):    Rare polymorphonuclear leukocytes seen per low power field    No Squamous epithelial cells seen per low power field    No organisms seen per oil power field  Final Report (10 Javed 2025 11:31):    No growth        RADIOLOGY & ADDITIONAL TESTS:  MRI Brain w/ and w/o 1/9/2025  IMPRESSION:  1. Innumerable foci susceptibility artifact scattered throughout the brain which can be seen with critically ill patients on ECMO. Diffuse fat embolization is also part of the differential diagnosis but is considered less likely.    2. Tiny acute/subacute infarcts within the bilateral basal ganglia, thalami, and chandu with disproportionate ovoid area of T2/FLAIR prolongation in the right ventral thalamus. Findings may indicate the presence of embolic acute/subacute infarcts.    3. No abnormal intracranial enhancement.

## 2025-01-11 NOTE — PROGRESS NOTE ADULT - SUBJECTIVE AND OBJECTIVE BOX
Patient seen and examined at the bedside.    Remains critically ill on continuous ICU monitoring, at risk for life threatening decompensation.  All Labs, data reviewed. Plan of care discussed in length during multi-disciplinary ICU rounds.       Brief Summary:  60 yo F with Sickle cell disease and NICM now with Cardiogenic shock s/p VA ECMO on 1/4/25.  VA ecmo decannulation on 1/7/25     24 Hour events:  Mental status remains poor  Not tolerating PS  MRI shows multiple acute/subacte infarcts in thalamic, basal ganglia and chandu region  Stroke team following        Objective:  Vital Signs Last 24 Hrs  T(C): 37.3 (11 Jan 2025 04:00), Max: 38.4 (10 Javed 2025 22:00)  T(F): 99.1 (11 Jan 2025 04:00), Max: 101.1 (10 Javed 2025 22:00)  HR: 76 (11 Jan 2025 06:00) (69 - 118)  BP: --  BP(mean): --  RR: 22 (11 Jan 2025 06:00) (18 - 30)  SpO2: 100% (11 Jan 2025 06:00) (96% - 100%)    Parameters below as of 11 Jan 2025 05:44  Patient On (Oxygen Delivery Method): ventilator        Mode: AC/ CMV (Assist Control/ Continuous Mandatory Ventilation)  RR (machine): 12  FiO2: 30  PEEP: 5  ITime: 1  MAP: 8  PC: 8  PIP: 14              Physical Exam:   General: Intubated  Neurology: Withdrawing to noxious stimuli in all extremities, +cough, +gag  Respiratory: Breath sounds bilaterally   CV: Sinus Tachycardia   Abdominal: Soft, Nontender  Extremities: Warm, well-perfused  -------------------------------------------------------------------------------------------------------------------------------    Labs:                        10.4   12.41 )-----------( 143      ( 11 Jan 2025 00:32 )             32.5     01-11    149[H]  |  110[H]  |  32[H]  ----------------------------<  109[H]  3.9   |  25  |  0.95    Ca    9.9      11 Jan 2025 00:32  Phos  3.2     01-11  Mg     3.1     01-11    TPro  7.3  /  Alb  4.1  /  TBili  2.4[H]  /  DBili  x   /  AST  75[H]  /  ALT  93[H]  /  AlkPhos  143[H]  01-11    LIVER FUNCTIONS - ( 11 Jan 2025 00:32 )  Alb: 4.1 g/dL / Pro: 7.3 g/dL / ALK PHOS: 143 U/L / ALT: 93 U/L / AST: 75 U/L / GGT: x           PT/INR - ( 11 Jan 2025 00:32 )   PT: 11.1 sec;   INR: 0.97 ratio         PTT - ( 11 Jan 2025 00:32 )  PTT:23.2 sec  ABG - ( 11 Jan 2025 00:10 )  pH, Arterial: 7.45  pH, Blood: x     /  pCO2: 45    /  pO2: 122   / HCO3: 31    / Base Excess: 6.5   /  SaO2: 98.7                  ALL MEDICATIONS   MEDICATIONS  (STANDING):  albuterol/ipratropium for Nebulization 3 milliLiter(s) Nebulizer every 6 hours  artificial tears (preservative free) Ophthalmic Solution 1 Drop(s) Both EYES four times a day  ascorbic acid 500 milliGRAM(s) Oral daily  buMETAnide Injectable 1 milliGRAM(s) IV Push every 12 hours  chlorhexidine 0.12% Liquid 15 milliLiter(s) Oral Mucosa every 12 hours  chlorhexidine 2% Cloths 1 Application(s) Topical <User Schedule>  dexMEDEtomidine Infusion 0.2 MICROgram(s)/kG/Hr (4.18 mL/Hr) IV Continuous <Continuous>  dextrose 5%. 1000 milliLiter(s) (100 mL/Hr) IV Continuous <Continuous>  dextrose 5%. 1000 milliLiter(s) (50 mL/Hr) IV Continuous <Continuous>  dextrose 50% Injectable 25 Gram(s) IV Push once  dextrose 50% Injectable 12.5 Gram(s) IV Push once  dextrose 50% Injectable 25 Gram(s) IV Push once  glucagon  Injectable 1 milliGRAM(s) IntraMuscular once  heparin   Injectable 5000 Unit(s) SubCutaneous every 8 hours  insulin lispro (ADMELOG) corrective regimen sliding scale   SubCutaneous every 6 hours  levETIRAcetam   Injectable 1000 milliGRAM(s) IV Push every 12 hours  meropenem  IVPB 1000 milliGRAM(s) IV Intermittent every 8 hours  multivitamin 1 Tablet(s) Oral daily  nitroprusside Infusion 0.3 MICROgram(s)/kG/Min (3.76 mL/Hr) IV Continuous <Continuous>  pantoprazole  Injectable 40 milliGRAM(s) IV Push daily  vancomycin    Solution 125 milliGRAM(s) Oral every 12 hours  vancomycin  IVPB 500 milliGRAM(s) IV Intermittent every 12 hours  vasopressin Infusion 0.04 Unit(s)/Min (6 mL/Hr) IV Continuous <Continuous>    MEDICATIONS  (PRN):  dextrose Oral Gel 15 Gram(s) Oral once PRN Blood Glucose LESS THAN 70 milliGRAM(s)/deciliter  ------------------------------------------------------------------------------------------------------------------------------  Assessment:  60 yo F w/ PMHx of Sickle cell disease (on hydroxyurea), HTN, 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 Pt initially presented to [facility] for SOB and SS crisis; course c/b witnessed seizure, intubated and sedated, and transferred to [facility] for further management. Now in cardiogenic shock, transferred to [facility] for further management. Cannulated for VA ECMO on 1/4/25.    Cardiogenic shock  Acute respiratory failure  Acute blood loss anemia  Thrombocytopenia  Hyperglycemia    Plan:   ***Neuro***  Multifactorial encephalopathy  S/p embolic stroke on MRI  Seizure activity on EEG  +corneal, gag and cough, responds to name  Keppra  BID  Neurology/ stroke team input appriciated    ***Cardiovascular***  On vaso on and off, Keep Maps >65   Albumin 25 %  and Bumex  Monitor hemodynamic closely, signs of hypoperfusion     ***Pulmonary***  Acute hypoxic respiratory failure  Full vent support, not tolerating PS  Discussed tracheostomy tube placment  Nebs prn    ***GI***  protein calorie malnutrition  Tube feeds at goal  Protonix for stress ulcer prophylaxis   bowel regimen, d/c rectal tube    ***Renal***  DYLON  Trend Creatinine   Costa catheter for strict I/O measurements.   Bumex BID  Albumin 25% for hypoalbuminemia    ***ID***  MSSA bacteremia, BAL + MSSA  On vancomycin  On empiric meropenum  Leukocytosis improving    Send:  CMV viral load  HSV 1/2 PCR  EBV viral load  West Nile serology and PCR  Lyme serology  Babesia PCR    ***Endocrine***  Hyperglycemia - Insulin infusion.     ***Hematology***  Acute blood loss anemia and thrombocytopenia  Sickle Cell - s/p Red cell exchange, Hg electrophoresis daily  Hematology team still not clear about red cells exchange  Keep Plats >50, Hg >8  Heparin SC for ppx,       I, Yana Patricia MD, personally performed the services described in this documentation. all medical record entries made by the scribe were at my direction and in my presence. I have reviewed the chart and agree that the record reflects my personal performance and is accurate and complete  Electronically signed:   Yana Patricia MD  CT ICU attending     ICU time: ** mins     By signing my name below, I, Gabriella Lopez, attest that this documentation has been prepared under the direction and in the presence of Yana Patricia MD  Electronically signed: Gabriella Lopez, 01-11-25 @ 06:18             Patient seen and examined at the bedside.    Remains critically ill on continuous ICU monitoring, at risk for life threatening decompensation.  All Labs, data reviewed. Plan of care discussed in length during multi-disciplinary ICU rounds.     Brief Summary:  58 yo F with Sickle cell disease and NICM now with Cardiogenic shock s/p VA ECMO on 1/4/25.  VA ecmo decannulation on 1/7/25     24 Hour events:  Pt spiked fever overnight, cultures sent, procal 0.61, CVL lines changed  Not tolerating PS, mental status mostly unchanged  MRI shows multiple acute/subacte infarcts in thalamic, basal ganglia and chandu region  Possible plasma exchange by hematology team    Objective:  Vital Signs Last 24 Hrs  T(C): 37.3 (11 Jan 2025 04:00), Max: 38.4 (10 Javed 2025 22:00)  T(F): 99.1 (11 Jan 2025 04:00), Max: 101.1 (10 Javed 2025 22:00)  HR: 76 (11 Jan 2025 06:00) (69 - 118)  BP: --  BP(mean): --  RR: 22 (11 Jan 2025 06:00) (18 - 30)  SpO2: 100% (11 Jan 2025 06:00) (96% - 100%)    Parameters below as of 11 Jan 2025 05:44  Patient On (Oxygen Delivery Method): ventilator    Mode: AC/ CMV (Assist Control/ Continuous Mandatory Ventilation)  RR (machine): 12  FiO2: 30  PEEP: 5  ITime: 1  MAP: 8  PC: 8  PIP: 14              Physical Exam:   General: Intubated  Neurology: Withdrawing to noxious stimuli in all extremities, +cough, +gag  Respiratory: Breath sounds bilaterally   CV: Sinus Tachycardia   Abdominal: Soft, Nontender  Extremities: Warm, well-perfused  -------------------------------------------------------------------------------------------------------------------------------    Labs:                        10.4   12.41 )-----------( 143      ( 11 Jan 2025 00:32 )             32.5     01-11    149[H]  |  110[H]  |  32[H]  ----------------------------<  109[H]  3.9   |  25  |  0.95    Ca    9.9      11 Jan 2025 00:32  Phos  3.2     01-11  Mg     3.1     01-11    TPro  7.3  /  Alb  4.1  /  TBili  2.4[H]  /  DBili  x   /  AST  75[H]  /  ALT  93[H]  /  AlkPhos  143[H]  01-11    LIVER FUNCTIONS - ( 11 Jan 2025 00:32 )  Alb: 4.1 g/dL / Pro: 7.3 g/dL / ALK PHOS: 143 U/L / ALT: 93 U/L / AST: 75 U/L / GGT: x           PT/INR - ( 11 Jan 2025 00:32 )   PT: 11.1 sec;   INR: 0.97 ratio    PTT - ( 11 Jan 2025 00:32 )  PTT:23.2 sec  ABG - ( 11 Jan 2025 00:10 )  pH, Arterial: 7.45  pH, Blood: x     /  pCO2: 45    /  pO2: 122   / HCO3: 31    / Base Excess: 6.5   /  SaO2: 98.7      ALL MEDICATIONS   MEDICATIONS  (STANDING):  albuterol/ipratropium for Nebulization 3 milliLiter(s) Nebulizer every 6 hours  artificial tears (preservative free) Ophthalmic Solution 1 Drop(s) Both EYES four times a day  ascorbic acid 500 milliGRAM(s) Oral daily  buMETAnide Injectable 1 milliGRAM(s) IV Push every 12 hours  chlorhexidine 0.12% Liquid 15 milliLiter(s) Oral Mucosa every 12 hours  chlorhexidine 2% Cloths 1 Application(s) Topical <User Schedule>  dexMEDEtomidine Infusion 0.2 MICROgram(s)/kG/Hr (4.18 mL/Hr) IV Continuous <Continuous>  dextrose 5%. 1000 milliLiter(s) (100 mL/Hr) IV Continuous <Continuous>  dextrose 5%. 1000 milliLiter(s) (50 mL/Hr) IV Continuous <Continuous>  dextrose 50% Injectable 25 Gram(s) IV Push once  dextrose 50% Injectable 12.5 Gram(s) IV Push once  dextrose 50% Injectable 25 Gram(s) IV Push once  glucagon  Injectable 1 milliGRAM(s) IntraMuscular once  heparin   Injectable 5000 Unit(s) SubCutaneous every 8 hours  insulin lispro (ADMELOG) corrective regimen sliding scale   SubCutaneous every 6 hours  levETIRAcetam   Injectable 1000 milliGRAM(s) IV Push every 12 hours  meropenem  IVPB 1000 milliGRAM(s) IV Intermittent every 8 hours  multivitamin 1 Tablet(s) Oral daily  nitroprusside Infusion 0.3 MICROgram(s)/kG/Min (3.76 mL/Hr) IV Continuous <Continuous>  pantoprazole  Injectable 40 milliGRAM(s) IV Push daily  vancomycin    Solution 125 milliGRAM(s) Oral every 12 hours  vancomycin  IVPB 500 milliGRAM(s) IV Intermittent every 12 hours  vasopressin Infusion 0.04 Unit(s)/Min (6 mL/Hr) IV Continuous <Continuous>    MEDICATIONS  (PRN):  dextrose Oral Gel 15 Gram(s) Oral once PRN Blood Glucose LESS THAN 70 milliGRAM(s)/deciliter  ------------------------------------------------------------------------------------------------------------------------------  Assessment:  58 yo F w/ PMHx of Sickle cell disease (on hydroxyurea), HTN, 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 Pt initially presented to [facility] for SOB and SS crisis; course c/b witnessed seizure, intubated and sedated, and transferred to [facility] for further management. Now in cardiogenic shock, transferred to [facility] for further management. Cannulated for VA ECMO on 1/4/25.    Cardiogenic shock  Acute respiratory failure  Acute blood loss anemia  Thrombocytopenia  Hyperglycemia    Plan:   ***Neuro***  Multifactorial encephalopathy  S/p embolic stroke on MRI  Seizure activity on EEG, now improved  on keppra  Thiamine folate, B12    ***Cardiovascular***  At risk for hemodynamic decompensation  S/p VA ecmo, Bi vent function improved  Monitor hemodynamic closely, signs of hypoperfusion     ***Pulmonary***  Acute hypoxic respiratory failure  Full vent support, not tolerating PS  Discussed tracheostomy tube placement  Nebs prn  S/p bronch, follow BAL, NGD    ***GI***  protein calorie malnutrition  Tube feeds at goal  Protonix for stress ulcer prophylaxis   bowel regimen, had BMs    ***Renal***  DYLON  Trend Creatinine   Costa catheter for strict I/O measurements.   Albumin 25% for hypoalbuminemia    ***ID***  MSSA bacteremia, BAL + MSSA  On vancomycin  On empiric meropenum  Leukocytosis improving  Spiked fever, BC sent  CMV viral load  HSV 1/2 PCR  EBV viral load  West Nile serology and PCR  Lyme serology  Babesia PCR    ***Endocrine***  Hyperglycemia - Insulin infusion.     ***Hematology***  Acute blood loss anemia and thrombocytopenia  Sickle Cell - s/p Red cell exchange, Hg electrophoresis   Hematology team still not clear about red cells exchange  Keep Plats >50, Hg >8  Heparin SC for ppx,     I, Yana Patricia MD, personally performed the services described in this documentation. all medical record entries made by the scribe were at my direction and in my presence. I have reviewed the chart and agree that the record reflects my personal performance and is accurate and complete  Electronically signed:   Yana Patricia MD  CT ICU attending     ICU time: 45 mins     By signing my name below, I, Gabriella Lopez, attest that this documentation has been prepared under the direction and in the presence of Yana Patricia MD  Electronically signed: Gabriella Lopez, 01-11-25 @ 06:18             Patient seen and examined at the bedside.    Remains critically ill on continuous ICU monitoring, at risk for life threatening decompensation.  All Labs, data reviewed. Plan of care discussed in length during multi-disciplinary ICU rounds.     Brief Summary:  60 yo F with Sickle cell disease and NICM now with Cardiogenic shock s/p VA ECMO on 1/4/25.  VA ecmo decannulation on 1/7/25     24 Hour events:  Pt spiked fever overnight, cultures sent, procal 0.61, CVL lines changed  Not tolerating PS, mental status mostly unchanged  MRI shows multiple acute/subacte infarcts in thalamic, basal ganglia and chandu region  Possible plasma exchange by hematology team    Objective:  Vital Signs Last 24 Hrs  T(C): 37.3 (11 Jan 2025 04:00), Max: 38.4 (10 Javed 2025 22:00)  T(F): 99.1 (11 Jan 2025 04:00), Max: 101.1 (10 Javed 2025 22:00)  HR: 76 (11 Jan 2025 06:00) (69 - 118)  BP: --  BP(mean): --  RR: 22 (11 Jan 2025 06:00) (18 - 30)  SpO2: 100% (11 Jan 2025 06:00) (96% - 100%)    Parameters below as of 11 Jan 2025 05:44  Patient On (Oxygen Delivery Method): ventilator    Mode: AC/ CMV (Assist Control/ Continuous Mandatory Ventilation)  RR (machine): 12  FiO2: 30  PEEP: 5  ITime: 1  MAP: 8  PC: 8  PIP: 14              Physical Exam:   General: Intubated  Neurology: Withdrawing to noxious stimuli in all extremities, +cough, +gag  Respiratory: Breath sounds bilaterally   CV: Sinus Tachycardia   Abdominal: Soft, Nontender  Extremities: Warm, well-perfused  -------------------------------------------------------------------------------------------------------------------------------    Labs:                        10.4   12.41 )-----------( 143      ( 11 Jan 2025 00:32 )             32.5     01-11    149[H]  |  110[H]  |  32[H]  ----------------------------<  109[H]  3.9   |  25  |  0.95    Ca    9.9      11 Jan 2025 00:32  Phos  3.2     01-11  Mg     3.1     01-11    TPro  7.3  /  Alb  4.1  /  TBili  2.4[H]  /  DBili  x   /  AST  75[H]  /  ALT  93[H]  /  AlkPhos  143[H]  01-11    LIVER FUNCTIONS - ( 11 Jan 2025 00:32 )  Alb: 4.1 g/dL / Pro: 7.3 g/dL / ALK PHOS: 143 U/L / ALT: 93 U/L / AST: 75 U/L / GGT: x           PT/INR - ( 11 Jan 2025 00:32 )   PT: 11.1 sec;   INR: 0.97 ratio    PTT - ( 11 Jan 2025 00:32 )  PTT:23.2 sec  ABG - ( 11 Jan 2025 00:10 )  pH, Arterial: 7.45  pH, Blood: x     /  pCO2: 45    /  pO2: 122   / HCO3: 31    / Base Excess: 6.5   /  SaO2: 98.7      ALL MEDICATIONS   MEDICATIONS  (STANDING):  albuterol/ipratropium for Nebulization 3 milliLiter(s) Nebulizer every 6 hours  artificial tears (preservative free) Ophthalmic Solution 1 Drop(s) Both EYES four times a day  ascorbic acid 500 milliGRAM(s) Oral daily  buMETAnide Injectable 1 milliGRAM(s) IV Push every 12 hours  chlorhexidine 0.12% Liquid 15 milliLiter(s) Oral Mucosa every 12 hours  chlorhexidine 2% Cloths 1 Application(s) Topical <User Schedule>  dexMEDEtomidine Infusion 0.2 MICROgram(s)/kG/Hr (4.18 mL/Hr) IV Continuous <Continuous>  dextrose 5%. 1000 milliLiter(s) (100 mL/Hr) IV Continuous <Continuous>  dextrose 5%. 1000 milliLiter(s) (50 mL/Hr) IV Continuous <Continuous>  dextrose 50% Injectable 25 Gram(s) IV Push once  dextrose 50% Injectable 12.5 Gram(s) IV Push once  dextrose 50% Injectable 25 Gram(s) IV Push once  glucagon  Injectable 1 milliGRAM(s) IntraMuscular once  heparin   Injectable 5000 Unit(s) SubCutaneous every 8 hours  insulin lispro (ADMELOG) corrective regimen sliding scale   SubCutaneous every 6 hours  levETIRAcetam   Injectable 1000 milliGRAM(s) IV Push every 12 hours  meropenem  IVPB 1000 milliGRAM(s) IV Intermittent every 8 hours  multivitamin 1 Tablet(s) Oral daily  nitroprusside Infusion 0.3 MICROgram(s)/kG/Min (3.76 mL/Hr) IV Continuous <Continuous>  pantoprazole  Injectable 40 milliGRAM(s) IV Push daily  vancomycin    Solution 125 milliGRAM(s) Oral every 12 hours  vancomycin  IVPB 500 milliGRAM(s) IV Intermittent every 12 hours  vasopressin Infusion 0.04 Unit(s)/Min (6 mL/Hr) IV Continuous <Continuous>    MEDICATIONS  (PRN):  dextrose Oral Gel 15 Gram(s) Oral once PRN Blood Glucose LESS THAN 70 milliGRAM(s)/deciliter  ------------------------------------------------------------------------------------------------------------------------------  Assessment:  60 yo F w/ PMHx of Sickle cell disease (on hydroxyurea), HTN, 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 Pt initially presented to [facility] for SOB and SS crisis; course c/b witnessed seizure, intubated and sedated, and transferred to [facility] for further management. Now in cardiogenic shock, transferred to [facility] for further management. Cannulated for VA ECMO on 1/4/25.    Cardiogenic shock  Acute respiratory failure  Acute blood loss anemia  Thrombocytopenia  Hyperglycemia    Plan:   ***Neuro***  Multifactorial encephalopathy  S/p embolic stroke on MRI  Seizure activity on EEG, now improved  on keppra  Thiamine folate, B12    ***Cardiovascular***  At risk for hemodynamic decompensation  S/p VA ecmo, Bi vent function improved  Hydralazine prn for afterload reduction  Monitor hemodynamic closely, signs of hypoperfusion     ***Pulmonary***  Acute hypoxic respiratory failure  Full vent support, not tolerating PS  Discussed tracheostomy tube placement  Nebs prn  S/p bronch, follow BAL, NGD    ***GI***  protein calorie malnutrition  Tube feeds at goal  Protonix for stress ulcer prophylaxis   bowel regimen, had BMs    ***Renal***  DYLON  Trend Creatinine   Costa catheter for strict I/O measurements.   Albumin 25% for hypoalbuminemia    ***ID***  MSSA bacteremia, BAL + MSSA  On vancomycin  On empiric meropenum  Leukocytosis improving  Spiked fever, BC sent  CMV viral load  HSV 1/2 PCR  EBV viral load  West Nile serology and PCR  Lyme serology  Babesia PCR    ***Endocrine***  Hyperglycemia - Insulin infusion.     ***Hematology***  Acute blood loss anemia and thrombocytopenia  Sickle Cell - s/p Red cell exchange, Hg electrophoresis   Hematology team still not clear about red cells exchange  Keep Plats >50, Hg >8  Heparin SC for ppx,     I, Yana Patricia MD, personally performed the services described in this documentation. all medical record entries made by the scribe were at my direction and in my presence. I have reviewed the chart and agree that the record reflects my personal performance and is accurate and complete  Electronically signed:   Yana Patricia MD  CT ICU attending     ICU time: 45 mins     By signing my name below, I, Gabriella Lopez, attest that this documentation has been prepared under the direction and in the presence of Yana Patricia MD  Electronically signed: Gabriella Lopez, 01-11-25 @ 06:18

## 2025-01-11 NOTE — PROGRESS NOTE ADULT - SUBJECTIVE AND OBJECTIVE BOX
Hematology Follow-up    INTERVAL HPI/OVERNIGHT EVENTS:  Patient seen and examined.     REVIEW OF SYSTEMS: unable to assess due to mental status    Vital Signs Last 24 Hrs  T(C): 37.7 (11 Jan 2025 12:00), Max: 38.4 (10 Javed 2025 22:00)  T(F): 99.9 (11 Jan 2025 12:00), Max: 101.1 (10 Javed 2025 22:00)  HR: 102 (11 Jan 2025 15:00) (69 - 118)  BP: --  BP(mean): --  RR: 24 (11 Jan 2025 15:00) (15 - 45)  SpO2: 99% (11 Jan 2025 15:00) (96% - 100%)    Parameters below as of 11 Jan 2025 12:00  Patient On (Oxygen Delivery Method): ventilator    O2 Concentration (%): 30    PHYSICAL EXAM:    GENERAL: ill appearing  HEAD:  Atraumatic, Normocephalic  EYES: EOMI, conjunctiva and sclera clear  NECK:+ IJ catheter  CHEST/LUNG: Clear to auscultation bilaterally  HEART: Regular rate and rhythm  ABDOMEN: Soft, Nontender, Nondistended  NEUROLOGY: intubated and sedated. RASS 0 to -1 off sedation.    albuterol/ipratropium for Nebulization 3 milliLiter(s) Nebulizer every 6 hours  artificial tears (preservative free) Ophthalmic Solution 1 Drop(s) Both EYES four times a day  ascorbic acid 500 milliGRAM(s) Oral daily  chlorhexidine 0.12% Liquid 15 milliLiter(s) Oral Mucosa every 12 hours  chlorhexidine 2% Cloths 1 Application(s) Topical <User Schedule>  cyanocobalamin 1000 MICROGram(s) Oral daily  dexMEDEtomidine Infusion 0.2 MICROgram(s)/kG/Hr IV Continuous <Continuous>  dextrose 5%. 1000 milliLiter(s) IV Continuous <Continuous>  dextrose 5%. 1000 milliLiter(s) IV Continuous <Continuous>  dextrose 50% Injectable 25 Gram(s) IV Push once  dextrose 50% Injectable 25 Gram(s) IV Push once  dextrose 50% Injectable 12.5 Gram(s) IV Push once  dextrose Oral Gel 15 Gram(s) Oral once PRN  glucagon  Injectable 1 milliGRAM(s) IntraMuscular once  heparin   Injectable 5000 Unit(s) SubCutaneous every 8 hours  hydrALAZINE Injectable 5 milliGRAM(s) IV Push every 6 hours PRN  insulin lispro (ADMELOG) corrective regimen sliding scale   SubCutaneous every 6 hours  levETIRAcetam   Injectable 1000 milliGRAM(s) IV Push every 12 hours  meropenem  IVPB 1000 milliGRAM(s) IV Intermittent every 8 hours  multivitamin 1 Tablet(s) Oral daily  pantoprazole  Injectable 40 milliGRAM(s) IV Push daily  potassium chloride  10 mEq/50 mL IVPB 10 milliEquivalent(s) IV Intermittent every 1 hour  vancomycin    Solution 125 milliGRAM(s) Oral every 12 hours  vancomycin  IVPB 500 milliGRAM(s) IV Intermittent every 12 hours  vasopressin Infusion 0.04 Unit(s)/Min IV Continuous <Continuous>                              10.4   12.41 )-----------( 143      ( 11 Jan 2025 00:32 )             32.5       01-11    150[H]  |  109[H]  |  35[H]  ----------------------------<  109[H]  3.5   |  26  |  0.87    Ca    9.8      11 Jan 2025 12:10  Phos  3.7     01-11  Mg     2.9     01-11    TPro  7.5  /  Alb  4.3  /  TBili  2.3[H]  /  DBili  x   /  AST  57[H]  /  ALT  83[H]  /  AlkPhos  126[H]  01-11          ABG - ( 11 Jan 2025 00:10 )  pH, Arterial: 7.45  pH, Blood: x     /  pCO2: 45    /  pO2: 122   / HCO3: 31    / Base Excess: 6.5   /  SaO2: 98.7                Urinalysis Basic - ( 11 Jan 2025 12:10 )    Color: x / Appearance: x / SG: x / pH: x  Gluc: 109 mg/dL / Ketone: x  / Bili: x / Urobili: x   Blood: x / Protein: x / Nitrite: x   Leuk Esterase: x / RBC: x / WBC x   Sq Epi: x / Non Sq Epi: x / Bacteria: x        PT/INR - ( 11 Jan 2025 00:32 )   PT: 11.1 sec;   INR: 0.97 ratio         PTT - ( 11 Jan 2025 00:32 )  PTT:23.2 sec    Lactate Trend            CAPILLARY BLOOD GLUCOSE  118 (11 Jan 2025 12:00)      POCT Blood Glucose.: 118 mg/dL (11 Jan 2025 11:41)        Culture Results:   No growth (01-08 @ 16:08)  Culture Results:   No growth at 4 days (01-07 @ 10:17)  Culture Results:   No growth at 4 days (01-07 @ 10:17)  Culture Results:   No growth (01-05 @ 14:23)  Culture Results:   No growth at 5 days (01-05 @ 14:00)  Culture Results:   No growth at 5 days (01-05 @ 12:00)  Culture Results:   Moderate Staphylococcus aureus  Commensal fredi consistent with body site (01-04 @ 03:38)  Culture Results:   Growth in aerobic bottle: Staphylococcus epidermidis  Isolation of Coagulase negative Staphylococcus from single blood culture  sets may represent  contamination. Contact the Microbiology Department at [phone] if  susceptibility testing is needed.  clinically indicated.  Direct identification is available within approximately 3-5  hours either by Blood Panel Multiplexed PCR or Direct  MALDI-TOF. Details: [email or web address] (01-03 @ 21:56)  Culture Results:   No growth at 5 days (01-03 @ 17:50)  Culture Results:   No growth at 5 days (01-03 @ 14:00)          RADIOLOGY & ADDITIONAL TESTS:  Studies reviewed.

## 2025-01-11 NOTE — PROGRESS NOTE ADULT - SUBJECTIVE AND OBJECTIVE BOX
1/4/2025 - V-A ECMO cannulation  1/4/2025 - V-A ECMO decannulation    on vancomycin / meropenem  not on therapeutic anticoagulation  not on antiplatelet medications    intubated with 7.5 ET tube  on mechanical vent (pressure control 12 / 8/+5 / 30%)  trachea palpable with neck extended  no innominate artery palpable    OG tube in place with tube feeds on hold since midnight in preparation for possible tracheostomy      plats - 143    1/11 coags  -INR - 0.9  -ptt - 23.2 sec    CTA chest (1/2/2025) - entire stomach is in abdomen (I personally reviewed the images)

## 2025-01-11 NOTE — PROGRESS NOTE ADULT - ATTENDING COMMENTS
During the evaluation, the patient's brother was present, and I appreciated his involvement and support.    Detailed neuro exam:  ROS: Unable to obtain due to the patient is currently orally intubated.  Please note, neuro exam is very limited due to the patient on mechanical ventilation via orally intubated and on the sedation.  General: Patient is comfortably lying on bed without any acute distress.  Mental status: On verbal command, patient unable to follow any simple or complex commands.  Cranial exams: Brainstem reflexes are intact cornea, conjunctiva and gag reflexes. Face looks symmetric. Pupils are symmetric, reactive to light bilaterally.  Power: On noxious stimuli the patient had 1/5 bilateral four extremities.  Sensation: On noxious stimuli patient grimace at all four extremities.  Coordination and gait: Patient did not participate in the setting of comatose.     A/P:  Neurology consulted because of seizure. Etiology of seizure is uncertain and patient requires further workup to rule out treatable etiologies.  During my evaluation on 01/07/2024, patient found to have right gaze deviation and EEG consistent with bilateral multifocal epileptiform potentials. Clinically suspicious for seizure. Ativan 4 mg stat, Keppra 1500 mg once as extras dose and patient already received 500 mg and increase to 1000mg BID as maintenance dose. Today on 01/08/2024, on exam no gaze deviation and neuro exam in limited due to the sedation.  EEG is improved in term of Background / periodic activity / spike burden. Patient remains seizure free on EEG.   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 Clinically, she is minimally better as compared before.       Continue Keppra 1 gm BID.   Continue medical management, neuro- check and fall precaution.  GI and DVT prophylaxis.    Patient is at high risk for complications and morbidity or mortality. There is high probability of imminent or life threatening deterioration in the patient's condition.  Patient is unable or incompetent to participate in giving a history and/or making decisions and discussion is necessary for determining treatment decisions.  My cumulative time taking care of this critically ill patient is 40 minutes  If you have any further questions, please do not hesitate to contact our team.  Thank you for allowing us to participate in this patient care. During the evaluation, the patient's brother was present, and I appreciated his involvement and support.    Detailed neuro exam:  ROS: Unable to obtain due to the patient is currently orally intubated.  Please note, neuro exam is very limited due to the patient on mechanical ventilation via orally intubated and on the sedation.  General: Patient is comfortably lying on bed without any acute distress.  Mental status: On verbal command, patient unable to follow any simple or complex commands.  Cranial exams: Brainstem reflexes are intact cornea, conjunctiva and gag reflexes. Face looks symmetric. Pupils are symmetric, reactive to light bilaterally.  Power: On noxious stimuli the patient had 1/5 bilateral four extremities.  Sensation: On noxious stimuli patient grimace at all four extremities.  Coordination and gait: Patient did not participate in the setting of comatose.     A/P:  Neurology consulted because of seizure. Etiology of seizure is uncertain and patient requires further workup to rule out treatable etiologies.  During my evaluation on 01/07/2024, patient found to have right gaze deviation and EEG consistent with bilateral multifocal epileptiform potentials. Clinically suspicious for seizure. Ativan 4 mg stat, Keppra 1500 mg once as extras dose and patient already received 500 mg and increase to 1000mg BID as maintenance dose. Today on 01/08/2024, on exam no gaze deviation and neuro exam in limited due to the sedation.  EEG is improved in term of Background / periodic activity / spike burden. Patient remains seizure free on EEG.   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 Clinically, she is minimally better as compared before.     Patient may benefit from LP to rule out treatable etiologies if mental status does not improved despite off the sedation.  Continue Keppra 1 gm BID.   Continue medical management, neuro- check and fall precaution.  GI and DVT prophylaxis.    Patient is at high risk for complications and morbidity or mortality. There is high probability of imminent or life threatening deterioration in the patient's condition.  Patient is unable or incompetent to participate in giving a history and/or making decisions and discussion is necessary for determining treatment decisions.  My cumulative time taking care of this critically ill patient is 40 minutes  If you have any further questions, please do not hesitate to contact our team.  Thank you for allowing us to participate in this patient care.

## 2025-01-11 NOTE — PROGRESS NOTE ADULT - ASSESSMENT
59y (1965) man with a PMHx significant for sickle cell disease (on hydroxyurea), HTN, HFimpEF/NICM (EF 58% 10/2024)originally presented to [facility] (1/3) as a transfer from [facility]. Pt initially presented to [facility] (1/2) for SOB and SS crisis; course c/b witnessed seizure, intubated and sedated, and transferred to [facility] hospital for further management. Keppra was discontinued at [facility] due to negative EEG. At [facility] MICU pt was found to have RV failure possibly iso pulm HTN 2/2 increased tidal volumes on the ventilator. On addition, pt possibly received a lot of volume iso SS crisis. Patient in profound cardiogenic shock. Pt has been decompensating, despite Dobutamine gtt, Bumex gtt, Levophed, and addition of Vasopressin. Vasopressors requirements have gone up. Patient transferred to [facility] for R heart cath and possible [facility] ECMO. Patient was cannulated on peripheral VA ECMO (1/4). Decannulated 1/7 PM. Neurology consulted.      Impression:   Reported seizure like activity at [facility] and worsening cardiac function at OSH causing RV strain and requiring ECMO at [facility]. Pt s/p decannulation. MRI showed multiple foci and acute/subacute infarcts which would not explain the current symptoms. Likely 2/2 toxic/metabolic/infectious encephalopathy 2/2 recent cardiac dysfunction. Exam improving on first day (1/11) off pressor and sedation    Recommendations  - Infectious/metabolic workup per primary  - Wean off pressor and sedation when able  - AC/AP per primary team  - Atorvastatin per ASCVD risk   - Defer LP unless mental status does not improve after off sedation and pressors   - Pending CTA read/review, may eventually need another CTA H/N    Patient seen by attending, Glynn Buchanan. Recs not final until attending attestation

## 2025-01-11 NOTE — PROGRESS NOTE ADULT - ASSESSMENT
59F PMH Sickle cell disease (on hydroxyurea), HTN, HFimpEF/NICM (EF 58% 10/2024) presenting as a transfer from [facility]. Pt initially presented to [facility] for SOB and SS crisis; course c/b witnessed seizure, intubated and sedated, and transferred to [facility] for further management. Keppra was discontinued at [facility] due to negative EEG. At [facility] MICU pt was found to have RV failure possibly iso pulm HTN 2/2 increased tidal volumes on the ventilator, possibly due to volume overload due to IVF iso SS crisis vs fat emboli syndrome.                                                                                                           #Hb SC disease  #Cardiogenic shock on ECMO  #Unclear trigger for unresponsiveness   - patient with 1-2 sickle pain crisis per year and on hydrea, had retinal detachment s/p repair                           - patient had f/up with cardio in Sept 2024: per the note, unclear etiology of her dyspnea but possibly driven by cardiomyopathy; low suspicion of pulmonary HTN as no RV dysfunction/severe TR.  She is found to have new RV failure, requiring ECMO  - Blood bank transfusion medicine team consulted for emergent RBC exchange given critically ill with multi-organ failure  - Retic count elevated to 11.6%, LDH 1000s, bili 3.9. CXR was clear not indicative of acute chest. Smear (prior to exchange) was reviewed: Hypochromic RBCs with normochromic RBC reflecting transfused blood. Several nucleated RBC suggesting stressed bone marrow. Very few target cells and rare sickle cell. No schistocytes to suggest hemolysis.    - S/p exchange 1/4/2024, s/p ECMO decannulation 1/7/2025 c/b groin hematoma and received 2 unit prbc, platelet and cryo.  -Hb electrophoresis on 01/07: Hb A 76.8%, Hb A2 3.6%, Hb S 9.3%, HbC 10.3%    Recommendations  - Given MRI demonstrating diffuse punctate lesions that could be consistent with fat emboli and patient's otherwise unexplained mental status, will pursue an additional exchange transfusion with the goal of apheresing any remaining circulating fat emboli. Discussed with blood bank, patient is scheduled for 1100 on 1/12/25.  - Please obtain hemoglobin electrophoresis weekly. Goal HbC and HbS fraction <30% combined.   -  recommend transfusion to maintain plt > 50k in the setting of bleeding, otherwise Monitor CBC with differential daily and transfuse for platelet count >10 minimum, >20 if febrile.  - Discuss with blood bank and heme team prior to RBC transfusions to minimize risk of antibody development/transfusion reactions, aim to keep hb >7-8  - Pending full neurology eval, appreciate care by critical care, cards/heart failure -neurology suspect that seizures are not related to presentation and plan to continue keppra for now, neurology following for neurologic status.   - continue to hold hydroxyurea in the setting of thrombocytopenia.   - Repeat hemolysis labs, (CBC, CMP, LDH, reticulocyte count) daily, prefer pediatric tubes.   - Hematology team to follow; plan discussed with CTICU team  - Hematology will continue to follow    - Discussed with Dr. Dietz. Recommendations not final until attending cosignature.    Lauro Gregory MD  PGY-4, Hematology-Oncology  Pager:[phone]  Available on Teams

## 2025-01-11 NOTE — PROGRESS NOTE ADULT - ATTENDING COMMENTS
59F PMH Sickle cell disease (on hydroxyurea), HTN, 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 Pt initially presented to [facility] for SOB and SS crisis; course c/b witnessed seizure, intubated and sedated, and transferred to [facility] for further management. Keppra was discontinued at [facility] due to negative EEG. At [facility] MICU pt was found to have RV failure possibly iso pulm HTN 2/2 increased tidal volumes on the ventilator, possibly due to volume overload due to IVF iso SS crisis.     Pt is in profound cardiogenic shock. Pt has been decompensating, despite Dobutamine gtt, Bumex gtt, Levophed, and addition of Vasopressin. Vasopressors requirements have gone up. [facility] shock team was called and pt transferred to [facility] for further management. Patient was cannulated on peripheral VA ECMO.                                                                                                            #Hb SC disease  #Cardiogenic shock on ECMO  #Unclear trigger for unresponsiveness   - patient with 1-2 sickle pain crisis per year and on hydrea, had retinal detachment s/p repair                           - patient had f/up with cardio in Sept 2024: per the note, unclear etiology of her dyspnea but possibly driven by cardiomyopathy; low suspicion of pulmonary HTN as no RV dysfunction/severe TR.  She is found to have new RV failure, requiring ECMO  - Blood bank transfusion medicine team consulted for emergent RBC exchange given critically ill with multi-organ failure  - Retic count elevated to 11.6%, LDH 1000s, bili 3.9. CXR was clear not indicative of acute chest. Smear (prior to exchange) was reviewed: Hypochromic RBCs with normochromic RBC reflecting transfused blood. Several nucleated RBC suggesting stressed bone marrow. Very few target cells and rare sickle cell. No schistocytes to suggest hemolysis.    - S/p exchange 1/4/2024, s/p ECMO decannulation 1/7/2025 c/b groin hematoma and received 2 unit prbc, platelet and cryo.  -Hb electrophoresis on 01/07: Hb A 76.8%, Hb A2 3.6%, Hb S 9.3%, HbC 10.3%    Recommendations  - Hb electrophoresis ordered today. consider repeat exchange if Hb C and Hb S fraction>30% on Hb electrophoresis; blood bank aware to help with exchange recommendations. Discussed at conference yesterday. Recommend to order Hb electrophoresis every week while critically ill.   -  recommend transfusion to maintain plt > 50k in the setting of bleeding, otherwise Monitor CBC with differential daily and transfuse for platelet count >10 minimum, >20 if febrile.  - Discuss with blood bank and heme team prior to RBC transfusions to minimize risk of antibody development/transfusion reactions, aim to keep hb >7-8  - Pending full neurology eval, appreciate care by critical care, cards/heart failure -neurology suspect that seizures are not related to presentation and plan to continue keppra for now, neurology following for neurologic status.   -continue to hold hydroxyurea in the setting of thrombocytopenia.   - Repeat hemolysis labs, (CBC, CMP, LDH, reticulocyte count) daily, prefer pediatric tubes.   - Hematology team to follow; plan discussed with CTICU and neurology teams Patient with sickle cell disease/crisis.  Remains intubated.  Sedation off as of this morning, patient not waking up thus far.    Concern for fat embolism syndrome based on MRI findings.    C/D/W ICU attending and Blood Bank attending:  proceed with additional PRBC exchange transfusion tomorrow as treatment for fat embolism syndrome.      D/W patient's brother (physician at [facility]) and son.      Hever Dietz MD   (Weekend Coverage)

## 2025-01-11 NOTE — PROGRESS NOTE ADULT - ASSESSMENT
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 Pt initially presented to [facility] for SOB and SS crisis; course c/b witnessed seizure, intubated and sedated, and transferred to [facility] for further management. Keppra was discontinued at [facility] due to negative EEG. At [facility] MICU pt was found to have RV failure possibly iso pulm HTN 2/2 increased tidal volumes on the ventilator. On addition, pt possibly received a lot of volume iso SS crisis. Neuro was consulted for seizures and EEG technician was called for vEEG placement. Pt is in profound cardiogenic shock. Pt has been decompensating, despite Dobutamine gtt, Bumex gtt, Levophed, and addition of Vasopressin. Vasopressors requirements have gone up. [facility] shock team was called and pt is being transferred to [facility] for further management.     Patient with multiple medications allergies listed  MSSA in sputum culture from ET tube  Coag negative Staph epi. in single blood culture drawn 1/3 with repeat blood cultures x2 sets pending  given dose of Vancomycin    # sickle cell SC disease  # multiple medication allergies and patient is intubated and unable to answer specific questions about allergy types  # multiple lines, ECMO- removed,  # mental status- secondary to small CVAs?, doubt infection but encephalitis studies sent and pending and plans for CSF sampling    Would:  Encephalitis studies sent:  CMV viral load negative in serum  HSV 1/2 PCR negative in serum  West Nile serology and PCR pending  Lyme serology negative  Babesia PCR-ordered    acute hepatitis serology negative  Coxsackie A nonreactive, B low titers    Staph epi bacteremia in a single set of cultures  repeat specimens sent on 1/7 are no growth  likely procurement contaminant    sputum with MSSA colonization vs [id number] from prior viral illness      will plan to complete an empiric course of antibiotics for 7-10days with Vancomycin plus Meropenem unless status changes    Pedro Shepherd MD  Can be called via Teams  After 5pm/weekends [phone]

## 2025-01-11 NOTE — PROGRESS NOTE ADULT - ASSESSMENT
acute respiratory failure  -her family is awaiting a prediction of neurologic prognosis from the neurology team prior to deciding on tracheostomy  -plan bedside percutaneous tracheostomy if her family desires this procedure      care coordinated with CTU Dr Patricia. We discussed that the family has not yet decided about proceeding with tracheostomy.

## 2025-01-12 LAB
ADD ON TEST-SPECIMEN IN LAB: SIGNIFICANT CHANGE UP
ALBUMIN SERPL ELPH-MCNC: 3.8 G/DL — SIGNIFICANT CHANGE UP (ref 3.3–5)
ALP SERPL-CCNC: 118 U/L — SIGNIFICANT CHANGE UP (ref 40–120)
ALT FLD-CCNC: 100 U/L — HIGH (ref 10–45)
ANION GAP SERPL CALC-SCNC: 12 MMOL/L — SIGNIFICANT CHANGE UP (ref 5–17)
APTT BLD: 24.2 SEC — LOW (ref 24.5–35.6)
AST SERPL-CCNC: 79 U/L — HIGH (ref 10–40)
BASE EXCESS BLDV CALC-SCNC: 3.4 MMOL/L — HIGH (ref -2–3)
BASOPHILS # BLD AUTO: 0.04 K/UL — SIGNIFICANT CHANGE UP (ref 0–0.2)
BASOPHILS NFR BLD AUTO: 0.3 % — SIGNIFICANT CHANGE UP (ref 0–2)
BILIRUB SERPL-MCNC: 1.7 MG/DL — HIGH (ref 0.2–1.2)
BUN SERPL-MCNC: 34 MG/DL — HIGH (ref 7–23)
CALCIUM SERPL-MCNC: 9.8 MG/DL — SIGNIFICANT CHANGE UP (ref 8.4–10.5)
CHLORIDE SERPL-SCNC: 112 MMOL/L — HIGH (ref 96–108)
CO2 BLDV-SCNC: 30 MMOL/L — HIGH (ref 22–26)
CO2 SERPL-SCNC: 24 MMOL/L — SIGNIFICANT CHANGE UP (ref 22–31)
CREAT SERPL-MCNC: 0.82 MG/DL — SIGNIFICANT CHANGE UP (ref 0.5–1.3)
CULTURE RESULTS: SIGNIFICANT CHANGE UP
CULTURE RESULTS: SIGNIFICANT CHANGE UP
EGFR: 83 ML/MIN/1.73M2 — SIGNIFICANT CHANGE UP
EOSINOPHIL # BLD AUTO: 0.32 K/UL — SIGNIFICANT CHANGE UP (ref 0–0.5)
EOSINOPHIL NFR BLD AUTO: 2.7 % — SIGNIFICANT CHANGE UP (ref 0–6)
FIBRINOGEN PPP-MCNC: 318 MG/DL — SIGNIFICANT CHANGE UP (ref 200–445)
GAS PNL BLDA: SIGNIFICANT CHANGE UP
GAS PNL BLDV: SIGNIFICANT CHANGE UP
GLUCOSE BLDC GLUCOMTR-MCNC: 128 MG/DL — HIGH (ref 70–99)
GLUCOSE BLDC GLUCOMTR-MCNC: 147 MG/DL — HIGH (ref 70–99)
GLUCOSE BLDC GLUCOMTR-MCNC: 148 MG/DL — HIGH (ref 70–99)
GLUCOSE SERPL-MCNC: 136 MG/DL — HIGH (ref 70–99)
HAPTOGLOB SERPL-MCNC: 135 MG/DL — SIGNIFICANT CHANGE UP (ref 34–200)
HCO3 BLDV-SCNC: 28 MMOL/L — SIGNIFICANT CHANGE UP (ref 22–29)
HCT VFR BLD CALC: 30 % — LOW (ref 34.5–45)
HGB BLD-MCNC: 9.6 G/DL — LOW (ref 11.5–15.5)
HOROWITZ INDEX BLDV+IHG-RTO: 30 — SIGNIFICANT CHANGE UP
IMM GRANULOCYTES NFR BLD AUTO: 1.8 % — HIGH (ref 0–0.9)
INR BLD: 1.01 RATIO — SIGNIFICANT CHANGE UP (ref 0.85–1.16)
LDH SERPL L TO P-CCNC: 633 U/L — HIGH (ref 50–242)
LYMPHOCYTES # BLD AUTO: 1.69 K/UL — SIGNIFICANT CHANGE UP (ref 1–3.3)
LYMPHOCYTES # BLD AUTO: 14.2 % — SIGNIFICANT CHANGE UP (ref 13–44)
MAGNESIUM SERPL-MCNC: 2.7 MG/DL — HIGH (ref 1.6–2.6)
MCHC RBC-ENTMCNC: 29 PG — SIGNIFICANT CHANGE UP (ref 27–34)
MCHC RBC-ENTMCNC: 32 G/DL — SIGNIFICANT CHANGE UP (ref 32–36)
MCV RBC AUTO: 90.6 FL — SIGNIFICANT CHANGE UP (ref 80–100)
MONOCYTES # BLD AUTO: 0.8 K/UL — SIGNIFICANT CHANGE UP (ref 0–0.9)
MONOCYTES NFR BLD AUTO: 6.7 % — SIGNIFICANT CHANGE UP (ref 2–14)
NEUTROPHILS # BLD AUTO: 8.83 K/UL — HIGH (ref 1.8–7.4)
NEUTROPHILS NFR BLD AUTO: 74.3 % — SIGNIFICANT CHANGE UP (ref 43–77)
NRBC # BLD: 1 /100 WBCS — HIGH (ref 0–0)
NRBC BLD-RTO: 1 /100 WBCS — HIGH (ref 0–0)
PCO2 BLDV: 44 MMHG — HIGH (ref 39–42)
PH BLDV: 7.42 — SIGNIFICANT CHANGE UP (ref 7.32–7.43)
PHOSPHATE SERPL-MCNC: 2.4 MG/DL — LOW (ref 2.5–4.5)
PLATELET # BLD AUTO: 141 K/UL — LOW (ref 150–400)
PO2 BLDV: 32 MMHG — SIGNIFICANT CHANGE UP (ref 25–45)
POTASSIUM BLDA-SCNC: 4 MMOL/L — SIGNIFICANT CHANGE UP (ref 3.5–5.1)
POTASSIUM SERPL-MCNC: 3.6 MMOL/L — SIGNIFICANT CHANGE UP (ref 3.5–5.3)
POTASSIUM SERPL-SCNC: 3.6 MMOL/L — SIGNIFICANT CHANGE UP (ref 3.5–5.3)
PROT SERPL-MCNC: 6.9 G/DL — SIGNIFICANT CHANGE UP (ref 6–8.3)
PROTHROM AB SERPL-ACNC: 11.6 SEC — SIGNIFICANT CHANGE UP (ref 9.9–13.4)
RBC # BLD: 3.31 M/UL — LOW (ref 3.8–5.2)
RBC # FLD: 17.2 % — HIGH (ref 10.3–14.5)
RETICS #: 135.9 K/UL — HIGH (ref 25–125)
RETICS/RBC NFR: 4.2 % — HIGH (ref 0.5–2.5)
SAO2 % BLDV: 61 % — LOW (ref 67–88)
SODIUM SERPL-SCNC: 148 MMOL/L — HIGH (ref 135–145)
SPECIMEN SOURCE: SIGNIFICANT CHANGE UP
SPECIMEN SOURCE: SIGNIFICANT CHANGE UP
VANCOMYCIN TROUGH SERPL-MCNC: 15.2 UG/ML — SIGNIFICANT CHANGE UP (ref 10–20)
WBC # BLD: 11.89 K/UL — HIGH (ref 3.8–10.5)
WBC # FLD AUTO: 11.89 K/UL — HIGH (ref 3.8–10.5)

## 2025-01-12 PROCEDURE — 99233 SBSQ HOSP IP/OBS HIGH 50: CPT

## 2025-01-12 PROCEDURE — 36512 APHERESIS RBC: CPT

## 2025-01-12 PROCEDURE — 99233 SBSQ HOSP IP/OBS HIGH 50: CPT | Mod: GC

## 2025-01-12 PROCEDURE — 99292 CRITICAL CARE ADDL 30 MIN: CPT | Mod: 25

## 2025-01-12 PROCEDURE — 99291 CRITICAL CARE FIRST HOUR: CPT | Mod: 25

## 2025-01-12 PROCEDURE — 99231 SBSQ HOSP IP/OBS SF/LOW 25: CPT | Mod: 25

## 2025-01-12 PROCEDURE — 71045 X-RAY EXAM CHEST 1 VIEW: CPT | Mod: 26,76

## 2025-01-12 PROCEDURE — 36556 INSERT NON-TUNNEL CV CATH: CPT | Mod: RT

## 2025-01-12 PROCEDURE — G0545: CPT

## 2025-01-12 RX ORDER — ANTISEPTIC SURGICAL SCRUB 0.04 MG/ML
1 SOLUTION TOPICAL
Refills: 0 | Status: DISCONTINUED | OUTPATIENT
Start: 2025-01-12 | End: 2025-02-06

## 2025-01-12 RX ORDER — POTASSIUM CHLORIDE 750 MG/1
10 TABLET, EXTENDED RELEASE ORAL
Refills: 0 | Status: COMPLETED | OUTPATIENT
Start: 2025-01-12 | End: 2025-01-12

## 2025-01-12 RX ORDER — SOD PHOSPHATE,MONOBASIC-DIBAS 3MMOL/ML
15 VIAL (ML) INTRAVENOUS ONCE
Refills: 0 | Status: COMPLETED | OUTPATIENT
Start: 2025-01-12 | End: 2025-01-12

## 2025-01-12 RX ORDER — ACETAMINOPHEN 160 MG/5ML
1000 SUSPENSION ORAL ONCE
Refills: 0 | Status: COMPLETED | OUTPATIENT
Start: 2025-01-12 | End: 2025-01-12

## 2025-01-12 RX ORDER — BUMETANIDE 2 MG/1
2 TABLET ORAL ONCE
Refills: 0 | Status: COMPLETED | OUTPATIENT
Start: 2025-01-12 | End: 2025-01-12

## 2025-01-12 RX ORDER — BACTERIOSTATIC SODIUM CHLORIDE 0.9 %
10 VIAL (ML) INJECTION
Refills: 0 | Status: DISCONTINUED | OUTPATIENT
Start: 2025-01-12 | End: 2025-02-06

## 2025-01-12 RX ADMIN — MEROPENEM 100 MILLIGRAM(S): 500 INJECTION INTRAVENOUS at 14:42

## 2025-01-12 RX ADMIN — MEROPENEM 100 MILLIGRAM(S): 500 INJECTION INTRAVENOUS at 05:03

## 2025-01-12 RX ADMIN — IPRATROPIUM BROMIDE AND ALBUTEROL SULFATE 3 MILLILITER(S): .5; 2.5 SOLUTION RESPIRATORY (INHALATION) at 05:20

## 2025-01-12 RX ADMIN — POTASSIUM CHLORIDE 50 MILLIEQUIVALENT(S): 750 TABLET, EXTENDED RELEASE ORAL at 22:43

## 2025-01-12 RX ADMIN — Medication 5000 UNIT(S): at 14:42

## 2025-01-12 RX ADMIN — Medication 63.75 MILLIMOLE(S): at 02:46

## 2025-01-12 RX ADMIN — POTASSIUM CHLORIDE 50 MILLIEQUIVALENT(S): 750 TABLET, EXTENDED RELEASE ORAL at 02:00

## 2025-01-12 RX ADMIN — Medication 500 MILLIGRAM(S): at 11:08

## 2025-01-12 RX ADMIN — ANTISEPTIC SURGICAL SCRUB 15 MILLILITER(S): 0.04 SOLUTION TOPICAL at 05:04

## 2025-01-12 RX ADMIN — VANCOMYCIN HYDROCHLORIDE 100 MILLIGRAM(S): KIT at 06:12

## 2025-01-12 RX ADMIN — Medication 1000 MICROGRAM(S): at 11:08

## 2025-01-12 RX ADMIN — ACETAMINOPHEN 400 MILLIGRAM(S): 160 SUSPENSION ORAL at 12:10

## 2025-01-12 RX ADMIN — PANTOPRAZOLE 40 MILLIGRAM(S): 20 TABLET, DELAYED RELEASE ORAL at 11:08

## 2025-01-12 RX ADMIN — LEVETIRACETAM 1000 MILLIGRAM(S): 750 TABLET, FILM COATED ORAL at 14:41

## 2025-01-12 RX ADMIN — POTASSIUM CHLORIDE 50 MILLIEQUIVALENT(S): 750 TABLET, EXTENDED RELEASE ORAL at 01:12

## 2025-01-12 RX ADMIN — Medication 1 DROP(S): at 11:09

## 2025-01-12 RX ADMIN — IPRATROPIUM BROMIDE AND ALBUTEROL SULFATE 3 MILLILITER(S): .5; 2.5 SOLUTION RESPIRATORY (INHALATION) at 23:10

## 2025-01-12 RX ADMIN — POTASSIUM CHLORIDE 50 MILLIEQUIVALENT(S): 750 TABLET, EXTENDED RELEASE ORAL at 00:31

## 2025-01-12 RX ADMIN — IPRATROPIUM BROMIDE AND ALBUTEROL SULFATE 3 MILLILITER(S): .5; 2.5 SOLUTION RESPIRATORY (INHALATION) at 17:04

## 2025-01-12 RX ADMIN — Medication 5000 UNIT(S): at 21:13

## 2025-01-12 RX ADMIN — Medication 5000 UNIT(S): at 05:03

## 2025-01-12 RX ADMIN — Medication 1 TABLET(S): at 11:08

## 2025-01-12 RX ADMIN — VANCOMYCIN HYDROCHLORIDE 125 MILLIGRAM(S): KIT at 09:30

## 2025-01-12 RX ADMIN — BUMETANIDE 2 MILLIGRAM(S): 2 TABLET ORAL at 12:10

## 2025-01-12 RX ADMIN — IPRATROPIUM BROMIDE AND ALBUTEROL SULFATE 3 MILLILITER(S): .5; 2.5 SOLUTION RESPIRATORY (INHALATION) at 11:13

## 2025-01-12 RX ADMIN — ANTISEPTIC SURGICAL SCRUB 15 MILLILITER(S): 0.04 SOLUTION TOPICAL at 18:11

## 2025-01-12 RX ADMIN — VANCOMYCIN HYDROCHLORIDE 125 MILLIGRAM(S): KIT at 20:12

## 2025-01-12 RX ADMIN — Medication 1 DROP(S): at 05:04

## 2025-01-12 RX ADMIN — VANCOMYCIN HYDROCHLORIDE 100 MILLIGRAM(S): KIT at 18:12

## 2025-01-12 RX ADMIN — Medication 1 DROP(S): at 18:11

## 2025-01-12 RX ADMIN — ANTISEPTIC SURGICAL SCRUB 1 APPLICATION(S): 0.04 SOLUTION TOPICAL at 05:09

## 2025-01-12 RX ADMIN — ACETAMINOPHEN 1000 MILLIGRAM(S): 160 SUSPENSION ORAL at 12:32

## 2025-01-12 RX ADMIN — POTASSIUM CHLORIDE 50 MILLIEQUIVALENT(S): 750 TABLET, EXTENDED RELEASE ORAL at 23:00

## 2025-01-12 RX ADMIN — MEROPENEM 100 MILLIGRAM(S): 500 INJECTION INTRAVENOUS at 21:13

## 2025-01-12 RX ADMIN — LEVETIRACETAM 1000 MILLIGRAM(S): 750 TABLET, FILM COATED ORAL at 02:46

## 2025-01-12 NOTE — PROGRESS NOTE ADULT - ASSESSMENT
acute respiratory failure  -currently starting an RBC exchange  -her family remains undecided about goals of care  -please call our team if her family desires tracheostomy    care coordinated with CTU Dr Patricia acute respiratory failure  -currently starting an RBC exchange for sickle cell disease  -her family remains undecided about goals of care  -please call our team if her family desires tracheostomy    care coordinated with CTU Dr Patricia

## 2025-01-12 NOTE — PROGRESS NOTE ADULT - ASSESSMENT
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 Pt initially presented to [facility] for SOB and SS crisis; course c/b witnessed seizure, intubated and sedated, and transferred to [facility] for further management. Keppra was discontinued at [facility] due to negative EEG. At [facility] MICU pt was found to have RV failure possibly iso pulm HTN 2/2 increased tidal volumes on the ventilator. On addition, pt possibly received a lot of volume iso SS crisis. Neuro was consulted for seizures and EEG technician was called for vEEG placement. Pt is in profound cardiogenic shock. Pt has been decompensating, despite Dobutamine gtt, Bumex gtt, Levophed, and addition of Vasopressin. Vasopressors requirements have gone up. NS shock team was called and pt is being transferred to [facility] for further management.     Patient with multiple medications allergies listed  MSSA in sputum culture from ET tube  Coag negative Staph epi. in single blood culture drawn 1/3 with repeat blood cultures x2 sets pending  given dose of Vancomycin    # sickle cell SC disease  # multiple medication allergies and patient is intubated and unable to answer specific questions about allergy types  # multiple lines, ECMO- removed, cardiac function improving  # mental status- secondary to small CVAs, prior seizures,   -doubt infection but encephalitis studies sent and pending and plans for CSF sampling    Would:  Encephalitis studies sent:  CMV viral load negative in serum  HSV 1/2 PCR negative in serum  West Nile serology and PCR pending  Lyme serology negative  Babesia PCR-ordered    acute hepatitis serology negative  Coxsackie A nonreactive, B low titers    Staph epi bacteremia in a single set of cultures  repeat specimens sent on 1/7 are no growth  likely procurement contaminant    sputum with MSSA colonization vs superinfection/pneumonia from prior viral illness      will plan to complete an empiric course of antibiotics for 7-10days with Vancomycin plus Meropenem unless status changes    Pedro Shepherd MD  Can be called via Teams  After 5pm/weekends [phone]

## 2025-01-12 NOTE — PROCEDURE NOTE - NSPROCDETAILS_GEN_ALL_CORE
guidewire recovered/lumen(s) aspirated and flushed/sterile dressing applied/sterile technique, catheter placed/ultrasound guidance with use of sterile gel and probe cove
location identified, draped/prepped, sterile technique used, needle inserted/introduced/positive blood return obtained via catheter/connected to a pressurized flush line/sutured in place/all materials/supplies accounted for at end of procedure
guidewire recovered/lumen(s) aspirated and flushed/sterile dressing applied/sterile technique, catheter placed/ultrasound guidance with use of sterile gel and probe cove

## 2025-01-12 NOTE — PROCEDURE NOTE - NSINDICATIONS_GEN_A_CORE
critical illness/hemodynamic monitoring/venous access
critical illness
critical illness/venous access
blood sampling/critical patient/monitoring purposes
dialysis/CRRT

## 2025-01-12 NOTE — PROGRESS NOTE ADULT - SUBJECTIVE AND OBJECTIVE BOX
INFECTIOUS DISEASES FOLLOW UP-- Ruthie Joao  [phone]    This is a follow up note for this  59yFemale with  Cardiogenic shock  remains unresopnsive, intubated, posturing on right arm  started PRBC exchange for her sickle cell disease      ROS:  CONSTITUTIONAL:  Non interactive, opens eyes    Allergies    doxycycline (Angioedema)  penicillin (Unknown)  clindamycin (Angioedema)  linezolid (Angioedema)    Intolerances        ANTIBIOTICS/RELEVANT:  antimicrobials  meropenem  IVPB 1000 milliGRAM(s) IV Intermittent every 8 hours  vancomycin    Solution 125 milliGRAM(s) Oral every 12 hours  vancomycin  IVPB 500 milliGRAM(s) IV Intermittent every 12 hours    immunologic:    OTHER:  albuterol/ipratropium for Nebulization 3 milliLiter(s) Nebulizer every 6 hours  artificial tears (preservative free) Ophthalmic Solution 1 Drop(s) Both EYES four times a day  ascorbic acid 500 milliGRAM(s) Oral daily  chlorhexidine 0.12% Liquid 15 milliLiter(s) Oral Mucosa every 12 hours  chlorhexidine 2% Cloths 1 Application(s) Topical <User Schedule>  chlorhexidine 4% Liquid 1 Application(s) Topical <User Schedule>  cyanocobalamin 1000 MICROGram(s) Oral daily  dexMEDEtomidine Infusion 0.2 MICROgram(s)/kG/Hr IV Continuous <Continuous>  dextrose 5%. 1000 milliLiter(s) IV Continuous <Continuous>  dextrose 5%. 1000 milliLiter(s) IV Continuous <Continuous>  dextrose 50% Injectable 25 Gram(s) IV Push once  dextrose 50% Injectable 12.5 Gram(s) IV Push once  dextrose 50% Injectable 25 Gram(s) IV Push once  dextrose Oral Gel 15 Gram(s) Oral once PRN  glucagon  Injectable 1 milliGRAM(s) IntraMuscular once  heparin   Injectable 5000 Unit(s) SubCutaneous every 8 hours  hydrALAZINE Injectable 5 milliGRAM(s) IV Push every 6 hours PRN  insulin lispro (ADMELOG) corrective regimen sliding scale   SubCutaneous every 6 hours  levETIRAcetam   Injectable 1000 milliGRAM(s) IV Push every 12 hours  multivitamin 1 Tablet(s) Oral daily  pantoprazole  Injectable 40 milliGRAM(s) IV Push daily  sodium chloride 0.9% lock flush 10 milliLiter(s) IV Push every 1 hour PRN  vasopressin Infusion 0.04 Unit(s)/Min IV Continuous <Continuous>      Objective:  Vital Signs Last 24 Hrs  T(C): 37.6 (12 Jan 2025 12:00), Max: 37.9 (11 Jan 2025 16:00)  T(F): 99.7 (12 Jan 2025 12:00), Max: 100.2 (11 Jan 2025 16:00)  HR: 94 (12 Jan 2025 14:00) (72 - 122)  BP: 124/68 (12 Jan 2025 08:00) (124/68 - 124/68)  BP(mean): 90 (12 Jan 2025 08:00) (90 - 90)  RR: 20 (12 Jan 2025 14:00) (20 - 31)  SpO2: 100% (12 Jan 2025 14:00) (94% - 100%)    Parameters below as of 12 Jan 2025 12:00  Patient On (Oxygen Delivery Method): ventilator    O2 Concentration (%): 30    PHYSICAL EXAM:  Constitutional:no change in status  Eyes:VICKY, EOMI  Ear/Nose/Throat: no oral lesions, CVC for exchange transfusion	  Respiratory: coarse BL  Cardiovascular: S1S2  Gastrointestinal:soft, (+) BS, no tenderness  Extremities:no e/e/c  No Lymphadenopathy  IV sites not inflammed.    LABS:                        9.6    11.89 )-----------( 141      ( 12 Jan 2025 00:40 )             30.0     01-12    148[H]  |  112[H]  |  34[H]  ----------------------------<  136[H]  3.6   |  24  |  0.82    Ca    9.8      12 Jan 2025 00:40  Phos  2.4     01-12  Mg     2.7     01-12    TPro  6.9  /  Alb  3.8  /  TBili  1.7[H]  /  DBili  x   /  AST  79[H]  /  ALT  100[H]  /  AlkPhos  118  01-12    PT/INR - ( 12 Jan 2025 00:40 )   PT: 11.6 sec;   INR: 1.01 ratio         PTT - ( 12 Jan 2025 00:40 )  PTT:24.2 sec  Urinalysis Basic - ( 12 Jan 2025 00:40 )    Color: x / Appearance: x / SG: x / pH: x  Gluc: 136 mg/dL / Ketone: x  / Bili: x / Urobili: x   Blood: x / Protein: x / Nitrite: x   Leuk Esterase: x / RBC: x / WBC x   Sq Epi: x / Non Sq Epi: x / Bacteria: x        MICROBIOLOGY:            RECENT CULTURES:  01-10 @ 21:15  .Blood BLOOD  --  --  --    No growth at 24 hours  --  01-10 @ 20:35  .Blood BLOOD  --  --  --    No growth at 24 hours  --  01-08 @ 16:08  Bronchial  --  --  --    No growth  --  01-07 @ 10:17  .Blood BLOOD  --  --  --    No growth at 5 days  --      RADIOLOGY & ADDITIONAL STUDIES:

## 2025-01-12 NOTE — PROGRESS NOTE ADULT - ASSESSMENT
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 Pt initially presented to [facility] for SOB and SS crisis; course c/b witnessed seizure, intubated and sedated, and transferred to [facility] for further management. Keppra was discontinued at [facility] due to negative EEG. At [facility] MICU pt was found to have RV failure possibly iso pulm HTN 2/2 increased tidal volumes on the ventilator, possibly due to volume overload due to IVF iso SS crisis vs fat emboli syndrome.                                                                                                           #Hb SC disease  #Cardiogenic shock on ECMO  #Unclear trigger for unresponsiveness   - patient with 1-2 sickle pain crisis per year and on hydrea, had retinal detachment s/p repair                           - patient had f/up with cardio in Sept 2024: per the note, unclear etiology of her dyspnea but possibly driven by cardiomyopathy; low suspicion of pulmonary HTN as no RV dysfunction/severe TR.  She is found to have new RV failure, requiring ECMO  - Blood bank transfusion medicine team consulted for emergent RBC exchange given critically ill with multi-organ failure  - Retic count elevated to 11.6%, LDH 1000s, bili 3.9. CXR was clear not indicative of acute chest. Smear (prior to exchange) was reviewed: Hypochromic RBCs with normochromic RBC reflecting transfused blood. Several nucleated RBC suggesting stressed bone marrow. Very few target cells and rare sickle cell. No schistocytes to suggest hemolysis.    - S/p exchange 1/4/2024, s/p ECMO decannulation 1/7/2025 c/b groin hematoma and received 2 unit prbc, platelet and cryo.  -Hb electrophoresis on 01/07: Hb A 76.8%, Hb A2 3.6%, Hb S 9.3%, HbC 10.3%    Recommendations  - Given MRI demonstrating diffuse punctate lesions that could be consistent with fat emboli and patient's otherwise unexplained mental status, will pursue an additional exchange transfusion with the goal of apheresing any remaining circulating fat emboli. Discussed with blood bank, patient is scheduled for today on 1/12/25.  - Please obtain hemoglobin electrophoresis weekly. Goal HbC and HbS fraction <30% combined.   - recommend transfusion to maintain plt > 50k in the setting of bleeding, otherwise Monitor CBC with differential daily and transfuse for platelet count >10 minimum, >20 if febrile.  - Discuss with blood bank and heme team prior to RBC transfusions to minimize risk of antibody development/transfusion reactions, aim to keep hb >7-8  - Pending full neurology eval, appreciate care by critical care, cards/heart failure -neurology suspect that seizures are not related to presentation and plan to continue keppra for now, neurology following for neurologic status.   - continue to hold hydroxyurea in the setting of thrombocytopenia.   - Repeat hemolysis labs, (CBC, CMP, LDH, reticulocyte count) daily, prefer pediatric tubes.   - Hematology team to follow    Discussed with Dr. Dietz.   Recommendations not final until attending cosignature.    ***************************************************************  Leroy Carrillo MD (Weekend Coverage)  Hematology/Oncology Fellow, PGY5  [facility] TEAMS  After 5pm or on weekends please contact  to page on-call fellow   ***************************************************************

## 2025-01-12 NOTE — PROGRESS NOTE ADULT - SUBJECTIVE AND OBJECTIVE BOX
Patient seen and examined at the bedside.    Remained critically ill on continuous ICU monitoring.    OBJECTIVE:  Vital Signs Last 24 Hrs  T(C): 37.7 (12 Jan 2025 16:00), Max: 37.9 (12 Jan 2025 00:00)  T(F): 99.9 (12 Jan 2025 16:00), Max: 100.2 (12 Jan 2025 00:00)  HR: 115 (12 Jan 2025 19:00) (72 - 122)  BP: 124/68 (12 Jan 2025 08:00) (124/68 - 124/68)  BP(mean): 90 (12 Jan 2025 08:00) (90 - 90)  RR: 24 (12 Jan 2025 19:00) (20 - 31)  SpO2: 100% (12 Jan 2025 19:00) (94% - 100%)    Parameters below as of 12 Jan 2025 20:00  Patient On (Oxygen Delivery Method): ventilator    ALL MEDICATIONS   MEDICATIONS  (STANDING):  albuterol/ipratropium for Nebulization 3 milliLiter(s) Nebulizer every 6 hours  artificial tears (preservative free) Ophthalmic Solution 1 Drop(s) Both EYES four times a day  ascorbic acid 500 milliGRAM(s) Oral daily  chlorhexidine 0.12% Liquid 15 milliLiter(s) Oral Mucosa every 12 hours  chlorhexidine 2% Cloths 1 Application(s) Topical <User Schedule>  chlorhexidine 4% Liquid 1 Application(s) Topical <User Schedule>  cyanocobalamin 1000 MICROGram(s) Oral daily  dexMEDEtomidine Infusion 0.2 MICROgram(s)/kG/Hr (4.18 mL/Hr) IV Continuous <Continuous>  dextrose 5%. 1000 milliLiter(s) (100 mL/Hr) IV Continuous <Continuous>  dextrose 5%. 1000 milliLiter(s) (50 mL/Hr) IV Continuous <Continuous>  dextrose 50% Injectable 25 Gram(s) IV Push once  dextrose 50% Injectable 12.5 Gram(s) IV Push once  dextrose 50% Injectable 25 Gram(s) IV Push once  glucagon  Injectable 1 milliGRAM(s) IntraMuscular once  heparin   Injectable 5000 Unit(s) SubCutaneous every 8 hours  insulin lispro (ADMELOG) corrective regimen sliding scale   SubCutaneous every 6 hours  levETIRAcetam   Injectable 1000 milliGRAM(s) IV Push every 12 hours  meropenem  IVPB 1000 milliGRAM(s) IV Intermittent every 8 hours  multivitamin 1 Tablet(s) Oral daily  pantoprazole  Injectable 40 milliGRAM(s) IV Push daily  vancomycin    Solution 125 milliGRAM(s) Oral every 12 hours  vancomycin  IVPB 500 milliGRAM(s) IV Intermittent every 12 hours  vasopressin Infusion 0.04 Unit(s)/Min (6 mL/Hr) IV Continuous <Continuous>    MEDICATIONS  (PRN):  dextrose Oral Gel 15 Gram(s) Oral once PRN Blood Glucose LESS THAN 70 milliGRAM(s)/deciliter  hydrALAZINE Injectable 5 milliGRAM(s) IV Push every 6 hours PRN HTN  sodium chloride 0.9% lock flush 10 milliLiter(s) IV Push every 1 hour PRN Pre/post blood products, medications, blood draw, and to maintain line patency        Physical Exam:  General: intubated multiple lines gtt & tubes   Neurology: Sedated, Withdraws to pain, +cough, +gag, + corneal, unable to follow commands  Eyes: + L gaze and tracking, unable to track with R  ENT/Neck: +ETT midline, Neck supple, trachea midline, No JVD   Respiratory: Rales noted bilaterally   CV: RRR, S1S2, no murmurs        [x] Sinus Rhythm   Abdominal: Soft, ND +BS,   Extremities: no pedal edema noted, + peripheral pulses, left groin hematoma resolving  Skin: No Rashes, RUE swelling noted, Ecchymosis                           Assessment:  58 yo F w/ PMHx of Sickle cell disease (on hydroxyurea), HTN, 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 Pt initially presented to [facility] for SOB and SS crisis; course c/b witnessed seizure, intubated and sedated, and transferred to [facility] for further management.     Cardiogenic shock  S/P VA ECMO cannulation on 1/4/25.  S/P VA ECMO decannulation on 1/7/25  Encephalopathy  Possible seizures  Acute respiratory failure  Acute blood loss anemia  Hypovolemia   Sickle cell disease  Transaminitis  DYLON   Thrombocytopenia   Hyperglycemia      Plan:   ***Neuro***  [x] Sedated with [x] Precedex  Post operative neuro assessment   +corneal, gag and cough, f/u neuro recs  CT and MRI unremarkable  Seizure activity on EEG 1/7, now improved  MRI 1/9 positive for multiple acute subacute infarcts in thalamic, basal ganglia and chandu region  Keppra q12 for seizure activity per neuro  Neurology following  Continue on Thiamine, folate, B12    ***Cardiovascular***  Invasive hemodynamic monitoring, assess perfusion indices   SR / CVP -1 / MAP 97 / Hct 30.0 / Lactate 1.4  S/p VA ECMO decannulated 1/7  [x] Vasopressin 0.04 Units/Min  Reassessment of hemodynamics post resuscitation   [x] Hydralazine 5q6 for afterload reduction   [x] Nonbleeding   Serial EKG and cardiac enzymes       ***Pulmonary***  Acute hypoxic respiratory failure  Full vent support, tolerated PS 10/5 30% today   Titration of FiO2 and PEEP, follow SpO2, CXR, blood gases   Continue on duonebs as needed    Mode: CPAP with PS  FiO2: 30  PEEP: 5  PS: 10  MAP: 9  PIP: 17                ***GI***  [x] Vivonex TFs at goal of 70 cc/hr  [x] Protonix for stress ulcer prophylaxis     ***Renal***  GFR 83 / [x] DYLON   Continue to monitor I/Os, BUN/Creatinine.   Replete lytes PRN  Costa present [x] positive   Diuresed with Bumex   Hypoalbuminemia  RIJ HD line placed today 1/12    ***ID***  Febrile today   MSSA bacteremia, BAL + MSSA  Empiric dosing with Meropenem & IVPB Vanco   PO Vanco for c.diff prophylaxis   F/u all repeat cultures     ***Endocrine***  [x] Hyperglycemia: HbA1c 4.6%              - [x] ISS             - Need tight glycemic control to prevent wound infection.    ***Hematology***  Acute blood loss anemia and thrombocytopenia  Sickle Cell - s/p Red cell exchange today 1/12, Hg electrophoresis   Hematology following   SQ Heparin 5000 q8 for DVT prophylaxis  Monitor RUE and Left groin        Patient requires continuous monitoring with bedside rhythm monitoring, pulse oximetry monitoring, and continuous central venous and arterial pressure monitoring; and intermittent blood gas analysis. Care plan discussed with the ICU care team.   Patient remained critical, at risk for life threatening decompensation.    I have spent 59 minutes providing critical care management to this patient.    By signing my name below, I, Kiet Ramirez, attest that this documentation has been prepared under the direction and in the presence of Latonya Guzman MD   Electronically signed: Omar Conklin, 01-12-25 @ 19:11    I, Latonya Guzman, personally performed the services described in this documentation. all medical record entries made by the scribe were at my direction and in my presence. I have reviewed the chart and agree that the record reflects my personal performance and is accurate and complete  Electronically signed: Latonya Guzman MD  Patient seen and examined at the bedside.    Remained critically ill on continuous ICU monitoring.    Opens eyes when called, withdrawing and moving ext minimally to pain, not on command/ spontaneously.  MRI concerning for multiple small strokes however physical exam not correlating with MRI findings  Hemodynamically much improved, off ionotropes, POD#5 from VA ECMO decannulation with recover of biventricular function.  Febrile to 101 on 1/10, remains ABX, negative cultures    OBJECTIVE:  Vital Signs Last 24 Hrs  T(C): 37.7 (12 Jan 2025 16:00), Max: 37.9 (12 Jan 2025 00:00)  T(F): 99.9 (12 Jan 2025 16:00), Max: 100.2 (12 Jan 2025 00:00)  HR: 115 (12 Jan 2025 19:00) (72 - 122)  BP: 124/68 (12 Jan 2025 08:00) (124/68 - 124/68)  BP(mean): 90 (12 Jan 2025 08:00) (90 - 90)  RR: 24 (12 Jan 2025 19:00) (20 - 31)  SpO2: 100% (12 Jan 2025 19:00) (94% - 100%)    Parameters below as of 12 Jan 2025 20:00  Patient On (Oxygen Delivery Method): ventilator    ALL MEDICATIONS   MEDICATIONS  (STANDING):  albuterol/ipratropium for Nebulization 3 milliLiter(s) Nebulizer every 6 hours  artificial tears (preservative free) Ophthalmic Solution 1 Drop(s) Both EYES four times a day  ascorbic acid 500 milliGRAM(s) Oral daily  chlorhexidine 0.12% Liquid 15 milliLiter(s) Oral Mucosa every 12 hours  chlorhexidine 2% Cloths 1 Application(s) Topical <User Schedule>  chlorhexidine 4% Liquid 1 Application(s) Topical <User Schedule>  cyanocobalamin 1000 MICROGram(s) Oral daily  dexMEDEtomidine Infusion 0.2 MICROgram(s)/kG/Hr (4.18 mL/Hr) IV Continuous <Continuous>  dextrose 5%. 1000 milliLiter(s) (100 mL/Hr) IV Continuous <Continuous>  dextrose 5%. 1000 milliLiter(s) (50 mL/Hr) IV Continuous <Continuous>  dextrose 50% Injectable 25 Gram(s) IV Push once  dextrose 50% Injectable 12.5 Gram(s) IV Push once  dextrose 50% Injectable 25 Gram(s) IV Push once  glucagon  Injectable 1 milliGRAM(s) IntraMuscular once  heparin   Injectable 5000 Unit(s) SubCutaneous every 8 hours  insulin lispro (ADMELOG) corrective regimen sliding scale   SubCutaneous every 6 hours  levETIRAcetam   Injectable 1000 milliGRAM(s) IV Push every 12 hours  meropenem  IVPB 1000 milliGRAM(s) IV Intermittent every 8 hours  multivitamin 1 Tablet(s) Oral daily  pantoprazole  Injectable 40 milliGRAM(s) IV Push daily  vancomycin    Solution 125 milliGRAM(s) Oral every 12 hours  vancomycin  IVPB 500 milliGRAM(s) IV Intermittent every 12 hours  vasopressin Infusion 0.04 Unit(s)/Min (6 mL/Hr) IV Continuous <Continuous>    MEDICATIONS  (PRN):  dextrose Oral Gel 15 Gram(s) Oral once PRN Blood Glucose LESS THAN 70 milliGRAM(s)/deciliter  hydrALAZINE Injectable 5 milliGRAM(s) IV Push every 6 hours PRN HTN  sodium chloride 0.9% lock flush 10 milliLiter(s) IV Push every 1 hour PRN Pre/post blood products, medications, blood draw, and to maintain line patency        Physical Exam:  General: intubated multiple lines gtt & tubes   Neurology: Sedated, Withdraws to pain, +cough, +gag, + corneal, unable to follow commands  Eyes: + L gaze and tracking, unable to track with R  ENT/Neck: +ETT midline, Neck supple, trachea midline, No JVD   Respiratory: Rales noted bilaterally   CV: RRR, S1S2, no murmurs        [x] Sinus Rhythm   Abdominal: Soft, ND +BS,   Extremities: no pedal edema noted, + peripheral pulses, left groin hematoma resolving  Skin: No Rashes, RUE swelling noted, Ecchymosis                           Assessment:  58 yo F w/ PMHx of Sickle cell disease (on hydroxyurea), HTN, HFimpEF/NICM (EF 58% 10/2024) presenting as a transfer from [facility]. Pt initially presented to [facility] for SOB and SS crisis; course c/b witnessed seizure, intubated and sedated, and transferred to [facility] for further management.     Cardiogenic shock improved S/P VA ECMO cannulation on 1/4/25.  S/P VA ECMO decannulation on 1/7/25  Encephalopathy  Possible seizures  Acute respiratory failure  Sickle cell disease  Transaminitis  DYLON improving        Plan:   ***Neuro***  [x] Sedated with [x] Precedex  Post operative neuro assessment   +corneal, gag and cough, f/u neuro recs  CT and MRI unremarkable  Seizure activity on EEG 1/7, now improved  MRI 1/9 positive for multiple acute subacute infarcts in thalamic, basal ganglia and chandu region  Keppra q12 for seizure activity per neuro  Neurology following  Continue on Thiamine, folate, B12    ***Cardiovascular***  Invasive hemodynamic monitoring, assess perfusion indices   SR / CVP -1 / MAP 97 / Hct 30.0 / Lactate 1.4  S/p VA ECMO decannulated 1/7  [x] Vasopressin 0.04 Units/Min  Reassessment of hemodynamics post resuscitation   [x] Hydralazine 5q6 for afterload reduction   [x] Nonbleeding   Serial EKG and cardiac enzymes       ***Pulmonary***  Acute hypoxic respiratory failure  Full vent support, tolerated PS 10/5 30% today   Titration of FiO2 and PEEP, follow SpO2, CXR, blood gases   Continue on duonebs as needed    Mode: CPAP with PS  FiO2: 30  PEEP: 5  PS: 10  MAP: 9  PIP: 17                ***GI***  [x] Vivonex TFs at goal of 70 cc/hr  [x] Protonix for stress ulcer prophylaxis     ***Renal***  GFR 83 / [x] DYLON   Continue to monitor I/Os, BUN/Creatinine.   Replete lytes PRN  Costa present [x] positive   Diuresed with Bumex   Hypoalbuminemia  RIJ HD line placed today 1/12    ***ID***  Febrile today   MSSA bacteremia, BAL + MSSA  Empiric dosing with Meropenem & IVPB Vanco   PO Vanco for c.diff prophylaxis   F/u all repeat cultures     ***Endocrine***  [x] Hyperglycemia: HbA1c 4.6%              - [x] ISS             - Need tight glycemic control to prevent wound infection.    ***Hematology***  Acute blood loss anemia and thrombocytopenia  Sickle Cell - s/p Red cell exchange today 1/12, Hg electrophoresis   Hematology following   SQ Heparin 5000 q8 for DVT prophylaxis  Monitor RUE and Left groin        Patient requires continuous monitoring with bedside rhythm monitoring, pulse oximetry monitoring, and continuous central venous and arterial pressure monitoring; and intermittent blood gas analysis. Care plan discussed with the ICU care team.   Patient remained critical, at risk for life threatening decompensation.    I have spent 59 minutes providing critical care management to this patient.    By signing my name below, I, Kiet Ramirez, attest that this documentation has been prepared under the direction and in the presence of Latonya Guzman MD   Electronically signed: Omar Conklin, 01-12-25 @ 19:11    I, Latonya Guzman, personally performed the services described in this documentation. all medical record entries made by the scribe were at my direction and in my presence. I have reviewed the chart and agree that the record reflects my personal performance and is accurate and complete  Electronically signed: Latonya Guzman MD

## 2025-01-12 NOTE — PROCEDURE NOTE - NSPOSTPRCRAD_GEN_A_CORE
central line located in the superior vena cava
central line located in the/central line located in the superior vena cava/no pneumothorax
post-procedure radiography performed
central line located in the superior vena cava/no pneumothorax

## 2025-01-12 NOTE — PROGRESS NOTE ADULT - SUBJECTIVE AND OBJECTIVE BOX
Patient seen and examined at the bedside.    Remains critically ill on continuous ICU monitoring, at risk for life threatening decompensation.  All Labs, data reviewed. Plan of care discussed in length during multi-disciplinary ICU rounds.       Brief Summary:  58 yo F with Sickle cell disease and NICM now with Cardiogenic shock s/p VA ECMO on 1/4/25.  VA ecmo decannulation on 1/7/25     24 Hour events:  Pt spiked fever overnight, cultures sent, procal 0.61, CVL lines changed  Not tolerating PS, mental status mostly unchanged  MRI shows multiple acute/subacte infarcts in thalamic, basal ganglia and chandu region  Possible plasma exchange by hematology team    Objective:  Vital Signs Last 24 Hrs  T(C): 37.2 (12 Jan 2025 04:00), Max: 37.9 (11 Jan 2025 16:00)  T(F): 99 (12 Jan 2025 04:00), Max: 100.2 (11 Jan 2025 16:00)  HR: 122 (12 Jan 2025 06:00) (72 - 122)  BP: --  BP(mean): --  RR: 26 (12 Jan 2025 06:00) (15 - 31)  SpO2: 100% (12 Jan 2025 06:00) (94% - 100%)    Parameters below as of 12 Jan 2025 05:34  Patient On (Oxygen Delivery Method): ventilator        Mode: AC/ CMV (Assist Control/ Continuous Mandatory Ventilation)  RR (machine): 12  FiO2: 30  PEEP: 5  ITime: 1  MAP: 9  PC: 8  PIP: 15              Physical Exam:   General: Intubated  Neurology: Withdrawing to noxious stimuli in all extremities, +cough, +gag  Respiratory: Breath sounds bilaterally   CV: Sinus Tachycardia   Abdominal: Soft, Nontender  Extremities: Warm, well-perfused  -------------------------------------------------------------------------------------------------------------------------------    Labs:                        9.6    11.89 )-----------( 141      ( 12 Jan 2025 00:40 )             30.0     01-12    148[H]  |  112[H]  |  34[H]  ----------------------------<  136[H]  3.6   |  24  |  0.82    Ca    9.8      12 Jan 2025 00:40  Phos  2.4     01-12  Mg     2.7     01-12    TPro  6.9  /  Alb  3.8  /  TBili  1.7[H]  /  DBili  x   /  AST  79[H]  /  ALT  100[H]  /  AlkPhos  118  01-12    LIVER FUNCTIONS - ( 12 Jan 2025 00:40 )  Alb: 3.8 g/dL / Pro: 6.9 g/dL / ALK PHOS: 118 U/L / ALT: 100 U/L / AST: 79 U/L / GGT: x           PT/INR - ( 12 Jan 2025 00:40 )   PT: 11.6 sec;   INR: 1.01 ratio         PTT - ( 12 Jan 2025 00:40 )  PTT:24.2 sec  ABG - ( 12 Jan 2025 00:12 )  pH, Arterial: 7.47  pH, Blood: x     /  pCO2: 37    /  pO2: 134   / HCO3: 27    / Base Excess: 3.1   /  SaO2: 98.9                  ALL MEDICATIONS   MEDICATIONS  (STANDING):  albuterol/ipratropium for Nebulization 3 milliLiter(s) Nebulizer every 6 hours  artificial tears (preservative free) Ophthalmic Solution 1 Drop(s) Both EYES four times a day  ascorbic acid 500 milliGRAM(s) Oral daily  chlorhexidine 0.12% Liquid 15 milliLiter(s) Oral Mucosa every 12 hours  chlorhexidine 2% Cloths 1 Application(s) Topical <User Schedule>  cyanocobalamin 1000 MICROGram(s) Oral daily  dexMEDEtomidine Infusion 0.2 MICROgram(s)/kG/Hr (4.18 mL/Hr) IV Continuous <Continuous>  dextrose 5%. 1000 milliLiter(s) (100 mL/Hr) IV Continuous <Continuous>  dextrose 5%. 1000 milliLiter(s) (50 mL/Hr) IV Continuous <Continuous>  dextrose 50% Injectable 25 Gram(s) IV Push once  dextrose 50% Injectable 12.5 Gram(s) IV Push once  dextrose 50% Injectable 25 Gram(s) IV Push once  glucagon  Injectable 1 milliGRAM(s) IntraMuscular once  heparin   Injectable 5000 Unit(s) SubCutaneous every 8 hours  insulin lispro (ADMELOG) corrective regimen sliding scale   SubCutaneous every 6 hours  levETIRAcetam   Injectable 1000 milliGRAM(s) IV Push every 12 hours  meropenem  IVPB 1000 milliGRAM(s) IV Intermittent every 8 hours  multivitamin 1 Tablet(s) Oral daily  pantoprazole  Injectable 40 milliGRAM(s) IV Push daily  vancomycin    Solution 125 milliGRAM(s) Oral every 12 hours  vancomycin  IVPB 500 milliGRAM(s) IV Intermittent every 12 hours  vasopressin Infusion 0.04 Unit(s)/Min (6 mL/Hr) IV Continuous <Continuous>    MEDICATIONS  (PRN):  dextrose Oral Gel 15 Gram(s) Oral once PRN Blood Glucose LESS THAN 70 milliGRAM(s)/deciliter  hydrALAZINE Injectable 5 milliGRAM(s) IV Push every 6 hours PRN HTN  ------------------------------------------------------------------------------------------------------------------------------  Assessment:  58 yo F w/ PMHx of Sickle cell disease (on hydroxyurea), HTN, 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 Pt initially presented to [facility] for SOB and SS crisis; course c/b witnessed seizure, intubated and sedated, and transferred to [facility] for further management. Now in cardiogenic shock, transferred to [facility] for further management. Cannulated for VA ECMO on 1/4/25.    Cardiogenic shock  Acute respiratory failure  Acute blood loss anemia  Thrombocytopenia  Hyperglycemia    Plan:   ***Neuro***  Multifactorial encephalopathy  S/p embolic stroke on MRI  Seizure activity on EEG, now improved  on keppra  Thiamine folate, B12    ***Cardiovascular***  At risk for hemodynamic decompensation  S/p VA ecmo, Bi vent function improved  Hydralazine prn for afterload reduction  Monitor hemodynamic closely, signs of hypoperfusion     ***Pulmonary***  Acute hypoxic respiratory failure  Full vent support, not tolerating PS  Discussed tracheostomy tube placement  Nebs prn  S/p bronch, follow BAL, NGD    ***GI***  protein calorie malnutrition  Tube feeds at goal  Protonix for stress ulcer prophylaxis   bowel regimen, had BMs    ***Renal***  DYLON  Trend Creatinine   Costa catheter for strict I/O measurements.   Albumin 25% for hypoalbuminemia    ***ID***  MSSA bacteremia, BAL + MSSA  On vancomycin  On empiric meropenum  Leukocytosis improving  Spiked fever, BC sent  CMV viral load  HSV 1/2 PCR  EBV viral load  West Nile serology and PCR  Lyme serology  Babesia PCR    ***Endocrine***  Hyperglycemia - Insulin infusion.     ***Hematology***  Acute blood loss anemia and thrombocytopenia  Sickle Cell - s/p Red cell exchange, Hg electrophoresis   Hematology team still not clear about red cells exchange  Keep Plats >50, Hg >8  Heparin SC for ppx,         I, Yana Patricia MD, personally performed the services described in this documentation. all medical record entries made by the scribe were at my direction and in my presence. I have reviewed the chart and agree that the record reflects my personal performance and is accurate and complete  Electronically signed:   Yana Patricia MD  CT ICU attending     ICU time: ** mins     By signing my name below, I, Gabriella Lopez, attest that this documentation has been prepared under the direction and in the presence of Yana Patricia MD  Electronically signed: Gabriella Lopez, 01-12-25 @ 06:43             Patient seen and examined at the bedside.    Remains critically ill on continuous ICU monitoring, at risk for life threatening decompensation.  All Labs, data reviewed. Plan of care discussed in length during multi-disciplinary ICU rounds.     Brief Summary:  58 yo F with Sickle cell disease and NICM now with Cardiogenic shock s/p VA ECMO on 1/4/25.  VA ecmo decannulation on 1/7/25     24 Hour events:  Pt spiked fever overnight, cultures sent, procal 0.61, CVL lines changed  Tolerates PS 10/5  MRI shows multiple acute subacute infarcts in thalamic, basal ganglia and chandu region  Mental status grossly unchanged for the last week, not following, not tracking  weak and nonconsistent response to pain  Red cell exchanged today by hematology team     Objective:  Vital Signs Last 24 Hrs  T(C): 37.2 (12 Jan 2025 04:00), Max: 37.9 (11 Jan 2025 16:00)  T(F): 99 (12 Jan 2025 04:00), Max: 100.2 (11 Jan 2025 16:00)  HR: 122 (12 Jan 2025 06:00) (72 - 122)  BP: --  BP(mean): --  RR: 26 (12 Jan 2025 06:00) (15 - 31)  SpO2: 100% (12 Jan 2025 06:00) (94% - 100%)    Parameters below as of 12 Jan 2025 05:34  Patient On (Oxygen Delivery Method): ventilator    Mode: AC/ CMV (Assist Control/ Continuous Mandatory Ventilation)  RR (machine): 12  FiO2: 30  PEEP: 5  ITime: 1  MAP: 9  PC: 8  PIP: 15              Physical Exam:   General: Intubated  Neurology:  +cough, +gag, + corneal  Respiratory: Breath sounds bilaterally   CV: Sinus Tachycardia   Abdominal: Soft, Nontender  Extremities: Warm, well-perfused  -------------------------------------------------------------------------------------------------------------------------------    Labs:                        9.6    11.89 )-----------( 141      ( 12 Jan 2025 00:40 )             30.0     01-12    148[H]  |  112[H]  |  34[H]  ----------------------------<  136[H]  3.6   |  24  |  0.82    Ca    9.8      12 Jan 2025 00:40  Phos  2.4     01-12  Mg     2.7     01-12    TPro  6.9  /  Alb  3.8  /  TBili  1.7[H]  /  DBili  x   /  AST  79[H]  /  ALT  100[H]  /  AlkPhos  118  01-12    LIVER FUNCTIONS - ( 12 Jan 2025 00:40 )  Alb: 3.8 g/dL / Pro: 6.9 g/dL / ALK PHOS: 118 U/L / ALT: 100 U/L / AST: 79 U/L / GGT: x           PT/INR - ( 12 Jan 2025 00:40 )   PT: 11.6 sec;   INR: 1.01 ratio    PTT - ( 12 Jan 2025 00:40 )  PTT:24.2 sec  ABG - ( 12 Jan 2025 00:12 )  pH, Arterial: 7.47  pH, Blood: x     /  pCO2: 37    /  pO2: 134   / HCO3: 27    / Base Excess: 3.1   /  SaO2: 98.9      ALL MEDICATIONS   MEDICATIONS  (STANDING):  albuterol/ipratropium for Nebulization 3 milliLiter(s) Nebulizer every 6 hours  artificial tears (preservative free) Ophthalmic Solution 1 Drop(s) Both EYES four times a day  ascorbic acid 500 milliGRAM(s) Oral daily  chlorhexidine 0.12% Liquid 15 milliLiter(s) Oral Mucosa every 12 hours  chlorhexidine 2% Cloths 1 Application(s) Topical <User Schedule>  cyanocobalamin 1000 MICROGram(s) Oral daily  dexMEDEtomidine Infusion 0.2 MICROgram(s)/kG/Hr (4.18 mL/Hr) IV Continuous <Continuous>  dextrose 5%. 1000 milliLiter(s) (100 mL/Hr) IV Continuous <Continuous>  dextrose 5%. 1000 milliLiter(s) (50 mL/Hr) IV Continuous <Continuous>  dextrose 50% Injectable 25 Gram(s) IV Push once  dextrose 50% Injectable 12.5 Gram(s) IV Push once  dextrose 50% Injectable 25 Gram(s) IV Push once  glucagon  Injectable 1 milliGRAM(s) IntraMuscular once  heparin   Injectable 5000 Unit(s) SubCutaneous every 8 hours  insulin lispro (ADMELOG) corrective regimen sliding scale   SubCutaneous every 6 hours  levETIRAcetam   Injectable 1000 milliGRAM(s) IV Push every 12 hours  meropenem  IVPB 1000 milliGRAM(s) IV Intermittent every 8 hours  multivitamin 1 Tablet(s) Oral daily  pantoprazole  Injectable 40 milliGRAM(s) IV Push daily  vancomycin    Solution 125 milliGRAM(s) Oral every 12 hours  vancomycin  IVPB 500 milliGRAM(s) IV Intermittent every 12 hours  vasopressin Infusion 0.04 Unit(s)/Min (6 mL/Hr) IV Continuous <Continuous>    MEDICATIONS  (PRN):  dextrose Oral Gel 15 Gram(s) Oral once PRN Blood Glucose LESS THAN 70 milliGRAM(s)/deciliter  hydrALAZINE Injectable 5 milliGRAM(s) IV Push every 6 hours PRN HTN  ------------------------------------------------------------------------------------------------------------------------------  Assessment:  58 yo F w/ PMHx of Sickle cell disease (on hydroxyurea), HTN, 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 Pt initially presented to [facility] for SOB and SS crisis; course c/b witnessed seizure, intubated and sedated, and transferred to [facility] for further management. Now in cardiogenic shock, transferred to [facility] for further management. Cannulated for VA ECMO on 1/4/25.    Cardiogenic shock  Acute respiratory failure  Acute blood loss anemia  Thrombocytopenia  Hyperglycemia    Plan:   ***Neuro***  Multifactorial encephalopathy  S/p embolic stroke on MRI  Seizure activity on EEG, now improved  on keppra  Thiamine, folate, B12    ***Cardiovascular***  At risk for hemodynamic decompensation  S/p VA ecmo, Bi vent function improved  Hydralazine prn for afterload reduction  Monitor hemodynamic closely, signs of hypoperfusion     ***Pulmonary***  Acute hypoxic respiratory failure  Full vent support, tolerates PS 10/5  Discussed tracheostomy tube placement  Nebs prn  S/p bronch, follow BAL     ***GI***  protein calorie malnutrition  Tube feeds at goal  Protonix for stress ulcer prophylaxis   bowel regimen, had BMs    ***Renal***  DYLON  Trend Creatinine   Costa catheter for strict I/O measurements.   Albumin 25% for hypoalbuminemia    ***ID***  MSSA bacteremia, BAL + MSSA  On vancomycin  On empiric meropenum  Leukocytosis improving  Spiking fever  CMV viral load  HSV 1/2 PCR  EBV viral load  West Nile serology and PCR  Lyme serology  Babesia PCR    ***Endocrine***  Hyperglycemia - Insulin infusion.     ***Hematology***  Acute blood loss anemia and thrombocytopenia  Sickle Cell - s/p Red cell exchange, Hg electrophoresis   Hematology team still not clear about red cells exchange  Keep Plats >50, Hg >8  Heparin SQ for ppx,     I, Yana Patricia MD, personally performed the services described in this documentation. all medical record entries made by the scribe were at my direction and in my presence. I have reviewed the chart and agree that the record reflects my personal performance and is accurate and complete  Electronically signed:   Yana Patricia MD  CT ICU attending     ICU time: 46 mins     By signing my name below, I, Gabriella Lopez, attest that this documentation has been prepared under the direction and in the presence of Yana Patricia MD  Electronically signed: Gabriella Lopez, 01-12-25 @ 06:43

## 2025-01-12 NOTE — CHART NOTE - NSCHARTNOTEFT_GEN_A_CORE
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 Pt initially presented to [facility] for SOB and SS crisis; course c/b witnessed seizure, intubated and sedated, and transferred to [facility] for further management. Keppra was discontinued at [facility] due to negative EEG. At [facility] MICU pt was found to have RV failure.   Pt was in profound cardiogenic shock. Pt has been decompensating, despite Dobutamine gtt, Bumex gtt, Levophed, and addition of Vasopressin. Vasopressors requirements have gone up. NS shock team was called and pt transferred to [facility] for further management. Patient was cannulated on peripheral VA ECMO.   Transfusion Medicine team was consulted for emergent RBC exchange to minimize the risk of complications related to SCD as this patient is critically ill with multi-organ failure, on ECMO. The patient also has a history of having a seizure, etiology unclear. Emergent RBC exchange was completed on 1/4, tolerated well. Now s/p ECMO decannulation on 1/7 c/b groin hematoma prompting transfusion with 2 units PRBC, platelet, and cryo.     Transfusion Medicine team contacted again to complete a second RBC exchange due to concern of fat emboli syndrome. Hb electrophoresis on 01/09: Hb A 82.3%, Hb A2 2.8%, Hb S 6.8%, HbC 8.1%  1/12/25: RBC exchange performed with a target FCR of 50% and an end hematocrit of 30%. The patient tolerated the procedure well. No additional RBC exchanges are scheduled.

## 2025-01-12 NOTE — PROGRESS NOTE ADULT - SUBJECTIVE AND OBJECTIVE BOX
1/4/2025 - V-A ECMO cannulation  1/4/2025 - V-A ECMO decannulation    on vancomycin / meropenem  not on therapeutic anticoagulation  not on antiplatelet medications    intubated with 7.5 ET tube  on mechanical vent (spont 10 / +5 / 30%)  trachea palpable with neck extended  no innominate artery palpable    OG tube in place with tube feeds on hold since midnight in preparation for possible tracheostomy      plats - 141    1/12 coags  -INR - 1.0  -ptt - 24.2 sec    CTA chest (1/2/2025) - entire stomach is in abdomen (I personally reviewed the images)

## 2025-01-12 NOTE — PROGRESS NOTE ADULT - ATTENDING COMMENTS
Patient continues to be intubated.  She is not responsive off of sedation.  She appears to have a leftward gaze.  Patient's friend is at bedside.  To undergo exchange transfusion today.  Monitor for clinical improvement.  Discussed with ICU attending.    Hever Dietz MD (weekend coverage)

## 2025-01-12 NOTE — PROGRESS NOTE ADULT - SUBJECTIVE AND OBJECTIVE BOX
INTERVAL HPI/OVERNIGHT EVENTS:  O/N:  This morning: Patient was seen and examined at bedside.      VITAL SIGNS:  T(F): 100 (01-12-25 @ 08:00)  HR: 93 (01-12-25 @ 11:14)  BP: --  RR: 26 (01-12-25 @ 06:00)  SpO2: 100% (01-12-25 @ 11:14)  Wt(kg): --    PHYSICAL EXAM:        MEDICATIONS  (STANDING):  albuterol/ipratropium for Nebulization 3 milliLiter(s) Nebulizer every 6 hours  artificial tears (preservative free) Ophthalmic Solution 1 Drop(s) Both EYES four times a day  ascorbic acid 500 milliGRAM(s) Oral daily  buMETAnide Injectable 2 milliGRAM(s) IV Push once  chlorhexidine 0.12% Liquid 15 milliLiter(s) Oral Mucosa every 12 hours  chlorhexidine 2% Cloths 1 Application(s) Topical <User Schedule>  chlorhexidine 4% Liquid 1 Application(s) Topical <User Schedule>  cyanocobalamin 1000 MICROGram(s) Oral daily  dexMEDEtomidine Infusion 0.2 MICROgram(s)/kG/Hr (4.18 mL/Hr) IV Continuous <Continuous>  dextrose 5%. 1000 milliLiter(s) (100 mL/Hr) IV Continuous <Continuous>  dextrose 5%. 1000 milliLiter(s) (50 mL/Hr) IV Continuous <Continuous>  dextrose 50% Injectable 25 Gram(s) IV Push once  dextrose 50% Injectable 12.5 Gram(s) IV Push once  dextrose 50% Injectable 25 Gram(s) IV Push once  glucagon  Injectable 1 milliGRAM(s) IntraMuscular once  heparin   Injectable 5000 Unit(s) SubCutaneous every 8 hours  insulin lispro (ADMELOG) corrective regimen sliding scale   SubCutaneous every 6 hours  levETIRAcetam   Injectable 1000 milliGRAM(s) IV Push every 12 hours  meropenem  IVPB 1000 milliGRAM(s) IV Intermittent every 8 hours  multivitamin 1 Tablet(s) Oral daily  pantoprazole  Injectable 40 milliGRAM(s) IV Push daily  vancomycin    Solution 125 milliGRAM(s) Oral every 12 hours  vancomycin  IVPB 500 milliGRAM(s) IV Intermittent every 12 hours  vasopressin Infusion 0.04 Unit(s)/Min (6 mL/Hr) IV Continuous <Continuous>    MEDICATIONS  (PRN):  dextrose Oral Gel 15 Gram(s) Oral once PRN Blood Glucose LESS THAN 70 milliGRAM(s)/deciliter  hydrALAZINE Injectable 5 milliGRAM(s) IV Push every 6 hours PRN HTN  sodium chloride 0.9% lock flush 10 milliLiter(s) IV Push every 1 hour PRN Pre/post blood products, medications, blood draw, and to maintain line patency      Allergies    doxycycline (Angioedema)  penicillin (Unknown)  clindamycin (Angioedema)  linezolid (Angioedema)    Intolerances        LABS:                        9.6    11.89 )-----------( 141      ( 12 Jan 2025 00:40 )             30.0     01-12    148[H]  |  112[H]  |  34[H]  ----------------------------<  136[H]  3.6   |  24  |  0.82    Ca    9.8      12 Jan 2025 00:40  Phos  2.4     01-12  Mg     2.7     01-12    TPro  6.9  /  Alb  3.8  /  TBili  1.7[H]  /  DBili  x   /  AST  79[H]  /  ALT  100[H]  /  AlkPhos  118  01-12    PT/INR - ( 12 Jan 2025 00:40 )   PT: 11.6 sec;   INR: 1.01 ratio         PTT - ( 12 Jan 2025 00:40 )  PTT:24.2 sec  Urinalysis Basic - ( 12 Jan 2025 00:40 )    Color: x / Appearance: x / SG: x / pH: x  Gluc: 136 mg/dL / Ketone: x  / Bili: x / Urobili: x   Blood: x / Protein: x / Nitrite: x   Leuk Esterase: x / RBC: x / WBC x   Sq Epi: x / Non Sq Epi: x / Bacteria: x              RADIOLOGY & ADDITIONAL TESTS:  Reviewed INTERVAL HPI/OVERNIGHT EVENTS:  O/N: no acute events  This morning: Patient was seen and examined at bedside. Exam as below    VITAL SIGNS:  T(F): 100 (01-12-25 @ 08:00)  HR: 93 (01-12-25 @ 11:14)  BP: --  RR: 26 (01-12-25 @ 06:00)  SpO2: 100% (01-12-25 @ 11:14)  Wt(kg): --    PHYSICAL EXAM:  Constitutional: resting comfortably in bed; NAD  Respiratory: intubated, off sedation per nursing and chart  Gastrointestinal: abdomen soft, NT/ND  Extremities: legs WWP, no peripheral edema  Neurologic: not following commands or tracking with her eyes in response to speech      MEDICATIONS  (STANDING):  albuterol/ipratropium for Nebulization 3 milliLiter(s) Nebulizer every 6 hours  artificial tears (preservative free) Ophthalmic Solution 1 Drop(s) Both EYES four times a day  ascorbic acid 500 milliGRAM(s) Oral daily  buMETAnide Injectable 2 milliGRAM(s) IV Push once  chlorhexidine 0.12% Liquid 15 milliLiter(s) Oral Mucosa every 12 hours  chlorhexidine 2% Cloths 1 Application(s) Topical <User Schedule>  chlorhexidine 4% Liquid 1 Application(s) Topical <User Schedule>  cyanocobalamin 1000 MICROGram(s) Oral daily  dexMEDEtomidine Infusion 0.2 MICROgram(s)/kG/Hr (4.18 mL/Hr) IV Continuous <Continuous>  dextrose 5%. 1000 milliLiter(s) (100 mL/Hr) IV Continuous <Continuous>  dextrose 5%. 1000 milliLiter(s) (50 mL/Hr) IV Continuous <Continuous>  dextrose 50% Injectable 25 Gram(s) IV Push once  dextrose 50% Injectable 12.5 Gram(s) IV Push once  dextrose 50% Injectable 25 Gram(s) IV Push once  glucagon  Injectable 1 milliGRAM(s) IntraMuscular once  heparin   Injectable 5000 Unit(s) SubCutaneous every 8 hours  insulin lispro (ADMELOG) corrective regimen sliding scale   SubCutaneous every 6 hours  levETIRAcetam   Injectable 1000 milliGRAM(s) IV Push every 12 hours  meropenem  IVPB 1000 milliGRAM(s) IV Intermittent every 8 hours  multivitamin 1 Tablet(s) Oral daily  pantoprazole  Injectable 40 milliGRAM(s) IV Push daily  vancomycin    Solution 125 milliGRAM(s) Oral every 12 hours  vancomycin  IVPB 500 milliGRAM(s) IV Intermittent every 12 hours  vasopressin Infusion 0.04 Unit(s)/Min (6 mL/Hr) IV Continuous <Continuous>    MEDICATIONS  (PRN):  dextrose Oral Gel 15 Gram(s) Oral once PRN Blood Glucose LESS THAN 70 milliGRAM(s)/deciliter  hydrALAZINE Injectable 5 milliGRAM(s) IV Push every 6 hours PRN HTN  sodium chloride 0.9% lock flush 10 milliLiter(s) IV Push every 1 hour PRN Pre/post blood products, medications, blood draw, and to maintain line patency      Allergies    doxycycline (Angioedema)  penicillin (Unknown)  clindamycin (Angioedema)  linezolid (Angioedema)    Intolerances        LABS:                        9.6    11.89 )-----------( 141      ( 12 Jan 2025 00:40 )             30.0     01-12    148[H]  |  112[H]  |  34[H]  ----------------------------<  136[H]  3.6   |  24  |  0.82    Ca    9.8      12 Jan 2025 00:40  Phos  2.4     01-12  Mg     2.7     01-12    TPro  6.9  /  Alb  3.8  /  TBili  1.7[H]  /  DBili  x   /  AST  79[H]  /  ALT  100[H]  /  AlkPhos  118  01-12    PT/INR - ( 12 Jan 2025 00:40 )   PT: 11.6 sec;   INR: 1.01 ratio         PTT - ( 12 Jan 2025 00:40 )  PTT:24.2 sec  Urinalysis Basic - ( 12 Jan 2025 00:40 )    Color: x / Appearance: x / SG: x / pH: x  Gluc: 136 mg/dL / Ketone: x  / Bili: x / Urobili: x   Blood: x / Protein: x / Nitrite: x   Leuk Esterase: x / RBC: x / WBC x   Sq Epi: x / Non Sq Epi: x / Bacteria: x              RADIOLOGY & ADDITIONAL TESTS:  Reviewed

## 2025-01-13 LAB
ALBUMIN SERPL ELPH-MCNC: 3.6 G/DL — SIGNIFICANT CHANGE UP (ref 3.3–5)
ALP SERPL-CCNC: 106 U/L — SIGNIFICANT CHANGE UP (ref 40–120)
ALT FLD-CCNC: 148 U/L — HIGH (ref 10–45)
ANION GAP SERPL CALC-SCNC: 13 MMOL/L — SIGNIFICANT CHANGE UP (ref 5–17)
APTT BLD: 23.6 SEC — LOW (ref 24.5–35.6)
AST SERPL-CCNC: 113 U/L — HIGH (ref 10–40)
B BURGDOR DNA SPEC QL NAA+PROBE: NEGATIVE — SIGNIFICANT CHANGE UP
BASOPHILS # BLD AUTO: 0 K/UL — SIGNIFICANT CHANGE UP (ref 0–0.2)
BASOPHILS NFR BLD AUTO: 0 % — SIGNIFICANT CHANGE UP (ref 0–2)
BILIRUB SERPL-MCNC: 1.8 MG/DL — HIGH (ref 0.2–1.2)
BUN SERPL-MCNC: 31 MG/DL — HIGH (ref 7–23)
BURR CELLS BLD QL SMEAR: PRESENT — SIGNIFICANT CHANGE UP
CALCIUM SERPL-MCNC: 9.2 MG/DL — SIGNIFICANT CHANGE UP (ref 8.4–10.5)
CHLORIDE SERPL-SCNC: 110 MMOL/L — HIGH (ref 96–108)
CLOSURE TME COLL+EPINEP BLD: 103 K/UL — LOW (ref 150–400)
CO2 SERPL-SCNC: 22 MMOL/L — SIGNIFICANT CHANGE UP (ref 22–31)
CREAT SERPL-MCNC: 0.69 MG/DL — SIGNIFICANT CHANGE UP (ref 0.5–1.3)
EGFR: 101 ML/MIN/1.73M2 — SIGNIFICANT CHANGE UP
ELLIPTOCYTES BLD QL SMEAR: SLIGHT — SIGNIFICANT CHANGE UP
EOSINOPHIL # BLD AUTO: 0.36 K/UL — SIGNIFICANT CHANGE UP (ref 0–0.5)
EOSINOPHIL NFR BLD AUTO: 2.6 % — SIGNIFICANT CHANGE UP (ref 0–6)
FIBRINOGEN PPP-MCNC: 297 MG/DL — SIGNIFICANT CHANGE UP (ref 200–445)
GAS PNL BLDA: SIGNIFICANT CHANGE UP
GAS PNL BLDA: SIGNIFICANT CHANGE UP
GIANT PLATELETS BLD QL SMEAR: PRESENT — SIGNIFICANT CHANGE UP
GLUCOSE BLDC GLUCOMTR-MCNC: 110 MG/DL — HIGH (ref 70–99)
GLUCOSE BLDC GLUCOMTR-MCNC: 110 MG/DL — HIGH (ref 70–99)
GLUCOSE BLDC GLUCOMTR-MCNC: 128 MG/DL — HIGH (ref 70–99)
GLUCOSE BLDC GLUCOMTR-MCNC: 142 MG/DL — HIGH (ref 70–99)
GLUCOSE BLDC GLUCOMTR-MCNC: 152 MG/DL — HIGH (ref 70–99)
GLUCOSE SERPL-MCNC: 137 MG/DL — HIGH (ref 70–99)
HAPTOGLOB SERPL-MCNC: 111 MG/DL — SIGNIFICANT CHANGE UP (ref 34–200)
HCT VFR BLD CALC: 30.9 % — LOW (ref 34.5–45)
HEMOGLOBIN INTERPRETATION: SIGNIFICANT CHANGE UP
HGB A MFR BLD: 81.3 % — LOW (ref 95.8–98)
HGB A2 MFR BLD: 2.7 % — SIGNIFICANT CHANGE UP (ref 2–3.2)
HGB BLD-MCNC: 10.8 G/DL — LOW (ref 11.5–15.5)
HGB C MFR BLD: 8.5 % — HIGH
HGB S MFR BLD: 7.5 % — HIGH
INR BLD: 1.04 RATIO — SIGNIFICANT CHANGE UP (ref 0.85–1.16)
LDH SERPL L TO P-CCNC: 607 U/L — HIGH (ref 50–242)
LYMPHOCYTES # BLD AUTO: 16.4 % — SIGNIFICANT CHANGE UP (ref 13–44)
LYMPHOCYTES # BLD AUTO: 2.25 K/UL — SIGNIFICANT CHANGE UP (ref 1–3.3)
MAGNESIUM SERPL-MCNC: 2.4 MG/DL — SIGNIFICANT CHANGE UP (ref 1.6–2.6)
MANUAL SMEAR VERIFICATION: SIGNIFICANT CHANGE UP
MCHC RBC-ENTMCNC: 30 PG — SIGNIFICANT CHANGE UP (ref 27–34)
MCHC RBC-ENTMCNC: 35 G/DL — SIGNIFICANT CHANGE UP (ref 32–36)
MCV RBC AUTO: 85.8 FL — SIGNIFICANT CHANGE UP (ref 80–100)
MICROCYTES BLD QL: SLIGHT — SIGNIFICANT CHANGE UP
MONOCYTES # BLD AUTO: 1.18 K/UL — HIGH (ref 0–0.9)
MONOCYTES NFR BLD AUTO: 8.6 % — SIGNIFICANT CHANGE UP (ref 2–14)
NEUTROPHILS # BLD AUTO: 9.95 K/UL — HIGH (ref 1.8–7.4)
NEUTROPHILS NFR BLD AUTO: 72.4 % — SIGNIFICANT CHANGE UP (ref 43–77)
NRBC # BLD: 8 /100 WBCS — HIGH (ref 0–0)
NRBC BLD-RTO: 8 /100 WBCS — HIGH (ref 0–0)
OVALOCYTES BLD QL SMEAR: SLIGHT — SIGNIFICANT CHANGE UP
PHOSPHATE SERPL-MCNC: 2.9 MG/DL — SIGNIFICANT CHANGE UP (ref 2.5–4.5)
PLAT MORPH BLD: NORMAL — SIGNIFICANT CHANGE UP
PLATELET # BLD AUTO: SIGNIFICANT CHANGE UP K/UL (ref 150–400)
POTASSIUM SERPL-MCNC: 3.9 MMOL/L — SIGNIFICANT CHANGE UP (ref 3.5–5.3)
POTASSIUM SERPL-SCNC: 3.9 MMOL/L — SIGNIFICANT CHANGE UP (ref 3.5–5.3)
PROT SERPL-MCNC: 6.7 G/DL — SIGNIFICANT CHANGE UP (ref 6–8.3)
PROTHROM AB SERPL-ACNC: 11.8 SEC — SIGNIFICANT CHANGE UP (ref 9.9–13.4)
RBC # BLD: 3.6 M/UL — LOW (ref 3.8–5.2)
RBC # FLD: 15 % — HIGH (ref 10.3–14.5)
RBC BLD AUTO: ABNORMAL
SCHISTOCYTES BLD QL AUTO: SLIGHT — SIGNIFICANT CHANGE UP
SODIUM SERPL-SCNC: 145 MMOL/L — SIGNIFICANT CHANGE UP (ref 135–145)
TARGETS BLD QL SMEAR: SLIGHT — SIGNIFICANT CHANGE UP
VANCOMYCIN TROUGH SERPL-MCNC: 10.8 UG/ML — SIGNIFICANT CHANGE UP (ref 10–20)
VANCOMYCIN TROUGH SERPL-MCNC: 12.7 UG/ML — SIGNIFICANT CHANGE UP (ref 10–20)
VIT A SERPL-MCNC: 30.9 UG/DL — SIGNIFICANT CHANGE UP (ref 20.1–62)
WBC # BLD: 13.74 K/UL — HIGH (ref 3.8–10.5)
WBC # FLD AUTO: 13.74 K/UL — HIGH (ref 3.8–10.5)

## 2025-01-13 PROCEDURE — 71045 X-RAY EXAM CHEST 1 VIEW: CPT | Mod: 26

## 2025-01-13 PROCEDURE — 99291 CRITICAL CARE FIRST HOUR: CPT

## 2025-01-13 PROCEDURE — G0545: CPT

## 2025-01-13 PROCEDURE — 99292 CRITICAL CARE ADDL 30 MIN: CPT

## 2025-01-13 PROCEDURE — 99233 SBSQ HOSP IP/OBS HIGH 50: CPT

## 2025-01-13 PROCEDURE — 99232 SBSQ HOSP IP/OBS MODERATE 35: CPT | Mod: GC

## 2025-01-13 RX ORDER — POTASSIUM CHLORIDE 750 MG/1
10 TABLET, EXTENDED RELEASE ORAL
Refills: 0 | Status: COMPLETED | OUTPATIENT
Start: 2025-01-13 | End: 2025-01-13

## 2025-01-13 RX ORDER — FENTANYL CITRATE 50 UG/ML
25 INJECTION INTRAMUSCULAR; INTRAVENOUS ONCE
Refills: 0 | Status: DISCONTINUED | OUTPATIENT
Start: 2025-01-13 | End: 2025-01-13

## 2025-01-13 RX ORDER — ALBUMIN HUMAN 50 G/1000ML
100 SOLUTION INTRAVENOUS ONCE
Refills: 0 | Status: COMPLETED | OUTPATIENT
Start: 2025-01-13 | End: 2025-01-13

## 2025-01-13 RX ADMIN — Medication 1 DROP(S): at 00:06

## 2025-01-13 RX ADMIN — Medication 1 DROP(S): at 17:36

## 2025-01-13 RX ADMIN — LEVETIRACETAM 1000 MILLIGRAM(S): 750 TABLET, FILM COATED ORAL at 01:00

## 2025-01-13 RX ADMIN — POTASSIUM CHLORIDE 50 MILLIEQUIVALENT(S): 750 TABLET, EXTENDED RELEASE ORAL at 00:33

## 2025-01-13 RX ADMIN — Medication 5000 UNIT(S): at 13:04

## 2025-01-13 RX ADMIN — MEROPENEM 100 MILLIGRAM(S): 500 INJECTION INTRAVENOUS at 13:06

## 2025-01-13 RX ADMIN — VANCOMYCIN HYDROCHLORIDE 125 MILLIGRAM(S): KIT at 19:15

## 2025-01-13 RX ADMIN — POTASSIUM CHLORIDE 50 MILLIEQUIVALENT(S): 750 TABLET, EXTENDED RELEASE ORAL at 01:00

## 2025-01-13 RX ADMIN — LEVETIRACETAM 1000 MILLIGRAM(S): 750 TABLET, FILM COATED ORAL at 13:06

## 2025-01-13 RX ADMIN — VANCOMYCIN HYDROCHLORIDE 100 MILLIGRAM(S): KIT at 05:01

## 2025-01-13 RX ADMIN — ANTISEPTIC SURGICAL SCRUB 15 MILLILITER(S): 0.04 SOLUTION TOPICAL at 05:01

## 2025-01-13 RX ADMIN — ANTISEPTIC SURGICAL SCRUB 15 MILLILITER(S): 0.04 SOLUTION TOPICAL at 17:36

## 2025-01-13 RX ADMIN — Medication 1000 MICROGRAM(S): at 11:59

## 2025-01-13 RX ADMIN — MEROPENEM 100 MILLIGRAM(S): 500 INJECTION INTRAVENOUS at 05:01

## 2025-01-13 RX ADMIN — Medication 1 DROP(S): at 11:58

## 2025-01-13 RX ADMIN — MEROPENEM 100 MILLIGRAM(S): 500 INJECTION INTRAVENOUS at 22:09

## 2025-01-13 RX ADMIN — ANTISEPTIC SURGICAL SCRUB 1 APPLICATION(S): 0.04 SOLUTION TOPICAL at 05:02

## 2025-01-13 RX ADMIN — Medication 1 TABLET(S): at 11:58

## 2025-01-13 RX ADMIN — Medication 5000 UNIT(S): at 22:09

## 2025-01-13 RX ADMIN — VANCOMYCIN HYDROCHLORIDE 100 MILLIGRAM(S): KIT at 17:37

## 2025-01-13 RX ADMIN — ALBUMIN HUMAN 50 MILLILITER(S): 50 SOLUTION INTRAVENOUS at 12:15

## 2025-01-13 RX ADMIN — VANCOMYCIN HYDROCHLORIDE 125 MILLIGRAM(S): KIT at 08:39

## 2025-01-13 RX ADMIN — IPRATROPIUM BROMIDE AND ALBUTEROL SULFATE 3 MILLILITER(S): .5; 2.5 SOLUTION RESPIRATORY (INHALATION) at 23:01

## 2025-01-13 RX ADMIN — FENTANYL CITRATE 25 MICROGRAM(S): 50 INJECTION INTRAMUSCULAR; INTRAVENOUS at 12:27

## 2025-01-13 RX ADMIN — Medication 1 DROP(S): at 05:01

## 2025-01-13 RX ADMIN — IPRATROPIUM BROMIDE AND ALBUTEROL SULFATE 3 MILLILITER(S): .5; 2.5 SOLUTION RESPIRATORY (INHALATION) at 05:13

## 2025-01-13 RX ADMIN — IPRATROPIUM BROMIDE AND ALBUTEROL SULFATE 3 MILLILITER(S): .5; 2.5 SOLUTION RESPIRATORY (INHALATION) at 11:30

## 2025-01-13 RX ADMIN — Medication 500 MILLIGRAM(S): at 11:59

## 2025-01-13 RX ADMIN — PANTOPRAZOLE 40 MILLIGRAM(S): 20 TABLET, DELAYED RELEASE ORAL at 11:58

## 2025-01-13 RX ADMIN — Medication 6 UNIT(S)/MIN: at 19:15

## 2025-01-13 RX ADMIN — Medication 5000 UNIT(S): at 05:01

## 2025-01-13 RX ADMIN — DEXMEDETOMIDINE HYDROCHLORIDE 4.18 MICROGRAM(S)/KG/HR: 4 INJECTION, SOLUTION INTRAVENOUS at 19:15

## 2025-01-13 RX ADMIN — FENTANYL CITRATE 25 MICROGRAM(S): 50 INJECTION INTRAMUSCULAR; INTRAVENOUS at 12:47

## 2025-01-13 NOTE — PROGRESS NOTE ADULT - SUBJECTIVE AND OBJECTIVE BOX
Hematology Follow-up    INTERVAL HPI/OVERNIGHT EVENTS:  Patient seen and evaluated at bedside, s/p 2nd exchange transfusion on 1/12/2025. Patient currently off sedation, still intubated, able to open eyes.    VITAL SIGNS:  T(F): 99.9 (01-13-25 @ 12:00)  HR: 109 (01-13-25 @ 15:00)  BP: --  RR: 23 (01-13-25 @ 15:00)  SpO2: 96% (01-13-25 @ 15:00)  Wt(kg): --    PHYSICAL EXAM:  GENERAL: intubated awake off sedation   HEAD:  Atraumatic, Normocephalic  EYES: EOMI, conjunctiva and sclera clear  CHEST/LUNG: Clear to auscultation bilaterally  HEART: Regular rate and rhythm  ABDOMEN: Soft, Nontender, Nondistended  NEUROLOGY: awake, unable to fully access, eyes open    MEDICATIONS  (STANDING):  albuterol/ipratropium for Nebulization 3 milliLiter(s) Nebulizer every 6 hours  artificial tears (preservative free) Ophthalmic Solution 1 Drop(s) Both EYES four times a day  ascorbic acid 500 milliGRAM(s) Oral daily  chlorhexidine 0.12% Liquid 15 milliLiter(s) Oral Mucosa every 12 hours  chlorhexidine 2% Cloths 1 Application(s) Topical <User Schedule>  chlorhexidine 4% Liquid 1 Application(s) Topical <User Schedule>  dexMEDEtomidine Infusion 0.2 MICROgram(s)/kG/Hr (4.18 mL/Hr) IV Continuous <Continuous>  dextrose 5%. 1000 milliLiter(s) (100 mL/Hr) IV Continuous <Continuous>  dextrose 5%. 1000 milliLiter(s) (50 mL/Hr) IV Continuous <Continuous>  dextrose 50% Injectable 25 Gram(s) IV Push once  dextrose 50% Injectable 12.5 Gram(s) IV Push once  dextrose 50% Injectable 25 Gram(s) IV Push once  glucagon  Injectable 1 milliGRAM(s) IntraMuscular once  heparin   Injectable 5000 Unit(s) SubCutaneous every 8 hours  insulin lispro (ADMELOG) corrective regimen sliding scale   SubCutaneous every 6 hours  levETIRAcetam   Injectable 1000 milliGRAM(s) IV Push every 12 hours  meropenem  IVPB 1000 milliGRAM(s) IV Intermittent every 8 hours  multivitamin 1 Tablet(s) Oral daily  pantoprazole  Injectable 40 milliGRAM(s) IV Push daily  vancomycin    Solution 125 milliGRAM(s) Oral every 12 hours  vancomycin  IVPB 500 milliGRAM(s) IV Intermittent every 12 hours  vasopressin Infusion 0.04 Unit(s)/Min (6 mL/Hr) IV Continuous <Continuous>    MEDICATIONS  (PRN):  dextrose Oral Gel 15 Gram(s) Oral once PRN Blood Glucose LESS THAN 70 milliGRAM(s)/deciliter  hydrALAZINE Injectable 5 milliGRAM(s) IV Push every 6 hours PRN HTN  sodium chloride 0.9% lock flush 10 milliLiter(s) IV Push every 1 hour PRN Pre/post blood products, medications, blood draw, and to maintain line patency      doxycycline (Angioedema)  penicillin (Unknown)  clindamycin (Angioedema)  linezolid (Angioedema)      LABS:                        10.8   13.74 )-----------( Clumped    ( 13 Jan 2025 00:26 )             30.9     01-13    145  |  110[H]  |  31[H]  ----------------------------<  137[H]  3.9   |  22  |  0.69    Ca    9.2      13 Jan 2025 00:26  Phos  2.9     01-13  Mg     2.4     01-13    TPro  6.7  /  Alb  3.6  /  TBili  1.8[H]  /  DBili  x   /  AST  113[H]  /  ALT  148[H]  /  AlkPhos  106  01-13    PT/INR - ( 13 Jan 2025 00:49 )   PT: 11.8 sec;   INR: 1.04 ratio         PTT - ( 13 Jan 2025 00:49 )  PTT:23.6 sec Lactate Dehydrogenase, Serum: 607 U/L (01-13 @ 00:26)  Haptoglobin, Serum: 111 mg/dL (01-13 @ 00:26)    Urinalysis Basic - ( 13 Jan 2025 00:26 )    Color: x / Appearance: x / SG: x / pH: x  Gluc: 137 mg/dL / Ketone: x  / Bili: x / Urobili: x   Blood: x / Protein: x / Nitrite: x   Leuk Esterase: x / RBC: x / WBC x   Sq Epi: x / Non Sq Epi: x / Bacteria: x        RADIOLOGY & ADDITIONAL TESTS:  Studies reviewed.

## 2025-01-13 NOTE — PROGRESS NOTE ADULT - SUBJECTIVE AND OBJECTIVE BOX
Patient seen and examined at the bedside.    Remained critically ill on continuous ICU monitoring.    OBJECTIVE:  Vital Signs Last 24 Hrs  T(C): 37.6 (13 Jan 2025 16:00), Max: 37.7 (13 Jan 2025 12:00)  T(F): 99.7 (13 Jan 2025 16:00), Max: 99.9 (13 Jan 2025 12:00)  HR: 128 (13 Jan 2025 18:00) (93 - 128)  BP: --  BP(mean): --  RR: 27 (13 Jan 2025 18:00) (20 - 27)  SpO2: 99% (13 Jan 2025 18:00) (86% - 100%)    Parameters below as of 13 Jan 2025 16:00  Patient On (Oxygen Delivery Method): ventilator  O2 Flow (L/min): 30    Physical Exam:   General: Intubated  Neurology: Sedated  Eyes: bilateral pupils equal and reactive   ENT/Neck: Neck supple, trachea midline, No JVD   Respiratory: Clear bilaterally   CV: S1S2, no murmurs        [x] Sinus rhythm  Abdominal: Soft, NT, ND +BS   Extremities: 1-2+ pedal edema noted, + peripheral pulses   Skin: No Rashes, Hematoma, Ecchymosis                         Assessment:  60 yo F w/ PMHx of Sickle cell disease (on hydroxyurea), HTN, HFimpEF/NICM (EF 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 Pt initially presented to [facility] for SOB and SS crisis; course c/b witnessed seizure, intubated and sedated, and transferred to [facility] for further management. Now in cardiogenic shock, transferred to [facility] for further management.    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  Keppra for seizures  Post operative neuro assessment     ***Cardiovascular***  Invasive hemodynamic monitoring, assess perfusion indices   SR / CVP 4 / MAP 99 / Hct 30.9% / Lactate 1.9  Continuous reassessment of hemodynamics  Hydralazine prn for hypertension  [x] VTE ppx with HSQ  Serial EKG and cardiac enzymes     ***Pulmonary***  Post op vent management   Titration of FiO2 and PEEP, follow SpO2, CXR, blood gasses     Mode: CPAP with PS  FiO2: 30  PEEP: 5  PS: 10  MAP: 9  PIP: 15            ***GI***  [x] Diet: Vivonex TF @70ml/hr  [x] Protonix for stress ulcer prophylaxis    ***Renal***  Continue to monitor I/Os, BUN/Creatinine.   Replete lytes PRN  Costa present    ***ID***  MSSA bacteremia, BAL + MSSA  On Vancomycin  On empiric Meropenum  Leukocytosis improving    ***Endocrine***  [x] Stress Hyperglycemia : HbA1c 4.6%                - [x] ISS             - Need tight glycemic control to prevent wound infection.    Patient requires continuous monitoring with bedside rhythm monitoring, pulse oximetry monitoring, and continuous central venous and arterial pressure monitoring; and intermittent blood gas analysis. Care plan discussed with the ICU care team.   Patient remained critical, at risk for life threatening decompensation.    I have spent 55 minutes providing critical care management to this patient.    By signing my name below, I, Elvie Angel, attest that this documentation has been prepared under the direction and in the presence of Conor Pressley NP  Electronically signed: Omar Aguilera, 01-13-25 @ 19:01    I, Conor Pressley NP, personally performed the services described in this documentation. all medical record entries made by the blasibrodrigo were at my direction and in my presence. I have reviewed the chart and agree that the record reflects my personal performance and is accurate and complete  Electronically signed: Conor Pressley NP

## 2025-01-13 NOTE — PROGRESS NOTE ADULT - ASSESSMENT
59F PMH Sickle cell disease (on hydroxyurea), HTN, HFimpEF/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 Pt initially presented to [facility] for SOB and SS crisis; course c/b witnessed seizure, intubated and sedated, and transferred to [facility] for further management. Keppra was discontinued at [facility] due to negative EEG. At [facility] MICU pt was found to have RV failure possibly iso pulm HTN 2/2 increased tidal volumes on the ventilator, possibly due to volume overload due to IVF iso SS crisis vs fat emboli syndrome.                                                                                                           #Hb SC disease  #Cardiogenic shock on ECMO  #Unclear trigger for unresponsiveness   - patient with 1-2 sickle pain crisis per year and on hydrea, had retinal detachment s/p repair                           - patient had f/up with cardio in Sept 2024: per the note, unclear etiology of her dyspnea but possibly driven by cardiomyopathy; low suspicion of pulmonary HTN as no RV dysfunction/severe TR.  She is found to have new RV failure, requiring ECMO  - Blood bank transfusion medicine team consulted for emergent RBC exchange given critically ill with multi-organ failure  - Retic count elevated to 11.6%, LDH 1000s, bili 3.9. CXR was clear not indicative of acute chest. Smear (prior to exchange) was reviewed: Hypochromic RBCs with normochromic RBC reflecting transfused blood. Several nucleated RBC suggesting stressed bone marrow. Very few target cells and rare sickle cell. No schistocytes to suggest hemolysis.    - S/p exchange 1/4/2024, s/p ECMO decannulation 1/7/2025 c/b groin hematoma and received 2 unit prbc, platelet and cryo.  -Hb electrophoresis on 01/07: Hb A 76.8%, Hb A2 3.6%, Hb S 9.3%, HbC 10.3%  - S/p 2nd exchange transfusion 1/12/2025 (based on MRI demonstrating diffuse punctate lesions that could be consistent with fat emboli and patient's otherwise unexplained mental status for goal of apheresis any remaining circulating fat emboli, over weekend which was discussed with blood bank)    Recommendations  - Please obtain hemoglobin electrophoresis weekly. Goal HbC and HbS fraction <30% combined.   - recommend transfusion to maintain plt > 50k in the setting of bleeding, otherwise Monitor CBC with differential daily and transfuse for platelet count >10 minimum, >20 if febrile.  - Discuss with blood bank and heme team prior to RBC transfusions to minimize risk of antibody development/transfusion reactions, aim to keep hb >7-8  - Pending full neurology eval, appreciate care by critical care, cards/heart failure -neurology suspect that seizures are not related to presentation and plan to continue keppra for now, neurology following for neurologic status.   - Ok to restart hydroxyurea as long as plt >100 and hb >8  - Repeat hemolysis labs, (CBC, CMP, LDH, reticulocyte count) daily, prefer pediatric tubes.   - Hematology team to follow    Note not finalized until signed by attending.   Please do not hesitate to page with questions.     Veronica Haas MD  Hematology/Oncology Fellow PGY5  Available on [employer] Teams   Pager: [phone]  For weekends and evenings (5 pm - 8 am), please page fellow on call.

## 2025-01-13 NOTE — PROGRESS NOTE ADULT - ATTENDING COMMENTS
59-yr-old woman with SCD (Hgb-SC disease) transferred from [facility] to [facility] to [facility] for worsening overall condition, RV failure, seizure, multiple pressors support on ECHMO, HD, consulted for need for red cell exchange. Patient's LDH was 1100, high retic counts, mild transaminitis, insignificant bilirubinemia. The cause(s) of her worsening could not be explained well, decision of an exchange transfusion was made. Patient seen post exchange. Not sure if it has helped her.  Hemoglobin electrophoresis showed S 6% and C 8%. Her conditions has improved to some extent. Of note, she received another exchange yesterday/last night. Check another hemoglobin electrophoresis. Management as per ICU team.

## 2025-01-13 NOTE — PROGRESS NOTE ADULT - NUTRITIONAL ASSESSMENT
This patient has been assessed with a concern for Malnutrition and has been determined to have a diagnosis/diagnoses of Severe protein-calorie malnutrition.    This patient is being managed with:   Diet NPO with Tube Feed-  Tube Feeding Modality: Orogastric  Vivonex  Total Volume for 24 Hours (mL): 1680  Continuous  Starting Tube Feed Rate {mL per Hour}: 20  Increase Tube Feed Rate by (mL): 10     Every 3 hours  Until Goal Tube Feed Rate (mL per Hour): 70  Tube Feed Duration (in Hours): 24  Tube Feed Start Time: 14:50  Mehdi(7 Gm Arginine/7 Gm Glut/1.2 Gm HMB     Qty per Day:  2  Entered: Javed 10 2025 10:27AM

## 2025-01-13 NOTE — PROGRESS NOTE ADULT - ASSESSMENT
Encephalopathy  Seizure  Sickle cell disease  HTN  Heart failure/NICM  Strokes  Transaminitis  Bicytopenia    - Patient with mental status change, seizure, and EEG abnormalities most likely due to toxic/metabolic process from ongoing medical problems but cannot exclude other causes such as infectious, inflammatory, or neoplastic. Stroke seen on MRI brain, personally reviewed by me, would be unlikely to cause current mental status changes and seizures (subcortical location). No active seizures noted on EEG and no new focal neurologic deficits  - Continue ASM with Keppra 1g PO/IV BID for seizure prophylaxis  - Labs pending with B1, B6, WNV  - Check labs for additional causes with syphilis serology TP, autoimmune encephalopathy panel  - Agree with LP - cell count, total protein, glucose, Cx, PCR panel, VDRL, WNV, oligoclonal bands, myelin basic protein, CSF IgG Index, autoimmune encephalopathy panel, cytology  - Above recommendations discussed with [facility] team, who verbalized agreement and understanding  - Continue to address above medical and surgical issues, as you are doing  - Will continue to follow patient with you

## 2025-01-13 NOTE — PROGRESS NOTE ADULT - SUBJECTIVE AND OBJECTIVE BOX
Patient seen and examined at the bedside.    Remains critically ill on continuous ICU monitoring.      Brief Summary:  60 yo F with Sickle cell disease and NICM now with Cardiogenic shock s/p VA ECMO on 1/4/25.  VA ecmo decannulation on 1/7/25      24 Hour events:        Objective:  Vital Signs Last 24 Hrs  T(C): 37.5 (13 Jan 2025 04:00), Max: 37.7 (12 Jan 2025 16:00)  T(F): 99.5 (13 Jan 2025 04:00), Max: 99.9 (12 Jan 2025 16:00)  HR: 105 (13 Jan 2025 07:00) (87 - 121)  BP: --  BP(mean): --  RR: 21 (13 Jan 2025 07:00) (20 - 27)  SpO2: 100% (13 Jan 2025 07:00) (86% - 100%)    Parameters below as of 13 Jan 2025 05:48  Patient On (Oxygen Delivery Method): ventilator      Mode: CPAP with PS  FiO2: 30  PEEP: 5  PS: 10  MAP: 9  PIP: 15              Physical Exam:   General: Intubated  Neurology:  +cough, +gag, + corneal  Respiratory: Breath sounds bilaterally   CV: Sinus Tachycardia   Abdominal: Soft, Nontender  Extremities: Warm, well-perfused  -------------------------------------------------------------------------------------------------------------------------------    Labs:                        10.8   13.74 )-----------( Clumped    ( 13 Jan 2025 00:26 )             30.9     01-13    145  |  110[H]  |  31[H]  ----------------------------<  137[H]  3.9   |  22  |  0.69    Ca    9.2      13 Jan 2025 00:26  Phos  2.9     01-13  Mg     2.4     01-13    TPro  6.7  /  Alb  3.6  /  TBili  1.8[H]  /  DBili  x   /  AST  113[H]  /  ALT  148[H]  /  AlkPhos  106  01-13    LIVER FUNCTIONS - ( 13 Jan 2025 00:26 )  Alb: 3.6 g/dL / Pro: 6.7 g/dL / ALK PHOS: 106 U/L / ALT: 148 U/L / AST: 113 U/L / GGT: x           PT/INR - ( 13 Jan 2025 00:49 )   PT: 11.8 sec;   INR: 1.04 ratio         PTT - ( 13 Jan 2025 00:49 )  PTT:23.6 sec  ABG - ( 13 Jan 2025 03:30 )  pH, Arterial: 7.47  pH, Blood: x     /  pCO2: 35    /  pO2: 127   / HCO3: 26    / Base Excess: 2.0   /  SaO2: 99.4        ------------------------------------------------------------------------------------------------------------------------------  Assessment:  60 yo F w/ PMHx of Sickle cell disease (on hydroxyurea), HTN, HFimpEF/NICM (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 Pt initially presented to [facility] for SOB and SS crisis; course c/b witnessed seizure, intubated and sedated, and transferred to [facility] for further management. Now in cardiogenic shock, transferred to [facility] for further management. Cannulated for VA ECMO on 1/4/25.    Cardiogenic shock  Acute respiratory failure  Acute blood loss anemia  Thrombocytopenia  Hyperglycemia  Leukocytosis    Plan:   ***Neuro***  Multifactorial encephalopathy  S/p embolic stroke on MRI  Seizure activity on EEG, now improved  On keppra  Sedated with Precedex for vent synchrony   Thiamine, folate, B12    ***Cardiovascular***  At risk for hemodynamic decompensation  S/p VA ecmo, Bi vent function improved  Wean Vasopressin for MAP > 65 mm Hg   Hydralazine prn for afterload reduction  Monitor hemodynamic closely, signs of hypoperfusion     ***Pulmonary***  Acute hypoxic respiratory failure  Full vent support, tolerates PS 10/5  Discussed tracheostomy tube placement  Nebs prn  S/p bronch, follow BAL     ***GI***  protein calorie malnutrition  Tube feeds at goal  Protonix for stress ulcer prophylaxis     ***Renal***  DYLON  Trend Creatinine   Costa catheter for strict I/O measurements.   Albumin 25% for hypoalbuminemia    ***ID***  MSSA bacteremia, BAL + MSSA  On vancomycin  On empiric meropenum  Leukocytosis improving  Spiking fever  CMV viral load  HSV 1/2 PCR  EBV viral load  West Nile serology and PCR  Lyme serology  Babesia PCR    ***Endocrine***  Hyperglycemia - Insulin sliding scale.     ***Hematology***  Acute blood loss anemia and thrombocytopenia  Sickle Cell - s/p Red cell exchange, Hg electrophoresis   Hematology team still not clear about red cells exchange  Keep Plats >50, Hg >8  Heparin SQ for ppx,           Patient requires continuous monitoring with bedside rhythm monitoring, pulse oximetry monitoring, and continuous central venous and arterial pressure monitoring; and intermittent blood gas analysis. Care plan discussed with the ICU care team.   Patient remains critical, at risk for life threatening decompensation.    I have spent 30 minutes providing critical care management to this patient.    By signing my name below, I, Jacky Brown, attest that this documentation has been prepared under the direction and in the presence of Jyothi Comer MD  Electronically signed: Jacky Brown, 01-13-25 @ 08:09    I, Jyothi Comer MD, personally performed the services described in this documentation. all medical record entries made by the scribe were at my direction and in my presence. I have reviewed the chart and agree that the record reflects my personal performance and is accurate and complete  Electronically signed: Jyothi Comer MD Patient seen and examined at the bedside.    Remains critically ill on continuous ICU monitoring.      Brief Summary:  58 yo F with Sickle cell disease and NICM now with Cardiogenic shock s/p VA ECMO on 1/4/25.  VA ECMO decannulation on 1/7/25      24 Hour events:  RBC exchange yesterday.      Objective:  Vital Signs Last 24 Hrs  T(C): 37.5 (13 Jan 2025 04:00), Max: 37.7 (12 Jan 2025 16:00)  T(F): 99.5 (13 Jan 2025 04:00), Max: 99.9 (12 Jan 2025 16:00)  HR: 105 (13 Jan 2025 07:00) (87 - 121)  BP: --  BP(mean): --  RR: 21 (13 Jan 2025 07:00) (20 - 27)  SpO2: 100% (13 Jan 2025 07:00) (86% - 100%)    Parameters below as of 13 Jan 2025 05:48  Patient On (Oxygen Delivery Method): ventilator      Mode: CPAP with PS  FiO2: 30  PEEP: 5  PS: 10  MAP: 9  PIP: 15              Physical Exam:   General: Intubated  Neurology:  +cough, +gag, + corneal  Respiratory: Breath sounds bilaterally   CV: Sinus Tachycardia   Abdominal: Soft  Extremities: Warm, well-perfused  -------------------------------------------------------------------------------------------------------------------------------    Labs:                        10.8   13.74 )-----------( Clumped    ( 13 Jan 2025 00:26 )             30.9     01-13    145  |  110[H]  |  31[H]  ----------------------------<  137[H]  3.9   |  22  |  0.69    Ca    9.2      13 Jan 2025 00:26  Phos  2.9     01-13  Mg     2.4     01-13    TPro  6.7  /  Alb  3.6  /  TBili  1.8[H]  /  DBili  x   /  AST  113[H]  /  ALT  148[H]  /  AlkPhos  106  01-13    LIVER FUNCTIONS - ( 13 Jan 2025 00:26 )  Alb: 3.6 g/dL / Pro: 6.7 g/dL / ALK PHOS: 106 U/L / ALT: 148 U/L / AST: 113 U/L / GGT: x           PT/INR - ( 13 Jan 2025 00:49 )   PT: 11.8 sec;   INR: 1.04 ratio         PTT - ( 13 Jan 2025 00:49 )  PTT:23.6 sec  ABG - ( 13 Jan 2025 03:30 )  pH, Arterial: 7.47  pH, Blood: x     /  pCO2: 35    /  pO2: 127   / HCO3: 26    / Base Excess: 2.0   /  SaO2: 99.4        ------------------------------------------------------------------------------------------------------------------------------  Assessment:  58 yo F w/ PMHx of Sickle cell disease (on hydroxyurea), HTN, HFimpEF/NICM (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 Pt initially presented to [facility] for SOB and SS crisis; course c/b witnessed seizure, intubated and sedated, and transferred to [facility] for further management. Now in cardiogenic shock, transferred to [facility] for further management. Cannulated for VA ECMO on 1/4/25. Decannulated on 1/7/25.    Altered mental status / multifactorial encephalopathy  Acute respiratory failure  Sickle cell disease  Hyperglycemia  Leukocytosis    Plan:   ***Neuro***  Multifactorial encephalopathy  S/p embolic stroke on MRI  Seizure activity on EEG, now improved  Remains on Keppra  Sedated with Precedex to prevent tube biting.  Thiamine, folate, B12    ***Cardiovascular***  At risk for hemodynamic decompensation  S/p VA ECMO, Bi vent function recovered.  Hydralazine prn for hypertension  Monitor hemodynamic closely, signs of hypoperfusion     ***Pulmonary***  Acute hypoxic respiratory failure  Full vent support, tolerates PS 10/5  Discussed tracheostomy tube placement - family discussing pending neuro prognosis.  Nebs prn  S/p bronch, follow BAL     ***GI***  Protein calorie malnutrition  Tube feeds at goal  Protonix for stress ulcer prophylaxis     ***Renal***  Trend Creatinine   Costa catheter for strict I/O measurements.     ***ID***  MSSA bacteremia, BAL + MSSA  On Vancomycin  On empiric Meropenum  Leukocytosis improving    ***Endocrine***  Hyperglycemia - Insulin sliding scale.     ***Hematology***  Acute blood loss anemia and thrombocytopenia  Sickle Cell - s/p Red cell exchange, Hgb electrophoresis   Keep Plats >50, Hg >8  Heparin SQ for prophylaxis,         Patient requires continuous monitoring with bedside rhythm monitoring, pulse oximetry monitoring, and continuous central venous and arterial pressure monitoring; and intermittent blood gas analysis. Care plan discussed with the ICU care team.   Patient remains critical, at risk for life threatening decompensation.    I have spent 50 minutes providing critical care management to this patient.    By signing my name below, I, Dakotameluda Brown, attest that this documentation has been prepared under the direction and in the presence of Jyothi Comer MD  Electronically signed: Jacky Brown, 01-13-25 @ 08:09    I, Jyothi Comer MD, personally performed the services described in this documentation. All medical record entries made by the scribe were at my direction and in my presence. I have reviewed the chart and agree that the record reflects my personal performance and is accurate and complete.  Electronically signed: Jyothi Comer MD

## 2025-01-13 NOTE — PROGRESS NOTE ADULT - ASSESSMENT
59F PMH Sickle cell disease (on hydroxyurea), HTN, HFimpEF/NICM (EF 58% 10/2024) presenting as a transfer from [facility]. Pt initially presented to [facility] for SOB and SS crisis; course c/b witnessed seizure, intubated and sedated, and transferred to [facility] for further management. Keppra was discontinued at [facility] due to negative EEG. At [facility] MICU pt was found to have RV failure possibly iso pulm HTN 2/2 increased tidal volumes on the ventilator. On addition, pt possibly received a lot of volume iso SS crisis. Neuro was consulted for seizures and EEG technician was called for vEEG placement. Pt is in profound cardiogenic shock. Pt has been decompensating, despite Dobutamine gtt, Bumex gtt, Levophed, and addition of Vasopressin. Vasopressors requirements have gone up. NS shock team was called and pt is being transferred to NS for further management.     Patient with multiple medications allergies listed  MSSA in sputum culture from ET tube  Coag negative Staph epi. 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 Innumerable foci susceptibility artifact scattered throughout the   brain which can be seen with critically ill patients on ECMO. Diffuse fat   embolization is also part of the differential diagnosis but is considered   less likely.    2. Tiny acute/subacute infarcts within the bilateral basal ganglia,   thalami, and chandu with disproportionate ovoid area of T2/FLAIR   prolongation in the right ventral thalamus. Findings may indicate the   presence of embolic acute/subacute infarcts.    3. No abnormal intracranial enhancement.      workup   Encephalitis studies sent:  CMV viral load negative in serum  HSV 1/2 PCR negative in serum  West Nile serology and PCR pending  Lyme serology negative  Babesia PCR-ordered    acute hepatitis serology negative  Coxsackie A nonreactive, B low titers    Staph epi bacteremia in a single set of cultures  repeat specimens sent on 1/7 are no growth  likely procurement contaminant    sputum with MSSA colonization vs superinfection/pneumonia from prior viral illness    remains on vancomycin,  now 10 days   Completing 7 days of meropenem for fevers  received apheresis      recommendations  ·	Repeat BC sent, follow  ·	follow serologies for WNV/PCR   ·	continue meropenem   ·	no clear benefit of vancomycin with fevers at this time so consider early discontinuation  ·	IF LP performed- please check opening pressure, Cell count, prot/glucose, cryptococcus antigen, PCR panel (includes HSV PCR, crAg), bacterial, fungal cx, WNV PCR/IGG/IgM (late season and lower probability of reactivation)+/-oligoclonal bands, autoimmune panel      Thank you for involving us in the care of this patient  Transplant ID will continue to follow  Please call or page with additional questions  Pager; #[phone]  Teams: from 8 am to 5 pm  Dian Man MD     59F PMH Sickle cell disease (on hydroxyurea), HTN, HFimpEF/NICM (EF 58% 10/2024) presenting as a transfer from [facility]. Pt initially presented to [facility] for SOB and SS crisis; course c/b witnessed seizure, intubated and sedated, and transferred to [facility] for further management. Keppra was discontinued at [facility] due to negative EEG. At [facility] MICU pt was found to have RV failure possibly iso pulm HTN 2/2 increased tidal volumes on the ventilator. On addition, pt possibly received a lot of volume iso SS crisis. Neuro was consulted for seizures and EEG technician was called for vEEG placement. Pt is in profound cardiogenic shock. Pt has been decompensating, despite Dobutamine gtt, Bumex gtt, Levophed, and addition of Vasopressin. Vasopressors requirements have gone up. NS shock team was called and pt is being transferred to NS for further management.     Patient with multiple medications allergies listed  MSSA in sputum culture from ET tube  Coag negative Staph epi. 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 Innumerable foci susceptibility artifact scattered throughout the   brain which can be seen with critically ill patients on ECMO. Diffuse fat   embolization is also part of the differential diagnosis but is considered   less likely.    2. Tiny acute/subacute infarcts within the bilateral basal ganglia,   thalami, and chandu with disproportionate ovoid area of T2/FLAIR   prolongation in the right ventral thalamus. Findings may indicate the   presence of embolic acute/subacute infarcts.    3. No abnormal intracranial enhancement.      workup   Encephalitis studies sent:  CMV viral load negative in serum  HSV 1/2 PCR negative in serum  West Nile serology and PCR pending  Lyme serology negative  Babesia PCR-ordered    acute hepatitis serology negative  Coxsackie A nonreactive, B low titers    Staph epi bacteremia in a single set of cultures  repeat specimens sent on 1/7 are no growth  likely procurement contaminant    sputum with MSSA colonization vs superinfection/pneumonia from prior viral illness    remains on vancomycin,  now 10 days   Completing 7 days of meropenem for fevers  received apheresis      recommendations  ·	Repeat BC sent, follow  ·	follow serologies for WNV/PCR   ·	continue meropenem   ·	no clear benefit of vancomycin with fevers at this time so consider early discontinuation  ·	IF LP performed- please check opening pressure, Cell count, prot/glucose, VDRL cryptococcus antigen, PCR panel (includes HSV PCR, crAg), bacterial, fungal cx, WNV PCR/IGG/IgM (late season and lower probability of reactivation)+/-oligoclonal bands, autoimmune panel  ·	agree with syphilis rule out- check treponemal Ab, if negative, ow likelihood of syphilis in CNS      Thank you for involving us in the care of this patient  Transplant ID will continue to follow  Please call or page with additional questions  Pager; #[phone]  Teams: from 8 am to 5 pm  Dian Man MD

## 2025-01-13 NOTE — PROGRESS NOTE ADULT - SUBJECTIVE AND OBJECTIVE BOX
Transplant ID  s/p apheresis 1/12/25   improved fever curve, still with low grade temps  remains intubated, on ECMO    Vital Signs Last 24 Hrs  T(C): 37.5 (13 Jan 2025 08:00), Max: 37.7 (12 Jan 2025 16:00)  T(F): 99.5 (13 Jan 2025 08:00), Max: 99.9 (12 Jan 2025 16:00)  HR: 120 (13 Jan 2025 11:31) (94 - 125)  BP: --  BP(mean): --  RR: 26 (13 Jan 2025 10:00) (20 - 27)  SpO2: 98% (13 Jan 2025 11:31) (86% - 100%)    Parameters below as of 13 Jan 2025 11:31  Patient On (Oxygen Delivery Method): ventilator        PHYSICAL EXAM:  Constitutional:no change in status  Eyes:VICKY, EOMI  Ear/Nose/Throat: no oral lesions, CVC for exchange transfusion	  Respiratory: coarse BL  Cardiovascular: S1S2  Gastrointestinal:soft, (+) BS, no tenderness  Extremities:no e/e/c  No Lymphadenopathy  IV sites not inflammed.      --      ____________________________________________________  ROS  unable to assess    Allergies  doxycycline (Angioedema)  penicillin (Unknown)  clindamycin (Angioedema)  linezolid (Angioedema)    __________________________________________________  MEDS:  MEDICATIONS  (STANDING):  albuterol/ipratropium for Nebulization 3 every 6 hours  dexMEDEtomidine Infusion 0.2 <Continuous>  dextrose 50% Injectable 25 once  dextrose 50% Injectable 12.5 once  dextrose 50% Injectable 25 once  dextrose Oral Gel 15 once PRN  fentaNYL    Injectable 25 once  glucagon  Injectable 1 once  heparin   Injectable 5000 every 8 hours  hydrALAZINE Injectable 5 every 6 hours PRN  insulin lispro (ADMELOG) corrective regimen sliding scale  every 6 hours  levETIRAcetam   Injectable 1000 every 12 hours  pantoprazole  Injectable 40 daily  vasopressin Infusion 0.04 <Continuous>    _________________________________________________  ANTIMICOBIALS  meropenem  IVPB 1000 every 8 hours  vancomycin    Solution 125 every 12 hours  vancomycin  IVPB 500 every 12 hours      GENERAL LABS              10.8                 145  | 22   | 31           13.74 >-----------< Clumped   ------------------------< 137                   30.9                 3.9  | 110  | 0.69                                         Ca 9.2   Mg 2.4   Ph 2.9      Vancomycin Level, Trough: 12.7 (01-13 @ 03:37)  Vancomycin Level, Trough: 15.2 (01-12 @ 05:13)  Vancomycin Level, Trough: 17.2 (01-11 @ 17:47)  Vancomycin Level, Trough: 15.7 (01-11 @ 04:46)  Vancomycin Level, Trough: 17.5 (01-10 @ 16:32)  Vancomycin Level, Trough: 18.4 (01-10 @ 05:28)  Vancomycin Level, Trough: 18.8 (01-09 @ 15:10)  Vancomycin Level, Trough: 17.4 (01-09 @ 04:37)  Vancomycin Level, Trough: 14.6 (01-08 @ 17:17)  Vancomycin Level, Trough: 22.0 (01-08 @ 05:23)  Vancomycin Level, Trough: 17.5 (01-07 @ 18:50)  Vancomycin Level, Trough: 17.5 (01-07 @ 07:14)  Vancomycin Level, Trough: 17.2 (01-06 @ 19:47)  Vancomycin Level, Trough: 19.2 (01-06 @ 06:24)  Vancomycin Level, Trough: 13.7 (01-05 @ 19:01)  Vancomycin Level, Trough: 20.0 (01-05 @ 06:53)  Vancomycin Level, Trough: 11.7 (01-04 @ 18:56)  Vancomycin Level, Trough: 11.0 (01-03 @ 22:15)  Vancomycin Level, Random: 16.8 (01-03 @ 15:51)    Urinalysis Basic - ( 13 Jan 2025 00:26 )    Color: x / Appearance: x / SG: x / pH: x  Gluc: 137 mg/dL / Ketone: x  / Bili: x / Urobili: x   Blood: x / Protein: x / Nitrite: x   Leuk Esterase: x / RBC: x / WBC x   Sq Epi: x / Non Sq Epi: x / Bacteria: x        _________________________________________________  MICROBIOLOGY  -----------    Culture - Blood (collected 10 Javed 2025 21:15)  Source: .Blood BLOOD  Preliminary Report (13 Jan 2025 01:01):    No growth at 48 Hours    Culture - Blood (collected 10 Javed 2025 20:35)  Source: .Blood BLOOD  Preliminary Report (13 Jan 2025 01:01):    No growth at 48 Hours    Culture - Bronchial (collected 08 Jan 2025 16:08)  Source: Bronchial  Gram Stain (09 Jan 2025 06:50):    Rare polymorphonuclear leukocytes seen per low power field    No Squamous epithelial cells seen per low power field    No organisms seen per oil power field  Final Report (10 Javed 2025 11:31):    No growth            CMVPCR Log: NotDetec [id number]/mL (01-08 @ 14:11)                  RADIOLOGY & ADDITIONAL STUDIES:

## 2025-01-13 NOTE — PROGRESS NOTE ADULT - SUBJECTIVE AND OBJECTIVE BOX
NEUROLOGY FOLLOW-UP CONSULT NOTE    RFC: Altered mental status (AMS), seizure    Interval history: No acute neurologic events overnight. Patient remains with poor mentation and intubated off sedation. No clear focal neurologic deficits noted and no seizure-like activity.    Meds:  MEDICATIONS  (STANDING):  albuterol/ipratropium for Nebulization 3 milliLiter(s) Nebulizer every 6 hours  artificial tears (preservative free) Ophthalmic Solution 1 Drop(s) Both EYES four times a day  ascorbic acid 500 milliGRAM(s) Oral daily  chlorhexidine 0.12% Liquid 15 milliLiter(s) Oral Mucosa every 12 hours  chlorhexidine 2% Cloths 1 Application(s) Topical <User Schedule>  chlorhexidine 4% Liquid 1 Application(s) Topical <User Schedule>  dexMEDEtomidine Infusion 0.2 MICROgram(s)/kG/Hr (4.18 mL/Hr) IV Continuous <Continuous>  dextrose 5%. 1000 milliLiter(s) (100 mL/Hr) IV Continuous <Continuous>  dextrose 5%. 1000 milliLiter(s) (50 mL/Hr) IV Continuous <Continuous>  dextrose 50% Injectable 25 Gram(s) IV Push once  dextrose 50% Injectable 12.5 Gram(s) IV Push once  dextrose 50% Injectable 25 Gram(s) IV Push once  fentaNYL    Injectable 25 MICROGram(s) IV Push once  glucagon  Injectable 1 milliGRAM(s) IntraMuscular once  heparin   Injectable 5000 Unit(s) SubCutaneous every 8 hours  insulin lispro (ADMELOG) corrective regimen sliding scale   SubCutaneous every 6 hours  levETIRAcetam   Injectable 1000 milliGRAM(s) IV Push every 12 hours  meropenem  IVPB 1000 milliGRAM(s) IV Intermittent every 8 hours  multivitamin 1 Tablet(s) Oral daily  pantoprazole  Injectable 40 milliGRAM(s) IV Push daily  vancomycin    Solution 125 milliGRAM(s) Oral every 12 hours  vancomycin  IVPB 500 milliGRAM(s) IV Intermittent every 12 hours  vasopressin Infusion 0.04 Unit(s)/Min (6 mL/Hr) IV Continuous <Continuous>    MEDICATIONS  (PRN):  dextrose Oral Gel 15 Gram(s) Oral once PRN Blood Glucose LESS THAN 70 milliGRAM(s)/deciliter  hydrALAZINE Injectable 5 milliGRAM(s) IV Push every 6 hours PRN HTN  sodium chloride 0.9% lock flush 10 milliLiter(s) IV Push every 1 hour PRN Pre/post blood products, medications, blood draw, and to maintain line patency    GTT:  None    PMHx/PSHx/FHx/SHx:  Cardiogenic shock    FHx: cerebral palsy (Child)    Handoff    MEWS Score    Sickle-cell-hemoglobin C disease with crisis    Essential hypertension    No pertinent past medical history    Sickle cell trait    Sickle cell disease    HTN (hypertension)    Cardiogenic shock    Cardiogenic shock    Sickle cell disease    Cardiogenic shock    Leukocytosis    Seizure    Encounter for palliative care    Initiation, venoarterial ECMO    Management, venoarterial ECMO    H/O:     H/O abdominoplasty    S/P breast augmentation    S/P     CARDIOGENIC SHOCK    SysAdmin_VstLnk        Allergies:  doxycycline (Angioedema)  penicillin (Unknown)  clindamycin (Angioedema)  linezolid (Angioedema)      ROS: Due to clinical condition unable to assess (MENDEL)    O:  T(C): 37.5 (25 @ 08:00), Max: 37.7 (25 @ 16:00)  HR: 120 (25 @ 11:31) (94 - 125)  BP: --  RR: 26 (25 @ 10:00) (20 - 27)  SpO2: 98% (25 @ 11:31) (86% - 100%)    Focused neurologic exam:  MS - Intubated, not sedated, awake, attends to all stimuli b/l, no speech output, does not follow commands. MENDEL orientation, rep/naming, attn/conc/recent and remote memory/fund of knowledge  CN - PERRL but some ovoid shape, EOMI, VFF as BTT, face sens/str/hearing WNL b/l, SCM at least 4/5 b/l and symmetric, (+) spontaneous respirations (patient rate 27 bpm on PSV), (+) cough. MENDEL tongue/palate  Motor - Normal bulk/tone. No spontaneous movements noted. She grimaces to strong tactile stimuli in all extremities as well as withdraws and localizes in R > L UE > BLE's with BUE's 2+/5 and BLE's 2/5  Sens - As per Motor above  DTR's - 3+ biceps/triceps b/l, 2+ and brisk BR b/l, 3+ KJ b/l, 2+ AJ b/l, and neutral b/l plantar response  Coord - Grossly appropriate for level of strength, no tremors noted  Gait and station - MENDEL    Pertinent labs/studies:  CBC with inc WBC 13.74, low H/H /31 and MCV WNL, low plt 103  PT/INR WNL, PTT dec 23.6  BMP essentially WNL  Albumin 3.6, LFT's with inc ALT//113  Mg WNL, Phos WNL  NH3 WNL, B12/folate WNL, Vitamin A WNL, A1C 4.6%, TSH inc 27.4 and T4 WNL, Lyme (-)    vEEG  -  EEG Classification / Summary:  Abnormal  EEG in a lethargic patient:   Frequent left temporal LRDA to 1.5hz  Less frequent Right temporal LRDA 1-1.5Hz   Generalized background slowing, moderate.   Background / spike burden improved vs prior days studies.    -------------------------------------------------------------------------------------------------------  Clinical Impression:   Risk of seizures from the left > right temporal regions  Moderate  generalized background slowing  There were no seizures recorded.   Background / periodic activity / spike burden improved vs prior days    In absence of additional clinical concerns, recommend consideration for discontinuation of current EEG study with reconnection in future if warranted.  No seizures x ~6days    < from: MR Head w/wo IV Cont (25 @ 18:07) >  1. Innumerable foci susceptibility artifact scattered throughout the   brain which can be seen with critically ill patients on ECMO. Diffuse fat   embolization is also part of the differential diagnosis but is considered   less likely.    2. Tiny acute/subacute infarcts within the bilateral basal ganglia,   thalami, and chandu with disproportionate ovoid area of T2/FLAIR   prolongation in the right ventral thalamus. Findings may indicate the   presence of embolic acute/subacute infarcts.    3. No abnormal intracranial enhancement.    < end of copied text >

## 2025-01-14 ENCOUNTER — RESULT REVIEW (OUTPATIENT)
Age: 60
End: 2025-01-14

## 2025-01-14 LAB
ALBUMIN SERPL ELPH-MCNC: 3.8 G/DL — SIGNIFICANT CHANGE UP (ref 3.3–5)
ALP SERPL-CCNC: 97 U/L — SIGNIFICANT CHANGE UP (ref 40–120)
ALT FLD-CCNC: 133 U/L — HIGH (ref 10–45)
ANION GAP SERPL CALC-SCNC: 11 MMOL/L — SIGNIFICANT CHANGE UP (ref 5–17)
APPEARANCE CSF: CLEAR — SIGNIFICANT CHANGE UP
APTT BLD: 26.7 SEC — SIGNIFICANT CHANGE UP (ref 24.5–35.6)
AST SERPL-CCNC: 83 U/L — HIGH (ref 10–40)
BASOPHILS # BLD AUTO: 0.05 K/UL — SIGNIFICANT CHANGE UP (ref 0–0.2)
BASOPHILS NFR BLD AUTO: 0.4 % — SIGNIFICANT CHANGE UP (ref 0–2)
BILIRUB SERPL-MCNC: 1.5 MG/DL — HIGH (ref 0.2–1.2)
BUN SERPL-MCNC: 27 MG/DL — HIGH (ref 7–23)
CALCIUM SERPL-MCNC: 9.4 MG/DL — SIGNIFICANT CHANGE UP (ref 8.4–10.5)
CHLORIDE SERPL-SCNC: 111 MMOL/L — HIGH (ref 96–108)
CO2 SERPL-SCNC: 24 MMOL/L — SIGNIFICANT CHANGE UP (ref 22–31)
COLOR CSF: SIGNIFICANT CHANGE UP
CREAT SERPL-MCNC: 0.63 MG/DL — SIGNIFICANT CHANGE UP (ref 0.5–1.3)
CSF PCR RESULT: SIGNIFICANT CHANGE UP
CYANIDE SERPL-MCNC: <0.1 MG/L — SIGNIFICANT CHANGE UP
EGFR: 103 ML/MIN/1.73M2 — SIGNIFICANT CHANGE UP
EOSINOPHIL # BLD AUTO: 0.28 K/UL — SIGNIFICANT CHANGE UP (ref 0–0.5)
EOSINOPHIL NFR BLD AUTO: 2.1 % — SIGNIFICANT CHANGE UP (ref 0–6)
FIBRINOGEN PPP-MCNC: 309 MG/DL — SIGNIFICANT CHANGE UP (ref 200–445)
GAS PNL BLDA: SIGNIFICANT CHANGE UP
GLUCOSE BLDC GLUCOMTR-MCNC: 102 MG/DL — HIGH (ref 70–99)
GLUCOSE BLDC GLUCOMTR-MCNC: 103 MG/DL — HIGH (ref 70–99)
GLUCOSE BLDC GLUCOMTR-MCNC: 108 MG/DL — HIGH (ref 70–99)
GLUCOSE CSF-MCNC: 59 MG/DL — SIGNIFICANT CHANGE UP (ref 40–70)
GLUCOSE SERPL-MCNC: 105 MG/DL — HIGH (ref 70–99)
HCT VFR BLD CALC: 26.3 % — LOW (ref 34.5–45)
HGB BLD-MCNC: 8.9 G/DL — LOW (ref 11.5–15.5)
IMM GRANULOCYTES NFR BLD AUTO: 1.8 % — HIGH (ref 0–0.9)
INR BLD: 1.04 RATIO — SIGNIFICANT CHANGE UP (ref 0.85–1.16)
LYMPHOCYTES # BLD AUTO: 2.78 K/UL — SIGNIFICANT CHANGE UP (ref 1–3.3)
LYMPHOCYTES # BLD AUTO: 21 % — SIGNIFICANT CHANGE UP (ref 13–44)
LYMPHOCYTES # CSF: 26 % — LOW (ref 40–80)
MAGNESIUM SERPL-MCNC: 2.3 MG/DL — SIGNIFICANT CHANGE UP (ref 1.6–2.6)
MCHC RBC-ENTMCNC: 29.2 PG — SIGNIFICANT CHANGE UP (ref 27–34)
MCHC RBC-ENTMCNC: 33.8 G/DL — SIGNIFICANT CHANGE UP (ref 32–36)
MCV RBC AUTO: 86.2 FL — SIGNIFICANT CHANGE UP (ref 80–100)
MONOCYTES # BLD AUTO: 0.93 K/UL — HIGH (ref 0–0.9)
MONOCYTES NFR BLD AUTO: 7 % — SIGNIFICANT CHANGE UP (ref 2–14)
MONOS+MACROS NFR CSF: 74 % — HIGH (ref 15–45)
NEUTROPHILS # BLD AUTO: 8.94 K/UL — HIGH (ref 1.8–7.4)
NEUTROPHILS # CSF: SIGNIFICANT CHANGE UP (ref 0–6)
NEUTROPHILS NFR BLD AUTO: 67.7 % — SIGNIFICANT CHANGE UP (ref 43–77)
NRBC # BLD: 3 /100 WBCS — HIGH (ref 0–0)
NRBC BLD-RTO: 3 /100 WBCS — HIGH (ref 0–0)
NRBC NFR CSF: 2 /UL — SIGNIFICANT CHANGE UP (ref 0–5)
PHOSPHATE SERPL-MCNC: 2.3 MG/DL — LOW (ref 2.5–4.5)
PLATELET # BLD AUTO: 97 K/UL — LOW (ref 150–400)
POTASSIUM SERPL-MCNC: 4.1 MMOL/L — SIGNIFICANT CHANGE UP (ref 3.5–5.3)
POTASSIUM SERPL-SCNC: 4.1 MMOL/L — SIGNIFICANT CHANGE UP (ref 3.5–5.3)
PROT CSF-MCNC: 68 MG/DL — HIGH (ref 15–45)
PROT SERPL-MCNC: 6.5 G/DL — SIGNIFICANT CHANGE UP (ref 6–8.3)
PROTHROM AB SERPL-ACNC: 11.9 SEC — SIGNIFICANT CHANGE UP (ref 9.9–13.4)
RBC # BLD: 3.05 M/UL — LOW (ref 3.8–5.2)
RBC # CSF: 1 /UL — HIGH (ref 0–0)
RBC # FLD: 15.5 % — HIGH (ref 10.3–14.5)
SODIUM SERPL-SCNC: 146 MMOL/L — HIGH (ref 135–145)
T PALLIDUM AB TITR SER: NEGATIVE — SIGNIFICANT CHANGE UP
TUBE TYPE: SIGNIFICANT CHANGE UP
VANCOMYCIN TROUGH SERPL-MCNC: 8.1 UG/ML — LOW (ref 10–20)
VANCOMYCIN TROUGH SERPL-MCNC: 9.2 UG/ML — LOW (ref 10–20)
WBC # BLD: 13.22 K/UL — HIGH (ref 3.8–10.5)
WBC # FLD AUTO: 13.22 K/UL — HIGH (ref 3.8–10.5)
WNV IGG TITR FLD: POSITIVE
WNV IGM SPEC QL: NEGATIVE — SIGNIFICANT CHANGE UP

## 2025-01-14 PROCEDURE — 99233 SBSQ HOSP IP/OBS HIGH 50: CPT

## 2025-01-14 PROCEDURE — 71045 X-RAY EXAM CHEST 1 VIEW: CPT | Mod: 26

## 2025-01-14 PROCEDURE — 88189 FLOWCYTOMETRY/READ 16 & >: CPT | Mod: 59

## 2025-01-14 PROCEDURE — 99291 CRITICAL CARE FIRST HOUR: CPT

## 2025-01-14 PROCEDURE — 83020 HEMOGLOBIN ELECTROPHORESIS: CPT | Mod: 26

## 2025-01-14 PROCEDURE — 99292 CRITICAL CARE ADDL 30 MIN: CPT

## 2025-01-14 PROCEDURE — G0545: CPT

## 2025-01-14 PROCEDURE — 88108 CYTOPATH CONCENTRATE TECH: CPT | Mod: 26

## 2025-01-14 PROCEDURE — 62328 DX LMBR SPI PNXR W/FLUOR/CT: CPT

## 2025-01-14 RX ORDER — ACETAMINOPHEN 160 MG/5ML
1000 SUSPENSION ORAL ONCE
Refills: 0 | Status: COMPLETED | OUTPATIENT
Start: 2025-01-14 | End: 2025-01-14

## 2025-01-14 RX ORDER — VANCOMYCIN HYDROCHLORIDE 50 MG/ML
750 KIT ORAL EVERY 12 HOURS
Refills: 0 | Status: DISCONTINUED | OUTPATIENT
Start: 2025-01-14 | End: 2025-01-15

## 2025-01-14 RX ADMIN — Medication 1 DROP(S): at 05:54

## 2025-01-14 RX ADMIN — ACETAMINOPHEN 400 MILLIGRAM(S): 160 SUSPENSION ORAL at 16:00

## 2025-01-14 RX ADMIN — MEROPENEM 100 MILLIGRAM(S): 500 INJECTION INTRAVENOUS at 05:53

## 2025-01-14 RX ADMIN — VANCOMYCIN HYDROCHLORIDE 100 MILLIGRAM(S): KIT at 07:24

## 2025-01-14 RX ADMIN — ACETAMINOPHEN 1000 MILLIGRAM(S): 160 SUSPENSION ORAL at 16:15

## 2025-01-14 RX ADMIN — LEVETIRACETAM 1000 MILLIGRAM(S): 750 TABLET, FILM COATED ORAL at 02:25

## 2025-01-14 RX ADMIN — Medication 1 DROP(S): at 19:05

## 2025-01-14 RX ADMIN — IPRATROPIUM BROMIDE AND ALBUTEROL SULFATE 3 MILLILITER(S): .5; 2.5 SOLUTION RESPIRATORY (INHALATION) at 05:16

## 2025-01-14 RX ADMIN — PANTOPRAZOLE 40 MILLIGRAM(S): 20 TABLET, DELAYED RELEASE ORAL at 12:46

## 2025-01-14 RX ADMIN — Medication 5 MILLIGRAM(S): at 04:09

## 2025-01-14 RX ADMIN — LEVETIRACETAM 1000 MILLIGRAM(S): 750 TABLET, FILM COATED ORAL at 16:44

## 2025-01-14 RX ADMIN — ANTISEPTIC SURGICAL SCRUB 1 APPLICATION(S): 0.04 SOLUTION TOPICAL at 06:36

## 2025-01-14 RX ADMIN — Medication 1 TABLET(S): at 12:47

## 2025-01-14 RX ADMIN — Medication 5000 UNIT(S): at 05:54

## 2025-01-14 RX ADMIN — ANTISEPTIC SURGICAL SCRUB 15 MILLILITER(S): 0.04 SOLUTION TOPICAL at 19:05

## 2025-01-14 RX ADMIN — Medication 1 DROP(S): at 00:00

## 2025-01-14 RX ADMIN — VANCOMYCIN HYDROCHLORIDE 125 MILLIGRAM(S): KIT at 21:01

## 2025-01-14 RX ADMIN — MEROPENEM 100 MILLIGRAM(S): 500 INJECTION INTRAVENOUS at 16:43

## 2025-01-14 RX ADMIN — Medication 1 DROP(S): at 12:47

## 2025-01-14 RX ADMIN — VANCOMYCIN HYDROCHLORIDE 250 MILLIGRAM(S): KIT at 18:27

## 2025-01-14 RX ADMIN — ANTISEPTIC SURGICAL SCRUB 15 MILLILITER(S): 0.04 SOLUTION TOPICAL at 05:53

## 2025-01-14 RX ADMIN — VANCOMYCIN HYDROCHLORIDE 125 MILLIGRAM(S): KIT at 09:15

## 2025-01-14 RX ADMIN — Medication 500 MILLIGRAM(S): at 12:47

## 2025-01-14 RX ADMIN — ANTISEPTIC SURGICAL SCRUB 1 APPLICATION(S): 0.04 SOLUTION TOPICAL at 05:54

## 2025-01-14 NOTE — PROGRESS NOTE ADULT - SUBJECTIVE AND OBJECTIVE BOX
Transplant ID  s/p apheresis 1/12/25   afebrile for 24 hours  remains intubated, on ECMO  pending LP  Wakes up     Vital Signs Last 24 Hrs  T(C): 37.7 (14 Jan 2025 20:00), Max: 37.8 (14 Jan 2025 12:00)  T(F): 99.9 (14 Jan 2025 20:00), Max: 100 (14 Jan 2025 12:00)  HR: 100 (14 Jan 2025 21:00) (86 - 137)  BP: --  BP(mean): --  RR: 20 (14 Jan 2025 21:00) (18 - 31)  SpO2: 100% (14 Jan 2025 21:00) (98% - 100%)    Parameters below as of 14 Jan 2025 20:00  Patient On (Oxygen Delivery Method): ventilator            PHYSICAL EXAM:  Constitutional:no change in status  Eyes:VICKY, EOMI  Ear/Nose/Throat: no oral lesions, CVC for exchange transfusion	  Respiratory: coarse BL  Cardiovascular: S1S2  Gastrointestinal:soft, (+) BS, no tenderness  Extremities:no e/e/c  No Lymphadenopathy  IV sites not inflammed.      --      ____________________________________________________  ROS  unable to assess    Allergies  doxycycline (Angioedema)  penicillin (Unknown)  clindamycin (Angioedema)  linezolid (Angioedema)    __________________________________________________  MEDS:  MEDICATIONS  (STANDING):  albuterol/ipratropium for Nebulization 3 every 6 hours  dexMEDEtomidine Infusion 0.2 <Continuous>  dextrose 50% Injectable 25 once  dextrose 50% Injectable 12.5 once  dextrose 50% Injectable 25 once  dextrose Oral Gel 15 once PRN  fentaNYL    Injectable 25 once  glucagon  Injectable 1 once  heparin   Injectable 5000 every 8 hours  hydrALAZINE Injectable 5 every 6 hours PRN  insulin lispro (ADMELOG) corrective regimen sliding scale  every 6 hours  levETIRAcetam   Injectable 1000 every 12 hours  pantoprazole  Injectable 40 daily  vasopressin Infusion 0.04 <Continuous>    _________________________________________________  ANTIMICOBIALS  meropenem  IVPB 1000 every 8 hours  vancomycin    Solution 125 every 12 hours  vancomycin  IVPB 500 every 12 hours      GENERAL LABS              10.8                 145  | 22   | 31           13.74 >-----------< Clumped   ------------------------< 137                   30.9                 3.9  | 110  | 0.69                                         Ca 9.2   Mg 2.4   Ph 2.9      Vancomycin Level, Trough: 12.7 (01-13 @ 03:37)  Vancomycin Level, Trough: 15.2 (01-12 @ 05:13)  Vancomycin Level, Trough: 17.2 (01-11 @ 17:47)  Vancomycin Level, Trough: 15.7 (01-11 @ 04:46)  Vancomycin Level, Trough: 17.5 (01-10 @ 16:32)  Vancomycin Level, Trough: 18.4 (01-10 @ 05:28)  Vancomycin Level, Trough: 18.8 (01-09 @ 15:10)  Vancomycin Level, Trough: 17.4 (01-09 @ 04:37)  Vancomycin Level, Trough: 14.6 (01-08 @ 17:17)  Vancomycin Level, Trough: 22.0 (01-08 @ 05:23)  Vancomycin Level, Trough: 17.5 (01-07 @ 18:50)  Vancomycin Level, Trough: 17.5 (01-07 @ 07:14)  Vancomycin Level, Trough: 17.2 (01-06 @ 19:47)  Vancomycin Level, Trough: 19.2 (01-06 @ 06:24)  Vancomycin Level, Trough: 13.7 (01-05 @ 19:01)  Vancomycin Level, Trough: 20.0 (01-05 @ 06:53)  Vancomycin Level, Trough: 11.7 (01-04 @ 18:56)  Vancomycin Level, Trough: 11.0 (01-03 @ 22:15)  Vancomycin Level, Random: 16.8 (01-03 @ 15:51)    Urinalysis Basic - ( 13 Jan 2025 00:26 )    Color: x / Appearance: x / SG: x / pH: x  Gluc: 137 mg/dL / Ketone: x  / Bili: x / Urobili: x   Blood: x / Protein: x / Nitrite: x   Leuk Esterase: x / RBC: x / WBC x   Sq Epi: x / Non Sq Epi: x / Bacteria: x        _________________________________________________  MICROBIOLOGY  -----------    Culture - Blood (collected 10 Javed 2025 21:15)  Source: .Blood BLOOD  Preliminary Report (13 Jan 2025 01:01):    No growth at 48 Hours    Culture - Blood (collected 10 Javed 2025 20:35)  Source: .Blood BLOOD  Preliminary Report (13 Jan 2025 01:01):    No growth at 48 Hours    Culture - Bronchial (collected 08 Jan 2025 16:08)  Source: Bronchial  Gram Stain (09 Jan 2025 06:50):    Rare polymorphonuclear leukocytes seen per low power field    No Squamous epithelial cells seen per low power field    No organisms seen per oil power field  Final Report (10 Javed 2025 11:31):    No growth            CMVPCR Log: NotDetec [id number]/mL (01-08 @ 14:11)                  RADIOLOGY & ADDITIONAL STUDIES:

## 2025-01-14 NOTE — CHART NOTE - NSCHARTNOTEFT_GEN_A_CORE
NUTRITION FOLLOW UP NOTE    PATIENT SEEN FOR: ICU malnutrition follow up    SOURCE: [] Patient  [x] Current Medical Record  [x] RN  [] Family/support person at bedside  [x] Patient unavailable/inappropriate  [] Other:    CHART REVIEWED/EVENTS NOTED.  [] No changes to nutrition care plan to note  [] Nutrition Status:    DIET ORDER:   Diet, NPO with Tube Feed:   Tube Feeding Modality: Orogastric  Vivonex  Total Volume for 24 Hours (mL): 1680  Continuous  Starting Tube Feed Rate {mL per Hour}: 20  Increase Tube Feed Rate by (mL): 10     Every 3 hours  Until Goal Tube Feed Rate (mL per Hour): 70  Tube Feed Duration (in Hours): 24  Tube Feed Start Time: 14:50  Mehdi(7 Gm Arginine/7 Gm Glut/1.2 Gm HMB     Qty per Day:  2 (01-10-25)      CURRENT DIET ORDER IS:  [] Appropriate:  [] Inadequate:  [] Other:    NUTRITION INTAKE/PROVISION:  [] PO:  [] Enteral Nutrition:  [] Parenteral Nutrition:    ANTHROPOMETRICS:  Drug Dosing Weight  Height (cm): 162 (2025 20:47)  Weight (kg): 83.6 (2025 20:47)  BMI (kg/m2): 31.9 (2025 20:47)  BSA (m2): 1.88 (2025 20:47)  Weights:   Daily Weight in k.3 (), Weight in k.8 (), Weight in k.1 (), Weight in k.7 (), Weight in k.9 (01-10), Weight in k.9 ()     NUTRITIONALLY PERTINENT MEDICATIONS:  MEDICATIONS  (STANDING):  ascorbic acid  dextrose 5%.  dextrose 5%.  dextrose 50% Injectable  dextrose 50% Injectable  dextrose 50% Injectable  glucagon  Injectable  insulin lispro (ADMELOG) corrective regimen sliding scale  meropenem  IVPB  multivitamin  pantoprazole  Injectable  vancomycin    Solution  vancomycin  IVPB  vasopressin Infusion       NUTRITIONALLY PERTINENT LABS:   Na146 mmol/L[H] Glu 105 mg/dL[H] K+ 4.1 mmol/L Cr  0.63 mg/dL BUN 27 mg/dL[H]  Phos 2.3 mg/dL[L] 01-14 Alb 3.8 g/dL  U/L[H] AST 83 U/L[H] Alkaline Phosphatase 97 U/L  25 @ 21:34 a1c 4.6    A1C with Estimated Average Glucose Result: 4.6 % (25 @ 21:34)          Finger Sticks:  POCT Blood Glucose.: 103 mg/dL ( @ 05:29)  POCT Blood Glucose.: 110 mg/dL ( @ 23:55)  POCT Blood Glucose.: 110 mg/dL ( @ 16:13)  POCT Blood Glucose.: 128 mg/dL ( @ 12:31)      NUTRITIONALLY PERTINENT MEDICATIONS/LABS:  [x] Reviewed  [] Relevant notes on medications/labs:    EDEMA:  [x] Reviewed  [] Relevant notes:    GI/ I&O:  [x] Reviewed  [] Relevant notes:  [] Other:    SKIN:   [] No pressure injuries documented, per nursing flowsheet  [] Pressure injury previously noted  [] Change in pressure injury documentation:  [] Other:    ESTIMATED NEEDS:  [] No change:  [] Updated:  Energy:   kcal/day (xx-xx kcal/kg)  Protein:   g/day (xx-xx g/kg)  Fluid:   ml/day or [] defer to team  Based on:    NUTRITION DIAGNOSIS:  [] Prior Dx:  [] New Dx:    EDUCATION:  [] Yes:  [] Not appropriate/warranted    NUTRITION CARE PLAN:  1. Diet:  2. Supplements:  3. Multivitamin/mineral supplementation:  4:     [] Achieved - Continue current nutrition intervention(s)  [] Current medical condition precludes nutrition intervention at this time.    MONITORING AND EVALUATION:   RD remains available upon request and will follow up per protocol.    Name  Available on [facility] NUTRITION FOLLOW UP NOTE    PATIENT SEEN FOR: ICU malnutrition follow up    SOURCE: [] Patient  [x] Current Medical Record  [x] RN  [] Family/support person at bedside  [x] Patient unavailable/inappropriate  [] Other:    CHART REVIEWED/EVENTS NOTED.  [x] No changes to nutrition care plan to note  [x] Nutrition Status:  -Hx sickle cell disease, heart failure  -acute respiratory failure. GOC ongoing regarding tracheostomy  -pending LP today    DIET ORDER:   Diet, NPO with Tube Feed:   Tube Feeding Modality: Orogastric  Vivonex  Total Volume for 24 Hours (mL): 1680  Continuous  Starting Tube Feed Rate {mL per Hour}: 20  Increase Tube Feed Rate by (mL): 10     Every 3 hours  Until Goal Tube Feed Rate (mL per Hour): 70  Tube Feed Duration (in Hours): 24  Tube Feed Start Time: 14:50  Mehdi(7 Gm Arginine/7 Gm Glut/1.2 Gm HMB     Qty per Day:  2 (01-10-25)      CURRENT DIET ORDER IS:  [x] Appropriate:  [] Inadequate:  [] Other:    NUTRITION INTAKE/PROVISION:  [] PO:  [x] Enteral Nutrition:  EN Order + Mehdi BID Provides:  1680 ml formula, 1860 kcal, 89 g protein, 1425 ml free water; meets 34.2kcal/kg and 1.64g/kg protein based on IBW 120lb/54.4kg.    Current Pump Rate: off for procedure today  5-Day Average Enteral Provision per RN flowsheet: 962 mL, 1287 kcals, 63 g protein (note formula changed and was titrating up to goal rate during this period)  [] Parenteral Nutrition:    ANTHROPOMETRICS:  Drug Dosing Weight  Height (cm): 162 (2025 20:47)  Weight (kg): 83.6 (2025 20:47)  BMI (kg/m2): 31.9 (2025 20:47)  Weights:   Daily Weight in k.3 (), 85.8 (), 92.1 (), 82.7 (), 87.9 (01-10), 86.9 (), 75.8 (), 74.1 (), 78.4 (), 83.6 ()   Weight fluctuations likely in setting of fluid shifts vs scale inaccuracies and/or ongoing malnutrition.RD to continue to monitor weight trends as available/able.    NUTRITIONALLY PERTINENT MEDICATIONS:  MEDICATIONS  (STANDING):  ascorbic acid  dextrose 5%.  dextrose 5%.  dextrose 50% Injectable  dextrose 50% Injectable  dextrose 50% Injectable  glucagon  Injectable  insulin lispro (ADMELOG) corrective regimen sliding scale  meropenem  IVPB  multivitamin  pantoprazole  Injectable  vancomycin    Solution  vancomycin  IVPB  vasopressin Infusion       NUTRITIONALLY PERTINENT LABS:   Na146 mmol/L[H] Glu 105 mg/dL[H] K+ 4.1 mmol/L Cr  0.63 mg/dL BUN 27 mg/dL[H]  Phos 2.3 mg/dL[L]  Alb 3.8 g/dL  U/L[H] AST 83 U/L[H] Alkaline Phosphatase 97 U/L  25 @ 21:34 a1c 4.6    A1C with Estimated Average Glucose Result: 4.6 % (25 @ 21:34)          Finger Sticks:  POCT Blood Glucose.: 103 mg/dL ( @ 05:29)  POCT Blood Glucose.: 110 mg/dL ( @ 23:55)  POCT Blood Glucose.: 110 mg/dL ( @ 16:13)  POCT Blood Glucose.: 128 mg/dL ( @ 12:31)      NUTRITIONALLY PERTINENT MEDICATIONS/LABS:  [x] Reviewed  [x] Relevant notes on medications/labs:  -sliding scale insulin for glycemic control  -Vasopressin @ 0 since   -Multivitamin and Vitamin C for wound healing    EDEMA:  [x] Reviewed  [x] Relevant notes: 2+ generalized; 3+ right arm per flowsheets     GI/ I&O:  [x] Reviewed  [x] Relevant notes: Tolerating enteral nutrition per RN but no BM yesterday or today thus far. Note patient previously with significant diarrhea.   [] Other:    SKIN:   [] No pressure injuries documented, per nursing flowsheet  [x] Pressure injury previously noted  -per flowsheets:  left earlobe suspected deep tissue injury  left forehead suspected deep tissue injury  right forehead suspected deep tissue injury  sacrum suspected deep tissue injury  bilateral heel suspected deep tissue injury  right buttocks suspected deep tissue injury  left buttocks suspected deep tissue injury  [] Change in pressure injury documentation:  [] Other:    ESTIMATED NEEDS:  [x] No change:  [] Updated:  Energy:  9426-6117 kcal/day (30-35 kcal/kg)  Protein:   g/day (1.5-2.0 g/kg)  Fluid:   ml/day or [x] defer to team  Based on: IBW 120lb/54.4kg    NUTRITION DIAGNOSIS:  [x] Prior Dx: Increased Nutrient Needs; Severe acute malnutrition  [] New Dx:    EDUCATION:  [] Yes:  [x] Not appropriate/warranted    NUTRITION CARE PLAN:  1. Diet: Continue Vivonex @ 70ml/hr x 24hr + Mehdi BID to provide 1680ml, 1860 kcal, 89 g protein, 1425 ml free water; meets 34.2kcal/kg and 1.64g/kg protein based on IBW 120lb/54.4kg. Defer additional free water flushes to medical team.  2. Supplements: Add Mehdi 2x/day (provides 180kcal and 5g protein) for wound healing.  3. Multivitamin/mineral supplementation: Add multivitamin and vitamin C for wound healing pending no medical contraindications.     [] Achieved - Continue current nutrition intervention(s)  [] Current medical condition precludes nutrition intervention at this time.    MONITORING AND EVALUATION:   RD remains available upon request and will follow up per protocol.    Corinne Lima RDN, CDN - available via [facility] NUTRITION FOLLOW UP NOTE    PATIENT SEEN FOR: ICU malnutrition follow up    SOURCE: [] Patient  [x] Current Medical Record  [x] RN  [] Family/support person at bedside  [x] Patient unavailable/inappropriate  [] Other:    CHART REVIEWED/EVENTS NOTED.  [x] No changes to nutrition care plan to note  [x] Nutrition Status:  -Hx sickle cell disease, heart failure  -acute respiratory failure. GOC ongoing regarding tracheostomy  -pending LP today    DIET ORDER:   Diet, NPO with Tube Feed:   Tube Feeding Modality: Orogastric  Vivonex  Total Volume for 24 Hours (mL): 1680  Continuous  Starting Tube Feed Rate {mL per Hour}: 20  Increase Tube Feed Rate by (mL): 10     Every 3 hours  Until Goal Tube Feed Rate (mL per Hour): 70  Tube Feed Duration (in Hours): 24  Tube Feed Start Time: 14:50  Mehdi(7 Gm Arginine/7 Gm Glut/1.2 Gm HMB     Qty per Day:  2 (01-10-25)      CURRENT DIET ORDER IS:  [x] Appropriate:  [] Inadequate:  [] Other:    NUTRITION INTAKE/PROVISION:  [] PO:  [x] Enteral Nutrition:  EN Order + Mehdi BID Provides:  1680 ml formula, 1860 kcal, 89 g protein, 1425 ml free water; meets 34.2kcal/kg and 1.64g/kg protein based on IBW 120lb/54.4kg.    Current Pump Rate: off for procedure today  5-Day Average Enteral Provision per RN flowsheet: 962 mL, 1287 kcals, 63 g protein (note formula changed and was titrating up to goal rate during this period)  [] Parenteral Nutrition:    ANTHROPOMETRICS:  Drug Dosing Weight  Height (cm): 162 (2025 20:47)  Weight (kg): 83.6 (2025 20:47)  BMI (kg/m2): 31.9 (2025 20:47)  Weights:   Daily Weight in k.3 (), 85.8 (), 92.1 (), 82.7 (), 87.9 (01-10), 86.9 (), 75.8 (), 74.1 (), 78.4 (), 83.6 ()   Weight fluctuations likely in setting of fluid shifts vs scale inaccuracies and/or ongoing malnutrition.RD to continue to monitor weight trends as available/able.    NUTRITIONALLY PERTINENT MEDICATIONS:  MEDICATIONS  (STANDING):  ascorbic acid  dextrose 5%.  dextrose 5%.  dextrose 50% Injectable  dextrose 50% Injectable  dextrose 50% Injectable  glucagon  Injectable  insulin lispro (ADMELOG) corrective regimen sliding scale  meropenem  IVPB  multivitamin  pantoprazole  Injectable  vancomycin    Solution  vancomycin  IVPB  vasopressin Infusion       NUTRITIONALLY PERTINENT LABS:   Na146 mmol/L[H] Glu 105 mg/dL[H] K+ 4.1 mmol/L Cr  0.63 mg/dL BUN 27 mg/dL[H]  Phos 2.3 mg/dL[L]  Alb 3.8 g/dL  U/L[H] AST 83 U/L[H] Alkaline Phosphatase 97 U/L  25 @ 21:34 a1c 4.6    A1C with Estimated Average Glucose Result: 4.6 % (25 @ 21:34)          Finger Sticks:  POCT Blood Glucose.: 103 mg/dL ( @ 05:29)  POCT Blood Glucose.: 110 mg/dL ( @ 23:55)  POCT Blood Glucose.: 110 mg/dL ( @ 16:13)  POCT Blood Glucose.: 128 mg/dL ( @ 12:31)      NUTRITIONALLY PERTINENT MEDICATIONS/LABS:  [x] Reviewed  [x] Relevant notes on medications/labs:  -sliding scale insulin for glycemic control  -Vasopressin @ 0 since   -Multivitamin and Vitamin C for wound healing    EDEMA:  [x] Reviewed  [x] Relevant notes: 2+ generalized; 3+ right arm per flowsheets     GI/ I&O:  [x] Reviewed  [x] Relevant notes: Tolerating enteral nutrition per RN but no BM yesterday or today thus far. Note patient previously with significant diarrhea.   [] Other:    SKIN:   [] No pressure injuries documented, per nursing flowsheet  [x] Pressure injury previously noted  -per flowsheets:  left earlobe suspected deep tissue injury  left forehead suspected deep tissue injury  right forehead suspected deep tissue injury  sacrum suspected deep tissue injury  bilateral heel suspected deep tissue injury  right buttocks suspected deep tissue injury  left buttocks suspected deep tissue injury  [] Change in pressure injury documentation:  [] Other:    ESTIMATED NEEDS:  [x] No change:  [] Updated:  Energy:  0172-3672 kcal/day (30-35 kcal/kg)  Protein:   g/day (1.5-2.0 g/kg)  Fluid:   ml/day or [x] defer to team  Based on: IBW 120lb/54.4kg    NUTRITION DIAGNOSIS:  [x] Prior Dx: Increased Nutrient Needs; Severe acute malnutrition  [] New Dx:    EDUCATION:  [] Yes:  [x] Not appropriate/warranted    NUTRITION CARE PLAN:  1. Diet: Continue Vivonex @ 70ml/hr x 24hr + Mehdi 2x/day (provides 180kcal and 5g protein) to provide total 1680ml, 1860 kcal, 89 g protein, 1425 ml free water; meets 34.2kcal/kg and 1.64g/kg protein based on IBW 120lb/54.4kg. Defer additional free water flushes to medical team.  2. Multivitamin/mineral supplementation: Add multivitamin and vitamin C for wound healing pending no medical contraindications.     [] Achieved - Continue current nutrition intervention(s)  [] Current medical condition precludes nutrition intervention at this time.    MONITORING AND EVALUATION:   RD remains available upon request and will follow up per protocol.    Corinne Lima RDN, CDN - available via [facility] TE[facility]

## 2025-01-14 NOTE — PROGRESS NOTE ADULT - ASSESSMENT
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 Pt initially presented to [facility] for SOB and SS crisis; course c/b witnessed seizure, intubated and sedated, and transferred to [facility] for further management. Keppra was discontinued at [facility] due to negative EEG. At [facility] MICU pt was found to have RV failure possibly iso pulm HTN 2/2 increased tidal volumes on the ventilator. On addition, pt possibly received a lot of volume iso SS crisis. Neuro was consulted for seizures and EEG technician was called for vEEG placement. Pt is in profound cardiogenic shock. Pt has been decompensating, despite Dobutamine gtt, Bumex gtt, Levophed, and addition of Vasopressin. Vasopressors requirements have gone up. [facility] shock team was called and pt is being transferred to [facility] for further management.     Patient with multiple medications allergies listed  MSSA in sputum culture from ET tube  Coag negative Staph epi. 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 Innumerable foci susceptibility artifact scattered throughout the   brain which can be seen with critically ill patients on ECMO. Diffuse fat   embolization is also part of the differential diagnosis but is considered   less likely.    2. Tiny acute/subacute infarcts within the bilateral basal ganglia,   thalami, and chandu with disproportionate ovoid area of T2/FLAIR   prolongation in the right ventral thalamus. Findings may indicate the   presence of embolic acute/subacute infarcts.    3. No abnormal intracranial enhancement.      workup   Encephalitis studies sent:  CMV viral load negative in serum  HSV 1/2 PCR negative in serum  West Nile serology and PCR pending  Lyme serology negative  Babesia PCR-ordered    acute hepatitis serology negative  Coxsackie A nonreactive, B low titers    Staph epi bacteremia in a single set of cultures  repeat specimens sent on 1/7 are no growth  likely procurement contaminant    sputum with MSSA colonization vs superinfection/pneumonia from prior viral illness    remains on vancomycin,  now 10 days   Completing 7 days of meropenem for fevers  received apheresis      recommendations  ·	Repeat BC sent, follow  ·	follow WNV/PCR - IgG pos, indicating prior exposure but IgM negative, no no evidence so far of active infection  ·	continue meropenem /vancomycin for now- consider early descalation of vancomycin (now >10 days)  ·	plan for LP-   please check opening pressure, Cell count, prot/glucose, VDRL cryptococcus antigen, PCR panel (includes HSV PCR, crAg), bacterial, fungal cx, WNV PCR/IGG/IgM (late season and lower probability of reactivation)+/-oligoclonal bands, autoimmune panel  ·	agree with syphilis rule out- check treponemal Ab, if negative, ow likelihood of syphilis in CNS      Thank you for involving us in the care of this patient  Transplant ID will continue to follow  Please call or page with additional questions  Pager; #[phone]  Teams: from 8 am to 5 pm  Dian Man MD

## 2025-01-14 NOTE — PROGRESS NOTE ADULT - SUBJECTIVE AND OBJECTIVE BOX
Patient seen and examined at the bedside.    Remains critically ill on continuous ICU monitoring.    Brief Summary:  58 yo F with Sickle cell disease and NICM now with Cardiogenic shock s/p VA ECMO on 1/4/25.  VA ECMO decannulation on 1/7/25    24 Hour events:  Off Precedex    Objective:  Vital Signs Last 24 Hrs  T(C): 37.3 (14 Jan 2025 04:00), Max: 37.7 (13 Jan 2025 12:00)  T(F): 99.1 (14 Jan 2025 04:00), Max: 99.9 (13 Jan 2025 12:00)  HR: 120 (14 Jan 2025 05:30) (89 - 128)  BP: --  BP(mean): --  RR: 25 (14 Jan 2025 05:00) (18 - 28)  SpO2: 100% (14 Jan 2025 05:30) (95% - 100%)    Parameters below as of 14 Jan 2025 05:30  Patient On (Oxygen Delivery Method): ventilator    Mode: CPAP with PS  FiO2: 30  PEEP: 5  PS: 10  MAP: 7  PIP: 16    Physical Exam:  General: Intubated  Neurology:  +cough, +gag, + corneal  Respiratory: Breath sounds bilaterally   CV: Sinus Tachycardia   Abdominal: Soft  Extremities: Warm, well-perfused  Costa  -------------------------------------------------------------------------------------------------------------------------------    Labs:                        8.9    13.22 )-----------( 97       ( 14 Jan 2025 00:26 )             26.3     01-14    146[H]  |  111[H]  |  27[H]  ----------------------------<  105[H]  4.1   |  24  |  0.63    Ca    9.4      14 Jan 2025 00:26  Phos  2.3     01-14  Mg     2.3     01-14    TPro  6.5  /  Alb  3.8  /  TBili  1.5[H]  /  DBili  x   /  AST  83[H]  /  ALT  133[H]  /  AlkPhos  97  01-14    LIVER FUNCTIONS - ( 14 Jan 2025 00:26 )  Alb: 3.8 g/dL / Pro: 6.5 g/dL / ALK PHOS: 97 U/L / ALT: 133 U/L / AST: 83 U/L / GGT: x           PT/INR - ( 14 Jan 2025 00:26 )   PT: 11.9 sec;   INR: 1.04 ratio       PTT - ( 14 Jan 2025 00:26 )  PTT:26.7 sec  ABG - ( 14 Jan 2025 00:11 )  pH, Arterial: 7.50  pH, Blood: x     /  pCO2: 34    /  pO2: 136   / HCO3: 26    / Base Excess: 3.3   /  SaO2: 99.4    ------------------------------------------------------------------------------------------------------------------------------  Assessment:  58 yo F w/ PMHx of Sickle cell disease (on hydroxyurea), HTN, 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 Pt initially presented to [facility] for SOB and SS crisis; course c/b witnessed seizure, intubated and sedated, and transferred to [facility] for further management. Now in cardiogenic shock, transferred to [facility] for further management. Cannulated for VA ECMO on 1/4/25. Decannulated on 1/7/25.    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  Hydralazine prn for hypertension  Monitor hemodynamic closely, signs of hypoperfusion     ***Pulmonary***  Acute hypoxic respiratory failure  Full vent support, tolerates PS 10/5  Discussed tracheostomy tube placement - family discussing pending neuro prognosis.  Nebs prn  S/p bronch, follow BAL     ***GI***  Protein calorie malnutrition  Tube feeds at goal  Protonix for stress ulcer prophylaxis     ***Renal***  Trend Creatinine   Costa catheter for strict I/O measurements.     ***ID***  MSSA bacteremia, BAL + MSSA  On IV Vancomycin  PO Vancomycin for cdiff prophylaxis   On empiric Meropenem  Leukocytosis improving    ***Endocrine***  Hyperglycemia - Insulin sliding scale.     ***Hematology***  Acute blood loss anemia and thrombocytopenia  Sickle Cell - s/p Red cell exchange, Hgb electrophoresis   Keep Plats >50, Hg >8  Heparin SQ for prophylaxis.        Care plan discussed with the ICU care team.   Patient remains critical, at risk for life threatening decompensation.    I have spent 30 minutes providing critical care management to this patient.    By signing my name below, I, Norris Oliver, attest that this documentation has been prepared under the direction and in the presence of Jyothi Comer MD.  Electronically signed: Norris Oliver, 01-14-25 @ 06:27    I, Jyothi Comer,  personally performed the services described in this documentation. All medical record entries made by the scribe were at my direction and in my presence. I have reviewed the chart and agree that the record reflects my personal performance and is accurate and complete  Electronically signed: Jyothi Comer MD. Patient seen and examined at the bedside.    Remains critically ill on continuous ICU monitoring.    Brief Summary:  58 yo F with Sickle cell disease and NICM now with Cardiogenic shock s/p VA ECMO on 1/4/25.  VA ECMO decannulation on 1/7/25    24 Hour events:  More awake today.  May be responding some.      Objective:  Vital Signs Last 24 Hrs  T(C): 37.3 (14 Jan 2025 04:00), Max: 37.7 (13 Jan 2025 12:00)  T(F): 99.1 (14 Jan 2025 04:00), Max: 99.9 (13 Jan 2025 12:00)  HR: 120 (14 Jan 2025 05:30) (89 - 128)  BP: --  BP(mean): --  RR: 25 (14 Jan 2025 05:00) (18 - 28)  SpO2: 100% (14 Jan 2025 05:30) (95% - 100%)    Parameters below as of 14 Jan 2025 05:30  Patient On (Oxygen Delivery Method): ventilator    Mode: CPAP with PS  FiO2: 30  PEEP: 5  PS: 10  MAP: 7  PIP: 16    Physical Exam:  General: Intubated  Neurology:  +cough, +gag, + corneal  Respiratory: Breath sounds bilaterally   CV: Sinus Tachycardia   Abdominal: Soft  Extremities: Warm, well-perfused  Costa  -------------------------------------------------------------------------------------------------------------------------------    Labs:                        8.9    13.22 )-----------( 97       ( 14 Jan 2025 00:26 )             26.3     01-14    146[H]  |  111[H]  |  27[H]  ----------------------------<  105[H]  4.1   |  24  |  0.63    Ca    9.4      14 Jan 2025 00:26  Phos  2.3     01-14  Mg     2.3     01-14    TPro  6.5  /  Alb  3.8  /  TBili  1.5[H]  /  DBili  x   /  AST  83[H]  /  ALT  133[H]  /  AlkPhos  97  01-14    LIVER FUNCTIONS - ( 14 Jan 2025 00:26 )  Alb: 3.8 g/dL / Pro: 6.5 g/dL / ALK PHOS: 97 U/L / ALT: 133 U/L / AST: 83 U/L / GGT: x           PT/INR - ( 14 Jan 2025 00:26 )   PT: 11.9 sec;   INR: 1.04 ratio       PTT - ( 14 Jan 2025 00:26 )  PTT:26.7 sec  ABG - ( 14 Jan 2025 00:11 )  pH, Arterial: 7.50  pH, Blood: x     /  pCO2: 34    /  pO2: 136   / HCO3: 26    / Base Excess: 3.3   /  SaO2: 99.4    ------------------------------------------------------------------------------------------------------------------------------  Assessment:  58 yo F w/ PMHx of Sickle cell disease (on hydroxyurea), HTN, 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 Pt initially presented to [facility] for SOB and SS crisis; course c/b witnessed seizure, intubated and sedated, and transferred to [facility] for further management. Now in cardiogenic shock, transferred to [facility] for further management. Cannulated for VA ECMO on 1/4/25. Decannulated on 1/7/25.    Altered mental status / multifactorial encephalopathy  Acute respiratory failure  Sickle cell disease  Hyperglycemia  Leukocytosis    Plan:   ***Neuro***  Multifactorial encephalopathy  S/p embolic stroke on MRI  Seizure activity on EEG, now improved  Remains on Keppra  LP today.    ***Cardiovascular***  At risk for hemodynamic decompensation  S/p VA ECMO, Bi vent function recovered.  Hydralazine prn for hypertension  Monitor hemodynamic closely, signs of hypoperfusion     ***Pulmonary***  Acute hypoxic respiratory failure  Full vent support, tolerates PS 10/5  Plan for tracheostomy on Thursday.  Nebs prn    ***GI***  Protein calorie malnutrition  Tube feeds at goal  Protonix for stress ulcer prophylaxis     ***Renal***  Trend Creatinine   Hypophosphatemia - repleted.  Costa catheter for strict I/O measurements.     ***ID***  MSSA bacteremia, BAL + MSSA  On IV Vancomycin  PO Vancomycin for cdiff prophylaxis   On empiric Meropenem  Leukocytosis improving    ***Endocrine***  Hyperglycemia - Insulin sliding scale.     ***Hematology***  Acute blood loss anemia and thrombocytopenia  Sickle Cell - s/p Red cell exchange, Hgb electrophoresis   Resume Hydroxyurea when Platelet count > 100  Heparin SQ for prophylaxis.        Care plan discussed with the ICU care team.   Patient remains critical, at risk for life threatening decompensation.    I have spent 58 minutes providing critical care management to this patient.    By signing my name below, I, Norris Oliver, attest that this documentation has been prepared under the direction and in the presence of Jyothi Comer MD.  Electronically signed: Norris Oliver, 01-14-25 @ 06:27    I, Jyothi Comer,  personally performed the services described in this documentation. All medical record entries made by the scribe were at my direction and in my presence. I have reviewed the chart and agree that the record reflects my personal performance and is accurate and complete  Electronically signed: Jyothi Comer MD.

## 2025-01-14 NOTE — PROGRESS NOTE ADULT - SUBJECTIVE AND OBJECTIVE BOX
Patient seen and examined at the bedside.    Remained critically ill on continuous ICU monitoring.    OBJECTIVE:  Vital Signs Last 24 Hrs  T(C): 37.7 (14 Jan 2025 20:00), Max: 37.8 (14 Jan 2025 12:00)  T(F): 99.9 (14 Jan 2025 20:00), Max: 100 (14 Jan 2025 12:00)  HR: 100 (14 Jan 2025 21:00) (86 - 137)  BP: --  BP(mean): --  RR: 20 (14 Jan 2025 21:00) (18 - 31)  SpO2: 100% (14 Jan 2025 21:00) (98% - 100%)    Parameters below as of 14 Jan 2025 20:00  Patient On (Oxygen Delivery Method): ventilator    Physical Exam:   General: Intubated  Neurology: Sedated  Eyes: bilateral pupils equal and reactive   ENT/Neck: Neck supple, trachea midline, No JVD   Respiratory: Clear bilaterally   CV: S1S2, no murmurs        [x] Sinus tachycardia  Abdominal: Soft, NT, ND +BS   Extremities: 1-2+ pedal edema noted, + peripheral pulses   Skin: No Rashes, Hematoma, Ecchymosis                         Assessment:  60 yo F w/ PMHx of Sickle cell disease (on hydroxyurea), HTN, 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 Pt initially presented to [facility] for SOB and SS crisis; course c/b witnessed seizure, intubated and sedated, and transferred to [facility] for further management. Now in cardiogenic shock, transferred to [facility] for further management.    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  Keppra for seizures  Post operative neuro assessment     ***Cardiovascular***  Invasive hemodynamic monitoring, assess perfusion indices   ST / CVP 2 / MAP 75 / Hct 26.3% / Lactate 0.9  [x] Vasopressin off  Continuous reassessment of hemodynamics  Hydralazine prn for hypertension  [x] VTE ppx with HSQ, held  Serial EKG and cardiac enzymes     ***Pulmonary***  PRN Duonebs  Post op vent management   Titration of FiO2 and PEEP, follow SpO2, CXR, blood gasses     Mode: AC/ CMV (Assist Control/ Continuous Mandatory Ventilation)  RR (machine): 12  FiO2: 30  PEEP: 5  ITime: 1  MAP: 8  PC: 8  PIP: 14            ***GI***  [x] Diet: Vivonex TF @70ml/hr  [x] Protonix for stress ulcer prophylaxis    ***Renal***  Continue to monitor I/Os, BUN/Creatinine.   Replete lytes PRN  Costa present    ***ID***  MSSA bacteremia, BAL + MSSA  IV Vancomycin empirically  PO Vancomycin for c diff prophylaxis  Meropenem completed    ***Endocrine***  [x] Stress Hyperglycemia : HbA1c 4.6%                - [x] ISS             - Need tight glycemic control to prevent wound infection.    Patient requires continuous monitoring with bedside rhythm monitoring, pulse oximetry monitoring, and continuous central venous and arterial pressure monitoring; and intermittent blood gas analysis. Care plan discussed with the ICU care team.   Patient remained critical, at risk for life threatening decompensation.    I have spent 47 minutes providing critical care management to this patient.    By signing my name below, I, Evlie Angel, attest that this documentation has been prepared under the direction and in the presence of Rayna Jin NP  Electronically signed: Omar Aguilera, 01-14-25 @ 21:45    I, Rayna Jin NP, personally performed the services described in this documentation. all medical record entries made by the blasibrodrigo were at my direction and in my presence. I have reviewed the chart and agree that the record reflects my personal performance and is accurate and complete  Electronically signed: Rayna Jin NP 24 HOUR EVENTS  - s/p lumbar puncture during the day    Patient seen and examined at the bedside.    Remained critically ill on continuous ICU monitoring.    OBJECTIVE:  Vital Signs Last 24 Hrs  T(C): 37.7 (14 Jan 2025 20:00), Max: 37.8 (14 Jan 2025 12:00)  T(F): 99.9 (14 Jan 2025 20:00), Max: 100 (14 Jan 2025 12:00)  HR: 100 (14 Jan 2025 21:00) (86 - 137)  BP: --  BP(mean): --  RR: 20 (14 Jan 2025 21:00) (18 - 31)  SpO2: 100% (14 Jan 2025 21:00) (98% - 100%)    Parameters below as of 14 Jan 2025 20:00  Patient On (Oxygen Delivery Method): ventilator    Physical Exam:   General: Intubated  Neurology: Opens eyes to vocal stimuli but does not follow commands, withdraws BUEs to painful stimuli, & triple flexion of BLEs to painful stimuli.  Eyes: bilateral pupils equal and reactive.  ENT/Neck: Neck supple, trachea midline, No JVD.  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 Pt initially presented to [facility] for SOB and SS crisis; course c/b witnessed seizure, intubated and sedated, and transferred to [facility] for further management. Now in cardiogenic shock, transferred to [facility] for further management.    Cardiogenic shock s/p VA ECMO cannulation on 01/04/2025  s/p VA ECMO decannulation on 01/07/2025  Altered mental status / multifactorial encephalopathy  Acute respiratory failure  Sickle cell disease  Hyperglycemia  Leukocytosis  Thrombocytopenia    Plan:   ***Neuro***  - Multifactorial encephalopathy  - S/p embolic stroke on MRI  - Continue with Keppra for seizures    ***Cardiovascular***  - Invasive hemodynamic monitoring, assess perfusion indices, ST / CVP 2 / MAP 75 / Hct 26.3% / Lactate 0.9  - Hydralazine prn for hypertension    ***Pulmonary***  - PRN Duonebs  - Titration of FiO2 and PEEP, follow SpO2, CXR, blood gasses   - Keep on full support until 5a then PSV  - Plan for trach Thursday?    Mode: AC/ CMV (Assist Control/ Continuous Mandatory Ventilation)  RR (machine): 12  FiO2: 30  PEEP: 5  ITime: 1  MAP: 8  PC: 8  PIP: 14            ***GI***  - Diet: Vivonex TF @70ml/hr  - Protonix for stress ulcer prophylaxis    ***Renal***  - Continue to monitor I/Os, BUN/Creatinine.   - Replete lytes PRN  - Costa present    ***ID***  - MSSA bacteremia, BAL + MSSA  - IV Vancomycin empirically  - PO Vancomycin for c diff prophylaxis  - Meropenem completed    ***Endocrine***  [x] Stress Hyperglycemia : HbA1c 4.6%                - [x] ISS             - Need tight glycemic control to prevent wound infection.    ***Heme***  - HSQ for VTE ppx ON HOLD for LP today  - Resume HSQ tomorrow?    Patient requires continuous monitoring with bedside rhythm monitoring, pulse oximetry monitoring, and continuous central venous and arterial pressure monitoring; and intermittent blood gas analysis. Care plan discussed with the ICU care team.   Patient remained critical, at risk for life threatening decompensation.    I have spent 47 minutes providing critical care management to this patient.    By signing my name below, I, Elvie Angel, attest that this documentation has been prepared under the direction and in the presence of Rayna Jin NP  Electronically signed: Krishna Aguilera, 01-14-25 @ 21:45    I, Rayna Jin NP, personally performed the services described in this documentation. all medical record entries made by the krishna were at my direction and in my presence. I have reviewed the chart and agree that the record reflects my personal performance and is accurate and complete  Electronically signed: Rayna Jin NP

## 2025-01-15 LAB
ALBUMIN SERPL ELPH-MCNC: 3.6 G/DL — SIGNIFICANT CHANGE UP (ref 3.3–5)
ALP SERPL-CCNC: 98 U/L — SIGNIFICANT CHANGE UP (ref 40–120)
ALT FLD-CCNC: 116 U/L — HIGH (ref 10–45)
ANION GAP SERPL CALC-SCNC: 12 MMOL/L — SIGNIFICANT CHANGE UP (ref 5–17)
APTT BLD: 22.3 SEC — LOW (ref 24.5–35.6)
AST SERPL-CCNC: 72 U/L — HIGH (ref 10–40)
BASOPHILS # BLD AUTO: 0.06 K/UL — SIGNIFICANT CHANGE UP (ref 0–0.2)
BASOPHILS NFR BLD AUTO: 0.6 % — SIGNIFICANT CHANGE UP (ref 0–2)
BILIRUB SERPL-MCNC: 1.4 MG/DL — HIGH (ref 0.2–1.2)
BLD GP AB SCN SERPL QL: NEGATIVE — SIGNIFICANT CHANGE UP
BUN SERPL-MCNC: 25 MG/DL — HIGH (ref 7–23)
CALCIUM SERPL-MCNC: 9.3 MG/DL — SIGNIFICANT CHANGE UP (ref 8.4–10.5)
CHLORIDE SERPL-SCNC: 111 MMOL/L — HIGH (ref 96–108)
CO2 SERPL-SCNC: 20 MMOL/L — LOW (ref 22–31)
CREAT SERPL-MCNC: 0.7 MG/DL — SIGNIFICANT CHANGE UP (ref 0.5–1.3)
EGFR: 101 ML/MIN/1.73M2 — SIGNIFICANT CHANGE UP
EOSINOPHIL # BLD AUTO: 0.23 K/UL — SIGNIFICANT CHANGE UP (ref 0–0.5)
EOSINOPHIL NFR BLD AUTO: 2.1 % — SIGNIFICANT CHANGE UP (ref 0–6)
FIBRINOGEN PPP-MCNC: 333 MG/DL — SIGNIFICANT CHANGE UP (ref 200–445)
GAS PNL BLDA: SIGNIFICANT CHANGE UP
GAS PNL BLDA: SIGNIFICANT CHANGE UP
GLUCOSE BLDC GLUCOMTR-MCNC: 124 MG/DL — HIGH (ref 70–99)
GLUCOSE BLDC GLUCOMTR-MCNC: 140 MG/DL — HIGH (ref 70–99)
GLUCOSE BLDC GLUCOMTR-MCNC: 142 MG/DL — HIGH (ref 70–99)
GLUCOSE BLDC GLUCOMTR-MCNC: 146 MG/DL — HIGH (ref 70–99)
GLUCOSE SERPL-MCNC: 114 MG/DL — HIGH (ref 70–99)
HAPTOGLOB SERPL-MCNC: 83 MG/DL — SIGNIFICANT CHANGE UP (ref 34–200)
HCT VFR BLD CALC: 27.7 % — LOW (ref 34.5–45)
HEMOGLOBIN INTERPRETATION: SIGNIFICANT CHANGE UP
HGB A MFR BLD: 87.9 % — LOW (ref 95.8–98)
HGB A2 MFR BLD: 2.5 % — SIGNIFICANT CHANGE UP (ref 2–3.2)
HGB BLD-MCNC: 9 G/DL — LOW (ref 11.5–15.5)
HGB C MFR BLD: 5 % — HIGH
HGB S MFR BLD: 4.6 % — HIGH
IMM GRANULOCYTES NFR BLD AUTO: 2.1 % — HIGH (ref 0–0.9)
INR BLD: 1.06 RATIO — SIGNIFICANT CHANGE UP (ref 0.85–1.16)
LDH SERPL L TO P-CCNC: 573 U/L — HIGH (ref 50–242)
LYMPHOCYTES # BLD AUTO: 19.3 % — SIGNIFICANT CHANGE UP (ref 13–44)
LYMPHOCYTES # BLD AUTO: 2.07 K/UL — SIGNIFICANT CHANGE UP (ref 1–3.3)
MAGNESIUM SERPL-MCNC: 2.3 MG/DL — SIGNIFICANT CHANGE UP (ref 1.6–2.6)
MCHC RBC-ENTMCNC: 29 PG — SIGNIFICANT CHANGE UP (ref 27–34)
MCHC RBC-ENTMCNC: 32.5 G/DL — SIGNIFICANT CHANGE UP (ref 32–36)
MCV RBC AUTO: 89.4 FL — SIGNIFICANT CHANGE UP (ref 80–100)
MONOCYTES # BLD AUTO: 0.81 K/UL — SIGNIFICANT CHANGE UP (ref 0–0.9)
MONOCYTES NFR BLD AUTO: 7.6 % — SIGNIFICANT CHANGE UP (ref 2–14)
NEUTROPHILS # BLD AUTO: 7.31 K/UL — SIGNIFICANT CHANGE UP (ref 1.8–7.4)
NEUTROPHILS NFR BLD AUTO: 68.3 % — SIGNIFICANT CHANGE UP (ref 43–77)
NON-GYNECOLOGICAL CYTOLOGY STUDY: SIGNIFICANT CHANGE UP
NRBC # BLD: 3 /100 WBCS — HIGH (ref 0–0)
NRBC BLD-RTO: 3 /100 WBCS — HIGH (ref 0–0)
PHOSPHATE SERPL-MCNC: 3.1 MG/DL — SIGNIFICANT CHANGE UP (ref 2.5–4.5)
PLATELET # BLD AUTO: 173 K/UL — SIGNIFICANT CHANGE UP (ref 150–400)
POTASSIUM SERPL-MCNC: 4.1 MMOL/L — SIGNIFICANT CHANGE UP (ref 3.5–5.3)
POTASSIUM SERPL-SCNC: 4.1 MMOL/L — SIGNIFICANT CHANGE UP (ref 3.5–5.3)
PROT SERPL-MCNC: 6.5 G/DL — SIGNIFICANT CHANGE UP (ref 6–8.3)
PROTHROM AB SERPL-ACNC: 12.1 SEC — SIGNIFICANT CHANGE UP (ref 9.9–13.4)
RBC # BLD: 3.1 M/UL — LOW (ref 3.8–5.2)
RBC # FLD: 16 % — HIGH (ref 10.3–14.5)
RH IG SCN BLD-IMP: POSITIVE — SIGNIFICANT CHANGE UP
SODIUM SERPL-SCNC: 143 MMOL/L — SIGNIFICANT CHANGE UP (ref 135–145)
TM INTERPRETATION: SIGNIFICANT CHANGE UP
VANCOMYCIN TROUGH SERPL-MCNC: 11.3 UG/ML — SIGNIFICANT CHANGE UP (ref 10–20)
WBC # BLD: 10.7 K/UL — HIGH (ref 3.8–10.5)
WBC # FLD AUTO: 10.7 K/UL — HIGH (ref 3.8–10.5)
WNV RNA SPEC QL NAA+PROBE: SIGNIFICANT CHANGE UP
WNV RNA SPEC QL NAA+PROBE: SIGNIFICANT CHANGE UP

## 2025-01-15 PROCEDURE — 71045 X-RAY EXAM CHEST 1 VIEW: CPT | Mod: 26

## 2025-01-15 PROCEDURE — 99291 CRITICAL CARE FIRST HOUR: CPT

## 2025-01-15 PROCEDURE — 99233 SBSQ HOSP IP/OBS HIGH 50: CPT

## 2025-01-15 PROCEDURE — G0545: CPT

## 2025-01-15 RX ORDER — HYDROXYUREA 500 MG
500 CAPSULE ORAL
Refills: 0 | Status: DISCONTINUED | OUTPATIENT
Start: 2025-01-15 | End: 2025-02-06

## 2025-01-15 RX ORDER — LEVETIRACETAM 750 MG/1
500 TABLET, FILM COATED ORAL EVERY 12 HOURS
Refills: 0 | Status: DISCONTINUED | OUTPATIENT
Start: 2025-01-15 | End: 2025-02-06

## 2025-01-15 RX ORDER — MEROPENEM 500 MG/20ML
1000 INJECTION INTRAVENOUS EVERY 8 HOURS
Refills: 0 | Status: COMPLETED | OUTPATIENT
Start: 2025-01-15 | End: 2025-01-17

## 2025-01-15 RX ORDER — ACETAMINOPHEN 160 MG/5ML
1000 SUSPENSION ORAL ONCE
Refills: 0 | Status: COMPLETED | OUTPATIENT
Start: 2025-01-15 | End: 2025-01-15

## 2025-01-15 RX ORDER — POTASSIUM CHLORIDE 750 MG/1
20 TABLET, EXTENDED RELEASE ORAL ONCE
Refills: 0 | Status: COMPLETED | OUTPATIENT
Start: 2025-01-15 | End: 2025-01-15

## 2025-01-15 RX ORDER — HEPARIN SODIUM,PORCINE 10000/ML
5000 VIAL (ML) INJECTION EVERY 8 HOURS
Refills: 0 | Status: DISCONTINUED | OUTPATIENT
Start: 2025-01-15 | End: 2025-01-15

## 2025-01-15 RX ADMIN — ANTISEPTIC SURGICAL SCRUB 15 MILLILITER(S): 0.04 SOLUTION TOPICAL at 06:20

## 2025-01-15 RX ADMIN — MEROPENEM 100 MILLIGRAM(S): 500 INJECTION INTRAVENOUS at 21:05

## 2025-01-15 RX ADMIN — Medication 5000 UNIT(S): at 21:04

## 2025-01-15 RX ADMIN — LEVETIRACETAM 500 MILLIGRAM(S): 750 TABLET, FILM COATED ORAL at 17:27

## 2025-01-15 RX ADMIN — VANCOMYCIN HYDROCHLORIDE 125 MILLIGRAM(S): KIT at 07:30

## 2025-01-15 RX ADMIN — ANTISEPTIC SURGICAL SCRUB 1 APPLICATION(S): 0.04 SOLUTION TOPICAL at 06:35

## 2025-01-15 RX ADMIN — PANTOPRAZOLE 40 MILLIGRAM(S): 20 TABLET, DELAYED RELEASE ORAL at 12:47

## 2025-01-15 RX ADMIN — ACETAMINOPHEN 1000 MILLIGRAM(S): 160 SUSPENSION ORAL at 17:45

## 2025-01-15 RX ADMIN — Medication 500 MILLIGRAM(S): at 12:47

## 2025-01-15 RX ADMIN — POTASSIUM CHLORIDE 20 MILLIEQUIVALENT(S): 750 TABLET, EXTENDED RELEASE ORAL at 17:22

## 2025-01-15 RX ADMIN — Medication 1 DROP(S): at 00:40

## 2025-01-15 RX ADMIN — Medication 1 DROP(S): at 06:19

## 2025-01-15 RX ADMIN — Medication 1 DROP(S): at 12:47

## 2025-01-15 RX ADMIN — Medication 5000 UNIT(S): at 14:28

## 2025-01-15 RX ADMIN — Medication 1 DROP(S): at 17:26

## 2025-01-15 RX ADMIN — ACETAMINOPHEN 400 MILLIGRAM(S): 160 SUSPENSION ORAL at 17:30

## 2025-01-15 RX ADMIN — Medication 500 MILLIGRAM(S): at 17:27

## 2025-01-15 RX ADMIN — Medication 1 TABLET(S): at 12:47

## 2025-01-15 RX ADMIN — MEROPENEM 100 MILLIGRAM(S): 500 INJECTION INTRAVENOUS at 14:27

## 2025-01-15 RX ADMIN — MEROPENEM 100 MILLIGRAM(S): 500 INJECTION INTRAVENOUS at 09:30

## 2025-01-15 RX ADMIN — ANTISEPTIC SURGICAL SCRUB 15 MILLILITER(S): 0.04 SOLUTION TOPICAL at 17:27

## 2025-01-15 RX ADMIN — LEVETIRACETAM 1000 MILLIGRAM(S): 750 TABLET, FILM COATED ORAL at 01:52

## 2025-01-15 RX ADMIN — VANCOMYCIN HYDROCHLORIDE 250 MILLIGRAM(S): KIT at 06:19

## 2025-01-15 NOTE — PROGRESS NOTE ADULT - SUBJECTIVE AND OBJECTIVE BOX
NEUROLOGY FOLLOW-UP CONSULT NOTE    RFC: ***    Interval history: No acute neurologic events overnight.    Meds:  MEDICATIONS  (STANDING):  artificial tears (preservative free) Ophthalmic Solution 1 Drop(s) Both EYES four times a day  ascorbic acid 500 milliGRAM(s) Oral daily  chlorhexidine 0.12% Liquid 15 milliLiter(s) Oral Mucosa every 12 hours  chlorhexidine 2% Cloths 1 Application(s) Topical <User Schedule>  chlorhexidine 4% Liquid 1 Application(s) Topical <User Schedule>  dextrose 5%. 1000 milliLiter(s) (100 mL/Hr) IV Continuous <Continuous>  dextrose 5%. 1000 milliLiter(s) (50 mL/Hr) IV Continuous <Continuous>  dextrose 50% Injectable 25 Gram(s) IV Push once  dextrose 50% Injectable 12.5 Gram(s) IV Push once  dextrose 50% Injectable 25 Gram(s) IV Push once  glucagon  Injectable 1 milliGRAM(s) IntraMuscular once  heparin   Injectable 5000 Unit(s) SubCutaneous every 8 hours  hydroxyurea 500 milliGRAM(s) Oral two times a day  insulin lispro (ADMELOG) corrective regimen sliding scale   SubCutaneous every 6 hours  levETIRAcetam  Solution 500 milliGRAM(s) Oral every 12 hours  meropenem  IVPB 1000 milliGRAM(s) IV Intermittent every 8 hours  multivitamin 1 Tablet(s) Oral daily  pantoprazole  Injectable 40 milliGRAM(s) IV Push daily  vancomycin    Solution 125 milliGRAM(s) Oral every 12 hours    MEDICATIONS  (PRN):  albuterol/ipratropium for Nebulization 3 milliLiter(s) Nebulizer every 6 hours PRN Shortness of Breath and/or Wheezing  dextrose Oral Gel 15 Gram(s) Oral once PRN Blood Glucose LESS THAN 70 milliGRAM(s)/deciliter  hydrALAZINE Injectable 5 milliGRAM(s) IV Push every 6 hours PRN HTN  sodium chloride 0.9% lock flush 10 milliLiter(s) IV Push every 1 hour PRN Pre/post blood products, medications, blood draw, and to maintain line patency    GTT:  None    PMHx/PSHx/FHx/SHx:  Cardiogenic shock    FHx: cerebral palsy (Child)    Handoff    MEWS Score    Sickle-cell-hemoglobin C disease with crisis    Essential hypertension    No pertinent past medical history    Sickle cell trait    Sickle cell disease    HTN (hypertension)    Cardiogenic shock    Cardiogenic shock    Sickle cell disease    Cardiogenic shock    Leukocytosis    Seizure    Encounter for palliative care    Initiation, venoarterial ECMO    Management, venoarterial ECMO    H/O:     H/O abdominoplasty    S/P breast augmentation    S/P     CARDIOGENIC SHOCK    SysAdmin_VstLnk        Allergies:  doxycycline (Angioedema)  penicillin (Unknown)  clindamycin (Angioedema)  linezolid (Angioedema)      ROS: Due to clinical condition unable to assess (MENDEL)    O:  T(C): 37.5 (01-15-25 @ 12:00), Max: 37.7 (25 @ 20:00)  HR: 115 (01-15-25 @ 13:00) (86 - 126)  BP: --  RR: 22 (01-15-25 @ 13:00) (16 - 26)  SpO2: 100% (01-15-25 @ 13:00) (99% - 100%)    Focused neurologic exam:  MS - AAO x3, speech fluent, rep/naming intact, follows commands, attn/conc/recent and remote memory/fund of knowledge WNL  CN - PERRLA, EOMI, VFF, face sens/str/hearing WNL b/l, tongue/palate midline, trap 5/5 b/l  Motor - Normal bulk/tone, 5/5 all  Sens - LT/PP intact all  DTR's - 2+ all and downgoing b/l plantar response  Coord - FtN intact b/l  Gait and station - Normal casual gait. Able to heel, toe, tandem. Romberg (-)    Pertinent labs/studies:  *** NEUROLOGY FOLLOW-UP CONSULT NOTE    RFC: Altered mental status (AMS), seizure    Interval history: No acute neurologic events overnight. Patient remains with poor mentation and intubated off sedation. She had LP under neuroradiology on , which was uneventful. No clear focal neurologic deficits noted and no seizure-like activity.    Meds:  MEDICATIONS  (STANDING):  artificial tears (preservative free) Ophthalmic Solution 1 Drop(s) Both EYES four times a day  ascorbic acid 500 milliGRAM(s) Oral daily  chlorhexidine 0.12% Liquid 15 milliLiter(s) Oral Mucosa every 12 hours  chlorhexidine 2% Cloths 1 Application(s) Topical <User Schedule>  chlorhexidine 4% Liquid 1 Application(s) Topical <User Schedule>  dextrose 5%. 1000 milliLiter(s) (100 mL/Hr) IV Continuous <Continuous>  dextrose 5%. 1000 milliLiter(s) (50 mL/Hr) IV Continuous <Continuous>  dextrose 50% Injectable 25 Gram(s) IV Push once  dextrose 50% Injectable 12.5 Gram(s) IV Push once  dextrose 50% Injectable 25 Gram(s) IV Push once  glucagon  Injectable 1 milliGRAM(s) IntraMuscular once  heparin   Injectable 5000 Unit(s) SubCutaneous every 8 hours  hydroxyurea 500 milliGRAM(s) Oral two times a day  insulin lispro (ADMELOG) corrective regimen sliding scale   SubCutaneous every 6 hours  levETIRAcetam  Solution 500 milliGRAM(s) Oral every 12 hours  meropenem  IVPB 1000 milliGRAM(s) IV Intermittent every 8 hours  multivitamin 1 Tablet(s) Oral daily  pantoprazole  Injectable 40 milliGRAM(s) IV Push daily  vancomycin    Solution 125 milliGRAM(s) Oral every 12 hours    MEDICATIONS  (PRN):  albuterol/ipratropium for Nebulization 3 milliLiter(s) Nebulizer every 6 hours PRN Shortness of Breath and/or Wheezing  dextrose Oral Gel 15 Gram(s) Oral once PRN Blood Glucose LESS THAN 70 milliGRAM(s)/deciliter  hydrALAZINE Injectable 5 milliGRAM(s) IV Push every 6 hours PRN HTN  sodium chloride 0.9% lock flush 10 milliLiter(s) IV Push every 1 hour PRN Pre/post blood products, medications, blood draw, and to maintain line patency    GTT:  None    PMHx/PSHx/FHx/SHx:  Cardiogenic shock    FHx: cerebral palsy (Child)    Handoff    MEWS Score    Sickle-cell-hemoglobin C disease with crisis    Essential hypertension    No pertinent past medical history    Sickle cell trait    Sickle cell disease    HTN (hypertension)    Cardiogenic shock    Cardiogenic shock    Sickle cell disease    Cardiogenic shock    Leukocytosis    Seizure    Encounter for palliative care    Initiation, venoarterial ECMO    Management, venoarterial ECMO    H/O:     H/O abdominoplasty    S/P breast augmentation    S/P     CARDIOGENIC SHOCK    SysAdmin_VstLnk        Allergies:  doxycycline (Angioedema)  penicillin (Unknown)  clindamycin (Angioedema)  linezolid (Angioedema)      ROS: Due to clinical condition unable to assess (MENDEL)    O:  T(C): 37.5 (01-15-25 @ 12:00), Max: 37.7 (25 @ 20:00)  HR: 115 (01-15-25 @ 13:00) (86 - 126)  BP: --  RR: 22 (01-15-25 @ 13:00) (16 - 26)  SpO2: 100% (01-15-25 @ 13:00) (99% - 100%)    Focused neurologic exam:  MS - Intubated, not sedated, awake, attends to all stimuli b/l, no speech output, does not follow commands. MENDEL orientation, rep/naming, attn/conc/recent and remote memory/fund of knowledge  CN - PERRL but some ovoid shape, EOMI, VFF as BTT, face sens/str/hearing WNL b/l, SCM at least 4/5 b/l and symmetric, (+) spontaneous respirations (patient rate 20 bpm on PSV), (+) cough. MENDEL tongue/palate  Motor - Normal bulk/tone. No spontaneous movements noted. She grimaces to strong tactile stimuli in all extremities as well as withdraws and localizes in R > L UE > BLE's with BUE's 2+/5 and BLE's 2/5  Sens - As per Motor above  DTR's - 3+ biceps/triceps b/l, 2+ and brisk BR b/l, 3+ KJ b/l, 2+ AJ b/l, and neutral b/l plantar response  Coord - Grossly appropriate for level of strength, no tremors noted  Gait and station - MENDEL    Pertinent labs/studies:  CBC with inc WBC 10.70, low H/H  and MCV WNL, otherwise essentially WNL  PT/INR WNL, PTT dec 22.3  BMP essentially WNL  Albumin 3.6, LFT's with inc ALT//72  Mg WNL, Phos WNL  NH3 WNL, B12/folate WNL, Vitamin A WNL, A1C 4.6%, TSH inc 27.4 and T4 WNL, Lyme (-), WNV IgG (+) and IgM (-)    CSF () - clear, colorless, RBC 1, WBC 2, protein inc 68, glucose 59, PCR panel (-), flow cytometry (-), cytology (-)    vEEG  -  EEG Classification / Summary:  Abnormal  EEG in a lethargic patient:   Frequent left temporal LRDA to 1.5hz  Less frequent Right temporal LRDA 1-1.5Hz   Generalized background slowing, moderate.   Background / spike burden improved vs prior days studies.    -------------------------------------------------------------------------------------------------------  Clinical Impression:   Risk of seizures from the left > right temporal regions  Moderate  generalized background slowing  There were no seizures recorded.   Background / periodic activity / spike burden improved vs prior days    In absence of additional clinical concerns, recommend consideration for discontinuation of current EEG study with reconnection in future if warranted.  No seizures x ~6days    < from: MR Head w/wo IV Cont (25 @ 18:07) >  1. Innumerable foci susceptibility artifact scattered throughout the   brain which can be seen with critically ill patients on ECMO. Diffuse fat   embolization is also part of the differential diagnosis but is considered   less likely.    2. Tiny acute/subacute infarcts within the bilateral basal ganglia,   thalami, and chandu with disproportionate ovoid area of T2/FLAIR   prolongation in the right ventral thalamus. Findings may indicate the   presence of embolic acute/subacute infarcts.    3. No abnormal intracranial enhancement.    < end of copied text >

## 2025-01-15 NOTE — PROGRESS NOTE ADULT - ASSESSMENT
59F PMH Sickle cell disease (on hydroxyurea), HTN, 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. Pt initially presented to [facility] for SOB and SS crisis; course c/b witnessed seizure, intubated and sedated, and transferred to [facility] for further management. Keppra was discontinued at [facility] due to negative EEG. At [facility] MICU pt was found to have RV failure possibly iso pulm HTN 2/2 increased tidal volumes on the ventilator. On addition, pt possibly received a lot of volume iso SS crisis. Neuro was consulted for seizures and EEG technician was called for vEEG placement. Pt is in profound cardiogenic shock. Pt has been decompensating, despite Dobutamine gtt, Bumex gtt, Levophed, and addition of Vasopressin. Vasopressors requirements have gone up. [facility] shock team was called and pt is being transferred to [facility] for further management.     Patient with multiple medications allergies listed  MSSA in sputum culture from ET tube  Coag negative Staph epi. 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 Innumerable foci susceptibility artifact scattered throughout the   brain which can be seen with critically ill patients on ECMO. Diffuse fat   embolization is also part of the differential diagnosis but is considered   less likely.    2. Tiny acute/subacute infarcts within the bilateral basal ganglia,   thalami, and chandu with disproportionate ovoid area of T2/FLAIR   prolongation in the right ventral thalamus. Findings may indicate the   presence of embolic acute/subacute infarcts.    3. No abnormal intracranial enhancement.      workup   Encephalitis studies sent:  CMV viral load negative in serum  HSV 1/2 PCR negative in serum  West Nile serology and PCR pending  Lyme serology negative  Babesia PCR-ordered    acute hepatitis serology negative  Coxsackie A nonreactive, B low titers    Staph epi bacteremia in a single set of cultures  repeat specimens sent on 1/7 are no growth  likely procurement contaminant    sputum with MSSA colonization vs superinfection/pneumonia from prior viral illness    remains on vancomycin,  now 10 days   >  7 days of meropenem for fevers  received zosyn 1/4-1/7--> meropenem    MSSA positive sputum cx on 1/4- so now completing nearly 14 days of Antibiotics for Staph pneumonia  received apheresis      recommendations  ·	Repeat BC  NGTD, no fevers now  ·	now off vancomycin, day 12 of antibiotics with coverage of MSSA for pneumonia- and 10 days by 1/16/25 of meropenem. Could sicontinue by 1/17/25    ·	continue meropenem /vancomycin for now- consider early descalation of vancomycin (now >10 days)  ·	CSF with no pleiocytosis, follow VDRL cryptococcus antigen, PCR panel (includes HSV PCR, crAg), bacterial, fungal cx, WNV PCR/IGG/IgM (late season and lower probability of reactivation)+/-oligoclonal bands, autoimmune panel  ·	treponemal Ab neg      Thank you for involving us in the care of this patient  Transplant ID will continue to follow  Please call or page with additional questions  Pager; #[phone]  Teams: from 8 am to 5 pm  Dian Man MD

## 2025-01-15 NOTE — PROGRESS NOTE ADULT - SUBJECTIVE AND OBJECTIVE BOX
Transplant ID  s/p apheresis 1/12/25   afebrile for 48 hours +  More awake, tracking    Vital Signs Last 24 Hrs  T(C): 37.7 (14 Jan 2025 20:00), Max: 37.8 (14 Jan 2025 12:00)  T(F): 99.9 (14 Jan 2025 20:00), Max: 100 (14 Jan 2025 12:00)  HR: 100 (14 Jan 2025 21:00) (86 - 137)  BP: --  BP(mean): --  RR: 20 (14 Jan 2025 21:00) (18 - 31)  SpO2: 100% (14 Jan 2025 21:00) (98% - 100%)    Parameters below as of 14 Jan 2025 20:00  Patient On (Oxygen Delivery Method): ventilator            PHYSICAL EXAM:  Constitutional:no change in status  Eyes:VICKY, EOMI  Ear/Nose/Throat: no oral lesions, CVC for exchange transfusion	  Respiratory: coarse BL  Cardiovascular: S1S2  Gastrointestinal:soft, (+) BS, no tenderness  Extremities:no e/e/c  No Lymphadenopathy  IV sites not inflammed.      --      ____________________________________________________  ROS  unable to assess    Allergies  doxycycline (Angioedema)  penicillin (Unknown)  clindamycin (Angioedema)  linezolid (Angioedema)    __________________________________________________  MEDS:  MEDICATIONS  (STANDING):  albuterol/ipratropium for Nebulization 3 every 6 hours  dexMEDEtomidine Infusion 0.2 <Continuous>  dextrose 50% Injectable 25 once  dextrose 50% Injectable 12.5 once  dextrose 50% Injectable 25 once  dextrose Oral Gel 15 once PRN  fentaNYL    Injectable 25 once  glucagon  Injectable 1 once  heparin   Injectable 5000 every 8 hours  hydrALAZINE Injectable 5 every 6 hours PRN  insulin lispro (ADMELOG) corrective regimen sliding scale  every 6 hours  levETIRAcetam   Injectable 1000 every 12 hours  pantoprazole  Injectable 40 daily  vasopressin Infusion 0.04 <Continuous>    _________________________________________________  ANTIMICOBIALS  meropenem  IVPB 1000 every 8 hours  vancomycin    Solution 125 every 12 hours  vancomycin  IVPB 500 every 12 hours      GENERAL LABS              10.8                 145  | 22   | 31           13.74 >-----------< Clumped   ------------------------< 137                   30.9                 3.9  | 110  | 0.69                                         Ca 9.2   Mg 2.4   Ph 2.9      Vancomycin Level, Trough: 12.7 (01-13 @ 03:37)  Vancomycin Level, Trough: 15.2 (01-12 @ 05:13)  Vancomycin Level, Trough: 17.2 (01-11 @ 17:47)  Vancomycin Level, Trough: 15.7 (01-11 @ 04:46)  Vancomycin Level, Trough: 17.5 (01-10 @ 16:32)  Vancomycin Level, Trough: 18.4 (01-10 @ 05:28)  Vancomycin Level, Trough: 18.8 (01-09 @ 15:10)  Vancomycin Level, Trough: 17.4 (01-09 @ 04:37)  Vancomycin Level, Trough: 14.6 (01-08 @ 17:17)  Vancomycin Level, Trough: 22.0 (01-08 @ 05:23)  Vancomycin Level, Trough: 17.5 (01-07 @ 18:50)  Vancomycin Level, Trough: 17.5 (01-07 @ 07:14)  Vancomycin Level, Trough: 17.2 (01-06 @ 19:47)  Vancomycin Level, Trough: 19.2 (01-06 @ 06:24)  Vancomycin Level, Trough: 13.7 (01-05 @ 19:01)  Vancomycin Level, Trough: 20.0 (01-05 @ 06:53)  Vancomycin Level, Trough: 11.7 (01-04 @ 18:56)  Vancomycin Level, Trough: 11.0 (01-03 @ 22:15)  Vancomycin Level, Random: 16.8 (01-03 @ 15:51)    Urinalysis Basic - ( 13 Jan 2025 00:26 )    Color: x / Appearance: x / SG: x / pH: x  Gluc: 137 mg/dL / Ketone: x  / Bili: x / Urobili: x   Blood: x / Protein: x / Nitrite: x   Leuk Esterase: x / RBC: x / WBC x   Sq Epi: x / Non Sq Epi: x / Bacteria: x        _________________________________________________  MICROBIOLOGY  -----------    Culture - Blood (collected 10 Javed 2025 21:15)  Source: .Blood BLOOD  Preliminary Report (13 Jan 2025 01:01):    No growth at 48 Hours    Culture - Blood (collected 10 Javed 2025 20:35)  Source: .Blood BLOOD  Preliminary Report (13 Jan 2025 01:01):    No growth at 48 Hours    Culture - Bronchial (collected 08 Jan 2025 16:08)  Source: Bronchial  Gram Stain (09 Jan 2025 06:50):    Rare polymorphonuclear leukocytes seen per low power field    No Squamous epithelial cells seen per low power field    No organisms seen per oil power field  Final Report (10 Javed 2025 11:31):    No growth            CMVPCR Log: NotDetec Wra33GI/mL (01-08 @ 14:11)                  RADIOLOGY & ADDITIONAL STUDIES:

## 2025-01-15 NOTE — PROGRESS NOTE ADULT - ASSESSMENT
Encephalopathy  Seizure  Sickle cell disease  HTN  Heart failure/NICM  Strokes  Transaminitis    - Patient with mental status change, seizure, and EEG abnormalities most likely due to toxic/metabolic process from ongoing medical problems but cannot exclude other causes such as infectious, inflammatory, or neoplastic. Stroke seen on MRI brain, personally reviewed by me, would be unlikely to cause current mental status changes and seizures (subcortical location). No active seizures noted on EEG and no new focal neurologic deficits. 1/15 - Underwent LP on 1/14, which only showed mildly elevated protein but no other abnormalities thus far; this is non-specific but likely related to recent acute strokes. Can consider neurostimulant trial at this point given subcortical strokes may represent structural abnormality affecting mentation more than previously thought as no other explanation at this time outside of medical issues, which are better controlled  - Continue ASM with Keppra 1g PO/IV BID for seizure prophylaxis  - Labs pending with B1, B6, syphilis serology TP, autoimmune encephalopathy panel  - LP await results - Cx, VDRL, WNV, oligoclonal bands, myelin basic protein, CSF IgG Index, autoimmune encephalopathy panel  - Recommend trial of neurostimulant with amantadine 100mg PO BID (07:00 and 14:00) to assess for neurologic/cognitive improvement if no other medical contraindications to this  - Above recommendations discussed with [facility] team, who verbalized agreement and understanding  - Continue to address above medical and surgical issues, as you are doing  - Will continue to follow patient with you

## 2025-01-15 NOTE — PROGRESS NOTE ADULT - SUBJECTIVE AND OBJECTIVE BOX
Patient seen and examined at the bedside.    Remains critically ill on continuous ICU monitoring.      Brief Summary:  60 yo F with Sickle cell disease and NICM now with Cardiogenic shock s/p VA ECMO on 1/4/25.  VA ECMO decannulation on 1/7/25    24 Hour events:  More awake today.  May be responding some.      Objective:  Vital Signs Last 24 Hrs  T(C): 37.4 (15 Javed 2025 04:00), Max: 37.8 (14 Jan 2025 12:00)  T(F): 99.3 (15 Javed 2025 04:00), Max: 100 (14 Jan 2025 12:00)  HR: 92 (15 Javed 2025 05:21) (86 - 137)  BP: --  BP(mean): --  RR: 23 (15 Javed 2025 04:00) (20 - 31)  SpO2: 100% (15 Javed 2025 05:21) (98% - 100%)    Parameters below as of 15 Javed 2025 04:00  Patient On (Oxygen Delivery Method): ventilator        Mode: CPAP with PS  FiO2: 30  PEEP: 5  PS: 10  MAP: 7  PIP: 14              Physical Exam:   General: Alert and interactive   Neurology: Oriented, following commands  Respiratory: Clear bilaterally   CV: Sinus Tach  Abdominal: Soft, Nontender  Extremities: Warm, well-perfused  -------------------------------------------------------------------------------------------------------------------------------    Labs:                        9.0    10.70 )-----------( 173      ( 15 Javed 2025 00:38 )             27.7     01-15    143  |  111[H]  |  25[H]  ----------------------------<  114[H]  4.1   |  20[L]  |  0.70    Ca    9.3      15 Javed 2025 02:17  Phos  3.1     01-15  Mg     2.3     01-15    TPro  6.5  /  Alb  3.6  /  TBili  1.4[H]  /  DBili  x   /  AST  72[H]  /  ALT  116[H]  /  AlkPhos  98  01-15    LIVER FUNCTIONS - ( 15 Javed 2025 02:17 )  Alb: 3.6 g/dL / Pro: 6.5 g/dL / ALK PHOS: 98 U/L / ALT: 116 U/L / AST: 72 U/L / GGT: x           PT/INR - ( 15 Javed 2025 00:38 )   PT: 12.1 sec;   INR: 1.06 ratio         PTT - ( 15 Javed 2025 00:38 )  PTT:22.3 sec  ABG - ( 15 Javed 2025 00:30 )  pH, Arterial: 7.48  pH, Blood: x     /  pCO2: 32    /  pO2: 142   / HCO3: 24    / Base Excess: 0.6   /  SaO2: 99.7    ------------------------------------------------------------------------------------------------------------------------------  Assessment:  60 yo F w/ PMHx of Sickle cell disease (on hydroxyurea), HTN, 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. Pt initially presented to [facility] for SOB and SS crisis; course c/b witnessed seizure, intubated and sedated, and transferred to [facility] for further management. Now in cardiogenic shock, transferred to [facility] for further management. Cannulated for VA ECMO on 1/4/25. Decannulated on 1/7/25.    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    ***Cardiovascular***  At risk for hemodynamic decompensation  S/p VA ECMO, Bi vent function recovered.  Hydralazine prn for hypertension  Monitor hemodynamic closely, signs of hypoperfusion     ***Pulmonary***  Acute hypoxic respiratory failure  Full vent support, tolerates PS 10/5  Plan for tracheostomy on Thursday.  Nebs prn    ***GI***  Protein calorie malnutrition  Tube feeds at goal  Protonix for stress ulcer prophylaxis     ***Renal***  Trend Creatinine   Hypophosphatemia - repleted.  Costa catheter for strict I/O measurements.     ***ID***  MSSA bacteremia, BAL + MSSA  On IV Vancomycin  PO Vancomycin for cdiff prophylaxis   Meropenem Discontinued.  Leukocytosis improving    ***Endocrine***  Hyperglycemia - Insulin sliding scale.     ***Hematology***  Acute blood loss anemia and thrombocytopenia  Sickle Cell - s/p Red cell exchange, Hgb electrophoresis   Resume Hydroxyurea when Platelet count > 100      Care plan discussed with the ICU care team.   Patient remains critical, at risk for life threatening decompensation.    I have spent 30 minutes providing critical care management to this patient.    By signing my name below, I, Barrett Sanchez, attest that this documentation has been prepared under the direction and in the presence of Jyothi Comer MD.  Electronically signedBarrett Sanchez, 01-15-25 @ 06:21    I, Jyothi Comer,  personally performed the services described in this documentation. all medical record entries made by the scribe were at my direction and in my presence. I have reviewed the chart and agree that the record reflects my personal performance and is accurate and complete  Electronically signed: Jyothi Comer MD. Patient seen and examined at the bedside.    Remains critically ill on continuous ICU monitoring.      Brief Summary:  60 yo F with Sickle cell disease and NICM now with Cardiogenic shock s/p VA ECMO on 1/4/25.  VA ECMO decannulation on 1/7/25    24 Hour events:  LP yesterday without issue.  On pressure support this morning.      Objective:  Vital Signs Last 24 Hrs  T(C): 37.4 (15 Javed 2025 04:00), Max: 37.8 (14 Jan 2025 12:00)  T(F): 99.3 (15 Javed 2025 04:00), Max: 100 (14 Jan 2025 12:00)  HR: 92 (15 Javed 2025 05:21) (86 - 137)  BP: --  BP(mean): --  RR: 23 (15 Javed 2025 04:00) (20 - 31)  SpO2: 100% (15 Javed 2025 05:21) (98% - 100%)    Parameters below as of 15 Javed 2025 04:00  Patient On (Oxygen Delivery Method): ventilator    Mode: CPAP with PS  FiO2: 30  PEEP: 5  PS: 10  MAP: 7  PIP: 14              Physical Exam:   General: Eyes open, less interactive this morning.  Neurology: Not following commands, moving some.  Respiratory: Clear bilaterally   CV: Sinus Tach  Abdominal: Soft, Nontender  Extremities: Warm, well-perfused  -------------------------------------------------------------------------------------------------------------------------------    Labs:                        9.0    10.70 )-----------( 173      ( 15 Javed 2025 00:38 )             27.7     01-15    143  |  111[H]  |  25[H]  ----------------------------<  114[H]  4.1   |  20[L]  |  0.70    Ca    9.3      15 Javed 2025 02:17  Phos  3.1     01-15  Mg     2.3     01-15    TPro  6.5  /  Alb  3.6  /  TBili  1.4[H]  /  DBili  x   /  AST  72[H]  /  ALT  116[H]  /  AlkPhos  98  01-15    LIVER FUNCTIONS - ( 15 Javed 2025 02:17 )  Alb: 3.6 g/dL / Pro: 6.5 g/dL / ALK PHOS: 98 U/L / ALT: 116 U/L / AST: 72 U/L / GGT: x           PT/INR - ( 15 Javed 2025 00:38 )   PT: 12.1 sec;   INR: 1.06 ratio         PTT - ( 15 Javed 2025 00:38 )  PTT:22.3 sec  ABG - ( 15 Javed 2025 00:30 )  pH, Arterial: 7.48  pH, Blood: x     /  pCO2: 32    /  pO2: 142   / HCO3: 24    / Base Excess: 0.6   /  SaO2: 99.7    ------------------------------------------------------------------------------------------------------------------------------  Assessment:  60 yo F w/ PMHx of Sickle cell disease (on hydroxyurea), HTN, 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 Pt initially presented to [facility] for SOB and SS crisis; course c/b witnessed seizure, intubated and sedated, and transferred to [facility]. Transferred to [facility] for further management of cardiogenic shock. Cannulated for VA ECMO on 1/4/25. Decannulated on 1/7/25.    Altered mental status / multifactorial encephalopathy  Acute respiratory failure  Sickle cell disease  Hyperglycemia  Leukocytosis    Plan:   ***Neuro***  Multifactorial encephalopathy  S/p embolic stroke on MRI  Seizure activity on EEG, now improved  Remains on Keppra  LP yesterday - follow up results.    ***Cardiovascular***  At risk for hemodynamic decompensation  S/p VA ECMO, Bi vent function recovered.  Hydralazine prn for hypertension    ***Pulmonary***  Acute hypoxic respiratory failure  Pressure support trials ongoing.  Plan for tracheostomy on Thursday.  Nebs prn    ***GI***  Protein calorie malnutrition  Tube feeds at goal  Protonix for stress ulcer prophylaxis     ***Renal***  Trend Creatinine   Costa catheter for strict I/O measurements.     ***ID***  MSSA bacteremia, BAL + MSSA - Merrem  Will stop Vancomycin.  PO Vancomycin for c diff prophylaxis     ***Endocrine***  Hyperglycemia - Insulin sliding scale.     ***Hematology***  Acute blood loss anemia - no transfusion indication currently.  Sickle Cell - s/p Red cell exchange, Hgb electrophoresis   Resume Hydroxyurea now that platelet count is normal.      Care plan discussed with the ICU care team.   Patient remains critical, at risk for life threatening decompensation.    I have spent 50 minutes providing critical care management to this patient.    By signing my name below, I, Barrett Sanchez, attest that this documentation has been prepared under the direction and in the presence of Jyothi Comer MD.  Electronically signed: Barrett Sanchez, 01-15-25 @ 06:21    I, Jyothi Comer, personally performed the services described in this documentation. All medical record entries made by the scribe were at my direction and in my presence. I have reviewed the chart and agree that the record reflects my personal performance and is accurate and complete  Electronically signed: Jyothi Comer MD.

## 2025-01-15 NOTE — CHART NOTE - NSCHARTNOTEFT_GEN_A_CORE
Palliative team following in setting of VA ECMO and GOC.  Pt decannulated last week and mentation slowly improving.  Goals are for continued medical management. Tentative plan for trach tomorrow.  No other palliative needs identified at this time. Will sign off. Reconsult as needed.  Case discussed with Dr. Comer.    Selene Lott MD  Geriatrics and Palliative Medicine Attending  [facility] pager: [phone]

## 2025-01-16 ENCOUNTER — TRANSCRIPTION ENCOUNTER (OUTPATIENT)
Age: 60
End: 2025-01-16

## 2025-01-16 LAB
ALBUMIN CSF-MCNC: 38.4 MG/DL — HIGH (ref 14–25)
ALBUMIN SERPL ELPH-MCNC: 3.4 G/DL — SIGNIFICANT CHANGE UP (ref 3.3–5)
ALBUMIN SERPL ELPH-MCNC: 3187 MG/DL — LOW (ref 3500–5200)
ALP SERPL-CCNC: 89 U/L — SIGNIFICANT CHANGE UP (ref 40–120)
ALT FLD-CCNC: 107 U/L — HIGH (ref 10–45)
ANION GAP SERPL CALC-SCNC: 12 MMOL/L — SIGNIFICANT CHANGE UP (ref 5–17)
ANISOCYTOSIS BLD QL: SLIGHT — SIGNIFICANT CHANGE UP
APTT BLD: 25.5 SEC — SIGNIFICANT CHANGE UP (ref 24.5–35.6)
AST SERPL-CCNC: 64 U/L — HIGH (ref 10–40)
BASOPHILS # BLD AUTO: 0 K/UL — SIGNIFICANT CHANGE UP (ref 0–0.2)
BASOPHILS NFR BLD AUTO: 0 % — SIGNIFICANT CHANGE UP (ref 0–2)
BILIRUB SERPL-MCNC: 1 MG/DL — SIGNIFICANT CHANGE UP (ref 0.2–1.2)
BUN SERPL-MCNC: 34 MG/DL — HIGH (ref 7–23)
CALCIUM SERPL-MCNC: 9.8 MG/DL — SIGNIFICANT CHANGE UP (ref 8.4–10.5)
CHLORIDE SERPL-SCNC: 111 MMOL/L — HIGH (ref 96–108)
CO2 SERPL-SCNC: 23 MMOL/L — SIGNIFICANT CHANGE UP (ref 22–31)
CREAT SERPL-MCNC: 0.75 MG/DL — SIGNIFICANT CHANGE UP (ref 0.5–1.3)
CULTURE RESULTS: SIGNIFICANT CHANGE UP
CULTURE RESULTS: SIGNIFICANT CHANGE UP
EGFR: 93 ML/MIN/1.73M2 — SIGNIFICANT CHANGE UP
ELLIPTOCYTES BLD QL SMEAR: SLIGHT — SIGNIFICANT CHANGE UP
EOSINOPHIL # BLD AUTO: 0.22 K/UL — SIGNIFICANT CHANGE UP (ref 0–0.5)
EOSINOPHIL NFR BLD AUTO: 2.6 % — SIGNIFICANT CHANGE UP (ref 0–6)
GAS PNL BLDA: SIGNIFICANT CHANGE UP
GLUCOSE BLDC GLUCOMTR-MCNC: 100 MG/DL — HIGH (ref 70–99)
GLUCOSE BLDC GLUCOMTR-MCNC: 109 MG/DL — HIGH (ref 70–99)
GLUCOSE BLDC GLUCOMTR-MCNC: 116 MG/DL — HIGH (ref 70–99)
GLUCOSE BLDC GLUCOMTR-MCNC: 134 MG/DL — HIGH (ref 70–99)
GLUCOSE SERPL-MCNC: 128 MG/DL — HIGH (ref 70–99)
HCT VFR BLD CALC: 23.9 % — LOW (ref 34.5–45)
HGB BLD-MCNC: 7.9 G/DL — LOW (ref 11.5–15.5)
HYPOCHROMIA BLD QL: SLIGHT — SIGNIFICANT CHANGE UP
IGG CSF-MCNC: 7.1 MG/DL — HIGH
IGG FLD-MCNC: 1122 MG/DL — SIGNIFICANT CHANGE UP (ref 610–1660)
IGG SYNTH RATE SER+CSF CALC-MRATE: 1.7 MG/DAY — SIGNIFICANT CHANGE UP
IGG/ALB CLEAR SER+CSF-RTO: 0.5 — SIGNIFICANT CHANGE UP
IGG/ALB CSF: 0.18 RATIO — SIGNIFICANT CHANGE UP
IGG/ALB SER: 0.35 RATIO — SIGNIFICANT CHANGE UP
INR BLD: 1.05 RATIO — SIGNIFICANT CHANGE UP (ref 0.85–1.16)
LYMPHOCYTES # BLD AUTO: 1.33 K/UL — SIGNIFICANT CHANGE UP (ref 1–3.3)
LYMPHOCYTES # BLD AUTO: 15.8 % — SIGNIFICANT CHANGE UP (ref 13–44)
MAGNESIUM SERPL-MCNC: 2.3 MG/DL — SIGNIFICANT CHANGE UP (ref 1.6–2.6)
MANUAL SMEAR VERIFICATION: SIGNIFICANT CHANGE UP
MCHC RBC-ENTMCNC: 30.4 PG — SIGNIFICANT CHANGE UP (ref 27–34)
MCHC RBC-ENTMCNC: 33.1 G/DL — SIGNIFICANT CHANGE UP (ref 32–36)
MCV RBC AUTO: 91.9 FL — SIGNIFICANT CHANGE UP (ref 80–100)
MONOCYTES # BLD AUTO: 0.74 K/UL — SIGNIFICANT CHANGE UP (ref 0–0.9)
MONOCYTES NFR BLD AUTO: 8.8 % — SIGNIFICANT CHANGE UP (ref 2–14)
MYELOCYTES NFR BLD: 1.8 % — HIGH (ref 0–0)
NEUTROPHILS # BLD AUTO: 5.97 K/UL — SIGNIFICANT CHANGE UP (ref 1.8–7.4)
NEUTROPHILS NFR BLD AUTO: 68.4 % — SIGNIFICANT CHANGE UP (ref 43–77)
NEUTS BAND # BLD: 2.6 % — SIGNIFICANT CHANGE UP (ref 0–8)
NEUTS BAND NFR BLD: 2.6 % — SIGNIFICANT CHANGE UP (ref 0–8)
NRBC # BLD: 2 /100 WBCS — HIGH (ref 0–0)
NRBC BLD-RTO: 2 /100 WBCS — HIGH (ref 0–0)
OVALOCYTES BLD QL SMEAR: SLIGHT — SIGNIFICANT CHANGE UP
PHOSPHATE SERPL-MCNC: 2.8 MG/DL — SIGNIFICANT CHANGE UP (ref 2.5–4.5)
PLAT MORPH BLD: NORMAL — SIGNIFICANT CHANGE UP
PLATELET # BLD AUTO: 164 K/UL — SIGNIFICANT CHANGE UP (ref 150–400)
POLYCHROMASIA BLD QL SMEAR: SLIGHT — SIGNIFICANT CHANGE UP
POTASSIUM BLDA-SCNC: 4.7 MMOL/L — SIGNIFICANT CHANGE UP (ref 3.5–5.1)
POTASSIUM SERPL-MCNC: 3.5 MMOL/L — SIGNIFICANT CHANGE UP (ref 3.5–5.3)
POTASSIUM SERPL-SCNC: 3.5 MMOL/L — SIGNIFICANT CHANGE UP (ref 3.5–5.3)
PROT SERPL-MCNC: 6.2 G/DL — SIGNIFICANT CHANGE UP (ref 6–8.3)
PROTHROM AB SERPL-ACNC: 12.1 SEC — SIGNIFICANT CHANGE UP (ref 9.9–13.4)
PYRIDOXAL PHOS SERPL-MCNC: 10 UG/L — SIGNIFICANT CHANGE UP (ref 3.4–65.2)
RBC # BLD: 2.6 M/UL — LOW (ref 3.8–5.2)
RBC # FLD: 15.9 % — HIGH (ref 10.3–14.5)
RBC BLD AUTO: ABNORMAL
SCHISTOCYTES BLD QL AUTO: SLIGHT — SIGNIFICANT CHANGE UP
SODIUM SERPL-SCNC: 146 MMOL/L — HIGH (ref 135–145)
SPECIMEN SOURCE: SIGNIFICANT CHANGE UP
SPECIMEN SOURCE: SIGNIFICANT CHANGE UP
TARGETS BLD QL SMEAR: SIGNIFICANT CHANGE UP
VIT B1 SERPL-MCNC: 118.4 NMOL/L — SIGNIFICANT CHANGE UP (ref 66.5–200)
WBC # BLD: 8.41 K/UL — SIGNIFICANT CHANGE UP (ref 3.8–10.5)
WBC # FLD AUTO: 8.41 K/UL — SIGNIFICANT CHANGE UP (ref 3.8–10.5)

## 2025-01-16 PROCEDURE — 99291 CRITICAL CARE FIRST HOUR: CPT

## 2025-01-16 PROCEDURE — 31600 PLANNED TRACHEOSTOMY: CPT

## 2025-01-16 PROCEDURE — 71045 X-RAY EXAM CHEST 1 VIEW: CPT | Mod: 26,76

## 2025-01-16 RX ORDER — ENOXAPARIN SODIUM 100 MG/ML
40 INJECTION SUBCUTANEOUS EVERY 24 HOURS
Refills: 0 | Status: DISCONTINUED | OUTPATIENT
Start: 2025-01-16 | End: 2025-02-02

## 2025-01-16 RX ORDER — FENTANYL CITRATE 50 UG/ML
50 INJECTION INTRAMUSCULAR; INTRAVENOUS ONCE
Refills: 0 | Status: DISCONTINUED | OUTPATIENT
Start: 2025-01-16 | End: 2025-01-16

## 2025-01-16 RX ORDER — NOREPINEPHRINE BITARTRATE 1 MG/ML
0.05 INJECTION, SOLUTION, CONCENTRATE INTRAVENOUS
Qty: 8 | Refills: 0 | Status: DISCONTINUED | OUTPATIENT
Start: 2025-01-16 | End: 2025-01-19

## 2025-01-16 RX ORDER — POTASSIUM CHLORIDE 750 MG/1
40 TABLET, EXTENDED RELEASE ORAL ONCE
Refills: 0 | Status: COMPLETED | OUTPATIENT
Start: 2025-01-16 | End: 2025-01-16

## 2025-01-16 RX ORDER — ROCURONIUM BROMIDE 10 MG/ML
100 INJECTION INTRAVENOUS ONCE
Refills: 0 | Status: COMPLETED | OUTPATIENT
Start: 2025-01-16 | End: 2025-01-16

## 2025-01-16 RX ORDER — FENTANYL CITRATE 50 UG/ML
25 INJECTION INTRAMUSCULAR; INTRAVENOUS EVERY 6 HOURS
Refills: 0 | Status: DISCONTINUED | OUTPATIENT
Start: 2025-01-16 | End: 2025-01-16

## 2025-01-16 RX ORDER — PROPOFOL 10 MG/ML
20 INJECTION, EMULSION INTRAVENOUS
Qty: 500 | Refills: 0 | Status: DISCONTINUED | OUTPATIENT
Start: 2025-01-16 | End: 2025-01-17

## 2025-01-16 RX ORDER — SUGAMMADEX 100 MG/ML
200 INJECTION, SOLUTION INTRAVENOUS ONCE
Refills: 0 | Status: COMPLETED | OUTPATIENT
Start: 2025-01-16 | End: 2025-01-16

## 2025-01-16 RX ORDER — POTASSIUM CHLORIDE 750 MG/1
10 TABLET, EXTENDED RELEASE ORAL ONCE
Refills: 0 | Status: COMPLETED | OUTPATIENT
Start: 2025-01-16 | End: 2025-01-16

## 2025-01-16 RX ORDER — FENTANYL CITRATE 50 UG/ML
100 INJECTION INTRAMUSCULAR; INTRAVENOUS ONCE
Refills: 0 | Status: DISCONTINUED | OUTPATIENT
Start: 2025-01-16 | End: 2025-01-16

## 2025-01-16 RX ORDER — AMANTADINE HCL 50 MG/5 ML
100 SOLUTION, ORAL ORAL
Refills: 0 | Status: DISCONTINUED | OUTPATIENT
Start: 2025-01-16 | End: 2025-01-16

## 2025-01-16 RX ORDER — AMANTADINE HCL 50 MG/5 ML
100 SOLUTION, ORAL ORAL
Refills: 0 | Status: DISCONTINUED | OUTPATIENT
Start: 2025-01-16 | End: 2025-01-24

## 2025-01-16 RX ORDER — ALBUMIN HUMAN 50 G/1000ML
250 SOLUTION INTRAVENOUS ONCE
Refills: 0 | Status: COMPLETED | OUTPATIENT
Start: 2025-01-16 | End: 2025-01-16

## 2025-01-16 RX ADMIN — ROCURONIUM BROMIDE 100 MILLIGRAM(S): 10 INJECTION INTRAVENOUS at 11:30

## 2025-01-16 RX ADMIN — LEVETIRACETAM 500 MILLIGRAM(S): 750 TABLET, FILM COATED ORAL at 05:06

## 2025-01-16 RX ADMIN — Medication 1 DROP(S): at 05:05

## 2025-01-16 RX ADMIN — ANTISEPTIC SURGICAL SCRUB 15 MILLILITER(S): 0.04 SOLUTION TOPICAL at 05:05

## 2025-01-16 RX ADMIN — Medication 100 MILLIGRAM(S): at 13:55

## 2025-01-16 RX ADMIN — SUGAMMADEX 200 MILLIGRAM(S): 100 INJECTION, SOLUTION INTRAVENOUS at 13:48

## 2025-01-16 RX ADMIN — Medication 1 TABLET(S): at 13:57

## 2025-01-16 RX ADMIN — Medication 500 MILLIGRAM(S): at 05:06

## 2025-01-16 RX ADMIN — FENTANYL CITRATE 100 MICROGRAM(S): 50 INJECTION INTRAMUSCULAR; INTRAVENOUS at 11:43

## 2025-01-16 RX ADMIN — MEROPENEM 100 MILLIGRAM(S): 500 INJECTION INTRAVENOUS at 13:54

## 2025-01-16 RX ADMIN — POTASSIUM CHLORIDE 40 MILLIEQUIVALENT(S): 750 TABLET, EXTENDED RELEASE ORAL at 03:14

## 2025-01-16 RX ADMIN — Medication 500 MILLIGRAM(S): at 18:33

## 2025-01-16 RX ADMIN — ANTISEPTIC SURGICAL SCRUB 1 APPLICATION(S): 0.04 SOLUTION TOPICAL at 05:09

## 2025-01-16 RX ADMIN — Medication 1 DROP(S): at 00:21

## 2025-01-16 RX ADMIN — FENTANYL CITRATE 50 MICROGRAM(S): 50 INJECTION INTRAMUSCULAR; INTRAVENOUS at 12:28

## 2025-01-16 RX ADMIN — PROPOFOL 10 MICROGRAM(S)/KG/MIN: 10 INJECTION, EMULSION INTRAVENOUS at 12:28

## 2025-01-16 RX ADMIN — Medication 1 DROP(S): at 17:48

## 2025-01-16 RX ADMIN — ANTISEPTIC SURGICAL SCRUB 15 MILLILITER(S): 0.04 SOLUTION TOPICAL at 17:48

## 2025-01-16 RX ADMIN — FENTANYL CITRATE 100 MICROGRAM(S): 50 INJECTION INTRAMUSCULAR; INTRAVENOUS at 11:28

## 2025-01-16 RX ADMIN — Medication 1 DROP(S): at 13:57

## 2025-01-16 RX ADMIN — FENTANYL CITRATE 50 MICROGRAM(S): 50 INJECTION INTRAMUSCULAR; INTRAVENOUS at 12:43

## 2025-01-16 RX ADMIN — ALBUMIN HUMAN 125 MILLILITER(S): 50 SOLUTION INTRAVENOUS at 03:13

## 2025-01-16 RX ADMIN — Medication 500 MILLIGRAM(S): at 13:51

## 2025-01-16 RX ADMIN — Medication 5 MILLIGRAM(S): at 14:29

## 2025-01-16 RX ADMIN — MEROPENEM 100 MILLIGRAM(S): 500 INJECTION INTRAVENOUS at 05:06

## 2025-01-16 RX ADMIN — PANTOPRAZOLE 40 MILLIGRAM(S): 20 TABLET, DELAYED RELEASE ORAL at 13:51

## 2025-01-16 RX ADMIN — POTASSIUM CHLORIDE 50 MILLIEQUIVALENT(S): 750 TABLET, EXTENDED RELEASE ORAL at 18:33

## 2025-01-16 RX ADMIN — MEROPENEM 100 MILLIGRAM(S): 500 INJECTION INTRAVENOUS at 21:55

## 2025-01-16 RX ADMIN — PROPOFOL 10 MICROGRAM(S)/KG/MIN: 10 INJECTION, EMULSION INTRAVENOUS at 11:55

## 2025-01-16 RX ADMIN — Medication 20 MILLIGRAM(S): at 12:48

## 2025-01-16 RX ADMIN — LEVETIRACETAM 500 MILLIGRAM(S): 750 TABLET, FILM COATED ORAL at 17:49

## 2025-01-16 NOTE — CHART NOTE - NSCHARTNOTEFT_GEN_A_CORE
Nutrition Services Chart Note  Chart reviewed, events noted    Verbal request received by team inquiring for patient's TF regimen. Patient had been NPO today for planned tracheostomy. Patient had previously been switched over to trial TFs of Vivonex in setting of prior reported ongoing diarrhea. Last BM documented on 1/15 and had previous documentation of BM being large and soft, no BM today reported when d/w RN. Will recommend to maintain current TF regimen, if BMs continue to be soft with no further diarrhea, RD/nutrition services will remain available as able to reassess TF formulas and for further need of Vivonex TF formula.    Recommendations:    1. when feasible to resume TFs, may resume prior TF regimen of:      - TFs of Vivonex w/ previous ordered goal rate of 70cc/hr x24 hours (1680cc total volume) and provide Mehdi BID      - combined TF regimen provides 1860kcal, 89g protein, 1425cc free H2O daily (34kcal/kg, 1.6g/pro/kg based on IBW 54.4kg)      - as medically able, continue multivitamin and vitamin C as ordered by team      - RD/nutrition services will remain available as able to reassess TF formulas and for further need of Vivonex TF formula  2. fluid management per team  3. monitor tolerance of TF regimen, weight trend, electrolytes, blood glucose levels, labs, BMs, wound healing    Casey Hyman RD, CDN - contact on TEAMS.

## 2025-01-16 NOTE — PROGRESS NOTE ADULT - SUBJECTIVE AND OBJECTIVE BOX
Patient seen and examined at the bedside.    Remains critically ill on continuous ICU monitoring.    Brief Summary:  58 yo F with Sickle cell disease and NICM now with Cardiogenic shock s/p VA ECMO on 1/4/25.  VA ECMO decannulation on 1/7/25    24 Hour events:  On pressure support yesterday morning.  NPO for trach today.    Objective:  Vital Signs Last 24 Hrs  T(C): 37.3 (16 Jan 2025 04:00), Max: 37.7 (15 Javed 2025 16:00)  T(F): 99.1 (16 Jan 2025 04:00), Max: 99.9 (15 Javed 2025 16:00)  HR: 100 (16 Jan 2025 06:00) (91 - 126)  BP: --  BP(mean): --  RR: 19 (16 Jan 2025 06:00) (15 - 25)  SpO2: 100% (16 Jan 2025 06:00) (97% - 100%)    Parameters below as of 16 Jan 2025 04:00  Patient On (Oxygen Delivery Method): ventilator    O2 Concentration (%): 30    Mode: CPAP with PS  FiO2: 30  PEEP: 5  PS: 10  MAP: 7  PIP: 15    Physical Exam:  General: Eyes open, less interactive this morning.  Neurology: Not following commands, moving some.  Respiratory: Clear bilaterally   CV: Sinus Tach  Abdominal: Soft, Nontender  Extremities: Warm, well-perfused  Costa  -------------------------------------------------------------------------------------------------------------------------------    Labs:                        7.9    8.41  )-----------( 164      ( 16 Jan 2025 00:21 )             23.9     01-16    146[H]  |  111[H]  |  34[H]  ----------------------------<  128[H]  3.5   |  23  |  0.75    Ca    9.8      16 Jan 2025 00:21  Phos  2.8     01-16  Mg     2.3     01-16    TPro  6.2  /  Alb  3.4  /  TBili  1.0  /  DBili  x   /  AST  64[H]  /  ALT  107[H]  /  AlkPhos  89  01-16    LIVER FUNCTIONS - ( 16 Jan 2025 00:21 )  Alb: 3.4 g/dL / Pro: 6.2 g/dL / ALK PHOS: 89 U/L / ALT: 107 U/L / AST: 64 U/L / GGT: x           PT/INR - ( 16 Jan 2025 00:21 )   PT: 12.1 sec;   INR: 1.05 ratio       PTT - ( 16 Jan 2025 00:21 )  PTT:25.5 sec  ABG - ( 16 Jan 2025 00:08 )  pH, Arterial: 7.47  pH, Blood: x     /  pCO2: 33    /  pO2: 139   / HCO3: 24    / Base Excess: 0.5   /  SaO2: 99.6    ------------------------------------------------------------------------------------------------------------------------------  Assessment:  58 yo F w/ PMHx of Sickle cell disease (on hydroxyurea), HTN, 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 Pt initially presented to [facility] for SOB and SS crisis; course c/b witnessed seizure, intubated and sedated, and transferred to [facility]. Transferred to [facility] for further management of cardiogenic shock. Cannulated for VA ECMO on 1/4/25. Decannulated on 1/7/25.    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    ***Cardiovascular***  At risk for hemodynamic decompensation  S/p VA ECMO, Bi vent function recovered.  Hydralazine prn for hypertension    ***Pulmonary***  Acute hypoxic respiratory failure  Pressure support trials ongoing.  Plan for tracheostomy on Thursday.  Nebs prn    ***GI***  NPO today for trach   Protein calorie malnutrition  Tube feeds at goal  Protonix for stress ulcer prophylaxis     ***Renal***  Trend Creatinine   Costa catheter for strict I/O measurements.     ***ID***  MSSA bacteremia, BAL + MSSA - Merrem    ***Endocrine***  Hyperglycemia - Insulin sliding scale.     ***Hematology***  Acute blood loss anemia - no transfusion indication currently.  Sickle Cell - s/p Red cell exchange, Hgb electrophoresis   Resume Hydroxyurea now that platelet count is normal.        Care plan discussed with the ICU care team.   Patient remains critical, at risk for life threatening decompensation.    I have spent 30 minutes providing critical care management to this patient.    By signing my name below, I, Norris Oliver, attest that this documentation has been prepared under the direction and in the presence of Jyothi Comer MD.  Electronically signed: Norris Oliver, 01-16-25 @ 06:13    I, Jyothi Comer,  personally performed the services described in this documentation. All medical record entries made by the scribe were at my direction and in my presence. I have reviewed the chart and agree that the record reflects my personal performance and is accurate and complete  Electronically signed: Jyothi Comer MD. Patient seen and examined at the bedside.    Remains critically ill on continuous ICU monitoring.    Brief Summary:  60 yo F with Sickle cell disease and NICM now with Cardiogenic shock s/p VA ECMO on 1/4/25.  VA ECMO decannulation on 1/7/25    24 Hour events:  Tolerating pressure support trials.  NPO for trach today.    Objective:  Vital Signs Last 24 Hrs  T(C): 37.3 (16 Jan 2025 04:00), Max: 37.7 (15 Javed 2025 16:00)  T(F): 99.1 (16 Jan 2025 04:00), Max: 99.9 (15 Javed 2025 16:00)  HR: 100 (16 Jan 2025 06:00) (91 - 126)  BP: --  BP(mean): --  RR: 19 (16 Jan 2025 06:00) (15 - 25)  SpO2: 100% (16 Jan 2025 06:00) (97% - 100%)    Parameters below as of 16 Jan 2025 04:00  Patient On (Oxygen Delivery Method): ventilator    O2 Concentration (%): 30    Mode: CPAP with PS  FiO2: 30  PEEP: 5  PS: 10  MAP: 7  PIP: 15    Physical Exam:  General: Eyes open, Trach to vent.  Neurology: Not following commands, moving some.  Respiratory: Clear bilaterally   CV: Sinus Tach  Abdominal: Soft, Nontender  Extremities: Warm, well-perfused  Costa  -------------------------------------------------------------------------------------------------------------------------------    Labs:                        7.9    8.41  )-----------( 164      ( 16 Jan 2025 00:21 )             23.9     01-16    146[H]  |  111[H]  |  34[H]  ----------------------------<  128[H]  3.5   |  23  |  0.75    Ca    9.8      16 Jan 2025 00:21  Phos  2.8     01-16  Mg     2.3     01-16    TPro  6.2  /  Alb  3.4  /  TBili  1.0  /  DBili  x   /  AST  64[H]  /  ALT  107[H]  /  AlkPhos  89  01-16    LIVER FUNCTIONS - ( 16 Jan 2025 00:21 )  Alb: 3.4 g/dL / Pro: 6.2 g/dL / ALK PHOS: 89 U/L / ALT: 107 U/L / AST: 64 U/L / GGT: x           PT/INR - ( 16 Jan 2025 00:21 )   PT: 12.1 sec;   INR: 1.05 ratio       PTT - ( 16 Jan 2025 00:21 )  PTT:25.5 sec  ABG - ( 16 Jan 2025 00:08 )  pH, Arterial: 7.47  pH, Blood: x     /  pCO2: 33    /  pO2: 139   / HCO3: 24    / Base Excess: 0.5   /  SaO2: 99.6    ------------------------------------------------------------------------------------------------------------------------------  Assessment:  60 yo F w/ PMHx of Sickle cell disease (on hydroxyurea), HTN, 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 Pt initially presented to [facility] for SOB and SS crisis; course c/b witnessed seizure, intubated and sedated, and transferred to [facility]. Transferred to [facility] for further management of cardiogenic shock. Cannulated for VA ECMO on 1/4/25. Decannulated on 1/7/25.    Altered mental status / multifactorial encephalopathy  Acute respiratory failure  Sickle cell disease  Acute blood loss anemia  Hyperglycemia      Plan:   ***Neuro***  Multifactorial encephalopathy  S/p embolic stroke on MRI  Seizure activity on EEG, now improved  Remains on Keppra (decreased dose yesterday per Neuro recs)  Will start Amantadine.  LP 1/14 - follow up results.    ***Cardiovascular***  At risk for hemodynamic decompensation  S/p VA ECMO, Bi vent function recovered.  Wean Norepinephrine as able.    ***Pulmonary***  Acute hypoxic respiratory failure  Pressure support trials ongoing.  Plan for tracheostomy on Thursday.  Nebs prn    ***GI***  NPO today for trach   Protein calorie malnutrition  Resume tube feeds later today.  Protonix for stress ulcer prophylaxis     ***Renal***  Trend Creatinine   Costa catheter for strict I/O measurements.     ***ID***  MSSA bacteremia, BAL + MSSA - Merrem for total 10 days.    ***Endocrine***  Hyperglycemia - Insulin sliding scale.     ***Hematology***  Acute blood loss anemia - will transfuse 1 unit prbcs.  Sickle Cell - s/p Red cell exchange, Hgb electrophoresis   Hydroxyurea - monitor platelet count.        Care plan discussed with the ICU care team.   Patient remains critical, at risk for life threatening decompensation.    I have spent 50 minutes providing critical care management to this patient.    By signing my name below, I, Norris Oliver, attest that this documentation has been prepared under the direction and in the presence of Jyothi Comer MD.  Electronically signed: Norris Oliver, 01-16-25 @ 06:13    IJyothi, personally performed the services described in this documentation. All medical record entries made by the scribe were at my direction and in my presence. I have reviewed the chart and agree that the record reflects my personal performance and is accurate and complete  Electronically signed: Jyothi Comer MD. Patient seen and examined at the bedside.    Remains critically ill on continuous ICU monitoring.    Brief Summary:  58 yo F with Sickle cell disease and NICM now with Cardiogenic shock s/p VA ECMO on 1/4/25.  VA ECMO decannulation on 1/7/25    24 Hour events:  Tolerating pressure support trials.  NPO for trach today.    Objective:  Vital Signs Last 24 Hrs  T(C): 37.3 (16 Jan 2025 04:00), Max: 37.7 (15 Javed 2025 16:00)  T(F): 99.1 (16 Jan 2025 04:00), Max: 99.9 (15 Javed 2025 16:00)  HR: 100 (16 Jan 2025 06:00) (91 - 126)  BP: --  BP(mean): --  RR: 19 (16 Jan 2025 06:00) (15 - 25)  SpO2: 100% (16 Jan 2025 06:00) (97% - 100%)    Parameters below as of 16 Jan 2025 04:00  Patient On (Oxygen Delivery Method): ventilator    O2 Concentration (%): 30    Mode: CPAP with PS  FiO2: 30  PEEP: 5  PS: 10  MAP: 7  PIP: 15    Physical Exam:  General: Eyes open, Trach to vent.  Neurology: Not following commands, moving some.  Respiratory: Clear bilaterally   CV: Sinus Tach  Abdominal: Soft, Nontender  Extremities: Warm, well-perfused  Costa  -------------------------------------------------------------------------------------------------------------------------------    Labs:                        7.9    8.41  )-----------( 164      ( 16 Jan 2025 00:21 )             23.9     01-16    146[H]  |  111[H]  |  34[H]  ----------------------------<  128[H]  3.5   |  23  |  0.75    Ca    9.8      16 Jan 2025 00:21  Phos  2.8     01-16  Mg     2.3     01-16    TPro  6.2  /  Alb  3.4  /  TBili  1.0  /  DBili  x   /  AST  64[H]  /  ALT  107[H]  /  AlkPhos  89  01-16    LIVER FUNCTIONS - ( 16 Jan 2025 00:21 )  Alb: 3.4 g/dL / Pro: 6.2 g/dL / ALK PHOS: 89 U/L / ALT: 107 U/L / AST: 64 U/L / GGT: x           PT/INR - ( 16 Jan 2025 00:21 )   PT: 12.1 sec;   INR: 1.05 ratio       PTT - ( 16 Jan 2025 00:21 )  PTT:25.5 sec  ABG - ( 16 Jan 2025 00:08 )  pH, Arterial: 7.47  pH, Blood: x     /  pCO2: 33    /  pO2: 139   / HCO3: 24    / Base Excess: 0.5   /  SaO2: 99.6    ------------------------------------------------------------------------------------------------------------------------------  Assessment:  58 yo F w/ PMHx of Sickle cell disease (on hydroxyurea), HTN, 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 Pt initially presented to [facility] for SOB and SS crisis; course c/b witnessed seizure, intubated and sedated, and transferred to [facility]. Transferred to [facility] for further management of cardiogenic shock. Cannulated for VA ECMO on 1/4/25. Decannulated on 1/7/25.    Altered mental status / multifactorial encephalopathy  Hypotension  Acute respiratory failure  Sickle cell disease  Acute blood loss anemia  Hyperglycemia      Plan:   ***Neuro***  Multifactorial encephalopathy  S/p embolic stroke on MRI  Seizure activity on EEG, now improved  Remains on Keppra (decreased dose yesterday per Neuro recs)  Will start Amantadine.  LP 1/14 - follow up results.    ***Cardiovascular***  At risk for hemodynamic decompensation  S/p VA ECMO, Bi vent function recovered.  Wean Norepinephrine as able.    ***Pulmonary***  Acute hypoxic respiratory failure  Pressure support trials ongoing.  Plan for tracheostomy on Thursday.  Nebs prn    ***GI***  NPO today for trach   Protein calorie malnutrition  Resume tube feeds later today.  Protonix for stress ulcer prophylaxis     ***Renal***  Trend Creatinine   Costa catheter for strict I/O measurements.     ***ID***  MSSA bacteremia, BAL + MSSA - Merrem for total 10 days.    ***Endocrine***  Hyperglycemia - Insulin sliding scale.     ***Hematology***  Acute blood loss anemia - will transfuse 1 unit prbcs.  Sickle Cell - s/p Red cell exchange, Hgb electrophoresis   Hydroxyurea - monitor platelet count.        Care plan discussed with the ICU care team.   Patient remains critical, at risk for life threatening decompensation.    I have spent 50 minutes providing critical care management to this patient.    By signing my name below, I, Norris Oliver, attest that this documentation has been prepared under the direction and in the presence of Jyothi Comer MD.  Electronically signed: Norris Oliver, 01-16-25 @ 06:13    IJyothi, personally performed the services described in this documentation. All medical record entries made by the scribe were at my direction and in my presence. I have reviewed the chart and agree that the record reflects my personal performance and is accurate and complete  Electronically signed: Jyothi Comer MD.

## 2025-01-16 NOTE — BRIEF OPERATIVE NOTE - NSICDXBRIEFPOSTOP_GEN_ALL_CORE_FT
POST-OP DIAGNOSIS:  Cardiogenic shock 04-Jan-2025 00:35:13  Sachin Yin  

## 2025-01-16 NOTE — CHART NOTE - NSCHARTNOTEFT_GEN_A_CORE
Post Operative Note  Patient: CARSON LEE 59y (1965) Female   MRN: [id number]  Location: [address] 01  Visit: 01-03-25 Inpatient  Patient Seen and Examined: 01-16-25 @ 18:09    Procedure: S/P perc trach with bronchoscopy    Subjective: Patient seen and examined post operatively. Able to utilize trach with current vent settings, no active drainage seen outside of dressing. Neck edema appreciated, patient with despondent affect but not endorsing pain. Otherwise unable to obtain subjective information given patients current neurologic condition      Objective:  Vitals: T(F): 99.7 (01-16-25 @ 16:00), Max: 99.7 (01-16-25 @ 11:15)  HR: 94 (01-16-25 @ 18:00)  BP: --  RR: 22 (01-16-25 @ 18:00)  SpO2: 100% (01-16-25 @ 18:00)    PHYSICAL EXAM:  HEENT: Trach in place, bumper at the skin and connected to ventilator. Prolene suture x2 in place, trach collar in place. No active oozing and dressing nonsaturated.     Imaging:    No post-op imaging studies    Assessment:  59y Female patient with PMH sickle cell disease and NICM with cardiogenic shock sp VA ECMO 1/4/25 with decannulation 1/7/25, now S/P percutaneous tracheostomy with bronchoscopy for acute hypoxic respiratory failure. Recovering appropriately.     Plan:  - Trach sutures to remain in place until POD5 1/21  - Continue trach collar  - appreciate care per CTICU     ACS/Trauma Surgery  [phone]

## 2025-01-16 NOTE — BRIEF OPERATIVE NOTE - NSICDXBRIEFPREOP_GEN_ALL_CORE_FT
PRE-OP DIAGNOSIS:  Cardiogenic shock 04-Jan-2025 00:35:04  Sachin Yin  

## 2025-01-17 LAB
ALBUMIN SERPL ELPH-MCNC: 3.6 G/DL — SIGNIFICANT CHANGE UP (ref 3.3–5)
ALBUMIN SERPL ELPH-MCNC: 3.6 G/DL — SIGNIFICANT CHANGE UP (ref 3.3–5)
ALP SERPL-CCNC: 100 U/L — SIGNIFICANT CHANGE UP (ref 40–120)
ALP SERPL-CCNC: 91 U/L — SIGNIFICANT CHANGE UP (ref 40–120)
ALT FLD-CCNC: 114 U/L — HIGH (ref 10–45)
ALT FLD-CCNC: 99 U/L — HIGH (ref 10–45)
ANION GAP SERPL CALC-SCNC: 13 MMOL/L — SIGNIFICANT CHANGE UP (ref 5–17)
ANION GAP SERPL CALC-SCNC: 13 MMOL/L — SIGNIFICANT CHANGE UP (ref 5–17)
APTT BLD: 23.5 SEC — LOW (ref 24.5–35.6)
AST SERPL-CCNC: 54 U/L — HIGH (ref 10–40)
AST SERPL-CCNC: 78 U/L — HIGH (ref 10–40)
BASOPHILS # BLD AUTO: 0.04 K/UL — SIGNIFICANT CHANGE UP (ref 0–0.2)
BASOPHILS NFR BLD AUTO: 0.5 % — SIGNIFICANT CHANGE UP (ref 0–2)
BILIRUB SERPL-MCNC: 1 MG/DL — SIGNIFICANT CHANGE UP (ref 0.2–1.2)
BILIRUB SERPL-MCNC: 1 MG/DL — SIGNIFICANT CHANGE UP (ref 0.2–1.2)
BUN SERPL-MCNC: 23 MG/DL — SIGNIFICANT CHANGE UP (ref 7–23)
BUN SERPL-MCNC: 24 MG/DL — HIGH (ref 7–23)
CALCIUM SERPL-MCNC: 10 MG/DL — SIGNIFICANT CHANGE UP (ref 8.4–10.5)
CALCIUM SERPL-MCNC: 9.9 MG/DL — SIGNIFICANT CHANGE UP (ref 8.4–10.5)
CHLORIDE SERPL-SCNC: 112 MMOL/L — HIGH (ref 96–108)
CHLORIDE SERPL-SCNC: 113 MMOL/L — HIGH (ref 96–108)
CO2 SERPL-SCNC: 23 MMOL/L — SIGNIFICANT CHANGE UP (ref 22–31)
CO2 SERPL-SCNC: 23 MMOL/L — SIGNIFICANT CHANGE UP (ref 22–31)
CREAT SERPL-MCNC: 0.73 MG/DL — SIGNIFICANT CHANGE UP (ref 0.5–1.3)
CREAT SERPL-MCNC: 0.77 MG/DL — SIGNIFICANT CHANGE UP (ref 0.5–1.3)
CULTURE RESULTS: SIGNIFICANT CHANGE UP
EGFR: 90 ML/MIN/1.73M2 — SIGNIFICANT CHANGE UP
EGFR: 96 ML/MIN/1.73M2 — SIGNIFICANT CHANGE UP
EOSINOPHIL # BLD AUTO: 0.22 K/UL — SIGNIFICANT CHANGE UP (ref 0–0.5)
EOSINOPHIL NFR BLD AUTO: 2.8 % — SIGNIFICANT CHANGE UP (ref 0–6)
GAS PNL BLDA: SIGNIFICANT CHANGE UP
GAS PNL BLDA: SIGNIFICANT CHANGE UP
GLUCOSE BLDC GLUCOMTR-MCNC: 124 MG/DL — HIGH (ref 70–99)
GLUCOSE BLDC GLUCOMTR-MCNC: 125 MG/DL — HIGH (ref 70–99)
GLUCOSE BLDC GLUCOMTR-MCNC: 128 MG/DL — HIGH (ref 70–99)
GLUCOSE SERPL-MCNC: 121 MG/DL — HIGH (ref 70–99)
GLUCOSE SERPL-MCNC: 123 MG/DL — HIGH (ref 70–99)
HCT VFR BLD CALC: 27.6 % — LOW (ref 34.5–45)
HGB BLD-MCNC: 9.1 G/DL — LOW (ref 11.5–15.5)
IMM GRANULOCYTES NFR BLD AUTO: 1.2 % — HIGH (ref 0–0.9)
INR BLD: 1.04 RATIO — SIGNIFICANT CHANGE UP (ref 0.85–1.16)
LYMPHOCYTES # BLD AUTO: 1.85 K/UL — SIGNIFICANT CHANGE UP (ref 1–3.3)
LYMPHOCYTES # BLD AUTO: 23.9 % — SIGNIFICANT CHANGE UP (ref 13–44)
MAGNESIUM SERPL-MCNC: 2.2 MG/DL — SIGNIFICANT CHANGE UP (ref 1.6–2.6)
MAGNESIUM SERPL-MCNC: 2.2 MG/DL — SIGNIFICANT CHANGE UP (ref 1.6–2.6)
MCHC RBC-ENTMCNC: 29.4 PG — SIGNIFICANT CHANGE UP (ref 27–34)
MCHC RBC-ENTMCNC: 33 G/DL — SIGNIFICANT CHANGE UP (ref 32–36)
MCV RBC AUTO: 89.3 FL — SIGNIFICANT CHANGE UP (ref 80–100)
MONOCYTES # BLD AUTO: 0.72 K/UL — SIGNIFICANT CHANGE UP (ref 0–0.9)
MONOCYTES NFR BLD AUTO: 9.3 % — SIGNIFICANT CHANGE UP (ref 2–14)
NEUTROPHILS # BLD AUTO: 4.82 K/UL — SIGNIFICANT CHANGE UP (ref 1.8–7.4)
NEUTROPHILS NFR BLD AUTO: 62.3 % — SIGNIFICANT CHANGE UP (ref 43–77)
NRBC # BLD: 3 /100 WBCS — HIGH (ref 0–0)
NRBC BLD-RTO: 3 /100 WBCS — HIGH (ref 0–0)
PHOSPHATE SERPL-MCNC: 2.7 MG/DL — SIGNIFICANT CHANGE UP (ref 2.5–4.5)
PHOSPHATE SERPL-MCNC: 2.7 MG/DL — SIGNIFICANT CHANGE UP (ref 2.5–4.5)
PLATELET # BLD AUTO: 141 K/UL — LOW (ref 150–400)
POTASSIUM SERPL-MCNC: 3.8 MMOL/L — SIGNIFICANT CHANGE UP (ref 3.5–5.3)
POTASSIUM SERPL-MCNC: 3.9 MMOL/L — SIGNIFICANT CHANGE UP (ref 3.5–5.3)
POTASSIUM SERPL-SCNC: 3.8 MMOL/L — SIGNIFICANT CHANGE UP (ref 3.5–5.3)
POTASSIUM SERPL-SCNC: 3.9 MMOL/L — SIGNIFICANT CHANGE UP (ref 3.5–5.3)
PROT SERPL-MCNC: 6.5 G/DL — SIGNIFICANT CHANGE UP (ref 6–8.3)
PROT SERPL-MCNC: 6.5 G/DL — SIGNIFICANT CHANGE UP (ref 6–8.3)
PROTHROM AB SERPL-ACNC: 11.9 SEC — SIGNIFICANT CHANGE UP (ref 9.9–13.4)
RBC # BLD: 3.09 M/UL — LOW (ref 3.8–5.2)
RBC # FLD: 16.9 % — HIGH (ref 10.3–14.5)
SODIUM SERPL-SCNC: 148 MMOL/L — HIGH (ref 135–145)
SODIUM SERPL-SCNC: 149 MMOL/L — HIGH (ref 135–145)
SPECIMEN SOURCE: SIGNIFICANT CHANGE UP
VDRL CSF-TITR: SIGNIFICANT CHANGE UP
VIT C SERPL-MCNC: 0.3 MG/DL — LOW (ref 0.4–2)
WBC # BLD: 7.74 K/UL — SIGNIFICANT CHANGE UP (ref 3.8–10.5)
WBC # FLD AUTO: 7.74 K/UL — SIGNIFICANT CHANGE UP (ref 3.8–10.5)
WNV IGG CSF IA-ACNC: POSITIVE
WNV IGM CSF IA-ACNC: NEGATIVE — SIGNIFICANT CHANGE UP

## 2025-01-17 PROCEDURE — G0545: CPT

## 2025-01-17 PROCEDURE — 99233 SBSQ HOSP IP/OBS HIGH 50: CPT

## 2025-01-17 PROCEDURE — 71045 X-RAY EXAM CHEST 1 VIEW: CPT | Mod: 26

## 2025-01-17 PROCEDURE — 99291 CRITICAL CARE FIRST HOUR: CPT

## 2025-01-17 RX ORDER — SENNOSIDES 8.6 MG
1 TABLET ORAL DAILY
Refills: 0 | Status: DISCONTINUED | OUTPATIENT
Start: 2025-01-17 | End: 2025-02-06

## 2025-01-17 RX ADMIN — Medication 500 MILLIGRAM(S): at 11:24

## 2025-01-17 RX ADMIN — ANTISEPTIC SURGICAL SCRUB 15 MILLILITER(S): 0.04 SOLUTION TOPICAL at 17:20

## 2025-01-17 RX ADMIN — MEROPENEM 100 MILLIGRAM(S): 500 INJECTION INTRAVENOUS at 14:01

## 2025-01-17 RX ADMIN — LEVETIRACETAM 500 MILLIGRAM(S): 750 TABLET, FILM COATED ORAL at 05:41

## 2025-01-17 RX ADMIN — Medication 100 MILLIGRAM(S): at 14:01

## 2025-01-17 RX ADMIN — MEROPENEM 100 MILLIGRAM(S): 500 INJECTION INTRAVENOUS at 21:18

## 2025-01-17 RX ADMIN — PANTOPRAZOLE 40 MILLIGRAM(S): 20 TABLET, DELAYED RELEASE ORAL at 11:23

## 2025-01-17 RX ADMIN — Medication 1 TABLET(S): at 17:19

## 2025-01-17 RX ADMIN — ENOXAPARIN SODIUM 40 MILLIGRAM(S): 100 INJECTION SUBCUTANEOUS at 05:40

## 2025-01-17 RX ADMIN — Medication 1 DROP(S): at 17:20

## 2025-01-17 RX ADMIN — Medication 1 DROP(S): at 00:30

## 2025-01-17 RX ADMIN — Medication 500 MILLIGRAM(S): at 05:41

## 2025-01-17 RX ADMIN — ANTISEPTIC SURGICAL SCRUB 1 APPLICATION(S): 0.04 SOLUTION TOPICAL at 06:53

## 2025-01-17 RX ADMIN — Medication 100 MILLIGRAM(S): at 07:29

## 2025-01-17 RX ADMIN — LEVETIRACETAM 500 MILLIGRAM(S): 750 TABLET, FILM COATED ORAL at 17:19

## 2025-01-17 RX ADMIN — Medication 1 TABLET(S): at 11:23

## 2025-01-17 RX ADMIN — ANTISEPTIC SURGICAL SCRUB 15 MILLILITER(S): 0.04 SOLUTION TOPICAL at 05:41

## 2025-01-17 RX ADMIN — MEROPENEM 100 MILLIGRAM(S): 500 INJECTION INTRAVENOUS at 05:41

## 2025-01-17 RX ADMIN — Medication 500 MILLIGRAM(S): at 17:19

## 2025-01-17 RX ADMIN — Medication 1 DROP(S): at 05:40

## 2025-01-17 RX ADMIN — Medication 1 DROP(S): at 23:47

## 2025-01-17 RX ADMIN — NOREPINEPHRINE BITARTRATE 7.84 MICROGRAM(S)/KG/MIN: 1 INJECTION, SOLUTION, CONCENTRATE INTRAVENOUS at 07:29

## 2025-01-17 RX ADMIN — Medication 1 DROP(S): at 11:24

## 2025-01-17 NOTE — PROGRESS NOTE ADULT - ASSESSMENT
59y Female patient with PMH sickle cell disease and NICM with cardiogenic shock sp VA ECMO 1/4/25 with decannulation 1/7/25, now S/P percutaneous tracheostomy with bronchoscopy for acute hypoxic respiratory failure. Recovering appropriately.     Plan:  - Trach sutures to remain in place until POD5 1/21  - ACS surgery to follow  - appreciate care per CTICU     ACS/Trauma Surgery  [phone].

## 2025-01-17 NOTE — PROGRESS NOTE ADULT - NUTRITIONAL ASSESSMENT
This patient has been assessed with a concern for Malnutrition and has been determined to have a diagnosis/diagnoses of Severe protein-calorie malnutrition.    This patient is being managed with:   Diet NPO with Tube Feed-  Tube Feeding Modality: Orogastric  Vivonex  Total Volume for 24 Hours (mL): 1680  Continuous  Starting Tube Feed Rate {mL per Hour}: 35  Increase Tube Feed Rate by (mL): 10     Every 3 hours  Until Goal Tube Feed Rate (mL per Hour): 70  Tube Feed Duration (in Hours): 24  Tube Feed Start Time: 17:00  Mehdi(7 Gm Arginine/7 Gm Glut/1.2 Gm HMB     Qty per Day:  2  Entered: Jan 16 2025  4:54PM

## 2025-01-17 NOTE — PROGRESS NOTE ADULT - ASSESSMENT
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 Pt initially presented to [facility] for SOB and SS crisis; course c/b witnessed seizure, intubated and sedated, and transferred to [facility] for further management. Keppra was discontinued at [facility] due to negative EEG. At [facility] MICU pt was found to have RV failure possibly iso pulm HTN 2/2 increased tidal volumes on the ventilator, possibly due to volume overload due to IVF iso SS crisis vs fat emboli syndrome.                                                                                                           #Hb SC disease  #Cardiogenic shock on ECMO  #Unclear trigger for unresponsiveness   - patient with 1-2 sickle pain crisis per year and on hydrea, had retinal detachment s/p repair                           - patient had f/up with cardio in Sept 2024: per the note, unclear etiology of her dyspnea but possibly driven by cardiomyopathy; low suspicion of pulmonary HTN as no RV dysfunction/severe TR.  She is found to have new RV failure, requiring ECMO  - Blood bank transfusion medicine team consulted for emergent RBC exchange given critically ill with multi-organ failure  - Retic count elevated to 11.6%, LDH 1000s, bili 3.9. CXR was clear not indicative of acute chest. Smear (prior to exchange) was reviewed: Hypochromic RBCs with normochromic RBC reflecting transfused blood. Several nucleated RBC suggesting stressed bone marrow. Very few target cells and rare sickle cell. No schistocytes to suggest hemolysis.    - S/p exchange 1/4/2024, s/p ECMO decannulation 1/7/2025 c/b groin hematoma and received 2 unit prbc, platelet and cryo.  - S/p 2nd exchange transfusion 1/12/2025 (based on MRI demonstrating diffuse punctate lesions that could be consistent with fat emboli and patient's otherwise unexplained mental status for goal of apheresis any remaining circulating fat emboli, over weekend which was discussed with blood bank)      Hb electrophoresis:  01/07/25: Hb A 76.8%, Hb A2 3.6%, Hb S 9.3%, HbC 10.3%  01/14/25: Hb A 87.9%, Hb A2 2.5%, Hb S 4.6%, HbC 5.0%      Recommendations:  - Please obtain hemoglobin electrophoresis weekly. Goal HbC and HbS fraction <30% combined.   - recommend transfusion to maintain plt > 50k in the setting of bleeding, otherwise Monitor CBC with differential daily and transfuse for platelet count >10 minimum, >20 if febrile.  - Discuss with blood bank and heme team prior to RBC transfusions to minimize risk of antibody development/transfusion reactions, aim to keep hb >7-8  - Pending full neurology eval, appreciate care by critical care, cards/heart failure -neurology suspect that seizures are not related to presentation and plan to continue keppra for now, neurology following for neurologic status.   - Ok to restart hydroxyurea as long as plt >100 and hb >8 - restarted on 01/15/25  - Monitor hemolysis labs (CBC, CMP, LDH, reticulocyte count) daily, prefer pediatric tubes.   - Hematology team to follow          Will continue to monitor the patient.  Please call via [place] Teams with any questions.  Above reviewed with Attending Dr. Ferrer.    Discussed with primary team.    *Note not finalized until signed by Attending Physician

## 2025-01-17 NOTE — PROGRESS NOTE ADULT - SUBJECTIVE AND OBJECTIVE BOX
Transplant ID  s/p apheresis 1/12/25   afebrile for 72 hours  More awake, tracking  tolerating PS trials, s/p tracheostomy      Vital Signs Last 24 Hrs  T(C): 37.6 (17 Jan 2025 08:00), Max: 37.6 (16 Jan 2025 11:15)  T(F): 99.7 (17 Jan 2025 08:00), Max: 99.7 (16 Jan 2025 11:15)  HR: 120 (17 Jan 2025 10:15) (81 - 120)  BP: --  BP(mean): --  RR: 23 (17 Jan 2025 10:15) (12 - 26)  SpO2: 100% (17 Jan 2025 10:15) (99% - 100%)    Parameters below as of 17 Jan 2025 09:00  Patient On (Oxygen Delivery Method): ventilator      Mode: CPAP with PS  FiO2: 40  PEEP: 5  PS: 10  MAP: 8  PIP: 15          PHYSICAL EXAM:  Constitutional:no change in status  Eyes:VICKY, EOMI  Ear/Nose/Throat: no oral lesions, CVC for exchange transfusion	  Respiratory: coarse BL  Cardiovascular: S1S2  Gastrointestinal:soft, (+) BS, no tenderness  Extremities:no e/e/c  No Lymphadenopathy  IV sites not inflammed.          ____________________________________________________  ROS  GENERAL: denies chills, , night sweats, weight loss.   PSYCH: denies depression, anxiety, suicidal ideation, hallucination, and delusions  SKIN: no rash or lesions; no color changes, no abnormal nevi,no  dryness, and nojaundice    EYES: denies visual changes, floaters, pain, inflammation, blurred vision, and discharge  ENT: denies tinnitus, vertigo, epistaxis, oral lesion, and decreased acuity  PULM: denies, hemoptysis, pleurisy  CVS: denies angina, palpitations,+ orthopnea, no syncope, or heart murmur  GI: denies constipation, diarrhea, melena, abdominal pain, nausea.   : denies dysuria, frequency, discharge, incontinence, stones or macroscopic hematuria  MS: no arthralgias, no erythema or swelling, no myalgias, noedema, or lower back pain.   CNS: denies numbness, dizziness, seizure, or tremor  ENDO: denies heat/cold intolerance, polyuria, polydipsia, malaise.    HEME: denies bruising, bleeding, lymphadenopathy, anemia, and calf pain    Allergies  doxycycline (Angioedema)  penicillin (Unknown)  clindamycin (Angioedema)  linezolid (Angioedema)    __________________________________________________  MEDS:  MEDICATIONS  (STANDING):  albuterol/ipratropium for Nebulization 3 every 6 hours PRN  amantadine 100 <User Schedule>  dextrose 50% Injectable 25 once  dextrose 50% Injectable 12.5 once  dextrose 50% Injectable 25 once  dextrose Oral Gel 15 once PRN  enoxaparin Injectable 40 every 24 hours  glucagon  Injectable 1 once  hydrALAZINE Injectable 5 every 6 hours PRN  hydroxyurea 500 two times a day  insulin lispro (ADMELOG) corrective regimen sliding scale  every 6 hours  levETIRAcetam  Solution 500 every 12 hours  norepinephrine Infusion 0.05 <Continuous>  pantoprazole  Injectable 40 daily    _________________________________________________  ANTIMICOBIALS  meropenem  IVPB 1000 every 8 hours      GENERAL LABS              9.1                  149  | 23   | 24           7.74  >-----------< 141     ------------------------< 121                   27.6                 3.8  | 113  | 0.77                                         Ca 9.9   Mg 2.2   Ph 2.7      Vancomycin Level, Trough: 11.3 (01-15 @ 04:42)  Vancomycin Level, Trough: 8.1 (01-14 @ 17:03)  Vancomycin Level, Trough: 9.2 (01-14 @ 05:57)  Vancomycin Level, Trough: 10.8 (01-13 @ 16:38)  Vancomycin Level, Trough: 12.7 (01-13 @ 03:37)  Vancomycin Level, Trough: 15.2 (01-12 @ 05:13)  Vancomycin Level, Trough: 17.2 (01-11 @ 17:47)  Vancomycin Level, Trough: 15.7 (01-11 @ 04:46)  Vancomycin Level, Trough: 17.5 (01-10 @ 16:32)  Vancomycin Level, Trough: 18.4 (01-10 @ 05:28)  Vancomycin Level, Trough: 18.8 (01-09 @ 15:10)  Vancomycin Level, Trough: 17.4 (01-09 @ 04:37)  Vancomycin Level, Trough: 14.6 (01-08 @ 17:17)  Vancomycin Level, Trough: 22.0 (01-08 @ 05:23)  Vancomycin Level, Trough: 17.5 (01-07 @ 18:50)  Vancomycin Level, Trough: 17.5 (01-07 @ 07:14)  Vancomycin Level, Trough: 17.2 (01-06 @ 19:47)  Vancomycin Level, Trough: 19.2 (01-06 @ 06:24)  Vancomycin Level, Trough: 13.7 (01-05 @ 19:01)  Vancomycin Level, Trough: 20.0 (01-05 @ 06:53)  Vancomycin Level, Trough: 11.7 (01-04 @ 18:56)  Vancomycin Level, Trough: 11.0 (01-03 @ 22:15)  Vancomycin Level, Random: 16.8 (01-03 @ 15:51)    Urinalysis Basic - ( 17 Jan 2025 00:23 )    Color: x / Appearance: x / SG: x / pH: x  Gluc: 121 mg/dL / Ketone: x  / Bili: x / Urobili: x   Blood: x / Protein: x / Nitrite: x   Leuk Esterase: x / RBC: x / WBC x   Sq Epi: x / Non Sq Epi: x / Bacteria: x        _________________________________________________  MICROBIOLOGY  -----------                  Fungitell:   _______________________________________________  PERTINENT IMAGING  L STUDIES:

## 2025-01-17 NOTE — PROGRESS NOTE ADULT - SUBJECTIVE AND OBJECTIVE BOX
General Surgery Progress Note    Overnight: T[facility]  Subjective: Patient seen and examined on rounds. No issues to report, tolerating on trach.     Objective:  Vitals:  T(C): 37.6 (01-17-25 @ 04:00), Max: 37.6 (01-16-25 @ 11:15)  HR: 98 (01-17-25 @ 04:00) (81 - 109)  BP: --  RR: 22 (01-17-25 @ 04:00) (18 - 26)  SpO2: 100% (01-17-25 @ 04:00) (99% - 100%)  Wt(kg): --    01-16 @ 07:01  -  01-17 @ 07:00  --------------------------------------------------------  IN:    Enteral Tube Flush: 90 mL    IV PiggyBack: 500 mL    Norepinephrine: 59.5 mL    Propofol: 35 mL    Vivonex RTF: 635 mL  Total IN: 1319.5 mL    OUT:    Indwelling Catheter - Urethral (mL): 2015 mL  Total OUT: 2015 mL    Total NET: -695.5 mL          Physical Exam:  HEENT: Trach in place c/d/i with no active bleeding/oozing        Labs:                        9.1    7.74  )-----------( 141      ( 17 Jan 2025 00:23 )             27.6     01-17    149[H]  |  113[H]  |  24[H]  ----------------------------<  121[H]  3.8   |  23  |  0.77    Ca    9.9      17 Jan 2025 00:23  Phos  2.7     01-17  Mg     2.2     01-17    TPro  6.5  /  Alb  3.6  /  TBili  1.0  /  DBili  x   /  AST  54[H]  /  ALT  99[H]  /  AlkPhos  91  01-17    LIVER FUNCTIONS - ( 17 Jan 2025 00:23 )  Alb: 3.6 g/dL / Pro: 6.5 g/dL / ALK PHOS: 91 U/L / ALT: 99 U/L / AST: 54 U/L / GGT: x           PT/INR - ( 17 Jan 2025 00:23 )   PT: 11.9 sec;   INR: 1.04 ratio         PTT - ( 17 Jan 2025 00:23 )  PTT:23.5 sec  Urinalysis Basic - ( 17 Jan 2025 00:23 )    Color: x / Appearance: x / SG: x / pH: x  Gluc: 121 mg/dL / Ketone: x  / Bili: x / Urobili: x   Blood: x / Protein: x / Nitrite: x   Leuk Esterase: x / RBC: x / WBC x   Sq Epi: x / Non Sq Epi: x / Bacteria: x

## 2025-01-17 NOTE — PROGRESS NOTE ADULT - SUBJECTIVE AND OBJECTIVE BOX
INTERVAL HPI/OVERNIGHT EVENTS:  Patient S&E at bedside. No o/n events, s/p tracheostomy.    VITAL SIGNS:  T(F): 100 (01-17-25 @ 12:00)  HR: 112 (01-17-25 @ 12:00)  BP: --  RR: 19 (01-17-25 @ 12:00)  SpO2: 100% (01-17-25 @ 12:00)  Wt(kg): --    PHYSICAL EXAM:    Constitutional: NAD, ill appearing  Eyes: EOMI  Neck: trache  Respiratory: trache to vent, clear BL  Cardiovascular: RRR, no M/R/G  Gastrointestinal: soft, NTND, + BS  Extremities: no c/c/e  Neurological: awake, trache      MEDICATIONS  (STANDING):  amantadine 100 milliGRAM(s) Oral <User Schedule>  artificial tears (preservative free) Ophthalmic Solution 1 Drop(s) Both EYES four times a day  ascorbic acid 500 milliGRAM(s) Oral daily  chlorhexidine 0.12% Liquid 15 milliLiter(s) Oral Mucosa every 12 hours  chlorhexidine 2% Cloths 1 Application(s) Topical <User Schedule>  chlorhexidine 4% Liquid 1 Application(s) Topical <User Schedule>  dextrose 5%. 1000 milliLiter(s) (100 mL/Hr) IV Continuous <Continuous>  dextrose 5%. 1000 milliLiter(s) (50 mL/Hr) IV Continuous <Continuous>  dextrose 50% Injectable 25 Gram(s) IV Push once  dextrose 50% Injectable 12.5 Gram(s) IV Push once  dextrose 50% Injectable 25 Gram(s) IV Push once  enoxaparin Injectable 40 milliGRAM(s) SubCutaneous every 24 hours  glucagon  Injectable 1 milliGRAM(s) IntraMuscular once  hydroxyurea 500 milliGRAM(s) Oral two times a day  insulin lispro (ADMELOG) corrective regimen sliding scale   SubCutaneous every 6 hours  levETIRAcetam  Solution 500 milliGRAM(s) Oral every 12 hours  meropenem  IVPB 1000 milliGRAM(s) IV Intermittent every 8 hours  multivitamin 1 Tablet(s) Oral daily  norepinephrine Infusion 0.05 MICROgram(s)/kG/Min (7.84 mL/Hr) IV Continuous <Continuous>  pantoprazole  Injectable 40 milliGRAM(s) IV Push daily    MEDICATIONS  (PRN):  albuterol/ipratropium for Nebulization 3 milliLiter(s) Nebulizer every 6 hours PRN Shortness of Breath and/or Wheezing  dextrose Oral Gel 15 Gram(s) Oral once PRN Blood Glucose LESS THAN 70 milliGRAM(s)/deciliter  hydrALAZINE Injectable 5 milliGRAM(s) IV Push every 6 hours PRN HTN  sodium chloride 0.9% lock flush 10 milliLiter(s) IV Push every 1 hour PRN Pre/post blood products, medications, blood draw, and to maintain line patency      Allergies    doxycycline (Angioedema)  penicillin (Unknown)  clindamycin (Angioedema)  linezolid (Angioedema)    Intolerances        LABS:                        9.1    7.74  )-----------( 141      ( 17 Jan 2025 00:23 )             27.6     01-17    149[H]  |  113[H]  |  24[H]  ----------------------------<  121[H]  3.8   |  23  |  0.77    Ca    9.9      17 Jan 2025 00:23  Phos  2.7     01-17  Mg     2.2     01-17    TPro  6.5  /  Alb  3.6  /  TBili  1.0  /  DBili  x   /  AST  54[H]  /  ALT  99[H]  /  AlkPhos  91  01-17    PT/INR - ( 17 Jan 2025 00:23 )   PT: 11.9 sec;   INR: 1.04 ratio         PTT - ( 17 Jan 2025 00:23 )  PTT:23.5 sec  Urinalysis Basic - ( 17 Jan 2025 00:23 )    Color: x / Appearance: x / SG: x / pH: x  Gluc: 121 mg/dL / Ketone: x  / Bili: x / Urobili: x   Blood: x / Protein: x / Nitrite: x   Leuk Esterase: x / RBC: x / WBC x   Sq Epi: x / Non Sq Epi: x / Bacteria: x        RADIOLOGY & ADDITIONAL TESTS:  Studies reviewed.    ASSESSMENT & PLAN:  INTERVAL HPI/OVERNIGHT EVENTS:  Patient S&E with family at bedside. No o/n events, s/p tracheostomy. Awake, follows some commands. On pressor.    VITAL SIGNS:  T(F): 100 (01-17-25 @ 12:00)  HR: 112 (01-17-25 @ 12:00)  BP: --  RR: 19 (01-17-25 @ 12:00)  SpO2: 100% (01-17-25 @ 12:00)  Wt(kg): --    PHYSICAL EXAM:    Constitutional: NAD, ill appearing  Eyes: EOMI  Neck: trache  Respiratory: trache to vent, clear BL  Cardiovascular: RRR, no M/R/G  Gastrointestinal: soft, NTND, + BS  : [place] with yellow urine  Extremities: warm  Neurological: awake, trache, follows some commands      MEDICATIONS  (STANDING):  amantadine 100 milliGRAM(s) Oral <User Schedule>  artificial tears (preservative free) Ophthalmic Solution 1 Drop(s) Both EYES four times a day  ascorbic acid 500 milliGRAM(s) Oral daily  chlorhexidine 0.12% Liquid 15 milliLiter(s) Oral Mucosa every 12 hours  chlorhexidine 2% Cloths 1 Application(s) Topical <User Schedule>  chlorhexidine 4% Liquid 1 Application(s) Topical <User Schedule>  dextrose 5%. 1000 milliLiter(s) (100 mL/Hr) IV Continuous <Continuous>  dextrose 5%. 1000 milliLiter(s) (50 mL/Hr) IV Continuous <Continuous>  dextrose 50% Injectable 25 Gram(s) IV Push once  dextrose 50% Injectable 12.5 Gram(s) IV Push once  dextrose 50% Injectable 25 Gram(s) IV Push once  enoxaparin Injectable 40 milliGRAM(s) SubCutaneous every 24 hours  glucagon  Injectable 1 milliGRAM(s) IntraMuscular once  hydroxyurea 500 milliGRAM(s) Oral two times a day  insulin lispro (ADMELOG) corrective regimen sliding scale   SubCutaneous every 6 hours  levETIRAcetam  Solution 500 milliGRAM(s) Oral every 12 hours  meropenem  IVPB 1000 milliGRAM(s) IV Intermittent every 8 hours  multivitamin 1 Tablet(s) Oral daily  norepinephrine Infusion 0.05 MICROgram(s)/kG/Min (7.84 mL/Hr) IV Continuous <Continuous>  pantoprazole  Injectable 40 milliGRAM(s) IV Push daily    MEDICATIONS  (PRN):  albuterol/ipratropium for Nebulization 3 milliLiter(s) Nebulizer every 6 hours PRN Shortness of Breath and/or Wheezing  dextrose Oral Gel 15 Gram(s) Oral once PRN Blood Glucose LESS THAN 70 milliGRAM(s)/deciliter  hydrALAZINE Injectable 5 milliGRAM(s) IV Push every 6 hours PRN HTN  sodium chloride 0.9% lock flush 10 milliLiter(s) IV Push every 1 hour PRN Pre/post blood products, medications, blood draw, and to maintain line patency      Allergies    doxycycline (Angioedema)  penicillin (Unknown)  clindamycin (Angioedema)  linezolid (Angioedema)    Intolerances        LABS:                        9.1    7.74  )-----------( 141      ( 17 Jan 2025 00:23 )             27.6     01-17    149[H]  |  113[H]  |  24[H]  ----------------------------<  121[H]  3.8   |  23  |  0.77    Ca    9.9      17 Jan 2025 00:23  Phos  2.7     01-17  Mg     2.2     01-17    TPro  6.5  /  Alb  3.6  /  TBili  1.0  /  DBili  x   /  AST  54[H]  /  ALT  99[H]  /  AlkPhos  91  01-17    PT/INR - ( 17 Jan 2025 00:23 )   PT: 11.9 sec;   INR: 1.04 ratio         PTT - ( 17 Jan 2025 00:23 )  PTT:23.5 sec  Urinalysis Basic - ( 17 Jan 2025 00:23 )    Color: x / Appearance: x / SG: x / pH: x  Gluc: 121 mg/dL / Ketone: x  / Bili: x / Urobili: x   Blood: x / Protein: x / Nitrite: x   Leuk Esterase: x / RBC: x / WBC x   Sq Epi: x / Non Sq Epi: x / Bacteria: x        RADIOLOGY & ADDITIONAL TESTS:  Studies reviewed.    ASSESSMENT & PLAN:

## 2025-01-17 NOTE — OCCUPATIONAL THERAPY INITIAL EVALUATION ADULT - ADDITIONAL COMMENTS
Per CM note: Patient is single and resides in a house with her daughter and son. Patients house with 2-3 DONNA with a railing. Prior to hospitalization, patient was independent with ADLS. Patient has DME of  cane at home that she uses for ambulation and mobility when needed.

## 2025-01-17 NOTE — OCCUPATIONAL THERAPY INITIAL EVALUATION ADULT - AMBULATORY DEVICES NEEDED
Chief Complaint   Patient presents with    Medication Refill    Sore Throat       SUBJECTIVE:  Yasmeen Valderrama is a 50 y.o. female here for new problem of see her HPI about the throat and then she has chronic pain.  Currently has co-morbidities including per problem list.      Past Medical History:   Diagnosis Date    Anxiety     Asthma     Carpal tunnel syndrome, bilateral     COPD (chronic obstructive pulmonary disease)      Past Surgical History:   Procedure Laterality Date    CARPAL TUNNEL RELEASE      FINGER SURGERY      HYSTERECTOMY       Social History     Social History    Marital status:      Spouse name: N/A    Number of children: N/A    Years of education: N/A     Occupational History    Not on file.     Social History Main Topics    Smoking status: Current Every Day Smoker     Packs/day: 2.00     Years: 35.00     Types: Cigarettes     Start date: 12/1/1983    Smokeless tobacco: Never Used    Alcohol use No    Drug use: No    Sexual activity: Yes     Partners: Male     Other Topics Concern    Not on file     Social History Narrative     Family History   Problem Relation Age of Onset    COPD Mother     Heart disease Father     No Known Problems Sister     No Known Problems Brother      Current Outpatient Prescriptions on File Prior to Visit   Medication Sig Dispense Refill    docusate sodium (COLACE) 100 MG capsule Take 1 capsule (100 mg total) by mouth 3 (three) times daily as needed for Constipation. 60 capsule 0    [DISCONTINUED] albuterol (PROVENTIL) 2.5 mg /3 mL (0.083 %) nebulizer solution Take 3 mLs (2.5 mg total) by nebulization every 6 (six) hours as needed for Wheezing. 1 Box 11    [DISCONTINUED] albuterol (VENTOLIN HFA) 90 mcg/actuation inhaler Inhale 2 puffs into the lungs every 6 (six) hours as needed for Wheezing. 18 g 5    [DISCONTINUED] alprazolam (XANAX) 2 MG Tab TAKE ONE TABLET TWICE DAILY *MAY CAUSE DROWSINESS* 60 tablet 5    [DISCONTINUED]  "doxycycline (MONODOX) 100 MG capsule Take 1 capsule (100 mg total) by mouth 2 (two) times daily. 60 capsule 0    [DISCONTINUED] duloxetine (CYMBALTA) 20 MG capsule Take 1 capsule (20 mg total) by mouth once daily. 30 capsule 11    [DISCONTINUED] fluticasone (FLOVENT HFA) 220 mcg/actuation inhaler Inhale 1 puff into the lungs 2 (two) times daily. Controller 12 g 0    [DISCONTINUED] lactulose (CHRONULAC) 10 gram/15 mL solution       [DISCONTINUED] predniSONE (DELTASONE) 20 MG tablet Take 2 po daily x 7 days, then 1 PO daily x 14 days, then 1/2 tablet for 7 days 40 tablet 0    [DISCONTINUED] tiotropium (SPIRIVA) 18 mcg inhalation capsule Inhale 1 capsule (18 mcg total) into the lungs once daily. 30 capsule 11    [DISCONTINUED] hydrocodone-acetaminophen 10-325mg (NORCO)  mg Tab Take 1 tablet by mouth every 8 (eight) hours as needed for Pain. 90 tablet 0     No current facility-administered medications on file prior to visit.      Review of patient's allergies indicates:   Allergen Reactions    Antivert [meclizine] Other (See Comments)     Behavioral changes         ROS  Answers for HPI/ROS submitted by the patient on 5/1/2017   Sore throat  Chronicity: recurrent  Onset: more than 1 month ago  Progression since onset: waxing and waning  Pain worse on: left  Fever: no fever  Pain - numeric: 10/10  hoarse voice: Yes  swollen glands: Yes  trouble swallowing: Yes  strep: Yes  mono: No  Treatments tried: NSAIDs, cool liquids, gargles  Improvement on treatment: no relief  Pain severity: severe    OBJECTIVE:  /84 (BP Location: Right arm, Patient Position: Sitting, BP Method: Manual)  Pulse 94  Temp 98.3 °F (36.8 °C) (Oral)   Ht 5' 3" (1.6 m)  Wt 66.8 kg (147 lb 4.3 oz)  SpO2 96%  BMI 26.09 kg/m2    Wt Readings from Last 3 Encounters:   05/03/17 66.8 kg (147 lb 4.3 oz)   04/03/17 65 kg (143 lb 4.8 oz)   02/21/17 68 kg (149 lb 14.6 oz)     BP Readings from Last 3 Encounters:   05/03/17 130/84   04/03/17 " cane/yes (!) 148/84   02/21/17 126/66       Appears chronically ill, alert and oriented  Very hoarse.  Throat seems normal  Tongue normal  Dentition is poor  Right neck anterior painless mass about 1.5 cm, mid neck above thyroid, seems mobile  Nose with coryza  Lungs with coarse BS but otherwise clear  Heart exam is normal    Review of old Records:  Reviewed per     Review of old labs:  Lab Results   Component Value Date    TSH 1.450 03/08/2016     Lab Results   Component Value Date    WBC 11.27 11/26/2016    HGB 14.5 11/26/2016    HCT 43.5 11/26/2016    MCV 93 11/26/2016     11/26/2016       Chemistry        Component Value Date/Time     11/26/2016 1630    K 3.8 11/26/2016 1630     11/26/2016 1630    CO2 25 11/26/2016 1630    BUN 7 11/26/2016 1630    CREATININE 0.7 11/26/2016 1630    GLU 95 11/26/2016 1630        Component Value Date/Time    CALCIUM 9.5 11/26/2016 1630    ALKPHOS 88 11/26/2016 1630    AST 22 11/26/2016 1630    ALT 13 11/26/2016 1630    BILITOT 0.8 11/26/2016 1630        Lab Results   Component Value Date    CHOL 182 09/09/2015     Lab Results   Component Value Date    HDL 54 09/09/2015     Lab Results   Component Value Date    LDLCALC 110.0 09/09/2015     Lab Results   Component Value Date    TRIG 90 09/09/2015     Lab Results   Component Value Date    CHOLHDL 29.7 09/09/2015         Review of old imaging:  n/a    ASSESSMENT:  Problem List Items Addressed This Visit     Tobacco use, since teenager, has tried to quit, is trying to quit    Relevant Medications    albuterol (VENTOLIN HFA) 90 mcg/actuation inhaler    Other Relevant Orders    CT Soft Tissue Neck WO Contrast    Lumbar radiculopathy, chronic    Relevant Medications    duloxetine (CYMBALTA) 20 MG capsule    hydrocodone-acetaminophen 10-325mg (NORCO)  mg Tab (Start on 5/17/2017)    hydrocodone-acetaminophen 10-325mg (NORCO)  mg Tab    Chronic pain syndrome    Chronic obstructive pulmonary disease    Relevant  Medications    albuterol (PROVENTIL) 2.5 mg /3 mL (0.083 %) nebulizer solution    tiotropium (SPIRIVA) 18 mcg inhalation capsule    Chronic hoarseness    Relevant Orders    CT Soft Tissue Neck WO Contrast    Anxiety    Relevant Medications    duloxetine (CYMBALTA) 20 MG capsule    alprazolam (XANAX) 2 MG Tab      Other Visit Diagnoses     Sore throat    -  Primary    Relevant Orders    CT Soft Tissue Neck WO Contrast    Carpal tunnel syndrome, unspecified laterality        Relevant Medications    duloxetine (CYMBALTA) 20 MG capsule    Neck mass        Relevant Orders    CT Soft Tissue Neck WO Contrast    Bacterial pharyngitis        Relevant Medications    amoxicillin-clavulanate 875-125mg (AUGMENTIN) 875-125 mg per tablet    predniSONE (DELTASONE) 20 MG tablet          ICD-10-CM ICD-9-CM   1. Sore throat J02.9 462   2. Chronic obstructive pulmonary disease, unspecified COPD type J44.9 496   3. Tobacco use Z72.0 305.1   4. Carpal tunnel syndrome, unspecified laterality G56.00 354.0   5. Lumbar radiculopathy, chronic M54.16 724.4   6. Anxiety F41.9 300.00   7. Chronic hoarseness R49.0 784.42   8. Neck mass R22.1 784.2   9. Bacterial pharyngitis J02.8 462    B96.89    10. Chronic pain syndrome G89.4 338.4     H/o vocal cord nodules    PLAN:  1. Chronic obstructive pulmonary disease, unspecified COPD type  Potential medication side effects were discussed with the patient; let me know if any occur.    - albuterol (PROVENTIL) 2.5 mg /3 mL (0.083 %) nebulizer solution; Take 3 mLs (2.5 mg total) by nebulization every 6 (six) hours as needed for Wheezing.  Dispense: 1 Box; Refill: 11  - tiotropium (SPIRIVA) 18 mcg inhalation capsule; Inhale 1 capsule (18 mcg total) into the lungs once daily.  Dispense: 30 capsule; Refill: 11    2. Tobacco use  Needs to quit  - albuterol (VENTOLIN HFA) 90 mcg/actuation inhaler; Inhale 2 puffs into the lungs every 6 (six) hours as needed for Wheezing.  Dispense: 18 g; Refill: 5    3. Carpal  tunnel syndrome, unspecified laterality  The current medical regimen is effective;  continue present plan and medications.    - duloxetine (CYMBALTA) 20 MG capsule; Take 1 capsule (20 mg total) by mouth once daily.  Dispense: 30 capsule; Refill: 11    4. Lumbar radiculopathy, chronic  The current medical regimen is effective;  continue present plan and medications.    - duloxetine (CYMBALTA) 20 MG capsule; Take 1 capsule (20 mg total) by mouth once daily.  Dispense: 30 capsule; Refill: 11    5. Anxiety  The current medical regimen is effective;  continue present plan and medications.    - duloxetine (CYMBALTA) 20 MG capsule; Take 1 capsule (20 mg total) by mouth once daily.  Dispense: 30 capsule; Refill: 11      Right neck mass with hoarseness and worsening, tobacco and alcohol use in past, concerning for malignancy get CT of neck, no weight loss noted.  Treat for infection while waiting to reduce inflammation  If CT negative send to ENT    Medication List with Changes/Refills   New Medications    AMOXICILLIN-CLAVULANATE 875-125MG (AUGMENTIN) 875-125 MG PER TABLET    Take 1 tablet by mouth every 12 (twelve) hours.    HYDROCODONE-ACETAMINOPHEN 10-325MG (NORCO)  MG TAB    Take 1 tablet by mouth every 8 (eight) hours as needed for Pain.    PREDNISONE (DELTASONE) 20 MG TABLET    Take 1 tablet (20 mg total) by mouth once daily.   Current Medications    DOCUSATE SODIUM (COLACE) 100 MG CAPSULE    Take 1 capsule (100 mg total) by mouth 3 (three) times daily as needed for Constipation.   Changed and/or Refilled Medications    Modified Medication Previous Medication    ALBUTEROL (PROVENTIL) 2.5 MG /3 ML (0.083 %) NEBULIZER SOLUTION albuterol (PROVENTIL) 2.5 mg /3 mL (0.083 %) nebulizer solution       Take 3 mLs (2.5 mg total) by nebulization every 6 (six) hours as needed for Wheezing.    Take 3 mLs (2.5 mg total) by nebulization every 6 (six) hours as needed for Wheezing.    ALBUTEROL (VENTOLIN HFA) 90 MCG/ACTUATION  INHALER albuterol (VENTOLIN HFA) 90 mcg/actuation inhaler       Inhale 2 puffs into the lungs every 6 (six) hours as needed for Wheezing.    Inhale 2 puffs into the lungs every 6 (six) hours as needed for Wheezing.    ALPRAZOLAM (XANAX) 2 MG TAB alprazolam (XANAX) 2 MG Tab       TAKE ONE TABLET TWICE DAILY *MAY CAUSE DROWSINESS*    TAKE ONE TABLET TWICE DAILY *MAY CAUSE DROWSINESS*    DULOXETINE (CYMBALTA) 20 MG CAPSULE duloxetine (CYMBALTA) 20 MG capsule       Take 1 capsule (20 mg total) by mouth once daily.    Take 1 capsule (20 mg total) by mouth once daily.    HYDROCODONE-ACETAMINOPHEN 10-325MG (NORCO)  MG TAB hydrocodone-acetaminophen 10-325mg (NORCO)  mg Tab       Take 1 tablet by mouth every 8 (eight) hours as needed for Pain.    Take 1 tablet by mouth every 8 (eight) hours as needed for Pain.    TIOTROPIUM (SPIRIVA) 18 MCG INHALATION CAPSULE tiotropium (SPIRIVA) 18 mcg inhalation capsule       Inhale 1 capsule (18 mcg total) into the lungs once daily.    Inhale 1 capsule (18 mcg total) into the lungs once daily.   Discontinued Medications    DOXYCYCLINE (MONODOX) 100 MG CAPSULE    Take 1 capsule (100 mg total) by mouth 2 (two) times daily.    FLUTICASONE (FLOVENT HFA) 220 MCG/ACTUATION INHALER    Inhale 1 puff into the lungs 2 (two) times daily. Controller    LACTULOSE (CHRONULAC) 10 GRAM/15 ML SOLUTION        PREDNISONE (DELTASONE) 20 MG TABLET    Take 2 po daily x 7 days, then 1 PO daily x 14 days, then 1/2 tablet for 7 days       Return in about 4 weeks (around 5/31/2017) for assess treatment plan.

## 2025-01-17 NOTE — PROGRESS NOTE ADULT - ASSESSMENT
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 Pt initially presented to [facility] for SOB and SS crisis; course c/b witnessed seizure, intubated and sedated, and transferred to [facility] for further management. Keppra was discontinued at [facility] due to negative EEG. At [facility] MICU pt was found to have RV failure possibly iso pulm HTN 2/2 increased tidal volumes on the ventilator. On addition, pt possibly received a lot of volume iso SS crisis. Neuro was consulted for seizures and EEG technician was called for vEEG placement. Pt is in profound cardiogenic shock. Pt has been decompensating, despite Dobutamine gtt, Bumex gtt, Levophed, and addition of Vasopressin. Vasopressors requirements have gone up. [facility] shock team was called and pt is being transferred to [facility] for further management.     Patient with multiple medications allergies listed  MSSA in sputum culture from ET tube  Coag negative Staph epi. 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 Innumerable foci susceptibility artifact scattered throughout the   brain which can be seen with critically ill patients on ECMO. Diffuse fat   embolization is also part of the differential diagnosis but is considered   less likely.    2. Tiny acute/subacute infarcts within the bilateral basal ganglia,   thalami, and chandu with disproportionate ovoid area of T2/FLAIR   prolongation in the right ventral thalamus. Findings may indicate the   presence of embolic acute/subacute infarcts.    3. No abnormal intracranial enhancement.      workup   Encephalitis studies sent:  CMV viral load negative in serum  HSV 1/2 PCR negative in serum  West Nile serology and PCR pending  Lyme serology negative  Babesia PCR-ordered    acute hepatitis serology negative  Coxsackie A nonreactive, B low titers    Staph epi bacteremia in a single set of cultures  repeat specimens sent on 1/7 are no growth  likely procurement contaminant    sputum with MSSA colonization vs superinfection/pneumonia from prior viral illness    Completed >10 days of vancomycin  received zosyn 1/4-1/7--> meropenem  1/7-  MSSA positive sputum cx on 1/4- so now completing nearly 14 days of Antibiotics for Staph pneumonia - please note there was no associated MSSA bacteremia     recommendations  ·	Repeat BC  NGTD, no fevers now  ·	now off vancomycin, day 13 of antibiotics with coverage of MSSA for pneumonia- and 10 days by 1/16/25 of meropenem. Could discontinue after topday  1/17/25  ·	CSF with no pleiocytosis, follow VDRL cryptococcus antigen, PCR panel (includes HSV PCR, crAg), bacterial, fungal cx, WNV PCR/IGG/IgM (late season and lower probability of reactivation)+/-oligoclonal bands, autoimmune panel  ·	treponemal Ab neg      Thank you for involving us in the care of this patient  Transplant ID will follow peripherally over the weekend  Please call or page with additional questions  Pager; #[phone]  Teams: from 8 am to 5 pm  Dian Man MD     59F PMH Sickle cell disease (on hydroxyurea), HTN, HFimpEF/NICM (EF 58% 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 Pt initially presented to [facility] for SOB and SS crisis; course c/b witnessed seizure, intubated and sedated, and transferred to [facility] for further management. Keppra was discontinued at [facility] due to negative EEG. At [facility] MICU pt was found to have RV failure possibly iso pulm HTN 2/2 increased tidal volumes on the ventilator. On addition, pt possibly received a lot of volume iso SS crisis. Neuro was consulted for seizures and EEG technician was called for vEEG placement. Pt is in profound cardiogenic shock. Pt has been decompensating, despite Dobutamine gtt, Bumex gtt, Levophed, and addition of Vasopressin. Vasopressors requirements have gone up. NS shock team was called and pt is being transferred to [facility] for further management.     Patient with multiple medications allergies listed  MSSA in sputum culture from ET tube  Coag negative Staph epi. 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 Innumerable foci susceptibility artifact scattered throughout the   brain which can be seen with critically ill patients on ECMO. Diffuse fat   embolization is also part of the differential diagnosis but is considered   less likely.    2. Tiny acute/subacute infarcts within the bilateral basal ganglia,   thalami, and chandu with disproportionate ovoid area of T2/FLAIR   prolongation in the right ventral thalamus. Findings may indicate the   presence of embolic acute/subacute infarcts.    3. No abnormal intracranial enhancement.      workup   Encephalitis studies sent:  CMV viral load negative in serum  HSV 1/2 PCR negative in serum  West Nile serology and PCR pending  Lyme serology negative  Babesia PCR-ordered    acute hepatitis serology negative  Coxsackie A nonreactive, B low titers    Staph epi bacteremia in a single set of cultures  repeat specimens sent on 1/7 are no growth  likely procurement contaminant    sputum with MSSA colonization vs superinfection/pneumonia from prior viral illness    Completed >10 days of vancomycin  received zosyn 1/4-1/7--> meropenem  1/7-  MSSA positive sputum cx on 1/4- so now completing nearly 14 days of Antibiotics for Staph pneumonia - please note there was no associated MSSA bacteremia     recommendations  ·	Repeat BC  NGTD, no fevers now  ·	now off vancomycin, day 13 of antibiotics with coverage of MSSA for pneumonia- and 10 days by 1/16/25 of meropenem. Could discontinue after topday  1/17/25  ·	CSF with no pleiocytosis, follow VDRL cryptococcus antigen, PCR panel (includes HSV PCR, crAg), bacterial, fungal cx, WNV PCR/IGG/IgM (late season and lower probability of reactivation)+/-oligoclonal bands, autoimmune panel  ·	treponemal Ab neg      Thank you for involving us in the care of this patient  Transplant ID will follow sign off at this time as marva is no longer a candidate for OHT  Please call or page with additional questions  Pager; #[phone]  Teams: from 8 am to 5 pm  Dian Man MD

## 2025-01-17 NOTE — OCCUPATIONAL THERAPY INITIAL EVALUATION ADULT - BALANCE TRAINING, PT EVAL
GOAL: Pt will improve static siting balance to fair in order to improve functional mobility in 4 weeks.

## 2025-01-17 NOTE — OCCUPATIONAL THERAPY INITIAL EVALUATION ADULT - PERTINENT HX OF CURRENT PROBLEM, REHAB EVAL
58 yo F w/ PMHx of Sickle cell disease (on hydroxyurea), HTN, HFimpEF/NICM (EF 58% 10/2024) presenting as a transfer from [facility]. Pt initially presented to [facility] for SOB and SS crisis; course c/b witnessed seizure, intubated and sedated, and transferred to [facility]. Transferred to [facility] for further management of cardiogenic shock. Cannulated for [facility] ECMO on 1/4/25. Decannulated on 1/7/25.

## 2025-01-17 NOTE — PROGRESS NOTE ADULT - SUBJECTIVE AND OBJECTIVE BOX
Patient seen and examined at the bedside.    Remains critically ill on continuous ICU monitoring.    Brief Summary:  58 yo F with Sickle cell disease and NICM now with Cardiogenic shock s/p VA ECMO on 1/4/25.  VA ECMO decannulation on 1/7/25    24 Hour events:  Tolerating pressure support trials.  Trach yesterday.    Objective:  Vital Signs Last 24 Hrs  T(C): 37.6 (17 Jan 2025 04:00), Max: 37.6 (16 Jan 2025 11:15)  T(F): 99.7 (17 Jan 2025 04:00), Max: 99.7 (16 Jan 2025 11:15)  HR: 98 (17 Jan 2025 04:00) (81 - 109)  BP: --  BP(mean): --  RR: 22 (17 Jan 2025 04:00) (18 - 26)  SpO2: 100% (17 Jan 2025 04:00) (99% - 100%)    Parameters below as of 17 Jan 2025 04:00  Patient On (Oxygen Delivery Method): ventilator    O2 Concentration (%): 30    Mode: AC/ CMV (Assist Control/ Continuous Mandatory Ventilation)  RR (machine): 22  FiO2: 40  PEEP: 5  ITime: 1  MAP: 9  PC: 10  PIP: 15    Physical Exam:  General: Eyes open, Trach to vent.  Neurology: Not following commands, moving some.  Respiratory: Clear bilaterally   CV: Sinus  Abdominal: Soft, Nontender  Extremities: Warm, well-perfused  Costa  -------------------------------------------------------------------------------------------------------------------------------    Labs:                        9.1    7.74  )-----------( 141      ( 17 Jan 2025 00:23 )             27.6     01-17    149[H]  |  113[H]  |  24[H]  ----------------------------<  121[H]  3.8   |  23  |  0.77    Ca    9.9      17 Jan 2025 00:23  Phos  2.7     01-17  Mg     2.2     01-17    TPro  6.5  /  Alb  3.6  /  TBili  1.0  /  DBili  x   /  AST  54[H]  /  ALT  99[H]  /  AlkPhos  91  01-17    LIVER FUNCTIONS - ( 17 Jan 2025 00:23 )  Alb: 3.6 g/dL / Pro: 6.5 g/dL / ALK PHOS: 91 U/L / ALT: 99 U/L / AST: 54 U/L / GGT: x           PT/INR - ( 17 Jan 2025 00:23 )   PT: 11.9 sec;   INR: 1.04 ratio       PTT - ( 17 Jan 2025 00:23 )  PTT:23.5 sec  ABG - ( 17 Jan 2025 00:02 )  pH, Arterial: 7.48  pH, Blood: x     /  pCO2: 33    /  pO2: 144   / HCO3: 25    / Base Excess: 1.3   /  SaO2: 99.6    ------------------------------------------------------------------------------------------------------------------------------  Assessment:  58 yo F w/ PMHx of Sickle cell disease (on hydroxyurea), HTN, 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 Pt initially presented to [facility] for SOB and SS crisis; course c/b witnessed seizure, intubated and sedated, and transferred to [facility]. Transferred to [facility] for further management of cardiogenic shock. Cannulated for VA ECMO on 1/4/25. Decannulated on 1/7/25.    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  LP 1/14 - follow up results.    ***Cardiovascular***  At risk for hemodynamic decompensation  S/p VA ECMO, Bi vent function recovered.  Wean Norepinephrine as able.    ***Pulmonary***  Acute hypoxic respiratory failure  Pressure support trials ongoing.  Tracheostomy yesterday.  Nebs prn    ***GI***  NPO on TFs   Protein calorie malnutrition  Protonix for stress ulcer prophylaxis     ***Renal***  Trend Creatinine   Costa catheter for strict I/O measurements.     ***ID***  MSSA bacteremia, BAL + MSSA - Merrem for total 10 days.    ***Endocrine***  Hyperglycemia - Insulin sliding scale.     ***Hematology***  Acute blood loss anemia - no current transfusion indication.  Sickle Cell - s/p Red cell exchange, Hgb electrophoresis   Hydroxyurea - monitor platelet count.  Lovenox for DVT prophylaxis         Care plan discussed with the ICU care team.   Patient remains critical, at risk for life threatening decompensation.    I have spent 30 minutes providing critical care management to this patient.    By signing my name below, I, Norris Oliver, attest that this documentation has been prepared under the direction and in the presence of Jyothi Comer MD.  Electronically signed: Norris Oliver, 01-17-25 @ 06:23    I, Jyothi Comer,  personally performed the services described in this documentation. All medical record entries made by the scribe were at my direction and in my presence. I have reviewed the chart and agree that the record reflects my personal performance and is accurate and complete  Electronically signed: Jyothi Comer MD. Patient seen and examined at the bedside.    Remains critically ill on continuous ICU monitoring.    Brief Summary:  58 yo F with Sickle cell disease and NICM now with Cardiogenic shock s/p VA ECMO on 1/4/25.  VA ECMO decannulation on 1/7/25  Tracheostomy on 1/16/25    24 Hour events:  Tracheostomy yesterday.  OOB to chair today.  Amantadine added.    Objective:  Vital Signs Last 24 Hrs  T(C): 37.6 (17 Jan 2025 04:00), Max: 37.6 (16 Jan 2025 11:15)  T(F): 99.7 (17 Jan 2025 04:00), Max: 99.7 (16 Jan 2025 11:15)  HR: 98 (17 Jan 2025 04:00) (81 - 109)  BP: --  BP(mean): --  RR: 22 (17 Jan 2025 04:00) (18 - 26)  SpO2: 100% (17 Jan 2025 04:00) (99% - 100%)    Parameters below as of 17 Jan 2025 04:00  Patient On (Oxygen Delivery Method): ventilator    O2 Concentration (%): 30    Mode: AC/ CMV (Assist Control/ Continuous Mandatory Ventilation)  RR (machine): 22  FiO2: 40  PEEP: 5  ITime: 1  MAP: 9  PC: 10  PIP: 15    Physical Exam:  General: Eyes open, Trach to vent.  Neurology: Nodding and shaking head to answer questions.  Respiratory: Clear bilaterally   CV: Sinus  Abdominal: Soft, Nontender  Extremities: Warm, well-perfused  Costa  -------------------------------------------------------------------------------------------------------------------------------    Labs:                        9.1    7.74  )-----------( 141      ( 17 Jan 2025 00:23 )             27.6     01-17    149[H]  |  113[H]  |  24[H]  ----------------------------<  121[H]  3.8   |  23  |  0.77    Ca    9.9      17 Jan 2025 00:23  Phos  2.7     01-17  Mg     2.2     01-17    TPro  6.5  /  Alb  3.6  /  TBili  1.0  /  DBili  x   /  AST  54[H]  /  ALT  99[H]  /  AlkPhos  91  01-17    LIVER FUNCTIONS - ( 17 Jan 2025 00:23 )  Alb: 3.6 g/dL / Pro: 6.5 g/dL / ALK PHOS: 91 U/L / ALT: 99 U/L / AST: 54 U/L / GGT: x           PT/INR - ( 17 Jan 2025 00:23 )   PT: 11.9 sec;   INR: 1.04 ratio       PTT - ( 17 Jan 2025 00:23 )  PTT:23.5 sec  ABG - ( 17 Jan 2025 00:02 )  pH, Arterial: 7.48  pH, Blood: x     /  pCO2: 33    /  pO2: 144   / HCO3: 25    / Base Excess: 1.3   /  SaO2: 99.6    ------------------------------------------------------------------------------------------------------------------------------  Assessment:  58 yo F w/ PMHx of Sickle cell disease (on hydroxyurea), HTN, 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 Pt initially presented to [facility] for SOB and SS crisis; course c/b witnessed seizure, intubated and sedated, and transferred to [facility]. Transferred to [facility] for further management of cardiogenic shock. Cannulated for VA ECMO on 1/4/25. Decannulated on 1/7/25.    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  LP 1/14 - follow up final results.    ***Cardiovascular***  At risk for hemodynamic decompensation  S/p VA ECMO, Bi vent function recovered.  Wean Norepinephrine as able.    ***Pulmonary***  Acute hypoxic respiratory failure s/p Tracheostomy yesterday.  Wean vent as tolerated.  Nebs prn    ***GI***  Tolerating tube feeds.  Protein calorie malnutrition  Protonix for stress ulcer prophylaxis   Bowel regimen.    ***Renal***  Trend Creatinine   Costa catheter for strict I/O measurements.     ***ID***  MSSA bacteremia, BAL + MSSA - Merrem for total 10 days.    ***Endocrine***  Hyperglycemia - Insulin sliding scale.     ***Hematology***  Acute blood loss anemia - no current transfusion indication.  Sickle Cell - s/p Red cell exchange, Hgb electrophoresis weekly  Hydroxyurea - monitor platelet count.  Lovenox for DVT prophylaxis         Care plan discussed with the ICU care team.   Patient remains critical, at risk for life threatening decompensation.    I have spent 50 minutes providing critical care management to this patient.    By signing my name below, I, Norris Oliver, attest that this documentation has been prepared under the direction and in the presence of Jyothi Comer MD.  Electronically signed: Norris Oliver, 01-17-25 @ 06:23    I, Jyothi Comer, personally performed the services described in this documentation. All medical record entries made by the scribe were at my direction and in my presence. I have reviewed the chart and agree that the record reflects my personal performance and is accurate and complete  Electronically signed: Jyothi Comer MD.

## 2025-01-17 NOTE — PROGRESS NOTE ADULT - NS ATTEND AMEND GEN_ALL_CORE FT
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 Patient's LDH was 1100, high retic counts, mild transaminitis, insignificant bilirubinemia. The cause(s) of her worsening status could not be explained well, decision of an exchange transfusion was made. Patient seen post exchange. Not sure if it has helped her.  Post exchange Hemoglobin electrophoresis showed S 6% and C 8%. Her conditions improved to some extent. She got another exchange prophylactically. Post exchange Hs S 4% and C 5%. Has received a simple transfusion since last exchange. She is off pressors and off vent support, s/p tracheostomy, NG feeding tube. Hydroxyurea restarted as her platelet counts improved. Overall condition improved. Management as per ICU team.

## 2025-01-18 LAB
ALBUMIN SERPL ELPH-MCNC: 3.6 G/DL — SIGNIFICANT CHANGE UP (ref 3.3–5)
ALP SERPL-CCNC: 106 U/L — SIGNIFICANT CHANGE UP (ref 40–120)
ALT FLD-CCNC: 154 U/L — HIGH (ref 10–45)
ANION GAP SERPL CALC-SCNC: 12 MMOL/L — SIGNIFICANT CHANGE UP (ref 5–17)
AST SERPL-CCNC: 107 U/L — HIGH (ref 10–40)
BILIRUB SERPL-MCNC: 1 MG/DL — SIGNIFICANT CHANGE UP (ref 0.2–1.2)
BUN SERPL-MCNC: 27 MG/DL — HIGH (ref 7–23)
CALCIUM SERPL-MCNC: 9.9 MG/DL — SIGNIFICANT CHANGE UP (ref 8.4–10.5)
CHLORIDE SERPL-SCNC: 109 MMOL/L — HIGH (ref 96–108)
CO2 SERPL-SCNC: 25 MMOL/L — SIGNIFICANT CHANGE UP (ref 22–31)
CREAT SERPL-MCNC: 0.76 MG/DL — SIGNIFICANT CHANGE UP (ref 0.5–1.3)
EGFR: 91 ML/MIN/1.73M2 — SIGNIFICANT CHANGE UP
GAS PNL BLDA: SIGNIFICANT CHANGE UP
GAS PNL BLDA: SIGNIFICANT CHANGE UP
GLUCOSE BLDC GLUCOMTR-MCNC: 112 MG/DL — HIGH (ref 70–99)
GLUCOSE BLDC GLUCOMTR-MCNC: 112 MG/DL — HIGH (ref 70–99)
GLUCOSE BLDC GLUCOMTR-MCNC: 124 MG/DL — HIGH (ref 70–99)
GLUCOSE BLDC GLUCOMTR-MCNC: 129 MG/DL — HIGH (ref 70–99)
GLUCOSE BLDC GLUCOMTR-MCNC: 130 MG/DL — HIGH (ref 70–99)
GLUCOSE SERPL-MCNC: 113 MG/DL — HIGH (ref 70–99)
HCT VFR BLD CALC: 26.9 % — LOW (ref 34.5–45)
HGB BLD-MCNC: 8.8 G/DL — LOW (ref 11.5–15.5)
MAGNESIUM SERPL-MCNC: 2.2 MG/DL — SIGNIFICANT CHANGE UP (ref 1.6–2.6)
MCHC RBC-ENTMCNC: 29.2 PG — SIGNIFICANT CHANGE UP (ref 27–34)
MCHC RBC-ENTMCNC: 32.7 G/DL — SIGNIFICANT CHANGE UP (ref 32–36)
MCV RBC AUTO: 89.4 FL — SIGNIFICANT CHANGE UP (ref 80–100)
NRBC # BLD: 2 /100 WBCS — HIGH (ref 0–0)
NRBC BLD-RTO: 2 /100 WBCS — HIGH (ref 0–0)
PHOSPHATE SERPL-MCNC: 2.4 MG/DL — LOW (ref 2.5–4.5)
PLATELET # BLD AUTO: 158 K/UL — SIGNIFICANT CHANGE UP (ref 150–400)
POTASSIUM SERPL-MCNC: 3.7 MMOL/L — SIGNIFICANT CHANGE UP (ref 3.5–5.3)
POTASSIUM SERPL-SCNC: 3.7 MMOL/L — SIGNIFICANT CHANGE UP (ref 3.5–5.3)
PROCALCITONIN SERPL-MCNC: 0.11 NG/ML — HIGH (ref 0.02–0.1)
PROT SERPL-MCNC: 6.5 G/DL — SIGNIFICANT CHANGE UP (ref 6–8.3)
RBC # BLD: 3.01 M/UL — LOW (ref 3.8–5.2)
RBC # FLD: 16.8 % — HIGH (ref 10.3–14.5)
SODIUM SERPL-SCNC: 146 MMOL/L — HIGH (ref 135–145)
WBC # BLD: 7.03 K/UL — SIGNIFICANT CHANGE UP (ref 3.8–10.5)
WBC # FLD AUTO: 7.03 K/UL — SIGNIFICANT CHANGE UP (ref 3.8–10.5)

## 2025-01-18 PROCEDURE — 71045 X-RAY EXAM CHEST 1 VIEW: CPT | Mod: 26

## 2025-01-18 PROCEDURE — 99291 CRITICAL CARE FIRST HOUR: CPT

## 2025-01-18 RX ORDER — SOD PHOSPHATE,MONOBASIC-DIBAS 3MMOL/ML
30 VIAL (ML) INTRAVENOUS ONCE
Refills: 0 | Status: DISCONTINUED | OUTPATIENT
Start: 2025-01-18 | End: 2025-01-18

## 2025-01-18 RX ORDER — LACTULOSE 10 G/15 ML
20 SOLUTION, ORAL ORAL ONCE
Refills: 0 | Status: COMPLETED | OUTPATIENT
Start: 2025-01-18 | End: 2025-01-18

## 2025-01-18 RX ORDER — POTASSIUM PHOSPHATE, MONOBASIC POTASSIUM PHOSPHATE, DIBASIC 236; 224 MG/ML; MG/ML
30 INJECTION, SOLUTION INTRAVENOUS ONCE
Refills: 0 | Status: COMPLETED | OUTPATIENT
Start: 2025-01-18 | End: 2025-01-18

## 2025-01-18 RX ADMIN — Medication 1 DROP(S): at 05:14

## 2025-01-18 RX ADMIN — Medication 100 MILLIGRAM(S): at 14:28

## 2025-01-18 RX ADMIN — ANTISEPTIC SURGICAL SCRUB 1 APPLICATION(S): 0.04 SOLUTION TOPICAL at 05:21

## 2025-01-18 RX ADMIN — LEVETIRACETAM 500 MILLIGRAM(S): 750 TABLET, FILM COATED ORAL at 05:14

## 2025-01-18 RX ADMIN — LEVETIRACETAM 500 MILLIGRAM(S): 750 TABLET, FILM COATED ORAL at 19:26

## 2025-01-18 RX ADMIN — Medication 1 TABLET(S): at 14:28

## 2025-01-18 RX ADMIN — Medication 1 DROP(S): at 23:35

## 2025-01-18 RX ADMIN — POTASSIUM PHOSPHATE, MONOBASIC POTASSIUM PHOSPHATE, DIBASIC 83.33 MILLIMOLE(S): 236; 224 INJECTION, SOLUTION INTRAVENOUS at 03:10

## 2025-01-18 RX ADMIN — Medication 500 MILLIGRAM(S): at 14:28

## 2025-01-18 RX ADMIN — PANTOPRAZOLE 40 MILLIGRAM(S): 20 TABLET, DELAYED RELEASE ORAL at 14:28

## 2025-01-18 RX ADMIN — ANTISEPTIC SURGICAL SCRUB 15 MILLILITER(S): 0.04 SOLUTION TOPICAL at 05:13

## 2025-01-18 RX ADMIN — Medication 100 MILLIGRAM(S): at 06:27

## 2025-01-18 RX ADMIN — Medication 500 MILLIGRAM(S): at 19:26

## 2025-01-18 RX ADMIN — Medication 1 DROP(S): at 18:26

## 2025-01-18 RX ADMIN — ANTISEPTIC SURGICAL SCRUB 15 MILLILITER(S): 0.04 SOLUTION TOPICAL at 19:26

## 2025-01-18 RX ADMIN — Medication 1 DROP(S): at 14:27

## 2025-01-18 RX ADMIN — Medication 500 MILLIGRAM(S): at 05:17

## 2025-01-18 RX ADMIN — ENOXAPARIN SODIUM 40 MILLIGRAM(S): 100 INJECTION SUBCUTANEOUS at 05:13

## 2025-01-18 NOTE — PROGRESS NOTE ADULT - SUBJECTIVE AND OBJECTIVE BOX
General Surgery Progress Note    Overnight: TAYLOR  Subjective: Patient seen and examined on rounds. No issues to report, tolerating on trach.    Objective:  Vitals:  T(C): 37.8 (01-18-25 @ 04:00), Max: 38.2 (01-18-25 @ 00:00)  HR: 101 (01-18-25 @ 07:00) (97 - 126)  BP: --  RR: 17 (01-18-25 @ 07:00) (12 - 47)  SpO2: 100% (01-18-25 @ 07:00) (99% - 100%)  Wt(kg): --    01-17 @ 07:01  -  01-18 @ 07:00  --------------------------------------------------------  IN:    Enteral Tube Flush: 175 mL    Free Water: 1000 mL    IV PiggyBack: 383.2 mL    IV PiggyBack: 60 mL    Norepinephrine: 1.6 mL    Vivonex RTF: 1680 mL  Total IN: 3299.8 mL    OUT:    Indwelling Catheter - Urethral (mL): 2225 mL    Propofol: 0 mL  Total OUT: 2225 mL    Total NET: 1074.8 mL          Physical Exam:  HEENT: Trach in place c/d/i with no active bleeding/oozing       Labs:                        8.8    7.03  )-----------( 158      ( 18 Jan 2025 00:45 )             26.9     01-18    146[H]  |  109[H]  |  27[H]  ----------------------------<  113[H]  3.7   |  25  |  0.76    Ca    9.9      18 Jan 2025 00:45  Phos  2.4     01-18  Mg     2.2     01-18    TPro  6.5  /  Alb  3.6  /  TBili  1.0  /  DBili  x   /  AST  107[H]  /  ALT  154[H]  /  AlkPhos  106  01-18    LIVER FUNCTIONS - ( 18 Jan 2025 00:45 )  Alb: 3.6 g/dL / Pro: 6.5 g/dL / ALK PHOS: 106 U/L / ALT: 154 U/L / AST: 107 U/L / GGT: x           PT/INR - ( 17 Jan 2025 00:23 )   PT: 11.9 sec;   INR: 1.04 ratio         PTT - ( 17 Jan 2025 00:23 )  PTT:23.5 sec  Urinalysis Basic - ( 18 Jan 2025 00:45 )    Color: x / Appearance: x / SG: x / pH: x  Gluc: 113 mg/dL / Ketone: x  / Bili: x / Urobili: x   Blood: x / Protein: x / Nitrite: x   Leuk Esterase: x / RBC: x / WBC x   Sq Epi: x / Non Sq Epi: x / Bacteria: x

## 2025-01-18 NOTE — PROGRESS NOTE ADULT - SUBJECTIVE AND OBJECTIVE BOX
Patient seen and examined at the bedside.    Remained critically ill on continuous ICU monitoring.    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 Pt initially presented to [facility] for SOB and SS crisis; course c/b witnessed seizure, intubated and sedated, and transferred to [facility] for further management. Keppra was discontinued at [facility] due to negative EEG. At [facility] MICU pt was found to have RV failure possibly iso pulm HTN 2/2 increased tidal volumes on the ventilator. On addition, pt possibly received a lot of volume iso SS crisis. Neuro was consulted for seizures and EEG technician was called for vEEG placement. Pt is in profound cardiogenic shock. Pt has been decompensating, despite Dobutamine gtt, Bumex gtt, Levophed, and addition of Vasopressin. Vasopressors requirements have gone up. NS shock team was called and pt is being transferred to NS for further management.  (03 Jan 2025 23:18)      =================== LABS =========================                        8.8    7.03  )-----------( 158      ( 18 Jan 2025 00:45 )             26.9     01-18    146[H]  |  109[H]  |  27[H]  ----------------------------<  113[H]  3.7   |  25  |  0.76    Ca    9.9      18 Jan 2025 00:45  Phos  2.4     01-18  Mg     2.2     01-18    TPro  6.5  /  Alb  3.6  /  TBili  1.0  /  DBili  x   /  AST  107[H]  /  ALT  154[H]  /  AlkPhos  106  01-18    LIVER FUNCTIONS - ( 18 Jan 2025 00:45 )  Alb: 3.6 g/dL / Pro: 6.5 g/dL / ALK PHOS: 106 U/L / ALT: 154 U/L / AST: 107 U/L / GGT: x           PT/INR - ( 17 Jan 2025 00:23 )   PT: 11.9 sec;   INR: 1.04 ratio         PTT - ( 17 Jan 2025 00:23 )  PTT:23.5 sec  ABG - ( 18 Jan 2025 00:00 )  pH, Arterial: 7.47  pH, Blood: x     /  pCO2: 37    /  pO2: 302   / HCO3: 27    / Base Excess: 3.1   /  SaO2: 100.0     Urinalysis Basic - ( 18 Jan 2025 00:45 )    Color: x / Appearance: x / SG: x / pH: x  Gluc: 113 mg/dL / Ketone: x  / Bili: x / Urobili: x   Blood: x / Protein: x / Nitrite: x   Leuk Esterase: x / RBC: x / WBC x   Sq Epi: x / Non Sq Epi: x / Bacteria: x    ==================================================    OBJECTIVE:  Vital Signs Last 24 Hrs  T(C): 37.8 (18 Jan 2025 04:00), Max: 38.2 (18 Jan 2025 00:00)  T(F): 100 (18 Jan 2025 04:00), Max: 100.8 (18 Jan 2025 00:00)  HR: 98 (18 Jan 2025 08:41) (97 - 126)  BP: --  BP(mean): --  RR: 17 (18 Jan 2025 07:00) (12 - 47)  SpO2: 100% (18 Jan 2025 08:41) (99% - 100%)    Parameters below as of 18 Jan 2025 04:00  Patient On (Oxygen Delivery Method): ventilator    O2 Concentration (%): 40    Physical Exam:  General: Intubated  Neurology: Opens eyes to vocal stimuli but does not follow commands, withdraws BUEs to painful stimuli, & triple flexion of BLEs to painful stimuli.  Eyes: bilateral pupils equal and reactive.  ENT/Neck: Neck supple, trachea midline, No JVD.  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 Pt initially presented to [facility] for SOB and SS crisis; course c/b witnessed seizure, intubated and sedated, and transferred to [facility]. Transferred to [facility] for further management of cardiogenic shock. Cannulated for VA ECMO on 1/4/25. Decannulated on 1/7/25.    Altered mental status / multifactorial encephalopathy  Hypotension  Acute respiratory failure  Hypovolemia  Sickle cell disease  Acute blood loss anemia  Hyperglycemia      Plan:   ***Neuro***  Multifactorial encephalopathy  S/p embolic stroke on MRI  Seizure activity on EEG, now improved  Continue with Keppra for seizures  On Amantadine.  LP 1/14 - follow up final results.    ***Cardiovascular***  Invasive hemodynamic monitoring, assess perfusion indices   ST / CVP 2 / MAP 89 / SvO2 ___ / Hct 26.9 / Lactate 1.5  S/p VA ECMO, Bi vent function recovered.  [x] Levophed -  0.05 mcgs/kG/Min  Reassessment of hemodynamics post resuscitation   [x] Hydralazine 5 IV push q6 for hypertension  [x] Nonbleeding   [x] SQ Lovenox for DVT prophylaxis  [x] Hydroxyurea - monitor platelet count.  Serial EKG and cardiac enzymes     ***Pulmonary***  Titration of FiO2 and PEEP, follow SpO2, CXR, blood gasses   on full support until 5a then PSV  Continue on duonebs as needed    Mode: CPAP with PS  FiO2: 40  PEEP: 5  PS: 10  MAP: 9  PIP: 16                ***GI***  [x] NPO w/ Vivonex TF @70ml/hr  [x] Protonix for stress ulcer prophylaxis    ***Renal***  GFR 91  Continue to monitor I/Os, BUN/Creatinine.   Replete lytes PRN  Costa present [x] positive     ***ID***  MSSA bacteremia, BAL + MSSA - Merrem for total 10 days.    ***Endocrine***  [x] Hyperglycemia HbA1c 4.6%              - [x] ISS             - Need tight glycemic control to prevent wound infection.          Patient requires continuous monitoring with bedside rhythm monitoring, pulse oximetry monitoring, and continuous central venous and arterial pressure monitoring; and intermittent blood gas analysis. Care plan discussed with the ICU care team.   Patient remained critical, at risk for life threatening decompensation.    I have spent 30 minutes providing critical care management to this patient.    By signing my name below, I, Kiet Ramirez, attest that this documentation has been prepared under the direction and in the presence of Sly Calderon NP   Electronically signed: Omar Conklin, 01-18-25 @ 09:32    I, Sly Calderon, personally performed the services described in this documentation. all medical record entries made by the blasibe were at my direction and in my presence. I have reviewed the chart and agree that the record reflects my personal performance and is accurate and complete  Electronically signed: Sly Calderon NP  Patient seen and examined at the bedside.    Remained critically ill on continuous ICU monitoring.    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. Pt initially presented to [facility] for SOB and SS crisis; course c/b witnessed seizure, intubated and sedated, and transferred to [facility] for further management. Keppra was discontinued at [facility] due to negative EEG. At [facility] MICU pt was found to have RV failure possibly iso pulm HTN 2/2 increased tidal volumes on the ventilator. On addition, pt possibly received a lot of volume iso SS crisis. Neuro was consulted for seizures and EEG technician was called for vEEG placement. Pt is in profound cardiogenic shock. Pt has been decompensating, despite Dobutamine gtt, Bumex gtt, Levophed, and addition of Vasopressin. Vasopressors requirements have gone up. NS shock team was called and pt is being transferred to NS for further management.  (03 Jan 2025 23:18)      =================== LABS =========================                        8.8    7.03  )-----------( 158      ( 18 Jan 2025 00:45 )             26.9     01-18    146[H]  |  109[H]  |  27[H]  ----------------------------<  113[H]  3.7   |  25  |  0.76    Ca    9.9      18 Jan 2025 00:45  Phos  2.4     01-18  Mg     2.2     01-18    TPro  6.5  /  Alb  3.6  /  TBili  1.0  /  DBili  x   /  AST  107[H]  /  ALT  154[H]  /  AlkPhos  106  01-18    LIVER FUNCTIONS - ( 18 Jan 2025 00:45 )  Alb: 3.6 g/dL / Pro: 6.5 g/dL / ALK PHOS: 106 U/L / ALT: 154 U/L / AST: 107 U/L / GGT: x           PT/INR - ( 17 Jan 2025 00:23 )   PT: 11.9 sec;   INR: 1.04 ratio         PTT - ( 17 Jan 2025 00:23 )  PTT:23.5 sec  ABG - ( 18 Jan 2025 00:00 )  pH, Arterial: 7.47  pH, Blood: x     /  pCO2: 37    /  pO2: 302   / HCO3: 27    / Base Excess: 3.1   /  SaO2: 100.0     Urinalysis Basic - ( 18 Jan 2025 00:45 )    Color: x / Appearance: x / SG: x / pH: x  Gluc: 113 mg/dL / Ketone: x  / Bili: x / Urobili: x   Blood: x / Protein: x / Nitrite: x   Leuk Esterase: x / RBC: x / WBC x   Sq Epi: x / Non Sq Epi: x / Bacteria: x    ==================================================    OBJECTIVE:  Vital Signs Last 24 Hrs  T(C): 37.8 (18 Jan 2025 04:00), Max: 38.2 (18 Jan 2025 00:00)  T(F): 100 (18 Jan 2025 04:00), Max: 100.8 (18 Jan 2025 00:00)  HR: 98 (18 Jan 2025 08:41) (97 - 126)  BP: --  BP(mean): --  RR: 17 (18 Jan 2025 07:00) (12 - 47)  SpO2: 100% (18 Jan 2025 08:41) (99% - 100%)    Parameters below as of 18 Jan 2025 04:00  Patient On (Oxygen Delivery Method): ventilator    O2 Concentration (%): 40    Physical Exam:  General: Intubated  Neurology: Opens eyes to vocal stimuli but does not follow commands, withdraws BUEs to painful stimuli, & triple flexion of BLEs to painful stimuli.  Eyes: bilateral pupils equal and reactive.  ENT/Neck: Neck supple, trachea midline, No JVD.  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. Pt initially presented to [facility] for SOB and SS crisis; course c/b witnessed seizure, intubated and sedated, and transferred to [facility]. Transferred to [facility] for further management of cardiogenic shock. Cannulated for VA ECMO on 1/4/25. Decannulated on 1/7/25.    Altered mental status / multifactorial encephalopathy  Hypotension  Acute respiratory failure  Hypovolemia  Sickle cell disease  Acute blood loss anemia  Hyperglycemia      Plan:   ***Neuro***  Multifactorial encephalopathy  S/p embolic stroke on MRI  Seizure activity on EEG, now improved  Continue with Keppra for seizures  On Amantadine.  LP 1/14 - follow up final results.    ***Cardiovascular***  Invasive hemodynamic monitoring, assess perfusion indices   ST / CVP 2 / MAP 89 / SvO2 ___ / Hct 26.9 / Lactate 1.5  S/p VA ECMO, Bi vent function recovered.  [x] Levophed -  0.05 mcgs/kG/Min  Reassessment of hemodynamics post resuscitation   [x] Hydralazine 5 IV push q6 for hypertension  [x] Nonbleeding   [x] SQ Lovenox for DVT prophylaxis  [x] Hydroxyurea - monitor platelet count.  Serial EKG and cardiac enzymes     ***Pulmonary***  Titration of FiO2 and PEEP, follow SpO2, CXR, blood gases   on full support until 5a then PSV  Continue on duonebs as needed    Mode: CPAP with PS  FiO2: 40  PEEP: 5  PS: 10  MAP: 9  PIP: 16                ***GI***  [x] NPO w/ Vivonex TF @70ml/hr  [x] Protonix for stress ulcer prophylaxis    ***Renal***  GFR 91  Continue to monitor I/Os, BUN/Creatinine.   Replete lytes PRN  Costa present [x] positive     ***ID***  MSSA bacteremia, BAL + MSSA - Meropenem for total 10 days.    ***Endocrine***  [x] Hyperglycemia HbA1c 4.6%              - [x] ISS             - Need tight glycemic control to prevent wound infection.          Patient requires continuous monitoring with bedside rhythm monitoring, pulse oximetry monitoring, and continuous central venous and arterial pressure monitoring; and intermittent blood gas analysis. Care plan discussed with the ICU care team.   Patient remained critical, at risk for life threatening decompensation.    I have spent 30 minutes providing critical care management to this patient.    By signing my name below, I, Kiet Ramirez, attest that this documentation has been prepared under the direction and in the presence of Sly Calderon NP   Electronically signed: Omar Conklin, 01-18-25 @ 09:32    I, Sly Calderon, personally performed the services described in this documentation. all medical record entries made by the scribe were at my direction and in my presence. I have reviewed the chart and agree that the record reflects my personal performance and is accurate and complete  Electronically signed: Sly Calderon NP

## 2025-01-18 NOTE — PROGRESS NOTE ADULT - ASSESSMENT
59y Female patient with PMH sickle cell disease and NICM with cardiogenic shock sp VA ECMO 1/4/25 with decannulation 1/7/25, now S/P percutaneous tracheostomy with bronchoscopy for acute hypoxic respiratory failure. Recovering appropriately.     Plan:  - Trach sutures to remain in place until POD5 1/21  - ACS surgery to follow  - appreciate care per CTICU     ACS/Trauma Surgery  [phone].

## 2025-01-19 LAB
ALBUMIN SERPL ELPH-MCNC: 3.4 G/DL — SIGNIFICANT CHANGE UP (ref 3.3–5)
ALP SERPL-CCNC: 106 U/L — SIGNIFICANT CHANGE UP (ref 40–120)
ALT FLD-CCNC: 186 U/L — HIGH (ref 10–45)
ANION GAP SERPL CALC-SCNC: 10 MMOL/L — SIGNIFICANT CHANGE UP (ref 5–17)
AST SERPL-CCNC: 123 U/L — HIGH (ref 10–40)
BASOPHILS # BLD AUTO: 0.02 K/UL — SIGNIFICANT CHANGE UP (ref 0–0.2)
BASOPHILS NFR BLD AUTO: 0.3 % — SIGNIFICANT CHANGE UP (ref 0–2)
BILIRUB SERPL-MCNC: 0.9 MG/DL — SIGNIFICANT CHANGE UP (ref 0.2–1.2)
BLD GP AB SCN SERPL QL: NEGATIVE — SIGNIFICANT CHANGE UP
BUN SERPL-MCNC: 22 MG/DL — SIGNIFICANT CHANGE UP (ref 7–23)
CALCIUM SERPL-MCNC: 9.8 MG/DL — SIGNIFICANT CHANGE UP (ref 8.4–10.5)
CHLORIDE SERPL-SCNC: 102 MMOL/L — SIGNIFICANT CHANGE UP (ref 96–108)
CO2 SERPL-SCNC: 26 MMOL/L — SIGNIFICANT CHANGE UP (ref 22–31)
CREAT SERPL-MCNC: 0.68 MG/DL — SIGNIFICANT CHANGE UP (ref 0.5–1.3)
EGFR: 102 ML/MIN/1.73M2 — SIGNIFICANT CHANGE UP
EOSINOPHIL # BLD AUTO: 0.25 K/UL — SIGNIFICANT CHANGE UP (ref 0–0.5)
EOSINOPHIL NFR BLD AUTO: 3.2 % — SIGNIFICANT CHANGE UP (ref 0–6)
GAS PNL BLDA: SIGNIFICANT CHANGE UP
GLUCOSE BLDC GLUCOMTR-MCNC: 116 MG/DL — HIGH (ref 70–99)
GLUCOSE BLDC GLUCOMTR-MCNC: 121 MG/DL — HIGH (ref 70–99)
GLUCOSE BLDC GLUCOMTR-MCNC: 126 MG/DL — HIGH (ref 70–99)
GLUCOSE SERPL-MCNC: 127 MG/DL — HIGH (ref 70–99)
HCT VFR BLD CALC: 28.3 % — LOW (ref 34.5–45)
HGB BLD-MCNC: 9.3 G/DL — LOW (ref 11.5–15.5)
IMM GRANULOCYTES NFR BLD AUTO: 1 % — HIGH (ref 0–0.9)
LYMPHOCYTES # BLD AUTO: 2.04 K/UL — SIGNIFICANT CHANGE UP (ref 1–3.3)
LYMPHOCYTES # BLD AUTO: 25.7 % — SIGNIFICANT CHANGE UP (ref 13–44)
MAGNESIUM SERPL-MCNC: 1.9 MG/DL — SIGNIFICANT CHANGE UP (ref 1.6–2.6)
MCHC RBC-ENTMCNC: 29.7 PG — SIGNIFICANT CHANGE UP (ref 27–34)
MCHC RBC-ENTMCNC: 32.9 G/DL — SIGNIFICANT CHANGE UP (ref 32–36)
MCV RBC AUTO: 90.4 FL — SIGNIFICANT CHANGE UP (ref 80–100)
MONOCYTES # BLD AUTO: 0.49 K/UL — SIGNIFICANT CHANGE UP (ref 0–0.9)
MONOCYTES NFR BLD AUTO: 6.2 % — SIGNIFICANT CHANGE UP (ref 2–14)
NEUTROPHILS # BLD AUTO: 5.05 K/UL — SIGNIFICANT CHANGE UP (ref 1.8–7.4)
NEUTROPHILS NFR BLD AUTO: 63.6 % — SIGNIFICANT CHANGE UP (ref 43–77)
NRBC # BLD: 2 /100 WBCS — HIGH (ref 0–0)
NRBC BLD-RTO: 2 /100 WBCS — HIGH (ref 0–0)
PHOSPHATE SERPL-MCNC: 3 MG/DL — SIGNIFICANT CHANGE UP (ref 2.5–4.5)
PLATELET # BLD AUTO: 143 K/UL — LOW (ref 150–400)
POTASSIUM SERPL-MCNC: 3.4 MMOL/L — LOW (ref 3.5–5.3)
POTASSIUM SERPL-SCNC: 3.4 MMOL/L — LOW (ref 3.5–5.3)
PROT SERPL-MCNC: 6.5 G/DL — SIGNIFICANT CHANGE UP (ref 6–8.3)
RBC # BLD: 3.13 M/UL — LOW (ref 3.8–5.2)
RBC # FLD: 16.2 % — HIGH (ref 10.3–14.5)
RH IG SCN BLD-IMP: POSITIVE — SIGNIFICANT CHANGE UP
SODIUM SERPL-SCNC: 138 MMOL/L — SIGNIFICANT CHANGE UP (ref 135–145)
WBC # BLD: 7.93 K/UL — SIGNIFICANT CHANGE UP (ref 3.8–10.5)
WBC # FLD AUTO: 7.93 K/UL — SIGNIFICANT CHANGE UP (ref 3.8–10.5)

## 2025-01-19 PROCEDURE — 74018 RADEX ABDOMEN 1 VIEW: CPT | Mod: 26

## 2025-01-19 PROCEDURE — 99291 CRITICAL CARE FIRST HOUR: CPT

## 2025-01-19 PROCEDURE — 71045 X-RAY EXAM CHEST 1 VIEW: CPT | Mod: 26

## 2025-01-19 RX ORDER — METOCLOPRAMIDE 10 MG/1
10 TABLET ORAL EVERY 8 HOURS
Refills: 0 | Status: COMPLETED | OUTPATIENT
Start: 2025-01-19 | End: 2025-01-21

## 2025-01-19 RX ORDER — MAGNESIUM SULFATE 0.8 MEQ/ML
2 AMPUL (ML) INJECTION ONCE
Refills: 0 | Status: COMPLETED | OUTPATIENT
Start: 2025-01-19 | End: 2025-01-19

## 2025-01-19 RX ORDER — POLYETHYLENE GLYCOL 3350 17 G/17G
17 POWDER, FOR SOLUTION ORAL EVERY 12 HOURS
Refills: 0 | Status: DISCONTINUED | OUTPATIENT
Start: 2025-01-19 | End: 2025-01-21

## 2025-01-19 RX ORDER — POTASSIUM CHLORIDE 750 MG/1
40 TABLET, EXTENDED RELEASE ORAL ONCE
Refills: 0 | Status: COMPLETED | OUTPATIENT
Start: 2025-01-19 | End: 2025-01-19

## 2025-01-19 RX ORDER — POTASSIUM CHLORIDE 750 MG/1
10 TABLET, EXTENDED RELEASE ORAL
Refills: 0 | Status: DISCONTINUED | OUTPATIENT
Start: 2025-01-19 | End: 2025-01-19

## 2025-01-19 RX ADMIN — POLYETHYLENE GLYCOL 3350 17 GRAM(S): 17 POWDER, FOR SOLUTION ORAL at 13:18

## 2025-01-19 RX ADMIN — Medication 25 GRAM(S): at 01:25

## 2025-01-19 RX ADMIN — METOCLOPRAMIDE 10 MILLIGRAM(S): 10 TABLET ORAL at 21:37

## 2025-01-19 RX ADMIN — ANTISEPTIC SURGICAL SCRUB 1 APPLICATION(S): 0.04 SOLUTION TOPICAL at 05:21

## 2025-01-19 RX ADMIN — Medication 100 MILLIGRAM(S): at 06:12

## 2025-01-19 RX ADMIN — METOCLOPRAMIDE 10 MILLIGRAM(S): 10 TABLET ORAL at 13:18

## 2025-01-19 RX ADMIN — Medication 1 TABLET(S): at 11:29

## 2025-01-19 RX ADMIN — ENOXAPARIN SODIUM 40 MILLIGRAM(S): 100 INJECTION SUBCUTANEOUS at 05:23

## 2025-01-19 RX ADMIN — ANTISEPTIC SURGICAL SCRUB 15 MILLILITER(S): 0.04 SOLUTION TOPICAL at 17:11

## 2025-01-19 RX ADMIN — Medication 500 MILLIGRAM(S): at 11:29

## 2025-01-19 RX ADMIN — Medication 1 DROP(S): at 05:24

## 2025-01-19 RX ADMIN — POTASSIUM CHLORIDE 40 MILLIEQUIVALENT(S): 750 TABLET, EXTENDED RELEASE ORAL at 01:25

## 2025-01-19 RX ADMIN — Medication 500 MILLIGRAM(S): at 05:24

## 2025-01-19 RX ADMIN — POTASSIUM CHLORIDE 40 MILLIEQUIVALENT(S): 750 TABLET, EXTENDED RELEASE ORAL at 15:41

## 2025-01-19 RX ADMIN — ANTISEPTIC SURGICAL SCRUB 15 MILLILITER(S): 0.04 SOLUTION TOPICAL at 05:24

## 2025-01-19 RX ADMIN — PANTOPRAZOLE 40 MILLIGRAM(S): 20 TABLET, DELAYED RELEASE ORAL at 11:28

## 2025-01-19 RX ADMIN — Medication 500 MILLIGRAM(S): at 17:11

## 2025-01-19 RX ADMIN — LEVETIRACETAM 500 MILLIGRAM(S): 750 TABLET, FILM COATED ORAL at 17:08

## 2025-01-19 RX ADMIN — LEVETIRACETAM 500 MILLIGRAM(S): 750 TABLET, FILM COATED ORAL at 05:24

## 2025-01-19 RX ADMIN — Medication 80 MILLIGRAM(S): at 13:18

## 2025-01-19 RX ADMIN — Medication 1 DROP(S): at 17:08

## 2025-01-19 RX ADMIN — Medication 1 DROP(S): at 11:59

## 2025-01-19 RX ADMIN — Medication 100 MILLIGRAM(S): at 13:18

## 2025-01-19 NOTE — PROGRESS NOTE ADULT - ASSESSMENT
59y Female patient with PMH sickle cell disease and NICM with cardiogenic shock sp VA ECMO 1/4/25 with decannulation 1/7/25, now S/P percutaneous tracheostomy with bronchoscopy for acute hypoxic respiratory failure. Recovering appropriately.     Plan:  - Trach sutures to remain in place until POD5 1/21  - ACS surgery to follow  - appreciate care per CTICU     ACS/Trauma Surgery  [phone].

## 2025-01-19 NOTE — PROGRESS NOTE ADULT - SUBJECTIVE AND OBJECTIVE BOX
General Surgery Progress Note    Overnight: TAYLOR  Subjective: Patient seen and examined on rounds.    Objective:  Vitals:  T(C): 37.6 (01-19-25 @ 05:00), Max: 37.9 (01-19-25 @ 04:00)  HR: 114 (01-19-25 @ 07:00) (89 - 115)  BP: --  RR: 32 (01-19-25 @ 07:00) (14 - 32)  SpO2: 100% (01-19-25 @ 07:00) (97% - 100%)  Wt(kg): --    01-18 @ 07:01  -  01-19 @ 07:00  --------------------------------------------------------  IN:    Enteral Tube Flush: 90 mL    Free Water: 750 mL    IV PiggyBack: 249.9 mL    IV PiggyBack: 120 mL    Vivonex RTF: 1680 mL  Total IN: 2889.9 mL    OUT:    Indwelling Catheter - Urethral (mL): 1235 mL    Norepinephrine: 0 mL    Voided (mL): 1200 mL  Total OUT: 2435 mL    Total NET: 454.9 mL          Physical Exam:  HEENT: Trach in place c/d/i with no active bleeding/oozing       Labs:                        9.3    7.93  )-----------( 143      ( 19 Jan 2025 00:35 )             28.3     01-19    138  |  102  |  22  ----------------------------<  127[H]  3.4[L]   |  26  |  0.68    Ca    9.8      19 Jan 2025 00:37  Phos  3.0     01-19  Mg     1.9     01-19    TPro  6.5  /  Alb  3.4  /  TBili  0.9  /  DBili  x   /  AST  123[H]  /  ALT  186[H]  /  AlkPhos  106  01-19    LIVER FUNCTIONS - ( 19 Jan 2025 00:37 )  Alb: 3.4 g/dL / Pro: 6.5 g/dL / ALK PHOS: 106 U/L / ALT: 186 U/L / AST: 123 U/L / GGT: x             Urinalysis Basic - ( 19 Jan 2025 00:37 )    Color: x / Appearance: x / SG: x / pH: x  Gluc: 127 mg/dL / Ketone: x  / Bili: x / Urobili: x   Blood: x / Protein: x / Nitrite: x   Leuk Esterase: x / RBC: x / WBC x   Sq Epi: x / Non Sq Epi: x / Bacteria: x

## 2025-01-19 NOTE — PROGRESS NOTE ADULT - SUBJECTIVE AND OBJECTIVE BOX
Patient seen and examined at the bedside.    Remained critically ill on continuous ICU monitoring.    OBJECTIVE:  Vital Signs Last 24 Hrs  T(C): 37.8 (19 Jan 2025 08:00), Max: 37.9 (19 Jan 2025 04:00)  T(F): 100.1 (19 Jan 2025 08:00), Max: 100.3 (19 Jan 2025 04:00)  HR: 114 (19 Jan 2025 08:00) (89 - 115)  BP: --  BP(mean): --  RR: 28 (19 Jan 2025 08:00) (14 - 32)  SpO2: 100% (19 Jan 2025 08:00) (97% - 100%)    Parameters below as of 19 Jan 2025 08:00  Patient On (Oxygen Delivery Method): tracheostomy collar    O2 Concentration (%): 40      Physical Exam:   General: Intubated  Neurology: Opens eyes to vocal stimuli but does not follow commands, withdraws BUEs to painful stimuli, & triple flexion of BLEs to painful stimuli.  Eyes: bilateral pupils equal and reactive.  ENT/Neck: Neck supple, trachea midline, No JVD.  Respiratory: Clear bilaterally   CV: S1S2, no murmurs        [x] Sinus tachycardia  Abdominal: Soft, NT, ND +BS   Extremities: 1-2+ pedal edema noted, + peripheral pulses   Skin: No Rashes, Hematoma, Ecchymosis              Assessment:  60 yo F w/ PMHx of Sickle cell disease (on hydroxyurea), HTN, HFimpEF/NICM (EF 58% 10/2024) presenting as a transfer from [facility]. Pt initially presented to [facility] for SOB and SS crisis; course c/b witnessed seizure, intubated and sedated, and transferred to [facility]. Transferred to [facility] for further management of cardiogenic shock. Cannulated for VA ECMO on 1/4/25. Decannulated on 1/7/25.    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  LP 1/14 - follow up final results.    ***Cardiovascular***  Invasive hemodynamic monitoring, assess perfusion indices   ST /  / Hct 28.3% / Lactate 1.6  Continuos reassessment of hemodynamics  Hydralazine for afterload reduction  [x] VTE ppx with Lovenox  Serial EKG and cardiac enzymes     ***Pulmonary***  [x] Trach Collar 40%  Post op vent management   Titration of FiO2 and PEEP, follow SpO2, CXR, blood gasses   on full support until 5a then PSV  Continue on duonebs as needed    Mode: OFF  FiO2: 40              ***GI***  [x] NPO w/ Vivonex TF @70ml/hr  [x] Protonix for stress ulcer prophylaxis  Bowel regimen with Senna.    ***Renal***  Continue to monitor I/Os, BUN/Creatinine.   Replete lytes PRN  Costa removed yesterday (1/18)    ***ID***  No Active antibiotic coverage.    ***Endocrine***  [x] Hyperglycemia HbA1c 4.6%              - [x] ISS             - Need tight glycemic control to prevent wound infection.            Patient requires continuous monitoring with bedside rhythm monitoring, pulse oximetry monitoring, and continuous central venous and arterial pressure monitoring; and intermittent blood gas analysis. Care plan discussed with the ICU care team.   Patient remained critical, at risk for life threatening decompensation.    I have spent 30 minutes providing critical care management to this patient.    By signing my name below, I, Edith Ledesma, attest that this documentation has been prepared under the direction and in the presence of TOD Britton  Electronically signed: Omar Gutierrez, 01-19-25 @ 08:44    I, TOD Britton, personally performed the services described in this documentation. all medical record entries made by the blasibe were at my direction and in my presence. I have reviewed the chart and agree that the record reflects my personal performance and is accurate and complete  Electronically signed: TOD Britton

## 2025-01-20 ENCOUNTER — RESULT REVIEW (OUTPATIENT)
Age: 60
End: 2025-01-20

## 2025-01-20 LAB
ALBUMIN SERPL ELPH-MCNC: 3.5 G/DL — SIGNIFICANT CHANGE UP (ref 3.3–5)
ALP SERPL-CCNC: 119 U/L — SIGNIFICANT CHANGE UP (ref 40–120)
ALT FLD-CCNC: 173 U/L — HIGH (ref 10–45)
ANION GAP SERPL CALC-SCNC: 12 MMOL/L — SIGNIFICANT CHANGE UP (ref 5–17)
ANION GAP SERPL CALC-SCNC: 13 MMOL/L — SIGNIFICANT CHANGE UP (ref 5–17)
ANISOCYTOSIS BLD QL: SLIGHT — SIGNIFICANT CHANGE UP
AST SERPL-CCNC: 88 U/L — HIGH (ref 10–40)
BASOPHILS # BLD AUTO: 0.06 K/UL — SIGNIFICANT CHANGE UP (ref 0–0.2)
BASOPHILS NFR BLD AUTO: 0.9 % — SIGNIFICANT CHANGE UP (ref 0–2)
BILIRUB SERPL-MCNC: 0.8 MG/DL — SIGNIFICANT CHANGE UP (ref 0.2–1.2)
BUN SERPL-MCNC: 28 MG/DL — HIGH (ref 7–23)
BUN SERPL-MCNC: 32 MG/DL — HIGH (ref 7–23)
CALCIUM SERPL-MCNC: 10.1 MG/DL — SIGNIFICANT CHANGE UP (ref 8.4–10.5)
CALCIUM SERPL-MCNC: 10.2 MG/DL — SIGNIFICANT CHANGE UP (ref 8.4–10.5)
CHLORIDE SERPL-SCNC: 102 MMOL/L — SIGNIFICANT CHANGE UP (ref 96–108)
CHLORIDE SERPL-SCNC: 104 MMOL/L — SIGNIFICANT CHANGE UP (ref 96–108)
CO2 SERPL-SCNC: 24 MMOL/L — SIGNIFICANT CHANGE UP (ref 22–31)
CO2 SERPL-SCNC: 24 MMOL/L — SIGNIFICANT CHANGE UP (ref 22–31)
CREAT SERPL-MCNC: 0.72 MG/DL — SIGNIFICANT CHANGE UP (ref 0.5–1.3)
CREAT SERPL-MCNC: 0.75 MG/DL — SIGNIFICANT CHANGE UP (ref 0.5–1.3)
DACRYOCYTES BLD QL SMEAR: SLIGHT — SIGNIFICANT CHANGE UP
EGFR: 93 ML/MIN/1.73M2 — SIGNIFICANT CHANGE UP
EGFR: 97 ML/MIN/1.73M2 — SIGNIFICANT CHANGE UP
EOSINOPHIL # BLD AUTO: 0.06 K/UL — SIGNIFICANT CHANGE UP (ref 0–0.5)
EOSINOPHIL NFR BLD AUTO: 0.9 % — SIGNIFICANT CHANGE UP (ref 0–6)
GAS PNL BLDA: SIGNIFICANT CHANGE UP
GLUCOSE BLDC GLUCOMTR-MCNC: 104 MG/DL — HIGH (ref 70–99)
GLUCOSE BLDC GLUCOMTR-MCNC: 118 MG/DL — HIGH (ref 70–99)
GLUCOSE BLDC GLUCOMTR-MCNC: 119 MG/DL — HIGH (ref 70–99)
GLUCOSE BLDC GLUCOMTR-MCNC: 123 MG/DL — HIGH (ref 70–99)
GLUCOSE BLDC GLUCOMTR-MCNC: 137 MG/DL — HIGH (ref 70–99)
GLUCOSE SERPL-MCNC: 123 MG/DL — HIGH (ref 70–99)
GLUCOSE SERPL-MCNC: 125 MG/DL — HIGH (ref 70–99)
HCT VFR BLD CALC: 28.3 % — LOW (ref 34.5–45)
HCT VFR BLD CALC: 28.3 % — LOW (ref 34.5–45)
HGB BLD-MCNC: 9.3 G/DL — LOW (ref 11.5–15.5)
HGB BLD-MCNC: 9.5 G/DL — LOW (ref 11.5–15.5)
LDH SERPL L TO P-CCNC: 432 U/L — HIGH (ref 50–242)
LYMPHOCYTES # BLD AUTO: 1.63 K/UL — SIGNIFICANT CHANGE UP (ref 1–3.3)
LYMPHOCYTES # BLD AUTO: 25 % — SIGNIFICANT CHANGE UP (ref 13–44)
MACROCYTES BLD QL: SLIGHT — SIGNIFICANT CHANGE UP
MAGNESIUM SERPL-MCNC: 2.3 MG/DL — SIGNIFICANT CHANGE UP (ref 1.6–2.6)
MANUAL SMEAR VERIFICATION: SIGNIFICANT CHANGE UP
MBP CSF-MCNC: 42.4 NG/ML — HIGH (ref 0–3.7)
MCHC RBC-ENTMCNC: 29.5 PG — SIGNIFICANT CHANGE UP (ref 27–34)
MCHC RBC-ENTMCNC: 30.1 PG — SIGNIFICANT CHANGE UP (ref 27–34)
MCHC RBC-ENTMCNC: 32.9 G/DL — SIGNIFICANT CHANGE UP (ref 32–36)
MCHC RBC-ENTMCNC: 33.6 G/DL — SIGNIFICANT CHANGE UP (ref 32–36)
MCV RBC AUTO: 89.6 FL — SIGNIFICANT CHANGE UP (ref 80–100)
MCV RBC AUTO: 89.8 FL — SIGNIFICANT CHANGE UP (ref 80–100)
MONOCYTES # BLD AUTO: 0.35 K/UL — SIGNIFICANT CHANGE UP (ref 0–0.9)
MONOCYTES NFR BLD AUTO: 5.4 % — SIGNIFICANT CHANGE UP (ref 2–14)
NEUTROPHILS # BLD AUTO: 4.42 K/UL — SIGNIFICANT CHANGE UP (ref 1.8–7.4)
NEUTROPHILS NFR BLD AUTO: 67.8 % — SIGNIFICANT CHANGE UP (ref 43–77)
NRBC # BLD: 2 /100 WBCS — HIGH (ref 0–0)
NRBC # BLD: 5 /100 WBCS — HIGH (ref 0–0)
NRBC BLD-RTO: 2 /100 WBCS — HIGH (ref 0–0)
NRBC BLD-RTO: 5 /100 WBCS — HIGH (ref 0–0)
OVALOCYTES BLD QL SMEAR: SLIGHT — SIGNIFICANT CHANGE UP
PHOSPHATE SERPL-MCNC: 3.7 MG/DL — SIGNIFICANT CHANGE UP (ref 2.5–4.5)
PLAT MORPH BLD: ABNORMAL
PLATELET # BLD AUTO: 205 K/UL — SIGNIFICANT CHANGE UP (ref 150–400)
PLATELET # BLD AUTO: 241 K/UL — SIGNIFICANT CHANGE UP (ref 150–400)
POIKILOCYTOSIS BLD QL AUTO: SLIGHT — SIGNIFICANT CHANGE UP
POLYCHROMASIA BLD QL SMEAR: SLIGHT — SIGNIFICANT CHANGE UP
POTASSIUM SERPL-MCNC: 3.7 MMOL/L — SIGNIFICANT CHANGE UP (ref 3.5–5.3)
POTASSIUM SERPL-MCNC: 4.1 MMOL/L — SIGNIFICANT CHANGE UP (ref 3.5–5.3)
POTASSIUM SERPL-SCNC: 3.7 MMOL/L — SIGNIFICANT CHANGE UP (ref 3.5–5.3)
POTASSIUM SERPL-SCNC: 4.1 MMOL/L — SIGNIFICANT CHANGE UP (ref 3.5–5.3)
PROT SERPL-MCNC: 6.6 G/DL — SIGNIFICANT CHANGE UP (ref 6–8.3)
RBC # BLD: 3.15 M/UL — LOW (ref 3.8–5.2)
RBC # BLD: 3.16 M/UL — LOW (ref 3.8–5.2)
RBC # FLD: 15.7 % — HIGH (ref 10.3–14.5)
RBC # FLD: 15.8 % — HIGH (ref 10.3–14.5)
RBC BLD AUTO: ABNORMAL
SODIUM SERPL-SCNC: 139 MMOL/L — SIGNIFICANT CHANGE UP (ref 135–145)
SODIUM SERPL-SCNC: 140 MMOL/L — SIGNIFICANT CHANGE UP (ref 135–145)
TARGETS BLD QL SMEAR: SLIGHT — SIGNIFICANT CHANGE UP
WBC # BLD: 6.52 K/UL — SIGNIFICANT CHANGE UP (ref 3.8–10.5)
WBC # BLD: 6.66 K/UL — SIGNIFICANT CHANGE UP (ref 3.8–10.5)
WBC # FLD AUTO: 6.52 K/UL — SIGNIFICANT CHANGE UP (ref 3.8–10.5)
WBC # FLD AUTO: 6.66 K/UL — SIGNIFICANT CHANGE UP (ref 3.8–10.5)

## 2025-01-20 PROCEDURE — 71045 X-RAY EXAM CHEST 1 VIEW: CPT | Mod: 26

## 2025-01-20 PROCEDURE — 99291 CRITICAL CARE FIRST HOUR: CPT

## 2025-01-20 PROCEDURE — 74018 RADEX ABDOMEN 1 VIEW: CPT | Mod: 26

## 2025-01-20 PROCEDURE — 93308 TTE F-UP OR LMTD: CPT | Mod: 26

## 2025-01-20 RX ORDER — BACTERIOSTATIC SODIUM CHLORIDE 0.9 %
4 VIAL (ML) INJECTION EVERY 12 HOURS
Refills: 0 | Status: COMPLETED | OUTPATIENT
Start: 2025-01-20 | End: 2025-01-21

## 2025-01-20 RX ORDER — METOPROLOL SUCCINATE 25 MG
12.5 TABLET, EXTENDED RELEASE 24 HR ORAL EVERY 12 HOURS
Refills: 0 | Status: DISCONTINUED | OUTPATIENT
Start: 2025-01-20 | End: 2025-01-21

## 2025-01-20 RX ORDER — BUMETANIDE 2 MG/1
1 TABLET ORAL ONCE
Refills: 0 | Status: COMPLETED | OUTPATIENT
Start: 2025-01-20 | End: 2025-01-20

## 2025-01-20 RX ORDER — METOPROLOL SUCCINATE 25 MG
12.5 TABLET, EXTENDED RELEASE 24 HR ORAL EVERY 12 HOURS
Refills: 0 | Status: DISCONTINUED | OUTPATIENT
Start: 2025-01-20 | End: 2025-01-20

## 2025-01-20 RX ADMIN — Medication 500 MILLIGRAM(S): at 12:21

## 2025-01-20 RX ADMIN — Medication 100 MILLIGRAM(S): at 14:15

## 2025-01-20 RX ADMIN — Medication 1 DROP(S): at 00:37

## 2025-01-20 RX ADMIN — POLYETHYLENE GLYCOL 3350 17 GRAM(S): 17 POWDER, FOR SOLUTION ORAL at 17:52

## 2025-01-20 RX ADMIN — Medication 500 MICROGRAM(S): at 05:01

## 2025-01-20 RX ADMIN — POLYETHYLENE GLYCOL 3350 17 GRAM(S): 17 POWDER, FOR SOLUTION ORAL at 06:06

## 2025-01-20 RX ADMIN — METOCLOPRAMIDE 10 MILLIGRAM(S): 10 TABLET ORAL at 21:49

## 2025-01-20 RX ADMIN — PANTOPRAZOLE 40 MILLIGRAM(S): 20 TABLET, DELAYED RELEASE ORAL at 12:21

## 2025-01-20 RX ADMIN — ANTISEPTIC SURGICAL SCRUB 15 MILLILITER(S): 0.04 SOLUTION TOPICAL at 06:03

## 2025-01-20 RX ADMIN — ANTISEPTIC SURGICAL SCRUB 15 MILLILITER(S): 0.04 SOLUTION TOPICAL at 17:52

## 2025-01-20 RX ADMIN — Medication 1 DROP(S): at 06:00

## 2025-01-20 RX ADMIN — LEVETIRACETAM 500 MILLIGRAM(S): 750 TABLET, FILM COATED ORAL at 06:00

## 2025-01-20 RX ADMIN — Medication 1 DROP(S): at 12:21

## 2025-01-20 RX ADMIN — ANTISEPTIC SURGICAL SCRUB 1 APPLICATION(S): 0.04 SOLUTION TOPICAL at 06:03

## 2025-01-20 RX ADMIN — Medication 500 MICROGRAM(S): at 11:24

## 2025-01-20 RX ADMIN — Medication 4 MILLILITER(S): at 17:16

## 2025-01-20 RX ADMIN — Medication 1 DROP(S): at 17:52

## 2025-01-20 RX ADMIN — ENOXAPARIN SODIUM 40 MILLIGRAM(S): 100 INJECTION SUBCUTANEOUS at 06:00

## 2025-01-20 RX ADMIN — METOCLOPRAMIDE 10 MILLIGRAM(S): 10 TABLET ORAL at 14:15

## 2025-01-20 RX ADMIN — Medication 1 DROP(S): at 23:05

## 2025-01-20 RX ADMIN — BUMETANIDE 1 MILLIGRAM(S): 2 TABLET ORAL at 12:17

## 2025-01-20 RX ADMIN — Medication 12.5 MILLIGRAM(S): at 16:22

## 2025-01-20 RX ADMIN — Medication 500 MILLIGRAM(S): at 17:52

## 2025-01-20 RX ADMIN — METOCLOPRAMIDE 10 MILLIGRAM(S): 10 TABLET ORAL at 06:01

## 2025-01-20 RX ADMIN — Medication 500 MICROGRAM(S): at 21:41

## 2025-01-20 RX ADMIN — Medication 500 MILLIGRAM(S): at 06:03

## 2025-01-20 RX ADMIN — LEVETIRACETAM 500 MILLIGRAM(S): 750 TABLET, FILM COATED ORAL at 17:51

## 2025-01-20 RX ADMIN — Medication 1 TABLET(S): at 12:21

## 2025-01-20 RX ADMIN — Medication 100 MILLIGRAM(S): at 06:04

## 2025-01-20 NOTE — CHART NOTE - NSCHARTNOTEFT_GEN_A_CORE
Pre-operative Note    Procedure: PEG  Surgeon: Navin     Labs:                        9.5    6.66  )-----------( 205      ( 20 Jan 2025 12:47 )             28.3     01-20    140  |  104  |  28[H]  ----------------------------<  123[H]  4.1   |  24  |  0.75    Ca    10.1      20 Jan 2025 00:36  Phos  3.7     01-20  Mg     2.3     01-20    TPro  6.6  /  Alb  3.5  /  TBili  0.8  /  DBili  x   /  AST  88[H]  /  ALT  173[H]  /  AlkPhos  119  01-20          Plan: PEG tomorrow with gen surg.     NPO at midnight  IVF while NPO per primary team  Insulin adjusted for NPO status  Type and Cross ordered for blood on hold for OR  Please continue VTE ppx  Consent obtained  CBC, BMP, coags, T&S, COVID

## 2025-01-20 NOTE — PROGRESS NOTE ADULT - SUBJECTIVE AND OBJECTIVE BOX
Patient seen and examined at the bedside.    Remains critically ill on continuous ICU monitoring, at risk for life threatening decompensation.  All Labs, data reviewed. Plan of care discussed in length during multi-disciplinary ICU rounds.       Brief Summary:  58 yo F with Sickle cell disease and NICM now with Cardiogenic shock s/p VA ECMO on 1/4/25.  VA ECMO decannulation on 1/7/25  Tracheostomy on 1/16/25    24 Hour events:  Tracheostomy yesterday.  OOB to chair today.  Amantadine added.      Objective:  Vital Signs Last 24 Hrs  T(C): 37.8 (20 Jan 2025 00:00), Max: 38.1 (19 Jan 2025 16:00)  T(F): 100.1 (20 Jan 2025 00:00), Max: 100.5 (19 Jan 2025 16:00)  HR: 101 (20 Jan 2025 05:24) (101 - 123)  BP: --  BP(mean): --  RR: 20 (20 Jan 2025 03:00) (14 - 34)  SpO2: 100% (20 Jan 2025 05:24) (98% - 100%)    Parameters below as of 20 Jan 2025 05:24  Patient On (Oxygen Delivery Method): tracheostomy collar      Mode: standby  FiO2: 40              Physical Exam:   General: Eyes open, Trach to vent.  Neurology: Nodding and shaking head to answer questions.  Respiratory: Clear bilaterally   CV: Sinus  Abdominal: Soft, Nontender  Extremities: Warm, well-perfused  Costa  -------------------------------------------------------------------------------------------------------------------------------    Labs:                        9.3    6.52  )-----------( 241      ( 20 Jan 2025 00:36 )             28.3     01-20    140  |  104  |  28[H]  ----------------------------<  123[H]  4.1   |  24  |  0.75    Ca    10.1      20 Jan 2025 00:36  Phos  3.7     01-20  Mg     2.3     01-20    TPro  6.6  /  Alb  3.5  /  TBili  0.8  /  DBili  x   /  AST  88[H]  /  ALT  173[H]  /  AlkPhos  119  01-20    LIVER FUNCTIONS - ( 20 Jan 2025 00:36 )  Alb: 3.5 g/dL / Pro: 6.6 g/dL / ALK PHOS: 119 U/L / ALT: 173 U/L / AST: 88 U/L / GGT: x             ABG - ( 20 Jan 2025 00:20 )  pH, Arterial: 7.46  pH, Blood: x     /  pCO2: 38    /  pO2: 168   / HCO3: 27    / Base Excess: 3.0   /  SaO2: 99.6        Pertinent labs/studies:  CBC with inc WBC 10.70, low H/H 9/28 and MCV WNL, otherwise essentially WNL  PT/INR WNL, PTT dec 22.3  BMP essentially WNL  Albumin 3.6, LFT's with inc ALT//72  Mg WNL, Phos WNL  NH3 WNL, B12/folate WNL, Vitamin A WNL, A1C 4.6%, TSH inc 27.4 and T4 WNL, Lyme (-), WNV IgG (+) and IgM (-)    CSF (1/14) - clear, colorless, RBC 1, WBC 2, protein inc 68, glucose 59, PCR panel (-), flow cytometry (-), cytology (-)    < from: MR Head w/wo IV Cont (01.09.25 @ 18:07) >  1. Innumerable foci susceptibility artifact scattered throughout the   brain which can be seen with critically ill patients on ECMO. Diffuse fat   embolization is also part of the differential diagnosis but is considered   less likely.    2. Tiny acute/subacute infarcts within the bilateral basal ganglia,   thalami, and chandu with disproportionate ovoid area of T2/FLAIR   prolongation in the right ventral thalamus. Findings may indicate the   presence of embolic acute/subacute infarcts.    3. No abnormal intracranial enhancement.      ALL MEDICATIONS   MEDICATIONS  (STANDING):  amantadine 100 milliGRAM(s) Oral <User Schedule>  artificial tears (preservative free) Ophthalmic Solution 1 Drop(s) Both EYES four times a day  ascorbic acid 500 milliGRAM(s) Oral daily  chlorhexidine 0.12% Liquid 15 milliLiter(s) Oral Mucosa every 12 hours  chlorhexidine 2% Cloths 1 Application(s) Topical <User Schedule>  chlorhexidine 4% Liquid 1 Application(s) Topical <User Schedule>  dextrose 50% Injectable 25 Gram(s) IV Push once  enoxaparin Injectable 40 milliGRAM(s) SubCutaneous every 24 hours  glucagon  Injectable 1 milliGRAM(s) IntraMuscular once  hydroxyurea 500 milliGRAM(s) Oral two times a day  insulin lispro (ADMELOG) corrective regimen sliding scale   SubCutaneous every 6 hours  ipratropium    for Nebulization 500 MICROGram(s) Nebulizer every 8 hours  levETIRAcetam  Solution 500 milliGRAM(s) Oral every 12 hours  metoclopramide Injectable 10 milliGRAM(s) IV Push every 8 hours  multivitamin 1 Tablet(s) Oral daily  pantoprazole  Injectable 40 milliGRAM(s) IV Push daily  polyethylene glycol 3350 17 Gram(s) Oral every 12 hours  senna 1 Tablet(s) Oral daily    MEDICATIONS  (PRN):  dextrose Oral Gel 15 Gram(s) Oral once PRN Blood Glucose LESS THAN 70 milliGRAM(s)/deciliter  hydrALAZINE Injectable 5 milliGRAM(s) IV Push every 6 hours PRN HTN  sodium chloride 0.9% lock flush 10 milliLiter(s) IV Push every 1 hour PRN Pre/post blood products, medications, blood draw, and to maintain line patency    ------------------------------------------------------------------------------------------------------------------------------  Assessment:  58 yo F w/ PMHx of Sickle cell disease (on hydroxyurea), HTN, 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 Pt initially presented to [facility] for SOB and SS crisis; course c/b witnessed seizure, intubated and sedated, and transferred to [facility]. Transferred to [facility] for further management of cardiogenic shock. Cannulated for VA ECMO on 1/4/25. Decannulated on 1/7/25.    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  LP 1/14 - follow up final results.    ***Cardiovascular***  At risk for hemodynamic decompensation  S/p VA ECMO, Bi vent function recovered.  Wean Norepinephrine as able.    ***Pulmonary***  Acute hypoxic respiratory failure s/p Tracheostomy yesterday.  Wean vent as tolerated.  Nebs prn    ***GI***  Tolerating tube feeds.  Protein calorie malnutrition  Protonix for stress ulcer prophylaxis   Bowel regimen.    ***Renal***  Trend Creatinine   Costa catheter for strict I/O measurements.     ***ID***  MSSA bacteremia, BAL + MSSA - Merrem for total 10 days.    ***Endocrine***  Hyperglycemia - Insulin sliding scale.     ***Hematology***  Acute blood loss anemia - no current transfusion indication.  Sickle Cell - s/p Red cell exchange, Hgb electrophoresis weekly  Hydroxyurea - monitor platelet count.  Lovenox for DVT prophylaxis         IYana MD, personally performed the services described in this documentation. all medical record entries made by the scribe were at my direction and in my presence. I have reviewed the chart and agree that the record reflects my personal performance and is accurate and complete  Electronically signed:   Yana Patricia MD  CT ICU attending     ICU time: ** mins     By signing my name below, I, Jacky Brown, attest that this documentation has been prepared under the direction and in the presence of Yana Patricia MD  Electronically signed: Jacky Brown, 01-20-25 @ 06:54             Patient seen and examined at the bedside.    Remains critically ill on continuous ICU monitoring, at risk for life threatening decompensation.  All Labs, data reviewed. Plan of care discussed in length during multi-disciplinary ICU rounds.       Brief Summary:  60 yo F with Sickle cell disease and NICM now with Cardiogenic shock s/p VA ECMO on 1/4/25.  VA ECMO decannulation on 1/7/25  Tracheostomy on 1/16/25    24 Hour events:  Tracheostomy yesterday.  OOB to chair today.  Amantadine added.      Objective:  Vital Signs Last 24 Hrs  T(C): 37.8 (20 Jan 2025 00:00), Max: 38.1 (19 Jan 2025 16:00)  T(F): 100.1 (20 Jan 2025 00:00), Max: 100.5 (19 Jan 2025 16:00)  HR: 101 (20 Jan 2025 05:24) (101 - 123)  BP: --  BP(mean): --  RR: 20 (20 Jan 2025 03:00) (14 - 34)  SpO2: 100% (20 Jan 2025 05:24) (98% - 100%)    Parameters below as of 20 Jan 2025 05:24  Patient On (Oxygen Delivery Method): tracheostomy collar      Mode: standby  FiO2: 40              Physical Exam:   General: Eyes open, Trach to vent.  Neurology: Nodding and shaking head to answer questions.  Respiratory: Clear bilaterally   CV: Sinus  Abdominal: Soft, Nontender  Extremities: Warm, well-perfused  Costa  -------------------------------------------------------------------------------------------------------------------------------    Labs:                        9.3    6.52  )-----------( 241      ( 20 Jan 2025 00:36 )             28.3     01-20    140  |  104  |  28[H]  ----------------------------<  123[H]  4.1   |  24  |  0.75    Ca    10.1      20 Jan 2025 00:36  Phos  3.7     01-20  Mg     2.3     01-20    TPro  6.6  /  Alb  3.5  /  TBili  0.8  /  DBili  x   /  AST  88[H]  /  ALT  173[H]  /  AlkPhos  119  01-20    LIVER FUNCTIONS - ( 20 Jan 2025 00:36 )  Alb: 3.5 g/dL / Pro: 6.6 g/dL / ALK PHOS: 119 U/L / ALT: 173 U/L / AST: 88 U/L / GGT: x             ABG - ( 20 Jan 2025 00:20 )  pH, Arterial: 7.46  pH, Blood: x     /  pCO2: 38    /  pO2: 168   / HCO3: 27    / Base Excess: 3.0   /  SaO2: 99.6        Pertinent labs/studies:  CBC with inc WBC 10.70, low H/H 9/28 and MCV WNL, otherwise essentially WNL  PT/INR WNL, PTT dec 22.3  BMP essentially WNL  Albumin 3.6, LFT's with inc ALT//72  Mg WNL, Phos WNL  NH3 WNL, B12/folate WNL, Vitamin A WNL, A1C 4.6%, TSH inc 27.4 and T4 WNL, Lyme (-), WNV IgG (+) and IgM (-)    CSF (1/14) - clear, colorless, RBC 1, WBC 2, protein inc 68, glucose 59, PCR panel (-), flow cytometry (-), cytology (-)    MR Head w/wo IV Cont on 1/09   1. Innumerable foci susceptibility artifact scattered throughout the   brain which can be seen with critically ill patients on ECMO. Diffuse fat   embolization is also part of the differential diagnosis but is considered   less likely.    2. Tiny acute/subacute infarcts within the bilateral basal ganglia,   thalami, and chandu with disproportionate ovoid area of T2/FLAIR   prolongation in the right ventral thalamus. Findings may indicate the   presence of embolic acute/subacute infarcts.    3. No abnormal intracranial enhancement.      ALL MEDICATIONS   MEDICATIONS  (STANDING):  amantadine 100 milliGRAM(s) Oral <User Schedule>  artificial tears (preservative free) Ophthalmic Solution 1 Drop(s) Both EYES four times a day  ascorbic acid 500 milliGRAM(s) Oral daily  chlorhexidine 0.12% Liquid 15 milliLiter(s) Oral Mucosa every 12 hours  chlorhexidine 2% Cloths 1 Application(s) Topical <User Schedule>  chlorhexidine 4% Liquid 1 Application(s) Topical <User Schedule>  dextrose 50% Injectable 25 Gram(s) IV Push once  enoxaparin Injectable 40 milliGRAM(s) SubCutaneous every 24 hours  glucagon  Injectable 1 milliGRAM(s) IntraMuscular once  hydroxyurea 500 milliGRAM(s) Oral two times a day  insulin lispro (ADMELOG) corrective regimen sliding scale   SubCutaneous every 6 hours  ipratropium    for Nebulization 500 MICROGram(s) Nebulizer every 8 hours  levETIRAcetam  Solution 500 milliGRAM(s) Oral every 12 hours  metoclopramide Injectable 10 milliGRAM(s) IV Push every 8 hours  multivitamin 1 Tablet(s) Oral daily  pantoprazole  Injectable 40 milliGRAM(s) IV Push daily  polyethylene glycol 3350 17 Gram(s) Oral every 12 hours  senna 1 Tablet(s) Oral daily    MEDICATIONS  (PRN):  dextrose Oral Gel 15 Gram(s) Oral once PRN Blood Glucose LESS THAN 70 milliGRAM(s)/deciliter  hydrALAZINE Injectable 5 milliGRAM(s) IV Push every 6 hours PRN HTN  sodium chloride 0.9% lock flush 10 milliLiter(s) IV Push every 1 hour PRN Pre/post blood products, medications, blood draw, and to maintain line patency    ------------------------------------------------------------------------------------------------------------------------------  Assessment:  60 yo F w/ PMHx of Sickle cell disease (on hydroxyurea), HTN, 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 Pt initially presented to [facility] for SOB and SS crisis; course c/b witnessed seizure, intubated and sedated, and transferred to [facility]. Transferred to [facility] for further management of cardiogenic shock. Cannulated for VA ECMO on 1/4/25. Decannulated on 1/7/25.    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  LP 1/14 - follow up final results.    ***Cardiovascular***  At risk for hemodynamic decompensation  S/p VA ECMO, Bi vent function recovered.  Wean Norepinephrine as able.    ***Pulmonary***  Acute hypoxic respiratory failure s/p Tracheostomy yesterday.  Wean vent as tolerated.  Nebs prn    ***GI***  Tolerating tube feeds.  Protein calorie malnutrition  Protonix for stress ulcer prophylaxis   Bowel regimen.    ***Renal***  Trend Creatinine   Costa catheter for strict I/O measurements.     ***ID***  MSSA bacteremia, BAL + MSSA - Merrem for total 10 days.    ***Endocrine***  Hyperglycemia - Insulin sliding scale.     ***Hematology***  Acute blood loss anemia - no current transfusion indication.  Sickle Cell - s/p Red cell exchange, Hgb electrophoresis weekly  Hydroxyurea - monitor platelet count.  Lovenox for DVT prophylaxis         IYana MD, personally performed the services described in this documentation. all medical record entries made by the scribe were at my direction and in my presence. I have reviewed the chart and agree that the record reflects my personal performance and is accurate and complete  Electronically signed:   Yana Patricia MD  CT ICU attending     ICU time: ** mins     By signing my name below, I, Jacky Brown, attest that this documentation has been prepared under the direction and in the presence of Yana Patricia MD  Electronically signed: Jacky Brown, 01-20-25 @ 06:54             Patient seen and examined at the bedside.    Remains critically ill on continuous ICU monitoring, at risk for life threatening decompensation.  All Labs, data reviewed. Plan of care discussed in length during multi-disciplinary ICU rounds.     Brief Summary:  58 yo F with Sickle cell disease and NICM now with Cardiogenic shock s/p VA ECMO on 1/4/25.  VA ECMO decannulation on 1/7/25  Tracheostomy on 1/16/25    24 Hour events:  OOB to chair, tolerates trach collar  moderate secretion started on hypertonic saline nebs  Started on Low dose BB    Objective:  Vital Signs Last 24 Hrs  T(C): 37.8 (20 Jan 2025 00:00), Max: 38.1 (19 Jan 2025 16:00)  T(F): 100.1 (20 Jan 2025 00:00), Max: 100.5 (19 Jan 2025 16:00)  HR: 101 (20 Jan 2025 05:24) (101 - 123)  BP: --  BP(mean): --  RR: 20 (20 Jan 2025 03:00) (14 - 34)  SpO2: 100% (20 Jan 2025 05:24) (98% - 100%)  Parameters below as of 20 Jan 2025 05:24  Patient On (Oxygen Delivery Method): tracheostomy collar    Mode: standby  FiO2: 40            Physical Exam:   General: Eyes open, Trach to vent.  Neurology: Nodding and shaking head to answer questions.  Respiratory: Clear bilaterally , secrition   CV: Sinus  Abdominal: Soft, Nontender  Extremities: Warm, well-perfused  Costa  -------------------------------------------------------------------------------------------------------------------------------    Labs:                        9.3    6.52  )-----------( 241      ( 20 Jan 2025 00:36 )             28.3     01-20    140  |  104  |  28[H]  ----------------------------<  123[H]  4.1   |  24  |  0.75    Ca    10.1      20 Jan 2025 00:36  Phos  3.7     01-20  Mg     2.3     01-20    TPro  6.6  /  Alb  3.5  /  TBili  0.8  /  DBili  x   /  AST  88[H]  /  ALT  173[H]  /  AlkPhos  119  01-20    LIVER FUNCTIONS - ( 20 Jan 2025 00:36 )  Alb: 3.5 g/dL / Pro: 6.6 g/dL / ALK PHOS: 119 U/L / ALT: 173 U/L / AST: 88 U/L / GGT: x             ABG - ( 20 Jan 2025 00:20 )  pH, Arterial: 7.46  pH, Blood: x     /  pCO2: 38    /  pO2: 168   / HCO3: 27    / Base Excess: 3.0   /  SaO2: 99.6      Pertinent labs/studies:  CBC with inc WBC 10.70, low H/H 9/28 and MCV WNL, otherwise essentially WNL  PT/INR WNL, PTT dec 22.3  BMP essentially WNL  Albumin 3.6, LFT's with inc ALT//72  Mg WNL, Phos WNL  NH3 WNL, B12/folate WNL, Vitamin A WNL, A1C 4.6%, TSH inc 27.4 and T4 WNL, Lyme (-), WNV IgG (+) and IgM (-)    CSF (1/14) - clear, colorless, RBC 1, WBC 2, protein inc 68, glucose 59, PCR panel (-), flow cytometry (-), cytology (-)    MR Head w/wo IV Cont on 1/09   1. Innumerable foci susceptibility artifact scattered throughout the   brain which can be seen with critically ill patients on ECMO. Diffuse fat   embolization is also part of the differential diagnosis but is considered   less likely.  2. Tiny acute/subacute infarcts within the bilateral basal ganglia,   thalami, and chandu with disproportionate ovoid area of T2/FLAIR   prolongation in the right ventral thalamus. Findings may indicate the   presence of embolic acute/subacute infarcts.  3. No abnormal intracranial enhancement.      ALL MEDICATIONS   MEDICATIONS  (STANDING):  amantadine 100 milliGRAM(s) Oral <User Schedule>  artificial tears (preservative free) Ophthalmic Solution 1 Drop(s) Both EYES four times a day  ascorbic acid 500 milliGRAM(s) Oral daily  chlorhexidine 0.12% Liquid 15 milliLiter(s) Oral Mucosa every 12 hours  chlorhexidine 2% Cloths 1 Application(s) Topical <User Schedule>  chlorhexidine 4% Liquid 1 Application(s) Topical <User Schedule>  dextrose 50% Injectable 25 Gram(s) IV Push once  enoxaparin Injectable 40 milliGRAM(s) SubCutaneous every 24 hours  glucagon  Injectable 1 milliGRAM(s) IntraMuscular once  hydroxyurea 500 milliGRAM(s) Oral two times a day  insulin lispro (ADMELOG) corrective regimen sliding scale   SubCutaneous every 6 hours  ipratropium    for Nebulization 500 MICROGram(s) Nebulizer every 8 hours  levETIRAcetam  Solution 500 milliGRAM(s) Oral every 12 hours  metoclopramide Injectable 10 milliGRAM(s) IV Push every 8 hours  multivitamin 1 Tablet(s) Oral daily  pantoprazole  Injectable 40 milliGRAM(s) IV Push daily  polyethylene glycol 3350 17 Gram(s) Oral every 12 hours  senna 1 Tablet(s) Oral daily    MEDICATIONS  (PRN):  dextrose Oral Gel 15 Gram(s) Oral once PRN Blood Glucose LESS THAN 70 milliGRAM(s)/deciliter  hydrALAZINE Injectable 5 milliGRAM(s) IV Push every 6 hours PRN HTN  sodium chloride 0.9% lock flush 10 milliLiter(s) IV Push every 1 hour PRN Pre/post blood products, medications, blood draw, and to maintain line patency    ------------------------------------------------------------------------------------------------------------------------------  Assessment:  58 yo F w/ PMHx of Sickle cell disease (on hydroxyurea), HTN, 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 Pt initially presented to [facility] for SOB and SS crisis; course c/b witnessed seizure, intubated and sedated, and transferred to [facility]. Transferred to [facility] for further management of cardiogenic shock. Cannulated for VA ECMO on 1/4/25. Decannulated on 1/7/25.    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  LP 1/14 - no evidence of meningitis/ encephalitis     ***Cardiovascular***  At risk for hemodynamic decompensation  S/p VA ECMO, Bi vent function recovered.  On Low dose BB    ***Pulmonary***  Acute hypoxic respiratory failure s/p Tracheostomy   trach collar  Nebs , added hypertonic saline nebs    ***GI***  Tolerating tube feeds.  Protein calorie malnutrition  Protonix for stress ulcer prophylaxis   Bowel regimen.    ***Renal***  Trend Creatinine   Costa catheter for strict I/O measurements.   Diuretics spot doses    ***ID***  MSSA bacteremia, BAL + MSSA - Merrem for total 10 days.    ***Endocrine***  Hyperglycemia - Insulin sliding scale.     ***Hematology***  Acute blood loss anemia - no current transfusion indication.  Sickle Cell - s/p Red cell exchange, Hgb electrophoresis weekly  Hydroxyurea - monitor platelet count.  Lovenox for DVT prophylaxis     IYana MD, personally performed the services described in this documentation. all medical record entries made by the scribe were at my direction and in my presence. I have reviewed the chart and agree that the record reflects my personal performance and is accurate and complete  Electronically signed:   Yana Patricia MD  CT ICU attending     ICU time: 46 mins     By signing my name below, I, Jacky Brown, attest that this documentation has been prepared under the direction and in the presence of Yana Patricia MD  Electronically signed: Jacky Brown, 01-20-25 @ 06:54

## 2025-01-21 ENCOUNTER — APPOINTMENT (OUTPATIENT)
Dept: HEART FAILURE | Facility: CLINIC | Age: 60
End: 2025-01-21

## 2025-01-21 LAB
ALBUMIN SERPL ELPH-MCNC: 3.6 G/DL — SIGNIFICANT CHANGE UP (ref 3.3–5)
ALP SERPL-CCNC: 114 U/L — SIGNIFICANT CHANGE UP (ref 40–120)
ALT FLD-CCNC: 127 U/L — HIGH (ref 10–45)
ANION GAP SERPL CALC-SCNC: 12 MMOL/L — SIGNIFICANT CHANGE UP (ref 5–17)
AST SERPL-CCNC: 55 U/L — HIGH (ref 10–40)
BILIRUB SERPL-MCNC: 0.8 MG/DL — SIGNIFICANT CHANGE UP (ref 0.2–1.2)
BUN SERPL-MCNC: 31 MG/DL — HIGH (ref 7–23)
CALCIUM SERPL-MCNC: 10.1 MG/DL — SIGNIFICANT CHANGE UP (ref 8.4–10.5)
CHLORIDE SERPL-SCNC: 101 MMOL/L — SIGNIFICANT CHANGE UP (ref 96–108)
CO2 SERPL-SCNC: 26 MMOL/L — SIGNIFICANT CHANGE UP (ref 22–31)
CREAT SERPL-MCNC: 0.72 MG/DL — SIGNIFICANT CHANGE UP (ref 0.5–1.3)
EGFR: 97 ML/MIN/1.73M2 — SIGNIFICANT CHANGE UP
GAS PNL BLDA: SIGNIFICANT CHANGE UP
GLUCOSE BLDC GLUCOMTR-MCNC: 105 MG/DL — HIGH (ref 70–99)
GLUCOSE BLDC GLUCOMTR-MCNC: 107 MG/DL — HIGH (ref 70–99)
GLUCOSE BLDC GLUCOMTR-MCNC: 114 MG/DL — HIGH (ref 70–99)
GLUCOSE SERPL-MCNC: 128 MG/DL — HIGH (ref 70–99)
GRAM STN FLD: ABNORMAL
HCT VFR BLD CALC: 30 % — LOW (ref 34.5–45)
HGB BLD-MCNC: 9.9 G/DL — LOW (ref 11.5–15.5)
MAGNESIUM SERPL-MCNC: 2.1 MG/DL — SIGNIFICANT CHANGE UP (ref 1.6–2.6)
MCHC RBC-ENTMCNC: 29.6 PG — SIGNIFICANT CHANGE UP (ref 27–34)
MCHC RBC-ENTMCNC: 33 G/DL — SIGNIFICANT CHANGE UP (ref 32–36)
MCV RBC AUTO: 89.8 FL — SIGNIFICANT CHANGE UP (ref 80–100)
NRBC # BLD: 2 /100 WBCS — HIGH (ref 0–0)
NRBC BLD-RTO: 2 /100 WBCS — HIGH (ref 0–0)
PHOSPHATE SERPL-MCNC: 3.6 MG/DL — SIGNIFICANT CHANGE UP (ref 2.5–4.5)
PLATELET # BLD AUTO: 259 K/UL — SIGNIFICANT CHANGE UP (ref 150–400)
POTASSIUM SERPL-MCNC: 3.7 MMOL/L — SIGNIFICANT CHANGE UP (ref 3.5–5.3)
POTASSIUM SERPL-SCNC: 3.7 MMOL/L — SIGNIFICANT CHANGE UP (ref 3.5–5.3)
PROT SERPL-MCNC: 7 G/DL — SIGNIFICANT CHANGE UP (ref 6–8.3)
RBC # BLD: 3.34 M/UL — LOW (ref 3.8–5.2)
RBC # FLD: 15.5 % — HIGH (ref 10.3–14.5)
SODIUM SERPL-SCNC: 139 MMOL/L — SIGNIFICANT CHANGE UP (ref 135–145)
SPECIMEN SOURCE: SIGNIFICANT CHANGE UP
WBC # BLD: 6.22 K/UL — SIGNIFICANT CHANGE UP (ref 3.8–10.5)
WBC # FLD AUTO: 6.22 K/UL — SIGNIFICANT CHANGE UP (ref 3.8–10.5)

## 2025-01-21 PROCEDURE — 43246 EGD PLACE GASTROSTOMY TUBE: CPT | Mod: GC

## 2025-01-21 PROCEDURE — 71045 X-RAY EXAM CHEST 1 VIEW: CPT | Mod: 26

## 2025-01-21 PROCEDURE — 31622 DX BRONCHOSCOPE/WASH: CPT | Mod: RT

## 2025-01-21 PROCEDURE — 83020 HEMOGLOBIN ELECTROPHORESIS: CPT | Mod: 26

## 2025-01-21 PROCEDURE — 99291 CRITICAL CARE FIRST HOUR: CPT

## 2025-01-21 PROCEDURE — 99292 CRITICAL CARE ADDL 30 MIN: CPT | Mod: 25

## 2025-01-21 RX ORDER — METOPROLOL SUCCINATE 25 MG
12.5 TABLET, EXTENDED RELEASE 24 HR ORAL EVERY 8 HOURS
Refills: 0 | Status: DISCONTINUED | OUTPATIENT
Start: 2025-01-21 | End: 2025-01-21

## 2025-01-21 RX ORDER — MAGNESIUM SULFATE 0.8 MEQ/ML
2 AMPUL (ML) INJECTION ONCE
Refills: 0 | Status: COMPLETED | OUTPATIENT
Start: 2025-01-21 | End: 2025-01-21

## 2025-01-21 RX ORDER — LABETALOL HYDROCHLORIDE 300 MG/1
5 TABLET, FILM COATED ORAL ONCE
Refills: 0 | Status: COMPLETED | OUTPATIENT
Start: 2025-01-21 | End: 2025-01-21

## 2025-01-21 RX ORDER — PROPOFOL 10 MG/ML
1 INJECTION, EMULSION INTRAVENOUS
Qty: 1000 | Refills: 0 | Status: DISCONTINUED | OUTPATIENT
Start: 2025-01-21 | End: 2025-01-21

## 2025-01-21 RX ORDER — FENTANYL CITRATE 50 UG/ML
100 INJECTION INTRAMUSCULAR; INTRAVENOUS ONCE
Refills: 0 | Status: COMPLETED | OUTPATIENT
Start: 2025-01-21 | End: 2025-01-28

## 2025-01-21 RX ORDER — FENTANYL CITRATE 50 UG/ML
100 INJECTION INTRAMUSCULAR; INTRAVENOUS ONCE
Refills: 0 | Status: DISCONTINUED | OUTPATIENT
Start: 2025-01-21 | End: 2025-01-21

## 2025-01-21 RX ORDER — IPRATROPIUM BROMIDE AND ALBUTEROL SULFATE .5; 2.5 MG/3ML; MG/3ML
3 SOLUTION RESPIRATORY (INHALATION) EVERY 8 HOURS
Refills: 0 | Status: DISCONTINUED | OUTPATIENT
Start: 2025-01-21 | End: 2025-02-06

## 2025-01-21 RX ORDER — BUMETANIDE 2 MG/1
2 TABLET ORAL DAILY
Refills: 0 | Status: DISCONTINUED | OUTPATIENT
Start: 2025-01-21 | End: 2025-01-24

## 2025-01-21 RX ORDER — METOPROLOL SUCCINATE 25 MG
12.5 TABLET, EXTENDED RELEASE 24 HR ORAL EVERY 8 HOURS
Refills: 0 | Status: DISCONTINUED | OUTPATIENT
Start: 2025-01-21 | End: 2025-01-27

## 2025-01-21 RX ORDER — CEFAZOLIN SODIUM IN 0.9 % NACL 2 G/10 ML
2000 SYRINGE (ML) INTRAVENOUS ONCE
Refills: 0 | Status: COMPLETED | OUTPATIENT
Start: 2025-01-21 | End: 2025-01-21

## 2025-01-21 RX ORDER — PROPOFOL 10 MG/ML
10 INJECTION, EMULSION INTRAVENOUS
Qty: 500 | Refills: 0 | Status: DISCONTINUED | OUTPATIENT
Start: 2025-01-21 | End: 2025-01-21

## 2025-01-21 RX ORDER — BACTERIOSTATIC SODIUM CHLORIDE 0.9 %
4 VIAL (ML) INJECTION EVERY 8 HOURS
Refills: 0 | Status: DISCONTINUED | OUTPATIENT
Start: 2025-01-21 | End: 2025-01-22

## 2025-01-21 RX ORDER — POLYETHYLENE GLYCOL 3350 17 G/17G
17 POWDER, FOR SOLUTION ORAL DAILY
Refills: 0 | Status: DISCONTINUED | OUTPATIENT
Start: 2025-01-21 | End: 2025-02-06

## 2025-01-21 RX ORDER — ALBUMIN HUMAN 50 G/1000ML
250 SOLUTION INTRAVENOUS ONCE
Refills: 0 | Status: COMPLETED | OUTPATIENT
Start: 2025-01-21 | End: 2025-01-22

## 2025-01-21 RX ORDER — POTASSIUM CHLORIDE 750 MG/1
40 TABLET, EXTENDED RELEASE ORAL ONCE
Refills: 0 | Status: COMPLETED | OUTPATIENT
Start: 2025-01-21 | End: 2025-01-21

## 2025-01-21 RX ORDER — PROPOFOL 10 MG/ML
1 INJECTION, EMULSION INTRAVENOUS
Qty: 1000 | Refills: 0 | Status: COMPLETED | OUTPATIENT
Start: 2025-01-21 | End: 2025-12-20

## 2025-01-21 RX ADMIN — METOCLOPRAMIDE 10 MILLIGRAM(S): 10 TABLET ORAL at 05:13

## 2025-01-21 RX ADMIN — Medication 100 MILLIGRAM(S): at 17:38

## 2025-01-21 RX ADMIN — POTASSIUM CHLORIDE 40 MILLIEQUIVALENT(S): 750 TABLET, EXTENDED RELEASE ORAL at 01:48

## 2025-01-21 RX ADMIN — ANTISEPTIC SURGICAL SCRUB 1 APPLICATION(S): 0.04 SOLUTION TOPICAL at 05:12

## 2025-01-21 RX ADMIN — ENOXAPARIN SODIUM 40 MILLIGRAM(S): 100 INJECTION SUBCUTANEOUS at 05:13

## 2025-01-21 RX ADMIN — LEVETIRACETAM 500 MILLIGRAM(S): 750 TABLET, FILM COATED ORAL at 17:36

## 2025-01-21 RX ADMIN — Medication 500 MILLIGRAM(S): at 17:36

## 2025-01-21 RX ADMIN — FENTANYL CITRATE 100 MICROGRAM(S): 50 INJECTION INTRAMUSCULAR; INTRAVENOUS at 17:10

## 2025-01-21 RX ADMIN — Medication 4 MILLILITER(S): at 21:52

## 2025-01-21 RX ADMIN — Medication 25 GRAM(S): at 01:48

## 2025-01-21 RX ADMIN — IPRATROPIUM BROMIDE AND ALBUTEROL SULFATE 3 MILLILITER(S): .5; 2.5 SOLUTION RESPIRATORY (INHALATION) at 21:49

## 2025-01-21 RX ADMIN — BUMETANIDE 2 MILLIGRAM(S): 2 TABLET ORAL at 12:34

## 2025-01-21 RX ADMIN — Medication 4 MILLILITER(S): at 14:02

## 2025-01-21 RX ADMIN — IPRATROPIUM BROMIDE AND ALBUTEROL SULFATE 3 MILLILITER(S): .5; 2.5 SOLUTION RESPIRATORY (INHALATION) at 14:01

## 2025-01-21 RX ADMIN — LABETALOL HYDROCHLORIDE 5 MILLIGRAM(S): 300 TABLET, FILM COATED ORAL at 12:00

## 2025-01-21 RX ADMIN — PROPOFOL 0.5 MICROGRAM(S)/KG/MIN: 10 INJECTION, EMULSION INTRAVENOUS at 16:56

## 2025-01-21 RX ADMIN — PANTOPRAZOLE 40 MILLIGRAM(S): 20 TABLET, DELAYED RELEASE ORAL at 12:34

## 2025-01-21 RX ADMIN — Medication 500 MILLIGRAM(S): at 05:13

## 2025-01-21 RX ADMIN — Medication 4 MILLILITER(S): at 05:03

## 2025-01-21 RX ADMIN — Medication 100 MILLIGRAM(S): at 07:24

## 2025-01-21 RX ADMIN — Medication 500 MICROGRAM(S): at 05:03

## 2025-01-21 RX ADMIN — LEVETIRACETAM 500 MILLIGRAM(S): 750 TABLET, FILM COATED ORAL at 05:14

## 2025-01-21 RX ADMIN — POLYETHYLENE GLYCOL 3350 17 GRAM(S): 17 POWDER, FOR SOLUTION ORAL at 05:14

## 2025-01-21 RX ADMIN — Medication 1 DROP(S): at 12:35

## 2025-01-21 RX ADMIN — FENTANYL CITRATE 100 MICROGRAM(S): 50 INJECTION INTRAMUSCULAR; INTRAVENOUS at 16:53

## 2025-01-21 RX ADMIN — ANTISEPTIC SURGICAL SCRUB 15 MILLILITER(S): 0.04 SOLUTION TOPICAL at 05:13

## 2025-01-21 RX ADMIN — ANTISEPTIC SURGICAL SCRUB 15 MILLILITER(S): 0.04 SOLUTION TOPICAL at 17:38

## 2025-01-21 RX ADMIN — Medication 12.5 MILLIGRAM(S): at 22:13

## 2025-01-21 RX ADMIN — Medication 1 DROP(S): at 05:12

## 2025-01-21 RX ADMIN — FENTANYL CITRATE 100 MICROGRAM(S): 50 INJECTION INTRAMUSCULAR; INTRAVENOUS at 16:55

## 2025-01-21 RX ADMIN — Medication 1 DROP(S): at 17:36

## 2025-01-21 RX ADMIN — Medication 12.5 MILLIGRAM(S): at 05:13

## 2025-01-21 NOTE — CHART NOTE - NSCHARTNOTEFT_GEN_A_CORE
NUTRITION FOLLOW UP NOTE    PATIENT SEEN FOR: ICU malnutrition follow up    SOURCE: [] Patient  [x] Current Medical Record  [x] RN  [] Family/support person at bedside  [x] Patient unavailable/inappropriate  [] Other:    CHART REVIEWED/EVENTS NOTED.  [] No changes to nutrition care plan to note  [x] Nutrition Status:  -Hx sickle cell disease, heart failure  -s/p Planned tracheostomy   -Pending PEG placement today per chart    DIET ORDER:   Diet, NPO after Midnight:      NPO Start Date: 2025,   NPO Start Time: 23:59 (25)      CURRENT DIET ORDER IS:  [] Appropriate:  [x] Inadequate: defer to team  [] Other:    NUTRITION INTAKE/PROVISION:  [] PO:  [x] Enteral Nutrition: Previously on enteral nutrition of Vivonex @ 70ml/hr x 24hr + Mehdi BID  Current Pump Rate: off for procedure today  5-Day Average Enteral Provision per RN flowsheet: 1387 mL, 1387 kcals, 69 g protein (note titrating up to goal rate during this period)  [] Parenteral Nutrition:    ANTHROPOMETRICS:  Drug Dosing Weight  Height (cm): 162 (2025 20:47)  Weight (kg): 83.6 (2025 20:47)  BMI (kg/m2): 31.9 (2025 20:47)  Weights:   Daily Weight in k.6 (), 86.6 (), 84.8 (), 83 (), 81.1 (), 82.6 (), 87.5 (01-15), 88.3 (), 85.8 (), 92.1 (), 82.7 (), 87.9 (01-10), 86.9 (), 75.8 (), 74.1 (), 78.4 (), 83.6 ()   Weight fluctuations likely in setting of fluid shifts vs scale inaccuracies and/or ongoing malnutrition.RD to continue to monitor weight trends as available/able.    NUTRITIONALLY PERTINENT MEDICATIONS:  MEDICATIONS  (STANDING):  ascorbic acid  dextrose 50% Injectable  glucagon  Injectable  hydroxyurea  insulin lispro (ADMELOG) corrective regimen sliding scale  metoprolol tartrate  multivitamin  pantoprazole  Injectable  polyethylene glycol 3350  senna       NUTRITIONALLY PERTINENT LABS:   Na139 mmol/L Glu 128 mg/dL[H] K+ 3.7 mmol/L Cr  0.72 mg/dL BUN 31 mg/dL[H]  Phos 3.6 mg/dL  Alb 3.6 g/dL  U/L[H] AST 55 U/L[H] Alkaline Phosphatase 114 U/L  25 @ 21:34 a1c 4.6    A1C with Estimated Average Glucose Result: 4.6 % (25 @ 21:34)          Finger Sticks:  POCT Blood Glucose.: 114 mg/dL ( @ 07:03)  POCT Blood Glucose.: 104 mg/dL ( @ 23:07)  POCT Blood Glucose.: 123 mg/dL ( @ 17:51)  POCT Blood Glucose.: 118 mg/dL ( @ 12:05)      NUTRITIONALLY PERTINENT MEDICATIONS/LABS:  [x] Reviewed  [x] Relevant notes on medications/labs:  -sliding scale insulin for glycemic control  -off pressors   -Multivitamin and Vitamin C for wound healing    EDEMA:  [x] Reviewed  [x] Relevant notes: 1+ generalized per flowsheets     GI/ I&O:  [x] Reviewed  [x] Relevant notes: last BM today x 1 thus far, noted to be large loose brown. BM x 2 yesterday - noted to be large and soft.   [] Other:    SKIN:   [] No pressure injuries documented, per nursing flowsheet  [x] Pressure injury previously noted  -per flowsheets:  left earlobe suspected deep tissue injury  left forehead suspected deep tissue injury  right forehead suspected deep tissue injury  sacrum suspected deep tissue injury  bilateral heel suspected deep tissue injury  right buttocks suspected deep tissue injury  left buttocks suspected deep tissue injury  [] Change in pressure injury documentation:  [] Other:    ESTIMATED NEEDS:  [x] No change:  [] Updated:  Energy:  5011-3736 kcal/day (30-35 kcal/kg)  Protein:   g/day (1.5-2.0 g/kg)  Fluid:   ml/day or [x] defer to team  Based on: IBW 120lb/54.4kg    NUTRITION DIAGNOSIS:  [x] Prior Dx: Increased Nutrient Needs; Severe acute malnutrition  [] New Dx:    EDUCATION:  [] Yes:  [x] Not appropriate/warranted    NUTRITION CARE PLAN:  1. Enteral nutrition: when ready to initiate enteral nutrition after PEG placement, recommend continuing Vivonex - start at 10ml/hr and increase 10ml q4hr to continue previous goal rate of 70ml/hr x 24hr. Continue Mehdi BID. At goal provides daily total 1680ml, 1860 kcal, 89 g protein, 1425 ml free water; meets 34.2kcal/kg and 1.64g/kg protein based on IBW 120lb/54.4kg. Defer additional free water flushes to medical team.  2. Supplements: Mehdi BID as noted above.  3. Multivitamin/mineral supplementation: continue Multivitamin and Vitamin C for wound healing pending no medical contraindications.    [] Achieved - Continue current nutrition intervention(s)  [] Current medical condition precludes nutrition intervention at this time.    MONITORING AND EVALUATION:   RD remains available upon request and will follow up per protocol.    Corinne Lima RDN, CDN - available via [facility] TEAMS

## 2025-01-21 NOTE — PROCEDURE NOTE - NSINFORMCONSENT_GEN_A_CORE
Benefits, risks, and possible complications of procedure explained to patient/caregiver who verbalized understanding and gave written consent.
patient daughter, obtain via telephone/Benefits, risks, and possible complications of procedure explained to patient/caregiver who verbalized understanding and gave written consent.
Benefits, risks, and possible complications of procedure explained to patient/caregiver who verbalized understanding and gave written consent.

## 2025-01-21 NOTE — PROGRESS NOTE ADULT - SUBJECTIVE AND OBJECTIVE BOX
NEUROLOGY FOLLOW-UP CONSULT NOTE    RFC: Altered mental status (AMS), seizure    Interval history: No acute neurologic events overnight. Patient had tracheostomy on  and then amantadine started after. Since then she has been more interactive and following some commands. No reported focal neurologic deficits and no seizure-like activity.    Meds:  MEDICATIONS  (STANDING):  albuterol/ipratropium for Nebulization 3 milliLiter(s) Nebulizer every 8 hours  amantadine 100 milliGRAM(s) Oral <User Schedule>  artificial tears (preservative free) Ophthalmic Solution 1 Drop(s) Both EYES four times a day  ascorbic acid 500 milliGRAM(s) Oral daily  buMETAnide Injectable 2 milliGRAM(s) IV Push daily  chlorhexidine 0.12% Liquid 15 milliLiter(s) Oral Mucosa every 12 hours  chlorhexidine 2% Cloths 1 Application(s) Topical <User Schedule>  chlorhexidine 4% Liquid 1 Application(s) Topical <User Schedule>  dextrose 50% Injectable 25 Gram(s) IV Push once  enoxaparin Injectable 40 milliGRAM(s) SubCutaneous every 24 hours  fentaNYL    Injectable 100 MICROGram(s) IV Push once  glucagon  Injectable 1 milliGRAM(s) IntraMuscular once  hydroxyurea 500 milliGRAM(s) Oral two times a day  insulin lispro (ADMELOG) corrective regimen sliding scale   SubCutaneous every 6 hours  levETIRAcetam  Solution 500 milliGRAM(s) Oral every 12 hours  metoprolol tartrate Suspension 12.5 milliGRAM(s) Oral every 8 hours  midazolam Injectable 6 milliGRAM(s) IV Push once  multivitamin 1 Tablet(s) Oral daily  pantoprazole  Injectable 40 milliGRAM(s) IV Push daily  polyethylene glycol 3350 17 Gram(s) Oral daily  propofol Infusion 1 MICROgram(s)/kG/Min (0.5 mL/Hr) IV Continuous <Continuous>  senna 1 Tablet(s) Oral daily  sodium chloride 3%  Inhalation 4 milliLiter(s) Inhalation every 8 hours    MEDICATIONS  (PRN):  dextrose Oral Gel 15 Gram(s) Oral once PRN Blood Glucose LESS THAN 70 milliGRAM(s)/deciliter  hydrALAZINE Injectable 5 milliGRAM(s) IV Push every 6 hours PRN HTN  sodium chloride 0.9% lock flush 10 milliLiter(s) IV Push every 1 hour PRN Pre/post blood products, medications, blood draw, and to maintain line patency    GTT:  None    PMHx/PSHx/FHx/SHx:  Cardiogenic shock    FHx: cerebral palsy (Child)    Handoff    MEWS Score    Sickle-cell-hemoglobin C disease with crisis    Essential hypertension    No pertinent past medical history    Sickle cell trait    Sickle cell disease    HTN (hypertension)    Cardiogenic shock    Cardiogenic shock    Sickle cell disease    Cardiogenic shock    Leukocytosis    Seizure    Encounter for palliative care    Initiation, venoarterial ECMO    Management, venoarterial ECMO    Planned tracheostomy    H/O:     H/O abdominoplasty    S/P breast augmentation    S/P     CARDIOGENIC SHOCK    SysAdmin_VstLnk        Allergies:  doxycycline (Angioedema)  penicillin (Unknown)  clindamycin (Angioedema)  linezolid (Angioedema)      ROS: Due to clinical condition unable to assess (MENDEL)    O:  T(C): 37.7 (25 @ 12:00), Max: 37.7 (25 @ 00:00)  HR: 84 (25 @ 12:00) (84 - 118)  BP: --  RR: 17 (25 @ 12:00) (17 - 28)  SpO2: 100% (25 @ 12:00) (98% - 100%)    Focused neurologic exam:  MS - Tracheostomy in place, not sedated, awake, attends to all stimuli b/l, no speech output but will occasionally nod head appropriately to "yes"/"no" questions, usually follows simple commands but with some delay. MENDEL orientation, rep/naming, attn/conc/recent and remote memory/fund of knowledge  CN - PERRL but some ovoid shape, EOMI, VFF as BTT, face sens/str/hearing WNL b/l, SCM at least 4/5 b/l and symmetric, (+) spontaneous respirations (patient rate 14 bpm on PSV), (+) cough, tongue/palate midline  Motor - Normal bulk/tone. No spontaneous movements noted. She grimaces to strong tactile stimuli in all extremities as well as withdraws and localizes in RUE proximal 2/5 -> distal 3+/5, LUE proximal 2/5 -> distal 0/5, LLE proximal 0/5 -> distal 4/5, RLE 0/5  Sens - As per Motor above  DTR's - 3+ biceps/triceps b/l, 2+ and brisk BR b/l, 3+ KJ b/l, 2+ AJ b/l, and neutral b/l plantar response  Coord - Grossly appropriate for level of strength, no tremors noted  Gait and station - MENDEL    Pertinent labs/studies:  CBC with low H/H 10/30 and MCV WNL, otherwise essentially WNL  PT/INR WNL, PTT dec 22.3  BMP essentially WNL  Albumin 3.6, LFT's with inc ALT//55  Mg WNL, Phos WNL  NH3 WNL, B12/folate WNL, Vitamin A WNL, A1C 4.6%, TSH inc 27.4 and T4 WNL, Lyme (-), WNV IgG (+) and IgM (-), B1 WNL, B6 WNL, syphilis serology TP (-)    CSF () - clear, colorless, RBC 1, WBC 2, protein inc 68, glucose 59, PCR panel (-), flow cytometry (-), cytology (-), IgG Index WNL, MBP inc 42.2, VDRL (-)    vEEG  -  EEG Classification / Summary:  Abnormal  EEG in a lethargic patient:   Frequent left temporal LRDA to 1.5hz  Less frequent Right temporal LRDA 1-1.5Hz   Generalized background slowing, moderate.   Background / spike burden improved vs prior days studies.    -------------------------------------------------------------------------------------------------------  Clinical Impression:   Risk of seizures from the left > right temporal regions  Moderate  generalized background slowing  There were no seizures recorded.   Background / periodic activity / spike burden improved vs prior days    In absence of additional clinical concerns, recommend consideration for discontinuation of current EEG study with reconnection in future if warranted.  No seizures x ~6days    < from: MR Head w/wo IV Cont (25 @ 18:07) >  1. Innumerable foci susceptibility artifact scattered throughout the   brain which can be seen with critically ill patients on ECMO. Diffuse fat   embolization is also part of the differential diagnosis but is considered   less likely.    2. Tiny acute/subacute infarcts within the bilateral basal ganglia,   thalami, and chandu with disproportionate ovoid area of T2/FLAIR   prolongation in the right ventral thalamus. Findings may indicate the   presence of embolic acute/subacute infarcts.    3. No abnormal intracranial enhancement.    < end of copied text >

## 2025-01-21 NOTE — PROCEDURE NOTE - PROCEDURE DATE TIME, MLM
04-Jan-2025 05:00
04-Jan-2025 04:19
08-Jan-2025 09:04
21-Jan-2025 16:56
03-Jan-2025 20:16
11-Jan-2025 09:20
12-Jan-2025 09:02

## 2025-01-21 NOTE — CHART NOTE - NSCHARTNOTEFT_GEN_A_CORE
POST-OP NOTE    CARSON LEE | [id number] | [facility] 4CTU 01    Procedure: s/p percutaneous endoscopic gastrostomy    Subjective: No complaints.     Vital Signs Last 24 Hrs  T(C): 37.1 (21 Jan 2025 20:00), Max: 37.7 (21 Jan 2025 00:00)  T(F): 98.7 (21 Jan 2025 20:00), Max: 99.8 (21 Jan 2025 00:00)  HR: 91 (21 Jan 2025 20:00) (84 - 118)  BP: --  BP(mean): --  RR: 18 (21 Jan 2025 20:00) (14 - 28)  SpO2: 100% (21 Jan 2025 20:00) (98% - 100%)    Parameters below as of 21 Jan 2025 20:00  Patient On (Oxygen Delivery Method): ventilator    O2 Concentration (%): 50  I&O's Summary    20 Jan 2025 07:01  -  21 Jan 2025 07:00  --------------------------------------------------------  IN: 1435 mL / OUT: 2050 mL / NET: -615 mL    21 Jan 2025 07:01  -  21 Jan 2025 20:29  --------------------------------------------------------  IN: 100 mL / OUT: 1400 mL / NET: -1300 mL                            9.9    6.22  )-----------( 259      ( 21 Jan 2025 00:44 )             30.0     01-21    139  |  101  |  31[H]  ----------------------------<  128[H]  3.7   |  26  |  0.72    Ca    10.1      21 Jan 2025 00:43  Phos  3.6     01-21  Mg     2.1     01-21    TPro  7.0  /  Alb  3.6  /  TBili  0.8  /  DBili  x   /  AST  55[H]  /  ALT  127[H]  /  AlkPhos  114  01-21       PHYSICAL EXAM:  Gen: NAD  Resp: tracheostomy in place, connected to ventilator. Satting appropriately, in no acute distress  CV: RRR  Abdomen: soft, nontender, mildly distended. PEG in place, 3cm at the skin. TFs running at 10/hr postoperatively. No emesis postop    A: 59y Female patient with PMH sickle cell disease and NICM with cardiogenic shock sp VA ECMO 1/4/25 with decannulation 1/7/25, now S/P percutaneous tracheostomy with bronchoscopy for acute hypoxic respiratory failure. She is now s/p PEG on 1/21 and on trickle feeds postoperatively, recovering well.     P:  - Continue trickle feeds overnight  - Remainder of care per CTICU team      Trauma/ACS  [id number]

## 2025-01-21 NOTE — PROGRESS NOTE ADULT - SUBJECTIVE AND OBJECTIVE BOX
Patient seen and examined at the bedside.    Remained critically ill on continuous ICU monitoring.    OBJECTIVE:  Vital Signs Last 24 Hrs  T(C): 37.2 (21 Jan 2025 16:00), Max: 37.7 (21 Jan 2025 00:00)  T(F): 99 (21 Jan 2025 16:00), Max: 99.8 (21 Jan 2025 00:00)  HR: 96 (21 Jan 2025 19:00) (84 - 118)  BP: --  BP(mean): --  RR: 15 (21 Jan 2025 19:00) (14 - 28)  SpO2: 100% (21 Jan 2025 19:00) (98% - 100%)    Parameters below as of 21 Jan 2025 16:00  Patient On (Oxygen Delivery Method): tracheostomy collar    O2 Concentration (%): 30      Physical Exam:   General: NAD   Neurology: nonfocal   Eyes: bilateral pupils equal and reactive   ENT/Neck: Neck supple, trachea midline, No JVD   Respiratory: Clear bilaterally   CV: S1S2, no murmurs        [x] Sternal dressing         [x] Sinus Tachycardia   Abdominal: Soft, NT, ND +BS   Extremities: 1-2+ pedal edema noted, + peripheral pulses   Skin: No Rashes, Hematoma, Ecchymosis                           Assessment:  59F PMH Sickle cell disease (on hydroxyurea), HTN, 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    Cardiogenic Shock s/p VA ECMO Initiation on 1/04/25  Cardiogenic Shock s/p VA ECMO Decannulation on 1/07/25  Cardiogenic Shock s/p Percutaneous Tracheostomy placement with bronchoscopy on 1/16/25  s/p EGD, with PEG tube placement on 1/21/25  Hypovolemia  Post op respiratory insufficiency  Acute blood loss anemia  Stress hyperglycemia  Plan:   ***Neuro***  [x] Sedated with [x] Diprivan  Post operative neuro assessment   Continue Amantadine  Continue Keppra, Midazolam    ***Cardiovascular***  Invasive hemodynamic monitoring, assess perfusion indices   ST / MAP 91 / Hct 30.0% / Lactate 1.0  Continuos reassessment of hemodynamics  BB for Afib prophylaxis  Hydralazine for afterload reduction  [x] VTE ppx with Lovenox   Serial EKG and cardiac enzymes   TTE performed yesterday (1/20), LVEF 53% with Normal RV function    ***Pulmonary***  [x] Trach COllar 30%  Post op vent management   Titration of FiO2 and PEEP, follow SpO2, CXR, blood gasses   Continue with bronchodilators as needed.  Bronched today, f/u results    Mode: AC/ CMV (Assist Control/ Continuous Mandatory Ventilation)  RR (machine): 20  FiO2: 100  PEEP: 5  ITime: 1  MAP: 9  PC: 15  PIP: 21              ***GI***  [x] NPO with TFs  [x] Protonix   Bowel regimen with Miralax and Senna  Folow PEG plan with Gen Surgery    ***Renal***  Continue to monitor I/Os, BUN/Creatinine.   Replete lytes PRN  Diuresis with Bumex     ***ID***  No active antibiotic coverage      ***Endocrine***  [x] Stress Hyperglycemia : HbA1c 4.6%                - [x] ISS             - Need tight glycemic control to prevent wound infection.            Patient requires continuous monitoring with bedside rhythm monitoring, pulse oximetry monitoring, and continuous central venous and arterial pressure monitoring; and intermittent blood gas analysis. Care plan discussed with the ICU care team.   Patient remained critical, at risk for life threatening decompensation.    I have spent 61 minutes providing critical care management to this patient.    By signing my name below, I, Edith Ledesma, attest that this documentation has been prepared under the direction and in the presence of Conor Pressley NP  Electronically signed: Omar Gutierrez, 01-21-25 @ 19:46    I, Conor Pressley NP, personally performed the services described in this documentation. all medical record entries made by the blasibe were at my direction and in my presence. I have reviewed the chart and agree that the record reflects my personal performance and is accurate and complete  Electronically signed: Conor Pressley NP

## 2025-01-21 NOTE — BRIEF OPERATIVE NOTE - NSICDXBRIEFPROCEDURE_GEN_ALL_CORE_FT
PROCEDURES:  Initiation, venoarterial ECMO 04-Jan-2025 00:33:54 VA ECMO (Left Femoral Arterial 17Fr, Right Femoral Venous 25Fr, L SFA RPC 6Fr) Sachin Yin  
PROCEDURES:  EGD, with PEG 21-Jan-2025 17:03:36  Hever Peña  
PROCEDURES:  Management, venoarterial ECMO 07-Jan-2025 17:53:20  Raleigh Guzman  
PROCEDURES:  Initiation, venoarterial ECMO 04-Jan-2025 00:33:54 VA ECMO (Left Femoral Arterial 17Fr, Right Femoral Venous 25Fr, L SFA RPC 6Fr) Sachin Yin  Management, venoarterial ECMO 07-Jan-2025 17:53:20  Raleigh Guzman  Planned tracheostomy 16-Jan-2025 13:22:08  Trista Kendrick

## 2025-01-21 NOTE — PROCEDURE NOTE - NSBRONCHPROCDETAILS_GEN_A_CORE_FT
Patient was sedated and placed supine. Tracheostomy cuff was removed and flexible bronchoscopy was introduced into the airway through the tracheostomy. Trachea appears clear of secretions, RUL visualized and washed out. Right middle and superior segment of RLL visualized and washed out. RLL segments appear clear of secretions. YUNG and Lingular segments visualized and LLL washed out with saline. Minimal secretions through out 
Indication: Secretion burden    Findings:  Bronchoscope inserted through ETT. ETT noted to be in good position. Airway evaluation revealed Sharp Cesia. YUNG and LLL evaluation revealed moderate green tinged secretions and mucous plugging. RUL, RML, RLL revealed minimal secretions. Bronchoscope then withdrawn from ETT. Minimal bleeding noted    Specimens: BAL sent for culture     Post bronch CXR: stable

## 2025-01-21 NOTE — PROCEDURE NOTE - NSPROCNAME_GEN_A_CORE
Central Line Insertion
Bronchoscopy
Central Line Insertion
Bronchoscopy
Central Line Insertion
Arterial Puncture/Cannulation
Central Line Insertion

## 2025-01-21 NOTE — PROGRESS NOTE ADULT - SUBJECTIVE AND OBJECTIVE BOX
Patient seen and examined at the bedside.    Remains critically ill on continuous ICU monitoring, at risk for life threatening decompensation.  All Labs, data reviewed. Plan of care discussed in length during multi-disciplinary ICU rounds.       Brief Summary:  60 yo F with Sickle cell disease and NICM now with Cardiogenic shock s/p VA ECMO on 1/4/25.  VA ECMO decannulation on 1/7/25  Tracheostomy on 1/16/25    24 Hour events:  OOB to chair, tolerates trach collar  moderate secretion started on hypertonic saline nebs  Started on Low dose BB    Objective:  Vital Signs Last 24 Hrs  T(C): 37.7 (21 Jan 2025 00:00), Max: 37.7 (20 Jan 2025 08:00)  T(F): 99.8 (21 Jan 2025 00:00), Max: 99.9 (20 Jan 2025 12:00)  HR: 100 (21 Jan 2025 05:06) (89 - 116)  BP: --  BP(mean): --  RR: 22 (21 Jan 2025 02:00) (20 - 32)  SpO2: 99% (21 Jan 2025 05:06) (98% - 100%)    Parameters below as of 21 Jan 2025 05:06  Patient On (Oxygen Delivery Method): tracheostomy collar    Mode: standby  FiO2: 30    Physical Exam:  General: Eyes open, Trach to vent.  Neurology: Nodding and shaking head to answer questions.  Respiratory: Clear bilaterally , secrition   CV: Sinus  Abdominal: Soft, Nontender  Extremities: Warm, well-perfused  [place]  -------------------------------------------------------------------------------------------------------------------------------    Labs:                        9.9    6.22  )-----------( 259      ( 21 Jan 2025 00:44 )             30.0     01-21    139  |  101  |  31[H]  ----------------------------<  128[H]  3.7   |  26  |  0.72    Ca    10.1      21 Jan 2025 00:43  Phos  3.6     01-21  Mg     2.1     01-21    TPro  7.0  /  Alb  3.6  /  TBili  0.8  /  DBili  x   /  AST  55[H]  /  ALT  127[H]  /  AlkPhos  114  01-21    LIVER FUNCTIONS - ( 21 Jan 2025 00:43 )  Alb: 3.6 g/dL / Pro: 7.0 g/dL / ALK PHOS: 114 U/L / ALT: 127 U/L / AST: 55 U/L / GGT: x           ABG - ( 20 Jan 2025 23:50 )  pH, Arterial: 7.50  pH, Blood: x     /  pCO2: 37    /  pO2: 131   / HCO3: 29    / Base Excess: 5.4   /  SaO2: 100.0     ALL MEDICATIONS   MEDICATIONS  (STANDING):  amantadine 100 milliGRAM(s) Oral <User Schedule>  artificial tears (preservative free) Ophthalmic Solution 1 Drop(s) Both EYES four times a day  ascorbic acid 500 milliGRAM(s) Oral daily  chlorhexidine 0.12% Liquid 15 milliLiter(s) Oral Mucosa every 12 hours  chlorhexidine 2% Cloths 1 Application(s) Topical <User Schedule>  chlorhexidine 4% Liquid 1 Application(s) Topical <User Schedule>  dextrose 50% Injectable 25 Gram(s) IV Push once  enoxaparin Injectable 40 milliGRAM(s) SubCutaneous every 24 hours  glucagon  Injectable 1 milliGRAM(s) IntraMuscular once  hydroxyurea 500 milliGRAM(s) Oral two times a day  insulin lispro (ADMELOG) corrective regimen sliding scale   SubCutaneous every 6 hours  ipratropium    for Nebulization 500 MICROGram(s) Nebulizer every 8 hours  levETIRAcetam  Solution 500 milliGRAM(s) Oral every 12 hours  metoprolol tartrate 12.5 milliGRAM(s) Oral every 12 hours  multivitamin 1 Tablet(s) Oral daily  pantoprazole  Injectable 40 milliGRAM(s) IV Push daily  polyethylene glycol 3350 17 Gram(s) Oral every 12 hours  senna 1 Tablet(s) Oral daily    MEDICATIONS  (PRN):  dextrose Oral Gel 15 Gram(s) Oral once PRN Blood Glucose LESS THAN 70 milliGRAM(s)/deciliter  hydrALAZINE Injectable 5 milliGRAM(s) IV Push every 6 hours PRN HTN  sodium chloride 0.9% lock flush 10 milliLiter(s) IV Push every 1 hour PRN Pre/post blood products, medications, blood draw, and to maintain line patency  ------------------------------------------------------------------------------------------------------------------------------  Assessment:  60 yo F w/ PMHx of Sickle cell disease (on hydroxyurea), HTN, 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 Pt initially presented to [facility] for SOB and SS crisis; course c/b witnessed seizure, intubated and sedated, and transferred to [facility]. Transferred to [facility] for further management of cardiogenic shock. Cannulated for VA ECMO on 1/4/25. Decannulated on 1/7/25.    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  LP 1/14 - no evidence of meningitis/ encephalitis     ***Cardiovascular***  At risk for hemodynamic decompensation  S/p VA ECMO, Bi vent function recovered.  On Low dose BB    ***Pulmonary***  Acute hypoxic respiratory failure s/p Tracheostomy   trach collar  Nebs , added hypertonic saline nebs    ***GI***  Tolerating tube feeds.  Protein calorie malnutrition  Protonix for stress ulcer prophylaxis   Bowel regimen.    ***Renal***  Trend Creatinine   Costa catheter for strict I/O measurements.   Diuretics spot doses    ***ID***  MSSA bacteremia, BAL + MSSA - Merrem for total 10 days.    ***Endocrine***  Hyperglycemia - Insulin sliding scale.     ***Hematology***  Acute blood loss anemia - no current transfusion indication.  Sickle Cell - s/p Red cell exchange, Hgb electrophoresis weekly  Hydroxyurea - monitor platelet count.  Lovenox for DVT prophylaxis         IYana MD, personally performed the services described in this documentation. All medical record entries made by the scribe were at my direction and in my presence. I have reviewed the chart and agree that the record reflects my personal performance and is accurate and complete  Electronically signed:   Yana Patricia MD  CT ICU attending     ICU time: ** mins     By signing my name below, I, Norris Oliver, attest that this documentation has been prepared under the direction and in the presence of Yana Patricia MD  Electronically signed: Norris Oliver, 01-21-25 @ 06:51             Patient seen and examined at the bedside.    Remains critically ill on continuous ICU monitoring, at risk for life threatening decompensation.  All Labs, data reviewed. Plan of care discussed in length during multi-disciplinary ICU rounds.       Brief Summary:  58 yo F with Sickle cell disease and NICM now with Cardiogenic shock s/p VA ECMO on 1/4/25.  VA ECMO decannulation on 1/7/25  Tracheostomy on 1/16/25    24 Hour events:  OOB to chair, tolerates trach collar  moderate secretion started on hypertonic saline nebs  Started on Low dose BB    Objective:  Vital Signs Last 24 Hrs  T(C): 37.7 (21 Jan 2025 00:00), Max: 37.7 (20 Jan 2025 08:00)  T(F): 99.8 (21 Jan 2025 00:00), Max: 99.9 (20 Jan 2025 12:00)  HR: 100 (21 Jan 2025 05:06) (89 - 116)  BP: --  BP(mean): --  RR: 22 (21 Jan 2025 02:00) (20 - 32)  SpO2: 99% (21 Jan 2025 05:06) (98% - 100%)    Parameters below as of 21 Jan 2025 05:06  Patient On (Oxygen Delivery Method): tracheostomy collar    Mode: standby  FiO2: 30    Physical Exam:  General: Eyes open, Trach to vent.  Neurology: Nodding and shaking head to answer questions.  Respiratory: Clear bilaterally , secrition   CV: Sinus  Abdominal: Soft, Nontender  Extremities: Warm, well-perfused  Costa  -------------------------------------------------------------------------------------------------------------------------------    Labs:                        9.9    6.22  )-----------( 259      ( 21 Jan 2025 00:44 )             30.0     01-21    139  |  101  |  31[H]  ----------------------------<  128[H]  3.7   |  26  |  0.72    Ca    10.1      21 Jan 2025 00:43  Phos  3.6     01-21  Mg     2.1     01-21    TPro  7.0  /  Alb  3.6  /  TBili  0.8  /  DBili  x   /  AST  55[H]  /  ALT  127[H]  /  AlkPhos  114  01-21    LIVER FUNCTIONS - ( 21 Jan 2025 00:43 )  Alb: 3.6 g/dL / Pro: 7.0 g/dL / ALK PHOS: 114 U/L / ALT: 127 U/L / AST: 55 U/L / GGT: x           ABG - ( 20 Jan 2025 23:50 )  pH, Arterial: 7.50  pH, Blood: x     /  pCO2: 37    /  pO2: 131   / HCO3: 29    / Base Excess: 5.4   /  SaO2: 100.0     Pertinent labs/studies:  CBC with inc WBC 10.70, low H/H 9/28 and MCV WNL, otherwise essentially WNL  PT/INR WNL, PTT dec 22.3  BMP essentially WNL  Albumin 3.6, LFT's with inc ALT//72  Mg WNL, Phos WNL  NH3 WNL, B12/folate WNL, Vitamin A WNL, A1C 4.6%, TSH inc 27.4 and T4 WNL, Lyme (-), WNV IgG (+) and IgM (-)    CSF (1/14) - clear, colorless, RBC 1, WBC 2, protein inc 68, glucose 59, PCR panel (-), flow cytometry (-), cytology (-)    MR Head w/wo IV Cont on 1/09   1. Innumerable foci susceptibility artifact scattered throughout the   brain which can be seen with critically ill patients on ECMO. Diffuse fat   embolization is also part of the differential diagnosis but is considered   less likely.  2. Tiny acute/subacute infarcts within the bilateral basal ganglia,   thalami, and chandu with disproportionate ovoid area of T2/FLAIR   prolongation in the right ventral thalamus. Findings may indicate the   presence of embolic acute/subacute infarcts.  3. No abnormal intracranial enhancement.      ALL MEDICATIONS   MEDICATIONS  (STANDING):  amantadine 100 milliGRAM(s) Oral <User Schedule>  artificial tears (preservative free) Ophthalmic Solution 1 Drop(s) Both EYES four times a day  ascorbic acid 500 milliGRAM(s) Oral daily  chlorhexidine 0.12% Liquid 15 milliLiter(s) Oral Mucosa every 12 hours  chlorhexidine 2% Cloths 1 Application(s) Topical <User Schedule>  chlorhexidine 4% Liquid 1 Application(s) Topical <User Schedule>  dextrose 50% Injectable 25 Gram(s) IV Push once  enoxaparin Injectable 40 milliGRAM(s) SubCutaneous every 24 hours  glucagon  Injectable 1 milliGRAM(s) IntraMuscular once  hydroxyurea 500 milliGRAM(s) Oral two times a day  insulin lispro (ADMELOG) corrective regimen sliding scale   SubCutaneous every 6 hours  ipratropium    for Nebulization 500 MICROGram(s) Nebulizer every 8 hours  levETIRAcetam  Solution 500 milliGRAM(s) Oral every 12 hours  metoprolol tartrate 12.5 milliGRAM(s) Oral every 12 hours  multivitamin 1 Tablet(s) Oral daily  pantoprazole  Injectable 40 milliGRAM(s) IV Push daily  polyethylene glycol 3350 17 Gram(s) Oral every 12 hours  senna 1 Tablet(s) Oral daily    MEDICATIONS  (PRN):  dextrose Oral Gel 15 Gram(s) Oral once PRN Blood Glucose LESS THAN 70 milliGRAM(s)/deciliter  hydrALAZINE Injectable 5 milliGRAM(s) IV Push every 6 hours PRN HTN  sodium chloride 0.9% lock flush 10 milliLiter(s) IV Push every 1 hour PRN Pre/post blood products, medications, blood draw, and to maintain line patency  ------------------------------------------------------------------------------------------------------------------------------  Assessment:  58 yo F w/ PMHx of Sickle cell disease (on hydroxyurea), HTN, 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 Pt initially presented to [facility] for SOB and SS crisis; course c/b witnessed seizure, intubated and sedated, and transferred to [facility]. Transferred to [facility] for further management of cardiogenic shock. Cannulated for VA ECMO on 1/4/25. Decannulated on 1/7/25.    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  LP 1/14 - no evidence of meningitis/ encephalitis     ***Cardiovascular***  At risk for hemodynamic decompensation  S/p VA ECMO, Bi vent function recovered.  On Low dose BB    ***Pulmonary***  Acute hypoxic respiratory failure s/p Tracheostomy   trach collar  Nebs , added hypertonic saline nebs    ***GI***  Tolerating tube feeds.  Protein calorie malnutrition  Protonix for stress ulcer prophylaxis   Bowel regimen.    ***Renal***  Trend Creatinine   Costa catheter for strict I/O measurements.   Diuretics spot doses    ***ID***  MSSA bacteremia, BAL + MSSA - Merrem for total 10 days.    ***Endocrine***  Hyperglycemia - Insulin sliding scale.     ***Hematology***  Acute blood loss anemia - no current transfusion indication.  Sickle Cell - s/p Red cell exchange, Hgb electrophoresis weekly  Hydroxyurea - monitor platelet count.  Lovenox for DVT prophylaxis         IYana MD, personally performed the services described in this documentation. All medical record entries made by the scribe were at my direction and in my presence. I have reviewed the chart and agree that the record reflects my personal performance and is accurate and complete  Electronically signed:   Yana Patricia MD  CT ICU attending     ICU time: ** mins     By signing my name below, I, Norris Oliver, attest that this documentation has been prepared under the direction and in the presence of Yana Patricia MD  Electronically signed: Norris Oliver, 01-21-25 @ 06:51             Patient seen and examined at the bedside.    Remains critically ill on continuous ICU monitoring, at risk for life threatening decompensation.  All Labs, data reviewed. Plan of care discussed in length during multi-disciplinary ICU rounds.     Brief Summary:  58 yo F with Sickle cell disease and NICM now with Cardiogenic shock s/p VA ECMO on 1/4/25.  VA ECMO decannulation on 1/7/25  Tracheostomy on 1/16/25    24 Hour events:  OOB to chair, tolerates trach collar  moderate secretion started on hypertonic saline nebs  Continue BB  Scheduled for PEG placment    Objective:  Vital Signs Last 24 Hrs  T(C): 37.7 (21 Jan 2025 00:00), Max: 37.7 (20 Jan 2025 08:00)  T(F): 99.8 (21 Jan 2025 00:00), Max: 99.9 (20 Jan 2025 12:00)  HR: 100 (21 Jan 2025 05:06) (89 - 116)  BP: --  BP(mean): --  RR: 22 (21 Jan 2025 02:00) (20 - 32)  SpO2: 99% (21 Jan 2025 05:06) (98% - 100%)    Parameters below as of 21 Jan 2025 05:06  Patient On (Oxygen Delivery Method): tracheostomy collar    Mode: standby  FiO2: 30    Physical Exam:  General: Eyes open, Trach to vent.  Neurology: Nodding and shaking head to answer questions.  Respiratory: Clear bilaterally , secretion   CV: Sinus  Abdominal: Soft, Nontender  Extremities: Warm, well-perfused  Costa  -------------------------------------------------------------------------------------------------------------------------------    Labs:                        9.9    6.22  )-----------( 259      ( 21 Jan 2025 00:44 )             30.0     01-21    139  |  101  |  31[H]  ----------------------------<  128[H]  3.7   |  26  |  0.72    Ca    10.1      21 Jan 2025 00:43  Phos  3.6     01-21  Mg     2.1     01-21    TPro  7.0  /  Alb  3.6  /  TBili  0.8  /  DBili  x   /  AST  55[H]  /  ALT  127[H]  /  AlkPhos  114  01-21    LIVER FUNCTIONS - ( 21 Jan 2025 00:43 )  Alb: 3.6 g/dL / Pro: 7.0 g/dL / ALK PHOS: 114 U/L / ALT: 127 U/L / AST: 55 U/L / GGT: x           ABG - ( 20 Jan 2025 23:50 )  pH, Arterial: 7.50  pH, Blood: x     /  pCO2: 37    /  pO2: 131   / HCO3: 29    / Base Excess: 5.4   /  SaO2: 100.0     Pertinent labs/studies:  CBC with inc WBC 10.70, low H/H 9/28 and MCV WNL, otherwise essentially WNL  PT/INR WNL, PTT dec 22.3  BMP essentially WNL  Albumin 3.6, LFT's with inc ALT//72  Mg WNL, Phos WNL  NH3 WNL, B12/folate WNL, Vitamin A WNL, A1C 4.6%, TSH inc 27.4 and T4 WNL, Lyme (-), WNV IgG (+) and IgM (-)    CSF (1/14) - clear, colorless, RBC 1, WBC 2, protein inc 68, glucose 59, PCR panel (-), flow cytometry (-), cytology (-)    MR Head w/wo IV Cont on 1/09   1. Innumerable foci susceptibility artifact scattered throughout the   brain which can be seen with critically ill patients on ECMO. Diffuse fat   embolization is also part of the differential diagnosis but is considered   less likely.  2. Tiny acute/subacute infarcts within the bilateral basal ganglia,   thalami, and chandu with disproportionate ovoid area of T2/FLAIR   prolongation in the right ventral thalamus. Findings may indicate the   presence of embolic acute/subacute infarcts.  3. No abnormal intracranial enhancement.      ALL MEDICATIONS   MEDICATIONS  (STANDING):  amantadine 100 milliGRAM(s) Oral <User Schedule>  artificial tears (preservative free) Ophthalmic Solution 1 Drop(s) Both EYES four times a day  ascorbic acid 500 milliGRAM(s) Oral daily  chlorhexidine 0.12% Liquid 15 milliLiter(s) Oral Mucosa every 12 hours  chlorhexidine 2% Cloths 1 Application(s) Topical <User Schedule>  chlorhexidine 4% Liquid 1 Application(s) Topical <User Schedule>  dextrose 50% Injectable 25 Gram(s) IV Push once  enoxaparin Injectable 40 milliGRAM(s) SubCutaneous every 24 hours  glucagon  Injectable 1 milliGRAM(s) IntraMuscular once  hydroxyurea 500 milliGRAM(s) Oral two times a day  insulin lispro (ADMELOG) corrective regimen sliding scale   SubCutaneous every 6 hours  ipratropium    for Nebulization 500 MICROGram(s) Nebulizer every 8 hours  levETIRAcetam  Solution 500 milliGRAM(s) Oral every 12 hours  metoprolol tartrate 12.5 milliGRAM(s) Oral every 12 hours  multivitamin 1 Tablet(s) Oral daily  pantoprazole  Injectable 40 milliGRAM(s) IV Push daily  polyethylene glycol 3350 17 Gram(s) Oral every 12 hours  senna 1 Tablet(s) Oral daily    MEDICATIONS  (PRN):  dextrose Oral Gel 15 Gram(s) Oral once PRN Blood Glucose LESS THAN 70 milliGRAM(s)/deciliter  hydrALAZINE Injectable 5 milliGRAM(s) IV Push every 6 hours PRN HTN  sodium chloride 0.9% lock flush 10 milliLiter(s) IV Push every 1 hour PRN Pre/post blood products, medications, blood draw, and to maintain line patency  ------------------------------------------------------------------------------------------------------------------------------  Assessment:  58 yo F w/ PMHx of Sickle cell disease (on hydroxyurea), HTN, 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 Pt initially presented to [facility] for SOB and SS crisis; course c/b witnessed seizure, intubated and sedated, and transferred to [facility]. Transferred to [facility] for further management of cardiogenic shock. Cannulated for VA ECMO on 1/4/25. Decannulated on 1/7/25.    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  LP on 1/14 - no evidence of meningitis/ encephalitis     ***Cardiovascular***  At risk for hemodynamic decompensation  S/p VA ECMO, Bi vent function recovered.  On Low dose BB    ***Pulmonary***  Acute hypoxic respiratory failure s/p Tracheostomy   trach collar  Nebs, added hypertonic saline nebs    ***GI***  Tolerating tube feeds.  Protein calorie malnutrition  Protonix for stress ulcer prophylaxis   Bowel regimen.    ***Renal***  Trend Creatinine   Costa catheter for strict I/O measurements.   Diuretics spot doses, bumex BID    ***ID***  MSSA bacteremia, BAL + MSSA -IV vanc completed  Merrem for total 10 days.    ***Endocrine***  Hyperglycemia - Insulin sliding scale.     ***Hematology***  Acute blood loss anemia - no current transfusion indication.  Sickle Cell - s/p Red cell exchange, Hgb electrophoresis weekly  Hydroxyurea - monitor platelet count.  Lovenox for DVT prophylaxis     IYana MD, personally performed the services described in this documentation. All medical record entries made by the scribe were at my direction and in my presence. I have reviewed the chart and agree that the record reflects my personal performance and is accurate and complete  Electronically signed:   Yana Patricia MD  CT ICU attending     ICU time: 44 mins     By signing my name below, I, Norris Oliver, attest that this documentation has been prepared under the direction and in the presence of Yana Patricia MD  Electronically signed: Norris Oliver, 01-21-25 @ 06:51

## 2025-01-21 NOTE — PROGRESS NOTE ADULT - ASSESSMENT
Encephalopathy  Seizure  Sickle cell disease  HTN  Heart failure/NICM  Strokes  Transaminitis    - Patient with mental status change, seizure, and EEG abnormalities most likely due to toxic/metabolic process from ongoing medical problems but cannot exclude other causes such as infectious, inflammatory, or neoplastic. Stroke seen on MRI brain, personally reviewed by me, would be unlikely to cause current mental status changes and seizures (subcortical location). No active seizures noted on EEG and no new focal neurologic deficits. 1/15 - Underwent LP on 1/14, which only showed mildly elevated protein but no other abnormalities thus far; this is non-specific but likely related to recent acute strokes. Can consider neurostimulant trial at this point given subcortical strokes may represent structural abnormality affecting mentation more than previously thought as no other explanation at this time outside of medical issues, which are better controlled. 1/21 - Mental status improved on neurostimulant, will continue  - Continue ASM with Keppra 1g PO/IV BID for seizure prophylaxis  - Labs pending with autoimmune encephalopathy panel  - LP await results - Cx, oligoclonal bands, autoimmune encephalopathy panel  - Trial of neurostimulant with amantadine 100mg PO BID (07:00 and 14:00) with neurologic/cognitive improvement. Would continue for additional 1-2 months and then reassess if need to continue or not  - Above recommendations discussed with CTICU team, who verbalized agreement and understanding  - Continue to address above medical and surgical issues, as you are doing  - Will continue to follow patient peripherally with you, please call [phone]) with additional questions or concerns

## 2025-01-22 LAB
ALBUMIN SERPL ELPH-MCNC: 3.5 G/DL — SIGNIFICANT CHANGE UP (ref 3.3–5)
ALP SERPL-CCNC: 110 U/L — SIGNIFICANT CHANGE UP (ref 40–120)
ALT FLD-CCNC: 90 U/L — HIGH (ref 10–45)
AMPA-R AB CBA, CSF: NEGATIVE — SIGNIFICANT CHANGE UP
AMPA-RECEPTOR ANTIBODY BY CBA, SERUM: NEGATIVE — SIGNIFICANT CHANGE UP
AMPHIPHYSIN AB TITR CSF: NEGATIVE — SIGNIFICANT CHANGE UP
AMPHIPHYSIN AB TITR SER: NEGATIVE — SIGNIFICANT CHANGE UP
ANION GAP SERPL CALC-SCNC: 14 MMOL/L — SIGNIFICANT CHANGE UP (ref 5–17)
AST SERPL-CCNC: 38 U/L — SIGNIFICANT CHANGE UP (ref 10–40)
BASOPHILS # BLD AUTO: 0.03 K/UL — SIGNIFICANT CHANGE UP (ref 0–0.2)
BASOPHILS NFR BLD AUTO: 0.4 % — SIGNIFICANT CHANGE UP (ref 0–2)
BILIRUB SERPL-MCNC: 0.9 MG/DL — SIGNIFICANT CHANGE UP (ref 0.2–1.2)
BUN SERPL-MCNC: 29 MG/DL — HIGH (ref 7–23)
CALCIUM SERPL-MCNC: 10.3 MG/DL — SIGNIFICANT CHANGE UP (ref 8.4–10.5)
CASPR2-IGG CBA, CSF: NEGATIVE — SIGNIFICANT CHANGE UP
CASPR2-IGG CBA, SERUM: NEGATIVE — SIGNIFICANT CHANGE UP
CHLORIDE SERPL-SCNC: 104 MMOL/L — SIGNIFICANT CHANGE UP (ref 96–108)
CO2 SERPL-SCNC: 24 MMOL/L — SIGNIFICANT CHANGE UP (ref 22–31)
CREAT SERPL-MCNC: 1 MG/DL — SIGNIFICANT CHANGE UP (ref 0.5–1.3)
CRMP-5-IGG WESTERN BLOT: NEGATIVE — SIGNIFICANT CHANGE UP
CULTURE RESULTS: ABNORMAL
CV2 IGG TITR CSF: NEGATIVE — SIGNIFICANT CHANGE UP
EGFR: 66 ML/MIN/1.73M2 — SIGNIFICANT CHANGE UP
EOSINOPHIL # BLD AUTO: 0.3 K/UL — SIGNIFICANT CHANGE UP (ref 0–0.5)
EOSINOPHIL NFR BLD AUTO: 4.1 % — SIGNIFICANT CHANGE UP (ref 0–6)
GABA-B-R AB CBA, CSF: NEGATIVE — SIGNIFICANT CHANGE UP
GABA-B-RECEPTOR ANTIBODY BY CBA, SERUM: NEGATIVE — SIGNIFICANT CHANGE UP
GAD65 AB CSF-SCNC: 0 NMOL/L — SIGNIFICANT CHANGE UP
GAD65 AB SER-MCNC: 0.01 NMOL/L — SIGNIFICANT CHANGE UP
GAS PNL BLDA: SIGNIFICANT CHANGE UP
GAS PNL BLDA: SIGNIFICANT CHANGE UP
GFAP ALPHA IGG SER QL IF: NEGATIVE — SIGNIFICANT CHANGE UP
GFAP IFA, CSF: NEGATIVE — SIGNIFICANT CHANGE UP
GLIAL NUC TYPE 1 AB TITR CSF: NEGATIVE — SIGNIFICANT CHANGE UP
GLIAL NUC TYPE 1 AB TITR SER: NEGATIVE — SIGNIFICANT CHANGE UP
GLUCOSE BLDC GLUCOMTR-MCNC: 109 MG/DL — HIGH (ref 70–99)
GLUCOSE BLDC GLUCOMTR-MCNC: 110 MG/DL — HIGH (ref 70–99)
GLUCOSE BLDC GLUCOMTR-MCNC: 119 MG/DL — HIGH (ref 70–99)
GLUCOSE BLDC GLUCOMTR-MCNC: 125 MG/DL — HIGH (ref 70–99)
GLUCOSE SERPL-MCNC: 110 MG/DL — HIGH (ref 70–99)
HCT VFR BLD CALC: 30 % — LOW (ref 34.5–45)
HGB BLD-MCNC: 10 G/DL — LOW (ref 11.5–15.5)
HU1 AB TITR CSF IF: NEGATIVE — SIGNIFICANT CHANGE UP
HU1 AB TITR SER: NEGATIVE — SIGNIFICANT CHANGE UP
HU2 AB TITR CSF IF: NEGATIVE — SIGNIFICANT CHANGE UP
HU2 AB TITR SER IF: NEGATIVE — SIGNIFICANT CHANGE UP
HU3 AB TITR CSF: NEGATIVE — SIGNIFICANT CHANGE UP
HU3 AB TITR SER: NEGATIVE — SIGNIFICANT CHANGE UP
IFA NOTES: SIGNIFICANT CHANGE UP
IFA NOTES: SIGNIFICANT CHANGE UP
IMM GRANULOCYTES NFR BLD AUTO: 0.8 % — SIGNIFICANT CHANGE UP (ref 0–0.9)
IMMUNOLOGIST REVIEW: SIGNIFICANT CHANGE UP
IMMUNOLOGIST REVIEW: SIGNIFICANT CHANGE UP
LGI1-IGG CBA, CSF: NEGATIVE — SIGNIFICANT CHANGE UP
LGI1-IGG CBA, SERUM: NEGATIVE — SIGNIFICANT CHANGE UP
LYMPHOCYTES # BLD AUTO: 1.73 K/UL — SIGNIFICANT CHANGE UP (ref 1–3.3)
LYMPHOCYTES # BLD AUTO: 23.8 % — SIGNIFICANT CHANGE UP (ref 13–44)
MAGNESIUM SERPL-MCNC: 2.5 MG/DL — SIGNIFICANT CHANGE UP (ref 1.6–2.6)
MCHC RBC-ENTMCNC: 30 PG — SIGNIFICANT CHANGE UP (ref 27–34)
MCHC RBC-ENTMCNC: 33.3 G/DL — SIGNIFICANT CHANGE UP (ref 32–36)
MCV RBC AUTO: 90.1 FL — SIGNIFICANT CHANGE UP (ref 80–100)
MGLUR1 AB IFA, CSF: NEGATIVE — SIGNIFICANT CHANGE UP
MGLUR1 IGG SER QL IF: NEGATIVE — SIGNIFICANT CHANGE UP
MONOCYTES # BLD AUTO: 0.7 K/UL — SIGNIFICANT CHANGE UP (ref 0–0.9)
MONOCYTES NFR BLD AUTO: 9.6 % — SIGNIFICANT CHANGE UP (ref 2–14)
NEUROCHONDRIN AB SERPL QL IF: NEGATIVE — SIGNIFICANT CHANGE UP
NEUTROPHILS # BLD AUTO: 4.46 K/UL — SIGNIFICANT CHANGE UP (ref 1.8–7.4)
NEUTROPHILS NFR BLD AUTO: 61.3 % — SIGNIFICANT CHANGE UP (ref 43–77)
NMDAR1 IGG SER QL CBA IFA: NEGATIVE — SIGNIFICANT CHANGE UP
NRBC # BLD: 1 /100 WBCS — HIGH (ref 0–0)
NRBC BLD-RTO: 1 /100 WBCS — HIGH (ref 0–0)
OLIGOCLONAL BANDS CSF ELPH-IMP: SIGNIFICANT CHANGE UP
PCA-1 AB TITR SER: NEGATIVE — SIGNIFICANT CHANGE UP
PCA-2 AB TITR SER: NEGATIVE — SIGNIFICANT CHANGE UP
PCA-TR AB TITR CSF: NEGATIVE — SIGNIFICANT CHANGE UP
PCA-TR AB TITR SER: NEGATIVE — SIGNIFICANT CHANGE UP
PHOSPHATE SERPL-MCNC: 5.2 MG/DL — HIGH (ref 2.5–4.5)
PLATELET # BLD AUTO: 302 K/UL — SIGNIFICANT CHANGE UP (ref 150–400)
POTASSIUM SERPL-MCNC: 3.5 MMOL/L — SIGNIFICANT CHANGE UP (ref 3.5–5.3)
POTASSIUM SERPL-SCNC: 3.5 MMOL/L — SIGNIFICANT CHANGE UP (ref 3.5–5.3)
PROT SERPL-MCNC: 7.2 G/DL — SIGNIFICANT CHANGE UP (ref 6–8.3)
PURKINJE CELL CYTOPLASMIC AB TYPE 2: NEGATIVE — SIGNIFICANT CHANGE UP
PURKINJE CELLS AB TITR CSF IF: NEGATIVE — SIGNIFICANT CHANGE UP
RBC # BLD: 3.33 M/UL — LOW (ref 3.8–5.2)
RBC # FLD: 15.3 % — HIGH (ref 10.3–14.5)
SODIUM SERPL-SCNC: 142 MMOL/L — SIGNIFICANT CHANGE UP (ref 135–145)
SPECIMEN SOURCE: SIGNIFICANT CHANGE UP
WBC # BLD: 7.28 K/UL — SIGNIFICANT CHANGE UP (ref 3.8–10.5)
WBC # FLD AUTO: 7.28 K/UL — SIGNIFICANT CHANGE UP (ref 3.8–10.5)

## 2025-01-22 PROCEDURE — 71045 X-RAY EXAM CHEST 1 VIEW: CPT | Mod: 26

## 2025-01-22 PROCEDURE — 99232 SBSQ HOSP IP/OBS MODERATE 35: CPT

## 2025-01-22 PROCEDURE — 99291 CRITICAL CARE FIRST HOUR: CPT

## 2025-01-22 RX ORDER — METOCLOPRAMIDE 10 MG/1
10 TABLET ORAL ONCE
Refills: 0 | Status: COMPLETED | OUTPATIENT
Start: 2025-01-22 | End: 2025-01-22

## 2025-01-22 RX ORDER — POTASSIUM CHLORIDE 750 MG/1
10 TABLET, EXTENDED RELEASE ORAL DAILY
Refills: 0 | Status: COMPLETED | OUTPATIENT
Start: 2025-01-22 | End: 2025-01-23

## 2025-01-22 RX ORDER — ACETAMINOPHEN 160 MG/5ML
650 SUSPENSION ORAL EVERY 6 HOURS
Refills: 0 | Status: DISCONTINUED | OUTPATIENT
Start: 2025-01-22 | End: 2025-02-06

## 2025-01-22 RX ORDER — METHOCARBAMOL 500 MG
500 TABLET ORAL EVERY 6 HOURS
Refills: 0 | Status: DISCONTINUED | OUTPATIENT
Start: 2025-01-22 | End: 2025-01-26

## 2025-01-22 RX ORDER — POTASSIUM CHLORIDE 750 MG/1
10 TABLET, EXTENDED RELEASE ORAL
Refills: 0 | Status: DISCONTINUED | OUTPATIENT
Start: 2025-01-22 | End: 2025-01-22

## 2025-01-22 RX ADMIN — BUMETANIDE 2 MILLIGRAM(S): 2 TABLET ORAL at 06:24

## 2025-01-22 RX ADMIN — ANTISEPTIC SURGICAL SCRUB 1 APPLICATION(S): 0.04 SOLUTION TOPICAL at 06:23

## 2025-01-22 RX ADMIN — Medication 100 MILLIGRAM(S): at 13:03

## 2025-01-22 RX ADMIN — LEVETIRACETAM 500 MILLIGRAM(S): 750 TABLET, FILM COATED ORAL at 18:16

## 2025-01-22 RX ADMIN — IPRATROPIUM BROMIDE AND ALBUTEROL SULFATE 3 MILLILITER(S): .5; 2.5 SOLUTION RESPIRATORY (INHALATION) at 05:03

## 2025-01-22 RX ADMIN — ANTISEPTIC SURGICAL SCRUB 15 MILLILITER(S): 0.04 SOLUTION TOPICAL at 06:23

## 2025-01-22 RX ADMIN — Medication 4 MILLILITER(S): at 05:03

## 2025-01-22 RX ADMIN — ANTISEPTIC SURGICAL SCRUB 15 MILLILITER(S): 0.04 SOLUTION TOPICAL at 18:16

## 2025-01-22 RX ADMIN — Medication 500 MILLIGRAM(S): at 06:23

## 2025-01-22 RX ADMIN — Medication 1 DROP(S): at 12:32

## 2025-01-22 RX ADMIN — Medication 500 MILLIGRAM(S): at 18:16

## 2025-01-22 RX ADMIN — Medication 1 TABLET(S): at 12:31

## 2025-01-22 RX ADMIN — METOCLOPRAMIDE 10 MILLIGRAM(S): 10 TABLET ORAL at 13:03

## 2025-01-22 RX ADMIN — Medication 1 DROP(S): at 18:15

## 2025-01-22 RX ADMIN — POTASSIUM CHLORIDE 100 MILLIEQUIVALENT(S): 750 TABLET, EXTENDED RELEASE ORAL at 01:45

## 2025-01-22 RX ADMIN — Medication 1 DROP(S): at 06:24

## 2025-01-22 RX ADMIN — Medication 500 MILLIGRAM(S): at 12:31

## 2025-01-22 RX ADMIN — IPRATROPIUM BROMIDE AND ALBUTEROL SULFATE 3 MILLILITER(S): .5; 2.5 SOLUTION RESPIRATORY (INHALATION) at 21:06

## 2025-01-22 RX ADMIN — ENOXAPARIN SODIUM 40 MILLIGRAM(S): 100 INJECTION SUBCUTANEOUS at 06:24

## 2025-01-22 RX ADMIN — ALBUMIN HUMAN 125 MILLILITER(S): 50 SOLUTION INTRAVENOUS at 01:15

## 2025-01-22 RX ADMIN — LEVETIRACETAM 500 MILLIGRAM(S): 750 TABLET, FILM COATED ORAL at 06:23

## 2025-01-22 RX ADMIN — PANTOPRAZOLE 40 MILLIGRAM(S): 20 TABLET, DELAYED RELEASE ORAL at 12:31

## 2025-01-22 RX ADMIN — Medication 12.5 MILLIGRAM(S): at 21:52

## 2025-01-22 RX ADMIN — Medication 100 MILLIGRAM(S): at 06:23

## 2025-01-22 RX ADMIN — Medication 1 DROP(S): at 00:39

## 2025-01-22 RX ADMIN — IPRATROPIUM BROMIDE AND ALBUTEROL SULFATE 3 MILLILITER(S): .5; 2.5 SOLUTION RESPIRATORY (INHALATION) at 11:42

## 2025-01-22 NOTE — PROGRESS NOTE ADULT - SUBJECTIVE AND OBJECTIVE BOX
General Surgery Progress Note    Overnight: TAYLOR  Subjective: Patient seen and examined on rounds. Tolerating TF via PEG.    Objective:  Vitals:  T(C): 37.4 (01-22-25 @ 08:00), Max: 37.4 (01-22-25 @ 08:00)  HR: 108 (01-22-25 @ 14:00) (91 - 109)  BP: --  RR: 22 (01-22-25 @ 14:00) (14 - 24)  SpO2: 99% (01-22-25 @ 14:00) (95% - 100%)  Wt(kg): --    01-21 @ 07:01  -  01-22 @ 07:00  --------------------------------------------------------  IN:    Albumin 5%  - 250 mL: 250 mL    Enteral Tube Flush: 60 mL    IV PiggyBack: 250 mL    IV PiggyBack: 60 mL    Propofol: 20 mL    Vivonex RTF: 140 mL  Total IN: 780 mL    OUT:    Propofol: 0 mL    Voided (mL): 1850 mL  Total OUT: 1850 mL    Total NET: -1070 mL      01-22 @ 07:01 - 01-22 @ 14:33  --------------------------------------------------------  IN:    Enteral Tube Flush: 30 mL    IV PiggyBack: 35 mL    Vivonex RTF: 130 mL  Total IN: 195 mL    OUT:    Voided (mL): 600 mL  Total OUT: 600 mL    Total NET: -405 mL          Physical Exam:  General Appearance: no acute distress, NTND   Chest: trach c/d/i  CV: no JVD, palpable pulses b/l  Abdomen: soft, non-tender, non-distended, PEG c/d/i 3cm@skin  Extremities: WWP       Labs:                        10.0   7.28  )-----------( 302      ( 22 Jan 2025 00:11 )             30.0     01-22    142  |  104  |  29[H]  ----------------------------<  110[H]  3.5   |  24  |  1.00    Ca    10.3      22 Jan 2025 00:11  Phos  5.2     01-22  Mg     2.5     01-22    TPro  7.2  /  Alb  3.5  /  TBili  0.9  /  DBili  x   /  AST  38  /  ALT  90[H]  /  AlkPhos  110  01-22    LIVER FUNCTIONS - ( 22 Jan 2025 00:11 )  Alb: 3.5 g/dL / Pro: 7.2 g/dL / ALK PHOS: 110 U/L / ALT: 90 U/L / AST: 38 U/L / GGT: x             Urinalysis Basic - ( 22 Jan 2025 00:11 )    Color: x / Appearance: x / SG: x / pH: x  Gluc: 110 mg/dL / Ketone: x  / Bili: x / Urobili: x   Blood: x / Protein: x / Nitrite: x   Leuk Esterase: x / RBC: x / WBC x   Sq Epi: x / Non Sq Epi: x / Bacteria: x

## 2025-01-22 NOTE — PROGRESS NOTE ADULT - SUBJECTIVE AND OBJECTIVE BOX
INTERVAL HPI/OVERNIGHT EVENTS:  Patient S&E at bedside. No o/n events, s/p bronch and PEG tube placement.    VITAL SIGNS:  T(F): 99.4 (01-22-25 @ 08:00)  HR: 104 (01-22-25 @ 09:00)  BP: --  RR: 20 (01-22-25 @ 09:00)  SpO2: 100% (01-22-25 @ 09:00)  Wt(kg): --    PHYSICAL EXAM:    Constitutional: NAD, ill appearing  Eyes: EOMI  Neck: trache  Respiratory: trache to vent, clear BL  Cardiovascular: RRR, no M/R/G  Gastrointestinal: soft, NTND, + BS  : [place] with yellow urine  Extremities: warm  Neurological: awake, trache, follows some commands        MEDICATIONS  (STANDING):  albuterol/ipratropium for Nebulization 3 milliLiter(s) Nebulizer every 8 hours  amantadine 100 milliGRAM(s) Oral <User Schedule>  artificial tears (preservative free) Ophthalmic Solution 1 Drop(s) Both EYES four times a day  ascorbic acid 500 milliGRAM(s) Oral daily  buMETAnide Injectable 2 milliGRAM(s) IV Push daily  chlorhexidine 0.12% Liquid 15 milliLiter(s) Oral Mucosa every 12 hours  chlorhexidine 2% Cloths 1 Application(s) Topical <User Schedule>  chlorhexidine 4% Liquid 1 Application(s) Topical <User Schedule>  dextrose 50% Injectable 25 Gram(s) IV Push once  enoxaparin Injectable 40 milliGRAM(s) SubCutaneous every 24 hours  glucagon  Injectable 1 milliGRAM(s) IntraMuscular once  hydroxyurea 500 milliGRAM(s) Oral two times a day  insulin lispro (ADMELOG) corrective regimen sliding scale   SubCutaneous every 6 hours  levETIRAcetam  Solution 500 milliGRAM(s) Oral every 12 hours  metoprolol tartrate 12.5 milliGRAM(s) Oral every 8 hours  midazolam Injectable 6 milliGRAM(s) IV Push once  multivitamin 1 Tablet(s) Oral daily  pantoprazole  Injectable 40 milliGRAM(s) IV Push daily  polyethylene glycol 3350 17 Gram(s) Oral daily  potassium chloride  10 mEq/100 mL IVPB 10 milliEquivalent(s) IV Intermittent daily  senna 1 Tablet(s) Oral daily  sodium chloride 3%  Inhalation 4 milliLiter(s) Inhalation every 8 hours    MEDICATIONS  (PRN):  dextrose Oral Gel 15 Gram(s) Oral once PRN Blood Glucose LESS THAN 70 milliGRAM(s)/deciliter  hydrALAZINE Injectable 5 milliGRAM(s) IV Push every 6 hours PRN HTN  sodium chloride 0.9% lock flush 10 milliLiter(s) IV Push every 1 hour PRN Pre/post blood products, medications, blood draw, and to maintain line patency      Allergies    doxycycline (Angioedema)  penicillin (Unknown)  clindamycin (Angioedema)  linezolid (Angioedema)    Intolerances        LABS:                        10.0   7.28  )-----------( 302      ( 22 Jan 2025 00:11 )             30.0     01-22    142  |  104  |  29[H]  ----------------------------<  110[H]  3.5   |  24  |  1.00    Ca    10.3      22 Jan 2025 00:11  Phos  5.2     01-22  Mg     2.5     01-22    TPro  7.2  /  Alb  3.5  /  TBili  0.9  /  DBili  x   /  AST  38  /  ALT  90[H]  /  AlkPhos  110  01-22      Urinalysis Basic - ( 22 Jan 2025 00:11 )    Color: x / Appearance: x / SG: x / pH: x  Gluc: 110 mg/dL / Ketone: x  / Bili: x / Urobili: x   Blood: x / Protein: x / Nitrite: x   Leuk Esterase: x / RBC: x / WBC x   Sq Epi: x / Non Sq Epi: x / Bacteria: x        RADIOLOGY & ADDITIONAL TESTS:  Studies reviewed.    ASSESSMENT & PLAN:  INTERVAL HPI/OVERNIGHT EVENTS:  Patient S&E at bedside. No o/n events, s/p bronch and PEG tube placement.    VITAL SIGNS:  T(F): 99.4 (01-22-25 @ 08:00)  HR: 104 (01-22-25 @ 09:00)  BP: --  RR: 20 (01-22-25 @ 09:00)  SpO2: 100% (01-22-25 @ 09:00)  Wt(kg): --    PHYSICAL EXAM:    Constitutional: NAD, ill appearing  Eyes: EOMI  Neck: trache  Respiratory: trache, clear BL  Cardiovascular: RRR, no M/R/G  Gastrointestinal: soft, NTND, + BS, +PEG  Extremities: warm  Neurological: awake, trache, follows some commands        MEDICATIONS  (STANDING):  albuterol/ipratropium for Nebulization 3 milliLiter(s) Nebulizer every 8 hours  amantadine 100 milliGRAM(s) Oral <User Schedule>  artificial tears (preservative free) Ophthalmic Solution 1 Drop(s) Both EYES four times a day  ascorbic acid 500 milliGRAM(s) Oral daily  buMETAnide Injectable 2 milliGRAM(s) IV Push daily  chlorhexidine 0.12% Liquid 15 milliLiter(s) Oral Mucosa every 12 hours  chlorhexidine 2% Cloths 1 Application(s) Topical <User Schedule>  chlorhexidine 4% Liquid 1 Application(s) Topical <User Schedule>  dextrose 50% Injectable 25 Gram(s) IV Push once  enoxaparin Injectable 40 milliGRAM(s) SubCutaneous every 24 hours  glucagon  Injectable 1 milliGRAM(s) IntraMuscular once  hydroxyurea 500 milliGRAM(s) Oral two times a day  insulin lispro (ADMELOG) corrective regimen sliding scale   SubCutaneous every 6 hours  levETIRAcetam  Solution 500 milliGRAM(s) Oral every 12 hours  metoprolol tartrate 12.5 milliGRAM(s) Oral every 8 hours  midazolam Injectable 6 milliGRAM(s) IV Push once  multivitamin 1 Tablet(s) Oral daily  pantoprazole  Injectable 40 milliGRAM(s) IV Push daily  polyethylene glycol 3350 17 Gram(s) Oral daily  potassium chloride  10 mEq/100 mL IVPB 10 milliEquivalent(s) IV Intermittent daily  senna 1 Tablet(s) Oral daily  sodium chloride 3%  Inhalation 4 milliLiter(s) Inhalation every 8 hours    MEDICATIONS  (PRN):  dextrose Oral Gel 15 Gram(s) Oral once PRN Blood Glucose LESS THAN 70 milliGRAM(s)/deciliter  hydrALAZINE Injectable 5 milliGRAM(s) IV Push every 6 hours PRN HTN  sodium chloride 0.9% lock flush 10 milliLiter(s) IV Push every 1 hour PRN Pre/post blood products, medications, blood draw, and to maintain line patency      Allergies    doxycycline (Angioedema)  penicillin (Unknown)  clindamycin (Angioedema)  linezolid (Angioedema)    Intolerances        LABS:                        10.0   7.28  )-----------( 302      ( 22 Jan 2025 00:11 )             30.0     01-22    142  |  104  |  29[H]  ----------------------------<  110[H]  3.5   |  24  |  1.00    Ca    10.3      22 Jan 2025 00:11  Phos  5.2     01-22  Mg     2.5     01-22    TPro  7.2  /  Alb  3.5  /  TBili  0.9  /  DBili  x   /  AST  38  /  ALT  90[H]  /  AlkPhos  110  01-22      Urinalysis Basic - ( 22 Jan 2025 00:11 )    Color: x / Appearance: x / SG: x / pH: x  Gluc: 110 mg/dL / Ketone: x  / Bili: x / Urobili: x   Blood: x / Protein: x / Nitrite: x   Leuk Esterase: x / RBC: x / WBC x   Sq Epi: x / Non Sq Epi: x / Bacteria: x        RADIOLOGY & ADDITIONAL TESTS:  Studies reviewed.    ASSESSMENT & PLAN:  Detail Level: Detailed Detail Level: Generalized

## 2025-01-22 NOTE — PROGRESS NOTE ADULT - ASSESSMENT
59F PMH Sickle cell disease (on hydroxyurea), HTN, HFimpEF/NICM (EF 58% 10/2024) presenting as a transfer from [facility]. Pt initially presented to [facility] for SOB and SS crisis; course c/b witnessed seizure, intubated and sedated, and transferred to [facility] for further management. Keppra was discontinued at [facility] due to negative EEG. At [facility] MICU pt was found to have RV failure possibly iso pulm HTN 2/2 increased tidal volumes on the ventilator, possibly due to volume overload due to IVF iso SS crisis vs fat emboli syndrome.                                                                                                           #Hb SC disease  #Cardiogenic shock on ECMO  #Unclear trigger for unresponsiveness   - patient with 1-2 sickle pain crisis per year and on hydrea, had retinal detachment s/p repair                           - patient had f/up with cardio in Sept 2024: per the note, unclear etiology of her dyspnea but possibly driven by cardiomyopathy; low suspicion of pulmonary HTN as no RV dysfunction/severe TR.  She is found to have new RV failure, requiring ECMO  - Blood bank transfusion medicine team consulted for emergent RBC exchange given critically ill with multi-organ failure  - Retic count elevated to 11.6%, LDH 1000s, bili 3.9. CXR was clear not indicative of acute chest. Smear (prior to exchange) was reviewed: Hypochromic RBCs with normochromic RBC reflecting transfused blood. Several nucleated RBC suggesting stressed bone marrow. Very few target cells and rare sickle cell. No schistocytes to suggest hemolysis.    - S/p exchange 1/4/2024, s/p ECMO decannulation 1/7/2025 c/b groin hematoma and received 2 unit prbc, platelet and cryo.  - S/p 2nd exchange transfusion 1/12/2025 (based on MRI demonstrating diffuse punctate lesions that could be consistent with fat emboli and patient's otherwise unexplained mental status for goal of apheresis any remaining circulating fat emboli, over weekend which was discussed with blood bank)      Hb electrophoresis:  01/07/25: Hb A 76.8%, Hb A2 3.6%, Hb S 9.3%, HbC 10.3%  01/14/25: Hb A 87.9%, Hb A2 2.5%, Hb S 4.6%, HbC 5.0%  01/21/25: F/U      Recommendations:  - Please obtain hemoglobin electrophoresis weekly. Goal HbC and HbS fraction <30% combined.   - recommend transfusion to maintain plt > 50k in the setting of bleeding, otherwise Monitor CBC with differential daily and transfuse for platelet count >10 minimum, >20 if febrile.  - Discuss with blood bank and heme team prior to RBC transfusions to minimize risk of antibody development/transfusion reactions, aim to keep hb >7-8  - Appreciate care by critical care, cards/heart failure -neurology suspect that seizures are not related to presentation and plan to continue keppra for now, neurology following for neurologic status.   - Ok to restart hydroxyurea as long as plt >100 and hb >8 - restarted on 01/15/25  - Monitor hemolysis labs (CBC, CMP, LDH, reticulocyte count) daily, prefer pediatric tubes.   - Hematology team to follow          Will continue to monitor the patient.  Please call via MS Teams with any questions.  Above reviewed with Attending Dr. Ferrer.    Discussed with primary team.

## 2025-01-22 NOTE — PROGRESS NOTE ADULT - ASSESSMENT
59y Female patient with PMH sickle cell disease and NICM with cardiogenic shock sp [facility] ECMO 1/4/25 with decannulation 1/7/25, now S/P percutaneous tracheostomy with bronchoscopy for acute hypoxic respiratory failure. Recovering appropriately.     Plan:  - PEG c/d/i tolerating TF  - Trach sutures out c/d/i  - ACS surgery to follow  - appreciate care per CTICU     ACS/Trauma Surgery  [phone].

## 2025-01-22 NOTE — PROGRESS NOTE ADULT - NUTRITIONAL ASSESSMENT
This patient has been assessed with a concern for Malnutrition and has been determined to have a diagnosis/diagnoses of Severe protein-calorie malnutrition.    This patient is being managed with:   Diet NPO with Tube Feed-  Tube Feeding Modality: Gastrostomy  Vivonex  Total Volume for 24 Hours (mL): 1680  Continuous  Starting Tube Feed Rate {mL per Hour}: 10  Increase Tube Feed Rate by (mL): 20     Every 4 hours  Until Goal Tube Feed Rate (mL per Hour): 70  Tube Feed Duration (in Hours): 24  Tube Feed Start Time: 14:50  Mehdi(7 Gm Arginine/7 Gm Glut/1.2 Gm HMB     Qty per Day:  2  Entered: Jan 22 2025 11:07AM

## 2025-01-22 NOTE — PROGRESS NOTE ADULT - SUBJECTIVE AND OBJECTIVE BOX
Patient seen and examined at the bedside.    Remains critically ill on continuous ICU monitoring, at risk for life threatening decompensation.  All Labs, data reviewed. Plan of care discussed in length during multi-disciplinary ICU rounds.       Brief Summary:  58 yo F with Sickle cell disease and NICM now with Cardiogenic shock s/p VA ECMO on 1/4/25.  VA ECMO decannulation on 1/7/25  Tracheostomy on 1/16/25    24 Hour events:  OOB to chair, tolerates trach collar  moderate secretion started on hypertonic saline nebs  Continue BB  Scheduled for PEG placment      Objective:  Vital Signs Last 24 Hrs  T(C): 36.9 (22 Jan 2025 04:00), Max: 37.7 (21 Jan 2025 08:00)  T(F): 98.4 (22 Jan 2025 04:00), Max: 99.8 (21 Jan 2025 08:00)  HR: 104 (22 Jan 2025 06:00) (84 - 109)  BP: --  BP(mean): --  RR: 17 (22 Jan 2025 06:00) (14 - 28)  SpO2: 100% (22 Jan 2025 06:00) (95% - 100%)    Parameters below as of 22 Jan 2025 05:36  Patient On (Oxygen Delivery Method): ventilator        Mode: AC/ CMV (Assist Control/ Continuous Mandatory Ventilation)  RR (machine): 15  FiO2: 40  PEEP: 5  ITime: 1  MAP: 9  PC: 13  PIP: 18              Physical Exam:   General: Eyes open, Trach to vent.  Neurology: Nodding and shaking head to answer questions.  Respiratory: Clear bilaterally , secretion   CV: Sinus  Abdominal: Soft, Nontender  Extremities: Warm, well-perfused  Costa  -------------------------------------------------------------------------------------------------------------------------------    Labs:                        10.0   7.28  )-----------( 302      ( 22 Jan 2025 00:11 )             30.0     01-22    142  |  104  |  29[H]  ----------------------------<  110[H]  3.5   |  24  |  1.00    Ca    10.3      22 Jan 2025 00:11  Phos  5.2     01-22  Mg     2.5     01-22    TPro  7.2  /  Alb  3.5  /  TBili  0.9  /  DBili  x   /  AST  38  /  ALT  90[H]  /  AlkPhos  110  01-22    LIVER FUNCTIONS - ( 22 Jan 2025 00:11 )  Alb: 3.5 g/dL / Pro: 7.2 g/dL / ALK PHOS: 110 U/L / ALT: 90 U/L / AST: 38 U/L / GGT: x             ABG - ( 22 Jan 2025 05:00 )  pH, Arterial: 7.54  pH, Blood: x     /  pCO2: 32    /  pO2: 390   / HCO3: 27    / Base Excess: 4.8   /  SaO2: 100.0     Pertinent labs/studies:  CBC with inc WBC 10.70, low H/H 9/28 and MCV WNL, otherwise essentially WNL  PT/INR WNL, PTT dec 22.3  BMP essentially WNL  Albumin 3.6, LFT's with inc ALT//72  Mg WNL, Phos WNL  NH3 WNL, B12/folate WNL, Vitamin A WNL, A1C 4.6%, TSH inc 27.4 and T4 WNL, Lyme (-), WNV IgG (+) and IgM (-)    CSF (1/14) - clear, colorless, RBC 1, WBC 2, protein inc 68, glucose 59, PCR panel (-), flow cytometry (-), cytology (-)    MR Head w/wo IV Cont on 1/09   1. Innumerable foci susceptibility artifact scattered throughout the   brain which can be seen with critically ill patients on ECMO. Diffuse fat   embolization is also part of the differential diagnosis but is considered   less likely.  2. Tiny acute/subacute infarcts within the bilateral basal ganglia,   thalami, and chandu with disproportionate ovoid area of T2/FLAIR   prolongation in the right ventral thalamus. Findings may indicate the   presence of embolic acute/subacute infarcts.  3. No abnormal intracranial enhancement.      ALL MEDICATIONS   MEDICATIONS  (STANDING):  albuterol/ipratropium for Nebulization 3 milliLiter(s) Nebulizer every 8 hours  amantadine 100 milliGRAM(s) Oral <User Schedule>  artificial tears (preservative free) Ophthalmic Solution 1 Drop(s) Both EYES four times a day  ascorbic acid 500 milliGRAM(s) Oral daily  buMETAnide Injectable 2 milliGRAM(s) IV Push daily  chlorhexidine 0.12% Liquid 15 milliLiter(s) Oral Mucosa every 12 hours  chlorhexidine 2% Cloths 1 Application(s) Topical <User Schedule>  chlorhexidine 4% Liquid 1 Application(s) Topical <User Schedule>  dextrose 50% Injectable 25 Gram(s) IV Push once  enoxaparin Injectable 40 milliGRAM(s) SubCutaneous every 24 hours  glucagon  Injectable 1 milliGRAM(s) IntraMuscular once  hydroxyurea 500 milliGRAM(s) Oral two times a day  insulin lispro (ADMELOG) corrective regimen sliding scale   SubCutaneous every 6 hours  levETIRAcetam  Solution 500 milliGRAM(s) Oral every 12 hours  metoprolol tartrate 12.5 milliGRAM(s) Oral every 8 hours  midazolam Injectable 6 milliGRAM(s) IV Push once  multivitamin 1 Tablet(s) Oral daily  pantoprazole  Injectable 40 milliGRAM(s) IV Push daily  polyethylene glycol 3350 17 Gram(s) Oral daily  potassium chloride  10 mEq/100 mL IVPB 10 milliEquivalent(s) IV Intermittent daily  senna 1 Tablet(s) Oral daily  sodium chloride 3%  Inhalation 4 milliLiter(s) Inhalation every 8 hours    MEDICATIONS  (PRN):  dextrose Oral Gel 15 Gram(s) Oral once PRN Blood Glucose LESS THAN 70 milliGRAM(s)/deciliter  hydrALAZINE Injectable 5 milliGRAM(s) IV Push every 6 hours PRN HTN  sodium chloride 0.9% lock flush 10 milliLiter(s) IV Push every 1 hour PRN Pre/post blood products, medications, blood draw, and to maintain line patency    ------------------------------------------------------------------------------------------------------------------------------  Assessment:  58 yo F w/ PMHx of Sickle cell disease (on hydroxyurea), HTN, 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 Pt initially presented to [facility] for SOB and SS crisis; course c/b witnessed seizure, intubated and sedated, and transferred to [facility]. Transferred to [facility] for further management of cardiogenic shock. Cannulated for VA ECMO on 1/4/25. Decannulated on 1/7/25.    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  LP on 1/14 - no evidence of meningitis/ encephalitis     ***Cardiovascular***  At risk for hemodynamic decompensation  S/p VA ECMO, Bi vent function recovered.  On Low dose BB    ***Pulmonary***  Acute hypoxic respiratory failure s/p Tracheostomy   trach collar  Nebs, added hypertonic saline nebs    ***GI***  Tolerating tube feeds.  Protein calorie malnutrition  Protonix for stress ulcer prophylaxis   Bowel regimen.    ***Renal***  Trend Creatinine   Costa catheter for strict I/O measurements.   Diuretics spot doses, bumex BID    ***ID***  MSSA bacteremia, BAL + MSSA -IV vanc completed  Merrem for total 10 days.    ***Endocrine***  Hyperglycemia - Insulin sliding scale.     ***Hematology***  Acute blood loss anemia - no current transfusion indication.  Sickle Cell - s/p Red cell exchange, Hgb electrophoresis weekly  Hydroxyurea - monitor platelet count.  Lovenox for DVT prophylaxis           I, Yana Patricia MD, personally performed the services described in this documentation. all medical record entries made by the scribe were at my direction and in my presence. I have reviewed the chart and agree that the record reflects my personal performance and is accurate and complete  Electronically signed:   Yana Patricia MD  CT ICU attending     ICU time: ** mins     By signing my name below, I, Barrett Sanchez, attest that this documentation has been prepared under the direction and in the presence of Yana Patricia MD  Electronically signed: Barrett Sanchez, 01-22-25 @ 06:41             Patient seen and examined at the bedside.    Remains critically ill on continuous ICU monitoring, at risk for life threatening decompensation.  All Labs, data reviewed. Plan of care discussed in length during multi-disciplinary ICU rounds.     Brief Summary:  60 yo F with Sickle cell disease and NICM now with Cardiogenic shock s/p VA ECMO on 1/4/25.  VA ECMO decannulation on 1/7/25  Tracheostomy on 1/16/25  PEG placement on 1/21/25    24 Hour events:  OOB to chair, tolerates trach collar  PEG tube placed by surgical team at the bed site  Bronchoscopy, clear min secretion and some bronchomalacia on a right site    Objective:  Vital Signs Last 24 Hrs  T(C): 36.9 (22 Jan 2025 04:00), Max: 37.7 (21 Jan 2025 08:00)  T(F): 98.4 (22 Jan 2025 04:00), Max: 99.8 (21 Jan 2025 08:00)  HR: 104 (22 Jan 2025 06:00) (84 - 109)  BP: --  BP(mean): --  RR: 17 (22 Jan 2025 06:00) (14 - 28)  SpO2: 100% (22 Jan 2025 06:00) (95% - 100%)    Parameters below as of 22 Jan 2025 05:36  Patient On (Oxygen Delivery Method): ventilator  Mode: AC/ CMV (Assist Control/ Continuous Mandatory Ventilation)  RR (machine): 15  FiO2: 40  PEEP: 5  ITime: 1  MAP: 9  PC: 13  PIP: 18              Physical Exam:   General: Eyes open, Trach to vent.  Neurology: Nodding and shaking head to answer questions.  Respiratory: Clear bilaterally , secretion   CV: Sinus  Abdominal: Soft, Nontender  Extremities: Warm, well-perfused  Costa  -------------------------------------------------------------------------------------------------------------------------------    Labs:                        10.0   7.28  )-----------( 302      ( 22 Jan 2025 00:11 )             30.0     01-22    142  |  104  |  29[H]  ----------------------------<  110[H]  3.5   |  24  |  1.00    Ca    10.3      22 Jan 2025 00:11  Phos  5.2     01-22  Mg     2.5     01-22    TPro  7.2  /  Alb  3.5  /  TBili  0.9  /  DBili  x   /  AST  38  /  ALT  90[H]  /  AlkPhos  110  01-22    LIVER FUNCTIONS - ( 22 Jan 2025 00:11 )  Alb: 3.5 g/dL / Pro: 7.2 g/dL / ALK PHOS: 110 U/L / ALT: 90 U/L / AST: 38 U/L / GGT: x         ABG - ( 22 Jan 2025 05:00 )  pH, Arterial: 7.54  pH, Blood: x     /  pCO2: 32    /  pO2: 390   / HCO3: 27    / Base Excess: 4.8   /  SaO2: 100.0     Pertinent labs/studies:  CBC with inc WBC 10.70, low H/H 9/28 and MCV WNL, otherwise essentially WNL  PT/INR WNL, PTT dec 22.3  BMP essentially WNL  Albumin 3.6, LFT's with inc ALT//72  Mg WNL, Phos WNL  NH3 WNL, B12/folate WNL, Vitamin A WNL, A1C 4.6%, TSH inc 27.4 and T4 WNL, Lyme (-), WNV IgG (+) and IgM (-)    CSF (1/14) - clear, colorless, RBC 1, WBC 2, protein inc 68, glucose 59, PCR panel (-), flow cytometry (-), cytology (-)    MR Head w/wo IV Cont on 1/09   1. Innumerable foci susceptibility artifact scattered throughout the   brain which can be seen with critically ill patients on ECMO. Diffuse fat   embolization is also part of the differential diagnosis but is considered   less likely.  2. Tiny acute/subacute infarcts within the bilateral basal ganglia,   thalami, and chandu with disproportionate ovoid area of T2/FLAIR   prolongation in the right ventral thalamus. Findings may indicate the   presence of embolic acute/subacute infarcts.  3. No abnormal intracranial enhancement.      ALL MEDICATIONS   MEDICATIONS  (STANDING):  albuterol/ipratropium for Nebulization 3 milliLiter(s) Nebulizer every 8 hours  amantadine 100 milliGRAM(s) Oral <User Schedule>  artificial tears (preservative free) Ophthalmic Solution 1 Drop(s) Both EYES four times a day  ascorbic acid 500 milliGRAM(s) Oral daily  buMETAnide Injectable 2 milliGRAM(s) IV Push daily  chlorhexidine 0.12% Liquid 15 milliLiter(s) Oral Mucosa every 12 hours  chlorhexidine 2% Cloths 1 Application(s) Topical <User Schedule>  chlorhexidine 4% Liquid 1 Application(s) Topical <User Schedule>  dextrose 50% Injectable 25 Gram(s) IV Push once  enoxaparin Injectable 40 milliGRAM(s) SubCutaneous every 24 hours  glucagon  Injectable 1 milliGRAM(s) IntraMuscular once  hydroxyurea 500 milliGRAM(s) Oral two times a day  insulin lispro (ADMELOG) corrective regimen sliding scale   SubCutaneous every 6 hours  levETIRAcetam  Solution 500 milliGRAM(s) Oral every 12 hours  metoprolol tartrate 12.5 milliGRAM(s) Oral every 8 hours  midazolam Injectable 6 milliGRAM(s) IV Push once  multivitamin 1 Tablet(s) Oral daily  pantoprazole  Injectable 40 milliGRAM(s) IV Push daily  polyethylene glycol 3350 17 Gram(s) Oral daily  potassium chloride  10 mEq/100 mL IVPB 10 milliEquivalent(s) IV Intermittent daily  senna 1 Tablet(s) Oral daily  sodium chloride 3%  Inhalation 4 milliLiter(s) Inhalation every 8 hours    MEDICATIONS  (PRN):  dextrose Oral Gel 15 Gram(s) Oral once PRN Blood Glucose LESS THAN 70 milliGRAM(s)/deciliter  hydrALAZINE Injectable 5 milliGRAM(s) IV Push every 6 hours PRN HTN  sodium chloride 0.9% lock flush 10 milliLiter(s) IV Push every 1 hour PRN Pre/post blood products, medications, blood draw, and to maintain line patency    ------------------------------------------------------------------------------------------------------------------------------  Assessment:  60 yo F w/ PMHx of Sickle cell disease (on hydroxyurea), HTN, 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 Pt initially presented to [facility] for SOB and SS crisis; course c/b witnessed seizure, intubated and sedated, and transferred to [facility]. Transferred to [facility] for further management of cardiogenic shock. Cannulated for VA ECMO on 1/4/25. Decannulated on 1/7/25.    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  LP on 1/14 - no evidence of meningitis/ encephalitis   Neuro exam improving slowly    ***Cardiovascular***  At risk for hemodynamic decompensation  S/p VA ECMO, Bi vent function recovered.  On Low dose BB    ***Pulmonary***  Acute hypoxic respiratory failure s/p Tracheostomy   trach collar  Nebs, added hypertonic saline nebs    ***GI***  Protein calorie malnutrition  S/p PEG yesterday  Tube feeds via PEG, tolerating well so far  Will increase rate  Protonix for stress ulcer prophylaxis   Bowel regimen.    ***Renal***  Trend Creatinine   Costa catheter for strict I/O measurements.   Diuretics spot doses,bumex    ***ID***  MSSA bacteremia, BAL + MSSA -IV vanc completed  Merrem for total 10 days, completed    ***Endocrine***  Hyperglycemia - Insulin sliding scale.     ***Hematology***  Acute blood loss anemia - no current transfusion indication.  Sickle Cell - s/p Red cell exchange, Hgb electrophoresis weekly  Hydroxyurea - monitor platelet count.  Lovenox for DVT prophylaxis     IYana MD, personally performed the services described in this documentation. all medical record entries made by the scribe were at my direction and in my presence. I have reviewed the chart and agree that the record reflects my personal performance and is accurate and complete  Electronically signed:   Yana Patricia MD  CT ICU attending     ICU time: 50 mins     By signing my name below, I, Barrett Sanchez, attest that this documentation has been prepared under the direction and in the presence of Yana Patricia MD  Electronically signed: Barrett Sanchez, 01-22-25 @ 06:41

## 2025-01-23 LAB
ALBUMIN SERPL ELPH-MCNC: 3.8 G/DL — SIGNIFICANT CHANGE UP (ref 3.3–5)
ALP SERPL-CCNC: 101 U/L — SIGNIFICANT CHANGE UP (ref 40–120)
ALT FLD-CCNC: 67 U/L — HIGH (ref 10–45)
ANION GAP SERPL CALC-SCNC: 13 MMOL/L — SIGNIFICANT CHANGE UP (ref 5–17)
AST SERPL-CCNC: 33 U/L — SIGNIFICANT CHANGE UP (ref 10–40)
BASOPHILS # BLD AUTO: 0.01 K/UL — SIGNIFICANT CHANGE UP (ref 0–0.2)
BASOPHILS NFR BLD AUTO: 0.2 % — SIGNIFICANT CHANGE UP (ref 0–2)
BILIRUB SERPL-MCNC: 1 MG/DL — SIGNIFICANT CHANGE UP (ref 0.2–1.2)
BLD GP AB SCN SERPL QL: NEGATIVE — SIGNIFICANT CHANGE UP
BUN SERPL-MCNC: 28 MG/DL — HIGH (ref 7–23)
CALCIUM SERPL-MCNC: 11 MG/DL — HIGH (ref 8.4–10.5)
CHLORIDE SERPL-SCNC: 107 MMOL/L — SIGNIFICANT CHANGE UP (ref 96–108)
CO2 SERPL-SCNC: 27 MMOL/L — SIGNIFICANT CHANGE UP (ref 22–31)
CREAT SERPL-MCNC: 0.97 MG/DL — SIGNIFICANT CHANGE UP (ref 0.5–1.3)
CULTURE RESULTS: SIGNIFICANT CHANGE UP
CULTURE RESULTS: SIGNIFICANT CHANGE UP
EGFR: 68 ML/MIN/1.73M2 — SIGNIFICANT CHANGE UP
EOSINOPHIL # BLD AUTO: 0.22 K/UL — SIGNIFICANT CHANGE UP (ref 0–0.5)
EOSINOPHIL NFR BLD AUTO: 4 % — SIGNIFICANT CHANGE UP (ref 0–6)
GAS PNL BLDA: SIGNIFICANT CHANGE UP
GLUCOSE BLDC GLUCOMTR-MCNC: 139 MG/DL — HIGH (ref 70–99)
GLUCOSE BLDC GLUCOMTR-MCNC: 92 MG/DL — SIGNIFICANT CHANGE UP (ref 70–99)
GLUCOSE SERPL-MCNC: 126 MG/DL — HIGH (ref 70–99)
HCT VFR BLD CALC: 31 % — LOW (ref 34.5–45)
HGB BLD-MCNC: 10.5 G/DL — LOW (ref 11.5–15.5)
IMM GRANULOCYTES NFR BLD AUTO: 1.8 % — HIGH (ref 0–0.9)
LYMPHOCYTES # BLD AUTO: 1.52 K/UL — SIGNIFICANT CHANGE UP (ref 1–3.3)
LYMPHOCYTES # BLD AUTO: 27.7 % — SIGNIFICANT CHANGE UP (ref 13–44)
MAGNESIUM SERPL-MCNC: 2.4 MG/DL — SIGNIFICANT CHANGE UP (ref 1.6–2.6)
MCHC RBC-ENTMCNC: 29.9 PG — SIGNIFICANT CHANGE UP (ref 27–34)
MCHC RBC-ENTMCNC: 33.9 G/DL — SIGNIFICANT CHANGE UP (ref 32–36)
MCV RBC AUTO: 88.3 FL — SIGNIFICANT CHANGE UP (ref 80–100)
MONOCYTES # BLD AUTO: 0.47 K/UL — SIGNIFICANT CHANGE UP (ref 0–0.9)
MONOCYTES NFR BLD AUTO: 8.6 % — SIGNIFICANT CHANGE UP (ref 2–14)
NEUTROPHILS # BLD AUTO: 3.17 K/UL — SIGNIFICANT CHANGE UP (ref 1.8–7.4)
NEUTROPHILS NFR BLD AUTO: 57.7 % — SIGNIFICANT CHANGE UP (ref 43–77)
NRBC # BLD: 2 /100 WBCS — HIGH (ref 0–0)
NRBC BLD-RTO: 2 /100 WBCS — HIGH (ref 0–0)
PHOSPHATE SERPL-MCNC: 4.5 MG/DL — SIGNIFICANT CHANGE UP (ref 2.5–4.5)
PLATELET # BLD AUTO: SIGNIFICANT CHANGE UP K/UL (ref 150–400)
POTASSIUM BLDA-SCNC: 4.2 MMOL/L — SIGNIFICANT CHANGE UP (ref 3.5–5.1)
POTASSIUM SERPL-MCNC: 3.4 MMOL/L — LOW (ref 3.5–5.3)
POTASSIUM SERPL-SCNC: 3.4 MMOL/L — LOW (ref 3.5–5.3)
PROT SERPL-MCNC: 7.3 G/DL — SIGNIFICANT CHANGE UP (ref 6–8.3)
RBC # BLD: 3.51 M/UL — LOW (ref 3.8–5.2)
RBC # FLD: 15.5 % — HIGH (ref 10.3–14.5)
RH IG SCN BLD-IMP: POSITIVE — SIGNIFICANT CHANGE UP
SODIUM SERPL-SCNC: 147 MMOL/L — HIGH (ref 135–145)
SPECIMEN SOURCE: SIGNIFICANT CHANGE UP
SPECIMEN SOURCE: SIGNIFICANT CHANGE UP
WBC # BLD: 5.49 K/UL — SIGNIFICANT CHANGE UP (ref 3.8–10.5)
WBC # FLD AUTO: 5.49 K/UL — SIGNIFICANT CHANGE UP (ref 3.8–10.5)

## 2025-01-23 PROCEDURE — 71045 X-RAY EXAM CHEST 1 VIEW: CPT | Mod: 26

## 2025-01-23 PROCEDURE — 99222 1ST HOSP IP/OBS MODERATE 55: CPT

## 2025-01-23 PROCEDURE — 99291 CRITICAL CARE FIRST HOUR: CPT

## 2025-01-23 RX ORDER — POTASSIUM CHLORIDE 750 MG/1
40 TABLET, EXTENDED RELEASE ORAL ONCE
Refills: 0 | Status: COMPLETED | OUTPATIENT
Start: 2025-01-23 | End: 2025-01-23

## 2025-01-23 RX ADMIN — IPRATROPIUM BROMIDE AND ALBUTEROL SULFATE 3 MILLILITER(S): .5; 2.5 SOLUTION RESPIRATORY (INHALATION) at 11:31

## 2025-01-23 RX ADMIN — Medication 1 TABLET(S): at 12:34

## 2025-01-23 RX ADMIN — IPRATROPIUM BROMIDE AND ALBUTEROL SULFATE 3 MILLILITER(S): .5; 2.5 SOLUTION RESPIRATORY (INHALATION) at 21:12

## 2025-01-23 RX ADMIN — Medication 500 MILLIGRAM(S): at 12:34

## 2025-01-23 RX ADMIN — ANTISEPTIC SURGICAL SCRUB 15 MILLILITER(S): 0.04 SOLUTION TOPICAL at 17:20

## 2025-01-23 RX ADMIN — Medication 12.5 MILLIGRAM(S): at 13:50

## 2025-01-23 RX ADMIN — ANTISEPTIC SURGICAL SCRUB 1 APPLICATION(S): 0.04 SOLUTION TOPICAL at 06:11

## 2025-01-23 RX ADMIN — BUMETANIDE 2 MILLIGRAM(S): 2 TABLET ORAL at 06:00

## 2025-01-23 RX ADMIN — IPRATROPIUM BROMIDE AND ALBUTEROL SULFATE 3 MILLILITER(S): .5; 2.5 SOLUTION RESPIRATORY (INHALATION) at 05:02

## 2025-01-23 RX ADMIN — POTASSIUM CHLORIDE 100 MILLIEQUIVALENT(S): 750 TABLET, EXTENDED RELEASE ORAL at 12:35

## 2025-01-23 RX ADMIN — PANTOPRAZOLE 40 MILLIGRAM(S): 20 TABLET, DELAYED RELEASE ORAL at 12:34

## 2025-01-23 RX ADMIN — ENOXAPARIN SODIUM 40 MILLIGRAM(S): 100 INJECTION SUBCUTANEOUS at 05:59

## 2025-01-23 RX ADMIN — Medication 500 MILLIGRAM(S): at 06:01

## 2025-01-23 RX ADMIN — Medication 100 MILLIGRAM(S): at 13:48

## 2025-01-23 RX ADMIN — ANTISEPTIC SURGICAL SCRUB 15 MILLILITER(S): 0.04 SOLUTION TOPICAL at 06:01

## 2025-01-23 RX ADMIN — Medication 500 MILLIGRAM(S): at 17:20

## 2025-01-23 RX ADMIN — Medication 12.5 MILLIGRAM(S): at 05:59

## 2025-01-23 RX ADMIN — Medication 1 DROP(S): at 01:26

## 2025-01-23 RX ADMIN — POTASSIUM CHLORIDE 40 MILLIEQUIVALENT(S): 750 TABLET, EXTENDED RELEASE ORAL at 02:17

## 2025-01-23 RX ADMIN — LEVETIRACETAM 500 MILLIGRAM(S): 750 TABLET, FILM COATED ORAL at 06:02

## 2025-01-23 RX ADMIN — Medication 100 MILLIGRAM(S): at 06:04

## 2025-01-23 RX ADMIN — Medication 1 DROP(S): at 06:02

## 2025-01-23 RX ADMIN — LEVETIRACETAM 500 MILLIGRAM(S): 750 TABLET, FILM COATED ORAL at 17:20

## 2025-01-23 RX ADMIN — Medication 1 DROP(S): at 12:34

## 2025-01-23 RX ADMIN — Medication 1 DROP(S): at 17:20

## 2025-01-23 RX ADMIN — ANTISEPTIC SURGICAL SCRUB 1 APPLICATION(S): 0.04 SOLUTION TOPICAL at 06:02

## 2025-01-23 NOTE — PROGRESS NOTE ADULT - SUBJECTIVE AND OBJECTIVE BOX
Patient seen and examined at the bedside.    Remains critically ill on continuous ICU monitoring, at risk for life threatening decompensation.  All Labs, data reviewed. Plan of care discussed in length during multi-disciplinary ICU rounds.       Brief Summary:  60 yo F with Sickle cell disease and NICM now with Cardiogenic shock s/p VA ECMO on 1/4/25.  VA ECMO decannulation on 1/7/25  Tracheostomy on 1/16/25  PEG placement on 1/21/25    24 Hour events:  OOB to chair, tolerates trach collar  PEG tube placed by surgical team at the bed site  Bronchoscopy, clear min secretion and some bronchomalacia on a right site    Objective:  Vital Signs Last 24 Hrs  T(C): 37.3 (23 Jan 2025 04:00), Max: 37.4 (22 Jan 2025 08:00)  T(F): 99.2 (23 Jan 2025 04:00), Max: 99.4 (22 Jan 2025 08:00)  HR: 104 (23 Jan 2025 05:14) (92 - 109)  BP: --  BP(mean): --  RR: 27 (23 Jan 2025 05:00) (16 - 27)  SpO2: 97% (23 Jan 2025 05:14) (95% - 100%)    Parameters below as of 23 Jan 2025 05:14  Patient On (Oxygen Delivery Method): tracheostomy collar    Mode: standby  FiO2: 30    Physical Exam:  General: Eyes open, Trach to vent.  Neurology: Nodding and shaking head to answer questions.  Respiratory: Clear bilaterally , secretion   CV: Sinus  Abdominal: Soft, Nontender  Extremities: Warm, well-perfused  Costa  -------------------------------------------------------------------------------------------------------------------------------    Labs:                        10.5   5.49  )-----------( Clumped    ( 23 Jan 2025 00:55 )             31.0     01-23    147[H]  |  107  |  28[H]  ----------------------------<  126[H]  3.4[L]   |  27  |  0.97    Ca    11.0[H]      23 Jan 2025 00:55  Phos  4.5     01-23  Mg     2.4     01-23    TPro  7.3  /  Alb  3.8  /  TBili  1.0  /  DBili  x   /  AST  33  /  ALT  67[H]  /  AlkPhos  101  01-23    LIVER FUNCTIONS - ( 23 Jan 2025 00:55 )  Alb: 3.8 g/dL / Pro: 7.3 g/dL / ALK PHOS: 101 U/L / ALT: 67 U/L / AST: 33 U/L / GGT: x           ABG - ( 23 Jan 2025 00:15 )  pH, Arterial: 7.49  pH, Blood: x     /  pCO2: 39    /  pO2: 227   / HCO3: 30    / Base Excess: 5.9   /  SaO2: 99.9      Pertinent labs/studies:  CBC with inc WBC 10.70, low H/H 9/28 and MCV WNL, otherwise essentially WNL  PT/INR WNL, PTT dec 22.3  BMP essentially WNL  Albumin 3.6, LFT's with inc ALT//72  Mg WNL, Phos WNL  NH3 WNL, B12/folate WNL, Vitamin A WNL, A1C 4.6%, TSH inc 27.4 and T4 WNL, Lyme (-), WNV IgG (+) and IgM (-)    CSF (1/14) - clear, colorless, RBC 1, WBC 2, protein inc 68, glucose 59, PCR panel (-), flow cytometry (-), cytology (-)    MR Head w/wo IV Cont on 1/09   1. Innumerable foci susceptibility artifact scattered throughout the   brain which can be seen with critically ill patients on ECMO. Diffuse fat   embolization is also part of the differential diagnosis but is considered   less likely.  2. Tiny acute/subacute infarcts within the bilateral basal ganglia,   thalami, and chandu with disproportionate ovoid area of T2/FLAIR   prolongation in the right ventral thalamus. Findings may indicate the   presence of embolic acute/subacute infarcts.  3. No abnormal intracranial enhancement.      ALL MEDICATIONS   MEDICATIONS  (STANDING):  albuterol/ipratropium for Nebulization 3 milliLiter(s) Nebulizer every 8 hours  amantadine 100 milliGRAM(s) Oral <User Schedule>  artificial tears (preservative free) Ophthalmic Solution 1 Drop(s) Both EYES four times a day  ascorbic acid 500 milliGRAM(s) Oral daily  buMETAnide Injectable 2 milliGRAM(s) IV Push daily  chlorhexidine 0.12% Liquid 15 milliLiter(s) Oral Mucosa every 12 hours  chlorhexidine 2% Cloths 1 Application(s) Topical <User Schedule>  chlorhexidine 4% Liquid 1 Application(s) Topical <User Schedule>  dextrose 50% Injectable 25 Gram(s) IV Push once  enoxaparin Injectable 40 milliGRAM(s) SubCutaneous every 24 hours  glucagon  Injectable 1 milliGRAM(s) IntraMuscular once  hydroxyurea 500 milliGRAM(s) Oral two times a day  insulin lispro (ADMELOG) corrective regimen sliding scale   SubCutaneous every 6 hours  levETIRAcetam  Solution 500 milliGRAM(s) Oral every 12 hours  metoprolol tartrate 12.5 milliGRAM(s) Oral every 8 hours  midazolam Injectable 6 milliGRAM(s) IV Push once  multivitamin 1 Tablet(s) Oral daily  pantoprazole  Injectable 40 milliGRAM(s) IV Push daily  polyethylene glycol 3350 17 Gram(s) Oral daily  potassium chloride  10 mEq/100 mL IVPB 10 milliEquivalent(s) IV Intermittent daily  senna 1 Tablet(s) Oral daily    MEDICATIONS  (PRN):  acetaminophen   Oral Liquid .. 650 milliGRAM(s) Oral every 6 hours PRN Moderate Pain (4 - 6)  dextrose Oral Gel 15 Gram(s) Oral once PRN Blood Glucose LESS THAN 70 milliGRAM(s)/deciliter  hydrALAZINE Injectable 5 milliGRAM(s) IV Push every 6 hours PRN HTN  methocarbamol 500 milliGRAM(s) Oral every 6 hours PRN Muscle Spasm  sodium chloride 0.9% lock flush 10 milliLiter(s) IV Push every 1 hour PRN Pre/post blood products, medications, blood draw, and to maintain line patency  ------------------------------------------------------------------------------------------------------------------------------  Assessment:  60 yo F w/ PMHx of Sickle cell disease (on hydroxyurea), HTN, 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 Pt initially presented to [facility] for SOB and SS crisis; course c/b witnessed seizure, intubated and sedated, and [facility]. Transferred to [facility] for further management of cardiogenic shock. Cannulated for VA ECMO on 1/4/25. Decannulated on 1/7/25.    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  LP on 1/14 - no evidence of meningitis/ encephalitis   Neuro exam improving slowly    ***Cardiovascular***  At risk for hemodynamic decompensation  S/p VA ECMO, Bi vent function recovered.  On Low dose BB    ***Pulmonary***  Acute hypoxic respiratory failure s/p Tracheostomy   trach collar  Nebs, added hypertonic saline nebs    ***GI***  Protein calorie malnutrition  S/p PEG yesterday  Tube feeds via PEG, tolerating well so far  Will increase rate  Protonix for stress ulcer prophylaxis   Bowel regimen.    ***Renal***  Trend Creatinine   Costa catheter for strict I/O measurements.   Diuretics spot doses,bumex    ***ID***  MSSA bacteremia, BAL + MSSA -IV vanc completed  Merrem for total 10 days, completed    ***Endocrine***  Hyperglycemia - Insulin sliding scale.     ***Hematology***  Acute blood loss anemia - no current transfusion indication.  Sickle Cell - s/p Red cell exchange, Hgb electrophoresis weekly  Hydroxyurea - monitor platelet count.  Lovenox for DVT prophylaxis         IYana MD, personally performed the services described in this documentation. All medical record entries made by the scribe were at my direction and in my presence. I have reviewed the chart and agree that the record reflects my personal performance and is accurate and complete  Electronically signed:   Yana Patricia MD  CT ICU attending     ICU time: ** mins     By signing my name below, I, Norris Oliver, attest that this documentation has been prepared under the direction and in the presence of Yana Patricia MD  Electronically signed: Norris Oliver, 01-23-25 @ 07:04             Patient seen and examined at the bedside.    Remains critically ill on continuous ICU monitoring, at risk for life threatening decompensation.  All Labs, data reviewed. Plan of care discussed in length during multi-disciplinary ICU rounds.     Brief Summary:  60 yo F with Sickle cell disease and NICM now with Cardiogenic shock s/p VA ECMO on 1/4/25.  VA ECMO decannulation on 1/7/25  Tracheostomy on 1/16/25  PEG placement on 1/21/25    24 Hour events:  OOB to chair, tolerates trach collar over 24h  Tolerates tube feeds via PEG tube  PMR consult and assessment  Daily PT    Objective:  Vital Signs Last 24 Hrs  T(C): 37.3 (23 Jan 2025 04:00), Max: 37.4 (22 Jan 2025 08:00)  T(F): 99.2 (23 Jan 2025 04:00), Max: 99.4 (22 Jan 2025 08:00)  HR: 104 (23 Jan 2025 05:14) (92 - 109)  BP: --  BP(mean): --  RR: 27 (23 Jan 2025 05:00) (16 - 27)  SpO2: 97% (23 Jan 2025 05:14) (95% - 100%)    Parameters below as of 23 Jan 2025 05:14  Patient On (Oxygen Delivery Method): tracheostomy collar    Mode: standby  FiO2: 30    Physical Exam:  NAD  Neurology: follows commands, not moving extremities  Attempting moving  toes and feet  Respiratory: Clear bilaterally , secretion improved  CV: Sinus tachycardia  Abdominal: Soft, Nontender  Extremities: Warm, well-perfused  Costa  -------------------------------------------------------------------------------------------------------------------------------    Labs:                        10.5   5.49  )-----------( Clumped    ( 23 Jan 2025 00:55 )             31.0     01-23    147[H]  |  107  |  28[H]  ----------------------------<  126[H]  3.4[L]   |  27  |  0.97    Ca    11.0[H]      23 Jan 2025 00:55  Phos  4.5     01-23  Mg     2.4     01-23    TPro  7.3  /  Alb  3.8  /  TBili  1.0  /  DBili  x   /  AST  33  /  ALT  67[H]  /  AlkPhos  101  01-23    LIVER FUNCTIONS - ( 23 Jan 2025 00:55 )  Alb: 3.8 g/dL / Pro: 7.3 g/dL / ALK PHOS: 101 U/L / ALT: 67 U/L / AST: 33 U/L / GGT: x           ABG - ( 23 Jan 2025 00:15 )  pH, Arterial: 7.49  pH, Blood: x     /  pCO2: 39    /  pO2: 227   / HCO3: 30    / Base Excess: 5.9   /  SaO2: 99.9      Pertinent labs/studies:  CBC with inc WBC 10.70, low H/H 9/28 and MCV WNL, otherwise essentially WNL  PT/INR WNL, PTT dec 22.3  BMP essentially WNL  Albumin 3.6, LFT's with inc ALT//72  Mg WNL, Phos WNL  NH3 WNL, B12/folate WNL, Vitamin A WNL, A1C 4.6%, TSH inc 27.4 and T4 WNL, Lyme (-), WNV IgG (+) and IgM (-)    CSF (1/14) - clear, colorless, RBC 1, WBC 2, protein inc 68, glucose 59, PCR panel (-), flow cytometry (-), cytology (-)    MR Head w/wo IV Cont on 1/09   1. Innumerable foci susceptibility artifact scattered throughout the   brain which can be seen with critically ill patients on ECMO. Diffuse fat   embolization is also part of the differential diagnosis but is considered   less likely.  2. Tiny acute/subacute infarcts within the bilateral basal ganglia,   thalami, and chandu with disproportionate ovoid area of T2/FLAIR   prolongation in the right ventral thalamus. Findings may indicate the   presence of embolic acute/subacute infarcts.  3. No abnormal intracranial enhancement.      ALL MEDICATIONS   MEDICATIONS  (STANDING):  albuterol/ipratropium for Nebulization 3 milliLiter(s) Nebulizer every 8 hours  amantadine 100 milliGRAM(s) Oral <User Schedule>  artificial tears (preservative free) Ophthalmic Solution 1 Drop(s) Both EYES four times a day  ascorbic acid 500 milliGRAM(s) Oral daily  buMETAnide Injectable 2 milliGRAM(s) IV Push daily  chlorhexidine 0.12% Liquid 15 milliLiter(s) Oral Mucosa every 12 hours  chlorhexidine 2% Cloths 1 Application(s) Topical <User Schedule>  chlorhexidine 4% Liquid 1 Application(s) Topical <User Schedule>  dextrose 50% Injectable 25 Gram(s) IV Push once  enoxaparin Injectable 40 milliGRAM(s) SubCutaneous every 24 hours  glucagon  Injectable 1 milliGRAM(s) IntraMuscular once  hydroxyurea 500 milliGRAM(s) Oral two times a day  insulin lispro (ADMELOG) corrective regimen sliding scale   SubCutaneous every 6 hours  levETIRAcetam  Solution 500 milliGRAM(s) Oral every 12 hours  metoprolol tartrate 12.5 milliGRAM(s) Oral every 8 hours  midazolam Injectable 6 milliGRAM(s) IV Push once  multivitamin 1 Tablet(s) Oral daily  pantoprazole  Injectable 40 milliGRAM(s) IV Push daily  polyethylene glycol 3350 17 Gram(s) Oral daily  potassium chloride  10 mEq/100 mL IVPB 10 milliEquivalent(s) IV Intermittent daily  senna 1 Tablet(s) Oral daily    MEDICATIONS  (PRN):  acetaminophen   Oral Liquid .. 650 milliGRAM(s) Oral every 6 hours PRN Moderate Pain (4 - 6)  dextrose Oral Gel 15 Gram(s) Oral once PRN Blood Glucose LESS THAN 70 milliGRAM(s)/deciliter  hydrALAZINE Injectable 5 milliGRAM(s) IV Push every 6 hours PRN HTN  methocarbamol 500 milliGRAM(s) Oral every 6 hours PRN Muscle Spasm  sodium chloride 0.9% lock flush 10 milliLiter(s) IV Push every 1 hour PRN Pre/post blood products, medications, blood draw, and to maintain line patency  ------------------------------------------------------------------------------------------------------------------------------  Assessment:  60 yo F w/ PMHx of Sickle cell disease (on hydroxyurea), HTN, 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 Pt initially presented to [facility] for SOB and SS crisis; course c/b witnessed seizure, intubated and sedated, and transferred to [facility]. Transferred to [facility] for further management of cardiogenic shock. Cannulated for VA ECMO on 1/4/25. Decannulated on 1/7/25.    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  LP on 1/14 - no evidence of meningitis/ encephalitis   Neuro exam improving slowly    ***Cardiovascular***  At risk for hemodynamic decompensation  S/p VA ECMO, Bi vent function recovered.  On Low dose BB    ***Pulmonary***  Acute hypoxic respiratory failure s/p Tracheostomy   trach collar as tolerates, ok to keep for 24h  Nebs daily    ***GI***  Protein calorie malnutrition  S/p PEG placement enteral feeds  Tube feeds via PEG, tolerating well so far  Will increase rate  Protonix for stress ulcer prophylaxis   Bowel regimen.    ***Renal***  Trend Creatinine   Costa catheter for strict I/O measurements.     ***ID***  MSSA bacteremia, BAL + MSSA -IV vanc completed  Merrem for total 10 days, completed  monitor off antibiotics    ***Endocrine***  Hyperglycemia - Insulin sliding scale.     ***Hematology***  Acute blood loss anemia - no current transfusion indication.  Sickle Cell - s/p Red cell exchange, Hgb electrophoresis weekly  Hydroxyurea - monitor platelet count.  Lovenox for DVT prophylaxis     IYana MD, personally performed the services described in this documentation. All medical record entries made by the scribe were at my direction and in my presence. I have reviewed the chart and agree that the record reflects my personal performance and is accurate and complete  Electronically signed:   Yana Patricia MD  CT ICU attending     ICU time: 46 mins     By signing my name below, I, Norris Oliver, attest that this documentation has been prepared under the direction and in the presence of Yana Patricia MD  Electronically signed: Norris Oliver, 01-23-25 @ 07:04

## 2025-01-23 NOTE — CONSULT NOTE ADULT - SUBJECTIVE AND OBJECTIVE BOX
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 Pt initially presented to [facility] for SOB and SS crisis; course c/b witnessed seizure, intubated and sedated, and transferred to [facility] for further management. Keppra was discontinued at [facility] due to negative EEG. At [facility] MICU pt was found to have RV failure possibly iso pulm HTN 2/2 increased tidal volumes on the ventilator. On addition, pt possibly received a lot of volume iso SS crisis. Neuro was consulted for seizures and EEG technician was called for vEEG placement. Pt is in profound cardiogenic shock. Pt has been decompensating, despite Dobutamine gtt, Bumex gtt, Levophed, and addition of Vasopressin. Vasopressors requirements have gone up. [facility] shock team was called and pt is being transferred to [facility] for further management.  (2025 23:18)    Interval History:  Patient has had a very complicated hospital course.  S/P ECMO , decannulated .  With persistent respiratory failure and s/p trach .  S/P PEG on .  Patient with multifactorial encephalopathy and persistent diffuse weakness.    Most recent brain imaging:   CT Head No Cont (25 @ 15:46) >  No acute intracranial hemorrhage, mass effect, or midline shift. No   significant change since 2025.    MR Head w/wo IV Cont (25 @ 18:07) >  1. Innumerable foci susceptibility artifact scattered throughout the   brain which can be seen with critically ill patients on ECMO. Diffuse fat   embolization is also part of the differential diagnosis but is considered   less likely.  2. Tiny acute/subacute infarcts within the bilateral basal ganglia,   thalami, and chandu with disproportionate ovoid area of T2/FLAIR   prolongation in the right ventral thalamus. Findings may indicate the   presence of embolic acute/subacute infarcts.  3. No abnormal intracranial enhancement.    Spine MRIs are pending.      REVIEW OF SYSTEMS: Difficult to attain due to mental status.    PAST MEDICAL & SURGICAL HISTORY  Sickle-cell-hemoglobin C disease with crisis  Essential hypertension  Sickle cell trait  Sickle cell disease  HTN (hypertension)  H/O:   H/O abdominoplasty  S/P breast augmentation  S/P     FUNCTIONAL HISTORY:   Lives w daughter and son in home w 2-3 DONNA  PTA Independent    CURRENT FUNCTIONAL STATUS:  Dependent with Bed Mob    FAMILY HISTORY   FHx: cerebral palsy (Child)    MEDICATIONS   acetaminophen   Oral Liquid .. 650 milliGRAM(s) Oral every 6 hours PRN  albuterol/ipratropium for Nebulization 3 milliLiter(s) Nebulizer every 8 hours  amantadine 100 milliGRAM(s) Oral <User Schedule>  artificial tears (preservative free) Ophthalmic Solution 1 Drop(s) Both EYES four times a day  ascorbic acid 500 milliGRAM(s) Oral daily  buMETAnide Injectable 2 milliGRAM(s) IV Push daily  chlorhexidine 0.12% Liquid 15 milliLiter(s) Oral Mucosa every 12 hours  chlorhexidine 2% Cloths 1 Application(s) Topical <User Schedule>  chlorhexidine 4% Liquid 1 Application(s) Topical <User Schedule>  enoxaparin Injectable 40 milliGRAM(s) SubCutaneous every 24 hours  hydrALAZINE Injectable 5 milliGRAM(s) IV Push every 6 hours PRN  hydroxyurea 500 milliGRAM(s) Oral two times a day  levETIRAcetam  Solution 500 milliGRAM(s) Oral every 12 hours  methocarbamol 500 milliGRAM(s) Oral every 6 hours PRN  metoprolol tartrate 12.5 milliGRAM(s) Oral every 8 hours  midazolam Injectable 6 milliGRAM(s) IV Push once  multivitamin 1 Tablet(s) Oral daily  pantoprazole  Injectable 40 milliGRAM(s) IV Push daily  polyethylene glycol 3350 17 Gram(s) Oral daily  potassium chloride  10 mEq/100 mL IVPB 10 milliEquivalent(s) IV Intermittent daily  senna 1 Tablet(s) Oral daily  sodium chloride 0.9% lock flush 10 milliLiter(s) IV Push every 1 hour PRN    ALLERGIES  doxycycline (Angioedema)  penicillin (Unknown)  clindamycin (Angioedema)  linezolid (Angioedema)    VITALS  T(C): 37.4 (25 @ 08:00), Max: 37.4 (25 @ 08:00)  HR: 114 (25 @ 10:00) (92 - 114)  BP: --  RR: 25 (25 @ 10:00) (19 - 26)  SpO2: 98% (25 @ 10:00) (95% - 100%)  Wt(kg): --    PHYSICAL EXAM  Constitutional - NAD, Comfortable  HEENT - Trach, EOMI  Neck - Supple  Chest - Scattered rhonchi, No distress, no use of accessory muscles for respiration  Cardiovascular -Tachy, Well perfused  Abdomen - BS+, Soft, NTND  Extremities - Trace edema, No calf tenderness   Neurologic Exam -                 Sitting in chair  Alert  Follows 50% of verbal instruction  Tracks  Moves feet 3-4/5, trace KE, no volitional movement of UEs noted  Nml Tone  No Clonus     Psychiatric - Mood stable, Affect WNL    RECENT LABS/IMAGING  CBC Full  -  ( 2025 00:55 )  WBC Count : 5.49 K/uL  RBC Count : 3.51 M/uL  Hemoglobin : 10.5 g/dL  Hematocrit : 31.0 %  Platelet Count - Automated : Clumped K/uL  Mean Cell Volume : 88.3 fl  Mean Cell Hemoglobin : 29.9 pg  Mean Cell Hemoglobin Concentration : 33.9 g/dL  Auto Neutrophil # : 3.17 K/uL  Auto Lymphocyte # : 1.52 K/uL  Auto Monocyte # : 0.47 K/uL  Auto Eosinophil # : 0.22 K/uL  Auto Basophil # : 0.01 K/uL  Auto Neutrophil % : 57.7 %  Auto Lymphocyte % : 27.7 %  Auto Monocyte % : 8.6 %  Auto Eosinophil % : 4.0 %  Auto Basophil % : 0.2 %        147[H]  |  107  |  28[H]  ----------------------------<  126[H]  3.4[L]   |  27  |  0.97    Ca    11.0[H]      2025 00:55  Phos  4.5       Mg     2.4         TPro  7.3  /  Alb  3.8  /  TBili  1.0  /  DBili  x   /  AST  33  /  ALT  67[H]  /  AlkPhos  101      Urinalysis Basic - ( 2025 00:55 )    Color: x / Appearance: x / SG: x / pH: x  Gluc: 126 mg/dL / Ketone: x  / Bili: x / Urobili: x   Blood: x / Protein: x / Nitrite: x   Leuk Esterase: x / RBC: x / WBC x   Sq Epi: x / Non Sq Epi: x / Bacteria: x    Impression:  60 yo with functional deficits secondary to diagnosis of encephalopathy, debility    Plan:  PT- ROM, Bed Mob, Transfers, Amb w AD and bracing as needed  OT- ADLs, bracing  SLP- Dysphagia eval and treat  Prec- Falls, Cardiac, Pulm  DVT Prophylaxis - Lovenox  Skin- Turn q2 h  Dispo- Had d/w Dr. Patricia- at this time not appropriate for acute rehab due to low function and need for periodic mechanical ventilation. If improves over time can consider for acute TBI rehabilitation. Patient's functional prognosis is guarded at this time but, as she is relatively young and was highly functioning, has rehab potential.     Total time taken to review relevant records and imaging results, examine patient, write note, and, when applicable, discuss case with patient, family, , resident, medical student and other medical providers:     [  ] 40 minutes (52842)  [X] 55 minutes ([id number])  [  ] 75 minutes (98406)    [  ] 25 minutes ([id number])  [  ] 35 minutes ([id number])  [  ] 50 minutes ([id number])

## 2025-01-23 NOTE — PROGRESS NOTE ADULT - ASSESSMENT
59y Female patient with PMH sickle cell disease and NICM with cardiogenic shock sp VA ECMO 1/4/25 with decannulation 1/7/25, now S/P percutaneous tracheostomy with bronchoscopy for acute hypoxic respiratory failure. Recovering appropriately.     Plan:  - PEG bumper loosened 3 > 4cm at skin  - PEG c/d/i tolerating TF  - Trach sutures out c/d/i  - ACS surgery to follow  - appreciate care per CTICU     ACS/Trauma Surgery  [phone]. 59y Female patient with PMH sickle cell disease and NICM with cardiogenic shock sp VA ECMO 1/4/25 with decannulation 1/7/25, now S/P percutaneous tracheostomy with bronchoscopy for acute hypoxic respiratory failure. Recovering appropriately.     Plan:  - PEG bumper loosened 3 > 4cm at skin  - PEG c/d/i tolerating TF  - ACS to s/o, please reconsult as needed  - appreciate care per CTICU     ACS/Trauma Surgery  [phone].

## 2025-01-23 NOTE — PROGRESS NOTE ADULT - SUBJECTIVE AND OBJECTIVE BOX
General Surgery Progress Note    Overnight: TAYLOR  Subjective: Patient seen and examined on rounds. Tolerating Tf via PEG.     Objective:  Vitals:  T(C): 37.3 (01-23-25 @ 04:00), Max: 37.4 (01-22-25 @ 08:00)  HR: 104 (01-23-25 @ 05:14) (92 - 109)  BP: --  RR: 27 (01-23-25 @ 05:00) (16 - 27)  SpO2: 97% (01-23-25 @ 05:14) (95% - 100%)  Wt(kg): --    01-22 @ 07:01  -  01-23 @ 07:00  --------------------------------------------------------  IN:    Enteral Tube Flush: 130 mL    IV PiggyBack: 115 mL    Vivonex RTF: 890 mL  Total IN: 1135 mL    OUT:    Voided (mL): 1025 mL  Total OUT: 1025 mL    Total NET: 110 mL          Physical Exam:  General Appearance: no acute distress, NTND   Chest: trach c/d/i  CV: no JVD, palpable pulses b/l  Abdomen: soft, non-tender, non-distended, PEG c/d/i 3cm@skin > loosened to 4cm  Extremities: [facility]       Labs:                        10.5   5.49  )-----------( Clumped    ( 23 Jan 2025 00:55 )             31.0     01-23    147[H]  |  107  |  28[H]  ----------------------------<  126[H]  3.4[L]   |  27  |  0.97    Ca    11.0[H]      23 Jan 2025 00:55  Phos  4.5     01-23  Mg     2.4     01-23    TPro  7.3  /  Alb  3.8  /  TBili  1.0  /  DBili  x   /  AST  33  /  ALT  67[H]  /  AlkPhos  101  01-23    LIVER FUNCTIONS - ( 23 Jan 2025 00:55 )  Alb: 3.8 g/dL / Pro: 7.3 g/dL / ALK PHOS: 101 U/L / ALT: 67 U/L / AST: 33 U/L / GGT: x             Urinalysis Basic - ( 23 Jan 2025 00:55 )    Color: x / Appearance: x / SG: x / pH: x  Gluc: 126 mg/dL / Ketone: x  / Bili: x / Urobili: x   Blood: x / Protein: x / Nitrite: x   Leuk Esterase: x / RBC: x / WBC x   Sq Epi: x / Non Sq Epi: x / Bacteria: x

## 2025-01-24 LAB
ALBUMIN SERPL ELPH-MCNC: 3.9 G/DL — SIGNIFICANT CHANGE UP (ref 3.3–5)
ALP SERPL-CCNC: 99 U/L — SIGNIFICANT CHANGE UP (ref 40–120)
ALT FLD-CCNC: 70 U/L — HIGH (ref 10–45)
ANION GAP SERPL CALC-SCNC: 13 MMOL/L — SIGNIFICANT CHANGE UP (ref 5–17)
AST SERPL-CCNC: 49 U/L — HIGH (ref 10–40)
BASOPHILS # BLD AUTO: 0.02 K/UL — SIGNIFICANT CHANGE UP (ref 0–0.2)
BASOPHILS NFR BLD AUTO: 0.3 % — SIGNIFICANT CHANGE UP (ref 0–2)
BILIRUB SERPL-MCNC: 0.8 MG/DL — SIGNIFICANT CHANGE UP (ref 0.2–1.2)
BUN SERPL-MCNC: 33 MG/DL — HIGH (ref 7–23)
CALCIUM SERPL-MCNC: 11.1 MG/DL — HIGH (ref 8.4–10.5)
CHLORIDE SERPL-SCNC: 106 MMOL/L — SIGNIFICANT CHANGE UP (ref 96–108)
CO2 SERPL-SCNC: 26 MMOL/L — SIGNIFICANT CHANGE UP (ref 22–31)
CREAT SERPL-MCNC: 0.99 MG/DL — SIGNIFICANT CHANGE UP (ref 0.5–1.3)
EGFR: 67 ML/MIN/1.73M2 — SIGNIFICANT CHANGE UP
EOSINOPHIL # BLD AUTO: 0.36 K/UL — SIGNIFICANT CHANGE UP (ref 0–0.5)
EOSINOPHIL NFR BLD AUTO: 5 % — SIGNIFICANT CHANGE UP (ref 0–6)
GAS PNL BLDA: SIGNIFICANT CHANGE UP
GAS PNL BLDA: SIGNIFICANT CHANGE UP
GLUCOSE SERPL-MCNC: 144 MG/DL — HIGH (ref 70–99)
HCT VFR BLD CALC: 30.9 % — LOW (ref 34.5–45)
HEMOGLOBIN INTERPRETATION: SIGNIFICANT CHANGE UP
HGB A MFR BLD: 78.8 % — LOW (ref 95.8–98)
HGB A2 MFR BLD: 3.2 % — SIGNIFICANT CHANGE UP (ref 2–3.2)
HGB BLD-MCNC: 10 G/DL — LOW (ref 11.5–15.5)
HGB C MFR BLD: 8.6 % — HIGH
HGB S MFR BLD: 9.4 % — HIGH
IMM GRANULOCYTES NFR BLD AUTO: 0.7 % — SIGNIFICANT CHANGE UP (ref 0–0.9)
LYMPHOCYTES # BLD AUTO: 2.08 K/UL — SIGNIFICANT CHANGE UP (ref 1–3.3)
LYMPHOCYTES # BLD AUTO: 29.1 % — SIGNIFICANT CHANGE UP (ref 13–44)
MAGNESIUM SERPL-MCNC: 2.2 MG/DL — SIGNIFICANT CHANGE UP (ref 1.6–2.6)
MCHC RBC-ENTMCNC: 29.7 PG — SIGNIFICANT CHANGE UP (ref 27–34)
MCHC RBC-ENTMCNC: 32.4 G/DL — SIGNIFICANT CHANGE UP (ref 32–36)
MCV RBC AUTO: 91.7 FL — SIGNIFICANT CHANGE UP (ref 80–100)
MONOCYTES # BLD AUTO: 0.84 K/UL — SIGNIFICANT CHANGE UP (ref 0–0.9)
MONOCYTES NFR BLD AUTO: 11.8 % — SIGNIFICANT CHANGE UP (ref 2–14)
NEUTROPHILS # BLD AUTO: 3.79 K/UL — SIGNIFICANT CHANGE UP (ref 1.8–7.4)
NEUTROPHILS NFR BLD AUTO: 53.1 % — SIGNIFICANT CHANGE UP (ref 43–77)
NRBC # BLD: 1 /100 WBCS — HIGH (ref 0–0)
NRBC BLD-RTO: 1 /100 WBCS — HIGH (ref 0–0)
PHOSPHATE SERPL-MCNC: 3.8 MG/DL — SIGNIFICANT CHANGE UP (ref 2.5–4.5)
PLATELET # BLD AUTO: 315 K/UL — SIGNIFICANT CHANGE UP (ref 150–400)
POTASSIUM SERPL-MCNC: 3.5 MMOL/L — SIGNIFICANT CHANGE UP (ref 3.5–5.3)
POTASSIUM SERPL-SCNC: 3.5 MMOL/L — SIGNIFICANT CHANGE UP (ref 3.5–5.3)
PROT SERPL-MCNC: 7.5 G/DL — SIGNIFICANT CHANGE UP (ref 6–8.3)
RBC # BLD: 3.37 M/UL — LOW (ref 3.8–5.2)
RBC # FLD: 15.4 % — HIGH (ref 10.3–14.5)
SODIUM SERPL-SCNC: 145 MMOL/L — SIGNIFICANT CHANGE UP (ref 135–145)
WBC # BLD: 7.14 K/UL — SIGNIFICANT CHANGE UP (ref 3.8–10.5)
WBC # FLD AUTO: 7.14 K/UL — SIGNIFICANT CHANGE UP (ref 3.8–10.5)

## 2025-01-24 PROCEDURE — 72148 MRI LUMBAR SPINE W/O DYE: CPT | Mod: 26

## 2025-01-24 PROCEDURE — 71045 X-RAY EXAM CHEST 1 VIEW: CPT | Mod: 26

## 2025-01-24 PROCEDURE — 72141 MRI NECK SPINE W/O DYE: CPT | Mod: 26

## 2025-01-24 PROCEDURE — 99291 CRITICAL CARE FIRST HOUR: CPT

## 2025-01-24 RX ORDER — FENTANYL CITRATE 50 UG/ML
50 INJECTION INTRAMUSCULAR; INTRAVENOUS ONCE
Refills: 0 | Status: DISCONTINUED | OUTPATIENT
Start: 2025-01-24 | End: 2025-01-24

## 2025-01-24 RX ORDER — POTASSIUM CHLORIDE 750 MG/1
10 TABLET, EXTENDED RELEASE ORAL ONCE
Refills: 0 | Status: DISCONTINUED | OUTPATIENT
Start: 2025-01-24 | End: 2025-01-24

## 2025-01-24 RX ORDER — POTASSIUM CHLORIDE 750 MG/1
30 TABLET, EXTENDED RELEASE ORAL ONCE
Refills: 0 | Status: COMPLETED | OUTPATIENT
Start: 2025-01-24 | End: 2025-01-24

## 2025-01-24 RX ORDER — DEXMEDETOMIDINE HYDROCHLORIDE 4 UG/ML
0.5 INJECTION, SOLUTION INTRAVENOUS
Qty: 200 | Refills: 0 | Status: COMPLETED | OUTPATIENT
Start: 2025-01-24 | End: 2025-12-23

## 2025-01-24 RX ORDER — DEXMEDETOMIDINE HYDROCHLORIDE 4 UG/ML
0.5 INJECTION, SOLUTION INTRAVENOUS
Qty: 200 | Refills: 0 | Status: DISCONTINUED | OUTPATIENT
Start: 2025-01-24 | End: 2025-01-24

## 2025-01-24 RX ORDER — BUMETANIDE 2 MG/1
2 TABLET ORAL
Refills: 0 | Status: DISCONTINUED | OUTPATIENT
Start: 2025-01-24 | End: 2025-01-25

## 2025-01-24 RX ORDER — ALBUMIN HUMAN 50 G/1000ML
250 SOLUTION INTRAVENOUS ONCE
Refills: 0 | Status: COMPLETED | OUTPATIENT
Start: 2025-01-24 | End: 2025-01-24

## 2025-01-24 RX ORDER — AMANTADINE HCL 50 MG/5 ML
100 SOLUTION, ORAL ORAL
Refills: 0 | Status: DISCONTINUED | OUTPATIENT
Start: 2025-01-24 | End: 2025-02-06

## 2025-01-24 RX ORDER — POTASSIUM CHLORIDE 750 MG/1
40 TABLET, EXTENDED RELEASE ORAL ONCE
Refills: 0 | Status: COMPLETED | OUTPATIENT
Start: 2025-01-24 | End: 2025-01-24

## 2025-01-24 RX ORDER — MAGNESIUM SULFATE 0.8 MEQ/ML
1 AMPUL (ML) INJECTION ONCE
Refills: 0 | Status: COMPLETED | OUTPATIENT
Start: 2025-01-24 | End: 2025-01-24

## 2025-01-24 RX ORDER — POTASSIUM CHLORIDE 750 MG/1
10 TABLET, EXTENDED RELEASE ORAL ONCE
Refills: 0 | Status: COMPLETED | OUTPATIENT
Start: 2025-01-24 | End: 2025-01-24

## 2025-01-24 RX ADMIN — Medication 500 MILLIGRAM(S): at 18:16

## 2025-01-24 RX ADMIN — Medication 12.5 MILLIGRAM(S): at 13:17

## 2025-01-24 RX ADMIN — Medication 500 MILLIGRAM(S): at 13:17

## 2025-01-24 RX ADMIN — LEVETIRACETAM 500 MILLIGRAM(S): 750 TABLET, FILM COATED ORAL at 18:17

## 2025-01-24 RX ADMIN — Medication 1 DROP(S): at 05:19

## 2025-01-24 RX ADMIN — POTASSIUM CHLORIDE 30 MILLIEQUIVALENT(S): 750 TABLET, EXTENDED RELEASE ORAL at 19:54

## 2025-01-24 RX ADMIN — Medication 12.5 MILLIGRAM(S): at 05:19

## 2025-01-24 RX ADMIN — POTASSIUM CHLORIDE 100 MILLIEQUIVALENT(S): 750 TABLET, EXTENDED RELEASE ORAL at 20:52

## 2025-01-24 RX ADMIN — Medication 1 DROP(S): at 13:16

## 2025-01-24 RX ADMIN — Medication 12.5 MILLIGRAM(S): at 21:16

## 2025-01-24 RX ADMIN — FENTANYL CITRATE 50 MICROGRAM(S): 50 INJECTION INTRAMUSCULAR; INTRAVENOUS at 15:45

## 2025-01-24 RX ADMIN — IPRATROPIUM BROMIDE AND ALBUTEROL SULFATE 3 MILLILITER(S): .5; 2.5 SOLUTION RESPIRATORY (INHALATION) at 05:01

## 2025-01-24 RX ADMIN — IPRATROPIUM BROMIDE AND ALBUTEROL SULFATE 3 MILLILITER(S): .5; 2.5 SOLUTION RESPIRATORY (INHALATION) at 11:12

## 2025-01-24 RX ADMIN — FENTANYL CITRATE 50 MICROGRAM(S): 50 INJECTION INTRAMUSCULAR; INTRAVENOUS at 16:00

## 2025-01-24 RX ADMIN — Medication 100 MILLIGRAM(S): at 06:07

## 2025-01-24 RX ADMIN — BUMETANIDE 2 MILLIGRAM(S): 2 TABLET ORAL at 05:19

## 2025-01-24 RX ADMIN — Medication 1 DROP(S): at 18:17

## 2025-01-24 RX ADMIN — PANTOPRAZOLE 40 MILLIGRAM(S): 20 TABLET, DELAYED RELEASE ORAL at 13:17

## 2025-01-24 RX ADMIN — Medication 100 MILLIGRAM(S): at 15:30

## 2025-01-24 RX ADMIN — ANTISEPTIC SURGICAL SCRUB 15 MILLILITER(S): 0.04 SOLUTION TOPICAL at 18:16

## 2025-01-24 RX ADMIN — POTASSIUM CHLORIDE 40 MILLIEQUIVALENT(S): 750 TABLET, EXTENDED RELEASE ORAL at 00:55

## 2025-01-24 RX ADMIN — Medication 500 MILLIGRAM(S): at 05:20

## 2025-01-24 RX ADMIN — ALBUMIN HUMAN 125 MILLILITER(S): 50 SOLUTION INTRAVENOUS at 01:56

## 2025-01-24 RX ADMIN — ANTISEPTIC SURGICAL SCRUB 1 APPLICATION(S): 0.04 SOLUTION TOPICAL at 05:14

## 2025-01-24 RX ADMIN — IPRATROPIUM BROMIDE AND ALBUTEROL SULFATE 3 MILLILITER(S): .5; 2.5 SOLUTION RESPIRATORY (INHALATION) at 23:20

## 2025-01-24 RX ADMIN — LEVETIRACETAM 500 MILLIGRAM(S): 750 TABLET, FILM COATED ORAL at 05:19

## 2025-01-24 RX ADMIN — Medication 100 GRAM(S): at 01:56

## 2025-01-24 RX ADMIN — ENOXAPARIN SODIUM 40 MILLIGRAM(S): 100 INJECTION SUBCUTANEOUS at 05:20

## 2025-01-24 RX ADMIN — ANTISEPTIC SURGICAL SCRUB 15 MILLILITER(S): 0.04 SOLUTION TOPICAL at 05:19

## 2025-01-24 RX ADMIN — BUMETANIDE 2 MILLIGRAM(S): 2 TABLET ORAL at 13:18

## 2025-01-24 NOTE — PROGRESS NOTE ADULT - SUBJECTIVE AND OBJECTIVE BOX
Patient seen and examined at the bedside.    Remains critically ill on continuous ICU monitoring, at risk for life threatening decompensation.  All Labs, data reviewed. Plan of care discussed in length during multi-disciplinary ICU rounds.       Brief Summary:  60 yo F with Sickle cell disease and NICM now with Cardiogenic shock s/p VA ECMO on 1/4/25.  VA ECMO decannulation on 1/7/25  Tracheostomy on 1/16/25  PEG placement on 1/21/25    24 Hour events:  OOB to chair, tolerates trach collar over 24h  Tolerates tube feeds via PEG tube  PMR consult and assessment  Daily PT    Objective:  Vital Signs Last 24 Hrs  T(C): 36.7 (24 Jan 2025 04:00), Max: 37.4 (23 Jan 2025 08:00)  T(F): 98 (24 Jan 2025 04:00), Max: 99.4 (23 Jan 2025 08:00)  HR: 114 (24 Jan 2025 05:13) (97 - 118)  BP: --  BP(mean): --  RR: 38 (24 Jan 2025 05:00) (21 - 38)  SpO2: 96% (24 Jan 2025 05:13) (96% - 100%)    Parameters below as of 24 Jan 2025 05:13  Patient On (Oxygen Delivery Method): tracheostomy collar    Mode: standby  FiO2: 30    Physical Exam:  NAD  Neurology: follows commands, not moving extremities  Attempting moving  toes and feet  Respiratory: Clear bilaterally , secretion improved  CV: Sinus tachycardia  Abdominal: Soft, Nontender  Extremities: Warm, well-perfused  Costa  -------------------------------------------------------------------------------------------------------------------------------    Labs:                        10.0   7.14  )-----------( 315      ( 24 Jan 2025 00:29 )             30.9     01-24    145  |  106  |  33[H]  ----------------------------<  144[H]  3.5   |  26  |  0.99    Ca    11.1[H]      24 Jan 2025 00:28  Phos  3.8     01-24  Mg     2.2     01-24    TPro  7.5  /  Alb  3.9  /  TBili  0.8  /  DBili  x   /  AST  49[H]  /  ALT  70[H]  /  AlkPhos  99  01-24    LIVER FUNCTIONS - ( 24 Jan 2025 00:28 )  Alb: 3.9 g/dL / Pro: 7.5 g/dL / ALK PHOS: 99 U/L / ALT: 70 U/L / AST: 49 U/L / GGT: x           ABG - ( 24 Jan 2025 00:04 )  pH, Arterial: 7.51  pH, Blood: x     /  pCO2: 37    /  pO2: 166   / HCO3: 30    / Base Excess: 6.1   /  SaO2: 100.0     Pertinent labs/studies:  CBC with inc WBC 10.70, low H/H 9/28 and MCV WNL, otherwise essentially WNL  PT/INR WNL, PTT dec 22.3  BMP essentially WNL  Albumin 3.6, LFT's with inc ALT//72  Mg WNL, Phos WNL  NH3 WNL, B12/folate WNL, Vitamin A WNL, A1C 4.6%, TSH inc 27.4 and T4 WNL, Lyme (-), WNV IgG (+) and IgM (-)    CSF (1/14) - clear, colorless, RBC 1, WBC 2, protein inc 68, glucose 59, PCR panel (-), flow cytometry (-), cytology (-)    MR Head w/wo IV Cont on 1/09   1. Innumerable foci susceptibility artifact scattered throughout the   brain which can be seen with critically ill patients on ECMO. Diffuse fat   embolization is also part of the differential diagnosis but is considered   less likely.  2. Tiny acute/subacute infarcts within the bilateral basal ganglia,   thalami, and chandu with disproportionate ovoid area of T2/FLAIR   prolongation in the right ventral thalamus. Findings may indicate the   presence of embolic acute/subacute infarcts.  3. No abnormal intracranial enhancement.      ALL MEDICATIONS   MEDICATIONS  (STANDING):  albuterol/ipratropium for Nebulization 3 milliLiter(s) Nebulizer every 8 hours  amantadine 100 milliGRAM(s) Oral <User Schedule>  artificial tears (preservative free) Ophthalmic Solution 1 Drop(s) Both EYES four times a day  ascorbic acid 500 milliGRAM(s) Oral daily  buMETAnide Injectable 2 milliGRAM(s) IV Push daily  chlorhexidine 0.12% Liquid 15 milliLiter(s) Oral Mucosa every 12 hours  chlorhexidine 2% Cloths 1 Application(s) Topical <User Schedule>  chlorhexidine 4% Liquid 1 Application(s) Topical <User Schedule>  enoxaparin Injectable 40 milliGRAM(s) SubCutaneous every 24 hours  hydroxyurea 500 milliGRAM(s) Oral two times a day  levETIRAcetam  Solution 500 milliGRAM(s) Oral every 12 hours  metoprolol tartrate 12.5 milliGRAM(s) Oral every 8 hours  midazolam Injectable 6 milliGRAM(s) IV Push once  multivitamin 1 Tablet(s) Oral daily  pantoprazole  Injectable 40 milliGRAM(s) IV Push daily  polyethylene glycol 3350 17 Gram(s) Oral daily  senna 1 Tablet(s) Oral daily    MEDICATIONS  (PRN):  acetaminophen   Oral Liquid .. 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 Pt initially presented to [facility] for SOB and SS crisis; course c/b witnessed seizure, intubated and sedated, and transferred to [facility]. Transferred to [facility] for further management of cardiogenic shock. Cannulated for VA ECMO on 1/4/25. Decannulated on 1/7/25.    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  LP on 1/14 - no evidence of meningitis/ encephalitis   Neuro exam improving slowly    ***Cardiovascular***  At risk for hemodynamic decompensation  S/p VA ECMO, Bi vent function recovered.  On Low dose BB    ***Pulmonary***  Acute hypoxic respiratory failure s/p Tracheostomy   trach collar as tolerates, ok to keep for 24h  Nebs daily    ***GI***  Protein calorie malnutrition  S/p PEG placement enteral feeds  Tube feeds via PEG, tolerating well so far  Will increase rate  Protonix for stress ulcer prophylaxis   Bowel regimen.    ***Renal***  Trend Creatinine   Costa catheter for strict I/O measurements.     ***ID***  MSSA bacteremia, BAL + MSSA -IV vanc completed  Merrem for total 10 days, completed  monitor off antibiotics    ***Endocrine***  Hyperglycemia - Insulin sliding scale.     ***Hematology***  Acute blood loss anemia - no current transfusion indication.  Sickle Cell - s/p Red cell exchange, Hgb electrophoresis weekly  Hydroxyurea - monitor platelet count.  Lovenox for DVT prophylaxis         IYana MD, personally performed the services described in this documentation. All medical record entries made by the scribe were at my direction and in my presence. I have reviewed the chart and agree that the record reflects my personal performance and is accurate and complete  Electronically signed:   Yana Patricia MD  CT ICU attending     ICU time: ** mins     By signing my name below, INorris, attest that this documentation has been prepared under the direction and in the presence of Yana Patricia MD  Electronically signed: Norris Oliver, 01-24-25 @ 06:22             Patient seen and examined at the bedside.    Remains critically ill on continuous ICU monitoring, at risk for life threatening decompensation.  All Labs, data reviewed. Plan of care discussed in length during multi-disciplinary ICU rounds.       Brief Summary:  58 yo F with Sickle cell disease and NICM now with Cardiogenic shock s/p VA ECMO on 1/4/25.  VA ECMO decannulation on 1/7/25  Tracheostomy on 1/16/25  PEG placement on 1/21/25    24 Hour events:  OOB to chair, tolerates trach collar over 24h  PMR consult and assessment  Daily PT  Spine MRI today    Objective:  Vital Signs Last 24 Hrs  T(C): 36.7 (24 Jan 2025 04:00), Max: 37.4 (23 Jan 2025 08:00)  T(F): 98 (24 Jan 2025 04:00), Max: 99.4 (23 Jan 2025 08:00)  HR: 114 (24 Jan 2025 05:13) (97 - 118)  BP: --  BP(mean): --  RR: 38 (24 Jan 2025 05:00) (21 - 38)  SpO2: 96% (24 Jan 2025 05:13) (96% - 100%)    Parameters below as of 24 Jan 2025 05:13  Patient On (Oxygen Delivery Method): tracheostomy collar    Mode: standby  FiO2: 30    Physical Exam:  NAD  Neurology: follows commands, not moving extremities  Attempting moving  toes and feet  Respiratory: Clear bilaterally , secretion improved  CV: Sinus tachycardia  Abdominal: Soft, Nontender  Extremities: Warm, well-perfused  Costa  -------------------------------------------------------------------------------------------------------------------------------    Labs:                        10.0   7.14  )-----------( 315      ( 24 Jan 2025 00:29 )             30.9     01-24    145  |  106  |  33[H]  ----------------------------<  144[H]  3.5   |  26  |  0.99    Ca    11.1[H]      24 Jan 2025 00:28  Phos  3.8     01-24  Mg     2.2     01-24    TPro  7.5  /  Alb  3.9  /  TBili  0.8  /  DBili  x   /  AST  49[H]  /  ALT  70[H]  /  AlkPhos  99  01-24    LIVER FUNCTIONS - ( 24 Jan 2025 00:28 )  Alb: 3.9 g/dL / Pro: 7.5 g/dL / ALK PHOS: 99 U/L / ALT: 70 U/L / AST: 49 U/L / GGT: x           ABG - ( 24 Jan 2025 00:04 )  pH, Arterial: 7.51  pH, Blood: x     /  pCO2: 37    /  pO2: 166   / HCO3: 30    / Base Excess: 6.1   /  SaO2: 100.0     Pertinent labs/studies:  CBC with inc WBC 10.70, low H/H 9/28 and MCV WNL, otherwise essentially WNL  PT/INR WNL, PTT dec 22.3  BMP essentially WNL  Albumin 3.6, LFT's with inc ALT//72  Mg WNL, Phos WNL  NH3 WNL, B12/folate WNL, Vitamin A WNL, A1C 4.6%, TSH inc 27.4 and T4 WNL, Lyme (-), WNV IgG (+) and IgM (-)    CSF (1/14) - clear, colorless, RBC 1, WBC 2, protein inc 68, glucose 59, PCR panel (-), flow cytometry (-), cytology (-)    MR Head w/wo IV Cont on 1/09   1. Innumerable foci susceptibility artifact scattered throughout the   brain which can be seen with critically ill patients on ECMO. Diffuse fat   embolization is also part of the differential diagnosis but is considered   less likely.  2. Tiny acute/subacute infarcts within the bilateral basal ganglia,   thalami, and chandu with disproportionate ovoid area of T2/FLAIR   prolongation in the right ventral thalamus. Findings may indicate the   presence of embolic acute/subacute infarcts.  3. No abnormal intracranial enhancement.      ALL MEDICATIONS   MEDICATIONS  (STANDING):  albuterol/ipratropium for Nebulization 3 milliLiter(s) Nebulizer every 8 hours  amantadine 100 milliGRAM(s) Oral <User Schedule>  artificial tears (preservative free) Ophthalmic Solution 1 Drop(s) Both EYES four times a day  ascorbic acid 500 milliGRAM(s) Oral daily  buMETAnide Injectable 2 milliGRAM(s) IV Push daily  chlorhexidine 0.12% Liquid 15 milliLiter(s) Oral Mucosa every 12 hours  chlorhexidine 2% Cloths 1 Application(s) Topical <User Schedule>  chlorhexidine 4% Liquid 1 Application(s) Topical <User Schedule>  enoxaparin Injectable 40 milliGRAM(s) SubCutaneous every 24 hours  hydroxyurea 500 milliGRAM(s) Oral two times a day  levETIRAcetam  Solution 500 milliGRAM(s) Oral every 12 hours  metoprolol tartrate 12.5 milliGRAM(s) Oral every 8 hours  midazolam Injectable 6 milliGRAM(s) IV Push once  multivitamin 1 Tablet(s) Oral daily  pantoprazole  Injectable 40 milliGRAM(s) IV Push daily  polyethylene glycol 3350 17 Gram(s) Oral daily  senna 1 Tablet(s) Oral daily    MEDICATIONS  (PRN):  acetaminophen   Oral Liquid .. 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 Pt initially presented to [facility] for SOB and SS crisis; course c/b witnessed seizure, intubated and sedated, and transferred to [facility]. Transferred to [facility] for further management of cardiogenic shock. Cannulated for VA ECMO on 1/4/25. Decannulated on 1/7/25.    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  LP on 1/14 - no evidence of meningitis/ encephalitis   Neuro exam improving slowly    ***Cardiovascular***  At risk for hemodynamic decompensation  S/p VA ECMO, Bi vent function recovered.  On Low dose BB  Spine MRI today    ***Pulmonary***  Acute hypoxic respiratory failure s/p Tracheostomy   trach collar as tolerates, ok to keep for 24h  Nebs daily    ***GI***  Protein calorie malnutrition  S/p PEG placement enteral feeds  Tube feeds via PEG, tolerating well so far  Will increase rate  Protonix for stress ulcer prophylaxis   Bowel regimen.    ***Renal***  Trend Creatinine   Costa catheter for strict I/O measurements.     ***ID***  MSSA bacteremia, BAL + MSSA -IV vanc completed  Merrem for total 10 days, completed  monitor off antibiotics    ***Endocrine***  Hyperglycemia - Insulin sliding scale.     ***Hematology***  Acute blood loss anemia - no current transfusion indication.  Sickle Cell - s/p Red cell exchange, Hgb electrophoresis weekly  Hydroxyurea - monitor platelet count.  Lovenox for DVT prophylaxis         IYana MD, personally performed the services described in this documentation. All medical record entries made by the scribe were at my direction and in my presence. I have reviewed the chart and agree that the record reflects my personal performance and is accurate and complete  Electronically signed:   Yana Patricia MD  CT ICU attending     ICU time: ** mins     By signing my name below, INorris, attest that this documentation has been prepared under the direction and in the presence of Yana Patricia MD  Electronically signed: Norris Oliver, 01-24-25 @ 06:22             Patient seen and examined at the bedside.    Remains critically ill on continuous ICU monitoring, at risk for life threatening decompensation.  All Labs, data reviewed. Plan of care discussed in length during multi-disciplinary ICU rounds.     Brief Summary:  60 yo F with Sickle cell disease and NICM now with Cardiogenic shock s/p VA ECMO on 1/4/25.  VA ECMO decannulation on 1/7/25  Tracheostomy on 1/16/25  PEG placement on 1/21/25    24 Hour events:  OOB to chair, tolerates trach collar throughout the day   PMR consult and assessment, Daily PT/OT, mobility slowly improving  Spine MRI today   PMV and speech evaluation     Objective:  Vital Signs Last 24 Hrs  T(C): 36.7 (24 Jan 2025 04:00), Max: 37.4 (23 Jan 2025 08:00)  T(F): 98 (24 Jan 2025 04:00), Max: 99.4 (23 Jan 2025 08:00)  HR: 114 (24 Jan 2025 05:13) (97 - 118)  BP: --  BP(mean): --  RR: 38 (24 Jan 2025 05:00) (21 - 38)  SpO2: 96% (24 Jan 2025 05:13) (96% - 100%)    Parameters below as of 24 Jan 2025 05:13  Patient On (Oxygen Delivery Method): tracheostomy collar    Mode: standby  FiO2: 30    Physical Exam:  NAD  Neurology: follows commands, not moving extremities  Attempting moving  toes and feet  Respiratory: Clear bilaterally , secretion improved  CV: Sinus tachycardia  Abdominal: Soft, Nontender  Extremities: Warm, well-perfused  Costa  -------------------------------------------------------------------------------------------------------------------------------    Labs:                        10.0   7.14  )-----------( 315      ( 24 Jan 2025 00:29 )             30.9     01-24    145  |  106  |  33[H]  ----------------------------<  144[H]  3.5   |  26  |  0.99    Ca    11.1[H]      24 Jan 2025 00:28  Phos  3.8     01-24  Mg     2.2     01-24    TPro  7.5  /  Alb  3.9  /  TBili  0.8  /  DBili  x   /  AST  49[H]  /  ALT  70[H]  /  AlkPhos  99  01-24    LIVER FUNCTIONS - ( 24 Jan 2025 00:28 )  Alb: 3.9 g/dL / Pro: 7.5 g/dL / ALK PHOS: 99 U/L / ALT: 70 U/L / AST: 49 U/L / GGT: x           ABG - ( 24 Jan 2025 00:04 )  pH, Arterial: 7.51  pH, Blood: x     /  pCO2: 37    /  pO2: 166   / HCO3: 30    / Base Excess: 6.1   /  SaO2: 100.0     Pertinent labs/studies:  CBC with inc WBC 10.70, low H/H 9/28 and MCV WNL, otherwise essentially WNL  PT/INR WNL, PTT dec 22.3  BMP essentially WNL  Albumin 3.6, LFT's with inc ALT//72  Mg WNL, Phos WNL  NH3 WNL, B12/folate WNL, Vitamin A WNL, A1C 4.6%, TSH inc 27.4 and T4 WNL, Lyme (-), WNV IgG (+) and IgM (-)    CSF (1/14) - clear, colorless, RBC 1, WBC 2, protein inc 68, glucose 59, PCR panel (-), flow cytometry (-), cytology (-)    MR Head w/wo IV Cont on 1/09   1. Innumerable foci susceptibility artifact scattered throughout the   brain which can be seen with critically ill patients on ECMO. Diffuse fat   embolization is also part of the differential diagnosis but is considered   less likely.  2. Tiny acute/subacute infarcts within the bilateral basal ganglia,   thalami, and chandu with disproportionate ovoid area of T2/FLAIR   prolongation in the right ventral thalamus. Findings may indicate the   presence of embolic acute/subacute infarcts.  3. No abnormal intracranial enhancement.    ALL MEDICATIONS   MEDICATIONS  (STANDING):  albuterol/ipratropium for Nebulization 3 milliLiter(s) Nebulizer every 8 hours  amantadine 100 milliGRAM(s) Oral <User Schedule>  artificial tears (preservative free) Ophthalmic Solution 1 Drop(s) Both EYES four times a day  ascorbic acid 500 milliGRAM(s) Oral daily  buMETAnide Injectable 2 milliGRAM(s) IV Push daily  chlorhexidine 0.12% Liquid 15 milliLiter(s) Oral Mucosa every 12 hours  chlorhexidine 2% Cloths 1 Application(s) Topical <User Schedule>  chlorhexidine 4% Liquid 1 Application(s) Topical <User Schedule>  enoxaparin Injectable 40 milliGRAM(s) SubCutaneous every 24 hours  hydroxyurea 500 milliGRAM(s) Oral two times a day  levETIRAcetam  Solution 500 milliGRAM(s) Oral every 12 hours  metoprolol tartrate 12.5 milliGRAM(s) Oral every 8 hours  midazolam Injectable 6 milliGRAM(s) IV Push once  multivitamin 1 Tablet(s) Oral daily  pantoprazole  Injectable 40 milliGRAM(s) IV Push daily  polyethylene glycol 3350 17 Gram(s) Oral daily  senna 1 Tablet(s) Oral daily    MEDICATIONS  (PRN):  acetaminophen   Oral Liquid .. 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 Pt initially presented to [facility] for SOB and SS crisis; course c/b witnessed seizure, intubated and sedated, and transferred to [facility]. Transferred to [facility] for further management of cardiogenic shock. Cannulated for VA ECMO on 1/4/25. Decannulated on 1/7/25.    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  LP on 1/14 - no evidence of meningitis/ encephalitis   Neuro exam improving slowly    ***Cardiovascular***  At risk for hemodynamic decompensation  S/p VA ECMO, Bi vent function recovered.  On Low dose BB, will increase to 25 BID  Spine MRI     ***Pulmonary***  Acute hypoxic respiratory failure s/p Tracheostomy   trach collar as tolerates, ok to keep for 24h  Nebs daily    ***GI***  Protein calorie malnutrition  S/p PEG placement   Enteral feeding via PEG tube, tolerates well  At goal  Protonix for stress ulcer prophylaxis   Bowel regimen.    ***Renal***  Trend Creatinine   Costa catheter for strict I/O measurements.  Bumex daily     ***ID***  MSSA bacteremia, BAL + MSSA -IV vanc completed  Merrem for total 10 days, completed  monitor off antibiotics    ***Endocrine***  Hyperglycemia - Insulin sliding scale.     ***Hematology***  Acute blood loss anemia - no current transfusion indication.  Sickle Cell - s/p Red cell exchange, Hgb electrophoresis weekly  Hydroxyurea - monitor platelet count.  Lovenox for DVT prophylaxis     IYana MD, personally performed the services described in this documentation. All medical record entries made by the scribe were at my direction and in my presence. I have reviewed the chart and agree that the record reflects my personal performance and is accurate and complete  Electronically signed:   Yana Patricia MD  CT ICU attending     ICU time: 45 mins     By signing my name below, I, Norris Oliver, attest that this documentation has been prepared under the direction and in the presence of Yana Patricia MD  Electronically signed: Norris Oliver, 01-24-25 @ 06:22

## 2025-01-25 LAB
ALBUMIN SERPL ELPH-MCNC: 4 G/DL — SIGNIFICANT CHANGE UP (ref 3.3–5)
ALP SERPL-CCNC: 90 U/L — SIGNIFICANT CHANGE UP (ref 40–120)
ALT FLD-CCNC: 50 U/L — HIGH (ref 10–45)
ANION GAP SERPL CALC-SCNC: 14 MMOL/L — SIGNIFICANT CHANGE UP (ref 5–17)
AST SERPL-CCNC: 31 U/L — SIGNIFICANT CHANGE UP (ref 10–40)
BASOPHILS # BLD AUTO: 0.04 K/UL — SIGNIFICANT CHANGE UP (ref 0–0.2)
BASOPHILS NFR BLD AUTO: 0.5 % — SIGNIFICANT CHANGE UP (ref 0–2)
BILIRUB SERPL-MCNC: 0.9 MG/DL — SIGNIFICANT CHANGE UP (ref 0.2–1.2)
BUN SERPL-MCNC: 40 MG/DL — HIGH (ref 7–23)
CALCIUM SERPL-MCNC: 11.2 MG/DL — HIGH (ref 8.4–10.5)
CHLORIDE SERPL-SCNC: 107 MMOL/L — SIGNIFICANT CHANGE UP (ref 96–108)
CO2 SERPL-SCNC: 25 MMOL/L — SIGNIFICANT CHANGE UP (ref 22–31)
CREAT SERPL-MCNC: 1.26 MG/DL — SIGNIFICANT CHANGE UP (ref 0.5–1.3)
EGFR: 50 ML/MIN/1.73M2 — LOW
EOSINOPHIL # BLD AUTO: 0.41 K/UL — SIGNIFICANT CHANGE UP (ref 0–0.5)
EOSINOPHIL NFR BLD AUTO: 5.3 % — SIGNIFICANT CHANGE UP (ref 0–6)
GAS PNL BLDA: SIGNIFICANT CHANGE UP
GLUCOSE SERPL-MCNC: 110 MG/DL — HIGH (ref 70–99)
HCT VFR BLD CALC: 30.5 % — LOW (ref 34.5–45)
HGB BLD-MCNC: 9.9 G/DL — LOW (ref 11.5–15.5)
IMM GRANULOCYTES NFR BLD AUTO: 0.6 % — SIGNIFICANT CHANGE UP (ref 0–0.9)
LYMPHOCYTES # BLD AUTO: 2.24 K/UL — SIGNIFICANT CHANGE UP (ref 1–3.3)
LYMPHOCYTES # BLD AUTO: 28.8 % — SIGNIFICANT CHANGE UP (ref 13–44)
MAGNESIUM SERPL-MCNC: 2.3 MG/DL — SIGNIFICANT CHANGE UP (ref 1.6–2.6)
MCHC RBC-ENTMCNC: 30.1 PG — SIGNIFICANT CHANGE UP (ref 27–34)
MCHC RBC-ENTMCNC: 32.5 G/DL — SIGNIFICANT CHANGE UP (ref 32–36)
MCV RBC AUTO: 92.7 FL — SIGNIFICANT CHANGE UP (ref 80–100)
MONOCYTES # BLD AUTO: 0.87 K/UL — SIGNIFICANT CHANGE UP (ref 0–0.9)
MONOCYTES NFR BLD AUTO: 11.2 % — SIGNIFICANT CHANGE UP (ref 2–14)
NEUTROPHILS # BLD AUTO: 4.18 K/UL — SIGNIFICANT CHANGE UP (ref 1.8–7.4)
NEUTROPHILS NFR BLD AUTO: 53.6 % — SIGNIFICANT CHANGE UP (ref 43–77)
NRBC # BLD: 0 /100 WBCS — SIGNIFICANT CHANGE UP (ref 0–0)
NRBC BLD-RTO: 0 /100 WBCS — SIGNIFICANT CHANGE UP (ref 0–0)
PHOSPHATE SERPL-MCNC: 4.5 MG/DL — SIGNIFICANT CHANGE UP (ref 2.5–4.5)
PLATELET # BLD AUTO: 317 K/UL — SIGNIFICANT CHANGE UP (ref 150–400)
POTASSIUM SERPL-MCNC: 3.9 MMOL/L — SIGNIFICANT CHANGE UP (ref 3.5–5.3)
POTASSIUM SERPL-SCNC: 3.9 MMOL/L — SIGNIFICANT CHANGE UP (ref 3.5–5.3)
PROT SERPL-MCNC: 7.6 G/DL — SIGNIFICANT CHANGE UP (ref 6–8.3)
RBC # BLD: 3.29 M/UL — LOW (ref 3.8–5.2)
RBC # FLD: 15.3 % — HIGH (ref 10.3–14.5)
SODIUM SERPL-SCNC: 146 MMOL/L — HIGH (ref 135–145)
WBC # BLD: 7.79 K/UL — SIGNIFICANT CHANGE UP (ref 3.8–10.5)
WBC # FLD AUTO: 7.79 K/UL — SIGNIFICANT CHANGE UP (ref 3.8–10.5)

## 2025-01-25 PROCEDURE — 99291 CRITICAL CARE FIRST HOUR: CPT

## 2025-01-25 PROCEDURE — 71045 X-RAY EXAM CHEST 1 VIEW: CPT | Mod: 26

## 2025-01-25 RX ORDER — BISACODYL 5 MG
10 TABLET, DELAYED RELEASE (ENTERIC COATED) ORAL ONCE
Refills: 0 | Status: COMPLETED | OUTPATIENT
Start: 2025-01-25 | End: 2025-01-25

## 2025-01-25 RX ORDER — POLYETHYLENE GLYCOL 3350 17 G/17G
17 POWDER, FOR SOLUTION ORAL ONCE
Refills: 0 | Status: COMPLETED | OUTPATIENT
Start: 2025-01-25 | End: 2025-01-25

## 2025-01-25 RX ORDER — IRON/FOLIC ACID/C/B6/B12/ZINC 150-1.25MG
15 TABLET ORAL DAILY
Refills: 0 | Status: DISCONTINUED | OUTPATIENT
Start: 2025-01-25 | End: 2025-02-06

## 2025-01-25 RX ADMIN — Medication 1 DROP(S): at 17:13

## 2025-01-25 RX ADMIN — Medication 100 MILLIGRAM(S): at 06:34

## 2025-01-25 RX ADMIN — Medication 500 MILLIGRAM(S): at 12:48

## 2025-01-25 RX ADMIN — POLYETHYLENE GLYCOL 3350 17 GRAM(S): 17 POWDER, FOR SOLUTION ORAL at 12:48

## 2025-01-25 RX ADMIN — Medication 12.5 MILLIGRAM(S): at 05:25

## 2025-01-25 RX ADMIN — Medication 12.5 MILLIGRAM(S): at 21:31

## 2025-01-25 RX ADMIN — IPRATROPIUM BROMIDE AND ALBUTEROL SULFATE 3 MILLILITER(S): .5; 2.5 SOLUTION RESPIRATORY (INHALATION) at 21:24

## 2025-01-25 RX ADMIN — BUMETANIDE 2 MILLIGRAM(S): 2 TABLET ORAL at 05:25

## 2025-01-25 RX ADMIN — IPRATROPIUM BROMIDE AND ALBUTEROL SULFATE 3 MILLILITER(S): .5; 2.5 SOLUTION RESPIRATORY (INHALATION) at 11:53

## 2025-01-25 RX ADMIN — ANTISEPTIC SURGICAL SCRUB 15 MILLILITER(S): 0.04 SOLUTION TOPICAL at 05:25

## 2025-01-25 RX ADMIN — ANTISEPTIC SURGICAL SCRUB 1 APPLICATION(S): 0.04 SOLUTION TOPICAL at 05:20

## 2025-01-25 RX ADMIN — IPRATROPIUM BROMIDE AND ALBUTEROL SULFATE 3 MILLILITER(S): .5; 2.5 SOLUTION RESPIRATORY (INHALATION) at 05:27

## 2025-01-25 RX ADMIN — POLYETHYLENE GLYCOL 3350 17 GRAM(S): 17 POWDER, FOR SOLUTION ORAL at 17:13

## 2025-01-25 RX ADMIN — Medication 15 MILLILITER(S): at 17:12

## 2025-01-25 RX ADMIN — Medication 100 MILLIGRAM(S): at 13:02

## 2025-01-25 RX ADMIN — LEVETIRACETAM 500 MILLIGRAM(S): 750 TABLET, FILM COATED ORAL at 17:13

## 2025-01-25 RX ADMIN — Medication 1 DROP(S): at 12:47

## 2025-01-25 RX ADMIN — Medication 12.5 MILLIGRAM(S): at 13:02

## 2025-01-25 RX ADMIN — Medication 1 DROP(S): at 05:25

## 2025-01-25 RX ADMIN — LEVETIRACETAM 500 MILLIGRAM(S): 750 TABLET, FILM COATED ORAL at 05:24

## 2025-01-25 RX ADMIN — PANTOPRAZOLE 40 MILLIGRAM(S): 20 TABLET, DELAYED RELEASE ORAL at 12:48

## 2025-01-25 RX ADMIN — Medication 10 MILLIGRAM(S): at 17:12

## 2025-01-25 RX ADMIN — Medication 500 MILLIGRAM(S): at 05:25

## 2025-01-25 RX ADMIN — Medication 1 TABLET(S): at 12:48

## 2025-01-25 RX ADMIN — Medication 500 MILLIGRAM(S): at 17:13

## 2025-01-25 RX ADMIN — ENOXAPARIN SODIUM 40 MILLIGRAM(S): 100 INJECTION SUBCUTANEOUS at 05:24

## 2025-01-25 NOTE — SWALLOW BEDSIDE ASSESSMENT ADULT - SLP PERTINENT HISTORY OF CURRENT PROBLEM
12/30 admitted with sickle cell; 1/2 unresponsive ? witnessed seizure; 1/4 VA ECMO - cardiogenic shock, RV failure; 1/7 LVEF 30-35%, ECMO decannulation; 1/8 Seizure on EEG; 1/9 MRI . Innumerable foci susceptibility artifact scattered throughout the brain which can be seen with critically ill patients on ECMO. 1/12 RBC Exchange. 1/14 LP. persistent respiratory failure, 1/16 s/p Trach. 1/21 s/p PEG. 1/24 MRI spinal stenosis. tolerating TC trial; mentation improving. Patient with multifactorial encephalopathy and persistent diffuse weakness.

## 2025-01-25 NOTE — SPEAKING VALVE EVALUATION - H & P REVIEW
59F PMH Sickle cell disease (on hydroxyurea), HTN, 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 Pt initially presented to [facility] for SOB and SS crisis; course c/b witnessed seizure, intubated/ sedated, transferred to [facility] for further management. Keppra was discontinued at [facility] due to negative EEG. At [facility] MICU pt found to have RV failure possibly iso pulm HTN 2/2 increased tidal volumes on vent. Neuro was consulted for seizures and EEG technician was called for vEEG placement. Pt is in profound cardiogenic shock. Pt decompensating despite Dobutamine gtt, Bumex gtt, Levophed, and addition of Vasopressin. Vasopressors requirements have gone up. NS shock team was called and pt transferred to [facility] for further mgmt. Lives w daughter and son in home w 2-3 DONNA; PTA Independent/yes

## 2025-01-25 NOTE — SPEAKING VALVE EVALUATION - PHONATION DURING VALVE TRIAL
slight brief vocalization on a couple of occasions during trial, but Pt mostly non-vocal / non-verbal

## 2025-01-25 NOTE — SPEECH LANGUAGE PATHOLOGY EVALUATION - SLP CONVERSATIONAL SPEECH
rare attempts to mouth words in spontaneous contexts, but not intelligible and significantly hypophonic

## 2025-01-25 NOTE — SWALLOW BEDSIDE ASSESSMENT ADULT - SLP GENERAL OBSERVATIONS
Pt seen at bedside, awake and alert, unable to demonstrate orientation, minimally responsive but pleasant, making eye contact, flat affect, intermittently following directions with maximal cues and models for the purposes of the exam.

## 2025-01-25 NOTE — PROGRESS NOTE ADULT - SUBJECTIVE AND OBJECTIVE BOX
Brief history : 60 yo with history of sickle cell  12/30 admitted with sickle cell   1/2 unresponsive ? witnessed seizure  1/4 [facility] ECMO - cardiogenic shock, RV failure  1/7 LVEF 30-35%, ECMO decannulation   1/8 Seizure on EEG   1/9 MRI . Innumerable foci susceptibility artifact scattered throughout the   brain which can be seen with critically ill patients on ECMO. Diffuse fat   embolization is also part of the differential diagnosis but is considered   less likely.    1/12 RBC Exchange   1/14 LP   1/16 Trach   1/21 s/p PEG   1/24 MRI spinal stenosis     24 hour events : no actue issues  tolerating TC trial  mentation improving     ICU Vital Signs Last 24 Hrs  T(C): 37 (01-25-25 @ 04:00), Max: 37.2 (01-24-25 @ 16:00)  T(F): 98.6 (01-25-25 @ 04:00), Max: 99 (01-24-25 @ 16:00)  HR: 94 (01-25-25 @ 07:00) (84 - 112)  ABP: 117/68 (01-25-25 @ 07:00) (85/51 - 130/84)  ABP(mean): 87 (01-25-25 @ 07:00) (63 - 104)  RR: 21 (01-25-25 @ 07:00) (16 - 28)  SpO2: 100% (01-25-25 @ 07:00) (98% - 100%)    I&O's Summary    24 Jan 2025 07:01  -  25 Jan 2025 07:00  --------------------------------------------------------  IN: 1485 mL / OUT: 850 mL / NET: 635 mL    Mode: standby  FiO2: 30    ABG - ( 25 Jan 2025 00:02 )  pH: 7.47  /  pCO2: 40    /  pO2: 180   / HCO3: 29    / Base Excess: 5.0   /  SaO2: 100.0                         9.9    7.79  )-----------( 317      ( 25 Jan 2025 00:30 )             30.5     25 Jan 2025 00:30    146    |  107    |  40     ----------------------------<  110    3.9     |  25     |  1.26     Ca    11.2       25 Jan 2025 00:30  Phos  4.5       25 Jan 2025 00:30  Mg     2.3       25 Jan 2025 00:30    TPro  7.6    /  Alb  4.0    /  TBili  0.9    /  DBili  x      /  AST  31     /  ALT  50     /  AlkPhos  90     25 Jan 2025 00:30    MEDICATIONS  (STANDING):  albuterol/ipratropium for Nebulization 3 milliLiter(s) Nebulizer every 8 hours    amantadine Syrup 100 milliGRAM(s) Oral <User Schedule>  levETIRAcetam  Solution 500 milliGRAM(s) Oral every 12 hours    ascorbic acid 500 milliGRAM(s) Oral daily  buMETAnide Injectable 2 milliGRAM(s) IV Push two times a day    enoxaparin Injectable 40 milliGRAM(s) SubCutaneous every 24 hours  hydroxyurea 500 milliGRAM(s) Oral two times a day  levETIRAcetam  Solution 500 milliGRAM(s) Oral every 12 hours    metoprolol tartrate 12.5 milliGRAM(s) Oral every 8 hours  multivitamin 1 Tablet(s) Oral daily    pantoprazole  Injectable 40 milliGRAM(s) IV Push daily  polyethylene glycol 3350 17 Gram(s) Oral daily  senna 1 Tablet(s) Oral daily    PHYSICAL EXAM:  Gen : no acute distress  Respiratory: decreased in the bases  Cardiovascular: S1 and S2, RRR, no M/G/R  Gastrointestinal: BS+, soft, NT/ND  Extremities: No peripheral edema  Vascular: 2+ peripheral pulses  Neurological: A/O x 3, no focal deficits  Incision: clean dry/ no sign of infection      admitted with Sickle cell crisis and cardiogenic shock requiring ECMO support  Respiratory failure s/p trach  Critical illness polyneuropathy/myopathy   Encephalopathy     Neuro  Metabolic encephalopathy  Critical illness polymyopathy/neuropathy   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 ASA)     CVS   s/p [facility] ECMO   EF Echo improved  MCS - s/p VA ECMO support on admission  Rhythm nSR     Pulm   Off vent  tolerating TC   Speech therapy     GI proph/Bowel regimen  s/p PEG  tolerating TF        Monitor UOP  Correct Electrolytes K >4.0-4.5 Mag 2.0-2.5    Endo   Glycemic control per protocol    Heme   Correct coagulapathy, monitor for bleeding  DVT proph  history Sickle Cell disease - continue hydroxyurea     ID   off antibiotics      Critical care time spent  45  min  Brief history : 58 yo with history of sickle cell  12/30 admitted with sickle cell   1/2 unresponsive ? witnessed seizure  1/4 [facility] ECM[facility] - cardiogenic shock, RV failure  1/7 LVEF 30-35%, ECMO decannulation   1/8 Seizure on EEG   1/9 MRI . Innumerable foci susceptibility artifact scattered throughout the   brain which can be seen with critically ill patients on ECMO. Diffuse fat   embolization is also part of the differential diagnosis but is considered   less likely.    1/12 RBC Exchange   1/14 LP   1/16 Trach   1/21 s/p PEG   1/24 MRI spinal stenosis     24 hour events : no actue issues  tolerating TC trial  mentation improving     ICU Vital Signs Last 24 Hrs  T(C): 37 (01-25-25 @ 04:00), Max: 37.2 (01-24-25 @ 16:00)  T(F): 98.6 (01-25-25 @ 04:00), Max: 99 (01-24-25 @ 16:00)  HR: 94 (01-25-25 @ 07:00) (84 - 112)  ABP: 117/68 (01-25-25 @ 07:00) (85/51 - 130/84)  ABP(mean): 87 (01-25-25 @ 07:00) (63 - 104)  RR: 21 (01-25-25 @ 07:00) (16 - 28)  SpO2: 100% (01-25-25 @ 07:00) (98% - 100%)    I&O's Summary    24 Jan 2025 07:01  -  25 Jan 2025 07:00  --------------------------------------------------------  IN: 1485 mL / OUT: 850 mL / NET: 635 mL    Mode: standby  FiO2: 30    ABG - ( 25 Jan 2025 00:02 )  pH: 7.47  /  pCO2: 40    /  pO2: 180   / HCO3: 29    / Base Excess: 5.0   /  SaO2: 100.0                         9.9    7.79  )-----------( 317      ( 25 Jan 2025 00:30 )             30.5     25 Jan 2025 00:30    146    |  107    |  40     ----------------------------<  110    3.9     |  25     |  1.26     Ca    11.2       25 Jan 2025 00:30  Phos  4.5       25 Jan 2025 00:30  Mg     2.3       25 Jan 2025 00:30    TPro  7.6    /  Alb  4.0    /  TBili  0.9    /  DBili  x      /  AST  31     /  ALT  50     /  AlkPhos  90     25 Jan 2025 00:30    MEDICATIONS  (STANDING):  albuterol/ipratropium for Nebulization 3 milliLiter(s) Nebulizer every 8 hours    amantadine Syrup 100 milliGRAM(s) Oral <User Schedule>  levETIRAcetam  Solution 500 milliGRAM(s) Oral every 12 hours    ascorbic acid 500 milliGRAM(s) Oral daily  buMETAnide Injectable 2 milliGRAM(s) IV Push two times a day    enoxaparin Injectable 40 milliGRAM(s) SubCutaneous every 24 hours  hydroxyurea 500 milliGRAM(s) Oral two times a day  levETIRAcetam  Solution 500 milliGRAM(s) Oral every 12 hours    metoprolol tartrate 12.5 milliGRAM(s) Oral every 8 hours  multivitamin 1 Tablet(s) Oral daily    pantoprazole  Injectable 40 milliGRAM(s) IV Push daily  polyethylene glycol 3350 17 Gram(s) Oral daily  senna 1 Tablet(s) Oral daily    PHYSICAL EXAM:  Gen : no acute distress  Respiratory: decreased in the bases  Cardiovascular: S1 and S2, RRR, no M/G/R  Gastrointestinal: BS+, soft, NT/ND  Extremities: No peripheral edema  Vascular: 2+ peripheral pulses  Neurological: A/O x 3, no focal deficits  Incision: clean dry/ no sign of infection      admitted with Sickle cell crisis and cardiogenic shock requiring ECMO support  Respiratory failure s/p trach  Critical illness polyneuropathy/myopathy   Encephalopathy     Neuro  Metabolic encephalopathy  Critical illness polymyopathy/neuropathy   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 ASA)   MRI spine results reviewed     CVS   s/p VA ECMO   EF Echo improved  MCS - s/p VA ECMO support on admission  Rhythm nSR     Pulm   Off vent  tolerating TC   Speech therapy     GI proph/Bowel regimen  s/p PEG  tolerating TF        Monitor UOP  Correct Electrolytes K >4.0-4.5 Mag 2.0-2.5    Endo   Glycemic control per protocol    Heme   Correct coagulapathy, monitor for bleeding  DVT proph  history Sickle Cell disease - continue hydroxyurea     ID   off antibiotics      Critical care time spent  45  min   Discharge planning  PT/OT  Speech therapy

## 2025-01-25 NOTE — SPEECH LANGUAGE PATHOLOGY EVALUATION - COMMENTS
Pt with intermittent attempts to vocalize (/i/or /a/) with maximal cues and models. unable to write upon request, noted to produce only one very small squiggle Most recent imaging:  CT Head No Cont (01.04.25 @ 15:46) >  No acute intracranial hemorrhage, mass effect, or midline shift. No significant change since 1/2/2025.    MR Head w/wo IV Cont (01.09.25 @ 18:07) >  1. Innumerable foci susceptibility artifact scattered throughout the brain which can be seen with critically ill patients on ECMO. Diffuse fat embolization is also part of the differential diagnosis but is considered less likely. 2. Tiny acute/subacute infarcts within the bilateral basal ganglia, thalami, and chandu with disproportionate ovoid area of T2/FLAIR prolongation in the right ventral thalamus. Findings may indicate the presence of embolic acute/subacute infarcts. 3. No abnormal intracranial enhancement.    Spine MRI: IMPRESSION:  MR cervical spine: Mild disc degeneration and spondylosis at C5-6 through C7-T1 with loss of disc height and associated degenerative endplate changes. There is narrowing of the RIGHT C5-6, C6/7 and C7-T1 neural foramina due to uncovertebral spurring and facet osteophytic hypertrophy. Mild posterior osteophytic ridge/disc complexes at C4-5 through C6/7 flatten the ventral thecal sac.    MR lumbar spine:  Mild disc degeneration at L2-3 through L5-S1 with loss of disc height and signal within the nucleus pulposus. Disc bulges are noted at L2-3 through L5-S1 which flatten the ventral thecal sac and narrow the BILATERAL neural foramina. Moderate central stenosis at L3-4 and mild central stenosis at L4-5 and L5-S1 on a degenerative basis due to disc bulge, facet osteophytic hypertrophy and redundancy of ligamentum flavum.    Pt previously unknown to this service. Pt with limited attempts to respond to yes/no question with minimal head movements, again, responses were observed occasionally during more conversational tasks with prosody-rich stimuli, not during more structured verbal comprehension tasks. Pt not following verbal commands alone, but benefits from context, tactile cues and visual models. unable to ID her name in field of 2 options

## 2025-01-25 NOTE — SPEECH LANGUAGE PATHOLOGY EVALUATION - H & P REVIEW
59F PMH Sickle cell disease (on hydroxyurea), HTN, HFimpEF/NICM (EF 58% 10/2024) presenting as a transfer from [facility]. Pt initially presented to [facility] for SOB and SS crisis; course c/b witnessed seizure, intubated/ sedated, transferred to [facility] for further management. Keppra was discontinued at [facility] due to negative EEG. At [facility] MICU pt found to have RV failure possibly iso pulm HTN 2/2 increased tidal volumes on vent. Neuro was consulted for seizures and EEG technician was called for vEEG placement. Pt is in profound cardiogenic shock. Pt decompensating despite Dobutamine gtt, Bumex gtt, Levophed, and addition of Vasopressin. Vasopressors requirements have gone up. NS shock team was called and pt transferred to [facility] for further mgmt. Lives w daughter and son in home w 2-3 DONNA; PTA Independent/yes

## 2025-01-25 NOTE — SWALLOW BEDSIDE ASSESSMENT ADULT - H & P REVIEW
59F PMH Sickle cell disease (on hydroxyurea), HTN, HFimpEF/NICM (EF 58% 10/2024) presenting as a transfer from [facility]. Pt initially presented to [facility] for SOB and SS crisis; course c/b witnessed seizure, intubated/ sedated, transferred to [facility] for further management. Keppra was discontinued at [facility] due to negative EEG. At [facility] MICU pt found to have RV failure possibly iso pulm HTN 2/2 increased tidal volumes on vent. Neuro was consulted for seizures and EEG technician was called for vEEG placement. Pt is in profound cardiogenic shock. Pt decompensating despite Dobutamine gtt, Bumex gtt, Levophed, and addition of Vasopressin. Vasopressors requirements have gone up. NS shock team was called and pt transferred to [facility] for further mgmt. Lives w daughter and son in home w 2-3 DONNA; PTA Independent/yes

## 2025-01-25 NOTE — SPEAKING VALVE EVALUATION - SUCTIONED PRIOR TO SPEAKING VALVE TRIAL
RN reports Pt requires occasional oral suction, but has not required endotracheal suctioning this morning so far/no

## 2025-01-25 NOTE — SWALLOW BEDSIDE ASSESSMENT ADULT - COMMENTS
Most recent brain imaging:  CT Head No Cont (01.04.25 @ 15:46) >  No acute intracranial hemorrhage, mass effect, or midline shift. No significant change since 1/2/2025.    MR Head w/wo IV Cont (01.09.25 @ 18:07) >  1. Innumerable foci susceptibility artifact scattered throughout the brain which can be seen with critically ill patients on ECMO. Diffuse fat embolization is also part of the differential diagnosis but is considered less likely. 2. Tiny acute/subacute infarcts within the bilateral basal ganglia, thalami, and chandu with disproportionate ovoid area of T2/FLAIR prolongation in the right ventral thalamus. Findings may indicate the presence of embolic acute/subacute infarcts. 3. No abnormal intracranial enhancement.    Spine MRI: IMPRESSION:  MR cervical spine: Mild disc degeneration and spondylosis at C5-6 through C7-T1 with loss of disc height and associated degenerative endplate changes. There is narrowing of the RIGHT C5-6, C6/7 and C7-T1 neural foramina due to uncovertebral spurring and facet osteophytic hypertrophy. Mild posterior osteophytic ridge/disc complexes at C4-5 through C6/7 flatten the ventral thecal sac.    MR lumbar spine:  Mild disc degeneration at L2-3 through L5-S1 with loss of disc height and signal within the nucleus pulposus. Disc bulges are noted at L2-3 through L5-S1 which flatten the ventral thecal sac and narrow the BILATERAL neural foramina. Moderate central stenosis at L3-4 and mild central stenosis at L4-5 and L5-S1 on a degenerative basis due to disc bulge, facet osteophytic hypertrophy and redundancy of ligamentum flavum.    Pt previously unknown to this service.

## 2025-01-25 NOTE — SPEAKING VALVE EVALUATION - RECOMMENDATIONS
Placement of [place] Speaking Valve, as tolerated, during waking hours. During the first 1-2 days, the patient should be closely supervised during use.   Guidelines for valve usage as follows:  1) Cuff fully deflated   2) Do not place valve if patient with baseline RD and/or thick/copious secretions  3) Remove valve if: SpO2 <92%, HR/RR 1.5 x norm, pt with increased work of breathing , patient with subjective complaints, evidence of airtrapping  4) Remove while asleep and for aerosolized respiratory treatments

## 2025-01-25 NOTE — SPEECH LANGUAGE PATHOLOGY EVALUATION - SLP DIAGNOSIS
Pt presents with evidence of a severe mixed expressive and receptive aphasia with essentially absent verbalization, and evidence of cognitive-linguistic impairment. Pt with flat affect, relatively intact eye contact, attempts to participate in some tasks. However, Pt essentially nonverbal with only occasional attempts to verbalize spontaneously in conversational context, not during structured tasks. Pt would benefit from speech-language therapy and ongoing assessment to maximize recovery potential.

## 2025-01-25 NOTE — SWALLOW BEDSIDE ASSESSMENT ADULT - SWALLOW EVAL: DIAGNOSIS
59F PMH Sickle cell disease, admitted with SOB in SS crisis, complicated course requiring transfer to [facility] for ECMO (1/4), decannulated 1/7, s/p trach/PEG, found with multiple acute/subacute infarcts on MRI brain. Pt with multiple risk factors for dysphagia including multiple CVAs, AMS, and trach. Will proceed with objective swallow study for comprehensive evaluation of  swallow function.

## 2025-01-25 NOTE — SPEAKING VALVE EVALUATION - DIAGNOSTIC IMPRESSIONS
Pt seen for [place] speaking valve evaluation. Pt maintained on 30% FIO2 via trach collar. Low pressure valve placed on hub of trach. Patient with minimal attempts to vocalize/verbalize, with occasional brief hyphonic productions. Pt tolerated valve with VSS stable throughout assessment. Pt with no signs of respiratory distress, no increased work of breathing, and no apparent discomfort. No signs of air trapping. Valve removed after minutes. Pt seen for [place] speaking valve evaluation. Pt maintained on 30% FIO2 via trach collar. Low pressure valve placed on hub of trach. Patient with minimal attempts to vocalize/verbalize, with occasional brief hyphonic productions. Pt tolerated valve with VSS stable throughout assessment. Pt with no signs of respiratory distress, no increased work of breathing, and no apparent discomfort. No signs of air trapping. Valve remained in place at conclusion of exam after 2 hours of continuous use. KISHAN Sargent notified of recommendations.

## 2025-01-25 NOTE — SWALLOW BEDSIDE ASSESSMENT ADULT - ASR SWALLOW LINGUAL MOBILITY
Reduced overall movement, in setting of reduced command following requiring models and repeat attempts

## 2025-01-25 NOTE — PROGRESS NOTE ADULT - SUBJECTIVE AND OBJECTIVE BOX
Brief history : 58 yo F with Sickle cell disease and NICM now with Cardiogenic shock s/p VA ECMO on 1/4/25.  VA ECMO decannulation on 1/7/25  Tracheostomy on 1/16/25  PEG placement on 1/21/25      24 hour events :     ICU Vital Signs Last 24 Hrs  T(C): 37 (01-25-25 @ 04:00), Max: 37.2 (01-24-25 @ 16:00)  T(F): 98.6 (01-25-25 @ 04:00), Max: 99 (01-24-25 @ 16:00)  HR: 94 (01-25-25 @ 07:00) (84 - 112)  BP: --  BP(mean): --  ABP: 117/68 (01-25-25 @ 07:00) (85/51 - 130/84)  ABP(mean): 87 (01-25-25 @ 07:00) (63 - 104)  RR: 21 (01-25-25 @ 07:00) (16 - 28)  SpO2: 100% (01-25-25 @ 07:00) (98% - 100%)    I&O's Summary    24 Jan 2025 07:01  -  25 Jan 2025 07:00  --------------------------------------------------------  IN: 1485 mL / OUT: 850 mL / NET: 635 mL    Mode: standby  FiO2: 30    ABG - ( 25 Jan 2025 00:02 )  pH: 7.47  /  pCO2: 40    /  pO2: 180   / HCO3: 29    / Base Excess: 5.0   /  SaO2: 100.0                         9.9    7.79  )-----------( 317      ( 25 Jan 2025 00:30 )             30.5     25 Jan 2025 00:30    146    |  107    |  40     ----------------------------<  110    3.9     |  25     |  1.26     Ca    11.2       25 Jan 2025 00:30  Phos  4.5       25 Jan 2025 00:30  Mg     2.3       25 Jan 2025 00:30    TPro  7.6    /  Alb  4.0    /  TBili  0.9    /  DBili  x      /  AST  31     /  ALT  50     /  AlkPhos  90     25 Jan 2025 00:30    MR Head w/wo IV Cont on 1/09   1. Innumerable foci susceptibility artifact scattered throughout the   brain which can be seen with critically ill patients on ECMO. Diffuse fat   embolization is also part of the differential diagnosis but is considered   less likely.  2. Tiny acute/subacute infarcts within the bilateral basal ganglia,   thalami, and chandu with disproportionate ovoid area of T2/FLAIR   prolongation in the right ventral thalamus. Findings may indicate the   presence of embolic acute/subacute infarcts.  3. No abnormal intracranial enhancement.    MEDICATIONS  (STANDING):  albuterol/ipratropium for Nebulization 3 milliLiter(s) Nebulizer every 8 hours  amantadine Syrup 100 milliGRAM(s) Oral <User Schedule>  artificial tears (preservative free) Ophthalmic Solution 1 Drop(s) Both EYES four times a day  ascorbic acid 500 milliGRAM(s) Oral daily  buMETAnide Injectable 2 milliGRAM(s) IV Push two times a day  chlorhexidine 0.12% Liquid 15 milliLiter(s) Oral Mucosa every 12 hours  chlorhexidine 2% Cloths 1 Application(s) Topical <User Schedule>  chlorhexidine 4% Liquid 1 Application(s) Topical <User Schedule>  enoxaparin Injectable 40 milliGRAM(s) SubCutaneous every 24 hours  hydroxyurea 500 milliGRAM(s) Oral two times a day  levETIRAcetam  Solution 500 milliGRAM(s) Oral every 12 hours  metoprolol tartrate 12.5 milliGRAM(s) Oral every 8 hours  multivitamin 1 Tablet(s) Oral daily  pantoprazole  Injectable 40 milliGRAM(s) IV Push daily  polyethylene glycol 3350 17 Gram(s) Oral daily  senna 1 Tablet(s) Oral daily    Home Medications:  Aztreonam: 1 gram(s) intravenous every 12 hours Empiric dosing (03 Jan 2025 18:32)  bumetanide: 2 milligram(s) by continuous intravenous infusion every 1 to 2 hours (03 Jan 2025 17:55)  DOBUTamine: 2.5 mcg/kg by continuous intravenous infusion every 1 to 2 hours (03 Jan 2025 18:23)  Midazolam: 0.15 mg/kg by continuous intravenous infusion every 1 to 2 hours (03 Jan 2025 18:23)  norepinephrine: 0.56 mcg/kg by continuous intravenous infusion every 1 to 2 hours (03 Jan 2025 18:23)  Propofol: 30 mcg/kg by continuous intravenous infusion every 1 to 2 hours (03 Jan 2025 17:55)  vasopressin: 0.04 unit(s) by continuous intravenous infusion every 1 to 2 hours (03 Jan 2025 18:23)    PHYSICAL EXAM:  Gen : no acute distress  Neck: No LAD, No JVD  Respiratory: decreased in the bases  Cardiovascular: Sinus tachycardia, S1 and S2, RRR, no M/G/R  Gastrointestinal: BS+, soft, NT/ND  Extremities: No peripheral edema  Vascular: 2+ peripheral pulses  Neurological: A/O x 3, no focal deficits  Incision: clean dry/ no sign of infection  Lines: no sign of infection      Altered mental status / multifactorial encephalopathy  Hypotension  Acute respiratory failure  Sickle cell disease  Acute blood loss anemia  Hyperglycemia      Neuro  Multifactorial encephalopathy  S/p embolic stroke on MRI  Seizure activity on EEG, now improved  Keppra 500 BID  On Amantadine  LP on 1/14 - no evidence of meningitis/ encephalitis   Neuro exam improving slowly  Multimodal pain management    CVS   S/p VA ECMO, Bi vent function recovered.  EF 53%  Low dose BB 25 BID  Hydralazine 5 q6  Spine MRI   ST -> Rhythm   Chest tube management    Pulm   Acute hypoxic respiratory failure s/p Tracheostomy   Tolerating TC 30%   Duonebs daily    GI   Protein calorie malnutrition  S/p PEG placement   GI proph/Bowel regimen       Monitor UOP  Correct Electrolytes K >4.0-4.5 Mag 2.0-2.5  Bumex daily     Endo   A1c 4.6  Glycemic control per protocl Glucose <180    Heme   Correct coagulapathy, monitor for bleeding  DVT proph  Acute blood loss anemia    ID   MSSA bacteremia, BAL + MSSA   Periop antibiotics completed         Critical care time spent    min       Care plan discussed with the ICU care team.   Patient remains critical, at risk for life threatening decompensation.    I have spent 30 minutes providing critical care management to this patient.    By signing my name below, I, Kiet Ramirez, attest that this documentation has been prepared under the direction and in the presence of Yash Roberts MD.   Electronically signed: Kiet Ramirez, 01-25-25 @ 07:51    I, Yash Roberts, personally performed the services described in this documentation. all medical record entries made by the scribe were at my direction and in my presence. I have reviewed the chart and agree that the record reflects my personal performance and is accurate and complete  Electronically signed: Yash Roberts MD.

## 2025-01-26 LAB
ALBUMIN SERPL ELPH-MCNC: 3.8 G/DL — SIGNIFICANT CHANGE UP (ref 3.3–5)
ALP SERPL-CCNC: 100 U/L — SIGNIFICANT CHANGE UP (ref 40–120)
ALT FLD-CCNC: 73 U/L — HIGH (ref 10–45)
ANION GAP SERPL CALC-SCNC: 14 MMOL/L — SIGNIFICANT CHANGE UP (ref 5–17)
AST SERPL-CCNC: 59 U/L — HIGH (ref 10–40)
BASOPHILS # BLD AUTO: 0.04 K/UL — SIGNIFICANT CHANGE UP (ref 0–0.2)
BASOPHILS NFR BLD AUTO: 0.4 % — SIGNIFICANT CHANGE UP (ref 0–2)
BILIRUB SERPL-MCNC: 0.8 MG/DL — SIGNIFICANT CHANGE UP (ref 0.2–1.2)
BUN SERPL-MCNC: 62 MG/DL — HIGH (ref 7–23)
CALCIUM SERPL-MCNC: 11.1 MG/DL — HIGH (ref 8.4–10.5)
CHLORIDE SERPL-SCNC: 107 MMOL/L — SIGNIFICANT CHANGE UP (ref 96–108)
CO2 SERPL-SCNC: 25 MMOL/L — SIGNIFICANT CHANGE UP (ref 22–31)
CREAT SERPL-MCNC: 1.15 MG/DL — SIGNIFICANT CHANGE UP (ref 0.5–1.3)
EGFR: 56 ML/MIN/1.73M2 — LOW
EOSINOPHIL # BLD AUTO: 0.29 K/UL — SIGNIFICANT CHANGE UP (ref 0–0.5)
EOSINOPHIL NFR BLD AUTO: 3.3 % — SIGNIFICANT CHANGE UP (ref 0–6)
GAS PNL BLDA: SIGNIFICANT CHANGE UP
GLUCOSE SERPL-MCNC: 128 MG/DL — HIGH (ref 70–99)
HCT VFR BLD CALC: 31.8 % — LOW (ref 34.5–45)
HGB BLD-MCNC: 10.4 G/DL — LOW (ref 11.5–15.5)
IMM GRANULOCYTES NFR BLD AUTO: 0.6 % — SIGNIFICANT CHANGE UP (ref 0–0.9)
LYMPHOCYTES # BLD AUTO: 2.17 K/UL — SIGNIFICANT CHANGE UP (ref 1–3.3)
LYMPHOCYTES # BLD AUTO: 24.4 % — SIGNIFICANT CHANGE UP (ref 13–44)
MAGNESIUM SERPL-MCNC: 2.4 MG/DL — SIGNIFICANT CHANGE UP (ref 1.6–2.6)
MCHC RBC-ENTMCNC: 30.1 PG — SIGNIFICANT CHANGE UP (ref 27–34)
MCHC RBC-ENTMCNC: 32.7 G/DL — SIGNIFICANT CHANGE UP (ref 32–36)
MCV RBC AUTO: 91.9 FL — SIGNIFICANT CHANGE UP (ref 80–100)
MONOCYTES # BLD AUTO: 0.84 K/UL — SIGNIFICANT CHANGE UP (ref 0–0.9)
MONOCYTES NFR BLD AUTO: 9.4 % — SIGNIFICANT CHANGE UP (ref 2–14)
NEUTROPHILS # BLD AUTO: 5.52 K/UL — SIGNIFICANT CHANGE UP (ref 1.8–7.4)
NEUTROPHILS NFR BLD AUTO: 61.9 % — SIGNIFICANT CHANGE UP (ref 43–77)
NRBC # BLD: 0 /100 WBCS — SIGNIFICANT CHANGE UP (ref 0–0)
NRBC BLD-RTO: 0 /100 WBCS — SIGNIFICANT CHANGE UP (ref 0–0)
PHOSPHATE SERPL-MCNC: 4.2 MG/DL — SIGNIFICANT CHANGE UP (ref 2.5–4.5)
PLATELET # BLD AUTO: 355 K/UL — SIGNIFICANT CHANGE UP (ref 150–400)
POTASSIUM SERPL-MCNC: 3.4 MMOL/L — LOW (ref 3.5–5.3)
POTASSIUM SERPL-SCNC: 3.4 MMOL/L — LOW (ref 3.5–5.3)
PROT SERPL-MCNC: 7.6 G/DL — SIGNIFICANT CHANGE UP (ref 6–8.3)
RBC # BLD: 3.46 M/UL — LOW (ref 3.8–5.2)
RBC # FLD: 15.3 % — HIGH (ref 10.3–14.5)
SODIUM SERPL-SCNC: 146 MMOL/L — HIGH (ref 135–145)
WBC # BLD: 8.91 K/UL — SIGNIFICANT CHANGE UP (ref 3.8–10.5)
WBC # FLD AUTO: 8.91 K/UL — SIGNIFICANT CHANGE UP (ref 3.8–10.5)

## 2025-01-26 PROCEDURE — 99291 CRITICAL CARE FIRST HOUR: CPT

## 2025-01-26 PROCEDURE — 71045 X-RAY EXAM CHEST 1 VIEW: CPT | Mod: 26

## 2025-01-26 RX ORDER — POTASSIUM CHLORIDE 750 MG/1
40 TABLET, EXTENDED RELEASE ORAL ONCE
Refills: 0 | Status: COMPLETED | OUTPATIENT
Start: 2025-01-26 | End: 2025-01-26

## 2025-01-26 RX ADMIN — Medication 500 MILLIGRAM(S): at 05:15

## 2025-01-26 RX ADMIN — Medication 500 MILLIGRAM(S): at 11:56

## 2025-01-26 RX ADMIN — Medication 12.5 MILLIGRAM(S): at 21:33

## 2025-01-26 RX ADMIN — ANTISEPTIC SURGICAL SCRUB 1 APPLICATION(S): 0.04 SOLUTION TOPICAL at 05:46

## 2025-01-26 RX ADMIN — LEVETIRACETAM 500 MILLIGRAM(S): 750 TABLET, FILM COATED ORAL at 17:01

## 2025-01-26 RX ADMIN — POTASSIUM CHLORIDE 40 MILLIEQUIVALENT(S): 750 TABLET, EXTENDED RELEASE ORAL at 01:53

## 2025-01-26 RX ADMIN — Medication 100 MILLIGRAM(S): at 16:29

## 2025-01-26 RX ADMIN — Medication 1 DROP(S): at 11:55

## 2025-01-26 RX ADMIN — ANTISEPTIC SURGICAL SCRUB 1 APPLICATION(S): 0.04 SOLUTION TOPICAL at 05:15

## 2025-01-26 RX ADMIN — LEVETIRACETAM 500 MILLIGRAM(S): 750 TABLET, FILM COATED ORAL at 05:15

## 2025-01-26 RX ADMIN — Medication 1 DROP(S): at 05:15

## 2025-01-26 RX ADMIN — Medication 1 DROP(S): at 17:04

## 2025-01-26 RX ADMIN — Medication 1 DROP(S): at 00:00

## 2025-01-26 RX ADMIN — Medication 100 MILLIGRAM(S): at 06:07

## 2025-01-26 RX ADMIN — IPRATROPIUM BROMIDE AND ALBUTEROL SULFATE 3 MILLILITER(S): .5; 2.5 SOLUTION RESPIRATORY (INHALATION) at 11:49

## 2025-01-26 RX ADMIN — IPRATROPIUM BROMIDE AND ALBUTEROL SULFATE 3 MILLILITER(S): .5; 2.5 SOLUTION RESPIRATORY (INHALATION) at 21:59

## 2025-01-26 RX ADMIN — IPRATROPIUM BROMIDE AND ALBUTEROL SULFATE 3 MILLILITER(S): .5; 2.5 SOLUTION RESPIRATORY (INHALATION) at 05:01

## 2025-01-26 RX ADMIN — Medication 10 MILLILITER(S): at 11:53

## 2025-01-26 RX ADMIN — Medication 500 MILLIGRAM(S): at 17:01

## 2025-01-26 RX ADMIN — PANTOPRAZOLE 40 MILLIGRAM(S): 20 TABLET, DELAYED RELEASE ORAL at 11:56

## 2025-01-26 RX ADMIN — Medication 12.5 MILLIGRAM(S): at 05:15

## 2025-01-26 RX ADMIN — ENOXAPARIN SODIUM 40 MILLIGRAM(S): 100 INJECTION SUBCUTANEOUS at 05:15

## 2025-01-26 RX ADMIN — Medication 15 MILLILITER(S): at 11:55

## 2025-01-26 NOTE — PROGRESS NOTE ADULT - SUBJECTIVE AND OBJECTIVE BOX
Brief history :   60 yo with history of sickle cell  12/30 admitted with sickle cell   1/2 unresponsive ? witnessed seizure  1/4 VA ECMO - cardiogenic shock, RV failure  1/7 LVEF 30-35%, ECMO decannulation   1/8 Seizure on EEG   1/9 MRI . Innumerable foci susceptibility artifact scattered throughout the   brain which can be seen with critically ill patients on ECMO. Diffuse fat   embolization is also part of the differential diagnosis but is considered   less likely.    1/12 RBC Exchange   1/14 LP   1/16 Trach   1/21 s/p PEG   1/24 MRI spinal stenosis     24 hour events : no actue issues  tolerating TC trial  mentation improving       ICU Vital Signs Last 24 Hrs  T(C): 36.8 (01-26-25 @ 08:00), Max: 37.9 (01-25-25 @ 12:00)  T(F): 98.3 (01-26-25 @ 08:00), Max: 100.2 (01-25-25 @ 12:00)  HR: 93 (01-26-25 @ 08:00) (93 - 114)  BP: --  BP(mean): --  ABP: 123/76 (01-26-25 @ 08:00) (89/63 - 127/80)  ABP(mean): 95 (01-26-25 @ 08:00) (75 - 100)  RR: 25 (01-26-25 @ 08:00) (20 - 31)  SpO2: 100% (01-26-25 @ 08:00) (97% - 100%)    I&O's Summary    25 Jan 2025 07:01  -  26 Jan 2025 07:00  --------------------------------------------------------  IN: 2270 mL / OUT: 1450 mL / NET: 820 mL    26 Jan 2025 07:01  -  26 Jan 2025 08:51  --------------------------------------------------------  IN: 70 mL / OUT: 0 mL / NET: 70 mL      Mode: standby  FiO2: 30        ABG - ( 26 Jan 2025 07:38 )  pH: 7.46  /  pCO2: 41    /  pO2: 131   / HCO3: 29    / Base Excess: 4.9   /  SaO2: 99.9                                    10.4   8.91  )-----------( 355      ( 26 Jan 2025 00:27 )             31.8     26 Jan 2025 00:28    146    |  107    |  62     ----------------------------<  128    3.4     |  25     |  1.15     Ca    11.1       26 Jan 2025 00:28  Phos  4.2       26 Jan 2025 00:28  Mg     2.4       26 Jan 2025 00:28    TPro  7.6    /  Alb  3.8    /  TBili  0.8    /  DBili  x      /  AST  59     /  ALT  73     /  AlkPhos  100    26 Jan 2025 00:28          MEDICATIONS  (STANDING):  albuterol/ipratropium for Nebulization 3 milliLiter(s) Nebulizer every 8 hours  amantadine Syrup 100 milliGRAM(s) Oral <User Schedule>  artificial tears (preservative free) Ophthalmic Solution 1 Drop(s) Both EYES four times a day  ascorbic acid 500 milliGRAM(s) Oral daily  chlorhexidine 2% Cloths 1 Application(s) Topical <User Schedule>  chlorhexidine 4% Liquid 1 Application(s) Topical <User Schedule>  enoxaparin Injectable 40 milliGRAM(s) SubCutaneous every 24 hours  hydroxyurea 500 milliGRAM(s) Oral two times a day  levETIRAcetam  Solution 500 milliGRAM(s) Oral every 12 hours  metoprolol tartrate 12.5 milliGRAM(s) Oral every 8 hours  multivitamin/minerals/iron Oral Solution (CENTRUM) 15 milliLiter(s) Oral daily  pantoprazole  Injectable 40 milliGRAM(s) IV Push daily  polyethylene glycol 3350 17 Gram(s) Oral daily  senna 1 Tablet(s) Oral daily    Home Medications:  Aztreonam: 1 gram(s) intravenous every 12 hours Empiric dosing (03 Jan 2025 18:32)  bumetanide: 2 milligram(s) by continuous intravenous infusion every 1 to 2 hours (03 Jan 2025 17:55)  DOBUTamine: 2.5 mcg/kg by continuous intravenous infusion every 1 to 2 hours (03 Jan 2025 18:23)  Midazolam: 0.15 mg/kg by continuous intravenous infusion every 1 to 2 hours (03 Jan 2025 18:23)  norepinephrine: 0.56 mcg/kg by continuous intravenous infusion every 1 to 2 hours (03 Jan 2025 18:23)  Propofol: 30 mcg/kg by continuous intravenous infusion every 1 to 2 hours (03 Jan 2025 17:55)  vasopressin: 0.04 unit(s) by continuous intravenous infusion every 1 to 2 hours (03 Jan 2025 18:23)    PHYSICAL EXAM:  Gen : no acute distress  Neck: No LAD, No JVD  Respiratory: decreased in the bases  Cardiovascular: S1 and S2, RRR, no M/G/R  Gastrointestinal: BS+, soft, NT/ND  Extremities: No peripheral edema  Vascular: 2+ peripheral pulses  Neurological: A/O x 3, no focal deficits  Incision: clean dry/ no sign of infection  Lines: no sign of infection      S/p   Acute post operative respiratory insufficiency  Acute blood loss anemia/Thrombocytopenia  Electrolyte abnormalities - Hypomag/Hypokalcemia  Hypotension   Cardiogenic shock  Post operative pain   Lactic acidosis  Post op AFib    Encounter for ventilator weaning        Neuro  Metabolic encephalopathy  Critical illness polymyopathy/neuropathy   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 ASA)   MRI spine results reviewed     CVS   s/p VA ECMO   EF Echo improved  MCS - s/p VA ECMO support on admission  Rhythm nSR     Pulm   Off vent  tolerating TC   Speech therapy     GI proph/Bowel regimen  s/p PEG  tolerating TF        Monitor UOP  Correct Electrolytes K >4.0-4.5 Mag 2.0-2.5    Endo   Glycemic control per protocol    Heme   Correct coagulapathy, monitor for bleeding  DVT proph  history Sickle Cell disease - continue hydroxyurea     ID   off antibiotics      Care plan discussed with the ICU care team.   Patient remains critical, at risk for life threatening decompensation.    I have spent 30 minutes providing critical care management to this patient.    By signing my name below, I, Gabriella Lopez, attest that this documentation has been prepared under the direction and in the presence of Yash Roberts MD.   Electronically signed: Gabriella Lopez, 01-26-25 @ 08:51    I, Yash Roberts, personally performed the services described in this documentation. all medical record entries made by the scribe were at my direction and in my presence. I have reviewed the chart and agree that the record reflects my personal performance and is accurate and complete  Electronically signed: Yash Roberts MD.    Brief history :   60 yo with history of sickle cell  12/30 admitted with sickle cell   1/2 unresponsive ? witnessed seizure  1/4 VA ECMO - cardiogenic shock, RV failure  1/7 LVEF 30-35%, ECMO decannulation   1/8 Seizure on EEG   1/9 MRI.  Innumerable foci susceptibility artifact scattered throughout the   brain which can be seen with critically ill patients on ECMO. Diffuse fat   embolization is also part of the differential diagnosis but is considered   less likely.    1/12 RBC Exchange   1/14 LP   1/16 Trach   1/21 s/p PEG   1/24 MRI spinal stenosis  1/25 no acute issues, seen by speech - pt aphasic/seems to also have receptive aphasia     ICU Vital Signs Last 24 Hrs  T(C): 36.8 (01-26-25 @ 08:00), Max: 37.9 (01-25-25 @ 12:00)  T(F): 98.3 (01-26-25 @ 08:00), Max: 100.2 (01-25-25 @ 12:00)  HR: 93 (01-26-25 @ 08:00) (93 - 114)  ABP: 123/76 (01-26-25 @ 08:00) (89/63 - 127/80)  ABP(mean): 95 (01-26-25 @ 08:00) (75 - 100)  RR: 25 (01-26-25 @ 08:00) (20 - 31)  SpO2: 100% (01-26-25 @ 08:00) (97% - 100%)    I&O's Summary    25 Jan 2025 07:01  -  26 Jan 2025 07:00  --------------------------------------------------------  IN: 2270 mL / OUT: 1450 mL / NET: 820 mL    26 Jan 2025 07:01  -  26 Jan 2025 08:51  --------------------------------------------------------  IN: 70 mL / OUT: 0 mL / NET: 70 mL      Mode: standby  FiO2: 30      ABG - ( 26 Jan 2025 07:38 )  pH: 7.46  /  pCO2: 41    /  pO2: 131   / HCO3: 29    / Base Excess: 4.9   /  SaO2: 99.9                          10.4   8.91  )-----------( 355      ( 26 Jan 2025 00:27 )             31.8     26 Jan 2025 00:28    146    |  107    |  62     ----------------------------<  128    3.4     |  25     |  1.15     Ca    11.1       26 Jan 2025 00:28  Phos  4.2       26 Jan 2025 00:28  Mg     2.4       26 Jan 2025 00:28    TPro  7.6    /  Alb  3.8    /  TBili  0.8    /  DBili  x      /  AST  59     /  ALT  73     /  AlkPhos  100    26 Jan 2025 00:28      MEDICATIONS  (STANDING):  albuterol/ipratropium for Nebulization 3 milliLiter(s) Nebulizer every 8 hours  amantadine Syrup 100 milliGRAM(s) Oral <User Schedule>  artificial tears (preservative free) Ophthalmic Solution 1 Drop(s) Both EYES four times a day  ascorbic acid 500 milliGRAM(s) Oral daily  enoxaparin Injectable 40 milliGRAM(s) SubCutaneous every 24 hours  hydroxyurea 500 milliGRAM(s) Oral two times a day  levETIRAcetam  Solution 500 milliGRAM(s) Oral every 12 hours  metoprolol tartrate 12.5 milliGRAM(s) Oral every 8 hours  multivitamin/minerals/iron Oral Solution (CENTRUM) 15 milliLiter(s) Oral daily  pantoprazole  Injectable 40 milliGRAM(s) IV Push daily  polyethylene glycol 3350 17 Gram(s) Oral daily  senna 1 Tablet(s) Oral daily        PHYSICAL EXAM:  Gen : no acute distress  Neck: No LAD, No JVD  Respiratory: decreased in the bases  Cardiovascular: S1 and S2, RRR, no M/G/R  Gastrointestinal: BS+, soft, NT/ND  Extremities: No peripheral edema  Vascular: 2+ peripheral pulses  Neurological: A/O x 3, no focal deficits  Incision: clean dry/ no sign of infection  Lines: no sign of infection      S/p admitted with Sickle cell crisis and cardiogenic shock requiring ECMO support  Respiratory failure s/p trach  Critical illness polyneuropathy/myopathy   Encephalopathy     Neuro  Metabolic encephalopathy  Critical illness polymyopathy/neuropathy   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 ASA)   MRI spine results reviewed   may benefit from neuro rehab    CVS   s/p VA ECMO   EF Echo improved  MCS - s/p VA ECMO support on admission  Rhythm NSR     Pulm   Off vent  tolerating TC   Speech therapy     GI proph/Bowel regimen  s/p PEG  tolerating TF        Monitor UOP  Correct Electrolytes K >4.0-4.5 Mag 2.0-2.5    Endo   Glycemic control per protocol    Heme   Correct coagulapathy, monitor for bleeding  DVT proph  history Sickle Cell disease - continue hydroxyurea     ID   off antibiotics      Critical care time spent  40 min   Discharge planning - consider neuro rehab   PT/OT  Speech therapy       By signing my name below, I, Gabriella Lopez, attest that this documentation has been prepared under the direction and in the presence of Yash Roberts MD.   Electronically signed: Gabriella Lopez, 01-26-25 @ 08:51    I, Yash Roberts, personally performed the services described in this documentation. all medical record entries made by the scribe were at my direction and in my presence. I have reviewed the chart and agree that the record reflects my personal performance and is accurate and complete  Electronically signed: Yash Roberts MD.

## 2025-01-27 LAB
ALBUMIN SERPL ELPH-MCNC: 3.8 G/DL — SIGNIFICANT CHANGE UP (ref 3.3–5)
ALBUMIN SERPL ELPH-MCNC: 3.8 G/DL — SIGNIFICANT CHANGE UP (ref 3.3–5)
ALP SERPL-CCNC: 112 U/L — SIGNIFICANT CHANGE UP (ref 40–120)
ALP SERPL-CCNC: 113 U/L — SIGNIFICANT CHANGE UP (ref 40–120)
ALT FLD-CCNC: 212 U/L — HIGH (ref 10–45)
ALT FLD-CCNC: 266 U/L — HIGH (ref 10–45)
ANION GAP SERPL CALC-SCNC: 13 MMOL/L — SIGNIFICANT CHANGE UP (ref 5–17)
ANION GAP SERPL CALC-SCNC: 14 MMOL/L — SIGNIFICANT CHANGE UP (ref 5–17)
AST SERPL-CCNC: 118 U/L — HIGH (ref 10–40)
AST SERPL-CCNC: 193 U/L — HIGH (ref 10–40)
BASOPHILS # BLD AUTO: 0.04 K/UL — SIGNIFICANT CHANGE UP (ref 0–0.2)
BASOPHILS NFR BLD AUTO: 0.5 % — SIGNIFICANT CHANGE UP (ref 0–2)
BILIRUB SERPL-MCNC: 0.6 MG/DL — SIGNIFICANT CHANGE UP (ref 0.2–1.2)
BILIRUB SERPL-MCNC: 0.7 MG/DL — SIGNIFICANT CHANGE UP (ref 0.2–1.2)
BLD GP AB SCN SERPL QL: NEGATIVE — SIGNIFICANT CHANGE UP
BUN SERPL-MCNC: 46 MG/DL — HIGH (ref 7–23)
BUN SERPL-MCNC: 48 MG/DL — HIGH (ref 7–23)
CALCIUM SERPL-MCNC: 10.7 MG/DL — HIGH (ref 8.4–10.5)
CALCIUM SERPL-MCNC: 11.1 MG/DL — HIGH (ref 8.4–10.5)
CHLORIDE SERPL-SCNC: 112 MMOL/L — HIGH (ref 96–108)
CHLORIDE SERPL-SCNC: 112 MMOL/L — HIGH (ref 96–108)
CO2 SERPL-SCNC: 23 MMOL/L — SIGNIFICANT CHANGE UP (ref 22–31)
CO2 SERPL-SCNC: 24 MMOL/L — SIGNIFICANT CHANGE UP (ref 22–31)
CREAT SERPL-MCNC: 0.99 MG/DL — SIGNIFICANT CHANGE UP (ref 0.5–1.3)
CREAT SERPL-MCNC: 1.12 MG/DL — SIGNIFICANT CHANGE UP (ref 0.5–1.3)
EGFR: 57 ML/MIN/1.73M2 — LOW
EGFR: 67 ML/MIN/1.73M2 — SIGNIFICANT CHANGE UP
EOSINOPHIL # BLD AUTO: 0.22 K/UL — SIGNIFICANT CHANGE UP (ref 0–0.5)
EOSINOPHIL NFR BLD AUTO: 3 % — SIGNIFICANT CHANGE UP (ref 0–6)
GLUCOSE BLDC GLUCOMTR-MCNC: 113 MG/DL — HIGH (ref 70–99)
GLUCOSE SERPL-MCNC: 125 MG/DL — HIGH (ref 70–99)
GLUCOSE SERPL-MCNC: 132 MG/DL — HIGH (ref 70–99)
HCT VFR BLD CALC: 31.6 % — LOW (ref 34.5–45)
HGB BLD-MCNC: 10.1 G/DL — LOW (ref 11.5–15.5)
IMM GRANULOCYTES NFR BLD AUTO: 0.8 % — SIGNIFICANT CHANGE UP (ref 0–0.9)
LYMPHOCYTES # BLD AUTO: 1.81 K/UL — SIGNIFICANT CHANGE UP (ref 1–3.3)
LYMPHOCYTES # BLD AUTO: 24.3 % — SIGNIFICANT CHANGE UP (ref 13–44)
MAGNESIUM SERPL-MCNC: 2.5 MG/DL — SIGNIFICANT CHANGE UP (ref 1.6–2.6)
MCHC RBC-ENTMCNC: 29.7 PG — SIGNIFICANT CHANGE UP (ref 27–34)
MCHC RBC-ENTMCNC: 32 G/DL — SIGNIFICANT CHANGE UP (ref 32–36)
MCV RBC AUTO: 92.9 FL — SIGNIFICANT CHANGE UP (ref 80–100)
MONOCYTES # BLD AUTO: 0.85 K/UL — SIGNIFICANT CHANGE UP (ref 0–0.9)
MONOCYTES NFR BLD AUTO: 11.4 % — SIGNIFICANT CHANGE UP (ref 2–14)
NEUTROPHILS # BLD AUTO: 4.47 K/UL — SIGNIFICANT CHANGE UP (ref 1.8–7.4)
NEUTROPHILS NFR BLD AUTO: 60 % — SIGNIFICANT CHANGE UP (ref 43–77)
NRBC # BLD: 0 /100 WBCS — SIGNIFICANT CHANGE UP (ref 0–0)
NRBC BLD-RTO: 0 /100 WBCS — SIGNIFICANT CHANGE UP (ref 0–0)
PHOSPHATE SERPL-MCNC: 4.3 MG/DL — SIGNIFICANT CHANGE UP (ref 2.5–4.5)
PLATELET # BLD AUTO: 346 K/UL — SIGNIFICANT CHANGE UP (ref 150–400)
POTASSIUM SERPL-MCNC: 3.7 MMOL/L — SIGNIFICANT CHANGE UP (ref 3.5–5.3)
POTASSIUM SERPL-MCNC: 3.9 MMOL/L — SIGNIFICANT CHANGE UP (ref 3.5–5.3)
POTASSIUM SERPL-SCNC: 3.7 MMOL/L — SIGNIFICANT CHANGE UP (ref 3.5–5.3)
POTASSIUM SERPL-SCNC: 3.9 MMOL/L — SIGNIFICANT CHANGE UP (ref 3.5–5.3)
PROT SERPL-MCNC: 7.6 G/DL — SIGNIFICANT CHANGE UP (ref 6–8.3)
PROT SERPL-MCNC: 7.6 G/DL — SIGNIFICANT CHANGE UP (ref 6–8.3)
RBC # BLD: 3.4 M/UL — LOW (ref 3.8–5.2)
RBC # FLD: 15.5 % — HIGH (ref 10.3–14.5)
RH IG SCN BLD-IMP: POSITIVE — SIGNIFICANT CHANGE UP
SODIUM SERPL-SCNC: 148 MMOL/L — HIGH (ref 135–145)
SODIUM SERPL-SCNC: 150 MMOL/L — HIGH (ref 135–145)
WBC # BLD: 7.45 K/UL — SIGNIFICANT CHANGE UP (ref 3.8–10.5)
WBC # FLD AUTO: 7.45 K/UL — SIGNIFICANT CHANGE UP (ref 3.8–10.5)

## 2025-01-27 PROCEDURE — 71045 X-RAY EXAM CHEST 1 VIEW: CPT | Mod: 26

## 2025-01-27 PROCEDURE — 99223 1ST HOSP IP/OBS HIGH 75: CPT

## 2025-01-27 PROCEDURE — 76705 ECHO EXAM OF ABDOMEN: CPT | Mod: 26

## 2025-01-27 PROCEDURE — 99291 CRITICAL CARE FIRST HOUR: CPT

## 2025-01-27 RX ORDER — POTASSIUM CHLORIDE 750 MG/1
20 TABLET, EXTENDED RELEASE ORAL ONCE
Refills: 0 | Status: COMPLETED | OUTPATIENT
Start: 2025-01-27 | End: 2025-01-27

## 2025-01-27 RX ORDER — METOPROLOL SUCCINATE 25 MG
25 TABLET, EXTENDED RELEASE 24 HR ORAL EVERY 8 HOURS
Refills: 0 | Status: DISCONTINUED | OUTPATIENT
Start: 2025-01-27 | End: 2025-02-06

## 2025-01-27 RX ADMIN — POLYETHYLENE GLYCOL 3350 17 GRAM(S): 17 POWDER, FOR SOLUTION ORAL at 13:10

## 2025-01-27 RX ADMIN — Medication 1 DROP(S): at 17:01

## 2025-01-27 RX ADMIN — Medication 1 DROP(S): at 05:29

## 2025-01-27 RX ADMIN — Medication 12.5 MILLIGRAM(S): at 05:28

## 2025-01-27 RX ADMIN — Medication 500 MILLIGRAM(S): at 17:02

## 2025-01-27 RX ADMIN — PANTOPRAZOLE 40 MILLIGRAM(S): 20 TABLET, DELAYED RELEASE ORAL at 13:10

## 2025-01-27 RX ADMIN — Medication 25 MILLIGRAM(S): at 21:13

## 2025-01-27 RX ADMIN — Medication 1 TABLET(S): at 13:10

## 2025-01-27 RX ADMIN — IPRATROPIUM BROMIDE AND ALBUTEROL SULFATE 3 MILLILITER(S): .5; 2.5 SOLUTION RESPIRATORY (INHALATION) at 05:46

## 2025-01-27 RX ADMIN — Medication 500 MILLIGRAM(S): at 05:28

## 2025-01-27 RX ADMIN — Medication 25 MILLIGRAM(S): at 13:11

## 2025-01-27 RX ADMIN — Medication 1 DROP(S): at 13:10

## 2025-01-27 RX ADMIN — ENOXAPARIN SODIUM 40 MILLIGRAM(S): 100 INJECTION SUBCUTANEOUS at 05:27

## 2025-01-27 RX ADMIN — Medication 1 DROP(S): at 00:05

## 2025-01-27 RX ADMIN — IPRATROPIUM BROMIDE AND ALBUTEROL SULFATE 3 MILLILITER(S): .5; 2.5 SOLUTION RESPIRATORY (INHALATION) at 11:38

## 2025-01-27 RX ADMIN — Medication 100 MILLIGRAM(S): at 06:44

## 2025-01-27 RX ADMIN — Medication 100 MILLIGRAM(S): at 13:46

## 2025-01-27 RX ADMIN — Medication 15 MILLILITER(S): at 13:10

## 2025-01-27 RX ADMIN — LEVETIRACETAM 500 MILLIGRAM(S): 750 TABLET, FILM COATED ORAL at 05:27

## 2025-01-27 RX ADMIN — IPRATROPIUM BROMIDE AND ALBUTEROL SULFATE 3 MILLILITER(S): .5; 2.5 SOLUTION RESPIRATORY (INHALATION) at 22:42

## 2025-01-27 RX ADMIN — LEVETIRACETAM 500 MILLIGRAM(S): 750 TABLET, FILM COATED ORAL at 17:01

## 2025-01-27 RX ADMIN — Medication 500 MILLIGRAM(S): at 13:11

## 2025-01-27 RX ADMIN — ANTISEPTIC SURGICAL SCRUB 1 APPLICATION(S): 0.04 SOLUTION TOPICAL at 05:27

## 2025-01-27 RX ADMIN — POTASSIUM CHLORIDE 20 MILLIEQUIVALENT(S): 750 TABLET, EXTENDED RELEASE ORAL at 01:14

## 2025-01-27 RX ADMIN — ANTISEPTIC SURGICAL SCRUB 1 APPLICATION(S): 0.04 SOLUTION TOPICAL at 05:30

## 2025-01-27 NOTE — SWALLOW FEES ASSESSMENT ADULT - H & P REVIEW
yes  59F PMH Sickle cell disease (on hydroxyurea), HTN, HFimpEF/NICM (EF 58% 10/2024) presenting as a transfer from [facility]. Pt initially presented to [facility] for SOB and SS crisis; course c/b witnessed seizure, intubated/ sedated, transferred to [facility] for further management. Keppra was discontinued at [facility] due to negative EEG. At [facility] MICU pt found to have RV failure possibly iso pulm HTN 2/2 increased tidal volumes on vent. Neuro was consulted for seizures and EEG technician was called for vEEG placement. Pt is in profound cardiogenic shock. Pt decompensating despite Dobutamine gtt, Bumex gtt, Levophed, and addition of Vasopressin. Vasopressors requirements have gone up. NS shock team was called and pt transferred to [facility] for further mgmt. Lives w daughter and son in home w 2-3 DONNA; PTA Independent/yes

## 2025-01-27 NOTE — SWALLOW FEES ASSESSMENT ADULT - DELAYED INITIATION OF THE PHARYNGEAL SWALLOW
triggered at the level of pyriforms spill to pyriforms; trace shallow penetration; no progression; cleared on spontaneous repeat swallows

## 2025-01-27 NOTE — CONSULT NOTE ADULT - SUBJECTIVE AND OBJECTIVE BOX
CHIEF COMPLAINT: Shock    HPI: Patient is a 58 yo F w/ Sickle Cell (on Hydroxyurea), HTN, HFpEF who was initially transferred to [facility] on 1/3 as a transfer from [facility]. Patient initially presented to [facility] on  with back/head/shoulder pain concerning for SS crisis. On , patient was found to be unresposnice requring intubation with concern for seizure due to tongue biting and incontinence (possibly caused by Clonazepam withdrawal as per OSH). Patient was initially transferred to [facility] MICU where patient was found to have RV failure and profound cardiogenic shock. Shock team was called on 1/3 and patient was placed on peripheral [facility] ECMO. Patient was decannulated on  with recovery of cardiac function. Course c/b seizure on EEG (), MSSA positive sputum s/p empiric abx x 2 weeks and persistent encephalopathy with MRI brain () notable for innumerable foci susceptibility artifact scattered throughout the brain (possible diffuse fat embolization as well as tiny acute/subacute infarcts within the bilateral basal ganglia, thalami, and chandu. Patient underwent exchange transfusion on  and .    Patient is now s/p Trach  and PEG     RCU consulted for evaluation.    PAST MEDICAL & SURGICAL HISTORY:  Sickle-cell-hemoglobin C disease with crisis      Essential hypertension      Sickle cell disease      HTN (hypertension)      H/O:       H/O abdominoplasty      S/P breast augmentation      S/P           FAMILY HISTORY:  FHx: cerebral palsy (Child)        SOCIAL HISTORY:  Smoking: [ ] Never Smoked [ ] Former Smoker (__ packs x ___ years) [ ] Current Smoker  (__ packs x ___ years)  Substance Use: [ ] Never Used [ ] Used ____  EtOH Use:  Marital Status: [ ] Single [ ]  [ ]  [ ]   Sexual History:   Occupation:  Recent Travel:  Country of Birth:  Advance Directives:    Allergies    doxycycline (Angioedema)  penicillin (Unknown)  clindamycin (Angioedema)  linezolid (Angioedema)    Intolerances        HOME MEDICATIONS:  Home Medications:  Aztreonam: 1 gram(s) intravenous every 12 hours Empiric dosing (2025 18:32)  bumetanide: 2 milligram(s) by continuous intravenous infusion every 1 to 2 hours (2025 17:55)  DOBUTamine: 2.5 mcg/kg by continuous intravenous infusion every 1 to 2 hours (2025 18:23)  Midazolam: 0.15 mg/kg by continuous intravenous infusion every 1 to 2 hours (2025 18:23)  norepinephrine: 0.56 mcg/kg by continuous intravenous infusion every 1 to 2 hours (2025 18:23)  Propofol: 30 mcg/kg by continuous intravenous infusion every 1 to 2 hours (2025 17:55)  vasopressin: 0.04 unit(s) by continuous intravenous infusion every 1 to 2 hours (2025 18:23)      REVIEW OF SYSTEMS:  Constitutional: [ ] negative [ ] fevers [ ] chills [ ] weight loss [ ] weight gain  HEENT: [ ] negative [ ] dry eyes [ ] eye irritation [ ] postnasal drip [ ] nasal congestion  CV: [ ] negative  [ ] chest pain [ ] orthopnea [ ] palpitations [ ] murmur  Resp: [ ] negative [ ] cough [ ] shortness of breath [ ] dyspnea [ ] wheezing [ ] sputum [ ] hemoptysis  GI: [ ] negative [ ] nausea [ ] vomiting [ ] diarrhea [ ] constipation [ ] abd pain [ ] dysphagia   : [ ] negative [ ] dysuria [ ] nocturia [ ] hematuria [ ] increased urinary frequency  Musculoskeletal: [ ] negative [ ] back pain [ ] myalgias [ ] arthralgias [ ] fracture  Skin: [ ] negative [ ] rash [ ] itch  Neurological: [ ] negative [ ] headache [ ] dizziness [ ] syncope [ ] weakness [ ] numbness  Psychiatric: [ ] negative [ ] anxiety [ ] depression  Endocrine: [ ] negative [ ] diabetes [ ] thyroid problem  Hematologic/Lymphatic: [ ] negative [ ] anemia [ ] bleeding problem  Allergic/Immunologic: [ ] negative [ ] itchy eyes [ ] nasal discharge [ ] hives [ ] angioedema  [ ] All other systems negative  [ ] Unable to assess ROS because ________    OBJECTIVE:  ICU Vital Signs Last 24 Hrs  T(C): 36.6 (2025 08:00), Max: 37.2 (2025 16:00)  T(F): 97.9 (2025 08:00), Max: 98.9 (2025 16:00)  HR: 99 (2025 13:00) (88 - 103)  BP: --  BP(mean): --  ABP: 119/77 (2025 13:00) (89/56 - 132/83)  ABP(mean): 94 (2025 13:00) (70 - 104)  RR: 21 (2025 13:00) (15 - 28)  SpO2: 100% (2025 13:00) (97% - 100%)    O2 Parameters below as of 2025 12:00  Patient On (Oxygen Delivery Method): tracheostomy collar    O2 Concentration (%): 30           @ 07: @ 07:00  --------------------------------------------------------  IN: 1960 mL / OUT: 1300 mL / NET: 660 mL     @ 07: @ 14:06  --------------------------------------------------------  IN: 770 mL / OUT: 0 mL / NET: 770 mL      CAPILLARY BLOOD GLUCOSE      POCT Blood Glucose.: 113 mg/dL (2025 12:43)      PHYSICAL EXAM:  General:   HEENT:   Lymph Nodes:  Neck:   Respiratory:   Cardiovascular:   Abdomen:   Extremities:   Skin:   Neurological:  Psychiatry:    LINES:     HOSPITAL MEDICATIONS:  Standing Meds:  albuterol/ipratropium for Nebulization 3 milliLiter(s) Nebulizer every 8 hours  amantadine Syrup 100 milliGRAM(s) Oral <User Schedule>  artificial tears (preservative free) Ophthalmic Solution 1 Drop(s) Both EYES four times a day  ascorbic acid 500 milliGRAM(s) Oral daily  chlorhexidine 2% Cloths 1 Application(s) Topical <User Schedule>  chlorhexidine 4% Liquid 1 Application(s) Topical <User Schedule>  enoxaparin Injectable 40 milliGRAM(s) SubCutaneous every 24 hours  hydroxyurea 500 milliGRAM(s) Oral two times a day  levETIRAcetam  Solution 500 milliGRAM(s) Oral every 12 hours  metoprolol tartrate 25 milliGRAM(s) Oral every 8 hours  multivitamin/minerals/iron Oral Solution (CENTRUM) 15 milliLiter(s) Oral daily  pantoprazole  Injectable 40 milliGRAM(s) IV Push daily  polyethylene glycol 3350 17 Gram(s) Oral daily  senna 1 Tablet(s) Oral daily      PRN Meds:  acetaminophen   Oral Liquid .. 650 milliGRAM(s) Oral every 6 hours PRN  hydrALAZINE Injectable 5 milliGRAM(s) IV Push every 6 hours PRN  sodium chloride 0.9% lock flush 10 milliLiter(s) IV Push every 1 hour PRN      LABS:                        10.1   7.45  )-----------( 346      ( 2025 00:54 )             31.6     Hgb Trend: 10.1<--, 10.4<--, 9.9<--, 10.0<--, 10.5<--      150[H]  |  112[H]  |  48[H]  ----------------------------<  125[H]  3.7   |  24  |  1.12    Ca    11.1[H]      2025 00:53  Phos  4.3       Mg     2.5         TPro  7.6  /  Alb  3.8  /  TBili  0.7  /  DBili  x   /  AST  193[H]  /  ALT  266[H]  /  AlkPhos  113      Creatinine Trend: 1.12<--, 1.15<--, 1.26<--, 0.99<--, 0.97<--, 1.00<--    Urinalysis Basic - ( 2025 00:53 )    Color: x / Appearance: x / SG: x / pH: x  Gluc: 125 mg/dL / Ketone: x  / Bili: x / Urobili: x   Blood: x / Protein: x / Nitrite: x   Leuk Esterase: x / RBC: x / WBC x   Sq Epi: x / Non Sq Epi: x / Bacteria: x      Arterial Blood Gas:   @ 23:58  7.50/36/151/28/99.5/4.8  ABG lactate: --  Arterial Blood Gas:   @ 07:38  7.46/41/131/29/99.9/4.9  ABG lactate: --  Arterial Blood Gas:   @ 00:16  7.47/41/121/30/99.1/5.6  ABG lactate: --        MICROBIOLOGY:       RADIOLOGY:  [ ] Reviewed and interpreted by me    PULMONARY FUNCTION TESTS:    EKG:   CHIEF COMPLAINT: Shock    HPI: Patient is a 60 yo F w/ Sickle Cell (on Hydroxyurea), HTN, HFpEF who was initially transferred to [facility] on 1/3 as a transfer from [facility]. Patient initially presented to [facility] on  with back/head/shoulder pain concerning for SS crisis. On , patient was found to be unresposnice requring intubation with concern for seizure due to tongue biting and incontinence (possibly caused by Clonazepam withdrawal as per OSH). Patient was initially transferred to [facility] MICU where patient was found to have RV failure and profound cardiogenic shock. Shock team was called on 1/3 and patient was placed on peripheral [facility] ECMO. Patient was decannulated on  with recovery of cardiac function. Course c/b seizure on EEG (), MSSA positive sputum s/p empiric abx x 2 weeks and persistent encephalopathy with MRI brain () notable for innumerable foci susceptibility artifact scattered throughout the brain (possible diffuse fat embolization as well as tiny acute/subacute infarcts within the bilateral basal ganglia, thalami, and chandu. Patient underwent exchange transfusion on  and .    Patient is now s/p Trach  and PEG     RCU consulted for evaluation.    Labs notable for no leukocytosisi WBC 7.4, Na 150, Bicarb 24, Creatinine 1.12, Ca 11.1, , , ABG 7.5/36/151 on TC 30%    PAST MEDICAL & SURGICAL HISTORY:  Sickle-cell-hemoglobin C disease with crisis      Essential hypertension      Sickle cell disease      HTN (hypertension)      H/O:       H/O abdominoplasty      S/P breast augmentation      S/P           FAMILY HISTORY:  FHx: cerebral palsy (Child)        SOCIAL HISTORY:  Smoking: [ ] Never Smoked [ ] Former Smoker (__ packs x ___ years) [ ] Current Smoker  (__ packs x ___ years)  Substance Use: [ ] Never Used [ ] Used ____  EtOH Use:  Marital Status: [ ] Single [ ]  [ ]  [ ]   Sexual History:   Occupation:  Recent Travel:  Country of Birth:  Advance Directives:    Unable to obtain as unable to phonate even with PMV    Allergies    doxycycline (Angioedema)  penicillin (Unknown)  clindamycin (Angioedema)  linezolid (Angioedema)    Intolerances        HOME MEDICATIONS:  Home Medications:  Aztreonam: 1 gram(s) intravenous every 12 hours Empiric dosing (2025 18:32)  bumetanide: 2 milligram(s) by continuous intravenous infusion every 1 to 2 hours (2025 17:55)  DOBUTamine: 2.5 mcg/kg by continuous intravenous infusion every 1 to 2 hours (2025 18:23)  Midazolam: 0.15 mg/kg by continuous intravenous infusion every 1 to 2 hours (2025 18:23)  norepinephrine: 0.56 mcg/kg by continuous intravenous infusion every 1 to 2 hours (2025 18:23)  Propofol: 30 mcg/kg by continuous intravenous infusion every 1 to 2 hours (2025 17:55)  vasopressin: 0.04 unit(s) by continuous intravenous infusion every 1 to 2 hours (2025 18:23)      REVIEW OF SYSTEMS:  Constitutional: [ ] negative [ ] fevers [ ] chills [ ] weight loss [ ] weight gain  HEENT: [ ] negative [ ] dry eyes [ ] eye irritation [ ] postnasal drip [ ] nasal congestion  CV: [ ] negative  [ ] chest pain [ ] orthopnea [ ] palpitations [ ] murmur  Resp: [ ] negative [ ] cough [ ] shortness of breath [ ] dyspnea [ ] wheezing [ ] sputum [ ] hemoptysis  GI: [ ] negative [ ] nausea [ ] vomiting [ ] diarrhea [ ] constipation [ ] abd pain [ ] dysphagia   : [ ] negative [ ] dysuria [ ] nocturia [ ] hematuria [ ] increased urinary frequency  Musculoskeletal: [ ] negative [ ] back pain [ ] myalgias [ ] arthralgias [ ] fracture  Skin: [ ] negative [ ] rash [ ] itch  Neurological: [ ] negative [ ] headache [ ] dizziness [ ] syncope [ ] weakness [ ] numbness  Psychiatric: [ ] negative [ ] anxiety [ ] depression  Endocrine: [ ] negative [ ] diabetes [ ] thyroid problem  Hematologic/Lymphatic: [ ] negative [ ] anemia [ ] bleeding problem  Allergic/Immunologic: [ ] negative [ ] itchy eyes [ ] nasal discharge [ ] hives [ ] angioedema  [ ] All other systems negative  [X] Unable to assess ROS because Unable to obtain as unable to phonate even with PMV    OBJECTIVE:  ICU Vital Signs Last 24 Hrs  T(C): 36.6 (2025 08:00), Max: 37.2 (2025 16:00)  T(F): 97.9 (2025 08:00), Max: 98.9 (2025 16:00)  HR: 99 (2025 13:00) (88 - 103)  BP: --  BP(mean): --  ABP: 119/77 (2025 13:00) (89/56 - 132/83)  ABP(mean): 94 (2025 13:00) (70 - 104)  RR: 21 (2025 13:00) (15 - 28)  SpO2: 100% (2025 13:00) (97% - 100%)    O2 Parameters below as of 2025 12:00  Patient On (Oxygen Delivery Method): tracheostomy collar    O2 Concentration (%): 30           @ 07: @ 07:00  --------------------------------------------------------  IN: 1960 mL / OUT: 1300 mL / NET: 660 mL     @ 07: @ 14:06  --------------------------------------------------------  IN: 770 mL / OUT: 0 mL / NET: 770 mL      CAPILLARY BLOOD GLUCOSE      POCT Blood Glucose.: 113 mg/dL (2025 12:43)      PHYSICAL EXAM:  General: NAD, Resting comfortably  HEENT: EOMI, PERRLA  Neck: Supple  Respiratory: CTAB, Trach noted  Cardiovascular: S1S2, RRR  Abdomen: Soft, NTND, PEG noted  Extremities: No peripheral edema  Skin: Warm and dry  Neurological: Tracking, looks to voice, following simple commands. Not shaking head yes or no, appears to be intermittently trying to verbalize    LINES:     HOSPITAL MEDICATIONS:  Standing Meds:  albuterol/ipratropium for Nebulization 3 milliLiter(s) Nebulizer every 8 hours  amantadine Syrup 100 milliGRAM(s) Oral <User Schedule>  artificial tears (preservative free) Ophthalmic Solution 1 Drop(s) Both EYES four times a day  ascorbic acid 500 milliGRAM(s) Oral daily  chlorhexidine 2% Cloths 1 Application(s) Topical <User Schedule>  chlorhexidine 4% Liquid 1 Application(s) Topical <User Schedule>  enoxaparin Injectable 40 milliGRAM(s) SubCutaneous every 24 hours  hydroxyurea 500 milliGRAM(s) Oral two times a day  levETIRAcetam  Solution 500 milliGRAM(s) Oral every 12 hours  metoprolol tartrate 25 milliGRAM(s) Oral every 8 hours  multivitamin/minerals/iron Oral Solution (CENTRUM) 15 milliLiter(s) Oral daily  pantoprazole  Injectable 40 milliGRAM(s) IV Push daily  polyethylene glycol 3350 17 Gram(s) Oral daily  senna 1 Tablet(s) Oral daily      PRN Meds:  acetaminophen   Oral Liquid .. 650 milliGRAM(s) Oral every 6 hours PRN  hydrALAZINE Injectable 5 milliGRAM(s) IV Push every 6 hours PRN  sodium chloride 0.9% lock flush 10 milliLiter(s) IV Push every 1 hour PRN      LABS:                        10.1   7.45  )-----------( 346      ( 2025 00:54 )             31.6     Hgb Trend: 10.1<--, 10.4<--, 9.9<--, 10.0<--, 10.5<--      150[H]  |  112[H]  |  48[H]  ----------------------------<  125[H]  3.7   |  24  |  1.12    Ca    11.1[H]      2025 00:53  Phos  4.3       Mg     2.5         TPro  7.6  /  Alb  3.8  /  TBili  0.7  /  DBili  x   /  AST  193[H]  /  ALT  266[H]  /  AlkPhos  113      Creatinine Trend: 1.12<--, 1.15<--, 1.26<--, 0.99<--, 0.97<--, 1.00<--    Urinalysis Basic - ( 2025 00:53 )    Color: x / Appearance: x / SG: x / pH: x  Gluc: 125 mg/dL / Ketone: x  / Bili: x / Urobili: x   Blood: x / Protein: x / Nitrite: x   Leuk Esterase: x / RBC: x / WBC x   Sq Epi: x / Non Sq Epi: x / Bacteria: x      Arterial Blood Gas:   @ 23:58  7.50/36/151/28/99.5/4.8  ABG lactate: --  Arterial Blood Gas:   @ 07:38  7.46/41/131/29/99.9/4.9  ABG lactate: --  Arterial Blood Gas:   @ 00:16  7.47/41/121/30/99.1/5.6  ABG lactate: --        MICROBIOLOGY:       RADIOLOGY:  [ ] Reviewed and interpreted by me    PULMONARY FUNCTION TESTS:    EKG:

## 2025-01-27 NOTE — SWALLOW FEES ASSESSMENT ADULT - COMMENTS
1/25: seen for Speech/language, bedside swallow, and speaking valve evaluation. Mixed expressive/receptive aphasia and cognitive impairment. Limited verbal output. Hypophonia. Cleared for speaking valve as tolerated. Recommended continue npo/peg and FEES. min-mod pooling of frothy secretions in vallecuae and pyriforms  reduced movement of right vocal cord/paramedian position; left mobile during limited cued phonation

## 2025-01-27 NOTE — SWALLOW FEES ASSESSMENT ADULT - UNSUCCESSFUL STRATEGIES TRIALED DURING PROCEDURE
does not consistently follow directives/hard swallow/nonproductive volitional cough following clinician cue

## 2025-01-27 NOTE — SWALLOW FEES ASSESSMENT ADULT - ROSENBEK'S PENETRATION ASPIRATION SCALE
(2) material enters airway, remains above the vocal cords, no residue remains (penetration) (5) material contacts vocal cords, visible residue remains (penetration)

## 2025-01-27 NOTE — SWALLOW FEES ASSESSMENT ADULT - ADDITIONAL RECOMMENDATIONS
GOAL: Patient will obtain safe and adequate nutrition/hydration/medications via least restrictive means.     Allow ice chips with SLP for therapeutic purposes. Patient with oral holding; benefits from verbal/tactile cueing to facilitate oral transfer.

## 2025-01-27 NOTE — PROGRESS NOTE ADULT - SUBJECTIVE AND OBJECTIVE BOX
Patient seen and examined at the bedside.    Remains critically ill on continuous ICU monitoring.      Brief Summary:  58 yo F with Sickle cell disease and NICM now with Cardiogenic shock s/p VA ECMO on 1/4/25.  VA ECMO decannulation on 1/7/25  Tracheostomy on 1/16/25  PEG placement on 1/21/25    24 Hour events:  OOB to chair, tolerates trach collar throughout the day   PMR consult and assessment, Daily PT/OT, mobility slowly improving    Objective:  Vital Signs Last 24 Hrs  T(C): 36.6 (27 Jan 2025 04:00), Max: 37.2 (26 Jan 2025 16:00)  T(F): 97.9 (27 Jan 2025 04:00), Max: 98.9 (26 Jan 2025 16:00)  HR: 94 (27 Jan 2025 06:00) (93 - 104)  BP: --  BP(mean): --  RR: 20 (27 Jan 2025 06:00) (15 - 28)  SpO2: 100% (27 Jan 2025 06:00) (97% - 100%)    Parameters below as of 27 Jan 2025 05:47  Patient On (Oxygen Delivery Method): tracheostomy collar                    Physical Exam:   NAD  Neurology: follows commands, not moving extremities  Attempting moving  toes and feet  Respiratory: Clear bilaterally , secretion improved  CV: Sinus tachycardia  Abdominal: Soft, Nontender  Extremities: Warm, well-perfused  Costa  -------------------------------------------------------------------------------------------------------------------------------    Labs:                        10.1   7.45  )-----------( 346      ( 27 Jan 2025 00:54 )             31.6     01-27    150[H]  |  112[H]  |  48[H]  ----------------------------<  125[H]  3.7   |  24  |  1.12    Ca    11.1[H]      27 Jan 2025 00:53  Phos  4.3     01-27  Mg     2.5     01-27    TPro  7.6  /  Alb  3.8  /  TBili  0.7  /  DBili  x   /  AST  193[H]  /  ALT  266[H]  /  AlkPhos  113  01-27    LIVER FUNCTIONS - ( 27 Jan 2025 00:53 )  Alb: 3.8 g/dL / Pro: 7.6 g/dL / ALK PHOS: 113 U/L / ALT: 266 U/L / AST: 193 U/L / GGT: x             ABG - ( 26 Jan 2025 23:58 )  pH, Arterial: 7.50  pH, Blood: x     /  pCO2: 36    /  pO2: 151   / HCO3: 28    / Base Excess: 4.8   /  SaO2: 99.5    ------------------------------------------------------------------------------------------------------------------------------  Assessment:  58 yo F w/ PMHx of Sickle cell disease (on hydroxyurea), HTN, HFimpEF/NICM (EF 58% 10/2024) presenting as a transfer from [facility]. Pt initially presented to [facility] for SOB and SS crisis; course c/b witnessed seizure, intubated and sedated, and transferred to [facility]. Transferred to [facility] for further management of cardiogenic shock. Cannulated for VA ECMO on 1/4/25. Decannulated on 1/7/25.    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  LP on 1/14 - no evidence of meningitis/ encephalitis   Neuro exam improving slowly    ***Cardiovascular***  At risk for hemodynamic decompensation  S/p VA ECMO, Bi vent function recovered.  On Low dose BB, will increase to 25 BID  Spine MRI     ***Pulmonary***  Acute hypoxic respiratory failure s/p Tracheostomy   trach collar as tolerates, ok to keep for 24h  Nebs daily    ***GI***  Protein calorie malnutrition  S/p PEG placement   Enteral feeding via PEG tube, tolerates well  At goal  Protonix for stress ulcer prophylaxis   Bowel regimen.    ***Renal***  Trend Creatinine   Costa catheter for strict I/O measurements.  Bumex daily     ***ID***  MSSA bacteremia, BAL + MSSA -IV vanc completed  Merrem for total 10 days, completed  monitor off antibiotics    ***Endocrine***  Hyperglycemia - Insulin sliding scale.     ***Hematology***  Acute blood loss anemia - no current transfusion indication.  Sickle Cell - s/p Red cell exchange, Hgb electrophoresis weekly  Hydroxyurea - monitor platelet count.  Lovenox for DVT prophylaxis       *** Lines ***  LRAL    Care plan discussed with the ICU care team.   Patient remains critical, at risk for life threatening decompensation.    I have spent 30 minutes providing critical care management to this patient.    By signing my name below, I, Gabriella Lopez, attest that this documentation has been prepared under the direction and in the presence of Memo Ontiveros MD.  Electronically signed: Gabriella oLpez, 01-27-25 @ 06:56    I, Memo Ontiveros MD,  personally performed the services described in this documentation. all medical record entries made by the scribe were at my direction and in my presence. I have reviewed the chart and agree that the record reflects my personal performance and is accurate and complete  Electronically signed: Memo Ontiveros MD. Patient seen and examined at the bedside.    Remains critically ill on continuous ICU monitoring.      Brief Summary:  58 yo with history of sickle cell  12/30 admitted with sickle cell   1/2 unresponsive ? witnessed seizure  1/4 VA ECMO - cardiogenic shock, RV failure  1/7 LVEF 30-35%, ECMO decannulation   1/8 Seizure on EEG   1/9 MRI.  Innumerable foci susceptibility artifact scattered throughout the   brain which can be seen with critically ill patients on ECMO. Diffuse fat   embolization is also part of the differential diagnosis but is considered   less likely.    1/12 RBC Exchange   1/14 LP   1/16 Trach   1/18 Trach collar around the clock  1/21 s/p PEG   1/24 MRI spinal stenosis  1/25 no acute issues, seen by speech - pt aphasic/seems to also have receptive aphasia     24 Hour events:  OOB to chair, tolerates trach collar throughout the day   PMR consult and assessment, Daily PT/OT, mobility slowly improving  Improving mental status and strength   PMV as tolerated     Objective:  Vital Signs Last 24 Hrs  T(C): 36.6 (27 Jan 2025 04:00), Max: 37.2 (26 Jan 2025 16:00)  T(F): 97.9 (27 Jan 2025 04:00), Max: 98.9 (26 Jan 2025 16:00)  HR: 94 (27 Jan 2025 06:00) (93 - 104)  BP: --  BP(mean): --  RR: 20 (27 Jan 2025 06:00) (15 - 28)  SpO2: 100% (27 Jan 2025 06:00) (97% - 100%)    Parameters below as of 27 Jan 2025 05:47  Patient On (Oxygen Delivery Method): tracheostomy collar                    Physical Exam:   NAD  Neurology: follows commands, slow to move extremities with weakness   Respiratory: Clear bilaterally , secretion improved  CV: Sinus tachycardia  Abdominal: Soft, Nontender  Extremities: Warm, well-perfused  Costa  -------------------------------------------------------------------------------------------------------------------------------    Labs:                        10.1   7.45  )-----------( 346      ( 27 Jan 2025 00:54 )             31.6     01-27    150[H]  |  112[H]  |  48[H]  ----------------------------<  125[H]  3.7   |  24  |  1.12    Ca    11.1[H]      27 Jan 2025 00:53  Phos  4.3     01-27  Mg     2.5     01-27    TPro  7.6  /  Alb  3.8  /  TBili  0.7  /  DBili  x   /  AST  193[H]  /  ALT  266[H]  /  AlkPhos  113  01-27    LIVER FUNCTIONS - ( 27 Jan 2025 00:53 )  Alb: 3.8 g/dL / Pro: 7.6 g/dL / ALK PHOS: 113 U/L / ALT: 266 U/L / AST: 193 U/L / GGT: x             ABG - ( 26 Jan 2025 23:58 )  pH, Arterial: 7.50  pH, Blood: x     /  pCO2: 36    /  pO2: 151   / HCO3: 28    / Base Excess: 4.8   /  SaO2: 99.5    ------------------------------------------------------------------------------------------------------------------------------  Assessment:  58 yo F w/ PMHx of Sickle cell disease (on hydroxyurea), HTN, 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 Pt initially presented to [facility] for SOB and SS crisis; course c/b witnessed seizure, intubated and sedated, and transferred to [facility]. Transferred to [facility] for further management of cardiogenic shock. Cannulated for VA ECMO on 1/4/25. Decannulated on 1/7/25.    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  LP on 1/14 - no evidence of meningitis/ encephalitis   Neuro exam improving slowly    ***Cardiovascular***  At risk for hemodynamic decompensation  S/p VA ECMO, Bi vent function recovered.  On Low dose BB, will increase to 25 BID  Spine MRI     ***Pulmonary***  Acute hypoxic respiratory failure s/p Tracheostomy   trach collar around the clock since 1/18   Nebs daily    ***GI***  Protein calorie malnutrition  S/p PEG placement   Enteral feeding via PEG tube, tolerates well  At goal  Protonix for stress ulcer prophylaxis   Bowel regimen.  Transaminitis - RUQ US     ***Renal***  Hypernatremia - FWB  Trend Creatinine   Costa catheter for strict I/O measurements.  Bumex daily     ***ID***  MSSA bacteremia, BAL + MSSA -IV vanc completed  Merrem for total 10 days, completed  monitor off antibiotics    ***Endocrine***  Hyperglycemia - Insulin sliding scale.     ***Hematology***  Acute blood loss anemia - no current transfusion indication.  Sickle Cell - s/p Red cell exchange, Hgb electrophoresis weekly  Hydroxyurea - monitor platelet count.  Lovenox for DVT prophylaxis       *** Lines ***  LRAL    Care plan discussed with the ICU care team.   Patient remains critical, at risk for life threatening decompensation.    I have spent 31 minutes providing critical care management to this patient.    By signing my name below, I, Gabriella Lopez, attest that this documentation has been prepared under the direction and in the presence of Memo Ontiveros MD.  Electronically signed: Gabriella Lopez, 01-27-25 @ 06:56    I, Memo Ontiveros MD,  personally performed the services described in this documentation. all medical record entries made by the scribe were at my direction and in my presence. I have reviewed the chart and agree that the record reflects my personal performance and is accurate and complete  Electronically signed: Memo Ontiveros MD.

## 2025-01-27 NOTE — CONSULT NOTE ADULT - TIME BILLING
Medical management as above, reviewing chart and coordinating care with primary team/staff, as well as reviewing vitals, radiology, medication list, recent labs, and prior records.    Does not include teaching time.
This includes chart review, patient assessment, discussion and collaboration with interdisciplinary team members, coordination of care.

## 2025-01-27 NOTE — CONSULT NOTE ADULT - ASSESSMENT
Patient is a 60 yo F w/ Sickle Cell (on Hydroxyurea), HTN, HFpEF who was initially transferred to [facility] on 1/3 as a transfer from [facility]. Patient initially presented to [facility] on 12/30 with back/head/shoulder pain concerning for SS crisis. On 1/2, patient was found to be unresposnice requring intubation with concern for seizure due to tongue biting and incontinence (possibly caused by Clonazepam withdrawal as per OSH). Patient was initially transferred to [facility] MICU where patient was found to have RV failure and profound cardiogenic shock. Shock team was called on 1/3 and patient was placed on peripheral VA ECMO. Patient was decannulated on 1/7 with recovery of cardiac function. Course c/b seizure on EEG (1/8), MSSA positive sputum s/p empiric abx x 2 weeks and persistent encephalopathy with MRI brain (1/9) notable for innumerable foci susceptibility artifact scattered throughout the brain (possible diffuse fat embolization as well as tiny acute/subacute infarcts within the bilateral basal ganglia, thalami, and chandu. Patient underwent exchange transfusion on 1/4 and 1/12.    Patient is now s/p Trach 1/16 and PEG 1/21    RCU consulted for evaluation.    Labs notable for no leukocytosisi WBC 7.4, Na 150, Bicarb 24, Creatinine 1.12, Ca 11.1, , , ABG 7.5/36/151 on TC 30% Patient is a 60 yo F w/ Sickle Cell (on Hydroxyurea), HTN, HFpEF who was initially transferred to [facility] on 1/3 as a transfer from [facility]. Patient initially presented to [facility] on 12/30 with back/head/shoulder pain concerning for SS crisis. On 1/2, patient was found to be unresposnice requring intubation with concern for seizure due to tongue biting and incontinence (possibly caused by Clonazepam withdrawal as per OSH). Patient was initially transferred to [facility] MICU where patient was found to have RV failure and profound cardiogenic shock. Shock team was called on 1/3 and patient was placed on peripheral VA ECMO. Patient was decannulated on 1/7 with recovery of cardiac function. Course c/b seizure on EEG (1/8), MSSA positive sputum s/p empiric abx x 2 weeks and persistent encephalopathy with MRI brain (1/9) notable for innumerable foci susceptibility artifact scattered throughout the brain (possible diffuse fat embolization as well as tiny acute/subacute infarcts within the bilateral basal ganglia, thalami, and chandu. Patient underwent exchange transfusion on 1/4 and 1/12.    Patient is now s/p Trach 1/16 and PEG 1/21    RCU consulted for evaluation.    Labs notable for no leukocytosisi WBC 7.4, Na 150, Bicarb 24, Creatinine 1.12, Ca 11.1, , , ABG 7.5/36/151 on TC 30%    #Acute hypoxemic respiratory failure  #Trach dependence  - [place]es for RCU evaluation due to high suctioning requirements. Spoke with RN, who endorses no suctioning need since 9 AM  - c/w duonebs and airway clearance  - If increased thick secretions, would add 3% and repeat sputum cultures  - c/w TC 30%, tolerating since 1/22. Wean as tolerated. PaO2 on 30% 151 on 1/26  - Trach care as per primary team  - No need for RCU at this time, please call back if any questions    Stone Ponce MD  Pulmonary & Critical Care

## 2025-01-27 NOTE — SWALLOW FEES ASSESSMENT ADULT - DIAGNOSTIC IMPRESSIONS
60 yo F admitted for cardiogenic shock requiring ECMO (decan 1/7); intubated 1/3-1/16; s/p trach 1/16; PEG 1/2; small b/l infarcts basal ganglia, thalami, chandu. Patient presents on FEES with an oropharyngeal dysphagia. Oral holding, delayed pharyngeal trigger, reduced supraglottic sensation and reduced airway closure is noted. There is reduced movement of the right vocal cord; left is mobile/intact. Silent laryngeal penetration of purees and moderately thick liquids is noted. Material progresses deep to the vocal cords without retrieval on spontaneous repeat swallows. There is no reflexive cough. Patient is unable to follow directives for swallow strategies. Ice chips are well tolerated with vigorous mastication. There is trace shallow penetration which clears on spontaneous repeat swallows.

## 2025-01-28 ENCOUNTER — TRANSCRIPTION ENCOUNTER (OUTPATIENT)
Age: 60
End: 2025-01-28

## 2025-01-28 LAB
ALBUMIN SERPL ELPH-MCNC: 3.8 G/DL — SIGNIFICANT CHANGE UP (ref 3.3–5)
ALP SERPL-CCNC: 104 U/L — SIGNIFICANT CHANGE UP (ref 40–120)
ALT FLD-CCNC: 180 U/L — HIGH (ref 10–45)
ANION GAP SERPL CALC-SCNC: 15 MMOL/L — SIGNIFICANT CHANGE UP (ref 5–17)
ANION GAP SERPL CALC-SCNC: 15 MMOL/L — SIGNIFICANT CHANGE UP (ref 5–17)
AST SERPL-CCNC: 92 U/L — HIGH (ref 10–40)
BASOPHILS # BLD AUTO: 0.04 K/UL — SIGNIFICANT CHANGE UP (ref 0–0.2)
BASOPHILS NFR BLD AUTO: 0.4 % — SIGNIFICANT CHANGE UP (ref 0–2)
BILIRUB SERPL-MCNC: 0.6 MG/DL — SIGNIFICANT CHANGE UP (ref 0.2–1.2)
BUN SERPL-MCNC: 41 MG/DL — HIGH (ref 7–23)
BUN SERPL-MCNC: 42 MG/DL — HIGH (ref 7–23)
CALCIUM SERPL-MCNC: 10.7 MG/DL — HIGH (ref 8.4–10.5)
CALCIUM SERPL-MCNC: 10.9 MG/DL — HIGH (ref 8.4–10.5)
CHLORIDE SERPL-SCNC: 107 MMOL/L — SIGNIFICANT CHANGE UP (ref 96–108)
CHLORIDE SERPL-SCNC: 112 MMOL/L — HIGH (ref 96–108)
CO2 SERPL-SCNC: 22 MMOL/L — SIGNIFICANT CHANGE UP (ref 22–31)
CO2 SERPL-SCNC: 24 MMOL/L — SIGNIFICANT CHANGE UP (ref 22–31)
CREAT SERPL-MCNC: 0.97 MG/DL — SIGNIFICANT CHANGE UP (ref 0.5–1.3)
CREAT SERPL-MCNC: 1.03 MG/DL — SIGNIFICANT CHANGE UP (ref 0.5–1.3)
EGFR: 63 ML/MIN/1.73M2 — SIGNIFICANT CHANGE UP
EGFR: 68 ML/MIN/1.73M2 — SIGNIFICANT CHANGE UP
EOSINOPHIL # BLD AUTO: 0.22 K/UL — SIGNIFICANT CHANGE UP (ref 0–0.5)
EOSINOPHIL NFR BLD AUTO: 2.3 % — SIGNIFICANT CHANGE UP (ref 0–6)
GAS PNL BLDA: SIGNIFICANT CHANGE UP
GLUCOSE SERPL-MCNC: 111 MG/DL — HIGH (ref 70–99)
GLUCOSE SERPL-MCNC: 113 MG/DL — HIGH (ref 70–99)
HCT VFR BLD CALC: 31.3 % — LOW (ref 34.5–45)
HGB BLD-MCNC: 10 G/DL — LOW (ref 11.5–15.5)
IMM GRANULOCYTES NFR BLD AUTO: 2.9 % — HIGH (ref 0–0.9)
LYMPHOCYTES # BLD AUTO: 2.01 K/UL — SIGNIFICANT CHANGE UP (ref 1–3.3)
LYMPHOCYTES # BLD AUTO: 20.7 % — SIGNIFICANT CHANGE UP (ref 13–44)
MAGNESIUM SERPL-MCNC: 2.3 MG/DL — SIGNIFICANT CHANGE UP (ref 1.6–2.6)
MCHC RBC-ENTMCNC: 29.5 PG — SIGNIFICANT CHANGE UP (ref 27–34)
MCHC RBC-ENTMCNC: 31.9 G/DL — LOW (ref 32–36)
MCV RBC AUTO: 92.3 FL — SIGNIFICANT CHANGE UP (ref 80–100)
MONOCYTES # BLD AUTO: 0.87 K/UL — SIGNIFICANT CHANGE UP (ref 0–0.9)
MONOCYTES NFR BLD AUTO: 9 % — SIGNIFICANT CHANGE UP (ref 2–14)
NEUTROPHILS # BLD AUTO: 6.27 K/UL — SIGNIFICANT CHANGE UP (ref 1.8–7.4)
NEUTROPHILS NFR BLD AUTO: 64.7 % — SIGNIFICANT CHANGE UP (ref 43–77)
NRBC # BLD: 1 /100 WBCS — HIGH (ref 0–0)
NRBC BLD-RTO: 1 /100 WBCS — HIGH (ref 0–0)
PHOSPHATE SERPL-MCNC: 3.5 MG/DL — SIGNIFICANT CHANGE UP (ref 2.5–4.5)
PLATELET # BLD AUTO: SIGNIFICANT CHANGE UP K/UL (ref 150–400)
POTASSIUM BLDA-SCNC: 4.4 MMOL/L — SIGNIFICANT CHANGE UP (ref 3.5–5.1)
POTASSIUM SERPL-MCNC: 3.5 MMOL/L — SIGNIFICANT CHANGE UP (ref 3.5–5.3)
POTASSIUM SERPL-MCNC: 3.6 MMOL/L — SIGNIFICANT CHANGE UP (ref 3.5–5.3)
POTASSIUM SERPL-SCNC: 3.5 MMOL/L — SIGNIFICANT CHANGE UP (ref 3.5–5.3)
POTASSIUM SERPL-SCNC: 3.6 MMOL/L — SIGNIFICANT CHANGE UP (ref 3.5–5.3)
PROT SERPL-MCNC: 7.4 G/DL — SIGNIFICANT CHANGE UP (ref 6–8.3)
RBC # BLD: 3.39 M/UL — LOW (ref 3.8–5.2)
RBC # FLD: 15.6 % — HIGH (ref 10.3–14.5)
SODIUM SERPL-SCNC: 144 MMOL/L — SIGNIFICANT CHANGE UP (ref 135–145)
SODIUM SERPL-SCNC: 151 MMOL/L — HIGH (ref 135–145)
WBC # BLD: 9.69 K/UL — SIGNIFICANT CHANGE UP (ref 3.8–10.5)
WBC # FLD AUTO: 9.69 K/UL — SIGNIFICANT CHANGE UP (ref 3.8–10.5)

## 2025-01-28 PROCEDURE — 83020 HEMOGLOBIN ELECTROPHORESIS: CPT | Mod: 26

## 2025-01-28 PROCEDURE — 71045 X-RAY EXAM CHEST 1 VIEW: CPT | Mod: 26

## 2025-01-28 PROCEDURE — 99291 CRITICAL CARE FIRST HOUR: CPT

## 2025-01-28 PROCEDURE — 99232 SBSQ HOSP IP/OBS MODERATE 35: CPT

## 2025-01-28 RX ORDER — BUMETANIDE 2 MG/1
2 TABLET ORAL ONCE
Refills: 0 | Status: COMPLETED | OUTPATIENT
Start: 2025-01-28 | End: 2025-01-28

## 2025-01-28 RX ORDER — POTASSIUM CHLORIDE 750 MG/1
40 TABLET, EXTENDED RELEASE ORAL ONCE
Refills: 0 | Status: COMPLETED | OUTPATIENT
Start: 2025-01-28 | End: 2025-01-28

## 2025-01-28 RX ORDER — POTASSIUM CHLORIDE 750 MG/1
20 TABLET, EXTENDED RELEASE ORAL ONCE
Refills: 0 | Status: COMPLETED | OUTPATIENT
Start: 2025-01-28 | End: 2025-01-28

## 2025-01-28 RX ADMIN — POTASSIUM CHLORIDE 20 MILLIEQUIVALENT(S): 750 TABLET, EXTENDED RELEASE ORAL at 18:26

## 2025-01-28 RX ADMIN — Medication 1 DROP(S): at 05:29

## 2025-01-28 RX ADMIN — IPRATROPIUM BROMIDE AND ALBUTEROL SULFATE 3 MILLILITER(S): .5; 2.5 SOLUTION RESPIRATORY (INHALATION) at 23:04

## 2025-01-28 RX ADMIN — Medication 25 MILLIGRAM(S): at 21:21

## 2025-01-28 RX ADMIN — Medication 1 DROP(S): at 00:10

## 2025-01-28 RX ADMIN — PANTOPRAZOLE 40 MILLIGRAM(S): 20 TABLET, DELAYED RELEASE ORAL at 13:00

## 2025-01-28 RX ADMIN — Medication 100 MILLIGRAM(S): at 06:34

## 2025-01-28 RX ADMIN — Medication 15 MILLILITER(S): at 13:00

## 2025-01-28 RX ADMIN — LEVETIRACETAM 500 MILLIGRAM(S): 750 TABLET, FILM COATED ORAL at 18:26

## 2025-01-28 RX ADMIN — IPRATROPIUM BROMIDE AND ALBUTEROL SULFATE 3 MILLILITER(S): .5; 2.5 SOLUTION RESPIRATORY (INHALATION) at 05:28

## 2025-01-28 RX ADMIN — Medication 1 DROP(S): at 13:01

## 2025-01-28 RX ADMIN — Medication 1 DROP(S): at 23:55

## 2025-01-28 RX ADMIN — POTASSIUM CHLORIDE 40 MILLIEQUIVALENT(S): 750 TABLET, EXTENDED RELEASE ORAL at 05:32

## 2025-01-28 RX ADMIN — Medication 1 DROP(S): at 18:27

## 2025-01-28 RX ADMIN — ANTISEPTIC SURGICAL SCRUB 1 APPLICATION(S): 0.04 SOLUTION TOPICAL at 06:21

## 2025-01-28 RX ADMIN — IPRATROPIUM BROMIDE AND ALBUTEROL SULFATE 3 MILLILITER(S): .5; 2.5 SOLUTION RESPIRATORY (INHALATION) at 11:04

## 2025-01-28 RX ADMIN — Medication 25 MILLIGRAM(S): at 13:01

## 2025-01-28 RX ADMIN — LEVETIRACETAM 500 MILLIGRAM(S): 750 TABLET, FILM COATED ORAL at 05:30

## 2025-01-28 RX ADMIN — Medication 25 MILLIGRAM(S): at 05:30

## 2025-01-28 RX ADMIN — Medication 100 MILLIGRAM(S): at 13:00

## 2025-01-28 RX ADMIN — ENOXAPARIN SODIUM 40 MILLIGRAM(S): 100 INJECTION SUBCUTANEOUS at 05:29

## 2025-01-28 RX ADMIN — Medication 500 MILLIGRAM(S): at 13:01

## 2025-01-28 RX ADMIN — Medication 500 MILLIGRAM(S): at 05:30

## 2025-01-28 RX ADMIN — BUMETANIDE 2 MILLIGRAM(S): 2 TABLET ORAL at 10:51

## 2025-01-28 RX ADMIN — Medication 500 MILLIGRAM(S): at 18:26

## 2025-01-28 NOTE — PROGRESS NOTE ADULT - SUBJECTIVE AND OBJECTIVE BOX
INTERVAL HPI/OVERNIGHT EVENTS:  Patient S&E at bedside. No o/n events,     VITAL SIGNS:  T(F): 98.3 (01-28-25 @ 08:00)  HR: 86 (01-28-25 @ 09:00)  BP: --  RR: 25 (01-28-25 @ 09:00)  SpO2: 100% (01-28-25 @ 09:00)  Wt(kg): --    PHYSICAL EXAM:    Constitutional: NAD  Eyes: EOMI  Neck: trache  Respiratory: trache, clear BL  Cardiovascular: RRR, no M/R/G  Gastrointestinal: soft, NTND, + BS, +PEG  Extremities: warm  Neurological: awake, trache, follows some commands      MEDICATIONS  (STANDING):  albuterol/ipratropium for Nebulization 3 milliLiter(s) Nebulizer every 8 hours  amantadine Syrup 100 milliGRAM(s) Oral <User Schedule>  artificial tears (preservative free) Ophthalmic Solution 1 Drop(s) Both EYES four times a day  ascorbic acid 500 milliGRAM(s) Oral daily  chlorhexidine 2% Cloths 1 Application(s) Topical <User Schedule>  chlorhexidine 4% Liquid 1 Application(s) Topical <User Schedule>  enoxaparin Injectable 40 milliGRAM(s) SubCutaneous every 24 hours  hydroxyurea 500 milliGRAM(s) Oral two times a day  levETIRAcetam  Solution 500 milliGRAM(s) Oral every 12 hours  metolazone 2.5 milliGRAM(s) Oral once  metoprolol tartrate 25 milliGRAM(s) Oral every 8 hours  multivitamin/minerals/iron Oral Solution (CENTRUM) 15 milliLiter(s) Oral daily  pantoprazole  Injectable 40 milliGRAM(s) IV Push daily  polyethylene glycol 3350 17 Gram(s) Oral daily  senna 1 Tablet(s) Oral daily    MEDICATIONS  (PRN):  acetaminophen   Oral Liquid .. 650 milliGRAM(s) Oral every 6 hours PRN Moderate Pain (4 - 6)  hydrALAZINE Injectable 5 milliGRAM(s) IV Push every 6 hours PRN HTN  sodium chloride 0.9% lock flush 10 milliLiter(s) IV Push every 1 hour PRN Pre/post blood products, medications, blood draw, and to maintain line patency      Allergies    doxycycline (Angioedema)  penicillin (Unknown)  clindamycin (Angioedema)  linezolid (Angioedema)    Intolerances        LABS:                        10.0   9.69  )-----------( Clumped    ( 28 Jan 2025 00:39 )             31.3     01-28    151[H]  |  112[H]  |  42[H]  ----------------------------<  113[H]  3.5   |  24  |  0.97    Ca    10.9[H]      28 Jan 2025 00:39  Phos  3.5     01-28  Mg     2.3     01-28    TPro  7.4  /  Alb  3.8  /  TBili  0.6  /  DBili  x   /  AST  92[H]  /  ALT  180[H]  /  AlkPhos  104  01-28      Urinalysis Basic - ( 28 Jan 2025 00:39 )    Color: x / Appearance: x / SG: x / pH: x  Gluc: 113 mg/dL / Ketone: x  / Bili: x / Urobili: x   Blood: x / Protein: x / Nitrite: x   Leuk Esterase: x / RBC: x / WBC x   Sq Epi: x / Non Sq Epi: x / Bacteria: x        RADIOLOGY & ADDITIONAL TESTS:  Studies reviewed.    ASSESSMENT & PLAN:

## 2025-01-28 NOTE — CHART NOTE - NSCHARTNOTEFT_GEN_A_CORE
NUTRITION FOLLOW UP NOTE    PATIENT SEEN FOR: ICU malnutrition follow up    SOURCE: [x] Patient  [x] Current Medical Record  [x] RN  [] Family/support person at bedside  [] Patient unavailable/inappropriate  [] Other:    CHART REVIEWED/EVENTS NOTED.  [] No changes to nutrition care plan to note  [x] Nutrition Status:  -Hx sickle cell disease, heart failure  -VA ECMO decannulated   -s/p Tracheostomy . Has been using trach collar  -s/p PEG   -s/p FEES  with recommendations to continue NPO     DIET ORDER:   Diet, NPO with Tube Feed:   Tube Feeding Modality: Gastrostomy  Vivonex  Total Volume for 24 Hours (mL): 1680  Continuous  Starting Tube Feed Rate {mL per Hour}: 10  Increase Tube Feed Rate by (mL): 20     Every 4 hours  Until Goal Tube Feed Rate (mL per Hour): 70  Tube Feed Duration (in Hours): 24  Tube Feed Start Time: 14:50  Mehdi(7 Gm Arginine/7 Gm Glut/1.2 Gm HMB     Qty per Day:  2 (25)      CURRENT DIET ORDER IS:  [] Appropriate:  [] Inadequate:  [x] Other: See recommendation below     NUTRITION INTAKE/PROVISION:  [] PO:  [x] Enteral Nutrition:  EN Order + Mehdi 2x/day Provides:  1680 ml formula, 1860 kcal, 89 g protein, 1425 ml free water; meets 34.2kcal/kg and 1.64g/kg protein based on IBW 120lb/54.4kg.    Current Pump Rate: 70ml/hr   5-Day Average Enteral Provision per RN flowsheet: 1524 mL, 1524 kcals, 76 g protein   [] Parenteral Nutrition:    ANTHROPOMETRICS:  Drug Dosing Weight  Height (cm): 162 (2025 20:47)  Weight (kg): 83.6 (2025 20:47)  BMI (kg/m2): 31.9 (2025 20:47)  Weights:   Daily Weight in k.5 (), 80.1 (), 79.4 (), 80.2 (), 81.2 (), 74.4 (), 85 (), 84.6 (), 86.6 (), 84.8 (), 83 (), 81.1 (), 82.6 (), 87.5 (01-15), 88.3 (), 85.8 (), 92.1 (), 82.7 (), 87.9 (01-10), 86.9 (), 75.8 (), 74.1 (), 78.4 (), 83.6 ()   Weight fluctuations likely in setting of fluid shifts vs scale inaccuracies and/or ongoing malnutrition. Note patient ordered for bumex. RD to continue to monitor weight trends as available/able.    NUTRITIONALLY PERTINENT MEDICATIONS:  MEDICATIONS  (STANDING):  ascorbic acid  buMETAnide Injectable  hydroxyurea  metolazone  metoprolol tartrate  multivitamin/minerals/iron Oral Solution (CENTRUM)  pantoprazole  Injectable  polyethylene glycol 3350  senna       NUTRITIONALLY PERTINENT LABS:   Na151 mmol/L[H] Glu 113 mg/dL[H] K+ 3.5 mmol/L Cr  0.97 mg/dL BUN 42 mg/dL[H]  Phos 3.5 mg/dL  Alb 3.8 g/dL  U/L[H] AST 92 U/L[H] Alkaline Phosphatase 104 U/L    A1C with Estimated Average Glucose Result: 4.6 % (25 @ 21:34)          Finger Sticks:  POCT Blood Glucose.: 113 mg/dL ( @ 12:43)      NUTRITIONALLY PERTINENT MEDICATIONS/LABS:  [x] Reviewed  [x] Relevant notes on medications/labs:  -off insulin  -off pressors   -Multivitamin and Vitamin C for wound healing    EDEMA:  [x] Reviewed  [x] Relevant notes: 1+ generalized edema per flowsheets     GI/ I&O:  [x] Reviewed  [x] Relevant notes: tolerating enteral nutrition per RN. Noted with formed BMs per flowsheets. Last BM  per flowsheets. Had 3x BM on , 2x BM on , 2x BM on .   [] Other:    SKIN:   [] No pressure injuries documented, per nursing flowsheet  [x] Pressure injury previously noted  -per flowsheets:  left earlobe suspected deep tissue injury  left forehead suspected deep tissue injury  right forehead suspected deep tissue injury  sacrum / bilateral buttocks suspected deep tissue injury  bilateral heels suspected deep tissue injury   [] Change in pressure injury documentation:  [] Other:    ESTIMATED NEEDS:  [] No change:  [x] Updated:  Energy:  8984-4228 kcal/day (32-37 kcal/kg)  Protein:   g/day (1.5-2.0 g/kg)  Fluid:   ml/day or [x] defer to team  Based on: IBW 120lb/54.4kg    NUTRITION DIAGNOSIS:  [x] Prior Dx: Increased Nutrient Needs; Severe Acute Malnutrition  [] New Dx:    EDUCATION:  [] Yes:  [x] Not appropriate/warranted    NUTRITION CARE PLAN:  1. Diet: Since patient having regular BMs and has been off pressors and ECMO, change formula to Vital 1.5. Start at 10ml/hr and increase by 10ml q4hr as tolerated to goal rate 50ml/hr x 24hr. At goal with Mehdi supplementation 2x/day, provides 1200ml formula, 1980kcal, 86g protein, 917ml free water. Meets 36.4kcal/kg and 1.58g/kg protein based on   2. Supplements: Continue Mehdi 2x/day as noted above.  3. Multivitamin/mineral supplementation: continue Multivitamin and Vitamin C for wound healing pending no medical contraindications.    [] Achieved - Continue current nutrition intervention(s)  [] Current medical condition precludes nutrition intervention at this time.    MONITORING AND EVALUATION:   RD remains available upon request and will follow up per protocol.    Corinne Lima RDN, CDN - available via [facility] NUTRITION FOLLOW UP NOTE    PATIENT SEEN FOR: ICU malnutrition follow up    SOURCE: [x] Patient  [x] Current Medical Record  [x] RN  [] Family/support person at bedside  [] Patient unavailable/inappropriate  [] Other:    CHART REVIEWED/EVENTS NOTED.  [] No changes to nutrition care plan to note  [x] Nutrition Status:  -Hx sickle cell disease, heart failure  -VA ECMO decannulated   -s/p Tracheostomy . Has been using trach collar  -s/p PEG   -s/p FEES  with recommendations to continue NPO     DIET ORDER:   Diet, NPO with Tube Feed:   Tube Feeding Modality: Gastrostomy  Vivonex  Total Volume for 24 Hours (mL): 1680  Continuous  Starting Tube Feed Rate {mL per Hour}: 10  Increase Tube Feed Rate by (mL): 20     Every 4 hours  Until Goal Tube Feed Rate (mL per Hour): 70  Tube Feed Duration (in Hours): 24  Tube Feed Start Time: 14:50  Mehdi(7 Gm Arginine/7 Gm Glut/1.2 Gm HMB     Qty per Day:  2 (25)      CURRENT DIET ORDER IS:  [] Appropriate:  [] Inadequate:  [x] Other: See recommendation below     NUTRITION INTAKE/PROVISION:  [] PO:  [x] Enteral Nutrition:  EN Order + Mehdi 2x/day Provides:  1680 ml formula, 1860 kcal, 89 g protein, 1425 ml free water; meets 34.2kcal/kg and 1.64g/kg protein based on IBW 120lb/54.4kg.    Current Pump Rate: 70ml/hr   5-Day Average Enteral Provision per RN flowsheet: 1524 mL, 1524 kcals, 76 g protein   [] Parenteral Nutrition:    ANTHROPOMETRICS:  Drug Dosing Weight  Height (cm): 162 (2025 20:47)  Weight (kg): 83.6 (2025 20:47)  BMI (kg/m2): 31.9 (2025 20:47)  Weights:   Daily Weight in k.5 (), 80.1 (), 79.4 (), 80.2 (), 81.2 (), 74.4 (), 85 (), 84.6 (), 86.6 (), 84.8 (), 83 (), 81.1 (), 82.6 (), 87.5 (01-15), 88.3 (), 85.8 (), 92.1 (), 82.7 (), 87.9 (01-10), 86.9 (), 75.8 (), 74.1 (), 78.4 (), 83.6 ()   Weight fluctuations likely in setting of fluid shifts vs scale inaccuracies and/or ongoing malnutrition. Note patient ordered for bumex. RD to continue to monitor weight trends as available/able.    NUTRITIONALLY PERTINENT MEDICATIONS:  MEDICATIONS  (STANDING):  ascorbic acid  buMETAnide Injectable  hydroxyurea  metolazone  metoprolol tartrate  multivitamin/minerals/iron Oral Solution (CENTRUM)  pantoprazole  Injectable  polyethylene glycol 3350  senna       NUTRITIONALLY PERTINENT LABS:   Na151 mmol/L[H] Glu 113 mg/dL[H] K+ 3.5 mmol/L Cr  0.97 mg/dL BUN 42 mg/dL[H]  Phos 3.5 mg/dL  Alb 3.8 g/dL  U/L[H] AST 92 U/L[H] Alkaline Phosphatase 104 U/L    A1C with Estimated Average Glucose Result: 4.6 % (25 @ 21:34)          Finger Sticks:  POCT Blood Glucose.: 113 mg/dL ( @ 12:43)      NUTRITIONALLY PERTINENT MEDICATIONS/LABS:  [x] Reviewed  [x] Relevant notes on medications/labs:  -off insulin  -off pressors   -Multivitamin and Vitamin C for wound healing    EDEMA:  [x] Reviewed  [x] Relevant notes: 1+ generalized edema per flowsheets     GI/ I&O:  [x] Reviewed  [x] Relevant notes: tolerating enteral nutrition per RN. Noted with formed BMs per flowsheets. Last BM  per flowsheets. Had 3x BM on , 2x BM on , 2x BM on .   [] Other:    SKIN:   [] No pressure injuries documented, per nursing flowsheet  [x] Pressure injury previously noted  -per flowsheets:  left earlobe suspected deep tissue injury  left forehead suspected deep tissue injury  right forehead suspected deep tissue injury  sacrum / bilateral buttocks suspected deep tissue injury  bilateral heels suspected deep tissue injury   [] Change in pressure injury documentation:  [] Other:    ESTIMATED NEEDS:  [] No change:  [x] Updated:  Energy:  6165-4937 kcal/day (32-37 kcal/kg)  Protein:   g/day (1.5-2.0 g/kg)  Fluid:   ml/day or [x] defer to team  Based on: IBW 120lb/54.4kg    NUTRITION DIAGNOSIS:  [x] Prior Dx: Increased Nutrient Needs; Severe Acute Malnutrition  [] New Dx:    EDUCATION:  [] Yes:  [x] Not appropriate/warranted    NUTRITION CARE PLAN:  1. Diet: Since patient having regular BMs and has been off pressors and ECMO, change formula to Vital 1.5. Start at 10ml/hr and increase by 10ml q4hr as tolerated to goal rate 50ml/hr x 24hr. At goal with Mehdi supplementation 2x/day, provides 1200ml formula, 1980kcal, 86g protein, 917ml free water. Meets 36.4kcal/kg and 1.58g/kg protein based on IBW 120lb/54.4kg. Defer free water flushes to medical team.  2. Supplements: Continue Mehdi 2x/day as noted above.  3. Multivitamin/mineral supplementation: continue Multivitamin and Vitamin C for wound healing pending no medical contraindications.    [] Achieved - Continue current nutrition intervention(s)  [] Current medical condition precludes nutrition intervention at this time.    MONITORING AND EVALUATION:   RD remains available upon request and will follow up per protocol.    Corinne Lima RDN, CDN - available via [facility] NUTRITION FOLLOW UP NOTE    PATIENT SEEN FOR: ICU malnutrition follow up    SOURCE: [x] Patient  [x] Current Medical Record  [x] RN  [] Family/support person at bedside  [] Patient unavailable/inappropriate  [] Other:    CHART REVIEWED/EVENTS NOTED.  [] No changes to nutrition care plan to note  [x] Nutrition Status:  -Hx sickle cell disease, heart failure  -VA ECMO decannulated   -s/p Tracheostomy . Has been using trach collar  -s/p PEG   -s/p FEES  with recommendations to continue NPO     DIET ORDER:   Diet, NPO with Tube Feed:   Tube Feeding Modality: Gastrostomy  Vivonex  Total Volume for 24 Hours (mL): 1680  Continuous  Starting Tube Feed Rate {mL per Hour}: 10  Increase Tube Feed Rate by (mL): 20     Every 4 hours  Until Goal Tube Feed Rate (mL per Hour): 70  Tube Feed Duration (in Hours): 24  Tube Feed Start Time: 14:50  Mehdi(7 Gm Arginine/7 Gm Glut/1.2 Gm HMB     Qty per Day:  2 (25)      CURRENT DIET ORDER IS:  [] Appropriate:  [] Inadequate:  [x] Other: See recommendation below     NUTRITION INTAKE/PROVISION:  [] PO:  [x] Enteral Nutrition:  EN Order + Mehdi 2x/day Provides:  1680 ml formula, 1860 kcal, 89 g protein, 1425 ml free water; meets 34.2kcal/kg and 1.64g/kg protein based on IBW 120lb/54.4kg.    Current Pump Rate: 70ml/hr   5-Day Average Enteral Provision per RN flowsheet: 1524 mL, 1524 kcals, 76 g protein   [] Parenteral Nutrition:    ANTHROPOMETRICS:  Drug Dosing Weight  Height (cm): 162 (2025 20:47)  Weight (kg): 83.6 (2025 20:47)  BMI (kg/m2): 31.9 (2025 20:47)  Weights:   Daily Weight in k.5 (), 80.1 (), 79.4 (), 80.2 (), 81.2 (), 74.4 (), 85 (), 84.6 (), 86.6 (), 84.8 (), 83 (), 81.1 (), 82.6 (), 87.5 (01-15), 88.3 (), 85.8 (), 92.1 (), 82.7 (), 87.9 (01-10), 86.9 (), 75.8 (), 74.1 (), 78.4 (), 83.6 ()   Weight fluctuations likely in setting of fluid shifts vs scale inaccuracies and/or ongoing malnutrition. Note patient ordered for bumex. RD to continue to monitor weight trends as available/able.    NUTRITIONALLY PERTINENT MEDICATIONS:  MEDICATIONS  (STANDING):  ascorbic acid  buMETAnide Injectable  hydroxyurea  metolazone  metoprolol tartrate  multivitamin/minerals/iron Oral Solution (CENTRUM)  pantoprazole  Injectable  polyethylene glycol 3350  senna       NUTRITIONALLY PERTINENT LABS:   Na151 mmol/L[H] Glu 113 mg/dL[H] K+ 3.5 mmol/L Cr  0.97 mg/dL BUN 42 mg/dL[H]  Phos 3.5 mg/dL  Alb 3.8 g/dL  U/L[H] AST 92 U/L[H] Alkaline Phosphatase 104 U/L    A1C with Estimated Average Glucose Result: 4.6 % (25 @ 21:34)          Finger Sticks:  POCT Blood Glucose.: 113 mg/dL ( @ 12:43)      NUTRITIONALLY PERTINENT MEDICATIONS/LABS:  [x] Reviewed  [x] Relevant notes on medications/labs:  -off insulin  -off pressors   -Multivitamin and Vitamin C for wound healing    EDEMA:  [x] Reviewed  [x] Relevant notes: 1+ generalized edema per flowsheets     GI/ I&O:  [x] Reviewed  [x] Relevant notes: tolerating enteral nutrition per RN. Noted with formed BMs per flowsheets. Last BM  per flowsheets. Had 3x BM on , 2x BM on , 2x BM on .   [] Other:    SKIN:   [] No pressure injuries documented, per nursing flowsheet  [x] Pressure injury previously noted  -per flowsheets:  left earlobe suspected deep tissue injury  left forehead suspected deep tissue injury  right forehead suspected deep tissue injury  sacrum / bilateral buttocks suspected deep tissue injury  bilateral heels suspected deep tissue injury   [] Change in pressure injury documentation:  [] Other:    ESTIMATED NEEDS:  [] No change:  [x] Updated:  Energy:  2986-6652 kcal/day (32-37 kcal/kg)  Protein:   g/day (1.5-2.0 g/kg)  Fluid:   ml/day or [x] defer to team  Based on: IBW 120lb/54.4kg    NUTRITION DIAGNOSIS:  [x] Prior Dx: Increased Nutrient Needs; Severe Acute Malnutrition  [] New Dx:    EDUCATION:  [] Yes:  [x] Not appropriate/warranted    NUTRITION CARE PLAN:  1. Diet: Since patient having regular BMs and has been off pressors and ECMO, change formula to [employer] Peptide 1.5. Start at 10ml/hr and increase by 10ml q4hr as tolerated to goal rate 45ml/hr x 24hr. At goal with Mehdi supplementation 2x/day, provides 1080ml formula, 1800kcal, 85g protein, 756 ml free water. Meets 33.1 kcal/kg and 1.56g/kg protein based on IBW 120lb/54.4kg. Defer free water flushes to medical team.  2. Supplements: Continue Mehdi 2x/day as noted above.  3. Multivitamin/mineral supplementation: continue Multivitamin and Vitamin C for wound healing pending no medical contraindications.    [] Achieved - Continue current nutrition intervention(s)  [] Current medical condition precludes nutrition intervention at this time.    MONITORING AND EVALUATION:   RD remains available upon request and will follow up per protocol.    Corinne Lima RDN, CDN - available via [facility] NUTRITION FOLLOW UP NOTE    PATIENT SEEN FOR: ICU malnutrition follow up    SOURCE: [x] Patient  [x] Current Medical Record  [x] RN  [] Family/support person at bedside  [] Patient unavailable/inappropriate  [] Other:    CHART REVIEWED/EVENTS NOTED.  [] No changes to nutrition care plan to note  [x] Nutrition Status:  -Hx sickle cell disease, heart failure  -VA ECMO decannulated   -s/p Tracheostomy . Has been using trach collar  -s/p PEG   -s/p FEES  with recommendations to continue NPO     DIET ORDER:   Diet, NPO with Tube Feed:   Tube Feeding Modality: Gastrostomy  Vivonex  Total Volume for 24 Hours (mL): 1680  Continuous  Starting Tube Feed Rate {mL per Hour}: 10  Increase Tube Feed Rate by (mL): 20     Every 4 hours  Until Goal Tube Feed Rate (mL per Hour): 70  Tube Feed Duration (in Hours): 24  Tube Feed Start Time: 14:50  Mehdi(7 Gm Arginine/7 Gm Glut/1.2 Gm HMB     Qty per Day:  2 (25)      CURRENT DIET ORDER IS:  [] Appropriate:  [] Inadequate:  [x] Other: See recommendation below     NUTRITION INTAKE/PROVISION:  [] PO:  [x] Enteral Nutrition:  EN Order + Mehdi 2x/day Provides:  1680 ml formula, 1860 kcal, 89 g protein, 1425 ml free water; meets 34.2kcal/kg and 1.64g/kg protein based on IBW 120lb/54.4kg.    Current Pump Rate: 70ml/hr   5-Day Average Enteral Provision per RN flowsheet: 1524 mL, 1524 kcals, 76 g protein   [] Parenteral Nutrition:    ANTHROPOMETRICS:  Drug Dosing Weight  Height (cm): 162 (2025 20:47)  Weight (kg): 83.6 (2025 20:47)  BMI (kg/m2): 31.9 (2025 20:47)  Weights:   Daily Weight in k.5 (), 80.1 (), 79.4 (), 80.2 (), 81.2 (), 74.4 (), 85 (), 84.6 (), 86.6 (), 84.8 (), 83 (), 81.1 (), 82.6 (), 87.5 (01-15), 88.3 (), 85.8 (), 92.1 (), 82.7 (), 87.9 (01-10), 86.9 (), 75.8 (), 74.1 (), 78.4 (), 83.6 ()   Weight fluctuations likely in setting of fluid shifts vs scale inaccuracies and/or ongoing malnutrition. Note patient ordered for bumex. RD to continue to monitor weight trends as available/able.    NUTRITIONALLY PERTINENT MEDICATIONS:  MEDICATIONS  (STANDING):  ascorbic acid  buMETAnide Injectable  hydroxyurea  metolazone  metoprolol tartrate  multivitamin/minerals/iron Oral Solution (CENTRUM)  pantoprazole  Injectable  polyethylene glycol 3350  senna       NUTRITIONALLY PERTINENT LABS:   Na151 mmol/L[H] Glu 113 mg/dL[H] K+ 3.5 mmol/L Cr  0.97 mg/dL BUN 42 mg/dL[H]  Phos 3.5 mg/dL  Alb 3.8 g/dL  U/L[H] AST 92 U/L[H] Alkaline Phosphatase 104 U/L    A1C with Estimated Average Glucose Result: 4.6 % (25 @ 21:34)          Finger Sticks:  POCT Blood Glucose.: 113 mg/dL ( @ 12:43)      NUTRITIONALLY PERTINENT MEDICATIONS/LABS:  [x] Reviewed  [x] Relevant notes on medications/labs:  -off insulin  -off pressors   -Multivitamin and Vitamin C for wound healing    EDEMA:  [x] Reviewed  [x] Relevant notes: 1+ generalized edema per flowsheets     GI/ I&O:  [x] Reviewed  [x] Relevant notes: tolerating enteral nutrition per RN. Noted with formed BMs per flowsheets. Last BM  per flowsheets. Had 3x BM on , 2x BM on , 2x BM on .   [] Other:    SKIN:   [] No pressure injuries documented, per nursing flowsheet  [x] Pressure injury previously noted  -per flowsheets:  left earlobe suspected deep tissue injury  left forehead suspected deep tissue injury  right forehead suspected deep tissue injury  sacrum / bilateral buttocks suspected deep tissue injury  bilateral heels suspected deep tissue injury   [] Change in pressure injury documentation:  [] Other:    ESTIMATED NEEDS:  [x] No change:  [] Updated:  Energy:  5432-0842 kcal/day (30-35 kcal/kg)  Protein:   g/day (1.5-2.0 g/kg)  Fluid:   ml/day or [x] defer to team  Based on: IBW 120lb/54.4kg    NUTRITION DIAGNOSIS:  [x] Prior Dx: Increased Nutrient Needs; Severe Acute Malnutrition  [] New Dx:    EDUCATION:  [] Yes:  [x] Not appropriate/warranted    NUTRITION CARE PLAN:  1. Diet: Since patient having regular BMs and has been off pressors and ECMO, change formula to [employer] Peptide 1.5. Start at 10ml/hr and increase by 10ml q4hr as tolerated to goal rate 45ml/hr x 24hr. At goal with Mehdi supplementation 2x/day, provides 1080ml formula, 1800kcal, 85g protein, 756 ml free water. Meets 33.1 kcal/kg and 1.56g/kg protein based on IBW 120lb/54.4kg. Defer free water flushes to medical team.  2. Supplements: Continue Mehdi 2x/day as noted above.  3. Multivitamin/mineral supplementation: continue Multivitamin and Vitamin C for wound healing pending no medical contraindications.    [] Achieved - Continue current nutrition intervention(s)  [] Current medical condition precludes nutrition intervention at this time.    MONITORING AND EVALUATION:   RD remains available upon request and will follow up per protocol.    Corinne Lima RDN, CDN - available via [facility]

## 2025-01-28 NOTE — PROGRESS NOTE ADULT - ASSESSMENT
59F PMH Sickle cell disease (on hydroxyurea), HTN, HFimpEF/NICM (EF 58% 10/2024) presenting as a transfer from [facility]. Pt initially presented to [facility] for SOB and SS crisis; course c/b witnessed seizure, intubated and sedated, and transferred to [facility] for further management. Keppra was discontinued at [facility] due to negative EEG. At [facility] MICU pt was found to have RV failure possibly iso pulm HTN 2/2 increased tidal volumes on the ventilator, possibly due to volume overload due to IVF iso SS crisis vs fat emboli syndrome.                                                                                                           #Hb SC disease  #Cardiogenic shock on ECMO  #Unclear trigger for unresponsiveness   - patient with 1-2 sickle pain crisis per year and on hydrea, had retinal detachment s/p repair                           - patient had f/up with cardio in Sept 2024: per the note, unclear etiology of her dyspnea but possibly driven by cardiomyopathy; low suspicion of pulmonary HTN as no RV dysfunction/severe TR.  She is found to have new RV failure, requiring ECMO  - Blood bank transfusion medicine team consulted for emergent RBC exchange given critically ill with multi-organ failure  - Retic count elevated to 11.6%, LDH 1000s, bili 3.9. CXR was clear not indicative of acute chest. Smear (prior to exchange) was reviewed: Hypochromic RBCs with normochromic RBC reflecting transfused blood. Several nucleated RBC suggesting stressed bone marrow. Very few target cells and rare sickle cell. No schistocytes to suggest hemolysis.    - S/p exchange 1/4/2024, s/p ECMO decannulation 1/7/2025 c/b groin hematoma and received 2 unit prbc, platelet and cryo.  - S/p 2nd exchange transfusion 1/12/2025 (based on MRI demonstrating diffuse punctate lesions that could be consistent with fat emboli and patient's otherwise unexplained mental status for goal of apheresis any remaining circulating fat emboli, over weekend which was discussed with blood bank)      Hb electrophoresis:  01/07/25: Hb A 76.8%, Hb A2 3.6%, Hb S 9.3%, HbC 10.3%  01/14/25: Hb A 87.9%, Hb A2 2.5%, Hb S 4.6%, HbC 5.0%  01/21/25: Hb A 78.8%, Hb A2 3.2%, Hb S 9.4%, HbC 8.6%  01/28/25: F/U      Recommendations:  - Please obtain hemoglobin electrophoresis weekly. Goal HbC and HbS fraction <30% combined.   - recommend transfusion to maintain plt > 50k in the setting of bleeding, otherwise Monitor CBC with differential daily and transfuse for platelet count >10 minimum, >20 if febrile.  - Discuss with blood bank and heme team prior to RBC transfusions to minimize risk of antibody development/transfusion reactions, aim to keep hb >7-8  - Appreciate care by critical care, cards/heart failure -neurology suspect that seizures are not related to presentation and plan to continue keppra for now, neurology following for neurologic status.   - Continue hydroxyurea as long as plt >100 and hb >8  (restarted on 01/15/25)  - Monitor hemolysis labs (CBC, CMP, LDH, reticulocyte count) daily, prefer pediatric tubes.   - Hematology team to follow          Will continue to monitor the patient.  Please call via MS Teams with any questions.  Above reviewed with Attending Dr. Onofre.    Discussed with primary team.       59F PMH Sickle cell disease (on hydroxyurea), HTN, 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 Pt initially presented to [facility] for SOB and SS crisis; course c/b witnessed seizure, intubated and sedated, and transferred to [facility] for further management. Keppra was discontinued at [facility] due to negative EEG. At [facility] MICU pt was found to have RV failure possibly iso pulm HTN 2/2 increased tidal volumes on the ventilator, possibly due to volume overload due to IVF iso SS crisis vs fat emboli syndrome.                                                                                                           #Hb SC disease  #Cardiogenic shock on ECMO  #Unclear trigger for unresponsiveness   - patient with 1-2 sickle pain crisis per year and on hydrea, had retinal detachment s/p repair                           - patient had f/up with cardio in Sept 2024: per the note, unclear etiology of her dyspnea but possibly driven by cardiomyopathy; low suspicion of pulmonary HTN as no RV dysfunction/severe TR.  She is found to have new RV failure, requiring ECMO  - Blood bank transfusion medicine team consulted for emergent RBC exchange given critically ill with multi-organ failure  - Retic count elevated to 11.6%, LDH 1000s, bili 3.9. CXR was clear not indicative of acute chest. Smear (prior to exchange) was reviewed: Hypochromic RBCs with normochromic RBC reflecting transfused blood. Several nucleated RBC suggesting stressed bone marrow. Very few target cells and rare sickle cell. No schistocytes to suggest hemolysis.    - S/p exchange 1/4/2024, s/p ECMO decannulation 1/7/2025 c/b groin hematoma and received 2 unit prbc, platelet and cryo.  - S/p 2nd exchange transfusion 1/12/2025 (based on MRI demonstrating diffuse punctate lesions that could be consistent with fat emboli and patient's otherwise unexplained mental status for goal of apheresis any remaining circulating fat emboli, over weekend which was discussed with blood bank)      Hb electrophoresis:  01/07/25: Hb A 76.8%, Hb A2 3.6%, Hb S 9.3%, HbC 10.3%  01/14/25: Hb A 87.9%, Hb A2 2.5%, Hb S 4.6%, HbC 5.0%  01/21/25: Hb A 78.8%, Hb A2 3.2%, Hb S 9.4%, HbC 8.6%  01/28/25: F/U      Recommendations:  - Please obtain hemoglobin electrophoresis weekly while inpatient (every Tuesday). Goal HbC and HbS fraction <30% combined.   - recommend transfusion to maintain plt > 50k in the setting of bleeding, otherwise Monitor CBC with differential daily and transfuse for platelet count >10 minimum, >20 if febrile.  - Discuss with blood bank and heme team prior to RBC transfusions to minimize risk of antibody development/transfusion reactions, aim to keep hb >7-8  - Appreciate care by critical care, cards/heart failure -neurology suspect that seizures are not related to presentation and plan to continue keppra for now, neurology following for neurologic status.   - Continue hydroxyurea as long as plt >100 and hb >8  (restarted on 01/15/25)  - Monitor hemolysis labs (CBC, CMP, LDH, reticulocyte count) daily, prefer pediatric tubes.   - Hematology team to follow          Will continue to monitor the patient.  Please call via MS Teams with any questions.  Above reviewed with Attending Dr. Onofre.    Discussed with primary team.

## 2025-01-28 NOTE — PROGRESS NOTE ADULT - SUBJECTIVE AND OBJECTIVE BOX
Patient seen and examined at the bedside.    Remains critically ill on continuous ICU monitoring.      Brief Summary:  58 yo with history of sickle cell  12/30 admitted with sickle cell   1/2 unresponsive ? witnessed seizure  1/4 [facility] ECMO - cardiogenic shock, RV failure  1/7 LVEF 30-35%, ECMO decannulation   1/8 Seizure on EEG   1/9 MRI.  Innumerable foci susceptibility artifact scattered throughout the   brain which can be seen with critically ill patients on ECMO. Diffuse fat   embolization is also part of the differential diagnosis but is considered   less likely.    1/12 RBC Exchange   1/14 LP   1/16 Trach   1/18 Trach collar around the clock  1/21 s/p PEG   1/24 MRI spinal stenosis  1/25 no acute issues, seen by speech - pt aphasic/seems to also have receptive aphasia     24 Hour events:  OOB to chair, tolerates trach collar throughout the day   PMR consult and assessment, Daily PT/OT, mobility slowly improving  Improving mental status and strength   PMV as tolerated     Objective:  Vital Signs Last 24 Hrs  T(C): 36.8 (28 Jan 2025 04:00), Max: 36.8 (28 Jan 2025 04:00)  T(F): 98.2 (28 Jan 2025 04:00), Max: 98.2 (28 Jan 2025 04:00)  HR: 93 (28 Jan 2025 06:00) (82 - 102)  BP: --  BP(mean): --  RR: 20 (28 Jan 2025 06:00) (12 - 26)  SpO2: 100% (28 Jan 2025 06:00) (98% - 100%)    Parameters below as of 28 Jan 2025 05:29  Patient On (Oxygen Delivery Method): tracheostomy collar    Physical Exam:  NAD  Neurology: follows commands, slow to move extremities with weakness   Respiratory: Clear bilaterally , secretion improved  CV: Sinus  Abdominal: Soft, Nontender  Extremities: Warm, well-perfused  -------------------------------------------------------------------------------------------------------------------------------    Labs:                        10.0   9.69  )-----------( Clumped    ( 28 Jan 2025 00:39 )             31.3     01-28    151[H]  |  112[H]  |  42[H]  ----------------------------<  113[H]  3.5   |  24  |  0.97    Ca    10.9[H]      28 Jan 2025 00:39  Phos  3.5     01-28  Mg     2.3     01-28    TPro  7.4  /  Alb  3.8  /  TBili  0.6  /  DBili  x   /  AST  92[H]  /  ALT  180[H]  /  AlkPhos  104  01-28    LIVER FUNCTIONS - ( 28 Jan 2025 00:39 )  Alb: 3.8 g/dL / Pro: 7.4 g/dL / ALK PHOS: 104 U/L / ALT: 180 U/L / AST: 92 U/L / GGT: x           ABG - ( 28 Jan 2025 00:25 )  pH, Arterial: 7.43  pH, Blood: x     /  pCO2: 39    /  pO2: 179   / HCO3: 26    / Base Excess: 1.5   /  SaO2: 99.3    ------------------------------------------------------------------------------------------------------------------------------  Assessment:  58 yo F w/ PMHx of Sickle cell disease (on hydroxyurea), HTN, HFimpEF/NICM (EF 58% 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 Pt initially presented to [facility] for SOB and SS crisis; course c/b witnessed seizure, intubated and sedated, and transferred to [facility]. Transferred to [facility] for further management of cardiogenic shock. Cannulated for VA ECMO on 1/4/25. Decannulated on 1/7/25.    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  LP on 1/14 - no evidence of meningitis/ encephalitis   Neuro exam improving slowly    ***Cardiovascular***  At risk for hemodynamic decompensation  S/p VA ECMO, Bi vent function recovered.  On Low dose BB, will increase to 25 BID  Spine MRI     ***Pulmonary***  Acute hypoxic respiratory failure s/p Tracheostomy   Trach collar around the clock since 1/18   Nebs daily    ***GI***  Protein calorie malnutrition  S/p PEG placement   Enteral feeding via PEG tube, tolerates well  At goal  Protonix for stress ulcer prophylaxis   Bowel regimen.  Transaminitis - RUQ US     ***Renal***  Hypernatremia - FWB  Trend Creatinine   Bumex daily     ***ID***  MSSA bacteremia, BAL + MSSA -IV Vanc completed  Merrem for total 10 days, completed  Monitor off antibiotics    ***Endocrine***  Hyperglycemia - Insulin sliding scale.     ***Hematology***  Acute blood loss anemia - no current transfusion indication.  Sickle Cell - s/p Red cell exchange, Hgb electrophoresis weekly  Hydroxyurea - monitor platelet count.  Lovenox for DVT prophylaxis       *** Lines ***  LRAL        Care plan discussed with the ICU care team.   Patient remains critical, at risk for life threatening decompensation.    I have spent 30 minutes providing critical care management to this patient.    By signing my name below, I, Norris Oliver, attest that this documentation has been prepared under the direction and in the presence of Memo Ontiveros MD.  Electronically signed: Omar Lopez 01-28-25 @ 06:21    I, Memo Ontiveros MD,  personally performed the services described in this documentation. All medical record entries made by the scribe were at my direction and in my presence. I have reviewed the chart and agree that the record reflects my personal performance and is accurate and complete  Electronically signed: Memo Ontiveros MD. Patient seen and examined at the bedside.    Remains critically ill on continuous ICU monitoring.      Brief Summary:  60 yo with history of sickle cell  12/30 admitted with sickle cell   1/2 unresponsive ? witnessed seizure  1/4 [facility] ECMO - cardiogenic shock, RV failure  1/7 LVEF 30-35%, ECMO decannulation   1/8 Seizure on EEG   1/9 MRI.  Innumerable foci susceptibility artifact scattered throughout the   brain which can be seen with critically ill patients on ECMO. Diffuse fat   embolization is also part of the differential diagnosis but is considered   less likely.    1/12 RBC Exchange   1/14 LP   1/16 Trach   1/18 Trach collar around the clock  1/21 s/p PEG   1/24 MRI spinal stenosis  1/25 no acute issues, seen by speech - pt aphasic/seems to also have receptive aphasia     24 Hour events:  OOB to chair, tolerates trach collar throughout the day   PMR consult and assessment, Daily PT/OT, mobility slowly improving  Improving mental status and strength   PMV as tolerated     Objective:  Vital Signs Last 24 Hrs  T(C): 36.8 (28 Jan 2025 04:00), Max: 36.8 (28 Jan 2025 04:00)  T(F): 98.2 (28 Jan 2025 04:00), Max: 98.2 (28 Jan 2025 04:00)  HR: 93 (28 Jan 2025 06:00) (82 - 102)  BP: --  BP(mean): --  RR: 20 (28 Jan 2025 06:00) (12 - 26)  SpO2: 100% (28 Jan 2025 06:00) (98% - 100%)    Parameters below as of 28 Jan 2025 05:29  Patient On (Oxygen Delivery Method): tracheostomy collar    Physical Exam:  NAD  Neurology: follows commands, slow to move extremities with weakness; upper > lower   Respiratory: Clear bilaterally , secretion improved  CV: Sinus  Abdominal: Soft, Nontender  Extremities: Warm, well-perfused  -------------------------------------------------------------------------------------------------------------------------------    Labs:                        10.0   9.69  )-----------( Clumped    ( 28 Jan 2025 00:39 )             31.3     01-28    151[H]  |  112[H]  |  42[H]  ----------------------------<  113[H]  3.5   |  24  |  0.97    Ca    10.9[H]      28 Jan 2025 00:39  Phos  3.5     01-28  Mg     2.3     01-28    TPro  7.4  /  Alb  3.8  /  TBili  0.6  /  DBili  x   /  AST  92[H]  /  ALT  180[H]  /  AlkPhos  104  01-28    LIVER FUNCTIONS - ( 28 Jan 2025 00:39 )  Alb: 3.8 g/dL / Pro: 7.4 g/dL / ALK PHOS: 104 U/L / ALT: 180 U/L / AST: 92 U/L / GGT: x           ABG - ( 28 Jan 2025 00:25 )  pH, Arterial: 7.43  pH, Blood: x     /  pCO2: 39    /  pO2: 179   / HCO3: 26    / Base Excess: 1.5   /  SaO2: 99.3    ------------------------------------------------------------------------------------------------------------------------------  Assessment:  60 yo F w/ PMHx of Sickle cell disease (on hydroxyurea), HTN, 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 Pt initially presented to [facility] for SOB and SS crisis; course c/b witnessed seizure, intubated and sedated, and transferred to [facility]. Transferred to [facility] for further management of cardiogenic shock. Cannulated for VA ECMO on 1/4/25. Decannulated on 1/7/25.    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  LP on 1/14 - no evidence of meningitis/ encephalitis   Neuro exam improving slowly    ***Cardiovascular***  S/p VA ECMO, Bi vent function recovered.  Metoprolol 25mg BID      ***Pulmonary***  Acute hypoxic respiratory failure s/p Tracheostomy   Trach collar around the clock since 1/22   Nebs daily    ***GI***  Protein calorie malnutrition  S/p PEG placement   Enteral feeding via PEG tube, tolerates well  At goal  Protonix for stress ulcer prophylaxis   Bowel regimen.  Transaminitis - RUQ US     ***Renal***  Hypernatremia - FWB  Trend Creatinine   Negative FG     ***ID***  MSSA bacteremia, BAL + MSSA -IV Vanc completed  Merrem for total 10 days, completed  Monitor off antibiotics    ***Endocrine***  Hyperglycemia - Insulin sliding scale.     ***Hematology***  Acute blood loss anemia - no current transfusion indication.  Sickle Cell - s/p Red cell exchange, Hgb electrophoresis weekly  Hydroxyurea - monitor platelet count.  Lovenox for DVT prophylaxis       *** Lines ***  LRAL        Care plan discussed with the ICU care team.   Patient remains critical, at risk for life threatening decompensation.    I have spent 30 minutes providing critical care management to this patient.    By signing my name below, I, Norris Oliver, attest that this documentation has been prepared under the direction and in the presence of Memo Ontiveros MD.  Electronically signed: Omar Lopez 01-28-25 @ 06:21    I, Memo Ontiveros MD,  personally performed the services described in this documentation. All medical record entries made by the scribe were at my direction and in my presence. I have reviewed the chart and agree that the record reflects my personal performance and is accurate and complete  Electronically signed: Memo Ontiveros MD.

## 2025-01-29 LAB
ALBUMIN SERPL ELPH-MCNC: 3.6 G/DL — SIGNIFICANT CHANGE UP (ref 3.3–5)
ALP SERPL-CCNC: 108 U/L — SIGNIFICANT CHANGE UP (ref 40–120)
ALT FLD-CCNC: 141 U/L — HIGH (ref 10–45)
ANION GAP SERPL CALC-SCNC: 14 MMOL/L — SIGNIFICANT CHANGE UP (ref 5–17)
AST SERPL-CCNC: 70 U/L — HIGH (ref 10–40)
BASOPHILS # BLD AUTO: 0.03 K/UL — SIGNIFICANT CHANGE UP (ref 0–0.2)
BASOPHILS NFR BLD AUTO: 0.3 % — SIGNIFICANT CHANGE UP (ref 0–2)
BILIRUB SERPL-MCNC: 0.6 MG/DL — SIGNIFICANT CHANGE UP (ref 0.2–1.2)
BUN SERPL-MCNC: 43 MG/DL — HIGH (ref 7–23)
CALCIUM SERPL-MCNC: 10.5 MG/DL — SIGNIFICANT CHANGE UP (ref 8.4–10.5)
CHLORIDE SERPL-SCNC: 107 MMOL/L — SIGNIFICANT CHANGE UP (ref 96–108)
CO2 SERPL-SCNC: 23 MMOL/L — SIGNIFICANT CHANGE UP (ref 22–31)
CREAT SERPL-MCNC: 1.07 MG/DL — SIGNIFICANT CHANGE UP (ref 0.5–1.3)
EGFR: 61 ML/MIN/1.73M2 — SIGNIFICANT CHANGE UP
EOSINOPHIL # BLD AUTO: 0.21 K/UL — SIGNIFICANT CHANGE UP (ref 0–0.5)
EOSINOPHIL NFR BLD AUTO: 2.3 % — SIGNIFICANT CHANGE UP (ref 0–6)
GAS PNL BLDA: SIGNIFICANT CHANGE UP
GLUCOSE BLDC GLUCOMTR-MCNC: 112 MG/DL — HIGH (ref 70–99)
GLUCOSE BLDC GLUCOMTR-MCNC: 113 MG/DL — HIGH (ref 70–99)
GLUCOSE SERPL-MCNC: 111 MG/DL — HIGH (ref 70–99)
HCT VFR BLD CALC: 31 % — LOW (ref 34.5–45)
HEMOGLOBIN INTERPRETATION: SIGNIFICANT CHANGE UP
HEMOGLOBIN INTERPRETATION: SIGNIFICANT CHANGE UP
HGB A MFR BLD: 72.1 % — LOW (ref 95.8–98)
HGB A MFR BLD: 72.3 % — LOW (ref 95.8–98)
HGB A2 MFR BLD: 3.3 % — HIGH (ref 2–3.2)
HGB A2 MFR BLD: 3.4 % — HIGH (ref 2–3.2)
HGB BLD-MCNC: 10 G/DL — LOW (ref 11.5–15.5)
HGB C MFR BLD: 11.6 % — HIGH
HGB C MFR BLD: 11.7 % — HIGH
HGB S MFR BLD: 12.7 % — HIGH
HGB S MFR BLD: 12.9 % — HIGH
IMM GRANULOCYTES NFR BLD AUTO: 0.7 % — SIGNIFICANT CHANGE UP (ref 0–0.9)
LYMPHOCYTES # BLD AUTO: 2.99 K/UL — SIGNIFICANT CHANGE UP (ref 1–3.3)
LYMPHOCYTES # BLD AUTO: 32.7 % — SIGNIFICANT CHANGE UP (ref 13–44)
MAGNESIUM SERPL-MCNC: 2.1 MG/DL — SIGNIFICANT CHANGE UP (ref 1.6–2.6)
MCHC RBC-ENTMCNC: 29.7 PG — SIGNIFICANT CHANGE UP (ref 27–34)
MCHC RBC-ENTMCNC: 32.3 G/DL — SIGNIFICANT CHANGE UP (ref 32–36)
MCV RBC AUTO: 92 FL — SIGNIFICANT CHANGE UP (ref 80–100)
MONOCYTES # BLD AUTO: 0.79 K/UL — SIGNIFICANT CHANGE UP (ref 0–0.9)
MONOCYTES NFR BLD AUTO: 8.6 % — SIGNIFICANT CHANGE UP (ref 2–14)
NEUTROPHILS # BLD AUTO: 5.07 K/UL — SIGNIFICANT CHANGE UP (ref 1.8–7.4)
NEUTROPHILS NFR BLD AUTO: 55.4 % — SIGNIFICANT CHANGE UP (ref 43–77)
NRBC # BLD: 2 /100 WBCS — HIGH (ref 0–0)
NRBC BLD-RTO: 2 /100 WBCS — HIGH (ref 0–0)
PHOSPHATE SERPL-MCNC: 4.8 MG/DL — HIGH (ref 2.5–4.5)
PLATELET # BLD AUTO: 339 K/UL — SIGNIFICANT CHANGE UP (ref 150–400)
POTASSIUM BLDA-SCNC: 4.4 MMOL/L — SIGNIFICANT CHANGE UP (ref 3.5–5.1)
POTASSIUM SERPL-MCNC: 3.5 MMOL/L — SIGNIFICANT CHANGE UP (ref 3.5–5.3)
POTASSIUM SERPL-SCNC: 3.5 MMOL/L — SIGNIFICANT CHANGE UP (ref 3.5–5.3)
PROT SERPL-MCNC: 7.3 G/DL — SIGNIFICANT CHANGE UP (ref 6–8.3)
RBC # BLD: 3.37 M/UL — LOW (ref 3.8–5.2)
RBC # FLD: 15.8 % — HIGH (ref 10.3–14.5)
SODIUM SERPL-SCNC: 144 MMOL/L — SIGNIFICANT CHANGE UP (ref 135–145)
WBC # BLD: 9.15 K/UL — SIGNIFICANT CHANGE UP (ref 3.8–10.5)
WBC # FLD AUTO: 9.15 K/UL — SIGNIFICANT CHANGE UP (ref 3.8–10.5)

## 2025-01-29 PROCEDURE — 99291 CRITICAL CARE FIRST HOUR: CPT

## 2025-01-29 PROCEDURE — 71045 X-RAY EXAM CHEST 1 VIEW: CPT | Mod: 26

## 2025-01-29 RX ORDER — POTASSIUM CHLORIDE 750 MG/1
40 TABLET, EXTENDED RELEASE ORAL ONCE
Refills: 0 | Status: COMPLETED | OUTPATIENT
Start: 2025-01-29 | End: 2025-01-29

## 2025-01-29 RX ORDER — MAGNESIUM SULFATE 0.8 MEQ/ML
2 AMPUL (ML) INJECTION ONCE
Refills: 0 | Status: COMPLETED | OUTPATIENT
Start: 2025-01-29 | End: 2025-01-29

## 2025-01-29 RX ADMIN — POLYETHYLENE GLYCOL 3350 17 GRAM(S): 17 POWDER, FOR SOLUTION ORAL at 11:55

## 2025-01-29 RX ADMIN — Medication 25 GRAM(S): at 02:00

## 2025-01-29 RX ADMIN — Medication 25 MILLIGRAM(S): at 15:00

## 2025-01-29 RX ADMIN — IPRATROPIUM BROMIDE AND ALBUTEROL SULFATE 3 MILLILITER(S): .5; 2.5 SOLUTION RESPIRATORY (INHALATION) at 05:05

## 2025-01-29 RX ADMIN — LEVETIRACETAM 500 MILLIGRAM(S): 750 TABLET, FILM COATED ORAL at 05:30

## 2025-01-29 RX ADMIN — Medication 100 MILLIGRAM(S): at 07:00

## 2025-01-29 RX ADMIN — Medication 1 TABLET(S): at 11:55

## 2025-01-29 RX ADMIN — Medication 15 MILLILITER(S): at 11:55

## 2025-01-29 RX ADMIN — Medication 500 MILLIGRAM(S): at 05:29

## 2025-01-29 RX ADMIN — ANTISEPTIC SURGICAL SCRUB 1 APPLICATION(S): 0.04 SOLUTION TOPICAL at 05:18

## 2025-01-29 RX ADMIN — Medication 1 DROP(S): at 17:36

## 2025-01-29 RX ADMIN — IPRATROPIUM BROMIDE AND ALBUTEROL SULFATE 3 MILLILITER(S): .5; 2.5 SOLUTION RESPIRATORY (INHALATION) at 21:21

## 2025-01-29 RX ADMIN — ENOXAPARIN SODIUM 40 MILLIGRAM(S): 100 INJECTION SUBCUTANEOUS at 05:29

## 2025-01-29 RX ADMIN — POTASSIUM CHLORIDE 40 MILLIEQUIVALENT(S): 750 TABLET, EXTENDED RELEASE ORAL at 02:00

## 2025-01-29 RX ADMIN — IPRATROPIUM BROMIDE AND ALBUTEROL SULFATE 3 MILLILITER(S): .5; 2.5 SOLUTION RESPIRATORY (INHALATION) at 11:51

## 2025-01-29 RX ADMIN — Medication 500 MILLIGRAM(S): at 11:55

## 2025-01-29 RX ADMIN — Medication 100 MILLIGRAM(S): at 14:42

## 2025-01-29 RX ADMIN — Medication 25 MILLIGRAM(S): at 05:29

## 2025-01-29 RX ADMIN — PANTOPRAZOLE 40 MILLIGRAM(S): 20 TABLET, DELAYED RELEASE ORAL at 11:55

## 2025-01-29 RX ADMIN — Medication 25 MILLIGRAM(S): at 22:08

## 2025-01-29 RX ADMIN — Medication 1 DROP(S): at 06:05

## 2025-01-29 RX ADMIN — LEVETIRACETAM 500 MILLIGRAM(S): 750 TABLET, FILM COATED ORAL at 17:36

## 2025-01-29 NOTE — PROGRESS NOTE ADULT - SUBJECTIVE AND OBJECTIVE BOX
Patient seen and examined at the bedside.    Remains critically ill on continuous ICU monitoring.      Brief Summary:  58 yo with history of sickle cell  12/30 admitted with sickle cell   1/2 unresponsive ? witnessed seizure  1/4 VA ECMO - cardiogenic shock, RV failure  1/7 LVEF 30-35%, ECMO decannulation   1/8 Seizure on EEG   1/9 MRI.  Innumerable foci susceptibility artifact scattered throughout the   brain which can be seen with critically ill patients on ECMO. Diffuse fat   embolization is also part of the differential diagnosis but is considered   less likely.    1/12 RBC Exchange   1/14 LP   1/16 Trach   1/18 Trach collar around the clock  1/21 s/p PEG   1/24 MRI spinal stenosis  1/25 no acute issues, seen by speech - pt aphasic/seems to also have receptive aphasia     24 Hour events:  OOB to chair, tolerates trach collar throughout the day   PMR consult and assessment, Daily PT/OT, mobility slowly improving  Improving mental status and strength   PMV as tolerated         Objective:  Vital Signs Last 24 Hrs  T(C): 37.1 (29 Jan 2025 04:00), Max: 37.1 (29 Jan 2025 04:00)  T(F): 98.8 (29 Jan 2025 04:00), Max: 98.8 (29 Jan 2025 04:00)  HR: 82 (29 Jan 2025 06:01) (78 - 96)  BP: --  BP(mean): --  RR: 23 (29 Jan 2025 06:00) (17 - 26)  SpO2: 100% (29 Jan 2025 06:01) (99% - 100%)    Parameters below as of 29 Jan 2025 06:01  Patient On (Oxygen Delivery Method): tracheostomy collar        Physical Exam:   NAD  Neurology: follows commands, slow to move extremities with weakness; upper > lower   Respiratory: Clear bilaterally , secretion improved  CV: Sinus  Abdominal: Soft, Nontender  Extremities: Warm, well-perfused  -------------------------------------------------------------------------------------------------------------------------------    Labs:                        10.0   9.15  )-----------( 339      ( 29 Jan 2025 00:47 )             31.0     01-29    144  |  107  |  43[H]  ----------------------------<  111[H]  3.5   |  23  |  1.07    Ca    10.5      29 Jan 2025 00:47  Phos  4.8     01-29  Mg     2.1     01-29    TPro  7.3  /  Alb  3.6  /  TBili  0.6  /  DBili  x   /  AST  70[H]  /  ALT  141[H]  /  AlkPhos  108  01-29    LIVER FUNCTIONS - ( 29 Jan 2025 00:47 )  Alb: 3.6 g/dL / Pro: 7.3 g/dL / ALK PHOS: 108 U/L / ALT: 141 U/L / AST: 70 U/L / GGT: x             ABG - ( 29 Jan 2025 00:14 )  pH, Arterial: 7.49  pH, Blood: x     /  pCO2: 34    /  pO2: 168   / HCO3: 26    / Base Excess: 2.7   /  SaO2: 99.8      ------------------------------------------------------------------------------------------------------------------------------  Assessment:  58 yo F w/ PMHx of Sickle cell disease (on hydroxyurea), HTN, 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 Pt initially presented to [facility] for SOB and SS crisis; course c/b witnessed seizure, intubated and sedated, and transferred to [facility]. Transferred to [facility] for further management of cardiogenic shock. Cannulated for VA ECMO on 1/4/25. Decannulated on 1/7/25.    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  LP on 1/14 - no evidence of meningitis/ encephalitis   Neuro exam improving slowly    ***Cardiovascular***  S/p VA ECMO, Bi vent function recovered.  Metoprolol 25mg BID      ***Pulmonary***  Acute hypoxic respiratory failure s/p Tracheostomy   Trach collar around the clock since 1/22   Nebs daily    ***GI***  Protein calorie malnutrition  S/p PEG placement   Enteral feeding via PEG tube, tolerates well  At goal  Protonix for stress ulcer prophylaxis   Bowel regimen.  Transaminitis - RUQ US     ***Renal***  Hypernatremia - FWB  Trend Creatinine   Negative FG     ***ID***  MSSA bacteremia, BAL + MSSA -IV Vanc completed  Merrem for total 10 days, completed  Monitor off antibiotics    ***Endocrine***  Hyperglycemia - Insulin sliding scale.     ***Hematology***  Acute blood loss anemia - no current transfusion indication.  Sickle Cell - s/p Red cell exchange, Hgb electrophoresis weekly  Hydroxyurea - monitor platelet count.  Lovenox for DVT prophylaxis       *** Lines ***  LRAL    Care plan discussed with the ICU care team.   Patient remains critical, at risk for life threatening decompensation.    I have spent 30 minutes providing critical care management to this patient.    By signing my name below, I, Barrett Sanchez, attest that this documentation has been prepared under the direction and in the presence of Memo Ontiveors MD.  Electronically signed: Barrett Sanchez, 01-29-25 @ 07:02    I, Memo Ontiveros MD,  personally performed the services described in this documentation. all medical record entries made by the scribe were at my direction and in my presence. I have reviewed the chart and agree that the record reflects my personal performance and is accurate and complete  Electronically signed: Memo Ontiveros MD. Patient seen and examined at the bedside.    Remains critically ill on continuous ICU monitoring.      Brief Summary:  60 yo with history of sickle cell  12/30 admitted with sickle cell   1/2 unresponsive ? witnessed seizure  1/4 VA ECMO - cardiogenic shock, RV failure  1/7 LVEF 30-35%, ECMO decannulation   1/8 Seizure on EEG   1/9 MRI.  Innumerable foci susceptibility artifact scattered throughout the   brain which can be seen with critically ill patients on ECMO. Diffuse fat   embolization is also part of the differential diagnosis but is considered   less likely.    1/12 RBC Exchange   1/14 LP   1/16 Trach   1/18 Trach collar around the clock  1/21 s/p PEG   1/24 MRI spinal stenosis  1/25 no acute issues, seen by speech - pt aphasic/seems to also have receptive aphasia     24 Hour events:  OOB to chair, tolerates trach collar   PMR consult and assessment, Daily PT/OT, mobility slowly improving  Improving mental status and strength   PMV as tolerated         Objective:  Vital Signs Last 24 Hrs  T(C): 37.1 (29 Jan 2025 04:00), Max: 37.1 (29 Jan 2025 04:00)  T(F): 98.8 (29 Jan 2025 04:00), Max: 98.8 (29 Jan 2025 04:00)  HR: 82 (29 Jan 2025 06:01) (78 - 96)  BP: --  BP(mean): --  RR: 23 (29 Jan 2025 06:00) (17 - 26)  SpO2: 100% (29 Jan 2025 06:01) (99% - 100%)    Parameters below as of 29 Jan 2025 06:01  Patient On (Oxygen Delivery Method): tracheostomy collar        Physical Exam:   NAD  Neurology: follows commands, slow to move extremities with weakness; upper > lower   Respiratory: Clear bilaterally , secretion improved  CV: Sinus  Abdominal: Soft, Nontender  Extremities: Warm, well-perfused  -------------------------------------------------------------------------------------------------------------------------------    Labs:                        10.0   9.15  )-----------( 339      ( 29 Jan 2025 00:47 )             31.0     01-29    144  |  107  |  43[H]  ----------------------------<  111[H]  3.5   |  23  |  1.07    Ca    10.5      29 Jan 2025 00:47  Phos  4.8     01-29  Mg     2.1     01-29    TPro  7.3  /  Alb  3.6  /  TBili  0.6  /  DBili  x   /  AST  70[H]  /  ALT  141[H]  /  AlkPhos  108  01-29    LIVER FUNCTIONS - ( 29 Jan 2025 00:47 )  Alb: 3.6 g/dL / Pro: 7.3 g/dL / ALK PHOS: 108 U/L / ALT: 141 U/L / AST: 70 U/L / GGT: x             ABG - ( 29 Jan 2025 00:14 )  pH, Arterial: 7.49  pH, Blood: x     /  pCO2: 34    /  pO2: 168   / HCO3: 26    / Base Excess: 2.7   /  SaO2: 99.8      ------------------------------------------------------------------------------------------------------------------------------  Assessment:  60 yo F w/ PMHx of Sickle cell disease (on hydroxyurea), HTN, HFimpEF/NICM (EF 58% 10/2024) presenting as a transfer from [facility]. Pt initially presented to [facility] for SOB and SS crisis; course c/b witnessed seizure, intubated and sedated, and transferred to [facility]. Transferred to [facility] for further management of cardiogenic shock. Cannulated for VA ECMO on 1/4/25. Decannulated on 1/7/25.    Altered mental status / multifactorial encephalopathy  Acute respiratory failure - trach collar  Sickle cell disease  Acute blood loss anemia  Hyperglycemia  Seizure   HTN  Hypernatremia      Plan:   ***Neuro***  Multifactorial encephalopathy  S/p embolic stroke on MRI  Seizure activity on EEG, now improved  Remains on Keppra , 500 BID  On Amantadine.  LP on 1/14 - no evidence of meningitis/ encephalitis   Neuro exam improving slowly    ***Cardiovascular***  S/p VA ECMO, Bi vent function recovered.  Metoprolol 25mg BID      ***Pulmonary***  Acute hypoxic respiratory failure s/p Tracheostomy   Trach collar around the clock since 1/22   Nebs daily    ***GI***  Protein calorie malnutrition  S/p PEG placement   Enteral feeding via PEG tube, tolerates well  At goal  Protonix for stress ulcer prophylaxis   Bowel regimen.  Transaminitis - RUQ US     ***Renal***  Hypernatremia - FWB  Trend Creatinine   Negative FG     ***ID***  MSSA bacteremia, BAL + MSSA -IV Vanc completed  Merrem for total 10 days, completed  Monitor off antibiotics    ***Endocrine***  Hyperglycemia - Insulin sliding scale.     ***Hematology***  Acute blood loss anemia - no current transfusion indication.  Sickle Cell - s/p Red cell exchange, Hgb electrophoresis weekly  Hydroxyurea - monitor platelet count.  Lovenox for DVT prophylaxis       *** Lines ***  LRAL    Care plan discussed with the ICU care team.   Patient remains critical, at risk for life threatening decompensation.    I have spent 30 minutes providing critical care management to this patient.    By signing my name below, I, Barrett Sanchez, attest that this documentation has been prepared under the direction and in the presence of Memo Ontiveros MD.  Electronically signed: Barrett Sanchez, 01-29-25 @ 07:02    I, Memo Ontiveros MD,  personally performed the services described in this documentation. all medical record entries made by the scribe were at my direction and in my presence. I have reviewed the chart and agree that the record reflects my personal performance and is accurate and complete  Electronically signed: Memo Ontiveros MD.

## 2025-01-30 DIAGNOSIS — Z93.1 GASTROSTOMY STATUS: ICD-10-CM

## 2025-01-30 DIAGNOSIS — Z98.890 OTHER SPECIFIED POSTPROCEDURAL STATES: ICD-10-CM

## 2025-01-30 DIAGNOSIS — Z93.0 TRACHEOSTOMY STATUS: ICD-10-CM

## 2025-01-30 LAB
ALBUMIN SERPL ELPH-MCNC: 3.7 G/DL — SIGNIFICANT CHANGE UP (ref 3.3–5)
ALP SERPL-CCNC: 115 U/L — SIGNIFICANT CHANGE UP (ref 40–120)
ALT FLD-CCNC: 107 U/L — HIGH (ref 10–45)
ANION GAP SERPL CALC-SCNC: 15 MMOL/L — SIGNIFICANT CHANGE UP (ref 5–17)
AST SERPL-CCNC: 45 U/L — HIGH (ref 10–40)
BASOPHILS # BLD AUTO: 0.04 K/UL — SIGNIFICANT CHANGE UP (ref 0–0.2)
BASOPHILS NFR BLD AUTO: 0.5 % — SIGNIFICANT CHANGE UP (ref 0–2)
BILIRUB SERPL-MCNC: 0.6 MG/DL — SIGNIFICANT CHANGE UP (ref 0.2–1.2)
BUN SERPL-MCNC: 35 MG/DL — HIGH (ref 7–23)
CALCIUM SERPL-MCNC: 10.4 MG/DL — SIGNIFICANT CHANGE UP (ref 8.4–10.5)
CHLORIDE SERPL-SCNC: 106 MMOL/L — SIGNIFICANT CHANGE UP (ref 96–108)
CO2 SERPL-SCNC: 22 MMOL/L — SIGNIFICANT CHANGE UP (ref 22–31)
CREAT SERPL-MCNC: 0.86 MG/DL — SIGNIFICANT CHANGE UP (ref 0.5–1.3)
EGFR: 79 ML/MIN/1.73M2 — SIGNIFICANT CHANGE UP
EOSINOPHIL # BLD AUTO: 0.16 K/UL — SIGNIFICANT CHANGE UP (ref 0–0.5)
EOSINOPHIL NFR BLD AUTO: 1.8 % — SIGNIFICANT CHANGE UP (ref 0–6)
GAS PNL BLDA: SIGNIFICANT CHANGE UP
GLUCOSE BLDC GLUCOMTR-MCNC: 97 MG/DL — SIGNIFICANT CHANGE UP (ref 70–99)
GLUCOSE SERPL-MCNC: 108 MG/DL — HIGH (ref 70–99)
HCT VFR BLD CALC: 31.8 % — LOW (ref 34.5–45)
HGB BLD-MCNC: 10.4 G/DL — LOW (ref 11.5–15.5)
IMM GRANULOCYTES NFR BLD AUTO: 0.7 % — SIGNIFICANT CHANGE UP (ref 0–0.9)
LYMPHOCYTES # BLD AUTO: 2.19 K/UL — SIGNIFICANT CHANGE UP (ref 1–3.3)
LYMPHOCYTES # BLD AUTO: 24.9 % — SIGNIFICANT CHANGE UP (ref 13–44)
MAGNESIUM SERPL-MCNC: 2.2 MG/DL — SIGNIFICANT CHANGE UP (ref 1.6–2.6)
MCHC RBC-ENTMCNC: 30.1 PG — SIGNIFICANT CHANGE UP (ref 27–34)
MCHC RBC-ENTMCNC: 32.7 G/DL — SIGNIFICANT CHANGE UP (ref 32–36)
MCV RBC AUTO: 91.9 FL — SIGNIFICANT CHANGE UP (ref 80–100)
MONOCYTES # BLD AUTO: 0.75 K/UL — SIGNIFICANT CHANGE UP (ref 0–0.9)
MONOCYTES NFR BLD AUTO: 8.5 % — SIGNIFICANT CHANGE UP (ref 2–14)
NEUTROPHILS # BLD AUTO: 5.58 K/UL — SIGNIFICANT CHANGE UP (ref 1.8–7.4)
NEUTROPHILS NFR BLD AUTO: 63.6 % — SIGNIFICANT CHANGE UP (ref 43–77)
NRBC # BLD: 2 /100 WBCS — HIGH (ref 0–0)
NRBC BLD-RTO: 2 /100 WBCS — HIGH (ref 0–0)
PHOSPHATE SERPL-MCNC: 4.9 MG/DL — HIGH (ref 2.5–4.5)
PLATELET # BLD AUTO: 341 K/UL — SIGNIFICANT CHANGE UP (ref 150–400)
POTASSIUM SERPL-MCNC: 3.7 MMOL/L — SIGNIFICANT CHANGE UP (ref 3.5–5.3)
POTASSIUM SERPL-SCNC: 3.7 MMOL/L — SIGNIFICANT CHANGE UP (ref 3.5–5.3)
PROT SERPL-MCNC: 7.4 G/DL — SIGNIFICANT CHANGE UP (ref 6–8.3)
RBC # BLD: 3.46 M/UL — LOW (ref 3.8–5.2)
RBC # FLD: 15.8 % — HIGH (ref 10.3–14.5)
SODIUM SERPL-SCNC: 143 MMOL/L — SIGNIFICANT CHANGE UP (ref 135–145)
WBC # BLD: 8.78 K/UL — SIGNIFICANT CHANGE UP (ref 3.8–10.5)
WBC # FLD AUTO: 8.78 K/UL — SIGNIFICANT CHANGE UP (ref 3.8–10.5)

## 2025-01-30 PROCEDURE — 99233 SBSQ HOSP IP/OBS HIGH 50: CPT

## 2025-01-30 PROCEDURE — 71045 X-RAY EXAM CHEST 1 VIEW: CPT | Mod: 26

## 2025-01-30 PROCEDURE — 99291 CRITICAL CARE FIRST HOUR: CPT

## 2025-01-30 RX ORDER — INSULIN LISPRO 100/ML
VIAL (ML) SUBCUTANEOUS EVERY 6 HOURS
Refills: 0 | Status: DISCONTINUED | OUTPATIENT
Start: 2025-01-30 | End: 2025-02-06

## 2025-01-30 RX ORDER — POTASSIUM CHLORIDE 750 MG/1
40 TABLET, EXTENDED RELEASE ORAL ONCE
Refills: 0 | Status: COMPLETED | OUTPATIENT
Start: 2025-01-30 | End: 2025-01-30

## 2025-01-30 RX ADMIN — Medication 500 MILLIGRAM(S): at 13:28

## 2025-01-30 RX ADMIN — IPRATROPIUM BROMIDE AND ALBUTEROL SULFATE 3 MILLILITER(S): .5; 2.5 SOLUTION RESPIRATORY (INHALATION) at 11:09

## 2025-01-30 RX ADMIN — PANTOPRAZOLE 40 MILLIGRAM(S): 20 TABLET, DELAYED RELEASE ORAL at 13:27

## 2025-01-30 RX ADMIN — Medication 25 MILLIGRAM(S): at 20:57

## 2025-01-30 RX ADMIN — LEVETIRACETAM 500 MILLIGRAM(S): 750 TABLET, FILM COATED ORAL at 17:54

## 2025-01-30 RX ADMIN — Medication 25 MILLIGRAM(S): at 05:24

## 2025-01-30 RX ADMIN — Medication 15 MILLILITER(S): at 13:27

## 2025-01-30 RX ADMIN — Medication 1 DROP(S): at 13:28

## 2025-01-30 RX ADMIN — Medication 25 MILLIGRAM(S): at 13:28

## 2025-01-30 RX ADMIN — Medication 1 DROP(S): at 05:24

## 2025-01-30 RX ADMIN — IPRATROPIUM BROMIDE AND ALBUTEROL SULFATE 3 MILLILITER(S): .5; 2.5 SOLUTION RESPIRATORY (INHALATION) at 20:57

## 2025-01-30 RX ADMIN — ANTISEPTIC SURGICAL SCRUB 1 APPLICATION(S): 0.04 SOLUTION TOPICAL at 05:29

## 2025-01-30 RX ADMIN — ENOXAPARIN SODIUM 40 MILLIGRAM(S): 100 INJECTION SUBCUTANEOUS at 05:24

## 2025-01-30 RX ADMIN — LEVETIRACETAM 500 MILLIGRAM(S): 750 TABLET, FILM COATED ORAL at 05:24

## 2025-01-30 RX ADMIN — Medication 500 MILLIGRAM(S): at 05:24

## 2025-01-30 RX ADMIN — Medication 100 MILLIGRAM(S): at 06:03

## 2025-01-30 RX ADMIN — ANTISEPTIC SURGICAL SCRUB 1 APPLICATION(S): 0.04 SOLUTION TOPICAL at 05:24

## 2025-01-30 RX ADMIN — POTASSIUM CHLORIDE 40 MILLIEQUIVALENT(S): 750 TABLET, EXTENDED RELEASE ORAL at 02:21

## 2025-01-30 RX ADMIN — Medication 1 DROP(S): at 17:54

## 2025-01-30 RX ADMIN — Medication 500 MILLIGRAM(S): at 17:54

## 2025-01-30 RX ADMIN — Medication 100 MILLIGRAM(S): at 13:44

## 2025-01-30 RX ADMIN — Medication 1 DROP(S): at 00:20

## 2025-01-30 RX ADMIN — IPRATROPIUM BROMIDE AND ALBUTEROL SULFATE 3 MILLILITER(S): .5; 2.5 SOLUTION RESPIRATORY (INHALATION) at 05:08

## 2025-01-30 NOTE — PROGRESS NOTE ADULT - SUBJECTIVE AND OBJECTIVE BOX
Patient seen and examined at the bedside.    Remains critically ill on continuous ICU monitoring.      Brief Summary:  58 yo with history of sickle cell  12/30 admitted with sickle cell   1/2 unresponsive ? witnessed seizure  1/4 VA ECMO - cardiogenic shock, RV failure  1/7 LVEF 30-35%, ECMO decannulation   1/8 Seizure on EEG   1/9 MRI.  Innumerable foci susceptibility artifact scattered throughout the   brain which can be seen with critically ill patients on ECMO. Diffuse fat   embolization is also part of the differential diagnosis but is considered   less likely.    1/12 RBC Exchange   1/14 LP   1/16 Trach   1/18 Trach collar around the clock  1/21 s/p PEG   1/24 MRI spinal stenosis  1/25 no acute issues, seen by speech - pt aphasic/seems to also have receptive aphasia     24 Hour events:  OOB to chair, tolerates trach collar   PMR consult and assessment, Daily PT/OT, mobility slowly improving  Improving mental status and strength   PMV as tolerated     Objective:  Vital Signs Last 24 Hrs  T(C): 37.2 (30 Jan 2025 04:00), Max: 37.4 (30 Jan 2025 00:00)  T(F): 99 (30 Jan 2025 04:00), Max: 99.3 (30 Jan 2025 00:00)  HR: 86 (30 Jan 2025 06:00) (73 - 93)  BP: --  BP(mean): --  RR: 22 (30 Jan 2025 06:00) (16 - 32)  SpO2: 100% (30 Jan 2025 06:00) (90% - 100%)    Parameters below as of 30 Jan 2025 05:21  Patient On (Oxygen Delivery Method): tracheostomy collar    Physical Exam:  NAD  Neurology: follows commands, slow to move extremities with weakness; upper > lower   Respiratory: Clear bilaterally , secretion improved  CV: Sinus  Abdominal: Soft, Nontender  Extremities: Warm, well-perfused  -------------------------------------------------------------------------------------------------------------------------------    Labs:                        10.4   8.78  )-----------( 341      ( 30 Jan 2025 00:34 )             31.8     01-30    143  |  106  |  35[H]  ----------------------------<  108[H]  3.7   |  22  |  0.86    Ca    10.4      30 Jan 2025 00:34  Phos  4.9     01-30  Mg     2.2     01-30    TPro  7.4  /  Alb  3.7  /  TBili  0.6  /  DBili  x   /  AST  45[H]  /  ALT  107[H]  /  AlkPhos  115  01-30    LIVER FUNCTIONS - ( 30 Jan 2025 00:34 )  Alb: 3.7 g/dL / Pro: 7.4 g/dL / ALK PHOS: 115 U/L / ALT: 107 U/L / AST: 45 U/L / GGT: x           ABG - ( 30 Jan 2025 00:05 )  pH, Arterial: 7.47  pH, Blood: x     /  pCO2: 36    /  pO2: 110   / HCO3: 26    / Base Excess: 2.6   /  SaO2: 98.8    ------------------------------------------------------------------------------------------------------------------------------  Assessment:  58 yo F w/ PMHx of Sickle cell disease (on hydroxyurea), HTN, HFimpEF/NICM (EF 58% 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 Pt initially presented to [facility] for SOB and SS crisis; course c/b witnessed seizure, intubated and sedated, and transferred to [facility]. Transferred to [facility] for further management of cardiogenic shock. Cannulated for VA ECMO on 1/4/25. Decannulated on 1/7/25.    Altered mental status / multifactorial encephalopathy  Acute respiratory failure - trach collar  Sickle cell disease  Acute blood loss anemia  Hyperglycemia  Seizure   HTN  Hypernatremia      Plan:   ***Neuro***  Multifactorial encephalopathy  S/p embolic stroke on MRI  Seizure activity on EEG, now improved  Remains on Keppra , 500 BID  On Amantadine.  LP on 1/14 - no evidence of meningitis/ encephalitis   Neuro exam improving slowly    ***Cardiovascular***  S/p VA ECMO, Bi vent function recovered.  Metoprolol 25mg BID    ***Pulmonary***  Acute hypoxic respiratory failure s/p Tracheostomy   Trach collar around the clock since 1/22   Nebs daily    ***GI***  Protein calorie malnutrition  S/p PEG placement   Enteral feeding via PEG tube, tolerates well  At goal  Protonix for stress ulcer prophylaxis   Bowel regimen.  Transaminitis - RUQ US     ***Renal***  Hypernatremia - FWB  Trend Creatinine   Negative FG     ***ID***  MSSA bacteremia, BAL + MSSA -IV Vanc completed  Merrem for total 10 days, completed  Monitor off antibiotics    ***Endocrine***  Hyperglycemia - Insulin sliding scale.     ***Hematology***  Acute blood loss anemia - no current transfusion indication.  Sickle Cell - s/p Red cell exchange, Hgb electrophoresis weekly  Hydroxyurea - monitor platelet count.  Lovenox for DVT prophylaxis       *** Lines ***  LRAL        Care plan discussed with the ICU care team.   Patient remains critical, at risk for life threatening decompensation.    I have spent 30 minutes providing critical care management to this patient.    By signing my name below, I, Norris Oliver, attest that this documentation has been prepared under the direction and in the presence of Memo Ontiveros MD.  Electronically signed: Omar Lopez 01-30-25 @ 06:12    I, Memo Ontiveros MD,  personally performed the services described in this documentation. All medical record entries made by the scribe were at my direction and in my presence. I have reviewed the chart and agree that the record reflects my personal performance and is accurate and complete  Electronically signed: Memo Ontiveros MD. Patient seen and examined at the bedside.    Remains critically ill on continuous ICU monitoring.      Brief Summary:  58 yo with history of sickle cell  12/30 admitted with sickle cell   1/2 unresponsive ? witnessed seizure  1/4 VA ECMO - cardiogenic shock, RV failure  1/7 LVEF 30-35%, ECMO decannulation   1/8 Seizure on EEG   1/9 MRI.  Innumerable foci susceptibility artifact scattered throughout the   brain which can be seen with critically ill patients on ECMO. Diffuse fat   embolization is also part of the differential diagnosis but is considered   less likely.    1/12 RBC Exchange   1/14 LP   1/16 Trach   1/18 Trach collar around the clock  1/21 s/p PEG   1/24 MRI spinal stenosis  1/25 no acute issues, seen by speech - pt aphasic/seems to also have receptive aphasia     24 Hour events:  OOB to chair, tolerates trach collar   PMR consult and assessment, Daily PT/OT, mobility slowly improving  Improving mental status and strength   PMV as tolerated     Objective:  Vital Signs Last 24 Hrs  T(C): 37.2 (30 Jan 2025 04:00), Max: 37.4 (30 Jan 2025 00:00)  T(F): 99 (30 Jan 2025 04:00), Max: 99.3 (30 Jan 2025 00:00)  HR: 86 (30 Jan 2025 06:00) (73 - 93)  BP: --  BP(mean): --  RR: 22 (30 Jan 2025 06:00) (16 - 32)  SpO2: 100% (30 Jan 2025 06:00) (90% - 100%)    Parameters below as of 30 Jan 2025 05:21  Patient On (Oxygen Delivery Method): tracheostomy collar    Physical Exam:  NAD  Neurology: follows commands, slow to move extremities with weakness; upper > lower   Respiratory: Clear bilaterally , secretion improved  CV: Sinus  Abdominal: Soft, Nontender  Extremities: Warm, well-perfused  -------------------------------------------------------------------------------------------------------------------------------    Labs:                        10.4   8.78  )-----------( 341      ( 30 Jan 2025 00:34 )             31.8     01-30    143  |  106  |  35[H]  ----------------------------<  108[H]  3.7   |  22  |  0.86    Ca    10.4      30 Jan 2025 00:34  Phos  4.9     01-30  Mg     2.2     01-30    TPro  7.4  /  Alb  3.7  /  TBili  0.6  /  DBili  x   /  AST  45[H]  /  ALT  107[H]  /  AlkPhos  115  01-30    LIVER FUNCTIONS - ( 30 Jan 2025 00:34 )  Alb: 3.7 g/dL / Pro: 7.4 g/dL / ALK PHOS: 115 U/L / ALT: 107 U/L / AST: 45 U/L / GGT: x           ABG - ( 30 Jan 2025 00:05 )  pH, Arterial: 7.47  pH, Blood: x     /  pCO2: 36    /  pO2: 110   / HCO3: 26    / Base Excess: 2.6   /  SaO2: 98.8    ------------------------------------------------------------------------------------------------------------------------------  Assessment:  58 yo F w/ PMHx of Sickle cell disease (on hydroxyurea), HTN, HFimpEF/NICM (EF 58% 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 Pt initially presented to [facility] for SOB and SS crisis; course c/b witnessed seizure, intubated and sedated, and transferred to [facility]. Transferred to [facility] for further management of cardiogenic shock. Cannulated for VA ECMO on 1/4/25. Decannulated on 1/7/25.    Altered mental status / multifactorial encephalopathy  Acute respiratory failure - trach collar  Sickle cell disease  Acute blood loss anemia  Hyperglycemia  Seizure   HTN  Hypernatremia      Plan:   ***Neuro***  Multifactorial encephalopathy  S/p embolic stroke on MRI  Seizure activity on EEG, now improved  Remains on Keppra , 500 BID  On Amantadine.  LP on 1/14 - no evidence of meningitis/ encephalitis   Neuro exam improving slowly - working with physical therapy     ***Cardiovascular***  S/p VA ECMO, Bi vent function recovered.  Metoprolol 25mg BID    ***Pulmonary***  Acute hypoxic respiratory failure s/p Tracheostomy   Trach collar around the clock since 1/22   Transitioned to 6.0 cuffless on 1/29  Nebs daily    ***GI***  Protein calorie malnutrition  S/p PEG placement   Enteral feeding via PEG tube, tolerates well  At goal  Protonix for stress ulcer prophylaxis   Bowel regimen.  Transaminitis - RUQ US     ***Renal***  Hypernatremia - FWB  Trend Creatinine   Negative FG     ***ID***  MSSA bacteremia, BAL + MSSA -IV Vanc completed  Merrem for total 10 days, completed  Monitor off antibiotics    ***Endocrine***  Hyperglycemia - Insulin sliding scale.     ***Hematology***  Acute blood loss anemia - no current transfusion indication.  Sickle Cell - s/p Red cell exchange, Hgb electrophoresis weekly  Hydroxyurea - monitor platelet count.  Lovenox for DVT prophylaxis       *** Lines ***  LRAL        Care plan discussed with the ICU care team.   Patient remains critical, at risk for life threatening decompensation.    I have spent 31 minutes providing critical care management to this patient.    By signing my name below, I, Norris Oliver, attest that this documentation has been prepared under the direction and in the presence of Memo Ontiveros MD.  Electronically signed: Omar Lopez 01-30-25 @ 06:12    I, Memo Ontiveros MD,  personally performed the services described in this documentation. All medical record entries made by the scribe were at my direction and in my presence. I have reviewed the chart and agree that the record reflects my personal performance and is accurate and complete  Electronically signed: Memo Ontiveros MD.

## 2025-01-30 NOTE — PROGRESS NOTE ADULT - PROBLEM SELECTOR PLAN 5
Protein calorie malnutrition  S/p PEG placement   Enteral feeding via PEG tube, tolerates well  At goal  Protonix for stress ulcer prophylaxis   Bowel regimen.  Transaminitis - RUQ US

## 2025-01-30 NOTE — PROGRESS NOTE ADULT - NUTRITIONAL ASSESSMENT
This patient has been assessed with a concern for Malnutrition and has been determined to have a diagnosis/diagnoses of Severe protein-calorie malnutrition.    This patient is being managed with:   Diet NPO after Midnight-     NPO Start Date: 28-Jan-2025   NPO Start Time: 23:59  Entered: Jan 28 2025  4:04PM    Diet NPO with Tube Feed-  Tube Feeding Modality: Gastrostomy  Inocencia Cortez Peptide 1.5 (KFPEPT1.5RTH)  Total Volume for 24 Hours (mL): 1080  Continuous  Starting Tube Feed Rate {mL per Hour}: 45  Until Goal Tube Feed Rate (mL per Hour): 45  Tube Feed Duration (in Hours): 24  Tube Feed Start Time: 14:30  Mehdi(7 Gm Arginine/7 Gm Glut/1.2 Gm HMB     Qty per Day:  2  Entered: Jan 28 2025  2:21PM

## 2025-01-30 NOTE — PROGRESS NOTE ADULT - PROBLEM SELECTOR PLAN 3
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  LP on 1/14 - no evidence of meningitis/ encephalitis   Neuro exam improving slowly - working with physical therapy

## 2025-01-30 NOTE — PROGRESS NOTE ADULT - ASSESSMENT
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 Keppra was discontinued at [facility] due to negative EEG. Seizure was thought to be caused by clonazepam withdrawal per OSH. She was also found to have elevated troponins ~900s.  At [facility] MICU pt was found to have RV failure. Pt transferred to NS for management of cardiogenic shock with increased inotropic and pressor support requirement.   1/3 Initiation, venoarterial ECMO (Left Femoral Arterial 17Fr, Right Femoral Venous 25Fr, L SFA RPC 6Fr)  Transfusion Medicine team is consulted for emergent RBC exchange to minimize the risk of complications related to SCD as this patient is critically ill with multi-organ failure.   -TTE 1/4/25: LVEF <20%, ECMO cannula in RV, mildly enlarged RV size and reduced RV systolic function, no pericardial effusion, no MR, trace TR, IVC nml size  MSSA in sputum culture from ET tube. MSSA bacteremia, BAL + MSSA -IV vanc completed  Merrem for total 10 days, completed  Coag negative Staph epi. in single blood culture drawn 1/3  1/7 s/p VA ECMO decannulation. Case was complicated by hematoma formation in left thigh (site of arterial cannula). 2U pRBC, 2 platelet and 5U cryo given.  1/8 YUNG and LLL evaluation revealed moderate green tinged secretions and mucous plugging. RUL, RML, RLL revealed minimal secretions. Keppra reinstated 1000  1/9 MRI today - Innumerable foci susceptibility artifact scattered throughout the brain, tiny acute/subacute infarcts within the bilateral basal ganglia, thalami, and chandu with disproportionate ovoid area of T2/FLAIR prolongation in the right ventral thalamus. Findings may indicate the presence of embolic acute/subacute infarcts  1/14 Lumbar puncture no evidence of meningitis/ encephalitis   1/16 bedside trach w/ Dr Hebert Holden  1/21 PEG placement, flexible bronchoscopy for Acute respiratory failure with hypoxia findings; minimal secretions through out b/l lung segments  1/27 Speech and swallow eval there is reduced movement of the right vocal cord; left is mobile/intact. Silent laryngeal penetration of purees and moderately thick liquids is noted  1/30 Transferred to  [place]

## 2025-01-30 NOTE — PROGRESS NOTE ADULT - PROBLEM SELECTOR PLAN 1
- Continue hydroxyurea as long as plt >100 and hb >8  (restarted on 01/15/25)  - Monitor hemolysis labs (CBC, CMP, LDH, reticulocyte count) daily, prefer pediatric tubes.   - Hematology team to follow

## 2025-01-30 NOTE — PROGRESS NOTE ADULT - PROBLEM SELECTOR PLAN 4
Acute hypoxic respiratory failure s/p Tracheostomy   Trach collar around the clock since 1/22   Transitioned to 6.0 cuffless on 1/29  Nebs daily

## 2025-01-30 NOTE — PROGRESS NOTE ADULT - SUBJECTIVE AND OBJECTIVE BOX
VITAL SIGNS    Telemetry:  SR 80-90  Vital Signs Last 24 Hrs  T(C): 36.8 (25 @ 12:00), Max: 37.4 (25 @ 00:00)  T(F): 98.3 (25 @ 12:00), Max: 99.3 (25 @ 00:00)  HR: 83 (25 @ 15:00) (77 - 93)  BP: --  RR: 25 (25 @ 15:00) (16 - 27)  SpO2: 100% (25 @ 15:00) (85% - 100%)             @ 07:  -   @ 07:00  --------------------------------------------------------  IN: 1530 mL / OUT: 2100 mL / NET: -570 mL     @ 07:  -   @ 16:19  --------------------------------------------------------  IN: 600 mL / OUT: 350 mL / NET: 250 mL       Daily     Daily Weight in k.8 (2025 00:00)  Admit Wt: Drug Dosing Weight  Height (cm): 162 (2025 20:47)  Weight (kg): 83.6 (2025 20:47)  BMI (kg/m2): 31.9 (2025 20:47)  BSA (m2): 1.88 (2025 20:47)    Bilirubin Total: 0.6 mg/dL ( @ 00:34)    CAPILLARY BLOOD GLUCOSE              MEDICATIONS  acetaminophen   Oral Liquid .. 650 milliGRAM(s) Oral every 6 hours PRN  albuterol/ipratropium for Nebulization 3 milliLiter(s) Nebulizer every 8 hours  amantadine Syrup 100 milliGRAM(s) Oral <User Schedule>  artificial tears (preservative free) Ophthalmic Solution 1 Drop(s) Both EYES four times a day  ascorbic acid 500 milliGRAM(s) Oral daily  chlorhexidine 2% Cloths 1 Application(s) Topical <User Schedule>  chlorhexidine 4% Liquid 1 Application(s) Topical <User Schedule>  enoxaparin Injectable 40 milliGRAM(s) SubCutaneous every 24 hours  hydrALAZINE Injectable 5 milliGRAM(s) IV Push every 6 hours PRN  hydroxyurea 500 milliGRAM(s) Oral two times a day  levETIRAcetam  Solution 500 milliGRAM(s) Oral every 12 hours  metoprolol tartrate 25 milliGRAM(s) Oral every 8 hours  multivitamin/minerals/iron Oral Solution (CENTRUM) 15 milliLiter(s) Oral daily  pantoprazole  Injectable 40 milliGRAM(s) IV Push daily  polyethylene glycol 3350 17 Gram(s) Oral daily  senna 1 Tablet(s) Oral daily  sodium chloride 0.9% lock flush 10 milliLiter(s) IV Push every 1 hour PRN      >>> <<<  PHYSICAL EXAM  Subjective:  Neurology: alert and oriented x 3, nonfocal, no gross deficits  CV :  Sternal Wound :  CDI , Stable  Lungs:  Abdomen: soft, NT,ND, ( )BM  :  voiding  Extremities:       LABS      143  |  106  |  35[H]  ----------------------------<  108[H]  3.7   |  22  |  0.86    Ca    10.4      2025 00:34  Phos  4.9       Mg     2.2         TPro  7.4  /  Alb  3.7  /  TBili  0.6  /  DBili  x   /  AST  45[H]  /  ALT  107[H]  /  AlkPhos  115                                   10.4   8.78  )-----------( 341      ( 2025 00:34 )             31.8                 PAST MEDICAL & SURGICAL HISTORY:  Sickle-cell-hemoglobin C disease with crisis      Essential hypertension      Sickle cell disease      HTN (hypertension)      H/O:       H/O abdominoplasty      S/P breast augmentation      S/P             VITAL SIGNS    Telemetry:  SR 80-90  Vital Signs Last 24 Hrs  T(C): 36.8 (25 @ 12:00), Max: 37.4 (25 @ 00:00)  T(F): 98.3 (25 @ 12:00), Max: 99.3 (25 @ 00:00)  HR: 83 (25 @ 15:00) (77 - 93)  BP: --  RR: 25 (25 @ 15:00) (16 - 27)  SpO2: 100% (25 @ 15:00) (85% - 100%)             @ 07:  -   @ 07:00  --------------------------------------------------------  IN: 1530 mL / OUT: 2100 mL / NET: -570 mL     @ 07:  -   @ 16:19  --------------------------------------------------------  IN: 600 mL / OUT: 350 mL / NET: 250 mL       Daily     Daily Weight in k.8 (2025 00:00)  Admit Wt: Drug Dosing Weight  Height (cm): 162 (2025 20:47)  Weight (kg): 83.6 (2025 20:47)  BMI (kg/m2): 31.9 (2025 20:47)  BSA (m2): 1.88 (2025 20:47)    Bilirubin Total: 0.6 mg/dL ( @ 00:34)    CAPILLARY BLOOD GLUCOSE              MEDICATIONS  acetaminophen   Oral Liquid .. 650 milliGRAM(s) Oral every 6 hours PRN  albuterol/ipratropium for Nebulization 3 milliLiter(s) Nebulizer every 8 hours  amantadine Syrup 100 milliGRAM(s) Oral <User Schedule>  artificial tears (preservative free) Ophthalmic Solution 1 Drop(s) Both EYES four times a day  ascorbic acid 500 milliGRAM(s) Oral daily  chlorhexidine 2% Cloths 1 Application(s) Topical <User Schedule>  chlorhexidine 4% Liquid 1 Application(s) Topical <User Schedule>  enoxaparin Injectable 40 milliGRAM(s) SubCutaneous every 24 hours  hydrALAZINE Injectable 5 milliGRAM(s) IV Push every 6 hours PRN  hydroxyurea 500 milliGRAM(s) Oral two times a day  levETIRAcetam  Solution 500 milliGRAM(s) Oral every 12 hours  metoprolol tartrate 25 milliGRAM(s) Oral every 8 hours  multivitamin/minerals/iron Oral Solution (CENTRUM) 15 milliLiter(s) Oral daily  pantoprazole  Injectable 40 milliGRAM(s) IV Push daily  polyethylene glycol 3350 17 Gram(s) Oral daily  senna 1 Tablet(s) Oral daily  sodium chloride 0.9% lock flush 10 milliLiter(s) IV Push every 1 hour PRN      >>> <<<  PHYSICAL EXAM  Subjective: Neurological Exam:  Mental Status: Awake, alert Able to follow simple verbal commands. + aphasia   Cranial Nerves: PERRL, EOMI, no obvious facial asymmetry, hearing is grossly intact.   Motor: Normal bulk, flaccid  Sensation: Intact to light touch and pinprick throughout.      CV :s1s2  trach collar  Sternal Wound :  CDI , Stable  Lungs: cta  Abdomen: soft, NT,ND, (+ )BM Peg  :  voiding primafit  sacrum dti  Extremities:   + pedal pulses no skin breakdown    LABS      143  |  106  |  35[H]  ----------------------------<  108[H]  3.7   |  22  |  0.86    Ca    10.4      2025 00:34  Phos  4.9       Mg     2.2         TPro  7.4  /  Alb  3.7  /  TBili  0.6  /  DBili  x   /  AST  45[H]  /  ALT  107[H]  /  AlkPhos  115                                   10.4   8.78  )-----------( 341      ( 2025 00:34 )             31.8                 PAST MEDICAL & SURGICAL HISTORY:  Sickle-cell-hemoglobin C disease with crisis      Essential hypertension      Sickle cell disease      HTN (hypertension)      H/O:       H/O abdominoplasty      S/P breast augmentation      S/P

## 2025-01-31 LAB
ALBUMIN SERPL ELPH-MCNC: 3.9 G/DL — SIGNIFICANT CHANGE UP (ref 3.3–5)
ALP SERPL-CCNC: 119 U/L — SIGNIFICANT CHANGE UP (ref 40–120)
ALT FLD-CCNC: 81 U/L — HIGH (ref 10–45)
ANION GAP SERPL CALC-SCNC: 12 MMOL/L — SIGNIFICANT CHANGE UP (ref 5–17)
AST SERPL-CCNC: 37 U/L — SIGNIFICANT CHANGE UP (ref 10–40)
BASOPHILS # BLD AUTO: 0.03 K/UL — SIGNIFICANT CHANGE UP (ref 0–0.2)
BASOPHILS NFR BLD AUTO: 0.3 % — SIGNIFICANT CHANGE UP (ref 0–2)
BILIRUB SERPL-MCNC: 0.6 MG/DL — SIGNIFICANT CHANGE UP (ref 0.2–1.2)
BUN SERPL-MCNC: 36 MG/DL — HIGH (ref 7–23)
CALCIUM SERPL-MCNC: 10.7 MG/DL — HIGH (ref 8.4–10.5)
CHLORIDE SERPL-SCNC: 109 MMOL/L — HIGH (ref 96–108)
CO2 SERPL-SCNC: 25 MMOL/L — SIGNIFICANT CHANGE UP (ref 22–31)
CREAT SERPL-MCNC: 0.89 MG/DL — SIGNIFICANT CHANGE UP (ref 0.5–1.3)
EGFR: 76 ML/MIN/1.73M2 — SIGNIFICANT CHANGE UP
EOSINOPHIL # BLD AUTO: 0.14 K/UL — SIGNIFICANT CHANGE UP (ref 0–0.5)
EOSINOPHIL NFR BLD AUTO: 1.6 % — SIGNIFICANT CHANGE UP (ref 0–6)
GLUCOSE BLDC GLUCOMTR-MCNC: 110 MG/DL — HIGH (ref 70–99)
GLUCOSE BLDC GLUCOMTR-MCNC: 125 MG/DL — HIGH (ref 70–99)
GLUCOSE BLDC GLUCOMTR-MCNC: 131 MG/DL — HIGH (ref 70–99)
GLUCOSE BLDC GLUCOMTR-MCNC: 96 MG/DL — SIGNIFICANT CHANGE UP (ref 70–99)
GLUCOSE SERPL-MCNC: 127 MG/DL — HIGH (ref 70–99)
HCT VFR BLD CALC: 34 % — LOW (ref 34.5–45)
HGB BLD-MCNC: 11 G/DL — LOW (ref 11.5–15.5)
IMM GRANULOCYTES NFR BLD AUTO: 0.9 % — SIGNIFICANT CHANGE UP (ref 0–0.9)
LYMPHOCYTES # BLD AUTO: 2.04 K/UL — SIGNIFICANT CHANGE UP (ref 1–3.3)
LYMPHOCYTES # BLD AUTO: 23.7 % — SIGNIFICANT CHANGE UP (ref 13–44)
MAGNESIUM SERPL-MCNC: 2.3 MG/DL — SIGNIFICANT CHANGE UP (ref 1.6–2.6)
MCHC RBC-ENTMCNC: 29.7 PG — SIGNIFICANT CHANGE UP (ref 27–34)
MCHC RBC-ENTMCNC: 32.4 G/DL — SIGNIFICANT CHANGE UP (ref 32–36)
MCV RBC AUTO: 91.9 FL — SIGNIFICANT CHANGE UP (ref 80–100)
MONOCYTES # BLD AUTO: 0.75 K/UL — SIGNIFICANT CHANGE UP (ref 0–0.9)
MONOCYTES NFR BLD AUTO: 8.7 % — SIGNIFICANT CHANGE UP (ref 2–14)
NEUTROPHILS # BLD AUTO: 5.58 K/UL — SIGNIFICANT CHANGE UP (ref 1.8–7.4)
NEUTROPHILS NFR BLD AUTO: 64.8 % — SIGNIFICANT CHANGE UP (ref 43–77)
NRBC # BLD: 1 /100 WBCS — HIGH (ref 0–0)
NRBC BLD-RTO: 1 /100 WBCS — HIGH (ref 0–0)
PHOSPHATE SERPL-MCNC: 4.6 MG/DL — HIGH (ref 2.5–4.5)
PLATELET # BLD AUTO: 303 K/UL — SIGNIFICANT CHANGE UP (ref 150–400)
POTASSIUM SERPL-MCNC: 3.8 MMOL/L — SIGNIFICANT CHANGE UP (ref 3.5–5.3)
POTASSIUM SERPL-SCNC: 3.8 MMOL/L — SIGNIFICANT CHANGE UP (ref 3.5–5.3)
PROT SERPL-MCNC: 7.7 G/DL — SIGNIFICANT CHANGE UP (ref 6–8.3)
RBC # BLD: 3.7 M/UL — LOW (ref 3.8–5.2)
RBC # FLD: 15.8 % — HIGH (ref 10.3–14.5)
SODIUM SERPL-SCNC: 146 MMOL/L — HIGH (ref 135–145)
WBC # BLD: 8.62 K/UL — SIGNIFICANT CHANGE UP (ref 3.8–10.5)
WBC # FLD AUTO: 8.62 K/UL — SIGNIFICANT CHANGE UP (ref 3.8–10.5)

## 2025-01-31 PROCEDURE — 99232 SBSQ HOSP IP/OBS MODERATE 35: CPT

## 2025-01-31 RX ORDER — POTASSIUM CHLORIDE 750 MG/1
10 TABLET, EXTENDED RELEASE ORAL
Refills: 0 | Status: COMPLETED | OUTPATIENT
Start: 2025-01-31 | End: 2025-01-31

## 2025-01-31 RX ADMIN — Medication 5 MILLIGRAM(S): at 08:02

## 2025-01-31 RX ADMIN — Medication 500 MILLIGRAM(S): at 11:47

## 2025-01-31 RX ADMIN — IPRATROPIUM BROMIDE AND ALBUTEROL SULFATE 3 MILLILITER(S): .5; 2.5 SOLUTION RESPIRATORY (INHALATION) at 13:16

## 2025-01-31 RX ADMIN — ENOXAPARIN SODIUM 40 MILLIGRAM(S): 100 INJECTION SUBCUTANEOUS at 06:47

## 2025-01-31 RX ADMIN — Medication 100 MILLIGRAM(S): at 13:16

## 2025-01-31 RX ADMIN — IPRATROPIUM BROMIDE AND ALBUTEROL SULFATE 3 MILLILITER(S): .5; 2.5 SOLUTION RESPIRATORY (INHALATION) at 21:44

## 2025-01-31 RX ADMIN — POLYETHYLENE GLYCOL 3350 17 GRAM(S): 17 POWDER, FOR SOLUTION ORAL at 18:33

## 2025-01-31 RX ADMIN — ANTISEPTIC SURGICAL SCRUB 1 APPLICATION(S): 0.04 SOLUTION TOPICAL at 07:02

## 2025-01-31 RX ADMIN — LEVETIRACETAM 500 MILLIGRAM(S): 750 TABLET, FILM COATED ORAL at 06:46

## 2025-01-31 RX ADMIN — IPRATROPIUM BROMIDE AND ALBUTEROL SULFATE 3 MILLILITER(S): .5; 2.5 SOLUTION RESPIRATORY (INHALATION) at 06:47

## 2025-01-31 RX ADMIN — Medication 25 MILLIGRAM(S): at 13:15

## 2025-01-31 RX ADMIN — Medication 100 MILLIGRAM(S): at 06:47

## 2025-01-31 RX ADMIN — Medication 1 TABLET(S): at 18:33

## 2025-01-31 RX ADMIN — LEVETIRACETAM 500 MILLIGRAM(S): 750 TABLET, FILM COATED ORAL at 18:32

## 2025-01-31 RX ADMIN — Medication 1 DROP(S): at 11:48

## 2025-01-31 RX ADMIN — Medication 1 DROP(S): at 18:34

## 2025-01-31 RX ADMIN — PANTOPRAZOLE 40 MILLIGRAM(S): 20 TABLET, DELAYED RELEASE ORAL at 11:46

## 2025-01-31 RX ADMIN — POTASSIUM CHLORIDE 10 MILLIEQUIVALENT(S): 750 TABLET, EXTENDED RELEASE ORAL at 11:47

## 2025-01-31 RX ADMIN — POTASSIUM CHLORIDE 10 MILLIEQUIVALENT(S): 750 TABLET, EXTENDED RELEASE ORAL at 10:28

## 2025-01-31 RX ADMIN — Medication 25 MILLIGRAM(S): at 06:47

## 2025-01-31 RX ADMIN — Medication 500 MILLIGRAM(S): at 06:47

## 2025-01-31 RX ADMIN — Medication 1 DROP(S): at 06:01

## 2025-01-31 RX ADMIN — Medication 1 DROP(S): at 01:00

## 2025-01-31 RX ADMIN — Medication 25 MILLIGRAM(S): at 21:48

## 2025-01-31 RX ADMIN — Medication 15 MILLILITER(S): at 13:16

## 2025-01-31 RX ADMIN — Medication 500 MILLIGRAM(S): at 18:33

## 2025-01-31 NOTE — PROGRESS NOTE ADULT - SUBJECTIVE AND OBJECTIVE BOX
VITAL SIGNS    Telemetry:  nsr 96    Vital Signs Last 24 Hrs  T(C): 37.1 (25 @ 11:22), Max: 37.3 (25 @ 16:57)  T(F): 98.7 (25 @ 11:22), Max: 99.1 (25 @ 16:57)  HR: 90 (25 @ 11:22) (76 - 94)  BP: 117/74 (25 @ 11:22) (117/74 - 137/92)  RR: 19 (25 @ 11:22) (18 - 27)  SpO2: 100% (25 @ 11:22) (99% - 100%)                    @ 07:01  -   @ 07:00  --------------------------------------------------------  IN: 1920 mL / OUT: 1500 mL / NET: 420 mL     @ 07:01  -   @ 13:28  --------------------------------------------------------  IN: 620 mL / OUT: 200 mL / NET: 420 mL          Daily     Daily Weight in k (2025 06:50)            CAPILLARY BLOOD GLUCOSE      POCT Blood Glucose.: 125 mg/dL (2025 12:09)  POCT Blood Glucose.: 131 mg/dL (2025 05:15)  POCT Blood Glucose.: 96 mg/dL (2025 00:46)  POCT Blood Glucose.: 97 mg/dL (2025 17:17)            Drains:     MS         [  ] Drainage:                 L Pleural  [  ]  Drainage:                R Pleural  [  ]  Drainage:    Pacing Wires        [  ]   Settings:                                  Isolated  [  ]    Coumadin    [ ] YES          [  ]      NO                                   PHYSICAL EXAM        Neurology: alert and oriented x 3, nonfocal, no gross deficits  CV : s1 s2 RRR  Sternal Wound :  CDI , Stable  Lungs: cta  Abdomen: soft, nontender, nondistended, positive bowel sounds, last bowel movement                       chest tubes  :    voiding / pete - sbd         Extremities:      edema   /  -   calve tenderness ,    L leg  /  R leg  incisions cdi          acetaminophen   Oral Liquid .. 650 milliGRAM(s) Oral every 6 hours PRN  albuterol/ipratropium for Nebulization 3 milliLiter(s) Nebulizer every 8 hours  amantadine Syrup 100 milliGRAM(s) Oral <User Schedule>  artificial tears (preservative free) Ophthalmic Solution 1 Drop(s) Both EYES four times a day  ascorbic acid 500 milliGRAM(s) Oral daily  chlorhexidine 2% Cloths 1 Application(s) Topical <User Schedule>  chlorhexidine 4% Liquid 1 Application(s) Topical <User Schedule>  enoxaparin Injectable 40 milliGRAM(s) SubCutaneous every 24 hours  hydrALAZINE Injectable 5 milliGRAM(s) IV Push every 6 hours PRN  hydroxyurea 500 milliGRAM(s) Oral two times a day  insulin lispro (ADMELOG) corrective regimen sliding scale   SubCutaneous every 6 hours  levETIRAcetam  Solution 500 milliGRAM(s) Oral every 12 hours  metoprolol tartrate 25 milliGRAM(s) Oral every 8 hours  multivitamin/minerals/iron Oral Solution (CENTRUM) 15 milliLiter(s) Oral daily  pantoprazole  Injectable 40 milliGRAM(s) IV Push daily  polyethylene glycol 3350 17 Gram(s) Oral daily  senna 1 Tablet(s) Oral daily  sodium chloride 0.9% lock flush 10 milliLiter(s) IV Push every 1 hour PRN                    Physical Therapy Rec:   Home  [  ]   Home w/ PT  [  ]  Rehab  [  ]  Discussed with Cardiothoracic Team at AM rounds.     VITAL SIGNS    Telemetry:  nsr 96    Vital Signs Last 24 Hrs  T(C): 37.1 (25 @ 11:22), Max: 37.3 (25 @ 16:57)  T(F): 98.7 (25 @ 11:22), Max: 99.1 (25 @ 16:57)  HR: 90 (25 @ 11:22) (76 - 94)  BP: 117/74 (25 @ 11:22) (117/74 - 137/92)  RR: 19 (25 @ 11:22) (18 - 27)  SpO2: 100% (25 @ 11:22) (99% - 100%)                    @ 07:01  -   @ 07:00  --------------------------------------------------------  IN: 1920 mL / OUT: 1500 mL / NET: 420 mL     @ 07:01  -   @ 13:28  --------------------------------------------------------  IN: 620 mL / OUT: 200 mL / NET: 420 mL          Daily     Daily Weight in k (2025 06:50)            CAPILLARY BLOOD GLUCOSE      POCT Blood Glucose.: 125 mg/dL (2025 12:09)  POCT Blood Glucose.: 131 mg/dL (2025 05:15)  POCT Blood Glucose.: 96 mg/dL (2025 00:46)  POCT Blood Glucose.: 97 mg/dL (2025 17:17)            Drains:     MS         [  ] Drainage:                 L Pleural  [  ]  Drainage:                R Pleural  [  ]  Drainage:    Pacing Wires        [  ]   Settings:                                  Isolated  [  ]    Coumadin    [ ] YES          [  ]      NO                                   PHYSICAL EXAM        Neurology: Examined patient while working with PT standing in [place] steady with assist of 2.  Following commands, overall generall weakness, able to move upper and lower extremities , weak.  Trach site cdi  CV : s1 s2 RRR    Lungs: upper rhonchi , scant, minimal trach secretions  Abdomen: soft, nontender, nondistended, positive bowel sounds                        :    voiding     Extremities:    no  edema   /  -   calve tenderness ,              acetaminophen   Oral Liquid .. 650 milliGRAM(s) Oral every 6 hours PRN  albuterol/ipratropium for Nebulization 3 milliLiter(s) Nebulizer every 8 hours  amantadine Syrup 100 milliGRAM(s) Oral <User Schedule>  artificial tears (preservative free) Ophthalmic Solution 1 Drop(s) Both EYES four times a day  ascorbic acid 500 milliGRAM(s) Oral daily  chlorhexidine 2% Cloths 1 Application(s) Topical <User Schedule>  chlorhexidine 4% Liquid 1 Application(s) Topical <User Schedule>  enoxaparin Injectable 40 milliGRAM(s) SubCutaneous every 24 hours  hydrALAZINE Injectable 5 milliGRAM(s) IV Push every 6 hours PRN  hydroxyurea 500 milliGRAM(s) Oral two times a day  insulin lispro (ADMELOG) corrective regimen sliding scale   SubCutaneous every 6 hours  levETIRAcetam  Solution 500 milliGRAM(s) Oral every 12 hours  metoprolol tartrate 25 milliGRAM(s) Oral every 8 hours  multivitamin/minerals/iron Oral Solution (CENTRUM) 15 milliLiter(s) Oral daily  pantoprazole  Injectable 40 milliGRAM(s) IV Push daily  polyethylene glycol 3350 17 Gram(s) Oral daily  senna 1 Tablet(s) Oral daily  sodium chloride 0.9% lock flush 10 milliLiter(s) IV Push every 1 hour PRN                    Physical Therapy Rec:   Home  [  ]   Home w/ PT  [  ]  Rehab  [  ]  Discussed with Cardiothoracic Team at AM rounds.

## 2025-01-31 NOTE — PROGRESS NOTE ADULT - ASSESSMENT
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 Keppra was discontinued at [facility] due to negative EEG. Seizure was thought to be caused by clonazepam withdrawal per OSH. She was also found to have elevated troponins ~900s.  At [facility] MICU pt was found to have RV failure. Pt transferred to [facility] for management of cardiogenic shock with increased inotropic and pressor support requirement.   1/3 Initiation, venoarterial ECMO (Left Femoral Arterial 17Fr, Right Femoral Venous 25Fr, L SFA RPC 6Fr)  Transfusion Medicine team is consulted for emergent RBC exchange to minimize the risk of complications related to SCD as this patient is critically ill with multi-organ failure.   -TTE 1/4/25: LVEF <20%, ECMO cannula in RV, mildly enlarged RV size and reduced RV systolic function, no pericardial effusion, no MR, trace TR, IVC nml size  MSSA in sputum culture from ET tube. MSSA bacteremia, BAL + MSSA -IV vanc completed  Merrem for total 10 days, completed  Coag negative Staph epi. in single blood culture drawn 1/3  1/7 s/p VA ECMO decannulation. Case was complicated by hematoma formation in left thigh (site of arterial cannula). 2U pRBC, 2 platelet and 5U cryo given.  1/8 YUNG and LLL evaluation revealed moderate green tinged secretions and mucous plugging. RUL, RML, RLL revealed minimal secretions. Keppra reinstated 1000  1/9 MRI today - Innumerable foci susceptibility artifact scattered throughout the brain, tiny acute/subacute infarcts within the bilateral basal ganglia, thalami, and chandu with disproportionate ovoid area of T2/FLAIR prolongation in the right ventral thalamus. Findings may indicate the presence of embolic acute/subacute infarcts  1/14 Lumbar puncture no evidence of meningitis/ encephalitis   1/16 bedside trach w/ Dr Hebert Holden  1/21 PEG placement, flexible bronchoscopy for Acute respiratory failure with hypoxia findings; minimal secretions through out b/l lung segments  1/27 Speech and swallow eval there is reduced movement of the right vocal cord; left is mobile/intact. 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 Keppra was discontinued at [facility] due to negative EEG. Seizure was thought to be caused by clonazepam withdrawal per OSH. She was also found to have elevated troponins ~900s.  At [facility] MICU pt was found to have RV failure. Pt transferred to [facility] for management of cardiogenic shock with increased inotropic and pressor support requirement.   1/3 Initiation, venoarterial ECMO (Left Femoral Arterial 17Fr, Right Femoral Venous 25Fr, L SFA RPC 6Fr)  Transfusion Medicine team is consulted for emergent RBC exchange to minimize the risk of complications related to SCD as this patient is critically ill with multi-organ failure.   -TTE 1/4/25: LVEF <20%, ECMO cannula in RV, mildly enlarged RV size and reduced RV systolic function, no pericardial effusion, no MR, trace TR, IVC nml size  MSSA in sputum culture from ET tube. MSSA bacteremia, BAL + MSSA -IV vanc completed  Merrem for total 10 days, completed  Coag negative Staph epi. in single blood culture drawn 1/3  1/7 s/p VA ECMO decannulation. Case was complicated by hematoma formation in left thigh (site of arterial cannula). 2U pRBC, 2 platelet and 5U cryo given.  1/8 YUNG and LLL evaluation revealed moderate green tinged secretions and mucous plugging. RUL, RML, RLL revealed minimal secretions. Keppra reinstated 1000  1/9 MRI today - Innumerable foci susceptibility artifact scattered throughout the brain, tiny acute/subacute infarcts within the bilateral basal ganglia, thalami, and chandu with disproportionate ovoid area of T2/FLAIR prolongation in the right ventral thalamus. Findings may indicate the presence of embolic acute/subacute infarcts  1/14 Lumbar puncture no evidence of meningitis/ encephalitis   1/16 bedside trach w/ Dr Hebert Holden  1/21 PEG placement, flexible bronchoscopy for Acute respiratory failure with hypoxia findings; minimal secretions through out b/l lung segments  1/27 Speech and swallow eval there is reduced movement of the right vocal cord; left is mobile/intact. Silent laryngeal penetration of purees and moderately thick liquids is noted  1/30 Transferred to [address] [place] [address]  1/31  Pending rehab placement  Suction prn. OOb to chair

## 2025-01-31 NOTE — PROGRESS NOTE ADULT - SUBJECTIVE AND OBJECTIVE BOX
INTERVAL HPI/OVERNIGHT EVENTS:  Patient S&E at bedside. No o/n events, downgraded to medicine on 1/30/25    VITAL SIGNS:  T(F): 98.9 (01-31-25 @ 07:14)  HR: 80 (01-31-25 @ 08:05)  BP: 137/92 (01-31-25 @ 07:14)  RR: 19 (01-31-25 @ 07:14)  SpO2: 100% (01-31-25 @ 08:05)  Wt(kg): --    PHYSICAL EXAM:    Constitutional: NAD  Eyes: EOMI  Neck: trache  Respiratory: trache, clear BL  Cardiovascular: RRR, no M/R/G  Gastrointestinal: soft, NTND, + BS, +PEG  Extremities: warm  Neurological: awake, trache, follows some commands      MEDICATIONS  (STANDING):  albuterol/ipratropium for Nebulization 3 milliLiter(s) Nebulizer every 8 hours  amantadine Syrup 100 milliGRAM(s) Oral <User Schedule>  artificial tears (preservative free) Ophthalmic Solution 1 Drop(s) Both EYES four times a day  ascorbic acid 500 milliGRAM(s) Oral daily  chlorhexidine 2% Cloths 1 Application(s) Topical <User Schedule>  chlorhexidine 4% Liquid 1 Application(s) Topical <User Schedule>  enoxaparin Injectable 40 milliGRAM(s) SubCutaneous every 24 hours  hydroxyurea 500 milliGRAM(s) Oral two times a day  insulin lispro (ADMELOG) corrective regimen sliding scale   SubCutaneous every 6 hours  levETIRAcetam  Solution 500 milliGRAM(s) Oral every 12 hours  metoprolol tartrate 25 milliGRAM(s) Oral every 8 hours  multivitamin/minerals/iron Oral Solution (CENTRUM) 15 milliLiter(s) Oral daily  pantoprazole  Injectable 40 milliGRAM(s) IV Push daily  polyethylene glycol 3350 17 Gram(s) Oral daily  potassium chloride   Solution 10 milliEquivalent(s) Oral every 2 hours  senna 1 Tablet(s) Oral daily    MEDICATIONS  (PRN):  acetaminophen   Oral Liquid .. 650 milliGRAM(s) Oral every 6 hours PRN Moderate Pain (4 - 6)  hydrALAZINE Injectable 5 milliGRAM(s) IV Push every 6 hours PRN HTN  sodium chloride 0.9% lock flush 10 milliLiter(s) IV Push every 1 hour PRN Pre/post blood products, medications, blood draw, and to maintain line patency      Allergies    doxycycline (Angioedema)  penicillin (Unknown)  clindamycin (Angioedema)  linezolid (Angioedema)    Intolerances        LABS:                        11.0   8.62  )-----------( 303      ( 31 Jan 2025 07:00 )             34.0     01-31    146[H]  |  109[H]  |  36[H]  ----------------------------<  127[H]  3.8   |  25  |  0.89    Ca    10.7[H]      31 Jan 2025 07:00  Phos  4.6     01-31  Mg     2.3     01-31    TPro  7.7  /  Alb  3.9  /  TBili  0.6  /  DBili  x   /  AST  37  /  ALT  81[H]  /  AlkPhos  119  01-31      Urinalysis Basic - ( 31 Jan 2025 07:00 )    Color: x / Appearance: x / SG: x / pH: x  Gluc: 127 mg/dL / Ketone: x  / Bili: x / Urobili: x   Blood: x / Protein: x / Nitrite: x   Leuk Esterase: x / RBC: x / WBC x   Sq Epi: x / Non Sq Epi: x / Bacteria: x        RADIOLOGY & ADDITIONAL TESTS:  Studies reviewed.    ASSESSMENT & PLAN:  INTERVAL HPI/OVERNIGHT EVENTS:  Patient S&E at bedside. No o/n events, downgraded to medicine on 1/30/25. Hgb stable at 11    VITAL SIGNS:  T(F): 98.9 (01-31-25 @ 07:14)  HR: 80 (01-31-25 @ 08:05)  BP: 137/92 (01-31-25 @ 07:14)  RR: 19 (01-31-25 @ 07:14)  SpO2: 100% (01-31-25 @ 08:05)  Wt(kg): --    PHYSICAL EXAM:    Constitutional: NAD  Eyes: EOMI  Neck: trache  Respiratory: trache, clear BL  Cardiovascular: RRR, no M/R/G  Gastrointestinal: soft, NTND, + BS, +PEG  Extremities: warm  Neurological: awake, trache, follows some commands      MEDICATIONS  (STANDING):  albuterol/ipratropium for Nebulization 3 milliLiter(s) Nebulizer every 8 hours  amantadine Syrup 100 milliGRAM(s) Oral <User Schedule>  artificial tears (preservative free) Ophthalmic Solution 1 Drop(s) Both EYES four times a day  ascorbic acid 500 milliGRAM(s) Oral daily  chlorhexidine 2% Cloths 1 Application(s) Topical <User Schedule>  chlorhexidine 4% Liquid 1 Application(s) Topical <User Schedule>  enoxaparin Injectable 40 milliGRAM(s) SubCutaneous every 24 hours  hydroxyurea 500 milliGRAM(s) Oral two times a day  insulin lispro (ADMELOG) corrective regimen sliding scale   SubCutaneous every 6 hours  levETIRAcetam  Solution 500 milliGRAM(s) Oral every 12 hours  metoprolol tartrate 25 milliGRAM(s) Oral every 8 hours  multivitamin/minerals/iron Oral Solution (CENTRUM) 15 milliLiter(s) Oral daily  pantoprazole  Injectable 40 milliGRAM(s) IV Push daily  polyethylene glycol 3350 17 Gram(s) Oral daily  potassium chloride   Solution 10 milliEquivalent(s) Oral every 2 hours  senna 1 Tablet(s) Oral daily    MEDICATIONS  (PRN):  acetaminophen   Oral Liquid .. 650 milliGRAM(s) Oral every 6 hours PRN Moderate Pain (4 - 6)  hydrALAZINE Injectable 5 milliGRAM(s) IV Push every 6 hours PRN HTN  sodium chloride 0.9% lock flush 10 milliLiter(s) IV Push every 1 hour PRN Pre/post blood products, medications, blood draw, and to maintain line patency      Allergies    doxycycline (Angioedema)  penicillin (Unknown)  clindamycin (Angioedema)  linezolid (Angioedema)    Intolerances        LABS:                        11.0   8.62  )-----------( 303      ( 31 Jan 2025 07:00 )             34.0     01-31    146[H]  |  109[H]  |  36[H]  ----------------------------<  127[H]  3.8   |  25  |  0.89    Ca    10.7[H]      31 Jan 2025 07:00  Phos  4.6     01-31  Mg     2.3     01-31    TPro  7.7  /  Alb  3.9  /  TBili  0.6  /  DBili  x   /  AST  37  /  ALT  81[H]  /  AlkPhos  119  01-31      Urinalysis Basic - ( 31 Jan 2025 07:00 )    Color: x / Appearance: x / SG: x / pH: x  Gluc: 127 mg/dL / Ketone: x  / Bili: x / Urobili: x   Blood: x / Protein: x / Nitrite: x   Leuk Esterase: x / RBC: x / WBC x   Sq Epi: x / Non Sq Epi: x / Bacteria: x        RADIOLOGY & ADDITIONAL TESTS:  Studies reviewed.    ASSESSMENT & PLAN:

## 2025-01-31 NOTE — PROGRESS NOTE ADULT - ASSESSMENT
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 Pt initially presented to [facility] for SOB and SS crisis; course c/b witnessed seizure, intubated and sedated, and transferred to [facility] for further management. Keppra was discontinued at [facility] due to negative EEG. At [facility] MICU pt was found to have RV failure possibly iso pulm HTN 2/2 increased tidal volumes on the ventilator, possibly due to volume overload due to IVF iso SS crisis vs fat emboli syndrome. Downgraded to medicine on 1/30/25                                                                                                             #Hb SC disease  #Cardiogenic shock on ECMO  #Unclear trigger for unresponsiveness   - patient with 1-2 sickle pain crisis per year and on hydrea, had retinal detachment s/p repair                           - patient had f/up with cardio in Sept 2024: per the note, unclear etiology of her dyspnea but possibly driven by cardiomyopathy; low suspicion of pulmonary HTN as no RV dysfunction/severe TR.  She is found to have new RV failure, requiring ECMO  - Blood bank transfusion medicine team consulted for emergent RBC exchange given critically ill with multi-organ failure  - Retic count elevated to 11.6%, LDH 1000s, bili 3.9. CXR was clear not indicative of acute chest. Smear (prior to exchange) was reviewed: Hypochromic RBCs with normochromic RBC reflecting transfused blood. Several nucleated RBC suggesting stressed bone marrow. Very few target cells and rare sickle cell. No schistocytes to suggest hemolysis.    - S/p exchange 1/4/2024, s/p ECMO decannulation 1/7/2025 c/b groin hematoma and received 2 unit prbc, platelet and cryo.  - S/p 2nd exchange transfusion 1/12/2025 (based on MRI demonstrating diffuse punctate lesions that could be consistent with fat emboli and patient's otherwise unexplained mental status for goal of apheresis any remaining circulating fat emboli, over weekend which was discussed with blood bank)      Hb electrophoresis:  01/07/25: Hb A 76.8%, Hb A2 3.6%, Hb S 9.3%, HbC 10.3%  01/14/25: Hb A 87.9%, Hb A2 2.5%, Hb S 4.6%, HbC 5.0%  01/21/25: Hb A 78.8%, Hb A2 3.2%, Hb S 9.4%, HbC 8.6%  01/28/25: Hb A 72.1%, Hb A2 3.3%, Hb S 12.9%, HbC 11.7%      Recommendations:  - Please obtain hemoglobin electrophoresis weekly while inpatient (every Tuesday). Goal HbC and HbS fraction <30% combined.   - recommend transfusion to maintain plt > 50k in the setting of bleeding, otherwise Monitor CBC with differential daily and transfuse for platelet count >10 minimum, >20 if febrile.  - Discuss with blood bank and heme team prior to RBC transfusions to minimize risk of antibody development/transfusion reactions, aim to keep hb >7-8  - Appreciate care by primary team, cards/heart failure -neurology suspect that seizures are not related to presentation and plan to continue keppra for now, neurology following for neurologic status.   - Continue hydroxyurea as long as plt >100 and hb >8  (restarted on 01/15/25)  - Monitor hemolysis labs (CBC, CMP, LDH, reticulocyte count) daily, prefer pediatric tubes.   - Hematology team to follow          Will continue to monitor the patient.  Please call via MS Teams with any questions.  Above reviewed with Attending Dr. Onofre.    Discussed with primary team.

## 2025-02-01 LAB
ALBUMIN SERPL ELPH-MCNC: 4 G/DL — SIGNIFICANT CHANGE UP (ref 3.3–5)
ALP SERPL-CCNC: 110 U/L — SIGNIFICANT CHANGE UP (ref 40–120)
ALT FLD-CCNC: 60 U/L — HIGH (ref 10–45)
ANION GAP SERPL CALC-SCNC: 16 MMOL/L — SIGNIFICANT CHANGE UP (ref 5–17)
AST SERPL-CCNC: 33 U/L — SIGNIFICANT CHANGE UP (ref 10–40)
BASOPHILS # BLD AUTO: 0.04 K/UL — SIGNIFICANT CHANGE UP (ref 0–0.2)
BASOPHILS NFR BLD AUTO: 0.4 % — SIGNIFICANT CHANGE UP (ref 0–2)
BILIRUB SERPL-MCNC: 0.6 MG/DL — SIGNIFICANT CHANGE UP (ref 0.2–1.2)
BUN SERPL-MCNC: 34 MG/DL — HIGH (ref 7–23)
CALCIUM SERPL-MCNC: 11.2 MG/DL — HIGH (ref 8.4–10.5)
CHLORIDE SERPL-SCNC: 113 MMOL/L — HIGH (ref 96–108)
CO2 SERPL-SCNC: 24 MMOL/L — SIGNIFICANT CHANGE UP (ref 22–31)
CREAT SERPL-MCNC: 0.9 MG/DL — SIGNIFICANT CHANGE UP (ref 0.5–1.3)
EGFR: 75 ML/MIN/1.73M2 — SIGNIFICANT CHANGE UP
EOSINOPHIL # BLD AUTO: 0.12 K/UL — SIGNIFICANT CHANGE UP (ref 0–0.5)
EOSINOPHIL NFR BLD AUTO: 1.3 % — SIGNIFICANT CHANGE UP (ref 0–6)
GLUCOSE BLDC GLUCOMTR-MCNC: 101 MG/DL — HIGH (ref 70–99)
GLUCOSE BLDC GLUCOMTR-MCNC: 111 MG/DL — HIGH (ref 70–99)
GLUCOSE BLDC GLUCOMTR-MCNC: 116 MG/DL — HIGH (ref 70–99)
GLUCOSE BLDC GLUCOMTR-MCNC: 128 MG/DL — HIGH (ref 70–99)
GLUCOSE BLDC GLUCOMTR-MCNC: 135 MG/DL — HIGH (ref 70–99)
GLUCOSE SERPL-MCNC: 117 MG/DL — HIGH (ref 70–99)
HCT VFR BLD CALC: 34.1 % — LOW (ref 34.5–45)
HGB BLD-MCNC: 11.3 G/DL — LOW (ref 11.5–15.5)
IMM GRANULOCYTES NFR BLD AUTO: 0.6 % — SIGNIFICANT CHANGE UP (ref 0–0.9)
LYMPHOCYTES # BLD AUTO: 1.84 K/UL — SIGNIFICANT CHANGE UP (ref 1–3.3)
LYMPHOCYTES # BLD AUTO: 20.4 % — SIGNIFICANT CHANGE UP (ref 13–44)
MAGNESIUM SERPL-MCNC: 2.4 MG/DL — SIGNIFICANT CHANGE UP (ref 1.6–2.6)
MCHC RBC-ENTMCNC: 30.6 PG — SIGNIFICANT CHANGE UP (ref 27–34)
MCHC RBC-ENTMCNC: 33.1 G/DL — SIGNIFICANT CHANGE UP (ref 32–36)
MCV RBC AUTO: 92.4 FL — SIGNIFICANT CHANGE UP (ref 80–100)
MONOCYTES # BLD AUTO: 0.84 K/UL — SIGNIFICANT CHANGE UP (ref 0–0.9)
MONOCYTES NFR BLD AUTO: 9.3 % — SIGNIFICANT CHANGE UP (ref 2–14)
NEUTROPHILS # BLD AUTO: 6.15 K/UL — SIGNIFICANT CHANGE UP (ref 1.8–7.4)
NEUTROPHILS NFR BLD AUTO: 68 % — SIGNIFICANT CHANGE UP (ref 43–77)
NRBC # BLD: 1 /100 WBCS — HIGH (ref 0–0)
NRBC BLD-RTO: 1 /100 WBCS — HIGH (ref 0–0)
PHOSPHATE SERPL-MCNC: 4.1 MG/DL — SIGNIFICANT CHANGE UP (ref 2.5–4.5)
PLATELET # BLD AUTO: 266 K/UL — SIGNIFICANT CHANGE UP (ref 150–400)
POTASSIUM SERPL-MCNC: 3.9 MMOL/L — SIGNIFICANT CHANGE UP (ref 3.5–5.3)
POTASSIUM SERPL-SCNC: 3.9 MMOL/L — SIGNIFICANT CHANGE UP (ref 3.5–5.3)
PROT SERPL-MCNC: 7.8 G/DL — SIGNIFICANT CHANGE UP (ref 6–8.3)
RBC # BLD: 3.69 M/UL — LOW (ref 3.8–5.2)
RBC # FLD: 15.9 % — HIGH (ref 10.3–14.5)
SODIUM SERPL-SCNC: 153 MMOL/L — HIGH (ref 135–145)
WBC # BLD: 9.04 K/UL — SIGNIFICANT CHANGE UP (ref 3.8–10.5)
WBC # FLD AUTO: 9.04 K/UL — SIGNIFICANT CHANGE UP (ref 3.8–10.5)

## 2025-02-01 PROCEDURE — 99232 SBSQ HOSP IP/OBS MODERATE 35: CPT

## 2025-02-01 RX ORDER — POTASSIUM CHLORIDE 750 MG/1
20 TABLET, EXTENDED RELEASE ORAL ONCE
Refills: 0 | Status: COMPLETED | OUTPATIENT
Start: 2025-02-01 | End: 2025-02-01

## 2025-02-01 RX ORDER — HYDROCORTISONE 1 %
1 CREAM (GRAM) TOPICAL THREE TIMES A DAY
Refills: 0 | Status: DISCONTINUED | OUTPATIENT
Start: 2025-02-01 | End: 2025-02-06

## 2025-02-01 RX ADMIN — IPRATROPIUM BROMIDE AND ALBUTEROL SULFATE 3 MILLILITER(S): .5; 2.5 SOLUTION RESPIRATORY (INHALATION) at 05:35

## 2025-02-01 RX ADMIN — Medication 1 DROP(S): at 05:34

## 2025-02-01 RX ADMIN — Medication 25 MILLIGRAM(S): at 05:35

## 2025-02-01 RX ADMIN — IPRATROPIUM BROMIDE AND ALBUTEROL SULFATE 3 MILLILITER(S): .5; 2.5 SOLUTION RESPIRATORY (INHALATION) at 13:09

## 2025-02-01 RX ADMIN — ACETAMINOPHEN 650 MILLIGRAM(S): 160 SUSPENSION ORAL at 14:52

## 2025-02-01 RX ADMIN — Medication 100 MILLIGRAM(S): at 06:27

## 2025-02-01 RX ADMIN — Medication 1 DROP(S): at 23:54

## 2025-02-01 RX ADMIN — Medication 500 MILLIGRAM(S): at 05:40

## 2025-02-01 RX ADMIN — PANTOPRAZOLE 40 MILLIGRAM(S): 20 TABLET, DELAYED RELEASE ORAL at 11:51

## 2025-02-01 RX ADMIN — ANTISEPTIC SURGICAL SCRUB 1 APPLICATION(S): 0.04 SOLUTION TOPICAL at 05:15

## 2025-02-01 RX ADMIN — IPRATROPIUM BROMIDE AND ALBUTEROL SULFATE 3 MILLILITER(S): .5; 2.5 SOLUTION RESPIRATORY (INHALATION) at 22:18

## 2025-02-01 RX ADMIN — Medication 500 MILLIGRAM(S): at 17:20

## 2025-02-01 RX ADMIN — ENOXAPARIN SODIUM 40 MILLIGRAM(S): 100 INJECTION SUBCUTANEOUS at 05:34

## 2025-02-01 RX ADMIN — Medication 15 MILLILITER(S): at 11:51

## 2025-02-01 RX ADMIN — Medication 25 MILLIGRAM(S): at 22:18

## 2025-02-01 RX ADMIN — Medication 100 MILLIGRAM(S): at 13:09

## 2025-02-01 RX ADMIN — LEVETIRACETAM 500 MILLIGRAM(S): 750 TABLET, FILM COATED ORAL at 17:20

## 2025-02-01 RX ADMIN — Medication 1 APPLICATION(S): at 22:18

## 2025-02-01 RX ADMIN — Medication 1 DROP(S): at 11:50

## 2025-02-01 RX ADMIN — ACETAMINOPHEN 650 MILLIGRAM(S): 160 SUSPENSION ORAL at 15:50

## 2025-02-01 RX ADMIN — LEVETIRACETAM 500 MILLIGRAM(S): 750 TABLET, FILM COATED ORAL at 05:35

## 2025-02-01 RX ADMIN — Medication 25 MILLIGRAM(S): at 13:09

## 2025-02-01 RX ADMIN — Medication 1 DROP(S): at 17:18

## 2025-02-01 RX ADMIN — Medication 500 MILLIGRAM(S): at 11:50

## 2025-02-01 RX ADMIN — POTASSIUM CHLORIDE 20 MILLIEQUIVALENT(S): 750 TABLET, EXTENDED RELEASE ORAL at 11:50

## 2025-02-01 NOTE — PROGRESS NOTE ADULT - PROBLEM SELECTOR PLAN 1
- Continue hydroxyurea as long as plt >100 and hb >8  (restarted on 01/15/25)  - Monitor hemolysis labs (CBC, CMP, LDH, reticulocyte count) daily, prefer pediatric tubes.   - Hematology team to follow - Hematology following  - S/p 2nd exchange transfusion 1/12/2025   - Continue hydroxyurea 500 BID as long as plt >100 and hb >8  (restarted on 01/15/25)  - Monitor hemolysis labs (CBC, CMP, LDH, reticulocyte count) daily, prefer pediatric tubes.

## 2025-02-01 NOTE — PROGRESS NOTE ADULT - SUBJECTIVE AND OBJECTIVE BOX
Subjective: Pt states "Hello" denies any CP or SOB. No acute events overnight.     Telemetry:  SR - ST 80 -  110  Vital Signs Last 24 Hrs  T(C): 36.8 (25 @ 07:35), Max: 37.2 (25 @ 19:21)  T(F): 98.3 (25 @ 07:35), Max: 98.9 (25 @ 19:21)  HR: 87 (25 @ 07:35) (82 - 98)  BP: 162/96 (25 @ 07:35) (108/67 - 166/90)  RR: 18 (25 @ 07:35) (18 - 19)  SpO2: 99% (25 @ 07:35) (99% - 100%)              @ 07:01  -   @ 07:00  --------------------------------------------------------  IN: 2210 mL / OUT: 1000 mL / NET: 1210 mL        Daily Weight in k (2025 05:34)                        11.3   9.04  )-----------( 266      ( 2025 06:55 )             34.1     153[H]  |  113[H]  |  34[H]  ----------------------------<  117[H]  3.9   |  24  |  0.90    Phos  4.1     02-01  Mg     2.4     02-  AST  33  /  ALT  60[H]  /  AlkPhos  110  02-        CAPILLARY BLOOD GLUCOSE  111 - 135            PHYSICAL EXAM  Neurology: A&Ox3, NAD, STREET, B/L UE/LE 3/5  CV : RRR+S1S2  Lungs: #6 Trach collar 30%, Respirations non-labored, B/L BS CTA    Abdomen: Soft, NT/ND, +BSx4Q, last BM   (-)N/V/D  +PEG with TF  : Voiding without difficulty  Extremities: B/L LE trace edema, negative calf tenderness, +PP   +Left axillary A-line   +RUE Midline CDI      MEDICATIONS  acetaminophen   Oral Liquid .. 650 milliGRAM(s) Oral every 6 hours PRN  albuterol/ipratropium for Nebulization 3 milliLiter(s) Nebulizer every 8 hours  amantadine Syrup 100 milliGRAM(s) Oral <User Schedule>  artificial tears (preservative free) Ophthalmic Solution 1 Drop(s) Both EYES four times a day  ascorbic acid 500 milliGRAM(s) Oral daily  chlorhexidine 2% Cloths 1 Application(s) Topical <User Schedule>  chlorhexidine 4% Liquid 1 Application(s) Topical <User Schedule>  enoxaparin Injectable 40 milliGRAM(s) SubCutaneous every 24 hours  hydrALAZINE Injectable 5 milliGRAM(s) IV Push every 6 hours PRN  hydroxyurea 500 milliGRAM(s) Oral two times a day  insulin lispro (ADMELOG) corrective regimen sliding scale   SubCutaneous every 6 hours  levETIRAcetam  Solution 500 milliGRAM(s) Oral every 12 hours  metoprolol tartrate 25 milliGRAM(s) Oral every 8 hours  multivitamin/minerals/iron Oral Solution (CENTRUM) 15 milliLiter(s) Oral daily  pantoprazole  Injectable 40 milliGRAM(s) IV Push daily  polyethylene glycol 3350 17 Gram(s) Oral daily  senna 1 Tablet(s) Oral daily  sodium chloride 0.9% lock flush 10 milliLiter(s) IV Push every 1 hour PRN      Physical Therapy Rec:   Home  [  ]   Home w/ PT  [  ]  Rehab  [ X ]  Acute    Discussed with Cardiothoracic Team at AM rounds. Subjective: Pt states "Hello" denies any CP or SOB. No acute events overnight.     Telemetry:  SR - ST 80 -  110  Vital Signs Last 24 Hrs  T(C): 36.8 (25 @ 07:35), Max: 37.2 (25 @ 19:21)  T(F): 98.3 (25 @ 07:35), Max: 98.9 (25 @ 19:21)  HR: 87 (25 @ 07:35) (82 - 98)  BP: 162/96 (25 @ 07:35) (108/67 - 166/90)  RR: 18 (25 @ 07:35) (18 - 19)  SpO2: 99% (25 @ 07:35) (99% - 100%)              @ 07:01  -   @ 07:00  --------------------------------------------------------  IN: 2210 mL / OUT: 1000 mL / NET: 1210 mL        Daily Weight in k (2025 05:34)                        11.3   9.04  )-----------( 266      ( 2025 06:55 )             34.1     153[H]  |  113[H]  |  34[H]  ----------------------------<  117[H]  3.9   |  24  |  0.90    Phos  4.1     02-01  Mg     2.4     02-  AST  33  /  ALT  60[H]  /  AlkPhos  110  02-        CAPILLARY BLOOD GLUCOSE  111 - 135            PHYSICAL EXAM  Neurology: A&Ox3, NAD, STREET, B/L UE/LE 3/5  CV : RRR+S1S2  Lungs: #6 Trach collar 30%, Respirations non-labored, B/L BS CTA    Abdomen: Soft, NT/ND, +BSx4Q, last BM   (-)N/V/D  +PEG with TF  : Voiding without difficulty  Extremities: B/L LE trace edema, negative calf tenderness, +PP   +Left axillary A-line   +RUE Midline CDI                    B/L groins soft CDI DEVORA  no hematoma      MEDICATIONS  acetaminophen   Oral Liquid .. 650 milliGRAM(s) Oral every 6 hours PRN  albuterol/ipratropium for Nebulization 3 milliLiter(s) Nebulizer every 8 hours  amantadine Syrup 100 milliGRAM(s) Oral <User Schedule>  artificial tears (preservative free) Ophthalmic Solution 1 Drop(s) Both EYES four times a day  ascorbic acid 500 milliGRAM(s) Oral daily  chlorhexidine 2% Cloths 1 Application(s) Topical <User Schedule>  chlorhexidine 4% Liquid 1 Application(s) Topical <User Schedule>  enoxaparin Injectable 40 milliGRAM(s) SubCutaneous every 24 hours  hydrALAZINE Injectable 5 milliGRAM(s) IV Push every 6 hours PRN  hydroxyurea 500 milliGRAM(s) Oral two times a day  insulin lispro (ADMELOG) corrective regimen sliding scale   SubCutaneous every 6 hours  levETIRAcetam  Solution 500 milliGRAM(s) Oral every 12 hours  metoprolol tartrate 25 milliGRAM(s) Oral every 8 hours  multivitamin/minerals/iron Oral Solution (CENTRUM) 15 milliLiter(s) Oral daily  pantoprazole  Injectable 40 milliGRAM(s) IV Push daily  polyethylene glycol 3350 17 Gram(s) Oral daily  senna 1 Tablet(s) Oral daily  sodium chloride 0.9% lock flush 10 milliLiter(s) IV Push every 1 hour PRN      Physical Therapy Rec:   Home  [  ]   Home w/ PT  [  ]  Rehab  [ X ]  Acute    Discussed with Cardiothoracic Team at AM rounds.

## 2025-02-01 NOTE — PROGRESS NOTE ADULT - PROBLEM SELECTOR PLAN 2
S/p VA ECMO, Bi vent function recovered.  Metoprolol 25mg BID S/p VA ECMO, Bi vent function recovered.  Continue Metoprolol 25mg q8h

## 2025-02-01 NOTE — PROGRESS NOTE ADULT - ASSESSMENT
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 Keppra was discontinued at [facility] due to negative EEG. Seizure was thought to be caused by clonazepam withdrawal per OSH. She was also found to have elevated troponins ~900s.  At [facility] MICU pt was found to have RV failure. Pt transferred to [facility] for management of cardiogenic shock with increased inotropic and pressor support requirement.   1/3 Initiation, venoarterial ECMO (Left Femoral Arterial 17Fr, Right Femoral Venous 25Fr, L SFA RPC 6Fr)  Transfusion Medicine team is consulted for emergent RBC exchange to minimize the risk of complications related to SCD as this patient is critically ill with multi-organ failure.   -TTE 1/4/25: LVEF <20%, ECMO cannula in RV, mildly enlarged RV size and reduced RV systolic function, no pericardial effusion, no MR, trace TR, IVC nml size  MSSA in sputum culture from ET tube. MSSA bacteremia, BAL + MSSA -IV vanc completed  Merrem for total 10 days, completed  Coag negative Staph epi. in single blood culture drawn 1/3  1/7 s/p VA ECMO decannulation. Case was complicated by hematoma formation in left thigh (site of arterial cannula). 2U pRBC, 2 platelet and 5U cryo given.  1/8 YUNG and LLL evaluation revealed moderate green tinged secretions and mucous plugging. RUL, RML, RLL revealed minimal secretions. Keppra reinstated 1000  1/9 MRI today - Innumerable foci susceptibility artifact scattered throughout the brain, tiny acute/subacute infarcts within the bilateral basal ganglia, thalami, and chandu with disproportionate ovoid area of T2/FLAIR prolongation in the right ventral thalamus. Findings may indicate the presence of embolic acute/subacute infarcts  1/14 Lumbar puncture no evidence of meningitis/ encephalitis   1/16 bedside trach w/ Dr Hebert Holden  1/21 PEG placement, flexible bronchoscopy for Acute respiratory failure with hypoxia findings; minimal secretions through out b/l lung segments  1/27 Speech and swallow eval there is reduced movement of the right vocal cord; left is mobile/intact. Silent laryngeal penetration of purees and moderately thick liquids is noted  1/30 Transferred to [address] [place] [address]  1/31  Pending rehab placement  Suction prn. 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 Keppra was discontinued at [facility] due to negative EEG. Seizure was thought to be caused by clonazepam withdrawal per OSH. She was also found to have elevated troponins ~900s.  At [facility] MICU pt was found to have RV failure. Pt transferred to NS for management of cardiogenic shock with increased inotropic and pressor support requirement.   1/3 Initiation, venoarterial ECMO (Left Femoral Arterial 17Fr, Right Femoral Venous 25Fr, L SFA RPC 6Fr)  Transfusion Medicine team is consulted for emergent RBC exchange to minimize the risk of complications related to SCD as this patient is critically ill with multi-organ failure.   -TTE 1/4/25: LVEF <20%, ECMO cannula in RV, mildly enlarged RV size and reduced RV systolic function, no pericardial effusion, no MR, trace TR, IVC nml size  MSSA in sputum culture from ET tube. MSSA bacteremia, BAL + MSSA -IV vanc completed  Merrem for total 10 days, completed  Coag negative Staph epi. in single blood culture drawn 1/3  1/7 s/p VA ECMO decannulation. Case was complicated by hematoma formation in left thigh (site of arterial cannula). 2U pRBC, 2 platelet and 5U cryo given.  1/8 YUNG and LLL evaluation revealed moderate green tinged secretions and mucous plugging. RUL, RML, RLL revealed minimal secretions. Keppra reinstated 1000  1/9 MRI today - Innumerable foci susceptibility artifact scattered throughout the brain, tiny acute/subacute infarcts within the bilateral basal ganglia, thalami, and chandu with disproportionate ovoid area of T2/FLAIR prolongation in the right ventral thalamus. Findings may indicate the presence of embolic acute/subacute infarcts  1/14 Lumbar puncture no evidence of meningitis/ encephalitis   1/16 bedside trach w/ Dr Hebert Holden  1/21 PEG placement, flexible bronchoscopy for Acute respiratory failure with hypoxia findings; minimal secretions through out b/l lung segments  1/27 Speech and swallow eval there is reduced movement of the right vocal cord; left is mobile/intact. Silent laryngeal penetration of purees and moderately thick liquids is noted  1/30 Transferred to [address] [place] [address]  1/31  Pending rehab placement. Suction prn. OOb to chair  2/1 VSS, , Free water increased to 300cc q6h via PEG. OOB to chair. Continue PT/OT, pt recommended for acute rehab. Repeat FEEST next week.

## 2025-02-01 NOTE — PROGRESS NOTE ADULT - PROBLEM SELECTOR PLAN 4
Acute hypoxic respiratory failure s/p Tracheostomy   Trach collar around the clock since 1/22   Transitioned to 6.0 cuffless on 1/29  Nebs daily Acute hypoxic respiratory failure s/p Tracheostomy 1/16  Trach collar around the clock since 1/22   Transitioned to 6.0 cuffless on 1/29  Nebs daily  Continue TC 30%  Speech & swallow following  1/27 FEEST - silent laryngeal penetration of purees and moderately thick liquids is noted

## 2025-02-01 NOTE — PROGRESS NOTE ADULT - PROBLEM SELECTOR PLAN 5
Protein calorie malnutrition  S/p PEG placement   Enteral feeding via PEG tube, tolerates well  At goal  Protonix for stress ulcer prophylaxis   Bowel regimen.  Transaminitis - RUQ US Protein calorie malnutrition  S/p PEG placement 1/21  Enteral feeding via PEG tube, tolerates well at goal 45cc/hr  Protonix 40 IV daily for stress ulcer prophylaxis   Bowel regimen.  Transaminitis - RUQ US

## 2025-02-01 NOTE — PROGRESS NOTE ADULT - PROBLEM SELECTOR PLAN 3
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

## 2025-02-02 LAB
ALBUMIN SERPL ELPH-MCNC: 3.8 G/DL — SIGNIFICANT CHANGE UP (ref 3.3–5)
ALP SERPL-CCNC: 109 U/L — SIGNIFICANT CHANGE UP (ref 40–120)
ALT FLD-CCNC: 55 U/L — HIGH (ref 10–45)
ANION GAP SERPL CALC-SCNC: 15 MMOL/L — SIGNIFICANT CHANGE UP (ref 5–17)
AST SERPL-CCNC: 37 U/L — SIGNIFICANT CHANGE UP (ref 10–40)
BILIRUB SERPL-MCNC: 0.7 MG/DL — SIGNIFICANT CHANGE UP (ref 0.2–1.2)
BUN SERPL-MCNC: 35 MG/DL — HIGH (ref 7–23)
CALCIUM SERPL-MCNC: 10.7 MG/DL — HIGH (ref 8.4–10.5)
CHLORIDE SERPL-SCNC: 117 MMOL/L — HIGH (ref 96–108)
CO2 SERPL-SCNC: 23 MMOL/L — SIGNIFICANT CHANGE UP (ref 22–31)
CREAT SERPL-MCNC: 0.9 MG/DL — SIGNIFICANT CHANGE UP (ref 0.5–1.3)
EGFR: 75 ML/MIN/1.73M2 — SIGNIFICANT CHANGE UP
GLUCOSE BLDC GLUCOMTR-MCNC: 107 MG/DL — HIGH (ref 70–99)
GLUCOSE BLDC GLUCOMTR-MCNC: 117 MG/DL — HIGH (ref 70–99)
GLUCOSE BLDC GLUCOMTR-MCNC: 132 MG/DL — HIGH (ref 70–99)
GLUCOSE SERPL-MCNC: 122 MG/DL — HIGH (ref 70–99)
HCT VFR BLD CALC: 34.7 % — SIGNIFICANT CHANGE UP (ref 34.5–45)
HGB BLD-MCNC: 11.3 G/DL — LOW (ref 11.5–15.5)
LDH SERPL L TO P-CCNC: 374 U/L — HIGH (ref 50–242)
MAGNESIUM SERPL-MCNC: 2.5 MG/DL — SIGNIFICANT CHANGE UP (ref 1.6–2.6)
MCHC RBC-ENTMCNC: 30.6 PG — SIGNIFICANT CHANGE UP (ref 27–34)
MCHC RBC-ENTMCNC: 32.6 G/DL — SIGNIFICANT CHANGE UP (ref 32–36)
MCV RBC AUTO: 94 FL — SIGNIFICANT CHANGE UP (ref 80–100)
NRBC # BLD: 0 /100 WBCS — SIGNIFICANT CHANGE UP (ref 0–0)
NRBC BLD-RTO: 0 /100 WBCS — SIGNIFICANT CHANGE UP (ref 0–0)
PHOSPHATE SERPL-MCNC: 4.1 MG/DL — SIGNIFICANT CHANGE UP (ref 2.5–4.5)
PLATELET # BLD AUTO: 230 K/UL — SIGNIFICANT CHANGE UP (ref 150–400)
POTASSIUM SERPL-MCNC: 3.9 MMOL/L — SIGNIFICANT CHANGE UP (ref 3.5–5.3)
POTASSIUM SERPL-SCNC: 3.9 MMOL/L — SIGNIFICANT CHANGE UP (ref 3.5–5.3)
PROT SERPL-MCNC: 7.6 G/DL — SIGNIFICANT CHANGE UP (ref 6–8.3)
RBC # BLD: 3.69 M/UL — LOW (ref 3.8–5.2)
RBC # BLD: 3.69 M/UL — LOW (ref 3.8–5.2)
RBC # FLD: 16 % — HIGH (ref 10.3–14.5)
RETICS #: 222.1 K/UL — HIGH (ref 25–125)
RETICS/RBC NFR: 6 % — HIGH (ref 0.5–2.5)
SODIUM SERPL-SCNC: 155 MMOL/L — HIGH (ref 135–145)
WBC # BLD: 9.05 K/UL — SIGNIFICANT CHANGE UP (ref 3.8–10.5)
WBC # FLD AUTO: 9.05 K/UL — SIGNIFICANT CHANGE UP (ref 3.8–10.5)

## 2025-02-02 PROCEDURE — 99232 SBSQ HOSP IP/OBS MODERATE 35: CPT | Mod: GC

## 2025-02-02 PROCEDURE — 99232 SBSQ HOSP IP/OBS MODERATE 35: CPT

## 2025-02-02 RX ADMIN — Medication 500 MILLIGRAM(S): at 11:01

## 2025-02-02 RX ADMIN — ANTISEPTIC SURGICAL SCRUB 1 APPLICATION(S): 0.04 SOLUTION TOPICAL at 06:55

## 2025-02-02 RX ADMIN — Medication 25 MILLIGRAM(S): at 06:33

## 2025-02-02 RX ADMIN — IPRATROPIUM BROMIDE AND ALBUTEROL SULFATE 3 MILLILITER(S): .5; 2.5 SOLUTION RESPIRATORY (INHALATION) at 11:01

## 2025-02-02 RX ADMIN — Medication 500 MILLIGRAM(S): at 16:42

## 2025-02-02 RX ADMIN — Medication 1 DROP(S): at 16:42

## 2025-02-02 RX ADMIN — Medication 25 MILLIGRAM(S): at 12:53

## 2025-02-02 RX ADMIN — Medication 25 MILLIGRAM(S): at 21:58

## 2025-02-02 RX ADMIN — Medication 1 DROP(S): at 06:33

## 2025-02-02 RX ADMIN — Medication 15 MILLILITER(S): at 11:00

## 2025-02-02 RX ADMIN — Medication 1 APPLICATION(S): at 06:33

## 2025-02-02 RX ADMIN — Medication 500 MILLIGRAM(S): at 06:33

## 2025-02-02 RX ADMIN — Medication 1 DROP(S): at 11:00

## 2025-02-02 RX ADMIN — LEVETIRACETAM 500 MILLIGRAM(S): 750 TABLET, FILM COATED ORAL at 16:41

## 2025-02-02 RX ADMIN — IPRATROPIUM BROMIDE AND ALBUTEROL SULFATE 3 MILLILITER(S): .5; 2.5 SOLUTION RESPIRATORY (INHALATION) at 21:57

## 2025-02-02 RX ADMIN — LEVETIRACETAM 500 MILLIGRAM(S): 750 TABLET, FILM COATED ORAL at 06:33

## 2025-02-02 RX ADMIN — Medication 100 MILLIGRAM(S): at 12:53

## 2025-02-02 RX ADMIN — PANTOPRAZOLE 40 MILLIGRAM(S): 20 TABLET, DELAYED RELEASE ORAL at 11:00

## 2025-02-02 RX ADMIN — Medication 1 APPLICATION(S): at 11:43

## 2025-02-02 RX ADMIN — IPRATROPIUM BROMIDE AND ALBUTEROL SULFATE 3 MILLILITER(S): .5; 2.5 SOLUTION RESPIRATORY (INHALATION) at 06:34

## 2025-02-02 RX ADMIN — ENOXAPARIN SODIUM 40 MILLIGRAM(S): 100 INJECTION SUBCUTANEOUS at 06:33

## 2025-02-02 RX ADMIN — Medication 1 APPLICATION(S): at 21:58

## 2025-02-02 RX ADMIN — Medication 100 MILLIGRAM(S): at 06:33

## 2025-02-02 NOTE — PROGRESS NOTE ADULT - ATTENDING COMMENTS
Pt is nonverbal. As per nursing staff, this is her choice.  No acute events reported last night.   She appears euvolemic and normotensive.   Most recent TTEs revealed normal bi-v function.  No acute Hf issues at this point.  We are available on as needed basis.   Continue care as per cTS.

## 2025-02-02 NOTE — PROGRESS NOTE ADULT - NUTRITIONAL ASSESSMENT
This patient has been assessed with a concern for Malnutrition and has been determined to have a diagnosis/diagnoses of Severe protein-calorie malnutrition.    This patient is being managed with:   Diet NPO with Tube Feed-  Tube Feeding Modality: Gastrostomy  Inocencia Cortez Peptide 1.5 ([id number])  Total Volume for 24 Hours (mL): 1080  Continuous  Starting Tube Feed Rate {mL per Hour}: 45  Until Goal Tube Feed Rate (mL per Hour): 45  Tube Feed Duration (in Hours): 24  Tube Feed Start Time: 14:30  Mehdi(7 Gm Arginine/7 Gm Glut/1.2 Gm HMB     Qty per Day:  2  Entered: Jan 28 2025  2:21PM

## 2025-02-02 NOTE — PROGRESS NOTE ADULT - ASSESSMENT
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 Keppra was discontinued at [facility] due to negative EEG. Seizure was thought to be caused by clonazepam withdrawal per OSH. She was also found to have elevated troponins ~900s.  At [facility] MICU pt was found to have RV failure. Pt transferred to [facility] for management of cardiogenic shock with increased inotropic and pressor support requirement.   1/3 Initiation, venoarterial ECMO (Left Femoral Arterial 17Fr, Right Femoral Venous 25Fr, L SFA RPC 6Fr)  Transfusion Medicine team is consulted for emergent RBC exchange to minimize the risk of complications related to SCD as this patient is critically ill with multi-organ failure.   -TTE 1/4/25: LVEF <20%, ECMO cannula in RV, mildly enlarged RV size and reduced RV systolic function, no pericardial effusion, no MR, trace TR, IVC nml size  MSSA in sputum culture from ET tube. MSSA bacteremia, BAL + MSSA -IV vanc completed  Merrem for total 10 days, completed  Coag negative Staph epi. in single blood culture drawn 1/3  1/7 s/p VA ECMO decannulation. Case was complicated by hematoma formation in left thigh (site of arterial cannula). 2U pRBC, 2 platelet and 5U cryo given.  1/8 YUNG and LLL evaluation revealed moderate green tinged secretions and mucous plugging. RUL, RML, RLL revealed minimal secretions. Keppra reinstated 1000  1/9 MRI today - Innumerable foci susceptibility artifact scattered throughout the brain, tiny acute/subacute infarcts within the bilateral basal ganglia, thalami, and chandu with disproportionate ovoid area of T2/FLAIR prolongation in the right ventral thalamus. Findings may indicate the presence of embolic acute/subacute infarcts  1/14 Lumbar puncture no evidence of meningitis/ encephalitis   1/16 bedside trach w/ Dr Hebert Holden  1/21 PEG placement, flexible bronchoscopy for Acute respiratory failure with hypoxia findings; minimal secretions through out b/l lung segments  1/27 Speech and swallow eval there is reduced movement of the right vocal cord; left is mobile/intact. Silent laryngeal penetration of purees and moderately thick liquids is noted  1/30 Transferred to [address] [place] [address]  1/31  Pending rehab placement. Suction prn. OOb to chair  2/1 VSS, , Free water increased to 300cc q6h via PEG. OOB to chair. Continue PT/OT, pt recommended for acute rehab. Repeat FEEST next week.

## 2025-02-02 NOTE — PROGRESS NOTE ADULT - ASSESSMENT
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 Keppra was discontinued at [facility] due to negative EEG. Seizure was thought to be caused by clonazepam withdrawal per OSH. She was also found to have elevated troponins ~900s.  At [facility] MICU pt was found to have RV failure. Pt transferred to [facility] for management of cardiogenic shock with increased inotropic and pressor support requirement.   1/3 Initiation, venoarterial ECMO (Left Femoral Arterial 17Fr, Right Femoral Venous 25Fr, L SFA RPC 6Fr)  Transfusion Medicine team is consulted for emergent RBC exchange to minimize the risk of complications related to SCD as this patient is critically ill with multi-organ failure.   -TTE 1/4/25: LVEF <20%, ECMO cannula in RV, mildly enlarged RV size and reduced RV systolic function, no pericardial effusion, no MR, trace TR, IVC nml size  MSSA in sputum culture from ET tube. MSSA bacteremia, BAL + MSSA -IV vanc completed  Merrem for total 10 days, completed  Coag negative Staph epi. in single blood culture drawn 1/3  1/7 s/p VA ECMO decannulation. Case was complicated by hematoma formation in left thigh (site of arterial cannula). 2U pRBC, 2 platelet and 5U cryo given.  1/8 YUNG and LLL evaluation revealed moderate green tinged secretions and mucous plugging. RUL, RML, RLL revealed minimal secretions. Keppra reinstated 1000  1/9 MRI today - Innumerable foci susceptibility artifact scattered throughout the brain, tiny acute/subacute infarcts within the bilateral basal ganglia, thalami, and chandu with disproportionate ovoid area of T2/FLAIR prolongation in the right ventral thalamus. Findings may indicate the presence of embolic acute/subacute infarcts  1/14 Lumbar puncture no evidence of meningitis/ encephalitis   1/16 bedside trach w/ Dr Hebert Holden  1/21 PEG placement, flexible bronchoscopy for Acute respiratory failure with hypoxia findings; minimal secretions through out b/l lung segments  1/27 Speech and swallow eval there is reduced movement of the right vocal cord; left is mobile/intact. Silent laryngeal penetration of purees and moderately thick liquids is noted  1/30 Transferred to [address] [place] [address]  1/31  Pending rehab placement. Suction prn. OOb to chair  2/1 VSS, , Free water increased to 300cc q6h via PEG. OOB to chair. Continue PT/OT, pt recommended for acute rehab. Repeat FEEST next week.  2/2 VSS;  --> free water increased to 400mL q6h via PEG. Repeat FEEST this week.

## 2025-02-02 NOTE — PROGRESS NOTE ADULT - SUBJECTIVE AND OBJECTIVE BOX
Interval Events/Subjective    Patient seen and examined at bedside.  No chest pain, shortness of breath, or palpitations            Telemetry review:     Current Meds:  acetaminophen   Oral Liquid .. 650 milliGRAM(s) Oral every 6 hours PRN  albuterol/ipratropium for Nebulization 3 milliLiter(s) Nebulizer every 8 hours  amantadine Syrup 100 milliGRAM(s) Oral <User Schedule>  artificial tears (preservative free) Ophthalmic Solution 1 Drop(s) Both EYES four times a day  ascorbic acid 500 milliGRAM(s) Oral daily  chlorhexidine 2% Cloths 1 Application(s) Topical <User Schedule>  chlorhexidine 4% Liquid 1 Application(s) Topical <User Schedule>  hydrALAZINE Injectable 5 milliGRAM(s) IV Push every 6 hours PRN  hydrocortisone 1% Cream 1 Application(s) Topical three times a day  hydroxyurea 500 milliGRAM(s) Oral two times a day  insulin lispro (ADMELOG) corrective regimen sliding scale   SubCutaneous every 6 hours  levETIRAcetam  Solution 500 milliGRAM(s) Oral every 12 hours  metoprolol tartrate 25 milliGRAM(s) Oral every 8 hours  multivitamin/minerals/iron Oral Solution (CENTRUM) 15 milliLiter(s) Oral daily  pantoprazole  Injectable 40 milliGRAM(s) IV Push daily  polyethylene glycol 3350 17 Gram(s) Oral daily  senna 1 Tablet(s) Oral daily  sodium chloride 0.9% lock flush 10 milliLiter(s) IV Push every 1 hour PRN      Vitals:  T(F): 97.9 (02-02), Max: 98.9 (02-01)  HR: 78 (02-02) (78 - 97)  BP: 134/88 (02-02) (124/84 - 150/97)  RR: 18 (02-02)  SpO2: 100% (02-02)  I&O's Summary    01 Feb 2025 07:01  -  02 Feb 2025 07:00  --------------------------------------------------------  IN: 2440 mL / OUT: 1050 mL / NET: 1390 mL    02 Feb 2025 07:01  -  02 Feb 2025 13:25  --------------------------------------------------------  IN: 0 mL / OUT: 0 mL / NET: 0 mL        Physical Exam:  GENERAL: NAD  HEAD:  Atraumatic, Normocephalic.  NECK: Supple, No JVD.  CHEST/LUNG: Clear to auscultation bilaterally; No rales, rhonchi, wheezing, or rubs. Speaking in full sentences  HEART: Regular rate and rhythm; No murmurs, rubs, or gallops.  ABDOMEN: Bowel sounds present; Soft, Nontender, Nondistended.   EXTREMITIES:  2+ Peripheral Pulses, brisk capillary refill. No clubbing, cyanosis, or edema.                          11.3   9.05  )-----------( 230      ( 02 Feb 2025 05:49 )             34.7     02-02    155[H]  |  117[H]  |  35[H]  ----------------------------<  122[H]  3.9   |  23  |  0.90    Ca    10.7[H]      02 Feb 2025 05:49  Phos  4.1     02-02  Mg     2.5     02-02    TPro  7.6  /  Alb  3.8  /  TBili  0.7  /  DBili  x   /  AST  37  /  ALT  55[H]  /  AlkPhos  109  02-02

## 2025-02-02 NOTE — PROGRESS NOTE ADULT - PROBLEM SELECTOR PLAN 4
-was noted to have seizure at [facility], EEG neg and Keppra d/c  -EEG had been negative with findings on 1/7 suspicious for sz  - given ativan and keppra  - awaiting repeat head MRI   -neuro to f/u.

## 2025-02-02 NOTE — PROGRESS NOTE ADULT - PROBLEM SELECTOR PLAN 1
Resolved with normal LV and RV function at present  - VA ECMO 1/4/25, decannulated 1/7/25    -Tolerating lopressor 25mg Q8 Resolved with normal LV and RV function at present  - VA ECMO 1/4/25, decannulated 1/7/25    -Tolerating lopressor 25mg Q8    Repeat TTE shows normal bi-v function. She appears euvolemic.     No acute HF issues. We are available if there are any further issues/concerns.

## 2025-02-02 NOTE — PROGRESS NOTE ADULT - PROBLEM SELECTOR PLAN 4
Acute hypoxic respiratory failure s/p Tracheostomy 1/16  Trach collar around the clock since 1/22   Transitioned to 6.0 cuffless on 1/29  Nebs daily  Continue TC 30%  Speech & swallow following  1/27 FEEST - silent laryngeal penetration of purees and moderately thick liquids is noted

## 2025-02-02 NOTE — PROGRESS NOTE ADULT - ASSESSMENT
She is a 59yr F, with hx of NICM/HFimpEF (LVEF 58% 10/2024), with PMH of SCD (on hydroxyurea) initially presented to [facility] for SOB and sickle cell crisis. Course c/b episodes of unresponsiveness, seizure requiring intubation. Was transferred to [facility] for further workup. Gordy was d/c at [facility] d/t negative EEG. At [facility] MICU, patient was found to have RV failure possibly iso pulm HTN 2/2 increased tidal volumes on the ventilator. On addition, pt possibly received a lot of volume i/s/o SS crisis. Neuro was consulted for seizures and was placed on vEEG. She was found to have worsening hemoydtnamics, despite on inotrope, pressors and diuretics, and concerns for shocks. Shock call was made and patient was transferred to [facility] 1/3/25 for further workup.     She is was cannulated for VA ECMO on 25. She's was requiring epi, vaso and dobutamine. She was diuresed with a bumex gtt. She underwent emergent RBC exchange on , following which pressors were able to be weaned off. She underwent turn down study and was decannulated from ECMO on . Post ECMO decannulation noted to have normal LV function with LVEF 55% on QUENTIN and was treated with broad spectrum antibiotics for 1/2 BCx of 1/3 growing Staph epi, subsequent cultures negative. sputum  showing staph aureus. Repeat TTE  showing normal biventricular function. Due to persistent respiratory failure she is now  s/p Trach  and PEG .      Hemodynamics  25 ( 5) CVP 7, PA /15/21, mVO2 79, CO/CI 7.2/3.8,   25 (VA ECMO, CVP 7, PA 36/18/26)  25 (VA ECMO,  CVP 12-14, PA )    Cardiac Studies:  TTE 25: Normal LV size and function (EF 53%) normal RV size and function  TAPSE 2.3  25 TTE: normal LV size and function, normal RV size and function, TAPSE 2, normal appearing aortic valve, mild TR  -TTE 25: LVEF <20%, ECMO cannula in RV, mildly enlarged RV size and reduced RV systolic function, no pericardial effusion, no MR, trace TR, IVC nml size   -TTE 1/3/25: LVIDd 3.1cm, LVEF appears grossly normal, RV severly enlarged size, reduced function, TAPSE 1.1cm, nml RA, trace pericardial effusion, mod/severe TR PASP 45,

## 2025-02-02 NOTE — PROGRESS NOTE ADULT - PROBLEM SELECTOR PLAN 3
Resolved  -on antibiotics cefepime and vanco, changed to sean and vanc  - BCx 1/3 with one bottle staph epi, BCx 1/5 NGTD, Sputum cx 1/4 staph aureus  - resend sputum culture with worsening secretions via ETT  - ID following

## 2025-02-02 NOTE — PROGRESS NOTE ADULT - PROBLEM SELECTOR PLAN 5
Protein calorie malnutrition  S/p PEG placement 1/21  Enteral feeding via PEG tube, tolerates well at goal 45cc/hr  Protonix 40 IV daily for stress ulcer prophylaxis   Bowel regimen.  Transaminitis - RUQ US

## 2025-02-02 NOTE — PROGRESS NOTE ADULT - SUBJECTIVE AND OBJECTIVE BOX
VITAL SIGNS    Subjective: "hi" Denies CP, palpitation, SOB, BOYER, HA, dizziness, N/V/D, fever or chills.  No acute event noted overnight.    Telemetry:  SR 80 - 100     Vital Signs Last 24 Hrs  T(C): 36.7 (25 @ 07:40), Max: 37.2 (25 @ 15:37)  T(F): 98 (25 @ 07:40), Max: 98.9 (25 @ 15:37)  HR: 79 (25 @ 07:40) (79 - 97)  BP: 129/86 (25 @ 07:40) (124/84 - 150/97)  RR: 18 (25 @ 07:40) (18 - 18)  SpO2: 99% (25 @ 07:40) (98% - 100%)                @ 07:01  -   @ 07:00  --------------------------------------------------------  IN: 2440 mL / OUT: 1050 mL / NET: 1390 mL     @ 07:01  -   @ 11:07  --------------------------------------------------------  IN: 0 mL / OUT: 0 mL / NET: 0 mL        LABS      155[H]  |  117[H]  |  35[H]  ----------------------------<  122[H]  3.9   |  23  |  0.90    Ca    10.7[H]      2025 05:49  Phos  4.1       Mg     2.5         TPro  7.6  /  Alb  3.8  /  TBili  0.7  /  DBili  x   /  AST  37  /  ALT  55[H]  /  AlkPhos  109                                   11.3   9.05  )-----------( 230      ( 2025 05:49 )             34.7                  Daily     Daily Weight in k.8 (2025 06:31)      CAPILLARY BLOOD GLUCOSE      POCT Blood Glucose.: 107 mg/dL (2025 06:30)  POCT Blood Glucose.: 101 mg/dL (2025 23:51)  POCT Blood Glucose.: 128 mg/dL (2025 17:25)  POCT Blood Glucose.: 111 mg/dL (2025 11:38)          Drains:     MS         [  ] Drainage:                 L Pleural  [  ]  Drainage:                R Pleural  [  ]  Drainage:    Pacing Wires        [  ]   Settings:                                  Isolated  [  ]    Coumadin    [ ] YES          [  ]      NO         Reason:           PHYSICAL EXAM      Neurology: alert and oriented x 3, nonfocal, no gross deficits    CV: +S1, S2. no heart murmurs heard.     Sternal Wound: MSI -->CDI, sternum stable    Lungs: diminished b/l lung sounds   + trach # 6 fio2 30%     Abdomen: soft, nontender, nondistended, positive bowel sounds, (+) Flatus; (+) BM   +peg site c/d/i     :  Voiding               Extremities:  B/L LE (+) no edema; negative calf tenderness; (+) 2 DP palpable        +right groin stich         acetaminophen   Oral Liquid .. 650 milliGRAM(s) Oral every 6 hours PRN  albuterol/ipratropium for Nebulization 3 milliLiter(s) Nebulizer every 8 hours  amantadine Syrup 100 milliGRAM(s) Oral <User Schedule>  artificial tears (preservative free) Ophthalmic Solution 1 Drop(s) Both EYES four times a day  ascorbic acid 500 milliGRAM(s) Oral daily  chlorhexidine 2% Cloths 1 Application(s) Topical <User Schedule>  chlorhexidine 4% Liquid 1 Application(s) Topical <User Schedule>  enoxaparin Injectable 40 milliGRAM(s) SubCutaneous every 24 hours  hydrALAZINE Injectable 5 milliGRAM(s) IV Push every 6 hours PRN  hydrocortisone 1% Cream 1 Application(s) Topical three times a day  hydroxyurea 500 milliGRAM(s) Oral two times a day  insulin lispro (ADMELOG) corrective regimen sliding scale   SubCutaneous every 6 hours  levETIRAcetam  Solution 500 milliGRAM(s) Oral every 12 hours  metoprolol tartrate 25 milliGRAM(s) Oral every 8 hours  multivitamin/minerals/iron Oral Solution (CENTRUM) 15 milliLiter(s) Oral daily  pantoprazole  Injectable 40 milliGRAM(s) IV Push daily  polyethylene glycol 3350 17 Gram(s) Oral daily  senna 1 Tablet(s) Oral daily  sodium chloride 0.9% lock flush 10 milliLiter(s) IV Push every 1 hour PRN                 Physical Therapy Rec:   Home  [  ]   Home w/ PT  [  ]  Rehab  [ x ]    Discussed with Cardiothoracic Team at AM rounds.

## 2025-02-02 NOTE — PROGRESS NOTE ADULT - PROBLEM SELECTOR PLAN 3
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

## 2025-02-02 NOTE — PROGRESS NOTE ADULT - PROBLEM SELECTOR PLAN 1
- Hematology following  - S/p 2nd exchange transfusion 1/12/2025   - Continue hydroxyurea 500 BID as long as plt >100 and hb >8  (restarted on 01/15/25)  - Monitor hemolysis labs (CBC, CMP, LDH, reticulocyte count) daily, prefer pediatric tubes.

## 2025-02-02 NOTE — PROGRESS NOTE ADULT - ATTENDING SUPERVISION STATEMENT
Fellow
Resident
Fellow
Resident
Fellow

## 2025-02-02 NOTE — PROGRESS NOTE ADULT - TIME BILLING
face-to-face encounter, review of extensive medical records in this and prior charts, laboratory findings, radiographic and microbiology results; documentation as noted above and discussion of diagnostic impressions and plan with the patient and team
coordinating care
face-to-face encounter, review of extensive medical records in this and prior charts, laboratory findings, radiographic and microbiology results; documentation as noted above and discussion of diagnostic impressions and plan with the patient and team
coordinating care
face-to-face encounter, review of extensive medical records in this and prior charts, laboratory findings, radiographic and microbiology results; documentation as noted above and discussion of diagnostic impressions and plan with the patient and team
coordinating care
coordinating care
face-to-face encounter, review of extensive medical records in this and prior charts, laboratory findings, radiographic and microbiology results; documentation as noted above and discussion of diagnostic impressions and plan with the patient and team
coordinating care
- Review of notes/records, telemetry, vital signs and daily labs.   - General and cardiovascular physical examination.  - Generation of cardiovascular treatment plan.  - Coordination of care with primary team.

## 2025-02-02 NOTE — PROGRESS NOTE ADULT - SUBJECTIVE AND OBJECTIVE BOX
VITAL SIGNS    Subjective:     Telemetry:      Vital Signs Last 24 Hrs  T(C): 36.6 (25 @ 02:49), Max: 37.2 (25 @ 15:37)  T(F): 97.9 (25 @ 02:49), Max: 98.9 (25 @ 15:37)  HR: 95 (25 @ 06:31) (79 - 97)  BP: 127/89 (25 @ 06:31) (124/84 - 150/97)  RR: 18 (25 @ 06:31) (18 - 18)  SpO2: 100% (25 @ 06:31) (98% - 100%)                @ 07:01  -   @ 07:00  --------------------------------------------------------  IN: 2440 mL / OUT: 1050 mL / NET: 1390 mL        LABS      155[H]  |  117[H]  |  35[H]  ----------------------------<  122[H]  3.9   |  23  |  0.90    Ca    10.7[H]      2025 05:49  Phos  4.1       Mg     2.5         TPro  7.6  /  Alb  3.8  /  TBili  0.7  /  DBili  x   /  AST  37  /  ALT  55[H]  /  AlkPhos  109                                   11.3   9.05  )-----------( 230      ( 2025 05:49 )             34.7                  Daily     Daily Weight in k.8 (2025 06:31)      CAPILLARY BLOOD GLUCOSE      POCT Blood Glucose.: 107 mg/dL (2025 06:30)  POCT Blood Glucose.: 101 mg/dL (2025 23:51)  POCT Blood Glucose.: 128 mg/dL (2025 17:25)  POCT Blood Glucose.: 111 mg/dL (2025 11:38)          Drains:     MS         [  ] Drainage:                 L Pleural  [  ]  Drainage:                R Pleural  [  ]  Drainage:    Pacing Wires        [  ]   Settings:                                  Isolated  [  ]    Coumadin    [ ] YES          [  ]      NO         Reason:           PHYSICAL EXAM      Neurology: alert and oriented x 3, nonfocal, no gross deficits    CV:     Sternal Wound: MSI -->CDI, sternum stable    Lungs: CTA B/L     Abdomen: soft, nontender, nondistended, positive bowel sounds, (+) Flatus; (+) BM     :  Voiding               Extremities:  B/L LE (+) edema; negative calf tenderness; (+) 2 DP palpable          acetaminophen   Oral Liquid .. 650 milliGRAM(s) Oral every 6 hours PRN  albuterol/ipratropium for Nebulization 3 milliLiter(s) Nebulizer every 8 hours  amantadine Syrup 100 milliGRAM(s) Oral <User Schedule>  artificial tears (preservative free) Ophthalmic Solution 1 Drop(s) Both EYES four times a day  ascorbic acid 500 milliGRAM(s) Oral daily  chlorhexidine 2% Cloths 1 Application(s) Topical <User Schedule>  chlorhexidine 4% Liquid 1 Application(s) Topical <User Schedule>  enoxaparin Injectable 40 milliGRAM(s) SubCutaneous every 24 hours  hydrALAZINE Injectable 5 milliGRAM(s) IV Push every 6 hours PRN  hydrocortisone 1% Cream 1 Application(s) Topical three times a day  hydroxyurea 500 milliGRAM(s) Oral two times a day  insulin lispro (ADMELOG) corrective regimen sliding scale   SubCutaneous every 6 hours  levETIRAcetam  Solution 500 milliGRAM(s) Oral every 12 hours  metoprolol tartrate 25 milliGRAM(s) Oral every 8 hours  multivitamin/minerals/iron Oral Solution (CENTRUM) 15 milliLiter(s) Oral daily  pantoprazole  Injectable 40 milliGRAM(s) IV Push daily  polyethylene glycol 3350 17 Gram(s) Oral daily  senna 1 Tablet(s) Oral daily  sodium chloride 0.9% lock flush 10 milliLiter(s) IV Push every 1 hour PRN                 Physical Therapy Rec:   Home  [  ]   Home w/ PT  [  ]  Rehab  [  ]    Discussed with Cardiothoracic Team at AM rounds.

## 2025-02-03 LAB
ALBUMIN SERPL ELPH-MCNC: 4 G/DL — SIGNIFICANT CHANGE UP (ref 3.3–5)
ALP SERPL-CCNC: 104 U/L — SIGNIFICANT CHANGE UP (ref 40–120)
ALT FLD-CCNC: 45 U/L — SIGNIFICANT CHANGE UP (ref 10–45)
ANION GAP SERPL CALC-SCNC: 14 MMOL/L — SIGNIFICANT CHANGE UP (ref 5–17)
APPEARANCE UR: ABNORMAL
AST SERPL-CCNC: 29 U/L — SIGNIFICANT CHANGE UP (ref 10–40)
BACTERIA # UR AUTO: ABNORMAL /HPF
BASOPHILS # BLD AUTO: 0.04 K/UL — SIGNIFICANT CHANGE UP (ref 0–0.2)
BASOPHILS NFR BLD AUTO: 0.3 % — SIGNIFICANT CHANGE UP (ref 0–2)
BILIRUB SERPL-MCNC: 0.8 MG/DL — SIGNIFICANT CHANGE UP (ref 0.2–1.2)
BILIRUB UR-MCNC: NEGATIVE — SIGNIFICANT CHANGE UP
BUN SERPL-MCNC: 37 MG/DL — HIGH (ref 7–23)
CALCIUM SERPL-MCNC: 10.7 MG/DL — HIGH (ref 8.4–10.5)
CHLORIDE SERPL-SCNC: 120 MMOL/L — HIGH (ref 96–108)
CO2 SERPL-SCNC: 25 MMOL/L — SIGNIFICANT CHANGE UP (ref 22–31)
COLOR SPEC: SIGNIFICANT CHANGE UP
COMMENT - URINE: SIGNIFICANT CHANGE UP
CREAT SERPL-MCNC: 0.96 MG/DL — SIGNIFICANT CHANGE UP (ref 0.5–1.3)
DIFF PNL FLD: NEGATIVE — SIGNIFICANT CHANGE UP
EGFR: 69 ML/MIN/1.73M2 — SIGNIFICANT CHANGE UP
EOSINOPHIL # BLD AUTO: 0.11 K/UL — SIGNIFICANT CHANGE UP (ref 0–0.5)
EOSINOPHIL NFR BLD AUTO: 0.9 % — SIGNIFICANT CHANGE UP (ref 0–6)
GLUCOSE BLDC GLUCOMTR-MCNC: 101 MG/DL — HIGH (ref 70–99)
GLUCOSE BLDC GLUCOMTR-MCNC: 101 MG/DL — HIGH (ref 70–99)
GLUCOSE BLDC GLUCOMTR-MCNC: 98 MG/DL — SIGNIFICANT CHANGE UP (ref 70–99)
GLUCOSE BLDC GLUCOMTR-MCNC: 99 MG/DL — SIGNIFICANT CHANGE UP (ref 70–99)
GLUCOSE BLDC GLUCOMTR-MCNC: 99 MG/DL — SIGNIFICANT CHANGE UP (ref 70–99)
GLUCOSE SERPL-MCNC: 113 MG/DL — HIGH (ref 70–99)
GLUCOSE UR QL: NEGATIVE MG/DL — SIGNIFICANT CHANGE UP
GRAN CASTS # UR COMP ASSIST: PRESENT
HCT VFR BLD CALC: 35.5 % — SIGNIFICANT CHANGE UP (ref 34.5–45)
HGB BLD-MCNC: 11.4 G/DL — LOW (ref 11.5–15.5)
HYALINE CASTS # UR AUTO: PRESENT
IMM GRANULOCYTES NFR BLD AUTO: 0.4 % — SIGNIFICANT CHANGE UP (ref 0–0.9)
INR BLD: 1.06 RATIO — SIGNIFICANT CHANGE UP (ref 0.85–1.16)
KETONES UR-MCNC: ABNORMAL MG/DL
LDH SERPL L TO P-CCNC: 322 U/L — HIGH (ref 50–242)
LEUKOCYTE ESTERASE UR-ACNC: ABNORMAL
LYMPHOCYTES # BLD AUTO: 18.9 % — SIGNIFICANT CHANGE UP (ref 13–44)
LYMPHOCYTES # BLD AUTO: 2.3 K/UL — SIGNIFICANT CHANGE UP (ref 1–3.3)
MAGNESIUM SERPL-MCNC: 2.7 MG/DL — HIGH (ref 1.6–2.6)
MCHC RBC-ENTMCNC: 30.5 PG — SIGNIFICANT CHANGE UP (ref 27–34)
MCHC RBC-ENTMCNC: 32.1 G/DL — SIGNIFICANT CHANGE UP (ref 32–36)
MCV RBC AUTO: 94.9 FL — SIGNIFICANT CHANGE UP (ref 80–100)
MONOCYTES # BLD AUTO: 1.22 K/UL — HIGH (ref 0–0.9)
MONOCYTES NFR BLD AUTO: 10 % — SIGNIFICANT CHANGE UP (ref 2–14)
NEUTROPHILS # BLD AUTO: 8.45 K/UL — HIGH (ref 1.8–7.4)
NEUTROPHILS NFR BLD AUTO: 69.5 % — SIGNIFICANT CHANGE UP (ref 43–77)
NITRITE UR-MCNC: POSITIVE
NRBC # BLD: 0 /100 WBCS — SIGNIFICANT CHANGE UP (ref 0–0)
NRBC BLD-RTO: 0 /100 WBCS — SIGNIFICANT CHANGE UP (ref 0–0)
PH UR: 5.5 — SIGNIFICANT CHANGE UP (ref 5–8)
PHOSPHATE SERPL-MCNC: 4 MG/DL — SIGNIFICANT CHANGE UP (ref 2.5–4.5)
PLATELET # BLD AUTO: 263 K/UL — SIGNIFICANT CHANGE UP (ref 150–400)
POTASSIUM SERPL-MCNC: 3.7 MMOL/L — SIGNIFICANT CHANGE UP (ref 3.5–5.3)
POTASSIUM SERPL-SCNC: 3.7 MMOL/L — SIGNIFICANT CHANGE UP (ref 3.5–5.3)
PROT SERPL-MCNC: 7.8 G/DL — SIGNIFICANT CHANGE UP (ref 6–8.3)
PROT UR-MCNC: 30 MG/DL
PROTHROM AB SERPL-ACNC: 12.1 SEC — SIGNIFICANT CHANGE UP (ref 9.9–13.4)
RBC # BLD: 3.74 M/UL — LOW (ref 3.8–5.2)
RBC # BLD: 3.74 M/UL — LOW (ref 3.8–5.2)
RBC # FLD: 16.1 % — HIGH (ref 10.3–14.5)
RBC CASTS # UR COMP ASSIST: 2 /HPF — SIGNIFICANT CHANGE UP (ref 0–4)
RETICS #: 192.2 K/UL — HIGH (ref 25–125)
RETICS/RBC NFR: 5.1 % — HIGH (ref 0.5–2.5)
SODIUM SERPL-SCNC: 159 MMOL/L — HIGH (ref 135–145)
SP GR SPEC: >1.03 — HIGH (ref 1–1.03)
UROBILINOGEN FLD QL: 1 MG/DL — SIGNIFICANT CHANGE UP (ref 0.2–1)
WBC # BLD: 12.17 K/UL — HIGH (ref 3.8–10.5)
WBC # FLD AUTO: 12.17 K/UL — HIGH (ref 3.8–10.5)
WBC UR QL: 8 /HPF — HIGH (ref 0–5)

## 2025-02-03 PROCEDURE — 99232 SBSQ HOSP IP/OBS MODERATE 35: CPT

## 2025-02-03 RX ADMIN — Medication 25 MILLIGRAM(S): at 06:38

## 2025-02-03 RX ADMIN — Medication 500 MILLIGRAM(S): at 06:38

## 2025-02-03 RX ADMIN — Medication 1 DROP(S): at 18:35

## 2025-02-03 RX ADMIN — Medication 1 APPLICATION(S): at 13:56

## 2025-02-03 RX ADMIN — LEVETIRACETAM 500 MILLIGRAM(S): 750 TABLET, FILM COATED ORAL at 06:38

## 2025-02-03 RX ADMIN — Medication 25 MILLIGRAM(S): at 13:55

## 2025-02-03 RX ADMIN — IPRATROPIUM BROMIDE AND ALBUTEROL SULFATE 3 MILLILITER(S): .5; 2.5 SOLUTION RESPIRATORY (INHALATION) at 21:37

## 2025-02-03 RX ADMIN — PANTOPRAZOLE 40 MILLIGRAM(S): 20 TABLET, DELAYED RELEASE ORAL at 12:46

## 2025-02-03 RX ADMIN — Medication 15 MILLILITER(S): at 12:46

## 2025-02-03 RX ADMIN — Medication 1 APPLICATION(S): at 21:36

## 2025-02-03 RX ADMIN — Medication 100 MILLIGRAM(S): at 13:55

## 2025-02-03 RX ADMIN — ANTISEPTIC SURGICAL SCRUB 1 APPLICATION(S): 0.04 SOLUTION TOPICAL at 06:37

## 2025-02-03 RX ADMIN — Medication 500 MILLIGRAM(S): at 18:35

## 2025-02-03 RX ADMIN — Medication 25 MILLIGRAM(S): at 21:36

## 2025-02-03 RX ADMIN — IPRATROPIUM BROMIDE AND ALBUTEROL SULFATE 3 MILLILITER(S): .5; 2.5 SOLUTION RESPIRATORY (INHALATION) at 06:38

## 2025-02-03 RX ADMIN — Medication 100 MILLIGRAM(S): at 06:38

## 2025-02-03 RX ADMIN — Medication 1 DROP(S): at 12:46

## 2025-02-03 RX ADMIN — LEVETIRACETAM 500 MILLIGRAM(S): 750 TABLET, FILM COATED ORAL at 18:35

## 2025-02-03 RX ADMIN — IPRATROPIUM BROMIDE AND ALBUTEROL SULFATE 3 MILLILITER(S): .5; 2.5 SOLUTION RESPIRATORY (INHALATION) at 14:33

## 2025-02-03 RX ADMIN — Medication 500 MILLIGRAM(S): at 12:46

## 2025-02-03 NOTE — PROGRESS NOTE ADULT - ASSESSMENT
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 Keppra was discontinued at [facility] due to negative EEG. Seizure was thought to be caused by clonazepam withdrawal per OSH. She was also found to have elevated troponins ~900s.  At [facility] MICU pt was found to have RV failure. Pt transferred to [facility] for management of cardiogenic shock with increased inotropic and pressor support requirement.   1/3 Initiation, venoarterial ECMO (Left Femoral Arterial 17Fr, Right Femoral Venous 25Fr, L SFA RPC 6Fr)  Transfusion Medicine team is consulted for emergent RBC exchange to minimize the risk of complications related to SCD as this patient is critically ill with multi-organ failure.   -TTE 1/4/25: LVEF <20%, ECMO cannula in RV, mildly enlarged RV size and reduced RV systolic function, no pericardial effusion, no MR, trace TR, IVC nml size  MSSA in sputum culture from ET tube. MSSA bacteremia, BAL + MSSA -IV vanc completed  Merrem for total 10 days, completed  Coag negative Staph epi. in single blood culture drawn 1/3  1/7 s/p VA ECMO decannulation. Case was complicated by hematoma formation in left thigh (site of arterial cannula). 2U pRBC, 2 platelet and 5U cryo given.  1/8 YUNG and LLL evaluation revealed moderate green tinged secretions and mucous plugging. RUL, RML, RLL revealed minimal secretions. Keppra reinstated 1000  1/9 MRI today - Innumerable foci susceptibility artifact scattered throughout the brain, tiny acute/subacute infarcts within the bilateral basal ganglia, thalami, and chandu with disproportionate ovoid area of T2/FLAIR prolongation in the right ventral thalamus. Findings may indicate the presence of embolic acute/subacute infarcts  1/14 Lumbar puncture no evidence of meningitis/ encephalitis   1/16 bedside trach w/ Dr Hebert Holden  1/21 PEG placement, flexible bronchoscopy for Acute respiratory failure with hypoxia findings; minimal secretions through out b/l lung segments  1/27 Speech and swallow eval there is reduced movement of the right vocal cord; left is mobile/intact. Silent laryngeal penetration of purees and moderately thick liquids is noted  1/30 Transferred to [address] [place] [address]  1/31  Pending rehab placement. Suction prn. OOb to chair  2/1 VSS, , Free water increased to 300cc q6h via PEG. OOB to chair. Continue PT/OT, pt recommended for acute rehab. Repeat FEEST next week.  2/2 VSS;  --> free water increased to 400mL q6h via PEG. Repeat FEEST this week.    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 Keppra was discontinued at [facility] due to negative EEG. Seizure was thought to be caused by clonazepam withdrawal per OSH. She was also found to have elevated troponins ~900s.  At [facility] MICU pt was found to have RV failure. Pt transferred to [facility] for management of cardiogenic shock with increased inotropic and pressor support requirement.   1/3 Initiation, venoarterial ECMO (Left Femoral Arterial 17Fr, Right Femoral Venous 25Fr, L SFA RPC 6Fr)  Transfusion Medicine team is consulted for emergent RBC exchange to minimize the risk of complications related to SCD as this patient is critically ill with multi-organ failure.   -TTE 1/4/25: LVEF <20%, ECMO cannula in RV, mildly enlarged RV size and reduced RV systolic function, no pericardial effusion, no MR, trace TR, IVC nml size  MSSA in sputum culture from ET tube. MSSA bacteremia, BAL + MSSA -IV vanc completed  Merrem for total 10 days, completed  Coag negative Staph epi. in single blood culture drawn 1/3  1/7 s/p VA ECMO decannulation. Case was complicated by hematoma formation in left thigh (site of arterial cannula). 2U pRBC, 2 platelet and 5U cryo given.  1/8 YUNG and LLL evaluation revealed moderate green tinged secretions and mucous plugging. RUL, RML, RLL revealed minimal secretions. Keppra reinstated 1000  1/9 MRI today - Innumerable foci susceptibility artifact scattered throughout the brain, tiny acute/subacute infarcts within the bilateral basal ganglia, thalami, and chandu with disproportionate ovoid area of T2/FLAIR prolongation in the right ventral thalamus. Findings may indicate the presence of embolic acute/subacute infarcts  1/14 Lumbar puncture no evidence of meningitis/ encephalitis   1/16 bedside trach w/ Dr Hebert Holden  1/21 PEG placement, flexible bronchoscopy for Acute respiratory failure with hypoxia findings; minimal secretions through out b/l lung segments  1/27 Speech and swallow eval there is reduced movement of the right vocal cord; left is mobile/intact. Silent laryngeal penetration of purees and moderately thick liquids is noted  1/30 Transferred to [address] [place] [address]  1/31  Pending rehab placement. Suction prn. OOb to chair  2/1 VSS, , Free water increased to 300cc q6h via PEG. OOB to chair. Continue PT/OT, pt recommended for acute rehab. Repeat FEEST next week.  2/2 VSS;  --> free water increased to 400mL q6h via PEG. Repeat FEEST this week.   2/3 VSS  --> free water amount and frequency increased. Rpt FEEs Tuesday.   Disposition: Acute Rehab

## 2025-02-03 NOTE — PROGRESS NOTE ADULT - SUBJECTIVE AND OBJECTIVE BOX
VITAL SIGNS    Subjective:     Telemetry:      Vital Signs Last 24 Hrs  T(C): 36.7 (25 @ 07:43), Max: 36.8 (25 @ 15:26)  T(F): 98 (25 @ 07:43), Max: 98.3 (25 @ 15:26)  HR: 81 (25 @ 07:43) (78 - 95)  BP: 158/81 (25 @ 07:43) (123/85 - 158/81)  RR: 17 (25 @ 07:43) (17 - 18)  SpO2: 100% (25 @ 07:43) (100% - 100%)                @ 07:01  -   @ 07:00  --------------------------------------------------------  IN: 2755 mL / OUT: 700 mL / NET: 2055 mL     @ 07:01  -   @ 08:35  --------------------------------------------------------  IN: 0 mL / OUT: 0 mL / NET: 0 mL        LABS      159[H]  |  120[H]  |  37[H]  ----------------------------<  113[H]  3.7   |  25  |  0.96    Ca    10.7[H]      2025 06:35  Phos  4.0       Mg     2.7         TPro  7.8  /  Alb  4.0  /  TBili  0.8  /  DBili  x   /  AST  29  /  ALT  45  /  AlkPhos  104                                   11.4   12.17 )-----------( 263      ( 2025 06:35 )             35.5          PT/INR - ( 2025 06:35 )   PT: 12.1 sec;   INR: 1.06 ratio                 Daily     Daily Weight in k (2025 06:47)      CAPILLARY BLOOD GLUCOSE      POCT Blood Glucose.: 98 mg/dL (2025 06:45)  POCT Blood Glucose.: 101 mg/dL (2025 00:11)  POCT Blood Glucose.: 117 mg/dL (2025 17:56)  POCT Blood Glucose.: 132 mg/dL (2025 11:27)          Drains:     MS         [  ] Drainage:                 L Pleural  [  ]  Drainage:                R Pleural  [  ]  Drainage:    Pacing Wires        [  ]   Settings:                                  Isolated  [  ]    Coumadin    [ ] YES          [  ]      NO         Reason:           PHYSICAL EXAM      Neurology: alert and oriented x 3, nonfocal, no gross deficits    CV:     Sternal Wound: MSI -->CDI, sternum stable    Lungs: CTA B/L     Abdomen: soft, nontender, nondistended, positive bowel sounds, (+) Flatus; (+) BM     :  Voiding               Extremities:  B/L LE (+) edema; negative calf tenderness; (+) 2 DP palpable          acetaminophen   Oral Liquid .. 650 milliGRAM(s) Oral every 6 hours PRN  albuterol/ipratropium for Nebulization 3 milliLiter(s) Nebulizer every 8 hours  amantadine Syrup 100 milliGRAM(s) Oral <User Schedule>  artificial tears (preservative free) Ophthalmic Solution 1 Drop(s) Both EYES four times a day  ascorbic acid 500 milliGRAM(s) Oral daily  chlorhexidine 2% Cloths 1 Application(s) Topical <User Schedule>  chlorhexidine 4% Liquid 1 Application(s) Topical <User Schedule>  hydrALAZINE Injectable 5 milliGRAM(s) IV Push every 6 hours PRN  hydrocortisone 1% Cream 1 Application(s) Topical three times a day  hydroxyurea 500 milliGRAM(s) Oral two times a day  insulin lispro (ADMELOG) corrective regimen sliding scale   SubCutaneous every 6 hours  levETIRAcetam  Solution 500 milliGRAM(s) Oral every 12 hours  metoprolol tartrate 25 milliGRAM(s) Oral every 8 hours  multivitamin/minerals/iron Oral Solution (CENTRUM) 15 milliLiter(s) Oral daily  pantoprazole  Injectable 40 milliGRAM(s) IV Push daily  polyethylene glycol 3350 17 Gram(s) Oral daily  senna 1 Tablet(s) Oral daily  sodium chloride 0.9% lock flush 10 milliLiter(s) IV Push every 1 hour PRN                 Physical Therapy Rec:   Home  [  ]   Home w/ PT  [  ]  Rehab  [  ]    Discussed with Cardiothoracic Team at AM rounds.     VITAL SIGNS    Subjective: "hi" Denies CP, palpitation, SOB, BOYER, HA, dizziness, N/V/D, fever or chills.  No acute event noted overnight.    Telemetry:  NSR 70/100s     Vital Signs Last 24 Hrs  T(C): 36.7 (25 @ 07:43), Max: 36.8 (25 @ 15:26)  T(F): 98 (25 @ 07:43), Max: 98.3 (25 @ 15:26)  HR: 81 (25:43) (78 - 95)  BP: 158/81 (25 @ 07:43) (123/85 - 158/81)  RR: 17 (25 @ 07:43) (17 - 18)  SpO2: 100% (25 07:43) (100% - 100%)                07:01  -   @ 07:00  --------------------------------------------------------  IN: 2755 mL / OUT: 700 mL / NET: 2055 mL     @ 07:01  -   @ 08:35  --------------------------------------------------------  IN: 0 mL / OUT: 0 mL / NET: 0 mL        LABS      159[H]  |  120[H]  |  37[H]  ----------------------------<  113[H]  3.7   |  25  |  0.96    Ca    10.7[H]      2025 06:35  Phos  4.0       Mg     2.7         TPro  7.8  /  Alb  4.0  /  TBili  0.8  /  DBili  x   /  AST  29  /  ALT  45  /  AlkPhos  104                                   11.4   12.17 )-----------( 263      ( 2025 06:35 )             35.5          PT/INR - ( 2025 06:35 )   PT: 12.1 sec;   INR: 1.06 ratio                 Daily     Daily Weight in k (2025 06:47)      CAPILLARY BLOOD GLUCOSE      POCT Blood Glucose.: 98 mg/dL (2025 06:45)  POCT Blood Glucose.: 101 mg/dL (2025 00:11)  POCT Blood Glucose.: 117 mg/dL (2025 17:56)  POCT Blood Glucose.: 132 mg/dL (2025 11:27)          Drains:     MS         [  ] Drainage:                 L Pleural  [  ]  Drainage:                R Pleural  [  ]  Drainage:    Pacing Wires        [  ]   Settings:                                  Isolated  [  ]    Coumadin    [ ] YES          [  ]      NO         Reason:           PHYSICAL EXAM      Neurology: alert and oriented x 3, nonfocal, no gross deficits    CV:     Sternal Wound: MSI -->CDI, sternum stable    Lungs: CTA B/L     Abdomen: soft, nontender, nondistended, positive bowel sounds, (+) Flatus; (+) BM     :  Voiding               Extremities:  B/L LE (+) edema; negative calf tenderness; (+) 2 DP palpable          acetaminophen   Oral Liquid .. 650 milliGRAM(s) Oral every 6 hours PRN  albuterol/ipratropium for Nebulization 3 milliLiter(s) Nebulizer every 8 hours  amantadine Syrup 100 milliGRAM(s) Oral <User Schedule>  artificial tears (preservative free) Ophthalmic Solution 1 Drop(s) Both EYES four times a day  ascorbic acid 500 milliGRAM(s) Oral daily  chlorhexidine 2% Cloths 1 Application(s) Topical <User Schedule>  chlorhexidine 4% Liquid 1 Application(s) Topical <User Schedule>  hydrALAZINE Injectable 5 milliGRAM(s) IV Push every 6 hours PRN  hydrocortisone 1% Cream 1 Application(s) Topical three times a day  hydroxyurea 500 milliGRAM(s) Oral two times a day  insulin lispro (ADMELOG) corrective regimen sliding scale   SubCutaneous every 6 hours  levETIRAcetam  Solution 500 milliGRAM(s) Oral every 12 hours  metoprolol tartrate 25 milliGRAM(s) Oral every 8 hours  multivitamin/minerals/iron Oral Solution (CENTRUM) 15 milliLiter(s) Oral daily  pantoprazole  Injectable 40 milliGRAM(s) IV Push daily  polyethylene glycol 3350 17 Gram(s) Oral daily  senna 1 Tablet(s) Oral daily  sodium chloride 0.9% lock flush 10 milliLiter(s) IV Push every 1 hour PRN                 Physical Therapy Rec:   Home  [  ]   Home w/ PT  [  ]  Rehab  [ x ]    Discussed with Cardiothoracic Team at AM rounds.

## 2025-02-04 LAB
ALBUMIN SERPL ELPH-MCNC: 3.6 G/DL — SIGNIFICANT CHANGE UP (ref 3.3–5)
ALP SERPL-CCNC: 103 U/L — SIGNIFICANT CHANGE UP (ref 40–120)
ALT FLD-CCNC: 65 U/L — HIGH (ref 10–45)
ANION GAP SERPL CALC-SCNC: 13 MMOL/L — SIGNIFICANT CHANGE UP (ref 5–17)
AST SERPL-CCNC: 48 U/L — HIGH (ref 10–40)
BILIRUB SERPL-MCNC: 0.6 MG/DL — SIGNIFICANT CHANGE UP (ref 0.2–1.2)
BUN SERPL-MCNC: 35 MG/DL — HIGH (ref 7–23)
CALCIUM SERPL-MCNC: 9.9 MG/DL — SIGNIFICANT CHANGE UP (ref 8.4–10.5)
CHLORIDE SERPL-SCNC: 112 MMOL/L — HIGH (ref 96–108)
CO2 SERPL-SCNC: 23 MMOL/L — SIGNIFICANT CHANGE UP (ref 22–31)
CREAT SERPL-MCNC: 0.94 MG/DL — SIGNIFICANT CHANGE UP (ref 0.5–1.3)
EGFR: 71 ML/MIN/1.73M2 — SIGNIFICANT CHANGE UP
GLUCOSE BLDC GLUCOMTR-MCNC: 105 MG/DL — HIGH (ref 70–99)
GLUCOSE BLDC GLUCOMTR-MCNC: 110 MG/DL — HIGH (ref 70–99)
GLUCOSE BLDC GLUCOMTR-MCNC: 63 MG/DL — LOW (ref 70–99)
GLUCOSE BLDC GLUCOMTR-MCNC: 69 MG/DL — LOW (ref 70–99)
GLUCOSE BLDC GLUCOMTR-MCNC: 79 MG/DL — SIGNIFICANT CHANGE UP (ref 70–99)
GLUCOSE BLDC GLUCOMTR-MCNC: 80 MG/DL — SIGNIFICANT CHANGE UP (ref 70–99)
GLUCOSE BLDC GLUCOMTR-MCNC: 95 MG/DL — SIGNIFICANT CHANGE UP (ref 70–99)
GLUCOSE SERPL-MCNC: 92 MG/DL — SIGNIFICANT CHANGE UP (ref 70–99)
HCT VFR BLD CALC: 35.4 % — SIGNIFICANT CHANGE UP (ref 34.5–45)
HGB BLD-MCNC: 11.3 G/DL — LOW (ref 11.5–15.5)
LDH SERPL L TO P-CCNC: 353 U/L — HIGH (ref 50–242)
MAGNESIUM SERPL-MCNC: 2.5 MG/DL — SIGNIFICANT CHANGE UP (ref 1.6–2.6)
MCHC RBC-ENTMCNC: 30.1 PG — SIGNIFICANT CHANGE UP (ref 27–34)
MCHC RBC-ENTMCNC: 31.9 G/DL — LOW (ref 32–36)
MCV RBC AUTO: 94.4 FL — SIGNIFICANT CHANGE UP (ref 80–100)
NRBC # BLD: 1 /100 WBCS — HIGH (ref 0–0)
NRBC BLD-RTO: 1 /100 WBCS — HIGH (ref 0–0)
PHOSPHATE SERPL-MCNC: 4 MG/DL — SIGNIFICANT CHANGE UP (ref 2.5–4.5)
PLATELET # BLD AUTO: 188 K/UL — SIGNIFICANT CHANGE UP (ref 150–400)
POTASSIUM SERPL-MCNC: 4.2 MMOL/L — SIGNIFICANT CHANGE UP (ref 3.5–5.3)
POTASSIUM SERPL-SCNC: 4.2 MMOL/L — SIGNIFICANT CHANGE UP (ref 3.5–5.3)
PROT SERPL-MCNC: 7.1 G/DL — SIGNIFICANT CHANGE UP (ref 6–8.3)
RBC # BLD: 3.75 M/UL — LOW (ref 3.8–5.2)
RBC # BLD: 3.75 M/UL — LOW (ref 3.8–5.2)
RBC # FLD: 16.2 % — HIGH (ref 10.3–14.5)
RETICS #: 252.4 K/UL — HIGH (ref 25–125)
RETICS/RBC NFR: 7 % — HIGH (ref 0.5–2.5)
SODIUM SERPL-SCNC: 148 MMOL/L — HIGH (ref 135–145)
WBC # BLD: 9.92 K/UL — SIGNIFICANT CHANGE UP (ref 3.8–10.5)
WBC # FLD AUTO: 9.92 K/UL — SIGNIFICANT CHANGE UP (ref 3.8–10.5)

## 2025-02-04 PROCEDURE — 99232 SBSQ HOSP IP/OBS MODERATE 35: CPT

## 2025-02-04 PROCEDURE — 99231 SBSQ HOSP IP/OBS SF/LOW 25: CPT

## 2025-02-04 RX ADMIN — Medication 1 DROP(S): at 17:07

## 2025-02-04 RX ADMIN — IPRATROPIUM BROMIDE AND ALBUTEROL SULFATE 3 MILLILITER(S): .5; 2.5 SOLUTION RESPIRATORY (INHALATION) at 05:46

## 2025-02-04 RX ADMIN — LEVETIRACETAM 500 MILLIGRAM(S): 750 TABLET, FILM COATED ORAL at 17:28

## 2025-02-04 RX ADMIN — Medication 1 APPLICATION(S): at 13:38

## 2025-02-04 RX ADMIN — ANTISEPTIC SURGICAL SCRUB 1 APPLICATION(S): 0.04 SOLUTION TOPICAL at 05:45

## 2025-02-04 RX ADMIN — Medication 25 MILLIGRAM(S): at 05:47

## 2025-02-04 RX ADMIN — LEVETIRACETAM 500 MILLIGRAM(S): 750 TABLET, FILM COATED ORAL at 05:45

## 2025-02-04 RX ADMIN — PANTOPRAZOLE 40 MILLIGRAM(S): 20 TABLET, DELAYED RELEASE ORAL at 12:02

## 2025-02-04 RX ADMIN — Medication 500 MILLIGRAM(S): at 17:28

## 2025-02-04 RX ADMIN — Medication 15 MILLILITER(S): at 12:02

## 2025-02-04 RX ADMIN — IPRATROPIUM BROMIDE AND ALBUTEROL SULFATE 3 MILLILITER(S): .5; 2.5 SOLUTION RESPIRATORY (INHALATION) at 22:34

## 2025-02-04 RX ADMIN — Medication 500 MILLIGRAM(S): at 12:02

## 2025-02-04 RX ADMIN — Medication 25 MILLIGRAM(S): at 22:33

## 2025-02-04 RX ADMIN — Medication 1 DROP(S): at 12:02

## 2025-02-04 RX ADMIN — IPRATROPIUM BROMIDE AND ALBUTEROL SULFATE 3 MILLILITER(S): .5; 2.5 SOLUTION RESPIRATORY (INHALATION) at 13:43

## 2025-02-04 RX ADMIN — Medication 1 APPLICATION(S): at 05:46

## 2025-02-04 RX ADMIN — Medication 500 MILLIGRAM(S): at 05:45

## 2025-02-04 RX ADMIN — ANTISEPTIC SURGICAL SCRUB 1 APPLICATION(S): 0.04 SOLUTION TOPICAL at 05:44

## 2025-02-04 RX ADMIN — Medication 1 DROP(S): at 05:44

## 2025-02-04 RX ADMIN — Medication 25 MILLIGRAM(S): at 13:43

## 2025-02-04 RX ADMIN — Medication 100 MILLIGRAM(S): at 13:43

## 2025-02-04 RX ADMIN — Medication 1 DROP(S): at 02:20

## 2025-02-04 RX ADMIN — Medication 100 MILLIGRAM(S): at 08:03

## 2025-02-04 RX ADMIN — Medication 1 APPLICATION(S): at 22:33

## 2025-02-04 NOTE — PROVIDER CONTACT NOTE (HYPOGLYCEMIA EVENT) - NS PROVIDER CONTACT BACKGROUND-HYPO
Age: 59y    Gender: Female    POCT Blood Glucose:  95 mg/dL (02-04-25 @ 18:52)  105 mg/dL (02-04-25 @ 18:24)  69 mg/dL (02-04-25 @ 17:47)  79 mg/dL (02-04-25 @ 13:09)  110 mg/dL (02-04-25 @ 06:00)  99 mg/dL (02-03-25 @ 23:54)      eMAR:

## 2025-02-04 NOTE — CHART NOTE - NSCHARTNOTEFT_GEN_A_CORE
NUTRITION FOLLOW UP NOTE    PATIENT SEEN FOR: follow up    SOURCE: [] Patient  [x] Current Medical Record  [x] RN  [] Family/support person at bedside  [x] Patient unavailable/inappropriate  [x] Other: team during interdisciplinary rounds     CHART REVIEWED/EVENTS NOTED.  [] No changes to nutrition care plan to note  [x] Nutrition Status:  Sickle cell disease  S/p VA ECMO   bedside trach w/ Dr Hebert Holden   PEG placement   Swallow FEES Assessment Adult: "continue npo/PEG"    DIET ORDER:   Diet, NPO with Tube Feed:   Tube Feeding Modality: Gastrostomy  Inocencia Revegy Peptide 1.5 (KFPEPT1.5RTH)  Total Volume for 24 Hours (mL): 1080  Continuous  Starting Tube Feed Rate {mL per Hour}: 45  Until Goal Tube Feed Rate (mL per Hour): 45  Tube Feed Duration (in Hours): 24  Tube Feed Start Time: 14:30  Mehdi(7 Gm Arginine/7 Gm Glut/1.2 Gm HMB     Qty per Day:  2 (25)    CURRENT DIET ORDER IS:  [] Appropriate:  [] Inadequate:  [x] Other: see below for recommendations     NUTRITION INTAKE/PROVISION:  [] PO:  [x] Enteral Nutrition: providing at 100% provision 1663kcal (31kcal/kg) and 80g protein (1.5g/kg)  [] Parenteral Nutrition:    ANTHROPOMETRICS:  Drug Dosing Weight  Height (cm): 162 (2025 20:47)  Weight (kg): 83.6 (2025 20:47)  BMI (kg/m2): 31.9 (2025 20:47)  BSA (m2): 1.88 (2025 20:47)  Weights:   Daily Weight in k (-), Weight in k.8 (), Weight in k (), Weight in k (), Weight in k.8 (), Weight in k.4 (),  83.6 ()     MEDICATIONS:  MEDICATIONS  (STANDING):  albuterol/ipratropium for Nebulization 3 milliLiter(s) Nebulizer every 8 hours  amantadine Syrup 100 milliGRAM(s) Oral <User Schedule>  artificial tears (preservative free) Ophthalmic Solution 1 Drop(s) Both EYES four times a day  ascorbic acid 500 milliGRAM(s) Oral daily  chlorhexidine 2% Cloths 1 Application(s) Topical <User Schedule>  chlorhexidine 4% Liquid 1 Application(s) Topical <User Schedule>  hydrocortisone 1% Cream 1 Application(s) Topical three times a day  hydroxyurea 500 milliGRAM(s) Oral two times a day  insulin lispro (ADMELOG) corrective regimen sliding scale   SubCutaneous every 6 hours  levETIRAcetam  Solution 500 milliGRAM(s) Oral every 12 hours  metoprolol tartrate 25 milliGRAM(s) Oral every 8 hours  multivitamin/minerals/iron Oral Solution (CENTRUM) 15 milliLiter(s) Oral daily  pantoprazole  Injectable 40 milliGRAM(s) IV Push daily  polyethylene glycol 3350 17 Gram(s) Oral daily  senna 1 Tablet(s) Oral daily    MEDICATIONS  (PRN):  acetaminophen   Oral Liquid .. 650 milliGRAM(s) Oral every 6 hours PRN Moderate Pain (4 - 6)  hydrALAZINE Injectable 5 milliGRAM(s) IV Push every 6 hours PRN HTN  sodium chloride 0.9% lock flush 10 milliLiter(s) IV Push every 1 hour PRN Pre/post blood products, medications, blood draw, and to maintain line patency    NUTRITIONALLY PERTINENT LABS:   Na148 mmol/L[H] Glu 92 mg/dL K+ 4.2 mmol/L Cr  0.94 mg/dL BUN 35 mg/dL[H]  Phos 4.0 mg/dL  Alb 3.6 g/dL ALT 65 U/L[H] AST 48 U/L[H] Alkaline Phosphatase 103 U/L    A1C with Estimated Average Glucose Result: 4.6 % (25 @ 21:34)    Finger Sticks:  POCT Blood Glucose.: 110 mg/dL ( @ 06:00)  POCT Blood Glucose.: 99 mg/dL ( @ 23:54)  POCT Blood Glucose.: 101 mg/dL ( @ 18:02)  POCT Blood Glucose.: 99 mg/dL ( @ 11:23)    NUTRITIONALLY PERTINENT MEDICATIONS/LABS:  [x] Reviewed  [x] Relevant notes on medications/labs:  -hypernatremia noted; free water 500ml every 4 hours ordered   -insulin regimen noted to maintain glycemic control    EDEMA:  [x] Reviewed  [] Relevant notes:    GI/ I&O:  [x] Reviewed  [] Relevant notes:  [] Other:    SKIN:   [] No pressure injuries documented, per nursing flowsheet  [x] Pressure injury previously noted  [] Change in pressure injury documentation:  [] Other:    ESTIMATED NEEDS:  Energy:  6791-8129 kcal/day (30-35 kcal/kg)  Protein:   g/day (1.5-2.0 g/kg)  Fluid:   ml/day or [x] defer to team  Based on: IBW 120lb/54.4kg    NUTRITION DIAGNOSIS:  [x] Prior Dx: Increased Nutrient Needs; Severe Acute Malnutrition  [] New Dx:    EDUCATION:  [] Yes:  [x] Not appropriate/warranted    NUTRITION CARE PLAN:  1. Diet: can continue current EN regimen, see above for what it is providing pt with   2. Supplements: continue Mehdi to aid in wound healing  3. Multivitamin/mineral supplementation: continue as ordered to aid in wound healing   4: Defer free water flush to team    [] Achieved - Continue current nutrition intervention(s)  [] Current medical condition precludes nutrition intervention at this time.    MONITORING AND EVALUATION:   RD remains available upon request and will follow up per protocol.    Rosemarie Arriaza, MS, RD, CDN / Teams

## 2025-02-04 NOTE — PROGRESS NOTE ADULT - SUBJECTIVE AND OBJECTIVE BOX
INTERVAL HPI/OVERNIGHT EVENTS:  Patient S&E at bedside. No o/n events,    VITAL SIGNS:  T(F): 98.1 (02-04-25 @ 07:00)  HR: 74 (02-04-25 @ 07:00)  BP: 124/73 (02-04-25 @ 07:00)  RR: 18 (02-04-25 @ 07:00)  SpO2: 100% (02-04-25 @ 07:00)  Wt(kg): --    PHYSICAL EXAM:    Constitutional: NAD  Eyes: EOMI  Neck: trache  Respiratory: trache, clear BL  Cardiovascular: RRR, no M/R/G  Gastrointestinal: soft, NTND, + BS, +PEG  Extremities: warm  Neurological: awake, trache, follows some commands      MEDICATIONS  (STANDING):  albuterol/ipratropium for Nebulization 3 milliLiter(s) Nebulizer every 8 hours  amantadine Syrup 100 milliGRAM(s) Oral <User Schedule>  artificial tears (preservative free) Ophthalmic Solution 1 Drop(s) Both EYES four times a day  ascorbic acid 500 milliGRAM(s) Oral daily  chlorhexidine 2% Cloths 1 Application(s) Topical <User Schedule>  chlorhexidine 4% Liquid 1 Application(s) Topical <User Schedule>  hydrocortisone 1% Cream 1 Application(s) Topical three times a day  hydroxyurea 500 milliGRAM(s) Oral two times a day  insulin lispro (ADMELOG) corrective regimen sliding scale   SubCutaneous every 6 hours  levETIRAcetam  Solution 500 milliGRAM(s) Oral every 12 hours  metoprolol tartrate 25 milliGRAM(s) Oral every 8 hours  multivitamin/minerals/iron Oral Solution (CENTRUM) 15 milliLiter(s) Oral daily  pantoprazole  Injectable 40 milliGRAM(s) IV Push daily  polyethylene glycol 3350 17 Gram(s) Oral daily  senna 1 Tablet(s) Oral daily    MEDICATIONS  (PRN):  acetaminophen   Oral Liquid .. 650 milliGRAM(s) Oral every 6 hours PRN Moderate Pain (4 - 6)  hydrALAZINE Injectable 5 milliGRAM(s) IV Push every 6 hours PRN HTN  sodium chloride 0.9% lock flush 10 milliLiter(s) IV Push every 1 hour PRN Pre/post blood products, medications, blood draw, and to maintain line patency      Allergies    doxycycline (Angioedema)  penicillin (Unknown)  clindamycin (Angioedema)  linezolid (Angioedema)    Intolerances        LABS:                        11.3   9.92  )-----------( 188      ( 04 Feb 2025 05:32 )             35.4     02-04    148[H]  |  112[H]  |  35[H]  ----------------------------<  92  4.2   |  23  |  0.94    Ca    9.9      04 Feb 2025 05:32  Phos  4.0     02-04  Mg     2.5     02-04    TPro  7.1  /  Alb  3.6  /  TBili  0.6  /  DBili  x   /  AST  48[H]  /  ALT  65[H]  /  AlkPhos  103  02-04    PT/INR - ( 03 Feb 2025 06:35 )   PT: 12.1 sec;   INR: 1.06 ratio           Urinalysis Basic - ( 04 Feb 2025 05:32 )    Color: x / Appearance: x / SG: x / pH: x  Gluc: 92 mg/dL / Ketone: x  / Bili: x / Urobili: x   Blood: x / Protein: x / Nitrite: x   Leuk Esterase: x / RBC: x / WBC x   Sq Epi: x / Non Sq Epi: x / Bacteria: x        RADIOLOGY & ADDITIONAL TESTS:  Studies reviewed.    ASSESSMENT & PLAN:  INTERVAL HPI/OVERNIGHT EVENTS:  Patient S&E with mother at bedside. No o/n events, more awake.    VITAL SIGNS:  T(F): 98.1 (02-04-25 @ 07:00)  HR: 74 (02-04-25 @ 07:00)  BP: 124/73 (02-04-25 @ 07:00)  RR: 18 (02-04-25 @ 07:00)  SpO2: 100% (02-04-25 @ 07:00)  Wt(kg): --    PHYSICAL EXAM:    Constitutional: NAD  Eyes: EOMI  Neck: trache  Respiratory: trache, clear BL  Cardiovascular: RRR, no M/R/G  Gastrointestinal: soft, NTND, + BS, +PEG  Extremities: warm  Neurological: awake, trache, follows some commands      MEDICATIONS  (STANDING):  albuterol/ipratropium for Nebulization 3 milliLiter(s) Nebulizer every 8 hours  amantadine Syrup 100 milliGRAM(s) Oral <User Schedule>  artificial tears (preservative free) Ophthalmic Solution 1 Drop(s) Both EYES four times a day  ascorbic acid 500 milliGRAM(s) Oral daily  chlorhexidine 2% Cloths 1 Application(s) Topical <User Schedule>  chlorhexidine 4% Liquid 1 Application(s) Topical <User Schedule>  hydrocortisone 1% Cream 1 Application(s) Topical three times a day  hydroxyurea 500 milliGRAM(s) Oral two times a day  insulin lispro (ADMELOG) corrective regimen sliding scale   SubCutaneous every 6 hours  levETIRAcetam  Solution 500 milliGRAM(s) Oral every 12 hours  metoprolol tartrate 25 milliGRAM(s) Oral every 8 hours  multivitamin/minerals/iron Oral Solution (CENTRUM) 15 milliLiter(s) Oral daily  pantoprazole  Injectable 40 milliGRAM(s) IV Push daily  polyethylene glycol 3350 17 Gram(s) Oral daily  senna 1 Tablet(s) Oral daily    MEDICATIONS  (PRN):  acetaminophen   Oral Liquid .. 650 milliGRAM(s) Oral every 6 hours PRN Moderate Pain (4 - 6)  hydrALAZINE Injectable 5 milliGRAM(s) IV Push every 6 hours PRN HTN  sodium chloride 0.9% lock flush 10 milliLiter(s) IV Push every 1 hour PRN Pre/post blood products, medications, blood draw, and to maintain line patency      Allergies    doxycycline (Angioedema)  penicillin (Unknown)  clindamycin (Angioedema)  linezolid (Angioedema)    Intolerances        LABS:                        11.3   9.92  )-----------( 188      ( 04 Feb 2025 05:32 )             35.4     02-04    148[H]  |  112[H]  |  35[H]  ----------------------------<  92  4.2   |  23  |  0.94    Ca    9.9      04 Feb 2025 05:32  Phos  4.0     02-04  Mg     2.5     02-04    TPro  7.1  /  Alb  3.6  /  TBili  0.6  /  DBili  x   /  AST  48[H]  /  ALT  65[H]  /  AlkPhos  103  02-04    PT/INR - ( 03 Feb 2025 06:35 )   PT: 12.1 sec;   INR: 1.06 ratio           Urinalysis Basic - ( 04 Feb 2025 05:32 )    Color: x / Appearance: x / SG: x / pH: x  Gluc: 92 mg/dL / Ketone: x  / Bili: x / Urobili: x   Blood: x / Protein: x / Nitrite: x   Leuk Esterase: x / RBC: x / WBC x   Sq Epi: x / Non Sq Epi: x / Bacteria: x        RADIOLOGY & ADDITIONAL TESTS:  Studies reviewed.    ASSESSMENT & PLAN:

## 2025-02-04 NOTE — PROGRESS NOTE ADULT - ASSESSMENT
59F PMH Sickle cell disease (on hydroxyurea), HTN, HFimpEF/NICM (EF 58% 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 Pt initially presented to [facility] for SOB and SS crisis; course c/b witnessed seizure, intubated and sedated, and transferred to [facility] for further management. Keppra was discontinued at [facility] due to negative EEG. At [facility] MICU pt was found to have RV failure possibly iso pulm HTN 2/2 increased tidal volumes on the ventilator, possibly due to volume overload due to IVF iso SS crisis vs fat emboli syndrome. Downgraded to medicine on 1/30/25                                                                                                             #Hb SC disease  #Cardiogenic shock on ECMO  #Unclear trigger for unresponsiveness   - patient with 1-2 sickle pain crisis per year and on hydrea, had retinal detachment s/p repair                           - patient had f/up with cardio in Sept 2024: per the note, unclear etiology of her dyspnea but possibly driven by cardiomyopathy; low suspicion of pulmonary HTN as no RV dysfunction/severe TR.  She is found to have new RV failure, requiring ECMO  - Blood bank transfusion medicine team consulted for emergent RBC exchange given critically ill with multi-organ failure  - Retic count elevated to 11.6%, LDH 1000s, bili 3.9. CXR was clear not indicative of acute chest. Smear (prior to exchange) was reviewed: Hypochromic RBCs with normochromic RBC reflecting transfused blood. Several nucleated RBC suggesting stressed bone marrow. Very few target cells and rare sickle cell. No schistocytes to suggest hemolysis.    - S/p exchange 1/4/2024, s/p ECMO decannulation 1/7/2025 c/b groin hematoma and received 2 unit prbc, platelet and cryo.  - S/p 2nd exchange transfusion 1/12/2025 (based on MRI demonstrating diffuse punctate lesions that could be consistent with fat emboli and patient's otherwise unexplained mental status for goal of apheresis any remaining circulating fat emboli, over weekend which was discussed with blood bank)      Hb electrophoresis:  01/07/25: Hb A 76.8%, Hb A2 3.6%, Hb S 9.3%, HbC 10.3%  01/14/25: Hb A 87.9%, Hb A2 2.5%, Hb S 4.6%, HbC 5.0%  01/21/25: Hb A 78.8%, Hb A2 3.2%, Hb S 9.4%, HbC 8.6%  01/28/25: Hb A 72.1%, Hb A2 3.3%, Hb S 12.9%, HbC 11.7%  02/04/25: F/U      Recommendations:  - Please obtain hemoglobin electrophoresis weekly while inpatient (every Tuesday). Goal HbC and HbS fraction <30% combined.   - recommend transfusion to maintain plt > 50k in the setting of bleeding, otherwise Monitor CBC with differential daily and transfuse for platelet count >10 minimum, >20 if febrile.  - Discuss with blood bank and heme team prior to RBC transfusions to minimize risk of antibody development/transfusion reactions, aim to keep hb >7-8  - Appreciate care by primary team, cards/heart failure -neurology suspect that seizures are not related to presentation and plan to continue keppra for now, neurology following for neurologic status.   - Continue hydroxyurea as long as plt >100 and hb >8  (restarted on 01/15/25)  - Monitor hemolysis labs (CBC, CMP, LDH, reticulocyte count) daily, prefer pediatric tubes.   - Hematology team to follow          Please call via MS Teams with any questions.  Above reviewed with Attending Dr. Onofre.    Discussed with primary team.       59F PMH Sickle cell disease (on hydroxyurea), HTN, 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 Pt initially presented to [facility] for SOB and SS crisis; course c/b witnessed seizure, intubated and sedated, and transferred to [facility] for further management. Keppra was discontinued at [facility] due to negative EEG. At [facility] MICU pt was found to have RV failure possibly iso pulm HTN 2/2 increased tidal volumes on the ventilator, possibly due to volume overload due to IVF iso SS crisis vs fat emboli syndrome. Downgraded to medicine on 1/30/25                                                                                                             #Hb SC disease  #Cardiogenic shock on ECMO  #Unclear trigger for unresponsiveness   - patient with 1-2 sickle pain crisis per year and on hydrea, had retinal detachment s/p repair                           - patient had f/up with cardio in Sept 2024: per the note, unclear etiology of her dyspnea but possibly driven by cardiomyopathy; low suspicion of pulmonary HTN as no RV dysfunction/severe TR.  She is found to have new RV failure, requiring ECMO  - Blood bank transfusion medicine team consulted for emergent RBC exchange given critically ill with multi-organ failure  - Retic count elevated to 11.6%, LDH 1000s, bili 3.9. CXR was clear not indicative of acute chest. Smear (prior to exchange) was reviewed: Hypochromic RBCs with normochromic RBC reflecting transfused blood. Several nucleated RBC suggesting stressed bone marrow. Very few target cells and rare sickle cell. No schistocytes to suggest hemolysis.    - S/p exchange 1/4/2024, s/p ECMO decannulation 1/7/2025 c/b groin hematoma and received 2 unit prbc, platelet and cryo.  - S/p 2nd exchange transfusion 1/12/2025 (based on MRI demonstrating diffuse punctate lesions that could be consistent with fat emboli and patient's otherwise unexplained mental status for goal of apheresis any remaining circulating fat emboli, over weekend which was discussed with blood bank)      Hb electrophoresis:  01/07/25: Hb A 76.8%, Hb A2 3.6%, Hb S 9.3%, HbC 10.3%  01/14/25: Hb A 87.9%, Hb A2 2.5%, Hb S 4.6%, HbC 5.0%  01/21/25: Hb A 78.8%, Hb A2 3.2%, Hb S 9.4%, HbC 8.6%  01/28/25: Hb A 72.1%, Hb A2 3.3%, Hb S 12.9%, HbC 11.7%  02/04/25: F/U      Recommendations:  - Please obtain hemoglobin electrophoresis weekly while inpatient (every Tuesday). Goal HbC and HbS fraction <30% combined.   - recommend transfusion to maintain plt > 50k in the setting of bleeding, otherwise Monitor CBC with differential daily and transfuse for platelet count >10 minimum, >20 if febrile.  - Discuss with blood bank and heme team prior to RBC transfusions to minimize risk of antibody development/transfusion reactions, aim to keep hb >7-8  - Appreciate care by primary team, cards/heart failure -neurology suspect that seizures are not related to presentation and plan to continue keppra for now, neurology following for neurologic status.   - Continue hydroxyurea as long as plt >100 and hb >8  (restarted on 01/15/25)  - Monitor hemolysis labs (CBC, CMP, LDH, reticulocyte count) daily, prefer pediatric tubes.   - Spoke with patient's mother at bedside, and sister Diana (via CP). They wanted the patient to follow-up with hematology at [facility] and consider gene therapy. Please inform hematology prior to discharge for us to arrange a follow-up.        Hematology will sign off, please reconsult if needed.  Please call via MS Teams with any questions.  Above reviewed with Attending Dr. Onofre.    Discussed with primary team.

## 2025-02-04 NOTE — PROGRESS NOTE ADULT - ASSESSMENT
59F PMH Sickle cell disease (on hydroxyurea), HTN, HFimpEF/NICM (EF 58% 10/2024) presented to [facility] for SOB and SS crisis 12/30 and 12/31; intubated and sedated s/p witnessed siezure, and transferred to [facility] for further management. Keppra was discontinued at [facility] due to negative EEG. Seizure was thought to be caused by clonazepam withdrawal per OSH. She was also found to have elevated troponins ~900s.  At [facility] MICU pt was found to have RV failure. Pt transferred to NS for management of cardiogenic shock with increased inotropic and pressor support requirement.   1/3 Initiation, venoarterial ECMO (Left Femoral Arterial 17Fr, Right Femoral Venous 25Fr, L SFA RPC 6Fr)  Transfusion Medicine team is consulted for emergent RBC exchange to minimize the risk of complications related to SCD as this patient is critically ill with multi-organ failure.   -TTE 1/4/25: LVEF <20%, ECMO cannula in RV, mildly enlarged RV size and reduced RV systolic function, no pericardial effusion, no MR, trace TR, IVC nml size  MSSA in sputum culture from ET tube. MSSA bacteremia, BAL + MSSA -IV vanc completed  Merrem for total 10 days, completed  Coag negative Staph epi. in single blood culture drawn 1/3  1/7 s/p VA ECMO decannulation. Case was complicated by hematoma formation in left thigh (site of arterial cannula). 2U pRBC, 2 platelet and 5U cryo given.  1/8 YUNG and LLL evaluation revealed moderate green tinged secretions and mucous plugging. RUL, RML, RLL revealed minimal secretions. Keppra reinstated 1000  1/9 MRI today - Innumerable foci susceptibility artifact scattered throughout the brain, tiny acute/subacute infarcts within the bilateral basal ganglia, thalami, and chandu with disproportionate ovoid area of T2/FLAIR prolongation in the right ventral thalamus. Findings may indicate the presence of embolic acute/subacute infarcts  1/14 Lumbar puncture no evidence of meningitis/ encephalitis   1/16 bedside trach w/ Dr Hebert Holden  1/21 PEG placement, flexible bronchoscopy for Acute respiratory failure with hypoxia findings; minimal secretions through out b/l lung segments  1/27 Speech and swallow eval there is reduced movement of the right vocal cord; left is mobile/intact. Silent laryngeal penetration of purees and moderately thick liquids is noted  1/30 Transferred to [address] [place] [address]  1/31  Pending rehab placement. Suction prn. OOb to chair  2/1 VSS, , Free water increased to 300cc q6h via PEG. OOB to chair. Continue PT/OT, pt recommended for acute rehab. Repeat FEEST next week.  2/2 VSS;  --> free water increased to 400mL q6h via PEG. Repeat FEEST this week.   2/3 VSS  --> free water amount and frequency increased. Rpt FEEs Tuesday.   2/4 VSS  on free water 500 q 4h, FEES today, awaiting acceptance at acute rehab  Disposition: Acute Rehab

## 2025-02-04 NOTE — PROGRESS NOTE ADULT - SUBJECTIVE AND OBJECTIVE BOX
Patient is a 59y old  Female who presents with a chief complaint of SS crisis, s/p  VA ecmo (23 Jan 2025 07:04)  Patient has been making significant improvement.  More alert.  Min A transfers, gait.    MEDICATIONS  (STANDING):  albuterol/ipratropium for Nebulization 3 milliLiter(s) Nebulizer every 8 hours  amantadine Syrup 100 milliGRAM(s) Oral <User Schedule>  artificial tears (preservative free) Ophthalmic Solution 1 Drop(s) Both EYES four times a day  ascorbic acid 500 milliGRAM(s) Oral daily  chlorhexidine 2% Cloths 1 Application(s) Topical <User Schedule>  chlorhexidine 4% Liquid 1 Application(s) Topical <User Schedule>  hydrocortisone 1% Cream 1 Application(s) Topical three times a day  hydroxyurea 500 milliGRAM(s) Oral two times a day  insulin lispro (ADMELOG) corrective regimen sliding scale   SubCutaneous every 6 hours  levETIRAcetam  Solution 500 milliGRAM(s) Oral every 12 hours  metoprolol tartrate 25 milliGRAM(s) Oral every 8 hours  multivitamin/minerals/iron Oral Solution (CENTRUM) 15 milliLiter(s) Oral daily  pantoprazole  Injectable 40 milliGRAM(s) IV Push daily  polyethylene glycol 3350 17 Gram(s) Oral daily  senna 1 Tablet(s) Oral daily    MEDICATIONS  (PRN):  acetaminophen   Oral Liquid .. 650 milliGRAM(s) Oral every 6 hours PRN Moderate Pain (4 - 6)  hydrALAZINE Injectable 5 milliGRAM(s) IV Push every 6 hours PRN HTN  sodium chloride 0.9% lock flush 10 milliLiter(s) IV Push every 1 hour PRN Pre/post blood products, medications, blood draw, and to maintain line patency    Vital Signs Last 24 Hrs  T(C): 36.7 (04 Feb 2025 07:00), Max: 37.1 (04 Feb 2025 03:29)  T(F): 98.1 (04 Feb 2025 07:00), Max: 98.8 (04 Feb 2025 03:29)  HR: 74 (04 Feb 2025 07:00) (74 - 87)  BP: 124/73 (04 Feb 2025 07:00) (116/70 - 134/87)  BP(mean): 94 (04 Feb 2025 07:00) (87 - 109)  RR: 18 (04 Feb 2025 07:00) (17 - 18)  SpO2: 100% (04 Feb 2025 07:00) (99% - 100%)  Parameters below as of 04 Feb 2025 08:09  Patient On (Oxygen Delivery Method): tracheostomy collar    PHYSICAL EXAM  Constitutional - NAD, Comfortable  HEENT - Trach, EOMI  Neck - Supple  Chest - No distress, no use of accessory muscles for respiration  Cardiovascular - RRR, Well perfused  Abdomen - BS+, Soft, NTND  Extremities - Trace edema, No calf tenderness   Neurologic Exam -                 Sitting in chair  Alert  Follows most verbal instruction  Tracks  Motor non-focal antigravity x 4   Nml Tone  No Clonus     Psychiatric - Mood stable, Affect WNL                          11.3   9.92  )-----------( 188      ( 04 Feb 2025 05:32 )             35.4     02-04    148[H]  |  112[H]  |  35[H]  ----------------------------<  92  4.2   |  23  |  0.94    Ca    9.9      04 Feb 2025 05:32  Phos  4.0     02-04  Mg     2.5     02-04    TPro  7.1  /  Alb  3.6  /  TBili  0.6  /  DBili  x   /  AST  48[H]  /  ALT  65[H]  /  AlkPhos  103  02-04    Impression:  58 yo with functional deficits secondary to diagnosis of encephalopathy, debility    Plan:  PT- ROM, Bed Mob, Transfers, Amb w AD and bracing as needed  OT- ADLs, bracing  SLP- Dysphagia eval and treat  Prec- Falls, Cardiac, Pulm  DVT Prophylaxis - Lovenox  Monitor Na+  Skin- Turn q2 h  Dispo- Acute TBI Rehab- patient requires active and ongoing therapeutic interventions of multiple disciplines and can tolerate and benefit from 3 hours of intensive therapies x 2-4wks depending on progress at rehabilitation facility. Can actively participate and benefit from  an intensive rehabilitation program. Requires supervision by a rehabilitation physician and a coordinated interdisciplinary approach to providing rehabilitation.     [  ] 25 minutes (65567)  [X] 35 minutes ([id number])  [  ] 50 minutes ([id number])

## 2025-02-04 NOTE — PROGRESS NOTE ADULT - SUBJECTIVE AND OBJECTIVE BOX
Subjective: Pt didn't respond,  denies any CP, SOB, N/V and palpitations. No acute events overnight.     VITAL SIGNS    Telemetry:  SR  Vital Signs Last 24 Hrs  T(C): 36.7 (25 @ 07:00), Max: 37.1 (25 @ 03:29)  T(F): 98.1 (25 @ 07:00), Max: 98.8 (25 @ 03:29)  HR: 78 (25 @ 09:05) (74 - 87)  BP: 124/73 (25 @ 07:00) (116/70 - 134/87)  RR: 18 (25 @ 09:05) (18 - 18)  SpO2: 100% (25 @ 09:05) (99% - 100%)             07:01  -   @ 07:00  --------------------------------------------------------  IN: 3990 mL / OUT: 800 mL / NET: 3190 mL     @ 07:01  -   @ 12:33  --------------------------------------------------------  IN: 0 mL / OUT: 0 mL / NET: 0 mL         Bilirubin Total: 0.6 mg/dL ( @ 05:32)    CAPILLARY BLOOD GLUCOSE      POCT Blood Glucose.: 110 mg/dL (2025 06:00)  POCT Blood Glucose.: 99 mg/dL (2025 23:54)  POCT Blood Glucose.: 101 mg/dL (2025 18:02)          MEDICATIONS  acetaminophen   Oral Liquid .. 650 milliGRAM(s) Oral every 6 hours PRN  albuterol/ipratropium for Nebulization 3 milliLiter(s) Nebulizer every 8 hours  amantadine Syrup 100 milliGRAM(s) Oral <User Schedule>  artificial tears (preservative free) Ophthalmic Solution 1 Drop(s) Both EYES four times a day  ascorbic acid 500 milliGRAM(s) Oral daily  chlorhexidine 2% Cloths 1 Application(s) Topical <User Schedule>  chlorhexidine 4% Liquid 1 Application(s) Topical <User Schedule>  hydrALAZINE Injectable 5 milliGRAM(s) IV Push every 6 hours PRN  hydrocortisone 1% Cream 1 Application(s) Topical three times a day  hydroxyurea 500 milliGRAM(s) Oral two times a day  insulin lispro (ADMELOG) corrective regimen sliding scale   SubCutaneous every 6 hours  levETIRAcetam  Solution 500 milliGRAM(s) Oral every 12 hours  metoprolol tartrate 25 milliGRAM(s) Oral every 8 hours  multivitamin/minerals/iron Oral Solution (CENTRUM) 15 milliLiter(s) Oral daily  pantoprazole  Injectable 40 milliGRAM(s) IV Push daily  polyethylene glycol 3350 17 Gram(s) Oral daily  senna 1 Tablet(s) Oral daily  sodium chloride 0.9% lock flush 10 milliLiter(s) IV Push every 1 hour PRN        PHYSICAL EXAM    Neurology: A&Ox3, nonfocal, no gross deficits  CV : RRR+S1S2  [place]:  Stable  Lungs: Respirations non-labored, B/L BS; TC 30%   Abdomen: Soft, NT/ND, +BSx4Q, last BM on 2/3  (-)N/V/D  : Voiding without difficulty(prima fit)  Extremities: + B/L LE edema, negative calf tenderness, +PP       LABS  -    148[H]  |  112[H]  |  35[H]  ----------------------------<  92  4.2   |  23  |  0.94    Ca    9.9      2025 05:32  Phos  4.0     02-04  Mg     2.5     02-04    TPro  7.1  /  Alb  3.6  /  TBili  0.6  /  DBili  x   /  AST  48[H]  /  ALT  65[H]  /  AlkPhos  103  02-04                                 11.3   9.92  )-----------( 188      ( 2025 05:32 )             35.4          PT/INR - ( 2025 06:35 )   PT: 12.1 sec;   INR: 1.06 ratio                PAST MEDICAL & SURGICAL HISTORY:  Sickle-cell-hemoglobin C disease with crisis      Essential hypertension      Sickle cell disease      HTN (hypertension)      H/O:       H/O abdominoplasty      S/P breast augmentation      S/P            Physical Therapy Rec:   Home  [  ]   Home w/ PT  [  ]  ACUTE Rehab  [ X  ]    Discussed with CT Surgery attending.

## 2025-02-05 LAB
ALBUMIN SERPL ELPH-MCNC: 3.5 G/DL — SIGNIFICANT CHANGE UP (ref 3.3–5)
ALP SERPL-CCNC: 91 U/L — SIGNIFICANT CHANGE UP (ref 40–120)
ALT FLD-CCNC: 44 U/L — SIGNIFICANT CHANGE UP (ref 10–45)
ANION GAP SERPL CALC-SCNC: 12 MMOL/L — SIGNIFICANT CHANGE UP (ref 5–17)
AST SERPL-CCNC: 31 U/L — SIGNIFICANT CHANGE UP (ref 10–40)
BASOPHILS # BLD AUTO: 0.04 K/UL — SIGNIFICANT CHANGE UP (ref 0–0.2)
BASOPHILS NFR BLD AUTO: 0.4 % — SIGNIFICANT CHANGE UP (ref 0–2)
BILIRUB SERPL-MCNC: 0.6 MG/DL — SIGNIFICANT CHANGE UP (ref 0.2–1.2)
BUN SERPL-MCNC: 25 MG/DL — HIGH (ref 7–23)
CALCIUM SERPL-MCNC: 9.7 MG/DL — SIGNIFICANT CHANGE UP (ref 8.4–10.5)
CHLORIDE SERPL-SCNC: 111 MMOL/L — HIGH (ref 96–108)
CO2 SERPL-SCNC: 25 MMOL/L — SIGNIFICANT CHANGE UP (ref 22–31)
CREAT SERPL-MCNC: 0.79 MG/DL — SIGNIFICANT CHANGE UP (ref 0.5–1.3)
EGFR: 87 ML/MIN/1.73M2 — SIGNIFICANT CHANGE UP
EOSINOPHIL # BLD AUTO: 0.15 K/UL — SIGNIFICANT CHANGE UP (ref 0–0.5)
EOSINOPHIL NFR BLD AUTO: 1.5 % — SIGNIFICANT CHANGE UP (ref 0–6)
GLUCOSE BLDC GLUCOMTR-MCNC: 131 MG/DL — HIGH (ref 70–99)
GLUCOSE BLDC GLUCOMTR-MCNC: 81 MG/DL — SIGNIFICANT CHANGE UP (ref 70–99)
GLUCOSE BLDC GLUCOMTR-MCNC: 92 MG/DL — SIGNIFICANT CHANGE UP (ref 70–99)
GLUCOSE BLDC GLUCOMTR-MCNC: 95 MG/DL — SIGNIFICANT CHANGE UP (ref 70–99)
GLUCOSE SERPL-MCNC: 108 MG/DL — HIGH (ref 70–99)
HCT VFR BLD CALC: 34.2 % — LOW (ref 34.5–45)
HGB BLD-MCNC: 11.1 G/DL — LOW (ref 11.5–15.5)
IMM GRANULOCYTES NFR BLD AUTO: 0.8 % — SIGNIFICANT CHANGE UP (ref 0–0.9)
LYMPHOCYTES # BLD AUTO: 2.91 K/UL — SIGNIFICANT CHANGE UP (ref 1–3.3)
LYMPHOCYTES # BLD AUTO: 28.5 % — SIGNIFICANT CHANGE UP (ref 13–44)
MAGNESIUM SERPL-MCNC: 2.4 MG/DL — SIGNIFICANT CHANGE UP (ref 1.6–2.6)
MCHC RBC-ENTMCNC: 30.4 PG — SIGNIFICANT CHANGE UP (ref 27–34)
MCHC RBC-ENTMCNC: 32.5 G/DL — SIGNIFICANT CHANGE UP (ref 32–36)
MCV RBC AUTO: 93.7 FL — SIGNIFICANT CHANGE UP (ref 80–100)
MONOCYTES # BLD AUTO: 1.08 K/UL — HIGH (ref 0–0.9)
MONOCYTES NFR BLD AUTO: 10.6 % — SIGNIFICANT CHANGE UP (ref 2–14)
NEUTROPHILS # BLD AUTO: 5.94 K/UL — SIGNIFICANT CHANGE UP (ref 1.8–7.4)
NEUTROPHILS NFR BLD AUTO: 58.2 % — SIGNIFICANT CHANGE UP (ref 43–77)
NRBC # BLD: 1 /100 WBCS — HIGH (ref 0–0)
NRBC BLD-RTO: 1 /100 WBCS — HIGH (ref 0–0)
PHOSPHATE SERPL-MCNC: 3.8 MG/DL — SIGNIFICANT CHANGE UP (ref 2.5–4.5)
PLATELET # BLD AUTO: 225 K/UL — SIGNIFICANT CHANGE UP (ref 150–400)
POTASSIUM SERPL-MCNC: 4.3 MMOL/L — SIGNIFICANT CHANGE UP (ref 3.5–5.3)
POTASSIUM SERPL-SCNC: 4.3 MMOL/L — SIGNIFICANT CHANGE UP (ref 3.5–5.3)
PROT SERPL-MCNC: 7 G/DL — SIGNIFICANT CHANGE UP (ref 6–8.3)
RBC # BLD: 3.65 M/UL — LOW (ref 3.8–5.2)
RBC # FLD: 16.2 % — HIGH (ref 10.3–14.5)
SODIUM SERPL-SCNC: 148 MMOL/L — HIGH (ref 135–145)
WBC # BLD: 10.2 K/UL — SIGNIFICANT CHANGE UP (ref 3.8–10.5)
WBC # FLD AUTO: 10.2 K/UL — SIGNIFICANT CHANGE UP (ref 3.8–10.5)

## 2025-02-05 PROCEDURE — 99231 SBSQ HOSP IP/OBS SF/LOW 25: CPT

## 2025-02-05 RX ORDER — DM/PSEUDOEPHED/ACETAMINOPHEN 10-30-250
25 CAPSULE ORAL
Refills: 0 | Status: COMPLETED | OUTPATIENT
Start: 2025-02-05 | End: 2025-02-05

## 2025-02-05 RX ADMIN — ANTISEPTIC SURGICAL SCRUB 1 APPLICATION(S): 0.04 SOLUTION TOPICAL at 10:51

## 2025-02-05 RX ADMIN — Medication 1 DROP(S): at 00:23

## 2025-02-05 RX ADMIN — Medication 500 MILLIGRAM(S): at 17:08

## 2025-02-05 RX ADMIN — Medication 1 APPLICATION(S): at 14:38

## 2025-02-05 RX ADMIN — Medication 1 APPLICATION(S): at 06:17

## 2025-02-05 RX ADMIN — LEVETIRACETAM 500 MILLIGRAM(S): 750 TABLET, FILM COATED ORAL at 17:07

## 2025-02-05 RX ADMIN — Medication 25 MILLIGRAM(S): at 06:15

## 2025-02-05 RX ADMIN — Medication 100 MILLIGRAM(S): at 15:33

## 2025-02-05 RX ADMIN — IPRATROPIUM BROMIDE AND ALBUTEROL SULFATE 3 MILLILITER(S): .5; 2.5 SOLUTION RESPIRATORY (INHALATION) at 15:34

## 2025-02-05 RX ADMIN — Medication 15 MILLILITER(S): at 11:57

## 2025-02-05 RX ADMIN — LEVETIRACETAM 500 MILLIGRAM(S): 750 TABLET, FILM COATED ORAL at 06:16

## 2025-02-05 RX ADMIN — IPRATROPIUM BROMIDE AND ALBUTEROL SULFATE 3 MILLILITER(S): .5; 2.5 SOLUTION RESPIRATORY (INHALATION) at 21:40

## 2025-02-05 RX ADMIN — Medication 500 MILLIGRAM(S): at 06:16

## 2025-02-05 RX ADMIN — Medication 1 DROP(S): at 11:42

## 2025-02-05 RX ADMIN — Medication 100 MILLIGRAM(S): at 08:39

## 2025-02-05 RX ADMIN — Medication 25 MILLIGRAM(S): at 21:39

## 2025-02-05 RX ADMIN — Medication 1 APPLICATION(S): at 21:40

## 2025-02-05 RX ADMIN — ANTISEPTIC SURGICAL SCRUB 1 APPLICATION(S): 0.04 SOLUTION TOPICAL at 08:34

## 2025-02-05 RX ADMIN — PANTOPRAZOLE 40 MILLIGRAM(S): 20 TABLET, DELAYED RELEASE ORAL at 11:57

## 2025-02-05 RX ADMIN — Medication 500 MILLIGRAM(S): at 12:01

## 2025-02-05 RX ADMIN — Medication 1 DROP(S): at 17:04

## 2025-02-05 RX ADMIN — IPRATROPIUM BROMIDE AND ALBUTEROL SULFATE 3 MILLILITER(S): .5; 2.5 SOLUTION RESPIRATORY (INHALATION) at 06:17

## 2025-02-05 RX ADMIN — Medication 25 MILLILITER(S): at 00:08

## 2025-02-05 RX ADMIN — Medication 1 DROP(S): at 06:15

## 2025-02-05 NOTE — CHART NOTE - NSCHARTNOTEFT_GEN_A_CORE
NUTRITION Brief Chart Note    PATIENT SEEN FOR: verbal consult for low blood glucose     A1C with Estimated Average Glucose Result: 4.6 % (01-03-25 @ 21:34)  Finger Sticks:  POCT Blood Glucose.: 81 mg/dL (02-05 @ 11:40)  POCT Blood Glucose.: 92 mg/dL (02-05 @ 06:17)  POCT Blood Glucose.: 131 mg/dL (02-05 @ 00:34)  POCT Blood Glucose.: 63 mg/dL (02-04 @ 23:50)  POCT Blood Glucose.: 80 mg/dL (02-04 @ 23:45)  POCT Blood Glucose.: 95 mg/dL (02-04 @ 18:52)  POCT Blood Glucose.: 105 mg/dL (02-04 @ 18:24)  POCT Blood Glucose.: 69 mg/dL (02-04 @ 17:47)    Feed was changed from Vivonex (very high carbohydrate and low fat formula) to Cardica peptide 1.5 since 1/28.   -Current regimen Glycosan Peptide 1.5 @ 45ml/hr x 24 hours providing  1080ml formula, 1663kcal (31kcal/kg) and 80g protein (1.5g/kg)  - Mehdi 2x daily (180 kcal, 6g proteins)   - Enteral nutrition and Mehdi 2x daily providing 1800kcal, 85g protein, 756 ml free water. Meets 33.1 kcal/kg and 1.56g/kg protein based on IBW 120lb/54.4kg. Defer free water flushes to medical team.    ESTIMATED NEEDS:  [] No change:  [x] Updated:  Energy:  6357-7365 kcal/day (35-40 kcal/kg)  Protein:   g/day (1.5-2.0 g/kg)  Fluid:   ml/day or [x] defer to team  Based on: IBW 120lb/54.4kg    Recommendation:   1. Enteral nutrition: recommend to continue Glycosan Peptide 1.5, increase rate to 50ml/hr x 24 hours for 1200ml formula, 1848kcal, 89g protein,  840ml free water + Modular protein: Mehdi 2x daily (180 kcal, 6g proteins). EN and modular protein will provide 2123 kcal (39kcal/kg), 95g protein (1.75g/kg) based on IBW of 54.4 kg. Additional water flush per team. RD remains available to adjust EN as needed.   2. Continue MVI and Vitamin C as ordered, pending no medical contraindication, for micronutrient support/aid wound healing.     RD remains available upon request and will follow-up per protocol.   Faye Maloney, MS, RDN, CDN   Available on MS TEAMS

## 2025-02-05 NOTE — PROGRESS NOTE ADULT - ASSESSMENT
59F PMH Sickle cell disease (on hydroxyurea), HTN, HFimpEF/NICM (EF 58% 10/2024) presented to [facility] for SOB and SS crisis 12/30 and 12/31; intubated and sedated s/p witnessed siezure, and transferred to [facility] for further management. Keppra was discontinued at [facility] due to negative EEG. Seizure was thought to be caused by clonazepam withdrawal per OSH. She was also found to have elevated troponins ~900s.  At [facility] MICU pt was found to have RV failure. Pt transferred to NS for management of cardiogenic shock with increased inotropic and pressor support requirement.   1/3 Initiation, venoarterial ECMO (Left Femoral Arterial 17Fr, Right Femoral Venous 25Fr, L SFA RPC 6Fr)  Transfusion Medicine team is consulted for emergent RBC exchange to minimize the risk of complications related to SCD as this patient is critically ill with multi-organ failure.   -TTE 1/4/25: LVEF <20%, ECMO cannula in RV, mildly enlarged RV size and reduced RV systolic function, no pericardial effusion, no MR, trace TR, IVC nml size  MSSA in sputum culture from ET tube. MSSA bacteremia, BAL + MSSA -IV vanc completed  Merrem for total 10 days, completed  Coag negative Staph epi. in single blood culture drawn 1/3  1/7 s/p VA ECMO decannulation. Case was complicated by hematoma formation in left thigh (site of arterial cannula). 2U pRBC, 2 platelet and 5U cryo given.  1/8 YUNG and LLL evaluation revealed moderate green tinged secretions and mucous plugging. RUL, RML, RLL revealed minimal secretions. Keppra reinstated 1000  1/9 MRI today - Innumerable foci susceptibility artifact scattered throughout the brain, tiny acute/subacute infarcts within the bilateral basal ganglia, thalami, and chandu with disproportionate ovoid area of T2/FLAIR prolongation in the right ventral thalamus. Findings may indicate the presence of embolic acute/subacute infarcts  1/14 Lumbar puncture no evidence of meningitis/ encephalitis   1/16 bedside trach w/ Dr Hebert Holden  1/21 PEG placement, flexible bronchoscopy for Acute respiratory failure with hypoxia findings; minimal secretions through out b/l lung segments  1/27 Speech and swallow eval there is reduced movement of the right vocal cord; left is mobile/intact. Silent laryngeal penetration of purees and moderately thick liquids is noted  1/30 Transferred to [address] [place] [address]  1/31  Pending rehab placement. Suction prn. OOb to chair  2/1 VSS, , Free water increased to 300cc q6h via PEG. OOB to chair. Continue PT/OT, pt recommended for acute rehab. Repeat FEEST next week.  2/2 VSS;  --> free water increased to 400mL q6h via PEG. Repeat FEEST this week.   2/3 VSS  --> free water amount and frequency increased. Rpt FEEs Tuesday.   2/4 VSS  on free water 500 q 4h, FEES today, awaiting acceptance at acute rehab  2/5 VSS;  free water bolus remains, Had low Blood sugar x 2 last pm after discussion with nutrition we increased her [employer] tube feeds to 50 cc/hr, Accepted at [place]   Disposition: Acute Rehab

## 2025-02-05 NOTE — CHART NOTE - NSCHARTNOTESELECT_GEN_ALL_CORE
Gen Surg Pre-Procedure Note/Event Note
Nutrition Services
Apheresis Note/Blood Bank
GAP 
Heme/onc
Nutrition Services
Palliative Medicine/Event Note
Postop check/Event Note
postop check
Apheresis Note/Blood Bank

## 2025-02-05 NOTE — PROGRESS NOTE ADULT - SUBJECTIVE AND OBJECTIVE BOX
Subjective: Pt waved and nodded denies any CP, SOB, N/V and palpitations. No acute events overnight.     VITAL SIGNS    Telemetry:  SR  Vital Signs Last 24 Hrs  T(C): 36.9 (25 @ 10:38), Max: 37.6 (25 @ 19:51)  T(F): 98.4 (25 @ 10:38), Max: 99.7 (25 @ 19:51)  HR: 85 (25 @ 10:38) (75 - 91)  BP: 106/66 (25 @ 10:38) (94/65 - 125/80)  RR: 20 (25 @ 10:38) (18 - 20)  SpO2: 99% (25 @ 10:38) (95% - 100%)             07:01  -   @ 07:00  --------------------------------------------------------  IN: 2040 mL / OUT: 400 mL / NET: 1640 mL     @ 07:01  -   @ 15:01  --------------------------------------------------------  IN: 0 mL / OUT: 0 mL / NET: 0 mL       Daily     Daily     Drug Dosing Weight      Bilirubin Total: 0.6 mg/dL ( @ 08:14)    CAPILLARY BLOOD GLUCOSE      POCT Blood Glucose.: 81 mg/dL (2025 11:40)  POCT Blood Glucose.: 92 mg/dL (2025 06:17)  POCT Blood Glucose.: 131 mg/dL (2025 00:34)  POCT Blood Glucose.: 63 mg/dL (2025 23:50)  POCT Blood Glucose.: 80 mg/dL (2025 23:45)  POCT Blood Glucose.: 95 mg/dL (2025 18:52)  POCT Blood Glucose.: 105 mg/dL (2025 18:24)  POCT Blood Glucose.: 69 mg/dL (2025 17:47)          MEDICATIONS  acetaminophen   Oral Liquid .. 650 milliGRAM(s) Oral every 6 hours PRN  albuterol/ipratropium for Nebulization 3 milliLiter(s) Nebulizer every 8 hours  amantadine Syrup 100 milliGRAM(s) Oral <User Schedule>  artificial tears (preservative free) Ophthalmic Solution 1 Drop(s) Both EYES four times a day  ascorbic acid 500 milliGRAM(s) Oral daily  chlorhexidine 2% Cloths 1 Application(s) Topical <User Schedule>  chlorhexidine 4% Liquid 1 Application(s) Topical <User Schedule>  dextrose 50% Injectable 25 milliLiter(s) IV Push every 15 minutes  hydrALAZINE Injectable 5 milliGRAM(s) IV Push every 6 hours PRN  hydrocortisone 1% Cream 1 Application(s) Topical three times a day  hydroxyurea 500 milliGRAM(s) Oral two times a day  insulin lispro (ADMELOG) corrective regimen sliding scale   SubCutaneous every 6 hours  levETIRAcetam  Solution 500 milliGRAM(s) Oral every 12 hours  metoprolol tartrate 25 milliGRAM(s) Oral every 8 hours  multivitamin/minerals/iron Oral Solution (CENTRUM) 15 milliLiter(s) Oral daily  pantoprazole  Injectable 40 milliGRAM(s) IV Push daily  polyethylene glycol 3350 17 Gram(s) Oral daily  senna 1 Tablet(s) Oral daily  sodium chloride 0.9% lock flush 10 milliLiter(s) IV Push every 1 hour PRN        PHYSICAL EXAM    Neurology: A&Ox3, nonfocal, no gross deficits  CV : RRR+S1S2  Sternal Wound :  MSI CDI , Stable  Lungs: Respirations non-labored, B/L BS  Abdomen: Soft, NT/ND, +BSx4Q, last BM on  (-)N/V/D  : Voiding without difficulty, bladder non-distended Costa         [  ]  Extremities: B/L LE edema, negative calf tenderness, +PP , SVG incision      LABS      148[H]  |  111[H]  |  25[H]  ----------------------------<  108[H]  4.3   |  25  |  0.79    Ca    9.7      2025 08:14  Phos  3.8     02-05  Mg     2.4     02-05    TPro  7.0  /  Alb  3.5  /  TBili  0.6  /  DBili  x   /  AST  31  /  ALT  44  /  AlkPhos  91  02-05                                 11.1   10.20 )-----------( 225      ( 2025 08:14 )             34.2                 PAST MEDICAL & SURGICAL HISTORY:  Sickle-cell-hemoglobin C disease with crisis      Essential hypertension      Sickle cell disease      HTN (hypertension)      H/O:       H/O abdominoplasty      S/P breast augmentation      S/P            Physical Therapy Rec:   Home  [  ]   Home w/ PT  [  ]  Rehab  [  ]    Discussed with CT Surgery attending.       Subjective: Pt waved and nodded denies any CP, SOB, N/V and palpitations. No acute events overnight.     VITAL SIGNS    Telemetry:  SR  Vital Signs Last 24 Hrs  T(C): 36.9 (25 @ 10:38), Max: 37.6 (25 @ 19:51)  T(F): 98.4 (25 @ 10:38), Max: 99.7 (25 @ 19:51)  HR: 85 (25 @ 10:38) (75 - 91)  BP: 106/66 (25 @ 10:38) (94/65 - 125/80)  RR: 20 (25 @ 10:38) (18 - 20)  SpO2: 99% (25 @ 10:38) (95% - 100%)             07:01  -   @ 07:00  --------------------------------------------------------  IN: 2040 mL / OUT: 400 mL / NET: 1640 mL     @ 07:01  -   @ 15:01  --------------------------------------------------------  IN: 0 mL / OUT: 0 mL / NET: 0 mL             Bilirubin Total: 0.6 mg/dL ( @ 08:14)    CAPILLARY BLOOD GLUCOSE      POCT Blood Glucose.: 81 mg/dL (2025 11:40)  POCT Blood Glucose.: 92 mg/dL (2025 06:17)  POCT Blood Glucose.: 131 mg/dL (2025 00:34)  POCT Blood Glucose.: 63 mg/dL (2025 23:50)  POCT Blood Glucose.: 80 mg/dL (2025 23:45)  POCT Blood Glucose.: 95 mg/dL (2025 18:52)  POCT Blood Glucose.: 105 mg/dL (2025 18:24)  POCT Blood Glucose.: 69 mg/dL (2025 17:47)          MEDICATIONS  acetaminophen   Oral Liquid .. 650 milliGRAM(s) Oral every 6 hours PRN  albuterol/ipratropium for Nebulization 3 milliLiter(s) Nebulizer every 8 hours  amantadine Syrup 100 milliGRAM(s) Oral <User Schedule>  artificial tears (preservative free) Ophthalmic Solution 1 Drop(s) Both EYES four times a day  ascorbic acid 500 milliGRAM(s) Oral daily  chlorhexidine 2% Cloths 1 Application(s) Topical <User Schedule>  chlorhexidine 4% Liquid 1 Application(s) Topical <User Schedule>  dextrose 50% Injectable 25 milliLiter(s) IV Push every 15 minutes  hydrALAZINE Injectable 5 milliGRAM(s) IV Push every 6 hours PRN  hydrocortisone 1% Cream 1 Application(s) Topical three times a day  hydroxyurea 500 milliGRAM(s) Oral two times a day  insulin lispro (ADMELOG) corrective regimen sliding scale   SubCutaneous every 6 hours  levETIRAcetam  Solution 500 milliGRAM(s) Oral every 12 hours  metoprolol tartrate 25 milliGRAM(s) Oral every 8 hours  multivitamin/minerals/iron Oral Solution (CENTRUM) 15 milliLiter(s) Oral daily  pantoprazole  Injectable 40 milliGRAM(s) IV Push daily  polyethylene glycol 3350 17 Gram(s) Oral daily  senna 1 Tablet(s) Oral daily  sodium chloride 0.9% lock flush 10 milliLiter(s) IV Push every 1 hour PRN        PHYSICAL EXAM    Neurology: A&Ox3, nonfocal, no gross deficits  CV : RRR+S1S2  Sternal Wound:  MSI CDI , Stable  Lungs: Respirations non-labored, B/L BS  Abdomen: Soft, NT/ND, +BSx4Q, last BM on   (-)N/V/D  : Voiding without difficulty has primafit in place  Extremities: B/L LE edema, negative calf tenderness, +PP , SVG incision      LABS      148[H]  |  111[H]  |  25[H]  ----------------------------<  108[H]  4.3   |  25  |  0.79    Ca    9.7      2025 08:14  Phos  3.8     02-05  Mg     2.4     02-05    TPro  7.0  /  Alb  3.5  /  TBili  0.6  /  DBili  x   /  AST  31  /  ALT  44  /  AlkPhos  91  02-                                 11.1   10.20 )-----------( 225      ( 2025 08:14 )             34.2                 PAST MEDICAL & SURGICAL HISTORY:  Sickle-cell-hemoglobin C disease with crisis      Essential hypertension      Sickle cell disease      HTN (hypertension)      H/O:       H/O abdominoplasty      S/P breast augmentation      S/P            Physical Therapy Rec:   Home  [  ]   Home w/ PT  [  ]  Acute Rehab  [ X  ]    Discussed with CT Surgery attending.

## 2025-02-05 NOTE — PROGRESS NOTE ADULT - NUTRITIONAL ASSESSMENT
This patient has been assessed with a concern for Malnutrition and has been determined to have a diagnosis/diagnoses of Severe protein-calorie malnutrition.    This patient is being managed with:   Diet NPO with Tube Feed-  Tube Feeding Modality: Gastrostomy  Inocencia Cortez Peptide 1.5 (KFPEPT1.5RTH)  Total Volume for 24 Hours (mL): 1080  Continuous  Starting Tube Feed Rate {mL per Hour}: 45  Until Goal Tube Feed Rate (mL per Hour): 45  Tube Feed Duration (in Hours): 24  Tube Feed Start Time: 14:30  Mehdi(7 Gm Arginine/7 Gm Glut/1.2 Gm HMB     Qty per Day:  2  Entered: Jan 28 2025  2:21PM   This patient has been assessed with a concern for Malnutrition and has been determined to have a diagnosis/diagnoses of Severe protein-calorie malnutrition.    This patient is being managed with:   Diet NPO with Tube Feed-  Tube Feeding Modality: Gastrostomy  Inocencia Cortez Peptide 1.5 (KFPEPT1.5RTH)  Total Volume for 24 Hours (mL): 1080  Continuous  Starting Tube Feed Rate {mL per Hour}: 50  Until Goal Tube Feed Rate (mL per Hour): 45  Tube Feed Duration (in Hours): 24  Tube Feed Start Time: 14:30  Mehdi(7 Gm Arginine/7 Gm Glut/1.2 Gm HMB     Qty per Day:  2  Entered: Jan 28 2025  2:21PM

## 2025-02-06 ENCOUNTER — TRANSCRIPTION ENCOUNTER (OUTPATIENT)
Age: 60
End: 2025-02-06

## 2025-02-06 ENCOUNTER — INPATIENT (INPATIENT)
Facility: HOSPITAL | Age: 60
LOS: 20 days | Discharge: OTHER INSTITUTION | DRG: 72 | End: 2025-02-27
Attending: STUDENT IN AN ORGANIZED HEALTH CARE EDUCATION/TRAINING PROGRAM | Admitting: PHYSICAL MEDICINE & REHABILITATION
Payer: COMMERCIAL

## 2025-02-06 VITALS
HEART RATE: 88 BPM | SYSTOLIC BLOOD PRESSURE: 126 MMHG | DIASTOLIC BLOOD PRESSURE: 79 MMHG | RESPIRATION RATE: 18 BRPM | TEMPERATURE: 99 F | OXYGEN SATURATION: 100 %

## 2025-02-06 VITALS
HEART RATE: 94 BPM | OXYGEN SATURATION: 98 % | WEIGHT: 160.06 LBS | TEMPERATURE: 98 F | DIASTOLIC BLOOD PRESSURE: 81 MMHG | HEIGHT: 64 IN | SYSTOLIC BLOOD PRESSURE: 118 MMHG | RESPIRATION RATE: 16 BRPM

## 2025-02-06 DIAGNOSIS — Z98.82 BREAST IMPLANT STATUS: Chronic | ICD-10-CM

## 2025-02-06 DIAGNOSIS — Z98.89 OTHER SPECIFIED POSTPROCEDURAL STATES: Chronic | ICD-10-CM

## 2025-02-06 DIAGNOSIS — G93.40 ENCEPHALOPATHY, UNSPECIFIED: ICD-10-CM

## 2025-02-06 DIAGNOSIS — Z98.890 OTHER SPECIFIED POSTPROCEDURAL STATES: Chronic | ICD-10-CM

## 2025-02-06 DIAGNOSIS — Z98.891 HISTORY OF UTERINE SCAR FROM PREVIOUS SURGERY: Chronic | ICD-10-CM

## 2025-02-06 LAB
ALBUMIN SERPL ELPH-MCNC: 3.7 G/DL — SIGNIFICANT CHANGE UP (ref 3.3–5)
ALP SERPL-CCNC: 93 U/L — SIGNIFICANT CHANGE UP (ref 40–120)
ALT FLD-CCNC: 51 U/L — HIGH (ref 10–45)
ANION GAP SERPL CALC-SCNC: 11 MMOL/L — SIGNIFICANT CHANGE UP (ref 5–17)
AST SERPL-CCNC: 33 U/L — SIGNIFICANT CHANGE UP (ref 10–40)
BASOPHILS # BLD AUTO: 0.04 K/UL — SIGNIFICANT CHANGE UP (ref 0–0.2)
BASOPHILS NFR BLD AUTO: 0.4 % — SIGNIFICANT CHANGE UP (ref 0–2)
BILIRUB SERPL-MCNC: 0.7 MG/DL — SIGNIFICANT CHANGE UP (ref 0.2–1.2)
BUN SERPL-MCNC: 22 MG/DL — SIGNIFICANT CHANGE UP (ref 7–23)
CALCIUM SERPL-MCNC: 10.1 MG/DL — SIGNIFICANT CHANGE UP (ref 8.4–10.5)
CHLORIDE SERPL-SCNC: 108 MMOL/L — SIGNIFICANT CHANGE UP (ref 96–108)
CO2 SERPL-SCNC: 26 MMOL/L — SIGNIFICANT CHANGE UP (ref 22–31)
CREAT SERPL-MCNC: 0.77 MG/DL — SIGNIFICANT CHANGE UP (ref 0.5–1.3)
EGFR: 90 ML/MIN/1.73M2 — SIGNIFICANT CHANGE UP
EOSINOPHIL # BLD AUTO: 0.13 K/UL — SIGNIFICANT CHANGE UP (ref 0–0.5)
EOSINOPHIL NFR BLD AUTO: 1.2 % — SIGNIFICANT CHANGE UP (ref 0–6)
GLUCOSE BLDC GLUCOMTR-MCNC: 100 MG/DL — HIGH (ref 70–99)
GLUCOSE BLDC GLUCOMTR-MCNC: 103 MG/DL — HIGH (ref 70–99)
GLUCOSE BLDC GLUCOMTR-MCNC: 111 MG/DL — HIGH (ref 70–99)
GLUCOSE BLDC GLUCOMTR-MCNC: 79 MG/DL — SIGNIFICANT CHANGE UP (ref 70–99)
GLUCOSE BLDC GLUCOMTR-MCNC: 85 MG/DL — SIGNIFICANT CHANGE UP (ref 70–99)
GLUCOSE BLDC GLUCOMTR-MCNC: 95 MG/DL — SIGNIFICANT CHANGE UP (ref 70–99)
GLUCOSE SERPL-MCNC: 105 MG/DL — HIGH (ref 70–99)
HCT VFR BLD CALC: 35.6 % — SIGNIFICANT CHANGE UP (ref 34.5–45)
HGB BLD-MCNC: 11.6 G/DL — SIGNIFICANT CHANGE UP (ref 11.5–15.5)
IMM GRANULOCYTES NFR BLD AUTO: 0.9 % — SIGNIFICANT CHANGE UP (ref 0–0.9)
LYMPHOCYTES # BLD AUTO: 2.81 K/UL — SIGNIFICANT CHANGE UP (ref 1–3.3)
LYMPHOCYTES # BLD AUTO: 25.8 % — SIGNIFICANT CHANGE UP (ref 13–44)
MCHC RBC-ENTMCNC: 30 PG — SIGNIFICANT CHANGE UP (ref 27–34)
MCHC RBC-ENTMCNC: 32.6 G/DL — SIGNIFICANT CHANGE UP (ref 32–36)
MCV RBC AUTO: 92 FL — SIGNIFICANT CHANGE UP (ref 80–100)
MONOCYTES # BLD AUTO: 1.1 K/UL — HIGH (ref 0–0.9)
MONOCYTES NFR BLD AUTO: 10.1 % — SIGNIFICANT CHANGE UP (ref 2–14)
NEUTROPHILS # BLD AUTO: 6.73 K/UL — SIGNIFICANT CHANGE UP (ref 1.8–7.4)
NEUTROPHILS NFR BLD AUTO: 61.6 % — SIGNIFICANT CHANGE UP (ref 43–77)
NRBC # BLD: 1 /100 WBCS — HIGH (ref 0–0)
NRBC BLD-RTO: 1 /100 WBCS — HIGH (ref 0–0)
PLATELET # BLD AUTO: 207 K/UL — SIGNIFICANT CHANGE UP (ref 150–400)
POTASSIUM SERPL-MCNC: 4 MMOL/L — SIGNIFICANT CHANGE UP (ref 3.5–5.3)
POTASSIUM SERPL-SCNC: 4 MMOL/L — SIGNIFICANT CHANGE UP (ref 3.5–5.3)
PROT SERPL-MCNC: 7.3 G/DL — SIGNIFICANT CHANGE UP (ref 6–8.3)
RBC # BLD: 3.87 M/UL — SIGNIFICANT CHANGE UP (ref 3.8–5.2)
RBC # FLD: 16.3 % — HIGH (ref 10.3–14.5)
SARS-COV-2 RNA SPEC QL NAA+PROBE: SIGNIFICANT CHANGE UP
SODIUM SERPL-SCNC: 145 MMOL/L — SIGNIFICANT CHANGE UP (ref 135–145)
WBC # BLD: 10.91 K/UL — HIGH (ref 3.8–10.5)
WBC # FLD AUTO: 10.91 K/UL — HIGH (ref 3.8–10.5)

## 2025-02-06 PROCEDURE — 92507 TX SP LANG VOICE COMM INDIV: CPT

## 2025-02-06 PROCEDURE — 36430 TRANSFUSION BLD/BLD COMPNT: CPT

## 2025-02-06 PROCEDURE — 87798 DETECT AGENT NOS DNA AMP: CPT

## 2025-02-06 PROCEDURE — 85730 THROMBOPLASTIN TIME PARTIAL: CPT

## 2025-02-06 PROCEDURE — 97167 OT EVAL HIGH COMPLEX 60 MIN: CPT

## 2025-02-06 PROCEDURE — 97110 THERAPEUTIC EXERCISES: CPT

## 2025-02-06 PROCEDURE — 99231 SBSQ HOSP IP/OBS SF/LOW 25: CPT

## 2025-02-06 PROCEDURE — 62328 DX LMBR SPI PNXR W/FLUOR/CT: CPT

## 2025-02-06 PROCEDURE — 82247 BILIRUBIN TOTAL: CPT

## 2025-02-06 PROCEDURE — P9016: CPT

## 2025-02-06 PROCEDURE — 84157 ASSAY OF PROTEIN OTHER: CPT

## 2025-02-06 PROCEDURE — 95700 EEG CONT REC W/VID EEG TECH: CPT

## 2025-02-06 PROCEDURE — 88305 TISSUE EXAM BY PATHOLOGIST: CPT

## 2025-02-06 PROCEDURE — 84100 ASSAY OF PHOSPHORUS: CPT

## 2025-02-06 PROCEDURE — 97129 THER IVNTJ 1ST 15 MIN: CPT

## 2025-02-06 PROCEDURE — 83519 RIA NONANTIBODY: CPT

## 2025-02-06 PROCEDURE — 82803 BLOOD GASES ANY COMBINATION: CPT

## 2025-02-06 PROCEDURE — 85396 CLOTTING ASSAY WHOLE BLOOD: CPT

## 2025-02-06 PROCEDURE — 99221 1ST HOSP IP/OBS SF/LOW 40: CPT

## 2025-02-06 PROCEDURE — 94002 VENT MGMT INPAT INIT DAY: CPT

## 2025-02-06 PROCEDURE — 70450 CT HEAD/BRAIN W/O DYE: CPT | Mod: MC

## 2025-02-06 PROCEDURE — 92610 EVALUATE SWALLOWING FUNCTION: CPT

## 2025-02-06 PROCEDURE — 86900 BLOOD TYPING SEROLOGIC ABO: CPT

## 2025-02-06 PROCEDURE — 87476 LYME DIS DNA AMP PROBE: CPT

## 2025-02-06 PROCEDURE — 87070 CULTURE OTHR SPECIMN AEROBIC: CPT

## 2025-02-06 PROCEDURE — 83615 LACTATE (LD) (LDH) ENZYME: CPT

## 2025-02-06 PROCEDURE — 84295 ASSAY OF SERUM SODIUM: CPT

## 2025-02-06 PROCEDURE — 82784 ASSAY IGA/IGD/IGG/IGM EACH: CPT

## 2025-02-06 PROCEDURE — 81001 URINALYSIS AUTO W/SCOPE: CPT

## 2025-02-06 PROCEDURE — 82945 GLUCOSE OTHER FLUID: CPT

## 2025-02-06 PROCEDURE — 85014 HEMATOCRIT: CPT

## 2025-02-06 PROCEDURE — 97161 PT EVAL LOW COMPLEX 20 MIN: CPT

## 2025-02-06 PROCEDURE — 93926 LOWER EXTREMITY STUDY: CPT

## 2025-02-06 PROCEDURE — P9040: CPT

## 2025-02-06 PROCEDURE — 82746 ASSAY OF FOLIC ACID SERUM: CPT

## 2025-02-06 PROCEDURE — 94799 UNLISTED PULMONARY SVC/PX: CPT

## 2025-02-06 PROCEDURE — 86592 SYPHILIS TEST NON-TREP QUAL: CPT

## 2025-02-06 PROCEDURE — 85027 COMPLETE CBC AUTOMATED: CPT

## 2025-02-06 PROCEDURE — 84484 ASSAY OF TROPONIN QUANT: CPT

## 2025-02-06 PROCEDURE — 87483 CNS DNA AMP PROBE TYPE 12-25: CPT

## 2025-02-06 PROCEDURE — 86923 COMPATIBILITY TEST ELECTRIC: CPT

## 2025-02-06 PROCEDURE — 87150 DNA/RNA AMPLIFIED PROBE: CPT

## 2025-02-06 PROCEDURE — 92612 ENDOSCOPY SWALLOW (FEES) VID: CPT

## 2025-02-06 PROCEDURE — 86901 BLOOD TYPING SEROLOGIC RH(D): CPT

## 2025-02-06 PROCEDURE — 84207 ASSAY OF VITAMIN B-6: CPT

## 2025-02-06 PROCEDURE — 74018 RADEX ABDOMEN 1 VIEW: CPT

## 2025-02-06 PROCEDURE — 83036 HEMOGLOBIN GLYCOSYLATED A1C: CPT

## 2025-02-06 PROCEDURE — 87040 BLOOD CULTURE FOR BACTERIA: CPT

## 2025-02-06 PROCEDURE — 93970 EXTREMITY STUDY: CPT

## 2025-02-06 PROCEDURE — 82040 ASSAY OF SERUM ALBUMIN: CPT

## 2025-02-06 PROCEDURE — 86965 POOLING BLOOD PLATELETS: CPT

## 2025-02-06 PROCEDURE — 83520 IMMUNOASSAY QUANT NOS NONAB: CPT

## 2025-02-06 PROCEDURE — 87640 STAPH A DNA AMP PROBE: CPT

## 2025-02-06 PROCEDURE — 87637 SARSCOV2&INF A&B&RSV AMP PRB: CPT

## 2025-02-06 PROCEDURE — 87077 CULTURE AEROBIC IDENTIFY: CPT

## 2025-02-06 PROCEDURE — 89051 BODY FLUID CELL COUNT: CPT

## 2025-02-06 PROCEDURE — 36514 APHERESIS PLASMA: CPT

## 2025-02-06 PROCEDURE — 82550 ASSAY OF CK (CPK): CPT

## 2025-02-06 PROCEDURE — 88108 CYTOPATH CONCENTRATE TECH: CPT

## 2025-02-06 PROCEDURE — 88184 FLOWCYTOMETRY/ TC 1 MARKER: CPT

## 2025-02-06 PROCEDURE — 80074 ACUTE HEPATITIS PANEL: CPT

## 2025-02-06 PROCEDURE — 85045 AUTOMATED RETICULOCYTE COUNT: CPT

## 2025-02-06 PROCEDURE — P9037: CPT

## 2025-02-06 PROCEDURE — 83916 OLIGOCLONAL BANDS: CPT

## 2025-02-06 PROCEDURE — 82330 ASSAY OF CALCIUM: CPT

## 2025-02-06 PROCEDURE — 93005 ELECTROCARDIOGRAM TRACING: CPT

## 2025-02-06 PROCEDURE — 87186 SC STD MICRODIL/AGAR DIL: CPT

## 2025-02-06 PROCEDURE — 86618 LYME DISEASE ANTIBODY: CPT

## 2025-02-06 PROCEDURE — 87529 HSV DNA AMP PROBE: CPT

## 2025-02-06 PROCEDURE — 93931 UPPER EXTREMITY STUDY: CPT

## 2025-02-06 PROCEDURE — 82947 ASSAY GLUCOSE BLOOD QUANT: CPT

## 2025-02-06 PROCEDURE — 72148 MRI LUMBAR SPINE W/O DYE: CPT | Mod: MC

## 2025-02-06 PROCEDURE — 36415 COLL VENOUS BLD VENIPUNCTURE: CPT

## 2025-02-06 PROCEDURE — 80048 BASIC METABOLIC PNL TOTAL CA: CPT

## 2025-02-06 PROCEDURE — 86850 RBC ANTIBODY SCREEN: CPT

## 2025-02-06 PROCEDURE — 83930 ASSAY OF BLOOD OSMOLALITY: CPT

## 2025-02-06 PROCEDURE — 85520 HEPARIN ASSAY: CPT

## 2025-02-06 PROCEDURE — 95711 VEEG 2-12 HR UNMONITORED: CPT

## 2025-02-06 PROCEDURE — 86780 TREPONEMA PALLIDUM: CPT

## 2025-02-06 PROCEDURE — 82962 GLUCOSE BLOOD TEST: CPT

## 2025-02-06 PROCEDURE — 93321 DOPPLER ECHO F-UP/LMTD STD: CPT

## 2025-02-06 PROCEDURE — 83735 ASSAY OF MAGNESIUM: CPT

## 2025-02-06 PROCEDURE — 80202 ASSAY OF VANCOMYCIN: CPT

## 2025-02-06 PROCEDURE — 84590 ASSAY OF VITAMIN A: CPT

## 2025-02-06 PROCEDURE — 93325 DOPPLER ECHO COLOR FLOW MAPG: CPT

## 2025-02-06 PROCEDURE — 86255 FLUORESCENT ANTIBODY SCREEN: CPT

## 2025-02-06 PROCEDURE — 86788 WEST NILE VIRUS AB IGM: CPT

## 2025-02-06 PROCEDURE — 85384 FIBRINOGEN ACTIVITY: CPT

## 2025-02-06 PROCEDURE — 82607 VITAMIN B-12: CPT

## 2025-02-06 PROCEDURE — 93320 DOPPLER ECHO COMPLETE: CPT

## 2025-02-06 PROCEDURE — C8924: CPT

## 2025-02-06 PROCEDURE — P9047: CPT

## 2025-02-06 PROCEDURE — 82042 OTHER SOURCE ALBUMIN QUAN EA: CPT

## 2025-02-06 PROCEDURE — 92597 ORAL SPEECH DEVICE EVAL: CPT

## 2025-02-06 PROCEDURE — P9073: CPT

## 2025-02-06 PROCEDURE — 86658 ENTEROVIRUS ANTIBODY: CPT

## 2025-02-06 PROCEDURE — 88185 FLOWCYTOMETRY/TC ADD-ON: CPT

## 2025-02-06 PROCEDURE — 86789 WEST NILE VIRUS ANTIBODY: CPT

## 2025-02-06 PROCEDURE — P9012: CPT

## 2025-02-06 PROCEDURE — 94640 AIRWAY INHALATION TREATMENT: CPT

## 2025-02-06 PROCEDURE — 83605 ASSAY OF LACTIC ACID: CPT

## 2025-02-06 PROCEDURE — 36512 APHERESIS RBC: CPT

## 2025-02-06 PROCEDURE — 93306 TTE W/DOPPLER COMPLETE: CPT

## 2025-02-06 PROCEDURE — C1894: CPT

## 2025-02-06 PROCEDURE — 70553 MRI BRAIN STEM W/O & W/DYE: CPT | Mod: MC

## 2025-02-06 PROCEDURE — 92523 SPEECH SOUND LANG COMPREHEN: CPT

## 2025-02-06 PROCEDURE — 97535 SELF CARE MNGMENT TRAINING: CPT

## 2025-02-06 PROCEDURE — 76705 ECHO EXAM OF ABDOMEN: CPT

## 2025-02-06 PROCEDURE — A9585: CPT

## 2025-02-06 PROCEDURE — 86341 ISLET CELL ANTIBODY: CPT

## 2025-02-06 PROCEDURE — 84132 ASSAY OF SERUM POTASSIUM: CPT

## 2025-02-06 PROCEDURE — 85610 PROTHROMBIN TIME: CPT

## 2025-02-06 PROCEDURE — 85018 HEMOGLOBIN: CPT

## 2025-02-06 PROCEDURE — 83010 ASSAY OF HAPTOGLOBIN QUANT: CPT

## 2025-02-06 PROCEDURE — 82180 ASSAY OF ASCORBIC ACID: CPT

## 2025-02-06 PROCEDURE — C1889: CPT

## 2025-02-06 PROCEDURE — 97530 THERAPEUTIC ACTIVITIES: CPT

## 2025-02-06 PROCEDURE — 85025 COMPLETE CBC W/AUTO DIFF WBC: CPT

## 2025-02-06 PROCEDURE — 71045 X-RAY EXAM CHEST 1 VIEW: CPT | Mod: 26

## 2025-02-06 PROCEDURE — 87205 SMEAR GRAM STAIN: CPT

## 2025-02-06 PROCEDURE — 71045 X-RAY EXAM CHEST 1 VIEW: CPT

## 2025-02-06 PROCEDURE — 83873 ASSAY OF CSF PROTEIN: CPT

## 2025-02-06 PROCEDURE — 86902 BLOOD TYPE ANTIGEN DONOR EA: CPT

## 2025-02-06 PROCEDURE — C9399: CPT

## 2025-02-06 PROCEDURE — 80053 COMPREHEN METABOLIC PANEL: CPT

## 2025-02-06 PROCEDURE — 86022 PLATELET ANTIBODIES: CPT

## 2025-02-06 PROCEDURE — 82248 BILIRUBIN DIRECT: CPT

## 2025-02-06 PROCEDURE — P9100: CPT

## 2025-02-06 PROCEDURE — 85049 AUTOMATED PLATELET COUNT: CPT

## 2025-02-06 PROCEDURE — 93308 TTE F-UP OR LMTD: CPT

## 2025-02-06 PROCEDURE — 84145 PROCALCITONIN (PCT): CPT

## 2025-02-06 PROCEDURE — 31720 CLEARANCE OF AIRWAYS: CPT

## 2025-02-06 PROCEDURE — 87641 MR-STAPH DNA AMP PROBE: CPT

## 2025-02-06 PROCEDURE — 94003 VENT MGMT INPAT SUBQ DAY: CPT

## 2025-02-06 PROCEDURE — 82140 ASSAY OF AMMONIA: CPT

## 2025-02-06 PROCEDURE — 82435 ASSAY OF BLOOD CHLORIDE: CPT

## 2025-02-06 PROCEDURE — P9045: CPT

## 2025-02-06 PROCEDURE — 84425 ASSAY OF VITAMIN B-1: CPT

## 2025-02-06 PROCEDURE — 95714 VEEG EA 12-26 HR UNMNTR: CPT

## 2025-02-06 PROCEDURE — 72141 MRI NECK SPINE W/O DYE: CPT | Mod: MC

## 2025-02-06 PROCEDURE — 97130 THER IVNTJ EA ADDL 15 MIN: CPT

## 2025-02-06 PROCEDURE — 83020 HEMOGLOBIN ELECTROPHORESIS: CPT

## 2025-02-06 RX ORDER — ENOXAPARIN SODIUM 100 MG/ML
40 INJECTION SUBCUTANEOUS
Qty: 30 | Refills: 0
Start: 2025-02-06 | End: 2025-03-07

## 2025-02-06 RX ORDER — HYDROCORTISONE 1 %
1 CREAM (GRAM) TOPICAL
Qty: 0 | Refills: 0 | DISCHARGE
Start: 2025-02-06

## 2025-02-06 RX ORDER — AMANTADINE HCL 50 MG/5 ML
100 SOLUTION, ORAL ORAL
Qty: 0 | Refills: 0 | DISCHARGE
Start: 2025-02-06

## 2025-02-06 RX ORDER — METOPROLOL SUCCINATE 50 MG/1
25 TABLET, EXTENDED RELEASE ORAL EVERY 8 HOURS
Refills: 0 | Status: DISCONTINUED | OUTPATIENT
Start: 2025-02-06 | End: 2025-02-07

## 2025-02-06 RX ORDER — IPRATROPIUM BROMIDE AND ALBUTEROL SULFATE .5; 2.5 MG/3ML; MG/3ML
3 SOLUTION RESPIRATORY (INHALATION) EVERY 8 HOURS
Refills: 0 | Status: DISCONTINUED | OUTPATIENT
Start: 2025-02-06 | End: 2025-02-17

## 2025-02-06 RX ORDER — ACETAMINOPHEN 500 MG/5ML
650 LIQUID (ML) ORAL EVERY 6 HOURS
Refills: 0 | Status: DISCONTINUED | OUTPATIENT
Start: 2025-02-06 | End: 2025-02-08

## 2025-02-06 RX ORDER — AMANTADINE HCL 100 MG
100 CAPSULE ORAL
Refills: 0 | Status: DISCONTINUED | OUTPATIENT
Start: 2025-02-07 | End: 2025-02-27

## 2025-02-06 RX ORDER — METOPROLOL SUCCINATE 25 MG
0.5 TABLET, EXTENDED RELEASE 24 HR ORAL
Qty: 0 | Refills: 0 | DISCHARGE
Start: 2025-02-06

## 2025-02-06 RX ORDER — LEVETIRACETAM 750 MG/1
5 TABLET, FILM COATED ORAL
Qty: 0 | Refills: 0 | DISCHARGE
Start: 2025-02-06

## 2025-02-06 RX ORDER — CYST/ALA/Q10/PHOS.SER/DHA/BROC 100-20-50
15 POWDER (GRAM) ORAL DAILY
Refills: 0 | Status: DISCONTINUED | OUTPATIENT
Start: 2025-02-07 | End: 2025-02-27

## 2025-02-06 RX ORDER — ASCORBIC ACID 500 MG/ML
1 VIAL (ML) INJECTION
Qty: 0 | Refills: 0 | DISCHARGE
Start: 2025-02-06

## 2025-02-06 RX ORDER — POLYETHYLENE GLYCOL 3350 17 G/17G
17 POWDER, FOR SOLUTION ORAL DAILY
Refills: 0 | Status: DISCONTINUED | OUTPATIENT
Start: 2025-02-06 | End: 2025-02-27

## 2025-02-06 RX ORDER — HYPROMELLOSE 0.4 %
1 DROPS OPHTHALMIC (EYE)
Refills: 0 | Status: DISCONTINUED | OUTPATIENT
Start: 2025-02-07 | End: 2025-02-27

## 2025-02-06 RX ORDER — HYDROXYUREA 500 MG
1 CAPSULE ORAL
Qty: 0 | Refills: 0 | DISCHARGE
Start: 2025-02-06

## 2025-02-06 RX ORDER — IRON/FOLIC ACID/C/B6/B12/ZINC 150-1.25MG
15 TABLET ORAL
Qty: 0 | Refills: 0 | DISCHARGE
Start: 2025-02-06

## 2025-02-06 RX ORDER — IPRATROPIUM BROMIDE AND ALBUTEROL SULFATE .5; 2.5 MG/3ML; MG/3ML
3 SOLUTION RESPIRATORY (INHALATION)
Qty: 0 | Refills: 0 | DISCHARGE
Start: 2025-02-06

## 2025-02-06 RX ORDER — ENOXAPARIN SODIUM 100 MG/ML
40 INJECTION SUBCUTANEOUS EVERY 24 HOURS
Refills: 0 | Status: DISCONTINUED | OUTPATIENT
Start: 2025-02-06 | End: 2025-02-27

## 2025-02-06 RX ORDER — POLYETHYLENE GLYCOL 3350 17 G/17G
17 POWDER, FOR SOLUTION ORAL
Qty: 0 | Refills: 0 | DISCHARGE
Start: 2025-02-06

## 2025-02-06 RX ORDER — ACETAMINOPHEN 160 MG/5ML
20.31 SUSPENSION ORAL
Qty: 0 | Refills: 0 | DISCHARGE
Start: 2025-02-06

## 2025-02-06 RX ORDER — ENOXAPARIN SODIUM 100 MG/ML
40 INJECTION SUBCUTANEOUS EVERY 24 HOURS
Refills: 0 | Status: DISCONTINUED | OUTPATIENT
Start: 2025-02-06 | End: 2025-02-06

## 2025-02-06 RX ORDER — SENNOSIDES 8.6 MG
1 TABLET ORAL
Qty: 0 | Refills: 0 | DISCHARGE
Start: 2025-02-06

## 2025-02-06 RX ORDER — HYDROXYUREA 500 MG/1
500 CAPSULE ORAL
Refills: 0 | Status: DISCONTINUED | OUTPATIENT
Start: 2025-02-07 | End: 2025-02-27

## 2025-02-06 RX ORDER — LEVETIRACETAM 10 MG/ML
500 INJECTION, SOLUTION INTRAVENOUS EVERY 12 HOURS
Refills: 0 | Status: DISCONTINUED | OUTPATIENT
Start: 2025-02-07 | End: 2025-02-27

## 2025-02-06 RX ORDER — SENNA 187 MG
1 TABLET ORAL DAILY
Refills: 0 | Status: DISCONTINUED | OUTPATIENT
Start: 2025-02-07 | End: 2025-02-27

## 2025-02-06 RX ADMIN — LEVETIRACETAM 500 MILLIGRAM(S): 750 TABLET, FILM COATED ORAL at 16:17

## 2025-02-06 RX ADMIN — Medication 25 MILLIGRAM(S): at 06:54

## 2025-02-06 RX ADMIN — Medication 1 APPLICATION(S): at 06:54

## 2025-02-06 RX ADMIN — Medication 15 MILLILITER(S): at 12:03

## 2025-02-06 RX ADMIN — Medication 100 MILLIGRAM(S): at 08:45

## 2025-02-06 RX ADMIN — Medication 500 MILLIGRAM(S): at 16:17

## 2025-02-06 RX ADMIN — PANTOPRAZOLE 40 MILLIGRAM(S): 20 TABLET, DELAYED RELEASE ORAL at 12:03

## 2025-02-06 RX ADMIN — METOPROLOL SUCCINATE 25 MILLIGRAM(S): 50 TABLET, EXTENDED RELEASE ORAL at 22:09

## 2025-02-06 RX ADMIN — Medication 500 MILLIGRAM(S): at 07:39

## 2025-02-06 RX ADMIN — ANTISEPTIC SURGICAL SCRUB 1 APPLICATION(S): 0.04 SOLUTION TOPICAL at 06:54

## 2025-02-06 RX ADMIN — IPRATROPIUM BROMIDE AND ALBUTEROL SULFATE 3 MILLILITER(S): .5; 2.5 SOLUTION RESPIRATORY (INHALATION) at 06:54

## 2025-02-06 RX ADMIN — IPRATROPIUM BROMIDE AND ALBUTEROL SULFATE 3 MILLILITER(S): .5; 2.5 SOLUTION RESPIRATORY (INHALATION) at 21:52

## 2025-02-06 RX ADMIN — Medication 1 DROP(S): at 00:10

## 2025-02-06 RX ADMIN — Medication 25 MILLIGRAM(S): at 13:37

## 2025-02-06 RX ADMIN — Medication 1 DROP(S): at 12:02

## 2025-02-06 RX ADMIN — Medication 500 MILLIGRAM(S): at 12:03

## 2025-02-06 RX ADMIN — LEVETIRACETAM 500 MILLIGRAM(S): 750 TABLET, FILM COATED ORAL at 06:54

## 2025-02-06 RX ADMIN — IPRATROPIUM BROMIDE AND ALBUTEROL SULFATE 3 MILLILITER(S): .5; 2.5 SOLUTION RESPIRATORY (INHALATION) at 13:34

## 2025-02-06 RX ADMIN — Medication 1 APPLICATION(S): at 13:35

## 2025-02-06 RX ADMIN — Medication 1 TABLET(S): at 12:02

## 2025-02-06 RX ADMIN — Medication 100 MILLIGRAM(S): at 13:34

## 2025-02-06 RX ADMIN — Medication 1 DROP(S): at 16:17

## 2025-02-06 RX ADMIN — Medication 1 DROP(S): at 06:54

## 2025-02-06 NOTE — PATIENT PROFILE ADULT - FUNCTIONAL ASSESSMENT - BASIC MOBILITY 6.
1-calculated by average/Not able to assess (calculate score using [facility] averaging method)

## 2025-02-06 NOTE — PATIENT PROFILE ADULT - CAREGIVER NAME
----- Message from Hewitt Litten, MD sent at 1/26/2023  2:00 PM EST -----  Let patient know echo test shows normal heart function, no new orders or changes at this time. Thanks. Hudson Quintana

## 2025-02-06 NOTE — DISCHARGE NOTE PROVIDER - NSDCFUADDAPPT_GEN_ALL_CORE_FT
Please Call Dr. Parra office at [phone] fo an appointment 2 weeks after discharge from rehab  Hematology will contact patient for follow up    ***HGB ELECTROPHORESIS every Tuesday as well as LDH, Reticulocyte, cbc and cmp as per Hematology** while at rehab

## 2025-02-06 NOTE — H&P ADULT - NSHPPHYSICALEXAM_GEN_ALL_CORE
Constitutional - NAD, Comfortable  HEENT - trach collar in place; on oxygen concentration 30%  Chest - good chest expansion, good respiratory effort  Cardio - warm and well perfused, RRR, no m/r/g  Abdomen -  Soft, NTND; PEG site c/d/i; multiple small, subcutaneous nodules palpated (but not visible) just suprapubic - possibly due to AC injections  Extremities - No peripheral edema, No calf tenderness   Neurologic Exam:                    Cognitive -             Orientation: Awake, unable to assess orientation as patient did not respond to questions     Speech - Hypophonic, responds to questions intermittently, aphasic     Cranial Nerves - PERRL, EOMI, no facial asymmetry, tongue midline - exam limited by patient participation     Motor -  Limited by patient limited command following. Moves all limbs spontaneously and appears to have at least antigravity strength in b/l DF and PF. No spasiticy noted      Sensory - patient did not respond when asked sensory questions     Coordination - unable to perform     OculoVestibular -  No nystagmus  Psychiatric - Quiet, seems somewhat nervous  Skin on admission: intact, no pressure sores or ulcers, no rash

## 2025-02-06 NOTE — PROGRESS NOTE ADULT - PROVIDER SPECIALTY LIST ADULT
CT Surgery
Critical Care
Heart Failure
Heme/Onc
Neurology
Rehab Medicine
Surgery
Transplant ID
CT Surgery
CT Surgery
Critical Care
Heart Failure
Heart Failure
Heme/Onc
Heme/Onc
Neurology
Surgery
Transplant ID
CT Surgery
Critical Care
Heme/Onc
Neurology
Surgery
Transplant ID
CT Surgery
Critical Care
Neurology
Transplant ID
Transplant ID
Vascular Surgery
Vascular Surgery
Neurology
Surgery
Surgery
CT Surgery
Neurology
Transplant ID
Heart Failure
CT Surgery
Heart Failure

## 2025-02-06 NOTE — DISCHARGE NOTE PROVIDER - INSTRUCTIONS
Diet NPO  Tube feeds via PEG  TensorComm Peptide 1.5  Total Volume for 24 hours: 1200   Continuous Tube feed rate 50cc/hr  tube feed duration 24 hours  MARIA( 7 GM ARGININE/7GM GLUT/1.2 Gm HMB 2 per day

## 2025-02-06 NOTE — DISCHARGE NOTE NURSING/CASE MANAGEMENT/SOCIAL WORK - CAREGIVER ADDRESS
[address], [facility], [address]
[address], [facility], [address]

## 2025-02-06 NOTE — PATIENT PROFILE ADULT - FALL HARM RISK - HARM RISK INTERVENTIONS

## 2025-02-06 NOTE — PROGRESS NOTE ADULT - SUBJECTIVE AND OBJECTIVE BOX
Subjective: Pt nodded to questions,  denies any CP, SOB, N/V and palpitations. No acute events overnight.     VITAL SIGNS    Telemetry:  SR  Vital Signs Last 24 Hrs  T(C): 37.2 (25 @ 06:54), Max: 37.4 (25 @ 23:36)  T(F): 98.9 (25 @ 06:54), Max: 99.3 (25 @ 23:36)  HR: 96 (25 @ 06:54) (85 - 102)  BP: 139/76 (25 @ 06:54) (96/68 - 139/76)  RR: 20 (25 @ 06:54) (18 - 20)  SpO2: 100% (25 @ 06:54) (98% - 100%)             07:01  -   @ 07:00  --------------------------------------------------------  IN: 2100 mL / OUT: 0 mL / NET: 2100 mL     07:01  -   @ 10:29  --------------------------------------------------------  IN: 0 mL / OUT: 0 mL / NET: 0 mL           Bilirubin Total: 0.7 mg/dL ( @ 07:32)    CAPILLARY BLOOD GLUCOSE      POCT Blood Glucose.: 100 mg/dL (2025 07:22)  POCT Blood Glucose.: 103 mg/dL (2025 02:36)  POCT Blood Glucose.: 85 mg/dL (2025 00:18)  POCT Blood Glucose.: 95 mg/dL (2025 17:56)  POCT Blood Glucose.: 81 mg/dL (2025 11:40)          MEDICATIONS  acetaminophen   Oral Liquid .. 650 milliGRAM(s) Oral every 6 hours PRN  albuterol/ipratropium for Nebulization 3 milliLiter(s) Nebulizer every 8 hours  amantadine Syrup 100 milliGRAM(s) Oral <User Schedule>  artificial tears (preservative free) Ophthalmic Solution 1 Drop(s) Both EYES four times a day  ascorbic acid 500 milliGRAM(s) Oral daily  chlorhexidine 2% Cloths 1 Application(s) Topical <User Schedule>  chlorhexidine 4% Liquid 1 Application(s) Topical <User Schedule>  dextrose 50% Injectable 25 milliLiter(s) IV Push every 15 minutes  hydrALAZINE Injectable 5 milliGRAM(s) IV Push every 6 hours PRN  hydrocortisone 1% Cream 1 Application(s) Topical three times a day  hydroxyurea 500 milliGRAM(s) Oral two times a day  insulin lispro (ADMELOG) corrective regimen sliding scale   SubCutaneous every 6 hours  levETIRAcetam  Solution 500 milliGRAM(s) Oral every 12 hours  metoprolol tartrate 25 milliGRAM(s) Oral every 8 hours  multivitamin/minerals/iron Oral Solution (CENTRUM) 15 milliLiter(s) Oral daily  pantoprazole  Injectable 40 milliGRAM(s) IV Push daily  polyethylene glycol 3350 17 Gram(s) Oral daily  senna 1 Tablet(s) Oral daily  sodium chloride 0.9% lock flush 10 milliLiter(s) IV Push every 1 hour PRN        PHYSICAL EXAM    Neurology: A&Ox3, nonfocal, no gross deficits  CV : RRR+S1S2  Sternal Wound :  MSI CDI , Stable  Lungs: Respirations non-labored, B/L BS  Abdomen: Soft, NT/ND, +BSx4Q, last BM on   (-)N/V/D  : Voiding without difficulty, primafit  Extremities: B/L LE edema, negative calf tenderness, +PP       LABS  -    145  |  108  |  22  ----------------------------<  105[H]  4.0   |  26  |  0.77    Ca    10.1      2025 07:32  Phos  3.8     02-05  Mg     2.4     02-05    TPro  7.3  /  Alb  3.7  /  TBili  0.7  /  DBili  x   /  AST  33  /  ALT  51[H]  /  AlkPhos  93  02-06                                 11.6   10.91 )-----------( 207      ( 2025 07:34 )             35.6                 PAST MEDICAL & SURGICAL HISTORY:  Sickle-cell-hemoglobin C disease with crisis      Essential hypertension      Sickle cell disease      HTN (hypertension)      H/O:       H/O abdominoplasty      S/P breast augmentation      S/P            Physical Therapy Rec:   Home  [  ]   Home w/ PT  [  ]  Rehab  [  ]    Discussed with CT Surgery attending.

## 2025-02-06 NOTE — DISCHARGE NOTE NURSING/CASE MANAGEMENT/SOCIAL WORK - FINANCIAL ASSISTANCE
[facility] provides services at a reduced cost to those who are determined to be eligible through [facility]’s financial assistance program. Information regarding [facility]’s financial assistance program can be found by going to [email or web address] or by calling [phone].
[facility] provides services at a reduced cost to those who are determined to be eligible through [facility]’s financial assistance program. Information regarding [facility]’s financial assistance program can be found by going to [email or web address] or by calling [phone].

## 2025-02-06 NOTE — H&P ADULT - NSHPREVIEWOFSYSTEMS_GEN_ALL_CORE
Pt responds to questions intermittently, but denies any current pain, constipation, dysuria, lightheadedness, dizziness, pruritus, numbness, or N/V.

## 2025-02-06 NOTE — DISCHARGE NOTE PROVIDER - DETAILS OF MALNUTRITION DIAGNOSIS/DIAGNOSES
This patient has been assessed with a concern for Malnutrition and was treated during this hospitalization for the following Nutrition diagnosis/diagnoses:     -  01/08/2025: Severe protein-calorie malnutrition

## 2025-02-06 NOTE — DISCHARGE NOTE PROVIDER - NSDCCPCAREPLAN_GEN_ALL_CORE_FT
no
PRINCIPAL DISCHARGE DIAGNOSIS  Diagnosis: Acute sickle cell crisis  Assessment and Plan of Treatment:

## 2025-02-06 NOTE — CONSULT NOTE ADULT - SUBJECTIVE AND OBJECTIVE BOX
Wound Surgery Consult Note    HPI:  59F PMH Sickle cell disease (on hydroxyurea), HTN, HFimpEF/NICM (EF 58% 10/2024) presented to [facility] for SOB and SS crisis  and ; intubated and sedated s/p witnessed siezure, and transferred to [facility] for further management. Keppra was discontinued at [facility] due to negative EEG. Seizure was thought to be caused by clonazepam withdrawal per OSH. She was also found to have elevated troponins ~900s.  At [facility] MICU pt was found to have RV failure. Pt transferred to [facility] for management of cardiogenic shock with increased inotropic and pressor support requirement.   1/3 Initiation, venoarterial ECMO (Left Femoral Arterial 17Fr, Right Femoral Venous 25Fr, L SFA RPC 6Fr)  Transfusion Medicine team is consulted for emergent RBC exchange to minimize the risk of complications related to SCD as this patient is critically ill with multi-organ failure.    s/p VA ECMO decannulation. Case was complicated by hematoma formation in left thigh (site of arterial cannula). 2U pRBC, 2 platelet and 5U cryo given.   YUNG and LLL evaluation revealed moderate green tinged secretions and mucous plugging. RUL, RML, RLL revealed minimal secretions. Keppra reinstated 1000   MRI today - Innumerable foci susceptibility artifact scattered throughout the brain, tiny acute/subacute infarcts within the bilateral basal ganglia, thalami, and chandu with disproportionate ovoid area of T2/FLAIR prolongation in the right ventral thalamus. Findings may indicate the presence of embolic acute/subacute infarcts   Lumbar puncture no evidence of meningitis/ encephalitis    bedside trach w/ Dr Hebert Holden   PEG placement, flexible bronchoscopy for Acute respiratory failure with hypoxia findings; minimal secretions through out b/l lung segments   Speech and swallow eval there is reduced movement of the right vocal cord; left is mobile/intact. Silent laryngeal penetration of purees and moderately thick liquids is noted   Transferred to [address] [place] [address] and is pending rehab placement.     Request for wound care reevaluation of the sacrum and bilateral buttocks, heels, ears and forehead received from nursing. Ms. Rose was encountered on an alternating air with low air loss surface.  She is incontinent of stool and urine. Her immobility, inactivity, incontinence of bowel and bladder in addition to poor nutritional status all contribute to her risk of pressure injury development and hinder healing. However, all skin damage appears to be resolved.    PAST MEDICAL & SURGICAL HISTORY:  Sickle-cell-hemoglobin C disease with crisis  Essential hypertension  Sickle cell disease  HTN (hypertension)  H/O:   H/O abdominoplasty  S/P breast augmentation  S/P     REVIEW OF SYSTEMS  Unable to obtain due to patient's nonverbal and mental status    MEDICATIONS  (STANDING):  albuterol/ipratropium for Nebulization 3 milliLiter(s) Nebulizer every 8 hours  amantadine Syrup 100 milliGRAM(s) Oral <User Schedule>  artificial tears (preservative free) Ophthalmic Solution 1 Drop(s) Both EYES four times a day  ascorbic acid 500 milliGRAM(s) Oral daily  chlorhexidine 2% Cloths 1 Application(s) Topical <User Schedule>  chlorhexidine 4% Liquid 1 Application(s) Topical <User Schedule>  dextrose 50% Injectable 25 milliLiter(s) IV Push every 15 minutes  hydrocortisone 1% Cream 1 Application(s) Topical three times a day  hydroxyurea 500 milliGRAM(s) Oral two times a day  insulin lispro (ADMELOG) corrective regimen sliding scale   SubCutaneous every 6 hours  levETIRAcetam  Solution 500 milliGRAM(s) Oral every 12 hours  metoprolol tartrate 25 milliGRAM(s) Oral every 8 hours  multivitamin/minerals/iron Oral Solution (CENTRUM) 15 milliLiter(s) Oral daily  pantoprazole  Injectable 40 milliGRAM(s) IV Push daily  polyethylene glycol 3350 17 Gram(s) Oral daily  senna 1 Tablet(s) Oral daily    MEDICATIONS  (PRN):  acetaminophen   Oral Liquid .. 650 milliGRAM(s) Oral every 6 hours PRN Moderate Pain (4 - 6)  hydrALAZINE Injectable 5 milliGRAM(s) IV Push every 6 hours PRN HTN  sodium chloride 0.9% lock flush 10 milliLiter(s) IV Push every 1 hour PRN Pre/post blood products, medications, blood draw, and to maintain line patency    Allergies    doxycycline (Angioedema)  penicillin (Unknown)  clindamycin (Angioedema)  linezolid (Angioedema)    Intolerances    SOCIAL HISTORY:  Unable to obtain due to patient's nonverbal and mental status    FAMILY HISTORY:  FHx: cerebral palsy (Child)    Vital Signs Last 24 Hrs  T(C): 36.9 (2025 11:07), Max: 37.4 (2025 23:36)  T(F): 98.5 (2025 11:07), Max: 99.3 (2025 23:36)  HR: 84 (2025 11:07) (84 - 102)  BP: 116/77 (2025 11:07) (96/68 - 139/76)  BP(mean): 102 (2025 06:54) (78 - 102)  RR: 18 (2025 11:07) (18 - 20)  SpO2: 100% (2025 11:07) (98% - 100%)    Parameters below as of 2025 11:07  Patient On (Oxygen Delivery Method): tracheostomy collar    Physical Exam:  General: alert, no indication of understanding  Ophthamology: sclera clear  ENMT: moist mucous membranes, trachea midline  Respiratory: equal chest rise with respirations  Gastrointestinal: soft NT/ND  Neurology: nonverbal,  following commands  Psych: calm, cooperative  Musculoskeletal: no contractures  Vascular: BLE edema equal  Skin:  Sacrum/bilateral buttocks with central small patch of hypopigmented intact skin, no drainage   Bilateral heels and ears with no signs of skin damage, skin is 100% intact, non discolored  right and left forehead with small patches of hypopigmented 100% intact scar tissue  No odor, increased warmth, tenderness, induration, fluctuance, erythema      LABS:      145  |  108  |  22  ----------------------------<  105[H]  4.0   |  26  |  0.77    Ca    10.1      2025 07:32  Phos  3.8     02-05  Mg     2.4     02-05    TPro  7.3  /  Alb  3.7  /  TBili  0.7  /  DBili  x   /  AST  33  /  ALT  51[H]  /  AlkPhos  93  02-06                          11.6   10.91 )-----------( 207      ( 2025 07:34 )             35.6       Urinalysis Basic - ( 2025 07:32 )    Color: x / Appearance: x / SG: x / pH: x  Gluc: 105 mg/dL / Ketone: x  / Bili: x / Urobili: x   Blood: x / Protein: x / Nitrite: x   Leuk Esterase: x / RBC: x / WBC x   Sq Epi: x / Non Sq Epi: x / Bacteria: x

## 2025-02-06 NOTE — H&P ADULT - ASSESSMENT
Assessment/Plan:  CARSON LEE is a 59y with a history of *** .   Now admitted to [facility] after for initiation of a multidisciplinary rehab program consisting focused on functional mobility, transfers and ADLs (activities of daily living).    #Encephalopathy  - Seizure activities on EEG (now improved); on Keppra 500 BID  for ppx  - Alertness - Amantadine 100 BID  - LP (1/14) - negative for meningitis/encephalitis  Deficits:   Comprehensive Multidisciplinary Rehab Program: 3 hours a day (  hr PT,  hr OT, hr SLP), 5 days a week.    #ARF  - S/p Trach (1/16) > switched to 6.0 cuffless (1/29)    #Cardiogenic Shock  - last trop: 201 (1/4) downtrending from 314, (per [facility] trop ~900)  - S/p ECMO   - Metoprolol 25 Q8h    #Sickle cell disease  - s/p transfusions + IVF  - Hydroxyurea 500 BID     #Transaminitis   - RUQ US    #Sleep:   Maintain quiet hours and low stim environment.    #Pain Management:  Analgesic: Tylenol PRN  Avoid sedating medications that may interfere with cognitive recovery    #GI/Bowel:  Senna QHS, Miralax PRN     #/Bladder:   - PVR q 8 hours (SC if > 400)    #Skin/Pressure Injury:   - Skin assessment on admission: healing pressure injury DTI to L ear lobe, b/l forehead, sacrum /bilateral buttocks, bilateral heels  - Monitor Incisions: ***  - Skin barrier cream as needed  - Nursing to monitor skin Qshift    #Diet/Dysphagia:  Dysphagia: SLP consult for swallow function evaluation and treatment  Current Diet: NPO + TF; free water   S/p PEG (1/21)  FEEST (1/27) > silent laryngeal penetration of purees and moderately thick liquids is noted   [X] Aspiration Precautions  Nutrition consult     #Precautions / PROPHYLAXIS:   - Lungs: Aspiration  - DVT ppx:     ---------------  Code Status/Emergency Contact:    Outpatient Follow-up (Specialty/Name of physician):    --------------  Goals: Safe discharge to Home*****  Estimated Length of Stay: 10-14 days  Rehab Potential: Good  Medical Prognosis: Good  Estimated Disposition: Home with Home Care  ---------------    PRESCREEN COMPARISON:  I have reviewed the prescreen information and I have found no relevant changes between the preadmission screening and my post admission evaluation.    RATIONALE FOR INPATIENT ADMISSION: Patient demonstrates the following:  [X] Medically appropriate for rehabilitation admission  [X] Has attainable rehab goals with an appropriate initial discharge plan  [X]Has rehabilitation potential (expected to make a significant improvement within a reasonable period of time)  [X] Requires close medical management by a rehab physician, rehab nursing care, Hospitalist and comprehensive interdisciplinary team (including PT, OT and/or SLP, Prosthetics and Orthotics) Assessment/Plan:  CRASON LEE is a 59y with significant history for SCC, HTN, HFimpEF/NICM (EF 58% 10/2024), who presented to [facility] for SOB and SS crisis 12/30 and 12/31; intubated/sedated + witnessed seizure, but OSH EEG negative and keppra discontinued. Seizure thought to be from clonazepam withdrawal. Transferred to [facility] (1/3) > found to have cardiogenic shock /RV failure s/p ECMO.   Now admitted to [facility] after for initiation of a multidisciplinary rehab program consisting focused on functional mobility, transfers and ADLs (activities of daily living).    #CVA  - EEG (1/7) with suspicion of seizure; s/p ativan + keppra  - Seizure ppx: Keppra 500 BID   - Alertness - Amantadine 100 BID  - LP (1/14) - negative for meningitis/encephalitis  Deficits:   Comprehensive Multidisciplinary Rehab Program: 3 hours a day (1  hr PT, 1 hr OT, 1 hr SLP), 5 days a week.    #ARF  #Bacteremia  - S/p Trach (1/16) > switched to 6.0 cuffless (1/29)  - BCx 1/3 with one bottle staph epi, BCx 1/5 NGTD, Sputum from ET cx 1/4 staph aureus  - MSSA - cefepime + vanc > switched to vanc + Merrem; Bactroban to nare  - Supplemental O2: trach collar FiO2 30% via 10L  - Duoneb q8hrs  - Pulm consult    #Cardiogenic Shock  - last trop: 201 (1/4) downtrending from 314, (per [facility] trop ~900)  - S/p ECMO (1/3 >decannulated 1/7)  - S/p 2 U PRBC, 2 platelet, 5U cryo  - TTE 1/4/25: LVEF <20%, ECMO cannula in RV, mildly enlarged RV size and reduced RV systolic function; most recent echo (1/20) EF 53%, normal LV+RV function, no acute HF issues  - Metoprolol 25 Q8h    #Sickle cell disease - 1-2 sickle pain crisis per yea  - s/p transfusions, apheresis (1/12) for fat emboli concerns   - Hydroxyurea 500 BID (as long as plt > 100 and hgb >8  - Hematology consult PRN    #Transaminitis   - RUQ US    #Sleep:   Maintain quiet hours and low stim environment.    #Pain Management:  Analgesic: Tylenol PRN  Avoid sedating medications that may interfere with cognitive recovery    #GI/Bowel:  Senna QHS, Miralax QD  GI ppx: Protonix    #/Bladder:   - PVR q 8 hours (SC if > 400)    #Skin/Pressure Injury:   - Skin assessment on admission: healing pressure injury DTI to L ear lobe, b/l forehead, sacrum /bilateral buttocks, bilateral heels  - Monitor Incisions: ***  - Skin barrier cream as needed  - Nursing to monitor skin Qshift  - Wound care consult PRN    #Diet/Dysphagia:  Dysphagia: SLP consult for swallow function evaluation and treatment  Current Diet: NPO + TF; free water   S/p PEG (1/21)  FEEST (1/27) > silent laryngeal penetration of purees and moderately thick liquids is noted > rec NPO  [X] Aspiration Precautions  Nutrition consult  Health supp: Vitamin C + MVI    #Precautions / PROPHYLAXIS:   - Lungs: Aspiration  - DVT ppx:  SCD; Previously on Lovenox 40 QD (stopped since 2/2?)    ---------------  Code Status/Emergency Contact: Full Code    Outpatient Follow-up (Specialty/Name of physician):  Wu Parra  Thoracic and Cardiac Surgery  [address] [place] [id number]  Phone: [phone]  Fax: [phone]  Follow Up Time: 2 weeks  --------------  Goals: Safe discharge to Home*****  Estimated Length of Stay: 14-18 days  Rehab Potential: Good  Medical Prognosis: Good  Estimated Disposition: Home with Home Care  ---------------    PRESCREEN COMPARISON:  I have reviewed the prescreen information and I have found no relevant changes between the preadmission screening and my post admission evaluation.    RATIONALE FOR INPATIENT ADMISSION: Patient demonstrates the following:  [X] Medically appropriate for rehabilitation admission  [X] Has attainable rehab goals with an appropriate initial discharge plan  [X]Has rehabilitation potential (expected to make a significant improvement within a reasonable period of time)  [X] Requires close medical management by a rehab physician, rehab nursing care, Hospitalist and comprehensive interdisciplinary team (including PT, OT and/or SLP, Prosthetics and Orthotics) Assessment/Plan:  CARSON LEE is a 59y with significant history for SCC, HTN, HFimpEF/NICM (EF 58% 10/2024), who presented to [facility] for SOB and SS crisis 12/30 and 12/31; intubated/sedated + witnessed seizure, but OSH EEG negative and keppra discontinued. Seizure thought to be from clonazepam withdrawal. Transferred to [facility] (1/3) > found to have cardiogenic shock /RV failure s/p ECMO.   Now admitted to [facility] after for initiation of a multidisciplinary rehab program consisting focused on functional mobility, transfers and ADLs (activities of daily living).    #CVA  - EEG (1/7) with suspicion of seizure; s/p ativan + keppra  - Seizure ppx: Keppra 500 BID   - Alertness - Amantadine 100 BID  - LP (1/14) - negative for meningitis/encephalitis  Deficits:   Comprehensive Multidisciplinary Rehab Program: 3 hours a day (1  hr PT, 1 hr OT, 1 hr SLP), 5 days a week.    #ARF  - S/p Trach (1/16) > switched to 6.0 cuffless (1/29)  - BCx 1/3 with one bottle staph epi, BCx 1/5 NGTD, Sputum from ET cx 1/4 staph aureus  - MSSA - cefepime + vanc > switched to vanc + Merrem; Bactroban to nare  - Supplemental O2: trach collar FiO2 30% via 10L  - Duoneb q8hrs  - Pulm consult    #Cardiogenic Shock  - last trop: 201 (1/4) downtrending from 314, (per [facility] trop ~900)  - S/p ECMO (1/3 >decannulated 1/7)  - S/p 2 U PRBC, 2 platelet, 5U cryo  - TTE 1/4/25: LVEF <20%, ECMO cannula in RV, mildly enlarged RV size and reduced RV systolic function; most recent echo (1/20) EF 53%, normal LV+RV function, no acute HF issues  - Metoprolol 25 Q8h    #Sickle cell disease - 1-2 sickle pain crisis per yea  - s/p transfusions, apheresis (1/12) for fat emboli concerns   - Hydroxyurea 500 BID (as long as plt > 100 and hgb >8  - Hematology consult PRN    #Transaminitis   - RUQ US    #Sleep:   Maintain quiet hours and low stim environment.    #Pain Management:  Analgesic: Tylenol PRN  Avoid sedating medications that may interfere with cognitive recovery    #GI/Bowel:  Senna QHS, Miralax QD  GI ppx: Protonix    #/Bladder:   - PVR q 8 hours (SC if > 400)    #Skin/Pressure Injury:   - Skin assessment on admission: healing pressure injury DTI to L ear lobe, b/l forehead, sacrum /bilateral buttocks, bilateral heels  - Monitor Incisions: ***  - Skin barrier cream as needed  - Nursing to monitor skin Qshift  - Wound care consult PRN    #Diet/Dysphagia:  Dysphagia: SLP consult for swallow function evaluation and treatment  Current Diet: NPO + TF; free water   S/p PEG (1/21)  FEEST (1/27) > silent laryngeal penetration of purees and moderately thick liquids is noted > rec NPO  [X] Aspiration Precautions  Nutrition consult  Health supp: Vitamin C + MVI    #Precautions / PROPHYLAXIS:   - Lungs: Aspiration  - DVT ppx:  SCD; Previously on Lovenox 40 QD (stopped since 2/2?)    ---------------  Code Status/Emergency Contact: Full Code    Outpatient Follow-up (Specialty/Name of physician):  Wu Parra  Thoracic and Cardiac Surgery  [address] [place] [id number]  Phone: [phone]  Fax: [phone]  Follow Up Time: 2 weeks  --------------  Goals: Safe discharge to Home*****  Estimated Length of Stay: 14-18 days  Rehab Potential: Good  Medical Prognosis: Good  Estimated Disposition: Home with Home Care  ---------------    PRESCREEN COMPARISON:  I have reviewed the prescreen information and I have found no relevant changes between the preadmission screening and my post admission evaluation.    RATIONALE FOR INPATIENT ADMISSION: Patient demonstrates the following:  [X] Medically appropriate for rehabilitation admission  [X] Has attainable rehab goals with an appropriate initial discharge plan  [X]Has rehabilitation potential (expected to make a significant improvement within a reasonable period of time)  [X] Requires close medical management by a rehab physician, rehab nursing care, Hospitalist and comprehensive interdisciplinary team (including PT, OT and/or SLP, Prosthetics and Orthotics) CARSON LEE is a 59y with PMH, HTN, HFimpEF/NICM (EF 58% 10/2024), sickle cell disease, who presented to [facility] for SOB and SS crisis 12/30/25;, followed by witnessed seizure (? secondary to clonazepam withdrawal) requiring intubation. She was transferred to [facility] 1/3/25 secondary to cardiogenic shock /RV failure s/p ECMO +TRACH +PEG.   Now admitted to [facility] after for initiation of a multidisciplinary rehab program consisting focused on functional mobility, transfers and ADLs (activities of daily living).    # bilateral CVA  - MRI 1/9: Innumerable foci susceptibility artifact scattered throughout the brain which can be seen with critically ill patients on ECMO. Diffuse fat embolization is also part of the differential diagnosis but is considered less likely. Tiny acute/subacute infarcts within the bilateral basal ganglia, thalami, and chandu with disproportionate ovoid area of T2/FLAIR prolongation in the right ventral thalamus. Findings may indicate the presence of embolic acute/subacute infarcts.  - begin Comprehensive Multidisciplinary Rehab Program: 3 hours a day (1  hr PT, 1 hr OT, 1 hr SLP), 5 days a week.  - Precautions: respiratory, aspiration, SZ, AMS, fall, TRACH, PEG    # hypoarousal  - LP (1/14) - negative for meningitis/encephalitis  - Amantadine 100 BID    # seizure  - ? secondary to clonazepam withdrawal  - EEG (1/7) with suspicion of seizure  - Seizure ppx: Keppra 500 BID     # Acute respiratory failure  - S/p Trach (1/16). On 6.0 cuffless (1/29)  - BCx 1/3 with one bottle staph epi, BCx 1/5 NGTD, Sputum from ET cx 1/4 staph aureus  - MSSA - cefepime + vanc > switched to vanc + Merrem; Bactroban to nare  - Supplemental O2: trach collar FiO2 30% via 10L  - Duoneb q8hrs  - Pulm consult    # Cardiogenic Shock  - last trop: 201 (1/4) downtrending from 314, (per [facility] trop ~900)  - S/p ECMO 1/3. decannulated 1/7  - S/p 2 U PRBC, 2 platelet, 5U cryo  - ECHO 1/20: EF 53%, normal LV+RV function, no acute HF issues  - Metoprolol 25 Q8h  - monitor vitals, hospitalist consult    # Sickle cell disease   - 1-2 sickle pain crisis per yea  - s/p transfusions, apheresis (1/12) for fat emboli concerns   - Hydroxyurea 500 BID (as long as plt > 100 and hgb >8)  - Hematology consult PRN    # Transaminitis   - RUQ US    # Sleep:   - Maintain quiet hours and low stim environment.    # Pain Management:  - Tylenol PRN  - Avoid sedating medications that may interfere with cognitive recovery    # GI/Bowel:  - Senna QHS, Miralax QD  - GI ppx: Protonix    # /Bladder:   - PVR q 8 hours (SC if > 400)    # Skin/Pressure Injury:   - Skin assessment on admission: healing pressure injury DTI to L ear lobe, b/l forehead, sacrum /bilateral buttocks, bilateral heels  - Monitor Incisions: ***  - Skin barrier cream as needed  - Nursing to monitor skin Qshift    # Diet/Dysphagia:  - S/p PEG (1/21)  - FEEST (1/27) > silent laryngeal penetration of purees and moderately thick liquids is noted > rec NPO  - Current Diet: NPO + TF; free water   - Dysphagia: SLP consult for swallow function evaluation and treatment  - Health supp: Vitamin C + MVI    # DVT ppx:    - Previously on Lovenox 40 QD (was last on medication 2/2)  - SCD  ---------------  Code Status/Emergency Contact: Full Code    Outpatient Follow-up (Specialty/Name of physician):  Wu Parra  Thoracic and Cardiac Surgery  [address] [place] [id number]  Phone: [phone]  Fax: [phone]  Follow Up Time: 2 weeks   CARSON LEE is a 59y with PMH, HTN, HFimpEF/NICM (EF 58% 10/2024), sickle cell disease, who presented to [facility] for SOB and SS crisis 12/30/25;, followed by witnessed seizure (? secondary to clonazepam withdrawal) requiring intubation. She was transferred to [facility] 1/3/25 secondary to cardiogenic shock /RV failure s/p ECMO +TRACH +PEG.   Now admitted to [facility] after for initiation of a multidisciplinary rehab program consisting focused on functional mobility, transfers and ADLs (activities of daily living).    # bilateral CVA  - MRI 1/9: Innumerable foci susceptibility artifact scattered throughout the brain which can be seen with critically ill patients on ECMO. Diffuse fat embolization is also part of the differential diagnosis but is considered less likely. Tiny acute/subacute infarcts within the bilateral basal ganglia, thalami, and chandu with disproportionate ovoid area of T2/FLAIR prolongation in the right ventral thalamus. Findings may indicate the presence of embolic acute/subacute infarcts.  - begin Comprehensive Multidisciplinary Rehab Program: 3 hours a day (1  hr PT, 1 hr OT, 1 hr SLP), 5 days a week.  - Precautions: respiratory, aspiration, SZ, AMS, fall, TRACH, PEG    # hypoarousal  - LP (1/14) - negative for meningitis/encephalitis  - Amantadine 100 BID    # seizure  - ? secondary to clonazepam withdrawal  - EEG (1/7) with suspicion of seizure  - Seizure ppx: Keppra 500 BID     # Acute respiratory failure  - S/p Trach (1/16). On 6.0 cuffless (1/29)  - BCx 1/3 with one bottle staph epi, BCx 1/5 NGTD, Sputum from ET cx 1/4 staph aureus  - MSSA - cefepime + vanc > switched to vanc + Merrem; Bactroban to nare  - Supplemental O2: trach collar FiO2 30% via 10L  - Duoneb q8hrs  - Pulm consult    # Cardiogenic Shock  - last trop: 201 (1/4) downtrending from 314, (per [facility] trop ~900)  - S/p ECMO 1/3. decannulated 1/7  - S/p 2 U PRBC, 2 platelet, 5U cryo  - ECHO 1/20: EF 53%, normal LV+RV function, no acute HF issues  - Metoprolol 25 Q8h  - monitor vitals, hospitalist consult    # Sickle cell disease   - 1-2 sickle pain crisis per yea  - s/p transfusions, apheresis (1/12) for fat emboli concerns   - Hydroxyurea 500 BID (as long as plt > 100 and hgb >8)  - Hematology consult PRN    # Transaminitis   - RUQ     # Sleep:   - Maintain quiet hours and low stim environment.    # Pain Management:  - Tylenol PRN  - Avoid sedating medications that may interfere with cognitive recovery    # GI/Bowel:  - Senna QHS, Miralax QD  - GI ppx: Protonix    # /Bladder:   - PVR q 8 hours (SC if > 400)    # Skin/Pressure Injury:   - Skin assessment on admission:   - Seen by wound care team at [facility]. healing pressure injury DTI to L ear lobe, b/l forehead, sacrum /bilateral buttocks, bilateral heels. Incontinence Dermatitis  Recommend:  1.) topical therapy: sacral/buttock skin – cleanse with incontinence cleanser, pat dry, apply Piotr ointment BID and PRN for incontinent episodes. bilateral forehead - cleanse with gentle cleanser, leave open to air  2.) Incontinence Management - incontinence cleanser, pads, pericare BID  3.) Maintain on an alternating air with low air loss surface  4.) Turn and reposition Q 2 hours  5.) Nutrition optimization   6.) Offload heels/feet with complete cair air fluidized boots/pillows; ensure that the soles of the feet are not resting on the foot board of the bed.  7.) chair cushion for chair sitting    # Diet/Dysphagia:  - S/p PEG (1/21)  - FEEST (1/27) > silent laryngeal penetration of purees and moderately thick liquids is noted > rec NPO  - Current Diet: NPO + TF; free water   - Dysphagia: SLP consult for swallow function evaluation and treatment  - Health supp: Vitamin C + MVI    # DVT ppx:    - Previously on Lovenox 40 QD (was last on medication 2/2)  - SCD  ---------------  Code Status/Emergency Contact: Full Code    Outpatient Follow-up (Specialty/Name of physician):  Wu Parra  Thoracic and Cardiac Surgery  [address] [place] [id number]  Phone: [phone]  Fax: [phone]  Follow Up Time: 2 weeks   CARSON LEE is a 59y with PMH, HTN, HFimpEF/NICM (EF 58% 10/2024), sickle cell disease, who presented to [facility] for SOB and SS crisis 12/30/25;, followed by witnessed seizure (? secondary to clonazepam withdrawal) requiring intubation. She was transferred to [facility] 1/3/25 secondary to cardiogenic shock /RV failure s/p ECMO +TRACH +PEG.   Now admitted to [facility] after for initiation of a multidisciplinary rehab program consisting focused on functional mobility, transfers and ADLs (activities of daily living).    # bilateral CVA  - MRI 1/9: Innumerable foci susceptibility artifact scattered throughout the brain which can be seen with critically ill patients on ECMO. Diffuse fat embolization is also part of the differential diagnosis but is considered less likely. Tiny acute/subacute infarcts within the bilateral basal ganglia, thalami, and chandu with disproportionate ovoid area of T2/FLAIR prolongation in the right ventral thalamus. Findings may indicate the presence of embolic acute/subacute infarcts.  - begin Comprehensive Multidisciplinary Rehab Program: 3 hours a day (1  hr PT, 1 hr OT, 1 hr SLP), 5 days a week.  - Precautions: respiratory, aspiration, SZ, AMS, fall, TRACH, PEG    # hypoarousal  - LP (1/14) - negative for meningitis/encephalitis  - Amantadine 100 BID    # seizure  - ? secondary to clonazepam withdrawal  - EEG (1/7) with suspicion of seizure  - Seizure ppx: Keppra 500 BID     # Acute respiratory failure  - S/p Trach (1/16). On 6.0 cuffless (1/29)  - BCx 1/3 with one bottle staph epi, BCx 1/5 NGTD, Sputum from ET cx 1/4 staph aureus  - MSSA - cefepime + vanc > switched to vanc + Merrem; Bactroban to nare  - Supplemental O2: trach collar FiO2 30% via 10L  - Duoneb q8hrs  - Pulm consult    # Cardiogenic Shock  - last trop: 201 (1/4) downtrending from 314, (per [facility] trop ~900)  - S/p ECMO 1/3. decannulated 1/7  - S/p 2 U PRBC, 2 platelet, 5U cryo  - ECHO 1/20: EF 53%, normal LV+RV function, no acute HF issues  - Metoprolol 25 Q8h  - monitor vitals, hospitalist consult    # Sickle cell disease   - 1-2 sickle pain crisis per yea  - s/p transfusions, apheresis (1/12) for fat emboli concerns   - Hydroxyurea 500 BID (as long as plt > 100 and hgb >8)  - Hematology consult PRN    # Transaminitis   - RUQ     # Sleep:   - Maintain quiet hours and low stim environment.    # Pain Management:  - Tylenol PRN  - Avoid sedating medications that may interfere with cognitive recovery    # GI/Bowel:  - Senna QHS, Miralax QD  - GI ppx: Protonix    # /Bladder:   - PVR q 8 hours (SC if > 400)    # Skin/Pressure Injury:   - Skin assessment on admission:   - Seen by wound care team at [facility]. healing pressure injury DTI to L ear lobe, b/l forehead, sacrum /bilateral buttocks, bilateral heels. Incontinence Dermatitis  Recommend:  1.) topical therapy: sacral/buttock skin – cleanse with incontinence cleanser, pat dry, apply Piotr ointment BID and PRN for incontinent episodes. bilateral forehead - cleanse with gentle cleanser, leave open to air  2.) Incontinence Management - incontinence cleanser, pads, pericare BID  3.) Maintain on an alternating air with low air loss surface  4.) Turn and reposition Q 2 hours  5.) Nutrition optimization   6.) Offload heels/feet with complete cair air fluidized boots/pillows; ensure that the soles of the feet are not resting on the foot board of the bed.  7.) chair cushion for chair sitting    # Diet/Dysphagia:  - S/p PEG (1/21)  - FEEST (1/27) > silent laryngeal penetration of purees and moderately thick liquids is noted > rec NPO  - Current Diet: NPO + TF; free water   - Dysphagia: SLP consult for swallow function evaluation and treatment  - Health supp: Vitamin C + MVI    # DVT ppx:    - Previously on Lovenox 40 QD (was last on medication 2/2). Confirmed with [facility], to restart. Will order on admission  - SCD  ---------------  Code Status/Emergency Contact: Full Code    Outpatient Follow-up (Specialty/Name of physician):  Wu Parra  Thoracic and Cardiac Surgery  [address] [place] [id number]  Phone: [phone]  Fax: [phone]  Follow Up Time: 2 weeks   CARSON LEE is a 59y with PMH, HTN, HFimpEF/NICM (EF 58% 10/2024), sickle cell disease, who presented to [facility] for SOB and SS crisis 12/30/25;, followed by witnessed seizure (? secondary to clonazepam withdrawal) requiring intubation. She was transferred to [facility] 1/3/25 secondary to cardiogenic shock /RV failure s/p ECMO +TRACH +PEG.   Now admitted to [facility] after for initiation of a multidisciplinary rehab program consisting focused on functional mobility, transfers and ADLs (activities of daily living).    # bilateral CVA  - MRI 1/9: Innumerable foci susceptibility artifact scattered throughout the brain which can be seen with critically ill patients on ECMO. Diffuse fat embolization is also part of the differential diagnosis but is considered less likely. Tiny acute/subacute infarcts within the bilateral basal ganglia, thalami, and chandu with disproportionate ovoid area of T2/FLAIR prolongation in the right ventral thalamus. Findings may indicate the presence of embolic acute/subacute infarcts.  - begin Comprehensive Multidisciplinary Rehab Program: 3 hours a day (1  hr PT, 1 hr OT, 1 hr SLP), 5 days a week.  - Precautions: respiratory, aspiration, SZ, AMS, fall, TRACH, PEG    # hypoarousal  - LP (1/14) - negative for meningitis/encephalitis  - Amantadine 100 BID    # seizure  - ? secondary to clonazepam withdrawal  - EEG (1/7) with suspicion of seizure  - Seizure ppx: Keppra 500 BID     # Acute respiratory failure  - S/p Trach (1/16). On 6.0 cuffless (1/29)  - BCx 1/3 with one bottle staph epi, BCx 1/5 NGTD, Sputum from ET cx 1/4 staph aureus  - MSSA - cefepime + vanc > switched to vanc + Merrem; Bactroban to nare  - Supplemental O2: trach collar FiO2 30% via 10L  - Duoneb q8hrs  - Pulm consult    # Cardiogenic Shock  - last trop: 201 (1/4) downtrending from 314, (per [facility] trop ~900)  - S/p ECMO 1/3. decannulated 1/7  - S/p 2 U PRBC, 2 platelet, 5U cryo  - ECHO 1/20: EF 53%, normal LV+RV function, no acute HF issues  - Metoprolol 25 Q8h  - monitor vitals, hospitalist consult    # Sickle cell disease   - 1-2 sickle pain crisis per yea  - s/p transfusions, apheresis (1/12) for fat emboli concerns   - Hydroxyurea 500 BID (as long as plt > 100 and hgb >8)  - Hematology consult PRN    # Transaminitis   - RUQ     # Sleep:   - Maintain quiet hours and low stim environment.    # Pain Management:  - Tylenol PRN  - Avoid sedating medications that may interfere with cognitive recovery    # GI/Bowel:  - Senna QHS, Miralax QD  - GI ppx: Protonix    # /Bladder:   - PVR q 8 hours (SC if > 400)    # Skin/Pressure Injury:   - Skin assessment on admission:   - Seen by wound care team at [facility]. healing pressure injury DTI to L ear lobe, b/l forehead, sacrum /bilateral buttocks, bilateral heels. Incontinence Dermatitis  - on admissions exam, no skin injuries noted to sacrum/buttocks or heels  Recommend:  1.) topical therapy: sacral/buttock skin – cleanse with incontinence cleanser, pat dry, apply Piotr ointment BID and PRN for incontinent episodes. bilateral forehead - cleanse with gentle cleanser, leave open to air  2.) Incontinence Management - incontinence cleanser, pads, pericare BID  3.) Maintain on an alternating air with low air loss surface  4.) Turn and reposition Q 2 hours  5.) Nutrition optimization   6.) Offload heels/feet with complete cair air fluidized boots/pillows; ensure that the soles of the feet are not resting on the foot board of the bed.  7.) chair cushion for chair sitting    # Diet/Dysphagia:  - S/p PEG (1/21)  - FEEST (1/27) > silent laryngeal penetration of purees and moderately thick liquids is noted > rec NPO  - Current Diet: NPO + TF; free water   - Dysphagia: SLP consult for swallow function evaluation and treatment  - Health supp: Vitamin C + MVI    # DVT ppx:    - Previously on Lovenox 40 QD (was last on medication 2/2). Confirmed with [facility], to restart. Will order on admission  - SCD  ---------------  Code Status/Emergency Contact: Full Code    Outpatient Follow-up:    Wu Parra  Thoracic and Cardiac Surgery  [address] [place] [id number]  Phone: [phone]  Fax: [phone]  Follow Up Time: 2 weeks   CARSON LEE is a 59y with PMH, HTN, HFimpEF/NICM (EF 58% 10/2024), sickle cell disease, who presented to [facility] for SOB and SS crisis 12/30/25;, followed by witnessed seizure (? secondary to clonazepam withdrawal) requiring intubation. She was [facility] 1[facility]/25 secondary to cardiogenic shock /RV failure s/p ECMO +TRACH +PEG.   Now admitted to [facility] after for initiation of a multidisciplinary rehab program consisting focused on functional mobility, transfers and ADLs (activities of daily living).    # bilateral CVA, bilateral body involvement left > right, left kelsi inattention, aphasia, respiratory failure, dysphagia  - MRI 1/9: Innumerable foci susceptibility artifact scattered throughout the brain which can be seen with critically ill patients on ECMO. Diffuse fat embolization is also part of the differential diagnosis but is considered less likely. Tiny acute/subacute infarcts within the bilateral basal ganglia, thalami, and chandu with disproportionate ovoid area of T2/FLAIR prolongation in the right ventral thalamus. Findings may indicate the presence of embolic acute/subacute infarcts.  - begin Comprehensive Multidisciplinary Rehab Program: 3 hours a day (1  hr PT, 1 hr OT, 1 hr SLP), 5 days a week.  - Precautions: respiratory, aspiration, SZ, AMS, fall, TRACH, PEG    # hypoarousal  - LP (1/14) - negative for meningitis/encephalitis  - Amantadine 100 BID    # seizure  - ? secondary to clonazepam withdrawal  - EEG (1/7) with suspicion of seizure  - Seizure ppx: Keppra 500 BID     # Acute respiratory failure  - S/p Trach (1/16). On 6.0 cuffless (1/29)  - BCx 1/3 with one bottle staph epi, BCx 1/5 NGTD, Sputum from ET cx 1/4 staph aureus  - MSSA - cefepime + vanc > switched to vanc + Merrem; Bactroban to nare  - Supplemental O2: trach collar FiO2 30% via 10L  - Duoneb q8hrs  - Pulm consult    # Cardiogenic Shock  - last trop: 201 (1/4) downtrending from 314, (per [facility] trop ~900)  - S/p ECMO 1/3. decannulated 1/7  - S/p 2 U PRBC, 2 platelet, 5U cryo  - ECHO 1/20: EF 53%, normal LV+RV function, no acute HF issues  - Metoprolol 25 Q8h  - monitor vitals, hospitalist consult  - BP  (116/77 - 126/87) 2/7    # Sickle cell disease   - 1-2 sickle pain crisis per yea  - s/p transfusions, apheresis (1/12) for fat emboli concerns   - Hydroxyurea 500 BID (as long as plt > 100 and hgb >8)  - Hematology consult PRN  - Hgb 10.5 plt 191 O2 sat % 2/7    # Mild leukocytosis   - WBC 11.33, 2/7  - has had fluctuations between 9-12 recently, monitor. Hospitalist consult    # Transaminitis   - RUQ US    # Sleep:   - Maintain quiet hours and low stim environment.    # Pain Management:  - Tylenol PRN  - Avoid sedating medications that may interfere with cognitive recovery    # GI/Bowel:  - Senna QHS, Miralax QD  - GI ppx: Protonix    # /Bladder:   - PVR q 8 hours (SC if > 400)    # Skin/Pressure Injury:   - Skin assessment on admission:   - Seen by wound care team at [facility]. healing pressure injury DTI to L ear lobe, b/l forehead, sacrum /bilateral buttocks, bilateral heels. Incontinence Dermatitis  - on admissions exam, no skin injuries noted to sacrum/buttocks or heels  Recommend:  1.) topical therapy: sacral/buttock skin – cleanse with incontinence cleanser, pat dry, apply Piotr ointment BID and PRN for incontinent episodes. bilateral forehead - cleanse with gentle cleanser, leave open to air  2.) Incontinence Management - incontinence cleanser, pads, pericare BID  3.) Maintain on an alternating air with low air loss surface  4.) Turn and reposition Q 2 hours  5.) Nutrition optimization   6.) Offload heels/feet with complete cair air fluidized boots/pillows; ensure that the soles of the feet are not resting on the foot board of the bed.  7.) chair cushion for chair sitting    # Diet/Dysphagia:  - S/p PEG (1/21)  - FEEST (1/27) > silent laryngeal penetration of purees and moderately thick liquids is noted   - Current Diet: NPO + TF; free water   - Dysphagia: SLP consult for swallow function evaluation and treatment  - Health supp: Vitamin C + MVI    # DVT ppx:    - Previously on Lovenox 40 QD (was last on medication 2/2). Confirmed with [facility], to restart. Will order on admission  - SCD  ---------------  Code Status/Emergency Contact: Full Code    Outpatient Follow-up:    Wu Parra  Thoracic and Cardiac Surgery  [address] [place] [id number]  Phone: [phone]  Fax: [phone]  Follow Up Time: 2 weeks   CARSON LEE is a 59y with PMH, HTN, HFimpEF/NICM (EF 58% 10/2024), sickle cell disease, who presented to [facility] for SOB and SS crisis 12/30/25;, followed by witnessed seizure (? secondary to clonazepam withdrawal) requiring intubation. She was transferred to [facility] 1/3/25 secondary to cardiogenic shock /RV failure s/p ECMO +TRACH +PEG.   Now admitted to [facility] after for initiation of a multidisciplinary rehab program consisting focused on functional mobility, transfers and ADLs (activities of daily living).    # bilateral CVA, bilateral body involvement left > right, left kelsi inattention, aphasia, respiratory failure, dysphagia  - MRI 1/9: Innumerable foci susceptibility artifact scattered throughout the brain which can be seen with critically ill patients on ECMO. Diffuse fat embolization is also part of the differential diagnosis but is considered less likely. Tiny acute/subacute infarcts within the bilateral basal ganglia, thalami, and chandu with disproportionate ovoid area of T2/FLAIR prolongation in the right ventral thalamus. Findings may indicate the presence of embolic acute/subacute infarcts.  - begin Comprehensive Multidisciplinary Rehab Program: 3 hours a day (1  hr PT, 1 hr OT, 1 hr SLP), 5 days a week.  - Precautions: respiratory, aspiration, SZ, AMS, fall, TRACH, PEG    # hypoarousal  - LP (1/14) - negative for meningitis/encephalitis  - Amantadine 100 BID    # seizure  - ? secondary to clonazepam withdrawal  - EEG (1/7) with suspicion of seizure  - Seizure ppx: Keppra 500 BID     # Acute respiratory failure  - S/p Trach (1/16). On 6.0 cuffless (1/29)  - BCx 1/3 with one bottle staph epi, BCx 1/5 NGTD, Sputum from ET cx 1/4 staph aureus  - MSSA - cefepime + vanc > switched to vanc + Merrem; Bactroban to nare  - Supplemental O2: trach collar FiO2 30% via 10L  - Duoneb q8hrs  - Pulm consult    # Cardiogenic Shock  - last trop: 201 (1/4) downtrending from 314, (per [facility] trop ~900)  - S/p ECMO 1/3. decannulated 1/7  - S/p 2 U PRBC, 2 platelet, 5U cryo  - ECHO 1/20: EF 53%, normal LV+RV function, no acute HF issues  - Metoprolol 25 Q8h--> reduced 12.5 secondary to [place] 2/7  - monitor vitals, hospitalist consult  - BP  (116/77 - 126/87) 2/7    # orthostatic hypotension  -  SBP dropped to 62 during OT session 2/7, imrpoved to SBP 98 supine  - hospitalist appreciated, reduce lopressor as above  - DONALD stockings    # Sickle cell disease   - 1-2 sickle pain crisis per yea  - s/p transfusions, apheresis (1/12) for fat emboli concerns   - Hydroxyurea 500 BID (as long as plt > 100 and hgb >8)  - Hematology consult PRN  - Hgb 10.5 plt 191 O2 sat % 2/7    # Mild leukocytosis   - WBC 11.33, 2/7  - has had fluctuations between 9-12 recently, monitor. Hospitalist consult    # Transaminitis   - RUQ US    # Sleep:   - Maintain quiet hours and low stim environment.    # Pain Management:  - Tylenol PRN  - Avoid sedating medications that may interfere with cognitive recovery    # GI/Bowel:  - Senna QHS, Miralax QD  - GI ppx: Protonix    # /Bladder:   - PVR q 8 hours (SC if > 400)    # Skin/Pressure Injury:   - Skin assessment on admission:   - Seen by wound care team at [facility]. healing pressure injury DTI to L ear lobe, b/l forehead, sacrum /bilateral buttocks, bilateral heels. Incontinence Dermatitis  - on admissions exam, no skin injuries noted to sacrum/buttocks or heels  Recommend:  1.) topical therapy: sacral/buttock skin – cleanse with incontinence cleanser, pat dry, apply Piotr ointment BID and PRN for incontinent episodes. bilateral forehead - cleanse with gentle cleanser, leave open to air  2.) Incontinence Management - incontinence cleanser, pads, pericare BID  3.) Maintain on an alternating air with low air loss surface  4.) Turn and reposition Q 2 hours  5.) Nutrition optimization   6.) Offload heels/feet with complete cair air fluidized boots/pillows; ensure that the soles of the feet are not resting on the foot board of the bed.  7.) chair cushion for chair sitting    # Diet/Dysphagia:  - S/p PEG (1/21)  - FEEST (1/27) > silent laryngeal penetration of purees and moderately thick liquids is noted   - Current Diet: NPO + TF; free water   - Dysphagia: SLP consult for swallow function evaluation and treatment  - Health supp: Vitamin C + MVI    # DVT ppx:    - Previously on Lovenox 40 QD (was last on medication 2/2). Confirmed with [facility], to restart. Will order on admission  - SCD  ---------------  Code Status/Emergency Contact: Full Code    Outpatient Follow-up:    Wu Parra  Thoracic and Cardiac Surgery  [address] [place] [id number]  Phone: [phone]  Fax: [phone]  Follow Up Time: 2 weeks   CARSON LEE is a 59y with PMH, HTN, HFimpEF/NICM (EF 58% 10/2024), sickle cell disease, who presented to [facility] for SOB and SS crisis 12/30/25;, followed by witnessed seizure (? secondary to clonazepam withdrawal) requiring intubation. She was transferred to [facility] 1/3/25 secondary to cardiogenic shock /RV failure s/p ECMO +TRACH +PEG.   Now admitted to [facility] after for initiation of a multidisciplinary rehab program consisting focused on functional mobility, transfers and ADLs (activities of daily living).    # bilateral CVA, bilateral body involvement left > right, left kelsi inattention, aphasia, respiratory failure, dysphagia  - MRI 1/9: Innumerable foci susceptibility artifact scattered throughout the brain which can be seen with critically ill patients on ECMO. Diffuse fat embolization is also part of the differential diagnosis but is considered less likely. Tiny acute/subacute infarcts within the bilateral basal ganglia, thalami, and chandu with disproportionate ovoid area of T2/FLAIR prolongation in the right ventral thalamus. Findings may indicate the presence of embolic acute/subacute infarcts.  - begin Comprehensive Multidisciplinary Rehab Program: 3 hours a day (1  hr PT, 1 hr OT, 1 hr SLP), 5 days a week.  - Precautions: respiratory, aspiration, SZ, AMS, fall, TRACH, PEG    # hypoarousal  - LP (1/14) - negative for meningitis/encephalitis  - Amantadine 100 BID    # seizure  - ? secondary to clonazepam withdrawal  - EEG (1/7) with suspicion of seizure  - Seizure ppx: Keppra 500 BID     # Acute respiratory failure  - S/p Trach (1/16). On 6.0 cuffless (1/29)  - BCx 1/3 with one bottle staph epi, BCx 1/5 NGTD, Sputum from ET cx 1/4 staph aureus  - MSSA - cefepime + vanc > switched to vanc + Merrem; Bactroban to nare  - Supplemental O2: trach collar FiO2 30% via 10L  - Duoneb q8hrs  - Pulm consult    # Cardiogenic Shock  - last trop: 201 (1/4) downtrending from 314, (per [facility] trop ~900)  - S/p ECMO 1/3. decannulated 1/7  - S/p 2 U PRBC, 2 platelet, 5U cryo  - ECHO 1/20: EF 53%, normal LV+RV function, no acute HF issues  - Metoprolol 25 Q8h--> reduced 12.5 secondary to [place] 2/7  - monitor vitals, hospitalist consult  - BP  (116/77 - 126/87) 2/7    # orthostatic hypotension  -  SBP dropped to 62 during OT session 2/7, imrpoved to SBP 98 supine  - hospitalist appreciated, reduce lopressor as above  - DONALD stockings    # Sickle cell disease   - 1-2 sickle pain crisis per yea  - s/p transfusions, apheresis (1/12) for fat emboli concerns   - Hydroxyurea 500 BID (as long as plt > 100 and hgb >8)  - Hematology consult PRN  - Hgb 10.5 plt 191 O2 sat % 2/7    # Mild leukocytosis   - WBC 11.33, 2/7  - has had fluctuations between 9-12 recently, monitor. Hospitalist consult    # Transaminitis   - RUQ US    # thrush  - nystatin started 2/7    # Sleep:   - Maintain quiet hours and low stim environment.    # Pain Management:  - Tylenol PRN  - Avoid sedating medications that may interfere with cognitive recovery    # GI/Bowel:  - Senna QHS, Miralax QD  - GI ppx: Protonix    # /Bladder:   - PVR q 8 hours (SC if > 400)    # Skin/Pressure Injury:   - Skin assessment on admission:   - Seen by wound care team at [facility]. healing pressure injury DTI to L ear lobe, b/l forehead, sacrum /bilateral buttocks, bilateral heels. Incontinence Dermatitis  - on admissions exam, no skin injuries noted to sacrum/buttocks or heels  Recommend:  1.) topical therapy: sacral/buttock skin – cleanse with incontinence cleanser, pat dry, apply Piotr ointment BID and PRN for incontinent episodes. bilateral forehead - cleanse with gentle cleanser, leave open to air  2.) Incontinence Management - incontinence cleanser, pads, pericare BID  3.) Maintain on an alternating air with low air loss surface  4.) Turn and reposition Q 2 hours  5.) Nutrition optimization   6.) Offload heels/feet with complete cair air fluidized boots/pillows; ensure that the soles of the feet are not resting on the foot board of the bed.  7.) chair cushion for chair sitting    # Diet/Dysphagia:  - S/p PEG (1/21)  - FEEST (1/27) > silent laryngeal penetration of purees and moderately thick liquids is noted   - Current Diet: NPO + TF; free water   - Dysphagia: SLP consult for swallow function evaluation and treatment  - Health supp: Vitamin C + MVI    # DVT ppx:    - Previously on Lovenox 40 QD (was last on medication 2/2). Confirmed with [facility], to restart. Will order on admission  - SCD  ---------------  Code Status/Emergency Contact: Full Code    Outpatient Follow-up:    Wu Parra  Thoracic and Cardiac Surgery  [address] [place] [id number]  Phone: [phone]  Fax: [phone]  Follow Up Time: 2 weeks

## 2025-02-06 NOTE — H&P ADULT - NSHPSOCIALHISTORY_GEN_ALL_CORE
SOCIAL HISTORY  Smoking - Denied  EtOH - Denied   Drugs - Denied    FUNCTIONAL HISTORY  Lives in a ______ with ______  and has __ stairs to enter + hand rails and _____ stairs inside + hand rails. Assissted device to walk ______.  Prior Level of Function: Independent in ADLs and ambulation    CURRENT FUNCTIONAL STATUS  - Bed Mobility:  - Transfers:   - Gait:  - ADLs: SOCIAL HISTORY  Smoking - Denied  EtOH - Denied   Drugs - Denied    FUNCTIONAL HISTORY  Lives in a house with her daughter and son. +2-3 DONNA with a railing  Prior Level of Function: Independent in ADLs and ambulated with SC    CURRENT FUNCTIONAL STATUS  - Transfers: mod-max A  - ADLs: mod A UBD/grooming

## 2025-02-06 NOTE — PROGRESS NOTE ADULT - PROBLEM SELECTOR PROBLEM 3
Leukocytosis
Seizure
Seizure
Leukocytosis
Seizure
Leukocytosis
Seizure
Seizure
Leukocytosis
Seizure
Seizure

## 2025-02-06 NOTE — CONSULT NOTE ADULT - PROVIDER SPECIALTY LIST ADULT
Heart Failure
Vascular Surgery
Wound Care
Heme/Onc
Neurology
Pulmonology
Rehab Medicine
Transfusion Medicine
Transplant ID
Surgery
Palliative Care

## 2025-02-06 NOTE — DISCHARGE NOTE NURSING/CASE MANAGEMENT/SOCIAL WORK - NSDCFUADDAPPT_GEN_ALL_CORE_FT
Please Call Dr. Parra office at [phone] fo an appointment 2 weeks after discharge from rehab  Hematology will contact patient for follow up    ***HGB ELECTROPHORESIS every Tuesday as well as LDH, Reticulocyte, cbc and cmp as per Hematology** while at rehab

## 2025-02-06 NOTE — DISCHARGE NOTE PROVIDER - CARE PROVIDER_API CALL
Wu Parra  Thoracic and Cardiac Surgery  [address] [place] [id number]  Phone: [phone]  Fax: [phone]  Follow Up Time: 2 weeks

## 2025-02-06 NOTE — H&P ADULT - ATTENDING COMMENTS
Progress note amended to include my discussions with patient, resident, hospitalist, RN, PCA, PT and my findings    CARSON LEE is a 59y with PMH, HTN, HFimpEF/NICM (EF 58% 10/2024), sickle cell disease, who presented to [facility] for SOB and SS crisis 12/30/25;, followed by witnessed seizure (? secondary to clonazepam withdrawal) requiring intubation. She was transferred to [facility] 1/3/25 secondary to cardiogenic shock /RV failure s/p ECMO +TRACH +PEG.      Patient seen in bed. Eyes are open spontaneously, tRACH collar in place. no significant secretions. She is breathing comfortably. No verbalization or vocalization. She can follow simple verbal commands, reduced initiation, and benefits from repetition and cues. ("close eyes" " hand") she has occasional head nods to simple personal yes/no responses but not consistent. Tm 99.2 no diaphoresis, no cough, no respiratory distress. No N/V. Remainder vitals stable    lungs: no use accessory muscles noted, no congestion, clear, fair- inspiratory effort. Cor RRR. abd soft +PEG in place, no redness or induration, no TTP +BS    left UE: appears to have some left kelsi inattention, benefits from tactile cues. left elbow at least 3-/5 flexion, extensin 3/5 gross grasp 3-/5 shoulder 2/5. right UE shoulder forward flexion 3/5 elbow flexion 4-.5 grasp 4-/.5. left HF 2-/5 right HF 2/5, left quad 2/5 right quad 2/5. PROM WFL without any grimacing. No calf swelling or redness, no pedal edema. DP pulse 2+ bilaterally. Difficulty to assess sensation due to aphasia, but does localize to tactile stimulation in right and left sides    - LAbs reviewed. Mild leukocytosis WBC 11.33, , has had fluctuations between 9-12 recently, monitor. Hospitalist consult  - Hgb 10.5 plt 191 O2 sat %  - comprehensive rehab evaluation and program in progress    RECENT LABS    Vital Signs Last 24 Hrs  T(C): 37.3 (07 Feb 2025 07:36), Max: 37.3 (06 Feb 2025 15:30)  T(F): 99.1 (07 Feb 2025 07:36), Max: 99.2 (06 Feb 2025 15:30)  HR: 85 (07 Feb 2025 08:18) (84 - 94)  BP: 126/87 (07 Feb 2025 07:36) (116/77 - 126/87)  BP(mean): 99 (06 Feb 2025 15:30) (99 - 99)  RR: 16 (07 Feb 2025 07:36) (16 - 18)  SpO2: 99% (07 Feb 2025 08:18) (98% - 100%)    Parameters below as of 07 Feb 2025 08:18  Patient On (Oxygen Delivery Method): tracheostomy collar                              10.5   11.33 )-----------( 191      ( 07 Feb 2025 05:47 )             31.1     02-07    146[H]  |  110[H]  |  20  ----------------------------<  102[H]  3.8   |  29  |  0.83    Ca    9.5      07 Feb 2025 05:47    TPro  7.3  /  Alb  2.8[L]  /  TBili  0.8  /  DBili  x   /  AST  26  /  ALT  49[H]  /  AlkPhos  90  02-07      Urinalysis Basic - ( 07 Feb 2025 05:47 )    Color: x / Appearance: x / SG: x / pH: x  Gluc: 102 mg/dL / Ketone: x  / Bili: x / Urobili: x   Blood: x / Protein: x / Nitrite: x   Leuk Esterase: x / RBC: x / WBC x   Sq Epi: x / Non Sq Epi: x / Bacteria: x      CAPILLARY BLOOD GLUCOSE      POCT Blood Glucose.: 79 mg/dL (06 Feb 2025 21:18)  POCT Blood Glucose.: 111 mg/dL (06 Feb 2025 18:05)  POCT Blood Glucose.: 95 mg/dL (06 Feb 2025 11:26) Progress note amended to include my discussions with patient, resident, hospitalist, RN, PCA, PT and my findings    CARSON LEE is a 59y with PMH, HTN, HFimpEF/NICM (EF 58% 10/2024), sickle cell disease, who presented to [facility] for SOB and SS crisis 12/30/25;, followed by witnessed seizure (? secondary to clonazepam withdrawal) requiring intubation. She was transferred to [facility] 1/3/25 secondary to cardiogenic shock /RV failure s/p ECMO +TRACH +PEG.      Patient seen in bed. Eyes are open spontaneously, tRACH collar in place. no significant secretions. She is breathing comfortably. No verbalization or vocalization. She can follow simple verbal commands, reduced initiation, and benefits from repetition and cues. ("close eyes" " hand") she has occasional head nods to simple personal yes/no responses but not consistent. Tm 99.2 no diaphoresis, no cough, no respiratory distress. No N/V. Remainder vitals stable    lungs: no use accessory muscles noted, no congestion, clear, fair- inspiratory effort. Cor RRR. abd soft +PEG in place, no redness or induration, no TTP +BS    left UE: appears to have some left kelsi inattention, benefits from tactile cues. left elbow at least 3-/5 flexion, extensin 3/5 gross grasp 3-/5 shoulder 2/5. right UE shoulder forward flexion 3/5 elbow flexion 4-.5 grasp 4-/.5. left HF 2-/5 right HF 2/5, left quad 2/5 right quad 2/5. PROM WFL without any grimacing. No calf swelling or redness, no pedal edema. DP pulse 2+ bilaterally. Difficulty to assess sensation due to aphasia, but does localize to tactile stimulation in right and left sides    - LAbs reviewed. Mild leukocytosis WBC 11.33, , has had fluctuations between 9-12 recently, monitor. Hospitalist consult  - Hgb 10.5 plt 191 O2 sat %  - Patient became orthostatic later when OOB. metoprolol reduced, TEDs  - comprehensive rehab evaluation and program in progress    RECENT LABS    Vital Signs Last 24 Hrs  T(C): 37.3 (07 Feb 2025 07:36), Max: 37.3 (06 Feb 2025 15:30)  T(F): 99.1 (07 Feb 2025 07:36), Max: 99.2 (06 Feb 2025 15:30)  HR: 85 (07 Feb 2025 08:18) (84 - 94)  BP: 126/87 (07 Feb 2025 07:36) (116/77 - 126/87)  BP(mean): 99 (06 Feb 2025 15:30) (99 - 99)  RR: 16 (07 Feb 2025 07:36) (16 - 18)  SpO2: 99% (07 Feb 2025 08:18) (98% - 100%)    Parameters below as of 07 Feb 2025 08:18  Patient On (Oxygen Delivery Method): tracheostomy collar                              10.5   11.33 )-----------( 191      ( 07 Feb 2025 05:47 )             31.1     02-07    146[H]  |  110[H]  |  20  ----------------------------<  102[H]  3.8   |  29  |  0.83    Ca    9.5      07 Feb 2025 05:47    TPro  7.3  /  Alb  2.8[L]  /  TBili  0.8  /  DBili  x   /  AST  26  /  ALT  49[H]  /  AlkPhos  90  02-07      Urinalysis Basic - ( 07 Feb 2025 05:47 )    Color: x / Appearance: x / SG: x / pH: x  Gluc: 102 mg/dL / Ketone: x  / Bili: x / Urobili: x   Blood: x / Protein: x / Nitrite: x   Leuk Esterase: x / RBC: x / WBC x   Sq Epi: x / Non Sq Epi: x / Bacteria: x      CAPILLARY BLOOD GLUCOSE      POCT Blood Glucose.: 79 mg/dL (06 Feb 2025 21:18)  POCT Blood Glucose.: 111 mg/dL (06 Feb 2025 18:05)  POCT Blood Glucose.: 95 mg/dL (06 Feb 2025 11:26)

## 2025-02-06 NOTE — CONSULT NOTE ADULT - CONSULT REASON
sacral/bilateral buttocks, bilateral forehead, bilateral heel and Left ear skin injuries
tracheostomy
Evaluate Rehabilitation Needs
RCU evaluation
bacteremia- gram+cocci
Heart Failure / Shock
Sickle cell
seizure
c/f R limb ischemia
RBC exchange for seizure and multi-organ failure
VA ECMO

## 2025-02-06 NOTE — PROGRESS NOTE ADULT - PROBLEM SELECTOR PROBLEM 2
Sickle cell disease
Cardiogenic shock
Sickle cell disease
Cardiogenic shock
Cardiogenic shock
Sickle cell disease
Cardiogenic shock
Sickle cell disease
Cardiogenic shock

## 2025-02-06 NOTE — DISCHARGE NOTE NURSING/CASE MANAGEMENT/SOCIAL WORK - NSDCPEFALRISK_GEN_ALL_CORE
For information on Fall & Injury Prevention, visit: [email or web address] OR  [email or web address] OR  https://www.cdc.gov/steadi/patient.html
For information on Fall & Injury Prevention, visit: [email or web address] OR  [email or web address] OR  https://www.cdc.gov/steadi/patient.html
(1) body pink, extremities blue

## 2025-02-06 NOTE — H&P ADULT - NSICDXFAMILYHX_GEN_ALL_CORE_FT
FAMILY HISTORY:  Child  Still living? Yes, Estimated age: Age Unknown  FHx: cerebral palsy, Age at diagnosis: Age Unknown     1 Principal Discharge DX:	Well child examination   Principal Discharge DX:	Acute UTI

## 2025-02-06 NOTE — DISCHARGE NOTE NURSING/CASE MANAGEMENT/SOCIAL WORK - PATIENT PORTAL LINK FT
You can access the FollowMyHealth Patient Portal offered by [facility] by registering at the following website: [email or web address]. By joining [employer]’s FollowMyHealth portal, you will also be able to view your health information using other applications (apps) compatible with our system.
You can access the FollowMyHealth Patient Portal offered by [facility] by registering at the following website: [email or web address]. By joining [employer]’s FollowMyHealth portal, you will also be able to view your health information using other applications (apps) compatible with our system.

## 2025-02-06 NOTE — DISCHARGE NOTE PROVIDER - DISCHARGE DATE
Called and spoke with Damaris-Pharmacist and had the loading dose of Dupixent discontinued and to continue to proceed with maintenance dose of Dupixent 300mg/2mL.   She verbalized agreement and will have staff reach out to patient to schedule delivery of Dupixent.   06-Feb-2025

## 2025-02-06 NOTE — PROGRESS NOTE ADULT - PROBLEM SELECTOR PROBLEM 4
Tracheostomy in place
Tracheostomy in place
Seizure
Tracheostomy in place
Seizure
Tracheostomy in place
Seizure
Seizure
Tracheostomy in place

## 2025-02-06 NOTE — CONSULT NOTE ADULT - REASON FOR ADMISSION
Mixed Shock

## 2025-02-06 NOTE — PROGRESS NOTE ADULT - PROBLEM/PLAN-1
DISPLAY PLAN FREE TEXT
(1) Other Diagnosis

## 2025-02-06 NOTE — DISCHARGE NOTE NURSING/CASE MANAGEMENT/SOCIAL WORK - NSDCVIVACCINE_GEN_ALL_CORE_FT
influenza, injectable, quadrivalent, preservative free; 13-Dec-2022 10:45; Gina Gambino (RN); [employer]; [id number] (Exp. Date: 23-Jun-2023); IntraMuscular; Deltoid Left.; 0.5 milliLiter(s); VIS (VIS Published: 06-Aug-2021, VIS Presented: 13-Dec-2022);   
influenza, injectable, quadrivalent, preservative free; 13-Dec-2022 10:45; Gina Gambino (RN); [employer]; [id number] (Exp. Date: 23-Jun-2023); IntraMuscular; Deltoid Left.; 0.5 milliLiter(s); VIS (VIS Published: 06-Aug-2021, VIS Presented: 13-Dec-2022);

## 2025-02-06 NOTE — DISCHARGE NOTE PROVIDER - NSDCPNSUBOBJ_GEN_ALL_CORE
Subjective: Pt nodded to questions,  denies any CP, SOB, N/V and palpitations. No acute events overnight.     VITAL SIGNS    Telemetry:  SR  Vital Signs Last 24 Hrs  T(C): 37.2 (25 @ 06:54), Max: 37.4 (25 @ 23:36)  T(F): 98.9 (25 @ 06:54), Max: 99.3 (25 @ 23:36)  HR: 96 (25 @ 06:54) (85 - 102)  BP: 139/76 (25 @ 06:54) (96/68 - 139/76)  RR: 20 (25 @ 06:54) (18 - 20)  SpO2: 100% (25 @ 06:54) (98% - 100%)             07:01  -   @ 07:00  --------------------------------------------------------  IN: 2100 mL / OUT: 0 mL / NET: 2100 mL     07:01  -   @ 10:29  --------------------------------------------------------  IN: 0 mL / OUT: 0 mL / NET: 0 mL           Bilirubin Total: 0.7 mg/dL ( @ 07:32)    CAPILLARY BLOOD GLUCOSE      POCT Blood Glucose.: 100 mg/dL (2025 07:22)  POCT Blood Glucose.: 103 mg/dL (2025 02:36)  POCT Blood Glucose.: 85 mg/dL (2025 00:18)  POCT Blood Glucose.: 95 mg/dL (2025 17:56)  POCT Blood Glucose.: 81 mg/dL (2025 11:40)          MEDICATIONS  acetaminophen   Oral Liquid .. 650 milliGRAM(s) Oral every 6 hours PRN  albuterol/ipratropium for Nebulization 3 milliLiter(s) Nebulizer every 8 hours  amantadine Syrup 100 milliGRAM(s) Oral <User Schedule>  artificial tears (preservative free) Ophthalmic Solution 1 Drop(s) Both EYES four times a day  ascorbic acid 500 milliGRAM(s) Oral daily  chlorhexidine 2% Cloths 1 Application(s) Topical <User Schedule>  chlorhexidine 4% Liquid 1 Application(s) Topical <User Schedule>  dextrose 50% Injectable 25 milliLiter(s) IV Push every 15 minutes  hydrALAZINE Injectable 5 milliGRAM(s) IV Push every 6 hours PRN  hydrocortisone 1% Cream 1 Application(s) Topical three times a day  hydroxyurea 500 milliGRAM(s) Oral two times a day  insulin lispro (ADMELOG) corrective regimen sliding scale   SubCutaneous every 6 hours  levETIRAcetam  Solution 500 milliGRAM(s) Oral every 12 hours  metoprolol tartrate 25 milliGRAM(s) Oral every 8 hours  multivitamin/minerals/iron Oral Solution (CENTRUM) 15 milliLiter(s) Oral daily  pantoprazole  Injectable 40 milliGRAM(s) IV Push daily  polyethylene glycol 3350 17 Gram(s) Oral daily  senna 1 Tablet(s) Oral daily  sodium chloride 0.9% lock flush 10 milliLiter(s) IV Push every 1 hour PRN        PHYSICAL EXAM    Neurology: A&Ox3, nonfocal, no gross deficits  CV : RRR+S1S2  Sternal Wound :  MSI CDI , Stable  Lungs: Respirations non-labored, B/L BS; #6 TC with 30% O2  Abdomen: Soft, NT/ND, +BSx4Q, last BM on   (-)N/V/D  : Voiding without difficulty, primafit  Extremities: B/L LE edema, negative calf tenderness, +PP       LABS  -    145  |  108  |  22  ----------------------------<  105[H]  4.0   |  26  |  0.77    Ca    10.1      2025 07:32  Phos  3.8     02-05  Mg     2.4     02-05    TPro  7.3  /  Alb  3.7  /  TBili  0.7  /  DBili  x   /  AST  33  /  ALT  51[H]  /  AlkPhos  93                                   11.6   10.91 )-----------( 207      ( 2025 07:34 )             35.6                 PAST MEDICAL & SURGICAL HISTORY:  Sickle-cell-hemoglobin C disease with crisis      Essential hypertension      Sickle cell disease      HTN (hypertension)      H/O:       H/O abdominoplasty      S/P breast augmentation      S/P

## 2025-02-06 NOTE — H&P ADULT - NSHPLABSRESULTS_GEN_ALL_CORE
11.6   10.91 )-----------( 207      ( 06 Feb 2025 07:34 )             35.6     02-06    145  |  108  |  22  ----------------------------<  105[H]  4.0   |  26  |  0.77    Ca    10.1      06 Feb 2025 07:32  Phos  3.8     02-05  Mg     2.4     02-05    TPro  7.3  /  Alb  3.7  /  TBili  0.7  /  DBili  x   /  AST  33  /  ALT  51[H]  /  AlkPhos  93  02-06      RADIOLOGY, EKG & ADDITIONAL TESTS:   US Abdomen Upper Quadrant Right (01.27.25 @ 17:38) >  1. The gallbladder is moderately distended and contains layering sludge and possibly small stones. There is no significant gallbladder wall thickening or pericholecystic fluid and a sonographic Gross sign was not elicited. Therefore, there is no definitive evidence for acute cholecystitis. If this is of clinical concern, HIDA scan could be performed for further evaluation. Follow-up ultrasound to reevaluate these findings is also suggested.  2. No biliary dilatation is identified.  3. The liver parenchyma demonstrates heterogeneous echotexture, of uncertain significance. Liver parenchymal disease is not excluded. No discrete, focal abnormality is identified.    MR Lumbar Spine No Cont (01.24.25 @ 17:40) >  MR cervical spine: Mild disc degeneration and spondylosis at C5-6 through C7-T1 with loss of disc height and associated degenerative endplate changes. There is narrowing of the RIGHT C5-6, C6/7 and C7-T1 neural   foramina due to uncovertebral spurring and facet osteophytic hypertrophy. Mild posterior osteophytic ridge/disc complexes at C4-5 through C6/7 flatten the ventral thecal sac.    MR lumbar spine:  Mild disc degeneration at L2-3 throughL5-S1 with loss of disc height and signal within the nucleus pulposus. Disc bulges are noted at L2-3 through L5-S1 which flatten the ventral thecal sac and narrow the BILATERAL neural foramina. Moderate central stenosis at L3-4 and mild central stenosis at L4-5 and L5-S1 on a degenerative basis due to disc bulge, facet osteophytic hypertrophy and redundancy of ligamentum   flavum.    Xray Chest 1 View- (01.22.25 @ 03:56) >  Resolution of prior right pleural effusion associated atelectasis.    MR Head w/wo IV Cont (01.09.25 @ 18:07) >  Innumerable foci susceptibility artifact scattered throughout the brain which can be seen with critically ill patients on ECMO. Diffuse fat embolization is also part of the differential diagnosis but is considered less likely.  2. Tiny acute/subacute infarcts within the bilateral basal ganglia, thalami, and chandu with disproportionate ovoid area of T2/FLAIR prolongation in the right ventral thalamus. Findings may indicate the presence of embolic acute/subacute infarcts.    Xray Abdomen (01.08.25 @ 20:31) >  Nonobstructive bowel gas pattern.

## 2025-02-06 NOTE — CONSULT NOTE ADULT - CONSULT REQUESTED DATE/TIME
23-Jan-2025 11:36
27-Jan-2025 12:05
04-Jan-2025 05:12
04-Jan-2025 07:37
08-Jan-2025 16:57
04-Jan-2025 10:24
04-Jan-2025 14:04
06-Feb-2025 15:11
06-Jan-2025
10-Javed-2025 10:10
06-Jan-2025 16:16

## 2025-02-06 NOTE — DISCHARGE NOTE PROVIDER - NSDCACTIVITY_GEN_ALL_CORE
Bathing allowed/Walking - Indoors allowed/Walking - Outdoors allowed/Follow Instructions Provided by your Surgical Team/Activity as tolerated

## 2025-02-06 NOTE — PROGRESS NOTE ADULT - PROBLEM SELECTOR PROBLEM 1
Sickle cell disease
Cardiogenic shock
Sickle cell disease
Cardiogenic shock
Sickle cell disease
Sickle cell disease
Cardiogenic shock
Sickle cell disease
Cardiogenic shock
Sickle cell disease
Sickle cell disease

## 2025-02-06 NOTE — DISCHARGE NOTE PROVIDER - NSDCMRMEDTOKEN_GEN_ALL_CORE_FT
acetaminophen 160 mg/5 mL oral suspension: 20.31 milliliter(s) orally every 6 hours as needed for Moderate Pain (4 - 6)  amantadine 50 mg/5 mL oral liquid: 100 milligram(s) orally 2 times a day please give at 7 am and 2 pm  ascorbic acid 500 mg oral tablet: 1 tab(s) orally once a day  hydrocortisone 1% topical cream: 1 Apply topically to affected area 3 times a day  hydroxyurea 500 mg oral capsule: 1 cap(s) orally 2 times a day  ipratropium-albuterol 0.5 mg-2.5 mg/3 mL inhalation solution: 3 milliliter(s) inhaled every 8 hours  levETIRAcetam 100 mg/mL oral solution: 5 milliliter(s) orally every 12 hours  metoprolol tartrate 25 mg oral tablet: 1 tab(s) orally every 8 hours  Multiple Vitamins with Minerals oral liquid: 15 milliliter(s) orally once a day  ocular lubricant preservative-free ophthalmic solution: 1 drop(s) to each affected eye 4 times a day  pantoprazole 4 mg/mL oral suspension: 40 milliliter(s) orally once a day MDD: 1200  polyethylene glycol 3350 oral powder for reconstitution: 17 gram(s) orally once a day  senna leaf extract oral tablet: 1 tab(s) orally once a day   acetaminophen 160 mg/5 mL oral suspension: 20.31 milliliter(s) orally every 6 hours as needed for Moderate Pain (4 - 6)  amantadine 50 mg/5 mL oral liquid: 100 milligram(s) orally 2 times a day please give at 7 am and 2 pm  ascorbic acid 500 mg oral tablet: 1 tab(s) orally once a day  enoxaparin 40 mg/0.4 mL injectable solution: 40 milligram(s) subcutaneously once a day  hydrocortisone 1% topical cream: 1 Apply topically to affected area 3 times a day  hydroxyurea 500 mg oral capsule: 1 cap(s) orally 2 times a day  ipratropium-albuterol 0.5 mg-2.5 mg/3 mL inhalation solution: 3 milliliter(s) inhaled every 8 hours  levETIRAcetam 100 mg/mL oral solution: 5 milliliter(s) orally every 12 hours  metoprolol tartrate 25 mg oral tablet: 1 tab(s) orally every 8 hours  Multiple Vitamins with Minerals oral liquid: 15 milliliter(s) orally once a day  ocular lubricant preservative-free ophthalmic solution: 1 drop(s) to each affected eye 4 times a day  pantoprazole 4 mg/mL oral suspension: 40 milliliter(s) orally once a day MDD: 1200  polyethylene glycol 3350 oral powder for reconstitution: 17 gram(s) orally once a day  senna leaf extract oral tablet: 1 tab(s) orally once a day

## 2025-02-06 NOTE — DISCHARGE NOTE PROVIDER - HOSPITAL COURSE
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 Keppra was discontinued at [facility] due to negative EEG. Seizure was thought to be caused by clonazepam withdrawal per OSH. She was also found to have elevated troponins ~900s.  At [facility] MICU pt was found to have RV failure. Pt transferred to [facility] for management of cardiogenic shock with increased inotropic and pressor support requirement.   1/3 Initiation, venoarterial ECMO (Left Femoral Arterial 17Fr, Right Femoral Venous 25Fr, L SFA RPC 6Fr)  Transfusion Medicine team is consulted for emergent RBC exchange to minimize the risk of complications related to SCD as this patient is critically ill with multi-organ failure.   -TTE 1/4/25: LVEF <20%, ECMO cannula in RV, mildly enlarged RV size and reduced RV systolic function, no pericardial effusion, no MR, trace TR, IVC nml size  MSSA in sputum culture from ET tube. MSSA bacteremia, BAL + MSSA -IV vanc completed  Merrem for total 10 days, completed  Coag negative Staph epi. in single blood culture drawn 1/3  1/7 s/p VA ECMO decannulation. Case was complicated by hematoma formation in left thigh (site of arterial cannula). 2U pRBC, 2 platelet and 5U cryo given.  1/8 YUNG and LLL evaluation revealed moderate green tinged secretions and mucous plugging. RUL, RML, RLL revealed minimal secretions. Keppra reinstated 1000  1/9 MRI today - Innumerable foci susceptibility artifact scattered throughout the brain, tiny acute/subacute infarcts within the bilateral basal ganglia, thalami, and chandu with disproportionate ovoid area of T2/FLAIR prolongation in the right ventral thalamus. Findings may indicate the presence of embolic acute/subacute infarcts  1/14 Lumbar puncture no evidence of meningitis/ encephalitis   1/16 bedside trach w/ Dr Hebert Holden  1/21 PEG placement, flexible bronchoscopy for Acute respiratory failure with hypoxia findings; minimal secretions through out b/l lung segments  1/27 Speech and swallow eval there is reduced movement of the right vocal cord; left is mobile/intact. Silent laryngeal penetration of purees and moderately thick liquids is noted  1/30 Transferred to [address] [place] [address]  1/31  Pending rehab placement. Suction prn. OOb to chair  2/1 VSS, , Free water increased to 300cc q6h via PEG. OOB to chair. Continue PT/OT, pt recommended for acute rehab. Repeat FEEST next week.  2/2 VSS;  --> free water increased to 400mL q6h via PEG. Repeat FEEST this week.   2/3 VSS  --> free water amount and frequency increased. Rpt FEEs Tuesday.   2/4 VSS  on free water 500 q 4h, FEES today, awaiting acceptance at acute rehab  2/5 VSS;  free water bolus remains, Had low Blood sugar x 2 last pm after discussion with nutrition we increased her [employer] tube feeds to 50 cc/hr, Accepted at [facility]   2/6 VSS , Blood sugars improved on increased dose of tube feeds, Awaiting confirmation of discharge to [facility]; Remains with a #6 Cuffless Trach at 30% Trach collar  Disposition: Acute Rehab

## 2025-02-06 NOTE — H&P ADULT - HISTORY OF PRESENT ILLNESS
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 Keppra was discontinued at [facility] due to negative EEG. Seizure was thought to be caused by clonazepam withdrawal per OSH. Elevated trops (~900s), no EKG changes. Was hypotensive, tachy. TTE with RV failure possibly iso pulmonary HTN 2/2 increased tidal volumes on ventilator. In addition, likely received a lot of volume iso SS crisis. Neuro was consulted for seizures and EEG technician was called for vEEG placement; seizure activities. Pt is in profound cardiogenic shock. Pt has been decompensating > s/p VA ECMO, + dobutaine/bumex/levophed/vsopressin gtt.  59F PMH HTN, HFimpEF/NICM (EF 58% 10/2024), Sickle cell disease (on hydroxyurea), who presented to [facility] for SOB and SS crisis on 12/30 and 12/31/24. She was intubated and sedated following witnessed seizure, and transferred to [facility] (1/3/2025) for further management.     Keppra was discontinued at [facility] due to negative EEG. Seizure was thought to be caused by clonazepam withdrawal per OSH. She was hypotensive and tachycardic with elevated trops (~900s), but with no EKG changes. TTE with RV failure possibly iso pulmonary HTN 2/2 increased tidal volumes on ventilator and possible additional volume overload 2/2 management of sickle cell crisis. Neuro was consulted for seizures and EEG technician was called for vEEG placement; seizure activities.  Pt transferred to [facility] for management of cardiogenic shock with increased inotropic and pressor support requirement.     ON 1/3, venoarterial ECMO (Left Femoral Arterial 17Fr, Right Femoral Venous 25Fr, L SFA RPC 6Fr) was initiated. Transfusion Medicine team was consulted for emergent RBC exchange to minimize the risk of complications related to sickle cell disease given critical illness with multi-organ failure. TTE 1/4/25: LVEF <20%, ECMO cannula in RV, mildly enlarged RV size and reduced RV systolic function, no pericardial effusion, no MR, trace TR, IVC nml size    Hospital course also significant for MSSA in sputum culture from ET tube, MSSA bacteremia, BAL + MSSA Patient was treated with IV vanc (completed) and merrem for total 10 days (completed). On 1/7 patient s/p VA ECMO decannulation. Case was complicated by hematoma formation in left thigh (site of arterial cannula). She was given 2U pRBC, 2 platelet and 5U cryo. 1/8 YUNG and LLL evaluation revealed moderate green tinged secretions and mucous plugging. RUL, RML, RLL revealed minimal secretions.     Patient was tapered off precedex sedation and Keppra reinstated. MRI 1/9 showed innumerable foci susceptibility artifact scattered throughout the brain, tiny acute/subacute infarcts within the bilateral basal ganglia, thalami, and chandu with disproportionate ovoid area of T2/FLAIR prolongation in the right ventral thalamus. Findings may indicate the presence of embolic acute/subacute infarcts. 1/14 Lumbar puncture no evidence of meningitis/ encephalitis     ON 1/16 bedside trach was placed by Dr Hebert Holden and on 1/21 PEG placement performed. 1/27 Speech and swallow eval there is reduced movement of the right vocal cord; left is mobile/intact. Silent laryngeal penetration of purees and moderately thick liquids is noted Remains with a #6 Cuffless Trach at 30% Trach collar.    Patient was medically cleared for transfer to [facility] IRF 2/6/25.   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 She was intubated and sedated following witnessed seizure, and transferred to [facility] (1/3/2025) for further management.     Keppra was discontinued at [facility] due to negative EEG. Seizure was thought to be caused by clonazepam withdrawal per OSH. She was hypotensive and tachycardic with elevated trops (~900s), but with no EKG changes. TTE with RV failure possibly iso pulmonary HTN 2/2 increased tidal volumes on ventilator and possible additional volume overload 2/2 management of sickle cell crisis. Neuro was consulted for seizures and EEG technician was called for vEEG placement; seizure activities noted.  Pt transferred to [facility] for management of cardiogenic shock with increased inotropic and pressor support requirement.     ON 1/3, venoarterial ECMO (Left Femoral Arterial 17Fr, Right Femoral Venous 25Fr, L SFA RPC 6Fr) was initiated. Transfusion Medicine team was consulted for emergent RBC exchange to minimize the risk of complications related to sickle cell disease given critical illness with multi-organ failure. TTE 1/4/25: LVEF <20%, ECMO cannula in RV, mildly enlarged RV size and reduced RV systolic function, no pericardial effusion, no MR, trace TR, IVC nml size    Hospital course also significant for MSSA in sputum culture from ET tube, MSSA bacteremia, BAL + MSSA Patient was treated with IV vanc (completed) and merrem for total 10 days (completed). On 1/7 patient s/p VA ECMO decannulation. Case was complicated by hematoma formation in left thigh (site of arterial cannula). She was given 2U pRBC, 2 platelet and 5U cryo. 1/8 YUNG and LLL evaluation revealed moderate green tinged secretions and mucous plugging. RUL, RML, RLL revealed minimal secretions.     Patient was tapered off precedex sedation and Keppra reinstated. MRI 1/9 showed innumerable foci susceptibility artifact scattered throughout the brain, tiny acute/subacute infarcts within the bilateral basal ganglia, thalami, and chandu with disproportionate ovoid area of T2/FLAIR prolongation in the right ventral thalamus. Findings may indicate the presence of embolic acute/subacute infarcts. 1/14 Lumbar puncture no evidence of meningitis/ encephalitis     ON 1/16 bedside trach was placed by Dr Hebert Holden and on 1/21 PEG placement performed. 1/27 Speech and swallow eval there is reduced movement of the right vocal cord; left is mobile/intact. Silent laryngeal penetration of purees and moderately thick liquids is noted Remains with a #6 Cuffless Trach at 30% Trach collar.    Patient was medically cleared for transfer to [facility] IRF 2/6/25.

## 2025-02-06 NOTE — PATIENT PROFILE ADULT - NSPRONUTRITIONRISK_GEN_A_NUR
Enteral/parenteral nutrition prior to admission Enteral/parenteral nutrition prior to admission/Pressure injury stage 2 or greater

## 2025-02-06 NOTE — DISCHARGE NOTE PROVIDER - NSDCFUADDINST_GEN_ALL_CORE_FT
1. Daily Shower  2. Weight yourself daily and notify any weight gain greater than 2-3 pounds in 24 hours.  3. Regular diet - low fat, low cholesterol, no added salt.  4. Cleanse Midsternal incision and leg incision daily while showering with warm water and mild soap, pat dry and maintain open to air.   5. Follow Cardiac Surgery Do's and Don'ts discharge instructions.   6. No driving until cleared by MD.   7. No heavy lifting nothing greater than 5 pounds until cleared by MD.   8. Call / Notify MD any fever greater than 101.0  9. Increase Activity as tolerated.

## 2025-02-06 NOTE — PROGRESS NOTE ADULT - ASSESSMENT
59F PMH Sickle cell disease (on hydroxyurea), HTN, HFimpEF/NICM (EF 58% 10/2024) presented to [facility] for SOB and SS crisis 12/30 and 12/31; intubated and sedated s/p witnessed siezure, and transferred to [facility] for further management. Keppra was discontinued at [facility] due to negative EEG. Seizure was thought to be caused by clonazepam withdrawal per OSH. She was also found to have elevated troponins ~900s.  At [facility] MICU pt was found to have RV failure. Pt transferred to NS for management of cardiogenic shock with increased inotropic and pressor support requirement.   1/3 Initiation, venoarterial ECMO (Left Femoral Arterial 17Fr, Right Femoral Venous 25Fr, L SFA RPC 6Fr)  Transfusion Medicine team is consulted for emergent RBC exchange to minimize the risk of complications related to SCD as this patient is critically ill with multi-organ failure.   -TTE 1/4/25: LVEF <20%, ECMO cannula in RV, mildly enlarged RV size and reduced RV systolic function, no pericardial effusion, no MR, trace TR, IVC nml size  MSSA in sputum culture from ET tube. MSSA bacteremia, BAL + MSSA -IV vanc completed  Merrem for total 10 days, completed  Coag negative Staph epi. in single blood culture drawn 1/3  1/7 s/p VA ECMO decannulation. Case was complicated by hematoma formation in left thigh (site of arterial cannula). 2U pRBC, 2 platelet and 5U cryo given.  1/8 YUNG and LLL evaluation revealed moderate green tinged secretions and mucous plugging. RUL, RML, RLL revealed minimal secretions. Keppra reinstated 1000  1/9 MRI today - Innumerable foci susceptibility artifact scattered throughout the brain, tiny acute/subacute infarcts within the bilateral basal ganglia, thalami, and chandu with disproportionate ovoid area of T2/FLAIR prolongation in the right ventral thalamus. Findings may indicate the presence of embolic acute/subacute infarcts  1/14 Lumbar puncture no evidence of meningitis/ encephalitis   1/16 bedside trach w/ Dr Hebert Holden  1/21 PEG placement, flexible bronchoscopy for Acute respiratory failure with hypoxia findings; minimal secretions through out b/l lung segments  1/27 Speech and swallow eval there is reduced movement of the right vocal cord; left is mobile/intact. Silent laryngeal penetration of purees and moderately thick liquids is noted  1/30 Transferred to [address] [place] [address]  1/31  Pending rehab placement. Suction prn. OOb to chair  2/1 VSS, , Free water increased to 300cc q6h via PEG. OOB to chair. Continue PT/OT, pt recommended for acute rehab. Repeat FEEST next week.  2/2 VSS;  --> free water increased to 400mL q6h via PEG. Repeat FEEST this week.   2/3 VSS  --> free water amount and frequency increased. Rpt FEEs Tuesday.   2/4 VSS  on free water 500 q 4h, FEES today, awaiting acceptance at acute rehab  2/5 VSS;  free water bolus remains, Had low Blood sugar x 2 last pm after discussion with nutrition we increased her [employer] tube feeds to 50 cc/hr, Accepted at [facility]   2/6 VSS , Blood sugars improved on increased dose of tube feeds, Awaiting confirmation of discharge to [facility]  Disposition: Acute Rehab

## 2025-02-06 NOTE — PROGRESS NOTE ADULT - PROBLEM SELECTOR PROBLEM 5
S/P percutaneous endoscopic gastrostomy (PEG) tube placement
Tracheostomy in place
S/P percutaneous endoscopic gastrostomy (PEG) tube placement

## 2025-02-06 NOTE — CONSULT NOTE ADULT - ASSESSMENT
Impression:    Sacral/bilateral Buttocks, heels, ears no wounds  bilateral forehead hypopigmentation  Incontinence of bowel and bladder  Incontinence Dermatitis    Recommend:  1.) topical therapy: sacral/buttock skin – cleanse with incontinence cleanser, pat dry, apply Piotr ointment BID and PRN for incontinent episodes  bilateral forehead - cleanse with gentle cleanser, leave open to air  2.) Incontinence Management - incontinence cleanser, pads, pericare BID  3.) Maintain on an alternating air with low air loss surface  4.) Turn and reposition Q 2 hours  5.) Nutrition optimization   6.) Offload heels/feet with complete cair air fluidized boots/pillows; ensure that the soles of the feet are not resting on the foot board of the bed.  7.) chair cushion for chair sitting    Care as per medicine. Will not actively follow but will remain available. Please recall for new issues or deterioration.  Upon discharge f[facility] [address] [place] [address] [phone]  Thank you for this consult  LINDSEY Stephens, CWOCN via TEAMS    Nights/ Weekends/ Holidays please call:  General Surgery Consult pager [phone]) for emergencies

## 2025-02-07 LAB
ALBUMIN SERPL ELPH-MCNC: 2.8 G/DL — LOW (ref 3.3–5)
ALP SERPL-CCNC: 90 U/L — SIGNIFICANT CHANGE UP (ref 40–120)
ALT FLD-CCNC: 49 U/L — HIGH (ref 10–45)
ANION GAP SERPL CALC-SCNC: 7 MMOL/L — SIGNIFICANT CHANGE UP (ref 5–17)
AST SERPL-CCNC: 26 U/L — SIGNIFICANT CHANGE UP (ref 10–40)
BASOPHILS # BLD AUTO: 0.04 K/UL — SIGNIFICANT CHANGE UP (ref 0–0.2)
BASOPHILS NFR BLD AUTO: 0.4 % — SIGNIFICANT CHANGE UP (ref 0–2)
BILIRUB SERPL-MCNC: 0.8 MG/DL — SIGNIFICANT CHANGE UP (ref 0.2–1.2)
BUN SERPL-MCNC: 20 MG/DL — SIGNIFICANT CHANGE UP (ref 7–23)
CALCIUM SERPL-MCNC: 9.5 MG/DL — SIGNIFICANT CHANGE UP (ref 8.4–10.5)
CHLORIDE SERPL-SCNC: 110 MMOL/L — HIGH (ref 96–108)
CO2 SERPL-SCNC: 29 MMOL/L — SIGNIFICANT CHANGE UP (ref 22–31)
CREAT SERPL-MCNC: 0.83 MG/DL — SIGNIFICANT CHANGE UP (ref 0.5–1.3)
EGFR: 82 ML/MIN/1.73M2 — SIGNIFICANT CHANGE UP
EOSINOPHIL # BLD AUTO: 0.16 K/UL — SIGNIFICANT CHANGE UP (ref 0–0.5)
EOSINOPHIL NFR BLD AUTO: 1.4 % — SIGNIFICANT CHANGE UP (ref 0–6)
GLUCOSE SERPL-MCNC: 102 MG/DL — HIGH (ref 70–99)
HCT VFR BLD CALC: 31.1 % — LOW (ref 34.5–45)
HGB BLD-MCNC: 10.5 G/DL — LOW (ref 11.5–15.5)
IMM GRANULOCYTES NFR BLD AUTO: 0.9 % — SIGNIFICANT CHANGE UP (ref 0–0.9)
LYMPHOCYTES # BLD AUTO: 2.67 K/UL — SIGNIFICANT CHANGE UP (ref 1–3.3)
LYMPHOCYTES # BLD AUTO: 23.6 % — SIGNIFICANT CHANGE UP (ref 13–44)
MCHC RBC-ENTMCNC: 30.9 PG — SIGNIFICANT CHANGE UP (ref 27–34)
MCHC RBC-ENTMCNC: 33.8 G/DL — SIGNIFICANT CHANGE UP (ref 32–36)
MCV RBC AUTO: 91.5 FL — SIGNIFICANT CHANGE UP (ref 80–100)
MONOCYTES # BLD AUTO: 1.16 K/UL — HIGH (ref 0–0.9)
MONOCYTES NFR BLD AUTO: 10.2 % — SIGNIFICANT CHANGE UP (ref 2–14)
NEUTROPHILS # BLD AUTO: 7.2 K/UL — SIGNIFICANT CHANGE UP (ref 1.8–7.4)
NEUTROPHILS NFR BLD AUTO: 63.5 % — SIGNIFICANT CHANGE UP (ref 43–77)
NRBC # BLD: 0 /100 WBCS — SIGNIFICANT CHANGE UP (ref 0–0)
NRBC BLD-RTO: 0 /100 WBCS — SIGNIFICANT CHANGE UP (ref 0–0)
PLATELET # BLD AUTO: 191 K/UL — SIGNIFICANT CHANGE UP (ref 150–400)
POTASSIUM SERPL-MCNC: 3.8 MMOL/L — SIGNIFICANT CHANGE UP (ref 3.5–5.3)
POTASSIUM SERPL-SCNC: 3.8 MMOL/L — SIGNIFICANT CHANGE UP (ref 3.5–5.3)
PROT SERPL-MCNC: 7.3 G/DL — SIGNIFICANT CHANGE UP (ref 6–8.3)
RBC # BLD: 3.4 M/UL — LOW (ref 3.8–5.2)
RBC # FLD: 15.9 % — HIGH (ref 10.3–14.5)
SODIUM SERPL-SCNC: 146 MMOL/L — HIGH (ref 135–145)
WBC # BLD: 11.33 K/UL — HIGH (ref 3.8–10.5)
WBC # FLD AUTO: 11.33 K/UL — HIGH (ref 3.8–10.5)

## 2025-02-07 PROCEDURE — 99223 1ST HOSP IP/OBS HIGH 75: CPT

## 2025-02-07 PROCEDURE — 93010 ELECTROCARDIOGRAM REPORT: CPT

## 2025-02-07 RX ORDER — METOPROLOL SUCCINATE 50 MG/1
12.5 TABLET, EXTENDED RELEASE ORAL EVERY 8 HOURS
Refills: 0 | Status: DISCONTINUED | OUTPATIENT
Start: 2025-02-07 | End: 2025-02-27

## 2025-02-07 RX ADMIN — Medication 40 MILLIGRAM(S): at 06:00

## 2025-02-07 RX ADMIN — HYDROXYUREA 500 MILLIGRAM(S): 500 CAPSULE ORAL at 06:03

## 2025-02-07 RX ADMIN — LEVETIRACETAM 500 MILLIGRAM(S): 10 INJECTION, SOLUTION INTRAVENOUS at 18:11

## 2025-02-07 RX ADMIN — IPRATROPIUM BROMIDE AND ALBUTEROL SULFATE 3 MILLILITER(S): .5; 2.5 SOLUTION RESPIRATORY (INHALATION) at 16:00

## 2025-02-07 RX ADMIN — Medication 15 MILLILITER(S): at 11:41

## 2025-02-07 RX ADMIN — IPRATROPIUM BROMIDE AND ALBUTEROL SULFATE 3 MILLILITER(S): .5; 2.5 SOLUTION RESPIRATORY (INHALATION) at 06:44

## 2025-02-07 RX ADMIN — Medication 500000 UNIT(S): at 14:41

## 2025-02-07 RX ADMIN — Medication 1 DROP(S): at 06:00

## 2025-02-07 RX ADMIN — Medication 1 TABLET(S): at 11:42

## 2025-02-07 RX ADMIN — METOPROLOL SUCCINATE 12.5 MILLIGRAM(S): 50 TABLET, EXTENDED RELEASE ORAL at 14:06

## 2025-02-07 RX ADMIN — Medication 100 MILLIGRAM(S): at 06:01

## 2025-02-07 RX ADMIN — Medication 500000 UNIT(S): at 21:24

## 2025-02-07 RX ADMIN — HYDROXYUREA 500 MILLIGRAM(S): 500 CAPSULE ORAL at 18:14

## 2025-02-07 RX ADMIN — POLYETHYLENE GLYCOL 3350 17 GRAM(S): 17 POWDER, FOR SOLUTION ORAL at 11:42

## 2025-02-07 RX ADMIN — Medication 100 MILLIGRAM(S): at 14:41

## 2025-02-07 RX ADMIN — Medication 1 DROP(S): at 18:24

## 2025-02-07 RX ADMIN — Medication 500 MILLIGRAM(S): at 11:41

## 2025-02-07 RX ADMIN — Medication 1 DROP(S): at 11:41

## 2025-02-07 RX ADMIN — LEVETIRACETAM 500 MILLIGRAM(S): 10 INJECTION, SOLUTION INTRAVENOUS at 05:59

## 2025-02-07 RX ADMIN — IPRATROPIUM BROMIDE AND ALBUTEROL SULFATE 3 MILLILITER(S): .5; 2.5 SOLUTION RESPIRATORY (INHALATION) at 21:49

## 2025-02-07 RX ADMIN — ENOXAPARIN SODIUM 40 MILLIGRAM(S): 100 INJECTION SUBCUTANEOUS at 06:00

## 2025-02-07 RX ADMIN — METOPROLOL SUCCINATE 12.5 MILLIGRAM(S): 50 TABLET, EXTENDED RELEASE ORAL at 21:23

## 2025-02-07 RX ADMIN — METOPROLOL SUCCINATE 25 MILLIGRAM(S): 50 TABLET, EXTENDED RELEASE ORAL at 05:59

## 2025-02-07 NOTE — DIETITIAN INITIAL EVALUATION ADULT - OTHER CALCULATIONS
After Visit Summary   5/7/2018    Javi Salmeron    MRN: [id number]           Patient Information     Date Of Birth          1966        Visit Information        Provider Department      5/7/2018 4:30 PM [facility] AC 2 [facility]        Today's Diagnoses     Aftercare    -  1       Follow-ups after your visit        Your next 10 appointments already scheduled     May 22, 2018  4:00 PM CDT   Individual Therapy 53+ minutes with Thomas Baugh, PhD TYLER   [facility] ([facility])    [address]  Mail [address]  [facility] [address]   [phone]            Jun 04, 2018  4:30 PM CDT   GROUP with [facility] CSB AC 2   [facility] ([facility])    [address]  Mail [address]  [facility] [address]   [phone]            Jun 20, 2018  4:00 PM CDT   Individual Therapy 53+ minutes with Thomas Baugh, PhD TYLER   [facility] ([facility])    [address]  [address]  [facility] [address]   [phone]            Jul 02, 2018  4:30 PM CDT   GROUP with [facility] CS[facility] AC 2   [facility] ([facility])    [address]  Mail [address]  [facility] [address]   [phone]            Jul 17, 2018  4:00 PM CDT   Individual Therapy 53+ minutes with Thomas Baugh, PhD TYLER   [facility] ([facility])    [address]  Mail [address]  [facility] [address]   [phone]            Aug 06, 2018  4:30 PM CDT   GROUP with SE [facility] CSB AC 2   [facility] ([facility])    [address]  Mail [address]  [facility] [address]   [phone]            Sep 03, 2018  4:30 PM CDT   GROUP with [facility] CSB AC 2   [facility] ([facility])    [address]  [address]  [facility] [address]   [phone]            Oct 01, 2018  4:30 PM CDT   GROUP with [facility] CSB AC 2   Center for 
Refill passed per [facility] protocol.     Hypertensive Medications  Protocol Criteria:  · Appointment scheduled in the past 6 months or in the next 3 months  · BMP or CMP in the past 12 months  · Creatinine result < 2  Recent Outpatient Visits
[facility] ([facility])    [address]  [address]  [facility] [address]   [phone]              Who to contact     Please call your clinic at [phone] to:    Ask questions about your health    Make or cancel appointments    Discuss your medicines    Learn about your test results    Speak to your doctor            Additional Information About Your Visit        MyChart Information     [employer] is an electronic gateway that provides easy, online access to your medical records. With [employer], you can request a clinic appointment, read your test results, renew a prescription or communicate with your care team.     To sign up for [employer] visit the website at [email or web address]   You will be asked to enter the access code listed below, as well as some personal information. Please follow the directions to create your username and password.     Your access code is: [email or web address]  Expires: 2018  1:03 PM     Your access code will  in 90 days. If you need help or a new code, please contact your [facility] or call [phone] for assistance.        Care EveryWhere ID     This is your Care EveryWhere ID. This could be used by other organizations to access your [facility] medical records  [id number]         Blood Pressure from Last 3 Encounters:   10/17/17 (!) 144/97   16 134/88    Weight from Last 3 Encounters:   10/27/17 90 kg (198 lb 8 oz)   10/17/17 92.1 kg (203 lb)   16 90.7 kg (200 lb)              We Performed the Following     Support Group - 120 Min. [[id number]]        Primary Care Provider Office Phone # Fax #    Samy Rendon [phone]       [facility] [address] [facility] [address]        Equal Access to Services     ANSHUL PALMER : Alexis Rodriguez, tom stroud, cherie kapadmini garcia, ying lianez. So [facility] [phone].    ATENCIÓN: Debra barnes 
español, tiene a horton disposición servicios gratuitos de asistencia lingüística. Tommy samson [phone].    We comply with applicable federal civil rights laws and [place] laws. We do not discriminate on the basis of race, color, national origin, age, disability, sex, sexual orientation, or gender identity.            Thank you!     Thank you for choosing [facility]  for your care. Our goal is always to provide you with excellent care. Hearing back from our patients is one way we can continue to improve our services. Please take a few minutes to complete the written survey that you may receive in the mail after your visit with us. Thank you!             Your Updated Medication List - Protect others around you: Learn how to safely use, store and throw away your medicines at www.disposemymeds.org.          This list is accurate as of 5/7/18  5:23 PM.  Always use your most recent med list.                   Brand Name Dispense Instructions for use Diagnosis    Cervical Traction Kit     1 kit    Use at home as needed and guided by Physical therapy    Cervicalgia       citalopram 40 MG tablet    celeXA    30 tablet    Take 1 tablet (40 mg) by mouth every morning    Depression       naltrexone 50 MG tablet    DEPADE;REVIA    90 tablet    Take 1 tablet (50 mg) by mouth every morning    Unspecified psychosexual disorder         
IBW used to calculate requirements   Will defer fluid requirements to medical team.

## 2025-02-07 NOTE — DIETITIAN INITIAL EVALUATION ADULT - PERTINENT LABORATORY DATA
02-07    146[H]  |  110[H]  |  20  ----------------------------<  102[H]  3.8   |  29  |  0.83    Ca    9.5      07 Feb 2025 05:47    TPro  7.3  /  Alb  2.8[L]  /  TBili  0.8  /  DBili  x   /  AST  26  /  ALT  49[H]  /  AlkPhos  90  02-07  POCT Blood Glucose.: 79 mg/dL (02-06-25 @ 21:18)  A1C with Estimated Average Glucose Result: 4.6 % (01-03-25 @ 21:34)

## 2025-02-07 NOTE — DIETITIAN NUTRITION RISK NOTIFICATION - TREATMENT: THE FOLLOWING DIET HAS BEEN RECOMMENDED
Diet, NPO with Tube Feed:   Tube Feeding Modality: Gastrostomy  Inocencia [employer] Peptide 1.5 (KFPEPT1.5RTH)  Total Volume for 24 Hours (mL): 1080  Bolus  Total Volume of Bolus (mL):  270  Total # of Feeds: 4  Tube Feed Frequency: Every 6 hours   Tube Feed Start Time: 06:00  Bolus Feed Rate (mL per Hour): 540   Bolus Feed Duration (in Hours): 0.5  Free Water Flush  Bolus   Total Volume per Flush (mL): 300   Frequency: Every 6 Hours    Start Time: 01:00  Free Water Flush Instructions:  Do not give water flush within 1 hour (before/after) tube feed.  Mehdi(7 Gm Arginine/7 Gm Glut/1.2 Gm HMB     Qty per Day:  2 (02-06-25 @ 22:05) [Active]

## 2025-02-07 NOTE — DIETITIAN INITIAL EVALUATION ADULT - FUNCTIONAL SCREEN CURRENT LEVEL: SWALLOWING (IF SCORE 2 OR MORE FOR ANY ITEM, CONSULT REHAB SERVICES), MLM)
Quality 130: Documentation Of Current Medications In The Medical Record: Current Medications Documented Detail Level: Detailed Quality 110: Preventive Care And Screening: Influenza Immunization: Influenza Immunization not Administered for Documented Reasons. Quality 131: Pain Assessment And Follow-Up: Pain assessment using a standardized tool is documented as negative, no follow-up plan required 2 = difficulty swallowing liquids/foods

## 2025-02-07 NOTE — PROVIDER CONTACT NOTE (OTHER) - ASSESSMENT
A&0 2-3, minimally verbal, flat affect. Pt able to follow commands. TC 30%. Peg tube, Primafit. VSS. No signs of acute distress. Pt keeps touching and pulling at her trach and t-collar tubing.

## 2025-02-07 NOTE — PROVIDER CONTACT NOTE (OTHER) - BACKGROUND
New admission from [facility] last night. Pt admitted with Encephalopathy. Hx of sickle cell disease and HTN,

## 2025-02-07 NOTE — DIETITIAN INITIAL EVALUATION ADULT - NS FNS DIET ORDER
Diet, NPO with Tube Feed:   Tube Feeding Modality: Gastrostomy  [employer]encia [employer] Peptide 1.5 (KFPEPT1.5RTH)  Total Volume for 24 Hours (mL): 1080  Bolus  Total Volume of Bolus (mL):  270  Total # of Feeds: 4  Tube Feed Frequency: Every 6 hours   Tube Feed Start Time: 06:00  Bolus Feed Rate (mL per Hour): 540   Bolus Feed Duration (in Hours): 0.5  Free Water Flush  Bolus   Total Volume per Flush (mL): 300   Frequency: Every 6 Hours    Start Time: 01:00  Free Water Flush Instructions:  Do not give water flush within 1 hour (before/after) tube feed.  Mehdi(7 Gm Arginine/7 Gm Glut/1.2 Gm HMB     Qty per Day:  2 (02-06-25 @ 22:05)

## 2025-02-07 NOTE — DIETITIAN INITIAL EVALUATION ADULT - ADD RECOMMEND
1) Continue Tube feed In[employer]encia [employer] 1.5 @ 270mL QID + Mehdi 1 Packet BID (Provides 180kcal, 5grams protein)  diet advance diet PRN.   2) Maintain aspiration precautions (elevated HOB>30-45 degrees during feeds and for at least 30 minutes before/after feeds), hold feeds for s/s TF intolerance (n/v/abdominal distention)  3) Monitor GI tolerance, skin integrity, hydration, labs, weight, and bowel movement regularity.   4) Follow SLP recommendations  5) Provide ongoing diet education as needed  6) RD remains available upon request and will follow-up per protocol  19.5

## 2025-02-07 NOTE — DIETITIAN INITIAL EVALUATION ADULT - NSFNSGIIOFT_GEN_A_CORE
02-06-25 @ 07:01  -  02-07-25 @ 07:00  --------------------------------------------------------  OUT:  Total OUT: 0 mL    Total NET: 540 mL      02-07-25 @ 07:01  -  02-07-25 @ 14:21  --------------------------------------------------------  OUT:  Total OUT: 0 mL    Total NET: 270 mL

## 2025-02-07 NOTE — DIETITIAN INITIAL EVALUATION ADULT - OTHER INFO
Reason for Admission: bilateral CVA  History of Present Illness:   59F PMH HTN, HFimpEF/NICM (EF 58% 10/2024), Sickle cell disease (on hydroxyurea), who presented to [facility] for SOB and SS crisis on 12/30 and 12/31/24. She was intubated and sedated following witnessed seizure, and transferred to [facility] (1/3/2025) for further management.     Keppra was discontinued at [facility] due to negative EEG. Seizure was thought to be caused by clonazepam withdrawal per OSH. She was hypotensive and tachycardic with elevated trops (~900s), but with no EKG changes. TTE with RV failure possibly iso pulmonary HTN 2/2 increased tidal volumes on ventilator and possible additional volume overload 2/2 management of sickle cell crisis. Neuro was consulted for seizures and EEG technician was called for vEEG placement; seizure activities noted.  Pt transferred to [facility] for management of cardiogenic shock with increased inotropic and pressor support requirement.     ON 1/3, venoarterial ECMO (Left Femoral Arterial 17Fr, Right Femoral Venous 25Fr, L SFA RPC 6Fr) was initiated. Transfusion Medicine team was consulted for emergent RBC exchange to minimize the risk of complications related to sickle cell disease given critical illness with multi-organ failure. TTE 1/4/25: LVEF <20%, ECMO cannula in RV, mildly enlarged RV size and reduced RV systolic function, no pericardial effusion, no MR, trace TR, IVC nml size    Hospital course also significant for MSSA in sputum culture from ET tube, MSSA bacteremia, BAL + MSSA Patient was treated with IV vanc (completed) and merrem for total 10 days (completed). On 1/7 patient s/p VA ECMO decannulation. Case was complicated by hematoma formation in left thigh (site of arterial cannula). She was given 2U pRBC, 2 platelet and 5U cryo. 1/8 YUNG and LLL evaluation revealed moderate green tinged secretions and mucous plugging. RUL, RML, RLL revealed minimal secretions.     Patient was tapered off precedex sedation and Keppra reinstated. MRI 1/9 showed innumerable foci susceptibility artifact scattered throughout the brain, tiny acute/subacute infarcts within the bilateral basal ganglia, thalami, and chandu with disproportionate ovoid area of T2/FLAIR prolongation in the right ventral thalamus. Findings may indicate the presence of embolic acute/subacute infarcts. 1/14 Lumbar puncture no evidence of meningitis/ encephalitis     ON 1/16 bedside trach was placed by Dr Hebert Holden and on 1/21 PEG placement performed. 1/27 Speech and swallow eval there is reduced movement of the right vocal cord; left is mobile/intact. Silent laryngeal penetration of purees and moderately thick liquids is noted Remains with a #6 Cuffless Trach at 30% Trach collar.    Patient was medically cleared for transfer to [facility]F 2/6/25.    Patient tolerating current TF at this time [employer] 1.5 @ 270mL QID. At goal TF provides 1620kcal, 80g protein + additional 180kcal from Mehdi BID. No N/V/C/D reported, last BM 2/3 Per nursing flowsheets. Weight Hx: 163.4lb (1/6),  160lb (2/6), Physical appearance consistent with malnutrition (muscle & subcutaneous fat loss observed).  W/ stage 2 Pressure Injury; trach, recommend continue Mehdi BID, +MVI,  Vit C (500 mg) supplementation to aid in wound healing.

## 2025-02-07 NOTE — DIETITIAN INITIAL EVALUATION ADULT - PERTINENT MEDS FT
MEDICATIONS  (STANDING):  albuterol/ipratropium for Nebulization 3 milliLiter(s) Nebulizer every 8 hours  amantadine Syrup 100 milliGRAM(s) Oral <User Schedule>  artificial tears (preservative free) Ophthalmic Solution 1 Drop(s) Both EYES four times a day  ascorbic acid 500 milliGRAM(s) Oral daily  enoxaparin Injectable 40 milliGRAM(s) SubCutaneous every 24 hours  hydroxyurea 500 milliGRAM(s) Oral two times a day  levETIRAcetam  Solution 500 milliGRAM(s) Oral every 12 hours  metoprolol tartrate 12.5 milliGRAM(s) Oral every 8 hours  multivitamin/minerals/iron Oral Solution (CENTRUM) 15 milliLiter(s) Oral daily  nystatin    Suspension 690693 Unit(s) Oral three times a day  pantoprazole   Suspension 40 milliGRAM(s) Oral before breakfast  polyethylene glycol 3350 17 Gram(s) Oral daily  senna 1 Tablet(s) Oral daily    MEDICATIONS  (PRN):  acetaminophen   Oral Liquid .. 650 milliGRAM(s) Oral every 6 hours PRN Mild Pain (1 - 3)

## 2025-02-07 NOTE — CONSULT NOTE ADULT - ASSESSMENT
58 y/o F with PMH HTN, HFimpEF/NICM (EF 58% 10/2024), sickle cell disease, who presented to [facility] for SOB and SS crisis 12/30/25;, followed by witnessed seizure (?secondary to clonazepam withdrawal) requiring intubation. She was transferred to [facility] 1/3/25 secondary to cardiogenic shock/RV failure s/p ECMO +TRACH +PEG. Now admitted to [facility] after for initiation of a multidisciplinary rehab program consisting focused on functional mobility, transfers and ADLs (activities of daily living).    #Bilateral CVA  -MRI 1/9: Innumerable foci susceptibility artifact scattered throughout the brain which can be seen with critically ill patients on ECMO. Diffuse fat embolization is also part of the differential diagnosis but is considered less likely. Tiny acute/subacute infarcts within the bilateral basal ganglia, thalami, and chandu with disproportionate ovoid area of T2/FLAIR prolongation in the right ventral thalamus. Findings may indicate the presence of embolic acute/subacute infarcts.  -Precautions: respiratory, aspiration, SZ, AMS, fall, TRACH, PEG  -Start comprehensive rehab program - PT/OT/SLP per rehab team  -Pain management, bowel regimen per rehab    #Hypoarousal  -LP (1/14) - negative for meningitis/encephalitis  -Amantadine 100 BID    #Seizure  -?Secondary to clonazepam withdrawal  -EEG (1/7) with suspicion of seizure  -Seizure ppx: Keppra 500 BID     # Acute respiratory failure  - S/p Trach (1/16). On 6.0 cuffless (1/29)  - BCx 1/3 with one bottle staph epi, BCx 1/5 NGTD, Sputum from ET cx 1/4 staph aureus  - MSSA - cefepime + vanc > switched to vanc + Merrem; Bactroban to nare  - Supplemental O2: trach collar FiO2 30% via 10L  - Duoneb q8hrs  - Pulm consult    # Cardiogenic Shock  - last trop: 201 (1/4) downtrending from 314, (per [facility] trop ~900)  - S/p ECMO 1/3. decannulated 1/7  - S/p 2 U PRBC, 2 platelet, 5U cryo  - ECHO 1/20: EF 53%, normal LV+RV function, no acute HF issues  - Metoprolol 25 Q8h  - monitor vitals, hospitalist consult    # Sickle cell disease   - 1-2 sickle pain crisis per yea  - s/p transfusions, apheresis (1/12) for fat emboli concerns   - Hydroxyurea 500 BID (as long as plt > 100 and hgb >8)  - Hematology consult PRN    # Transaminitis   - RUQ US    # Sleep:   - Maintain quiet hours and low stim environment.    # Pain Management:  - Tylenol PRN  - Avoid sedating medications that may interfere with cognitive recovery    # GI/Bowel:  - Senna QHS, Miralax QD  - GI ppx: Protonix    # /Bladder:   - PVR q 8 hours (SC if > 400)    # Skin/Pressure Injury:   - Skin assessment on admission:   - Seen by wound care team at [facility]. healing pressure injury DTI to L ear lobe, b/l forehead, sacrum /bilateral buttocks, bilateral heels. Incontinence Dermatitis  - on admissions exam, no skin injuries noted to sacrum/buttocks or heels  Recommend:  1.) topical therapy: sacral/buttock skin – cleanse with incontinence cleanser, pat dry, apply Piotr ointment BID and PRN for incontinent episodes. bilateral forehead - cleanse with gentle cleanser, leave open to air  2.) Incontinence Management - incontinence cleanser, pads, pericare BID  3.) Maintain on an alternating air with low air loss surface  4.) Turn and reposition Q 2 hours  5.) Nutrition optimization   6.) Offload heels/feet with complete cair air fluidized boots/pillows; ensure that the soles of the feet are not resting on the foot board of the bed.  7.) chair cushion for chair sitting    # Diet/Dysphagia:  - S/p PEG (1/21)  - FEEST (1/27) > silent laryngeal penetration of purees and moderately thick liquids is noted > rec NPO  - Current Diet: NPO + TF; free water   - Dysphagia: SLP consult for swallow function evaluation and treatment  - Health supp: Vitamin C + MVI    #DVT ppx  -Lovenox   60 y/o F with PMH HTN, HFimpEF/NICM (EF 58% 10/2024), sickle cell disease, who presented to [facility] for SOB and SS crisis 12/30/25;, followed by witnessed seizure (?secondary to clonazepam withdrawal) requiring intubation. She was transferred to [facility] 1/3/25 secondary to cardiogenic shock/RV failure s/p ECMO +TRACH +PEG. Now admitted to [facility] after for initiation of a multidisciplinary rehab program consisting focused on functional mobility, transfers and ADLs (activities of daily living).    #Orthostatic hypotension  -Decrease metoprolol to 12.5mg q8h with holding parameters 2/7    #Bilateral CVA  -MRI 1/9: Innumerable foci susceptibility artifact scattered throughout the brain which can be seen with critically ill patients on ECMO. Diffuse fat embolization is also part of the differential diagnosis but is considered less likely. Tiny acute/subacute infarcts within the bilateral basal ganglia, thalami, and chandu with disproportionate ovoid area of T2/FLAIR prolongation in the right ventral thalamus. Findings may indicate the presence of embolic acute/subacute infarcts.  -Precautions: respiratory, aspiration, SZ, AMS, fall, TRACH, PEG  -Start comprehensive rehab program - PT/OT/SLP per rehab team  -Pain management, bowel regimen per rehab    #Hypoarousal  -LP (1/14) - negative for meningitis/encephalitis  -Amantadine 100 BID    #Seizure  -?Secondary to clonazepam withdrawal  -EEG (1/7) with suspicion of seizure  -Seizure ppx: Keppra 500 BID     #Acute respiratory failure - s/p Trach (1/16). On 6.0 cuffless (1/29)  -BCx 1/3 with one bottle staph epi, BCx 1/5 NGTD, Sputum from ET cx 1/4 staph aureus  -MSSA - cefepime + vanc > switched to vanc + Merrem; Bactroban to nare  -Supplemental O2: trach collar FiO2 30% via 10L  -Duoneb q8hrs  -Pulm consult    #Cardiogenic Shock - resolved  -last trop: 201 (1/4) downtrending from 314, (per [facility] trop ~900)  -S/p ECMO 1/3. decannulated 1/7  -S/p 2 U PRBC, 2 platelet, 5U cryo  -ECHO 1/20: EF 53%, normal LV+RV function, no acute HF issues  -Metoprolol    #Sickle cell disease   -1-2 sickle pain crisis per yea  -s/p transfusions, apheresis (1/12) for fat emboli concerns   -Hydroxyurea 500 BID (as long as plt > 100 and hgb >8)    #Transaminitis   -RUQ US    DVT ppx - Lovenox  GI ppx - PPI

## 2025-02-07 NOTE — DIETITIAN INITIAL EVALUATION ADULT - MUSCLE MASS (LOSS OF MUSCLE)
negative Temples.../Clavicles... Normal respiratory pattern/No chest wall deformities/Symmetric breath sounds clear to auscultation and percussion

## 2025-02-07 NOTE — CONSULT NOTE ADULT - SUBJECTIVE AND OBJECTIVE BOX
Patient is a 59y old  Female who presents with a chief complaint of bilateral CVA (07 Feb 2025 13:54)      Patient seen and examined at bedside. no acute events overnight.     ALLERGIES:  doxycycline (Angioedema)  clindamycin (Angioedema)  penicillin (Unknown)  linezolid (Angioedema)    MEDICATIONS  (STANDING):  albuterol/ipratropium for Nebulization 3 milliLiter(s) Nebulizer every 8 hours  amantadine Syrup 100 milliGRAM(s) Oral <User Schedule>  artificial tears (preservative free) Ophthalmic Solution 1 Drop(s) Both EYES four times a day  ascorbic acid 500 milliGRAM(s) Oral daily  enoxaparin Injectable 40 milliGRAM(s) SubCutaneous every 24 hours  hydroxyurea 500 milliGRAM(s) Oral two times a day  levETIRAcetam  Solution 500 milliGRAM(s) Oral every 12 hours  metoprolol tartrate 12.5 milliGRAM(s) Oral every 8 hours  multivitamin/minerals/iron Oral Solution (CENTRUM) 15 milliLiter(s) Oral daily  nystatin    Suspension 368503 Unit(s) Oral three times a day  pantoprazole   Suspension 40 milliGRAM(s) Oral before breakfast  polyethylene glycol 3350 17 Gram(s) Oral daily  senna 1 Tablet(s) Oral daily    MEDICATIONS  (PRN):  acetaminophen   Oral Liquid .. 650 milliGRAM(s) Oral every 6 hours PRN Mild Pain (1 - 3)    Vital Signs Last 24 Hrs  T(F): 99.1 (07 Feb 2025 07:36), Max: 99.2 (06 Feb 2025 15:30)  HR: 100 (07 Feb 2025 14:06) (85 - 100)  BP: 114/78 (07 Feb 2025 14:06) (114/78 - 126/87)  RR: 16 (07 Feb 2025 14:06) (16 - 18)  SpO2: 100% (07 Feb 2025 14:06) (98% - 100%)  I&O's Summary    06 Feb 2025 07:01  -  07 Feb 2025 07:00  --------------------------------------------------------  IN: 1140 mL / OUT: 600 mL / NET: 540 mL    07 Feb 2025 07:01  -  07 Feb 2025 14:29  --------------------------------------------------------  IN: 570 mL / OUT: 0 mL / NET: 570 mL      BMI (kg/m2): 27.5 (02-06-25 @ 20:59)    PHYSICAL EXAM:  General: NAD, +hypophonic  ENT: MMM, no scleral icterus  Neck: +trach collar  Lungs: Clear to auscultation bilaterally, no wheezes, rales, rhonchi  Cardio: RRR, S1/S2  Abdomen: Soft, Nontender, Nondistended; Bowel sounds present, +PEG  Extremities: No calf tenderness, No pitting edema    LABS:                        10.5   11.33 )-----------( 191      ( 07 Feb 2025 05:47 )             31.1       02-07    146  |  110  |  20  ----------------------------<  102  3.8   |  29  |  0.83    Ca    9.5      07 Feb 2025 05:47  Phos  3.8     02-05  Mg     2.4     02-05    TPro  7.3  /  Alb  2.8  /  TBili  0.8  /  DBili  x   /  AST  26  /  ALT  49  /  AlkPhos  90  02-07                              POCT Blood Glucose.: 79 mg/dL (06 Feb 2025 21:18)  POCT Blood Glucose.: 111 mg/dL (06 Feb 2025 18:05)      Urinalysis Basic - ( 07 Feb 2025 05:47 )    Color: x / Appearance: x / SG: x / pH: x  Gluc: 102 mg/dL / Ketone: x  / Bili: x / Urobili: x   Blood: x / Protein: x / Nitrite: x   Leuk Esterase: x / RBC: x / WBC x   Sq Epi: x / Non Sq Epi: x / Bacteria: x        COVID-19 PCR: NotDetec (02-06-25 @ 21:11)      RADIOLOGY & ADDITIONAL TESTS:  Labs, Radiology, Cardiology, and Other Results: 11.6   10.91 )-----------( 207      ( 06 Feb 2025 07:34 )             35.6     02-06    145  |  108  |  22  ----------------------------<  105[H]  4.0   |  26  |  0.77    Ca    10.1      06 Feb 2025 07:32  Phos  3.8     02-05  Mg     2.4     02-05    TPro  7.3  /  Alb  3.7  /  TBili  0.7  /  DBili  x   /  AST  33  /  ALT  51[H]  /  AlkPhos  93  02-06      RADIOLOGY, EKG & ADDITIONAL TESTS:     US Abdomen Upper Quadrant Right (01.27.25 @ 17:38) >  1. The gallbladder is moderately distended and contains layering sludge and possibly small stones. There is no significant gallbladder wall thickening or pericholecystic fluid and a sonographic Gross sign was not elicited. Therefore, there is no definitive evidence for acute cholecystitis. If this is of clinical concern, HIDA scan could be performed for further evaluation. Follow-up ultrasound to reevaluate these findings is also suggested.  2. No biliary dilatation is identified.  3. The liver parenchyma demonstrates heterogeneous echotexture, of uncertain significance. Liver parenchymal disease is not excluded. No discrete, focal abnormality is identified.    MR Lumbar Spine No Cont (01.24.25 @ 17:40) >  MR cervical spine: Mild disc degeneration and spondylosis at C5-6 through C7-T1 with loss of disc height and associated degenerative endplate changes. There is narrowing of the RIGHT C5-6, C6/7 and C7-T1 neural   foramina due to uncovertebral spurring and facet osteophytic hypertrophy. Mild posterior osteophytic ridge/disc complexes at C4-5 through C6/7 flatten the ventral thecal sac.    MR lumbar spine:  Mild disc degeneration at L2-3 throughL5-S1 with loss of disc height and signal within the nucleus pulposus. Disc bulges are noted at L2-3 through L5-S1 which flatten the ventral thecal sac and narrow the BILATERAL neural foramina. Moderate central stenosis at L3-4 and mild central stenosis at L4-5 and L5-S1 on a degenerative basis due to disc bulge, facet osteophytic hypertrophy and redundancy of ligamentum   flavum.    Xray Chest 1 View- (01.22.25 @ 03:56) >  Resolution of prior right pleural effusion associated atelectasis.    MR Head w/wo IV Cont (01.09.25 @ 18:07) >  Innumerable foci susceptibility artifact scattered throughout the brain which can be seen with critically ill patients on ECMO. Diffuse fat embolization is also part of the differential diagnosis but is considered less likely.  2. Tiny acute/subacute infarcts within the bilateral basal ganglia, thalami, and chandu with disproportionate ovoid area of T2/FLAIR prolongation in the right ventral thalamus. Findings may indicate the presence of embolic acute/subacute infarcts.    Xray Abdomen (01.08.25 @ 20:31) >  Nonobstructive bowel gas pattern.      Care Discussed with Consultants/Other Providers: yes, rehab   Patient is a 59y old  Female who presents with a chief complaint of bilateral CVA (2025 13:54)    HPI:  59F PMH HTN, HFimpEF/NICM (EF 58% 10/2024), Sickle cell disease (on hydroxyurea), who presented to [facility] for SOB and SS crisis on  and 24. She was intubated and sedated following witnessed seizure, and transferred to [facility] (1/3/2025) for further management.     Keppra was discontinued at [facility] due to negative EEG. Seizure was thought to be caused by clonazepam withdrawal per OSH. She was hypotensive and tachycardic with elevated trops (~900s), but with no EKG changes. TTE with RV failure possibly iso pulmonary HTN 2/2 increased tidal volumes on ventilator and possible additional volume overload 2/2 management of sickle cell crisis. Neuro was consulted for seizures and EEG technician was called for vEEG placement; seizure activities noted.  Pt transferred to NS for management of cardiogenic shock with increased inotropic and pressor support requirement.     ON 1/3, venoarterial ECMO (Left Femoral Arterial 17Fr, Right Femoral Venous 25Fr, L SFA RPC 6Fr) was initiated. Transfusion Medicine team was consulted for emergent RBC exchange to minimize the risk of complications related to sickle cell disease given critical illness with multi-organ failure. TTE 25: LVEF <20%, ECMO cannula in RV, mildly enlarged RV size and reduced RV systolic function, no pericardial effusion, no MR, trace TR, IVC nml size    Hospital course also significant for MSSA in sputum culture from ET tube, MSSA bacteremia, BAL + MSSA Patient was treated with IV vanc (completed) and merrem for total 10 days (completed). On  patient s/p [facility] ECMO decannulation. Case was complicated by hematoma formation in left thigh (site of arterial cannula). She was given 2U pRBC, 2 platelet and 5U cryo.  YUNG and LLL evaluation revealed moderate green tinged secretions and mucous plugging. RUL, RML, RLL revealed minimal secretions.     Patient was tapered off precedex sedation and Keppra reinstated. MRI  showed innumerable foci susceptibility artifact scattered throughout the brain, tiny acute/subacute infarcts within the bilateral basal ganglia, thalami, and chandu with disproportionate ovoid area of T2/FLAIR prolongation in the right ventral thalamus. Findings may indicate the presence of embolic acute/subacute infarcts.  Lumbar puncture no evidence of meningitis/ encephalitis     ON  bedside trach was placed by Dr Hebert Holden and on  PEG placement performed.  Speech and swallow eval there is reduced movement of the right vocal cord; left is mobile/intact. Silent laryngeal penetration of purees and moderately thick liquids is noted Remains with a #6 Cuffless Trach at 30% Trach collar. Patient was medically cleared for transfer to [facility] 25. (2025 12:00)    Patient seen and examined at bedside. noted hypotension this am. ROS limited due to aphasia.     PAST MEDICAL & SURGICAL HISTORY:  Sickle-cell-hemoglobin C disease with crisis  Essential hypertension  H/O:   H/O abdominoplasty  S/P breast augmentation  S/P     ALLERGIES:  doxycycline (Angioedema)  clindamycin (Angioedema)  penicillin (Unknown)  linezolid (Angioedema)    MEDICATIONS  (STANDING):  albuterol/ipratropium for Nebulization 3 milliLiter(s) Nebulizer every 8 hours  amantadine Syrup 100 milliGRAM(s) Oral <User Schedule>  artificial tears (preservative free) Ophthalmic Solution 1 Drop(s) Both EYES four times a day  ascorbic acid 500 milliGRAM(s) Oral daily  enoxaparin Injectable 40 milliGRAM(s) SubCutaneous every 24 hours  hydroxyurea 500 milliGRAM(s) Oral two times a day  levETIRAcetam  Solution 500 milliGRAM(s) Oral every 12 hours  metoprolol tartrate 12.5 milliGRAM(s) Oral every 8 hours  multivitamin/minerals/iron Oral Solution (CENTRUM) 15 milliLiter(s) Oral daily  nystatin    Suspension 144321 Unit(s) Oral three times a day  pantoprazole   Suspension 40 milliGRAM(s) Oral before breakfast  polyethylene glycol 3350 17 Gram(s) Oral daily  senna 1 Tablet(s) Oral daily    MEDICATIONS  (PRN):  acetaminophen   Oral Liquid .. 650 milliGRAM(s) Oral every 6 hours PRN Mild Pain (1 - 3)    Review of Systems: Refer to HPI for pertinent positives and negatives. All other ROS reviewed and negative except as otherwise stated above.    Vital Signs Last 24 Hrs  T(F): 99.1 (2025 07:36), Max: 99.2 (2025 15:30)  HR: 100 (2025 14:06) (85 - 100)  BP: 114/78 (2025 14:06) (114/78 - 126/87)  RR: 16 (2025 14:06) (16 - 18)  SpO2: 100% (2025 14:06) (98% - 100%)  I&O's Summary    2025 07:01  -  2025 07:00  --------------------------------------------------------  IN: 1140 mL / OUT: 600 mL / NET: 540 mL    2025 07:01  -  2025 14:43  --------------------------------------------------------  IN: 570 mL / OUT: 0 mL / NET: 570 mL      PHYSICAL EXAM:  GENERAL: NAD, well-groomed, +hypophonia  HEAD:  Atraumatic, Normocephalic  EYES: EOMI, PERRL, conjunctiva and sclera clear  ENMT: Moist mucous membranes, Good dentition  NECK: +trach  CHEST/LUNG: Clear to auscultation bilaterally, non-labored breathing, good air entry  HEART: RRR; S1/S2  ABDOMEN: Soft, Nontender, Nondistended; Bowel sounds present. +PEG  VASCULAR: Normal pulses, Normal capillary refill  EXTREMITIES: No cyanosis, No edema  LYMPH: No lymphadenopathy noted  SKIN: Warm, Intact  PSYCH: Normal mood and affect  NERVOUS SYSTEM:  Poor concentration with intermittent agitation; diffuse weakness    LABS:                        10.5   11.33 )-----------( 191      ( 2025 05:47 )             31.1     02-07    146  |  110  |  20  ----------------------------<  102  3.8   |  29  |  0.83    Ca    9.5      2025 05:47  Phos  3.8     02-  Mg     2.4     -    TPro  7.3  /  Alb  2.8  /  TBili  0.8  /  DBili  x   /  AST  26  /  ALT  49  /  AlkPhos  90  -                          POCT Blood Glucose.: 79 mg/dL (2025 21:18)  POCT Blood Glucose.: 111 mg/dL (2025 18:05)      Urinalysis Basic - ( 2025 05:47 )    Color: x / Appearance: x / SG: x / pH: x  Gluc: 102 mg/dL / Ketone: x  / Bili: x / Urobili: x   Blood: x / Protein: x / Nitrite: x   Leuk Esterase: x / RBC: x / WBC x   Sq Epi: x / Non Sq Epi: x / Bacteria: x        COVID-19 PCR: NotDetec (25 @ 21:11)    RADIOLOGY & ADDITIONAL TESTS:      US Abdomen Upper Quadrant Right (25 @ 17:38) >  1. The gallbladder is moderately distended and contains layering sludge and possibly small stones. There is no significant gallbladder wall thickening or pericholecystic fluid and a sonographic Gross sign was not elicited. Therefore, there is no definitive evidence for acute cholecystitis. If this is of clinical concern, HIDA scan could be performed for further evaluation. Follow-up ultrasound to reevaluate these findings is also suggested.  2. No biliary dilatation is identified.  3. The liver parenchyma demonstrates heterogeneous echotexture, of uncertain significance. Liver parenchymal disease is not excluded. No discrete, focal abnormality is identified.    MR Lumbar Spine No Cont (25 @ 17:40) >  MR cervical spine: Mild disc degeneration and spondylosis at C5-6 through C7-T1 with loss of disc height and associated degenerative endplate changes. There is narrowing of the RIGHT C5-6, C6/7 and C7-T1 neural   foramina due to uncovertebral spurring and facet osteophytic hypertrophy. Mild posterior osteophytic ridge/disc complexes at C4-5 through C6/7 flatten the ventral thecal sac.    MR lumbar spine:  Mild disc degeneration at L2-3 throughL5-S1 with loss of disc height and signal within the nucleus pulposus. Disc bulges are noted at L2-3 through L5-S1 which flatten the ventral thecal sac and narrow the BILATERAL neural foramina. Moderate central stenosis at L3-4 and mild central stenosis at L4-5 and L5-S1 on a degenerative basis due to disc bulge, facet osteophytic hypertrophy and redundancy of ligamentum   flavum.    Xray Chest 1 View- (25 @ 03:56) >  Resolution of prior right pleural effusion associated atelectasis.    MR Head w/wo IV Cont (25 @ 18:07) >  Innumerable foci susceptibility artifact scattered throughout the brain which can be seen with critically ill patients on ECMO. Diffuse fat embolization is also part of the differential diagnosis but is considered less likely.  2. Tiny acute/subacute infarcts within the bilateral basal ganglia, thalami, and chandu with disproportionate ovoid area of T2/FLAIR prolongation in the right ventral thalamus. Findings may indicate the presence of embolic acute/subacute infarcts.    Xray Abdomen (25 @ 20:31) >  Nonobstructive bowel gas pattern.    Care Discussed with Consultants/Other Providers: yes, rehab

## 2025-02-07 NOTE — DIETITIAN INITIAL EVALUATION ADULT - SIGNS/SYMPTOMS
as evidenced by NPO w/ Tube Feed as evidenced by 4 lb wt loss x 1m, subcutaneous fat loss/ muscle depletion

## 2025-02-07 NOTE — CONSULT NOTE ADULT - SUBJECTIVE AND OBJECTIVE BOX
Time of visit:    CHIEF COMPLAINT: Patient is a 59y old  Female who presents with a chief complaint of bilateral CVA (2025 07:56)      HPI: This is a 59 yr old woman  HTN, HFimpEF/NICM (EF 58% 10/2024), Sickle cell disease (on hydroxyurea), who presented to [facility] for SOB and SS crisis on  and 24. She was intubated and sedated following witnessed seizure, and [facility] (1/3/[facility]25) for further management.     Keppra was discontinued at [facility] due to negative EEG. Seizure was thought to be caused by clonazepam withdrawal per OSH. She was hypotensive and tachycardic with elevated trops (~900s), but with no EKG changes. TTE with RV failure possibly iso pulmonary HTN 2/2 increased tidal volumes on ventilator and possible additional volume overload 2/2 management of sickle cell crisis. Neuro was consulted for seizures and EEG technician was called for vEEG placement; seizure activities noted.  Pt transferred to NS for management of cardiogenic shock with increased inotropic and pressor support requirement.     ON 1/3, venoarterial ECMO (Left Femoral Arterial 17Fr, Right Femoral Venous 25Fr, L SFA RPC 6Fr) was initiated. Transfusion Medicine team was consulted for emergent RBC exchange to minimize the risk of complications related to sickle cell disease given critical illness with multi-organ failure. TTE 25: LVEF <20%, ECMO cannula in RV, mildly enlarged RV size and reduced RV systolic function, no pericardial effusion, no MR, trace TR, IVC nml size    Hospital course also significant for MSSA in sputum culture from ET tube, MSSA bacteremia, BAL + MSSA Patient was treated with IV vanc (completed) and merrem for total 10 days (completed). On  patient s/p VA ECMO decannulation. Case was complicated by hematoma formation in left thigh (site of arterial cannula). She was given 2U pRBC, 2 platelet and 5U cryo.  YUNG and LLL evaluation revealed moderate green tinged secretions and mucous plugging. RUL, RML, RLL revealed minimal secretions.     Patient was tapered off precedex sedation and Keppra reinstated. MRI  showed innumerable foci susceptibility artifact scattered throughout the brain, tiny acute/subacute infarcts within the bilateral basal ganglia, thalami, and chandu with disproportionate ovoid area of T2/FLAIR prolongation in the right ventral thalamus. Findings may indicate the presence of embolic acute/subacute infarcts.  Lumbar puncture no evidence of meningitis/ encephalitis     ON  bedside trach was placed by Dr Hebert Holden and on  PEG placement performed.  Speech and swallow eval there is reduced movement of the right vocal cord; left is mobile/intact. Silent laryngeal penetration of purees and moderately thick liquids is noted Remains with a #6 Cuffless Trach at 30% Trach collar.    Patient was medically cleared for transfer to [facility] 25.   (2025 12:00)   Patient seen and examined.     PAST MEDICAL & SURGICAL HISTORY:  Sickle-cell-hemoglobin C disease with crisis      Essential hypertension      Sickle cell disease      HTN (hypertension)      H/O:       H/O abdominoplasty      S/P breast augmentation      S/P           Allergies    doxycycline (Angioedema)  clindamycin (Angioedema)  penicillin (Unknown)  linezolid (Angioedema)    Intolerances        MEDICATIONS  (STANDING):  albuterol/ipratropium for Nebulization 3 milliLiter(s) Nebulizer every 8 hours  amantadine Syrup 100 milliGRAM(s) Oral <User Schedule>  artificial tears (preservative free) Ophthalmic Solution 1 Drop(s) Both EYES four times a day  ascorbic acid 500 milliGRAM(s) Oral daily  enoxaparin Injectable 40 milliGRAM(s) SubCutaneous every 24 hours  hydroxyurea 500 milliGRAM(s) Oral two times a day  levETIRAcetam  Solution 500 milliGRAM(s) Oral every 12 hours  metoprolol tartrate 12.5 milliGRAM(s) Oral every 8 hours  multivitamin/minerals/iron Oral Solution (CENTRUM) 15 milliLiter(s) Oral daily  nystatin    Suspension 488340 Unit(s) Oral three times a day  pantoprazole   Suspension 40 milliGRAM(s) Oral before breakfast  polyethylene glycol 3350 17 Gram(s) Oral daily  senna 1 Tablet(s) Oral daily      MEDICATIONS  (PRN):  acetaminophen   Oral Liquid .. 650 milliGRAM(s) Oral every 6 hours PRN Mild Pain (1 - 3)   Medications up to date at time of exam.    Medications up to date at time of exam.    FAMILY HISTORY:  FHx: cerebral palsy (Child)        SOCIAL HISTORY unable to obtain due to mental status   Smoking History: [   ] smoking/smoke exposure, [   ] former smoker  Living Condition: [   ] apartment, [   ] private house  Work History:   Travel History: denies recent travel  Illicit Substance Use: denies  Alcohol Use: denies    REVIEW OF SYSTEMS: unable to obtain due to mental status    CONSTITUTIONAL:  denies fevers, chills, sweats, weight loss    HEENT:  denies diplopia or blurred vision, sore throat or runny nose.    CARDIOVASCULAR:  denies pressure, squeezing, tightness, or heaviness about the chest; no palpitations.    RESPIRATORY:  denies SOB, cough, BOYER, wheezing.    GASTROINTESTINAL:  denies abdominal pain, nausea, vomiting or diarrhea.    GENITOURINARY: denies dysuria, frequency or urgency.    NEUROLOGIC:  denies numbness, tingling, seizures or weakness.    PSYCHIATRIC:  denies disorder of thought or mood.    MSK: denies swelling, redness      PHYSICAL EXAMINATION:    GENERAL: The patient  in no apparent distress. lying in bed O2 via TC     Vital Signs Last 24 Hrs  T(C): 37.3 (2025 07:36), Max: 37.3 (2025 15:30)  T(F): 99.1 (2025 07:36), Max: 99.2 (2025 15:30)  HR: 85 (2025 08:18) (85 - 94)  BP: 126/87 (2025 07:36) (118/81 - 126/87)  BP(mean): 99 (2025 15:30) (99 - 99)  RR: 16 (2025 07:36) (16 - 18)  SpO2: 99% (2025 08:18) (98% - 100%)    Parameters below as of 2025 08:18  Patient On (Oxygen Delivery Method): tracheostomy collar       (if applicable)    Chest Tube (if applicable)    HEENT: Head is normocephalic and atraumatic. Extraocular muscles are intact. Mucous membranes are moist.     NECK: Supple, no palpable adenopathy. + trach # 6 cuffless  with TC , Stoma no redness or discharge     LUNGS: Fair b/l breath sounds, no wheezing, rales, or rhonchi.    HEART: Regular rate and rhythm without murmur.    ABDOMEN: Soft, nontender, and nondistended.  No hepatosplenomegaly is noted. + PEG     RENAL: No difficulty voiding, no pelvic pain    EXTREMITIES: Without any cyanosis, clubbing, rash, lesions or edema.    NEUROLOGIC: eyes open , non verbal     SKIN: Warm, dry, good turgor.      LABS:                        10.5   11.33 )-----------( 191      ( 2025 05:47 )             31.1         146[H]  |  110[H]  |  20  ----------------------------<  102[H]  3.8   |  29  |  0.83    Ca    9.5      2025 05:47    TPro  7.3  /  Alb  2.8[L]  /  TBili  0.8  /  DBili  x   /  AST  26  /  ALT  49[H]  /  AlkPhos  90        Urinalysis Basic - ( 2025 05:47 )    Color: x / Appearance: x / SG: x / pH: x  Gluc: 102 mg/dL / Ketone: x  / Bili: x / Urobili: x   Blood: x / Protein: x / Nitrite: x   Leuk Esterase: x / RBC: x / WBC x   Sq Epi: x / Non Sq Epi: x / Bacteria: x    MICROBIOLOGY: (if applicable)    RADIOLOGY & ADDITIONAL STUDIES:  EKG:   CXR:< from: Xray Chest 1 View- PORTABLE-Urgent (Xray Chest 1 View- PORTABLE-Urgent .) (25 @ 02:14) >  IMPRESSION:  Clear lungs.      < end of copied text >    ECHO:    IMPRESSION: 59y Female PAST MEDICAL & SURGICAL HISTORY:  Sickle-cell-hemoglobin C disease with crisis      Essential hypertension      Sickle cell disease      HTN (hypertension)      H/O:       H/O abdominoplasty      S/P breast augmentation      S/P        p/w           IMP:  IMP: This is a 59 yr old woman  with  HTN, HFimpEF/NICM (EF 58% 10/2024), sickle cell disease, who presented to [facility] for SOB and SS crisis 25;, followed by witnessed seizure (?secondary to clonazepam withdrawal) requiring intubation. [facility] secondary to cardiogenic shock/RV failure s/p ECMO +TRACH with #6 cuffless +PEG. Now admitted to [facility] after for initiation of a multidisciplinary rehab program consisting focused on functional mobility, transfers and ADLs (activities of daily living). Pulmonary eval requested for trach management       Sugg    - Continue O2 via TC  to maintain sat >90%   - Duoneb q6h   - Pulmicort 0.5 mg neb Q12h   - Start PMV trial with SLP at bedside 1-2 hours daily   - Pulmonary hygiene   - Tube feed   -

## 2025-02-08 PROCEDURE — 99232 SBSQ HOSP IP/OBS MODERATE 35: CPT

## 2025-02-08 RX ORDER — ACETAMINOPHEN 500 MG/5ML
650 LIQUID (ML) ORAL EVERY 6 HOURS
Refills: 0 | Status: DISCONTINUED | OUTPATIENT
Start: 2025-02-08 | End: 2025-02-27

## 2025-02-08 RX ORDER — BISACODYL 5 MG
10 TABLET, DELAYED RELEASE (ENTERIC COATED) ORAL DAILY
Refills: 0 | Status: DISCONTINUED | OUTPATIENT
Start: 2025-02-08 | End: 2025-02-27

## 2025-02-08 RX ADMIN — Medication 100 MILLIGRAM(S): at 14:56

## 2025-02-08 RX ADMIN — Medication 500 MILLIGRAM(S): at 12:23

## 2025-02-08 RX ADMIN — Medication 1 TABLET(S): at 12:23

## 2025-02-08 RX ADMIN — Medication 500000 UNIT(S): at 05:42

## 2025-02-08 RX ADMIN — ENOXAPARIN SODIUM 40 MILLIGRAM(S): 100 INJECTION SUBCUTANEOUS at 05:42

## 2025-02-08 RX ADMIN — Medication 1 DROP(S): at 00:01

## 2025-02-08 RX ADMIN — Medication 40 MILLIGRAM(S): at 06:12

## 2025-02-08 RX ADMIN — Medication 650 MILLIGRAM(S): at 22:30

## 2025-02-08 RX ADMIN — LEVETIRACETAM 500 MILLIGRAM(S): 10 INJECTION, SOLUTION INTRAVENOUS at 17:20

## 2025-02-08 RX ADMIN — Medication 15 MILLILITER(S): at 12:23

## 2025-02-08 RX ADMIN — IPRATROPIUM BROMIDE AND ALBUTEROL SULFATE 3 MILLILITER(S): .5; 2.5 SOLUTION RESPIRATORY (INHALATION) at 22:04

## 2025-02-08 RX ADMIN — Medication 500000 UNIT(S): at 14:56

## 2025-02-08 RX ADMIN — HYDROXYUREA 500 MILLIGRAM(S): 500 CAPSULE ORAL at 17:20

## 2025-02-08 RX ADMIN — Medication 500000 UNIT(S): at 21:24

## 2025-02-08 RX ADMIN — LEVETIRACETAM 500 MILLIGRAM(S): 10 INJECTION, SOLUTION INTRAVENOUS at 05:42

## 2025-02-08 RX ADMIN — Medication 1 DROP(S): at 17:20

## 2025-02-08 RX ADMIN — METOPROLOL SUCCINATE 12.5 MILLIGRAM(S): 50 TABLET, EXTENDED RELEASE ORAL at 14:56

## 2025-02-08 RX ADMIN — Medication 10 MILLIGRAM(S): at 16:16

## 2025-02-08 RX ADMIN — HYDROXYUREA 500 MILLIGRAM(S): 500 CAPSULE ORAL at 05:43

## 2025-02-08 RX ADMIN — POLYETHYLENE GLYCOL 3350 17 GRAM(S): 17 POWDER, FOR SOLUTION ORAL at 12:23

## 2025-02-08 RX ADMIN — METOPROLOL SUCCINATE 12.5 MILLIGRAM(S): 50 TABLET, EXTENDED RELEASE ORAL at 21:09

## 2025-02-08 RX ADMIN — Medication 1 DROP(S): at 12:23

## 2025-02-08 RX ADMIN — IPRATROPIUM BROMIDE AND ALBUTEROL SULFATE 3 MILLILITER(S): .5; 2.5 SOLUTION RESPIRATORY (INHALATION) at 14:37

## 2025-02-08 RX ADMIN — Medication 1 DROP(S): at 05:43

## 2025-02-08 RX ADMIN — Medication 100 MILLIGRAM(S): at 06:12

## 2025-02-08 RX ADMIN — Medication 650 MILLIGRAM(S): at 21:37

## 2025-02-08 RX ADMIN — METOPROLOL SUCCINATE 12.5 MILLIGRAM(S): 50 TABLET, EXTENDED RELEASE ORAL at 05:42

## 2025-02-08 RX ADMIN — IPRATROPIUM BROMIDE AND ALBUTEROL SULFATE 3 MILLILITER(S): .5; 2.5 SOLUTION RESPIRATORY (INHALATION) at 06:47

## 2025-02-08 NOTE — PROGRESS NOTE ADULT - SUBJECTIVE AND OBJECTIVE BOX
Time of Visit:  Patient seen and examined. pat lying supine , alert and communicating by lips     MEDICATIONS  (STANDING):  albuterol/ipratropium for Nebulization 3 milliLiter(s) Nebulizer every 8 hours  amantadine Syrup 100 milliGRAM(s) Oral <User Schedule>  artificial tears (preservative free) Ophthalmic Solution 1 Drop(s) Both EYES four times a day  ascorbic acid 500 milliGRAM(s) Oral daily  enoxaparin Injectable 40 milliGRAM(s) SubCutaneous every 24 hours  hydroxyurea 500 milliGRAM(s) Oral two times a day  levETIRAcetam  Solution 500 milliGRAM(s) Oral every 12 hours  metoprolol tartrate 12.5 milliGRAM(s) Oral every 8 hours  multivitamin/minerals/iron Oral Solution (CENTRUM) 15 milliLiter(s) Oral daily  nystatin    Suspension 750915 Unit(s) Oral three times a day  pantoprazole   Suspension 40 milliGRAM(s) Oral before breakfast  polyethylene glycol 3350 17 Gram(s) Oral daily  senna 1 Tablet(s) Oral daily      MEDICATIONS  (PRN):  acetaminophen   Oral Liquid .. 650 milliGRAM(s) Oral every 6 hours PRN Mild Pain (1 - 3)  bisacodyl Suppository 10 milliGRAM(s) Rectal daily PRN Constipation       Medications up to date at time of exam.      PHYSICAL EXAMINATION:  Patient has no new complaints.  GENERAL: The patient  in no apparent distress.     Vital Signs Last 24 Hrs  T(C): 37.1 (2025 08:09), Max: 37.1 (2025 20:46)  T(F): 98.7 (2025 08:09), Max: 98.8 (2025 20:46)  HR: 89 (2025 08:09) (85 - 101)  BP: 140/92 (2025 08:09) (114/78 - 140/92)  BP(mean): --  RR: 14 (2025 08:09) (14 - 16)  SpO2: 97% (2025 08:09) (97% - 100%)    Parameters below as of 2025 08:09  Patient On (Oxygen Delivery Method): tracheostomy collar    O2 Concentration (%): 30   (if applicable)    Chest Tube (if applicable)    HEENT: Head is normocephalic and atraumatic. Extraocular muscles are intact. Mucous membranes are moist.     NECK: Supple, no palpable adenopathy. + trach , clean #6 cuffless  + TC    LUNGS: Fair bilateral air entry   no wheezing, rales, or rhonchi.    HEART: Regular rate and rhythm without murmur.    ABDOMEN: Soft, nontender, and nondistended.  No hepatosplenomegaly is noted. + PEG     : No painful voiding, no pelvic pain    EXTREMITIES: Without any cyanosis, clubbing, rash, lesions or edema.    NEUROLOGIC: Awake, alert, oriented, grossly intact    SKIN: Warm, dry, good turgor.      LABS:                        10.5   11.33 )-----------( 191      ( 2025 05:47 )             31.1         146[H]  |  110[H]  |  20  ----------------------------<  102[H]  3.8   |  29  |  0.83    Ca    9.5      2025 05:47    TPro  7.3  /  Alb  2.8[L]  /  TBili  0.8  /  DBili  x   /  AST  26  /  ALT  49[H]  /  AlkPhos  90        Urinalysis Basic - ( 2025 05:47 )    Color: x / Appearance: x / SG: x / pH: x  Gluc: 102 mg/dL / Ketone: x  / Bili: x / Urobili: x   Blood: x / Protein: x / Nitrite: x   Leuk Esterase: x / RBC: x / WBC x   Sq Epi: x / Non Sq Epi: x / Bacteria: x                      MICROBIOLOGY: (if applicable)    RADIOLOGY & ADDITIONAL STUDIES:  EKG:   CXR:  ECHO:    IMPRESSION: 59y Female PAST MEDICAL & SURGICAL HISTORY:  Sickle-cell-hemoglobin C disease with crisis      Essential hypertension      Sickle cell disease      HTN (hypertension)      H/O:       H/O abdominoplasty      S/P breast augmentation      S/P        p/w         IMP:  IMP: This is a 59 yr old woman  with  HTN, HFimpEF/NICM (EF 58% 10/2024), sickle cell disease, who presented to [facility] for SOB and SS crisis 25;, followed by witnessed seizure (?secondary to clonazepam withdrawal) requiring intubation. She was transferred to [facility] 1/3/25 secondary to cardiogenic shock/RV failure s/p ECMO +TRACH with #6 cuffless +PEG. Now admitted to [facility] after for initiation of a multidisciplinary rehab program consisting focused on functional mobility, transfers and ADLs (activities of daily living). Pulmonary eval requested for trach management       Sugg    - Continue O2 via TC  to maintain sat >90%   - Duoneb q6h   - Pulmicort 0.5 mg neb Q12h   - Start PMV trial with SLP at bedside 1-2 hours daily   - Pulmonary hygiene   - Tube feed

## 2025-02-08 NOTE — PROGRESS NOTE ADULT - SUBJECTIVE AND OBJECTIVE BOX
Patient is a 59y old  Female who presents with a chief complaint of bilateral CVA (08 Feb 2025 09:44)      Patient seen and examined at bedside. no acute events overnight.     ALLERGIES:  doxycycline (Angioedema)  clindamycin (Angioedema)  penicillin (Unknown)  linezolid (Angioedema)    MEDICATIONS  (STANDING):  albuterol/ipratropium for Nebulization 3 milliLiter(s) Nebulizer every 8 hours  amantadine Syrup 100 milliGRAM(s) Oral <User Schedule>  artificial tears (preservative free) Ophthalmic Solution 1 Drop(s) Both EYES four times a day  ascorbic acid 500 milliGRAM(s) Oral daily  enoxaparin Injectable 40 milliGRAM(s) SubCutaneous every 24 hours  hydroxyurea 500 milliGRAM(s) Oral two times a day  levETIRAcetam  Solution 500 milliGRAM(s) Oral every 12 hours  metoprolol tartrate 12.5 milliGRAM(s) Oral every 8 hours  multivitamin/minerals/iron Oral Solution (CENTRUM) 15 milliLiter(s) Oral daily  nystatin    Suspension 144116 Unit(s) Oral three times a day  pantoprazole   Suspension 40 milliGRAM(s) Oral before breakfast  polyethylene glycol 3350 17 Gram(s) Oral daily  senna 1 Tablet(s) Oral daily    MEDICATIONS  (PRN):  acetaminophen   Oral Liquid .. 650 milliGRAM(s) Oral every 6 hours PRN Mild Pain (1 - 3)  bisacodyl Suppository 10 milliGRAM(s) Rectal daily PRN Constipation    Vital Signs Last 24 Hrs  T(F): 98.7 (08 Feb 2025 08:09), Max: 98.8 (07 Feb 2025 20:46)  HR: 89 (08 Feb 2025 08:09) (85 - 101)  BP: 140/92 (08 Feb 2025 08:09) (114/78 - 140/92)  RR: 14 (08 Feb 2025 08:09) (14 - 16)  SpO2: 97% (08 Feb 2025 08:09) (97% - 100%)  I&O's Summary    07 Feb 2025 07:01  -  08 Feb 2025 07:00  --------------------------------------------------------  IN: 1710 mL / OUT: 252 mL / NET: 1458 mL      BMI (kg/m2): 27.5 (02-06-25 @ 20:59)    PHYSICAL EXAM:  General: NAD  ENT: MMM, no scleral icterus  Neck: Supple, No JVD  Lungs: Clear to auscultation bilaterally, no wheezes, rales, rhonchi  Cardio: RRR, S1/S2  Abdomen: Soft, Nontender, Nondistended; Bowel sounds present, +PEG  Extremities: No calf tenderness, No pitting edema    LABS:                        10.5   11.33 )-----------( 191      ( 07 Feb 2025 05:47 )             31.1       02-07    146  |  110  |  20  ----------------------------<  102  3.8   |  29  |  0.83    Ca    9.5      07 Feb 2025 05:47    TPro  7.3  /  Alb  2.8  /  TBili  0.8  /  DBili  x   /  AST  26  /  ALT  49  /  AlkPhos  90  02-07                                  Urinalysis Basic - ( 07 Feb 2025 05:47 )    Color: x / Appearance: x / SG: x / pH: x  Gluc: 102 mg/dL / Ketone: x  / Bili: x / Urobili: x   Blood: x / Protein: x / Nitrite: x   Leuk Esterase: x / RBC: x / WBC x   Sq Epi: x / Non Sq Epi: x / Bacteria: x        COVID-19 PCR: NotDetec (02-06-25 @ 21:11)      RADIOLOGY & ADDITIONAL TESTS:    Care Discussed with Consultants/Other Providers: yes, rehab

## 2025-02-08 NOTE — PROGRESS NOTE ADULT - ASSESSMENT
CARSON LEE is a 59y with PMH, HTN, HFimpEF/NICM (EF 58% 10/2024), sickle cell disease, who presented to [facility] for SOB and SS crisis 12/30/25;, followed by witnessed seizure (? secondary to clonazepam withdrawal) requiring intubation. She was transferred to [facility] 1/3/25 secondary to cardiogenic shock /RV failure s/p ECMO +TRACH +PEG.   Now admitted to [facility] after for initiation of a multidisciplinary rehab program consisting focused on functional mobility, transfers and ADLs (activities of daily living).    # bilateral CVA, bilateral body involvement left > right, left kelsi inattention, aphasia, respiratory failure, dysphagia  - MRI 1/9: Innumerable foci susceptibility artifact scattered throughout the brain which can be seen with critically ill patients on ECMO. Diffuse fat embolization is also part of the differential diagnosis but is considered less likely. Tiny acute/subacute infarcts within the bilateral basal ganglia, thalami, and chandu with disproportionate ovoid area of T2/FLAIR prolongation in the right ventral thalamus. Findings may indicate the presence of embolic acute/subacute infarcts.  - continue Comprehensive Multidisciplinary Rehab Program: 3 hours a day (1  hr PT, 1 hr OT, 1 hr SLP), 5 days a week.  - Precautions: respiratory, aspiration, SZ, AMS, fall, TRACH, PEG    # hypoarousal  - LP (1/14) - negative for meningitis/encephalitis  - Amantadine 100 BID    # seizure  - EEG (1/7) with suspicion of seizure  - Seizure ppx: Keppra 500 BID     # Acute respiratory failure  - S/p Trach (1/16). On 6.0 cuffless (1/29)  - MSSA s/p Abx  - Duoneb q8hrs  - PMV with direct supervision SLP  - O2 sat % TRACH collar FiO2 30% 2/8    # Cardiogenic Shock  - S/p ECMO 1/3. decannulated 1/7  - S/p 2 U PRBC, 2 platelet, 5U cryo  - ECHO 1/20: EF 53%, normal LV+RV function, no acute HF issues  - Metoprolol reduced 12.5 secondary to OH 2/7  - BP  114/78 - 140/92) 2/8    # orthostatic hypotension  -  SBP dropped to 62 during OT session 2/7, improved to SBP 98 supine  - lopressor reduced  - DONALD stockings    # Sickle cell disease   - 1-2 sickle pain crisis per yea  - s/p transfusions, apheresis (1/12) for fat emboli concerns   - Hydroxyurea 500 BID (as long as plt > 100 and hgb >8)  - Hematology consult PRN  - Hgb 10.5 plt 191 O2 sat % 2/7  - CBC 2/10    # Mild leukocytosis   - has had fluctuations between 9-12 recently,  - WBC 11.33, 2/7  - CBC 2/10    # Transaminitis   - RUQ US 1/27: The gallbladder is moderately distended and contains layering sludge and possibly small stones. There is no significant gallbladder wall thickening or pericholecystic fluid and a sonographic Gross sign was not elicited. Therefore, there is no definitive evidence for acute cholecystitis.  -  AST  26  /  ALT  49[H]  /  AlkPhos  90  02-07    # thrush  - nystatin started 2/7    # Pain Management:  - Tylenol PRN    # GI/Bowel:  - Senna QHS, Miralax QD  - GI ppx: Protonix    # /Bladder:   - PVR q 8 hours (SC if > 400)    # Skin/Pressure Injury:   - Skin assessment on admission:   - Seen by wound care team at [facility]. healing pressure injury DTI to L ear lobe, b/l forehead, sacrum /bilateral buttocks, bilateral heels. Incontinence Dermatitis  - on admissions exam, no skin injuries noted to sacrum/buttocks or heels  Recommend:  1.) topical therapy: sacral/buttock skin – cleanse with incontinence cleanser, pat dry, apply Piotr ointment BID and PRN for incontinent episodes. bilateral forehead - cleanse with gentle cleanser, leave open to air  2.) Incontinence Management - incontinence cleanser, pads, pericare BID  3.) Maintain on an alternating air with low air loss surface  4.) Turn and reposition Q 2 hours  5.) Nutrition optimization   6.) Offload heels/feet with complete cair air fluidized boots/pillows; ensure that the soles of the feet are not resting on the foot board of the bed.  7.) chair cushion for chair sitting    # Diet/Dysphagia:  - S/p PEG (1/21)  - FEEST (1/27) > silent laryngeal penetration of purees and moderately thick liquids is noted   - Current Diet: NPO + TF; free water   - Dysphagia: SLP consult for swallow function evaluation and treatment  - Health supp: Vitamin C + MVI    # DVT ppx:    - Lovenox 40 QD (  - SCD    # LABS  CBC CMP 2/10  ---------------  Code Status/Emergency Contact: Full Code    Outpatient Follow-up:    Wu Parra  Thoracic and Cardiac Surgery  [address] [place] [id number]  Phone: [phone]  Fax: [phone]  Follow Up Time: 2 weeks

## 2025-02-08 NOTE — PROGRESS NOTE ADULT - COMMENTS
Patient alert this morning, smiles upon seeing examiner. She also has increased attempts verbal responses, including several where she is vocalizing over open TRACH    No significant secretions TRACH site, breathing comfortably. She mouths/nods yes/no and reports sleeping well. Denies H/A or any pain. no CP./papitations, extremity pain

## 2025-02-08 NOTE — PROGRESS NOTE ADULT - SUBJECTIVE AND OBJECTIVE BOX
Patient is a 59y old  Female who presents with a chief complaint of Encephalopathy     (2025 14:20)      HPI:  59F PMH HTN, HFimpEF/NICM (EF 58% 10/2024), Sickle cell disease (on hydroxyurea), who presented to [facility] for SOB and SS crisis on  and 24. She was intubated and sedated following witnessed seizure, and transferred to [facility] (1/3/2025) for further management.     Keppra was discontinued at [facility] due to negative EEG. Seizure was thought to be caused by clonazepam withdrawal per OSH. She was hypotensive and tachycardic with elevated trops (~900s), but with no EKG changes. TTE with RV failure possibly iso pulmonary HTN 2/2 increased tidal volumes on ventilator and possible additional volume overload 2/2 management of sickle cell crisis. Neuro was consulted for seizures and EEG technician was called for vEEG placement; seizure activities noted.  Pt transferred to [facility] for management of cardiogenic shock with increased inotropic and pressor support requirement.     ON 1/3, venoarterial ECMO (Left Femoral Arterial 17Fr, Right Femoral Venous 25Fr, L SFA RPC 6Fr) was initiated. Transfusion Medicine team was consulted for emergent RBC exchange to minimize the risk of complications related to sickle cell disease given critical illness with multi-organ failure. TTE 25: LVEF <20%, ECMO cannula in RV, mildly enlarged RV size and reduced RV systolic function, no pericardial effusion, no MR, trace TR, IVC nml size    Hospital course also significant for MSSA in sputum culture from ET tube, MSSA bacteremia, BAL + MSSA Patient was treated with IV vanc (completed) and merrem for total 10 days (completed). On  patient s/p VA ECMO decannulation. Case was complicated by hematoma formation in left thigh (site of arterial cannula). She was given 2U pRBC, 2 platelet and 5U cryo.  YUNG and LLL evaluation revealed moderate green tinged secretions and mucous plugging. RUL, RML, RLL revealed minimal secretions.     Patient was tapered off precedex sedation and Keppra reinstated. MRI  showed innumerable foci susceptibility artifact scattered throughout the brain, tiny acute/subacute infarcts within the bilateral basal ganglia, thalami, and chandu with disproportionate ovoid area of T2/FLAIR prolongation in the right ventral thalamus. Findings may indicate the presence of embolic acute/subacute infarcts.  Lumbar puncture no evidence of meningitis/ encephalitis     ON  bedside trach was placed by Dr Hebert Holden and on  PEG placement performed.  Speech and swallow eval there is reduced movement of the right vocal cord; left is mobile/intact. Silent laryngeal penetration of purees and moderately thick liquids is noted Remains with a #6 Cuffless Trach at 30% Trach collar.    Patient was medically cleared for transfer to [facility] 25.   (2025 12:00)      PAST MEDICAL & SURGICAL HISTORY:  Sickle-cell-hemoglobin C disease with crisis      Essential hypertension      Sickle cell disease      HTN (hypertension)      H/O:       H/O abdominoplasty      S/P breast augmentation      S/P           MEDICATIONS  (STANDING):  albuterol/ipratropium for Nebulization 3 milliLiter(s) Nebulizer every 8 hours  amantadine Syrup 100 milliGRAM(s) Oral <User Schedule>  artificial tears (preservative free) Ophthalmic Solution 1 Drop(s) Both EYES four times a day  ascorbic acid 500 milliGRAM(s) Oral daily  enoxaparin Injectable 40 milliGRAM(s) SubCutaneous every 24 hours  hydroxyurea 500 milliGRAM(s) Oral two times a day  levETIRAcetam  Solution 500 milliGRAM(s) Oral every 12 hours  metoprolol tartrate 12.5 milliGRAM(s) Oral every 8 hours  multivitamin/minerals/iron Oral Solution (CENTRUM) 15 milliLiter(s) Oral daily  nystatin    Suspension 288078 Unit(s) Oral three times a day  pantoprazole   Suspension 40 milliGRAM(s) Oral before breakfast  polyethylene glycol 3350 17 Gram(s) Oral daily  senna 1 Tablet(s) Oral daily    MEDICATIONS  (PRN):  acetaminophen   Oral Liquid .. 650 milliGRAM(s) Oral every 6 hours PRN Mild Pain (1 - 3)      Allergies    doxycycline (Angioedema)  clindamycin (Angioedema)  penicillin (Unknown)  linezolid (Angioedema)    Intolerances          VITALS  59y  Vital Signs Last 24 Hrs  T(C): 37.1 (2025 08:09), Max: 37.1 (2025 20:46)  T(F): 98.7 (2025 08:09), Max: 98.8 (2025 20:46)  HR: 89 (2025 08:09) (85 - 101)  BP: 140/92 (2025 08:09) (114/78 - 140/92)  BP(mean): --  RR: 14 (2025 08:09) (14 - 16)  SpO2: 97% (2025 08:09) (97% - 100%)    Parameters below as of 2025 08:09  Patient On (Oxygen Delivery Method): tracheostomy collar    O2 Concentration (%): 30  Daily     Daily         RECENT LABS:                          10.5   11.33 )-----------( 191      ( 2025 05:47 )             31.1     02-07    146[H]  |  110[H]  |  20  ----------------------------<  102[H]  3.8   |  29  |  0.83    Ca    9.5      2025 05:47    TPro  7.3  /  Alb  2.8[L]  /  TBili  0.8  /  DBili  x   /  AST  26  /  ALT  49[H]  /  AlkPhos  90  02-07    LIVER FUNCTIONS - ( 2025 05:47 )  Alb: 2.8 g/dL / Pro: 7.3 g/dL / ALK PHOS: 90 U/L / ALT: 49 U/L / AST: 26 U/L / GGT: x             Urinalysis Basic - ( 2025 05:47 )    Color: x / Appearance: x / SG: x / pH: x  Gluc: 102 mg/dL / Ketone: x  / Bili: x / Urobili: x   Blood: x / Protein: x / Nitrite: x   Leuk Esterase: x / RBC: x / WBC x   Sq Epi: x / Non Sq Epi: x / Bacteria: x          CAPILLARY BLOOD GLUCOSE

## 2025-02-08 NOTE — PROGRESS NOTE ADULT - ASSESSMENT
60 y/o F with PMH HTN, HFimpEF/NICM (EF 58% 10/2024), sickle cell disease, who presented to [facility] for SOB and SS crisis 12/30/25;, followed by witnessed seizure (?secondary to clonazepam withdrawal) requiring intubation. She was transferred to [facility] 1/3/25 secondary to cardiogenic shock/RV failure s/p ECMO +TRACH +PEG. Now admitted to [facility] after for initiation of a multidisciplinary rehab program consisting focused on functional mobility, transfers and ADLs (activities of daily living).    #Orthostatic hypotension  -Continue metoprolol 12.5mg q8h with holding parameters    #Bilateral CVA  -MRI 1/9: Innumerable foci susceptibility artifact scattered throughout the brain which can be seen with critically ill patients on ECMO. Diffuse fat embolization is also part of the differential diagnosis but is considered less likely. Tiny acute/subacute infarcts within the bilateral basal ganglia, thalami, and chandu with disproportionate ovoid area of T2/FLAIR prolongation in the right ventral thalamus. Findings may indicate the presence of embolic acute/subacute infarcts.  -Precautions: respiratory, aspiration, SZ, AMS, fall, TRACH, PEG  -Continue comprehensive rehab program - PT/OT/SLP per rehab team  -Pain management, bowel regimen per rehab    #Hypoarousal  -LP (1/14) - negative for meningitis/encephalitis  -Amantadine 100 BID    #Seizure  -?Secondary to clonazepam withdrawal  -EEG (1/7) with suspicion of seizure  -Seizure ppx: Keppra 500 BID    #Acute respiratory failure - s/p Trach (1/16). On 6.0 cuffless (1/29)  -BCx 1/3 with one bottle staph epi, BCx 1/5 NGTD, Sputum from ET cx 1/4 staph aureus  -MSSA - cefepime + vanc > switched to vanc + Merrem; Bactroban to nare  -Supplemental O2: trach collar FiO2 30% via 10L  -Pulm consult appreciated - cont O2 via TC, duonebs q6h, pulmicort q12h, PMV trial with SLP    #Cardiogenic Shock - resolved  -last trop: 201 (1/4) downtrending from 314, (per [facility] trop ~900)  -S/p ECMO 1/3. decannulated 1/7  -S/p 2 U PRBC, 2 platelet, 5U cryo  -ECHO 1/20: EF 53%, normal LV+RV function, no acute HF issues  -Metoprolol    #Sickle cell disease   -1-2 sickle pain crisis per yea  -s/p transfusions, apheresis (1/12) for fat emboli concerns   -Hydroxyurea 500 BID (as long as plt > 100 and hgb >8)    #Transaminitis   -RUQ US    DVT ppx - Lovenox  GI ppx - PPI

## 2025-02-09 PROCEDURE — 99232 SBSQ HOSP IP/OBS MODERATE 35: CPT

## 2025-02-09 RX ADMIN — METOPROLOL SUCCINATE 12.5 MILLIGRAM(S): 50 TABLET, EXTENDED RELEASE ORAL at 05:31

## 2025-02-09 RX ADMIN — LEVETIRACETAM 500 MILLIGRAM(S): 10 INJECTION, SOLUTION INTRAVENOUS at 17:09

## 2025-02-09 RX ADMIN — POLYETHYLENE GLYCOL 3350 17 GRAM(S): 17 POWDER, FOR SOLUTION ORAL at 12:16

## 2025-02-09 RX ADMIN — HYDROXYUREA 500 MILLIGRAM(S): 500 CAPSULE ORAL at 17:10

## 2025-02-09 RX ADMIN — Medication 100 MILLIGRAM(S): at 06:11

## 2025-02-09 RX ADMIN — Medication 1 DROP(S): at 05:27

## 2025-02-09 RX ADMIN — Medication 650 MILLIGRAM(S): at 09:27

## 2025-02-09 RX ADMIN — METOPROLOL SUCCINATE 12.5 MILLIGRAM(S): 50 TABLET, EXTENDED RELEASE ORAL at 15:07

## 2025-02-09 RX ADMIN — Medication 40 MILLIGRAM(S): at 06:11

## 2025-02-09 RX ADMIN — Medication 15 MILLILITER(S): at 12:16

## 2025-02-09 RX ADMIN — Medication 500 MILLIGRAM(S): at 12:16

## 2025-02-09 RX ADMIN — Medication 650 MILLIGRAM(S): at 08:27

## 2025-02-09 RX ADMIN — Medication 1 DROP(S): at 00:00

## 2025-02-09 RX ADMIN — Medication 1 DROP(S): at 17:10

## 2025-02-09 RX ADMIN — Medication 500000 UNIT(S): at 15:07

## 2025-02-09 RX ADMIN — IPRATROPIUM BROMIDE AND ALBUTEROL SULFATE 3 MILLILITER(S): .5; 2.5 SOLUTION RESPIRATORY (INHALATION) at 14:30

## 2025-02-09 RX ADMIN — METOPROLOL SUCCINATE 12.5 MILLIGRAM(S): 50 TABLET, EXTENDED RELEASE ORAL at 21:21

## 2025-02-09 RX ADMIN — Medication 1 DROP(S): at 23:16

## 2025-02-09 RX ADMIN — LEVETIRACETAM 500 MILLIGRAM(S): 10 INJECTION, SOLUTION INTRAVENOUS at 05:27

## 2025-02-09 RX ADMIN — HYDROXYUREA 500 MILLIGRAM(S): 500 CAPSULE ORAL at 05:27

## 2025-02-09 RX ADMIN — Medication 1 TABLET(S): at 12:16

## 2025-02-09 RX ADMIN — IPRATROPIUM BROMIDE AND ALBUTEROL SULFATE 3 MILLILITER(S): .5; 2.5 SOLUTION RESPIRATORY (INHALATION) at 05:31

## 2025-02-09 RX ADMIN — IPRATROPIUM BROMIDE AND ALBUTEROL SULFATE 3 MILLILITER(S): .5; 2.5 SOLUTION RESPIRATORY (INHALATION) at 22:38

## 2025-02-09 RX ADMIN — ENOXAPARIN SODIUM 40 MILLIGRAM(S): 100 INJECTION SUBCUTANEOUS at 05:27

## 2025-02-09 RX ADMIN — Medication 500000 UNIT(S): at 05:27

## 2025-02-09 RX ADMIN — Medication 500000 UNIT(S): at 21:21

## 2025-02-09 RX ADMIN — Medication 1 DROP(S): at 12:17

## 2025-02-09 RX ADMIN — Medication 100 MILLIGRAM(S): at 15:07

## 2025-02-09 NOTE — PROGRESS NOTE ADULT - CONSTITUTIONAL COMMENTS
eyes sustained opening, NAD afebrile. TRACH collar without sig secretions, no irritation, TRACH in place eyes sustained opening, NAD afebrile currently. TRACH collar without sig secretions, no irritation, TRACH in place

## 2025-02-09 NOTE — PROGRESS NOTE ADULT - MOTOR
bilateral calves soft no TTP  no erythema or warmth  no pedal edema DP pulses 2+
DP pulse 2+ bilaterally  calves soft  no TTP  left elbow at least 3-/5 flexion, extensin 3/5 gross grasp 3-/5 shoulder 2/5  right UE shoulder forward flexion 3/5 elbow flexion 4-.5 grasp 4-/.5  left HF 2-/5 right HF 2/5, left quad 2/5 right quad 2/5.

## 2025-02-09 NOTE — PROGRESS NOTE ADULT - SUBJECTIVE AND OBJECTIVE BOX
Follow-up Pulmonary Progress Note  Chief Complaint : Encephalopathy        patient seen and examined  feeling well  able to speak with PMV  denies underlying lung issues        Allergies :doxycycline (Angioedema)  clindamycin (Angioedema)  penicillin (Unknown)  linezolid (Angioedema)      PAST MEDICAL & SURGICAL HISTORY:  Sickle-cell-hemoglobin C disease with crisis  Essential hypertension  Sickle cell disease  HTN (hypertension)  H/O:   H/O abdominoplasty  S/P breast augmentation  S/P         Medications:  MEDICATIONS  (STANDING):  albuterol/ipratropium for Nebulization 3 milliLiter(s) Nebulizer every 8 hours  amantadine Syrup 100 milliGRAM(s) Oral <User Schedule>  artificial tears (preservative free) Ophthalmic Solution 1 Drop(s) Both EYES four times a day  ascorbic acid 500 milliGRAM(s) Oral daily  enoxaparin Injectable 40 milliGRAM(s) SubCutaneous every 24 hours  hydroxyurea 500 milliGRAM(s) Oral two times a day  levETIRAcetam  Solution 500 milliGRAM(s) Oral every 12 hours  metoprolol tartrate 12.5 milliGRAM(s) Oral every 8 hours  multivitamin/minerals/iron Oral Solution (CENTRUM) 15 milliLiter(s) Oral daily  nystatin    Suspension 315958 Unit(s) Oral three times a day  pantoprazole   Suspension 40 milliGRAM(s) Oral before breakfast  polyethylene glycol 3350 17 Gram(s) Oral daily  senna 1 Tablet(s) Oral daily    MEDICATIONS  (PRN):  acetaminophen   Oral Liquid .. 650 milliGRAM(s) Oral every 6 hours PRN Temp greater or equal to 38C (100.4F), Mild Pain (1 - 3)  bisacodyl Suppository 10 milliGRAM(s) Rectal daily PRN Constipation      Antibiotics History  nystatin    Suspension 437704 Unit(s) Oral three times a day, 25 @ 10:19      Heme Medications   enoxaparin Injectable 40 milliGRAM(s) SubCutaneous every 24 hours, 25 @ 21:02      GI Medications  bisacodyl Suppository 10 milliGRAM(s) Rectal daily, 25 @ 10:50, Routine PRN  pantoprazole   Suspension 40 milliGRAM(s) Oral before breakfast, 25 @ 00:00, Routine  polyethylene glycol 3350 17 Gram(s) Oral daily, 25 @ 21:02,   senna 1 Tablet(s) Oral daily, 25 @ 00:00, Routine          RADIOLOGY  CXR:  < from: Xray Chest 1 View- PORTABLE-Routine (Xray Chest 1 View- PORTABLE-Routine .) (25 @ 07:50) >    ACC: [id number] EXAM:  XR CHEST PORTABLE ROUTINE 1V   ORDERED BY: SABINA KAPLAN     PROCEDURE DATE:  2025          INTERPRETATION:  TECHNIQUE: A single AP view of the chest was obtained.   Ordered time:   2025 7:50 AM    COMPARISON: 2025    CLINICAL INFORMATION: Assess pleural effusions    FINDINGS:  A tracheostomy tube is again identified.  The heart is not enlarged.  The lungs are clear.  There are no pleural effusions.  There is no pneumothorax.    IMPRESSION:    No radiographic evidence of acute cardiopulmonary disease    --- End of Report ---    < end of copied text >      CT:    ECHO:    < from: TTE Limited W or WO Ultrasound Enhancing Agent (25 @ 06:18) >  CONCLUSIONS:      1. Left ventricular systolic function is normal with an ejection fraction of 53 % by Carr's method of disks.   2. Normal right ventricular cavity size and normal right ventricular systolic function.   3. No pericardial effusion seen.   4. Compared to the transthoracic echocardiogram performed on 2025, there have been no significant interval changes.    < end of copied text >    VITALS:  T(C): 36.6 (25 @ 13:23), Max: 38.1 (25 @ 21:14)  T(F): 97.8 (25 @ 13:23), Max: 100.5 (25 @ 21:14)  HR: 100 (25 @ 14:30) (92 - 110)  BP: 114/80 (25 @ 13:23) (113/77 - 133/94)  BP(mean): --  ABP: --  ABP(mean): --  RR: 24 (25 @ 13:23) (14 - 24)  SpO2: 100% (25 @ 14:30) (99% - 100%)  CVP(mm Hg): --  CVP(cm H2O): --    Ins and Outs     25 @ 07:01  -  25 @ 07:00  --------------------------------------------------------  IN: 2280 mL / OUT: 1 mL / NET: 2279 mL    25 @ 07:01  -  25 @ 14:45  --------------------------------------------------------  IN: 270 mL / OUT: 0 mL / NET: 270 mL        Height (cm): 162.6 (25 @ 20:59)  Weight (kg): 72.6 (25 @ 20:59)  BMI (kg/m2): 27.5 (25 @ 20:59)        I&O's Detail    2025 07:  -  2025 07:00  --------------------------------------------------------  IN:    Free Water: 1200 mL    Miscellaneous Tube Feedin mL  Total IN: 2280 mL    OUT:    Voided (mL): 1 mL  Total OUT: 1 mL    Total NET: 2279 mL      2025 07:  -  2025 14:45  --------------------------------------------------------  IN:    Miscellaneous Tube Feedin mL  Total IN: 270 mL    OUT:  Total OUT: 0 mL    Total NET: 270 mL       Physical Examination:  GENERAL:               Alert, Oriented, No acute distress.    HEENT:                   No JVD, Moist MM + size 6 portex  PULM:                     Bilateral air entry, Clear to auscultation bilaterally, no significant sputum production, No Rales, No Rhonchi, No Wheezing  CVS:                         S1, S2,  No Murmur   NEURO:                  Alert, oriented, interactive,  follows commands  PSYC:                      Calm, + Insight.

## 2025-02-09 NOTE — PROGRESS NOTE ADULT - COMMENTS
Patient sitting in bed, looks mildly withdrawn today but cooperative. Makes an attempt at verbalization over TRACH but less than yesterday, some phonation although weakness. HOwever, sats and vitals stable, and patient denies any SOB, H/A, dizziness or pain. Reports sleeping well last night Patient sitting in bed, looks mildly withdrawn today but cooperative. Makes an attempt at verbalization over TRACH but less than yesterday, some phonation although weakness. HOwever, sats and vitals stable, and patient denies any SOB, H/A, dizziness or pain. Reports sleeping well last night    Per staff, patient with low grade fever Tm 100.5 overnight.

## 2025-02-09 NOTE — PROGRESS NOTE ADULT - ASSESSMENT
60 y/o F with PMH HTN, HFimpEF/NICM (EF 58% 10/2024), sickle cell disease, who presented to [facility] for SOB and SS crisis 12/30/25;, followed by witnessed seizure (?secondary to clonazepam withdrawal) requiring intubation. She was transferred to [facility] 1/3/25 secondary to cardiogenic shock/RV failure s/p ECMO +TRACH +PEG. Now admitted to [facility] after for initiation of a multidisciplinary rehab program consisting focused on functional mobility, transfers and ADLs (activities of daily living).    #Fever  -Tmax 100.5F 2/8  -No obvious s/s this am, clinically well appearing  -Check CXR, UA, CBC, BMP, procal, RVP if fever persists    #Orthostatic hypotension  -Continue metoprolol 12.5mg q8h with holding parameters    #Bilateral CVA  -MRI 1/9: Innumerable foci susceptibility artifact scattered throughout the brain which can be seen with critically ill patients on ECMO. Diffuse fat embolization is also part of the differential diagnosis but is considered less likely. Tiny acute/subacute infarcts within the bilateral basal ganglia, thalami, and chandu with disproportionate ovoid area of T2/FLAIR prolongation in the right ventral thalamus. Findings may indicate the presence of embolic acute/subacute infarcts.  -Precautions: respiratory, aspiration, SZ, AMS, fall, TRACH, PEG  -Continue comprehensive rehab program - PT/OT/SLP per rehab team  -Pain management, bowel regimen per rehab    #Hypoarousal  -LP (1/14) - negative for meningitis/encephalitis  -Amantadine 100 BID    #Seizure  -?Secondary to clonazepam withdrawal  -EEG (1/7) with suspicion of seizure  -Seizure ppx: Keppra 500 BID    #Acute respiratory failure - s/p Trach (1/16). On 6.0 cuffless (1/29)  -BCx 1/3 with one bottle staph epi, BCx 1/5 NGTD, Sputum from ET cx 1/4 staph aureus  -MSSA - cefepime + vanc > switched to vanc + Merrem; Bactroban to nare  -Supplemental O2: trach collar FiO2 30% via 10L  -Pulm consult appreciated - cont O2 via TC, duonebs q6h, pulmicort q12h, PMV trial with SLP    #Cardiogenic Shock - resolved  -last trop: 201 (1/4) downtrending from 314, (per NUMC trop ~900)  -S/p ECMO 1/3. decannulated 1/7  -S/p 2 U PRBC, 2 platelet, 5U cryo  -ECHO 1/20: EF 53%, normal LV+RV function, no acute HF issues  -Metoprolol    #Sickle cell disease   -1-2 sickle pain crisis per yea  -s/p transfusions, apheresis (1/12) for fat emboli concerns   -Hydroxyurea 500 BID (as long as plt > 100 and hgb >8)    #Transaminitis   -RUQ US    DVT ppx - Lovenox  GI ppx - PPI

## 2025-02-09 NOTE — PROGRESS NOTE ADULT - ASSESSMENT
CARSON LEE is a 59y with PMH, HTN, HFimpEF/NICM (EF 58% 10/2024), sickle cell disease, who presented to [facility] for SOB and SS crisis 12/30/25;, followed by witnessed seizure (? secondary to clonazepam withdrawal) requiring intubation. She was transferred to [facility] 1/3/25 secondary to cardiogenic shock /RV failure s/p ECMO +TRACH +PEG.   Now admitted to [facility] after for initiation of a multidisciplinary rehab program consisting focused on functional mobility, transfers and ADLs (activities of daily living).    # bilateral CVA, bilateral body involvement left > right, left kelsi inattention, aphasia, respiratory failure, dysphagia  - MRI 1/9: Innumerable foci susceptibility artifact scattered throughout the brain which can be seen with critically ill patients on ECMO. Diffuse fat embolization is also part of the differential diagnosis but is considered less likely. Tiny acute/subacute infarcts within the bilateral basal ganglia, thalami, and chandu with disproportionate ovoid area of T2/FLAIR prolongation in the right ventral thalamus. Findings may indicate the presence of embolic acute/subacute infarcts.  - continue Comprehensive Multidisciplinary Rehab Program: 3 hours a day (1  hr PT, 1 hr OT, 1 hr SLP), 5 days a week.  - Precautions: respiratory, aspiration, SZ, AMS, fall, TRACH, PEG    # hypoarousal  - LP (1/14) - negative for meningitis/encephalitis  - Amantadine 100 BID    # seizure  - EEG (1/7) with suspicion of seizure  - Seizure ppx: Keppra 500 BID     # Acute respiratory failure  - S/p Trach (1/16). On 6.0 cuffless (1/29)  - MSSA s/p Abx  - Duoneb q8hrs  - PMV with direct supervision SLP  - HR , O2 sat % TRACH collar FiO2 30% 2/9    # Cardiogenic Shock  - S/p ECMO 1/3. decannulated 1/7  - S/p 2 U PRBC, 2 platelet, 5U cryo  - ECHO 1/20: EF 53%, normal LV+RV function, no acute HF issues  - Metoprolol reduced 12.5 secondary to OH 2/7  - BP  (113/77 - 133/94) 2/9    # orthostatic hypotension  -  SBP dropped to 62 during OT session 2/7, improved to SBP 98 supine  - lopressor reduced  - DONALD stockings    # Sickle cell disease   - 1-2 sickle pain crisis per yea  - s/p transfusions, apheresis (1/12) for fat emboli concerns   - Hydroxyurea 500 BID (as long as plt > 100 and hgb >8)  - Hematology consult PRN  - Hgb 10.5 plt 191 O2 sat % 2/7  - CBC 2/10    # Mild leukocytosis   - has had fluctuations between 9-12 recently,  - WBC 11.33, 2/7  - CBC 2/10    # Transaminitis   - RUQ US 1/27: The gallbladder is moderately distended and contains layering sludge and possibly small stones. There is no significant gallbladder wall thickening or pericholecystic fluid and a sonographic Gross sign was not elicited. Therefore, there is no definitive evidence for acute cholecystitis.  -  AST  26  /  ALT  49[H]  /  AlkPhos  90  02-07  - CMP 2/10    # thrush  - nystatin started 2/7    # Pain Management:  - Tylenol PRN    # GI/Bowel:  - Senna QHS, Miralax QD  - GI ppx: Protonix    # /Bladder:   - PVR q 8 hours (SC if > 400)    # Skin/Pressure Injury:   - Skin assessment on admission:   - Seen by wound care team at [facility]. healing pressure injury DTI to L ear lobe, b/l forehead, sacrum /bilateral buttocks, bilateral heels. Incontinence Dermatitis  - on admissions exam, no skin injuries noted to sacrum/buttocks or heels  Recommend:  1.) topical therapy: sacral/buttock skin – cleanse with incontinence cleanser, pat dry, apply Piotr ointment BID and PRN for incontinent episodes. bilateral forehead - cleanse with gentle cleanser, leave open to air  2.) Incontinence Management - incontinence cleanser, pads, pericare BID  3.) Maintain on an alternating air with low air loss surface  4.) Turn and reposition Q 2 hours  5.) Nutrition optimization   6.) Offload heels/feet with complete cair air fluidized boots/pillows; ensure that the soles of the feet are not resting on the foot board of the bed.  7.) chair cushion for chair sitting    # Diet/Dysphagia:  - S/p PEG (1/21)  - FEEST (1/27) > silent laryngeal penetration of purees and moderately thick liquids is noted   - Current Diet: NPO + TF; free water   - Dysphagia: SLP consult for swallow function evaluation and treatment  - Health supp: Vitamin C + MVI    # DVT ppx:    - Lovenox 40 QD (  - SCD    # LABS  CBC CMP 2/10  ---------------  Code Status/Emergency Contact: Full Code    Outpatient Follow-up:    Wu Parra  Thoracic and Cardiac Surgery  [address] [place] [id number]  Phone: [phone]  Fax: [phone]  Follow Up Time: 2 weeks   CARSON LEE is a 59y with PMH, HTN, HFimpEF/NICM (EF 58% 10/2024), sickle cell disease, who presented to [facility] for SOB and SS crisis 12/30/25;, followed by witnessed seizure (? secondary to clonazepam withdrawal) requiring intubation. She was transferred to [facility] 1/3/25 secondary to cardiogenic shock /RV failure s/p ECMO +TRACH +PEG.   Now admitted to [facility] after for initiation of a multidisciplinary rehab program consisting focused on functional mobility, transfers and ADLs (activities of daily living).    # bilateral CVA, bilateral body involvement left > right, left kelsi inattention, aphasia, respiratory failure, dysphagia  - MRI 1/9: Innumerable foci susceptibility artifact scattered throughout the brain which can be seen with critically ill patients on ECMO. Diffuse fat embolization is also part of the differential diagnosis but is considered less likely. Tiny acute/subacute infarcts within the bilateral basal ganglia, thalami, and chandu with disproportionate ovoid area of T2/FLAIR prolongation in the right ventral thalamus. Findings may indicate the presence of embolic acute/subacute infarcts.  - continue Comprehensive Multidisciplinary Rehab Program: 3 hours a day (1  hr PT, 1 hr OT, 1 hr SLP), 5 days a week.  - Precautions: respiratory, aspiration, SZ, AMS, fall, TRACH, PEG    # hypoarousal  - LP (1/14) - negative for meningitis/encephalitis  - Amantadine 100 BID    # seizure  - EEG (1/7) with suspicion of seizure  - Seizure ppx: Keppra 500 BID     # low grade fever T m 100.5 2/8  - afebrile this morning  - discussed with hospitalist. No obvious source, asymptomatic, vitals stable, clinically well appearing  -Check CXR, UA, CBC, BMP, procal, RVP if fever persists    # Acute respiratory failure  - S/p Trach (1/16). On 6.0 cuffless (1/29)  - MSSA s/p Abx  - Duoneb q8hrs  - PMV with direct supervision SLP  - pulmonary consult 2/8 read and appreicated  - HR , O2 sat % TRACH collar FiO2 30% 2/9    # Cardiogenic Shock  - S/p ECMO 1/3. decannulated 1/7  - S/p 2 U PRBC, 2 platelet, 5U cryo  - ECHO 1/20: EF 53%, normal LV+RV function, no acute HF issues  - Metoprolol reduced 12.5 secondary to OH 2/7  - BP  (113/77 - 133/94) 2/9    # orthostatic hypotension  -  SBP dropped to 62 during OT session 2/7, improved to SBP 98 supine  - lopressor reduced  - DONALD stockings    # Sickle cell disease   - 1-2 sickle pain crisis per yea  - s/p transfusions, apheresis (1/12) for fat emboli concerns   - Hydroxyurea 500 BID (as long as plt > 100 and hgb >8)  - Hematology consult PRN  - Hgb 10.5 plt 191 O2 sat % 2/7  - CBC 2/10    # Mild leukocytosis   - has had fluctuations between 9-12 recently,  - WBC 11.33, 2/7  - CBC 2/10    # Transaminitis   - RUQ US 1/27: The gallbladder is moderately distended and contains layering sludge and possibly small stones. There is no significant gallbladder wall thickening or pericholecystic fluid and a sonographic Gross sign was not elicited. Therefore, there is no definitive evidence for acute cholecystitis.  -  AST  26  /  ALT  49[H]  /  AlkPhos  90  02-07  - CMP 2/10    # thrush  - nystatin started 2/7    # Pain Management:  - Tylenol PRN    # GI/Bowel:  - Senna QHS, Miralax QD  - GI ppx: Protonix    # /Bladder:   - PVR q 8 hours (SC if > 400)    # Skin/Pressure Injury:   - Skin assessment on admission:   - Seen by wound care team at [facility]. healing pressure injury DTI to L ear lobe, b/l forehead, sacrum /bilateral buttocks, bilateral heels. Incontinence Dermatitis  - on admissions exam, no skin injuries noted to sacrum/buttocks or heels  Recommend:  1.) topical therapy: sacral/buttock skin – cleanse with incontinence cleanser, pat dry, apply Piotr ointment BID and PRN for incontinent episodes. bilateral forehead - cleanse with gentle cleanser, leave open to air  2.) Incontinence Management - incontinence cleanser, pads, pericare BID  3.) Maintain on an alternating air with low air loss surface  4.) Turn and reposition Q 2 hours  5.) Nutrition optimization   6.) Offload heels/feet with complete cair air fluidized boots/pillows; ensure that the soles of the feet are not resting on the foot board of the bed.  7.) chair cushion for chair sitting    # Diet/Dysphagia:  - S/p PEG (1/21)  - FEEST (1/27) > silent laryngeal penetration of purees and moderately thick liquids is noted   - Current Diet: NPO + TF; free water   - Dysphagia: SLP consult for swallow function evaluation and treatment  - Health supp: Vitamin C + MVI    # DVT ppx:    - Lovenox 40 QD (  - SCD    # LABS  CBC CMP 2/10  ---------------  Code Status/Emergency Contact: Full Code    Outpatient Follow-up:    Wu Parra  Thoracic and Cardiac Surgery  [address] [place] [id number]  Phone: [phone]  Fax: [phone]  Follow Up Time: 2 weeks

## 2025-02-09 NOTE — PROGRESS NOTE ADULT - ASSESSMENT
59 yr old woman  with  HTN, HFimpEF/NICM (EF 58% 10/2024), sickle cell disease, who presented to [facility] for SOB and SS crisis 12/30/25;, followed by witnessed seizure (?secondary to clonazepam withdrawal) requiring intubation. She was transferred to [facility] 1/3/25 secondary to cardiogenic shock/RV failure s/p ECMO +TRACH with #6 cuffless +PEG. Now admitted to [facility] after for initiation of a multidisciplinary rehab program consisting focused on functional mobility, transfers and ADLs (activities of daily living). Pulmonary eval requested for trach management       Plan  TC to maintain sat  Continue nebs/pulmicort  start with PMV trial with speech and advance as tolerated  good candidate for decannulation when tolerating capping     rest of care as per primary team

## 2025-02-09 NOTE — PROGRESS NOTE ADULT - SUBJECTIVE AND OBJECTIVE BOX
Patient is a 59y old  Female who presents with a chief complaint of bilateral CVA (08 Feb 2025 13:45)      Patient seen and examined at bedside. calm this am. noted tmax 100.5F 2/8 overnight.     ALLERGIES:  doxycycline (Angioedema)  clindamycin (Angioedema)  penicillin (Unknown)  linezolid (Angioedema)    MEDICATIONS  (STANDING):  albuterol/ipratropium for Nebulization 3 milliLiter(s) Nebulizer every 8 hours  amantadine Syrup 100 milliGRAM(s) Oral <User Schedule>  artificial tears (preservative free) Ophthalmic Solution 1 Drop(s) Both EYES four times a day  ascorbic acid 500 milliGRAM(s) Oral daily  enoxaparin Injectable 40 milliGRAM(s) SubCutaneous every 24 hours  hydroxyurea 500 milliGRAM(s) Oral two times a day  levETIRAcetam  Solution 500 milliGRAM(s) Oral every 12 hours  metoprolol tartrate 12.5 milliGRAM(s) Oral every 8 hours  multivitamin/minerals/iron Oral Solution (CENTRUM) 15 milliLiter(s) Oral daily  nystatin    Suspension 572636 Unit(s) Oral three times a day  pantoprazole   Suspension 40 milliGRAM(s) Oral before breakfast  polyethylene glycol 3350 17 Gram(s) Oral daily  senna 1 Tablet(s) Oral daily    MEDICATIONS  (PRN):  acetaminophen   Oral Liquid .. 650 milliGRAM(s) Oral every 6 hours PRN Temp greater or equal to 38C (100.4F), Mild Pain (1 - 3)  bisacodyl Suppository 10 milliGRAM(s) Rectal daily PRN Constipation    Vital Signs Last 24 Hrs  T(F): 98.2 (09 Feb 2025 05:24), Max: 100.5 (08 Feb 2025 21:14)  HR: 95 (09 Feb 2025 05:25) (94 - 110)  BP: 119/79 (09 Feb 2025 05:24) (113/77 - 133/94)  RR: 16 (09 Feb 2025 05:25) (14 - 22)  SpO2: 100% (09 Feb 2025 05:25) (99% - 100%)  I&O's Summary    08 Feb 2025 07:01  -  09 Feb 2025 07:00  --------------------------------------------------------  IN: 2280 mL / OUT: 1 mL / NET: 2279 mL      BMI (kg/m2): 27.5 (02-06-25 @ 20:59)  PHYSICAL EXAM:  General: NAD  ENT: MMM, no scleral icterus  Neck: +trach  Lungs: Clear to auscultation bilaterally, no wheezes, rales, rhonchi  Cardio: RRR, S1/S2  Abdomen: Soft, Nontender, Nondistended; Bowel sounds present, +PEG  Extremities: No calf tenderness, No pitting edema    LABS:                        10.5   11.33 )-----------( 191      ( 07 Feb 2025 05:47 )             31.1       02-07    146  |  110  |  20  ----------------------------<  102  3.8   |  29  |  0.83    Ca    9.5      07 Feb 2025 05:47    TPro  7.3  /  Alb  2.8  /  TBili  0.8  /  DBili  x   /  AST  26  /  ALT  49  /  AlkPhos  90  02-07                                  Urinalysis Basic - ( 07 Feb 2025 05:47 )    Color: x / Appearance: x / SG: x / pH: x  Gluc: 102 mg/dL / Ketone: x  / Bili: x / Urobili: x   Blood: x / Protein: x / Nitrite: x   Leuk Esterase: x / RBC: x / WBC x   Sq Epi: x / Non Sq Epi: x / Bacteria: x        COVID-19 PCR: NotDetec (02-06-25 @ 21:11)      RADIOLOGY & ADDITIONAL TESTS:    Care Discussed with Consultants/Other Providers: yes, rehab

## 2025-02-09 NOTE — PROGRESS NOTE ADULT - SUBJECTIVE AND OBJECTIVE BOX
Patient is a 59y old  Female who presents with a chief complaint of bilateral CVA (2025 08:27)      HPI:  59F PMH HTN, HFimpEF/NICM (EF 58% 10/2024), Sickle cell disease (on hydroxyurea), who presented to [facility] for SOB and SS crisis on  and 24. She was intubated and sedated following witnessed seizure, and transferred to [facility] (1/3/2025) for further management.     Keppra was discontinued at [facility] due to negative EEG. Seizure was thought to be caused by clonazepam withdrawal per OSH. She was hypotensive and tachycardic with elevated trops (~900s), but with no EKG changes. TTE with RV failure possibly iso pulmonary HTN 2/2 increased tidal volumes on ventilator and possible additional volume overload 2/2 management of sickle cell crisis. Neuro was consulted for seizures and EEG technician was called for vEEG placement; seizure activities noted.  Pt transferred to [facility] for management of cardiogenic shock with increased inotropic and pressor support requirement.     ON 1/3, venoarterial ECMO (Left Femoral Arterial 17Fr, Right Femoral Venous 25Fr, L SFA RPC 6Fr) was initiated. Transfusion Medicine team was consulted for emergent RBC exchange to minimize the risk of complications related to sickle cell disease given critical illness with multi-organ failure. TTE 25: LVEF <20%, ECMO cannula in RV, mildly enlarged RV size and reduced RV systolic function, no pericardial effusion, no MR, trace TR, IVC nml size    Hospital course also significant for MSSA in sputum culture from ET tube, MSSA bacteremia, BAL + MSSA Patient was treated with IV vanc (completed) and merrem for total 10 days (completed). On  patient s/p VA ECMO decannulation. Case was complicated by hematoma formation in left thigh (site of arterial cannula). She was given 2U pRBC, 2 platelet and 5U cryo.  YUNG and LLL evaluation revealed moderate green tinged secretions and mucous plugging. RUL, RML, RLL revealed minimal secretions.     Patient was tapered off precedex sedation and Keppra reinstated. MRI  showed innumerable foci susceptibility artifact scattered throughout the brain, tiny acute/subacute infarcts within the bilateral basal ganglia, thalami, and chandu with disproportionate ovoid area of T2/FLAIR prolongation in the right ventral thalamus. Findings may indicate the presence of embolic acute/subacute infarcts.  Lumbar puncture no evidence of meningitis/ encephalitis     ON  bedside trach was placed by Dr Hebert Holden and on  PEG placement performed.  Speech and swallow eval there is reduced movement of the right vocal cord; left is mobile/intact. Silent laryngeal penetration of purees and moderately thick liquids is noted Remains with a #6 Cuffless Trach at 30% Trach collar.    Patient was medically cleared for transfer to [facility].   (2025 12:00)      PAST MEDICAL & SURGICAL HISTORY:  Sickle-cell-hemoglobin C disease with crisis      Essential hypertension      Sickle cell disease      HTN (hypertension)      H/O:       H/O abdominoplasty      S/P breast augmentation      S/P           MEDICATIONS  (STANDING):  albuterol/ipratropium for Nebulization 3 milliLiter(s) Nebulizer every 8 hours  amantadine Syrup 100 milliGRAM(s) Oral <User Schedule>  artificial tears (preservative free) Ophthalmic Solution 1 Drop(s) Both EYES four times a day  ascorbic acid 500 milliGRAM(s) Oral daily  enoxaparin Injectable 40 milliGRAM(s) SubCutaneous every 24 hours  hydroxyurea 500 milliGRAM(s) Oral two times a day  levETIRAcetam  Solution 500 milliGRAM(s) Oral every 12 hours  metoprolol tartrate 12.5 milliGRAM(s) Oral every 8 hours  multivitamin/minerals/iron Oral Solution (CENTRUM) 15 milliLiter(s) Oral daily  nystatin    Suspension 065942 Unit(s) Oral three times a day  pantoprazole   Suspension 40 milliGRAM(s) Oral before breakfast  polyethylene glycol 3350 17 Gram(s) Oral daily  senna 1 Tablet(s) Oral daily    MEDICATIONS  (PRN):  acetaminophen   Oral Liquid .. 650 milliGRAM(s) Oral every 6 hours PRN Temp greater or equal to 38C (100.4F), Mild Pain (1 - 3)  bisacodyl Suppository 10 milliGRAM(s) Rectal daily PRN Constipation      Allergies    doxycycline (Angioedema)  clindamycin (Angioedema)  penicillin (Unknown)  linezolid (Angioedema)    Intolerances          VITALS  59y  Vital Signs Last 24 Hrs  T(C): 37.2 (2025 08:18), Max: 38.1 (2025 21:14)  T(F): 98.9 (2025 08:18), Max: 100.5 (2025 21:14)  HR: 92 (2025 08:18) (92 - 110)  BP: 115/80 (2025 08:18) (113/77 - 133/94)  BP(mean): --  RR: 16 (2025 08:18) (14 - 22)  SpO2: 100% (2025 08:18) (99% - 100%)    Parameters below as of 2025 08:27  Patient On (Oxygen Delivery Method): tracheostomy collar      Daily     Daily         RECENT LABS:                      CAPILLARY BLOOD GLUCOSE

## 2025-02-10 LAB
ALBUMIN SERPL ELPH-MCNC: 2.6 G/DL — LOW (ref 3.3–5)
ALP SERPL-CCNC: 100 U/L — SIGNIFICANT CHANGE UP (ref 40–120)
ALT FLD-CCNC: 61 U/L — HIGH (ref 10–45)
ANION GAP SERPL CALC-SCNC: 6 MMOL/L — SIGNIFICANT CHANGE UP (ref 5–17)
AST SERPL-CCNC: 32 U/L — SIGNIFICANT CHANGE UP (ref 10–40)
BILIRUB SERPL-MCNC: 0.4 MG/DL — SIGNIFICANT CHANGE UP (ref 0.2–1.2)
BUN SERPL-MCNC: 32 MG/DL — HIGH (ref 7–23)
CALCIUM SERPL-MCNC: 9.6 MG/DL — SIGNIFICANT CHANGE UP (ref 8.4–10.5)
CHLORIDE SERPL-SCNC: 107 MMOL/L — SIGNIFICANT CHANGE UP (ref 96–108)
CO2 SERPL-SCNC: 30 MMOL/L — SIGNIFICANT CHANGE UP (ref 22–31)
CREAT SERPL-MCNC: 0.87 MG/DL — SIGNIFICANT CHANGE UP (ref 0.5–1.3)
EGFR: 77 ML/MIN/1.73M2 — SIGNIFICANT CHANGE UP
GLUCOSE SERPL-MCNC: 100 MG/DL — HIGH (ref 70–99)
HCT VFR BLD CALC: 31.6 % — LOW (ref 34.5–45)
HGB BLD-MCNC: 10.7 G/DL — LOW (ref 11.5–15.5)
MCHC RBC-ENTMCNC: 30.9 PG — SIGNIFICANT CHANGE UP (ref 27–34)
MCHC RBC-ENTMCNC: 33.9 G/DL — SIGNIFICANT CHANGE UP (ref 32–36)
MCV RBC AUTO: 91.3 FL — SIGNIFICANT CHANGE UP (ref 80–100)
NRBC # BLD: 0 /100 WBCS — SIGNIFICANT CHANGE UP (ref 0–0)
NRBC BLD-RTO: 0 /100 WBCS — SIGNIFICANT CHANGE UP (ref 0–0)
PLATELET # BLD AUTO: 286 K/UL — SIGNIFICANT CHANGE UP (ref 150–400)
POTASSIUM SERPL-MCNC: 4.3 MMOL/L — SIGNIFICANT CHANGE UP (ref 3.5–5.3)
POTASSIUM SERPL-SCNC: 4.3 MMOL/L — SIGNIFICANT CHANGE UP (ref 3.5–5.3)
PROT SERPL-MCNC: 7.6 G/DL — SIGNIFICANT CHANGE UP (ref 6–8.3)
RBC # BLD: 3.46 M/UL — LOW (ref 3.8–5.2)
RBC # FLD: 15.9 % — HIGH (ref 10.3–14.5)
SODIUM SERPL-SCNC: 143 MMOL/L — SIGNIFICANT CHANGE UP (ref 135–145)
WBC # BLD: 11.36 K/UL — HIGH (ref 3.8–10.5)
WBC # FLD AUTO: 11.36 K/UL — HIGH (ref 3.8–10.5)

## 2025-02-10 PROCEDURE — 99232 SBSQ HOSP IP/OBS MODERATE 35: CPT

## 2025-02-10 PROCEDURE — 99233 SBSQ HOSP IP/OBS HIGH 50: CPT | Mod: GC

## 2025-02-10 RX ADMIN — METOPROLOL SUCCINATE 12.5 MILLIGRAM(S): 50 TABLET, EXTENDED RELEASE ORAL at 14:01

## 2025-02-10 RX ADMIN — Medication 500 MILLIGRAM(S): at 11:50

## 2025-02-10 RX ADMIN — Medication 100 MILLIGRAM(S): at 14:01

## 2025-02-10 RX ADMIN — IPRATROPIUM BROMIDE AND ALBUTEROL SULFATE 3 MILLILITER(S): .5; 2.5 SOLUTION RESPIRATORY (INHALATION) at 06:39

## 2025-02-10 RX ADMIN — Medication 1 DROP(S): at 23:14

## 2025-02-10 RX ADMIN — ENOXAPARIN SODIUM 40 MILLIGRAM(S): 100 INJECTION SUBCUTANEOUS at 05:20

## 2025-02-10 RX ADMIN — METOPROLOL SUCCINATE 12.5 MILLIGRAM(S): 50 TABLET, EXTENDED RELEASE ORAL at 21:27

## 2025-02-10 RX ADMIN — HYDROXYUREA 500 MILLIGRAM(S): 500 CAPSULE ORAL at 17:28

## 2025-02-10 RX ADMIN — Medication 1 DROP(S): at 17:29

## 2025-02-10 RX ADMIN — Medication 500000 UNIT(S): at 05:19

## 2025-02-10 RX ADMIN — Medication 500000 UNIT(S): at 21:26

## 2025-02-10 RX ADMIN — Medication 40 MILLIGRAM(S): at 06:12

## 2025-02-10 RX ADMIN — IPRATROPIUM BROMIDE AND ALBUTEROL SULFATE 3 MILLILITER(S): .5; 2.5 SOLUTION RESPIRATORY (INHALATION) at 22:47

## 2025-02-10 RX ADMIN — HYDROXYUREA 500 MILLIGRAM(S): 500 CAPSULE ORAL at 05:19

## 2025-02-10 RX ADMIN — Medication 1 DROP(S): at 11:49

## 2025-02-10 RX ADMIN — LEVETIRACETAM 500 MILLIGRAM(S): 10 INJECTION, SOLUTION INTRAVENOUS at 05:19

## 2025-02-10 RX ADMIN — Medication 500000 UNIT(S): at 14:01

## 2025-02-10 RX ADMIN — Medication 100 MILLIGRAM(S): at 06:12

## 2025-02-10 RX ADMIN — Medication 1 DROP(S): at 05:20

## 2025-02-10 RX ADMIN — METOPROLOL SUCCINATE 12.5 MILLIGRAM(S): 50 TABLET, EXTENDED RELEASE ORAL at 05:19

## 2025-02-10 RX ADMIN — LEVETIRACETAM 500 MILLIGRAM(S): 10 INJECTION, SOLUTION INTRAVENOUS at 17:29

## 2025-02-10 RX ADMIN — Medication 15 MILLILITER(S): at 11:49

## 2025-02-10 NOTE — PROGRESS NOTE ADULT - SUBJECTIVE AND OBJECTIVE BOX
Patient is a 59y old  Female who presents with a chief complaint of bilateral CVA     SUBJECTIVE/OBJECTIVE: Patient seen and evaluated while sitting in tilt in space WC > appears comfortable, not in acute distress.      REVIEW OF SYSTEMS:  +cog impairment; selectively answer questions  Constitutional: No fever or Chills  Pulm: no witnessed cough or resp distress  Cardio: No chest pain  GI/: incont > soft/loose BM per nursing  Neuro: No headaches  MSK: No pain    PHYSICAL EXAM  59y  Vital Signs Last 24 Hrs  T(C): 37.1 (10 Feb 2025 07:17), Max: 37.3 (09 Feb 2025 18:41)  T(F): 98.7 (10 Feb 2025 07:17), Max: 99.2 (09 Feb 2025 18:41)  HR: 100 (10 Feb 2025 07:17) (95 - 100)  BP: 119/80 (10 Feb 2025 07:17) (113/78 - 130/94)  RR: 16 (10 Feb 2025 07:17) (16 - 24)  SpO2: 98% (10 Feb 2025 07:17) (96% - 100%)  Parameters below as of 10 Feb 2025 07:17  Patient On (Oxygen Delivery Method): tracheostomy collar O2 Concentration (%): 28    Constitutional - NAD, Comfortable  HEENT - trach collar in place; on oxygen concentration 28%  Chest - good chest expansion, good respiratory effort  Cardio - warm and well perfused, no cyanosis or pedal edema  Abdomen -  Soft, NTND; +PEG site c/d/i  Extremities - No peripheral edema, No calf tenderness   Neurologic Exam:                    Cognitive -             Orientation: Awake and alert to at least self (able to state her name); not to location, m/yr.  able to follow some commands (delayed), answer limited questions (unclear of answers are reliable)     Speech - Hypophonic, +aphasic, no naming, delayed processing/response     Cranial Nerves - poor tracking, no facial asymmetry, tongue midline - exam limited by patient participation     Motor - poor command following; Moving all extremities at least antigravity - except Left HF 2/5     Coordination - unable to perform  Psychiatric - calm affect, but restless RUE    RECENT LABS:  reordered     Allergies  doxycycline (Angioedema)  clindamycin (Angioedema)  penicillin (Unknown)  linezolid (Angioedema)    MEDICATIONS  (STANDING):  albuterol/ipratropium for Nebulization 3 milliLiter(s) Nebulizer every 8 hours  amantadine Syrup 100 milliGRAM(s) Oral <User Schedule>  artificial tears (preservative free) Ophthalmic Solution 1 Drop(s) Both EYES four times a day  ascorbic acid 500 milliGRAM(s) Oral daily  enoxaparin Injectable 40 milliGRAM(s) SubCutaneous every 24 hours  hydroxyurea 500 milliGRAM(s) Oral two times a day  levETIRAcetam  Solution 500 milliGRAM(s) Oral every 12 hours  metoprolol tartrate 12.5 milliGRAM(s) Oral every 8 hours  multivitamin/minerals/iron Oral Solution (CENTRUM) 15 milliLiter(s) Oral daily  nystatin    Suspension 795735 Unit(s) Oral three times a day  pantoprazole   Suspension 40 milliGRAM(s) Oral before breakfast  polyethylene glycol 3350 17 Gram(s) Oral daily  senna 1 Tablet(s) Oral daily    MEDICATIONS  (PRN):  acetaminophen   Oral Liquid .. 650 milliGRAM(s) Oral every 6 hours PRN Temp greater or equal to 38C (100.4F), Mild Pain (1 - 3)  bisacodyl Suppository 10 milliGRAM(s) Rectal daily PRN Constipation

## 2025-02-10 NOTE — PROGRESS NOTE ADULT - SUBJECTIVE AND OBJECTIVE BOX
Patient is a 59y old  Female who presents with a chief complaint of bilateral CVA (10 Feb 2025 13:21)    Patient seen and examined at bedside.     ALLERGIES:  doxycycline (Angioedema)  clindamycin (Angioedema)  penicillin (Unknown)  linezolid (Angioedema)    MEDICATIONS  (STANDING):  albuterol/ipratropium for Nebulization 3 milliLiter(s) Nebulizer every 8 hours  amantadine Syrup 100 milliGRAM(s) Oral <User Schedule>  artificial tears (preservative free) Ophthalmic Solution 1 Drop(s) Both EYES four times a day  ascorbic acid 500 milliGRAM(s) Oral daily  enoxaparin Injectable 40 milliGRAM(s) SubCutaneous every 24 hours  hydroxyurea 500 milliGRAM(s) Oral two times a day  levETIRAcetam  Solution 500 milliGRAM(s) Oral every 12 hours  metoprolol tartrate 12.5 milliGRAM(s) Oral every 8 hours  multivitamin/minerals/iron Oral Solution (CENTRUM) 15 milliLiter(s) Oral daily  nystatin    Suspension 886599 Unit(s) Oral three times a day  pantoprazole   Suspension 40 milliGRAM(s) Oral before breakfast  polyethylene glycol 3350 17 Gram(s) Oral daily  senna 1 Tablet(s) Oral daily    MEDICATIONS  (PRN):  acetaminophen   Oral Liquid .. 650 milliGRAM(s) Oral every 6 hours PRN Temp greater or equal to 38C (100.4F), Mild Pain (1 - 3)  bisacodyl Suppository 10 milliGRAM(s) Rectal daily PRN Constipation    Vital Signs Last 24 Hrs  T(F): 98.7 (10 Feb 2025 07:17), Max: 99.2 (09 Feb 2025 18:41)  HR: 100 (10 Feb 2025 14:04) (95 - 100)  BP: 121/87 (10 Feb 2025 14:04) (114/79 - 130/94)  RR: 16 (10 Feb 2025 14:04) (16 - 20)  SpO2: 98% (10 Feb 2025 14:04) (96% - 99%)  I&O's Summary    09 Feb 2025 07:01  -  10 Feb 2025 07:00  --------------------------------------------------------  IN: 2280 mL / OUT: 0 mL / NET: 2280 mL      PHYSICAL EXAM:  General: NAD  ENT: No gross hearing impairment, Moist mucous membranes, + thrush  Neck: Supple, No JVD, tracheostomy   Lungs: Clear to auscultation bilaterally, good air entry, non-labored breathing  Cardio: RRR, S1/S2, No murmur  Abdomen: Soft, Nontender, Nondistended; Bowel sounds present; PEG+  Extremities: No calf tenderness, No cyanosis, No pitting edema  Psych: Appropriate mood and affect  Neuro: Aphasia    LABS:                        10.7   11.36 )-----------( 286      ( 10 Feb 2025 15:30 )             31.6     02-10    143  |  107  |  32  ----------------------------<  100  4.3   |  30  |  0.87    Ca    9.6      10 Feb 2025 15:30    TPro  7.6  /  Alb  2.6  /  TBili  0.4  /  DBili  x   /  AST  32  /  ALT  61  /  AlkPhos  100  02-10        Urinalysis Basic - ( 10 Feb 2025 15:30 )    Color: x / Appearance: x / SG: x / pH: x  Gluc: 100 mg/dL / Ketone: x  / Bili: x / Urobili: x   Blood: x / Protein: x / Nitrite: x   Leuk Esterase: x / RBC: x / WBC x   Sq Epi: x / Non Sq Epi: x / Bacteria: x        COVID-19 PCR: Ronan (02-06-25 @ 21:11)

## 2025-02-10 NOTE — PROGRESS NOTE ADULT - ASSESSMENT
59y with PMH, HTN, HFimpEF/NICM (EF 58% 10/2024), sickle cell disease, who presented to [facility] for SOB and SS crisis 12/30/25;, followed by witnessed seizure (? secondary to clonazepam withdrawal) requiring intubation. She was transferred to [facility] 1/3/25 secondary to cardiogenic shock /RV failure s/p ECMO +TRACH +PEG.   Now admitted to [facility] after for initiation of a multidisciplinary rehab program consisting focused on functional mobility, transfers and ADLs (activities of daily living).    # Bilateral CVA, bilateral body involvement left > right, left kelsi inattention, aphasia, respiratory failure, dysphagia  - MRI 1/9: Innumerable foci susceptibility artifact scattered throughout the brain which can be seen with critically ill patients on ECMO. Diffuse fat embolization is also part of the differential diagnosis but is considered less likely. Tiny acute/subacute infarcts within the bilateral basal ganglia, thalami, and chandu with disproportionate ovoid area of T2/FLAIR prolongation in the right ventral thalamus. Findings may indicate the presence of embolic acute/subacute infarcts.  - continue Comprehensive Multidisciplinary Rehab Program: 3 hours a day (1  hr PT, 1 hr OT, 1 hr SLP), 5 days a week.  - Precautions: respiratory, aspiration, SZ, AMS, fall, TRACH, PEG    # hypoarousal  - LP (1/14) - negative for meningitis/encephalitis  - Amantadine 100 BID    # seizure  - EEG (1/7) with suspicion of seizure  - Seizure ppx: Keppra 500 BID     # low grade fever T m 100.5 (2/8)  - discussed with hospitalist. No obvious source, asymptomatic, vitals stable, clinically well appearing  - Check CXR, UA, CBC, BMP, procal; RVP if fever persists  - temp <100*F, WBC slight uptrend >11.33 (2/7)    # Acute respiratory failure  - S/p Trach (1/16). On 6.0 cuffless (1/29)  - MSSA s/p Abx  - Duoneb q8hrs  - PMV with direct supervision SLP  - pulmonary consult ( 2/8) - will be following  - TRACH collar FiO2 28%   - PMV with speech only    # Cardiogenic Shock  - S/p ECMO 1/3. decannulated 1/7  - S/p 2 U PRBC, 2 platelet, 5U cryo  - ECHO 1/20: EF 53%, normal LV+RV function, no acute HF issues  - Metoprolol reduced 12.5 secondary to OH 2/7  - /80 - 130/94 (2/10)    # orthostatic hypotension  -  SBP dropped to 62 during OT session 2/7, improved to SBP 98 supine  - Lopressor reduced  - DONALD stockings when out of bed  - has abd for PEG protection already    # Sickle cell disease   - 1-2 sickle pain crisis per yea  - s/p transfusions, apheresis (1/12) for fat emboli concerns   - Hydroxyurea 500 BID (as long as plt > 100 and hgb >8)  - Hematology consult PRN  - Hgb 10.5 plt 191 O2 sat % (2/7) - pending labs for today    # Mild leukocytosis   - has had fluctuations between 9-12 recently,  - WBC 11.33, 2/7 - trend > pending CBC for today     # Transaminitis   - RUQ US 1/27: The gallbladder is moderately distended and contains layering sludge and possibly small stones. There is no significant gallbladder wall thickening or pericholecystic fluid and a sonographic Gross sign was not elicited. Therefore, there is no definitive evidence for acute cholecystitis.  -  AST  26  /  ALT  49[H]  /  AlkPhos  90  02-07  - CMP 2/10 - reordered (was canceled?)    # Thrush  - nystatin (2/7)    # Pain Management:  - Tylenol PRN    # GI/Bowel:  - Senna QHS, Miralax QD - hold if having loose BM  - GI ppx: Protonix    # /Bladder:   - PVR q 8 hours (SC if > 400)    # Skin/Pressure Injury:   - Skin assessment on admission: no active pressure injury > healed DTI to L ear lobe, b/l forehead, sacrum /bilateral buttocks, bilateral heels. +Incontinence Dermatitis  Recommend:  1.) topical therapy: sacral/buttock skin – cleanse with incontinence cleanser, pat dry, apply Piotr ointment BID and PRN for incontinent episodes. bilateral forehead - cleanse with gentle cleanser, leave open to air  2.) Incontinence Management - incontinence cleanser, pads, pericare BID  3.) Maintain on an alternating air with low air loss surface  4.) Turn and reposition Q 2 hours  5.) Nutrition optimization   6.) Offload heels/feet with complete cair air fluidized boots/pillows; ensure that the soles of the feet are not resting on the foot board of the bed.  7.) chair cushion for chair sitting    # Diet/Dysphagia:  - S/p PEG (1/21)  - FEEST (1/27) > silent laryngeal penetration of purees and moderately thick liquids is noted   - Current Diet: NPO + TF; free water   - Dysphagia: SLP consult for swallow function evaluation and treatment  - Health supp: Vitamin C + MVI    # DVT ppx:    - Lovenox 40 QD   - SCD  ---------------  Code Status/Emergency Contact: Full Code    Outpatient Follow-up:    Wu Parra  Thoracic and Cardiac Surgery  [address] [place] [id number]  Phone: [phone]  Fax: [phone]  Follow Up Time: 2 weeks   59y with PMH, HTN, HFimpEF/NICM (EF 58% 10/2024), sickle cell disease, who presented to [facility] for SOB and SS crisis 12/30/25;, followed by witnessed seizure (? secondary to clonazepam withdrawal) requiring intubation. She was transferred to [facility] 1/3/25 secondary to cardiogenic shock /RV failure s/p ECMO +TRACH +PEG.   Now admitted to [facility] after for initiation of a multidisciplinary rehab program consisting focused on functional mobility, transfers and ADLs (activities of daily living).    # Bilateral CVA, bilateral body involvement left > right, left kelsi inattention, aphasia, respiratory failure, dysphagia  - MRI 1/9: Innumerable foci susceptibility artifact scattered throughout the brain which can be seen with critically ill patients on ECMO. Diffuse fat embolization is also part of the differential diagnosis but is considered less likely. Tiny acute/subacute infarcts within the bilateral basal ganglia, thalami, and chandu with disproportionate ovoid area of T2/FLAIR prolongation in the right ventral thalamus. Findings may indicate the presence of embolic acute/subacute infarcts.  - continue Comprehensive Multidisciplinary Rehab Program: 3 hours a day (1  hr PT, 1 hr OT, 1 hr SLP), 5 days a week.  - Precautions: respiratory, aspiration, SZ, AMS, fall, TRACH, PEG    # hypoarousal  - LP (1/14) - negative for meningitis/encephalitis  - Amantadine 100 BID    # seizure  - EEG (1/7) with suspicion of seizure  - Seizure ppx: Keppra 500 BID     # low grade fever T m 100.5 (2/8)  - discussed with hospitalist. No obvious source, asymptomatic, vitals stable, clinically well appearing  - Check CXR, UA, CBC, BMP, procal; RVP if fever persists  - temp <100*F, WBC slight uptrend >11.33 (2/7), continue to monitor     # Acute respiratory failure  - S/p Trach (1/16). On 6.0 cuffless (1/29)  - MSSA s/p Abx  - Duoneb q8hrs  - PMV with direct supervision SLP  - pulmonary consult ( 2/8) - will be following  - TRACH collar FiO2 28%   - PMV with speech only    # Cardiogenic Shock  - S/p ECMO 1/3. decannulated 1/7  - S/p 2 U PRBC, 2 platelet, 5U cryo  - ECHO 1/20: EF 53%, normal LV+RV function, no acute HF issues  - Metoprolol reduced 12.5 secondary to OH 2/7  - /80 - 130/94 (2/10)    # orthostatic hypotension  -  SBP dropped to 62 during OT session 2/7, improved to SBP 98 supine  - Lopressor reduced  - DONALD stockings when out of bed  - has abd for PEG protection already    # Sickle cell disease   - 1-2 sickle pain crisis per yea  - s/p transfusions, apheresis (1/12) for fat emboli concerns   - Hydroxyurea 500 BID (as long as plt > 100 and hgb >8)  - Hematology consult PRN  - Hgb 10.5 plt 191 O2 sat % (2/7) -hg 10.7 (2/10)    # Mild leukocytosis   - has had fluctuations between 9-12 recently,  - WBC 11.36     # Transaminitis   - RUQ US 1/27: The gallbladder is moderately distended and contains layering sludge and possibly small stones. There is no significant gallbladder wall thickening or pericholecystic fluid and a sonographic Gross sign was not elicited. Therefore, there is no definitive evidence for acute cholecystitis.  -  AST  26  /  ALT  49[H]  /  AlkPhos  90  02-07  LIVER FUNCTIONS - ( 10 Feb 2025 15:30 )  Alb: 2.6 g/dL / Pro: 7.6 g/dL / ALK PHOS: 100 U/L / ALT: 61 U/L / AST: 32 U/L / GGT: x             # Thrush  - nystatin (2/7)    # Pain Management:  - Tylenol PRN    # GI/Bowel:  - Senna QHS, Miralax QD - hold if having loose BM  - GI ppx: Protonix    # /Bladder:   - PVR q 8 hours (SC if > 400)    # Skin/Pressure Injury:   - Skin assessment on admission: no active pressure injury > healed DTI to L ear lobe, b/l forehead, sacrum /bilateral buttocks, bilateral heels. +Incontinence Dermatitis  Recommend:  1.) topical therapy: sacral/buttock skin – cleanse with incontinence cleanser, pat dry, apply Piotr ointment BID and PRN for incontinent episodes. bilateral forehead - cleanse with gentle cleanser, leave open to air  2.) Incontinence Management - incontinence cleanser, pads, pericare BID  3.) Maintain on an alternating air with low air loss surface  4.) Turn and reposition Q 2 hours  5.) Nutrition optimization   6.) Offload heels/feet with complete cair air fluidized boots/pillows; ensure that the soles of the feet are not resting on the foot board of the bed.  7.) chair cushion for chair sitting    # Diet/Dysphagia:  - S/p PEG (1/21)  - FEEST (1/27) > silent laryngeal penetration of purees and moderately thick liquids is noted   - Current Diet: NPO + TF; free water   - Dysphagia: SLP consult for swallow function evaluation and treatment  - Health supp: Vitamin C + MVI    # DVT ppx:    - Lovenox 40 QD   - SCD  ---------------  Code Status/Emergency Contact: Full Code    Outpatient Follow-up:    Wu Parra  Thoracic and Cardiac Surgery  [address] [place] [id number]  Phone: [phone]  Fax: [phone]  Follow Up Time: 2 weeks   59y with PMH, HTN, HFimpEF/NICM (EF 58% 10/2024), sickle cell disease, who presented to [facility] for SOB and SS crisis 12/30/25;, followed by witnessed seizure (? secondary to clonazepam withdrawal) requiring intubation. She was transferred to [facility] 1/3/25 secondary to cardiogenic shock /RV failure s/p ECMO +TRACH +PEG.   Now admitted to [facility] after for initiation of a multidisciplinary rehab program consisting focused on functional mobility, transfers and ADLs (activities of daily living).    # Bilateral CVA, bilateral body involvement left > right, left kelsi inattention, aphasia, respiratory failure, dysphagia  - MRI 1/9: Innumerable foci susceptibility artifact scattered throughout the brain which can be seen with critically ill patients on ECMO. Diffuse fat embolization is also part of the differential diagnosis but is considered less likely. Tiny acute/subacute infarcts within the bilateral basal ganglia, thalami, and chandu with disproportionate ovoid area of T2/FLAIR prolongation in the right ventral thalamus. Findings may indicate the presence of embolic acute/subacute infarcts.  - continue Comprehensive Multidisciplinary Rehab Program: 3 hours a day (1  hr PT, 1 hr OT, 1 hr SLP), 5 days a week.  - Precautions: respiratory, aspiration, SZ, AMS, fall, TRACH, PEG    # hypoarousal  - LP (1/14) - negative for meningitis/encephalitis  - Amantadine 100 BID    # seizure  - EEG (1/7) with suspicion of seizure  - Seizure ppx: Keppra 500 BID     # low grade fever T m 100.5 (2/8)  - discussed with hospitalist. No obvious source, asymptomatic, vitals stable, clinically well appearing  - Check CXR, UA, CBC, BMP, procal; RVP if fever persists  - temp <100*F, WBC slight uptrend >11.33 (2/7), continue to monitor     # Acute respiratory failure  - S/p Trach (1/16). On 6.0 cuffless (1/29)  - MSSA s/p Abx  - Duoneb q8hrs  - PMV with direct supervision SLP  - pulmonary consult ( 2/8) - will be following  - TRACH collar FiO2 28%   - PMV with speech only    # Cardiogenic Shock  - S/p ECMO 1/3. decannulated 1/7  - S/p 2 U PRBC, 2 platelet, 5U cryo  - ECHO 1/20: EF 53%, normal LV+RV function, no acute HF issues  - Metoprolol reduced 12.5 secondary to OH 2/7  - /80 - 130/94 (2/10)    # orthostatic hypotension  -  SBP dropped to 62 during OT session 2/7, improved to SBP 98 supine  - Lopressor reduced  - DONALD stockings when out of bed  - has abd for PEG protection already    # Sickle cell disease   - 1-2 sickle pain crisis per yea  - s/p transfusions, apheresis (1/12) for fat emboli concerns   - Hydroxyurea 500 BID (as long as plt > 100 and hgb >8)  - Hematology consult PRN  - Hgb 10.5 plt 191 O2 sat % (2/7) -hg 10.7 (2/10)    # Mild leukocytosis   - has had fluctuations between 9-12 recently,  - WBC 11.36     # Transaminitis   - RUQ US 1/27: The gallbladder is moderately distended and contains layering sludge and possibly small stones. There is no significant gallbladder wall thickening or pericholecystic fluid and a sonographic Gross sign was not elicited. Therefore, there is no definitive evidence for acute cholecystitis.  -  AST  26  /  ALT  49[H]  /  AlkPhos  90  02-07  LIVER FUNCTIONS - ( 10 Feb 2025 15:30 )  Alb: 2.6 g/dL / Pro: 7.6 g/dL / ALK PHOS: 100 U/L / ALT: 61 U/L / AST: 32 U/L / GGT: x             # Thrush  - nystatin (2/7)    # Pain Management:  - Tylenol PRN    # GI/Bowel:  - Senna QHS, Miralax QD - hold if having loose BM  - GI ppx: Protonix    # /Bladder:   - PVR q 8 hours (SC if > 400)    # Skin/Pressure Injury: healed DTI on admission.   - Skin assessment on admission: no active pressure injury > healed DTI to L ear lobe, b/l forehead, sacrum /bilateral buttocks, bilateral heels. +Incontinence Dermatitis  Recommend:  1.) topical therapy: sacral/buttock skin – cleanse with incontinence cleanser, pat dry, apply Piotr ointment BID and PRN for incontinent episodes. bilateral forehead - cleanse with gentle cleanser, leave open to air  2.) Incontinence Management - incontinence cleanser, pads, pericare BID  3.) Maintain on an alternating air with low air loss surface  4.) Turn and reposition Q 2 hours  5.) Nutrition optimization   6.) Offload heels/feet with complete cair air fluidized boots/pillows; ensure that the soles of the feet are not resting on the foot board of the bed.  7.) chair cushion for chair sitting    # Diet/Dysphagia:  - S/p PEG (1/21)  - FEEST (1/27) > silent laryngeal penetration of purees and moderately thick liquids is noted   - Current Diet: NPO + TF; free water   - Dysphagia: SLP consult for swallow function evaluation and treatment  - Health supp: Vitamin C + MVI    # DVT ppx:    - Lovenox 40 QD   - SCD  ---------------  Code Status/Emergency Contact: Full Code    Outpatient Follow-up:    Wu Parra  Thoracic and Cardiac Surgery  [address] [place] [id number]  Phone: [phone]  Fax: [phone]  Follow Up Time: 2 weeks

## 2025-02-10 NOTE — PROGRESS NOTE ADULT - NS ATTEND AMEND GEN_ALL_CORE FT
I have personally seen and examined the patient.  I fully participated in the care of this patient.  I have made amendments to the documentation where necessary, and agree with the history, physical exam, and plan as documented above  patient seen while in the wheelchair. intermittently restless.   labs reviewed-- Bun elevated at 32, increased water flushes to 330. continue to monitor.   elevated WBC 11, chest x ray from 2/6 without acute pathology  patient afebrile. continue to monitor.  Interdisciplinary team meeting today with speech therapist, occupational therapist, physical therapist, social work and nursing where medical updates provided, progress with therapies and goals discussed. Plan of care reviewed. Team conference note documented on aster [facility] 2/27 home  Total time 50 mins-face to face time encounter and counseling the patient, meeting with hospitalist, nursing staff, therapists and social work to discuss medical updates and management, care coordination, reviewing chart and data-including but not limited to labs and imaging, team meeting

## 2025-02-10 NOTE — PROGRESS NOTE ADULT - ASSESSMENT
59F with HTN, HFimpEF (EF 58%), sickle cell disease, recent hospitalization for sickle cell crisis followed by seizures requiring intubation / cardiogenic shock / ECMO / CVA, s/p trach and PEG, and now in acute rehab    #Multifocal strokes in setting of ECMO  #Aphasia due to above  #Dysphagia due to above  #PEG status  -PT/OT/SLP  -BP control - currently on Lopressor, monitor for orthostasis  -c/w Amantadine  -PEG feeds as tolerated  -will d/w team re: utility of antithrombotics and statin use in this setting    #Possible seizure disorder, recent onset  -c/w Keppra    #History of respiratory failure requiring tracheostomy  #Tracheostomy status  -Pulmonary following, hope for decannulation soon    #Sickle cell disease  -c/w hydroxyurea     #Oral thrush  -c/w Nystatin    #DVT ppx: Lovenox

## 2025-02-11 PROCEDURE — 99233 SBSQ HOSP IP/OBS HIGH 50: CPT | Mod: GC

## 2025-02-11 PROCEDURE — 99232 SBSQ HOSP IP/OBS MODERATE 35: CPT

## 2025-02-11 RX ADMIN — Medication 500000 UNIT(S): at 22:11

## 2025-02-11 RX ADMIN — IPRATROPIUM BROMIDE AND ALBUTEROL SULFATE 3 MILLILITER(S): .5; 2.5 SOLUTION RESPIRATORY (INHALATION) at 22:27

## 2025-02-11 RX ADMIN — Medication 1 DROP(S): at 22:11

## 2025-02-11 RX ADMIN — Medication 500000 UNIT(S): at 14:05

## 2025-02-11 RX ADMIN — METOPROLOL SUCCINATE 12.5 MILLIGRAM(S): 50 TABLET, EXTENDED RELEASE ORAL at 22:11

## 2025-02-11 RX ADMIN — LEVETIRACETAM 500 MILLIGRAM(S): 10 INJECTION, SOLUTION INTRAVENOUS at 05:22

## 2025-02-11 RX ADMIN — METOPROLOL SUCCINATE 12.5 MILLIGRAM(S): 50 TABLET, EXTENDED RELEASE ORAL at 14:05

## 2025-02-11 RX ADMIN — Medication 500 MILLIGRAM(S): at 11:53

## 2025-02-11 RX ADMIN — Medication 1 DROP(S): at 11:53

## 2025-02-11 RX ADMIN — Medication 500000 UNIT(S): at 05:22

## 2025-02-11 RX ADMIN — HYDROXYUREA 500 MILLIGRAM(S): 500 CAPSULE ORAL at 17:44

## 2025-02-11 RX ADMIN — ENOXAPARIN SODIUM 40 MILLIGRAM(S): 100 INJECTION SUBCUTANEOUS at 05:23

## 2025-02-11 RX ADMIN — Medication 1 DROP(S): at 05:23

## 2025-02-11 RX ADMIN — IPRATROPIUM BROMIDE AND ALBUTEROL SULFATE 3 MILLILITER(S): .5; 2.5 SOLUTION RESPIRATORY (INHALATION) at 14:35

## 2025-02-11 RX ADMIN — METOPROLOL SUCCINATE 12.5 MILLIGRAM(S): 50 TABLET, EXTENDED RELEASE ORAL at 05:22

## 2025-02-11 RX ADMIN — Medication 100 MILLIGRAM(S): at 06:08

## 2025-02-11 RX ADMIN — Medication 100 MILLIGRAM(S): at 14:05

## 2025-02-11 RX ADMIN — LEVETIRACETAM 500 MILLIGRAM(S): 10 INJECTION, SOLUTION INTRAVENOUS at 17:44

## 2025-02-11 RX ADMIN — Medication 15 MILLILITER(S): at 11:53

## 2025-02-11 RX ADMIN — HYDROXYUREA 500 MILLIGRAM(S): 500 CAPSULE ORAL at 05:22

## 2025-02-11 RX ADMIN — Medication 40 MILLIGRAM(S): at 06:08

## 2025-02-11 RX ADMIN — IPRATROPIUM BROMIDE AND ALBUTEROL SULFATE 3 MILLILITER(S): .5; 2.5 SOLUTION RESPIRATORY (INHALATION) at 07:49

## 2025-02-11 RX ADMIN — Medication 1 DROP(S): at 17:45

## 2025-02-11 NOTE — PROGRESS NOTE ADULT - ASSESSMENT
Physical Examination:  GENERAL:               Alert, Oriented, No acute distress.    HEENT:                   No JVD, Moist MM + size 6 portex  PULM:                     Bilateral air entry, Clear to auscultation bilaterally, no significant sputum production, No Rales, No Rhonchi, No Wheezing  CVS:                         S1, S2,  No Murmur   NEURO:                  Alert, oriented, interactive,  follows commands  PSYC:                      Calm, + Insight.       Assessment  59 yr old woman  with  HTN, HFimpEF/NICM (EF 58% 10/2024), sickle cell disease, who presented to [facility] for SOB and SS crisis 12/30/25;, followed by witnessed seizure (?secondary to clonazepam withdrawal) requiring intubation. She was transferred to [facility] 1/3/25 secondary to cardiogenic shock/RV failure s/p ECMO +TRACH with #6 cuffless +PEG. Now admitted to [facility] after for initiation of a multidisciplinary rehab program consisting focused on functional mobility, transfers and ADLs (activities of daily living). Pulmonary eval requested for trach management       Plan  TC to maintain sat  Continue nebs/pulmicort  plan to advance capping with therpaies    and advance as tolerated      good candidate for decannulation when tolerating capping     rest of care as per primary team

## 2025-02-11 NOTE — PROGRESS NOTE ADULT - SUBJECTIVE AND OBJECTIVE BOX
Patient is a 59y old  Female who presents with a chief complaint of bilateral CVA     SUBJECTIVE/OBJECTIVE: Patient seen and evaluated while sitting in tilt in space WC > appears comfortable, not in acute distress. Discussed PMV with all therapies with speech and pulm.     REVIEW OF SYSTEMS:  +cog impairment; selectively answer questions  Constitutional: No fever or Chills  Pulm: no witnessed cough or resp distress  Cardio: No chest pain  GI/: incont > soft/loose BM per nursing  Neuro: No headaches  MSK: No pain    PHYSICAL EXAM  59y  T(C): 36.8 (02-11-25 @ 07:06)  T(F): 98.3 (02-11-25 @ 07:06), Max: 98.4 (02-10-25 @ 20:49)  HR: 99 (02-11-25 @ 07:49) (84 - 100)  BP: 124/87 (02-11-25 @ 07:06) (121/87 - 138/95)  RR:  (16 - 18)  SpO2:  (97% - 100%)    Constitutional - NAD, Comfortable  HEENT - trach collar in place; on oxygen concentration 28%  Chest - good chest expansion, good respiratory effort  Cardio - warm and well perfused, no cyanosis or pedal edema  Abdomen -  Soft, NTND; +PEG site c/d/i  Extremities - No peripheral edema, No calf tenderness   Neurologic Exam:                    Cognitive -             Orientation: Awake and alert to at least self (able to state her name); not to location, m/yr.  impaired command following, answers limited questions (unclear of answers are reliable)     Speech - Hypophonic, +aphasic, no naming, delayed processing/response     Cranial Nerves - poor tracking, no facial asymmetry, tongue midline - exam limited by patient participation     Motor - poor command following; Moving all extremities at least antigravity - except Left HF 2/5     Coordination - unable to perform  Psychiatric - calm affect, but restless RUE    RECENT LABS:  LAB                        10.7   11.36 )-----------( 286      ( 10 Feb 2025 15:30 )             31.6     02-10    143  |  107  |  32[H]  ----------------------------<  100[H]  4.3   |  30  |  0.87    Ca    9.6      10 Feb 2025 15:30    TPro  7.6  /  Alb  2.6[L]  /  TBili  0.4  /  DBili  x   /  AST  32  /  ALT  61[H]  /  AlkPhos  100  02-10    LIVER FUNCTIONS - ( 10 Feb 2025 15:30 )  Alb: 2.6 g/dL / Pro: 7.6 g/dL / ALK PHOS: 100 U/L / ALT: 61 U/L / AST: 32 U/L / GGT: x             Urinalysis Basic - ( 10 Feb 2025 15:30 )    Color: x / Appearance: x / SG: x / pH: x  Gluc: 100 mg/dL / Ketone: x  / Bili: x / Urobili: x   Blood: x / Protein: x / Nitrite: x   Leuk Esterase: x / RBC: x / WBC x   Sq Epi: x / Non Sq Epi: x / Bacteria: x            Allergies  doxycycline (Angioedema)  clindamycin (Angioedema)  penicillin (Unknown)  linezolid (Angioedema)    MEDICATIONS  (STANDING):  albuterol/ipratropium for Nebulization 3 milliLiter(s) Nebulizer every 8 hours  amantadine Syrup 100 milliGRAM(s) Oral <User Schedule>  artificial tears (preservative free) Ophthalmic Solution 1 Drop(s) Both EYES four times a day  ascorbic acid 500 milliGRAM(s) Oral daily  enoxaparin Injectable 40 milliGRAM(s) SubCutaneous every 24 hours  hydroxyurea 500 milliGRAM(s) Oral two times a day  levETIRAcetam  Solution 500 milliGRAM(s) Oral every 12 hours  metoprolol tartrate 12.5 milliGRAM(s) Oral every 8 hours  multivitamin/minerals/iron Oral Solution (CENTRUM) 15 milliLiter(s) Oral daily  nystatin    Suspension 882691 Unit(s) Oral three times a day  pantoprazole   Suspension 40 milliGRAM(s) Oral before breakfast  polyethylene glycol 3350 17 Gram(s) Oral daily  senna 1 Tablet(s) Oral daily    MEDICATIONS  (PRN):  acetaminophen   Oral Liquid .. 650 milliGRAM(s) Oral every 6 hours PRN Temp greater or equal to 38C (100.4F), Mild Pain (1 - 3)  bisacodyl Suppository 10 milliGRAM(s) Rectal daily PRN Constipation

## 2025-02-11 NOTE — PROGRESS NOTE ADULT - SUBJECTIVE AND OBJECTIVE BOX
Patient is a 59y old  Female who presents with a chief complaint of bilateral CVA (11 Feb 2025 13:01)    Patient seen and examined at bedside.     ALLERGIES:  doxycycline (Angioedema)  clindamycin (Angioedema)  penicillin (Unknown)  linezolid (Angioedema)    MEDICATIONS  (STANDING):  albuterol/ipratropium for Nebulization 3 milliLiter(s) Nebulizer every 8 hours  amantadine Syrup 100 milliGRAM(s) Oral <User Schedule>  artificial tears (preservative free) Ophthalmic Solution 1 Drop(s) Both EYES four times a day  ascorbic acid 500 milliGRAM(s) Oral daily  enoxaparin Injectable 40 milliGRAM(s) SubCutaneous every 24 hours  hydroxyurea 500 milliGRAM(s) Oral two times a day  levETIRAcetam  Solution 500 milliGRAM(s) Oral every 12 hours  metoprolol tartrate 12.5 milliGRAM(s) Oral every 8 hours  multivitamin/minerals/iron Oral Solution (CENTRUM) 15 milliLiter(s) Oral daily  nystatin    Suspension 193516 Unit(s) Oral three times a day  pantoprazole   Suspension 40 milliGRAM(s) Oral before breakfast  polyethylene glycol 3350 17 Gram(s) Oral daily  senna 1 Tablet(s) Oral daily    MEDICATIONS  (PRN):  acetaminophen   Oral Liquid .. 650 milliGRAM(s) Oral every 6 hours PRN Temp greater or equal to 38C (100.4F), Mild Pain (1 - 3)  bisacodyl Suppository 10 milliGRAM(s) Rectal daily PRN Constipation    Vital Signs Last 24 Hrs  T(F): 98.3 (11 Feb 2025 07:06), Max: 98.4 (10 Feb 2025 20:49)  HR: 100 (11 Feb 2025 14:36) (84 - 100)  BP: 136/90 (11 Feb 2025 14:15) (124/87 - 138/95)  RR: 16 (11 Feb 2025 14:15) (16 - 18)  SpO2: 99% (11 Feb 2025 14:36) (97% - 100%)  I&O's Summary    10 Feb 2025 07:01  -  11 Feb 2025 07:00  --------------------------------------------------------  IN: 1140 mL / OUT: 0 mL / NET: 1140 mL      PHYSICAL EXAM:  General: NAD, A/O x2  ENT: No gross hearing impairment, Moist mucous membranes, + thrush  Neck: Supple, No JVD, tracheostomy   Lungs: Clear to auscultation bilaterally, good air entry, non-labored breathing  Cardio: RRR, S1/S2, No murmur  Abdomen: Soft, Nontender, Nondistended; Bowel sounds present; PEG+  Extremities: No calf tenderness, No cyanosis, No pitting edema  Psych: Appropriate mood and affect  Neuro: Aphasia          LABS:                        10.7   11.36 )-----------( 286      ( 10 Feb 2025 15:30 )             31.6     02-10    143  |  107  |  32  ----------------------------<  100  4.3   |  30  |  0.87    Ca    9.6      10 Feb 2025 15:30    TPro  7.6  /  Alb  2.6  /  TBili  0.4  /  DBili  x   /  AST  32  /  ALT  61  /  AlkPhos  100  02-10              Urinalysis Basic - ( 10 Feb 2025 15:30 )    Color: x / Appearance: x / SG: x / pH: x  Gluc: 100 mg/dL / Ketone: x  / Bili: x / Urobili: x   Blood: x / Protein: x / Nitrite: x   Leuk Esterase: x / RBC: x / WBC x   Sq Epi: x / Non Sq Epi: x / Bacteria: x        COVID-19 PCR: Ronan (02-06-25 @ 21:11)

## 2025-02-11 NOTE — PROGRESS NOTE ADULT - SUBJECTIVE AND OBJECTIVE BOX
Follow-up Pulmonary Progress Note  Chief Complaint : Encephalopathy    patient seen and examined  comfortable  tolerating PMV  states been tolerating capping with speech  now being advanced to therapies        Allergies :doxycycline (Angioedema)  clindamycin (Angioedema)  penicillin (Unknown)  linezolid (Angioedema)      PAST MEDICAL & SURGICAL HISTORY:  Sickle-cell-hemoglobin C disease with crisis    Essential hypertension    Sickle cell disease    HTN (hypertension)    H/O:     H/O abdominoplasty    S/P breast augmentation    S/P         Medications:  MEDICATIONS  (STANDING):  albuterol/ipratropium for Nebulization 3 milliLiter(s) Nebulizer every 8 hours  amantadine Syrup 100 milliGRAM(s) Oral <User Schedule>  artificial tears (preservative free) Ophthalmic Solution 1 Drop(s) Both EYES four times a day  ascorbic acid 500 milliGRAM(s) Oral daily  enoxaparin Injectable 40 milliGRAM(s) SubCutaneous every 24 hours  hydroxyurea 500 milliGRAM(s) Oral two times a day  levETIRAcetam  Solution 500 milliGRAM(s) Oral every 12 hours  metoprolol tartrate 12.5 milliGRAM(s) Oral every 8 hours  multivitamin/minerals/iron Oral Solution (CENTRUM) 15 milliLiter(s) Oral daily  nystatin    Suspension 889669 Unit(s) Oral three times a day  pantoprazole   Suspension 40 milliGRAM(s) Oral before breakfast  polyethylene glycol 3350 17 Gram(s) Oral daily  senna 1 Tablet(s) Oral daily    MEDICATIONS  (PRN):  acetaminophen   Oral Liquid .. 650 milliGRAM(s) Oral every 6 hours PRN Temp greater or equal to 38C (100.4F), Mild Pain (1 - 3)  bisacodyl Suppository 10 milliGRAM(s) Rectal daily PRN Constipation      Antibiotics History  nystatin    Suspension 597923 Unit(s) Oral three times a day, 25 @ 10:19      Heme Medications   enoxaparin Injectable 40 milliGRAM(s) SubCutaneous every 24 hours, 25 @ 21:02      GI Medications  bisacodyl Suppository 10 milliGRAM(s) Rectal daily, 25 @ 10:50, Routine PRN  pantoprazole   Suspension 40 milliGRAM(s) Oral before breakfast, 25 @ 00:00, Routine  polyethylene glycol 3350 17 Gram(s) Oral daily, 25 @ 21:02,   senna 1 Tablet(s) Oral daily, 25 @ 00:00, Routine        LABS:                        10.7   11.36 )-----------( 286      ( 10 Feb 2025 15:30 )             31.6     02-10    143  |  107  |  32[H]  ----------------------------<  100[H]  4.3   |  30  |  0.87    Ca    9.6      10 Feb 2025 15:30    TPro  7.6  /  Alb  2.6[L]  /  TBili  0.4  /  DBili  x   /  AST  32  /  ALT  61[H]  /  AlkPhos  100  02-10               VITALS:  T(C): 36.8 (25 @ 07:06), Max: 36.9 (02-10-25 @ 20:49)  T(F): 98.3 (25 @ 07:06), Max: 98.4 (02-10-25 @ 20:49)  HR: 99 (25 @ 07:49) (84 - 100)  BP: 124/87 (25 @ 07:06) (121/87 - 138/95)  BP(mean): --  ABP: --  ABP(mean): --  RR: 16 (25 @ 07:06) (16 - 18)  SpO2: 99% (25 @ 07:49) (97% - 100%)  CVP(mm Hg): --  CVP(cm H2O): --    Ins and Outs     02-10-25 @ 07:01  -  25 @ 07:00  --------------------------------------------------------  IN: 1140 mL / OUT: 0 mL / NET: 1140 mL                I&O's Detail    10 Feb 2025 07:01  -  2025 07:00  --------------------------------------------------------  IN:    Free Water: 600 mL    Miscellaneous Tube Feedin mL  Total IN: 1140 mL    OUT:  Total OUT: 0 mL    Total NET: 1140 mL

## 2025-02-11 NOTE — PROGRESS NOTE ADULT - ASSESSMENT
59y with PMH, HTN, HFimpEF/NICM (EF 58% 10/2024), sickle cell disease, who presented to [facility] for SOB and SS crisis 12/30/25;, followed by witnessed seizure (? secondary to clonazepam withdrawal) requiring intubation. She was transferred to [facility] 1/3/25 secondary to cardiogenic shock /RV failure s/p ECMO +TRACH +PEG.   Now admitted to [facility] after for initiation of a multidisciplinary rehab program consisting focused on functional mobility, transfers and ADLs (activities of daily living).    # Bilateral CVA, bilateral body involvement left > right, left kelsi inattention, aphasia, respiratory failure, dysphagia  - MRI 1/9: Innumerable foci susceptibility artifact scattered throughout the brain which can be seen with critically ill patients on ECMO. Diffuse fat embolization is also part of the differential diagnosis but is considered less likely. Tiny acute/subacute infarcts within the bilateral basal ganglia, thalami, and chandu with disproportionate ovoid area of T2/FLAIR prolongation in the right ventral thalamus. Findings may indicate the presence of embolic acute/subacute infarcts.  - continue Comprehensive Multidisciplinary Rehab Program: 3 hours a day (1  hr PT, 1 hr OT, 1 hr SLP), 5 days a week.  - Precautions: respiratory, aspiration, SZ, AMS, fall, TRACH, PEG    # Hypoarousal  - LP (1/14) - negative for meningitis/encephalitis  - Amantadine 100 BID    # seizure  - EEG (1/7) with suspicion of seizure  - Seizure ppx: Keppra 500 BID     # low grade fever T m 100.5 (2/8)  - discussed with hospitalist. No obvious source, asymptomatic, vitals stable, clinically well appearing  - Check CXR, UA, CBC, BMP, procal; RVP if fever persists  - temp <100*F, WBC slight uptrend >11.33 (2/7), continue to monitor, patient is afebrile    # Acute respiratory failure  - S/p Trach (1/16). On 6.0 cuffless (1/29)  - MSSA s/p Abx  - Duoneb q8hrs  - PMV with direct supervision SLP  - pulmonary consult ( 2/8) - will be following  - TRACH collar FiO2 28%   - PMV extended to all therpaies    # Cardiogenic Shock  - S/p ECMO 1/3. decannulated 1/7  - S/p 2 U PRBC, 2 platelet, 5U cryo  - ECHO 1/20: EF 53%, normal LV+RV function, no acute HF issues  - Metoprolol reduced 12.5 secondary to OH 2/7  - BP controlled    # orthostatic hypotension last week  -  SBP dropped to 62 during OT session 2/7, improved to SBP 98 supine  - Lopressor reduced  - DONALD stockings when out of bed  - has abd for PEG protection already    # Sickle cell disease   - 1-2 sickle pain crisis per yea  - s/p transfusions, apheresis (1/12) for fat emboli concerns   - Hydroxyurea 500 BID (as long as plt > 100 and hgb >8)  - Hematology consult PRN  - Hgb 10.5 plt 191 O2 sat % (2/7) -hg 10.7 (2/10)    # Mild leukocytosis   - has had fluctuations between 9-12 recently,  - WBC 11.36, no signs of infection, continue to monitor      # Transaminitis   - RUQ US 1/27: The gallbladder is moderately distended and contains layering sludge and possibly small stones. There is no significant gallbladder wall thickening or pericholecystic fluid and a sonographic Gross sign was not elicited. Therefore, there is no definitive evidence for acute cholecystitis.  -  AST  26  /  ALT  49[H]  /  AlkPhos  90  02-07  LIVER FUNCTIONS - ( 10 Feb 2025 15:30 )  Alb: 2.6 g/dL / Pro: 7.6 g/dL / ALK PHOS: 100 U/L / ALT: 61 U/L / AST: 32 U/L / GGT: x             # Thrush  - nystatin (2/7)    # Pain Management:  - Tylenol PRN    # GI/Bowel:  - Senna QHS, Miralax QD - hold if having loose BM  - GI ppx: Protonix    # /Bladder:   - PVR q 8 hours (SC if > 400)    # Skin/Pressure Injury: healed DTI on admission.   - Skin assessment on admission: no active pressure injury > healed DTI to L ear lobe, b/l forehead, sacrum /bilateral buttocks, bilateral heels. +Incontinence Dermatitis  Recommend:  1.) topical therapy: sacral/buttock skin – cleanse with incontinence cleanser, pat dry, apply Piotr ointment BID and PRN for incontinent episodes. bilateral forehead - cleanse with gentle cleanser, leave open to air  2.) Incontinence Management - incontinence cleanser, pads, pericare BID  3.) Maintain on an alternating air with low air loss surface  4.) Turn and reposition Q 2 hours  5.) Nutrition optimization   6.) Offload heels/feet with complete cair air fluidized boots/pillows; ensure that the soles of the feet are not resting on the foot board of the bed.  7.) chair cushion for chair sitting    # Diet/Dysphagia:  - S/p PEG (1/21)  - FEEST (1/27) > silent laryngeal penetration of purees and moderately thick liquids is noted   - Current Diet: NPO + TF; free water   - Dysphagia: SLP consult for swallow function evaluation and treatment  - Health supp: Vitamin C + MVI    # DVT ppx:    - Lovenox 40 QD   - SCD  ---------------  Code Status/Emergency Contact: Full Code    Outpatient Follow-up:    [facility] Parra  Thoracic and Cardiac Surgery  [address] [place] [id number]  Phone: [phone]  Fax: [phone]  Follow Up Time: 2 weeks    spoke to patient's [phone] and provided update. all questions answered. Looking to take patient home after AR stay  reviewed labs/imaging  Discussed medical updates and plan with nursing and hospitalist on team rounds  Patient requires acute inpatient hospitalization for ongoing management of medical comorbidities (s/p trach and peg, pmv trials with therapies, impaired arousal/command following, monitoring of BP/labs, which are limiting functional abilities and optimization of functional independence for discharge

## 2025-02-12 PROCEDURE — 99233 SBSQ HOSP IP/OBS HIGH 50: CPT | Mod: GC

## 2025-02-12 RX ADMIN — Medication 100 MILLIGRAM(S): at 14:07

## 2025-02-12 RX ADMIN — METOPROLOL SUCCINATE 12.5 MILLIGRAM(S): 50 TABLET, EXTENDED RELEASE ORAL at 14:07

## 2025-02-12 RX ADMIN — LEVETIRACETAM 500 MILLIGRAM(S): 10 INJECTION, SOLUTION INTRAVENOUS at 06:14

## 2025-02-12 RX ADMIN — Medication 15 MILLILITER(S): at 12:13

## 2025-02-12 RX ADMIN — IPRATROPIUM BROMIDE AND ALBUTEROL SULFATE 3 MILLILITER(S): .5; 2.5 SOLUTION RESPIRATORY (INHALATION) at 23:10

## 2025-02-12 RX ADMIN — ENOXAPARIN SODIUM 40 MILLIGRAM(S): 100 INJECTION SUBCUTANEOUS at 06:14

## 2025-02-12 RX ADMIN — LEVETIRACETAM 500 MILLIGRAM(S): 10 INJECTION, SOLUTION INTRAVENOUS at 17:29

## 2025-02-12 RX ADMIN — Medication 500000 UNIT(S): at 14:07

## 2025-02-12 RX ADMIN — Medication 500000 UNIT(S): at 06:13

## 2025-02-12 RX ADMIN — METOPROLOL SUCCINATE 12.5 MILLIGRAM(S): 50 TABLET, EXTENDED RELEASE ORAL at 06:13

## 2025-02-12 RX ADMIN — Medication 1 TABLET(S): at 12:17

## 2025-02-12 RX ADMIN — Medication 1 DROP(S): at 12:13

## 2025-02-12 RX ADMIN — Medication 1 DROP(S): at 21:18

## 2025-02-12 RX ADMIN — POLYETHYLENE GLYCOL 3350 17 GRAM(S): 17 POWDER, FOR SOLUTION ORAL at 12:13

## 2025-02-12 RX ADMIN — HYDROXYUREA 500 MILLIGRAM(S): 500 CAPSULE ORAL at 17:29

## 2025-02-12 RX ADMIN — Medication 40 MILLIGRAM(S): at 06:13

## 2025-02-12 RX ADMIN — Medication 500000 UNIT(S): at 21:18

## 2025-02-12 RX ADMIN — HYDROXYUREA 500 MILLIGRAM(S): 500 CAPSULE ORAL at 06:06

## 2025-02-12 RX ADMIN — Medication 100 MILLIGRAM(S): at 06:13

## 2025-02-12 RX ADMIN — IPRATROPIUM BROMIDE AND ALBUTEROL SULFATE 3 MILLILITER(S): .5; 2.5 SOLUTION RESPIRATORY (INHALATION) at 06:18

## 2025-02-12 RX ADMIN — Medication 1 DROP(S): at 06:14

## 2025-02-12 RX ADMIN — Medication 1 DROP(S): at 17:29

## 2025-02-12 RX ADMIN — Medication 500 MILLIGRAM(S): at 12:13

## 2025-02-12 NOTE — CHART NOTE - NSCHARTNOTEFT_GEN_A_CORE
Nutrition Follow Up Note  Hospital Course   (Per Electronic Medical Record)    Source:  Patient [X]  Family Member [X]   Nursing Staff [X]   Medical Record [X]      Diet: Diet, NPO with Tube Feed:   Tube Feeding Modality: Gastrostomy  Inocencia Coolture Peptide 1.5 (KFPEPT1.5RTH)  Total Volume for 24 Hours (mL): 1080  Bolus  Total Volume of Bolus (mL):  270  Total # of Feeds: 4  Tube Feed Frequency: Every 6 hours   Tube Feed Start Time: 06:00  Bolus Feed Rate (mL per Hour): 540   Bolus Feed Duration (in Hours): 0.5  Free Water Flush  Bolus   Total Volume per Flush (mL): 330   Frequency: Every 6 Hours    Start Time: 01:00  Free Water Flush Instructions:  Do not give water flush within 1 hour (before/after) tube feed.  Mehdi(7 Gm Arginine/7 Gm Glut/1.2 Gm HMB     Qty per Day:  2 (02-10-25 @ 16:28) [Active]      Patient tolerating current regimen cVidya Peptide 1.5 @ 270ml QID. At goal TF provides 1620kcal, 80g protein + additional 180kcal from Mehdi BID. No N/V/C/D, last BM   Current Weight: lb on /      Pertinent Medications: MEDICATIONS  (STANDING):  albuterol/ipratropium for Nebulization 3 milliLiter(s) Nebulizer every 8 hours  amantadine Syrup 100 milliGRAM(s) Oral <User Schedule>  artificial tears (preservative free) Ophthalmic Solution 1 Drop(s) Both EYES four times a day  ascorbic acid 500 milliGRAM(s) Oral daily  enoxaparin Injectable 40 milliGRAM(s) SubCutaneous every 24 hours  hydroxyurea 500 milliGRAM(s) Oral two times a day  levETIRAcetam  Solution 500 milliGRAM(s) Oral every 12 hours  metoprolol tartrate 12.5 milliGRAM(s) Oral every 8 hours  multivitamin/minerals/iron Oral Solution (CENTRUM) 15 milliLiter(s) Oral daily  nystatin    Suspension 138536 Unit(s) Oral three times a day  pantoprazole   Suspension 40 milliGRAM(s) Oral before breakfast  polyethylene glycol 3350 17 Gram(s) Oral daily  senna 1 Tablet(s) Oral daily    MEDICATIONS  (PRN):  acetaminophen   Oral Liquid .. 650 milliGRAM(s) Oral every 6 hours PRN Temp greater or equal to 38C (100.4F), Mild Pain (1 - 3)  bisacodyl Suppository 10 milliGRAM(s) Rectal daily PRN Constipation      Pertinent Labs:  02-10 Na143 mmol/L Glu 100 mg/dL[H] K+ 4.3 mmol/L Cr  0.87 mg/dL BUN 32 mg/dL[H] 02-10 Alb 2.6 g/dL[L]        Skin: No pressure injury per nursing flowsheets    Edema: No edema noted per nursing flowsheet    Last Bowel Movement: on 4/    Estimated Needs:   [X] No Change Since Previous Assessment    Previous Nutrition Diagnosis:   Malnutrition...  Moderate protein-calorie malnutrition  related to decreased ability to meet estimated energy needs  as evidenced by 4 lb wt loss x 1m, subcutaneous fat loss/ muscle depletion    Swallowing Difficulty  dysphagia s/p CVA  as evidenced by NPO w/ Tube Feed  Pt to meet >75% of estimated nutrient needs during hospitalization    Nutrition Diagnosis addressed w/ TF @ goal     New Nutrition Diagnosis: [X] Not Applicable    Interventions:   1. Recommend continuing with current plan of care    Monitoring & Evaluation:   [X] Weights   [X] Skin Integrity   [X] Follow Up (Per Protocol)  [X] Tolerance to Diet Prescription   [X] Other: Labs     Registered Dietitian/Nutritionist Remains Available.  Veronica Morris RD    Phone# [phone] Nutrition Follow Up Note  Hospital Course   (Per Electronic Medical Record)    Source:  Patient [X]  Nursing Staff [X]   Medical Record [X]      Diet: Diet, NPO with Tube Feed:   Tube Feeding Modality: Gastrostomy  Inocencia Unda Peptide 1.5 (KFPEPT1.5RTH)  Total Volume for 24 Hours (mL): 1080  Bolus  Total Volume of Bolus (mL):  270  Total # of Feeds: 4  Tube Feed Frequency: Every 6 hours   Tube Feed Start Time: 06:00  Bolus Feed Rate (mL per Hour): 540   Bolus Feed Duration (in Hours): 0.5  Free Water Flush  Bolus   Total Volume per Flush (mL): 330   Frequency: Every 6 Hours    Start Time: 01:00  Free Water Flush Instructions:  Do not give water flush within 1 hour (before/after) tube feed.  Mehdi(7 Gm Arginine/7 Gm Glut/1.2 Gm HMB     Qty per Day:  2 (02-10-25 @ 16:28) [Active]      Patient tolerating current regimen Vpon Peptide 1.5 @ 270ml QID. At goal TF provides 1620kcal, 80g protein + additional 180kcal from Mehdi BID. No N/V/C/D, last BM 2/11 per RN.     Current Weight: 160lb on 2/6      Pertinent Medications: MEDICATIONS  (STANDING):  albuterol/ipratropium for Nebulization 3 milliLiter(s) Nebulizer every 8 hours  amantadine Syrup 100 milliGRAM(s) Oral <User Schedule>  artificial tears (preservative free) Ophthalmic Solution 1 Drop(s) Both EYES four times a day  ascorbic acid 500 milliGRAM(s) Oral daily  enoxaparin Injectable 40 milliGRAM(s) SubCutaneous every 24 hours  hydroxyurea 500 milliGRAM(s) Oral two times a day  levETIRAcetam  Solution 500 milliGRAM(s) Oral every 12 hours  metoprolol tartrate 12.5 milliGRAM(s) Oral every 8 hours  multivitamin/minerals/iron Oral Solution (CENTRUM) 15 milliLiter(s) Oral daily  nystatin    Suspension 220779 Unit(s) Oral three times a day  pantoprazole   Suspension 40 milliGRAM(s) Oral before breakfast  polyethylene glycol 3350 17 Gram(s) Oral daily  senna 1 Tablet(s) Oral daily    MEDICATIONS  (PRN):  acetaminophen   Oral Liquid .. 650 milliGRAM(s) Oral every 6 hours PRN Temp greater or equal to 38C (100.4F), Mild Pain (1 - 3)  bisacodyl Suppository 10 milliGRAM(s) Rectal daily PRN Constipation      Pertinent Labs:  02-10 Na143 mmol/L Glu 100 mg/dL[H] K+ 4.3 mmol/L Cr  0.87 mg/dL BUN 32 mg/dL[H] 02-10 Alb 2.6 g/dL[L]        Skin: No pressure injury per nursing flowsheets    Edema: No edema noted per nursing flowsheet    Last Bowel Movement: on 2/11 per RN     Estimated Needs:   [X] No Change Since Previous Assessment: Based on IBW 120lb 54.4 kg  Calories: 1632-1904kcal  Protein: 76-87grams    Previous Nutrition Diagnosis:   Malnutrition...  Moderate protein-calorie malnutrition  related to decreased ability to meet estimated energy needs  as evidenced by 4 lb wt loss x 1m, subcutaneous fat loss/ muscle depletion    Swallowing Difficulty  dysphagia s/p CVA  as evidenced by NPO w/ Tube Feed  Pt to meet >75% of estimated nutrient needs during hospitalization    Nutrition Diagnosis addressed w/ TF @ goal     New Nutrition Diagnosis: [X] Not Applicable    Interventions:   1. Recommend continuing with current plan of care    Monitoring & Evaluation:   [X] Weights   [X] Skin Integrity   [X] Follow Up (Per Protocol)  [X] Tolerance to Diet Prescription   [X] Other: Labs     Registered Dietitian/Nutritionist Remains Available.  Veronica Morris RD    Phone# [phone]

## 2025-02-12 NOTE — PROGRESS NOTE ADULT - SUBJECTIVE AND OBJECTIVE BOX
Patient is a 59y old  Female who presents with a chief complaint of bilateral CVA     SUBJECTIVE/OBJECTIVE: Patient seen and evaluated during OT session. Denies pain, slowed responses but answers questions. Intermittent command following.    REVIEW OF SYSTEMS:  +cog impairment; selectively answer questions  Constitutional: No fever or Chills  Pulm: no witnessed cough or resp distress  Cardio: No chest pain  GI/: incont > soft/loose BM per nursing  Neuro: No headaches  MSK: No pain    PHYSICAL EXAM  59y  T(C): 37.1 (02-12-25 @ 07:40)  T(F): 98.7 (02-12-25 @ 07:40), Max: 98.9 (02-11-25 @ 22:09)  HR: 92 (02-12-25 @ 09:00) (92 - 100)  BP: 127/84 (02-12-25 @ 07:40) (113/76 - 136/90)  RR:  (16 - 16)  SpO2:  (99% - 100%)  Wt(kg): --    Constitutional - NAD, Comfortable  HEENT - trach collar in place; on oxygen concentration 28%  Chest - good chest expansion, good respiratory effort  Cardio - warm and well perfused, no cyanosis or pedal edema  Abdomen -  Soft, NTND; +PEG site c/d/i  Extremities - No peripheral edema, No calf tenderness   Neurologic Exam:                    Cognitive -             Orientation: Awake and alert to self, hospital. Able to pick the year out of 2 written choices, impaired command following, answers limited questions-- improving     Speech - Hypophonic, +aphasic, no naming, delayed processing/response     Cranial Nerves - poor tracking, no facial asymmetry, tongue midline - exam limited by patient participation     Motor - poor command following; Moving all extremities at least antigravity - except Left HF 2/5     Coordination - unable to perform  Psychiatric - calm affect, but restless RUE    RECENT LABS:  LAB                        10.7   11.36 )-----------( 286      ( 10 Feb 2025 15:30 )             31.6     02-10    143  |  107  |  32[H]  ----------------------------<  100[H]  4.3   |  30  |  0.87    Ca    9.6      10 Feb 2025 15:30    TPro  7.6  /  Alb  2.6[L]  /  TBili  0.4  /  DBili  x   /  AST  32  /  ALT  61[H]  /  AlkPhos  100  02-10    LIVER FUNCTIONS - ( 10 Feb 2025 15:30 )  Alb: 2.6 g/dL / Pro: 7.6 g/dL / ALK PHOS: 100 U/L / ALT: 61 U/L / AST: 32 U/L / GGT: x             Urinalysis Basic - ( 10 Feb 2025 15:30 )    Color: x / Appearance: x / SG: x / pH: x  Gluc: 100 mg/dL / Ketone: x  / Bili: x / Urobili: x   Blood: x / Protein: x / Nitrite: x   Leuk Esterase: x / RBC: x / WBC x   Sq Epi: x / Non Sq Epi: x / Bacteria: x        Allergies  doxycycline (Angioedema)  clindamycin (Angioedema)  penicillin (Unknown)  linezolid (Angioedema)    MEDICATIONS  (STANDING):  albuterol/ipratropium for Nebulization 3 milliLiter(s) Nebulizer every 8 hours  amantadine Syrup 100 milliGRAM(s) Oral <User Schedule>  artificial tears (preservative free) Ophthalmic Solution 1 Drop(s) Both EYES four times a day  ascorbic acid 500 milliGRAM(s) Oral daily  enoxaparin Injectable 40 milliGRAM(s) SubCutaneous every 24 hours  hydroxyurea 500 milliGRAM(s) Oral two times a day  levETIRAcetam  Solution 500 milliGRAM(s) Oral every 12 hours  metoprolol tartrate 12.5 milliGRAM(s) Oral every 8 hours  multivitamin/minerals/iron Oral Solution (CENTRUM) 15 milliLiter(s) Oral daily  nystatin    Suspension 188321 Unit(s) Oral three times a day  pantoprazole   Suspension 40 milliGRAM(s) Oral before breakfast  polyethylene glycol 3350 17 Gram(s) Oral daily  senna 1 Tablet(s) Oral daily    MEDICATIONS  (PRN):  acetaminophen   Oral Liquid .. 650 milliGRAM(s) Oral every 6 hours PRN Temp greater or equal to 38C (100.4F), Mild Pain (1 - 3)  bisacodyl Suppository 10 milliGRAM(s) Rectal daily PRN Constipation

## 2025-02-12 NOTE — PROGRESS NOTE ADULT - ASSESSMENT
59y with PMH, HTN, HFimpEF/NICM (EF 58% 10/2024), sickle cell disease, who presented to [facility] for SOB and SS crisis 12/30/25;, followed by witnessed seizure (? secondary to clonazepam withdrawal) requiring intubation. She was transferred to [facility] 1/3/25 secondary to cardiogenic shock /RV failure s/p ECMO +TRACH +PEG.   Now admitted to [facility] after for initiation of a multidisciplinary rehab program consisting focused on functional mobility, transfers and ADLs (activities of daily living).    # Bilateral CVA, bilateral body involvement left > right, left kelsi inattention, aphasia, respiratory failure, dysphagia  - MRI 1/9: Innumerable foci susceptibility artifact scattered throughout the brain which can be seen with critically ill patients on ECMO. Diffuse fat embolization is also part of the differential diagnosis but is considered less likely. Tiny acute/subacute infarcts within the bilateral basal ganglia, thalami, and chandu with disproportionate ovoid area of T2/FLAIR prolongation in the right ventral thalamus. Findings may indicate the presence of embolic acute/subacute infarcts.  - continue Comprehensive Multidisciplinary Rehab Program: 3 hours a day (1  hr PT, 1 hr OT, 1 hr SLP), 5 days a week.  - Precautions: respiratory, aspiration, SZ, AMS, fall, TRACH, PEG    # Hypoarousal  - LP (1/14) - negative for meningitis/encephalitis  - Amantadine 100 BID    # seizure  - EEG (1/7) with suspicion of seizure  - Seizure ppx: Keppra 500 BID     # low grade fever T m 100.5 (2/8)  - discussed with hospitalist. No obvious source, asymptomatic, vitals stable, clinically well appearing  - Check CXR, UA, CBC, BMP, procal; RVP if fever persists  - temp <100*F, WBC slight uptrend >11.33 (2/7), continue to monitor, patient is afebrile    # Acute respiratory failure  - S/p Trach (1/16). On 6.0 cuffless (1/29)  - MSSA s/p Abx  - Duoneb q8hrs  - PMV with direct supervision SLP  - pulmonary consult ( 2/8) - will be following  - TRACH collar FiO2 28%   - PMV extended to all therpaies    # Cardiogenic Shock  - S/p ECMO 1/3. decannulated 1/7  - S/p 2 U PRBC, 2 platelet, 5U cryo  - ECHO 1/20: EF 53%, normal LV+RV function, no acute HF issues  - Metoprolol reduced 12.5 secondary to OH 2/7  - BP controlled    # orthostatic hypotension last week  -  SBP dropped to 62 during OT session 2/7, improved to SBP 98 supine  - Lopressor reduced  - DONALD stockings when out of bed  - has abd for PEG protection already    # Sickle cell disease   - 1-2 sickle pain crisis per yea  - s/p transfusions, apheresis (1/12) for fat emboli concerns   - Hydroxyurea 500 BID (as long as plt > 100 and hgb >8)  - Hematology consult PRN  - Hgb 10.5 plt 191 O2 sat % (2/7) -hg 10.7 (2/10)    # Mild leukocytosis   - has had fluctuations between 9-12 recently,  - WBC 11.36, no signs of infection, continue to monitor      # Transaminitis   - RUQ US 1/27: The gallbladder is moderately distended and contains layering sludge and possibly small stones. There is no significant gallbladder wall thickening or pericholecystic fluid and a sonographic Gross sign was not elicited. Therefore, there is no definitive evidence for acute cholecystitis.  -  AST  26  /  ALT  49[H]  /  AlkPhos  90  02-07  LIVER FUNCTIONS - ( 10 Feb 2025 15:30 )  Alb: 2.6 g/dL / Pro: 7.6 g/dL / ALK PHOS: 100 U/L / ALT: 61 U/L / AST: 32 U/L / GGT: x             # Thrush  - nystatin (2/7)    # Pain Management:  - Tylenol PRN    # GI/Bowel:  - Senna QHS, Miralax QD - hold if having loose BM  - GI ppx: Protonix    # /Bladder:   - PVR q 8 hours (SC if > 400)    # Skin/Pressure Injury: healed DTI on admission.   - Skin assessment on admission: no active pressure injury > healed DTI to L ear lobe, b/l forehead, sacrum /bilateral buttocks, bilateral heels. +Incontinence Dermatitis  Recommend:  1.) topical therapy: sacral/buttock skin – cleanse with incontinence cleanser, pat dry, apply Piotr ointment BID and PRN for incontinent episodes. bilateral forehead - cleanse with gentle cleanser, leave open to air  2.) Incontinence Management - incontinence cleanser, pads, pericare BID  3.) Maintain on an alternating air with low air loss surface  4.) Turn and reposition Q 2 hours  5.) Nutrition optimization   6.) Offload heels/feet with complete cair air fluidized boots/pillows; ensure that the soles of the feet are not resting on the foot board of the bed.  7.) chair cushion for chair sitting    # Diet/Dysphagia:  - S/p PEG (1/21)  - FEEST (1/27) > silent laryngeal penetration of purees and moderately thick liquids is noted   - Current Diet: NPO + TF; free water   - Dysphagia: SLP consult for swallow function evaluation and treatment  - Health supp: Vitamin C + MVI    # DVT ppx:    - Lovenox 40 QD   - SCD  ---------------  Code Status/Emergency Contact: Full Code    Outpatient Follow-up:    Wu Parra  Thoracic and Cardiac Surgery  [address] [place] [id number]  Phone: [phone]  Fax: [phone]  Follow Up Time: 2 weeks    spoke to patient's [phone] and provided update. all questions answered. Looking to take patient home after [facility] stay  reviewed labs/imaging  Discussed medical updates and plan with nursing and hospitalist on team rounds  Patient requires acute inpatient hospitalization for ongoing management of medical comorbidities (s/p trach and peg, pmv trials with therapies, impaired arousal/command following, monitoring of BP/labs, which are limiting functional abilities and optimization of functional independence for discharge     59y with PMH, HTN, HFimpEF/NICM (EF 58% 10/2024), sickle cell disease, who presented to [facility] for SOB and SS crisis 12/30/25;, followed by witnessed seizure (? secondary to clonazepam withdrawal) requiring intubation. She was transferred to [facility] 1/3/25 secondary to cardiogenic shock /RV failure s/p ECMO +TRACH +PEG.   Now admitted to [facility] after for initiation of a multidisciplinary rehab program consisting focused on functional mobility, transfers and ADLs (activities of daily living).    # Bilateral CVA, bilateral body involvement left > right, left kelsi inattention, aphasia, respiratory failure, dysphagia  - MRI 1/9: Innumerable foci susceptibility artifact scattered throughout the brain which can be seen with critically ill patients on ECMO. Diffuse fat embolization is also part of the differential diagnosis but is considered less likely. Tiny acute/subacute infarcts within the bilateral basal ganglia, thalami, and chandu with disproportionate ovoid area of T2/FLAIR prolongation in the right ventral thalamus. Findings may indicate the presence of embolic acute/subacute infarcts.  - continue Comprehensive Multidisciplinary Rehab Program: 3 hours a day (1  hr PT, 1 hr OT, 1 hr SLP), 5 days a week.  - Precautions: respiratory, aspiration, SZ, AMS, fall, TRACH, PEG    # Hypoarousal  - LP (1/14) - negative for meningitis/encephalitis  - c/w Amantadine 100 BID    # seizure  - EEG (1/7) with suspicion of seizure  - Seizure ppx: Keppra 500 BID     # low grade fever T m 100.5 (2/8)  - discussed with hospitalist. No obvious source, asymptomatic, vitals stable, clinically well appearing  - Check CXR, UA, CBC, BMP, procal; RVP if fever persists  - temp <100*F, WBC slight uptrend >11.33 (2/7), continue to monitor, patient is afebrile    # Acute respiratory failure  - S/p Trach (1/16). On 6.0 cuffless (1/29)  - MSSA s/p Abx  - Duoneb q8hrs  - PMV with direct supervision SLP  - pulmonary consult ( 2/8) - will be following  - TRACH collar FiO2 28%   -cap with all therapies    # Cardiogenic Shock  - S/p ECMO 1/3. decannulated 1/7  - S/p 2 U PRBC, 2 platelet, 5U cryo  - ECHO 1/20: EF 53%, normal LV+RV function, no acute HF issues  - Metoprolol reduced 12.5 secondary to OH 2/7  - BP controlled    # orthostatic hypotension last week  -  SBP dropped to 62 during OT session 2/7, improved to SBP 98 supine  - Lopressor reduced  - DONALD stockings when out of bed  - has abd for PEG protection already    # Sickle cell disease   - 1-2 sickle pain crisis per yea  - s/p transfusions, apheresis (1/12) for fat emboli concerns   - Hydroxyurea 500 BID (as long as plt > 100 and hgb >8)  - Hematology consult PRN  - Hgb 10.5 plt 191 O2 sat % (2/7) -hg 10.7 (2/10)    # Mild leukocytosis   - has had fluctuations between 9-12 recently,  - WBC 11.36, no signs of infection, continue to monitor      # Transaminitis   - RUQ US 1/27: The gallbladder is moderately distended and contains layering sludge and possibly small stones. There is no significant gallbladder wall thickening or pericholecystic fluid and a sonographic Gross sign was not elicited. Therefore, there is no definitive evidence for acute cholecystitis.  -  AST  26  /  ALT  49[H]  /  AlkPhos  90  02-07  LIVER FUNCTIONS - ( 10 Feb 2025 15:30 )  Alb: 2.6 g/dL / Pro: 7.6 g/dL / ALK PHOS: 100 U/L / ALT: 61 U/L / AST: 32 U/L / GGT: x             # Thrush  - nystatin (2/7)    # Pain Management:  - Tylenol PRN    # GI/Bowel:  - Senna QHS, Miralax QD - hold if having loose BM  - GI ppx: Protonix    # /Bladder:   - PVR q 8 hours (SC if > 400)  - incontinent     # Skin/Pressure Injury: healed DTI on admission.   - Skin assessment on admission: no active pressure injury > healed DTI to L ear lobe, b/l forehead, sacrum /bilateral buttocks, bilateral heels. +Incontinence Dermatitis  Recommend:  1.) topical therapy: sacral/buttock skin – cleanse with incontinence cleanser, pat dry, apply Piotr ointment BID and PRN for incontinent episodes. bilateral forehead - cleanse with gentle cleanser, leave open to air  2.) Incontinence Management - incontinence cleanser, pads, pericare BID  3.) Maintain on an alternating air with low air loss surface  4.) Turn and reposition Q 2 hours  5.) Nutrition optimization   6.) Offload heels/feet with complete cair air fluidized boots/pillows; ensure that the soles of the feet are not resting on the foot board of the bed.  7.) chair cushion for chair sitting    # Diet/Dysphagia:  - S/p PEG (1/21)  - FEEST (1/27) > silent laryngeal penetration of purees and moderately thick liquids is noted   - Current Diet: NPO + TF; free water   - Dysphagia: SLP consult for swallow function evaluation and treatment  - Health supp: Vitamin C + MVI    # DVT ppx:    - Lovenox 40 QD   - SCD  ---------------  Code Status/Emergency Contact: Full Code    Outpatient Follow-up:    Wu Parra  Thoracic and Cardiac Surgery  [address] [place] [id number]  Phone: [phone]  Fax: [phone]  Follow Up Time: 2 weeks      reviewed labs/imaging  Discussed medical updates and plan with nursing and hospitalist on team rounds  Patient requires acute inpatient hospitalization for ongoing management of medical comorbidities (s/p trach and peg, pmv trials with therapies, impaired arousal/command following, monitoring of BP/labs, which are limiting functional abilities and optimization of functional independence for discharge

## 2025-02-13 LAB
ALBUMIN SERPL ELPH-MCNC: 2.5 G/DL — LOW (ref 3.3–5)
ALP SERPL-CCNC: 98 U/L — SIGNIFICANT CHANGE UP (ref 40–120)
ALT FLD-CCNC: 55 U/L — HIGH (ref 10–45)
ANION GAP SERPL CALC-SCNC: 10 MMOL/L — SIGNIFICANT CHANGE UP (ref 5–17)
AST SERPL-CCNC: 29 U/L — SIGNIFICANT CHANGE UP (ref 10–40)
BILIRUB SERPL-MCNC: 0.4 MG/DL — SIGNIFICANT CHANGE UP (ref 0.2–1.2)
BUN SERPL-MCNC: 19 MG/DL — SIGNIFICANT CHANGE UP (ref 7–23)
CALCIUM SERPL-MCNC: 9.5 MG/DL — SIGNIFICANT CHANGE UP (ref 8.4–10.5)
CHLORIDE SERPL-SCNC: 105 MMOL/L — SIGNIFICANT CHANGE UP (ref 96–108)
CO2 SERPL-SCNC: 28 MMOL/L — SIGNIFICANT CHANGE UP (ref 22–31)
CREAT SERPL-MCNC: 0.75 MG/DL — SIGNIFICANT CHANGE UP (ref 0.5–1.3)
EGFR: 92 ML/MIN/1.73M2 — SIGNIFICANT CHANGE UP
GLUCOSE SERPL-MCNC: 124 MG/DL — HIGH (ref 70–99)
HCT VFR BLD CALC: 31.2 % — LOW (ref 34.5–45)
HGB BLD-MCNC: 10.5 G/DL — LOW (ref 11.5–15.5)
MCHC RBC-ENTMCNC: 30.6 PG — SIGNIFICANT CHANGE UP (ref 27–34)
MCHC RBC-ENTMCNC: 33.7 G/DL — SIGNIFICANT CHANGE UP (ref 32–36)
MCV RBC AUTO: 91 FL — SIGNIFICANT CHANGE UP (ref 80–100)
NRBC BLD AUTO-RTO: 1 /100 WBCS — HIGH (ref 0–0)
PLATELET # BLD AUTO: 300 K/UL — SIGNIFICANT CHANGE UP (ref 150–400)
POTASSIUM SERPL-MCNC: 4 MMOL/L — SIGNIFICANT CHANGE UP (ref 3.5–5.3)
POTASSIUM SERPL-SCNC: 4 MMOL/L — SIGNIFICANT CHANGE UP (ref 3.5–5.3)
PROT SERPL-MCNC: 7.5 G/DL — SIGNIFICANT CHANGE UP (ref 6–8.3)
RBC # BLD: 3.43 M/UL — LOW (ref 3.8–5.2)
RBC # FLD: 15.9 % — HIGH (ref 10.3–14.5)
SODIUM SERPL-SCNC: 143 MMOL/L — SIGNIFICANT CHANGE UP (ref 135–145)
WBC # BLD: 9.24 K/UL — SIGNIFICANT CHANGE UP (ref 3.8–10.5)
WBC # FLD AUTO: 9.24 K/UL — SIGNIFICANT CHANGE UP (ref 3.8–10.5)

## 2025-02-13 PROCEDURE — 99233 SBSQ HOSP IP/OBS HIGH 50: CPT | Mod: GC

## 2025-02-13 PROCEDURE — 99232 SBSQ HOSP IP/OBS MODERATE 35: CPT

## 2025-02-13 RX ORDER — ATORVASTATIN CALCIUM 80 MG/1
40 TABLET, FILM COATED ORAL AT BEDTIME
Refills: 0 | Status: DISCONTINUED | OUTPATIENT
Start: 2025-02-13 | End: 2025-02-13

## 2025-02-13 RX ORDER — ASPIRIN 325 MG
81 TABLET ORAL DAILY
Refills: 0 | Status: DISCONTINUED | OUTPATIENT
Start: 2025-02-13 | End: 2025-02-13

## 2025-02-13 RX ORDER — ASPIRIN 325 MG
81 TABLET ORAL DAILY
Refills: 0 | Status: DISCONTINUED | OUTPATIENT
Start: 2025-02-13 | End: 2025-02-27

## 2025-02-13 RX ADMIN — LEVETIRACETAM 500 MILLIGRAM(S): 10 INJECTION, SOLUTION INTRAVENOUS at 18:14

## 2025-02-13 RX ADMIN — IPRATROPIUM BROMIDE AND ALBUTEROL SULFATE 3 MILLILITER(S): .5; 2.5 SOLUTION RESPIRATORY (INHALATION) at 20:58

## 2025-02-13 RX ADMIN — Medication 1 DROP(S): at 12:58

## 2025-02-13 RX ADMIN — METOPROLOL SUCCINATE 12.5 MILLIGRAM(S): 50 TABLET, EXTENDED RELEASE ORAL at 14:31

## 2025-02-13 RX ADMIN — ENOXAPARIN SODIUM 40 MILLIGRAM(S): 100 INJECTION SUBCUTANEOUS at 05:45

## 2025-02-13 RX ADMIN — IPRATROPIUM BROMIDE AND ALBUTEROL SULFATE 3 MILLILITER(S): .5; 2.5 SOLUTION RESPIRATORY (INHALATION) at 06:18

## 2025-02-13 RX ADMIN — Medication 500000 UNIT(S): at 05:44

## 2025-02-13 RX ADMIN — METOPROLOL SUCCINATE 12.5 MILLIGRAM(S): 50 TABLET, EXTENDED RELEASE ORAL at 21:54

## 2025-02-13 RX ADMIN — Medication 100 MILLIGRAM(S): at 14:26

## 2025-02-13 RX ADMIN — LEVETIRACETAM 500 MILLIGRAM(S): 10 INJECTION, SOLUTION INTRAVENOUS at 05:45

## 2025-02-13 RX ADMIN — Medication 1 DROP(S): at 21:54

## 2025-02-13 RX ADMIN — Medication 500000 UNIT(S): at 21:54

## 2025-02-13 RX ADMIN — METOPROLOL SUCCINATE 12.5 MILLIGRAM(S): 50 TABLET, EXTENDED RELEASE ORAL at 05:45

## 2025-02-13 RX ADMIN — Medication 1 DROP(S): at 18:13

## 2025-02-13 RX ADMIN — HYDROXYUREA 500 MILLIGRAM(S): 500 CAPSULE ORAL at 06:00

## 2025-02-13 RX ADMIN — Medication 40 MILLIGRAM(S): at 05:45

## 2025-02-13 RX ADMIN — Medication 500000 UNIT(S): at 14:26

## 2025-02-13 RX ADMIN — POLYETHYLENE GLYCOL 3350 17 GRAM(S): 17 POWDER, FOR SOLUTION ORAL at 12:59

## 2025-02-13 RX ADMIN — Medication 100 MILLIGRAM(S): at 06:00

## 2025-02-13 RX ADMIN — Medication 1 DROP(S): at 05:45

## 2025-02-13 RX ADMIN — HYDROXYUREA 500 MILLIGRAM(S): 500 CAPSULE ORAL at 18:14

## 2025-02-13 RX ADMIN — Medication 1 TABLET(S): at 12:59

## 2025-02-13 RX ADMIN — Medication 15 MILLILITER(S): at 12:58

## 2025-02-13 RX ADMIN — Medication 500 MILLIGRAM(S): at 12:58

## 2025-02-13 NOTE — PROGRESS NOTE ADULT - ASSESSMENT
59F with HTN, HFimpEF (EF 58%), sickle cell disease, recent hospitalization for sickle cell crisis followed by seizures requiring intubation / cardiogenic shock / ECMO / CVA, s/p trach and PEG, and now in acute rehab    #Multifocal strokes in setting of ECMO  #Aphasia due to above  #Dysphagia due to above  #PEG status  -PT/OT/SLP  -BP control - currently on Lopressor, monitor for orthostasis  -c/w Amantadine  -PEG feeds as tolerated  -discussion ongoing re: utility of antithrombotics and statin use in this setting    #Possible seizure disorder, recent onset  -c/w Keppra    #History of respiratory failure requiring tracheostomy  #Tracheostomy status  -Pulmonary following, hope for decannulation tomorrow    #Sickle cell disease  -c/w hydroxyurea     #Oral thrush  -c/w Nystatin    #DVT ppx: Lovenox 59F with HTN, HFimpEF (EF 58%), sickle cell disease, recent hospitalization for sickle cell crisis followed by seizures requiring intubation / cardiogenic shock / ECMO / CVA, s/p trach and PEG, and now in acute rehab    #Multifocal strokes in setting of ECMO  #Aphasia due to above  #Dysphagia due to above  #PEG status  -PT/OT/SLP  -BP control - currently on Lopressor, monitor for orthostasis  -c/w Amantadine  -PEG feeds as tolerated  -will start ASA for secondary stroke prevention, after discussing with team, benefits > risks  -will check lipid panel in AM and start statin depending on LDL results to keep LDL goal < 70    #Possible seizure disorder, recent onset  -c/w Keppra    #History of respiratory failure requiring tracheostomy  #Tracheostomy status  -Pulmonary following, hope for decannulation tomorrow    #Sickle cell disease  -c/w hydroxyurea     #Oral thrush  -c/w Nystatin    #DVT ppx: Lovenox

## 2025-02-13 NOTE — PROGRESS NOTE ADULT - ASSESSMENT
59y with PMH, HTN, HFimpEF/NICM (EF 58% 10/2024), sickle cell disease, who presented to [facility] for SOB and SS crisis 12/30/25;, followed by witnessed seizure (? secondary to clonazepam withdrawal) requiring intubation. She was transferred to [facility] 1/3/25 secondary to cardiogenic shock /RV failure s/p ECMO +TRACH +PEG.   Now admitted to [facility] after for initiation of a multidisciplinary rehab program consisting focused on functional mobility, transfers and ADLs (activities of daily living).    # Bilateral CVA, bilateral body involvement left > right, left kelsi inattention, aphasia, respiratory failure, dysphagia  - MRI 1/9: Innumerable foci susceptibility artifact scattered throughout the brain which can be seen with critically ill patients on ECMO. Diffuse fat embolization is also part of the differential diagnosis but is considered less likely. Tiny acute/subacute infarcts within the bilateral basal ganglia, thalami, and chandu with disproportionate ovoid area of T2/FLAIR prolongation in the right ventral thalamus. Findings may indicate the presence of embolic acute/subacute infarcts.  - continue Comprehensive Multidisciplinary Rehab Program: 3 hours a day (1  hr PT, 1 hr OT, 1 hr SLP), 5 days a week.  - Precautions: respiratory, aspiration, SZ, AMS, fall, TRACH, PEG    # Hypoarousal  - LP (1/14) - negative for meningitis/encephalitis  - c/w Amantadine 100 BID    # seizure  - EEG (1/7) with suspicion of seizure  - Seizure ppx: Keppra 500 BID     # low grade fever T m 100.5 (2/8)  - discussed with hospitalist. No obvious source, asymptomatic, vitals stable, clinically well appearing  - Check CXR, UA, CBC, BMP, procal; RVP if fever persists  - temp <100*F, WBC slight uptrend >11.33 (2/7)>11.36 (2/10)  - afebrile, WBC normal 9.24 (2/13)    # Acute respiratory failure  - S/p Trach (1/16). On 6.0 cuffless (1/29)  - MSSA s/p Abx  - Duoneb q8hrs  - PMV with direct supervision SLP  - pulmonary consult ( 2/8) - will be following  - TRACH collar FiO2 28%   - capping during waking hours > goal for decannulation for 2/14***    # Cardiogenic Shock  - S/p ECMO 1/3. decannulated 1/7  - S/p 2 U PRBC, 2 platelet, 5U cryo  - ECHO 1/20: EF 53%, normal LV+RV function, no acute HF issues  - Metoprolol reduced 12.5 secondary to OH 2/7  - /70  - 132/85 (2/13)    # orthostatic hypotension last week  -  SBP dropped to 62 during OT session 2/7, improved to SBP 98 supine  - Lopressor reduced  - DONALD stockings when out of bed  - has abd for PEG protection already    # Sickle cell disease   - 1-2 sickle pain crisis per yea  - s/p transfusions, apheresis (1/12) for fat emboli concerns   - Hydroxyurea 500 BID (as long as plt > 100 and hgb >8)  - Hematology consult PRN  - hgb 10.5 (2/13)    # Mild leukocytosis - resolved  - has had fluctuations between 9-12 recently,  - WBC 11.36 (2/10), no signs of infection  - WBC 9.24 (2/13)     # Transaminitis   - RUQ US 1/27: The gallbladder is moderately distended and contains layering sludge and possibly small stones. There is no significant gallbladder wall thickening or pericholecystic fluid and a sonographic Gross sign was not elicited. Therefore, there is no definitive evidence for acute cholecystitis.  - continues to down trend: Alk Phos/AST normal 98/29; ALT 55 (2/13)    # Thrush  - nystatin (2/7)    # Pain Management:  - Tylenol PRN    # GI/Bowel:  - Senna QHS, Miralax QD - hold if having loose BM  - GI ppx: Protonix    # /Bladder:   - PVR q 8 hours (SC if > 400) > BS/PVR fluctuates 115 - 300cc - have not needed straight cath  - incontinent     # Skin/Pressure Injury: healed DTI on admission.   - Skin assessment on admission: no active pressure injury > healed DTI to L ear lobe, b/l forehead, sacrum /bilateral buttocks, bilateral heels. +Incontinence Dermatitis  Recommend:  1.) topical therapy: sacral/buttock skin – cleanse with incontinence cleanser, pat dry, apply Piotr ointment BID and PRN for incontinent episodes. bilateral forehead - cleanse with gentle cleanser, leave open to air  2.) Incontinence Management - incontinence cleanser, pads, pericare BID  3.) Maintain on an alternating air with low air loss surface  4.) Turn and reposition Q 2 hours  5.) Nutrition optimization   6.) Offload heels/feet with complete cair air fluidized boots/pillows; ensure that the soles of the feet are not resting on the foot board of the bed.  7.) chair cushion for chair sitting    # Diet/Dysphagia:  - S/p PEG (1/21)  - FEEST (1/27) > silent laryngeal penetration of purees and moderately thick liquids is noted   - Current Diet: NPO + TF; free water   - Dysphagia: SLP consult for swallow function evaluation and treatment  - Health supp: Vitamin C + MVI    # DVT ppx:    - Lovenox 40 QD   - SCD  ---------------  Code Status/Emergency Contact: Full Code    Outpatient Follow-up:    Wu Parra  Thoracic and Cardiac Surgery  [address] [place] [id number]  Phone: [phone]  Fax: [phone]  Follow Up Time: 2 weeks    IDT 2/10  NSG: incont  SW: lives in  with daughter + son(special needs, has own aide), 2-3 DONNA, + ramp access; 18 steps up?  Support: daughter + pt's sibling  SLP: NPO + TF, PMV + capping with SLP. severe recep language (better > phrase level), limited spontaneous speech. Speaking w/wo PMV, reduced orientation  OT: total A eating, max A groom/sit to supine, otherwise total A with all other ADLs. no transfer done.  Goal: min A with all ADLs  PT: max A bed mob, max-total A transfer, total amb 6' parallel bars. Goal: min A  Team goals: WC level transfer to to toilet min A ; tolerate less restrictive diet; PMV all day  Barriers: cog, language, NPO, trach, limited support  TDD: 2/27 home vs [facility] 59y with PMH, HTN, HFimpEF/NICM (EF 58% 10/2024), sickle cell disease, who presented to [facility] for SOB and SS crisis 12/30/25;, followed by witnessed seizure (? secondary to clonazepam withdrawal) requiring intubation. She was transferred to [facility] 1/3/25 secondary to cardiogenic shock /RV failure s/p ECMO +TRACH +PEG.   Now admitted to [facility] after for initiation of a multidisciplinary rehab program consisting focused on functional mobility, transfers and ADLs (activities of daily living).    # Bilateral CVA, bilateral body involvement left > right, left kelsi inattention, aphasia, respiratory failure, dysphagia  - MRI 1/9: Innumerable foci susceptibility artifact scattered throughout the brain which can be seen with critically ill patients on ECMO. Diffuse fat embolization is also part of the differential diagnosis but is considered less likely. Tiny acute/subacute infarcts within the bilateral basal ganglia, thalami, and chandu with disproportionate ovoid area of T2/FLAIR prolongation in the right ventral thalamus. Findings may indicate the presence of embolic acute/subacute infarcts.  - ASA 81 mg po restarted today--discussed with hospitalist  -Lipid panel ordered by hospitalist-- depending on result will add liptor depending on LDL level  - continue Comprehensive Multidisciplinary Rehab Program: 3 hours a day (1  hr PT, 1 hr OT, 1 hr SLP), 5 days a week.  - Precautions: respiratory, aspiration, SZ, AMS, fall, TRACH, PEG    # Hypoarousal  - LP (1/14) - negative for meningitis/encephalitis  - c/w Amantadine 100 BID    # seizure  - EEG (1/7) with suspicion of seizure  - Seizure ppx: Keppra 500 BID     # low grade fever T m 100.5 (2/8)  - discussed with hospitalist. No obvious source, asymptomatic, vitals stable, clinically well appearing  - Check CXR, UA, CBC, BMP, procal; RVP if fever persists  - temp <100*F, WBC slight uptrend >11.33 (2/7)>11.36 (2/10)  - afebrile, WBC normal 9.24 (2/13)    # Acute respiratory failure  - S/p Trach (1/16). On 6.0 cuffless (1/29)  - MSSA s/p Abx  - Duoneb q8hrs  - PMV with direct supervision SLP  - pulmonary consult ( 2/8) - will be following  - TRACH collar FiO2 28%   - capping during waking hours > goal for decannulation for 2/14***    # Cardiogenic Shock  - S/p ECMO 1/3. decannulated 1/7  - S/p 2 U PRBC, 2 platelet, 5U cryo  - ECHO 1/20: EF 53%, normal LV+RV function, no acute HF issues  - Metoprolol reduced 12.5 secondary to OH 2/7  - /70  - 132/85 (2/13)    # orthostatic hypotension last week  -  SBP dropped to 62 during OT session 2/7, improved to SBP 98 supine  - Lopressor reduced  - DONALD stockings when out of bed  - has abd for PEG protection already    # Sickle cell disease   - 1-2 sickle pain crisis per yea  - s/p transfusions, apheresis (1/12) for fat emboli concerns   - Hydroxyurea 500 BID (as long as plt > 100 and hgb >8)  - Hematology consult PRN  - hgb 10.5 (2/13)    # Mild leukocytosis - resolved  - has had fluctuations between 9-12 recently,  - WBC 11.36 (2/10), no signs of infection  - WBC 9.24 (2/13)     # Transaminitis   - RUQ US 1/27: The gallbladder is moderately distended and contains layering sludge and possibly small stones. There is no significant gallbladder wall thickening or pericholecystic fluid and a sonographic Gross sign was not elicited. Therefore, there is no definitive evidence for acute cholecystitis.  - continues to down trend: Alk Phos/AST normal 98/29; ALT 55 (2/13)    # Thrush  - nystatin (2/7)    # Pain Management:  - Tylenol PRN    # GI/Bowel:  - Senna QHS, Miralax QD - hold if having loose BM  - GI ppx: Protonix    # /Bladder:   - PVR q 8 hours (SC if > 400) > BS/PVR fluctuates 115 - 300cc - have not needed straight cath  - incontinent     # Skin/Pressure Injury: healed DTI on admission.   - Skin assessment on admission: no active pressure injury > healed DTI to L ear lobe, b/l forehead, sacrum /bilateral buttocks, bilateral heels. +Incontinence Dermatitis  Recommend:  1.) topical therapy: sacral/buttock skin – cleanse with incontinence cleanser, pat dry, apply Piotr ointment BID and PRN for incontinent episodes. bilateral forehead - cleanse with gentle cleanser, leave open to air  2.) Incontinence Management - incontinence cleanser, pads, pericare BID  3.) Maintain on an alternating air with low air loss surface  4.) Turn and reposition Q 2 hours  5.) Nutrition optimization   6.) Offload heels/feet with complete cair air fluidized boots/pillows; ensure that the soles of the feet are not resting on the foot board of the bed.  7.) chair cushion for chair sitting    # Diet/Dysphagia:  - S/p PEG (1/21)  - FEEST (1/27) > silent laryngeal penetration of purees and moderately thick liquids is noted   - Current Diet: NPO + TF; free water   - Dysphagia: SLP consult for swallow function evaluation and treatment  - Health supp: Vitamin C + MVI    # DVT ppx:    - Lovenox 40 QD   - SCD  ---------------  Code Status/Emergency Contact: Full Code    Outpatient Follow-up:    uW Parra  Thoracic and Cardiac Surgery  [address] [place] [id number]  Phone: [phone]  Fax: [phone]  Follow Up Time: 2 weeks    IDT 2/10  NSG: incont  SW: lives in  with daughter + son(special needs, has own aide), 2-3 DONNA, + ramp access; 18 steps up?  Support: daughter + pt's sibling  SLP: NPO + TF, PMV + capping with SLP. severe recep language (better > phrase level), limited spontaneous speech. Speaking w/wo PMV, reduced orientation  OT: total A eating, max A groom/sit to supine, otherwise total A with all other ADLs. no transfer done.  Goal: min A with all ADLs  PT: max A bed mob, max-total A transfer, total amb 6' parallel bars. Goal: min A  Team goals: WC level transfer to to toilet min A ; tolerate less restrictive diet; PMV all day  Barriers: cog, language, NPO, trach, limited support  TDD: 2/27 home vs [facility]T

## 2025-02-13 NOTE — PROGRESS NOTE ADULT - SUBJECTIVE AND OBJECTIVE BOX
Patient is a 59y old  Female who presents with a chief complaint of bilateral CVA (13 Feb 2025 11:58)      Patient seen and examined at bedside. No overnight events reported.     ALLERGIES:  doxycycline (Angioedema)  clindamycin (Angioedema)  penicillin (Unknown)  linezolid (Angioedema)    MEDICATIONS  (STANDING):  albuterol/ipratropium for Nebulization 3 milliLiter(s) Nebulizer every 8 hours  amantadine Syrup 100 milliGRAM(s) Oral <User Schedule>  artificial tears (preservative free) Ophthalmic Solution 1 Drop(s) Both EYES four times a day  ascorbic acid 500 milliGRAM(s) Oral daily  enoxaparin Injectable 40 milliGRAM(s) SubCutaneous every 24 hours  hydroxyurea 500 milliGRAM(s) Oral two times a day  levETIRAcetam  Solution 500 milliGRAM(s) Oral every 12 hours  metoprolol tartrate 12.5 milliGRAM(s) Oral every 8 hours  multivitamin/minerals/iron Oral Solution (CENTRUM) 15 milliLiter(s) Oral daily  nystatin    Suspension 221609 Unit(s) Oral three times a day  pantoprazole   Suspension 40 milliGRAM(s) Oral before breakfast  polyethylene glycol 3350 17 Gram(s) Oral daily  senna 1 Tablet(s) Oral daily    MEDICATIONS  (PRN):  acetaminophen   Oral Liquid .. 650 milliGRAM(s) Oral every 6 hours PRN Temp greater or equal to 38C (100.4F), Mild Pain (1 - 3)  bisacodyl Suppository 10 milliGRAM(s) Rectal daily PRN Constipation    Vital Signs Last 24 Hrs  T(F): 98.3 (12 Feb 2025 21:12), Max: 98.3 (12 Feb 2025 21:12)  HR: 97 (13 Feb 2025 06:23) (95 - 107)  BP: 132/85 (13 Feb 2025 05:44) (106/71 - 133/91)  RR: 16 (12 Feb 2025 21:12) (16 - 16)  SpO2: 100% (13 Feb 2025 06:23) (100% - 107%)  I&O's Summary    PHYSICAL EXAM:  General: NAD, A/O x 1, hypophonic   ENT: No gross hearing impairment, + thrush  Neck: Supple, No JVD, tracheostomy   Lungs: Clear to auscultation bilaterally, good air entry, non-labored breathing  Cardio: RRR, S1/S2, No murmur  Abdomen: Soft, Nontender, Nondistended; Bowel sounds present; PEG+  Extremities: No calf tenderness, No cyanosis, No pitting edema  Psych: Appropriate mood and affect  Neuro: Aphasia        LABS:                        10.5   9.24  )-----------( 300      ( 13 Feb 2025 06:19 )             31.2     02-13    143  |  105  |  19  ----------------------------<  124  4.0   |  28  |  0.75    Ca    9.5      13 Feb 2025 06:19    TPro  7.5  /  Alb  2.5  /  TBili  0.4  /  DBili  x   /  AST  29  /  ALT  55  /  AlkPhos  98  02-13    Urinalysis Basic - ( 13 Feb 2025 06:19 )    Color: x / Appearance: x / SG: x / pH: x  Gluc: 124 mg/dL / Ketone: x  / Bili: x / Urobili: x   Blood: x / Protein: x / Nitrite: x   Leuk Esterase: x / RBC: x / WBC x   Sq Epi: x / Non Sq Epi: x / Bacteria: x        COVID-19 PCR: Ronan (02-06-25 @ 21:11)

## 2025-02-13 NOTE — PROGRESS NOTE ADULT - SUBJECTIVE AND OBJECTIVE BOX
Patient is a 59y old  Female who presents with a chief complaint of bilateral CVA     SUBJECTIVE/OBJECTIVE: Patient seen and evaluated while sitting in WC at bedside.  PMV on because cap not at bedside.  Calm and able to recognize visitor by name and who she is to her.  But unable to recall year or month despite external cues, and multiple choice. Overall in good spirit.  Tolerating therapy. Further discuss goal of decannulation tomorrow.    REVIEW OF SYSTEMS:  Constitutional: No fever or Chills  Pulm: no witnessed cough or resp distress  Cardio: No chest pain  GI/: incont, no dysuria or abd pain  Neuro: No headaches or dizziness, +weakness, +cog impairment   MSK: No pain    PHYSICAL EXAM  59y  Vital Signs Last 24 Hrs  T(C): 36.8 (02-12-25 @ 21:12), Max: 36.8 (02-12-25 @ 21:12)  T(F): 98.3 (02-12-25 @ 21:12), Max: 98.3 (02-12-25 @ 21:12)  HR: 97 (02-13-25 @ 06:23) (95 - 107)  BP: 132/85 (02-13-25 @ 05:44) (106/71 - 133/91)  RR: 16 (02-12-25 @ 21:12) (16 - 16)  SpO2: 100% (02-13-25 @ 06:23) (100% - 107%)    Constitutional - NAD, Comfortable  HEENT - trach collar in place; on oxygen concentration 28%  Cardio - warm and well perfused, no cyanosis or pedal edema  Pulm - resp nonlabored  Abdomen -  Soft, NTND; +PEG site c/d/i  Extremities - No peripheral edema, No calf tenderness   Neurologic Exam:                    Cognitive -        Orientation: AAO to self and friend and hosptial.  Didn't know year/month despite external cues and multiple choice. able to follow 1 step commands (delayed), answers limited questions (selectively/delay)     Speech - Hypophonic, impaired naming, delayed processing/response     Cranial Nerves - poor tracking, no facial asymmetry, tongue midline     Motor - poor/delayed command following; RUE 4/5, LUE at least antigravity, BLE 4/5  Psychiatric - calm affect    RECENT LABS:                      10.5   9.24  )-----------( 300      ( 13 Feb 2025 06:19 )             31.2     02-13    143  |  105  |  19  ----------------------------<  124[H]  4.0   |  28  |  0.75    Ca    9.5      13 Feb 2025 06:19    TPro  7.5  /  Alb  2.5[L]  /  TBili  0.4  /  DBili  x   /  AST  29  /  ALT  55[H]  /  AlkPhos  98  02-13    LIVER FUNCTIONS - ( 13 Feb 2025 06:19 )  Alb: 2.5 g/dL / Pro: 7.5 g/dL / ALK PHOS: 98 U/L / ALT: 55 U/L / AST: 29 U/L / GGT: x           Allergies  doxycycline (Angioedema)  clindamycin (Angioedema)  penicillin (Unknown)  linezolid (Angioedema)    MEDICATIONS  (STANDING):  albuterol/ipratropium for Nebulization 3 milliLiter(s) Nebulizer every 8 hours  amantadine Syrup 100 milliGRAM(s) Oral <User Schedule>  artificial tears (preservative free) Ophthalmic Solution 1 Drop(s) Both EYES four times a day  ascorbic acid 500 milliGRAM(s) Oral daily  enoxaparin Injectable 40 milliGRAM(s) SubCutaneous every 24 hours  hydroxyurea 500 milliGRAM(s) Oral two times a day  levETIRAcetam  Solution 500 milliGRAM(s) Oral every 12 hours  metoprolol tartrate 12.5 milliGRAM(s) Oral every 8 hours  multivitamin/minerals/iron Oral Solution (CENTRUM) 15 milliLiter(s) Oral daily  nystatin    Suspension 647698 Unit(s) Oral three times a day  pantoprazole   Suspension 40 milliGRAM(s) Oral before breakfast  polyethylene glycol 3350 17 Gram(s) Oral daily  senna 1 Tablet(s) Oral daily    MEDICATIONS  (PRN):  acetaminophen   Oral Liquid .. 650 milliGRAM(s) Oral every 6 hours PRN Temp greater or equal to 38C (100.4F), Mild Pain (1 - 3)  bisacodyl Suppository 10 milliGRAM(s) Rectal daily PRN Constipation

## 2025-02-14 LAB
CHOLEST SERPL-MCNC: 160 MG/DL — SIGNIFICANT CHANGE UP
HDLC SERPL-MCNC: 32 MG/DL — LOW
LIPID PNL WITH DIRECT LDL SERPL: 106 MG/DL — HIGH
NON HDL CHOLESTEROL: 128 MG/DL — SIGNIFICANT CHANGE UP
TRIGL SERPL-MCNC: 121 MG/DL — SIGNIFICANT CHANGE UP

## 2025-02-14 PROCEDURE — 99233 SBSQ HOSP IP/OBS HIGH 50: CPT | Mod: GC

## 2025-02-14 RX ADMIN — LEVETIRACETAM 500 MILLIGRAM(S): 10 INJECTION, SOLUTION INTRAVENOUS at 06:05

## 2025-02-14 RX ADMIN — Medication 1 DROP(S): at 18:24

## 2025-02-14 RX ADMIN — POLYETHYLENE GLYCOL 3350 17 GRAM(S): 17 POWDER, FOR SOLUTION ORAL at 12:49

## 2025-02-14 RX ADMIN — Medication 81 MILLIGRAM(S): at 12:51

## 2025-02-14 RX ADMIN — HYDROXYUREA 500 MILLIGRAM(S): 500 CAPSULE ORAL at 06:00

## 2025-02-14 RX ADMIN — Medication 100 MILLIGRAM(S): at 06:00

## 2025-02-14 RX ADMIN — Medication 500000 UNIT(S): at 14:59

## 2025-02-14 RX ADMIN — Medication 500000 UNIT(S): at 06:01

## 2025-02-14 RX ADMIN — Medication 1 DROP(S): at 23:28

## 2025-02-14 RX ADMIN — Medication 1 DROP(S): at 12:49

## 2025-02-14 RX ADMIN — METOPROLOL SUCCINATE 12.5 MILLIGRAM(S): 50 TABLET, EXTENDED RELEASE ORAL at 21:00

## 2025-02-14 RX ADMIN — ENOXAPARIN SODIUM 40 MILLIGRAM(S): 100 INJECTION SUBCUTANEOUS at 06:06

## 2025-02-14 RX ADMIN — IPRATROPIUM BROMIDE AND ALBUTEROL SULFATE 3 MILLILITER(S): .5; 2.5 SOLUTION RESPIRATORY (INHALATION) at 21:05

## 2025-02-14 RX ADMIN — Medication 15 MILLILITER(S): at 12:49

## 2025-02-14 RX ADMIN — HYDROXYUREA 500 MILLIGRAM(S): 500 CAPSULE ORAL at 18:25

## 2025-02-14 RX ADMIN — Medication 500000 UNIT(S): at 21:01

## 2025-02-14 RX ADMIN — METOPROLOL SUCCINATE 12.5 MILLIGRAM(S): 50 TABLET, EXTENDED RELEASE ORAL at 15:04

## 2025-02-14 RX ADMIN — Medication 1 TABLET(S): at 12:51

## 2025-02-14 RX ADMIN — LEVETIRACETAM 500 MILLIGRAM(S): 10 INJECTION, SOLUTION INTRAVENOUS at 18:24

## 2025-02-14 RX ADMIN — Medication 100 MILLIGRAM(S): at 14:59

## 2025-02-14 RX ADMIN — IPRATROPIUM BROMIDE AND ALBUTEROL SULFATE 3 MILLILITER(S): .5; 2.5 SOLUTION RESPIRATORY (INHALATION) at 05:29

## 2025-02-14 RX ADMIN — Medication 1 DROP(S): at 06:01

## 2025-02-14 RX ADMIN — Medication 40 MILLIGRAM(S): at 06:00

## 2025-02-14 RX ADMIN — METOPROLOL SUCCINATE 12.5 MILLIGRAM(S): 50 TABLET, EXTENDED RELEASE ORAL at 06:00

## 2025-02-14 RX ADMIN — Medication 500 MILLIGRAM(S): at 12:50

## 2025-02-14 NOTE — PROGRESS NOTE ADULT - ASSESSMENT
59y with PMH, HTN, HFimpEF/NICM (EF 58% 10/2024), sickle cell disease, who presented to [facility] for SOB and SS crisis 12/30/25;, followed by witnessed seizure (? secondary to clonazepam withdrawal) requiring intubation. She was transferred to [facility] 1/3/25 secondary to cardiogenic shock /RV failure s/p ECMO +TRACH +PEG.   Now admitted to [facility] after for initiation of a multidisciplinary rehab program consisting focused on functional mobility, transfers and ADLs (activities of daily living).    # Bilateral CVA, bilateral body involvement left > right, left kelsi inattention, aphasia, respiratory failure, dysphagia  - MRI 1/9: Innumerable foci susceptibility artifact scattered throughout the brain which can be seen with critically ill patients on ECMO. Diffuse fat embolization is also part of the differential diagnosis but is considered less likely. Tiny acute/subacute infarcts within the bilateral basal ganglia, thalami, and chandu with disproportionate ovoid area of T2/FLAIR prolongation in the right ventral thalamus. Findings may indicate the presence of embolic acute/subacute infarcts.  - ASA 81 mg po restarted 2/13  - Lipid panel ordered by hospitalist-- depending on result considering adding lipitor   - continue Comprehensive Multidisciplinary Rehab Program: 3 hours a day (1  hr PT, 1 hr OT, 1 hr SLP), 5 days a week.  - Precautions: respiratory, aspiration, SZ, AMS, fall, TRACH, PEG    # Hypoarousal  - LP (1/14) - negative for meningitis/encephalitis  - c/w Amantadine 100 BID    # seizure  - EEG (1/7) with suspicion of seizure  - Seizure ppx: Keppra 500 BID     # low grade fever T m 100.5 (2/8)  - discussed with hospitalist. No obvious source, asymptomatic, vitals stable, clinically well appearing  - Check CXR, UA, CBC, BMP, procal; RVP if fever persists  - temp <100*F, WBC slight uptrend >11.33 (2/7)>11.36 (2/10)  - afebrile, WBC normal 9.24 (2/13)    # Acute respiratory failure  - S/p Trach (1/16). On 6.0 cuffless (1/29)  - MSSA s/p Abx  - Duoneb q8hrs  - pulmonary following-- appreciate recommendations  - capping during waking hours > goal for decannulation today    # Cardiogenic Shock  - S/p ECMO 1/3. decannulated 1/7  - S/p 2 U PRBC, 2 platelet, 5U cryo  - ECHO 1/20: EF 53%, normal LV+RV function, no acute HF issues  - Metoprolol reduced 12.5 secondary to OH 2/7  - //96 (2/14)    # Orthostatic hypotension last week  -  SBP dropped to 62 during OT session 2/7, improved to SBP 98 supine  - Lopressor reduced  - DONALD stockings when out of bed  - has abd for PEG protection already    # Sickle cell disease   - 1-2 sickle pain crisis per yea  - s/p transfusions, apheresis (1/12) for fat emboli concerns   - Hydroxyurea 500 BID (as long as plt > 100 and hgb >8)  - Hematology consult PRN  - hgb 10.5 (2/13)    # Mild leukocytosis - resolved  - has had fluctuations between 9-12 recently,  - WBC 11.36 (2/10), no signs of infection  - WBC 9.24 (2/13)     # Transaminitis   - RUQ US 1/27: The gallbladder is moderately distended and contains layering sludge and possibly small stones. There is no significant gallbladder wall thickening or pericholecystic fluid and a sonographic Gross sign was not elicited. Therefore, there is no definitive evidence for acute cholecystitis.  - continues to down trend: Alk Phos/AST normal 98/29; ALT 55 (2/13)    # Thrush  - nystatin (2/7)    # Pain Management:  - Tylenol PRN    # GI/Bowel:  - Senna QHS, Miralax QD - hold if having loose BM  - GI ppx: Protonix    # /Bladder:   - PVR q 8 hours (SC if > 400) > BS/PVR fluctuates, not needing straight cath   - incontinent     # Skin/Pressure Injury: healed DTI on admission.   - Skin assessment on admission: no active pressure injury > healed DTI to L ear lobe, b/l forehead, sacrum /bilateral buttocks, bilateral heels. +Incontinence Dermatitis  Recommend:  1.) topical therapy: sacral/buttock skin – cleanse with incontinence cleanser, pat dry, apply Piotr ointment BID and PRN for incontinent episodes. bilateral forehead - cleanse with gentle cleanser, leave open to air  2.) Incontinence Management - incontinence cleanser, pads, pericare BID  3.) Maintain on an alternating air with low air loss surface  4.) Turn and reposition Q 2 hours  5.) Nutrition optimization   6.) Offload heels/feet with complete cair air fluidized boots/pillows; ensure that the soles of the feet are not resting on the foot board of the bed.  7.) chair cushion for chair sitting    # Diet/Dysphagia:  - S/p PEG (1/21)  - FEEST (1/27) > silent laryngeal penetration of purees and moderately thick liquids is noted   - Current Diet: NPO + TF; free water   - Dysphagia: SLP consult for swallow function evaluation and treatment  - Health supp: Vitamin C + MVI    # DVT ppx:    - Lovenox 40 QD   - SCD  ---------------  Code Status/Emergency Contact: Full Code    Outpatient Follow-up:    Wu Parra  Thoracic and Cardiac Surgery  [address] [place] [id number]  Phone: [phone]  Fax: [phone]  Follow Up Time: 2 weeks    IDT 2/10  NSG: incont  SW: lives in  with daughter + son(special needs, has own aide), 2-3 DONNA, + ramp access; 18 steps up?  Support: daughter + pt's sibling  SLP: NPO + TF, PMV + capping with SLP. severe recep language (better > phrase level), limited spontaneous speech. Speaking w/wo PMV, reduced orientation  OT: total A eating, max A groom/sit to supine, otherwise total A with all other ADLs. no transfer done.  Goal: min A with all ADLs  PT: max A bed mob, max-total A transfer, total amb 6' parallel bars. Goal: min A  Team goals: WC level transfer to to toilet min A ; tolerate less restrictive diet; PMV all day  Barriers: cog, language, NPO, trach, limited support  TDD: 2/27 home vs JETT 59y with PMH, HTN, HFimpEF/NICM (EF 58% 10/2024), sickle cell disease, who presented to [facility] for SOB and SS crisis 12/30/25;, followed by witnessed seizure (? secondary to clonazepam withdrawal) requiring intubation. She was transferred to [facility] 1/3/25 secondary to cardiogenic shock /RV failure s/p ECMO +TRACH +PEG.   Now admitted to [facility] after for initiation of a multidisciplinary rehab program consisting focused on functional mobility, transfers and ADLs (activities of daily living).    # Bilateral CVA, bilateral body involvement left > right, left kelsi inattention, aphasia, respiratory failure, dysphagia  - MRI 1/9: Innumerable foci susceptibility artifact scattered throughout the brain which can be seen with critically ill patients on ECMO. Diffuse fat embolization is also part of the differential diagnosis but is considered less likely. Tiny acute/subacute infarcts within the bilateral basal ganglia, thalami, and chandu with disproportionate ovoid area of T2/FLAIR prolongation in the right ventral thalamus. Findings may indicate the presence of embolic acute/subacute infarcts.  - ASA 81 mg po restarted 2/13  - Lipid panel ordered by hospitalist-- depending on result consider adding lipitor   - continue Comprehensive Multidisciplinary Rehab Program: 3 hours a day (1  hr PT, 1 hr OT, 1 hr SLP), 5 days a week.  - Precautions: respiratory, aspiration, SZ, AMS, fall, TRACH, PEG    # Hypoarousal  - LP (1/14) - negative for meningitis/encephalitis  - c/w Amantadine 100 BID    # seizure  - EEG (1/7) with suspicion of seizure  - Seizure ppx: Keppra 500 BID     # low grade fever T m 100.5 (2/8)  - discussed with hospitalist. No obvious source, asymptomatic, vitals stable, clinically well appearing  - Check CXR, UA, CBC, BMP, procal; RVP if fever persists  - temp <100*F, WBC slight uptrend >11.33 (2/7)>11.36 (2/10)  - afebrile, WBC normal 9.24 (2/13)    # Acute respiratory failure  - S/p Trach (1/16). On 6.0 cuffless (1/29)  - MSSA s/p Abx  - Duoneb q8hrs  - pulmonary following-- appreciate recommendations  - capping during waking hours > goal for decannulation today    # Cardiogenic Shock  - S/p ECMO 1/3. decannulated 1/7  - S/p 2 U PRBC, 2 platelet, 5U cryo  - ECHO 1/20: EF 53%, normal LV+RV function, no acute HF issues  - Metoprolol reduced 12.5 secondary to OH 2/7  - //96 (2/14)    # Orthostatic hypotension last week  -  SBP dropped to 62 during OT session 2/7, improved to SBP 98 supine  - Lopressor reduced  - DONALD stockings when out of bed  - has abd for PEG protection already    # Sickle cell disease   - 1-2 sickle pain crisis per yea  - s/p transfusions, apheresis (1/12) for fat emboli concerns   - Hydroxyurea 500 BID (as long as plt > 100 and hgb >8)  - Hematology consult PRN  - hgb 10.5 (2/13)    # Mild leukocytosis - resolved  - has had fluctuations between 9-12 recently,  - WBC 11.36 (2/10), no signs of infection  - WBC 9.24 (2/13)     # Transaminitis   - RUQ US 1/27: The gallbladder is moderately distended and contains layering sludge and possibly small stones. There is no significant gallbladder wall thickening or pericholecystic fluid and a sonographic Gross sign was not elicited. Therefore, there is no definitive evidence for acute cholecystitis.  - continues to down trend: Alk Phos/AST normal 98/29; ALT 55 (2/13)    # Thrush  - nystatin (2/7)    # Pain Management:  - Tylenol PRN    # GI/Bowel:  - Senna QHS, Miralax QD - hold if having loose BM  - GI ppx: Protonix    # /Bladder:   - PVR q 8 hours (SC if > 400) > BS/PVR fluctuates, not needing straight cath   - incontinent     # Skin/Pressure Injury: healed DTI on admission.   - Skin assessment on admission: no active pressure injury > healed DTI to L ear lobe, b/l forehead, sacrum /bilateral buttocks, bilateral heels. +Incontinence Dermatitis  Recommend:  1.) topical therapy: sacral/buttock skin – cleanse with incontinence cleanser, pat dry, apply Piotr ointment BID and PRN for incontinent episodes. bilateral forehead - cleanse with gentle cleanser, leave open to air  2.) Incontinence Management - incontinence cleanser, pads, pericare BID  3.) Maintain on an alternating air with low air loss surface  4.) Turn and reposition Q 2 hours  5.) Nutrition optimization   6.) Offload heels/feet with complete cair air fluidized boots/pillows; ensure that the soles of the feet are not resting on the foot board of the bed.  7.) chair cushion for chair sitting    # Diet/Dysphagia:  - S/p PEG (1/21)  - FEEST (1/27) > silent laryngeal penetration of purees and moderately thick liquids is noted   - Current Diet: NPO + TF; free water   - Dysphagia: SLP consult for swallow function evaluation and treatment  - Health supp: Vitamin C + MVI    # DVT ppx:    - Lovenox 40 QD   - SCD  ---------------  Code Status/Emergency Contact: Full Code    Outpatient Follow-up:    Wu Parra  Thoracic and Cardiac Surgery  [address] [place] [id number]  Phone: [phone]  Fax: [phone]  Follow Up Time: 2 weeks    IDT 2/10  NSG: incont  SW: lives in  with daughter + son(special needs, has own aide), 2-3 DONNA, + ramp access; 18 steps up?  Support: daughter + pt's sibling  SLP: NPO + TF, PMV + capping with SLP. severe recep language (better > phrase level), limited spontaneous speech. Speaking w/wo PMV, reduced orientation  OT: total A eating, max A groom/sit to supine, otherwise total A with all other ADLs. no transfer done.  Goal: min A with all ADLs  PT: max A bed mob, max-total A transfer, total amb 6' parallel bars. Goal: min A  Team goals: WC level transfer to to toilet min A ; tolerate less restrictive diet; PMV all day  Barriers: cog, language, NPO, trach, limited support  TDD: 2/27 home vs JETT 59y with PMH, HTN, HFimpEF/NICM (EF 58% 10/2024), sickle cell disease, who presented to [facility] for SOB and SS crisis 12/30/25;, followed by witnessed seizure (? secondary to clonazepam withdrawal) requiring intubation. She was transferred to [facility] 1/3/25 secondary to cardiogenic shock /RV failure s/p ECMO +TRACH +PEG.   Now admitted to [facility] after for initiation of a multidisciplinary rehab program consisting focused on functional mobility, transfers and ADLs (activities of daily living).    # Bilateral CVA, bilateral body involvement left > right, left kelsi inattention, aphasia, respiratory failure, dysphagia  - MRI 1/9: Innumerable foci susceptibility artifact scattered throughout the brain which can be seen with critically ill patients on ECMO. Diffuse fat embolization is also part of the differential diagnosis but is considered less likely. Tiny acute/subacute infarcts within the bilateral basal ganglia, thalami, and chandu with disproportionate ovoid area of T2/FLAIR prolongation in the right ventral thalamus. Findings may indicate the presence of embolic acute/subacute infarcts.  - ASA 81 mg po restarted 2/13  - Lipid panel ordered by hospitalist-- depending on result consider adding lipitor   - continue Comprehensive Multidisciplinary Rehab Program: 3 hours a day (1  hr PT, 1 hr OT, 1 hr SLP), 5 days a week.  - Precautions: respiratory, aspiration, SZ, AMS, fall, TRACH, PEG    # Hypoarousal  - LP (1/14) - negative for meningitis/encephalitis  - c/w Amantadine 100 BID    # seizure  - EEG (1/7) with suspicion of seizure  - Seizure ppx: Keppra 500 BID     # low grade fever T m 100.5 (2/8)  - discussed with hospitalist. No obvious source, asymptomatic, vitals stable, clinically well appearing  - Check CXR, UA, CBC, BMP, procal; RVP if fever persists  - temp <100*F, WBC slight uptrend >11.33 (2/7)>11.36 (2/10)  - afebrile, WBC normal 9.24 (2/13)    # Acute respiratory failure  - S/p Trach (1/16). On 6.0 cuffless (1/29)  - MSSA s/p Abx  - Duoneb q8hrs  - pulmonary following-- appreciate recommendations  - capping during waking hours > goal for decannulation today    # Cardiogenic Shock  - S/p ECMO 1/3. decannulated 1/7  - S/p 2 U PRBC, 2 platelet, 5U cryo  - ECHO 1/20: EF 53%, normal LV+RV function, no acute HF issues  - Metoprolol reduced 12.5 secondary to OH 2/7  - //96 (2/14)    # Orthostatic hypotension last week  -  SBP dropped to 62 during OT session 2/7, improved to SBP 98 supine  - Lopressor reduced  - DONALD stockings when out of bed  - has abd for PEG protection already    # Sickle cell disease   - 1-2 sickle pain crisis per yea  - s/p transfusions, apheresis (1/12) for fat emboli concerns   - Hydroxyurea 500 BID (as long as plt > 100 and hgb >8)  - Hematology consult PRN  - hgb 10.5 (2/13)    # Mild leukocytosis - resolved  - has had fluctuations between 9-12 recently,  - WBC 11.36 (2/10), no signs of infection  - WBC 9.24 (2/13)     # Transaminitis   - RUQ US 1/27: The gallbladder is moderately distended and contains layering sludge and possibly small stones. There is no significant gallbladder wall thickening or pericholecystic fluid and a sonographic Gross sign was not elicited. Therefore, there is no definitive evidence for acute cholecystitis.  - continues to down trend: Alk Phos/AST normal 98/29; ALT 55 (2/13)    # Thrush  - nystatin (2/7)    # Pain Management:  - Tylenol PRN    # GI/Bowel:  - Senna QHS, Miralax QD - hold if having loose BM  - GI ppx: Protonix    # /Bladder:   - PVR q 8 hours (SC if > 400) > BS/PVR fluctuates, not needing straight cath   - incontinent     # Skin/Pressure Injury: healed DTI on admission.   - Skin assessment on admission: no active pressure injury > healed DTI to L ear lobe, b/l forehead, sacrum /bilateral buttocks, bilateral heels. +Incontinence Dermatitis  Recommend:  1.) topical therapy: sacral/buttock skin – cleanse with incontinence cleanser, pat dry, apply Piotr ointment BID and PRN for incontinent episodes. bilateral forehead - cleanse with gentle cleanser, leave open to air  2.) Incontinence Management - incontinence cleanser, pads, pericare BID  3.) Maintain on an alternating air with low air loss surface  4.) Turn and reposition Q 2 hours  5.) Nutrition optimization   6.) Offload heels/feet with complete cair air fluidized boots/pillows; ensure that the soles of the feet are not resting on the foot board of the bed.  7.) chair cushion for chair sitting    # Diet/Dysphagia:  - S/p PEG (1/21)  - FEEST (1/27) > silent laryngeal penetration of purees and moderately thick liquids is noted   - Current Diet: NPO + TF; free water   - Dysphagia: SLP consult for swallow function evaluation and treatment  - Health supp: Vitamin C + MVI    # DVT ppx:    - Lovenox 40 QD   - SCD  ---------------  Code Status/Emergency Contact: Full Code    Outpatient Follow-up:    Wu Parra  Thoracic and Cardiac Surgery  [address] [place] [id number]  Phone: [phone]  Fax: [phone]  Follow Up Time: 2 weeks    IDT 2/14  NSG: incont  SW: lives in  with daughter + son(special needs, has own aide), 2-3 DONNA, + ramp access; 18 steps up?  Support: daughter + pt's sibling  SLP: NPO + TF, tolerating capping > to be decannulated (2/14). adequate voicing, reduced orientation, mod aphasiz, is formulating sentences, improving attention to task  OT: total A eating (TF - MBS for 2/18), min-mod grooming, max A UBD/bathing, total A LBD/footwear/toileting/shower transfer, mod A toilet transfer.  Goal: min A with all ADLs  PT: max A bed mob, mod A transfer/amb 70' RW +WC follow, total A step up. Goal: min A  RT: consult requested  Team goals: WC level transfer to to toilet min A ; tolerate less restrictive diet  Barriers: cog, language, NPO, trach, limited support  TDD: 2/27 home vs JETT    Interdisciplinary team meeting today with speech therapist, occupational therapist, physical therapist, social work and nursing where medical updates provided, progress with therapies and goals discussed. Plan of care reviewed. Team conference note documented on monieky.    Total time 50 mins-face to face time encounter and counseling the patient, meeting with hospitalist, nursing staff, therapists and social work to discuss medical updates and management, care coordination, reviewing chart and data, team meeting

## 2025-02-14 NOTE — PROGRESS NOTE ADULT - ASSESSMENT
Physical Examination:  GENERAL:               Alert, Oriented, No acute distress.    HEENT:                   No JVD, Moist MM Stoma open, dry  PULM:                     Bilateral air entry, Clear to auscultation bilaterally, no significant sputum production, No Rales, No Rhonchi, No Wheezing  CVS:                         S1, S2,  No Murmur   NEURO:                  Alert, oriented, interactive,  follows commands  PSYC:                      Calm, + Insight.       Assessment  59 yr old woman  with  HTN, HFimpEF/NICM (EF 58% 10/2024), sickle cell disease, who presented to [facility] for SOB and SS crisis 12/30/25;, followed by witnessed seizure (?secondary to clonazepam withdrawal) requiring intubation. She was transferred to [facility] 1/3/25 secondary to cardiogenic shock/RV failure s/p ECMO +TRACH with #6 cuffless +PEG. Now admitted to [facility] after for initiation of a multidisciplinary rehab program consisting focused on functional mobility, transfers and ADLs (activities of daily living). Pulmonary eval requested for trach management       Plan  Decannulated today   Continue nebs/pulmicort .  alyven dressing on stoma     rest of care as per primary team

## 2025-02-14 NOTE — PROGRESS NOTE ADULT - SUBJECTIVE AND OBJECTIVE BOX
Follow-up Pulmonary Progress Note  Chief Complaint : Encephalopathy      patient seen and examined  comfortable  no cp, sob, palp, n/v,  tolerating capping  decision to decannulate  d/w family bedside   pt decannulated       Allergies :doxycycline (Angioedema)  clindamycin (Angioedema)  penicillin (Unknown)  linezolid (Angioedema)      PAST MEDICAL & SURGICAL HISTORY:  Sickle-cell-hemoglobin C disease with crisis    Essential hypertension    Sickle cell disease    HTN (hypertension)    H/O:     H/O abdominoplasty    S/P breast augmentation    S/P         Medications:  MEDICATIONS  (STANDING):  albuterol/ipratropium for Nebulization 3 milliLiter(s) Nebulizer every 8 hours  amantadine Syrup 100 milliGRAM(s) Oral <User Schedule>  artificial tears (preservative free) Ophthalmic Solution 1 Drop(s) Both EYES four times a day  ascorbic acid 500 milliGRAM(s) Oral daily  aspirin  chewable 81 milliGRAM(s) Oral daily  enoxaparin Injectable 40 milliGRAM(s) SubCutaneous every 24 hours  hydroxyurea 500 milliGRAM(s) Oral two times a day  levETIRAcetam  Solution 500 milliGRAM(s) Oral every 12 hours  metoprolol tartrate 12.5 milliGRAM(s) Oral every 8 hours  multivitamin/minerals/iron Oral Solution (CENTRUM) 15 milliLiter(s) Oral daily  nystatin    Suspension 217436 Unit(s) Oral three times a day  pantoprazole   Suspension 40 milliGRAM(s) Oral before breakfast  polyethylene glycol 3350 17 Gram(s) Oral daily  senna 1 Tablet(s) Oral daily    MEDICATIONS  (PRN):  acetaminophen   Oral Liquid .. 650 milliGRAM(s) Oral every 6 hours PRN Temp greater or equal to 38C (100.4F), Mild Pain (1 - 3)  bisacodyl Suppository 10 milliGRAM(s) Rectal daily PRN Constipation      Antibiotics History  nystatin    Suspension 473657 Unit(s) Oral three times a day, 25 @ 10:19      Heme Medications   aspirin  chewable 81 milliGRAM(s) Oral daily, 25 @ 14:25  enoxaparin Injectable 40 milliGRAM(s) SubCutaneous every 24 hours, 25 @ 21:02      GI Medications  bisacodyl Suppository 10 milliGRAM(s) Rectal daily, 25 @ 10:50, Routine PRN  pantoprazole   Suspension 40 milliGRAM(s) Oral before breakfast, 25 @ 00:00, Routine  polyethylene glycol 3350 17 Gram(s) Oral daily, 25 @ 21:02,   senna 1 Tablet(s) Oral daily, 25 @ 00:00, Routine        LABS:                        10.5   9.24  )-----------( 300      ( 2025 06:19 )             31.2         143  |  105  |  19  ----------------------------<  124[H]  4.0   |  28  |  0.75    Ca    9.5      2025 06:19    TPro  7.5  /  Alb  2.5[L]  /  TBili  0.4  /  DBili  x   /  AST  29  /  ALT  55[H]  /  AlkPhos  98       RADIOLOGY  CXR:  < from: Xray Chest 1 View- PORTABLE-Routine (Xray Chest 1 View- PORTABLE-Routine .) (25 @ 07:50) >    ACC: [id number] EXAM:  XR CHEST PORTABLE ROUTINE 1V   ORDERED BY: SABINA KAPLAN     PROCEDURE DATE:  2025          INTERPRETATION:  TECHNIQUE: A single AP view of the chest was obtained.   Ordered time:   2025 7:50 AM    COMPARISON: 2025    CLINICAL INFORMATION: Assess pleural effusions    FINDINGS:  A tracheostomy tube is again identified.  The heart is not enlarged.  The lungs are clear.  There are no pleural effusions.  There is no pneumothorax.    IMPRESSION:    No radiographic evidence of acute cardiopulmonary disease    --- End of Report ---      < end of copied text >         VITALS:  T(C): 37 (25 @ 10:13), Max: 37.1 (25 @ 21:52)  T(F): 98.6 (25 @ 10:13), Max: 98.8 (25 @ 21:52)  HR: 100 (25 @ 15:03) (82 - 100)  BP: 123/89 (25 @ 15:03) (115/74 - 133/92)  BP(mean): --  ABP: --  ABP(mean): --  RR: 16 (25 @ 10:13) (16 - 16)  SpO2: 98% (25 @ 10:13) (98% - 98%)  CVP(mm Hg): --  CVP(cm H2O): --    Ins and Outs     25 @ 07:01  -  25 @ 07:00  --------------------------------------------------------  IN: 1410 mL / OUT: 1 mL / NET: 1409 mL    25 @ 07:01  -  25 @ 18:10  --------------------------------------------------------  IN: 300 mL / OUT: 0 mL / NET: 300 mL                I&O's Detail    2025 07:01  -  2025 07:00  --------------------------------------------------------  IN:    Free Water: 600 mL    Miscellaneous Tube Feedin mL  Total IN: 1410 mL    OUT:    Blood Loss (mL): 1 mL  Total OUT: 1 mL    Total NET: 1409 mL      2025 07:01  -  2025 18:10  --------------------------------------------------------  IN:    Free Water: 300 mL  Total IN: 300 mL    OUT:  Total OUT: 0 mL    Total NET: 300 mL

## 2025-02-14 NOTE — PROGRESS NOTE ADULT - SUBJECTIVE AND OBJECTIVE BOX
Patient is a 59y old  Female who presents with a chief complaint of bilateral CVA     SUBJECTIVE/OBJECTIVE: Patient seen and evaluated at bedside. Tolerating capping during all hours-- possible decannulation today by pulmonary. Denies pain, impaired command following and slowed responses, but improved compared to previous.     REVIEW OF SYSTEMS:  Constitutional: No fever or Chills  Pulm: no witnessed cough or resp distress  Cardio: No chest pain  GI/: incont, no dysuria or abd pain  Neuro: No headaches or dizziness, +weakness, +cog impairment   MSK: No pain    PHYSICAL EXAM  59y  T(C): 37 (02-14-25 @ 10:13)  T(F): 98.6 (02-14-25 @ 10:13), Max: 98.8 (02-13-25 @ 21:52)  HR: 82 (02-14-25 @ 10:13) (82 - 100)  BP: 131/91 (02-14-25 @ 10:13) (115/74 - 135/96)  RR:  (16 - 16)  SpO2:  (98% - 98%)    Constitutional - NAD, Comfortable  HEENT - trach in place   Cardio - warm and well perfused, no cyanosis or pedal edema  Pulm - resp nonlabored  Abdomen -  Soft, NTND; +PEG site c/d/i  Extremities - No peripheral edema, No calf tenderness   Neurologic Exam:                    Cognitive -        Orientation: AAO to self and friend and hospital.  Didn't know year/month despite external cues and multiple choice. able to follow 1 step commands (delayed), answers limited questions (selectively/delay)     Speech - Hypophonic, impaired naming, delayed processing/response     Cranial Nerves - poor tracking, no facial asymmetry, tongue midline     Motor - poor/delayed command following; RUE 4/5, LUE at least antigravity, BLE 4/5  Psychiatric - calm affect    RECENT LABS:                      10.5   9.24  )-----------( 300      ( 13 Feb 2025 06:19 )             31.2     02-13    143  |  105  |  19  ----------------------------<  124[H]  4.0   |  28  |  0.75    Ca    9.5      13 Feb 2025 06:19    TPro  7.5  /  Alb  2.5[L]  /  TBili  0.4  /  DBili  x   /  AST  29  /  ALT  55[H]  /  AlkPhos  98  02-13    LIVER FUNCTIONS - ( 13 Feb 2025 06:19 )  Alb: 2.5 g/dL / Pro: 7.5 g/dL / ALK PHOS: 98 U/L / ALT: 55 U/L / AST: 29 U/L / GGT: x           Allergies  doxycycline (Angioedema)  clindamycin (Angioedema)  penicillin (Unknown)  linezolid (Angioedema)    MEDICATIONS  (STANDING):  albuterol/ipratropium for Nebulization 3 milliLiter(s) Nebulizer every 8 hours  amantadine Syrup 100 milliGRAM(s) Oral <User Schedule>  artificial tears (preservative free) Ophthalmic Solution 1 Drop(s) Both EYES four times a day  ascorbic acid 500 milliGRAM(s) Oral daily  enoxaparin Injectable 40 milliGRAM(s) SubCutaneous every 24 hours  hydroxyurea 500 milliGRAM(s) Oral two times a day  levETIRAcetam  Solution 500 milliGRAM(s) Oral every 12 hours  metoprolol tartrate 12.5 milliGRAM(s) Oral every 8 hours  multivitamin/minerals/iron Oral Solution (CENTRUM) 15 milliLiter(s) Oral daily  nystatin    Suspension 177909 Unit(s) Oral three times a day  pantoprazole   Suspension 40 milliGRAM(s) Oral before breakfast  polyethylene glycol 3350 17 Gram(s) Oral daily  senna 1 Tablet(s) Oral daily    MEDICATIONS  (PRN):  acetaminophen   Oral Liquid .. 650 milliGRAM(s) Oral every 6 hours PRN Temp greater or equal to 38C (100.4F), Mild Pain (1 - 3)  bisacodyl Suppository 10 milliGRAM(s) Rectal daily PRN Constipation

## 2025-02-15 PROCEDURE — 99232 SBSQ HOSP IP/OBS MODERATE 35: CPT

## 2025-02-15 RX ADMIN — Medication 100 MILLIGRAM(S): at 06:10

## 2025-02-15 RX ADMIN — METOPROLOL SUCCINATE 12.5 MILLIGRAM(S): 50 TABLET, EXTENDED RELEASE ORAL at 05:46

## 2025-02-15 RX ADMIN — HYDROXYUREA 500 MILLIGRAM(S): 500 CAPSULE ORAL at 06:10

## 2025-02-15 RX ADMIN — Medication 1 DROP(S): at 23:29

## 2025-02-15 RX ADMIN — IPRATROPIUM BROMIDE AND ALBUTEROL SULFATE 3 MILLILITER(S): .5; 2.5 SOLUTION RESPIRATORY (INHALATION) at 15:09

## 2025-02-15 RX ADMIN — Medication 81 MILLIGRAM(S): at 11:21

## 2025-02-15 RX ADMIN — Medication 1 DROP(S): at 05:46

## 2025-02-15 RX ADMIN — IPRATROPIUM BROMIDE AND ALBUTEROL SULFATE 3 MILLILITER(S): .5; 2.5 SOLUTION RESPIRATORY (INHALATION) at 20:42

## 2025-02-15 RX ADMIN — IPRATROPIUM BROMIDE AND ALBUTEROL SULFATE 3 MILLILITER(S): .5; 2.5 SOLUTION RESPIRATORY (INHALATION) at 05:48

## 2025-02-15 RX ADMIN — Medication 500 MILLIGRAM(S): at 11:21

## 2025-02-15 RX ADMIN — Medication 100 MILLIGRAM(S): at 13:11

## 2025-02-15 RX ADMIN — Medication 1 DROP(S): at 11:21

## 2025-02-15 RX ADMIN — LEVETIRACETAM 500 MILLIGRAM(S): 10 INJECTION, SOLUTION INTRAVENOUS at 05:46

## 2025-02-15 RX ADMIN — Medication 1 DROP(S): at 18:10

## 2025-02-15 RX ADMIN — METOPROLOL SUCCINATE 12.5 MILLIGRAM(S): 50 TABLET, EXTENDED RELEASE ORAL at 21:24

## 2025-02-15 RX ADMIN — Medication 500000 UNIT(S): at 21:24

## 2025-02-15 RX ADMIN — ENOXAPARIN SODIUM 40 MILLIGRAM(S): 100 INJECTION SUBCUTANEOUS at 05:46

## 2025-02-15 RX ADMIN — METOPROLOL SUCCINATE 12.5 MILLIGRAM(S): 50 TABLET, EXTENDED RELEASE ORAL at 13:11

## 2025-02-15 RX ADMIN — Medication 15 MILLILITER(S): at 11:21

## 2025-02-15 RX ADMIN — LEVETIRACETAM 500 MILLIGRAM(S): 10 INJECTION, SOLUTION INTRAVENOUS at 18:09

## 2025-02-15 RX ADMIN — HYDROXYUREA 500 MILLIGRAM(S): 500 CAPSULE ORAL at 18:10

## 2025-02-15 RX ADMIN — Medication 500000 UNIT(S): at 05:46

## 2025-02-15 RX ADMIN — Medication 40 MILLIGRAM(S): at 06:10

## 2025-02-15 RX ADMIN — Medication 500000 UNIT(S): at 13:11

## 2025-02-15 NOTE — PROGRESS NOTE ADULT - SUBJECTIVE AND OBJECTIVE BOX
Cc: Gait dysfunction 2/2 cva    HPI: Patient with no new medical issues today.  decannulated yesterday.    Pain controlled, no chest pain, no N/V, no Fevers/Chills. No other new ROS  Has been tolerating rehabilitation program.    acetaminophen   Oral Liquid .. 650 milliGRAM(s) Oral every 6 hours PRN  albuterol/ipratropium for Nebulization 3 milliLiter(s) Nebulizer every 8 hours  amantadine Syrup 100 milliGRAM(s) Oral <User Schedule>  artificial tears (preservative free) Ophthalmic Solution 1 Drop(s) Both EYES four times a day  ascorbic acid 500 milliGRAM(s) Oral daily  aspirin  chewable 81 milliGRAM(s) Oral daily  bisacodyl Suppository 10 milliGRAM(s) Rectal daily PRN  enoxaparin Injectable 40 milliGRAM(s) SubCutaneous every 24 hours  hydroxyurea 500 milliGRAM(s) Oral two times a day  levETIRAcetam  Solution 500 milliGRAM(s) Oral every 12 hours  metoprolol tartrate 12.5 milliGRAM(s) Oral every 8 hours  multivitamin/minerals/iron Oral Solution (CENTRUM) 15 milliLiter(s) Oral daily  nystatin    Suspension 655696 Unit(s) Oral three times a day  pantoprazole   Suspension 40 milliGRAM(s) Oral before breakfast  polyethylene glycol 3350 17 Gram(s) Oral daily  senna 1 Tablet(s) Oral daily      T(C): 36.9 (02-15-25 @ 09:15), Max: 37 (02-14-25 @ 20:13)  HR: 102 (02-15-25 @ 09:15) (98 - 102)  BP: 136/89 (02-15-25 @ 09:15) (112/79 - 136/89)  RR: 16 (02-15-25 @ 09:15) (15 - 16)  SpO2: 96% (02-15-25 @ 09:15) (96% - 96%)    In NAD  HEENT-trach decannulated, dressing over stoma intact  Heart- RRR, S1S2  Lungs- no respiratory distress  Abd- + peg  Ext- No calf pain  Neuro- LUE 3/5 at elbow        Imp: Patient with diagnosis of b/l cva admitted for comprehensive acute rehabilitation.    Plan:  - Continue therapies  - DVT prophylaxis- lovenox  - Skin- Turn q2h, check skin daily  - Continue current medications; patient medically stable.   - Patient is stable to continue current rehabilitation program.

## 2025-02-15 NOTE — PROGRESS NOTE ADULT - SUBJECTIVE AND OBJECTIVE BOX
Follow-up Pulmonary Progress Note  Chief Complaint : Encephalopathy      pt decanulated yesterday  doing well today  stoma closed        Allergies :doxycycline (Angioedema)  clindamycin (Angioedema)  penicillin (Unknown)  linezolid (Angioedema)      PAST MEDICAL & SURGICAL HISTORY:  Sickle-cell-hemoglobin C disease with crisis    Essential hypertension    Sickle cell disease    HTN (hypertension)    H/O:     H/O abdominoplasty    S/P breast augmentation    S/P         Medications:  MEDICATIONS  (STANDING):  albuterol/ipratropium for Nebulization 3 milliLiter(s) Nebulizer every 8 hours  amantadine Syrup 100 milliGRAM(s) Oral <User Schedule>  artificial tears (preservative free) Ophthalmic Solution 1 Drop(s) Both EYES four times a day  ascorbic acid 500 milliGRAM(s) Oral daily  aspirin  chewable 81 milliGRAM(s) Oral daily  enoxaparin Injectable 40 milliGRAM(s) SubCutaneous every 24 hours  hydroxyurea 500 milliGRAM(s) Oral two times a day  levETIRAcetam  Solution 500 milliGRAM(s) Oral every 12 hours  metoprolol tartrate 12.5 milliGRAM(s) Oral every 8 hours  multivitamin/minerals/iron Oral Solution (CENTRUM) 15 milliLiter(s) Oral daily  nystatin    Suspension 715614 Unit(s) Oral three times a day  pantoprazole   Suspension 40 milliGRAM(s) Oral before breakfast  polyethylene glycol 3350 17 Gram(s) Oral daily  senna 1 Tablet(s) Oral daily    MEDICATIONS  (PRN):  acetaminophen   Oral Liquid .. 650 milliGRAM(s) Oral every 6 hours PRN Temp greater or equal to 38C (100.4F), Mild Pain (1 - 3)  bisacodyl Suppository 10 milliGRAM(s) Rectal daily PRN Constipation      Antibiotics History  nystatin    Suspension 792874 Unit(s) Oral three times a day, 25 @ 10:19      Heme Medications   aspirin  chewable 81 milliGRAM(s) Oral daily, 25 @ 14:25  enoxaparin Injectable 40 milliGRAM(s) SubCutaneous every 24 hours, 25 @ 21:02      GI Medications  bisacodyl Suppository 10 milliGRAM(s) Rectal daily, 25 @ 10:50, Routine PRN  pantoprazole   Suspension 40 milliGRAM(s) Oral before breakfast, 25 @ 00:00, Routine  polyethylene glycol 3350 17 Gram(s) Oral daily, 25 @ 21:02,   senna 1 Tablet(s) Oral daily, 25 @ 00:00, Routine           VITALS:  T(C): 36.9 (02-15-25 @ 09:15), Max: 37 (25 @ 20:13)  T(F): 98.4 (02-15-25 @ 09:15), Max: 98.6 (25 @ 20:13)  HR: 105 (02-15-25 @ 13:18) (98 - 105)  BP: 127/84 (02-15-25 @ 13:18) (112/79 - 136/89)  BP(mean): --  ABP: --  ABP(mean): --  RR: 15 (02-15-25 @ 13:18) (15 - 16)  SpO2: 99% (02-15-25 @ 13:18) (96% - 99%)  CVP(mm Hg): --  CVP(cm H2O): --    Ins and Outs     25 @ 07:01  -  02-15-25 @ 07:00  --------------------------------------------------------  IN: 1440 mL / OUT: 0 mL / NET: 1440 mL                I&O's Detail    2025 07:01  -  15 Feb 2025 07:00  --------------------------------------------------------  IN:    Free Water: 900 mL    Miscellaneous Tube Feedin mL  Total IN: 1440 mL    OUT:  Total OUT: 0 mL    Total NET: 1440 mL

## 2025-02-16 PROCEDURE — 99232 SBSQ HOSP IP/OBS MODERATE 35: CPT

## 2025-02-16 RX ORDER — ATORVASTATIN CALCIUM 80 MG/1
40 TABLET, FILM COATED ORAL AT BEDTIME
Refills: 0 | Status: DISCONTINUED | OUTPATIENT
Start: 2025-02-16 | End: 2025-02-24

## 2025-02-16 RX ADMIN — ATORVASTATIN CALCIUM 40 MILLIGRAM(S): 80 TABLET, FILM COATED ORAL at 21:12

## 2025-02-16 RX ADMIN — Medication 500 MILLIGRAM(S): at 11:23

## 2025-02-16 RX ADMIN — Medication 81 MILLIGRAM(S): at 11:23

## 2025-02-16 RX ADMIN — METOPROLOL SUCCINATE 12.5 MILLIGRAM(S): 50 TABLET, EXTENDED RELEASE ORAL at 21:12

## 2025-02-16 RX ADMIN — Medication 1 DROP(S): at 23:33

## 2025-02-16 RX ADMIN — Medication 100 MILLIGRAM(S): at 13:07

## 2025-02-16 RX ADMIN — LEVETIRACETAM 500 MILLIGRAM(S): 10 INJECTION, SOLUTION INTRAVENOUS at 17:31

## 2025-02-16 RX ADMIN — METOPROLOL SUCCINATE 12.5 MILLIGRAM(S): 50 TABLET, EXTENDED RELEASE ORAL at 05:10

## 2025-02-16 RX ADMIN — IPRATROPIUM BROMIDE AND ALBUTEROL SULFATE 3 MILLILITER(S): .5; 2.5 SOLUTION RESPIRATORY (INHALATION) at 15:08

## 2025-02-16 RX ADMIN — Medication 500000 UNIT(S): at 05:10

## 2025-02-16 RX ADMIN — Medication 15 MILLILITER(S): at 11:22

## 2025-02-16 RX ADMIN — Medication 100 MILLIGRAM(S): at 06:07

## 2025-02-16 RX ADMIN — Medication 500000 UNIT(S): at 13:09

## 2025-02-16 RX ADMIN — ENOXAPARIN SODIUM 40 MILLIGRAM(S): 100 INJECTION SUBCUTANEOUS at 05:09

## 2025-02-16 RX ADMIN — Medication 500000 UNIT(S): at 21:12

## 2025-02-16 RX ADMIN — HYDROXYUREA 500 MILLIGRAM(S): 500 CAPSULE ORAL at 17:31

## 2025-02-16 RX ADMIN — Medication 40 MILLIGRAM(S): at 06:08

## 2025-02-16 RX ADMIN — HYDROXYUREA 500 MILLIGRAM(S): 500 CAPSULE ORAL at 06:07

## 2025-02-16 RX ADMIN — Medication 1 DROP(S): at 17:31

## 2025-02-16 RX ADMIN — IPRATROPIUM BROMIDE AND ALBUTEROL SULFATE 3 MILLILITER(S): .5; 2.5 SOLUTION RESPIRATORY (INHALATION) at 10:29

## 2025-02-16 RX ADMIN — Medication 1 DROP(S): at 05:09

## 2025-02-16 RX ADMIN — Medication 1 DROP(S): at 11:22

## 2025-02-16 RX ADMIN — METOPROLOL SUCCINATE 12.5 MILLIGRAM(S): 50 TABLET, EXTENDED RELEASE ORAL at 13:06

## 2025-02-16 RX ADMIN — LEVETIRACETAM 500 MILLIGRAM(S): 10 INJECTION, SOLUTION INTRAVENOUS at 05:10

## 2025-02-16 NOTE — PROGRESS NOTE ADULT - SUBJECTIVE AND OBJECTIVE BOX
Cc: Gait dysfunction 2/2 cva    HPI: Patient with no new medical issues today.  Pain controlled, no chest pain, no N/V, no Fevers/Chills. No other new ROS  Has been tolerating rehabilitation program.    acetaminophen   Oral Liquid .. 650 milliGRAM(s) Oral every 6 hours PRN  albuterol/ipratropium for Nebulization 3 milliLiter(s) Nebulizer every 8 hours  amantadine Syrup 100 milliGRAM(s) Oral <User Schedule>  artificial tears (preservative free) Ophthalmic Solution 1 Drop(s) Both EYES four times a day  ascorbic acid 500 milliGRAM(s) Oral daily  aspirin  chewable 81 milliGRAM(s) Oral daily  bisacodyl Suppository 10 milliGRAM(s) Rectal daily PRN  enoxaparin Injectable 40 milliGRAM(s) SubCutaneous every 24 hours  hydroxyurea 500 milliGRAM(s) Oral two times a day  levETIRAcetam  Solution 500 milliGRAM(s) Oral every 12 hours  metoprolol tartrate 12.5 milliGRAM(s) Oral every 8 hours  multivitamin/minerals/iron Oral Solution (CENTRUM) 15 milliLiter(s) Oral daily  nystatin    Suspension 177819 Unit(s) Oral three times a day  pantoprazole   Suspension 40 milliGRAM(s) Oral before breakfast  polyethylene glycol 3350 17 Gram(s) Oral daily  senna 1 Tablet(s) Oral daily      T(C): 37.2 (02-16-25 @ 08:50), Max: 37.2 (02-16-25 @ 08:50)  HR: 97 (02-16-25 @ 10:10) (67 - 108)  BP: 103/71 (02-16-25 @ 08:50) (103/71 - 138/89)  RR: 16 (02-16-25 @ 08:50) (15 - 16)  SpO2: 97% (02-16-25 @ 10:10) (97% - 99%)    In NAD  HEENT-dressing over stoma intact  Heart- RRR, S1S2  Lungs- no respiratory distress  Abd- + peg  Ext- No calf pain  Neuro- unchanged        Imp: Patient with diagnosis of b/l cva admitted for comprehensive acute rehabilitation.    Plan:  - Continue therapies  - DVT prophylaxis- lovenox  - Skin- Turn q2h, check skin daily  - Continue current medications; patient medically stable.   - Patient is stable to continue current rehabilitation program.

## 2025-02-16 NOTE — PROGRESS NOTE ADULT - ASSESSMENT
59F with HTN, HFimpEF (EF 58%), sickle cell disease, recent hospitalization for sickle cell crisis followed by seizures requiring intubation / cardiogenic shock / ECMO / CVA, s/p trach and PEG, and now in acute rehab    #Multifocal strokes in setting of ECMO  #Aphasia due to above  #Dysphagia due to above  #PEG status  -PT/OT/SLP  -BP control - currently on Lopressor, monitor for orthostasis  -c/w Amantadine  -PEG feeds as tolerated  - continue ASA for secondary stroke prevention  - started atorvastatin 40mg po daily    #Possible seizure disorder, recent onset  -c/w Keppra    #History of respiratory failure requiring tracheostomy  #Tracheostomy status  -decannulated 2/14  - continue nebs/pulmicort    #Sickle cell disease  -c/w hydroxyurea     #Oral thrush  -c/w Nystatin    #DVT ppx: Lovenox

## 2025-02-16 NOTE — PROGRESS NOTE ADULT - SUBJECTIVE AND OBJECTIVE BOX
some SOB while working with PT otherwise no complaints      PHYSICAL EXAM:    Vital Signs Last 24 Hrs  T(F): 98.9 (16 Feb 2025 08:50), Max: 98.9 (16 Feb 2025 08:50)  HR: 97 (16 Feb 2025 10:10) (67 - 108)  BP: 103/71 (16 Feb 2025 08:50) (103/71 - 138/89)  RR: 16 (16 Feb 2025 08:50) (15 - 16)  SpO2: 97% (16 Feb 2025 10:10) (97% - 99%)  I&O's Summary    15 Feb 2025 07:01  -  16 Feb 2025 07:00  --------------------------------------------------------  IN: 840 mL / OUT: 0 mL / NET: 840 mL        GENERAL: NAD  HEENT: NCAT, trach incision c/d/i  CHEST/LUNG: No increased WOB, Clear to percussion bilaterally; No rales, rhonchi, wheezing  HEART: Regular rate and rhythm; No murmurs  ABDOMEN: Soft, Nontender, Nondistended; Bowel sounds present  MUSCULOSKELETAL/EXTREMITIES:  2+ Peripheral Pulses, No LE edema  PSYCH: Appropriate affect, Alert & Oriented    LABS:                        02-14 Chol 160 mg/dL LDL -- HDL 32 mg/dL Trig 121 mg/dL                      COVID-19 PCR: NotDetec (02-06-25 @ 21:11)      MEDICATIONS  (STANDING):  albuterol/ipratropium for Nebulization 3 milliLiter(s) Nebulizer every 8 hours  amantadine Syrup 100 milliGRAM(s) Oral <User Schedule>  artificial tears (preservative free) Ophthalmic Solution 1 Drop(s) Both EYES four times a day  ascorbic acid 500 milliGRAM(s) Oral daily  aspirin  chewable 81 milliGRAM(s) Oral daily  enoxaparin Injectable 40 milliGRAM(s) SubCutaneous every 24 hours  hydroxyurea 500 milliGRAM(s) Oral two times a day  levETIRAcetam  Solution 500 milliGRAM(s) Oral every 12 hours  metoprolol tartrate 12.5 milliGRAM(s) Oral every 8 hours  multivitamin/minerals/iron Oral Solution (CENTRUM) 15 milliLiter(s) Oral daily  nystatin    Suspension 929530 Unit(s) Oral three times a day  pantoprazole   Suspension 40 milliGRAM(s) Oral before breakfast  polyethylene glycol 3350 17 Gram(s) Oral daily  senna 1 Tablet(s) Oral daily    MEDICATIONS  (PRN):  acetaminophen   Oral Liquid .. 650 milliGRAM(s) Oral every 6 hours PRN Temp greater or equal to 38C (100.4F), Mild Pain (1 - 3)  bisacodyl Suppository 10 milliGRAM(s) Rectal daily PRN Constipation      Care Discussed with Consultants/Other Providers: Yes

## 2025-02-17 LAB
ALBUMIN SERPL ELPH-MCNC: 2.4 G/DL — LOW (ref 3.3–5)
ALP SERPL-CCNC: 99 U/L — SIGNIFICANT CHANGE UP (ref 40–120)
ALT FLD-CCNC: 64 U/L — HIGH (ref 10–45)
ANION GAP SERPL CALC-SCNC: 8 MMOL/L — SIGNIFICANT CHANGE UP (ref 5–17)
AST SERPL-CCNC: 38 U/L — SIGNIFICANT CHANGE UP (ref 10–40)
BILIRUB SERPL-MCNC: 0.4 MG/DL — SIGNIFICANT CHANGE UP (ref 0.2–1.2)
BUN SERPL-MCNC: 23 MG/DL — SIGNIFICANT CHANGE UP (ref 7–23)
CALCIUM SERPL-MCNC: 9 MG/DL — SIGNIFICANT CHANGE UP (ref 8.4–10.5)
CHLORIDE SERPL-SCNC: 103 MMOL/L — SIGNIFICANT CHANGE UP (ref 96–108)
CO2 SERPL-SCNC: 28 MMOL/L — SIGNIFICANT CHANGE UP (ref 22–31)
CREAT SERPL-MCNC: 0.85 MG/DL — SIGNIFICANT CHANGE UP (ref 0.5–1.3)
EGFR: 80 ML/MIN/1.73M2 — SIGNIFICANT CHANGE UP
GLUCOSE SERPL-MCNC: 102 MG/DL — HIGH (ref 70–99)
HCT VFR BLD CALC: 28.5 % — LOW (ref 34.5–45)
HGB BLD-MCNC: 9.9 G/DL — LOW (ref 11.5–15.5)
MCHC RBC-ENTMCNC: 31.1 PG — SIGNIFICANT CHANGE UP (ref 27–34)
MCHC RBC-ENTMCNC: 34.7 G/DL — SIGNIFICANT CHANGE UP (ref 32–36)
MCV RBC AUTO: 89.6 FL — SIGNIFICANT CHANGE UP (ref 80–100)
NRBC BLD AUTO-RTO: 1 /100 WBCS — HIGH (ref 0–0)
PLATELET # BLD AUTO: 430 K/UL — HIGH (ref 150–400)
POTASSIUM SERPL-MCNC: 4.3 MMOL/L — SIGNIFICANT CHANGE UP (ref 3.5–5.3)
POTASSIUM SERPL-SCNC: 4.3 MMOL/L — SIGNIFICANT CHANGE UP (ref 3.5–5.3)
PROT SERPL-MCNC: 7.3 G/DL — SIGNIFICANT CHANGE UP (ref 6–8.3)
RBC # BLD: 3.18 M/UL — LOW (ref 3.8–5.2)
RBC # FLD: 15.8 % — HIGH (ref 10.3–14.5)
SODIUM SERPL-SCNC: 139 MMOL/L — SIGNIFICANT CHANGE UP (ref 135–145)
WBC # BLD: 11.02 K/UL — HIGH (ref 3.8–10.5)
WBC # FLD AUTO: 11.02 K/UL — HIGH (ref 3.8–10.5)

## 2025-02-17 PROCEDURE — 99232 SBSQ HOSP IP/OBS MODERATE 35: CPT

## 2025-02-17 PROCEDURE — 93010 ELECTROCARDIOGRAM REPORT: CPT

## 2025-02-17 PROCEDURE — 71045 X-RAY EXAM CHEST 1 VIEW: CPT | Mod: 26

## 2025-02-17 RX ORDER — IPRATROPIUM BROMIDE AND ALBUTEROL SULFATE .5; 2.5 MG/3ML; MG/3ML
3 SOLUTION RESPIRATORY (INHALATION) EVERY 6 HOURS
Refills: 0 | Status: DISCONTINUED | OUTPATIENT
Start: 2025-02-17 | End: 2025-02-27

## 2025-02-17 RX ADMIN — Medication 500 MILLIGRAM(S): at 11:04

## 2025-02-17 RX ADMIN — Medication 1 TABLET(S): at 11:04

## 2025-02-17 RX ADMIN — Medication 81 MILLIGRAM(S): at 11:04

## 2025-02-17 RX ADMIN — Medication 1 DROP(S): at 00:00

## 2025-02-17 RX ADMIN — ENOXAPARIN SODIUM 40 MILLIGRAM(S): 100 INJECTION SUBCUTANEOUS at 05:27

## 2025-02-17 RX ADMIN — LEVETIRACETAM 500 MILLIGRAM(S): 10 INJECTION, SOLUTION INTRAVENOUS at 17:13

## 2025-02-17 RX ADMIN — LEVETIRACETAM 500 MILLIGRAM(S): 10 INJECTION, SOLUTION INTRAVENOUS at 05:28

## 2025-02-17 RX ADMIN — METOPROLOL SUCCINATE 12.5 MILLIGRAM(S): 50 TABLET, EXTENDED RELEASE ORAL at 05:52

## 2025-02-17 RX ADMIN — Medication 1 DROP(S): at 05:28

## 2025-02-17 RX ADMIN — ATORVASTATIN CALCIUM 40 MILLIGRAM(S): 80 TABLET, FILM COATED ORAL at 21:36

## 2025-02-17 RX ADMIN — Medication 500000 UNIT(S): at 14:24

## 2025-02-17 RX ADMIN — Medication 500000 UNIT(S): at 21:36

## 2025-02-17 RX ADMIN — Medication 1 DROP(S): at 17:13

## 2025-02-17 RX ADMIN — Medication 100 MILLIGRAM(S): at 14:24

## 2025-02-17 RX ADMIN — Medication 40 MILLIGRAM(S): at 06:08

## 2025-02-17 RX ADMIN — POLYETHYLENE GLYCOL 3350 17 GRAM(S): 17 POWDER, FOR SOLUTION ORAL at 11:05

## 2025-02-17 RX ADMIN — Medication 500000 UNIT(S): at 05:28

## 2025-02-17 RX ADMIN — Medication 100 MILLIGRAM(S): at 06:08

## 2025-02-17 RX ADMIN — HYDROXYUREA 500 MILLIGRAM(S): 500 CAPSULE ORAL at 06:08

## 2025-02-17 RX ADMIN — HYDROXYUREA 500 MILLIGRAM(S): 500 CAPSULE ORAL at 17:13

## 2025-02-17 RX ADMIN — Medication 15 MILLILITER(S): at 11:04

## 2025-02-17 RX ADMIN — Medication 1 DROP(S): at 11:05

## 2025-02-17 NOTE — PROGRESS NOTE ADULT - ASSESSMENT
Physical Examination:  GENERAL:               Alert, Oriented, No acute distress.    HEENT:                   No JVD, Moist MM Stoma closed  no stridor  PULM:                     Bilateral air entry, Clear to auscultation bilaterally, no significant sputum production, No Rales, No Rhonchi, No Wheezing  CVS:                         S1, S2,  No Murmur   NEURO:                  Alert, oriented, interactive,  follows commands  PSYC:                      Calm, + Insight.       Assessment  59 yr old woman  with  HTN, HFimpEF/NICM (EF 58% 10/2024), sickle cell disease, who presented to [facility] for SOB and SS crisis 12/30/25;, followed by witnessed seizure (?secondary to clonazepam withdrawal) requiring intubation. She was transferred to [facility] 1/3/25 secondary to cardiogenic shock/RV failure s/p ECMO +TRACH with #6 cuffless +PEG. Now admitted to [facility] after for initiation of a multidisciplinary rehab program consisting focused on functional mobility, transfers and ADLs (activities of daily living). Pulmonary eval requested for trach management       Plan  Decannulated on 2/14  can make nebs prn  may shower  reconsult pulm as needed    rest of care as per primary team

## 2025-02-17 NOTE — PROGRESS NOTE ADULT - SUBJECTIVE AND OBJECTIVE BOX
Cc: Gait dysfunction 2/2 cva    HPI: seen this morning with no complaints.  Patient tachy with OT today, ekg ordered.  Pain controlled, no chest pain, no N/V, no Fevers/Chills. No other new ROS  Has been tolerating rehabilitation program.    acetaminophen   Oral Liquid .. 650 milliGRAM(s) Oral every 6 hours PRN  albuterol/ipratropium for Nebulization 3 milliLiter(s) Nebulizer every 8 hours  amantadine Syrup 100 milliGRAM(s) Oral <User Schedule>  artificial tears (preservative free) Ophthalmic Solution 1 Drop(s) Both EYES four times a day  ascorbic acid 500 milliGRAM(s) Oral daily  aspirin  chewable 81 milliGRAM(s) Oral daily  atorvastatin 40 milliGRAM(s) Oral at bedtime  bisacodyl Suppository 10 milliGRAM(s) Rectal daily PRN  enoxaparin Injectable 40 milliGRAM(s) SubCutaneous every 24 hours  hydroxyurea 500 milliGRAM(s) Oral two times a day  levETIRAcetam  Solution 500 milliGRAM(s) Oral every 12 hours  metoprolol tartrate 12.5 milliGRAM(s) Oral every 8 hours  multivitamin/minerals/iron Oral Solution (CENTRUM) 15 milliLiter(s) Oral daily  nystatin    Suspension 955328 Unit(s) Oral three times a day  pantoprazole   Suspension 40 milliGRAM(s) Oral before breakfast  polyethylene glycol 3350 17 Gram(s) Oral daily  senna 1 Tablet(s) Oral daily      T(C): 36.3 (02-17-25 @ 07:15), Max: 36.8 (02-16-25 @ 13:17)  HR: 101 (02-17-25 @ 07:15) (98 - 115)  BP: 124/88 (02-17-25 @ 07:15) (97/63 - 124/88)  RR: 16 (02-17-25 @ 07:15) (15 - 16)  SpO2: 98% (02-17-25 @ 07:15) (97% - 98%)      In NAD  HEENT-stoma healed at prior trach site  Heart- mild tachycardia  Lungs- no respiratory distress  Abd- + peg  Ext- No calf pain  Neuro- unchanged        Imp: Patient with diagnosis of b/l cva admitted for comprehensive acute rehabilitation.    Plan:  - Continue therapies  - DVT prophylaxis- lovenox  - Skin- Turn q2h, check skin daily  - Continue current medications; patient medically stable.   - Patient is stable to continue current rehabilitation program.

## 2025-02-17 NOTE — PROGRESS NOTE ADULT - ASSESSMENT
59F with HTN, HFimpEF (EF 58%), sickle cell disease, recent hospitalization for sickle cell crisis followed by seizures requiring intubation / cardiogenic shock / ECMO / CVA, s/p trach and PEG, and now in acute rehab    #exertional SOB  - may be related to debility  - EKG with sinus tachycardia  - obtain CXR  - on lovenox for DVT ppx.    #Multifocal strokes in setting of ECMO  #Aphasia due to above, improved.  #Dysphagia due to above  #PEG status  -PT/OT/SLP  -BP control - currently on Lopressor, monitor for orthostasis  -c/w Amantadine  -PEG feeds as tolerated  - continue ASA for secondary stroke prevention  - started atorvastatin 40mg po daily    #Possible seizure disorder, recent onset  -c/w Keppra    #History of respiratory failure requiring tracheostomy  #Tracheostomy status  -decannulated 2/14  - continue nebs/pulmicort    #Sickle cell disease  -c/w hydroxyurea     #Oral thrush  -c/w Nystatin    #DVT ppx: Lovenox

## 2025-02-17 NOTE — PROGRESS NOTE ADULT - SUBJECTIVE AND OBJECTIVE BOX
Follow-up Pulmonary Progress Note  Chief Complaint : Encephalopathy        pt seen and examined  comfortable  stoma closed   no resp complaitns        Allergies :doxycycline (Angioedema)  clindamycin (Angioedema)  penicillin (Unknown)  linezolid (Angioedema)      PAST MEDICAL & SURGICAL HISTORY:  Sickle-cell-hemoglobin C disease with crisis    Essential hypertension    Sickle cell disease    HTN (hypertension)    H/O:     H/O abdominoplasty    S/P breast augmentation    S/P         Medications:  MEDICATIONS  (STANDING):  albuterol/ipratropium for Nebulization 3 milliLiter(s) Nebulizer every 8 hours  amantadine Syrup 100 milliGRAM(s) Oral <User Schedule>  artificial tears (preservative free) Ophthalmic Solution 1 Drop(s) Both EYES four times a day  ascorbic acid 500 milliGRAM(s) Oral daily  aspirin  chewable 81 milliGRAM(s) Oral daily  atorvastatin 40 milliGRAM(s) Oral at bedtime  enoxaparin Injectable 40 milliGRAM(s) SubCutaneous every 24 hours  hydroxyurea 500 milliGRAM(s) Oral two times a day  levETIRAcetam  Solution 500 milliGRAM(s) Oral every 12 hours  metoprolol tartrate 12.5 milliGRAM(s) Oral every 8 hours  multivitamin/minerals/iron Oral Solution (CENTRUM) 15 milliLiter(s) Oral daily  nystatin    Suspension 251997 Unit(s) Oral three times a day  pantoprazole   Suspension 40 milliGRAM(s) Oral before breakfast  polyethylene glycol 3350 17 Gram(s) Oral daily  senna 1 Tablet(s) Oral daily    MEDICATIONS  (PRN):  acetaminophen   Oral Liquid .. 650 milliGRAM(s) Oral every 6 hours PRN Temp greater or equal to 38C (100.4F), Mild Pain (1 - 3)  bisacodyl Suppository 10 milliGRAM(s) Rectal daily PRN Constipation      Antibiotics History  nystatin    Suspension 589394 Unit(s) Oral three times a day, 25 @ 10:19      Heme Medications   aspirin  chewable 81 milliGRAM(s) Oral daily, 25 @ 14:25  enoxaparin Injectable 40 milliGRAM(s) SubCutaneous every 24 hours, 25 @ 21:02      GI Medications  bisacodyl Suppository 10 milliGRAM(s) Rectal daily, 25 @ 10:50, Routine PRN  pantoprazole   Suspension 40 milliGRAM(s) Oral before breakfast, 25 @ 00:00, Routine  polyethylene glycol 3350 17 Gram(s) Oral daily, 25 @ 21:02,   senna 1 Tablet(s) Oral daily, 25 @ 00:00, Routine        LABS:                        9.9    11.02 )-----------( 430      ( 2025 05:42 )             28.5         139  |  103  |  23  ----------------------------<  102[H]  4.3   |  28  |  0.85    Ca    9.0      2025 05:42    TPro  7.3  /  Alb  2.4[L]  /  TBili  0.4  /  DBili  x   /  AST  38  /  ALT  64[H]  /  AlkPhos  99                  VITALS:  T(C): 36.3 (25 @ 07:15), Max: 36.7 (25 @ 19:28)  T(F): 97.4 (25 @ 07:15), Max: 98 (25 @ 19:28)  HR: 105 (25 @ 14:21) (98 - 105)  BP: 107/73 (25 @ 14:21) (97/63 - 124/88)  BP(mean): --  ABP: --  ABP(mean): --  RR: 16 (25 @ 07:15) (15 - 16)  SpO2: 98% (25 @ 14:07) (98% - 98%)  CVP(mm Hg): --  CVP(cm H2O): --    Ins and Outs     25 @ 07:01  -  25 @ 07:00  --------------------------------------------------------  IN: 540 mL / OUT: 0 mL / NET: 540 mL                I&O's Detail    2025 07:01  -  2025 07:00  --------------------------------------------------------  IN:    Miscellaneous Tube Feedin mL  Total IN: 540 mL    OUT:  Total OUT: 0 mL    Total NET: 540 mL

## 2025-02-17 NOTE — PROGRESS NOTE ADULT - SUBJECTIVE AND OBJECTIVE BOX
some SOB with PT today.      PHYSICAL EXAM:    Vital Signs Last 24 Hrs  T(F): 97.4 (17 Feb 2025 07:15), Max: 98 (16 Feb 2025 19:28)  HR: 101 (17 Feb 2025 07:15) (98 - 105)  BP: 124/88 (17 Feb 2025 07:15) (97/63 - 124/88)  RR: 16 (17 Feb 2025 07:15) (15 - 16)  SpO2: 98% (17 Feb 2025 07:15) (98% - 98%)  I&O's Summary    16 Feb 2025 07:01  -  17 Feb 2025 07:00  --------------------------------------------------------  IN: 540 mL / OUT: 0 mL / NET: 540 mL        GENERAL: NAD  HEENT: NCAT  CHEST/LUNG: No increased WOB, Clear to percussion bilaterally; No rales, rhonchi, wheezing  HEART: Regular rate and rhythm; No murmurs  ABDOMEN: Soft, Nontender, Nondistended; Bowel sounds present  MUSCULOSKELETAL/EXTREMITIES:  2+ Peripheral Pulses, No LE edema  PSYCH: Appropriate affect, Alert & Oriented    LABS:                        9.9    11.02 )-----------( 430      ( 17 Feb 2025 05:42 )             28.5       02-17    139  |  103  |  23  ----------------------------<  102  4.3   |  28  |  0.85    Ca    9.0      17 Feb 2025 05:42    TPro  7.3  /  Alb  2.4  /  TBili  0.4  /  DBili  x   /  AST  38  /  ALT  64  /  AlkPhos  99  02-17                02-14 Chol 160 mg/dL LDL -- HDL 32 mg/dL Trig 121 mg/dL                  Urinalysis Basic - ( 17 Feb 2025 05:42 )    Color: x / Appearance: x / SG: x / pH: x  Gluc: 102 mg/dL / Ketone: x  / Bili: x / Urobili: x   Blood: x / Protein: x / Nitrite: x   Leuk Esterase: x / RBC: x / WBC x   Sq Epi: x / Non Sq Epi: x / Bacteria: x        COVID-19 PCR: NotDetec (02-06-25 @ 21:11)      MEDICATIONS  (STANDING):  albuterol/ipratropium for Nebulization 3 milliLiter(s) Nebulizer every 8 hours  amantadine Syrup 100 milliGRAM(s) Oral <User Schedule>  artificial tears (preservative free) Ophthalmic Solution 1 Drop(s) Both EYES four times a day  ascorbic acid 500 milliGRAM(s) Oral daily  aspirin  chewable 81 milliGRAM(s) Oral daily  atorvastatin 40 milliGRAM(s) Oral at bedtime  enoxaparin Injectable 40 milliGRAM(s) SubCutaneous every 24 hours  hydroxyurea 500 milliGRAM(s) Oral two times a day  levETIRAcetam  Solution 500 milliGRAM(s) Oral every 12 hours  metoprolol tartrate 12.5 milliGRAM(s) Oral every 8 hours  multivitamin/minerals/iron Oral Solution (CENTRUM) 15 milliLiter(s) Oral daily  nystatin    Suspension 202431 Unit(s) Oral three times a day  pantoprazole   Suspension 40 milliGRAM(s) Oral before breakfast  polyethylene glycol 3350 17 Gram(s) Oral daily  senna 1 Tablet(s) Oral daily    MEDICATIONS  (PRN):  acetaminophen   Oral Liquid .. 650 milliGRAM(s) Oral every 6 hours PRN Temp greater or equal to 38C (100.4F), Mild Pain (1 - 3)  bisacodyl Suppository 10 milliGRAM(s) Rectal daily PRN Constipation      Care Discussed with Consultants/Other Providers: Yes

## 2025-02-18 PROCEDURE — 99233 SBSQ HOSP IP/OBS HIGH 50: CPT | Mod: GC

## 2025-02-18 PROCEDURE — 99232 SBSQ HOSP IP/OBS MODERATE 35: CPT

## 2025-02-18 PROCEDURE — 74230 X-RAY XM SWLNG FUNCJ C+: CPT | Mod: 26

## 2025-02-18 RX ADMIN — Medication 15 MILLILITER(S): at 12:20

## 2025-02-18 RX ADMIN — Medication 1 DROP(S): at 12:20

## 2025-02-18 RX ADMIN — LEVETIRACETAM 500 MILLIGRAM(S): 10 INJECTION, SOLUTION INTRAVENOUS at 17:24

## 2025-02-18 RX ADMIN — Medication 40 MILLIGRAM(S): at 05:48

## 2025-02-18 RX ADMIN — Medication 100 MILLIGRAM(S): at 14:35

## 2025-02-18 RX ADMIN — Medication 1 TABLET(S): at 17:24

## 2025-02-18 RX ADMIN — Medication 1 DROP(S): at 17:24

## 2025-02-18 RX ADMIN — Medication 1 DROP(S): at 05:49

## 2025-02-18 RX ADMIN — Medication 100 MILLIGRAM(S): at 05:47

## 2025-02-18 RX ADMIN — Medication 500000 UNIT(S): at 05:48

## 2025-02-18 RX ADMIN — METOPROLOL SUCCINATE 12.5 MILLIGRAM(S): 50 TABLET, EXTENDED RELEASE ORAL at 21:44

## 2025-02-18 RX ADMIN — ENOXAPARIN SODIUM 40 MILLIGRAM(S): 100 INJECTION SUBCUTANEOUS at 05:46

## 2025-02-18 RX ADMIN — ATORVASTATIN CALCIUM 40 MILLIGRAM(S): 80 TABLET, FILM COATED ORAL at 21:44

## 2025-02-18 RX ADMIN — HYDROXYUREA 500 MILLIGRAM(S): 500 CAPSULE ORAL at 05:47

## 2025-02-18 RX ADMIN — HYDROXYUREA 500 MILLIGRAM(S): 500 CAPSULE ORAL at 17:27

## 2025-02-18 RX ADMIN — Medication 1 DROP(S): at 23:06

## 2025-02-18 RX ADMIN — Medication 500 MILLIGRAM(S): at 12:20

## 2025-02-18 RX ADMIN — Medication 81 MILLIGRAM(S): at 12:20

## 2025-02-18 RX ADMIN — METOPROLOL SUCCINATE 12.5 MILLIGRAM(S): 50 TABLET, EXTENDED RELEASE ORAL at 14:35

## 2025-02-18 RX ADMIN — LEVETIRACETAM 500 MILLIGRAM(S): 10 INJECTION, SOLUTION INTRAVENOUS at 05:47

## 2025-02-18 RX ADMIN — POLYETHYLENE GLYCOL 3350 17 GRAM(S): 17 POWDER, FOR SOLUTION ORAL at 17:24

## 2025-02-18 NOTE — PROGRESS NOTE ADULT - NS ATTEND AMEND GEN_ALL_CORE FT
I have personally seen and examined the patient.  I fully participated in the care of this patient.  I have made amendments to the documentation where necessary, and agree with the history, physical exam, and plan as documented above  patient seen while in the , prior to going to [facility], denies pain. slept. Impaired command following but improved compared to previous.   labs reviewed. WBC 11  decannulated on friday, doing well. appreciate pulm followup  patient diet upgraded to regular from [facility]-- discussed with speech  Total time 50 mins-face to face time encounter and counseling the patient, meeting with hospitalist, nursing staff, therapists and social work to discuss medical updates and management, care coordination, reviewing chart and data

## 2025-02-18 NOTE — PROGRESS NOTE ADULT - ASSESSMENT
59y with PMH, HTN, HFimpEF/NICM (EF 58% 10/2024), sickle cell disease, who presented to [facility] for SOB and SS crisis 12/30/25;, followed by witnessed seizure (? secondary to clonazepam withdrawal) requiring intubation. She was transferred to [facility] 1/3/25 secondary to cardiogenic shock /RV failure s/p ECMO +TRACH +PEG.   Now admitted to [facility] after for initiation of a multidisciplinary rehab program consisting focused on functional mobility, transfers and ADLs (activities of daily living).    # Bilateral CVA, bilateral body involvement left > right, left kelsi inattention, aphasia, respiratory failure, dysphagia  - MRI 1/9: Innumerable foci susceptibility artifact scattered throughout the brain which can be seen with critically ill patients on ECMO. Diffuse fat embolization is also part of the differential diagnosis but is considered less likely. Tiny acute/subacute infarcts within the bilateral basal ganglia, thalami, and chandu with disproportionate ovoid area of T2/FLAIR prolongation in the right ventral thalamus. Findings may indicate the presence of embolic acute/subacute infarcts.  - ASA 81 mg  (restarted 2/13)  - Started Atorvastatin 40 ()   - continue Comprehensive Multidisciplinary Rehab Program: 3 hours a day (1  hr PT, 1 hr OT, 1 hr SLP), 5 days a week.  - Precautions: respiratory, aspiration, SZ, AMS, fall, PEG    # Hypoarousal  - LP (1/14) - negative for meningitis/encephalitis  - c/w Amantadine 100 BID    # seizure  - EEG (1/7) with suspicion of seizure  - Seizure ppx: Keppra 500 BID     # low grade fever T m 100.5 (2/8)  - discussed with hospitalist. No obvious source, asymptomatic, vitals stable, clinically well appearing  - Check CXR, UA, CBC, BMP, procal; RVP if fever persists  - temp <100*F, WBC slight uptrend >11.33 (2/7)>11.36 (2/10)  - afebrile, WBC normal 9.24 (2/13)> 11.02 (2/17) > no fever or overt sign of infection; continue to trend    # Acute respiratory failure  - S/p Trach (1/16). On 6.0 cuffless (1/29)  - MSSA s/p Abx  - Duoneb q8hrs  - pulmonary following-- appreciate recommendations  - capping during waking hours > decannulated 2/14**    # Cardiogenic Shock  - S/p ECMO 1/3. decannulated 1/7  - S/p 2 U PRBC, 2 platelet, 5U cryo  - ECHO 1/20: EF 53%, normal LV+RV function, no acute HF issues  - Metoprolol reduced 12.5 secondary to OH 2/7  - /76 - 128/92 (2/18)    # Orthostatic hypotension last week  -  SBP dropped to 62 during OT session 2/7, improved to SBP 98 supine  - Lopressor reduced  - DONALD stockings when out of bed  - has abd for PEG protection already    # Sickle cell disease   - 1-2 sickle pain crisis per yea  - s/p transfusions, apheresis (1/12) for fat emboli concerns   - Hydroxyurea 500 BID (as long as plt > 100 and hgb >8)  - Hematology consult PRN  - hgb 10.5 (2/13)>9.9 (2/18)    # Mild leukocytosis - resolved  - has had fluctuations between 9-12 recently,  - WBC 11.36 (2/10), no signs of infection  - WBC 9.24 (2/13)>11.02 (2/18)     # Transaminitis   - RUQ US 1/27: The gallbladder is moderately distended and contains layering sludge and possibly small stones. There is no significant gallbladder wall thickening or pericholecystic fluid and a sonographic Gross sign was not elicited. Therefore, there is no definitive evidence for acute cholecystitis.  - continues to down trend: Alk Phos/AST normal 98/29; ALT 55 (2/13)> ALT 64 (2/18)    # Thrush  - nystatin (2/7)    # Pain Management:  - Tylenol PRN    # GI/Bowel:  - Senna QHS, Miralax QD   - GI ppx: Protonix    # /Bladder:   - PVR q 8 hours (SC if > 400) > BS/PVR fluctuates, not needing straight cath   - incontinent     # Skin/Pressure Injury: healed DTI on admission.   - Skin assessment on admission: no active pressure injury > healed DTI to L ear lobe, b/l forehead, sacrum /bilateral buttocks, bilateral heels. +Incontinence Dermatitis  Recommend:  1.) topical therapy: sacral/buttock skin – cleanse with incontinence cleanser, pat dry, apply Piotr ointment BID and PRN for incontinent episodes. bilateral forehead - cleanse with gentle cleanser, leave open to air  2.) Incontinence Management - incontinence cleanser, pads, pericare BID  3.) Maintain on an alternating air with low air loss surface  4.) Turn and reposition Q 2 hours  5.) Nutrition optimization   6.) Offload heels/feet with complete cair air fluidized boots/pillows; ensure that the soles of the feet are not resting on the foot board of the bed.  7.) chair cushion for chair sitting    # Diet/Dysphagia:  - S/p PEG (1/21)  - FEEST (1/27) > silent laryngeal penetration of purees and moderately thick liquids is noted   - Current Diet: NPO + TF; free water   - Dysphagia: SLP consult for swallow function evaluation and treatment  - Health supp: Vitamin C + MVI    # DVT ppx:    - Lovenox 40 QD   - SCD  ---------------  Code Status/Emergency Contact: Full Code    Outpatient Follow-up:    Wu Parra  Thoracic and Cardiac Surgery  [address] [place] [id number]  Phone: [phone]  Fax: [phone]  Follow Up Time: 2 weeks    IDT 2/14  NSG: incont  SW: lives in  with daughter + son(special needs, has own aide), 2-3 DONNA, + ramp access; 18 steps up?  Support: daughter + pt's sibling  SLP: NPO + TF, tolerating capping > to be decannulated (2/14). adequate voicing, reduced orientation, mod aphasiz, is formulating sentences, improving attention to task  OT: total A eating (TF - MBS for 2/18), min-mod grooming, max A UBD/bathing, total A LBD/footwear/toileting/shower transfer, mod A toilet transfer.  Goal: min A with all ADLs  PT: max A bed mob, mod A transfer/amb 70' RW +WC follow, total A step up. Goal: min A  RT: consult requested  Team goals: WC level transfer to to toilet min A ; tolerate less restrictive diet  Barriers: cog, language, NPO, trach, limited support  TDD: 2/27 home vs JETT

## 2025-02-18 NOTE — PROGRESS NOTE ADULT - SUBJECTIVE AND OBJECTIVE BOX
Patient is a 59y old  Female who presents with a chief complaint of bilateral CVA     SUBJECTIVE/OBJECTIVE: Patient seen and evaluated while sitting up in WC at bedside > close friend also present.  Patient in good spirit, ready for MBS.  Overall uneventful weekend/overnight.     REVIEW OF SYSTEMS:  Constitutional: No fever or Chills  Pulm: no witnessed cough or resp distress  Cardio: No chest pain  GI/: incont, no dysuria or abd pain  Neuro: No headaches or dizziness, +weakness, +cog impairment   MSK: No pain    PHYSICAL EXAM  59y  Vital Signs Last 24 Hrs  T(C): 36.4 (02-18-25 @ 07:25), Max: 36.7 (02-18-25 @ 05:43)  T(F): 97.5 (02-18-25 @ 07:25), Max: 98 (02-18-25 @ 05:43)  HR: 106 (02-18-25 @ 07:25) (98 - 106)  BP: 128/92 (02-18-25 @ 07:25) (98/66 - 128/92)  RR: 16 (02-18-25 @ 07:25) (16 - 16)  SpO2: 98% (02-18-25 @ 07:25) (97% - 98%)    Constitutional - NAD, Comfortable  HEENT - trach stoma healed  Cardio - warm and well perfused, no cyanosis or pedal edema  Pulm - resp nonlabored  Abdomen -  Soft, NTND; +PEG site c/d/i  Extremities - No peripheral edema, No calf tenderness   Neurologic Exam:                    Cognitive -        Orientation: AAO to self and friend and hospital.  Didn't know year/month despite external cues and multiple choice. able to follow 1 step commands (delayed), answers limited questions (selectively/delay)     Speech - Hypophonic, impaired naming, delayed processing/response     Cranial Nerves - poor tracking, no facial asymmetry, tongue midline     Motor - poor/delayed command following; RUE 4/5, LUE at least antigravity, BLE 4/5  Psychiatric - calm affect, smiles appropriately    RECENT LABS:                           9.9    11.02 )-----------( 430      ( 17 Feb 2025 05:42 )             28.5     02-17    139  |  103  |  23  ----------------------------<  102[H]  4.3   |  28  |  0.85    Ca    9.0      17 Feb 2025 05:42    TPro  7.3  /  Alb  2.4[L]  /  TBili  0.4  /  DBili  x   /  AST  38  /  ALT  64[H]  /  AlkPhos  99  02-17    Allergies  doxycycline (Angioedema)  clindamycin (Angioedema)  penicillin (Unknown)  linezolid (Angioedema)    MEDICATIONS  (STANDING):  amantadine Syrup 100 milliGRAM(s) Oral <User Schedule>  artificial tears (preservative free) Ophthalmic Solution 1 Drop(s) Both EYES four times a day  ascorbic acid 500 milliGRAM(s) Oral daily  aspirin  chewable 81 milliGRAM(s) Oral daily  atorvastatin 40 milliGRAM(s) Oral at bedtime  enoxaparin Injectable 40 milliGRAM(s) SubCutaneous every 24 hours  hydroxyurea 500 milliGRAM(s) Oral two times a day  levETIRAcetam  Solution 500 milliGRAM(s) Oral every 12 hours  metoprolol tartrate 12.5 milliGRAM(s) Oral every 8 hours  multivitamin/minerals/iron Oral Solution (CENTRUM) 15 milliLiter(s) Oral daily  nystatin    Suspension 295650 Unit(s) Oral three times a day  pantoprazole   Suspension 40 milliGRAM(s) Oral before breakfast  polyethylene glycol 3350 17 Gram(s) Oral daily  senna 1 Tablet(s) Oral daily    MEDICATIONS  (PRN):  acetaminophen   Oral Liquid .. 650 milliGRAM(s) Oral every 6 hours PRN Temp greater or equal to 38C (100.4F), Mild Pain (1 - 3)  albuterol/ipratropium for Nebulization 3 milliLiter(s) Nebulizer every 6 hours PRN Shortness of Breath and/or Wheezing  bisacodyl Suppository 10 milliGRAM(s) Rectal daily PRN Constipation

## 2025-02-18 NOTE — PROGRESS NOTE ADULT - ASSESSMENT
59F with HTN, HFimpEF (EF 58%), sickle cell disease, recent hospitalization for sickle cell crisis followed by seizures requiring intubation / cardiogenic shock / ECMO / CVA, s/p trach and PEG, and now in acute rehab    labs 2/17 reviewed    #exertional SOB  - may be related to debility  - EKG with sinus tachycardia  - obtain CXR : reviewed, no focal consolidations noted on independent review  - on lovenox for DVT ppx.    #Multifocal strokes in setting of ECMO  #Aphasia due to above, improved.  #Dysphagia due to above  #PEG status  -PT/OT/SLP  -BP control - currently on Lopressor, monitor for orthostasis  -c/w Amantadine  -PEG feeds as tolerated  - continue ASA for secondary stroke prevention  - atorvastatin 40mg po daily    #Possible seizure disorder, recent onset  -c/w Keppra    #History of respiratory failure requiring tracheostomy  #Tracheostomy status  -decannulated 2/14  - continue nebs/pulmicort    #Sickle cell disease  -c/w hydroxyurea     #Oral thrush  -c/w Nystatin    #DVT ppx: Lovenox

## 2025-02-18 NOTE — PROGRESS NOTE ADULT - SUBJECTIVE AND OBJECTIVE BOX
no acute events overnight.      PHYSICAL EXAM:    Vital Signs Last 24 Hrs  T(F): 97.5 (18 Feb 2025 07:25), Max: 98 (18 Feb 2025 05:43)  HR: 106 (18 Feb 2025 07:25) (98 - 106)  BP: 128/92 (18 Feb 2025 07:25) (98/66 - 128/92)  RR: 16 (18 Feb 2025 07:25) (16 - 16)  SpO2: 98% (18 Feb 2025 07:25) (97% - 98%)  I&O's Summary      GENERAL: NAD  HEENT: NCAT  CHEST/LUNG: No increased WOB, Clear to percussion bilaterally; No rales, rhonchi, wheezing  HEART: Regular rate and rhythm; No murmurs  ABDOMEN: Soft, Nontender, Nondistended; Bowel sounds present  MUSCULOSKELETAL/EXTREMITIES:  2+ Peripheral Pulses, No LE edema  PSYCH: Appropriate affect, Alert & Oriented    LABS:                        9.9    11.02 )-----------( 430      ( 17 Feb 2025 05:42 )             28.5       02-17    139  |  103  |  23  ----------------------------<  102  4.3   |  28  |  0.85    Ca    9.0      17 Feb 2025 05:42    TPro  7.3  /  Alb  2.4  /  TBili  0.4  /  DBili  x   /  AST  38  /  ALT  64  /  AlkPhos  99  02-17                02-14 Chol 160 mg/dL LDL -- HDL 32 mg/dL Trig 121 mg/dL                  Urinalysis Basic - ( 17 Feb 2025 05:42 )    Color: x / Appearance: x / SG: x / pH: x  Gluc: 102 mg/dL / Ketone: x  / Bili: x / Urobili: x   Blood: x / Protein: x / Nitrite: x   Leuk Esterase: x / RBC: x / WBC x   Sq Epi: x / Non Sq Epi: x / Bacteria: x        COVID-19 PCR: NotDetec (02-06-25 @ 21:11)      MEDICATIONS  (STANDING):  amantadine Syrup 100 milliGRAM(s) Oral <User Schedule>  artificial tears (preservative free) Ophthalmic Solution 1 Drop(s) Both EYES four times a day  ascorbic acid 500 milliGRAM(s) Oral daily  aspirin  chewable 81 milliGRAM(s) Oral daily  atorvastatin 40 milliGRAM(s) Oral at bedtime  enoxaparin Injectable 40 milliGRAM(s) SubCutaneous every 24 hours  hydroxyurea 500 milliGRAM(s) Oral two times a day  levETIRAcetam  Solution 500 milliGRAM(s) Oral every 12 hours  metoprolol tartrate 12.5 milliGRAM(s) Oral every 8 hours  multivitamin/minerals/iron Oral Solution (CENTRUM) 15 milliLiter(s) Oral daily  pantoprazole   Suspension 40 milliGRAM(s) Oral before breakfast  polyethylene glycol 3350 17 Gram(s) Oral daily  senna 1 Tablet(s) Oral daily    MEDICATIONS  (PRN):  acetaminophen   Oral Liquid .. 650 milliGRAM(s) Oral every 6 hours PRN Temp greater or equal to 38C (100.4F), Mild Pain (1 - 3)  albuterol/ipratropium for Nebulization 3 milliLiter(s) Nebulizer every 6 hours PRN Shortness of Breath and/or Wheezing  bisacodyl Suppository 10 milliGRAM(s) Rectal daily PRN Constipation      Care Discussed with Consultants/Other Providers: Yes

## 2025-02-19 PROCEDURE — 99233 SBSQ HOSP IP/OBS HIGH 50: CPT | Mod: GC

## 2025-02-19 PROCEDURE — 99232 SBSQ HOSP IP/OBS MODERATE 35: CPT

## 2025-02-19 RX ADMIN — HYDROXYUREA 500 MILLIGRAM(S): 500 CAPSULE ORAL at 06:09

## 2025-02-19 RX ADMIN — Medication 100 MILLIGRAM(S): at 14:56

## 2025-02-19 RX ADMIN — LEVETIRACETAM 500 MILLIGRAM(S): 10 INJECTION, SOLUTION INTRAVENOUS at 17:04

## 2025-02-19 RX ADMIN — ENOXAPARIN SODIUM 40 MILLIGRAM(S): 100 INJECTION SUBCUTANEOUS at 05:48

## 2025-02-19 RX ADMIN — Medication 15 MILLILITER(S): at 11:23

## 2025-02-19 RX ADMIN — HYDROXYUREA 500 MILLIGRAM(S): 500 CAPSULE ORAL at 17:04

## 2025-02-19 RX ADMIN — Medication 1 DROP(S): at 17:05

## 2025-02-19 RX ADMIN — Medication 1 TABLET(S): at 11:24

## 2025-02-19 RX ADMIN — Medication 1 DROP(S): at 11:23

## 2025-02-19 RX ADMIN — Medication 100 MILLIGRAM(S): at 06:09

## 2025-02-19 RX ADMIN — METOPROLOL SUCCINATE 12.5 MILLIGRAM(S): 50 TABLET, EXTENDED RELEASE ORAL at 05:48

## 2025-02-19 RX ADMIN — Medication 500 MILLIGRAM(S): at 11:24

## 2025-02-19 RX ADMIN — LEVETIRACETAM 500 MILLIGRAM(S): 10 INJECTION, SOLUTION INTRAVENOUS at 05:48

## 2025-02-19 RX ADMIN — Medication 1 DROP(S): at 22:06

## 2025-02-19 RX ADMIN — Medication 1 DROP(S): at 05:48

## 2025-02-19 RX ADMIN — POLYETHYLENE GLYCOL 3350 17 GRAM(S): 17 POWDER, FOR SOLUTION ORAL at 11:23

## 2025-02-19 RX ADMIN — Medication 40 MILLIGRAM(S): at 05:48

## 2025-02-19 RX ADMIN — Medication 81 MILLIGRAM(S): at 11:24

## 2025-02-19 RX ADMIN — METOPROLOL SUCCINATE 12.5 MILLIGRAM(S): 50 TABLET, EXTENDED RELEASE ORAL at 14:55

## 2025-02-19 RX ADMIN — ATORVASTATIN CALCIUM 40 MILLIGRAM(S): 80 TABLET, FILM COATED ORAL at 22:04

## 2025-02-19 NOTE — PROGRESS NOTE ADULT - ASSESSMENT
59y with PMH, HTN, HFimpEF/NICM (EF 58% 10/2024), sickle cell disease, who presented to [facility] for SOB and SS crisis 12/30/25;, followed by witnessed seizure (? secondary to clonazepam withdrawal) requiring intubation. She was transferred to [facility] 1/3/25 secondary to cardiogenic shock /RV failure s/p ECMO +TRACH +PEG.   Now admitted to [facility] after for initiation of a multidisciplinary rehab program consisting focused on functional mobility, transfers and ADLs (activities of daily living).    # Bilateral CVA, bilateral body involvement left > right, left kelsi inattention, aphasia, respiratory failure, dysphagia  - MRI 1/9: Innumerable foci susceptibility artifact scattered throughout the brain which can be seen with critically ill patients on ECMO. Diffuse fat embolization is also part of the differential diagnosis but is considered less likely. Tiny acute/subacute infarcts within the bilateral basal ganglia, thalami, and chandu with disproportionate ovoid area of T2/FLAIR prolongation in the right ventral thalamus. Findings may indicate the presence of embolic acute/subacute infarcts.  - ASA 81 mg  (restarted 2/13)  - Started Atorvastatin 40 ()   - continue Comprehensive Multidisciplinary Rehab Program: 3 hours a day (1  hr PT, 1 hr OT, 1 hr SLP), 5 days a week.  - Precautions: respiratory, aspiration, SZ, AMS, fall, PEG    # Hypoarousal  - LP (1/14) - negative for meningitis/encephalitis  - c/w Amantadine 100 BID    # Seizure  - EEG (1/7) with suspicion of seizure  - Seizure ppx: Keppra 500 BID     # low grade fever T m 100.5 (2/8)  - discussed with hospitalist. No obvious source, asymptomatic, vitals stable, clinically well appearing  - Check CXR, UA, CBC, BMP, procal; RVP if fever persists  - temp <100*F, WBC slight uptrend >11.33 (2/7)>11.36 (2/10)  - afebrile, WBC normal 9.24 (2/13)> 11.02 (2/17) > no fever or overt sign of infection; continue to trend    # Acute respiratory failure  - S/p Trach (1/16). On 6.0 cuffless (1/29)  - MSSA s/p Abx  - Duoneb q8hrs  - pulmonary following-- appreciate recommendations  - capping during waking hours > decannulated 2/14---- saturating well on RA    # Cardiogenic Shock  - S/p ECMO 1/3. decannulated 1/7  - S/p 2 U PRBC, 2 platelet, 5U cryo  - ECHO 1/20: EF 53%, normal LV+RV function, no acute HF issues  - Metoprolol reduced 12.5 secondary to OH 2/7  - BP stable    # Orthostatic hypotension last week  -  SBP dropped to 62 during OT session 2/7, improved to SBP 98 supine  - Lopressor reduced  - DONALD stockings when out of bed  - has abd for PEG protection already    # Sickle cell disease   - 1-2 sickle pain crisis per yea  - s/p transfusions, apheresis (1/12) for fat emboli concerns   - Hydroxyurea 500 BID (as long as plt > 100 and hgb >8)  - Hematology consult PRN  - hgb 10.5 (2/13)>9.9 (2/17)    # Mild leukocytosis - resolved  - has had fluctuations between 9-12 recently,  - WBC 11.36 (2/10), no signs of infection  - WBC 9.24 (2/13)>11.02 (2/17)     # Transaminitis   - RUQ US 1/27: The gallbladder is moderately distended and contains layering sludge and possibly small stones. There is no significant gallbladder wall thickening or pericholecystic fluid and a sonographic Gross sign was not elicited. Therefore, there is no definitive evidence for acute cholecystitis.  - continues to down trend: Alk Phos/AST normal 98/29; ALT 55 (2/13)> ALT 64 (2/17)    # Thrush  - nystatin (2/7)    # Pain Management:  - Tylenol PRN    # GI/Bowel:  - Senna QHS, Miralax QD   - GI ppx: Protonix    # /Bladder:   - PVR q 8 hours (SC if > 400) > BS/PVR fluctuates, not needing straight cath   - incontinent     # Skin/Pressure Injury: healed DTI on admission.   - Skin assessment on admission: no active pressure injury > healed DTI to L ear lobe, b/l forehead, sacrum /bilateral buttocks, bilateral heels. +Incontinence Dermatitis  Recommend:  1.) topical therapy: sacral/buttock skin – cleanse with incontinence cleanser, pat dry, apply Piotr ointment BID and PRN for incontinent episodes. bilateral forehead - cleanse with gentle cleanser, leave open to air  2.) Incontinence Management - incontinence cleanser, pads, pericare BID  3.) Maintain on an alternating air with low air loss surface  4.) Turn and reposition Q 2 hours  5.) Nutrition optimization   6.) Offload heels/feet with complete cair air fluidized boots/pillows; ensure that the soles of the feet are not resting on the foot board of the bed.  7.) chair cushion for chair sitting    # Diet/Dysphagia:  - S/p PEG (1/21)  - FEEST (1/27) > silent laryngeal penetration of purees and moderately thick liquids is noted ----MBS yesterday--- diet upgraded to regular 2/18  - Current Diet: NPO + TF; free water   - Dysphagia: SLP consult for swallow function evaluation and treatment  - Health supp: Vitamin C + MVI    # DVT ppx:    - Lovenox 40 QD   - SCD  ---------------  Code Status/Emergency Contact: Full Code    Outpatient Follow-up:    Wu Parra  Thoracic and Cardiac Surgery  [address] [place] [id number]  Phone: [phone]  Fax: [phone]  Follow Up Time: 2 weeks    IDT 2/14  NSG: incont  SW: lives in  with daughter + son(special needs, has own aide), 2-3 DONNA, + ramp access; 18 steps up?  Support: daughter + pt's sibling  SLP: NPO + TF, tolerating capping > to be decannulated (2/14). adequate voicing, reduced orientation, mod aphasiz, is formulating sentences, improving attention to task  OT: total A eating (TF - MBS for 2/18), min-mod grooming, max A UBD/bathing, total A LBD/footwear/toileting/shower transfer, mod A toilet transfer.  Goal: min A with all ADLs  PT: max A bed mob, mod A transfer/amb 70' RW +WC follow, total A step up. Goal: min A  RT: consult requested  Team goals: WC level transfer to to toilet min A ; tolerate less restrictive diet  Barriers: cog, language, NPO, trach, limited support  TDD: 2/27 home vs JETT    Total time 50 mins-face to face time encounter and counseling the patient, meeting with hospitalist, nursing staff, therapists and social work to discuss medical updates and management, care coordination, reviewing chart and data

## 2025-02-19 NOTE — CHART NOTE - NSCHARTNOTEFT_GEN_A_CORE
Nutrition Follow Up Note  Source: Medical Record [X] Patient [x] Family [ ]         Diet: Regular, no straws, single sips  Pt was on tube feeding, now tolerating oral diet with fair PO intake, eating 50-75% of meals Per nursing flowsheets. Pt reports fair appetite. Receptive to adding Ensure Plus High Protein (provides 350 kcal, 20 g protein/serving) to current diet. Discussed other food preferences to optimize PO intake. Denies nausea, vomiting, diarrhea, constipation.     Enteral/Parenteral Nutrition: N/A    Current Weight: 160 lbs (2-6)    Pertinent Medications: MEDICATIONS  (STANDING):  amantadine Syrup 100 milliGRAM(s) Oral <User Schedule>  artificial tears (preservative free) Ophthalmic Solution 1 Drop(s) Both EYES four times a day  ascorbic acid 500 milliGRAM(s) Oral daily  aspirin  chewable 81 milliGRAM(s) Oral daily  atorvastatin 40 milliGRAM(s) Oral at bedtime  enoxaparin Injectable 40 milliGRAM(s) SubCutaneous every 24 hours  hydroxyurea 500 milliGRAM(s) Oral two times a day  levETIRAcetam  Solution 500 milliGRAM(s) Oral every 12 hours  metoprolol tartrate 12.5 milliGRAM(s) Oral every 8 hours  multivitamin/minerals/iron Oral Solution (CENTRUM) 15 milliLiter(s) Oral daily  pantoprazole   Suspension 40 milliGRAM(s) Oral before breakfast  polyethylene glycol 3350 17 Gram(s) Oral daily  senna 1 Tablet(s) Oral daily    MEDICATIONS  (PRN):  acetaminophen   Oral Liquid .. 650 milliGRAM(s) Oral every 6 hours PRN Temp greater or equal to 38C (100.4F), Mild Pain (1 - 3)  albuterol/ipratropium for Nebulization 3 milliLiter(s) Nebulizer every 6 hours PRN Shortness of Breath and/or Wheezing  bisacodyl Suppository 10 milliGRAM(s) Rectal daily PRN Constipation      Pertinent Labs:  02-17 Na139 mmol/L Glu 102 mg/dL[H] K+ 4.3 mmol/L Cr  0.85 mg/dL BUN 23 mg/dL 02-17 Alb 2.4 g/dL[L] 02-14 Chol 160 mg/dL LDL --    HDL 32 mg/dL[L] Trig 121 mg/dL        Skin: Skin intact per nursing flow sheets     Edema: No edema per nursing flow sheets     Last BM: on 2-19 Per nursing flowsheets     Estimated Needs:   [X] No Change since Previous Assessment  [ ] Recalculated:     Previous Nutrition Diagnosis:   1. Moderate malnutrition  2. Swallowing difficulties (resolved)    Nutrition Diagnosis is [X] Ongoing  -  recommend Ensure Plus High Protein (provides 350 kcal, 20 g protein/serving) daily       New Nutrition Diagnosis: [X] Not Applicable  [ ] Inadequate Protein Energy Intake   [ ] Inadequate Oral Intake   [ ] Excessive Energy Intake   [ ] Increased Nutrient Needs   [ ] Obesity   [ ] Altered GI Function   [ ] Unintended Weight Loss   [ ] Food & Nutrition Related Knowledge Deficit  [ ] Limited Adherence to nutrition related recommendations   [ ] Malnutrition      Interventions:   1. Recommend continuing with current diet, add Ensure Plus High Protein (provides 350 kcal, 20 g protein/serving)   2. Encourage PO intake  3. Obtain and honor food preferences as able      Monitoring & Evaluation:   [X] Weights   [X] PO Intake   [X] Follow Up (Per Protocol)  [X] Tolerance to Diet Prescription   [X] Other: Labs     RD Remains Available.  Debby Proctor RD

## 2025-02-19 NOTE — PROGRESS NOTE ADULT - SUBJECTIVE AND OBJECTIVE BOX
Patient is a 59y old  Female who presents with a chief complaint of bilateral CVA     SUBJECTIVE/OBJECTIVE: Patient seen and evaluated while sitting up in WC, ate her breakfast. Reported to have slept. Denies pain. Improved command following    REVIEW OF SYSTEMS:  Constitutional: No fever or Chills  Pulm: no witnessed cough or resp distress  Cardio: No chest pain  GI/: incont, no dysuria or abd pain  Neuro: No headaches or dizziness, +weakness, +cog impairment   MSK: No pain    PHYSICAL EXAM  59y  Vital Signs Last 24 Hrs  T(C): 36.4 (02-18-25 @ 07:25), Max: 36.7 (02-18-25 @ 05:43)  T(F): 97.5 (02-18-25 @ 07:25), Max: 98 (02-18-25 @ 05:43)  HR: 106 (02-18-25 @ 07:25) (98 - 106)  BP: 128/92 (02-18-25 @ 07:25) (98/66 - 128/92)  RR: 16 (02-18-25 @ 07:25) (16 - 16)  SpO2: 98% (02-18-25 @ 07:25) (97% - 98%)    Constitutional - NAD, Comfortable  HEENT - trach stoma healed  Cardio - warm and well perfused, no cyanosis or pedal edema  Pulm - resp nonlabored  Abdomen -  Soft, NTND; +PEG site c/d/i  Extremities - No peripheral edema, No calf tenderness   Neurologic Exam:                    Cognitive -        Orientation: AAO to self and hospital.  Didn't know year/month despite external cues and multiple choice. able to follow 1 step commands (delayed)     Speech - Hypophonic, impaired naming, delayed processing/response     Cranial Nerves - poor tracking, no facial asymmetry, tongue midline     Motor - poor/delayed command following; RUE 4/5, LUE at least antigravity, BLE 4/5  Psychiatric - calm affect, smiles appropriately    RECENT LABS:                           9.9    11.02 )-----------( 430      ( 17 Feb 2025 05:42 )             28.5     02-17    139  |  103  |  23  ----------------------------<  102[H]  4.3   |  28  |  0.85    Ca    9.0      17 Feb 2025 05:42    TPro  7.3  /  Alb  2.4[L]  /  TBili  0.4  /  DBili  x   /  AST  38  /  ALT  64[H]  /  AlkPhos  99  02-17    Allergies  doxycycline (Angioedema)  clindamycin (Angioedema)  penicillin (Unknown)  linezolid (Angioedema)    MEDICATIONS  (STANDING):  amantadine Syrup 100 milliGRAM(s) Oral <User Schedule>  artificial tears (preservative free) Ophthalmic Solution 1 Drop(s) Both EYES four times a day  ascorbic acid 500 milliGRAM(s) Oral daily  aspirin  chewable 81 milliGRAM(s) Oral daily  atorvastatin 40 milliGRAM(s) Oral at bedtime  enoxaparin Injectable 40 milliGRAM(s) SubCutaneous every 24 hours  hydroxyurea 500 milliGRAM(s) Oral two times a day  levETIRAcetam  Solution 500 milliGRAM(s) Oral every 12 hours  metoprolol tartrate 12.5 milliGRAM(s) Oral every 8 hours  multivitamin/minerals/iron Oral Solution (CENTRUM) 15 milliLiter(s) Oral daily  nystatin    Suspension 758035 Unit(s) Oral three times a day  pantoprazole   Suspension 40 milliGRAM(s) Oral before breakfast  polyethylene glycol 3350 17 Gram(s) Oral daily  senna 1 Tablet(s) Oral daily    MEDICATIONS  (PRN):  acetaminophen   Oral Liquid .. 650 milliGRAM(s) Oral every 6 hours PRN Temp greater or equal to 38C (100.4F), Mild Pain (1 - 3)  albuterol/ipratropium for Nebulization 3 milliLiter(s) Nebulizer every 6 hours PRN Shortness of Breath and/or Wheezing  bisacodyl Suppository 10 milliGRAM(s) Rectal daily PRN Constipation

## 2025-02-19 NOTE — PROGRESS NOTE ADULT - SUBJECTIVE AND OBJECTIVE BOX
no acute events overnight  some SOB with PT      PHYSICAL EXAM:    Vital Signs Last 24 Hrs  T(F): 98 (19 Feb 2025 05:45), Max: 98.2 (18 Feb 2025 21:42)  HR: 98 (19 Feb 2025 05:45) (95 - 110)  BP: 112/77 (19 Feb 2025 05:45) (112/77 - 122/86)  RR: 16 (19 Feb 2025 05:45) (16 - 16)  SpO2: 98% (19 Feb 2025 05:45) (97% - 98%)  I&O's Summary      GENERAL: NAD  HEENT: NCAT  CHEST/LUNG: No increased WOB, Clear to percussion bilaterally; No rales, rhonchi, wheezing  HEART: Regular rate and rhythm; No murmurs  ABDOMEN: Soft, Nontender, Nondistended; Bowel sounds present  MUSCULOSKELETAL/EXTREMITIES:  2+ Peripheral Pulses, No LE edema  PSYCH: Appropriate affect, Alert & Oriented    LABS:                        9.9    11.02 )-----------( 430      ( 17 Feb 2025 05:42 )             28.5       02-17    139  |  103  |  23  ----------------------------<  102  4.3   |  28  |  0.85    Ca    9.0      17 Feb 2025 05:42    TPro  7.3  /  Alb  2.4  /  TBili  0.4  /  DBili  x   /  AST  38  /  ALT  64  /  AlkPhos  99  02-17                02-14 Chol 160 mg/dL LDL -- HDL 32 mg/dL Trig 121 mg/dL                  Urinalysis Basic - ( 17 Feb 2025 05:42 )    Color: x / Appearance: x / SG: x / pH: x  Gluc: 102 mg/dL / Ketone: x  / Bili: x / Urobili: x   Blood: x / Protein: x / Nitrite: x   Leuk Esterase: x / RBC: x / WBC x   Sq Epi: x / Non Sq Epi: x / Bacteria: x        COVID-19 PCR: NotDetec (02-06-25 @ 21:11)      MEDICATIONS  (STANDING):  amantadine Syrup 100 milliGRAM(s) Oral <User Schedule>  artificial tears (preservative free) Ophthalmic Solution 1 Drop(s) Both EYES four times a day  ascorbic acid 500 milliGRAM(s) Oral daily  aspirin  chewable 81 milliGRAM(s) Oral daily  atorvastatin 40 milliGRAM(s) Oral at bedtime  enoxaparin Injectable 40 milliGRAM(s) SubCutaneous every 24 hours  hydroxyurea 500 milliGRAM(s) Oral two times a day  levETIRAcetam  Solution 500 milliGRAM(s) Oral every 12 hours  metoprolol tartrate 12.5 milliGRAM(s) Oral every 8 hours  multivitamin/minerals/iron Oral Solution (CENTRUM) 15 milliLiter(s) Oral daily  pantoprazole   Suspension 40 milliGRAM(s) Oral before breakfast  polyethylene glycol 3350 17 Gram(s) Oral daily  senna 1 Tablet(s) Oral daily    MEDICATIONS  (PRN):  acetaminophen   Oral Liquid .. 650 milliGRAM(s) Oral every 6 hours PRN Temp greater or equal to 38C (100.4F), Mild Pain (1 - 3)  albuterol/ipratropium for Nebulization 3 milliLiter(s) Nebulizer every 6 hours PRN Shortness of Breath and/or Wheezing  bisacodyl Suppository 10 milliGRAM(s) Rectal daily PRN Constipation      Care Discussed with Consultants/Other Providers: Yes

## 2025-02-19 NOTE — PROGRESS NOTE ADULT - ASSESSMENT
59F with HTN, HFimpEF (EF 58%), sickle cell disease, recent hospitalization for sickle cell crisis followed by seizures requiring intubation / cardiogenic shock / ECMO / CVA, s/p trach and PEG, and now in acute rehab    labs 2/17 reviewed    #exertional SOB  - may be related to debility  - EKG with sinus tachycardia  - CXR : no focal consolidations noted on independent review  - on lovenox for DVT ppx.    #Multifocal strokes in setting of ECMO  #Aphasia due to above, improved.  #Dysphagia due to above  #PEG status  -PT/OT/SLP  -BP control - currently on Lopressor, monitor for orthostasis  -c/w Amantadine  -PEG feeds as tolerated  - continue ASA for secondary stroke prevention  - atorvastatin 40mg po daily    #Possible seizure disorder, recent onset  -c/w Keppra    #History of respiratory failure requiring tracheostomy  #Tracheostomy status  -decannulated 2/14  - continue nebs/pulmicort    #Sickle cell disease  -c/w hydroxyurea     #Oral thrush  -c/w Nystatin    #DVT ppx: Lovenox

## 2025-02-20 LAB
ALBUMIN SERPL ELPH-MCNC: 2.4 G/DL — LOW (ref 3.3–5)
ALP SERPL-CCNC: 105 U/L — SIGNIFICANT CHANGE UP (ref 40–120)
ALT FLD-CCNC: 57 U/L — HIGH (ref 10–45)
ANION GAP SERPL CALC-SCNC: 12 MMOL/L — SIGNIFICANT CHANGE UP (ref 5–17)
AST SERPL-CCNC: 35 U/L — SIGNIFICANT CHANGE UP (ref 10–40)
BILIRUB SERPL-MCNC: 0.4 MG/DL — SIGNIFICANT CHANGE UP (ref 0.2–1.2)
BUN SERPL-MCNC: 19 MG/DL — SIGNIFICANT CHANGE UP (ref 7–23)
CALCIUM SERPL-MCNC: 9.3 MG/DL — SIGNIFICANT CHANGE UP (ref 8.4–10.5)
CHLORIDE SERPL-SCNC: 106 MMOL/L — SIGNIFICANT CHANGE UP (ref 96–108)
CO2 SERPL-SCNC: 23 MMOL/L — SIGNIFICANT CHANGE UP (ref 22–31)
CREAT SERPL-MCNC: 0.84 MG/DL — SIGNIFICANT CHANGE UP (ref 0.5–1.3)
EGFR: 81 ML/MIN/1.73M2 — SIGNIFICANT CHANGE UP
GLUCOSE SERPL-MCNC: 102 MG/DL — HIGH (ref 70–99)
HCT VFR BLD CALC: 28.7 % — LOW (ref 34.5–45)
HGB BLD-MCNC: 9.5 G/DL — LOW (ref 11.5–15.5)
MCHC RBC-ENTMCNC: 30.4 PG — SIGNIFICANT CHANGE UP (ref 27–34)
MCHC RBC-ENTMCNC: 33.1 G/DL — SIGNIFICANT CHANGE UP (ref 32–36)
MCV RBC AUTO: 91.7 FL — SIGNIFICANT CHANGE UP (ref 80–100)
NRBC BLD AUTO-RTO: 2 /100 WBCS — HIGH (ref 0–0)
PLATELET # BLD AUTO: 492 K/UL — HIGH (ref 150–400)
POTASSIUM SERPL-MCNC: 4.1 MMOL/L — SIGNIFICANT CHANGE UP (ref 3.5–5.3)
POTASSIUM SERPL-SCNC: 4.1 MMOL/L — SIGNIFICANT CHANGE UP (ref 3.5–5.3)
PROT SERPL-MCNC: 7.3 G/DL — SIGNIFICANT CHANGE UP (ref 6–8.3)
RBC # BLD: 3.13 M/UL — LOW (ref 3.8–5.2)
RBC # FLD: 15.7 % — HIGH (ref 10.3–14.5)
SODIUM SERPL-SCNC: 141 MMOL/L — SIGNIFICANT CHANGE UP (ref 135–145)
WBC # BLD: 8.74 K/UL — SIGNIFICANT CHANGE UP (ref 3.8–10.5)
WBC # FLD AUTO: 8.74 K/UL — SIGNIFICANT CHANGE UP (ref 3.8–10.5)

## 2025-02-20 PROCEDURE — 90834 PSYTX W PT 45 MINUTES: CPT

## 2025-02-20 PROCEDURE — 99232 SBSQ HOSP IP/OBS MODERATE 35: CPT

## 2025-02-20 PROCEDURE — 99232 SBSQ HOSP IP/OBS MODERATE 35: CPT | Mod: GC

## 2025-02-20 RX ADMIN — Medication 15 MILLILITER(S): at 13:09

## 2025-02-20 RX ADMIN — HYDROXYUREA 500 MILLIGRAM(S): 500 CAPSULE ORAL at 17:58

## 2025-02-20 RX ADMIN — METOPROLOL SUCCINATE 12.5 MILLIGRAM(S): 50 TABLET, EXTENDED RELEASE ORAL at 13:09

## 2025-02-20 RX ADMIN — LEVETIRACETAM 500 MILLIGRAM(S): 10 INJECTION, SOLUTION INTRAVENOUS at 05:52

## 2025-02-20 RX ADMIN — LEVETIRACETAM 500 MILLIGRAM(S): 10 INJECTION, SOLUTION INTRAVENOUS at 17:55

## 2025-02-20 RX ADMIN — Medication 1 DROP(S): at 17:55

## 2025-02-20 RX ADMIN — POLYETHYLENE GLYCOL 3350 17 GRAM(S): 17 POWDER, FOR SOLUTION ORAL at 13:10

## 2025-02-20 RX ADMIN — ENOXAPARIN SODIUM 40 MILLIGRAM(S): 100 INJECTION SUBCUTANEOUS at 05:50

## 2025-02-20 RX ADMIN — Medication 40 MILLIGRAM(S): at 05:52

## 2025-02-20 RX ADMIN — Medication 1 DROP(S): at 05:51

## 2025-02-20 RX ADMIN — ATORVASTATIN CALCIUM 40 MILLIGRAM(S): 80 TABLET, FILM COATED ORAL at 22:13

## 2025-02-20 RX ADMIN — Medication 100 MILLIGRAM(S): at 13:09

## 2025-02-20 RX ADMIN — Medication 81 MILLIGRAM(S): at 13:08

## 2025-02-20 RX ADMIN — Medication 100 MILLIGRAM(S): at 05:51

## 2025-02-20 RX ADMIN — Medication 1 DROP(S): at 13:09

## 2025-02-20 RX ADMIN — Medication 500 MILLIGRAM(S): at 13:09

## 2025-02-20 RX ADMIN — HYDROXYUREA 500 MILLIGRAM(S): 500 CAPSULE ORAL at 05:52

## 2025-02-20 RX ADMIN — METOPROLOL SUCCINATE 12.5 MILLIGRAM(S): 50 TABLET, EXTENDED RELEASE ORAL at 05:52

## 2025-02-20 RX ADMIN — Medication 1 TABLET(S): at 13:08

## 2025-02-20 NOTE — PROGRESS NOTE ADULT - ASSESSMENT
Pt alert, fair attention, impaired expressive language, thought processes - goal-directed, thought contents - no morbid ideation noticed, depressed/constricted affect, euthymic mood, denied AH/VH, denied SI/HI/I/P, calm behavior. Plan: Continue the assessment process. Provide individual supportive tx.

## 2025-02-20 NOTE — PROGRESS NOTE ADULT - SUBJECTIVE AND OBJECTIVE BOX
no acute events overnight      PHYSICAL EXAM:    Vital Signs Last 24 Hrs  T(F): 98.2 (20 Feb 2025 07:52), Max: 98.4 (19 Feb 2025 19:58)  HR: 95 (20 Feb 2025 13:10) (95 - 109)  BP: 118/87 (20 Feb 2025 13:10) (101/69 - 118/87)  RR: 16 (20 Feb 2025 07:52) (16 - 16)  SpO2: 96% (20 Feb 2025 07:52) (96% - 100%)  I&O's Summary      GENERAL: NAD  HEENT: NCAT  CHEST/LUNG: No increased WOB, Clear to percussion bilaterally; No rales, rhonchi, wheezing  HEART: Regular rate and rhythm; No murmurs  ABDOMEN: Soft, Nontender, Nondistended; Bowel sounds present  MUSCULOSKELETAL/EXTREMITIES:  2+ Peripheral Pulses, No LE edema  PSYCH: Appropriate affect, Alert & Oriented    LABS:                        9.5    8.74  )-----------( 492      ( 20 Feb 2025 06:05 )             28.7       02-20    141  |  106  |  19  ----------------------------<  102  4.1   |  23  |  0.84    Ca    9.3      20 Feb 2025 06:05    TPro  7.3  /  Alb  2.4  /  TBili  0.4  /  DBili  x   /  AST  35  /  ALT  57  /  AlkPhos  105  02-20                02-14 Chol 160 mg/dL LDL -- HDL 32 mg/dL Trig 121 mg/dL                  Urinalysis Basic - ( 20 Feb 2025 06:05 )    Color: x / Appearance: x / SG: x / pH: x  Gluc: 102 mg/dL / Ketone: x  / Bili: x / Urobili: x   Blood: x / Protein: x / Nitrite: x   Leuk Esterase: x / RBC: x / WBC x   Sq Epi: x / Non Sq Epi: x / Bacteria: x        COVID-19 PCR: NotDetec (02-06-25 @ 21:11)      MEDICATIONS  (STANDING):  amantadine Syrup 100 milliGRAM(s) Oral <User Schedule>  artificial tears (preservative free) Ophthalmic Solution 1 Drop(s) Both EYES four times a day  ascorbic acid 500 milliGRAM(s) Oral daily  aspirin  chewable 81 milliGRAM(s) Oral daily  atorvastatin 40 milliGRAM(s) Oral at bedtime  enoxaparin Injectable 40 milliGRAM(s) SubCutaneous every 24 hours  hydroxyurea 500 milliGRAM(s) Oral two times a day  levETIRAcetam  Solution 500 milliGRAM(s) Oral every 12 hours  metoprolol tartrate 12.5 milliGRAM(s) Oral every 8 hours  multivitamin/minerals/iron Oral Solution (CENTRUM) 15 milliLiter(s) Oral daily  pantoprazole   Suspension 40 milliGRAM(s) Oral before breakfast  polyethylene glycol 3350 17 Gram(s) Oral daily  senna 1 Tablet(s) Oral daily    MEDICATIONS  (PRN):  acetaminophen   Oral Liquid .. 650 milliGRAM(s) Oral every 6 hours PRN Temp greater or equal to 38C (100.4F), Mild Pain (1 - 3)  albuterol/ipratropium for Nebulization 3 milliLiter(s) Nebulizer every 6 hours PRN Shortness of Breath and/or Wheezing  bisacodyl Suppository 10 milliGRAM(s) Rectal daily PRN Constipation      Care Discussed with Consultants/Other Providers: Yes

## 2025-02-20 NOTE — PROGRESS NOTE ADULT - ASSESSMENT
59y with PMH, HTN, HFimpEF/NICM (EF 58% 10/2024), sickle cell disease, who presented to [facility] for SOB and SS crisis 12/30/25;, followed by witnessed seizure (? secondary to clonazepam withdrawal) requiring intubation. She was transferred to [facility] 1/3/25 secondary to cardiogenic shock /RV failure s/p ECMO +TRACH +PEG.   Now admitted to [facility] after for initiation of a multidisciplinary rehab program consisting focused on functional mobility, transfers and ADLs (activities of daily living).    # Bilateral CVA, bilateral body involvement left > right, left kelsi inattention, aphasia, respiratory failure, dysphagia  - MRI 1/9: Innumerable foci susceptibility artifact scattered throughout the brain which can be seen with critically ill patients on ECMO. Diffuse fat embolization is also part of the differential diagnosis but is considered less likely. Tiny acute/subacute infarcts within the bilateral basal ganglia, thalami, and chandu with disproportionate ovoid area of T2/FLAIR prolongation in the right ventral thalamus. Findings may indicate the presence of embolic acute/subacute infarcts.  - ASA 81 mg  (restarted 2/13)  - Started Atorvastatin 40 ()   - continue Comprehensive Multidisciplinary Rehab Program: 3 hours a day (1  hr PT, 1 hr OT, 1 hr SLP), 5 days a week.  - Precautions: respiratory, aspiration, SZ, AMS, fall, PEG    # Hypoarousal  - LP (1/14) - negative for meningitis/encephalitis  - c/w Amantadine 100 BID    # Seizure  - EEG (1/7) with suspicion of seizure  - Seizure ppx: Keppra 500 BID     # low grade fever T m 100.5 (2/8)  - discussed with hospitalist. No obvious source, asymptomatic, vitals stable, clinically well appearing  - Check CXR, UA, CBC, BMP, procal; RVP if fever persists  - temp <100*F, WBC slight uptrend >11.33 (2/7)>11.36 (2/10)  - afebrile, WBC normal 9.24 (2/13)> 11.02 (2/17) > no fever or overt sign of infection; continue to trend--WBC 8.74 (2/20)    # Acute respiratory failure  - S/p Trach (1/16). On 6.0 cuffless (1/29)  - MSSA s/p Abx  - Duoneb q8hrs  - pulmonary following-- appreciate recommendations  - capping during waking hours > decannulated 2/14---- saturating well on RA    # Cardiogenic Shock  - S/p ECMO 1/3. decannulated 1/7  - S/p 2 U PRBC, 2 platelet, 5U cryo  - ECHO 1/20: EF 53%, normal LV+RV function, no acute HF issues  - Metoprolol reduced 12.5 secondary to OH 2/7  - BP stable    # Orthostatic hypotension last week  -  SBP dropped to 62 during OT session 2/7, improved to SBP 98 supine  - Lopressor reduced  - DONALD stockings when out of bed  - has abd for PEG protection already    # Sickle cell disease   - 1-2 sickle pain crisis per yea  - s/p transfusions, apheresis (1/12) for fat emboli concerns   - Hydroxyurea 500 BID (as long as plt > 100 and hgb >8)  - Hematology consult PRN  - hgb 10.5 (2/13)>9.9 (2/17)>9.5 (2/20)    # Transaminitis   - RUQ US 1/27: The gallbladder is moderately distended and contains layering sludge and possibly small stones. There is no significant gallbladder wall thickening or pericholecystic fluid and a sonographic Gross sign was not elicited. Therefore, there is no definitive evidence for acute cholecystitis.  - continues to down trend: Alk Phos/AST normal 98/29; ALT 55 (2/13)> ALT 64 (2/17)>57 (2/20)    # Thrush  - nystatin (2/7)    # Pain Management:  - Tylenol PRN    # GI/Bowel:  - Senna QHS, Miralax QD   - GI ppx: Protonix    # /Bladder:   - PVR q 8 hours (SC if > 400) > BS/PVR fluctuates, not needing straight cath   - incontinent     # Skin/Pressure Injury: healed DTI on admission.   - Skin assessment on admission: no active pressure injury > healed DTI to L ear lobe, b/l forehead, sacrum /bilateral buttocks, bilateral heels. +Incontinence Dermatitis  Recommend:  1.) topical therapy: sacral/buttock skin – cleanse with incontinence cleanser, pat dry, apply Piotr ointment BID and PRN for incontinent episodes. bilateral forehead - cleanse with gentle cleanser, leave open to air  2.) Incontinence Management - incontinence cleanser, pads, pericare BID  3.) Maintain on an alternating air with low air loss surface  4.) Turn and reposition Q 2 hours  5.) Nutrition optimization   6.) Offload heels/feet with complete cair air fluidized boots/pillows; ensure that the soles of the feet are not resting on the foot board of the bed.  7.) chair cushion for chair sitting    # Diet/Dysphagia:  - S/p PEG (1/21)  - FEEST (1/27) > silent laryngeal penetration of purees and moderately thick liquids is noted ----MBS yesterday--- diet upgraded to regular 2/18  - Current Diet: NPO + TF; free water   - Dysphagia: SLP consult for swallow function evaluation and treatment  - Health supp: Vitamin C + MVI    # DVT ppx:    - Lovenox 40 QD   - SCD  ---------------  Code Status/Emergency Contact: Full Code    Outpatient Follow-up:    [facility]  Thoracic and Cardiac Surgery  [address] [place] [id number]  Phone: [phone]  Fax: [phone]  Follow Up Time: 2 weeks    Total time 35 mins-face to face time encounter and counseling the patient, meeting with hospitalist, nursing staff, therapists and social work to discuss medical updates and management, care coordination, reviewing chart and data

## 2025-02-20 NOTE — PROGRESS NOTE ADULT - SUBJECTIVE AND OBJECTIVE BOX
Patient is a 59y old  Female who presents with a chief complaint of bilateral CVA     SUBJECTIVE/OBJECTIVE: Patient seen and evaluated while sitting up in , working on task with physical therapist. Denies pain, states she slept. Limited by ongoing cognitive impairments.    REVIEW OF SYSTEMS:  Constitutional: No fever or Chills  Pulm: no witnessed cough or resp distress  Cardio: No chest pain  GI/: incont, no dysuria or abd pain  Neuro: No headaches or dizziness, +weakness, +cog impairment   MSK: No pain    vitals  T(C): 36.8 (02-20-25 @ 07:52)  T(F): 98.2 (02-20-25 @ 07:52), Max: 98.4 (02-19-25 @ 19:58)  HR: 95 (02-20-25 @ 07:52) (95 - 110)  BP: 108/73 (02-20-25 @ 07:52) (101/69 - 125/92)  RR:  (16 - 16)  SpO2:  (96% - 100%)  Wt(kg): --    PHYSICAL EXAM  Constitutional - NAD, Comfortable  HEENT - trach stoma healed  Cardio - warm and well perfused, no cyanosis or pedal edema  Pulm - resp nonlabored  Abdomen -  Soft, NTND; +PEG site c/d/i  Extremities - No peripheral edema, No calf tenderness   Neurologic Exam:                    Cognitive -        Orientation: AAO to self and hospital.  Didn't know year/month despite external cues and multiple choice. able to follow 1 step commands (delayed)     Speech - Hypophonic, impaired naming, delayed processing/response     Cranial Nerves - poor tracking, no facial asymmetry, tongue midline     Motor - poor/delayed command following; RUE 4/5, LUE at least antigravity, BLE 4/5  Psychiatric - calm affect, smiles appropriately      LAB                        9.5    8.74  )-----------( 492      ( 20 Feb 2025 06:05 )             28.7     02-20    141  |  106  |  19  ----------------------------<  102[H]  4.1   |  23  |  0.84    Ca    9.3      20 Feb 2025 06:05    TPro  7.3  /  Alb  2.4[L]  /  TBili  0.4  /  DBili  x   /  AST  35  /  ALT  57[H]  /  AlkPhos  105  02-20    LIVER FUNCTIONS - ( 20 Feb 2025 06:05 )  Alb: 2.4 g/dL / Pro: 7.3 g/dL / ALK PHOS: 105 U/L / ALT: 57 U/L / AST: 35 U/L / GGT: x               Urinalysis Basic - ( 20 Feb 2025 06:05 )    Color: x / Appearance: x / SG: x / pH: x  Gluc: 102 mg/dL / Ketone: x  / Bili: x / Urobili: x   Blood: x / Protein: x / Nitrite: x   Leuk Esterase: x / RBC: x / WBC x   Sq Epi: x / Non Sq Epi: x / Bacteria: x        RECENT LABS:                       Allergies  doxycycline (Angioedema)  clindamycin (Angioedema)  penicillin (Unknown)  linezolid (Angioedema)    MEDICATIONS  (STANDING):  amantadine Syrup 100 milliGRAM(s) Oral <User Schedule>  artificial tears (preservative free) Ophthalmic Solution 1 Drop(s) Both EYES four times a day  ascorbic acid 500 milliGRAM(s) Oral daily  aspirin  chewable 81 milliGRAM(s) Oral daily  atorvastatin 40 milliGRAM(s) Oral at bedtime  enoxaparin Injectable 40 milliGRAM(s) SubCutaneous every 24 hours  hydroxyurea 500 milliGRAM(s) Oral two times a day  levETIRAcetam  Solution 500 milliGRAM(s) Oral every 12 hours  metoprolol tartrate 12.5 milliGRAM(s) Oral every 8 hours  multivitamin/minerals/iron Oral Solution (CENTRUM) 15 milliLiter(s) Oral daily  nystatin    Suspension 367240 Unit(s) Oral three times a day  pantoprazole   Suspension 40 milliGRAM(s) Oral before breakfast  polyethylene glycol 3350 17 Gram(s) Oral daily  senna 1 Tablet(s) Oral daily    MEDICATIONS  (PRN):  acetaminophen   Oral Liquid .. 650 milliGRAM(s) Oral every 6 hours PRN Temp greater or equal to 38C (100.4F), Mild Pain (1 - 3)  albuterol/ipratropium for Nebulization 3 milliLiter(s) Nebulizer every 6 hours PRN Shortness of Breath and/or Wheezing  bisacodyl Suppository 10 milliGRAM(s) Rectal daily PRN Constipation

## 2025-02-20 NOTE — PROGRESS NOTE ADULT - SUBJECTIVE AND OBJECTIVE BOX
Pt was seen for 40 minutes for supportive tx; her daughter Juan Antonio was present.  Pt had no complaints. Reviewed chart, approached Pt for an initial consult, introduced the role of neuropsychology in the rehab team, and explained the nature and purpose of the consult.  Pt is a 60 y/o female with PMHx of HTN, HFimpEF/NICM (EF 58% 10/2024), sickle cell disease, who presented to [facility] for SOB and SS crisis 12/30/25;, followed by witnessed seizure (? secondary to clonazepam withdrawal) requiring intubation. She was transferred to [facility] 1/3/25 secondary to cardiogenic shock /RV failure s/p ECMO +TRACH +PEG.  Now admitted to [facility] after for initiation of a multidisciplinary rehab program consisting focused on functional mobility, transfers and ADLs (activities of daily living). Pt agreed to participate; she was well related and open in discussing her present medical condition. Pt exhibited expressive aphasia, with somewhat telegraphic quality, and low volume. Session focused on establishing rapport with Pt, gathering relevant biographical information, and assessing Pt's current emotional functioning. Pt provided some information about her medical hx and social hx, which was significantly augmented by her daughter. Also, Pt answered all questions during the clinical interview in order to elicit emotional symptoms, but denied experiencing any emotional symptoms at this time in spite of exhibiting depressed and constricted mood. After providing some education to Pt and her daughter on the process of rehabilitation after a brain injury and typical disposition paths (e.g., home vs [facility]), Pt expressed no particular preference for any and expressed willingness to go to [facility] if it was recommended by the team. Support and encouragement were provided.

## 2025-02-21 PROCEDURE — 99233 SBSQ HOSP IP/OBS HIGH 50: CPT | Mod: GC

## 2025-02-21 PROCEDURE — 99231 SBSQ HOSP IP/OBS SF/LOW 25: CPT

## 2025-02-21 RX ADMIN — Medication 81 MILLIGRAM(S): at 11:56

## 2025-02-21 RX ADMIN — Medication 40 MILLIGRAM(S): at 05:20

## 2025-02-21 RX ADMIN — LEVETIRACETAM 500 MILLIGRAM(S): 10 INJECTION, SOLUTION INTRAVENOUS at 05:20

## 2025-02-21 RX ADMIN — Medication 15 MILLILITER(S): at 11:56

## 2025-02-21 RX ADMIN — Medication 100 MILLIGRAM(S): at 13:14

## 2025-02-21 RX ADMIN — HYDROXYUREA 500 MILLIGRAM(S): 500 CAPSULE ORAL at 05:21

## 2025-02-21 RX ADMIN — METOPROLOL SUCCINATE 12.5 MILLIGRAM(S): 50 TABLET, EXTENDED RELEASE ORAL at 05:20

## 2025-02-21 RX ADMIN — Medication 500 MILLIGRAM(S): at 11:56

## 2025-02-21 RX ADMIN — METOPROLOL SUCCINATE 12.5 MILLIGRAM(S): 50 TABLET, EXTENDED RELEASE ORAL at 13:16

## 2025-02-21 RX ADMIN — Medication 1 DROP(S): at 17:51

## 2025-02-21 RX ADMIN — Medication 1 DROP(S): at 11:56

## 2025-02-21 RX ADMIN — Medication 1 TABLET(S): at 11:56

## 2025-02-21 RX ADMIN — Medication 100 MILLIGRAM(S): at 06:38

## 2025-02-21 RX ADMIN — Medication 1 DROP(S): at 05:20

## 2025-02-21 RX ADMIN — Medication 1 DROP(S): at 22:03

## 2025-02-21 RX ADMIN — ENOXAPARIN SODIUM 40 MILLIGRAM(S): 100 INJECTION SUBCUTANEOUS at 05:20

## 2025-02-21 RX ADMIN — ATORVASTATIN CALCIUM 40 MILLIGRAM(S): 80 TABLET, FILM COATED ORAL at 21:57

## 2025-02-21 RX ADMIN — POLYETHYLENE GLYCOL 3350 17 GRAM(S): 17 POWDER, FOR SOLUTION ORAL at 11:56

## 2025-02-21 RX ADMIN — HYDROXYUREA 500 MILLIGRAM(S): 500 CAPSULE ORAL at 17:52

## 2025-02-21 RX ADMIN — LEVETIRACETAM 500 MILLIGRAM(S): 10 INJECTION, SOLUTION INTRAVENOUS at 17:52

## 2025-02-21 NOTE — PROGRESS NOTE ADULT - ASSESSMENT
59F with HTN, HFimpEF (EF 58%), sickle cell disease, recent hospitalization for sickle cell crisis followed by seizures requiring intubation / cardiogenic shock / ECMO / CVA, s/p trach and PEG, and now in acute rehab    labs 2/20 reviewed    #exertional SOB  - may be related to debility  - EKG with sinus tachycardia  - CXR : no focal consolidations noted on independent review  - on lovenox for DVT ppx.    #Multifocal strokes in setting of ECMO  #Aphasia due to above, improved.  #Dysphagia due to above  #PEG status  -PT/OT/SLP  -BP control - currently on Lopressor, monitor for orthostasis  -c/w Amantadine  -PEG feeds as tolerated  - continue ASA for secondary stroke prevention  - atorvastatin 40mg po daily    #Possible seizure disorder, recent onset  -c/w Keppra    #History of respiratory failure requiring tracheostomy  #Tracheostomy status  -decannulated 2/14  - continue nebs/pulmicort    #Sickle cell disease  -c/w hydroxyurea     #Oral thrush  -c/w Nystatin    #DVT ppx: Lovenox

## 2025-02-21 NOTE — PROGRESS NOTE ADULT - SUBJECTIVE AND OBJECTIVE BOX
no acute events overnight      PHYSICAL EXAM:    Vital Signs Last 24 Hrs  T(F): 97.8 (21 Feb 2025 08:19), Max: 98.8 (20 Feb 2025 19:56)  HR: 91 (21 Feb 2025 13:18) (90 - 97)  BP: 111/78 (21 Feb 2025 13:18) (100/68 - 125/87)  RR: 16 (21 Feb 2025 13:18) (16 - 16)  SpO2: 96% (21 Feb 2025 13:18) (96% - 100%)  I&O's Summary      GENERAL: NAD  HEENT: NCAT  CHEST/LUNG: No increased WOB, Clear to percussion bilaterally; No rales, rhonchi, wheezing  HEART: Regular rate and rhythm; No murmurs  ABDOMEN: Soft, Nontender, Nondistended; Bowel sounds present  MUSCULOSKELETAL/EXTREMITIES:  2+ Peripheral Pulses, No LE edema  PSYCH: Appropriate affect, Alert & Oriented    LABS:                        9.5    8.74  )-----------( 492      ( 20 Feb 2025 06:05 )             28.7       02-20    141  |  106  |  19  ----------------------------<  102  4.1   |  23  |  0.84    Ca    9.3      20 Feb 2025 06:05    TPro  7.3  /  Alb  2.4  /  TBili  0.4  /  DBili  x   /  AST  35  /  ALT  57  /  AlkPhos  105  02-20                02-14 Chol 160 mg/dL LDL -- HDL 32 mg/dL Trig 121 mg/dL                  Urinalysis Basic - ( 20 Feb 2025 06:05 )    Color: x / Appearance: x / SG: x / pH: x  Gluc: 102 mg/dL / Ketone: x  / Bili: x / Urobili: x   Blood: x / Protein: x / Nitrite: x   Leuk Esterase: x / RBC: x / WBC x   Sq Epi: x / Non Sq Epi: x / Bacteria: x        COVID-19 PCR: NotDetec (02-06-25 @ 21:11)      MEDICATIONS  (STANDING):  amantadine Syrup 100 milliGRAM(s) Oral <User Schedule>  artificial tears (preservative free) Ophthalmic Solution 1 Drop(s) Both EYES four times a day  ascorbic acid 500 milliGRAM(s) Oral daily  aspirin  chewable 81 milliGRAM(s) Oral daily  atorvastatin 40 milliGRAM(s) Oral at bedtime  enoxaparin Injectable 40 milliGRAM(s) SubCutaneous every 24 hours  hydroxyurea 500 milliGRAM(s) Oral two times a day  levETIRAcetam  Solution 500 milliGRAM(s) Oral every 12 hours  metoprolol tartrate 12.5 milliGRAM(s) Oral every 8 hours  multivitamin/minerals/iron Oral Solution (CENTRUM) 15 milliLiter(s) Oral daily  pantoprazole   Suspension 40 milliGRAM(s) Oral before breakfast  polyethylene glycol 3350 17 Gram(s) Oral daily  senna 1 Tablet(s) Oral daily    MEDICATIONS  (PRN):  acetaminophen   Oral Liquid .. 650 milliGRAM(s) Oral every 6 hours PRN Temp greater or equal to 38C (100.4F), Mild Pain (1 - 3)  albuterol/ipratropium for Nebulization 3 milliLiter(s) Nebulizer every 6 hours PRN Shortness of Breath and/or Wheezing  bisacodyl Suppository 10 milliGRAM(s) Rectal daily PRN Constipation      Care Discussed with Consultants/Other Providers: Yes

## 2025-02-21 NOTE — PROGRESS NOTE ADULT - ASSESSMENT
59y with PMH, HTN, HFimpEF/NICM (EF 58% 10/2024), sickle cell disease, who presented to [facility] for SOB and SS crisis 12/30/25;, followed by witnessed seizure (? secondary to clonazepam withdrawal) requiring intubation. She was transferred to [facility] 1/3/25 secondary to cardiogenic shock /RV failure s/p ECMO +TRACH +PEG.   Now admitted to [facility] after for initiation of a multidisciplinary rehab program consisting focused on functional mobility, transfers and ADLs (activities of daily living).    # Bilateral CVA, bilateral body involvement left > right, left kelsi inattention, aphasia, respiratory failure, dysphagia  - MRI 1/9: Innumerable foci susceptibility artifact scattered throughout the brain which can be seen with critically ill patients on ECMO. Diffuse fat embolization is also part of the differential diagnosis but is considered less likely. Tiny acute/subacute infarcts within the bilateral basal ganglia, thalami, and chandu with disproportionate ovoid area of T2/FLAIR prolongation in the right ventral thalamus. Findings may indicate the presence of embolic acute/subacute infarcts.  - ASA 81 mg  (restarted 2/13)  - Started Atorvastatin 40 ()   - continue Comprehensive Multidisciplinary Rehab Program: 3 hours a day (1  hr PT, 1 hr OT, 1 hr SLP), 5 days a week.  - Precautions: respiratory, aspiration, SZ, AMS, fall, PEG    # Hypoarousal  - LP (1/14) - negative for meningitis/encephalitis  - c/w Amantadine 100 BID    # Seizure  - EEG (1/7) with suspicion of seizure  - Seizure ppx: Keppra 500 BID     # low grade fever T m 100.5 (2/8)  - discussed with hospitalist. No obvious source, asymptomatic, vitals stable, clinically well appearing  - Check CXR, UA, CBC, BMP, procal; RVP if fever persists  - temp <100*F, WBC slight uptrend >11.33 (2/7)>11.36 (2/10)  - afebrile, WBC normal 9.24 (2/13)> 11.02 (2/17) > no fever or overt sign of infection; continue to trend--WBC 8.74 (2/20)    # Acute respiratory failure  - S/p Trach (1/16). On 6.0 cuffless (1/29)  - MSSA s/p Abx  - Duoneb q8hrs  - pulmonary following-- appreciate recommendations  - capping during waking hours > decannulated 2/14---- saturating well on RA    # Cardiogenic Shock  - S/p ECMO 1/3. decannulated 1/7  - S/p 2 U PRBC, 2 platelet, 5U cryo  - ECHO 1/20: EF 53%, normal LV+RV function, no acute HF issues  - Metoprolol reduced 12.5 secondary to OH 2/7  - BP stable    # Orthostatic hypotension last week  -  SBP dropped to 62 during OT session 2/7, improved to SBP 98 supine  - Lopressor reduced  - DONALD stockings when out of bed  - has abd for PEG protection already    # Sickle cell disease   - 1-2 sickle pain crisis per yea  - s/p transfusions, apheresis (1/12) for fat emboli concerns   - Hydroxyurea 500 BID (as long as plt > 100 and hgb >8)  - Hematology consult PRN  - hgb 10.5 (2/13)>9.9 (2/17)>9.5 (2/20)    # Transaminitis   - RUQ US 1/27: The gallbladder is moderately distended and contains layering sludge and possibly small stones. There is no significant gallbladder wall thickening or pericholecystic fluid and a sonographic Gross sign was not elicited. Therefore, there is no definitive evidence for acute cholecystitis.  - continues to down trend: Alk Phos/AST normal 98/29; ALT 55 (2/13)> ALT 64 (2/17)>57 (2/20)    # Thrush  - nystatin (2/7)    # Pain Management:  - Tylenol PRN    # GI/Bowel:  - Senna QHS, Miralax QD   - GI ppx: Protonix    # /Bladder:   - PVR q 8 hours (SC if > 400) > BS/PVR fluctuates, not needing straight cath   - incontinent     # Skin/Pressure Injury: healed DTI on admission.   - Skin assessment on admission: no active pressure injury > healed DTI to L ear lobe, b/l forehead, sacrum /bilateral buttocks, bilateral heels. +Incontinence Dermatitis  Recommend:  1.) topical therapy: sacral/buttock skin – cleanse with incontinence cleanser, pat dry, apply Piotr ointment BID and PRN for incontinent episodes. bilateral forehead - cleanse with gentle cleanser, leave open to air  2.) Incontinence Management - incontinence cleanser, pads, pericare BID  3.) Maintain on an alternating air with low air loss surface  4.) Turn and reposition Q 2 hours  5.) Nutrition optimization   6.) Offload heels/feet with complete cair air fluidized boots/pillows; ensure that the soles of the feet are not resting on the foot board of the bed.  7.) chair cushion for chair sitting    # Diet/Dysphagia:  - S/p PEG (1/21)  - FEEST (1/27) > silent laryngeal penetration of purees and moderately thick liquids is noted ----MBS yesterday--- diet upgraded to regular 2/18  - Current Diet: NPO + TF; free water   - Dysphagia: SLP consult for swallow function evaluation and treatment  - Health supp: Vitamin C + MVI    # DVT ppx:    - Lovenox 40 QD   - SCD  ---------------  Code Status/Emergency Contact: Full Code    Outpatient Follow-up:    Wu Parra  Thoracic and Cardiac Surgery  [address] [place] [id number]  Phone: [phone]  Fax: [phone]  Follow Up Time: 2 weeks    IDT 2/21  NSG: incont  SW: lives in  with daughter + son(special needs, has own aide), 2-3 DONNA, + ramp access; 18 steps up?  Support: daughter + pt's sibling  SLP: on regular diet, severe cognitive and attention deficits, delayed processing  OT: supervision- eating/OH, Total- toileting, UBD, LBD are mod assist, total with shower transfers  PT: mod-max- BM, ambulating 90 ft RW, Mod Assist  Team goals: WC level transfer to to toilet min A ; tolerate less restrictive diet  TDD: 2/27 JETT    Interdisciplinary team meeting today with speech therapist, occupational therapist, physical therapist, social work and nursing where medical updates provided, progress with therapies and goals discussed. Plan of care reviewed. Team conference note documente on nia.  Total time 50 mins-face to face time encounter and counseling the patient, meeting with hospitalist, nursing staff, therapists and social work to discuss medical updates and management, care coordination, reviewing chart and data-including but not limited to labs and imaging, team meeting

## 2025-02-21 NOTE — PROGRESS NOTE ADULT - SUBJECTIVE AND OBJECTIVE BOX
Patient is a 59y old  Female who presents with a chief complaint of bilateral CVA     SUBJECTIVE/OBJECTIVE: Patient seen and evaluated while in the wheelchair, slept, denies pain    REVIEW OF SYSTEMS:  Limited by cognition and awareness of deficits   motor apraxia  delayed proccessing      vitals  T(C): 36.6 (02-21-25 @ 08:19)  T(F): 97.8 (02-21-25 @ 08:19), Max: 98.8 (02-20-25 @ 19:56)  HR: 90 (02-21-25 @ 08:19) (90 - 97)  BP: 125/87 (02-21-25 @ 08:19) (100/68 - 125/87)  RR:  (16 - 16)  SpO2:  (98% - 100%)  Wt(kg): --      PHYSICAL EXAM  Constitutional - NAD, Comfortable  HEENT - trach stoma healed  Cardio - warm and well perfused, no cyanosis or pedal edema  Pulm - resp nonlabored  Abdomen -  Soft, NTND; +PEG site c/d/i  Extremities - No peripheral edema, No calf tenderness   Neurologic Exam:                    Cognitive -        Orientation: AAO to self and hospital.  Didn't know year/month despite external cues and multiple choice. able to follow 1 step commands (delayed)     Speech - Hypophonic, impaired naming, delayed processing/response     Cranial Nerves - poor tracking, no facial asymmetry, tongue midline     Motor - poor/delayed command following; RUE 4/5, LUE at least antigravity, BLE 4/5  Psychiatric - calm affect, smiles appropriately      LAB                        9.5    8.74  )-----------( 492      ( 20 Feb 2025 06:05 )             28.7     02-20    141  |  106  |  19  ----------------------------<  102[H]  4.1   |  23  |  0.84    Ca    9.3      20 Feb 2025 06:05    TPro  7.3  /  Alb  2.4[L]  /  TBili  0.4  /  DBili  x   /  AST  35  /  ALT  57[H]  /  AlkPhos  105  02-20    LIVER FUNCTIONS - ( 20 Feb 2025 06:05 )  Alb: 2.4 g/dL / Pro: 7.3 g/dL / ALK PHOS: 105 U/L / ALT: 57 U/L / AST: 35 U/L / GGT: x               Urinalysis Basic - ( 20 Feb 2025 06:05 )    Color: x / Appearance: x / SG: x / pH: x  Gluc: 102 mg/dL / Ketone: x  / Bili: x / Urobili: x   Blood: x / Protein: x / Nitrite: x   Leuk Esterase: x / RBC: x / WBC x   Sq Epi: x / Non Sq Epi: x / Bacteria: x        RECENT LABS:                       Allergies  doxycycline (Angioedema)  clindamycin (Angioedema)  penicillin (Unknown)  linezolid (Angioedema)    MEDICATIONS  (STANDING):  amantadine Syrup 100 milliGRAM(s) Oral <User Schedule>  artificial tears (preservative free) Ophthalmic Solution 1 Drop(s) Both EYES four times a day  ascorbic acid 500 milliGRAM(s) Oral daily  aspirin  chewable 81 milliGRAM(s) Oral daily  atorvastatin 40 milliGRAM(s) Oral at bedtime  enoxaparin Injectable 40 milliGRAM(s) SubCutaneous every 24 hours  hydroxyurea 500 milliGRAM(s) Oral two times a day  levETIRAcetam  Solution 500 milliGRAM(s) Oral every 12 hours  metoprolol tartrate 12.5 milliGRAM(s) Oral every 8 hours  multivitamin/minerals/iron Oral Solution (CENTRUM) 15 milliLiter(s) Oral daily  nystatin    Suspension 787355 Unit(s) Oral three times a day  pantoprazole   Suspension 40 milliGRAM(s) Oral before breakfast  polyethylene glycol 3350 17 Gram(s) Oral daily  senna 1 Tablet(s) Oral daily    MEDICATIONS  (PRN):  acetaminophen   Oral Liquid .. 650 milliGRAM(s) Oral every 6 hours PRN Temp greater or equal to 38C (100.4F), Mild Pain (1 - 3)  albuterol/ipratropium for Nebulization 3 milliLiter(s) Nebulizer every 6 hours PRN Shortness of Breath and/or Wheezing  bisacodyl Suppository 10 milliGRAM(s) Rectal daily PRN Constipation

## 2025-02-22 PROCEDURE — 99232 SBSQ HOSP IP/OBS MODERATE 35: CPT

## 2025-02-22 RX ADMIN — LEVETIRACETAM 500 MILLIGRAM(S): 10 INJECTION, SOLUTION INTRAVENOUS at 17:43

## 2025-02-22 RX ADMIN — Medication 1 DROP(S): at 11:30

## 2025-02-22 RX ADMIN — HYDROXYUREA 500 MILLIGRAM(S): 500 CAPSULE ORAL at 17:43

## 2025-02-22 RX ADMIN — ATORVASTATIN CALCIUM 40 MILLIGRAM(S): 80 TABLET, FILM COATED ORAL at 21:20

## 2025-02-22 RX ADMIN — METOPROLOL SUCCINATE 12.5 MILLIGRAM(S): 50 TABLET, EXTENDED RELEASE ORAL at 21:19

## 2025-02-22 RX ADMIN — Medication 40 MILLIGRAM(S): at 05:33

## 2025-02-22 RX ADMIN — Medication 100 MILLIGRAM(S): at 13:34

## 2025-02-22 RX ADMIN — LEVETIRACETAM 500 MILLIGRAM(S): 10 INJECTION, SOLUTION INTRAVENOUS at 05:32

## 2025-02-22 RX ADMIN — Medication 81 MILLIGRAM(S): at 11:30

## 2025-02-22 RX ADMIN — Medication 1 DROP(S): at 05:32

## 2025-02-22 RX ADMIN — METOPROLOL SUCCINATE 12.5 MILLIGRAM(S): 50 TABLET, EXTENDED RELEASE ORAL at 05:35

## 2025-02-22 RX ADMIN — ENOXAPARIN SODIUM 40 MILLIGRAM(S): 100 INJECTION SUBCUTANEOUS at 05:32

## 2025-02-22 RX ADMIN — Medication 500 MILLIGRAM(S): at 11:30

## 2025-02-22 RX ADMIN — Medication 15 MILLILITER(S): at 11:30

## 2025-02-22 RX ADMIN — HYDROXYUREA 500 MILLIGRAM(S): 500 CAPSULE ORAL at 05:33

## 2025-02-22 RX ADMIN — POLYETHYLENE GLYCOL 3350 17 GRAM(S): 17 POWDER, FOR SOLUTION ORAL at 11:28

## 2025-02-22 RX ADMIN — METOPROLOL SUCCINATE 12.5 MILLIGRAM(S): 50 TABLET, EXTENDED RELEASE ORAL at 13:34

## 2025-02-22 RX ADMIN — Medication 1 DROP(S): at 17:43

## 2025-02-22 RX ADMIN — Medication 1 TABLET(S): at 11:30

## 2025-02-22 RX ADMIN — Medication 100 MILLIGRAM(S): at 06:17

## 2025-02-22 NOTE — PROGRESS NOTE ADULT - SUBJECTIVE AND OBJECTIVE BOX
INTERVAL EVENTS: Patient seen and assessed at bedside. No acute events overnight. Denies HA, CP, abdominal pain. Patient feels well and has no complaints. BM 2/20      Patient is a 59y old  Female who presents with a chief complaint of bilateral CVA     REVIEW OF SYSTEMS:  Limited by cognition and awareness of deficits   motor apraxia  delayed proccessing    Vital Signs Last 24 Hrs  T(C): 36.8 (22 Feb 2025 07:55), Max: 36.8 (22 Feb 2025 07:55)  T(F): 98.3 (22 Feb 2025 07:55), Max: 98.3 (22 Feb 2025 07:55)  HR: 90 (22 Feb 2025 07:55) (90 - 104)  BP: 100/68 (22 Feb 2025 07:55) (96/64 - 125/88)  BP(mean): --  RR: 16 (22 Feb 2025 07:55) (16 - 16)  SpO2: 97% (22 Feb 2025 07:55) (96% - 97%)    Parameters below as of 22 Feb 2025 07:55  Patient On (Oxygen Delivery Method): room air        PHYSICAL EXAM  Constitutional - NAD, Comfortable  HEENT - trach stoma healed  Cardio - warm and well perfused, no cyanosis or pedal edema  Pulm - resp nonlabored  Abdomen -  Soft, NTND; +PEG site c/d/i  Extremities - No peripheral edema, No calf tenderness   Neurologic Exam:                    Cognitive -        Orientation: AAO to self and hospital.  Didn't know year/month despite external cues and multiple choice. able to follow 1 step commands (delayed)     Speech - Hypophonic, impaired naming, delayed processing/response     Cranial Nerves - poor tracking, no facial asymmetry, tongue midline     Motor - poor/delayed command following; RUE 4/5, LUE at least antigravity, BLE 4/5  Psychiatric - calm affect, smiles appropriately      LABS:  CBC. CMP 2/20 reviewed.         Allergies  doxycycline (Angioedema)  clindamycin (Angioedema)  penicillin (Unknown)  linezolid (Angioedema)      MEDICATIONS  (STANDING):  amantadine Syrup 100 milliGRAM(s) Oral <User Schedule>  artificial tears (preservative free) Ophthalmic Solution 1 Drop(s) Both EYES four times a day  ascorbic acid 500 milliGRAM(s) Oral daily  aspirin  chewable 81 milliGRAM(s) Oral daily  atorvastatin 40 milliGRAM(s) Oral at bedtime  enoxaparin Injectable 40 milliGRAM(s) SubCutaneous every 24 hours  hydroxyurea 500 milliGRAM(s) Oral two times a day  levETIRAcetam  Solution 500 milliGRAM(s) Oral every 12 hours  metoprolol tartrate 12.5 milliGRAM(s) Oral every 8 hours  multivitamin/minerals/iron Oral Solution (CENTRUM) 15 milliLiter(s) Oral daily  pantoprazole   Suspension 40 milliGRAM(s) Oral before breakfast  polyethylene glycol 3350 17 Gram(s) Oral daily  senna 1 Tablet(s) Oral daily    MEDICATIONS  (PRN):  acetaminophen   Oral Liquid .. 650 milliGRAM(s) Oral every 6 hours PRN Temp greater or equal to 38C (100.4F), Mild Pain (1 - 3)  albuterol/ipratropium for Nebulization 3 milliLiter(s) Nebulizer every 6 hours PRN Shortness of Breath and/or Wheezing  bisacodyl Suppository 10 milliGRAM(s) Rectal daily PRN Constipation

## 2025-02-22 NOTE — PROGRESS NOTE ADULT - ASSESSMENT
59y with PMH, HTN, HFimpEF/NICM (EF 58% 10/2024), sickle cell disease, who presented to [facility] for SOB and SS crisis 12/30/25;, followed by witnessed seizure (? secondary to clonazepam withdrawal) requiring intubation. She was transferred to [facility] 1/3/25 secondary to cardiogenic shock /RV failure s/p ECMO +TRACH +PEG.   Now admitted to [facility] after for initiation of a multidisciplinary rehab program consisting focused on functional mobility, transfers and ADLs (activities of daily living).    # Bilateral CVA, bilateral body involvement left > right, left kelsi inattention, aphasia, respiratory failure, dysphagia  - MRI 1/9: Innumerable foci susceptibility artifact scattered throughout the brain which can be seen with critically ill patients on ECMO. Diffuse fat embolization is also part of the differential diagnosis but is considered less likely. Tiny acute/subacute infarcts within the bilateral basal ganglia, thalami, and chandu with disproportionate ovoid area of T2/FLAIR prolongation in the right ventral thalamus. Findings may indicate the presence of embolic acute/subacute infarcts.  - ASA 81 mg  (restarted 2/13)  - Started Atorvastatin 40 ()   - continue Comprehensive Multidisciplinary Rehab Program: 3 hours a day (1  hr PT, 1 hr OT, 1 hr SLP), 5 days a week.  - Precautions: respiratory, aspiration, SZ, AMS, fall, PEG    # Hypoarousal  - LP (1/14) - negative for meningitis/encephalitis  - c/w Amantadine 100 BID    # Seizure  - EEG (1/7) with suspicion of seizure  - Seizure ppx: Keppra 500 BID     # low grade fever T m 100.5 (2/8)  - discussed with hospitalist. No obvious source, asymptomatic, vitals stable, clinically well appearing  - Check CXR, UA, CBC, BMP, procal; RVP if fever persists  - temp <100*F, WBC slight uptrend >11.33 (2/7)>11.36 (2/10)  - afebrile, WBC normal 9.24 (2/13)> 11.02 (2/17) > no fever or overt sign of infection; continue to trend--WBC 8.74 (2/20)    # Acute respiratory failure  - S/p Trach (1/16). On 6.0 cuffless (1/29)  - MSSA s/p Abx  - Duoneb q8hrs  - pulmonary following-- appreciate recommendations  - capping during waking hours > decannulated 2/14---- saturating well on RA    # Cardiogenic Shock  - S/p ECMO 1/3. decannulated 1/7  - S/p 2 U PRBC, 2 platelet, 5U cryo  - ECHO 1/20: EF 53%, normal LV+RV function, no acute HF issues  - Metoprolol reduced 12.5 secondary to OH 2/7  - BP stable    # Orthostatic hypotension last week  -  SBP dropped to 62 during OT session 2/7, improved to SBP 98 supine  - Lopressor reduced  - DONALD stockings when out of bed  - has abd for PEG protection already    # Sickle cell disease   - 1-2 sickle pain crisis per yea  - s/p transfusions, apheresis (1/12) for fat emboli concerns   - Hydroxyurea 500 BID (as long as plt > 100 and hgb >8)  - Hematology consult PRN  - hgb 10.5 (2/13)>9.9 (2/17)>9.5 (2/20)    # Transaminitis   - RUQ US 1/27: The gallbladder is moderately distended and contains layering sludge and possibly small stones. There is no significant gallbladder wall thickening or pericholecystic fluid and a sonographic Gross sign was not elicited. Therefore, there is no definitive evidence for acute cholecystitis.  - continues to down trend: Alk Phos/AST normal 98/29; ALT 55 (2/13)> ALT 64 (2/17)>57 (2/20)    # Thrush  - nystatin (2/7)    # Pain Management:  - Tylenol PRN    # GI/Bowel:  - Senna QHS, Miralax QD   - GI ppx: Protonix    # /Bladder:   - PVR q 8 hours (SC if > 400) > BS/PVR fluctuates, not needing straight cath   - incontinent     # Skin/Pressure Injury: healed DTI on admission.   - Skin assessment on admission: no active pressure injury > healed DTI to L ear lobe, b/l forehead, sacrum /bilateral buttocks, bilateral heels. +Incontinence Dermatitis  Recommend:  1.) topical therapy: sacral/buttock skin – cleanse with incontinence cleanser, pat dry, apply Piotr ointment BID and PRN for incontinent episodes. bilateral forehead - cleanse with gentle cleanser, leave open to air  2.) Incontinence Management - incontinence cleanser, pads, pericare BID  3.) Maintain on an alternating air with low air loss surface  4.) Turn and reposition Q 2 hours  5.) Nutrition optimization   6.) Offload heels/feet with complete cair air fluidized boots/pillows; ensure that the soles of the feet are not resting on the foot board of the bed.  7.) chair cushion for chair sitting    # Diet/Dysphagia:  - S/p PEG (1/21)  - FEEST (1/27) > silent laryngeal penetration of purees and moderately thick liquids is noted ----MBS yesterday--- diet upgraded to regular 2/18  - Current Diet: NPO + TF; free water   - Dysphagia: SLP consult for swallow function evaluation and treatment  - Health supp: Vitamin C + MVI    # DVT ppx:    - Lovenox 40 QD   - SCD  ---------------  Code Status/Emergency Contact: Full Code    Outpatient Follow-up:    Wu Parra  Thoracic and Cardiac Surgery  [address] [place] [id number]  Phone: [phone]  Fax: [phone]  Follow Up Time: 2 weeks    IDT 2/21  NSG: incont  SW: lives in  with daughter + son(special needs, has own aide), 2-3 DONNA, + ramp access; 18 steps up?  Support: daughter + pt's sibling  SLP: on regular diet, severe cognitive and attention deficits, delayed processing  OT: supervision- eating/OH, Total- toileting, UBD, LBD are mod assist, total with shower transfers  PT: mod-max- BM, ambulating 90 ft RW, Mod Assist  Team goals: WC level transfer to to toilet min A ; tolerate less restrictive diet  TDD: 2/27 JETT    Interdisciplinary team meeting today with speech therapist, occupational therapist, physical therapist, social work and nursing where medical updates provided, progress with therapies and goals discussed. Plan of care reviewed. Team conference note documente on nia.  Total time 50 mins-face to face time encounter and counseling the patient, meeting with hospitalist, nursing staff, therapists and social work to discuss medical updates and management, care coordination, reviewing chart and data-including but not limited to labs and imaging, team meeting

## 2025-02-23 LAB
FERRITIN SERPL-MCNC: 843 NG/ML — HIGH (ref 13–330)
IRON SATN MFR SERPL: 23 % — SIGNIFICANT CHANGE UP (ref 14–50)
IRON SATN MFR SERPL: 53 UG/DL — SIGNIFICANT CHANGE UP (ref 30–160)
TIBC SERPL-MCNC: 231 UG/DL — SIGNIFICANT CHANGE UP (ref 220–430)
TRANSFERRIN SERPL-MCNC: 174 MG/DL — LOW (ref 200–360)
UIBC SERPL-MCNC: 178 UG/DL — SIGNIFICANT CHANGE UP (ref 110–370)

## 2025-02-23 PROCEDURE — 99233 SBSQ HOSP IP/OBS HIGH 50: CPT

## 2025-02-23 PROCEDURE — 99232 SBSQ HOSP IP/OBS MODERATE 35: CPT

## 2025-02-23 RX ADMIN — ENOXAPARIN SODIUM 40 MILLIGRAM(S): 100 INJECTION SUBCUTANEOUS at 05:21

## 2025-02-23 RX ADMIN — Medication 500 MILLIGRAM(S): at 11:24

## 2025-02-23 RX ADMIN — Medication 1 TABLET(S): at 11:24

## 2025-02-23 RX ADMIN — Medication 100 MILLIGRAM(S): at 06:48

## 2025-02-23 RX ADMIN — POLYETHYLENE GLYCOL 3350 17 GRAM(S): 17 POWDER, FOR SOLUTION ORAL at 11:24

## 2025-02-23 RX ADMIN — METOPROLOL SUCCINATE 12.5 MILLIGRAM(S): 50 TABLET, EXTENDED RELEASE ORAL at 05:23

## 2025-02-23 RX ADMIN — Medication 15 MILLILITER(S): at 11:23

## 2025-02-23 RX ADMIN — Medication 1 DROP(S): at 05:20

## 2025-02-23 RX ADMIN — LEVETIRACETAM 500 MILLIGRAM(S): 10 INJECTION, SOLUTION INTRAVENOUS at 17:57

## 2025-02-23 RX ADMIN — Medication 1 DROP(S): at 11:24

## 2025-02-23 RX ADMIN — Medication 40 MILLIGRAM(S): at 05:20

## 2025-02-23 RX ADMIN — METOPROLOL SUCCINATE 12.5 MILLIGRAM(S): 50 TABLET, EXTENDED RELEASE ORAL at 13:21

## 2025-02-23 RX ADMIN — Medication 1 DROP(S): at 00:28

## 2025-02-23 RX ADMIN — LEVETIRACETAM 500 MILLIGRAM(S): 10 INJECTION, SOLUTION INTRAVENOUS at 05:21

## 2025-02-23 RX ADMIN — HYDROXYUREA 500 MILLIGRAM(S): 500 CAPSULE ORAL at 17:57

## 2025-02-23 RX ADMIN — Medication 100 MILLIGRAM(S): at 13:19

## 2025-02-23 RX ADMIN — Medication 1 DROP(S): at 17:57

## 2025-02-23 RX ADMIN — HYDROXYUREA 500 MILLIGRAM(S): 500 CAPSULE ORAL at 05:20

## 2025-02-23 RX ADMIN — METOPROLOL SUCCINATE 12.5 MILLIGRAM(S): 50 TABLET, EXTENDED RELEASE ORAL at 21:37

## 2025-02-23 RX ADMIN — ATORVASTATIN CALCIUM 40 MILLIGRAM(S): 80 TABLET, FILM COATED ORAL at 21:36

## 2025-02-23 RX ADMIN — Medication 81 MILLIGRAM(S): at 11:24

## 2025-02-23 NOTE — PROGRESS NOTE ADULT - ASSESSMENT
59F with HTN, HFimpEF (EF 58%), sickle cell disease, recent hospitalization for sickle cell crisis followed by seizures requiring intubation / cardiogenic shock / ECMO / CVA, s/p trach and PEG, and now in acute rehab    labs 2/20 reviewed    #Multifocal strokes in setting of ECMO  #Aphasia due to above, improved.  #Dysphagia due to above  #PEG status  -PT/OT/SLP  -BP control - currently on Lopressor, monitor for orthostasis  -c/w Amantadine  -PEG feeds as tolerated  - continue ASA for secondary stroke prevention  - atorvastatin 40mg po daily      #History of respiratory failure requiring tracheostomy  #Tracheostomy status  -decannulated 2/14  - continue nebs/pulmicort    #Possible seizure disorder, recent onset  -c/w Keppra    #Sickle cell disease  -c/w hydroxyurea     #Oral thrush  -c/w Nystatin    #exertional SOB  # Anemia  # Thrombocytosis- likely reactive   - may be related to generalized debility vs anemia  - EKG with sinus tachycardia  - CXR : no focal consolidations noted on independent review  - on lovenox for DVT ppx.  - Requested Iron panel and ferritin     #DVT ppx: Lovenox

## 2025-02-23 NOTE — PROGRESS NOTE ADULT - SUBJECTIVE AND OBJECTIVE BOX
CC: Patient is a 60y old  Female who presents with a chief complaint of bilateral CVA (22 Feb 2025 12:43)      Interval History:  Patient seen and examined at bedside.  No overnight events  No complaints this morning    ALLERGIES:  doxycycline (Angioedema)  clindamycin (Angioedema)  penicillin (Unknown)  linezolid (Angioedema)    MEDICATIONS  (STANDING):  amantadine Syrup 100 milliGRAM(s) Oral <User Schedule>  artificial tears (preservative free) Ophthalmic Solution 1 Drop(s) Both EYES four times a day  ascorbic acid 500 milliGRAM(s) Oral daily  aspirin  chewable 81 milliGRAM(s) Oral daily  atorvastatin 40 milliGRAM(s) Oral at bedtime  enoxaparin Injectable 40 milliGRAM(s) SubCutaneous every 24 hours  hydroxyurea 500 milliGRAM(s) Oral two times a day  levETIRAcetam  Solution 500 milliGRAM(s) Oral every 12 hours  metoprolol tartrate 12.5 milliGRAM(s) Oral every 8 hours  multivitamin/minerals/iron Oral Solution (CENTRUM) 15 milliLiter(s) Oral daily  pantoprazole   Suspension 40 milliGRAM(s) Oral before breakfast  polyethylene glycol 3350 17 Gram(s) Oral daily  senna 1 Tablet(s) Oral daily    MEDICATIONS  (PRN):  acetaminophen   Oral Liquid .. 650 milliGRAM(s) Oral every 6 hours PRN Temp greater or equal to 38C (100.4F), Mild Pain (1 - 3)  albuterol/ipratropium for Nebulization 3 milliLiter(s) Nebulizer every 6 hours PRN Shortness of Breath and/or Wheezing  bisacodyl Suppository 10 milliGRAM(s) Rectal daily PRN Constipation    Vital Signs Last 24 Hrs  T(F): 98 (22 Feb 2025 20:21), Max: 98 (22 Feb 2025 20:21)  HR: 88 (23 Feb 2025 05:20) (84 - 97)  BP: 126/87 (23 Feb 2025 05:20) (100/67 - 126/87)  RR: 15 (22 Feb 2025 20:21) (15 - 16)  SpO2: 99% (22 Feb 2025 20:21) (99% - 99%)  I&O's Summary        PHYSICAL EXAM:  GENERAL: NAD  NERVOUS SYSTEM:  CN II - XII intact; Sensation intact; follows commands  CHEST/LUNG: Clear to percussion bilaterally; No rales, rhonchi, wheezing, or rubs; normal respiratory effort, no intercostal retractions  HEART: Regular rate and rhythm; No murmurs, rubs, or gallops; No pitting edema  ABDOMEN: Soft, Nontender, Nondistended; Bowel sounds present; No HSM or masses  MUSCULOSKELETAL/EXTREMITIES:  2+ Peripheral Pulses, No clubbing or digital cyanosis; FROM of extremeties (pain, crepitation or contracture)  PSYCH: Appropriate affect, Alert & Oriented x 3, Good Memory; Good insight    LABS:       02-14 Chol 160 mg/dL LDL -- HDL 32 mg/dL Trig 121 mg/dL        COVID-19 PCR: NotDetec (02-06-25 @ 21:11)      Care Discussed with Consultants/Other Providers: Yes

## 2025-02-23 NOTE — PROGRESS NOTE ADULT - ASSESSMENT
59y with PMH, HTN, HFimpEF/NICM (EF 58% 10/2024), sickle cell disease, who presented to [facility] for SOB and SS crisis 12/30/25;, followed by witnessed seizure (? secondary to clonazepam withdrawal) requiring intubation. She was transferred to [facility] 1/3/25 secondary to cardiogenic shock /RV failure s/p ECMO +TRACH +PEG.   Now admitted to [facility] after for initiation of a multidisciplinary rehab program consisting focused on functional mobility, transfers and ADLs (activities of daily living).    # Bilateral CVA, bilateral body involvement left > right, left kelsi inattention, aphasia, respiratory failure, dysphagia  - MRI 1/9: Innumerable foci susceptibility artifact scattered throughout the brain which can be seen with critically ill patients on ECMO. Diffuse fat embolization is also part of the differential diagnosis but is considered less likely. Tiny acute/subacute infarcts within the bilateral basal ganglia, thalami, and chandu with disproportionate ovoid area of T2/FLAIR prolongation in the right ventral thalamus. Findings may indicate the presence of embolic acute/subacute infarcts.  - ASA 81 mg  (restarted 2/13)  - Started Atorvastatin 40 ()   - continue Comprehensive Multidisciplinary Rehab Program: 3 hours a day (1  hr PT, 1 hr OT, 1 hr SLP), 5 days a week.  - Precautions: respiratory, aspiration, SZ, AMS, fall, PEG    # Hypoarousal  - LP (1/14) - negative for meningitis/encephalitis  - c/w Amantadine 100 BID    # Seizure  - EEG (1/7) with suspicion of seizure  - Seizure ppx: Keppra 500 BID     # low grade fever T m 100.5 (2/8)  - discussed with hospitalist. No obvious source, asymptomatic, vitals stable, clinically well appearing  - Check CXR, UA, CBC, BMP, procal; RVP if fever persists  - temp <100*F, WBC slight uptrend >11.33 (2/7)>11.36 (2/10)  - afebrile, WBC normal 9.24 (2/13)> 11.02 (2/17) > no fever or overt sign of infection; continue to trend--WBC 8.74 (2/20)    # Acute respiratory failure  - S/p Trach (1/16). On 6.0 cuffless (1/29)  - MSSA s/p Abx  - Duoneb q8hrs  - pulmonary following-- appreciate recommendations  - capping during waking hours > decannulated 2/14---- saturating well on RA    # Cardiogenic Shock  - S/p ECMO 1/3. decannulated 1/7  - S/p 2 U PRBC, 2 platelet, 5U cryo  - ECHO 1/20: EF 53%, normal LV+RV function, no acute HF issues  - Metoprolol reduced 12.5 secondary to OH 2/7  - BP stable    # Orthostatic hypotension last week  -  SBP dropped to 62 during OT session 2/7, improved to SBP 98 supine  - Lopressor reduced  - DONALD stockings when out of bed  - has abd for PEG protection already    # Sickle cell disease   - 1-2 sickle pain crisis per yea  - s/p transfusions, apheresis (1/12) for fat emboli concerns   - Hydroxyurea 500 BID (as long as plt > 100 and hgb >8)  - Hematology consult PRN  - hgb 10.5 (2/13)>9.9 (2/17)>9.5 (2/20)  - Iron panel ordered    # Transaminitis   - RUQ US 1/27: The gallbladder is moderately distended and contains layering sludge and possibly small stones. There is no significant gallbladder wall thickening or pericholecystic fluid and a sonographic Gross sign was not elicited. Therefore, there is no definitive evidence for acute cholecystitis.  - continues to down trend: Alk Phos/AST normal 98/29; ALT 55 (2/13)> ALT 64 (2/17)>57 (2/20)    # Thrush  - nystatin (2/7)    # Pain Management:  - Tylenol PRN    # GI/Bowel:  - Senna QHS, Miralax QD   - GI ppx: Protonix    # /Bladder:   - PVR q 8 hours (SC if > 400) > BS/PVR fluctuates, not needing straight cath   - incontinent     # Skin/Pressure Injury: healed DTI on admission.   - Skin assessment on admission: no active pressure injury > healed DTI to L ear lobe, b/l forehead, sacrum /bilateral buttocks, bilateral heels. +Incontinence Dermatitis  Recommend:  1.) topical therapy: sacral/buttock skin – cleanse with incontinence cleanser, pat dry, apply Piotr ointment BID and PRN for incontinent episodes. bilateral forehead - cleanse with gentle cleanser, leave open to air  2.) Incontinence Management - incontinence cleanser, pads, pericare BID  3.) Maintain on an alternating air with low air loss surface  4.) Turn and reposition Q 2 hours  5.) Nutrition optimization   6.) Offload heels/feet with complete cair air fluidized boots/pillows; ensure that the soles of the feet are not resting on the foot board of the bed.  7.) chair cushion for chair sitting    # Diet/Dysphagia:  - S/p PEG (1/21)  - FEEST (1/27) > silent laryngeal penetration of purees and moderately thick liquids is noted ----MBS yesterday--- diet upgraded to regular 2/18  - Current Diet: NPO + TF; free water   - Dysphagia: SLP consult for swallow function evaluation and treatment  - Health supp: Vitamin C + MVI    # DVT ppx:    - Lovenox 40 QD   - SCD  ---------------  Code Status/Emergency Contact: Full Code    Outpatient Follow-up:    [facility]  Th[facility] Card[facility]  [address] [place] [id number]  Phone: [phone]  Fax: [phone]  Follow Up Time: 2 weeks    IDT 2/21  NSG: incont  SW: lives in  with daughter + son(special needs, has own aide), 2-3 DONNA, + ramp access; 18 steps up?  Support: daughter + pt's sibling  SLP: on regular diet, severe cognitive and attention deficits, delayed processing  OT: supervision- eating/OH, Total- toileting, UBD, LBD are mod assist, total with shower transfers  PT: mod-max- BM, ambulating 90 ft RW, Mod Assist  Team goals: WC level transfer to to toilet min A ; tolerate less restrictive diet  TDD: 2/27 JETT    Interdisciplinary team meeting today with speech therapist, occupational therapist, physical therapist, social work and nursing where medical updates provided, progress with therapies and goals discussed. Plan of care reviewed. Team conference note documente on nia.  Total time 50 mins-face to face time encounter and counseling the patient, meeting with hospitalist, nursing staff, therapists and social work to discuss medical updates and management, care coordination, reviewing chart and data-including but not limited to labs and imaging, team meeting

## 2025-02-23 NOTE — PROGRESS NOTE ADULT - SUBJECTIVE AND OBJECTIVE BOX
INTERVAL EVENTS: Patient seen and assessed at bedside. No acute events overnight. Denies HA, CP, abdominal pain. Patient feels well and has no complaints. BM 2/20      Patient is a 59y old  Female who presents with a chief complaint of bilateral CVA     REVIEW OF SYSTEMS:  Limited by cognition and awareness of deficits   motor apraxia  delayed proccessing    Vital Signs Last 24 Hrs  T(C): 36.7 (23 Feb 2025 10:14), Max: 36.7 (22 Feb 2025 20:21)  T(F): 98.1 (23 Feb 2025 10:14), Max: 98.1 (23 Feb 2025 10:14)  HR: 87 (23 Feb 2025 10:14) (84 - 97)  BP: 120/83 (23 Feb 2025 10:14) (100/67 - 126/87)  BP(mean): --  RR: 16 (23 Feb 2025 10:14) (15 - 16)  SpO2: 98% (23 Feb 2025 10:14) (98% - 99%)    Parameters below as of 23 Feb 2025 10:14  Patient On (Oxygen Delivery Method): room air        PHYSICAL EXAM  Constitutional - NAD, Comfortable  HEENT - trach stoma healed  Cardio - warm and well perfused, no cyanosis or pedal edema  Pulm - resp nonlabored  Abdomen -  Soft, NTND; +PEG site c/d/i  Extremities - No peripheral edema, No calf tenderness   Neurologic Exam:                    Cognitive -        Orientation: AAO to self and hospital.  Didn't know year/month despite external cues and multiple choice. able to follow 1 step commands (delayed)     Speech - Hypophonic, impaired naming, delayed processing/response     Cranial Nerves - poor tracking, no facial asymmetry, tongue midline     Motor - poor/delayed command following; RUE 4/5, LUE at least antigravity, BLE 4/5  Psychiatric - calm affect, smiles appropriately      LABS:  CBC. CMP 2/20 reviewed.         Allergies  doxycycline (Angioedema)  clindamycin (Angioedema)  penicillin (Unknown)  linezolid (Angioedema)      MEDICATIONS  (STANDING):  amantadine Syrup 100 milliGRAM(s) Oral <User Schedule>  artificial tears (preservative free) Ophthalmic Solution 1 Drop(s) Both EYES four times a day  ascorbic acid 500 milliGRAM(s) Oral daily  aspirin  chewable 81 milliGRAM(s) Oral daily  atorvastatin 40 milliGRAM(s) Oral at bedtime  enoxaparin Injectable 40 milliGRAM(s) SubCutaneous every 24 hours  hydroxyurea 500 milliGRAM(s) Oral two times a day  levETIRAcetam  Solution 500 milliGRAM(s) Oral every 12 hours  metoprolol tartrate 12.5 milliGRAM(s) Oral every 8 hours  multivitamin/minerals/iron Oral Solution (CENTRUM) 15 milliLiter(s) Oral daily  pantoprazole   Suspension 40 milliGRAM(s) Oral before breakfast  polyethylene glycol 3350 17 Gram(s) Oral daily  senna 1 Tablet(s) Oral daily    MEDICATIONS  (PRN):  acetaminophen   Oral Liquid .. 650 milliGRAM(s) Oral every 6 hours PRN Temp greater or equal to 38C (100.4F), Mild Pain (1 - 3)  albuterol/ipratropium for Nebulization 3 milliLiter(s) Nebulizer every 6 hours PRN Shortness of Breath and/or Wheezing  bisacodyl Suppository 10 milliGRAM(s) Rectal daily PRN Constipation

## 2025-02-23 NOTE — PROGRESS NOTE ADULT - TIME BILLING
Time spent includes direct patient care  (interview and examination of patient), discussion with other providers, support staff and/or patient's family members, review of medical records, imaging and lab results.
Time spent includes direct patient care  (interview and examination of patient), discussion with other providers, support staff and/or patient's family members, review of medical records, imaging and lab results.
Time spent includes direct patient care  (interview and examination of patient), discussion with other providers, support staff and/or patient's family members, review of medical records, ordering diagnostic tests and analyzing results, and documentation.
Time spent includes direct patient care  (interview and examination of patient), discussion with other providers, support staff and/or patient's family members, review of medical records, imaging and lab results.
Time spent includes direct patient care  (interview and examination of patient), discussion with other providers, support staff and/or patient's family members, review of medical records, imaging and lab results.
Time spent includes direct patient care  (interview and examination of patient), discussion with other providers, support staff and/or patient's family members, review of medical records, ordering diagnostic tests and analyzing results, and documentation.
Time spent includes direct patient care (interview and examination of patient), discussion with other providers, support staff and/or patient's family members, review of medical records, ordering diagnostic tests and analyzing results, and documentation.
Time spent includes direct patient care  (interview and examination of patient), discussion with other providers, support staff and/or patient's family members, review of medical records, imaging and lab results.
Time spent includes direct patient care  (interview and examination of patient), discussion with other providers, support staff and/or patient's family members, review of medical records, imaging and lab results.

## 2025-02-24 LAB
ALBUMIN SERPL ELPH-MCNC: 2 G/DL — LOW (ref 3.3–5)
ALP SERPL-CCNC: 93 U/L — SIGNIFICANT CHANGE UP (ref 40–120)
ALT FLD-CCNC: 81 U/L — HIGH (ref 10–45)
ANION GAP SERPL CALC-SCNC: 12 MMOL/L — SIGNIFICANT CHANGE UP (ref 5–17)
AST SERPL-CCNC: 50 U/L — HIGH (ref 10–40)
BILIRUB SERPL-MCNC: 0.4 MG/DL — SIGNIFICANT CHANGE UP (ref 0.2–1.2)
BUN SERPL-MCNC: 17 MG/DL — SIGNIFICANT CHANGE UP (ref 7–23)
CALCIUM SERPL-MCNC: 9.1 MG/DL — SIGNIFICANT CHANGE UP (ref 8.4–10.5)
CHLORIDE SERPL-SCNC: 107 MMOL/L — SIGNIFICANT CHANGE UP (ref 96–108)
CO2 SERPL-SCNC: 19 MMOL/L — LOW (ref 22–31)
CREAT SERPL-MCNC: 0.88 MG/DL — SIGNIFICANT CHANGE UP (ref 0.5–1.3)
EGFR: 76 ML/MIN/1.73M2 — SIGNIFICANT CHANGE UP
GLUCOSE SERPL-MCNC: 74 MG/DL — SIGNIFICANT CHANGE UP (ref 70–99)
HCT VFR BLD CALC: 32.5 % — LOW (ref 34.5–45)
HGB BLD-MCNC: 10.5 G/DL — LOW (ref 11.5–15.5)
MCHC RBC-ENTMCNC: 30.7 PG — SIGNIFICANT CHANGE UP (ref 27–34)
MCHC RBC-ENTMCNC: 32.3 G/DL — SIGNIFICANT CHANGE UP (ref 32–36)
MCV RBC AUTO: 95 FL — SIGNIFICANT CHANGE UP (ref 80–100)
NRBC BLD AUTO-RTO: 4 /100 WBCS — HIGH (ref 0–0)
PLATELET # BLD AUTO: 387 K/UL — SIGNIFICANT CHANGE UP (ref 150–400)
POTASSIUM SERPL-MCNC: 4.7 MMOL/L — SIGNIFICANT CHANGE UP (ref 3.5–5.3)
POTASSIUM SERPL-SCNC: 4.7 MMOL/L — SIGNIFICANT CHANGE UP (ref 3.5–5.3)
PROT SERPL-MCNC: 7.2 G/DL — SIGNIFICANT CHANGE UP (ref 6–8.3)
RBC # BLD: 3.42 M/UL — LOW (ref 3.8–5.2)
RBC # FLD: 16.1 % — HIGH (ref 10.3–14.5)
SODIUM SERPL-SCNC: 138 MMOL/L — SIGNIFICANT CHANGE UP (ref 135–145)
WBC # BLD: 8 K/UL — SIGNIFICANT CHANGE UP (ref 3.8–10.5)
WBC # FLD AUTO: 8 K/UL — SIGNIFICANT CHANGE UP (ref 3.8–10.5)

## 2025-02-24 PROCEDURE — 99232 SBSQ HOSP IP/OBS MODERATE 35: CPT

## 2025-02-24 PROCEDURE — 99233 SBSQ HOSP IP/OBS HIGH 50: CPT | Mod: GC

## 2025-02-24 RX ORDER — ATORVASTATIN CALCIUM 80 MG/1
20 TABLET, FILM COATED ORAL AT BEDTIME
Refills: 0 | Status: DISCONTINUED | OUTPATIENT
Start: 2025-02-24 | End: 2025-02-27

## 2025-02-24 RX ADMIN — HYDROXYUREA 500 MILLIGRAM(S): 500 CAPSULE ORAL at 17:20

## 2025-02-24 RX ADMIN — LEVETIRACETAM 500 MILLIGRAM(S): 10 INJECTION, SOLUTION INTRAVENOUS at 17:20

## 2025-02-24 RX ADMIN — Medication 1 DROP(S): at 00:57

## 2025-02-24 RX ADMIN — Medication 1 TABLET(S): at 12:24

## 2025-02-24 RX ADMIN — Medication 100 MILLIGRAM(S): at 14:10

## 2025-02-24 RX ADMIN — Medication 1 DROP(S): at 12:25

## 2025-02-24 RX ADMIN — ATORVASTATIN CALCIUM 20 MILLIGRAM(S): 80 TABLET, FILM COATED ORAL at 21:51

## 2025-02-24 RX ADMIN — Medication 81 MILLIGRAM(S): at 12:23

## 2025-02-24 RX ADMIN — METOPROLOL SUCCINATE 12.5 MILLIGRAM(S): 50 TABLET, EXTENDED RELEASE ORAL at 14:10

## 2025-02-24 RX ADMIN — Medication 100 MILLIGRAM(S): at 06:44

## 2025-02-24 RX ADMIN — Medication 1 DROP(S): at 05:37

## 2025-02-24 RX ADMIN — Medication 1 DROP(S): at 23:22

## 2025-02-24 RX ADMIN — LEVETIRACETAM 500 MILLIGRAM(S): 10 INJECTION, SOLUTION INTRAVENOUS at 05:37

## 2025-02-24 RX ADMIN — Medication 40 MILLIGRAM(S): at 05:37

## 2025-02-24 RX ADMIN — Medication 15 MILLILITER(S): at 12:24

## 2025-02-24 RX ADMIN — METOPROLOL SUCCINATE 12.5 MILLIGRAM(S): 50 TABLET, EXTENDED RELEASE ORAL at 05:39

## 2025-02-24 RX ADMIN — METOPROLOL SUCCINATE 12.5 MILLIGRAM(S): 50 TABLET, EXTENDED RELEASE ORAL at 21:51

## 2025-02-24 RX ADMIN — Medication 1 DROP(S): at 17:22

## 2025-02-24 RX ADMIN — POLYETHYLENE GLYCOL 3350 17 GRAM(S): 17 POWDER, FOR SOLUTION ORAL at 12:24

## 2025-02-24 RX ADMIN — ENOXAPARIN SODIUM 40 MILLIGRAM(S): 100 INJECTION SUBCUTANEOUS at 05:37

## 2025-02-24 RX ADMIN — Medication 500 MILLIGRAM(S): at 12:23

## 2025-02-24 RX ADMIN — HYDROXYUREA 500 MILLIGRAM(S): 500 CAPSULE ORAL at 05:37

## 2025-02-24 NOTE — PROGRESS NOTE ADULT - SUBJECTIVE AND OBJECTIVE BOX
Patient is a 59y old  Female who presents with a chief complaint of bilateral CVA     SUBJECTIVE/OBJECTIVE: Patient seen and evaluated while in the wheelchair. Uneventful weekend.  Tolerating therapy. no Complaint at this time    REVIEW OF SYSTEMS:  Limited by cognition and awareness of deficits   motor apraxia  delayed processing     Vital Signs Last 24 Hrs  T(C): 36.6 (02-24-25 @ 10:01), Max: 36.6 (02-23-25 @ 20:09)  T(F): 97.9 (02-24-25 @ 10:01), Max: 97.9 (02-23-25 @ 20:09)  HR: 90 (02-24-25 @ 10:01) (74 - 100)  BP: 121/86 (02-24-25 @ 10:01) (113/79 - 130/90)  RR: 16 (02-24-25 @ 10:01) (15 - 16)  SpO2: 97% (02-24-25 @ 10:01) (97% - 98%)    PHYSICAL EXAM  Constitutional - NAD, Comfortable  HEENT - trach stoma healed  Cardio - warm and well perfused, no cyanosis or pedal edema  Pulm - resp nonlabored  Abdomen -  Soft, NTND; +PEG site c/d/i  Extremities - No peripheral edema, No calf tenderness   Neurologic Exam:                    Cognitive -        Orientation: AAO to self and hospital (not name of hospital).  Didn't know year/month despite cues. Able to follow 1 step commands (delayed)     Speech - Hypophonic, impaired naming, delayed processing/response     Cranial Nerves - poor tracking, no facial asymmetry, tongue midline     Motor - poor/delayed command following; RUE 4/5, LUE at least antigravity, BLE 4/5  Psychiatric - calm affect, smiles appropriately    LAB        RECENT LABS:                                     10.5   8.00  )-----------( 387      ( 24 Feb 2025 06:00 )             32.5     02-24    138  |  107  |  17  ----------------------------<  74  4.7   |  19[L]  |  0.88    Ca    9.1      24 Feb 2025 06:00    TPro  7.2  /  Alb  2.0[L]  /  TBili  0.4  /  DBili  x   /  AST  50[H]  /  ALT  81[H]  /  AlkPhos  93  02-24    LIVER FUNCTIONS - ( 24 Feb 2025 06:00 )  Alb: 2.0 g/dL / Pro: 7.2 g/dL / ALK PHOS: 93 U/L / ALT: 81 U/L / AST: 50 U/L / GGT: x                  Allergies  doxycycline (Angioedema)  clindamycin (Angioedema)  penicillin (Unknown)  linezolid (Angioedema)    MEDICATIONS  (STANDING):  amantadine Syrup 100 milliGRAM(s) Oral <User Schedule>  artificial tears (preservative free) Ophthalmic Solution 1 Drop(s) Both EYES four times a day  ascorbic acid 500 milliGRAM(s) Oral daily  aspirin  chewable 81 milliGRAM(s) Oral daily  atorvastatin 40 milliGRAM(s) Oral at bedtime  enoxaparin Injectable 40 milliGRAM(s) SubCutaneous every 24 hours  hydroxyurea 500 milliGRAM(s) Oral two times a day  levETIRAcetam  Solution 500 milliGRAM(s) Oral every 12 hours  metoprolol tartrate 12.5 milliGRAM(s) Oral every 8 hours  multivitamin/minerals/iron Oral Solution (CENTRUM) 15 milliLiter(s) Oral daily  pantoprazole   Suspension 40 milliGRAM(s) Oral before breakfast  polyethylene glycol 3350 17 Gram(s) Oral daily  senna 1 Tablet(s) Oral daily    MEDICATIONS  (PRN):  acetaminophen   Oral Liquid .. 650 milliGRAM(s) Oral every 6 hours PRN Temp greater or equal to 38C (100.4F), Mild Pain (1 - 3)  albuterol/ipratropium for Nebulization 3 milliLiter(s) Nebulizer every 6 hours PRN Shortness of Breath and/or Wheezing  bisacodyl Suppository 10 milliGRAM(s) Rectal daily PRN Constipation   Patient is a 59y old  Female who presents with a chief complaint of bilateral CVA     SUBJECTIVE/OBJECTIVE: Patient seen and evaluated while in the wheelchair. Uneventful weekend.  Tolerating therapy. No complaints at this time, however poor insight into deficits.     REVIEW OF SYSTEMS:  Limited by cognition and awareness of deficits   motor apraxia  delayed processing     Vital Signs Last 24 Hrs  T(C): 36.6 (02-24-25 @ 10:01), Max: 36.6 (02-23-25 @ 20:09)  T(F): 97.9 (02-24-25 @ 10:01), Max: 97.9 (02-23-25 @ 20:09)  HR: 90 (02-24-25 @ 10:01) (74 - 100)  BP: 121/86 (02-24-25 @ 10:01) (113/79 - 130/90)  RR: 16 (02-24-25 @ 10:01) (15 - 16)  SpO2: 97% (02-24-25 @ 10:01) (97% - 98%)    PHYSICAL EXAM  Constitutional - NAD, Comfortable  HEENT - trach stoma healed  Cardio - warm and well perfused, no cyanosis or pedal edema  Pulm - resp nonlabored  Abdomen -  Soft, NTND; +PEG site c/d/i  Extremities - No peripheral edema, No calf tenderness   Neurologic Exam:                    Cognitive -        Orientation: AAO to self and hospital (not name of hospital).  Didn't know year/month despite cues. Able to follow 1 step commands (delayed)     Speech - Hypophonic, impaired naming, delayed processing/response     Cranial Nerves - poor tracking, no facial asymmetry, tongue midline     Motor - poor/delayed command following; RUE 4/5, LUE at least antigravity, BLE 4/5  Psychiatric - calm affect, smiles appropriately    LAB        RECENT LABS:                                     10.5   8.00  )-----------( 387      ( 24 Feb 2025 06:00 )             32.5     02-24    138  |  107  |  17  ----------------------------<  74  4.7   |  19[L]  |  0.88    Ca    9.1      24 Feb 2025 06:00    TPro  7.2  /  Alb  2.0[L]  /  TBili  0.4  /  DBili  x   /  AST  50[H]  /  ALT  81[H]  /  AlkPhos  93  02-24    LIVER FUNCTIONS - ( 24 Feb 2025 06:00 )  Alb: 2.0 g/dL / Pro: 7.2 g/dL / ALK PHOS: 93 U/L / ALT: 81 U/L / AST: 50 U/L / GGT: x                  Allergies  doxycycline (Angioedema)  clindamycin (Angioedema)  penicillin (Unknown)  linezolid (Angioedema)    MEDICATIONS  (STANDING):  amantadine Syrup 100 milliGRAM(s) Oral <User Schedule>  artificial tears (preservative free) Ophthalmic Solution 1 Drop(s) Both EYES four times a day  ascorbic acid 500 milliGRAM(s) Oral daily  aspirin  chewable 81 milliGRAM(s) Oral daily  atorvastatin 40 milliGRAM(s) Oral at bedtime  enoxaparin Injectable 40 milliGRAM(s) SubCutaneous every 24 hours  hydroxyurea 500 milliGRAM(s) Oral two times a day  levETIRAcetam  Solution 500 milliGRAM(s) Oral every 12 hours  metoprolol tartrate 12.5 milliGRAM(s) Oral every 8 hours  multivitamin/minerals/iron Oral Solution (CENTRUM) 15 milliLiter(s) Oral daily  pantoprazole   Suspension 40 milliGRAM(s) Oral before breakfast  polyethylene glycol 3350 17 Gram(s) Oral daily  senna 1 Tablet(s) Oral daily    MEDICATIONS  (PRN):  acetaminophen   Oral Liquid .. 650 milliGRAM(s) Oral every 6 hours PRN Temp greater or equal to 38C (100.4F), Mild Pain (1 - 3)  albuterol/ipratropium for Nebulization 3 milliLiter(s) Nebulizer every 6 hours PRN Shortness of Breath and/or Wheezing  bisacodyl Suppository 10 milliGRAM(s) Rectal daily PRN Constipation

## 2025-02-24 NOTE — PROGRESS NOTE ADULT - NS ATTEND AMEND GEN_ALL_CORE FT
I have personally seen and examined the patient.  I fully participated in the care of this patient.  I have made amendments to the documentation where necessary, and agree with the history, physical exam, and plan as documented above  patient seen while in the wheelchair  denies pain  lipitor decreased due to uptrending LFTs  reports no new issues, however poor insight into deficits   Interdisciplinary team meeting today with speech therapist, occupational therapist, physical therapist, social work and nursing where medical updates provided, progress with therapies and goals discussed. Plan of care reviewed. Team conference note documented on monieky.  Total time 50 mins-face to face time encounter and counseling the patient, meeting with hospitalist, nursing staff, therapists and social work to discuss medical updates and management, care coordination, reviewing chart and data-including but not limited to labs and imaging, team meeting

## 2025-02-24 NOTE — PROGRESS NOTE ADULT - NUTRITIONAL ASSESSMENT
This patient has been assessed with a concern for Malnutrition and has been determined to have a diagnosis/diagnoses of Moderate protein-calorie malnutrition.    This patient is being managed with:   Diet NPO with Tube Feed-  Tube Feeding Modality: Gastrostomy  Inocencia [employer]ms Peptide 1.5 (KFPEPT1.5RTH)  Total Volume for 24 Hours (mL): 1080  Bolus  Total Volume of Bolus (mL):  270  Total # of Feeds: 4  Tube Feed Frequency: Every 6 hours   Tube Feed Start Time: 06:00  Bolus Feed Rate (mL per Hour): 540   Bolus Feed Duration (in Hours): 0.5  Free Water Flush  Bolus   Total Volume per Flush (mL): 300   Frequency: Every 6 Hours    Start Time: 01:00  Free Water Flush Instructions:  Do not give water flush within 1 hour (before/after) tube feed.  Mehdi(7 Gm Arginine/7 Gm Glut/1.2 Gm HMB     Qty per Day:  2  Entered: Feb 6 2025 10:05PM  
This patient has been assessed with a concern for Malnutrition and has been determined to have a diagnosis/diagnoses of Moderate protein-calorie malnutrition.    This patient is being managed with:   Diet NPO with Tube Feed-  Tube Feeding Modality: Gastrostomy  In[employer] Peptide 1.5 (KFPEPT1.5RTH)  Total Volume for 24 Hours (mL): 1080  Bolus  Total Volume of Bolus (mL):  270  Total # of Feeds: 4  Tube Feed Frequency: Every 6 hours   Tube Feed Start Time: 06:00  Bolus Feed Rate (mL per Hour): 540   Bolus Feed Duration (in Hours): 0.5  Free Water Flush  Bolus   Total Volume per Flush (mL): 330   Frequency: Every 6 Hours    Start Time: 01:00  Free Water Flush Instructions:  Do not give water flush within 1 hour (before/after) tube feed.  Mehdi(7 Gm Arginine/7 Gm Glut/1.2 Gm HMB     Qty per Day:  2  Entered: Feb 10 2025  4:28PM  
This patient has been assessed with a concern for Malnutrition and has been determined to have a diagnosis/diagnoses of Moderate protein-calorie malnutrition.    This patient is being managed with:   Diet NPO with Tube Feed-  Tube Feeding Modality: Gastrostomy  In[employer] Peptide 1.5 (KFPEPT1.5RTH)  Total Volume for 24 Hours (mL): 1080  Bolus  Total Volume of Bolus (mL):  270  Total # of Feeds: 4  Tube Feed Frequency: Every 6 hours   Tube Feed Start Time: 06:00  Bolus Feed Rate (mL per Hour): 540   Bolus Feed Duration (in Hours): 0.5  Free Water Flush  Bolus   Total Volume per Flush (mL): 330   Frequency: Every 6 Hours    Start Time: 01:00  Free Water Flush Instructions:  Do not give water flush within 1 hour (before/after) tube feed.  Mehdi(7 Gm Arginine/7 Gm Glut/1.2 Gm HMB     Qty per Day:  2  Entered: Feb 10 2025  4:28PM  
This patient has been assessed with a concern for Malnutrition and has been determined to have a diagnosis/diagnoses of Moderate protein-calorie malnutrition.    This patient is being managed with:   Diet Regular-  No Straws  Single Sips Only  Supplement Feeding Modality:  Oral  Ensure Plus High Protein Cans or Servings Per Day:  1       Frequency:  Daily  Entered: Feb 19 2025  2:30PM  
This patient has been assessed with a concern for Malnutrition and has been determined to have a diagnosis/diagnoses of Moderate protein-calorie malnutrition.    This patient is being managed with:   Diet Regular-  No Straws  Single Sips Only  Supplement Feeding Modality:  Oral  Ensure Plus High Protein Cans or Servings Per Day:  1       Frequency:  Daily  Entered: Feb 19 2025  2:30PM  
This patient has been assessed with a concern for Malnutrition and has been determined to have a diagnosis/diagnoses of Moderate protein-calorie malnutrition.    This patient is being managed with:   Diet NPO with Tube Feed-  Tube Feeding Modality: Gastrostomy  In[employer] Peptide 1.5 (KFPEPT1.5RTH)  Total Volume for 24 Hours (mL): 1080  Bolus  Total Volume of Bolus (mL):  270  Total # of Feeds: 4  Tube Feed Frequency: Every 6 hours   Tube Feed Start Time: 06:00  Bolus Feed Rate (mL per Hour): 540   Bolus Feed Duration (in Hours): 0.5  Free Water Flush  Bolus   Total Volume per Flush (mL): 300   Frequency: Every 6 Hours    Start Time: 01:00  Free Water Flush Instructions:  Do not give water flush within 1 hour (before/after) tube feed.  Mehdi(7 Gm Arginine/7 Gm Glut/1.2 Gm HMB     Qty per Day:  2  Entered: Feb 6 2025 10:05PM  
This patient has been assessed with a concern for Malnutrition and has been determined to have a diagnosis/diagnoses of Moderate protein-calorie malnutrition.    This patient is being managed with:   Diet NPO with Tube Feed-  Tube Feeding Modality: Gastrostomy  In[employer] Peptide 1.5 (KFPEPT1.5RTH)  Total Volume for 24 Hours (mL): 1080  Bolus  Total Volume of Bolus (mL):  270  Total # of Feeds: 4  Tube Feed Frequency: Every 6 hours   Tube Feed Start Time: 06:00  Bolus Feed Rate (mL per Hour): 540   Bolus Feed Duration (in Hours): 0.5  Free Water Flush  Bolus   Total Volume per Flush (mL): 330   Frequency: Every 6 Hours    Start Time: 01:00  Free Water Flush Instructions:  Do not give water flush within 1 hour (before/after) tube feed.  Mehdi(7 Gm Arginine/7 Gm Glut/1.2 Gm HMB     Qty per Day:  2  Entered: Feb 10 2025  4:28PM  
This patient has been assessed with a concern for Malnutrition and has been determined to have a diagnosis/diagnoses of Moderate protein-calorie malnutrition.    This patient is being managed with:   Diet Regular-  No Straws  Single Sips Only  Entered: Feb 18 2025 10:08AM  
This patient has been assessed with a concern for Malnutrition and has been determined to have a diagnosis/diagnoses of Moderate protein-calorie malnutrition.    This patient is being managed with:   Diet NPO with Tube Feed-  Tube Feeding Modality: Gastrostomy  [employer] Peptide 1.5 (KFPEPT1.5RTH)  Total Volume for 24 Hours (mL): 1080  Bolus  Total Volume of Bolus (mL):  270  Total # of Feeds: 4  Tube Feed Frequency: Every 6 hours   Tube Feed Start Time: 06:00  Bolus Feed Rate (mL per Hour): 540   Bolus Feed Duration (in Hours): 0.5  Free Water Flush  Bolus   Total Volume per Flush (mL): 330   Frequency: Every 6 Hours    Start Time: 01:00  Free Water Flush Instructions:  Do not give water flush within 1 hour (before/after) tube feed.  Mehdi(7 Gm Arginine/7 Gm Glut/1.2 Gm HMB     Qty per Day:  2  Entered: Feb 10 2025  4:28PM  
This patient has been assessed with a concern for Malnutrition and has been determined to have a diagnosis/diagnoses of Moderate protein-calorie malnutrition.    This patient is being managed with:   Diet Regular-  No Straws  Single Sips Only  Supplement Feeding Modality:  Oral  Ensure Plus High Protein Cans or Servings Per Day:  1       Frequency:  Daily  Entered: Feb 19 2025  2:30PM  
This patient has been assessed with a concern for Malnutrition and has been determined to have a diagnosis/diagnoses of Moderate protein-calorie malnutrition.    This patient is being managed with:   Diet NPO with Tube Feed-  Tube Feeding Modality: Gastrostomy  [employer] Peptide 1.5 (KFPEPT1.5RTH)  Total Volume for 24 Hours (mL): 1080  Bolus  Total Volume of Bolus (mL):  270  Total # of Feeds: 4  Tube Feed Frequency: Every 6 hours   Tube Feed Start Time: 06:00  Bolus Feed Rate (mL per Hour): 540   Bolus Feed Duration (in Hours): 0.5  Free Water Flush  Bolus   Total Volume per Flush (mL): 330   Frequency: Every 6 Hours    Start Time: 01:00  Free Water Flush Instructions:  Do not give water flush within 1 hour (before/after) tube feed.  Mehdi(7 Gm Arginine/7 Gm Glut/1.2 Gm HMB     Qty per Day:  2  Entered: Feb 10 2025  4:28PM  
This patient has been assessed with a concern for Malnutrition and has been determined to have a diagnosis/diagnoses of Moderate protein-calorie malnutrition.    This patient is being managed with:   Diet NPO with Tube Feed-  Tube Feeding Modality: Gastrostomy  [employer] Peptide 1.5 (KFPEPT1.5RTH)  Total Volume for 24 Hours (mL): 1080  Bolus  Total Volume of Bolus (mL):  270  Total # of Feeds: 4  Tube Feed Frequency: Every 6 hours   Tube Feed Start Time: 06:00  Bolus Feed Rate (mL per Hour): 540   Bolus Feed Duration (in Hours): 0.5  Free Water Flush  Bolus   Total Volume per Flush (mL): 330   Frequency: Every 6 Hours    Start Time: 01:00  Free Water Flush Instructions:  Do not give water flush within 1 hour (before/after) tube feed.  Mehdi(7 Gm Arginine/7 Gm Glut/1.2 Gm HMB     Qty per Day:  2  Entered: Feb 10 2025  4:28PM  
This patient has been assessed with a concern for Malnutrition and has been determined to have a diagnosis/diagnoses of Moderate protein-calorie malnutrition.    This patient is being managed with:   Diet NPO with Tube Feed-  Tube Feeding Modality: Gastrostomy  In[employer] Peptide 1.5 (KFPEPT1.5RTH)  Total Volume for 24 Hours (mL): 1080  Bolus  Total Volume of Bolus (mL):  270  Total # of Feeds: 4  Tube Feed Frequency: Every 6 hours   Tube Feed Start Time: 06:00  Bolus Feed Rate (mL per Hour): 540   Bolus Feed Duration (in Hours): 0.5  Free Water Flush  Bolus   Total Volume per Flush (mL): 300   Frequency: Every 6 Hours    Start Time: 01:00  Free Water Flush Instructions:  Do not give water flush within 1 hour (before/after) tube feed.  Mehdi(7 Gm Arginine/7 Gm Glut/1.2 Gm HMB     Qty per Day:  2  Entered: Feb 6 2025 10:05PM  
This patient has been assessed with a concern for Malnutrition and has been determined to have a diagnosis/diagnoses of Moderate protein-calorie malnutrition.    This patient is being managed with:   Diet NPO with Tube Feed-  Tube Feeding Modality: Gastrostomy  In[employer] Peptide 1.5 (KFPEPT1.5RTH)  Total Volume for 24 Hours (mL): 1080  Bolus  Total Volume of Bolus (mL):  270  Total # of Feeds: 4  Tube Feed Frequency: Every 6 hours   Tube Feed Start Time: 06:00  Bolus Feed Rate (mL per Hour): 540   Bolus Feed Duration (in Hours): 0.5  Free Water Flush  Bolus   Total Volume per Flush (mL): 330   Frequency: Every 6 Hours    Start Time: 01:00  Free Water Flush Instructions:  Do not give water flush within 1 hour (before/after) tube feed.  Mehdi(7 Gm Arginine/7 Gm Glut/1.2 Gm HMB     Qty per Day:  2  Entered: Feb 10 2025  4:28PM

## 2025-02-24 NOTE — PROGRESS NOTE ADULT - ASSESSMENT
59y with PMH, HTN, HFimpEF/NICM (EF 58% 10/2024), sickle cell disease, who presented to [facility] for SOB and SS crisis 12/30/25;, followed by witnessed seizure (? secondary to clonazepam withdrawal) requiring intubation. She was transferred to [facility] 1/3/25 secondary to cardiogenic shock /RV failure s/p ECMO +TRACH +PEG.   Now admitted to [facility] after for initiation of a multidisciplinary rehab program consisting focused on functional mobility, transfers and ADLs (activities of daily living).    # Bilateral CVA, bilateral body involvement left > right, left kelsi inattention, aphasia, respiratory failure, dysphagia  - MRI 1/9: Innumerable foci susceptibility artifact scattered throughout the brain which can be seen with critically ill patients on ECMO. Diffuse fat embolization is also part of the differential diagnosis but is considered less likely. Tiny acute/subacute infarcts within the bilateral basal ganglia, thalami, and chandu with disproportionate ovoid area of T2/FLAIR prolongation in the right ventral thalamus. Findings may indicate the presence of embolic acute/subacute infarcts.  - ASA 81 mg  (restarted 2/13)  - Atorvastatin 40 () > reduce to 20 d/t LFT  - continue Comprehensive Multidisciplinary Rehab Program: 3 hours a day (1  hr PT, 1 hr OT, 1 hr SLP), 5 days a week.  - Precautions: respiratory, aspiration, SZ, AMS, fall, PEG    # Hypoarousal  - LP (1/14) - negative for meningitis/encephalitis  - c/w Amantadine 100 BID    # Seizure  - EEG (1/7) with suspicion of seizure  - Seizure ppx: Keppra 500 BID     # low grade fever T m 100.5 (2/8)  - discussed with hospitalist. No obvious source, asymptomatic, vitals stable, clinically well appearing  - Check CXR, UA, CBC, BMP, procal; RVP if fever persists  - temp <100*F, WBC slight uptrend >11.33 (2/7)>11.36 (2/10)  - afebrile, WBC normal     # Acute respiratory failure  - S/p Trach (1/16). On 6.0 cuffless (1/29)  - MSSA s/p Abx  - Duoneb q8hrs  - pulmonary following-- appreciate recommendations  - capping during waking hours > decannulated 2/14---- saturating well on RA    # Cardiogenic Shock  - S/p ECMO 1/3. decannulated 1/7  - S/p 2 U PRBC, 2 platelet, 5U cryo  - ECHO 1/20: EF 53%, normal LV+RV function, no acute HF issues  - Metoprolol reduced 12.5 secondary to OH 2/7  - BP stable    # Orthostatic hypotension - no more occurrence   -  SBP dropped to 62 during OT session 2/7, improved to SBP 98 supine  - Lopressor reduced  - DONALD stockings when out of bed  - has abd for PEG protection already    # Sickle cell disease   - 1-2 sickle pain crisis per yea  - s/p transfusions, apheresis (1/12) for fat emboli concerns   - Hydroxyurea 500 BID (as long as plt > 100 and hgb >8)  - Hematology consult PRN  - hgb 10.5 (2/13)>9.9 (2/17)>9.5 (2/20)> 10.5 (2/24)    # Transaminitis   - RUQ US 1/27: The gallbladder is moderately distended and contains layering sludge and possibly small stones. There is no significant gallbladder wall thickening or pericholecystic fluid and a sonographic Gross sign was not elicited. Therefore, there is no definitive evidence for acute cholecystitis.  - continues to down trend: Alk Phos/AST normal 98/29; ALT 55 (2/13)> ALT 64 (2/17)>57 (2/20)> 50/81 (2/24) - back on statin,  can trial reduced Atorvastatin @ 20mg     # Thrush  - nystatin (2/7)    # Pain Management:  - Tylenol PRN    # GI/Bowel:  - Senna QHS, Miralax QD   - GI ppx: Protonix    # /Bladder:   - PVR q 8 hours (SC if > 400) > BS/PVR fluctuates, not needing straight cath   - incontinent     # Skin/Pressure Injury: healed DTI on admission.   - Skin assessment on admission: no active pressure injury > healed DTI to L ear lobe, b/l forehead, sacrum /bilateral buttocks, bilateral heels. +Incontinence Dermatitis  Recommend:  1.) topical therapy: sacral/buttock skin – cleanse with incontinence cleanser, pat dry, apply Piotr ointment BID and PRN for incontinent episodes. bilateral forehead - cleanse with gentle cleanser, leave open to air  2.) Incontinence Management - incontinence cleanser, pads, pericare BID  3.) Maintain on an alternating air with low air loss surface  4.) Turn and reposition Q 2 hours  5.) Nutrition optimization   6.) Offload heels/feet with complete cair air fluidized boots/pillows; ensure that the soles of the feet are not resting on the foot board of the bed.  7.) chair cushion for chair sitting    # Diet/Dysphagia:  - S/p PEG (1/21)  - FEEST (1/27) > silent laryngeal penetration of purees and moderately thick liquids is noted ----MBS (2/18)--- diet upgraded to Regular  - Current Diet: NPO + TF; free water   - Dysphagia: SLP consult for swallow function evaluation and treatment  - Health supp: Vitamin C + MVI    # DVT ppx:    - Lovenox 40 QD   - SCD  ---------------  Code Status/Emergency Contact: Full Code    Outpatient Follow-up:  Wu Parra  Thoracic and Cardiac Surgery  [address] [place] [id number]  Phone: [phone]  Fax: [phone]  Follow Up Time: 2 weeks    IDT 2/24  NSG: incont  SW: lives in  with daughter + son(special needs, has own aide), 2-3 DONNA, + ramp access; 18 steps up?  Support: daughter + pt's sibling  SLP: on regular diet, severe cognitive, attention deficits, prolonged processing, working on orientation, has some receptive/expressive language impairment, working on naming and 2 sep commands   OT: supervision- eating/groom, min A UBD/LBD, mod A footwear/bathing, min-mod A bathroom transfers, max A toileting   PT: mod-max- BM, mod A transfer/ambulating 95 ft RW/steps  Team goals: WC level transfer to to toilet min A ; tolerate less restrictive diet  Barriers: cog impairment requiring max Awith attention to stay on task  TDD: 2/27 JETT   59y with PMH, HTN, HFimpEF/NICM (EF 58% 10/2024), sickle cell disease, who presented to [facility] for SOB and SS crisis 12/30/25;, followed by witnessed seizure (? secondary to clonazepam withdrawal) requiring intubation. She was transferred to [facility] 1/3/25 secondary to cardiogenic shock /RV failure s/p ECMO +TRACH +PEG.   Now admitted to  after for initiation of a multidisciplinary rehab program consisting focused on functional mobility, transfers and ADLs (activities of daily living).    # Bilateral CVA, bilateral body involvement left > right, left kelsi inattention, aphasia, respiratory failure, dysphagia  - MRI 1/9: Innumerable foci susceptibility artifact scattered throughout the brain which can be seen with critically ill patients on ECMO. Diffuse fat embolization is also part of the differential diagnosis but is considered less likely. Tiny acute/subacute infarcts within the bilateral basal ganglia, thalami, and chandu with disproportionate ovoid area of T2/FLAIR prolongation in the right ventral thalamus. Findings may indicate the presence of embolic acute/subacute infarcts.  - ASA 81 mg  (restarted 2/13)  - Atorvastatin 40 () > reduce to 20 d/t uptrending LFT  - continue Comprehensive Multidisciplinary Rehab Program: 3 hours a day (1  hr PT, 1 hr OT, 1 hr SLP), 5 days a week.  - Precautions: respiratory, aspiration, SZ, AMS, fall, PEG    # Hypoarousal  - LP (1/14) - negative for meningitis/encephalitis  - c/w Amantadine 100 BID    # Seizure  - EEG (1/7) with suspicion of seizure  - Seizure ppx: Keppra 500 BID     # low grade fever T m 100.5 (2/8)  - discussed with hospitalist. No obvious source, asymptomatic, vitals stable, clinically well appearing  - Check CXR, UA, CBC, BMP, procal; RVP if fever persists  - temp <100*F, WBC slight uptrend >11.33 (2/7)>11.36 (2/10) >8 (2/24)  - afebrile, WBC normal     # Acute respiratory failure  - S/p Trach (1/16). On 6.0 cuffless (1/29)  - MSSA s/p Abx  - Duoneb q8hrs  - pulmonary following-- appreciate recommendations  - capping during waking hours > decannulated 2/14---- saturating well on RA    # Cardiogenic Shock  - S/p ECMO 1/3. decannulated 1/7  - S/p 2 U PRBC, 2 platelet, 5U cryo  - ECHO 1/20: EF 53%, normal LV+RV function, no acute HF issues  - Metoprolol reduced 12.5 secondary to OH 2/7  - BP stable    # Orthostatic hypotension - no more occurrence   -  SBP dropped to 62 during OT session 2/7, improved to SBP 98 supine  - Lopressor reduced  - DONALD stockings when out of bed  - has abd for PEG protection already    # Sickle cell disease   - 1-2 sickle pain crisis per yea  - s/p transfusions, apheresis (1/12) for fat emboli concerns   - Hydroxyurea 500 BID (as long as plt > 100 and hgb >8)  - Hematology consult PRN  - hgb 10.5 (2/13)>9.9 (2/17)>9.5 (2/20)> 10.5 (2/24)    # Transaminitis   - RUQ US 1/27: The gallbladder is moderately distended and contains layering sludge and possibly small stones. There is no significant gallbladder wall thickening or pericholecystic fluid and a sonographic Gross sign was not elicited. Therefore, there is no definitive evidence for acute cholecystitis.  - continues to down trend: Alk Phos/AST normal 98/29; ALT 55 (2/13)> ALT 64 (2/17)>57 (2/20)> 50/81 (2/24) - back on statin,  can trial reduced Atorvastatin @ 20mg     # Thrush  - nystatin (2/7)    # Pain Management:  - Tylenol PRN    # GI/Bowel:  - Senna QHS, Miralax QD   - GI ppx: Protonix    # /Bladder:   - PVR q 8 hours (SC if > 400) > BS/PVR fluctuates, not needing straight cath   - incontinent     # Skin/Pressure Injury: healed DTI on admission.   - Skin assessment on admission: no active pressure injury > healed DTI to L ear lobe, b/l forehead, sacrum /bilateral buttocks, bilateral heels. +Incontinence Dermatitis  Recommend:  1.) topical therapy: sacral/buttock skin – cleanse with incontinence cleanser, pat dry, apply Piotr ointment BID and PRN for incontinent episodes. bilateral forehead - cleanse with gentle cleanser, leave open to air  2.) Incontinence Management - incontinence cleanser, pads, pericare BID  3.) Maintain on an alternating air with low air loss surface  4.) Turn and reposition Q 2 hours  5.) Nutrition optimization   6.) Offload heels/feet with complete cair air fluidized boots/pillows; ensure that the soles of the feet are not resting on the foot board of the bed.  7.) chair cushion for chair sitting    # Diet/Dysphagia:  - S/p PEG (1/21)  - FEEST (1/27) > silent laryngeal penetration of purees and moderately thick liquids is noted ----MBS (2/18)--- diet upgraded to Regular  - Current Diet: NPO + TF; free water   - Dysphagia: SLP consult for swallow function evaluation and treatment  - Health supp: Vitamin C + MVI    # DVT ppx:    - Lovenox 40 QD   - SCD  ---------------  Code Status/Emergency Contact: Full Code    Outpatient Follow-up:  Wu Parra  Thoracic and Cardiac Surgery  [address] [place] [id number]  Phone: [phone]  Fax: [phone]  Follow Up Time: 2 weeks    IDT 2/24  NSG: incont  SW: lives in  with daughter + son(special needs, has own aide), 2-3 DONNA, + ramp access; 18 steps up?  Support: daughter + pt's sibling  SLP: on regular diet, severe cognitive, attention deficits, prolonged processing, working on orientation, has some receptive/expressive language impairment, working on naming and 2 sep commands   OT: supervision- eating/groom, min A UBD/LBD, mod A footwear/bathing, min-mod A bathroom transfers, max A toileting   PT: mod-max- BM, mod A transfer/ambulating 95 ft RW/steps  Team goals: WC level transfer to to toilet min A ; tolerate less restrictive diet  Barriers: cog impairment requiring max Awith attention to stay on task  TDD: 2/27 JETT

## 2025-02-24 NOTE — PROGRESS NOTE ADULT - NS ATTEND OPT1A GEN_ALL_CORE
History/Medical decision making
History/Exam/Medical decision making

## 2025-02-24 NOTE — PROGRESS NOTE ADULT - SUBJECTIVE AND OBJECTIVE BOX
Patient is a 60y old  Female who presents with a chief complaint of bilateral CVA (24 Feb 2025 11:52)    Patient seen and examined at bedside. No acute overnight events.     ALLERGIES:  doxycycline (Angioedema)  clindamycin (Angioedema)  penicillin (Unknown)  linezolid (Angioedema)    MEDICATIONS  (STANDING):  amantadine Syrup 100 milliGRAM(s) Oral <User Schedule>  artificial tears (preservative free) Ophthalmic Solution 1 Drop(s) Both EYES four times a day  ascorbic acid 500 milliGRAM(s) Oral daily  aspirin  chewable 81 milliGRAM(s) Oral daily  atorvastatin 20 milliGRAM(s) Oral at bedtime  enoxaparin Injectable 40 milliGRAM(s) SubCutaneous every 24 hours  hydroxyurea 500 milliGRAM(s) Oral two times a day  levETIRAcetam  Solution 500 milliGRAM(s) Oral every 12 hours  metoprolol tartrate 12.5 milliGRAM(s) Oral every 8 hours  multivitamin/minerals/iron Oral Solution (CENTRUM) 15 milliLiter(s) Oral daily  pantoprazole   Suspension 40 milliGRAM(s) Oral before breakfast  polyethylene glycol 3350 17 Gram(s) Oral daily  senna 1 Tablet(s) Oral daily    MEDICATIONS  (PRN):  acetaminophen   Oral Liquid .. 650 milliGRAM(s) Oral every 6 hours PRN Temp greater or equal to 38C (100.4F), Mild Pain (1 - 3)  albuterol/ipratropium for Nebulization 3 milliLiter(s) Nebulizer every 6 hours PRN Shortness of Breath and/or Wheezing  bisacodyl Suppository 10 milliGRAM(s) Rectal daily PRN Constipation    Vital Signs Last 24 Hrs  T(F): 97.9 (24 Feb 2025 10:01), Max: 97.9 (23 Feb 2025 20:09)  HR: 90 (24 Feb 2025 10:01) (74 - 90)  BP: 121/86 (24 Feb 2025 10:01) (113/79 - 130/90)  RR: 16 (24 Feb 2025 10:01) (15 - 16)  SpO2: 97% (24 Feb 2025 10:01) (97% - 98%)  I&O's Summary    PHYSICAL EXAM:  General: NAD, A/O x 3  ENT: MMM, no tonsilar exudate  Neck: Supple, No JVD  Lungs: Clear to auscultation bilaterally, no wheezes. Good air entry bilaterally   Cardio: RRR, S1/S2, No murmurs  Abdomen: Soft, Nontender, Nondistended; Bowel sounds present  Extremities: No calf tenderness, No pitting edema    LABS:                        10.5   8.00  )-----------( 387      ( 24 Feb 2025 06:00 )             32.5       02-24    138  |  107  |  17  ----------------------------<  74  4.7   |  19  |  0.88    Ca    9.1      24 Feb 2025 06:00    TPro  7.2  /  Alb  2.0  /  TBili  0.4  /  DBili  x   /  AST  50  /  ALT  81  /  AlkPhos  93  02-24     02-14 Chol 160 mg/dL LDL -- HDL 32 mg/dL Trig 121 mg/dL    Urinalysis Basic - ( 24 Feb 2025 06:00 )    Color: x / Appearance: x / SG: x / pH: x  Gluc: 74 mg/dL / Ketone: x  / Bili: x / Urobili: x   Blood: x / Protein: x / Nitrite: x   Leuk Esterase: x / RBC: x / WBC x   Sq Epi: x / Non Sq Epi: x / Bacteria: x    COVID-19 PCR: NotDetec (02-06-25 @ 21:11)    RADIOLOGY & ADDITIONAL TESTS:     Care Discussed with Consultants/Other Providers:

## 2025-02-24 NOTE — PROGRESS NOTE ADULT - ASSESSMENT
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  #Dysphagia due to above  #PEG status  -PT/OT/SLP  -BP control - currently on Lopressor, monitor for orthostasis  -c/w Amantadine  -PEG feeds as tolerated  - continue ASA for secondary stroke prevention  - atorvastatin 40mg po daily    #History of respiratory failure requiring tracheostomy  #Tracheostomy status  -decannulated 2/14  - continue nebs/pulmicort    #Possible seizure disorder, recent onset  -c/w Keppra    #Sickle cell disease  -c/w hydroxyurea     #Oral thrush  -c/w Nystatin    #exertional SOB  # Anemia  # Thrombocytosis- likely reactive   - may be related to generalized debility vs anemia  - EKG with sinus tachycardia  - CXR : no focal consolidations noted on independent review  - on lovenox for DVT ppx.  - Requested Iron panel and ferritin     #DVT ppx: Lovenox

## 2025-02-25 PROCEDURE — 99232 SBSQ HOSP IP/OBS MODERATE 35: CPT

## 2025-02-25 PROCEDURE — 99232 SBSQ HOSP IP/OBS MODERATE 35: CPT | Mod: GC

## 2025-02-25 RX ADMIN — LEVETIRACETAM 500 MILLIGRAM(S): 10 INJECTION, SOLUTION INTRAVENOUS at 05:08

## 2025-02-25 RX ADMIN — Medication 500 MILLIGRAM(S): at 11:23

## 2025-02-25 RX ADMIN — Medication 81 MILLIGRAM(S): at 11:28

## 2025-02-25 RX ADMIN — ATORVASTATIN CALCIUM 20 MILLIGRAM(S): 80 TABLET, FILM COATED ORAL at 21:52

## 2025-02-25 RX ADMIN — Medication 15 MILLILITER(S): at 11:23

## 2025-02-25 RX ADMIN — METOPROLOL SUCCINATE 12.5 MILLIGRAM(S): 50 TABLET, EXTENDED RELEASE ORAL at 13:13

## 2025-02-25 RX ADMIN — HYDROXYUREA 500 MILLIGRAM(S): 500 CAPSULE ORAL at 18:06

## 2025-02-25 RX ADMIN — Medication 1 DROP(S): at 05:08

## 2025-02-25 RX ADMIN — METOPROLOL SUCCINATE 12.5 MILLIGRAM(S): 50 TABLET, EXTENDED RELEASE ORAL at 05:08

## 2025-02-25 RX ADMIN — Medication 100 MILLIGRAM(S): at 06:15

## 2025-02-25 RX ADMIN — POLYETHYLENE GLYCOL 3350 17 GRAM(S): 17 POWDER, FOR SOLUTION ORAL at 11:23

## 2025-02-25 RX ADMIN — ENOXAPARIN SODIUM 40 MILLIGRAM(S): 100 INJECTION SUBCUTANEOUS at 05:08

## 2025-02-25 RX ADMIN — Medication 1 DROP(S): at 18:06

## 2025-02-25 RX ADMIN — HYDROXYUREA 500 MILLIGRAM(S): 500 CAPSULE ORAL at 05:09

## 2025-02-25 RX ADMIN — Medication 1 DROP(S): at 23:05

## 2025-02-25 RX ADMIN — Medication 1 TABLET(S): at 11:28

## 2025-02-25 RX ADMIN — Medication 100 MILLIGRAM(S): at 13:13

## 2025-02-25 RX ADMIN — Medication 1 DROP(S): at 11:23

## 2025-02-25 RX ADMIN — METOPROLOL SUCCINATE 12.5 MILLIGRAM(S): 50 TABLET, EXTENDED RELEASE ORAL at 21:52

## 2025-02-25 RX ADMIN — LEVETIRACETAM 500 MILLIGRAM(S): 10 INJECTION, SOLUTION INTRAVENOUS at 18:06

## 2025-02-25 RX ADMIN — Medication 40 MILLIGRAM(S): at 05:09

## 2025-02-25 NOTE — PROGRESS NOTE ADULT - SUBJECTIVE AND OBJECTIVE BOX
Patient is a 60y old  Female who presents with a chief complaint of bilateral CVA (24 Feb 2025 13:36)      Patient seen and examined at bedside. No acute overnight events. No complaints at time of evaluation     ALLERGIES:  doxycycline (Angioedema)  clindamycin (Angioedema)  penicillin (Unknown)  linezolid (Angioedema)    MEDICATIONS  (STANDING):  amantadine Syrup 100 milliGRAM(s) Oral <User Schedule>  artificial tears (preservative free) Ophthalmic Solution 1 Drop(s) Both EYES four times a day  ascorbic acid 500 milliGRAM(s) Oral daily  aspirin  chewable 81 milliGRAM(s) Oral daily  atorvastatin 20 milliGRAM(s) Oral at bedtime  enoxaparin Injectable 40 milliGRAM(s) SubCutaneous every 24 hours  hydroxyurea 500 milliGRAM(s) Oral two times a day  levETIRAcetam  Solution 500 milliGRAM(s) Oral every 12 hours  metoprolol tartrate 12.5 milliGRAM(s) Oral every 8 hours  multivitamin/minerals/iron Oral Solution (CENTRUM) 15 milliLiter(s) Oral daily  pantoprazole   Suspension 40 milliGRAM(s) Oral before breakfast  polyethylene glycol 3350 17 Gram(s) Oral daily  senna 1 Tablet(s) Oral daily    MEDICATIONS  (PRN):  acetaminophen   Oral Liquid .. 650 milliGRAM(s) Oral every 6 hours PRN Temp greater or equal to 38C (100.4F), Mild Pain (1 - 3)  albuterol/ipratropium for Nebulization 3 milliLiter(s) Nebulizer every 6 hours PRN Shortness of Breath and/or Wheezing  bisacodyl Suppository 10 milliGRAM(s) Rectal daily PRN Constipation    Vital Signs Last 24 Hrs  T(F): 98 (25 Feb 2025 08:01), Max: 98.5 (24 Feb 2025 21:49)  HR: 79 (25 Feb 2025 08:01) (79 - 91)  BP: 104/67 (25 Feb 2025 08:01) (104/67 - 121/86)  RR: 16 (25 Feb 2025 08:01) (16 - 16)  SpO2: 98% (25 Feb 2025 08:01) (97% - 98%)  I&O's Summary    PHYSICAL EXAM:  General: NAD, awake, alert. pleasant   ENT: MMM, no tonsilar exudate  Neck: Supple, No JVD  Lungs: Clear to auscultation bilaterally, no wheezes. Good air entry bilaterally   Cardio: RRR, S1/S2, No murmurs  Abdomen: Soft, Nontender, Nondistended; Bowel sounds present +PEG  Extremities: No calf tenderness, No pitting edema    LABS:                        10.5   8.00  )-----------( 387      ( 24 Feb 2025 06:00 )             32.5       02-24    138  |  107  |  17  ----------------------------<  74  4.7   |  19  |  0.88    Ca    9.1      24 Feb 2025 06:00    TPro  7.2  /  Alb  2.0  /  TBili  0.4  /  DBili  x   /  AST  50  /  ALT  81  /  AlkPhos  93  02-24     02-14 Chol 160 mg/dL LDL -- HDL 32 mg/dL Trig 121 mg/dL    Urinalysis Basic - ( 24 Feb 2025 06:00 )    Color: x / Appearance: x / SG: x / pH: x  Gluc: 74 mg/dL / Ketone: x  / Bili: x / Urobili: x   Blood: x / Protein: x / Nitrite: x   Leuk Esterase: x / RBC: x / WBC x   Sq Epi: x / Non Sq Epi: x / Bacteria: x    COVID-19 PCR: NotDetec (02-06-25 @ 21:11)    RADIOLOGY & ADDITIONAL TESTS:     Care Discussed with Consultants/Other Providers:

## 2025-02-25 NOTE — PROGRESS NOTE ADULT - ASSESSMENT
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  #Dysphagia due to above  #PEG status  -PT/OT/SLP  -BP control - currently on Lopressor, monitor for orthostasis  -c/w Amantadine  -PEG feeds as tolerated  - continue ASA for secondary stroke prevention  - atorvastatin 40mg po daily    #History of respiratory failure requiring tracheostomy  #Tracheostomy status  -decannulated 2/14  - continue nebs/pulmicort    #Possible seizure disorder, recent onset  -c/w Keppra    #Sickle cell disease  -c/w hydroxyurea     #Oral thrush  -c/w Nystatin    #exertional SOB  # Anemia  # Thrombocytosis- likely reactive   - may be related to generalized debility vs anemia  - EKG with sinus tachycardia  - CXR : no focal consolidations noted on independent review  - on lovenox for DVT ppx.    #DVT ppx: Lovenox

## 2025-02-25 NOTE — PROGRESS NOTE ADULT - ASSESSMENT
59y with PMH, HTN, HFimpEF/NICM (EF 58% 10/2024), sickle cell disease, who presented to [facility] for SOB and SS crisis 12/30/25;, followed by witnessed seizure (? secondary to clonazepam withdrawal) requiring intubation. She was transferred to [facility] 1/3/25 secondary to cardiogenic shock /RV failure s/p ECMO +TRACH +PEG.   Now admitted to [facility] after for initiation of a multidisciplinary rehab program consisting focused on functional mobility, transfers and ADLs (activities of daily living).    # Bilateral CVA, bilateral body involvement left > right, left kelsi inattention, aphasia, respiratory failure, dysphagia  - MRI 1/9: Innumerable foci susceptibility artifact scattered throughout the brain which can be seen with critically ill patients on ECMO. Diffuse fat embolization is also part of the differential diagnosis but is considered less likely. Tiny acute/subacute infarcts within the bilateral basal ganglia, thalami, and chandu with disproportionate ovoid area of T2/FLAIR prolongation in the right ventral thalamus. Findings may indicate the presence of embolic acute/subacute infarcts.  - ASA 81 mg  (restarted 2/13)  - Atorvastatin 40 () > reduce to 20 d/t uptrending LFT  - continue Comprehensive Multidisciplinary Rehab Program: 3 hours a day (1  hr PT, 1 hr OT, 1 hr SLP), 5 days a week.  - Precautions: respiratory, aspiration, SZ, AMS, fall, PEG    # Hypoarousal  - LP (1/14) - negative for meningitis/encephalitis  - c/w Amantadine 100 BID    # Seizure  - EEG (1/7) with suspicion of seizure  - Seizure ppx: Keppra 500 BID     # low grade fever T m 100.5 (2/8)  - discussed with hospitalist. No obvious source, asymptomatic, vitals stable, clinically well appearing  - Check CXR, UA, CBC, BMP, procal; RVP if fever persists  - temp <100*F, WBC slight uptrend >11.33 (2/7)>11.36 (2/10) >8 (2/24)  - afebrile, WBC normal     # Acute respiratory failure  - S/p Trach (1/16). On 6.0 cuffless (1/29)  - MSSA s/p Abx  - Duoneb q8hrs  - pulmonary following-- appreciate recommendations  - capping during waking hours > decannulated 2/14---- saturating well on RA    # Cardiogenic Shock  - S/p ECMO 1/3. decannulated 1/7  - S/p 2 U PRBC, 2 platelet, 5U cryo  - ECHO 1/20: EF 53%, normal LV+RV function, no acute HF issues  - Metoprolol reduced 12.5 secondary to OH 2/7  - BP stable    # Orthostatic hypotension - no more occurrence   -  SBP dropped to 62 during OT session 2/7, improved to SBP 98 supine  - Lopressor reduced  - DONALD stockings when out of bed  - has abd for PEG protection already    # Sickle cell disease   - 1-2 sickle pain crisis per yea  - s/p transfusions, apheresis (1/12) for fat emboli concerns   - Hydroxyurea 500 BID (as long as plt > 100 and hgb >8)  - Hematology consult PRN  - hgb 10.5 (2/13)>9.9 (2/17)>9.5 (2/20)> 10.5 (2/24)    # Transaminitis   - RUQ US 1/27: The gallbladder is moderately distended and contains layering sludge and possibly small stones. There is no significant gallbladder wall thickening or pericholecystic fluid and a sonographic Gross sign was not elicited. Therefore, there is no definitive evidence for acute cholecystitis.  - continues to down trend: Alk Phos/AST normal 98/29; ALT 55 (2/13)> ALT 64 (2/17)>57 (2/20)> 50/81 (2/24) - back on statin,  can trial reduced Atorvastatin @ 20mg     # Thrush  - nystatin (2/7)    # Pain Management:  - Tylenol PRN    # GI/Bowel:  - Senna QHS, Miralax QD   - GI ppx: Protonix    # /Bladder:   - PVR q 8 hours (SC if > 400) > BS/PVR fluctuates, not needing straight cath   - incontinent     # Skin/Pressure Injury: healed DTI on admission.   - Skin assessment on admission: no active pressure injury > healed DTI to L ear lobe, b/l forehead, sacrum /bilateral buttocks, bilateral heels. +Incontinence Dermatitis  Recommend:  1.) topical therapy: sacral/buttock skin – cleanse with incontinence cleanser, pat dry, apply Piotr ointment BID and PRN for incontinent episodes. bilateral forehead - cleanse with gentle cleanser, leave open to air  2.) Incontinence Management - incontinence cleanser, pads, pericare BID  3.) Maintain on an alternating air with low air loss surface  4.) Turn and reposition Q 2 hours  5.) Nutrition optimization   6.) Offload heels/feet with complete cair air fluidized boots/pillows; ensure that the soles of the feet are not resting on the foot board of the bed.  7.) chair cushion for chair sitting    # Diet/Dysphagia:  - S/p PEG (1/21)  - FEEST (1/27) > silent laryngeal penetration of purees and moderately thick liquids is noted ----MBS (2/18)--- diet upgraded to Regular  - Current Diet: NPO + TF; free water   - Dysphagia: SLP consult for swallow function evaluation and treatment  - Health supp: Vitamin C + MVI    # DVT ppx:    - Lovenox 40 QD   - SCD  ---------------  Code Status/Emergency Contact: Full Code    Outpatient Follow-up:  [facility]  [address] [place] [id number]  Phone: [phone]  Fax: [phone]  Follow Up Time: 2 weeks    IDT 2/24  NSG: incont  SW: lives in  with daughter + son(special needs, has own aide), 2-3 DONNA, + ramp access; 18 steps up?  Support: daughter + pt's sibling  SLP: on regular diet, severe cognitive, attention deficits, prolonged processing, working on orientation, has some receptive/expressive language impairment, working on naming and 2 sep commands   OT: supervision- eating/groom, min A UBD/LBD, mod A footwear/bathing, min-mod A bathroom transfers, max A toileting   PT: mod-max- BM, mod A transfer/ambulating 95 ft RW/steps  Team goals: WC level transfer to to toilet min A ; tolerate less restrictive diet  Barriers: cog impairment requiring max Awith attention to stay on task  TDD: 2/27 JETT    Total time 35 mins-face to face time encounter and counseling the patient, meeting with hospitalist, nursing staff, therapists and social work to discuss medical updates and management, care coordination, reviewing chart and data

## 2025-02-25 NOTE — PROGRESS NOTE ADULT - SUBJECTIVE AND OBJECTIVE BOX
Patient is a 60y old  Female who presents with a chief complaint of bilateral CVA (25 Feb 2025 09:42)      HPI:  59F PMH HTN, HFimpEF/NICM (EF 58% 10/2024), Sickle cell disease (on hydroxyurea), who presented to [facility] for SOB and SS crisis on 12/30 and 12/31/24. She was intubated and sedated following witnessed seizure, and transferred to [facility] (1/3/2025) for further management.     Keppra was discontinued at [facility] due to negative EEG. Seizure was thought to be caused by clonazepam withdrawal per OSH. She was hypotensive and tachycardic with elevated trops (~900s), but with no EKG changes. TTE with RV failure possibly iso pulmonary HTN 2/2 increased tidal volumes on ventilator and possible additional volume overload 2/2 management of sickle cell crisis. Neuro was consulted for seizures and EEG technician was called for vEEG placement; seizure activities noted.  Pt transferred to [facility] for management of cardiogenic shock with increased inotropic and pressor support requirement.     ON 1/3, venoarterial ECMO (Left Femoral Arterial 17Fr, Right Femoral Venous 25Fr, L SFA RPC 6Fr) was initiated. Transfusion Medicine team was consulted for emergent RBC exchange to minimize the risk of complications related to sickle cell disease given critical illness with multi-organ failure. TTE 1/4/25: LVEF <20%, ECMO cannula in RV, mildly enlarged RV size and reduced RV systolic function, no pericardial effusion, no MR, trace TR, IVC nml size    Hospital course also significant for MSSA in sputum culture from ET tube, MSSA bacteremia, BAL + MSSA Patient was treated with IV vanc (completed) and merrem for total 10 days (completed). On 1/7 patient s/p VA ECMO decannulation. Case was complicated by hematoma formation in left thigh (site of arterial cannula). She was given 2U pRBC, 2 platelet and 5U cryo. 1/8 YUNG and LLL evaluation revealed moderate green tinged secretions and mucous plugging. RUL, RML, RLL revealed minimal secretions.     Patient was tapered off precedex sedation and Keppra reinstated. MRI 1/9 showed innumerable foci susceptibility artifact scattered throughout the brain, tiny acute/subacute infarcts within the bilateral basal ganglia, thalami, and chandu with disproportionate ovoid area of T2/FLAIR prolongation in the right ventral thalamus. Findings may indicate the presence of embolic acute/subacute infarcts. 1/14 Lumbar puncture no evidence of meningitis/ encephalitis     ON 1/16 bedside trach was placed by Dr Hebert Holden and on 1/21 PEG placement performed. 1/27 Speech and swallow eval there is reduced movement of the right vocal cord; left is mobile/intact. Silent laryngeal penetration of purees and moderately thick liquids is noted Remains with a #6 Cuffless Trach at 30% Trach collar.    Patient was medically cleared for transfer to [facility] 2/6/25.   (06 Feb 2025 12:00)        SUBJECTIVE: Patient seen and examined. No acute overnight events, slept well. Denies pain. Observed working with ST.      REVIEW OF SYSTEMS  impaired due to poor insight, impaired command following, delayed responses to questions    VITALS  60y  Vital Signs Last 24 Hrs  T(C): 36.7 (25 Feb 2025 08:01), Max: 36.9 (24 Feb 2025 21:49)  T(F): 98 (25 Feb 2025 08:01), Max: 98.5 (24 Feb 2025 21:49)  HR: 64 (25 Feb 2025 13:09) (64 - 91)  BP: 112/78 (25 Feb 2025 13:09) (104/67 - 118/83)  BP(mean): --  RR: 15 (25 Feb 2025 13:09) (15 - 16)  SpO2: 99% (25 Feb 2025 13:09) (97% - 99%)    Parameters below as of 25 Feb 2025 13:09  Patient On (Oxygen Delivery Method): room air      PHYSICAL EXAM  Constitutional - NAD, Comfortable  HEENT - trach stoma healed  Cardio - warm and well perfused, no cyanosis  Pulm - resp nonlabored  Abdomen -  Soft, NTND; +PEG site c/d/i  Extremities -  No calf tenderness   Neurologic Exam:                    Cognitive -        Orientation: AAO to self and hospital (not name of hospital).  Didn't know year/month despite cues. Able to follow 1 step commands (delayed)     Speech - Hypophonic, impaired naming, delayed processing/response     Cranial Nerves - poor tracking     Motor - poor/delayed command following; RUE 4/5, LUE at least antigravity, BLE 4/5  Psychiatric - calm affect, smiles appropriately    RECENT LABS:                        10.5   8.00  )-----------( 387      ( 24 Feb 2025 06:00 )             32.5     02-24    138  |  107  |  17  ----------------------------<  74  4.7   |  19[L]  |  0.88    Ca    9.1      24 Feb 2025 06:00    TPro  7.2  /  Alb  2.0[L]  /  TBili  0.4  /  DBili  x   /  AST  50[H]  /  ALT  81[H]  /  AlkPhos  93  02-24    LIVER FUNCTIONS - ( 24 Feb 2025 06:00 )  Alb: 2.0 g/dL / Pro: 7.2 g/dL / ALK PHOS: 93 U/L / ALT: 81 U/L / AST: 50 U/L / GGT: x             Urinalysis Basic - ( 24 Feb 2025 06:00 )    Color: x / Appearance: x / SG: x / pH: x  Gluc: 74 mg/dL / Ketone: x  / Bili: x / Urobili: x   Blood: x / Protein: x / Nitrite: x   Leuk Esterase: x / RBC: x / WBC x   Sq Epi: x / Non Sq Epi: x / Bacteria: x          CAPILLARY BLOOD GLUCOSE            MEDICATIONS:  MEDICATIONS  (STANDING):  amantadine Syrup 100 milliGRAM(s) Oral <User Schedule>  artificial tears (preservative free) Ophthalmic Solution 1 Drop(s) Both EYES four times a day  ascorbic acid 500 milliGRAM(s) Oral daily  aspirin  chewable 81 milliGRAM(s) Oral daily  atorvastatin 20 milliGRAM(s) Oral at bedtime  enoxaparin Injectable 40 milliGRAM(s) SubCutaneous every 24 hours  hydroxyurea 500 milliGRAM(s) Oral two times a day  levETIRAcetam  Solution 500 milliGRAM(s) Oral every 12 hours  metoprolol tartrate 12.5 milliGRAM(s) Oral every 8 hours  multivitamin/minerals/iron Oral Solution (CENTRUM) 15 milliLiter(s) Oral daily  pantoprazole   Suspension 40 milliGRAM(s) Oral before breakfast  polyethylene glycol 3350 17 Gram(s) Oral daily  senna 1 Tablet(s) Oral daily    MEDICATIONS  (PRN):  acetaminophen   Oral Liquid .. 650 milliGRAM(s) Oral every 6 hours PRN Temp greater or equal to 38C (100.4F), Mild Pain (1 - 3)  albuterol/ipratropium for Nebulization 3 milliLiter(s) Nebulizer every 6 hours PRN Shortness of Breath and/or Wheezing  bisacodyl Suppository 10 milliGRAM(s) Rectal daily PRN Constipation

## 2025-02-26 ENCOUNTER — TRANSCRIPTION ENCOUNTER (OUTPATIENT)
Age: 60
End: 2025-02-26

## 2025-02-26 PROCEDURE — 99232 SBSQ HOSP IP/OBS MODERATE 35: CPT

## 2025-02-26 PROCEDURE — 99233 SBSQ HOSP IP/OBS HIGH 50: CPT | Mod: GC

## 2025-02-26 RX ORDER — CYST/ALA/Q10/PHOS.SER/DHA/BROC 100-20-50
15 POWDER (GRAM) ORAL
Qty: 0 | Refills: 0 | DISCHARGE
Start: 2025-02-26

## 2025-02-26 RX ORDER — HYPROMELLOSE 0.4 %
1 DROPS OPHTHALMIC (EYE)
Qty: 0 | Refills: 0 | DISCHARGE
Start: 2025-02-26

## 2025-02-26 RX ORDER — ASPIRIN 325 MG
1 TABLET ORAL
Qty: 0 | Refills: 0 | DISCHARGE
Start: 2025-02-26

## 2025-02-26 RX ORDER — SENNA 187 MG
1 TABLET ORAL
Qty: 0 | Refills: 0 | DISCHARGE
Start: 2025-02-26

## 2025-02-26 RX ORDER — POLYETHYLENE GLYCOL 3350 17 G/17G
17 POWDER, FOR SOLUTION ORAL
Qty: 0 | Refills: 0 | DISCHARGE
Start: 2025-02-26

## 2025-02-26 RX ORDER — ATORVASTATIN CALCIUM 80 MG/1
1 TABLET, FILM COATED ORAL
Qty: 0 | Refills: 0 | DISCHARGE
Start: 2025-02-26

## 2025-02-26 RX ORDER — IPRATROPIUM BROMIDE AND ALBUTEROL SULFATE .5; 2.5 MG/3ML; MG/3ML
3 SOLUTION RESPIRATORY (INHALATION)
Qty: 0 | Refills: 0 | DISCHARGE
Start: 2025-02-26

## 2025-02-26 RX ORDER — HYDROXYUREA 500 MG/1
1 CAPSULE ORAL
Qty: 0 | Refills: 0 | DISCHARGE
Start: 2025-02-26

## 2025-02-26 RX ORDER — LEVETIRACETAM 10 MG/ML
5 INJECTION, SOLUTION INTRAVENOUS
Qty: 0 | Refills: 0 | DISCHARGE
Start: 2025-02-26

## 2025-02-26 RX ORDER — AMANTADINE HCL 100 MG
100 CAPSULE ORAL
Refills: 0 | DISCHARGE
Start: 2025-02-26

## 2025-02-26 RX ORDER — ACETAMINOPHEN 500 MG/5ML
20.31 LIQUID (ML) ORAL
Qty: 0 | Refills: 0 | DISCHARGE
Start: 2025-02-26

## 2025-02-26 RX ADMIN — Medication 81 MILLIGRAM(S): at 12:09

## 2025-02-26 RX ADMIN — Medication 1 DROP(S): at 06:11

## 2025-02-26 RX ADMIN — ENOXAPARIN SODIUM 40 MILLIGRAM(S): 100 INJECTION SUBCUTANEOUS at 06:11

## 2025-02-26 RX ADMIN — Medication 40 MILLIGRAM(S): at 06:11

## 2025-02-26 RX ADMIN — Medication 15 MILLILITER(S): at 12:09

## 2025-02-26 RX ADMIN — METOPROLOL SUCCINATE 12.5 MILLIGRAM(S): 50 TABLET, EXTENDED RELEASE ORAL at 13:29

## 2025-02-26 RX ADMIN — LEVETIRACETAM 500 MILLIGRAM(S): 10 INJECTION, SOLUTION INTRAVENOUS at 06:20

## 2025-02-26 RX ADMIN — HYDROXYUREA 500 MILLIGRAM(S): 500 CAPSULE ORAL at 17:12

## 2025-02-26 RX ADMIN — HYDROXYUREA 500 MILLIGRAM(S): 500 CAPSULE ORAL at 06:12

## 2025-02-26 RX ADMIN — Medication 100 MILLIGRAM(S): at 06:11

## 2025-02-26 RX ADMIN — LEVETIRACETAM 500 MILLIGRAM(S): 10 INJECTION, SOLUTION INTRAVENOUS at 17:11

## 2025-02-26 RX ADMIN — Medication 1 DROP(S): at 12:10

## 2025-02-26 RX ADMIN — ATORVASTATIN CALCIUM 20 MILLIGRAM(S): 80 TABLET, FILM COATED ORAL at 21:47

## 2025-02-26 RX ADMIN — METOPROLOL SUCCINATE 12.5 MILLIGRAM(S): 50 TABLET, EXTENDED RELEASE ORAL at 21:48

## 2025-02-26 RX ADMIN — Medication 1 TABLET(S): at 12:11

## 2025-02-26 RX ADMIN — METOPROLOL SUCCINATE 12.5 MILLIGRAM(S): 50 TABLET, EXTENDED RELEASE ORAL at 06:11

## 2025-02-26 RX ADMIN — POLYETHYLENE GLYCOL 3350 17 GRAM(S): 17 POWDER, FOR SOLUTION ORAL at 12:10

## 2025-02-26 RX ADMIN — Medication 1 DROP(S): at 17:12

## 2025-02-26 RX ADMIN — Medication 100 MILLIGRAM(S): at 13:29

## 2025-02-26 RX ADMIN — Medication 500 MILLIGRAM(S): at 12:09

## 2025-02-26 NOTE — DISCHARGE NOTE PROVIDER - PROVIDER TOKENS
PROVIDER:[TOKEN:[[id number]],FOLLOWUP:[2 weeks]],FREE:[LAST:[PCP],PHONE:[(   )    -],FAX:[(   )    -],ADDRESS:[Dr. Hampton - [phone]],FOLLOWUP:[1 week]],PROVIDER:[TOKEN:[[id number]],FOLLOWUP:[1 month]]

## 2025-02-26 NOTE — DISCHARGE NOTE PROVIDER - ATTENDING DISCHARGE PHYSICAL EXAMINATION:
PHYSICAL EXAM  Constitutional - NAD, Comfortable  HEENT - trach stoma healed  Cardio - warm and well perfused, no cyanosis  Pulm - resp nonlabored  Abdomen -  Soft, NTND; +PEG site c/d/i  Extremities -  No calf tenderness   Neurologic Exam:                    Cognitive -        Orientation: AAO to self and hospital (not name of hospital).  Didn't know year/month despite cues. Able to follow 1 step commands (delayed)     Speech - Hypophonic, impaired naming, delayed processing/response     Cranial Nerves - poor tracking     Motor - poor/delayed command following; RUE 4/5, LUE at least antigravity, BLE 4/5  Psychiatric - calm affect, smiles appropriately

## 2025-02-26 NOTE — PROGRESS NOTE ADULT - SUBJECTIVE AND OBJECTIVE BOX
Patient is a 60y old  Female who presents with a chief complaint of bilateral CVA (25 Feb 2025 09:42)      HPI:  59F PMH HTN, HFimpEF/NICM (EF 58% 10/2024), Sickle cell disease (on hydroxyurea), who presented to [facility] for SOB and SS crisis on 12/30 and 12/31/24. She was intubated and sedated following witnessed seizure, and transferred to [facility] (1/3/2025) for further management.     Keppra was discontinued at [facility] due to negative EEG. Seizure was thought to be caused by clonazepam withdrawal per OSH. She was hypotensive and tachycardic with elevated trops (~900s), but with no EKG changes. TTE with RV failure possibly iso pulmonary HTN 2/2 increased tidal volumes on ventilator and possible additional volume overload 2/2 management of sickle cell crisis. Neuro was consulted for seizures and EEG technician was called for vEEG placement; seizure activities noted.  Pt transferred to NS for management of cardiogenic shock with increased inotropic and pressor support requirement.     ON 1/3, venoarterial ECMO (Left Femoral Arterial 17Fr, Right Femoral Venous 25Fr, L SFA RPC 6Fr) was initiated. Transfusion Medicine team was consulted for emergent RBC exchange to minimize the risk of complications related to sickle cell disease given critical illness with multi-organ failure. TTE 1/4/25: LVEF <20%, ECMO cannula in RV, mildly enlarged RV size and reduced RV systolic function, no pericardial effusion, no MR, trace TR, IVC nml size    Hospital course also significant for MSSA in sputum culture from ET tube, MSSA bacteremia, BAL + MSSA Patient was treated with IV vanc (completed) and merrem for total 10 days (completed). On 1/7 patient s/p VA ECMO decannulation. Case was complicated by hematoma formation in left thigh (site of arterial cannula). She was given 2U pRBC, 2 platelet and 5U cryo. 1/8 YUNG and LLL evaluation revealed moderate green tinged secretions and mucous plugging. RUL, RML, RLL revealed minimal secretions.     Patient was tapered off precedex sedation and Keppra reinstated. MRI 1/9 showed innumerable foci susceptibility artifact scattered throughout the brain, tiny acute/subacute infarcts within the bilateral basal ganglia, thalami, and chandu with disproportionate ovoid area of T2/FLAIR prolongation in the right ventral thalamus. Findings may indicate the presence of embolic acute/subacute infarcts. 1/14 Lumbar puncture no evidence of meningitis/ encephalitis     ON 1/16 bedside trach was placed by Dr Hebert Holden and on 1/21 PEG placement performed. 1/27 Speech and swallow eval there is reduced movement of the right vocal cord; left is mobile/intact. Silent laryngeal penetration of purees and moderately thick liquids is noted Remains with a #6 Cuffless Trach at 30% Trach collar.    Patient was medically cleared for transfer to [facility]F 2/6/25.   (06 Feb 2025 12:00)        SUBJECTIVE: Patient seen and examined. No acute overnight events, slept well. Denies pain.       REVIEW OF SYSTEMS  impaired due to poor insight, impaired command following, delayed responses to questions    VITALS  60y  T(C): 37.1 (02-26-25 @ 07:40)  T(F): 98.7 (02-26-25 @ 07:40), Max: 98.7 (02-26-25 @ 07:40)  HR: 90 (02-26-25 @ 13:32) (80 - 92)  BP: 126/88 (02-26-25 @ 13:32) (115/71 - 130/88)  RR:  (16 - 16)  SpO2:  (97% - 99%)    PHYSICAL EXAM  Constitutional - NAD, Comfortable  HEENT - trach stoma healed  Cardio - warm and well perfused, no cyanosis  Pulm - resp nonlabored  Abdomen -  Soft, NTND; +PEG site c/d/i  Extremities -  No calf tenderness   Neurologic Exam:                    Cognitive -        Orientation: AAO to self and hospital (not name of hospital).  Didn't know year/month despite cues. Able to follow 1 step commands (delayed)     Speech - Hypophonic, impaired naming, delayed processing/response     Cranial Nerves - poor tracking     Motor - poor/delayed command following; RUE 4/5, LUE at least antigravity, BLE 4/5  Psychiatric - calm affect, smiles appropriately    RECENT LABS:                        10.5   8.00  )-----------( 387      ( 24 Feb 2025 06:00 )             32.5     02-24    138  |  107  |  17  ----------------------------<  74  4.7   |  19[L]  |  0.88    Ca    9.1      24 Feb 2025 06:00    TPro  7.2  /  Alb  2.0[L]  /  TBili  0.4  /  DBili  x   /  AST  50[H]  /  ALT  81[H]  /  AlkPhos  93  02-24    LIVER FUNCTIONS - ( 24 Feb 2025 06:00 )  Alb: 2.0 g/dL / Pro: 7.2 g/dL / ALK PHOS: 93 U/L / ALT: 81 U/L / AST: 50 U/L / GGT: x             Urinalysis Basic - ( 24 Feb 2025 06:00 )    Color: x / Appearance: x / SG: x / pH: x  Gluc: 74 mg/dL / Ketone: x  / Bili: x / Urobili: x   Blood: x / Protein: x / Nitrite: x   Leuk Esterase: x / RBC: x / WBC x   Sq Epi: x / Non Sq Epi: x / Bacteria: x          CAPILLARY BLOOD GLUCOSE            MEDICATIONS:  MEDICATIONS  (STANDING):  amantadine Syrup 100 milliGRAM(s) Oral <User Schedule>  artificial tears (preservative free) Ophthalmic Solution 1 Drop(s) Both EYES four times a day  ascorbic acid 500 milliGRAM(s) Oral daily  aspirin  chewable 81 milliGRAM(s) Oral daily  atorvastatin 20 milliGRAM(s) Oral at bedtime  enoxaparin Injectable 40 milliGRAM(s) SubCutaneous every 24 hours  hydroxyurea 500 milliGRAM(s) Oral two times a day  levETIRAcetam  Solution 500 milliGRAM(s) Oral every 12 hours  metoprolol tartrate 12.5 milliGRAM(s) Oral every 8 hours  multivitamin/minerals/iron Oral Solution (CENTRUM) 15 milliLiter(s) Oral daily  pantoprazole   Suspension 40 milliGRAM(s) Oral before breakfast  polyethylene glycol 3350 17 Gram(s) Oral daily  senna 1 Tablet(s) Oral daily    MEDICATIONS  (PRN):  acetaminophen   Oral Liquid .. 650 milliGRAM(s) Oral every 6 hours PRN Temp greater or equal to 38C (100.4F), Mild Pain (1 - 3)  albuterol/ipratropium for Nebulization 3 milliLiter(s) Nebulizer every 6 hours PRN Shortness of Breath and/or Wheezing  bisacodyl Suppository 10 milliGRAM(s) Rectal daily PRN Constipation

## 2025-02-26 NOTE — DISCHARGE NOTE PROVIDER - DETAILS OF MALNUTRITION DIAGNOSIS/DIAGNOSES
This patient has been assessed with a concern for Malnutrition and was treated during this hospitalization for the following Nutrition diagnosis/diagnoses:     -  02/07/2025: Moderate protein-calorie malnutrition

## 2025-02-26 NOTE — DISCHARGE NOTE PROVIDER - HOSPITAL COURSE
HPI:  59F PMH HTN, HFimpEF/NICM (EF 58% 10/2024), Sickle cell disease (on hydroxyurea), who presented to [facility] for SOB and SS crisis on 12/30 and 12/31/24. She was intubated and sedated following witnessed seizure, and transferred to [facility] (1/3/2025) for further management.     Keppra was discontinued at [facility] due to negative EEG. Seizure was thought to be caused by clonazepam withdrawal per OSH. She was hypotensive and tachycardic with elevated trops (~900s), but with no EKG changes. TTE with RV failure possibly iso pulmonary HTN 2/2 increased tidal volumes on ventilator and possible additional volume overload 2/2 management of sickle cell crisis. Neuro was consulted for seizures and EEG technician was called for vEEG placement; seizure activities noted.  Pt transferred to NS for management of cardiogenic shock with increased inotropic and pressor support requirement.     ON 1/3, venoarterial ECMO (Left Femoral Arterial 17Fr, Right Femoral Venous 25Fr, L SFA RPC 6Fr) was initiated. Transfusion Medicine team was consulted for emergent RBC exchange to minimize the risk of complications related to sickle cell disease given critical illness with multi-organ failure. TTE 1/4/25: LVEF <20%, ECMO cannula in RV, mildly enlarged RV size and reduced RV systolic function, no pericardial effusion, no MR, trace TR, IVC nml size    Hospital course also significant for MSSA in sputum culture from ET tube, MSSA bacteremia, BAL + MSSA Patient was treated with IV vanc (completed) and merrem for total 10 days (completed). On 1/7 patient s/p VA ECMO decannulation. Case was complicated by hematoma formation in left thigh (site of arterial cannula). She was given 2U pRBC, 2 platelet and 5U cryo. 1/8 YUNG and LLL evaluation revealed moderate green tinged secretions and mucous plugging. RUL, RML, RLL revealed minimal secretions.     Patient was tapered off precedex sedation and Keppra reinstated. MRI 1/9 showed innumerable foci susceptibility artifact scattered throughout the brain, tiny acute/subacute infarcts within the bilateral basal ganglia, thalami, and chandu with disproportionate ovoid area of T2/FLAIR prolongation in the right ventral thalamus. Findings may indicate the presence of embolic acute/subacute infarcts. 1/14 Lumbar puncture no evidence of meningitis/ encephalitis     ON 1/16 bedside trach was placed by Dr Hebert Holden and on 1/21 PEG placement performed. 1/27 Speech and swallow eval there is reduced movement of the right vocal cord; left is mobile/intact. Silent laryngeal penetration of purees and moderately thick liquids is noted Remains with a #6 Cuffless Trach at 30% Trach collar.    Patient was medically cleared for transfer to [facility] IRF 2/6/25.    Pt was stable upon rehab admission to  [facility]. Admitted with gait instabilty, ADL, and functional impairments.     Rehab Course significant:   -Symptomatic orthostasis on day 1 (evaluation with OT) > medications adjusted, added teds and abd binder.  No more episodes  -Decannulation of trach (2/14) - stoma fully healed  - [facility] 2/18 > was able to be placed on a diet > regular + thin    All other medical co-morbidities were stable.     Last IDT 2/24  NSG: incont  SW: lives in  with daughter + son(special needs, has own aide), 2-3 DONNA, + ramp access; 18 steps up?  Support: daughter + pt's sibling  SLP: on regular diet, severe cognitive, attention deficits, prolonged processing, working on orientation, has some receptive/expressive language impairment, working on naming and 2 sep commands   OT: supervision- eating/groom, min A UBD/LBD, mod A footwear/bathing, min-mod A bathroom transfers, max A toileting   PT: mod-max- BM, mod A transfer/ambulating 95 ft RW/steps  Team goals: WC level transfer to to toilet min A ; tolerate less restrictive diet  Barriers: cog impairment requiring max Awith attention to stay on task  TDD: 2/27 JETT    Pt tolerated course of inpatient PT/OT/SLP rehab with significant functional improvements and met rehab goals prior to discharge.

## 2025-02-26 NOTE — DISCHARGE NOTE PROVIDER - NSDCCPCAREPLAN_GEN_ALL_CORE_FT
PRINCIPAL DISCHARGE DIAGNOSIS  Diagnosis: Stroke  Assessment and Plan of Treatment: Tiny acute/subacute infarcts within bilateral basal ganglia, thalamus and chandu were note don MRI (1/9).   Continue with Aspirin 81mg and Statin for secondary stroke prevention.   Statin is to help with cholesterol control.  Your LDL (bad cholesterol) is 106; after a stroke, goal is for LDL to be <70.  Dose of Atorvastatin was reduced from 40 to 20 d/t elevated liver enzymes (which is a common side effect of medication > will need routine monitoring of labs.  At some point you were hypoaroused - lumbar punction (1/14) negative for meningitis/encephalitis.  Started on Amantadine to help  Other modifications: Monitor your blood pressure.  Reduce fat, cholesterol and salt intake in your diet.  Increase intake of fruits and vegetables.  Limit alcohol to minimum and do not smoke. Avoid nonsteroidal anti-inflammatory drugs (NSAIDs). You may be at risk for falling, make changes to your home to help you walk easier.  Keep up to date on vaccinations.  If you experience any symptoms of faical drooping, slurred speech, arm or leg weakness, severe headache, vision changes or any worsening symptoms, notify provider immediately and return to ER.      SECONDARY DISCHARGE DIAGNOSES  Diagnosis: Seizure  Assessment and Plan of Treatment: [facility] and sickle cell crisis on 12/30.  There you had witness seizure ? presumed to be from clonazepam withdrawal.  Continue with Keppra 500mg twice daily.  You were intubated and then transferred to [facility] on 1/3/2025 for cardiogenic shock    Diagnosis: Cardiogenic shock  Assessment and Plan of Treatment: You had cardiogenic shock/RV failure s/p ECMO (1/3) > decannulated 1/7. During acute hospitalization (at [facility]) you had 2 Units of PRBC, 2 Platelet, and 5U of cryo.  Most recent EF (1/2) showed EF to be 53%, normal LV + RV function, no acute heart failure issues.    Diagnosis: Acute respiratory failure with hypoxia  Assessment and Plan of Treatment: you were trached on 1/16 and downsized to 6.0 cuffless on 1/29. Noted with increased sputum from trach >culture showed MSSA s/p antibiotic.  You were decannulated on 2/14 > have been doing well on RA without any issues    Diagnosis: Sickle cell disease  Assessment and Plan of Treatment: history of 1-2 sickle cell crisis a year.  S/p transfusions, pheresis (1/12) for fat emboli concerns.  Continue with Hydroxyurea 500 twice daily (as long as plt is >100 and hgb > 8.0). CBC has been stable    Diagnosis: Transaminitis  Assessment and Plan of Treatment: Elevated liver enzymes >  Atorvastatin reduced from 40 to 20mg.  Ultrasound done (1/27) > showed that gallbladder is moderately distended and contains laying sludge and possible small stones.  There is no significant gallbladder wall thickening or pericholecystic fluid and a sonographic Merphy sign was not elicited.  Therefore, there is no definitive evidence for acute cholecystitis.  Patient also asymptomatic > denies abdominal pain/discomfort.    Diagnosis: Dysphagia  Assessment and Plan of Treatment: difficulty swallowing > a feeding tube was placed 1/21.  Now on a regular diet.  PEG may be removed after 6 weeks of insertion (3/4/2025) as long as you're tolearting diet.

## 2025-02-26 NOTE — DISCHARGE NOTE PROVIDER - CARE PROVIDER_API CALL
Wu Parra  Thoracic and Cardiac Surgery  [address] [place] [id number]  Phone: [phone]  Fax: [phone]  Follow Up Time: 2 weeks    PCP,   Dr. Hampton - [phone]  Phone: (   )    -  Fax: (   )    -  Follow Up Time: 1 week    Jewels Gomez  Physical/Rehab Medicine  [address] [place] [address]  Phone: [phone]  Fax: [phone]  Follow Up Time: 1 month

## 2025-02-26 NOTE — PROGRESS NOTE ADULT - SUBJECTIVE AND OBJECTIVE BOX
Patient is a 60y old  Female who presents with a chief complaint of bilateral CVA (26 Feb 2025 10:58)    Patient seen and examined at bedside. No acute overnight events. No complaints. Denies pain/discomfort.     ALLERGIES:  doxycycline (Angioedema)  clindamycin (Angioedema)  penicillin (Unknown)  linezolid (Angioedema)    MEDICATIONS  (STANDING):  amantadine Syrup 100 milliGRAM(s) Oral <User Schedule>  artificial tears (preservative free) Ophthalmic Solution 1 Drop(s) Both EYES four times a day  ascorbic acid 500 milliGRAM(s) Oral daily  aspirin  chewable 81 milliGRAM(s) Oral daily  atorvastatin 20 milliGRAM(s) Oral at bedtime  enoxaparin Injectable 40 milliGRAM(s) SubCutaneous every 24 hours  hydroxyurea 500 milliGRAM(s) Oral two times a day  levETIRAcetam  Solution 500 milliGRAM(s) Oral every 12 hours  metoprolol tartrate 12.5 milliGRAM(s) Oral every 8 hours  multivitamin/minerals/iron Oral Solution (CENTRUM) 15 milliLiter(s) Oral daily  pantoprazole   Suspension 40 milliGRAM(s) Oral before breakfast  polyethylene glycol 3350 17 Gram(s) Oral daily  senna 1 Tablet(s) Oral daily    MEDICATIONS  (PRN):  acetaminophen   Oral Liquid .. 650 milliGRAM(s) Oral every 6 hours PRN Temp greater or equal to 38C (100.4F), Mild Pain (1 - 3)  albuterol/ipratropium for Nebulization 3 milliLiter(s) Nebulizer every 6 hours PRN Shortness of Breath and/or Wheezing  bisacodyl Suppository 10 milliGRAM(s) Rectal daily PRN Constipation    Vital Signs Last 24 Hrs  T(F): 98.7 (26 Feb 2025 07:40), Max: 98.7 (26 Feb 2025 07:40)  HR: 80 (26 Feb 2025 07:40) (64 - 92)  BP: 130/88 (26 Feb 2025 07:40) (112/78 - 130/88)  RR: 16 (26 Feb 2025 07:40) (15 - 16)  SpO2: 97% (26 Feb 2025 07:40) (97% - 99%)  I&O's Summary    PHYSICAL EXAM:  General: NAD, A/O x 3  ENT: MMM, no tonsilar exudate  Neck: Supple, No JVD  Lungs: Clear to auscultation bilaterally, no wheezes. Good air entry bilaterally   Cardio: RRR, S1/S2, No murmurs  Abdomen: Soft, Nontender, Nondistended; Bowel sounds present  Extremities: No calf tenderness, No pitting edema    LABS:                        10.5   8.00  )-----------( 387      ( 24 Feb 2025 06:00 )             32.5       02-24    138  |  107  |  17  ----------------------------<  74  4.7   |  19  |  0.88    Ca    9.1      24 Feb 2025 06:00    TPro  7.2  /  Alb  2.0  /  TBili  0.4  /  DBili  x   /  AST  50  /  ALT  81  /  AlkPhos  93  02-24     02-14 Chol 160 mg/dL LDL -- HDL 32 mg/dL Trig 121 mg/dL    Urinalysis Basic - ( 24 Feb 2025 06:00 )    Color: x / Appearance: x / SG: x / pH: x  Gluc: 74 mg/dL / Ketone: x  / Bili: x / Urobili: x   Blood: x / Protein: x / Nitrite: x   Leuk Esterase: x / RBC: x / WBC x   Sq Epi: x / Non Sq Epi: x / Bacteria: x    COVID-19 PCR: NotDetec (02-06-25 @ 21:11)    RADIOLOGY & ADDITIONAL TESTS:     Care Discussed with Consultants/Other Providers:

## 2025-02-26 NOTE — DISCHARGE NOTE PROVIDER - NSDCMRMEDTOKEN_GEN_ALL_CORE_FT
acetaminophen 160 mg/5 mL oral suspension: 20.31 milliliter(s) orally every 6 hours as needed for Temp greater or equal to 38C (100.4F), Mild Pain (1 - 3)  amantadine 50 mg/5 mL oral liquid: 100 milligram(s) orally 2 times a day given at 7am and 1pm - to help with alertness  ascorbic acid 500 mg oral tablet: 1 tab(s) orally once a day  aspirin 81 mg oral tablet, chewable: 1 tab(s) orally once a day  atorvastatin 20 mg oral tablet: 1 tab(s) orally once a day (at bedtime)  enoxaparin 40 mg/0.4 mL injectable solution: 40 milligram(s) subcutaneously once a day  hydroxyurea 500 mg oral capsule: 1 cap(s) orally 2 times a day  ipratropium-albuterol 0.5 mg-2.5 mg/3 mL inhalation solution: 3 milliliter(s) inhaled every 6 hours As needed Shortness of Breath and/or Wheezing  levETIRAcetam 100 mg/mL oral solution: 5 milliliter(s) orally every 12 hours  metoprolol tartrate 25 mg oral tablet: 0.5 tab(s) orally every 8 hours 12.5mg 3 times daily  Multiple Vitamins with Minerals oral liquid: 15 milliliter(s) orally once a day  ocular lubricant preservative-free ophthalmic solution: 1 drop(s) to each affected eye 4 times a day  pantoprazole 40 mg oral granule, delayed release: 1 cap(s) orally once a day  polyethylene glycol 3350 oral powder for reconstitution: 17 gram(s) orally once a day  senna leaf extract oral tablet: 1 tab(s) orally once a day

## 2025-02-26 NOTE — PROGRESS NOTE ADULT - ASSESSMENT
59y with PMH, HTN, HFimpEF/NICM (EF 58% 10/2024), sickle cell disease, who presented to [facility] for SOB and SS crisis 12/30/25;, followed by witnessed seizure (? secondary to clonazepam withdrawal) requiring intubation. She was transferred to [facility] 1/3/25 secondary to cardiogenic shock /RV failure s/p ECMO +TRACH +PEG.   Now admitted to [facility] after for initiation of a multidisciplinary rehab program consisting focused on functional mobility, transfers and ADLs (activities of daily living).    # Bilateral CVA, bilateral body involvement left > right, left kelsi inattention, aphasia, respiratory failure, dysphagia  - MRI 1/9: Innumerable foci susceptibility artifact scattered throughout the brain which can be seen with critically ill patients on ECMO. Diffuse fat embolization is also part of the differential diagnosis but is considered less likely. Tiny acute/subacute infarcts within the bilateral basal ganglia, thalami, and chandu with disproportionate ovoid area of T2/FLAIR prolongation in the right ventral thalamus. Findings may indicate the presence of embolic acute/subacute infarcts.  - ASA 81 mg  (restarted 2/13)  - Atorvastatin 40 () > reduce to 20 d/t uptrending LFT--repeat CMP tomorrow  - continue Comprehensive Multidisciplinary Rehab Program: 3 hours a day (1  hr PT, 1 hr OT, 1 hr SLP), 5 days a week.  - Precautions: respiratory, aspiration, SZ, AMS, fall, PEG    # Hypoarousal  - LP (1/14) - negative for meningitis/encephalitis  - c/w Amantadine 100 BID    # Seizure  - EEG (1/7) with suspicion of seizure  - Seizure ppx: Keppra 500 BID     # low grade fever T m 100.5 (2/8)  - discussed with hospitalist. No obvious source, asymptomatic, vitals stable, clinically well appearing  - Check CXR, UA, CBC, BMP, procal; RVP if fever persists  - temp <100*F, WBC slight uptrend >11.33 (2/7)>11.36 (2/10) >8 (2/24)  - afebrile, WBC normal     # Acute respiratory failure  - S/p Trach (1/16). On 6.0 cuffless (1/29)  - MSSA s/p Abx  - Duoneb q8hrs  - pulmonary following-- appreciate recommendations  - capping during waking hours > decannulated 2/14---- saturating well on RA    # Cardiogenic Shock  - S/p ECMO 1/3. decannulated 1/7  - S/p 2 U PRBC, 2 platelet, 5U cryo  - ECHO 1/20: EF 53%, normal LV+RV function, no acute HF issues  - Metoprolol reduced 12.5 secondary to OH 2/7  - BP stable    # Orthostatic hypotension - no more occurrence   -  SBP dropped to 62 during OT session 2/7, improved to SBP 98 supine  - Lopressor reduced  - DONALD stockings when out of bed  - has abd for PEG protection already    # Sickle cell disease   - 1-2 sickle pain crisis per yea  - s/p transfusions, apheresis (1/12) for fat emboli concerns   - Hydroxyurea 500 BID (as long as plt > 100 and hgb >8)  - Hematology consult PRN  - hgb 10.5 (2/13)>9.9 (2/17)>9.5 (2/20)> 10.5 (2/24)    # Transaminitis   - RUQ US 1/27: The gallbladder is moderately distended and contains layering sludge and possibly small stones. There is no significant gallbladder wall thickening or pericholecystic fluid and a sonographic Gross sign was not elicited. Therefore, there is no definitive evidence for acute cholecystitis.  - continues to down trend: Alk Phos/AST normal 98/29; ALT 55 (2/13)> ALT 64 (2/17)>57 (2/20)> 50/81 (2/24) - back on statin,  can trial reduced Atorvastatin @ 20mg     # Thrush  - nystatin (2/7)    # Pain Management:  - Tylenol PRN    # GI/Bowel:  - Senna QHS, Miralax QD   - GI ppx: Protonix    # /Bladder:   - PVR q 8 hours (SC if > 400) > BS/PVR fluctuates, not needing straight cath   - incontinent     # Skin/Pressure Injury: healed DTI on admission.   - Skin assessment on admission: no active pressure injury > healed DTI to L ear lobe, b/l forehead, sacrum /bilateral buttocks, bilateral heels. +Incontinence Dermatitis  Recommend:  1.) topical therapy: sacral/buttock skin – cleanse with incontinence cleanser, pat dry, apply Piotr ointment BID and PRN for incontinent episodes. bilateral forehead - cleanse with gentle cleanser, leave open to air  2.) Incontinence Management - incontinence cleanser, pads, pericare BID  3.) Maintain on an alternating air with low air loss surface  4.) Turn and reposition Q 2 hours  5.) Nutrition optimization   6.) Offload heels/feet with complete cair air fluidized boots/pillows; ensure that the soles of the feet are not resting on the foot board of the bed.  7.) chair cushion for chair sitting    # Diet/Dysphagia:  - S/p PEG (1/21)  - FEEST (1/27) > silent laryngeal penetration of purees and moderately thick liquids is noted ----MBS (2/18)--- diet upgraded to Regular  - Current Diet: NPO + TF; free water   - Dysphagia: SLP consult for swallow function evaluation and treatment  - Health supp: Vitamin C + MVI    # DVT ppx:    - Lovenox 40 QD   - SCD  ---------------  Code Status/Emergency Contact: Full Code    Outpatient Follow-up:  [facility]  Thoracic and Cardiac Surgery  [address] [place] [id number]  Phone: [phone]  Fax: [phone]  Follow Up Time: 2 weeks    IDT 2/24  NSG: incont  SW: lives in  with daughter + son(special needs, has own aide), 2-3 DONNA, + ramp access; 18 steps up?  Support: daughter + pt's sibling  SLP: on regular diet, severe cognitive, attention deficits, prolonged processing, working on orientation, has some receptive/expressive language impairment, working on naming and 2 sep commands   OT: supervision- eating/groom, min A UBD/LBD, mod A footwear/bathing, min-mod A bathroom transfers, max A toileting   PT: mod-max- BM, mod A transfer/ambulating 95 ft RW/steps  Team goals: WC level transfer to to toilet min A ; tolerate less restrictive diet  Barriers: cog impairment requiring max Awith attention to stay on task  TDD: 2/27 JETT    Total time 50 mins-face to face time encounter and counseling the patient, meeting with hospitalist, nursing staff, therapists and social work to discuss medical updates and management, care coordination, reviewing chart and data-including but not limited to labs and imaging, dc planning

## 2025-02-26 NOTE — CHART NOTE - NSCHARTNOTEFT_GEN_A_CORE
Nutrition Follow Up Note  Hospital Course   (Per Electronic Medical Record)    Source:  Patient [X]  Family Member [X]   Nursing Staff [X]   Medical Record [X]      Diet: Diet, Regular:   No Straws  Single Sips Only  Supplement Feeding Modality:  Oral  Ensure Plus High Protein Cans or Servings Per Day:  1       Frequency:  Daily (02-19-25 @ 14:30) [Active]    Diet upgraded from bolus TF to regular 2/19. At this time patient tolerating diet w/ adequate appetite/intake consuming % of most meals. No issues w/ dentition or chewing and swallowing current diet texture. Last BM 2/23 Per nursing flowsheets       Current Weight:160 lb on 2/6      Pertinent Medications: MEDICATIONS  (STANDING):  amantadine Syrup 100 milliGRAM(s) Oral <User Schedule>  artificial tears (preservative free) Ophthalmic Solution 1 Drop(s) Both EYES four times a day  ascorbic acid 500 milliGRAM(s) Oral daily  aspirin  chewable 81 milliGRAM(s) Oral daily  atorvastatin 20 milliGRAM(s) Oral at bedtime  enoxaparin Injectable 40 milliGRAM(s) SubCutaneous every 24 hours  hydroxyurea 500 milliGRAM(s) Oral two times a day  levETIRAcetam  Solution 500 milliGRAM(s) Oral every 12 hours  metoprolol tartrate 12.5 milliGRAM(s) Oral every 8 hours  multivitamin/minerals/iron Oral Solution (CENTRUM) 15 milliLiter(s) Oral daily  pantoprazole   Suspension 40 milliGRAM(s) Oral before breakfast  polyethylene glycol 3350 17 Gram(s) Oral daily  senna 1 Tablet(s) Oral daily    MEDICATIONS  (PRN):  acetaminophen   Oral Liquid .. 650 milliGRAM(s) Oral every 6 hours PRN Temp greater or equal to 38C (100.4F), Mild Pain (1 - 3)  albuterol/ipratropium for Nebulization 3 milliLiter(s) Nebulizer every 6 hours PRN Shortness of Breath and/or Wheezing  bisacodyl Suppository 10 milliGRAM(s) Rectal daily PRN Constipation      Pertinent Labs:  02-24 Na138 mmol/L Glu 74 mg/dL K+ 4.7 mmol/L Cr  0.88 mg/dL BUN 17 mg/dL 02-24 Alb 2.0 g/dL[L] 02-14 Chol 160 mg/dL LDL --    HDL 32 mg/dL[L] Trig 121 mg/dL        Skin: No pressure injury per nursing flowsheets. + PEG    Edema: No edema noted per nursing flowsheet    Last Bowel Movement: on 2/23 Per nursing flowsheets     Estimated Needs:   [X] No Change Since Previous Assessment    Previous Nutrition Diagnosis:   1. Moderate malnutrition  2. Swallowing difficulties (resolved)    Nutrition Diagnosis is [X] Ongoing  -  recommend Ensure Plus High Protein (provides 350 kcal, 20 g protein/serving) daily    New Nutrition Diagnosis: [X] Not Applicable    Interventions:   1. Recommend continuing with current diet, add Ensure Plus High Protein (provides 350 kcal, 20 g protein/serving)   2. Encourage PO intake  3. Obtain and honor food preferences as able    Monitoring & Evaluation:   [X] Weights   [X] PO Intake   [X] Skin Integrity   [X] Follow Up (Per Protocol)  [X] Tolerance to Diet Prescription   [X] Other: Labs    Registered Dietitian/Nutritionist Remains Available.  Veronica Morris RD    Phone# [phone] Nutrition Follow Up Note  Hospital Course   (Per Electronic Medical Record)    Source:  Patient [X]  Nursing Staff [X]   Medical Record [X]      Diet: Diet, Regular:   No Straws  Single Sips Only  Supplement Feeding Modality:  Oral  Ensure Plus High Protein Cans or Servings Per Day:  1       Frequency:  Daily (02-19-25 @ 14:30) [Active]    Diet upgraded from bolus TF to regular 2/19. At this time patient tolerating diet w/ adequate appetite/intake consuming % of most meals. W/ good acceptance of meals per RN. Patient requires assist with feeds; Restorative Dining Room. No issues w/ dentition or chewing and swallowing current diet texture. Last BM 2/23 Per nursing flowsheets       Current Weight:160 lb on 2/6      Pertinent Medications: MEDICATIONS  (STANDING):  amantadine Syrup 100 milliGRAM(s) Oral <User Schedule>  artificial tears (preservative free) Ophthalmic Solution 1 Drop(s) Both EYES four times a day  ascorbic acid 500 milliGRAM(s) Oral daily  aspirin  chewable 81 milliGRAM(s) Oral daily  atorvastatin 20 milliGRAM(s) Oral at bedtime  enoxaparin Injectable 40 milliGRAM(s) SubCutaneous every 24 hours  hydroxyurea 500 milliGRAM(s) Oral two times a day  levETIRAcetam  Solution 500 milliGRAM(s) Oral every 12 hours  metoprolol tartrate 12.5 milliGRAM(s) Oral every 8 hours  multivitamin/minerals/iron Oral Solution (CENTRUM) 15 milliLiter(s) Oral daily  pantoprazole   Suspension 40 milliGRAM(s) Oral before breakfast  polyethylene glycol 3350 17 Gram(s) Oral daily  senna 1 Tablet(s) Oral daily    MEDICATIONS  (PRN):  acetaminophen   Oral Liquid .. 650 milliGRAM(s) Oral every 6 hours PRN Temp greater or equal to 38C (100.4F), Mild Pain (1 - 3)  albuterol/ipratropium for Nebulization 3 milliLiter(s) Nebulizer every 6 hours PRN Shortness of Breath and/or Wheezing  bisacodyl Suppository 10 milliGRAM(s) Rectal daily PRN Constipation      Pertinent Labs:  02-24 Na138 mmol/L Glu 74 mg/dL K+ 4.7 mmol/L Cr  0.88 mg/dL BUN 17 mg/dL 02-24 Alb 2.0 g/dL[L] 02-14 Chol 160 mg/dL LDL --    HDL 32 mg/dL[L] Trig 121 mg/dL        Skin: No pressure injury per nursing flowsheets. + PEG    Edema: No edema noted per nursing flowsheet    Last Bowel Movement: on 2/23 Per nursing flowsheets     Estimated Needs:   [X] No Change Since Previous Assessment    Previous Nutrition Diagnosis:   1. Moderate malnutrition  2. Swallowing difficulties (resolved)    Nutrition Diagnosis is [X] Ongoing  -  recommend Ensure Plus High Protein (provides 350 kcal, 20 g protein/serving) daily    New Nutrition Diagnosis: [X] Not Applicable    Interventions:   1. Recommend continuing with current diet, add Ensure Plus High Protein (provides 350 kcal, 20 g protein/serving)   2. Encourage PO intake  3. Obtain and honor food preferences as able    Monitoring & Evaluation:   [X] Weights   [X] PO Intake   [X] Skin Integrity   [X] Follow Up (Per Protocol)  [X] Tolerance to Diet Prescription   [X] Other: Labs    Registered Dietitian/Nutritionist Remains Available.  Veronica Morris RD    Phone# [phone]

## 2025-02-26 NOTE — DISCHARGE NOTE PROVIDER - CARE PROVIDERS DIRECT ADDRESSES
,[email or web address],DirectAddress_Unknown

## 2025-02-27 ENCOUNTER — TRANSCRIPTION ENCOUNTER (OUTPATIENT)
Age: 60
End: 2025-02-27

## 2025-02-27 VITALS
TEMPERATURE: 98 F | SYSTOLIC BLOOD PRESSURE: 128 MMHG | OXYGEN SATURATION: 98 % | DIASTOLIC BLOOD PRESSURE: 68 MMHG | RESPIRATION RATE: 16 BRPM | HEART RATE: 89 BPM

## 2025-02-27 LAB
ALBUMIN SERPL ELPH-MCNC: 2.5 G/DL — LOW (ref 3.3–5)
ALP SERPL-CCNC: 87 U/L — SIGNIFICANT CHANGE UP (ref 40–120)
ALT FLD-CCNC: 61 U/L — HIGH (ref 10–45)
ANION GAP SERPL CALC-SCNC: 9 MMOL/L — SIGNIFICANT CHANGE UP (ref 5–17)
AST SERPL-CCNC: 36 U/L — SIGNIFICANT CHANGE UP (ref 10–40)
BILIRUB SERPL-MCNC: 0.5 MG/DL — SIGNIFICANT CHANGE UP (ref 0.2–1.2)
BUN SERPL-MCNC: 18 MG/DL — SIGNIFICANT CHANGE UP (ref 7–23)
CALCIUM SERPL-MCNC: 9.4 MG/DL — SIGNIFICANT CHANGE UP (ref 8.4–10.5)
CHLORIDE SERPL-SCNC: 107 MMOL/L — SIGNIFICANT CHANGE UP (ref 96–108)
CO2 SERPL-SCNC: 24 MMOL/L — SIGNIFICANT CHANGE UP (ref 22–31)
CREAT SERPL-MCNC: 0.82 MG/DL — SIGNIFICANT CHANGE UP (ref 0.5–1.3)
EGFR: 83 ML/MIN/1.73M2 — SIGNIFICANT CHANGE UP
GLUCOSE SERPL-MCNC: 97 MG/DL — SIGNIFICANT CHANGE UP (ref 70–99)
HCT VFR BLD CALC: 29.8 % — LOW (ref 34.5–45)
HGB BLD-MCNC: 10.3 G/DL — LOW (ref 11.5–15.5)
MCHC RBC-ENTMCNC: 31.1 PG — SIGNIFICANT CHANGE UP (ref 27–34)
MCHC RBC-ENTMCNC: 34.6 G/DL — SIGNIFICANT CHANGE UP (ref 32–36)
MCV RBC AUTO: 90 FL — SIGNIFICANT CHANGE UP (ref 80–100)
NRBC BLD AUTO-RTO: 3 /100 WBCS — HIGH (ref 0–0)
PLATELET # BLD AUTO: 424 K/UL — HIGH (ref 150–400)
POTASSIUM SERPL-MCNC: 4 MMOL/L — SIGNIFICANT CHANGE UP (ref 3.5–5.3)
POTASSIUM SERPL-SCNC: 4 MMOL/L — SIGNIFICANT CHANGE UP (ref 3.5–5.3)
PROT SERPL-MCNC: 7.2 G/DL — SIGNIFICANT CHANGE UP (ref 6–8.3)
RBC # BLD: 3.31 M/UL — LOW (ref 3.8–5.2)
RBC # FLD: 16.1 % — HIGH (ref 10.3–14.5)
SODIUM SERPL-SCNC: 140 MMOL/L — SIGNIFICANT CHANGE UP (ref 135–145)
WBC # BLD: 8.47 K/UL — SIGNIFICANT CHANGE UP (ref 3.8–10.5)
WBC # FLD AUTO: 8.47 K/UL — SIGNIFICANT CHANGE UP (ref 3.8–10.5)

## 2025-02-27 PROCEDURE — 97116 GAIT TRAINING THERAPY: CPT | Mod: GP

## 2025-02-27 PROCEDURE — 80053 COMPREHEN METABOLIC PANEL: CPT

## 2025-02-27 PROCEDURE — 93005 ELECTROCARDIOGRAM TRACING: CPT

## 2025-02-27 PROCEDURE — 84466 ASSAY OF TRANSFERRIN: CPT

## 2025-02-27 PROCEDURE — 97161 PT EVAL LOW COMPLEX 20 MIN: CPT | Mod: GP

## 2025-02-27 PROCEDURE — 92611 MOTION FLUOROSCOPY/SWALLOW: CPT | Mod: GN

## 2025-02-27 PROCEDURE — 74230 X-RAY XM SWLNG FUNCJ C+: CPT

## 2025-02-27 PROCEDURE — 97535 SELF CARE MNGMENT TRAINING: CPT | Mod: GO

## 2025-02-27 PROCEDURE — 97110 THERAPEUTIC EXERCISES: CPT | Mod: GP

## 2025-02-27 PROCEDURE — 36415 COLL VENOUS BLD VENIPUNCTURE: CPT

## 2025-02-27 PROCEDURE — 94640 AIRWAY INHALATION TREATMENT: CPT

## 2025-02-27 PROCEDURE — 97530 THERAPEUTIC ACTIVITIES: CPT | Mod: GO

## 2025-02-27 PROCEDURE — 85027 COMPLETE CBC AUTOMATED: CPT

## 2025-02-27 PROCEDURE — 92523 SPEECH SOUND LANG COMPREHEN: CPT | Mod: GN

## 2025-02-27 PROCEDURE — 80061 LIPID PANEL: CPT

## 2025-02-27 PROCEDURE — 83550 IRON BINDING TEST: CPT

## 2025-02-27 PROCEDURE — 97165 OT EVAL LOW COMPLEX 30 MIN: CPT | Mod: GO

## 2025-02-27 PROCEDURE — 99232 SBSQ HOSP IP/OBS MODERATE 35: CPT

## 2025-02-27 PROCEDURE — 71045 X-RAY EXAM CHEST 1 VIEW: CPT

## 2025-02-27 PROCEDURE — 82728 ASSAY OF FERRITIN: CPT

## 2025-02-27 PROCEDURE — 82962 GLUCOSE BLOOD TEST: CPT

## 2025-02-27 PROCEDURE — 85025 COMPLETE CBC W/AUTO DIFF WBC: CPT

## 2025-02-27 PROCEDURE — 92610 EVALUATE SWALLOWING FUNCTION: CPT | Mod: GN

## 2025-02-27 PROCEDURE — 97112 NEUROMUSCULAR REEDUCATION: CPT | Mod: GO

## 2025-02-27 PROCEDURE — 99239 HOSP IP/OBS DSCHRG MGMT >30: CPT | Mod: GC

## 2025-02-27 PROCEDURE — 92507 TX SP LANG VOICE COMM INDIV: CPT | Mod: GN

## 2025-02-27 PROCEDURE — 87635 SARS-COV-2 COVID-19 AMP PRB: CPT

## 2025-02-27 PROCEDURE — 83540 ASSAY OF IRON: CPT

## 2025-02-27 PROCEDURE — 92526 ORAL FUNCTION THERAPY: CPT | Mod: GN

## 2025-02-27 RX ADMIN — Medication 100 MILLIGRAM(S): at 05:32

## 2025-02-27 RX ADMIN — Medication 40 MILLIGRAM(S): at 05:32

## 2025-02-27 RX ADMIN — HYDROXYUREA 500 MILLIGRAM(S): 500 CAPSULE ORAL at 05:52

## 2025-02-27 RX ADMIN — Medication 81 MILLIGRAM(S): at 11:31

## 2025-02-27 RX ADMIN — Medication 100 MILLIGRAM(S): at 13:51

## 2025-02-27 RX ADMIN — Medication 15 MILLILITER(S): at 11:29

## 2025-02-27 RX ADMIN — METOPROLOL SUCCINATE 12.5 MILLIGRAM(S): 50 TABLET, EXTENDED RELEASE ORAL at 13:51

## 2025-02-27 RX ADMIN — Medication 1 DROP(S): at 05:26

## 2025-02-27 RX ADMIN — Medication 1 DROP(S): at 00:07

## 2025-02-27 RX ADMIN — LEVETIRACETAM 500 MILLIGRAM(S): 10 INJECTION, SOLUTION INTRAVENOUS at 05:31

## 2025-02-27 RX ADMIN — Medication 1 DROP(S): at 11:29

## 2025-02-27 RX ADMIN — Medication 500 MILLIGRAM(S): at 11:29

## 2025-02-27 RX ADMIN — POLYETHYLENE GLYCOL 3350 17 GRAM(S): 17 POWDER, FOR SOLUTION ORAL at 11:29

## 2025-02-27 RX ADMIN — Medication 1 TABLET(S): at 11:31

## 2025-02-27 RX ADMIN — ENOXAPARIN SODIUM 40 MILLIGRAM(S): 100 INJECTION SUBCUTANEOUS at 05:27

## 2025-02-27 NOTE — PROGRESS NOTE ADULT - ASSESSMENT
59y with PMH, HTN, HFimpEF/NICM (EF 58% 10/2024), sickle cell disease, who presented to [facility] for SOB and SS crisis 12/30/25;, followed by witnessed seizure (? secondary to clonazepam withdrawal) requiring intubation. She was transferred to [facility] 1/3/25 secondary to cardiogenic shock /RV failure s/p ECMO +TRACH +PEG.   Now admitted to [facility] after for initiation of a multidisciplinary rehab program consisting focused on functional mobility, transfers and ADLs (activities of daily living).    # Bilateral CVA, bilateral body involvement left > right, left kelsi inattention, aphasia, respiratory failure, dysphagia  - MRI 1/9: Innumerable foci susceptibility artifact scattered throughout the brain which can be seen with critically ill patients on ECMO. Diffuse fat embolization is also part of the differential diagnosis but is considered less likely. Tiny acute/subacute infarcts within the bilateral basal ganglia, thalami, and chandu with disproportionate ovoid area of T2/FLAIR prolongation in the right ventral thalamus. Findings may indicate the presence of embolic acute/subacute infarcts.  - ASA 81 mg  (restarted 2/13)  - Atorvastatin 40 () > reduce to 20 d/t uptrending LFT--repeat CMP tomorrow--improved LFTs  - continue Comprehensive Multidisciplinary Rehab Program: 3 hours a day (1  hr PT, 1 hr OT, 1 hr SLP), 5 days a week.  - Precautions: respiratory, aspiration, SZ, AMS, fall, PEG    # Hypoarousal  - LP (1/14) - negative for meningitis/encephalitis  - c/w Amantadine 100 BID    # Seizure  - EEG (1/7) with suspicion of seizure  - Seizure ppx: Keppra 500 BID     # low grade fever T m 100.5 (2/8)  - discussed with hospitalist. No obvious source, asymptomatic, vitals stable, clinically well appearing  - Check CXR, UA, CBC, BMP, procal; RVP if fever persists  - temp <100*F, WBC slight uptrend >11.33 (2/7)>11.36 (2/10) >8 (2/27)  - afebrile, WBC normal     # Acute respiratory failure  - S/p Trach (1/16). On 6.0 cuffless (1/29)  - MSSA s/p Abx  - Duoneb q8hrs  - pulmonary following-- appreciate recommendations  - capping during waking hours > decannulated 2/14---- saturating well on RA    # Cardiogenic Shock  - S/p ECMO 1/3. decannulated 1/7  - S/p 2 U PRBC, 2 platelet, 5U cryo  - ECHO 1/20: EF 53%, normal LV+RV function, no acute HF issues  - Metoprolol reduced 12.5 secondary to OH 2/7  - BP stable    # Orthostatic hypotension - no more occurrence   -  SBP dropped to 62 during OT session 2/7, improved to SBP 98 supine  - Lopressor reduced  - DONALD stockings when out of bed  - has abd for PEG protection already    # Sickle cell disease   - 1-2 sickle pain crisis per yea  - s/p transfusions, apheresis (1/12) for fat emboli concerns   - Hydroxyurea 500 BID (as long as plt > 100 and hgb >8)  - Hematology consult PRN  - hgb 10.5 (2/13)>9.9 (2/17)>9.5 (2/20)> 10.5 (2/24)>10.3 (2/27)--stable    # Transaminitis   - RUQ US 1/27: The gallbladder is moderately distended and contains layering sludge and possibly small stones. There is no significant gallbladder wall thickening or pericholecystic fluid and a sonographic Gross sign was not elicited. Therefore, there is no definitive evidence for acute cholecystitis.  - continues to down trend: Alk Phos/AST normal 98/29; ALT 55 (2/13)> ALT 64 (2/17)>57 (2/20)> 50/81 (2/24) - back on statin,  can trial reduced Atorvastatin @ 20mg >AST/ALT-- 36/61 (2/27)-- improved    # Thrush  - nystatin (2/7)    # Pain Management:  - Tylenol PRN    # GI/Bowel:  - Senna QHS, Miralax QD   - GI ppx: Protonix    # /Bladder:   - incontinent     # Skin/Pressure Injury: healed DTI on admission.   - Skin assessment on admission: no active pressure injury > healed DTI to L ear lobe, b/l forehead, sacrum /bilateral buttocks, bilateral heels. +Incontinence Dermatitis  Recommend:  1.) topical therapy: sacral/buttock skin – cleanse with incontinence cleanser, pat dry, apply Piotr ointment BID and PRN for incontinent episodes. bilateral forehead - cleanse with gentle cleanser, leave open to air  2.) Incontinence Management - incontinence cleanser, pads, pericare BID  3.) Maintain on an alternating air with low air loss surface  4.) Turn and reposition Q 2 hours  5.) Nutrition optimization   6.) Offload heels/feet with complete cair air fluidized boots/pillows; ensure that the soles of the feet are not resting on the foot board of the bed.  7.) chair cushion for chair sitting    # Diet/Dysphagia:  - S/p PEG (1/21)  - FEEST (1/27) > silent laryngeal penetration of purees and moderately thick liquids is noted ----MBS (2/18)--- diet upgraded to Regular  - Current Diet: NPO + TF; free water   - Dysphagia: SLP consult for swallow function evaluation and treatment  - Health supp: Vitamin C + MVI    # DVT ppx:    - Lovenox 40 QD   - SCD  ---------------  Code Status/Emergency Contact: Full Code    Outpatient Follow-up:  Wu Parra  Thoracic and Cardiac Surgery  [address] [place] [id number]  Phone: [phone]  Fax: [phone]  Follow Up Time: 2 weeks    IDT 2/24  NSG: incont  SW: lives in  with daughter + son(special needs, has own aide), 2-3 DONNA, + ramp access; 18 steps up?  Support: daughter + pt's sibling  SLP: on regular diet, severe cognitive, attention deficits, prolonged processing, working on orientation, has some receptive/expressive language impairment, working on naming and 2 sep commands   OT: supervision- eating/groom, min A UBD/LBD, mod A footwear/bathing, min-mod A bathroom transfers, max A toileting   PT: mod-max- BM, mod A transfer/ambulating 95 ft RW/steps  Team goals: WC level transfer to to toilet min A ; tolerate less restrictive diet  Barriers: cog impairment requiring max Awith attention to stay on task  TDD: 2/27 JETT    Total time 55mins-face to face time encounter and counseling the patient, meeting with hospitalist, nursing staff, therapists and social work to discuss medical updates and management, care coordination, reviewing chart and data, planning for dc        59y with PMH, HTN, HFimpEF/NICM (EF 58% 10/2024), sickle cell disease, who presented to [facility] for SOB and SS crisis 12/30/25;, followed by witnessed seizure (? secondary to clonazepam withdrawal) requiring intubation. She was transferred to [facility] 1/3/25 secondary to cardiogenic shock /RV failure s/p ECMO +TRACH +PEG.   Now admitted to [facility] after for initiation of a multidisciplinary rehab program consisting focused on functional mobility, transfers and ADLs (activities of daily living).    # Bilateral CVA, bilateral body involvement left > right, left kelsi inattention, aphasia, respiratory failure, dysphagia  - MRI 1/9: Innumerable foci susceptibility artifact scattered throughout the brain which can be seen with critically ill patients on ECMO. Diffuse fat embolization is also part of the differential diagnosis but is considered less likely. Tiny acute/subacute infarcts within the bilateral basal ganglia, thalami, and chandu with disproportionate ovoid area of T2/FLAIR prolongation in the right ventral thalamus. Findings may indicate the presence of embolic acute/subacute infarcts.  - ASA 81 mg  (restarted 2/13)  - Atorvastatin 40 () > reduce to 20 d/t uptrending LFT--repeat CMP tomorrow--improved LFTs  - DC from [facility]hab Program: 3 hours a day (1  hr PT, 1 hr OT, 1 hr SLP), 5 days a week -- continue with therapies at [facility]  - Precautions: respiratory, aspiration, SZ, AMS, fall, PEG    # Hypoarousal  - LP (1/14) - negative for meningitis/encephalitis  - c/w Amantadine 100 BID    # Seizure  - EEG (1/7) with suspicion of seizure  - Seizure ppx: Keppra 500 BID     # low grade fever T m 100.5 (2/8)  - discussed with hospitalist. No obvious source, asymptomatic, vitals stable, clinically well appearing  - Check CXR, UA, CBC, BMP, procal; RVP if fever persists  - temp <100*F, WBC slight uptrend >11.33 (2/7)>11.36 (2/10) >8 (2/27)  - afebrile, WBC normal     # Acute respiratory failure  - S/p Trach (1/16). On 6.0 cuffless (1/29)  - MSSA s/p Abx  - Duoneb q8hrs  - pulmonary following-- appreciate recommendations  - capping during waking hours > decannulated 2/14---- saturating well on RA    # Cardiogenic Shock  - S/p ECMO 1/3. decannulated 1/7  - S/p 2 U PRBC, 2 platelet, 5U cryo  - ECHO 1/20: EF 53%, normal LV+RV function, no acute HF issues  - Metoprolol reduced 12.5 secondary to OH 2/7  - BP stable    # Orthostatic hypotension - no more occurrence   -  SBP dropped to 62 during OT session 2/7, improved to SBP 98 supine  - Lopressor reduced  - DONALD stockings when out of bed  - has abd for PEG protection already    # Sickle cell disease   - 1-2 sickle pain crisis per yea  - s/p transfusions, apheresis (1/12) for fat emboli concerns   - Hydroxyurea 500 BID (as long as plt > 100 and hgb >8)  - Hematology consult PRN  - hgb 10.5 (2/13)>9.9 (2/17)>9.5 (2/20)> 10.5 (2/24)>10.3 (2/27)--stable    # Transaminitis   - RUQ US 1/27: The gallbladder is moderately distended and contains layering sludge and possibly small stones. There is no significant gallbladder wall thickening or pericholecystic fluid and a sonographic Gross sign was not elicited. Therefore, there is no definitive evidence for acute cholecystitis.  - continues to down trend: Alk Phos/AST normal 98/29; ALT 55 (2/13)> ALT 64 (2/17)>57 (2/20)> 50/81 (2/24) - back on statin,  can trial reduced Atorvastatin @ 20mg >AST/ALT-- 36/61 (2/27)-- improved    # Thrush  - nystatin (2/7)    # Pain Management:  - Tylenol PRN    # GI/Bowel:  - Senna QHS, Miralax QD   - GI ppx: Protonix    # /Bladder:   - incontinent     # Skin/Pressure Injury: healed DTI on admission.   - Skin assessment on admission: no active pressure injury > healed DTI to L ear lobe, b/l forehead, sacrum /bilateral buttocks, bilateral heels. +Incontinence Dermatitis  Recommend:  1.) topical therapy: sacral/buttock skin – cleanse with incontinence cleanser, pat dry, apply Piotr ointment BID and PRN for incontinent episodes. bilateral forehead - cleanse with gentle cleanser, leave open to air  2.) Incontinence Management - incontinence cleanser, pads, pericare BID  3.) Maintain on an alternating air with low air loss surface  4.) Turn and reposition Q 2 hours  5.) Nutrition optimization   6.) Offload heels/feet with complete cair air fluidized boots/pillows; ensure that the soles of the feet are not resting on the foot board of the bed.  7.) chair cushion for chair sitting    # Diet/Dysphagia:  - S/p PEG (1/21)  - FEEST (1/27) > silent laryngeal penetration of purees and moderately thick liquids is noted ----MBS (2/18)--- diet upgraded to Regular  - Current Diet: NPO + TF; free water   - Dysphagia: SLP consult for swallow function evaluation and treatment  - Health supp: Vitamin C + MVI    # DVT ppx:    - Lovenox 40 QD   - SCD  ---------------  Code Status/Emergency Contact: Full Code    Outpatient Follow-up:  Wu Parra  Thoracic and Cardiac Surgery  [address] [place] [id number]  Phone: [phone]  Fax: [phone]  Follow Up Time: 2 weeks    IDT 2/24  NSG: incont  SW: lives in  with daughter + son(special needs, has own aide), 2-3 DONNA, + ramp access; 18 steps up?  Support: daughter + pt's sibling  SLP: on regular diet, severe cognitive, attention deficits, prolonged processing, working on orientation, has some receptive/expressive language impairment, working on naming and 2 sep commands   OT: supervision- eating/groom, min A UBD/LBD, mod A footwear/bathing, min-mod A bathroom transfers, max A toileting   PT: mod-max- BM, mod A transfer/ambulating 95 ft RW/steps  Team goals: WC level transfer to to toilet min A ; tolerate less restrictive diet  Barriers: cog impairment requiring max Awith attention to stay on task  TDD: 2/27 JETT

## 2025-02-27 NOTE — PROGRESS NOTE ADULT - SUBJECTIVE AND OBJECTIVE BOX
Patient is a 60y old  Female who presents with a chief complaint of bilateral CVA (26 Feb 2025 13:50)    Patient seen and examined at bedside. No acute overnight events. Denies pain.     ALLERGIES:  doxycycline (Angioedema)  clindamycin (Angioedema)  penicillin (Unknown)  linezolid (Angioedema)    MEDICATIONS  (STANDING):  amantadine Syrup 100 milliGRAM(s) Oral <User Schedule>  artificial tears (preservative free) Ophthalmic Solution 1 Drop(s) Both EYES four times a day  ascorbic acid 500 milliGRAM(s) Oral daily  aspirin  chewable 81 milliGRAM(s) Oral daily  atorvastatin 20 milliGRAM(s) Oral at bedtime  enoxaparin Injectable 40 milliGRAM(s) SubCutaneous every 24 hours  hydroxyurea 500 milliGRAM(s) Oral two times a day  levETIRAcetam  Solution 500 milliGRAM(s) Oral every 12 hours  metoprolol tartrate 12.5 milliGRAM(s) Oral every 8 hours  multivitamin/minerals/iron Oral Solution (CENTRUM) 15 milliLiter(s) Oral daily  pantoprazole   Suspension 40 milliGRAM(s) Oral before breakfast  polyethylene glycol 3350 17 Gram(s) Oral daily  senna 1 Tablet(s) Oral daily    MEDICATIONS  (PRN):  acetaminophen   Oral Liquid .. 650 milliGRAM(s) Oral every 6 hours PRN Temp greater or equal to 38C (100.4F), Mild Pain (1 - 3)  albuterol/ipratropium for Nebulization 3 milliLiter(s) Nebulizer every 6 hours PRN Shortness of Breath and/or Wheezing  bisacodyl Suppository 10 milliGRAM(s) Rectal daily PRN Constipation    Vital Signs Last 24 Hrs  T(F): 98.5 (27 Feb 2025 07:31), Max: 98.8 (26 Feb 2025 19:49)  HR: 86 (27 Feb 2025 07:31) (84 - 90)  BP: 119/82 (27 Feb 2025 07:31) (102/64 - 126/88)  RR: 16 (27 Feb 2025 07:31) (16 - 16)  SpO2: 98% (27 Feb 2025 07:31) (97% - 99%)  I&O's Summary    PHYSICAL EXAM:  General: NAD, awake, alert. pleasant   ENT: MMM, no tonsilar exudate  Neck: Supple, No JVD  Lungs: Clear to auscultation bilaterally, no wheezes. Good air entry bilaterally   Cardio: RRR, S1/S2, No murmurs  Abdomen: Soft, Nontender, Nondistended; Bowel sounds present  Extremities: No calf tenderness, No pitting edema    LABS:                        10.3   8.47  )-----------( 424      ( 27 Feb 2025 06:07 )             29.8       02-27    140  |  107  |  18  ----------------------------<  97  4.0   |  24  |  0.82    Ca    9.4      27 Feb 2025 06:07    TPro  7.2  /  Alb  2.5  /  TBili  0.5  /  DBili  x   /  AST  36  /  ALT  61  /  AlkPhos  87  02-27     02-14 Chol 160 mg/dL LDL -- HDL 32 mg/dL Trig 121 mg/dL    Urinalysis Basic - ( 27 Feb 2025 06:07 )    Color: x / Appearance: x / SG: x / pH: x  Gluc: 97 mg/dL / Ketone: x  / Bili: x / Urobili: x   Blood: x / Protein: x / Nitrite: x   Leuk Esterase: x / RBC: x / WBC x   Sq Epi: x / Non Sq Epi: x / Bacteria: x    COVID-19 PCR: NotDetec (02-06-25 @ 21:11)    RADIOLOGY & ADDITIONAL TESTS:     Care Discussed with Consultants/Other Providers:

## 2025-02-27 NOTE — DISCHARGE NOTE NURSING/CASE MANAGEMENT/SOCIAL WORK - NSDCPEFALRISK_GEN_ALL_CORE
For information on Fall & Injury Prevention, visit: [email or web address] OR  [email or web address] OR  https://www.cdc.gov/steadi/patient.html

## 2025-02-27 NOTE — PROGRESS NOTE ADULT - PROVIDER SPECIALTY LIST ADULT
Hospitalist
Physiatry
Physiatry
Pulmonology
Rehab Medicine
Rehab Medicine
Hospitalist
Neuropsychology
Physiatry
Pulmonology
Rehab Medicine
Hospitalist
Physiatry
Pulmonology
Pulmonology
Hospitalist
Hospitalist
Physiatry
Hospitalist
Hospitalist
Physiatry
Physiatry
Hospitalist
Physiatry

## 2025-02-27 NOTE — PROGRESS NOTE ADULT - NS ATTEND AMEND GEN_ALL_CORE FT
Patient said that her daughter said she will pick her up after she has the MRI.    I have personally seen and examined the patient.  I fully participated in the care of this patient.  I have made amendments to the documentation where necessary, and agree with the history, physical exam, and plan as documented above  patient seen while in the wheelchair at bedside. family friend and RN present.  patient tolerating capping during waking hours. denies pain  intermittent command following, +motor apraxia  labs reviewed   leukocytosis resolved-- 11>9.44  Hypernatremia resolved-- 143    reviewed labs/imaging  Discussed medical updates and plan with nursing and hospitalist   Patient requires acute inpatient hospitalization for ongoing management of medical comorbidities (sp trach and peg-- tolerating capping, plan arnulfo decannulation tomorrow depending on patient tolerance, monitoring of BP and labs, abnormality of gait and mobility, which are limiting functional abilities and optimization of functional independence for discharge I have personally seen and examined the patient.  I fully participated in the care of this patient.  I have made amendments to the documentation where necessary, and agree with the history, physical exam, and plan as documented above  patient seen while in the wheelchair at bedside. family friend and RN present.  patient tolerating capping during waking hours. denies pain  intermittent command following, +motor apraxia  labs reviewed   leukocytosis resolved-- 11>9.44  Hypernatremia resolved-- 143  ASA 81 mg po restarted today--discussed with hospitalist  Lipid panel ordered by hospitalist-- depending on result will add liptor depending on LDL level  reviewed labs/imaging  Discussed medical updates and plan with nursing and hospitalist   Patient requires acute inpatient hospitalization for ongoing management of medical comorbidities (sp trach and peg-- tolerating capping, plan arnulfo decannulation tomorrow depending on patient tolerance, monitoring of BP and labs, abnormality of gait and mobility, which are limiting functional abilities and optimization of functional independence for discharge

## 2025-02-27 NOTE — PROGRESS NOTE ADULT - REASON FOR ADMISSION
bilateral CVA

## 2025-02-27 NOTE — PROGRESS NOTE ADULT - ASSESSMENT
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  #Dysphagia due to above  #PEG status  -PT/OT/SLP  -BP control - currently on Lopressor, monitor for orthostasis  -c/w Amantadine  -PEG feeds as tolerated  - continue ASA for secondary stroke prevention  - atorvastatin 40mg po daily    #History of respiratory failure requiring tracheostomy  #Tracheostomy status  -decannulated 2/14  - continue nebs/pulmicort    #Possible seizure disorder, recent onset  -c/w Keppra    #Sickle cell disease  -c/w hydroxyurea     #Oral thrush  -c/w Nystatin    #DVT ppx: Lovenox

## 2025-02-27 NOTE — PROGRESS NOTE ADULT - SUBJECTIVE AND OBJECTIVE BOX
Patient is a 60y old  Female who presents with a chief complaint of bilateral CVA (25 Feb 2025 09:42)      HPI:  59F PMH HTN, HFimpEF/NICM (EF 58% 10/2024), Sickle cell disease (on hydroxyurea), who presented to [facility] for SOB and SS crisis on 12/30 and 12/31/24. She was intubated and sedated following witnessed seizure, and transferred to [facility] (1/3/2025) for further management.     Keppra was discontinued at [facility] due to negative EEG. Seizure was thought to be caused by clonazepam withdrawal per OSH. She was hypotensive and tachycardic with elevated trops (~900s), but with no EKG changes. TTE with RV failure possibly iso pulmonary HTN 2/2 increased tidal volumes on ventilator and possible additional volume overload 2/2 management of sickle cell crisis. Neuro was consulted for seizures and EEG technician was called for vEEG placement; seizure activities noted.  Pt transferred to [facility] for management of cardiogenic shock with increased inotropic and pressor support requirement.     ON 1/3, venoarterial ECMO (Left Femoral Arterial 17Fr, Right Femoral Venous 25Fr, L SFA RPC 6Fr) was initiated. Transfusion Medicine team was consulted for emergent RBC exchange to minimize the risk of complications related to sickle cell disease given critical illness with multi-organ failure. TTE 1/4/25: LVEF <20%, ECMO cannula in RV, mildly enlarged RV size and reduced RV systolic function, no pericardial effusion, no MR, trace TR, IVC nml size    Hospital course also significant for MSSA in sputum culture from ET tube, MSSA bacteremia, BAL + MSSA Patient was treated with IV vanc (completed) and merrem for total 10 days (completed). On 1/7 patient s/p VA ECMO decannulation. Case was complicated by hematoma formation in left thigh (site of arterial cannula). She was given 2U pRBC, 2 platelet and 5U cryo. 1/8 YUNG and LLL evaluation revealed moderate green tinged secretions and mucous plugging. RUL, RML, RLL revealed minimal secretions.     Patient was tapered off precedex sedation and Keppra reinstated. MRI 1/9 showed innumerable foci susceptibility artifact scattered throughout the brain, tiny acute/subacute infarcts within the bilateral basal ganglia, thalami, and chandu with disproportionate ovoid area of T2/FLAIR prolongation in the right ventral thalamus. Findings may indicate the presence of embolic acute/subacute infarcts. 1/14 Lumbar puncture no evidence of meningitis/ encephalitis     ON 1/16 bedside trach was placed by Dr Hebert Holden and on 1/21 PEG placement performed. 1/27 Speech and swallow eval there is reduced movement of the right vocal cord; left is mobile/intact. Silent laryngeal penetration of purees and moderately thick liquids is noted Remains with a #6 Cuffless Trach at 30% Trach collar.    Patient was medically cleared for transfer to [facility] IRF 2/6/25.   (06 Feb 2025 12:00)        SUBJECTIVE: Patient seen and examined. No acute overnight events, slept well. Denies pain. Discussed JETT tomorrow.       REVIEW OF SYSTEMS  impaired due to poor insight, impaired command following, delayed responses to questions    VITALS  60y  Vital Signs Last 24 Hrs  T(C): 36.9 (27 Feb 2025 07:31), Max: 37.1 (26 Feb 2025 19:49)  T(F): 98.5 (27 Feb 2025 07:31), Max: 98.8 (26 Feb 2025 19:49)  HR: 86 (27 Feb 2025 07:31) (84 - 90)  BP: 119/82 (27 Feb 2025 07:31) (102/64 - 126/88)  BP(mean): --  RR: 16 (27 Feb 2025 07:31) (16 - 16)  SpO2: 98% (27 Feb 2025 07:31) (97% - 99%)    Parameters below as of 27 Feb 2025 07:31  Patient On (Oxygen Delivery Method): room air        PHYSICAL EXAM  Constitutional - NAD, Comfortable  HEENT - trach stoma healed  Cardio - warm and well perfused, no cyanosis  Pulm - resp nonlabored  Abdomen -  Soft, NTND; +PEG site c/d/i  Extremities -  No calf tenderness   Neurologic Exam:                    Cognitive -        Orientation: AAO to self and hospital (not name of hospital).  Didn't know year/month despite cues. Able to follow 1 step commands (delayed)     Speech - Hypophonic, impaired naming, delayed processing/response     Cranial Nerves - poor tracking     Motor - poor/delayed command following; RUE 4/5, LUE at least antigravity, BLE 4/5  Psychiatric - calm affect, smiles appropriately    RECENT LABS:      LAB                        10.3   8.47  )-----------( 424      ( 27 Feb 2025 06:07 )             29.8     02-27    140  |  107  |  18  ----------------------------<  97  4.0   |  24  |  0.82    Ca    9.4      27 Feb 2025 06:07    TPro  7.2  /  Alb  2.5[L]  /  TBili  0.5  /  DBili  x   /  AST  36  /  ALT  61[H]  /  AlkPhos  87  02-27    LIVER FUNCTIONS - ( 27 Feb 2025 06:07 )  Alb: 2.5 g/dL / Pro: 7.2 g/dL / ALK PHOS: 87 U/L / ALT: 61 U/L / AST: 36 U/L / GGT: x                 Urinalysis Basic - ( 27 Feb 2025 06:07 )    Color: x / Appearance: x / SG: x / pH: x  Gluc: 97 mg/dL / Ketone: x  / Bili: x / Urobili: x   Blood: x / Protein: x / Nitrite: x   Leuk Esterase: x / RBC: x / WBC x   Sq Epi: x / Non Sq Epi: x / Bacteria: x              MEDICATIONS:  MEDICATIONS  (STANDING):  amantadine Syrup 100 milliGRAM(s) Oral <User Schedule>  artificial tears (preservative free) Ophthalmic Solution 1 Drop(s) Both EYES four times a day  ascorbic acid 500 milliGRAM(s) Oral daily  aspirin  chewable 81 milliGRAM(s) Oral daily  atorvastatin 20 milliGRAM(s) Oral at bedtime  enoxaparin Injectable 40 milliGRAM(s) SubCutaneous every 24 hours  hydroxyurea 500 milliGRAM(s) Oral two times a day  levETIRAcetam  Solution 500 milliGRAM(s) Oral every 12 hours  metoprolol tartrate 12.5 milliGRAM(s) Oral every 8 hours  multivitamin/minerals/iron Oral Solution (CENTRUM) 15 milliLiter(s) Oral daily  pantoprazole   Suspension 40 milliGRAM(s) Oral before breakfast  polyethylene glycol 3350 17 Gram(s) Oral daily  senna 1 Tablet(s) Oral daily    MEDICATIONS  (PRN):  acetaminophen   Oral Liquid .. 650 milliGRAM(s) Oral every 6 hours PRN Temp greater or equal to 38C (100.4F), Mild Pain (1 - 3)  albuterol/ipratropium for Nebulization 3 milliLiter(s) Nebulizer every 6 hours PRN Shortness of Breath and/or Wheezing  bisacodyl Suppository 10 milliGRAM(s) Rectal daily PRN Constipation

## 2025-02-27 NOTE — DISCHARGE NOTE NURSING/CASE MANAGEMENT/SOCIAL WORK - PATIENT PORTAL LINK FT
You can access the FollowMyHealth Patient Portal offered by [facility] by registering at the following website: [email or web address]. By joining [facility]’s FollowMyHealth portal, you will also be able to view your health information using other applications (apps) compatible with our system.

## 2025-02-27 NOTE — DISCHARGE NOTE NURSING/CASE MANAGEMENT/SOCIAL WORK - FINANCIAL ASSISTANCE
[facility] provides services at a reduced cost to those who are determined to be eligible through [facility]’s financial assistance program. Information regarding [facility]’s financial assistance program can be found by going to [email or web address] or by calling [phone].

## 2025-03-09 NOTE — PROGRESS NOTE ADULT - ASSESSMENT
RN gave pt two more warm blankets. Oral temp 98.0    Physical Examination:  GENERAL:               Alert, Oriented, No acute distress.    HEENT:                   No JVD, Moist MM Stoma closed with granulation tissue no stridor  PULM:                     Bilateral air entry, Clear to auscultation bilaterally, no significant sputum production, No Rales, No Rhonchi, No Wheezing  CVS:                         S1, S2,  No Murmur   NEURO:                  Alert, oriented, interactive,  follows commands  PSYC:                      Calm, + Insight.       Assessment  59 yr old woman  with  HTN, HFimpEF/NICM (EF 58% 10/2024), sickle cell disease, who presented to [facility] for SOB and SS crisis 12/30/25;, followed by witnessed seizure (?secondary to clonazepam withdrawal) requiring intubation. She was transferred to [facility] 1/3/25 secondary to cardiogenic shock/RV failure s/p ECMO +TRACH with #6 cuffless +PEG. Now admitted to [facility] after for initiation of a multidisciplinary rehab program consisting focused on functional mobility, transfers and ADLs (activities of daily living). Pulmonary eval requested for trach management       Plan  Decannulated on 2/14  Continue nebs/pulmicort .  alyven dressing on stoma   may shower  rest of care as per primary team

## 2025-03-10 ENCOUNTER — INPATIENT (INPATIENT)
Facility: HOSPITAL | Age: 60
LOS: 3 days | Discharge: NURSING, REHAB OR LONG-TERM CARE | DRG: 176 | End: 2025-03-14
Attending: STUDENT IN AN ORGANIZED HEALTH CARE EDUCATION/TRAINING PROGRAM | Admitting: STUDENT IN AN ORGANIZED HEALTH CARE EDUCATION/TRAINING PROGRAM
Payer: COMMERCIAL

## 2025-03-10 VITALS
HEART RATE: 97 BPM | RESPIRATION RATE: 18 BRPM | SYSTOLIC BLOOD PRESSURE: 102 MMHG | DIASTOLIC BLOOD PRESSURE: 68 MMHG | TEMPERATURE: 98 F | HEIGHT: 64 IN | OXYGEN SATURATION: 98 %

## 2025-03-10 DIAGNOSIS — Z98.89 OTHER SPECIFIED POSTPROCEDURAL STATES: Chronic | ICD-10-CM

## 2025-03-10 DIAGNOSIS — Z98.890 OTHER SPECIFIED POSTPROCEDURAL STATES: Chronic | ICD-10-CM

## 2025-03-10 DIAGNOSIS — Z98.891 HISTORY OF UTERINE SCAR FROM PREVIOUS SURGERY: Chronic | ICD-10-CM

## 2025-03-10 DIAGNOSIS — Z98.82 BREAST IMPLANT STATUS: Chronic | ICD-10-CM

## 2025-03-10 PROCEDURE — 99291 CRITICAL CARE FIRST HOUR: CPT | Mod: 25

## 2025-03-11 ENCOUNTER — RESULT REVIEW (OUTPATIENT)
Age: 60
End: 2025-03-11

## 2025-03-11 DIAGNOSIS — G40.909 EPILEPSY, UNSPECIFIED, NOT INTRACTABLE, WITHOUT STATUS EPILEPTICUS: ICD-10-CM

## 2025-03-11 DIAGNOSIS — I26.99 OTHER PULMONARY EMBOLISM WITHOUT ACUTE COR PULMONALE: ICD-10-CM

## 2025-03-11 DIAGNOSIS — R13.10 DYSPHAGIA, UNSPECIFIED: ICD-10-CM

## 2025-03-11 DIAGNOSIS — Z86.79 PERSONAL HISTORY OF OTHER DISEASES OF THE CIRCULATORY SYSTEM: ICD-10-CM

## 2025-03-11 DIAGNOSIS — D57.00 HB-SS DISEASE WITH CRISIS, UNSPECIFIED: ICD-10-CM

## 2025-03-11 DIAGNOSIS — Z86.73 PERSONAL HISTORY OF TRANSIENT ISCHEMIC ATTACK (TIA), AND CEREBRAL INFARCTION WITHOUT RESIDUAL DEFICITS: ICD-10-CM

## 2025-03-11 DIAGNOSIS — I10 ESSENTIAL (PRIMARY) HYPERTENSION: ICD-10-CM

## 2025-03-11 DIAGNOSIS — R09.89 OTHER SPECIFIED SYMPTOMS AND SIGNS INVOLVING THE CIRCULATORY AND RESPIRATORY SYSTEMS: ICD-10-CM

## 2025-03-11 DIAGNOSIS — G93.49 OTHER ENCEPHALOPATHY: ICD-10-CM

## 2025-03-11 LAB
ADD ON TEST-SPECIMEN IN LAB: SIGNIFICANT CHANGE UP
ALBUMIN SERPL ELPH-MCNC: 3.2 G/DL — LOW (ref 3.3–5)
ALP SERPL-CCNC: 90 U/L — SIGNIFICANT CHANGE UP (ref 40–120)
ALT FLD-CCNC: 36 U/L — SIGNIFICANT CHANGE UP (ref 10–45)
ANION GAP SERPL CALC-SCNC: 14 MMOL/L — SIGNIFICANT CHANGE UP (ref 5–17)
APPEARANCE UR: CLEAR — SIGNIFICANT CHANGE UP
APTT BLD: 26.1 SEC — SIGNIFICANT CHANGE UP (ref 24.5–35.6)
APTT BLD: 70.6 SEC — HIGH (ref 24.5–35.6)
AST SERPL-CCNC: 40 U/L — SIGNIFICANT CHANGE UP (ref 10–40)
BASOPHILS # BLD AUTO: 0.03 K/UL — SIGNIFICANT CHANGE UP (ref 0–0.2)
BASOPHILS NFR BLD AUTO: 0.3 % — SIGNIFICANT CHANGE UP (ref 0–2)
BILIRUB SERPL-MCNC: 0.6 MG/DL — SIGNIFICANT CHANGE UP (ref 0.2–1.2)
BILIRUB UR-MCNC: NEGATIVE — SIGNIFICANT CHANGE UP
BUN SERPL-MCNC: 15 MG/DL — SIGNIFICANT CHANGE UP (ref 7–23)
CALCIUM SERPL-MCNC: 9.3 MG/DL — SIGNIFICANT CHANGE UP (ref 8.4–10.5)
CHLORIDE SERPL-SCNC: 107 MMOL/L — SIGNIFICANT CHANGE UP (ref 96–108)
CK MB BLD-MCNC: <2.1 % — SIGNIFICANT CHANGE UP (ref 0–3.5)
CK MB CFR SERPL CALC: <1 NG/ML — SIGNIFICANT CHANGE UP (ref 0–3.8)
CK SERPL-CCNC: 48 U/L — SIGNIFICANT CHANGE UP (ref 25–170)
CO2 SERPL-SCNC: 19 MMOL/L — LOW (ref 22–31)
COLOR SPEC: YELLOW — SIGNIFICANT CHANGE UP
CREAT SERPL-MCNC: 0.74 MG/DL — SIGNIFICANT CHANGE UP (ref 0.5–1.3)
DIFF PNL FLD: NEGATIVE — SIGNIFICANT CHANGE UP
EGFR: 94 ML/MIN/1.73M2 — SIGNIFICANT CHANGE UP
EGFR: 94 ML/MIN/1.73M2 — SIGNIFICANT CHANGE UP
EOSINOPHIL # BLD AUTO: 0.19 K/UL — SIGNIFICANT CHANGE UP (ref 0–0.5)
EOSINOPHIL NFR BLD AUTO: 1.8 % — SIGNIFICANT CHANGE UP (ref 0–6)
FLUAV AG NPH QL: SIGNIFICANT CHANGE UP
FLUBV AG NPH QL: SIGNIFICANT CHANGE UP
GAS PNL BLDV: SIGNIFICANT CHANGE UP
GLUCOSE SERPL-MCNC: 86 MG/DL — SIGNIFICANT CHANGE UP (ref 70–99)
GLUCOSE UR QL: NEGATIVE MG/DL — SIGNIFICANT CHANGE UP
HCT VFR BLD CALC: 30 % — LOW (ref 34.5–45)
HCT VFR BLD CALC: 30.4 % — LOW (ref 34.5–45)
HGB BLD-MCNC: 10.7 G/DL — LOW (ref 11.5–15.5)
HGB BLD-MCNC: 10.8 G/DL — LOW (ref 11.5–15.5)
IMM GRANULOCYTES NFR BLD AUTO: 0.5 % — SIGNIFICANT CHANGE UP (ref 0–0.9)
INR BLD: 1.01 RATIO — SIGNIFICANT CHANGE UP (ref 0.85–1.16)
KETONES UR-MCNC: NEGATIVE MG/DL — SIGNIFICANT CHANGE UP
LEUKOCYTE ESTERASE UR-ACNC: NEGATIVE — SIGNIFICANT CHANGE UP
LYMPHOCYTES # BLD AUTO: 3.68 K/UL — HIGH (ref 1–3.3)
LYMPHOCYTES # BLD AUTO: 35.5 % — SIGNIFICANT CHANGE UP (ref 13–44)
MCHC RBC-ENTMCNC: 31.6 PG — SIGNIFICANT CHANGE UP (ref 27–34)
MCHC RBC-ENTMCNC: 31.8 PG — SIGNIFICANT CHANGE UP (ref 27–34)
MCHC RBC-ENTMCNC: 35.5 G/DL — SIGNIFICANT CHANGE UP (ref 32–36)
MCHC RBC-ENTMCNC: 35.7 G/DL — SIGNIFICANT CHANGE UP (ref 32–36)
MCV RBC AUTO: 88.9 FL — SIGNIFICANT CHANGE UP (ref 80–100)
MCV RBC AUTO: 89 FL — SIGNIFICANT CHANGE UP (ref 80–100)
MONOCYTES # BLD AUTO: 0.79 K/UL — SIGNIFICANT CHANGE UP (ref 0–0.9)
MONOCYTES NFR BLD AUTO: 7.6 % — SIGNIFICANT CHANGE UP (ref 2–14)
NEUTROPHILS # BLD AUTO: 5.64 K/UL — SIGNIFICANT CHANGE UP (ref 1.8–7.4)
NEUTROPHILS NFR BLD AUTO: 54.3 % — SIGNIFICANT CHANGE UP (ref 43–77)
NITRITE UR-MCNC: NEGATIVE — SIGNIFICANT CHANGE UP
NRBC BLD AUTO-RTO: 2 /100 WBCS — HIGH (ref 0–0)
NRBC BLD AUTO-RTO: 2 /100 WBCS — HIGH (ref 0–0)
PH UR: 7.5 — SIGNIFICANT CHANGE UP (ref 5–8)
PLATELET # BLD AUTO: 272 K/UL — SIGNIFICANT CHANGE UP (ref 150–400)
PLATELET # BLD AUTO: 286 K/UL — SIGNIFICANT CHANGE UP (ref 150–400)
POTASSIUM SERPL-MCNC: 4.8 MMOL/L — SIGNIFICANT CHANGE UP (ref 3.5–5.3)
POTASSIUM SERPL-SCNC: 4.8 MMOL/L — SIGNIFICANT CHANGE UP (ref 3.5–5.3)
PROT SERPL-MCNC: 7 G/DL — SIGNIFICANT CHANGE UP (ref 6–8.3)
PROT UR-MCNC: NEGATIVE MG/DL — SIGNIFICANT CHANGE UP
PROTHROM AB SERPL-ACNC: 11.6 SEC — SIGNIFICANT CHANGE UP (ref 9.9–13.4)
RBC # BLD: 3.37 M/UL — LOW (ref 3.8–5.2)
RBC # BLD: 3.37 M/UL — LOW (ref 3.8–5.2)
RBC # BLD: 3.42 M/UL — LOW (ref 3.8–5.2)
RBC # FLD: 16 % — HIGH (ref 10.3–14.5)
RBC # FLD: 16.3 % — HIGH (ref 10.3–14.5)
RETICS #: 379.8 K/UL — HIGH (ref 25–125)
RETICS/RBC NFR: 11.4 % — HIGH (ref 0.5–2.5)
RSV RNA NPH QL NAA+NON-PROBE: SIGNIFICANT CHANGE UP
SARS-COV-2 RNA SPEC QL NAA+PROBE: SIGNIFICANT CHANGE UP
SODIUM SERPL-SCNC: 140 MMOL/L — SIGNIFICANT CHANGE UP (ref 135–145)
SP GR SPEC: 1.02 — SIGNIFICANT CHANGE UP (ref 1–1.03)
TROPONIN T, HIGH SENSITIVITY RESULT: 10 NG/L — SIGNIFICANT CHANGE UP (ref 0–51)
TROPONIN T, HIGH SENSITIVITY RESULT: 10 NG/L — SIGNIFICANT CHANGE UP (ref 0–51)
UROBILINOGEN FLD QL: 0.2 MG/DL — SIGNIFICANT CHANGE UP (ref 0.2–1)
WBC # BLD: 10.15 K/UL — SIGNIFICANT CHANGE UP (ref 3.8–10.5)
WBC # BLD: 10.38 K/UL — SIGNIFICANT CHANGE UP (ref 3.8–10.5)
WBC # FLD AUTO: 10.15 K/UL — SIGNIFICANT CHANGE UP (ref 3.8–10.5)
WBC # FLD AUTO: 10.38 K/UL — SIGNIFICANT CHANGE UP (ref 3.8–10.5)

## 2025-03-11 PROCEDURE — 93970 EXTREMITY STUDY: CPT | Mod: 26

## 2025-03-11 PROCEDURE — 74177 CT ABD & PELVIS W/CONTRAST: CPT | Mod: 26

## 2025-03-11 PROCEDURE — 70450 CT HEAD/BRAIN W/O DYE: CPT | Mod: 26

## 2025-03-11 PROCEDURE — 93306 TTE W/DOPPLER COMPLETE: CPT | Mod: 26

## 2025-03-11 PROCEDURE — 71045 X-RAY EXAM CHEST 1 VIEW: CPT | Mod: 26

## 2025-03-11 PROCEDURE — 71275 CT ANGIOGRAPHY CHEST: CPT | Mod: 26

## 2025-03-11 PROCEDURE — 99223 1ST HOSP IP/OBS HIGH 75: CPT

## 2025-03-11 PROCEDURE — 83020 HEMOGLOBIN ELECTROPHORESIS: CPT | Mod: 26

## 2025-03-11 RX ORDER — SODIUM CHLORIDE 9 G/1000ML
1000 INJECTION, SOLUTION INTRAVENOUS
Refills: 0 | Status: DISCONTINUED | OUTPATIENT
Start: 2025-03-11 | End: 2025-03-12

## 2025-03-11 RX ORDER — HYPROMELLOSE 0.4 %
1 DROPS OPHTHALMIC (EYE)
Refills: 0 | Status: DISCONTINUED | OUTPATIENT
Start: 2025-03-11 | End: 2025-03-14

## 2025-03-11 RX ORDER — LEVETIRACETAM 10 MG/ML
500 INJECTION, SOLUTION INTRAVENOUS
Refills: 0 | Status: DISCONTINUED | OUTPATIENT
Start: 2025-03-11 | End: 2025-03-14

## 2025-03-11 RX ORDER — HEPARIN SODIUM 1000 [USP'U]/ML
INJECTION INTRAVENOUS; SUBCUTANEOUS
Qty: 25000 | Refills: 0 | Status: DISCONTINUED | OUTPATIENT
Start: 2025-03-11 | End: 2025-03-11

## 2025-03-11 RX ORDER — HYDROMORPHONE/SOD CHLOR,ISO/PF 2 MG/10 ML
0.5 SYRINGE (ML) INJECTION EVERY 6 HOURS
Refills: 0 | Status: DISCONTINUED | OUTPATIENT
Start: 2025-03-11 | End: 2025-03-14

## 2025-03-11 RX ORDER — HYDROXYUREA 500 MG/1
500 CAPSULE ORAL
Refills: 0 | Status: DISCONTINUED | OUTPATIENT
Start: 2025-03-11 | End: 2025-03-14

## 2025-03-11 RX ORDER — ONDANSETRON HCL/PF 4 MG/2 ML
4 VIAL (ML) INJECTION EVERY 6 HOURS
Refills: 0 | Status: DISCONTINUED | OUTPATIENT
Start: 2025-03-11 | End: 2025-03-14

## 2025-03-11 RX ORDER — ASPIRIN 325 MG
1 TABLET ORAL
Refills: 0 | DISCHARGE

## 2025-03-11 RX ORDER — HEPARIN SODIUM 1000 [USP'U]/ML
3000 INJECTION INTRAVENOUS; SUBCUTANEOUS EVERY 6 HOURS
Refills: 0 | Status: DISCONTINUED | OUTPATIENT
Start: 2025-03-11 | End: 2025-03-11

## 2025-03-11 RX ORDER — ACETAMINOPHEN 500 MG/5ML
2 LIQUID (ML) ORAL
Refills: 0 | DISCHARGE

## 2025-03-11 RX ORDER — ATORVASTATIN CALCIUM 80 MG/1
1 TABLET, FILM COATED ORAL
Refills: 0 | DISCHARGE

## 2025-03-11 RX ORDER — FOLIC ACID 1 MG/1
1 TABLET ORAL DAILY
Refills: 0 | Status: DISCONTINUED | OUTPATIENT
Start: 2025-03-11 | End: 2025-03-14

## 2025-03-11 RX ORDER — METOPROLOL SUCCINATE 50 MG/1
12.5 TABLET, EXTENDED RELEASE ORAL THREE TIMES A DAY
Refills: 0 | Status: DISCONTINUED | OUTPATIENT
Start: 2025-03-11 | End: 2025-03-14

## 2025-03-11 RX ORDER — HEPARIN SODIUM 1000 [USP'U]/ML
6500 INJECTION INTRAVENOUS; SUBCUTANEOUS EVERY 6 HOURS
Refills: 0 | Status: DISCONTINUED | OUTPATIENT
Start: 2025-03-11 | End: 2025-03-11

## 2025-03-11 RX ORDER — ASPIRIN 325 MG
81 TABLET ORAL DAILY
Refills: 0 | Status: DISCONTINUED | OUTPATIENT
Start: 2025-03-11 | End: 2025-03-14

## 2025-03-11 RX ORDER — TIOTROPIUM BROMIDE INHALATION SPRAY 3.12 UG/1
2 SPRAY, METERED RESPIRATORY (INHALATION) DAILY
Refills: 0 | Status: DISCONTINUED | OUTPATIENT
Start: 2025-03-11 | End: 2025-03-14

## 2025-03-11 RX ORDER — ASPIRIN 325 MG
81 TABLET ORAL DAILY
Refills: 0 | Status: DISCONTINUED | OUTPATIENT
Start: 2025-03-11 | End: 2025-03-11

## 2025-03-11 RX ORDER — ATORVASTATIN CALCIUM 80 MG/1
40 TABLET, FILM COATED ORAL AT BEDTIME
Refills: 0 | Status: DISCONTINUED | OUTPATIENT
Start: 2025-03-11 | End: 2025-03-14

## 2025-03-11 RX ORDER — OMEPRAZOLE 20 MG/1
2 CAPSULE, DELAYED RELEASE ORAL
Refills: 0 | DISCHARGE

## 2025-03-11 RX ORDER — AMANTADINE HCL 100 MG
100 CAPSULE ORAL
Refills: 0 | Status: DISCONTINUED | OUTPATIENT
Start: 2025-03-11 | End: 2025-03-14

## 2025-03-11 RX ORDER — ENOXAPARIN SODIUM 100 MG/ML
80 INJECTION SUBCUTANEOUS EVERY 12 HOURS
Refills: 0 | Status: DISCONTINUED | OUTPATIENT
Start: 2025-03-11 | End: 2025-03-13

## 2025-03-11 RX ORDER — ALBUTEROL SULFATE 2.5 MG/3ML
2 VIAL, NEBULIZER (ML) INHALATION EVERY 6 HOURS
Refills: 0 | Status: DISCONTINUED | OUTPATIENT
Start: 2025-03-11 | End: 2025-03-14

## 2025-03-11 RX ORDER — ACETAMINOPHEN 500 MG/5ML
650 LIQUID (ML) ORAL EVERY 6 HOURS
Refills: 0 | Status: DISCONTINUED | OUTPATIENT
Start: 2025-03-11 | End: 2025-03-14

## 2025-03-11 RX ADMIN — TIOTROPIUM BROMIDE INHALATION SPRAY 2 PUFF(S): 3.12 SPRAY, METERED RESPIRATORY (INHALATION) at 11:50

## 2025-03-11 RX ADMIN — HEPARIN SODIUM 6500 UNIT(S): 1000 INJECTION INTRAVENOUS; SUBCUTANEOUS at 05:47

## 2025-03-11 RX ADMIN — ATORVASTATIN CALCIUM 40 MILLIGRAM(S): 80 TABLET, FILM COATED ORAL at 22:47

## 2025-03-11 RX ADMIN — SODIUM CHLORIDE 100 MILLILITER(S): 9 INJECTION, SOLUTION INTRAVENOUS at 14:50

## 2025-03-11 RX ADMIN — ENOXAPARIN SODIUM 80 MILLIGRAM(S): 100 INJECTION SUBCUTANEOUS at 10:31

## 2025-03-11 RX ADMIN — METOPROLOL SUCCINATE 12.5 MILLIGRAM(S): 50 TABLET, EXTENDED RELEASE ORAL at 22:48

## 2025-03-11 RX ADMIN — Medication 1 DROP(S): at 17:51

## 2025-03-11 RX ADMIN — Medication 1 DROP(S): at 11:50

## 2025-03-11 RX ADMIN — Medication 2 PUFF(S): at 17:51

## 2025-03-11 RX ADMIN — Medication 40 MILLIGRAM(S): at 17:50

## 2025-03-11 RX ADMIN — FOLIC ACID 1 MILLIGRAM(S): 1 TABLET ORAL at 11:51

## 2025-03-11 RX ADMIN — HYDROXYUREA 500 MILLIGRAM(S): 500 CAPSULE ORAL at 17:50

## 2025-03-11 RX ADMIN — HEPARIN SODIUM 1400 UNIT(S)/HR: 1000 INJECTION INTRAVENOUS; SUBCUTANEOUS at 05:48

## 2025-03-11 RX ADMIN — HEPARIN SODIUM 1400 UNIT(S)/HR: 1000 INJECTION INTRAVENOUS; SUBCUTANEOUS at 07:12

## 2025-03-11 RX ADMIN — Medication 81 MILLIGRAM(S): at 12:32

## 2025-03-11 RX ADMIN — Medication 100 MILLIGRAM(S): at 17:50

## 2025-03-11 RX ADMIN — LEVETIRACETAM 500 MILLIGRAM(S): 10 INJECTION, SOLUTION INTRAVENOUS at 12:32

## 2025-03-11 RX ADMIN — ENOXAPARIN SODIUM 80 MILLIGRAM(S): 100 INJECTION SUBCUTANEOUS at 17:50

## 2025-03-11 RX ADMIN — Medication 2 PUFF(S): at 12:32

## 2025-03-11 RX ADMIN — METOPROLOL SUCCINATE 12.5 MILLIGRAM(S): 50 TABLET, EXTENDED RELEASE ORAL at 13:23

## 2025-03-11 RX ADMIN — LEVETIRACETAM 500 MILLIGRAM(S): 10 INJECTION, SOLUTION INTRAVENOUS at 17:50

## 2025-03-11 NOTE — ED PROVIDER NOTE - PROGRESS NOTE DETAILS
Christie Freitas MD PGY-3: CTA significant for acute segmental PE, no evidence of RHS on CT. cardiac enzymes negative. Will initiate heparin drip and admit for ongoing monitoring.

## 2025-03-11 NOTE — ED PROVIDER NOTE - CARE PLAN
1 Principal Discharge DX:	Pulmonary embolism   Principal Discharge DX:	Pulmonary embolism  Secondary Diagnosis:	Dysphagia

## 2025-03-11 NOTE — ED ADULT NURSE REASSESSMENT NOTE - NS ED NURSE REASSESS COMMENT FT1
MD Falcon at bedside verbal orders to stop heparin. MD Falcon to order lovenox injections for anticoagulation.

## 2025-03-11 NOTE — CONSULT NOTE ADULT - ASSESSMENT
60F with a history of sickle cell disease, prior acute chest syndrome, bilateral CVA with seizure disorder, and right heart failure requiring VA ECMO (s/p trach and reversal and PEG) presents from a rehab facility with chest pain and altered mental status. Per daughter, the patient had two days of chest pain and worsening confusion, including paranoia about staff harming her, which is atypical for her baseline. She endorsed intermittent, non-pleuritic chest pain for 24 hours, with one episode of vomiting but no fevers, chills, abdominal pain, or urinary symptoms.    In the ED, she remained hemodynamically stable (/84, HR 82, SpO2 98% on RA). CTA chest showed acute segmental PEs in the RUL and RML, and she was started on IV heparin. CT head was negative for acute CVA or mass, and abdominal CT showed a 2mm right renal calculus. Labs showed Hb 10.7, reticulocyte count 11% (prior 7%), and lactate 3.0. Encephalopathy suspected to be multifactorial, possibly hypoxic from PE in the setting of sickle cell crisis.    Vascular cardiology consulted for evaluation of segmental PE.    Assessment & Plan:  Vascular cardiology consulted for segmental PE.  PE Risk Stratification:  sPESI: 1 (sickle cell disease) ? High risk  PESI: 66,  Class II (Low risk)  Risk classification: Intermediate-low risk PE  No hemodynamic instability, no signs of RV strain on CT    Recommendations:   - Transitioned from IV heparin to therapeutic Lovenox 80mg SC q12h; plan to discharge on DOAC  - Awaiting TTE to assess for RV strain  - Awaiting LE venous duplex to evaluate for additional DVT  - BP stable, continuing Metoprolol 12.5mg BID  - Will continue monitoring for hemodynamic stability and signs of RV strain

## 2025-03-11 NOTE — ED PROVIDER NOTE - CLINICAL SUMMARY MEDICAL DECISION MAKING FREE TEXT BOX
60-year-old female history as above [facility] of vomiting, chest discomfort x 1 day as well as change in mental status.  Exam without focal neurologic deficit, patient somewhat slow to respond but attributes this to her history of stroke she is able to tell me that she has chest pain that has been intermittent, no pain currently.  Vital signs are nonactionable.  EKG nonischemic.  Given patient's extensive recent medical history will obtain CT noncon of head to assess for underlying intracranial process, CTA of chest to assess for PE or other acute process given immobility and risk of hypercoag although is on lovenox at facility, and CT abd/pelvis to assess for underlying obstructive process given vomiting, and PEG placement. Dispo per labs, imaging, reassessment.

## 2025-03-11 NOTE — CONSULT NOTE ADULT - SUBJECTIVE AND OBJECTIVE BOX
Patient seen and evaluated at bedside    Reason for consult: PE    HPI:  Patient is a 60-year-old female with h/o MH sickle cell disease with history of acute chest, recent admission for bilateral CVA complicated by seizure, as well as right heart failure requiring VA ECMO s/p trach (now reversed) and PEG presenting from rehab facility with chest pain and AMS. As per patient's daughter ( by phone) reported she had been c/o chest pain x 2 days and seemed more confused than her baseline - stating people at the facility were trying to harm her which is atypical of patient. Daughter is concerned that patient may be having either a sickle cell crisis or other occult process given she does not present normally given her history of stroke. She is endorsing chest pain x 24 hours- intermittent, non pleuritic in nature. Vomited x1 today. Denies cough, fevers/chills, abd pain or urinary symptoms.   (11 Mar 2025 09:39)      PMHx:   Sickle-cell-hemoglobin C disease with crisis    Essential hypertension    No pertinent past medical history    Sickle cell trait    Sickle cell disease    HTN (hypertension)        PSHx:   H/O:     H/O abdominoplasty    S/P breast augmentation    S/P         Allergies:  doxycycline (Angioedema)  penicillin (Unknown)  clindamycin (Angioedema)  linezolid (Angioedema)      Home Meds:    Current Medications:   acetaminophen     Tablet .. 650 milliGRAM(s) Oral every 6 hours PRN  albuterol    90 MICROgram(s) HFA Inhaler 2 Puff(s) Inhalation every 6 hours  amantadine Syrup 100 milliGRAM(s) Oral two times a day  artificial  tears Solution 1 Drop(s) Both EYES four times a day  aspirin  chewable 81 milliGRAM(s) Oral daily  atorvastatin 40 milliGRAM(s) Oral at bedtime  enoxaparin Injectable 80 milliGRAM(s) SubCutaneous every 12 hours  folic acid 1 milliGRAM(s) Oral daily  HYDROmorphone  Injectable 0.5 milliGRAM(s) IV Push every 6 hours PRN  hydroxyurea 500 milliGRAM(s) Oral two times a day  levETIRAcetam  Solution 500 milliGRAM(s) Enteral Tube two times a day  metoprolol tartrate 12.5 milliGRAM(s) Oral three times a day  ondansetron Injectable 4 milliGRAM(s) IV Push every 6 hours PRN  pantoprazole   Suspension 40 milliGRAM(s) Oral every 12 hours  sodium chloride 0.45%. 1000 milliLiter(s) IV Continuous <Continuous>  tiotropium 2.5 MICROgram(s) Inhaler 2 Puff(s) Inhalation daily      FAMILY HISTORY:  FHx: cerebral palsy (Child)        Social History:  Smoking History:  Alcohol Use:  Drug Use:    Review of Systems:  REVIEW OF SYSTEMS:     [ ] All other systems negative       Physical Exam:  T(F): 98.3 (), Max: 98.9 ()  HR: 61 () (61 - 97)  BP: 134/85 () (102/68 - 152/84)  RR: 18 ()  SpO2: 99% ()  GENERAL: No acute distress, well-developed  HEAD:  Atraumatic, Normocephalic  ENT: EOMI, PERRLA, conjunctiva and sclera clear, Neck supple, No JVD, moist mucosa  CHEST/LUNG: Clear to auscultation bilaterally; No wheeze, equal breath sounds bilaterally   BACK: No spinal tenderness  HEART: Regular rate and rhythm; No murmurs, rubs, or gallops  ABDOMEN: Soft, Nontender, Nondistended; Bowel sounds present, PEG in place  EXTREMITIES:  No clubbing, cyanosis, or edema  PSYCH: Nl behavior, nl affect  NEUROLOGY: AAOx3, non-focal, cranial nerves intact  SKIN: Normal color, No rashes or lesions  LINES:    Cardiovascular Diagnostic Testing:    CXR: Personally reviewed    Labs: Personally reviewed                        10.7   10.38 )-----------( 272      ( 11 Mar 2025 01:58 )             30.0         140  |  107  |  15  ----------------------------<  86  4.8   |  19[L]  |  0.74    Ca    9.3      11 Mar 2025 01:58  Mg     1.9         TPro  7.0  /  Alb  3.2[L]  /  TBili  0.6  /  DBili  x   /  AST  40  /  ALT  36  /  AlkPhos  90      PT/INR - ( 11 Mar 2025 02:43 )   PT: 11.6 sec;   INR: 1.01 ratio         PTT - ( 11 Mar 2025 02:43 )  PTT:26.1 sec

## 2025-03-11 NOTE — SWALLOW BEDSIDE ASSESSMENT ADULT - SWALLOW EVAL: PATIENT/FAMILY GOALS STATEMENT
Per d/w daughter Farhat via telephone, Pt has been tolerating regular diet/thin liquids w/ no supplemental nutrition via PEG at rehab; denies Pt difficulty w/ intake and/or any concerns about Pt tolerating PO intake. Hopeful to have PEG removed within the coming weeks.

## 2025-03-11 NOTE — ED PROVIDER NOTE - OBJECTIVE STATEMENT
60-year-old female [facility] sickle cell disease with history of acute chest, recent admission for bilateral CVA complicated by seizure, as well as right heart failure requiring VA ECMO s/p trach (now reversed) and PEG presenting from rehab facility with chest pain and AMS. I spoke with the patient's daughter by phone prior to seeing the patient - daughter reported patient had been c/o chest pain x2 days and seemed more confused than her baseline - stating people at the facility were trying to harm her which is atypical of patient. Daughter is concerned that patient may be having either a sickle cell crisis or other occult process given she does not present normally given her history of stroke. Patient is endorsing chest pain x24 hours, intermittent. Not pleuritic in nature. Vomited x1 today. Denies cough, fevers/chills, abd pain or urinary symptoms.

## 2025-03-11 NOTE — PHYSICAL THERAPY INITIAL EVALUATION ADULT - NSPTDISCHREC_GEN_A_CORE
Return to [facility]Sub-acute Rehab Return to [facility]. If d/c home, Pt. will need 24 hrs x 7 days assistance for bed & OOB mobility, ADLs, HPT, [facility]W/Sub-acute Rehab

## 2025-03-11 NOTE — H&P ADULT - HISTORY OF PRESENT ILLNESS
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 As per patient's daughter ( by phone) reported she had been c/o chest pain x 2 days and seemed more confused than her baseline - stating people at the facility were trying to harm her which is atypical of patient. Daughter is concerned that patient may be having either a sickle cell crisis or other occult process given she does not present normally given her history of stroke. She is endorsing chest pain x 24 hours- intermittent, non pleuritic in nature. Vomited x1 today. Denies cough, fevers/chills, abd pain or urinary symptoms.

## 2025-03-11 NOTE — PHYSICAL THERAPY INITIAL EVALUATION ADULT - TRANSFER TRAINING, PT EVAL
GOAL: Patient will perform sit to stand transfers independently at rolling walker with proper hand placement in 2 weeks GOAL: Pt will perform ALL transfers with MIN A x1, w/use of appropriate assistive device as needed, in 2 weeks.

## 2025-03-11 NOTE — ED ADULT NURSE NOTE - OBJECTIVE STATEMENT
60YOF hx HTN/HF/Sickle Cell/CVA/Dysphagia s/p PEG tube BIBEMS from SNF c/o AMS. EMS states since 330PM daughter noticed pt more confused than normal (baseline AOx3 per EMS, currently x1 to name in ED), was stating that "people are trying to hurt her" in the SNF, 1x episode of vomiting NBNB, additionally c/o pain to L arm (daughter states concerns for sickle cell crisis). On arrival pt AOx1 to name, breathing unlabored, pulses present and regular x4, abdomen soft nontender nondistended, endorses pain to L arm. 98.8 orally, normotensive. PEG tube site clean dry and intact. VSS.

## 2025-03-11 NOTE — H&P ADULT - ASSESSMENT
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 As per patient's daughter ( by phone) reported she had been c/o chest pain x 2 days and seemed more confused than her baseline - stating people at the facility were trying to harm her which is atypical of patient. Daughter is concerned that patient may be having either a sickle cell crisis or other occult process given she does not present normally given her history of stroke. She is endorsing chest pain x 24 hours- intermittent, non pleuritic in nature. Vomited x1 today. Denies cough, fevers/chills, abd pain or urinary symptoms.    In ED she rermains hemodynamically stable, CTA chest shows RUL and RML segmental PE, started on IV heparin gtt, CT abd/pelvis- R renal calculi 2mm. CTH- no CVA, mass, Hb 10.7, Retic 11%, CXR clear

## 2025-03-11 NOTE — PHYSICAL THERAPY INITIAL EVALUATION ADULT - ADDITIONAL COMMENTS
Patient is currently A&Ox1, unable to answer questions related to social hx and previous level of function; Information retrieved from previous PT eval notes from Jan, 2025.   "Social hx obtained from chart review/case management note. Patient is single and resides in a house with her daughter and son. +2-3 DONNA with a railing. Prior to hospitalization, patient was independent with ADLS. Patient was ambulating (I) with SC"

## 2025-03-11 NOTE — ED PROVIDER NOTE - ATTENDING CONTRIBUTION TO CARE
Sheldon Celis MD (Attending Physician):    I performed a history and physical exam of the patient and discussed their management with the resident/fellow/ACP/student. I have reviewed the resident/fellow/ACP/student note and agree with the documented findings and plan of care, except as noted. I have personally performed a substantive portion of the visit including all aspects of the medical decision making. My medical decision making and observations are found below. Please refer to any progress notes for updates on clinical course.    HPI:  60-year-old female PMH sickle cell disease with history of acute chest, recent admission for bilateral CVA complicated by seizure, as well as right heart failure requiring VA ECMO s/p trach (now reversed) and PEG presenting from rehab facility with chest pain and AMS. I spoke with the patient's daughter by phone prior to seeing the patient - daughter reported patient had been c/o chest pain x2 days and seemed more confused than her baseline - stating people at the facility were trying to harm her which is atypical of patient. Daughter is concerned that patient may be having either a sickle cell crisis or other occult process given she does not present normally given her history of stroke. Patient is endorsing chest pain x24 hours, intermittent. Not pleuritic in nature. Vomited x1 today. Denies cough, fevers/chills, abd pain or urinary symptoms.    PE:  Vitals noted  GEN - NAD, +A&Ox2 (doesn't know year)  HEAD - NC/AT  EYES - PERRL, EOMI  ENT - Airway patent, mucous membranes moist  NECK - Supple, non-tender without lymphadenopathy, no masses  PULMONARY - Normal wob, CTA b/l, symmetric breath sounds, no W/R/R, satting 98% on RA  CARDIAC - +S1S2, RRR, no M/G/R, no JVD  ABDOMEN - +BS, ND, +minimally TTP in b/l lower abd. +PEG tube in place, surrounding area is c/d/i. Soft, no guarding, no rebound, no masses, no rigidity.   - No CVA TTP b/l  EXTREMITIES - FROM, symmetric pulses, no edema. B/l calves NT.  SKIN - No rash or bruising  NEUROLOGIC - Alert, speech clear, moving all extremities spontaneously, no focal deficits  PSYCH - Normal mood/affect, normal insight    MDM:  DDx includes, but not limited to: ACS, PE, PTX, PNA, viral syndrome, pancreatitis, intracranial bleed/stroke, UTI, anemia, metabolic derangement, appendicitis, kidney stone, SBO, sickle cell pain crisis. ekg, cxr, CT head, CTA chest, CT a/p, labs, cardiac monitor. Dispo pending w/u.

## 2025-03-11 NOTE — ED PROVIDER NOTE - PHYSICAL EXAMINATION
GENERAL: Awake, alert, NAD, nontoxic appearing  HEAD: NC/AT, neck supple, moist mucous membranes  EYES: PERRL, EOM grossly intact, sclera anicteric  LUNGS: normal WOB on RA, CTAB, no wheezes or crackles   CARDIAC: RRR, no m/r/g  ABDOMEN: Soft, non tender, non distended, no rebound, no guarding  BACK: No midline spinal tenderness, no CVA tenderness  EXT: No edema, no calf tenderness, no deformities.  NEURO: A&Ox2-3. Moving all extremities without focal deficit. Somewhat slow to respond but is appropriate, able to relay history.   SKIN: Warm and dry. No rash.  PSYCH: Normal affect.

## 2025-03-11 NOTE — ED ADULT NURSE REASSESSMENT NOTE - NS ED NURSE REASSESS COMMENT FT1
Pt started on heparin gtt for pulmonary embolism, pt currently with 1 ultrasound guided access to LUE, notified attending MD Chung of need for second IV.

## 2025-03-11 NOTE — H&P ADULT - NSVTERISKREFERASSESS_GEN_ALL_CORE
Labs stable/improved except LDL mildly high than before at 133 (though trigs well down to 62, from 150s); mild decrease kidney function (avoid nsaids, stay hydrated)-- can do follow up labs at appt in November (hemogram, lipids, bmp)     Refer to the Assessment tab to view/cancel completed assessment.

## 2025-03-11 NOTE — H&P ADULT - PROBLEM SELECTOR PLAN 2
Afebrile, CTH- no CVA, CXR clear, WBC wnl  - Likely from acute PE in the setting of SS crisis  - will send UA w urine c/s  - Lactate 3.0, started on IVF Afebrile, CTH- no CVA, CXR clear, WBC wnl  - Likely from acute PE in the setting of SS crisis  - will send UA w urine c/s  - Lactate 3.0, WBC wnl, UA neg, started on IVF for possible SS crisis

## 2025-03-11 NOTE — PHYSICAL THERAPY INITIAL EVALUATION ADULT - PERTINENT HX OF CURRENT PROBLEM, REHAB EVAL
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 Hospital course: (3/11) CT Chest PE protocol: Segmental pulmonary embolism in the right upper lobe and right middle   lobe. (3/11) CT Abd/Pelvis: Nonobstructive right upper pole 0.2 cm renal calculus. Degenerative changes of the spine with chronic compression deformity of the T7 vertebral body and extensive Modic type changes of the L4 and L5 vertebral bodies with associated vertebral body height loss. (3/11) CT Head: No acute intracranial hemorrhage, mass effect, or midline shift. (3/11) CXR: No focal consolidation.

## 2025-03-11 NOTE — H&P ADULT - PROBLEM SELECTOR PLAN 1
CTA chest shows RUL and RML segmental PEs. Hemodynamically stable, /84, HR 82, O2 sat 98% RA  - In Ed started with IV heparin gtt  - will start Lovenox 80mg sc q 12 hrs, will d/c on DOAC  - Echo, doppler LE ordered  - Given stability will CTA chest shows RUL and RML segmental PEs. Hemodynamically stable, /84, HR 82, O2 sat 98% RA  - In Ed started with IV heparin gtt  - will start Lovenox 80mg sc q 12 hrs, will d/c on DOAC  - Echo, doppler LE ordered  - PERT team consult called ( given SS crisis, recent CVAs)  - close monitors on vitals

## 2025-03-11 NOTE — H&P ADULT - PROBLEM SELECTOR PLAN 3
Elevated Retic 11% from last admission 7%  - IVF with 1/2 NS at 100ml/hr  - c/w Hydrea, folate  - Dilaudid 0.5mg q 6 hrs prn for now, may need Dilaudid PCA pump on the floor. ( not on analgetics in Rehab except Tylenol)

## 2025-03-11 NOTE — H&P ADULT - PROBLEM SELECTOR PLAN 7
On PEG feeding  - started with Javity 1.5cal 200ml/hrs, increase 10ml/hr to goal 45ml/he  - Nutrition consult  - aspiration precaution On Regular diet per daughter but has a PEG for few more weeks  - Given AMS, will start PEG feeding for now, S & S eval requested- if pass will start regular food  - started with Javity 1.5cal 20ml/hrs, increase 10ml/hr q 6 hrs to goal 45ml/hr  - Nutrition consult  - aspiration precaution

## 2025-03-11 NOTE — PHYSICAL THERAPY INITIAL EVALUATION ADULT - LEVEL OF INDEPENDENCE: SIT/STAND, REHAB EVAL
Deferred, pt. not following safety cues 3/13/2025 functional eval/moderate assist (50% patients effort)

## 2025-03-11 NOTE — PHYSICAL THERAPY INITIAL EVALUATION ADULT - GAIT TRAINING, PT EVAL
GOAL: Patient will ambulate 100 feet independently with RW in 2 weeks GOAL: Pt will ambulate 150 feet w/CG A x1, w/use of appropriate assistive device in 2 weeks.

## 2025-03-11 NOTE — ED ADULT NURSE REASSESSMENT NOTE - NS ED NURSE REASSESS COMMENT FT1
Beside report received from Henrique ELLINGTON Pt saturated with urine in bed. Pt changed and repositioned in bed. Heparin running at 14ml/hr. Cardia monitor in place. Comfort and safety maintained.

## 2025-03-12 ENCOUNTER — TRANSCRIPTION ENCOUNTER (OUTPATIENT)
Age: 60
End: 2025-03-12

## 2025-03-12 LAB
ALBUMIN SERPL ELPH-MCNC: 3.2 G/DL — LOW (ref 3.3–5)
ALP SERPL-CCNC: 90 U/L — SIGNIFICANT CHANGE UP (ref 40–120)
ALT FLD-CCNC: 27 U/L — SIGNIFICANT CHANGE UP (ref 10–45)
ANION GAP SERPL CALC-SCNC: 14 MMOL/L — SIGNIFICANT CHANGE UP (ref 5–17)
AST SERPL-CCNC: 20 U/L — SIGNIFICANT CHANGE UP (ref 10–40)
BILIRUB SERPL-MCNC: 0.7 MG/DL — SIGNIFICANT CHANGE UP (ref 0.2–1.2)
BLD GP AB SCN SERPL QL: NEGATIVE — SIGNIFICANT CHANGE UP
BUN SERPL-MCNC: 7 MG/DL — SIGNIFICANT CHANGE UP (ref 7–23)
CALCIUM SERPL-MCNC: 9 MG/DL — SIGNIFICANT CHANGE UP (ref 8.4–10.5)
CHLORIDE SERPL-SCNC: 105 MMOL/L — SIGNIFICANT CHANGE UP (ref 96–108)
CO2 SERPL-SCNC: 22 MMOL/L — SIGNIFICANT CHANGE UP (ref 22–31)
CREAT SERPL-MCNC: 0.59 MG/DL — SIGNIFICANT CHANGE UP (ref 0.5–1.3)
CULTURE RESULTS: SIGNIFICANT CHANGE UP
EGFR: 104 ML/MIN/1.73M2 — SIGNIFICANT CHANGE UP
EGFR: 104 ML/MIN/1.73M2 — SIGNIFICANT CHANGE UP
GLUCOSE SERPL-MCNC: 116 MG/DL — HIGH (ref 70–99)
HAPTOGLOB SERPL-MCNC: 207 MG/DL — HIGH (ref 34–200)
HCT VFR BLD CALC: 29.5 % — LOW (ref 34.5–45)
HGB BLD-MCNC: 10.4 G/DL — LOW (ref 11.5–15.5)
LACTATE SERPL-SCNC: 1.5 MMOL/L — SIGNIFICANT CHANGE UP (ref 0.5–2)
LDH SERPL L TO P-CCNC: 173 U/L — SIGNIFICANT CHANGE UP (ref 50–242)
MCHC RBC-ENTMCNC: 31.3 PG — SIGNIFICANT CHANGE UP (ref 27–34)
MCHC RBC-ENTMCNC: 35.3 G/DL — SIGNIFICANT CHANGE UP (ref 32–36)
MCV RBC AUTO: 88.9 FL — SIGNIFICANT CHANGE UP (ref 80–100)
NRBC BLD AUTO-RTO: 2 /100 WBCS — HIGH (ref 0–0)
PLATELET # BLD AUTO: 267 K/UL — SIGNIFICANT CHANGE UP (ref 150–400)
POTASSIUM SERPL-MCNC: 3.8 MMOL/L — SIGNIFICANT CHANGE UP (ref 3.5–5.3)
POTASSIUM SERPL-SCNC: 3.8 MMOL/L — SIGNIFICANT CHANGE UP (ref 3.5–5.3)
PROT SERPL-MCNC: 6.6 G/DL — SIGNIFICANT CHANGE UP (ref 6–8.3)
RBC # BLD: 3.32 M/UL — LOW (ref 3.8–5.2)
RBC # BLD: 3.32 M/UL — LOW (ref 3.8–5.2)
RBC # FLD: 16.3 % — HIGH (ref 10.3–14.5)
RETICS #: 301.5 K/UL — HIGH (ref 25–125)
RETICS/RBC NFR: 9.1 % — HIGH (ref 0.5–2.5)
RH IG SCN BLD-IMP: POSITIVE — SIGNIFICANT CHANGE UP
SODIUM SERPL-SCNC: 141 MMOL/L — SIGNIFICANT CHANGE UP (ref 135–145)
SPECIMEN SOURCE: SIGNIFICANT CHANGE UP
WBC # BLD: 7.86 K/UL — SIGNIFICANT CHANGE UP (ref 3.8–10.5)
WBC # FLD AUTO: 7.86 K/UL — SIGNIFICANT CHANGE UP (ref 3.8–10.5)

## 2025-03-12 PROCEDURE — 99233 SBSQ HOSP IP/OBS HIGH 50: CPT

## 2025-03-12 PROCEDURE — 99222 1ST HOSP IP/OBS MODERATE 55: CPT

## 2025-03-12 PROCEDURE — 99232 SBSQ HOSP IP/OBS MODERATE 35: CPT

## 2025-03-12 PROCEDURE — 99223 1ST HOSP IP/OBS HIGH 75: CPT | Mod: GC

## 2025-03-12 RX ORDER — APIXABAN 2.5 MG/1
1 TABLET, FILM COATED ORAL
Qty: 60 | Refills: 0
Start: 2025-03-12 | End: 2025-04-10

## 2025-03-12 RX ORDER — SODIUM CHLORIDE 9 G/1000ML
1000 INJECTION, SOLUTION INTRAVENOUS
Refills: 0 | Status: DISCONTINUED | OUTPATIENT
Start: 2025-03-12 | End: 2025-03-12

## 2025-03-12 RX ORDER — APIXABAN 2.5 MG/1
1 TABLET, FILM COATED ORAL
Qty: 80 | Refills: 0
Start: 2025-03-12

## 2025-03-12 RX ADMIN — FOLIC ACID 1 MILLIGRAM(S): 1 TABLET ORAL at 12:04

## 2025-03-12 RX ADMIN — Medication 81 MILLIGRAM(S): at 12:04

## 2025-03-12 RX ADMIN — Medication 100 MILLIGRAM(S): at 05:42

## 2025-03-12 RX ADMIN — ENOXAPARIN SODIUM 80 MILLIGRAM(S): 100 INJECTION SUBCUTANEOUS at 05:42

## 2025-03-12 RX ADMIN — METOPROLOL SUCCINATE 12.5 MILLIGRAM(S): 50 TABLET, EXTENDED RELEASE ORAL at 22:25

## 2025-03-12 RX ADMIN — Medication 2 PUFF(S): at 17:22

## 2025-03-12 RX ADMIN — ATORVASTATIN CALCIUM 40 MILLIGRAM(S): 80 TABLET, FILM COATED ORAL at 22:25

## 2025-03-12 RX ADMIN — Medication 1 DROP(S): at 17:22

## 2025-03-12 RX ADMIN — HYDROXYUREA 500 MILLIGRAM(S): 500 CAPSULE ORAL at 05:43

## 2025-03-12 RX ADMIN — Medication 40 MILLIGRAM(S): at 05:43

## 2025-03-12 RX ADMIN — Medication 1 DROP(S): at 05:41

## 2025-03-12 RX ADMIN — Medication 2 PUFF(S): at 05:41

## 2025-03-12 RX ADMIN — SODIUM CHLORIDE 100 MILLILITER(S): 9 INJECTION, SOLUTION INTRAVENOUS at 05:41

## 2025-03-12 RX ADMIN — Medication 100 MILLIGRAM(S): at 17:21

## 2025-03-12 RX ADMIN — Medication 40 MILLIGRAM(S): at 17:22

## 2025-03-12 RX ADMIN — LEVETIRACETAM 500 MILLIGRAM(S): 10 INJECTION, SOLUTION INTRAVENOUS at 17:21

## 2025-03-12 RX ADMIN — Medication 2 PUFF(S): at 23:03

## 2025-03-12 RX ADMIN — TIOTROPIUM BROMIDE INHALATION SPRAY 2 PUFF(S): 3.12 SPRAY, METERED RESPIRATORY (INHALATION) at 12:03

## 2025-03-12 RX ADMIN — METOPROLOL SUCCINATE 12.5 MILLIGRAM(S): 50 TABLET, EXTENDED RELEASE ORAL at 05:43

## 2025-03-12 RX ADMIN — METOPROLOL SUCCINATE 12.5 MILLIGRAM(S): 50 TABLET, EXTENDED RELEASE ORAL at 13:51

## 2025-03-12 RX ADMIN — Medication 2 PUFF(S): at 12:01

## 2025-03-12 RX ADMIN — LEVETIRACETAM 500 MILLIGRAM(S): 10 INJECTION, SOLUTION INTRAVENOUS at 05:42

## 2025-03-12 RX ADMIN — ENOXAPARIN SODIUM 80 MILLIGRAM(S): 100 INJECTION SUBCUTANEOUS at 17:21

## 2025-03-12 RX ADMIN — Medication 1 DROP(S): at 12:03

## 2025-03-12 RX ADMIN — HYDROXYUREA 500 MILLIGRAM(S): 500 CAPSULE ORAL at 17:21

## 2025-03-12 RX ADMIN — Medication 1 DROP(S): at 23:02

## 2025-03-12 RX ADMIN — Medication 2 PUFF(S): at 00:05

## 2025-03-12 RX ADMIN — Medication 1 DROP(S): at 00:05

## 2025-03-12 NOTE — CONSULT NOTE ADULT - SUBJECTIVE AND OBJECTIVE BOX
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 As per patient's daughter ( by phone) reported she had been c/o chest pain x 2 days and seemed more confused than her baseline - stating people at the facility were trying to harm her which is atypical of patient. Daughter is concerned that patient may be having either a sickle cell crisis or other occult process given she does not present normally given her history of stroke. She was endorsing chest pain x 24 hours- intermittent, non pleuritic in nature, vomiting x1  Denies cough, fevers/chills, abd pain or urinary symptoms.    In ED she rermains hemodynamically stable, CTA chest shows RUL and RML segmental PE without signs of RHS., started on IV heparin gtt-> transitioned to lovenox, TTE - no Right ventricular strain, LE duplex US -ve for any DVT,  CT abd/pelvis- R renal calculi 2mm. CTH- no CVA, mass. , Hb 10.7, Retic 11%, CXR clear. Seen by vascular cardiology team.       PAST MEDICAL & SURGICAL HISTORY:  Sickle-cell-hemoglobin C disease with crisis      Essential hypertension      Sickle cell disease      HTN (hypertension)      H/O:       H/O abdominoplasty      S/P breast augmentation      S/P           Allergies    doxycycline (Angioedema)  penicillin (Unknown)  clindamycin (Angioedema)  linezolid (Angioedema)    Intolerances        MEDICATIONS  (STANDING):  albuterol    90 MICROgram(s) HFA Inhaler 2 Puff(s) Inhalation every 6 hours  amantadine Syrup 100 milliGRAM(s) Oral two times a day  artificial  tears Solution 1 Drop(s) Both EYES four times a day  aspirin  chewable 81 milliGRAM(s) Oral daily  atorvastatin 40 milliGRAM(s) Oral at bedtime  enoxaparin Injectable 80 milliGRAM(s) SubCutaneous every 12 hours  folic acid 1 milliGRAM(s) Oral daily  hydroxyurea 500 milliGRAM(s) Oral two times a day  levETIRAcetam  Solution 500 milliGRAM(s) Enteral Tube two times a day  metoprolol tartrate 12.5 milliGRAM(s) Oral three times a day  pantoprazole   Suspension 40 milliGRAM(s) Oral every 12 hours  sodium chloride 0.45%. 1000 milliLiter(s) (100 mL/Hr) IV Continuous <Continuous>  tiotropium 2.5 MICROgram(s) Inhaler 2 Puff(s) Inhalation daily    MEDICATIONS  (PRN):  acetaminophen     Tablet .. 650 milliGRAM(s) Oral every 6 hours PRN Temp greater or equal to 38C (100.4F), Moderate Pain (4 - 6)  HYDROmorphone  Injectable 0.5 milliGRAM(s) IV Push every 6 hours PRN Severe Pain (7 - 10)  ondansetron Injectable 4 milliGRAM(s) IV Push every 6 hours PRN Nausea and/or Vomiting      FAMILY HISTORY:  FHx: cerebral palsy (Child)        SOCIAL HISTORY: No EtOH, no tobacco    REVIEW OF SYSTEMS:    CONSTITUTIONAL: No weakness, fevers or chills  EYES/ENT: No visual changes;  No vertigo or throat pain   NECK: No pain or stiffness  RESPIRATORY: No cough, wheezing, hemoptysis; No shortness of breath  CARDIOVASCULAR: No chest pain or palpitations  GASTROINTESTINAL: No abdominal or epigastric pain. No nausea, vomiting, or hematemesis; No diarrhea or constipation. No melena or hematochezia.  GENITOURINARY: No dysuria, frequency or hematuria  NEUROLOGICAL: No numbness or weakness  SKIN: No itching, burning, rashes, or lesions   All other review of systems is negative unless indicated above.        T(F): 98.5 (25 @ 05:13), Max: 98.9 (25 @ 10:35)  HR: 97 (25 @ 05:39)  BP: 115/70 (25 @ 05:39)  RR: 18 (25 @ 05:13)  SpO2: 97% (25 @ 05:13)  Wt(kg): --    GENERAL: NAD, well-developed  HEAD:  Atraumatic, Normocephalic  EYES: EOMI, PERRLA, conjunctiva and sclera clear  NECK: Supple, No JVD  CHEST/LUNG: Clear to auscultation bilaterally; No wheeze  HEART: Regular rate and rhythm; No murmurs, rubs, or gallops  ABDOMEN: Soft, Nontender, Nondistended; Bowel sounds present  EXTREMITIES:  2+ Peripheral Pulses, No clubbing, cyanosis, or edema  NEUROLOGY: non-focal  SKIN: No rashes or lesions                          10.4   7.86  )-----------( 267      ( 12 Mar 2025 09:28 )             29.5           141  |  105  |  7   ----------------------------<  116[H]  3.8   |  22  |  0.59    Ca    9.0      12 Mar 2025 09:28  Mg     1.9         TPro  6.6  /  Alb  3.2[L]  /  TBili  0.7  /  DBili  x   /  AST  20  /  ALT  27  /  AlkPhos  90        Lactate Dehydrogenase, Serum: 173 U/L ( @ 09:28)      PT/INR - ( 11 Mar 2025 02:43 )   PT: 11.6 sec;   INR: 1.01 ratio         PTT - ( 11 Mar 2025 12:34 )  PTT:70.6 sec    Clean Catch Clean Catch (Midstream)   @ 03:42   >=3 organisms. Probable collection contamination.  --  --      Trach Asp Tracheal Aspirate   @ 04:05   Commensal fredi consistent with body site  --    Moderate polymorphonuclear leukocytes seen per low power field  Few Squamous epithelial cells seen per low power field  Rare Gram positive cocci in pairs seen per oil power field      .Blood BLOOD   @ 06:55   No growth at 5 days  --  --      .Blood BLOOD   @ 06:38   No growth at 5 days  --  --      .Blood BLOOD   @ 09:10   No growth at 5 days  --  --      .Blood BLOOD  01-10 @ 21:15   No growth at 5 days  --  --      .Blood BLOOD  01-10 @ 20:35   No growth at 5 days  --  --      Bronchial   @ 16:08   No growth  --    Rare polymorphonuclear leukocytes seen per low power field  No Squamous epithelial cells seen per low power field  No organisms seen per oil power field      .Blood BLOOD   @ 10:17   No growth at 5 days  --  --      ET Tube ET Tube   @ 14:23   No growth  --    Rare polymorphonuclear leukocytes per low power field  No Squamous epithelial cells per low power field  No organisms seen per oil power field      .Blood BLOOD   @ 14:00   No growth at 5 days  --  --      .Blood BLOOD   @ 12:00   No growth at 5 days  --  --      ET Tube ET Tube   @ 03:38   Moderate Staphylococcus aureus  Commensal fredi consistent with body site  --  Staphylococcus aureus      .Blood BLOOD   @ 21:56   Growth in aerobic bottle: Staphylococcus epidermidis  Isolation of Coagulase negative Staphylococcus from single blood culture  sets may represent  contamination. Contact the Microbiology Department at [phone] if  susceptibility testing is needed.  clinically indicated.  Direct identification is available within approximately 3-5  hours either by Blood Panel Multiplexed PCR or Direct  MALDI-TOF. Details: [email or web address]  --  Blood Culture PCR      .Blood BLOOD   @ 17:50   No growth at 5 days  --  --      .Blood BLOOD   @ 14:00   No growth at 5 days  --  --       HPI:  Patient is a 60-year-old female with h/o MH sickle cell disease with history of acute chest, recent admission for bilateral CVA complicated by seizure, as well as right heart failure requiring VA ECMO s/p trach (now reversed) and PEG presenting from rehab facility with chest pain and AMS. As per patient's daughter ( by phone) reported she had been c/o chest pain x 2 days and seemed more confused than her baseline - stating people at the facility were trying to harm her which is atypical of patient. Daughter is concerned that patient may be having either a sickle cell crisis or other occult process given she does not present normally given her history of stroke. She was endorsing chest pain x 24 hours- intermittent, non pleuritic in nature, vomiting x1  Denies cough, fevers/chills, abd pain or urinary symptoms.    In ED she rermains hemodynamically stable, CTA chest shows RUL and RML segmental PE without signs of RHS., started on IV heparin gtt-> transitioned to lovenox, TTE - no Right ventricular strain, LE duplex US -ve for any DVT,  CT abd/pelvis- R renal calculi 2mm. CTH- no CVA, mass. , Hb 10.7, Retic 11%, CXR clear. Seen by vascular cardiology team.       PAST MEDICAL & SURGICAL HISTORY:  Sickle-cell-hemoglobin C disease with crisis      Essential hypertension      Sickle cell disease      HTN (hypertension)      H/O:       H/O abdominoplasty      S/P breast augmentation      S/P           Allergies    doxycycline (Angioedema)  penicillin (Unknown)  clindamycin (Angioedema)  linezolid (Angioedema)    Intolerances        MEDICATIONS  (STANDING):  albuterol    90 MICROgram(s) HFA Inhaler 2 Puff(s) Inhalation every 6 hours  amantadine Syrup 100 milliGRAM(s) Oral two times a day  artificial  tears Solution 1 Drop(s) Both EYES four times a day  aspirin  chewable 81 milliGRAM(s) Oral daily  atorvastatin 40 milliGRAM(s) Oral at bedtime  enoxaparin Injectable 80 milliGRAM(s) SubCutaneous every 12 hours  folic acid 1 milliGRAM(s) Oral daily  hydroxyurea 500 milliGRAM(s) Oral two times a day  levETIRAcetam  Solution 500 milliGRAM(s) Enteral Tube two times a day  metoprolol tartrate 12.5 milliGRAM(s) Oral three times a day  pantoprazole   Suspension 40 milliGRAM(s) Oral every 12 hours  sodium chloride 0.45%. 1000 milliLiter(s) (100 mL/Hr) IV Continuous <Continuous>  tiotropium 2.5 MICROgram(s) Inhaler 2 Puff(s) Inhalation daily    MEDICATIONS  (PRN):  acetaminophen     Tablet .. 650 milliGRAM(s) Oral every 6 hours PRN Temp greater or equal to 38C (100.4F), Moderate Pain (4 - 6)  HYDROmorphone  Injectable 0.5 milliGRAM(s) IV Push every 6 hours PRN Severe Pain (7 - 10)  ondansetron Injectable 4 milliGRAM(s) IV Push every 6 hours PRN Nausea and/or Vomiting      FAMILY HISTORY:  FHx: cerebral palsy (Child)        SOCIAL HISTORY: No EtOH, no tobacco    REVIEW OF SYSTEMS:    CONSTITUTIONAL: No weakness, fevers or chills  EYES/ENT: No visual changes;  No vertigo or throat pain   NECK: No pain or stiffness  RESPIRATORY: No cough, wheezing, hemoptysis; No shortness of breath  CARDIOVASCULAR: No chest pain or palpitations  GASTROINTESTINAL: No abdominal or epigastric pain. No nausea, vomiting, or hematemesis; No diarrhea or constipation. No melena or hematochezia.  GENITOURINARY: No dysuria, frequency or hematuria  NEUROLOGICAL: No numbness or weakness  SKIN: No itching, burning, rashes, or lesions   All other review of systems is negative unless indicated above.        T(F): 98.5 (25 @ 05:13), Max: 98.9 (25 @ 10:35)  HR: 97 (25 @ 05:39)  BP: 115/70 (25 @ 05:39)  RR: 18 (25 @ 05:13)  SpO2: 97% (25 @ 05:13)  Wt(kg): --    GENERAL: NAD, well-developed  HEAD:  Atraumatic, Normocephalic  CHEST/LUNG: Clear to auscultation bilaterally; No wheeze  HEART: Regular rate and rhythm;  ABDOMEN: Soft, Nontender, Nondistended;  EXTREMITIES:  no edema  NEUROLOGY: Motor strength b/l UE 3-4/5, b/l LE 2-3/5  SKIN: No rashes or lesions                          10.4   7.86  )-----------( 267      ( 12 Mar 2025 09:28 )             29.5           141  |  105  |  7   ----------------------------<  116[H]  3.8   |  22  |  0.59    Ca    9.0      12 Mar 2025 09:28  Mg     1.9         TPro  6.6  /  Alb  3.2[L]  /  TBili  0.7  /  DBili  x   /  AST  20  /  ALT  27  /  AlkPhos  90        Lactate Dehydrogenase, Serum: 173 U/L ( @ 09:28)      PT/INR - ( 11 Mar 2025 02:43 )   PT: 11.6 sec;   INR: 1.01 ratio         PTT - ( 11 Mar 2025 12:34 )  PTT:70.6 sec    Clean Catch Clean Catch (Midstream)   @ 03:42   >=3 organisms. Probable collection contamination.  --  --      Trach Asp Tracheal Aspirate   @ 04:05   Commensal fredi consistent with body site  --    Moderate polymorphonuclear leukocytes seen per low power field  Few Squamous epithelial cells seen per low power field  Rare Gram positive cocci in pairs seen per oil power field      .Blood BLOOD   @ 06:55   No growth at 5 days  --  --      .Blood BLOOD   @ 06:38   No growth at 5 days  --  --      .Blood BLOOD   @ 09:10   No growth at 5 days  --  --      .Blood BLOOD  01-10 @ 21:15   No growth at 5 days  --  --      .Blood BLOOD  01-10 @ 20:35   No growth at 5 days  --  --      Bronchial   @ 16:08   No growth  --    Rare polymorphonuclear leukocytes seen per low power field  No Squamous epithelial cells seen per low power field  No organisms seen per oil power field      .Blood BLOOD   @ 10:17   No growth at 5 days  --  --      ET Tube ET Tube   @ 14:23   No growth  --    Rare polymorphonuclear leukocytes per low power field  No Squamous epithelial cells per low power field  No organisms seen per oil power field      .Blood BLOOD   @ 14:00   No growth at 5 days  --  --      .Blood BLOOD   @ 12:00   No growth at 5 days  --  --      ET Tube ET Tube   @ 03:38   Moderate Staphylococcus aureus  Commensal fredi consistent with body site  --  Staphylococcus aureus      .Blood BLOOD   @ 21:56   Growth in aerobic bottle: Staphylococcus epidermidis  Isolation of Coagulase negative Staphylococcus from single blood culture  sets may represent  contamination. Contact the Microbiology Department at [phone] if  susceptibility testing is needed.  clinically indicated.  Direct identification is available within approximately 3-5  hours either by Blood Panel Multiplexed PCR or Direct  MALDI-TOF. Details: [email or web address]  --  Blood Culture PCR      .Blood BLOOD   @ 17:50   No growth at 5 days  --  --      .Blood BLOOD   @ 14:00   No growth at 5 days  --  --       HPI:  Patient is a 60-year-old female with h/o MH sickle cell disease with history of acute chest, recent admission for bilateral CVA complicated by seizure, as well as right heart failure requiring VA ECMO s/p trach (now reversed) and PEG presenting from rehab facility with chest pain and AMS. As per patient's daughter ( by phone) reported she had been c/o chest pain x 2 days and seemed more confused than her baseline - stating people at the facility were trying to harm her which is atypical of patient. Daughter is concerned that patient may be having either a sickle cell crisis (genotype SC) or other occult process given she does not present normally given her history of stroke. She was endorsing chest pain x 24 hours- intermittent, non pleuritic in nature, vomiting x1  Denies cough, fevers/chills, abd pain or urinary symptoms.    In ED she remains hemodynamically stable, CTA chest shows RUL and RML segmental PE without signs of RHS., started on IV heparin gtt-> transitioned to lovenox, TTE - no Right ventricular strain, LE duplex US -ve for any DVT,  CT abd/pelvis- R renal calculi 2mm. CTH- no CVA, mass. , Hb 10.7, Retic 11%, CXR clear. Seen by vascular cardiology team.       PAST MEDICAL & SURGICAL HISTORY:  Sickle-cell-hemoglobin C disease with crisis      Essential hypertension      Sickle cell disease      HTN (hypertension)      H/O:       H/O abdominoplasty      S/P breast augmentation      S/P           Allergies    doxycycline (Angioedema)  penicillin (Unknown)  clindamycin (Angioedema)  linezolid (Angioedema)    Intolerances        MEDICATIONS  (STANDING):  albuterol    90 MICROgram(s) HFA Inhaler 2 Puff(s) Inhalation every 6 hours  amantadine Syrup 100 milliGRAM(s) Oral two times a day  artificial  tears Solution 1 Drop(s) Both EYES four times a day  aspirin  chewable 81 milliGRAM(s) Oral daily  atorvastatin 40 milliGRAM(s) Oral at bedtime  enoxaparin Injectable 80 milliGRAM(s) SubCutaneous every 12 hours  folic acid 1 milliGRAM(s) Oral daily  hydroxyurea 500 milliGRAM(s) Oral two times a day  levETIRAcetam  Solution 500 milliGRAM(s) Enteral Tube two times a day  metoprolol tartrate 12.5 milliGRAM(s) Oral three times a day  pantoprazole   Suspension 40 milliGRAM(s) Oral every 12 hours  sodium chloride 0.45%. 1000 milliLiter(s) (100 mL/Hr) IV Continuous <Continuous>  tiotropium 2.5 MICROgram(s) Inhaler 2 Puff(s) Inhalation daily    MEDICATIONS  (PRN):  acetaminophen     Tablet .. 650 milliGRAM(s) Oral every 6 hours PRN Temp greater or equal to 38C (100.4F), Moderate Pain (4 - 6)  HYDROmorphone  Injectable 0.5 milliGRAM(s) IV Push every 6 hours PRN Severe Pain (7 - 10)  ondansetron Injectable 4 milliGRAM(s) IV Push every 6 hours PRN Nausea and/or Vomiting      FAMILY HISTORY:  FHx: cerebral palsy (Child)        SOCIAL HISTORY: No EtOH, no tobacco    REVIEW OF SYSTEMS:    CONSTITUTIONAL: No weakness, fevers or chills  EYES/ENT: No visual changes;  No vertigo or throat pain   NECK: No pain or stiffness  RESPIRATORY: No cough, wheezing, hemoptysis; No shortness of breath  CARDIOVASCULAR: No chest pain or palpitations  GASTROINTESTINAL: No abdominal or epigastric pain. No nausea, vomiting, or hematemesis; No diarrhea or constipation. No melena or hematochezia.  GENITOURINARY: No dysuria, frequency or hematuria  NEUROLOGICAL: No numbness or weakness  SKIN: No itching, burning, rashes, or lesions   All other review of systems is negative unless indicated above.        T(F): 98.5 (25 @ 05:13), Max: 98.9 (25 @ 10:35)  HR: 97 (25 @ 05:39)  BP: 115/70 (25 @ 05:39)  RR: 18 (25 @ 05:13)  SpO2: 97% (25 @ 05:13)  Wt(kg): --    GENERAL: NAD, well-developed  HEAD:  Atraumatic, Normocephalic  CHEST/LUNG: Clear to auscultation bilaterally; No wheeze  HEART: Regular rate and rhythm;  ABDOMEN: Soft, Nontender, Nondistended;  EXTREMITIES:  no edema  NEUROLOGY: Motor strength b/l UE 3-4/5, b/l LE 2-3/5  SKIN: No rashes or lesions                          10.4   7.86  )-----------( 267      ( 12 Mar 2025 09:28 )             29.5           141  |  105  |  7   ----------------------------<  116[H]  3.8   |  22  |  0.59    Ca    9.0      12 Mar 2025 09:28  Mg     1.9         TPro  6.6  /  Alb  3.2[L]  /  TBili  0.7  /  DBili  x   /  AST  20  /  ALT  27  /  AlkPhos  90        Lactate Dehydrogenase, Serum: 173 U/L ( @ 09:28)      PT/INR - ( 11 Mar 2025 02:43 )   PT: 11.6 sec;   INR: 1.01 ratio         PTT - ( 11 Mar 2025 12:34 )  PTT:70.6 sec    Clean Catch Clean Catch (Midstream)   @ 03:42   >=3 organisms. Probable collection contamination.  --  --      Trach Asp Tracheal Aspirate   @ 04:05   Commensal fredi consistent with body site  --    Moderate polymorphonuclear leukocytes seen per low power field  Few Squamous epithelial cells seen per low power field  Rare Gram positive cocci in pairs seen per oil power field      .Blood BLOOD   @ 06:55   No growth at 5 days  --  --      .Blood BLOOD   @ 06:38   No growth at 5 days  --  --      .Blood BLOOD   @ 09:10   No growth at 5 days  --  --      .Blood BLOOD  01-10 @ 21:15   No growth at 5 days  --  --      .Blood BLOOD  01-10 @ 20:35   No growth at 5 days  --  --      Bronchial   @ 16:08   No growth  --    Rare polymorphonuclear leukocytes seen per low power field  No Squamous epithelial cells seen per low power field  No organisms seen per oil power field      .Blood BLOOD   @ 10:17   No growth at 5 days  --  --      ET Tube ET Tube   @ 14:23   No growth  --    Rare polymorphonuclear leukocytes per low power field  No Squamous epithelial cells per low power field  No organisms seen per oil power field      .Blood BLOOD   @ 14:00   No growth at 5 days  --  --      .Blood BLOOD   @ 12:00   No growth at 5 days  --  --      ET Tube ET Tube   @ 03:38   Moderate Staphylococcus aureus  Commensal fredi consistent with body site  --  Staphylococcus aureus      .Blood BLOOD   @ 21:56   Growth in aerobic bottle: Staphylococcus epidermidis  Isolation of Coagulase negative Staphylococcus from single blood culture  sets may represent  contamination. Contact the Microbiology Department at [phone] if  susceptibility testing is needed.  clinically indicated.  Direct identification is available within approximately 3-5  hours either by Blood Panel Multiplexed PCR or Direct  MALDI-TOF. Details: [email or web address]  --  Blood Culture PCR      .Blood BLOOD   @ 17:50   No growth at 5 days  --  --      .Blood BLOOD   @ 14:00   No growth at 5 days  --  --

## 2025-03-12 NOTE — PROGRESS NOTE ADULT - PROBLEM SELECTOR PLAN 3
Elevated Retic 11% from last admission 7%  - IVF with 1/2 NS at 100ml/hr  - c/w Hydrea, folate  - Dilaudid 0.5mg q 6 hrs prn for now, may need Dilaudid PCA pump on the floor. ( not on analgetics in Rehab except Tylenol) Elevated Retic 11% from last admission 7%  - s/p 1/2 NS at 100ml/hr  - c/w Hydrea 500 BID, folate 1mg qd   - Dilaudid 0.5mg q 6 hrs prn for now  - hem/onc c/s, appreciate recs  - check CBC, CMP with indirect bili, LDH, haptoglobin, retic count daily

## 2025-03-12 NOTE — DISCHARGE NOTE PROVIDER - NSFOLLOWUPCLINICS_GEN_ALL_ED_FT
[facility]  Hematology/Oncology  [address] [id number]  Phone: [phone]  Fax:   Follow Up Time: 1 month

## 2025-03-12 NOTE — DISCHARGE NOTE PROVIDER - CARE PROVIDER_API CALL
Chayito Venegas  Internal Medicine  [address] [facility], [address]  [place] [address]  Phone: [phone]  Fax: [phone]  Follow Up Time: 2 weeks    Naseem Barber  Vascular Medicine  [address] [place] [address] Drive, [address] [place] [id number]  Phone: [phone]  Fax: [phone]  Established Patient  Follow Up Time: 1 month

## 2025-03-12 NOTE — SWALLOW BEDSIDE ASSESSMENT ADULT - SLP PATIENT PROFILE REVIEW
No Patient Profile in EMR at the time of initial chart review
No Patient Profile in EMR at the time of initial chart review

## 2025-03-12 NOTE — DIETITIAN INITIAL EVALUATION ADULT - PROBLEM SELECTOR PLAN 1
CTA chest shows RUL and RML segmental PEs. Hemodynamically stable, /84, HR 82, O2 sat 98% RA  - In Ed started with IV heparin gtt  - will start Lovenox 80mg sc q 12 hrs, will d/c on DOAC  - Echo, doppler LE ordered  - PERT team consult called ( given SS crisis, recent CVAs)  - close monitors on vitals

## 2025-03-12 NOTE — PROGRESS NOTE ADULT - ASSESSMENT
60F with a history of sickle cell disease, prior acute chest syndrome, bilateral CVA with seizure disorder, and right heart failure requiring VA ECMO (s/p trach and reversal and PEG) presents from a rehab facility with chest pain and altered mental status. Per daughter, the patient had two days of chest pain and worsening confusion, including paranoia about staff harming her, which is atypical for her baseline. She endorsed intermittent, non-pleuritic chest pain for 24 hours, with one episode of vomiting but no fevers, chills, abdominal pain, or urinary symptoms.    In the ED, she remained hemodynamically stable (/84, HR 82, SpO2 98% on RA). CTA chest showed acute segmental PEs in the RUL and RML, and she was started on IV heparin. CT head was negative for acute CVA or mass, and abdominal CT showed a 2mm right renal calculus. Labs showed Hb 10.7, reticulocyte count 11% (prior 7%), and lactate 3.0. Encephalopathy suspected to be multifactorial, possibly hypoxic from PE in the setting of sickle cell crisis.    Vascular cardiology consulted for evaluation of segmental PE.    Assessment & Plan:  Vascular cardiology consulted for segmental PE.  PE Risk Stratification:  sPESI: 1 (sickle cell disease), High risk  PESI: 66,  Class II (Low risk)  Risk classification: Intermediate-low risk PE  No hemodynamic instability, no signs of RV strain on CT    TTE 3/11/25: EF 63% technically difficult study, LV normal size/thicknexx, paradoxical septal motion, RV NWV but grossly normal size/function, no sig valv disease, no pericardial effusion, no changes since 1/25/2025    B/l LE Venous Duplex: No DVT      Recommendations:   - On therapeutic Lovenox 80mg SC q12h; can transition to DOAC with run-in per VTE protocol   - BP stable, continuing Metoprolol 12.5mg BID  - Will continue monitoring for hemodynamic stability and signs of RV strain

## 2025-03-12 NOTE — DIETITIAN INITIAL EVALUATION ADULT - OTHER INFO
Weights:  - Dosing Weight (per chart): 172.8 pounds (78.4 kg) (3/11)  - Daily Weights (per flow sheets): 69.4 pounds (3/12)(bed)(question accuracy - NOT consistent with pt appearance)  - Bed Scale Weight (taken by RD): 73 pounds (3/12)(bed)(question accuracy - NOT consistent with pt appearance)  - Per [facility] HIE: 160.1 pounds (2/6/25), 184.5 pounds (1/3/25), 174 pounds (9/9/24), 195 pounds (1/24/24), 202 pounds (6/26/23)  Using weight from 1/3/25 and current dosing weight, pt noted with possible 6% wt loss x 2 months, not significant. Overall weight gain of ~12 pounds noted x 1 month prior to admission. RD to continue to monitor weights and trends as able.     Nutritional Concerns:  - Pt noted with sickle cell crisis (per chart). Ordered for hydroxyurea (per orders).   - Ordered for Folic Acid (per orders).   - Glucose 116 mg/dL (3/12).

## 2025-03-12 NOTE — DIETITIAN INITIAL EVALUATION ADULT - REASON
Nutrition focused physical exam inappropriate at this time as pt falling asleep during interview, unable to provide consent. Perform at follow up if able/appropriate.

## 2025-03-12 NOTE — PROGRESS NOTE ADULT - PROBLEM SELECTOR PLAN 2
Afebrile, CTH- no CVA, CXR clear, WBC wnl  - Likely from acute PE in the setting of SS crisis  - will send UA w urine c/s  - Lactate 3.0, WBC wnl, UA neg, started on IVF for possible SS crisis Afebrile, CTH- no CVA, CXR clear, WBC wnl  - Likely from acute PE in the setting of SS crisis  -  UA = neg, urine = contamination   - Lactate 3.0 -> 1.5, WBC wnl, UA neg  - s/p 1/2 NS for possible SS crisis  - pending S and S, if pass, can resume PO diet

## 2025-03-12 NOTE — DISCHARGE NOTE PROVIDER - NSDCFUADDAPPT_GEN_ALL_CORE_FT
APPTS ARE READY TO BE MADE: [ ] YES    Best Family or Patient Contact (if needed):    Additional Information about above appointments (if needed):    1: Naseem Barber MD structural cardiology within 1 month  2:   3:     Other comments or requests:    APPTS ARE READY TO BE MADE: [X] YES    Best Family or Patient Contact (if needed):    Additional Information about above appointments (if needed):    1: Naseem Barber MD structural cardiology within 1 month  2: PCP in 2 weeks  3: hematology/oncology in 1 month     Other comments or requests:    APPTS ARE READY TO BE MADE: [X] YES    Best Family or Patient Contact (if needed):    Additional Information about above appointments (if needed):    1: Naseem Barber MD structural cardiology within 1 month  2: PCP in 2 weeks  3: hematology/oncology in 1 month     Other comments or requests:     Patient is being transferred. Caregiver will arrange follow up.

## 2025-03-12 NOTE — SWALLOW BEDSIDE ASSESSMENT ADULT - SWALLOW EVAL: DIAGNOSIS
Consult received and appreciated. Chart reviewed. SLP attempted to see Pt for bedside swallow evaluation x3; Pt receiving RN care x1 and off the unit for exam x2. This service will follow-up for evaluation at a later date. MD Calixto Cazares made aware.
60y F w/ PMHx of sickle cell disease and recent admission for b/l CVA (hospital course c/b seizures and heart failure requiring VA ECMO s/p trach--decannulated and PEG who presents from rehab facility for chest pain and AMS. Pt presents with a overtly functional swallow function. Timely oral transit time and suspected timely pharyngeal swallow trigger observed. Upon palpation, adequate hyolaryngeal excursion was noted. No s/s of aspiration or laryngeal penetration noted across all consistencies. Pt is recommended for a regular/thin liquid diet. This service will sign off at this time.

## 2025-03-12 NOTE — DISCHARGE NOTE PROVIDER - NSDCCPTREATMENT_GEN_ALL_CORE_FT
PRINCIPAL PROCEDURE  Procedure: CTA chest w/w/o contrast  Findings and Treatment: FINDINGS:  CHEST:  LUNGS AND LARGE AIRWAYS: Patent central airways. Bibasilar subsegmental   atelectasis.  PLEURA: No pleural effusion.  VESSELS: There is segmental thromboembolism in the right upper lobe and   right lower lobe (302-144, 602-88). There is no right heart strain.  HEART: Heart size is normal. No pericardial effusion.  MEDIASTINUM AND FAM: No lymphadenopathy.  CHEST WALL AND LOWER NECK: Bilateral breast implants.  ABDOMEN AND PELVIS:  LIVER: Within normal limits.  BILE DUCTS: Normal caliber.  GALLBLADDER: Cholelithiasis.  SPLEEN: Shrunken and calcified.  PANCREAS: Within normal limits.  ADRENALS: Within normal limits.  KIDNEYS/URETERS: Nonobstructing 0.2 cm right upper pole calculus. No   hydronephrosis.  BLADDER: Within normal limits.  REPRODUCTIVE ORGANS: Uterus and adnexa within normal limits.  BOWEL: Gastrostomy tube balloon and tip within the stomach. No bowel   obstruction. Appendix is not visualized.  PERITONEUM/RETROPERITONEUM: Within normal limits.  VESSELS: Within normal limits.  LYMPH NODES: No lymphadenopathy.  ABDOMINAL WALL: Left groin surgical clips.  BONES: Degenerative changes of the spine with chronic compression   deformity of the T7 vertebral body and extensive Modic type changes of   the L4 and L5 vertebral bodies with associated vertebral body height loss   as seen on prior MRI lumbar spine 1/24/2025.  IMPRESSION:  Segmental pulmonary embolism in the right upper lobe and right middle   lobe.  Nonobstructing right upper pole 0.2 cm renal calculus.  Otherwise, no acute pathology in the chest, abdomen, or pelvis to explain   patient's symptoms.       PRINCIPAL PROCEDURE  Procedure: CTA chest w/w/o contrast  Findings and Treatment: FINDINGS:  CHEST:  LUNGS AND LARGE AIRWAYS: Patent central airways. Bibasilar subsegmental   atelectasis.  PLEURA: No pleural effusion.  VESSELS: There is segmental thromboembolism in the right upper lobe and   right lower lobe (302-144, 602-88). There is no right heart strain.  HEART: Heart size is normal. No pericardial effusion.  MEDIASTINUM AND FAM: No lymphadenopathy.  CHEST WALL AND LOWER NECK: Bilateral breast implants.  ABDOMEN AND PELVIS:  LIVER: Within normal limits.  BILE DUCTS: Normal caliber.  GALLBLADDER: Cholelithiasis.  SPLEEN: Shrunken and calcified.  PANCREAS: Within normal limits.  ADRENALS: Within normal limits.  KIDNEYS/URETERS: Nonobstructing 0.2 cm right upper pole calculus. No   hydronephrosis.  BLADDER: Within normal limits.  REPRODUCTIVE ORGANS: Uterus and adnexa within normal limits.  BOWEL: Gastrostomy tube balloon and tip within the stomach. No bowel   obstruction. Appendix is not visualized.  PERITONEUM/RETROPERITONEUM: Within normal limits.  VESSELS: Within normal limits.  LYMPH NODES: No lymphadenopathy.  ABDOMINAL WALL: Left groin surgical clips.  BONES: Degenerative changes of the spine with chronic compression   deformity of the T7 vertebral body and extensive Modic type changes of   the L4 and L5 vertebral bodies with associated vertebral body height loss   as seen on prior MRI lumbar spine 1/24/2025.  IMPRESSION:  Segmental pulmonary embolism in the right upper lobe and right middle   lobe.  Nonobstructing right upper pole 0.2 cm renal calculus.  Otherwise, no acute pathology in the chest, abdomen, or pelvis to explain   patient's symptoms.        SECONDARY PROCEDURE  Procedure: Complete transthoracic echocardiography (TTE)  Findings and Treatment: CONCLUSIONS:      1. Technically difficult image quality.   2. Left ventricular cavity is normal in size. Left ventricular wall thickness is normal. Abnormal (paradoxical) septal motion consistent with conduction delay. Left ventricular systolic function is normal with an ejection fraction of 63 % by Carr's method of disks. There are no regional wall motion abnormalities seen.   3. The right ventricle is not well visualized. Visually normal right ventricular cavity size and normal right ventricular systolic function.   4. No significant valvular disease.   5. No pericardial effusion seen.   6. Compared to the transthoracic echocardiogram performed on 1/25/2025, there have been no significant interval changes.

## 2025-03-12 NOTE — DIETITIAN INITIAL EVALUATION ADULT - PROBLEM SELECTOR PLAN 2
Afebrile, CTH- no CVA, CXR clear, WBC wnl  - Likely from acute PE in the setting of SS crisis  - will send UA w urine c/s  - Lactate 3.0, WBC wnl, UA neg, started on IVF for possible SS crisis

## 2025-03-12 NOTE — DISCHARGE NOTE PROVIDER - NSDCMRMEDTOKEN_GEN_ALL_CORE_FT
acetaminophen 325 mg oral tablet: 2 tab(s) orally every 6 hours as needed  amantadine 50 mg/5 mL oral liquid: 100 milligram(s) orally 2 times a day  aspirin 81 mg oral delayed release tablet: 1 tab(s) orally once a day  atorvastatin 40 mg oral tablet: 1 tab(s) orally once a day  Eliquis Starter Pack for Treatment of DVT and PE 5 mg oral tablet: 1 tab(s) orally every 12 hours  hydroxyurea 500 mg oral capsule: 1 cap(s) orally 2 times a day  ipratropium-albuterol 0.5 mg-2.5 mg/3 mL inhalation solution: 3 milliliter(s) inhaled every 6 hours As needed Shortness of Breath and/or Wheezing  levETIRAcetam 100 mg/mL oral solution: 5 milliliter(s) orally every 12 hours  metoprolol tartrate 25 mg oral tablet: 0.5 tab(s) orally 3 times a day  ocular lubricant preservative-free ophthalmic solution: 1 drop(s) to each affected eye 4 times a day  omeprazole 20 mg oral delayed release capsule: 2 cap(s) orally once a day  Senna 8.6mg Capsule: 2 capsules orally 2 times a day   acetaminophen 325 mg oral tablet: 2 tab(s) orally every 6 hours as needed  amantadine 50 mg/5 mL oral liquid: 100 milligram(s) orally 2 times a day  apixaban 5 mg oral tablet: 2 tab(s) orally every 12 hours Please take eliquis 10mg twice a day with last day on 3/19 to complete a 7 days of loading and take eliquis 5mg twice a day after that.  aspirin 81 mg oral delayed release tablet: 1 tab(s) orally once a day  atorvastatin 40 mg oral tablet: 1 tab(s) orally once a day  folic acid 1 mg oral tablet: 1 tab(s) orally once a day  hydroxyurea 500 mg oral capsule: 1 cap(s) orally 2 times a day  ipratropium-albuterol 0.5 mg-2.5 mg/3 mL inhalation solution: 3 milliliter(s) inhaled every 6 hours As needed Shortness of Breath and/or Wheezing  levETIRAcetam 100 mg/mL oral solution: 5 milliliter(s) orally every 12 hours  metoprolol tartrate 25 mg oral tablet: 0.5 tab(s) orally 3 times a day  ocular lubricant preservative-free ophthalmic solution: 1 drop(s) to each affected eye 4 times a day  omeprazole 20 mg oral delayed release capsule: 2 cap(s) orally once a day  Senna 8.6mg Capsule: 2 capsules orally 2 times a day

## 2025-03-12 NOTE — SWALLOW BEDSIDE ASSESSMENT ADULT - SLP PERTINENT HISTORY OF CURRENT PROBLEM
60y F w/ PMHx of sickle cell disease and recent admission for b/l CVA (hospital course c/b seizuress and heart failure requiring VA ECMO s/p trach--decannulated and PEG who presents from rehab facility for chest pain and AMS. Daughter reports Pt has been c/o chest pain x2 days and noted w/ confusion; concerned that Pt may be having a sickle cell crisis or other occult process. Vomited x1 on day of admission. In ED she rermains hemodynamically stable, CTA chest shows RUL and RML segmental PE, started on IV heparin gtt, CT abd/pelvis-R renal calculi 2mm. CTH- no CVA, mass, Hb 10.7, Retic 11%, CXR clear. Encephalopathy suspected to be multifactorial, possibly hypoxic from PE in the setting of sickle cell crisis. Vascular consulted for PE; transitioned from IV heparin to therapeutic Lovenox.
60y F w/ PMHx of sickle cell disease and recent admission for b/l CVA (hospital course c/b seizuress and heart failure requiring VA ECMO s/p trach--decannulated and PEG who presents from rehab facility for chest pain and AMS. Daughter reports Pt has been c/o chest pain x2 days and noted w/ confusion; concerned that Pt may be having a sickle cell crisis or other occult process. Vomited x1 on day of admission. In ED she rermains hemodynamically stable, CTA chest shows RUL and RML segmental PE, started on IV heparin gtt, CT abd/pelvis-R renal calculi 2mm. CTH- no CVA, mass, Hb 10.7, Retic 11%, CXR clear. Encephalopathy suspected to be multifactorial, possibly hypoxic from PE in the setting of sickle cell crisis. Vascular consulted for PE; transitioned from IV heparin to therapeutic Lovenox.

## 2025-03-12 NOTE — SWALLOW BEDSIDE ASSESSMENT ADULT - COMMENTS
IMAGING:  3/11 CXR: IMPRESSION: No focal consolidation.  3/11 CTA chest/A/P: IMPRESSION: Segmental pulmonary embolism in the right upper lobe and right middle lobe. Nonobstructing right upper pole 0.2 cm renal calculus. Otherwise, no acute pathology in the chest, abdomen, or pelvis to explain patient's symptoms.   3/11 CT head: IMPRESSION: No acute intracranial hemorrhage, mass effect, or midline shift.    ***Pt is well-known to this service from prior hospitalization 1/2025. Pt seen for PMV evaluation and was subsequently cleared for speaking valve use on 1/25/25. FEES 1/27/25 notable for oropharyngeal dysphagia w/ oral holding, delayed pharyngeal trigger, reduced supraglottic sensation and reduced airway closure. Reduced movement of the R vocal cord appreciated. Silent laryngeal penetration to the level of the vocal cords without Pt response on puree and moderately thick liquids. Ice chips recommended w/ SLP for therapeutic purposes. Pt later seen for MBS at [facility] on 2/18/25; Pt found to have a mild oropharyngeal dysphagia notable for trace laryngeal penetration w/ full retrieval of material on thin liquids via cup (single sips) and tsp. Diet of regular solids and thin liquids recommended.
IMAGING:  3/11 CXR: IMPRESSION: No focal consolidation.  3/11 CTA chest/A/P: IMPRESSION: Segmental pulmonary embolism in the right upper lobe and right middle lobe. Nonobstructing right upper pole 0.2 cm renal calculus. Otherwise, no acute pathology in the chest, abdomen, or pelvis to explain patient's symptoms.   3/11 CT head: IMPRESSION: No acute intracranial hemorrhage, mass effect, or midline shift.    ***Pt is well-known to this service from prior hospitalization 1/2025. Pt seen for PMV evaluation and was subsequently cleared for speaking valve use on 1/25/25. FEES 1/27/25 notable for oropharyngeal dysphagia w/ oral holding, delayed pharyngeal trigger, reduced supraglottic sensation and reduced airway closure. Reduced movement of the R vocal cord appreciated. Silent laryngeal penetration to the level of the vocal cords without Pt response on puree and moderately thick liquids. Ice chips recommended w/ SLP for therapeutic purposes. [facility]; Pt found to have a mild oropharyngeal dysphagia notable for trace laryngeal penetration w/ full retrieval of material on thin liquids via cup (single sips) and tsp. Diet of regular solids and thin liquids recommended.

## 2025-03-12 NOTE — DIETITIAN INITIAL EVALUATION ADULT - OTHER CALCULATIONS
Estimated protein-energy needs calculated using IBW of 120 pounds with consideration for BMI, sickle cell crisis.   Defer fluid calculations to the medical team.

## 2025-03-12 NOTE — DIETITIAN INITIAL EVALUATION ADULT - ENERGY INTAKE
Speech Language Pathologist attempted to see patient on 3/11 for a Swallow Bedside Assessment, however unable to complete assessment, pt off of unit. Enteral feeds initiated on 3/11 (per orders). At the time of RD interview, pt receiving enteral feeds of Jevity 1.5 at 40 mL/hr via PEG. Per RN, pt tolerating tube feeding regimen at this time, no GI distress. Pt underwent Speech Language Pathologist evaluation after RD visit today, recommended "Regular/thin liquids." Enteral feeds discontinued this afternoon (per orders).  NPO

## 2025-03-12 NOTE — DIETITIAN INITIAL EVALUATION ADULT - PERTINENT MEDS FT
MEDICATIONS  (STANDING):  albuterol    90 MICROgram(s) HFA Inhaler 2 Puff(s) Inhalation every 6 hours  amantadine Syrup 100 milliGRAM(s) Oral two times a day  artificial  tears Solution 1 Drop(s) Both EYES four times a day  aspirin  chewable 81 milliGRAM(s) Oral daily  atorvastatin 40 milliGRAM(s) Oral at bedtime  enoxaparin Injectable 80 milliGRAM(s) SubCutaneous every 12 hours  folic acid 1 milliGRAM(s) Oral daily  hydroxyurea 500 milliGRAM(s) Oral two times a day  levETIRAcetam  Solution 500 milliGRAM(s) Enteral Tube two times a day  metoprolol tartrate 12.5 milliGRAM(s) Oral three times a day  pantoprazole   Suspension 40 milliGRAM(s) Oral every 12 hours  tiotropium 2.5 MICROgram(s) Inhaler 2 Puff(s) Inhalation daily    MEDICATIONS  (PRN):  acetaminophen     Tablet .. 650 milliGRAM(s) Oral every 6 hours PRN Temp greater or equal to 38C (100.4F), Moderate Pain (4 - 6)  HYDROmorphone  Injectable 0.5 milliGRAM(s) IV Push every 6 hours PRN Severe Pain (7 - 10)  ondansetron Injectable 4 milliGRAM(s) IV Push every 6 hours PRN Nausea and/or Vomiting

## 2025-03-12 NOTE — DIETITIAN INITIAL EVALUATION ADULT - NSFNSGIIOFT_GEN_A_CORE
Per RN, pt with no GI distress at this time. Tolerating EN regimen at time of visit.   - Bowel Movement: None noted thus far in-house (per flow sheets). None today (per RN).  - Bowel Regimen: Pt not currently ordered for a bowel regimen at this time (per orders).   - Ordered for Zofran and Protonix (per orders).   Continue to monitor bowel movements and bowel movement regularity.

## 2025-03-12 NOTE — DIETITIAN INITIAL EVALUATION ADULT - NSFNSNUTRCHEWSWALLOWFT_GEN_A_CORE
Pt seen by Speech Language Pathologist today, recommended "Regular/thin liquids." Defer diet texture/consistency to Medical Team/ Speech Language Pathologist.

## 2025-03-12 NOTE — CONSULT NOTE ADULT - ATTENDING COMMENTS
Cont lovenox for now  Await echo and venous duplex
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 Nonetheless she has had a CVA in the past three months and has required rehabilitation and bed rest. She has intermittently been taking hydroxyurea and she did have an exchange transfusion in the past when she had pain crisis. Today she is comfortable and not in pain; nor is there dyspnea. She has a pulmonary embolism of unknown onset. I agree with plan for anticoagulation

## 2025-03-12 NOTE — CONSULT NOTE ADULT - SUBJECTIVE AND OBJECTIVE BOX
Patient is a 60y old  Female who presents with a chief complaint of Other pulmonary embolism without acute cor pulmonale     (12 Mar 2025 14:46)    HPI:  Patient is a 60-year-old female with h/o MH sickle cell disease with history of acute chest, recent admission for bilateral CVA complicated by seizure, as well as right heart failure requiring VA ECMO s/p trach (now reversed) and PEG presenting from rehab facility with chest pain and AMS. As per patient's daughter ( by phone) reported she had been c/o chest pain x 2 days and seemed more confused than her baseline - stating people at the facility were trying to harm her which is atypical of patient. Daughter is concerned that patient may be having either a sickle cell crisis or other occult process given she does not present normally given her history of stroke. She is endorsing chest pain x 24 hours- intermittent, non pleuritic in nature. Vomited x1 today. Denies cough, fevers/chills, abd pain or urinary symptoms.   (11 Mar 2025 09:39)      60yF was admitted on , seen today, patient continues to feel chest discomfort. She was hospitalized and discharged to Acute inpatient rehab at [facility] on , then [facility] on . At [facility], she was on a regular diet, required moderate assistance for transfers and gait x 95 feet with RW, noted to have severe cognitive impairment requiring maximum assist to stay on task.       REVIEW OF SYSTEMS  Denies SOB, N/V, F/C, abdominal pain     VITALS  T(C): 36.8 (25 @ 12:00), Max: 36.9 (25 @ 05:13)  HR: 51 (25 @ 13:49) (51 - 97)  BP: 123/92 (25 @ 13:49) (95/58 - 134/93)  RR: 18 (25 @ 12:00) (18 - 18)  SpO2: 98% (25 @ 12:00) (97% - 99%)  Wt(kg): --    PAST MEDICAL & SURGICAL HISTORY  Sickle-cell-hemoglobin C disease with crisis    Essential hypertension    Sickle cell trait    Sickle cell disease    HTN (hypertension)    H/O:     H/O abdominoplasty    S/P breast augmentation    S/P         FUNCTIONAL HISTORY  Lives with son, daughter  was independent with ADLs, using SC prior to hospitalization   admitted from [facility]     CURRENT FUNCTIONAL STATUS  SLP 3/12  functional swallow  regular thin diet     PT  3/11  follows 75% verbal cues   bed mobility CG/min assist   transfers     RECENT LABS/IMAGING  CBC Full  -  ( 12 Mar 2025 09:28 )  WBC Count : 7.86 K/uL  RBC Count : 3.32 M/uL  Hemoglobin : 10.4 g/dL  Hematocrit : 29.5 %  Platelet Count - Automated : 267 K/uL  Mean Cell Volume : 88.9 fl  Mean Cell Hemoglobin : 31.3 pg  Mean Cell Hemoglobin Concentration : 35.3 g/dL  Auto Neutrophil # : x  Auto Lymphocyte # : x  Auto Monocyte # : x  Auto Eosinophil # : x  Auto Basophil # : x  Auto Neutrophil % : x  Auto Lymphocyte % : x  Auto Monocyte % : x  Auto Eosinophil % : x  Auto Basophil % : x        141  |  105  |  7   ----------------------------<  116[H]  3.8   |  22  |  0.59    Ca    9.0      12 Mar 2025 09:28  Mg     1.9         TPro  6.6  /  Alb  3.2[L]  /  TBili  0.7  /  DBili  x   /  AST  20  /  ALT  27  /  AlkPhos  90      Urinalysis Basic - ( 12 Mar 2025 09:28 )    Color: x / Appearance: x / SG: x / pH: x  Gluc: 116 mg/dL / Ketone: x  / Bili: x / Urobili: x   Blood: x / Protein: x / Nitrite: x   Leuk Esterase: x / RBC: x / WBC x   Sq Epi: x / Non Sq Epi: x / Bacteria: x    < from: CT Head No Cont (25 @ 02:52) >      IMPRESSION:  No acute intracranial hemorrhage, mass effect, or midline shift.          < end of copied text >    < from: CT Angio Chest PE Protocol w/ IV Cont (25 @ 02:54) >  IMPRESSION:  Segmental pulmonary embolism in the right upper lobe and right middle   lobe.    Nonobstructing right upper pole 0.2 cm renal calculus.    Otherwise, no acute pathology in the chest, abdomen, or pelvis to explain   patient's symptoms.    < end of copied text >      ALLERGIES  doxycycline (Angioedema)  penicillin (Unknown)  clindamycin (Angioedema)  linezolid (Angioedema)      MEDICATIONS   acetaminophen     Tablet .. 650 milliGRAM(s) Oral every 6 hours PRN  albuterol    90 MICROgram(s) HFA Inhaler 2 Puff(s) Inhalation every 6 hours  amantadine Syrup 100 milliGRAM(s) Oral two times a day  artificial  tears Solution 1 Drop(s) Both EYES four times a day  aspirin  chewable 81 milliGRAM(s) Oral daily  atorvastatin 40 milliGRAM(s) Oral at bedtime  enoxaparin Injectable 80 milliGRAM(s) SubCutaneous every 12 hours  folic acid 1 milliGRAM(s) Oral daily  HYDROmorphone  Injectable 0.5 milliGRAM(s) IV Push every 6 hours PRN  hydroxyurea 500 milliGRAM(s) Oral two times a day  levETIRAcetam  Solution 500 milliGRAM(s) Enteral Tube two times a day  metoprolol tartrate 12.5 milliGRAM(s) Oral three times a day  ondansetron Injectable 4 milliGRAM(s) IV Push every 6 hours PRN  pantoprazole   Suspension 40 milliGRAM(s) Oral every 12 hours  tiotropium 2.5 MICROgram(s) Inhaler 2 Puff(s) Inhalation daily      ----------------------------------------------------------------------------------------  PHYSICAL EXAM  Constitutional - NAD, Comfortable, in bed   Chest - Breathing comfortably on room air   Cardiovascular - S1S2   Extremities - No C/C/E, No calf tenderness   Neurologic Exam -    follows most commands                 Cognitive - Awake, Alert, AAO to self, place, situation, not month, 2025 with choices      Communication - Fluent, No dysarthria        Motor - UE 5/5, LE 3-4     Sensory - Intact to LT     Psychiatric - Mood stable, Affect WNL  ----------------------------------------------------------------------------------------  ASSESSMENT/PLAN  60yFemale h/o HTN, HFimpEF/NICM, sickle cell disease, ECMO, bilateral CVA, aphasia  with functional deficits after PE  CTA with PE, on lovenox   CTHead negative   on keppra for seizure prophylaxis  amantadine for attention  dysphagia, cleared for regular diet, patient today with decreased appetite, not interested in lunch tray, +PEG feeds   sickle cell, Heme following   Pain - Tylenol, hydromorphone   Rehab -    patient requires min assist with bed mobility, needs PT follow up for transfers   continue bedside therapy while admitted to prevent secondary complications of immobility, bed mobility, transfer training, progressive ambulation, equipment evaluation, ADLs   OOB throughout the day with staff, OOB to chair with meals/3 hours daily        patient would benefit from continued inpatient rehabilitation, JETT for cognitive therapy, safety assessment, reconditioning, ADL and mobility restoration in a supervised setting     patient needs a rehab diagnosis to return to Acute rehabilitation    Rehab recommendations are dependent on functional progress and participation with bedside therapy       Will continue to follow for ongoing rehab needs and recommendations.       55 minutes spent, reviewing hospital course, therapy notes, relevant imaging, previous hospitalizations, exam, education about inpatient rehabilitation, documentation, and discussion with  and rehabilitation team

## 2025-03-12 NOTE — DISCHARGE NOTE PROVIDER - NSDCCPCAREPLAN_GEN_ALL_CORE_FT
PRINCIPAL DISCHARGE DIAGNOSIS  Diagnosis: Pulmonary embolism  Assessment and Plan of Treatment:       SECONDARY DISCHARGE DIAGNOSES  Diagnosis: Dysphagia  Assessment and Plan of Treatment:      PRINCIPAL DISCHARGE DIAGNOSIS  Diagnosis: Pulmonary embolism  Assessment and Plan of Treatment: You were sent to the hospital for the concern of altered mental status and was found to have clot(s) in the lung, which we called pulmonary embolism, on the CT scan of the chest. You were starting on anticoagulation infusion (heparin) which was transitioned to injection (lovenox) and eventually a oral pill called eliquis to help manage the clot(s) in the lung .      SECONDARY DISCHARGE DIAGNOSES  Diagnosis: Dysphagia  Assessment and Plan of Treatment:      PRINCIPAL DISCHARGE DIAGNOSIS  Diagnosis: Pulmonary embolism  Assessment and Plan of Treatment: You were sent to the hospital for the concern of altered mental status and was found to have clot(s) in the lung, which we called pulmonary embolism, on the CT scan of the chest. You were starting on anticoagulation infusion (heparin) which was transitioned to injection (lovenox) and eventually a oral pill called eliquis to help manage the clot(s) in the lung. You underwent ultrasound of the lower extremity and heart which showed no clots in the legs, and no right heart strain. Vascular cardiology evaluated you in this hospitalization and recommended continuining with anticoagulation. Hematology/oncology also evaluated you in the hospital and do not think you are in pain crisis or need exchange transfusion. You have remain stable and continue to do well with improvement in mental status. You can now be discharged to subacute rehab with PCP (in 2 weeks), vascular cardiology (in a month for pulmonary embolism) and hematology/oncology (in a month for sickle cell management) follow up.   Please take eliquis 10mg twice a day with last day on 3/19 to complete a 7 days of loading and take eliquis 5mg twice a day after that. Please continue to take hydroxyurea 500 mg twice daily.  If you experience worsening of current symptoms or new symptoms including chest pain, shortness of breath, nausea, vomiting, abdominal pain, fever, fast heart rate, malaise, weakness, altered mental status, please seek medical attention.         SECONDARY DISCHARGE DIAGNOSES  Diagnosis: Dysphagia  Assessment and Plan of Treatment: You were sent to the hospital for the concern of altered mental status and was found to have clots in your lung. Oral intake was held for you given the concern for altered mental status and you were receiving food through tube feed. Speech and swallow evaluated you in the hospital and you have passed their evaluation. We stopped the tube feed and resumed your oral diet and you have been doing well, showing adequate oral intake without needing tube feeds. You have remain stable and continue to do well with improvement in mental status. You can now be discharged to subacute rehab with PCP follow up in 2 weeks.   If you experience worsening of current symptoms or new symptoms including chest pain, shortness of breath, nausea, vomiting, abdominal pain, fever, fast heart rate, malaise, weakness, altered mental status, please seek medical attention.

## 2025-03-12 NOTE — SWALLOW BEDSIDE ASSESSMENT ADULT - SLP GENERAL OBSERVATIONS
Pt was encountered reclined in bed, on room air. Pt was A&Ox3; unable to orient to year (Pt repeatedly her birth year). Pt was cooperative and participated throughout the evaluation.

## 2025-03-12 NOTE — DISCHARGE NOTE PROVIDER - PROVIDER TOKENS
PROVIDER:[TOKEN:[[id number]],FOLLOWUP:[2 weeks]],PROVIDER:[TOKEN:[[id number]],FOLLOWUP:[1 month],ESTABLISHEDPATIENT:[T]]

## 2025-03-12 NOTE — PROGRESS NOTE ADULT - ASSESSMENT
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 As per patient's daughter ( by phone) reported she had been c/o chest pain x 2 days and seemed more confused than her baseline - stating people at the facility were trying to harm her which is atypical of patient. Daughter is concerned that patient may be having either a sickle cell crisis or other occult process given she does not present normally given her history of stroke. She is endorsing chest pain x 24 hours- intermittent, non pleuritic in nature. Vomited x1 today. Denies cough, fevers/chills, abd pain or urinary symptoms.    In ED she rermains hemodynamically stable, CTA chest shows RUL and RML segmental PE, started on IV heparin gtt, CT abd/pelvis- R renal calculi 2mm. CTH- no CVA, mass, Hb 10.7, Retic 11%, CXR clear  60-year-old female with h/o MH sickle cell disease with history of acute chest, recent admission for bilateral CVA complicated by seizure, as well as right heart failure requiring VA ECMO s/p trach (now reversed) and PEG presenting from rehab facility with chest pain and AMS with CTA c/f PE now s/p heparin gtt on lovenox. c/f sickle cell crisis on dilaudid PRN

## 2025-03-12 NOTE — DISCHARGE NOTE PROVIDER - CARE PROVIDERS DIRECT ADDRESSES
[email or web address]

## 2025-03-12 NOTE — DIETITIAN INITIAL EVALUATION ADULT - NSFNSADHERENCEPTAFT_GEN_A_CORE
Per Swallow Bedside Assessment on 3/11: "Per d/w daughter Farhat via telephone, Pt has been tolerating regular diet/thin liquids w/ no supplemental nutrition via PEG at rehab; denies Pt difficulty w/ intake and/or any concerns about Pt tolerating PO intake. Hopeful to have PEG removed within the coming weeks." Limited information available in paper chart to assess regarding therapeutic diets prior to admission. Super Cereal noted in paper chart 1x/day (331 kcal/6 g protein).    --> Per previous RD note on 2/26/25: "Diet upgraded from bolus TF to regular 2/19. At this time patient tolerating diet w/ adequate appetite/intake consuming % of most meals. W/ good acceptance of meals per RN. Patient requires assist with feeds"

## 2025-03-12 NOTE — DIETITIAN INITIAL EVALUATION ADULT - NSFNSNUTRHOMESUPPLEMENTFT_GEN_A_CORE
Per paper chart, no micronutrient supplements noted prior to admission. Super Cereal noted in paper chart 1x/day (331 kcal/6 g protein).

## 2025-03-12 NOTE — DIETITIAN INITIAL EVALUATION ADULT - REASON INDICATOR FOR ASSESSMENT
RD consult warranted for Assessment, Tube Feeding  Source: Patient, Registered Nurse, Paper Chart, Previous RD Notes, Electronic Medical Record  Chart reviewed, events noted.

## 2025-03-12 NOTE — DIETITIAN INITIAL EVALUATION ADULT - ORAL INTAKE PTA/DIET HISTORY
Nutrition assessment completed at bedside. Limited subjective information able to be obtained at this time, pt noted to be AAOx2, falling asleep during RD interview. RD attempted to call pt's daughter, Farhat, however got sent to voicemail. Limited information available to assess in paper chart. Per chart, pt noted to be from "the [facility] rehab at [facility]." No food allergies noted in chart.

## 2025-03-12 NOTE — CONSULT NOTE ADULT - ASSESSMENT
Patient is a 60-year-old female with h/o MH sickle cell disease with history of acute chest, recent admission for bilateral CVA complicated by seizure, as well as right heart failure requiring VA ECMO s/p trach (now reversed) and PEG presenting from rehab facility with chest pain and AMS for about 24 hours. Workup shows right RUL and RML segmental PE without signs of Right heart strain, no LE DVT. Hematology was consulted for potential sickle cell crisis and new PE.      # Sickle cell disease  # RUL and RML segmental PE  * TTE 3/11- no RHS  * LE doppler - no DVT  * Pt currently on room air  - PE likely provoked iso recent immobilization  - Hgb 10.4 (baseline ~ 10-11), , haptoglobin 211, retic % 9.1, T. Bili 0.7-> overall low risk of hemolysis  - supportive care to prevent RBC sickling-> continue Half NS 100cc/hr , supplemental O2, folic acid 1 mg daily  - obtain hemoglobin electrophoresis  - obtain daily CBC, CMP with indirect bilirubin, LDH, retic count, haptoglobin  -    ---incomplete note--- Patient is a 60-year-old female with h/o MH sickle cell disease with history of acute chest, recent admission for bilateral CVA complicated by seizure, as well as right heart failure requiring VA ECMO s/p trach (now reversed) and PEG presenting from rehab facility with chest pain and AMS for about 24 hours. Workup shows right RUL and RML segmental PE without signs of Right heart strain, no LE DVT. Hematology was consulted for potential sickle cell crisis and new PE.    Of note, pt had a recent prolong hospitalization 12/30-2/6. Initially presented to [facility] for SOB and SS crisis on 12/30 and 12/31/24. She was intubated and sedated following witnessed seizure, and transferred to [facility] (1/3/2025) . Pt had right ventricular failure and required ECMO. course c/b MSSA bacteremia, stroke, respiratory failure, s/p trach, . Patient was discharged to [place] rehab on 2/6/25. Pt had rehab stay from 2/6-2/26. then patient was discharged to [facility] on 2/27.     # Sickle cell disease  # RUL and RML segmental PE  * TTE 3/11- no RHS  * LE doppler - no DVT  * Pt currently on room air  - PE likely provoked iso recent immobilization  - Hgb 10.4 (baseline ~ 10-11), , haptoglobin 211, retic % 9.1, T. Bili 0.7-> overall low risk of hemolysis  - supportive care to prevent RBC sickling-> continue Half NS 100cc/hr , supplemental O2, folic acid 1 mg daily, incentive spirometry  - c/w hydroxyurea 500mg BID  - obtain hemoglobin electrophoresis  - obtain daily CBC, CMP with indirect bilirubin, LDH, retic count, haptoglobin  - rest of the care as per primary team    ---incomplete note---    Blaine Barnes MD  PGY4  Hematology-Oncology Fellow  [facility] Patient is a 60-year-old female with h/o MH sickle cell disease with history of acute chest, recent admission for bilateral CVA complicated by seizure, as well as right heart failure requiring VA ECMO s/p trach (now reversed) and PEG presenting from rehab facility with chest pain and AMS for about 24 hours. Workup shows right RUL and RML segmental PE without signs of Right heart strain, no LE DVT. Hematology was consulted for potential sickle cell crisis and new PE.    Of note, pt had a recent prolong hospitalization 12/30-2/6. Initially presented to [facility] for SOB and SS crisis on 12/30 and 12/31/24. She was intubated and sedated following witnessed seizure, and transferred to [facility] (1/3/2025) . Pt had right ventricular failure and required ECMO. course c/b MSSA bacteremia, stroke, respiratory failure, s/p trach now reversed. Patient was discharged to [facility] rehab on 2/6/25. Pt had rehab stay from 2/6-2/26. then patient was discharged to [facility] on 2/27.     # Sickle cell disease  # RUL and RML segmental PE  * TTE 3/11- no RHS  * LE doppler - no DVT  * Pt currently on room air  - PE likely provoked iso recent immobilization  - Hgb 10.4 (baseline ~ 10-11), , haptoglobin 211, retic % 9.1, T. Bili 0.7-> overall low risk of hemolysis  - supportive care to prevent RBC sickling-> continue Half NS 100cc/hr , supplemental O2 PRN, folic acid 1 mg daily, incentive spirometry  - c/w hydroxyurea 500mg BID  - Pt does not seem to be in pain crisis. Not c/o any pain.  - obtain hemoglobin electrophoresis  - obtain daily CBC, CMP with indirect bilirubin, LDH, retic count, haptoglobin  - rest of the care as per primary team  - Patient to f/u hematology at [facility] on discharge    ---incomplete note---    Blaine Barnes MD  PGY4  Hematology-Oncology Fellow  [facility] Patient is a 60-year-old female with h/o MH sickle cell disease with history of acute chest, recent admission for bilateral CVA complicated by seizure, as well as right heart failure requiring VA ECMO s/p trach (now reversed) and PEG presenting from rehab facility with chest pain and AMS for about 24 hours. Workup shows right RUL and RML segmental PE without signs of Right heart strain, no LE DVT. Hematology was consulted for potential sickle cell crisis and new PE.    Of note, pt had a recent prolong hospitalization 12/30-2/6. Initially presented to [facility] for SOB and SS crisis on 12/30 and 12/31/24. She was intubated and sedated following witnessed seizure, and transferred to [facility] (1/3/2025) . Pt had right ventricular failure and required ECMO. course c/b MSSA bacteremia, stroke, respiratory failure, s/p trach now reversed. Patient was discharged to [facility] on 2/6/25. Pt had rehab stay from 2/6-2/26. then patient was discharged to [facility] on 2/27.     # Sickle cell /Hgb SC   # RUL and RML segmental PE  * TTE 3/11- no RHS  * LE doppler - no DVT  * Pt currently on room air  - PE likely provoked iso recent immobilization  - Hgb 10.4 (baseline ~ 10-11), , haptoglobin 211, retic % 9.1, T. Bili 0.7-> overall low risk of hemolysis  - supportive care to prevent RBC sickling->supplemental O2 PRN, folic acid 1 mg daily, incentive spirometry  -s/p 24 hours of half NS at 100cc/hr.  agree with primary team regarding holding further fluid iso active PE to prevent fluid overload.  - c/w hydroxyurea 500mg BID  - Pt does not seem to be in pain crisis. Not c/o any pain.  - obtain hemoglobin electrophoresis  - obtain daily CBC, CMP with indirect bilirubin, LDH, retic count, haptoglobin  - rest of the care as per primary team  - Patient to f/u hematology at [facility] on discharge    Patient seen at bedside and discussed with attending Dr. Onofre.    Blaine Barnes MD  PGY4  Hematology-Oncology Fellow  [facility]

## 2025-03-12 NOTE — DIETITIAN INITIAL EVALUATION ADULT - PERTINENT LABORATORY DATA
03-12    141  |  105  |  7   ----------------------------<  116[H]  3.8   |  22  |  0.59    Ca    9.0      12 Mar 2025 09:28  Mg     1.9     03-11    TPro  6.6  /  Alb  3.2[L]  /  TBili  0.7  /  DBili  x   /  AST  20  /  ALT  27  /  AlkPhos  90  03-12  A1C with Estimated Average Glucose Result: 4.6 % (01-03-25 @ 21:34)

## 2025-03-12 NOTE — DIETITIAN INITIAL EVALUATION ADULT - EDUCATION DIETARY MODIFICATIONS
Nutrition education not appropriate at this time, pt repeatedly falling asleep during interview, confused./not applicable

## 2025-03-12 NOTE — DISCHARGE NOTE PROVIDER - ATTENDING DISCHARGE PHYSICAL EXAMINATION:
Vital Signs Last 24 Hrs  T(C): 36.6 (14 Mar 2025 09:24), Max: 36.8 (13 Mar 2025 18:22)  T(F): 97.8 (14 Mar 2025 09:24), Max: 98.3 (13 Mar 2025 18:22)  HR: 83 (14 Mar 2025 09:24) (78 - 92)  BP: 113/87 (14 Mar 2025 09:24) (112/78 - 124/86)  BP(mean): --  RR: 18 (14 Mar 2025 05:04) (18 - 18)  SpO2: 99% (14 Mar 2025 09:24) (99% - 100%)    Parameters below as of 14 Mar 2025 09:24  Patient On (Oxygen Delivery Method): room air      GENERAL: NAD  HEAD: Atraumatic, normocephalic  EYES: conjunctiva and sclera clear  HEART: S1, S2, Regular rate and rhythm, no murmurs, rubs, or gallops  LUNGS: Unlabored respirations, clear to auscultation bilaterally, no crackles, wheezing, or rhonchi  ABDOMEN: Soft, nontender, nondistended  EXTREMITIES: no LE edema   NERVOUS SYSTEM:  A&Ox2 self and place

## 2025-03-12 NOTE — PROGRESS NOTE ADULT - SUBJECTIVE AND OBJECTIVE BOX
*******************************  Calixto Cazares, PGY-1  Internal Medicine  Contact via [employer] TEAMS    *******************************    PROGRESS NOTE:     Patient is a 60y old  Female who presents with a chief complaint of     INTERVAL EVENTS: No acute overnight events.     SUBJECTIVE: Patient seen and examined at bedside. This morning, the patient is comfortable and doing well. No acute complaints. Denies fevers, chills, N/V/D, chest pain, SOB, abdominal pain.    MEDICATIONS  (STANDING):  albuterol    90 MICROgram(s) HFA Inhaler 2 Puff(s) Inhalation every 6 hours  amantadine Syrup 100 milliGRAM(s) Oral two times a day  artificial  tears Solution 1 Drop(s) Both EYES four times a day  aspirin  chewable 81 milliGRAM(s) Oral daily  atorvastatin 40 milliGRAM(s) Oral at bedtime  enoxaparin Injectable 80 milliGRAM(s) SubCutaneous every 12 hours  folic acid 1 milliGRAM(s) Oral daily  hydroxyurea 500 milliGRAM(s) Oral two times a day  levETIRAcetam  Solution 500 milliGRAM(s) Enteral Tube two times a day  metoprolol tartrate 12.5 milliGRAM(s) Oral three times a day  pantoprazole   Suspension 40 milliGRAM(s) Oral every 12 hours  sodium chloride 0.45%. 1000 milliLiter(s) (100 mL/Hr) IV Continuous <Continuous>  tiotropium 2.5 MICROgram(s) Inhaler 2 Puff(s) Inhalation daily    MEDICATIONS  (PRN):  acetaminophen     Tablet .. 650 milliGRAM(s) Oral every 6 hours PRN Temp greater or equal to 38C (100.4F), Moderate Pain (4 - 6)  HYDROmorphone  Injectable 0.5 milliGRAM(s) IV Push every 6 hours PRN Severe Pain (7 - 10)  ondansetron Injectable 4 milliGRAM(s) IV Push every 6 hours PRN Nausea and/or Vomiting      CAPILLARY BLOOD GLUCOSE        I&O's Summary    11 Mar 2025 07:01  -  12 Mar 2025 07:00  --------------------------------------------------------  IN: 1490 mL / OUT: 0 mL / NET: 1490 mL        PHYSICAL EXAM:  Vital Signs Last 24 Hrs  T(C): 36.9 (12 Mar 2025 05:13), Max: 37.2 (11 Mar 2025 10:35)  T(F): 98.5 (12 Mar 2025 05:13), Max: 98.9 (11 Mar 2025 10:35)  HR: 97 (12 Mar 2025 05:39) (61 - 97)  BP: 115/70 (12 Mar 2025 05:39) (109/89 - 158/66)  BP(mean): --  RR: 18 (12 Mar 2025 05:13) (18 - 18)  SpO2: 97% (12 Mar 2025 05:13) (97% - 99%)    Parameters below as of 12 Mar 2025 05:13  Patient On (Oxygen Delivery Method): room air        GENERAL: NAD, lying in bed comfortably  HEAD: Atraumatic, normocephalic  EYES: EOMI, PERRLA, conjunctiva and sclera clear  ENT: Moist mucous membranes  NECK: Supple, no JVD  HEART: S1, S2, Regular rate and rhythm, no murmurs, rubs, or gallops  LUNGS: Unlabored respirations, clear to auscultation bilaterally, no crackles, wheezing, or rhonchi  ABDOMEN: Soft, nontender, nondistended, +BS  EXTREMITIES: 2+ peripheral pulses bilaterally. No clubbing, cyanosis, or edema  NERVOUS SYSTEM:  A&Ox3, no focal deficits   SKIN: No rashes or lesions    LABS:                        10.8   10.15 )-----------( 286      ( 11 Mar 2025 12:34 )             30.4     03-11    140  |  107  |  15  ----------------------------<  86  4.8   |  19[L]  |  0.74    Ca    9.3      11 Mar 2025 01:58  Mg     1.9     03-11    TPro  7.0  /  Alb  3.2[L]  /  TBili  0.6  /  DBili  x   /  AST  40  /  ALT  36  /  AlkPhos  90  03-11    PT/INR - ( 11 Mar 2025 02:43 )   PT: 11.6 sec;   INR: 1.01 ratio         PTT - ( 11 Mar 2025 12:34 )  PTT:70.6 sec  CARDIAC MARKERS ( 11 Mar 2025 10:55 )  x     / x     / x     / x     / <1.0 ng/mL      Urinalysis Basic - ( 11 Mar 2025 03:42 )    Color: Yellow / Appearance: Clear / S.020 / pH: x  Gluc: x / Ketone: Negative mg/dL  / Bili: Negative / Urobili: 0.2 mg/dL   Blood: x / Protein: Negative mg/dL / Nitrite: Negative   Leuk Esterase: Negative / RBC: x / WBC x   Sq Epi: x / Non Sq Epi: x / Bacteria: x        Culture - Urine (collected 11 Mar 2025 03:42)  Source: Clean Catch Clean Catch (Midstream)  Final Report (12 Mar 2025 05:43):    >=3 organisms. Probable collection contamination.        RADIOLOGY & ADDITIONAL TESTS:  Results Reviewed:   Imaging Personally Reviewed:  Electrocardiogram Personally Reviewed:  Tele: *******************************  Calixto Cazares, PGY-1  Internal Medicine  Contact via [employer] TEAMS    *******************************    PROGRESS NOTE:     Patient is a 60y old  Female who presents with a chief complaint of     INTERVAL EVENTS: No acute overnight events.     SUBJECTIVE: Patient seen and examined at bedside. This morning, the patient is AOX2 (self and place). Reports residual weakness in LUE s/p CVA, but denies other complaints including CP, SOB, n/v/d/abdominal pain.     MEDICATIONS  (STANDING):  albuterol    90 MICROgram(s) HFA Inhaler 2 Puff(s) Inhalation every 6 hours  amantadine Syrup 100 milliGRAM(s) Oral two times a day  artificial  tears Solution 1 Drop(s) Both EYES four times a day  aspirin  chewable 81 milliGRAM(s) Oral daily  atorvastatin 40 milliGRAM(s) Oral at bedtime  enoxaparin Injectable 80 milliGRAM(s) SubCutaneous every 12 hours  folic acid 1 milliGRAM(s) Oral daily  hydroxyurea 500 milliGRAM(s) Oral two times a day  levETIRAcetam  Solution 500 milliGRAM(s) Enteral Tube two times a day  metoprolol tartrate 12.5 milliGRAM(s) Oral three times a day  pantoprazole   Suspension 40 milliGRAM(s) Oral every 12 hours  sodium chloride 0.45%. 1000 milliLiter(s) (100 mL/Hr) IV Continuous <Continuous>  tiotropium 2.5 MICROgram(s) Inhaler 2 Puff(s) Inhalation daily    MEDICATIONS  (PRN):  acetaminophen     Tablet .. 650 milliGRAM(s) Oral every 6 hours PRN Temp greater or equal to 38C (100.4F), Moderate Pain (4 - 6)  HYDROmorphone  Injectable 0.5 milliGRAM(s) IV Push every 6 hours PRN Severe Pain (7 - 10)  ondansetron Injectable 4 milliGRAM(s) IV Push every 6 hours PRN Nausea and/or Vomiting      CAPILLARY BLOOD GLUCOSE        I&O's Summary    11 Mar 2025 07:01  -  12 Mar 2025 07:00  --------------------------------------------------------  IN: 1490 mL / OUT: 0 mL / NET: 1490 mL        PHYSICAL EXAM:  Vital Signs Last 24 Hrs  T(C): 36.9 (12 Mar 2025 05:13), Max: 37.2 (11 Mar 2025 10:35)  T(F): 98.5 (12 Mar 2025 05:13), Max: 98.9 (11 Mar 2025 10:35)  HR: 97 (12 Mar 2025 05:39) (61 - 97)  BP: 115/70 (12 Mar 2025 05:39) (109/89 - 158/66)  BP(mean): --  RR: 18 (12 Mar 2025 05:13) (18 - 18)  SpO2: 97% (12 Mar 2025 05:13) (97% - 99%)    Parameters below as of 12 Mar 2025 05:13  Patient On (Oxygen Delivery Method): room air        GENERAL: NAD, lying in bed comfortably  HEAD: Atraumatic, normocephalic  EYES: conjunctiva and sclera clear  HEART: S1, S2, Regular rate and rhythm, no murmurs, rubs, or gallops  LUNGS: Unlabored respirations, clear to auscultation bilaterally, no crackles, wheezing, or rhonchi  ABDOMEN: Soft, nontender, nondistended, PEG in place   EXTREMITIES: no LE edema b/l   NERVOUS SYSTEM:  A&Ox2 (self and place)     LABS:                        10.8   10.15 )-----------( 286      ( 11 Mar 2025 12:34 )             30.4     03-11    140  |  107  |  15  ----------------------------<  86  4.8   |  19[L]  |  0.74    Ca    9.3      11 Mar 2025 01:58  Mg     1.9     03-11    TPro  7.0  /  Alb  3.2[L]  /  TBili  0.6  /  DBili  x   /  AST  40  /  ALT  36  /  AlkPhos  90  03-11    PT/INR - ( 11 Mar 2025 02:43 )   PT: 11.6 sec;   INR: 1.01 ratio         PTT - ( 11 Mar 2025 12:34 )  PTT:70.6 sec  CARDIAC MARKERS ( 11 Mar 2025 10:55 )  x     / x     / x     / x     / <1.0 ng/mL      Urinalysis Basic - ( 11 Mar 2025 03:42 )    Color: Yellow / Appearance: Clear / S.020 / pH: x  Gluc: x / Ketone: Negative mg/dL  / Bili: Negative / Urobili: 0.2 mg/dL   Blood: x / Protein: Negative mg/dL / Nitrite: Negative   Leuk Esterase: Negative / RBC: x / WBC x   Sq Epi: x / Non Sq Epi: x / Bacteria: x        Culture - Urine (collected 11 Mar 2025 03:42)  Source: Clean Catch Clean Catch (Midstream)  Final Report (12 Mar 2025 05:43):    >=3 organisms. Probable collection contamination.        RADIOLOGY & ADDITIONAL TESTS:  Results Reviewed:   Imaging Personally Reviewed:  Electrocardiogram Personally Reviewed:  Tele:

## 2025-03-12 NOTE — DIETITIAN INITIAL EVALUATION ADULT - ADD RECOMMEND
1) Recommend continue DASH diet as tolerated. Defer diet texture/consistency to Medical Team/ Speech Language Pathologist.  2) Recommend adding Ensure Plus High Protein 2x/day to optimize protein-energy intake in-house and assist with meeting needs.   3) Monitor and encourage adequate PO intake, obtain food preferences and honor as able.   4) Monitor electrolytes, replete as needed.  5) Monitor nutritional intake, tolerance to diet prescription, bowel movements, weights, labs, and skin integrity.

## 2025-03-12 NOTE — PROGRESS NOTE ADULT - ATTENDING COMMENTS
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 c/f sickle cell crisis on dilaudid PRN     #PE  Full dose AC lovenox, transition to eliquis prior to DC (price check needed)  Hold off on half-NS since no pain, no evidence of active sickle crisis  Vascular cards and heme recs appreciated  TTE no evidence of overt RV dysfunction    #S/p CVA w Dysphagia  Has PEG in place, feeds tolerated  Speech re-eval today, regular diet (DASH), monitor   PM&R consulted--would benefit from acute rehab, dispo planning per CM and     DC planning     Dannie Bowden MD  Division of Hospital Medicine

## 2025-03-12 NOTE — DISCHARGE NOTE PROVIDER - HOSPITAL COURSE
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 As per patient's daughter ( by phone) reported she had been c/o chest pain x 2 days and seemed more confused than her baseline - stating people at the facility were trying to harm her which is atypical of patient. Daughter is concerned that patient may be having either a sickle cell crisis or other occult process given she does not present normally given her history of stroke. She is endorsing chest pain x 24 hours- intermittent, non pleuritic in nature. Vomited x1 today. Denies cough, fevers/chills, abd pain or urinary symptoms.   (11 Mar 2025 09:39)    Hospital Course:      Important Medication Changes and Reason:    Active or Pending Issues Requiring Follow-up:    Advanced Directives:   [ ] Full code  [ ] DNR  [ ] Hospice    Discharge Diagnoses:         HPI:  Patient is a 60-year-old female with h/o MH sickle cell disease with history of acute chest, recent admission for bilateral CVA complicated by seizure, as well as right heart failure requiring VA ECMO s/p trach (now reversed) and PEG presenting from rehab facility with chest pain and AMS. As per patient's daughter ( by phone) reported she had been c/o chest pain x 2 days and seemed more confused than her baseline - stating people at the facility were trying to harm her which is atypical of patient. Daughter is concerned that patient may be having either a sickle cell crisis or other occult process given she does not present normally given her history of stroke. She is endorsing chest pain x 24 hours- intermittent, non pleuritic in nature. Vomited x1 today. Denies cough, fevers/chills, abd pain or urinary symptoms.   (11 Mar 2025 09:39)    Hospital Course: Pt's CTA chest/ CT A/P from ED showed Segmental pulmonary embolism in the right upper lobe and right middle lobe. Pt was started on heparin gtt which was later transitioned to lovenox BID. Vascular surgery was c/s and recommended continuing with AC and transition to DOAC on dc. Hematology was c/s given the concern for sickle cell crisis, and think pt is not in pain geeta, recommended hydroxyurea 500 BID and no indication for exchange transfusion. Pt has passed S and S and started on regular diet as recommended. Pt has tolerated PO intakes well without tube feed. She has remain hemodynamically stable and clinically improved. She can now be discharged to [facility] with PCP follow up.       Important Medication Changes and Reason:  - Please take eliquis 10 BID with last day on 3/19 to complete a 7 days of loading and take eliquis 5mg BID after that     Active or Pending Issues Requiring Follow-up:  - PCP follow up     Advanced Directives:   [X] Full code  [ ] DNR  [ ] Hospice    Discharge Diagnoses: PE, dysphagia          HPI:  Patient is a 60-year-old female with h/o MH sickle cell disease with history of acute chest, recent admission for bilateral CVA complicated by seizure, as well as right heart failure requiring VA ECMO s/p trach (now reversed) and PEG presenting from rehab facility with chest pain and AMS. As per patient's daughter ( by phone) reported she had been c/o chest pain x 2 days and seemed more confused than her baseline - stating people at the facility were trying to harm her which is atypical of patient. Daughter is concerned that patient may be having either a sickle cell crisis or other occult process given she does not present normally given her history of stroke. She is endorsing chest pain x 24 hours- intermittent, non pleuritic in nature. Vomited x1 today. Denies cough, fevers/chills, abd pain or urinary symptoms.   (11 Mar 2025 09:39)    Hospital Course: Pt's CTA chest/ CT A/P from ED showed Segmental pulmonary embolism in the right upper lobe and right middle lobe. Pt was started on heparin gtt which was later transitioned to lovenox BID. Vascular cardiology was c/s and recommended continuing with AC and transition to DOAC on dc. Hematology was c/s given the concern for sickle cell crisis, and think pt is not in pain geeta, recommended hydroxyurea 500 BID and no indication for exchange transfusion. Pt has passed S and S and started on regular diet as recommended. Pt has tolerated PO intakes well without tube feed. She has remain hemodynamically stable and clinically improved. She can now be discharged to [facility] with PCP, hematology/oncology, vascular cardiology follow up.       Important Medication Changes and Reason:  - Please take eliquis 10 BID with last day on 3/19 to complete a 7 days of loading and take eliquis 5mg BID after that   - please continue to take hydroxyurea 500 mg twice daily     Active or Pending Issues Requiring Follow-up:  - PCP follow up in 2 weeks  - vascular cardiology follow up in 1 month   - hematology/oncology follow up in 1 month     Advanced Directives:   [X] Full code  [ ] DNR  [ ] Hospice    Discharge Diagnoses: PE, dysphagia          HPI:  Patient is a 60-year-old female with h/o MH sickle cell disease with history of acute chest, recent admission for bilateral CVA complicated by seizure, as well as right heart failure requiring VA ECMO s/p trach (now reversed) and PEG presenting from rehab facility with chest pain and AMS. As per patient's daughter ( by phone) reported she had been c/o chest pain x 2 days and seemed more confused than her baseline - stating people at the facility were trying to harm her which is atypical of patient. Daughter is concerned that patient may be having either a sickle cell crisis or other occult process given she does not present normally given her history of stroke. She is endorsing chest pain x 24 hours- intermittent, non pleuritic in nature. Vomited x1 today. Denies cough, fevers/chills, abd pain or urinary symptoms.   (11 Mar 2025 09:39)    Hospital Course: Pt's CTA chest/ CT A/P from ED showed Segmental pulmonary embolism in the right upper lobe and right middle lobe. Pt was started on heparin gtt which was later transitioned to lovenox BID. Vascular cardiology was c/s and recommended continuing with AC and transition to DOAC on dc. Hematology was c/s given the concern for sickle cell crisis, and think pt is not in pain crisis, recommended hydroxyurea 500 BID and no indication for exchange transfusion. Pt has passed S and S and started on regular diet as recommended. Pt has tolerated PO intakes well without tube feed. She has remain hemodynamically stable and clinically improved. She can now be discharged to [facility] with PCP, hematology/oncology, vascular cardiology follow up.       Important Medication Changes and Reason:  - Please take eliquis 10 BID with last day on 3/19 to complete a 7 days of loading and take eliquis 5mg BID after that   - please continue to take hydroxyurea 500 mg twice daily     Active or Pending Issues Requiring Follow-up:  - PCP follow up in 2 weeks  - vascular cardiology follow up in 1 month   - hematology/oncology follow up in 1 month     Advanced Directives:   [X] Full code  [ ] DNR  [ ] Hospice    Discharge Diagnoses: PE, dysphagia

## 2025-03-12 NOTE — CONSULT NOTE ADULT - CONSULT REASON
Sickle cell crisis, new PE Sickle cell crisis, new Pulmonary embolism. S C genotype with prior history of "crisis"

## 2025-03-12 NOTE — PROGRESS NOTE ADULT - PROBLEM SELECTOR PLAN 7
On Regular diet per daughter but has a PEG for few more weeks  - Given AMS, will start PEG feeding for now, S & S eval requested- if pass will start regular food  - started with Javity 1.5cal 20ml/hrs, increase 10ml/hr q 6 hrs to goal 45ml/hr  - Nutrition consult  - aspiration precaution On Regular diet per daughter but has a PEG for few more weeks  - Given AMS, will start PEG feeding for now, S & S eval requested- if pass will start regular food  - started with Javity 1.5cal 20ml/hrs, increase 10ml/hr q 6 hrs to goal 45ml/hr  - Nutrition consult  - aspiration precaution  - pending S and S, if passed, can resume on diet

## 2025-03-12 NOTE — PROGRESS NOTE ADULT - PROBLEM SELECTOR PLAN 1
CTA chest shows RUL and RML segmental PEs. Hemodynamically stable, /84, HR 82, O2 sat 98% RA  - In Ed started with IV heparin gtt  - will start Lovenox 80mg sc q 12 hrs, will d/c on DOAC  - Echo, doppler LE ordered  - PERT team consult called ( given SS crisis, recent CVAs)  - close monitors on vitals CTA chest shows RUL and RML segmental PEs. Hemodynamically stable, /84, HR 82, O2 sat 98% RA  - In Ed started with IV heparin gtt  - c/w Lovenox 80mg sc q 12 hrs, will transition to DOAC on DC   - TTE 3/11- abnormal/paradoxical septal motion c/w conduction delay. LVEF 63%, LVDF indeterminate.   - doppler LE - no DVT in b/l LE   - PERT team consult called ( given SS crisis, recent CVAs) -> vascular surgery following, appreciate recs   - close monitors on vitals

## 2025-03-12 NOTE — PROGRESS NOTE ADULT - SUBJECTIVE AND OBJECTIVE BOX
Patient seen and examined at bedside.    Overnight Events: No acute events overnight. Denies chest pain or SOB     REVIEW OF SYSTEMS:    [x ] All other systems negative  [ ] Unable to assess ROS due to    Current Meds:  acetaminophen     Tablet .. 650 milliGRAM(s) Oral every 6 hours PRN  albuterol    90 MICROgram(s) HFA Inhaler 2 Puff(s) Inhalation every 6 hours  amantadine Syrup 100 milliGRAM(s) Oral two times a day  artificial  tears Solution 1 Drop(s) Both EYES four times a day  aspirin  chewable 81 milliGRAM(s) Oral daily  atorvastatin 40 milliGRAM(s) Oral at bedtime  enoxaparin Injectable 80 milliGRAM(s) SubCutaneous every 12 hours  folic acid 1 milliGRAM(s) Oral daily  HYDROmorphone  Injectable 0.5 milliGRAM(s) IV Push every 6 hours PRN  hydroxyurea 500 milliGRAM(s) Oral two times a day  levETIRAcetam  Solution 500 milliGRAM(s) Enteral Tube two times a day  metoprolol tartrate 12.5 milliGRAM(s) Oral three times a day  ondansetron Injectable 4 milliGRAM(s) IV Push every 6 hours PRN  pantoprazole   Suspension 40 milliGRAM(s) Oral every 12 hours  sodium chloride 0.45%. 1000 milliLiter(s) IV Continuous <Continuous>  tiotropium 2.5 MICROgram(s) Inhaler 2 Puff(s) Inhalation daily      PAST MEDICAL & SURGICAL HISTORY:  Sickle-cell-hemoglobin C disease with crisis      Essential hypertension      Sickle cell disease      HTN (hypertension)      H/O:       H/O abdominoplasty      S/P breast augmentation      S/P           Vitals:  T(F): 98.5 (), Max: 98.9 ()  HR: 97 () (61 - 97)  BP: 115/70 () (109/89 - 158/66)  RR: 18 ()  SpO2: 97% ()  I&O's Summary    11 Mar 2025 07:01  -  12 Mar 2025 07:00  --------------------------------------------------------  IN: 1490 mL / OUT: 0 mL / NET: 1490 mL        Physical Exam:  Appearance: No acute distress; well appearing  Eyes: PERRL, EOMI, pink conjunctiva  HENT: Normal oral mucosa  Cardiovascular: RRR, S1, S2, no murmurs, rubs, or gallops; no edema; no JVD  Respiratory: Clear to auscultation bilaterally  Gastrointestinal: soft, non-tender, non-distended with normal bowel sounds  Musculoskeletal: No clubbing; no joint deformity   Neurologic: Non-focal  Lymphatic: No lymphadenopathy  Psychiatry: AAOx3, mood & affect appropriate  Skin: No rashes, ecchymoses, or cyanosis                          10.4   7.86  )-----------( 267      ( 12 Mar 2025 09:28 )             29.5     03-12    141  |  105  |  7   ----------------------------<  116[H]  3.8   |  22  |  0.59    Ca    9.0      12 Mar 2025 09:28  Mg     1.9     03-11    TPro  6.6  /  Alb  3.2[L]  /  TBili  0.7  /  DBili  x   /  AST  20  /  ALT  27  /  AlkPhos  90  03-12    PT/INR - ( 11 Mar 2025 02:43 )   PT: 11.6 sec;   INR: 1.01 ratio         PTT - ( 11 Mar 2025 12:34 )  PTT:70.6 sec  CARDIAC MARKERS ( 11 Mar 2025 10:55 )  x     / x     / x     / x     / <1.0 ng/mL

## 2025-03-12 NOTE — DISCHARGE NOTE PROVIDER - INSTRUCTIONS
Diet, Regular:   DASH/TLC {Sodium & Cholesterol Restricted} (DASH)  Supplement Feeding Modality:  Oral  Ensure Plus High Protein Cans or Servings Per Day:  2       Frequency:  Daily

## 2025-03-13 DIAGNOSIS — Z29.9 ENCOUNTER FOR PROPHYLACTIC MEASURES, UNSPECIFIED: ICD-10-CM

## 2025-03-13 LAB
ADD ON TEST-SPECIMEN IN LAB: SIGNIFICANT CHANGE UP
ALBUMIN SERPL ELPH-MCNC: 3.2 G/DL — LOW (ref 3.3–5)
ALP SERPL-CCNC: 86 U/L — SIGNIFICANT CHANGE UP (ref 40–120)
ALT FLD-CCNC: 27 U/L — SIGNIFICANT CHANGE UP (ref 10–45)
ANION GAP SERPL CALC-SCNC: 15 MMOL/L — SIGNIFICANT CHANGE UP (ref 5–17)
AST SERPL-CCNC: 22 U/L — SIGNIFICANT CHANGE UP (ref 10–40)
BILIRUB DIRECT SERPL-MCNC: 0.2 MG/DL — SIGNIFICANT CHANGE UP (ref 0–0.3)
BILIRUB INDIRECT FLD-MCNC: 0.5 MG/DL — SIGNIFICANT CHANGE UP (ref 0.2–1)
BILIRUB SERPL-MCNC: 0.7 MG/DL — SIGNIFICANT CHANGE UP (ref 0.2–1.2)
BILIRUB SERPL-MCNC: 0.7 MG/DL — SIGNIFICANT CHANGE UP (ref 0.2–1.2)
BUN SERPL-MCNC: 10 MG/DL — SIGNIFICANT CHANGE UP (ref 7–23)
CALCIUM SERPL-MCNC: 9.4 MG/DL — SIGNIFICANT CHANGE UP (ref 8.4–10.5)
CHLORIDE SERPL-SCNC: 108 MMOL/L — SIGNIFICANT CHANGE UP (ref 96–108)
CO2 SERPL-SCNC: 20 MMOL/L — LOW (ref 22–31)
CREAT SERPL-MCNC: 0.75 MG/DL — SIGNIFICANT CHANGE UP (ref 0.5–1.3)
EGFR: 92 ML/MIN/1.73M2 — SIGNIFICANT CHANGE UP
EGFR: 92 ML/MIN/1.73M2 — SIGNIFICANT CHANGE UP
GLUCOSE BLDC GLUCOMTR-MCNC: 113 MG/DL — HIGH (ref 70–99)
GLUCOSE SERPL-MCNC: 99 MG/DL — SIGNIFICANT CHANGE UP (ref 70–99)
HAPTOGLOB SERPL-MCNC: 200 MG/DL — SIGNIFICANT CHANGE UP (ref 34–200)
HCT VFR BLD CALC: 31.6 % — LOW (ref 34.5–45)
HEMOGLOBIN INTERPRETATION: SIGNIFICANT CHANGE UP
HGB A MFR BLD: 37.3 % — LOW (ref 95.8–98)
HGB A2 MFR BLD: 5.2 % — HIGH (ref 2–3.2)
HGB BLD-MCNC: 11 G/DL — LOW (ref 11.5–15.5)
HGB C MFR BLD: 25.8 % — HIGH
HGB F MFR BLD: 0.2 % — SIGNIFICANT CHANGE UP (ref 0–1)
HGB S MFR BLD: 31.5 % — HIGH
IRON SATN MFR SERPL: 43 % — SIGNIFICANT CHANGE UP (ref 14–50)
IRON SATN MFR SERPL: 85 UG/DL — SIGNIFICANT CHANGE UP (ref 30–160)
LDH SERPL L TO P-CCNC: 202 U/L — SIGNIFICANT CHANGE UP (ref 50–242)
MAGNESIUM SERPL-MCNC: 2 MG/DL — SIGNIFICANT CHANGE UP (ref 1.6–2.6)
MCHC RBC-ENTMCNC: 31.3 PG — SIGNIFICANT CHANGE UP (ref 27–34)
MCHC RBC-ENTMCNC: 34.8 G/DL — SIGNIFICANT CHANGE UP (ref 32–36)
MCV RBC AUTO: 90 FL — SIGNIFICANT CHANGE UP (ref 80–100)
NRBC BLD AUTO-RTO: 2 /100 WBCS — HIGH (ref 0–0)
PHOSPHATE SERPL-MCNC: 3.7 MG/DL — SIGNIFICANT CHANGE UP (ref 2.5–4.5)
PLATELET # BLD AUTO: 193 K/UL — SIGNIFICANT CHANGE UP (ref 150–400)
POTASSIUM SERPL-MCNC: 4.1 MMOL/L — SIGNIFICANT CHANGE UP (ref 3.5–5.3)
POTASSIUM SERPL-SCNC: 4.1 MMOL/L — SIGNIFICANT CHANGE UP (ref 3.5–5.3)
PROT SERPL-MCNC: 6.8 G/DL — SIGNIFICANT CHANGE UP (ref 6–8.3)
RBC # BLD: 3.51 M/UL — LOW (ref 3.8–5.2)
RBC # BLD: 3.51 M/UL — LOW (ref 3.8–5.2)
RBC # FLD: 16.5 % — HIGH (ref 10.3–14.5)
RETICS #: 289.2 K/UL — HIGH (ref 25–125)
RETICS/RBC NFR: 8.2 % — HIGH (ref 0.5–2.5)
SODIUM SERPL-SCNC: 143 MMOL/L — SIGNIFICANT CHANGE UP (ref 135–145)
TIBC SERPL-MCNC: 194 UG/DL — LOW (ref 220–430)
UIBC SERPL-MCNC: 110 UG/DL — SIGNIFICANT CHANGE UP (ref 110–370)
WBC # BLD: 7.81 K/UL — SIGNIFICANT CHANGE UP (ref 3.8–10.5)
WBC # FLD AUTO: 7.81 K/UL — SIGNIFICANT CHANGE UP (ref 3.8–10.5)

## 2025-03-13 PROCEDURE — 99233 SBSQ HOSP IP/OBS HIGH 50: CPT

## 2025-03-13 PROCEDURE — 99232 SBSQ HOSP IP/OBS MODERATE 35: CPT | Mod: GC

## 2025-03-13 RX ORDER — APIXABAN 2.5 MG/1
10 TABLET, FILM COATED ORAL EVERY 12 HOURS
Refills: 0 | Status: DISCONTINUED | OUTPATIENT
Start: 2025-03-13 | End: 2025-03-14

## 2025-03-13 RX ADMIN — Medication 81 MILLIGRAM(S): at 11:43

## 2025-03-13 RX ADMIN — Medication 100 MILLIGRAM(S): at 05:15

## 2025-03-13 RX ADMIN — ENOXAPARIN SODIUM 80 MILLIGRAM(S): 100 INJECTION SUBCUTANEOUS at 05:15

## 2025-03-13 RX ADMIN — HYDROXYUREA 500 MILLIGRAM(S): 500 CAPSULE ORAL at 05:16

## 2025-03-13 RX ADMIN — LEVETIRACETAM 500 MILLIGRAM(S): 10 INJECTION, SOLUTION INTRAVENOUS at 18:12

## 2025-03-13 RX ADMIN — Medication 1 DROP(S): at 23:02

## 2025-03-13 RX ADMIN — METOPROLOL SUCCINATE 12.5 MILLIGRAM(S): 50 TABLET, EXTENDED RELEASE ORAL at 21:18

## 2025-03-13 RX ADMIN — Medication 2 PUFF(S): at 11:42

## 2025-03-13 RX ADMIN — ATORVASTATIN CALCIUM 40 MILLIGRAM(S): 80 TABLET, FILM COATED ORAL at 21:18

## 2025-03-13 RX ADMIN — TIOTROPIUM BROMIDE INHALATION SPRAY 2 PUFF(S): 3.12 SPRAY, METERED RESPIRATORY (INHALATION) at 11:43

## 2025-03-13 RX ADMIN — Medication 2 PUFF(S): at 05:15

## 2025-03-13 RX ADMIN — Medication 2 PUFF(S): at 18:11

## 2025-03-13 RX ADMIN — Medication 1 DROP(S): at 11:42

## 2025-03-13 RX ADMIN — Medication 100 MILLIGRAM(S): at 18:12

## 2025-03-13 RX ADMIN — FOLIC ACID 1 MILLIGRAM(S): 1 TABLET ORAL at 11:43

## 2025-03-13 RX ADMIN — APIXABAN 10 MILLIGRAM(S): 2.5 TABLET, FILM COATED ORAL at 18:22

## 2025-03-13 RX ADMIN — Medication 2 PUFF(S): at 23:05

## 2025-03-13 RX ADMIN — Medication 1 DROP(S): at 05:14

## 2025-03-13 RX ADMIN — LEVETIRACETAM 500 MILLIGRAM(S): 10 INJECTION, SOLUTION INTRAVENOUS at 05:16

## 2025-03-13 RX ADMIN — METOPROLOL SUCCINATE 12.5 MILLIGRAM(S): 50 TABLET, EXTENDED RELEASE ORAL at 14:55

## 2025-03-13 RX ADMIN — Medication 1 DROP(S): at 18:11

## 2025-03-13 RX ADMIN — Medication 40 MILLIGRAM(S): at 05:15

## 2025-03-13 RX ADMIN — METOPROLOL SUCCINATE 12.5 MILLIGRAM(S): 50 TABLET, EXTENDED RELEASE ORAL at 05:15

## 2025-03-13 RX ADMIN — HYDROXYUREA 500 MILLIGRAM(S): 500 CAPSULE ORAL at 18:12

## 2025-03-13 RX ADMIN — Medication 40 MILLIGRAM(S): at 18:12

## 2025-03-13 NOTE — CHART NOTE - NSCHARTNOTEFT_GEN_A_CORE
Nutrition Services Interim Note  Consult for: Calorie Count initiation.     Chart reviewed, events noted. RD visited pt and pt's mother at bedside to initiate/explain 3 day calorie count as ordered by team. Calorie count sheet hung at bedside (RN made aware) to run from 3/14-3/16, RD to follow up 3/17 or as able to evaluate results. Addressed/answered all questions by pt's mother. Pt and pt's mother made aware RD remains available PRN.     Current Diet Order:  Diet, Regular:   DASH/TLC {Sodium & Cholesterol Restricted} (DASH)  Supplement Feeding Modality:  Oral  Ensure Plus High Protein Cans or Servings Per Day:  2       Frequency:  Daily (03-12-25 @ 15:36) [Active]    RD remains available PRN / upon request.  Tran Morales, RYANN, CDN / TEAMS Nutrition Services Interim Note  Consult for: Calorie Count initiation.     Chart reviewed, events noted. Pt passed swallow assessment 3/12, SLP recommended "Regular/thin liquids" for pt. RD visited pt and pt's mother at bedside to initiate/explain 3 day calorie count as ordered by team. Calorie count sheet hung at bedside (RN made aware) to run from 3/14-3/16, RD to follow up 3/17 or as able to evaluate results. Addressed/answered all questions by pt's mother. Pt and pt's mother made aware RD remains available PRN.     Current Diet Order:  Diet, Regular:   DASH/TLC {Sodium & Cholesterol Restricted} (DASH)  Supplement Feeding Modality:  Oral  Ensure Plus High Protein Cans or Servings Per Day:  2       Frequency:  Daily (03-12-25 @ 15:36) [Active]    RD remains available PRN / upon request.  Tran Morales, PARADISE, CDN / TEAMS

## 2025-03-13 NOTE — PROGRESS NOTE ADULT - PROBLEM SELECTOR PLAN 1
CTA chest shows RUL and RML segmental PEs. Hemodynamically stable, /84, HR 82, O2 sat 98% RA  - In Ed started with IV heparin gtt  - c/w Lovenox 80mg sc q 12 hrs, will transition to DOAC on DC   - TTE 3/11- abnormal/paradoxical septal motion c/w conduction delay. LVEF 63%, LVDF indeterminate.   - doppler LE - no DVT in b/l LE   - PERT team consult called ( given SS crisis, recent CVAs) -> vascular surgery following, appreciate recs   - close monitors on vitals CTA chest shows RUL and RML segmental PEs. Hemodynamically stable, /84, HR 82, O2 sat 98% RA  - In Ed started with IV heparin gtt  - c/w Lovenox 80mg sc q 12 hrs, will transition to DOAC on DC   - TTE 3/11- abnormal/paradoxical septal motion c/w conduction delay. LVEF 63%, LVDF indeterminate.   - doppler LE - no DVT in b/l LE   - PERT team consult called ( given SS crisis, recent CVAs) -> vascular surgery following, appreciate recs   - close monitors on vitals  - 3/13 - transitioned to eliquis 10 BID for 7 days and then 5 BID

## 2025-03-13 NOTE — PROGRESS NOTE ADULT - SUBJECTIVE AND OBJECTIVE BOX
*******************************  Calixto Cazares, PGY-1  Internal Medicine  Contact via [employer] TEAMS    *******************************    PROGRESS NOTE:     Patient is a 60y old  Female who presents with a chief complaint of chest pain (12 Mar 2025 16:19)      INTERVAL EVENTS: No acute overnight events.     SUBJECTIVE: Patient seen and examined at bedside. This morning, the patient is comfortable and doing well. No acute complaints. Denies fevers, chills, N/V/D, chest pain, SOB, abdominal pain.    MEDICATIONS  (STANDING):  albuterol    90 MICROgram(s) HFA Inhaler 2 Puff(s) Inhalation every 6 hours  amantadine Syrup 100 milliGRAM(s) Oral two times a day  artificial  tears Solution 1 Drop(s) Both EYES four times a day  aspirin  chewable 81 milliGRAM(s) Oral daily  atorvastatin 40 milliGRAM(s) Oral at bedtime  enoxaparin Injectable 80 milliGRAM(s) SubCutaneous every 12 hours  folic acid 1 milliGRAM(s) Oral daily  hydroxyurea 500 milliGRAM(s) Oral two times a day  levETIRAcetam  Solution 500 milliGRAM(s) Enteral Tube two times a day  metoprolol tartrate 12.5 milliGRAM(s) Oral three times a day  pantoprazole   Suspension 40 milliGRAM(s) Oral every 12 hours  tiotropium 2.5 MICROgram(s) Inhaler 2 Puff(s) Inhalation daily    MEDICATIONS  (PRN):  acetaminophen     Tablet .. 650 milliGRAM(s) Oral every 6 hours PRN Temp greater or equal to 38C (100.4F), Moderate Pain (4 - 6)  HYDROmorphone  Injectable 0.5 milliGRAM(s) IV Push every 6 hours PRN Severe Pain (7 - 10)  ondansetron Injectable 4 milliGRAM(s) IV Push every 6 hours PRN Nausea and/or Vomiting      CAPILLARY BLOOD GLUCOSE        I&O's Summary    12 Mar 2025 07:01  -  13 Mar 2025 07:00  --------------------------------------------------------  IN: 0 mL / OUT: 2450 mL / NET: -2450 mL        PHYSICAL EXAM:  Vital Signs Last 24 Hrs  T(C): 37 (13 Mar 2025 05:05), Max: 37.4 (12 Mar 2025 20:20)  T(F): 98.6 (13 Mar 2025 05:05), Max: 99.3 (12 Mar 2025 20:20)  HR: 87 (13 Mar 2025 05:14) (51 - 92)  BP: 122/82 (13 Mar 2025 05:14) (95/58 - 128/86)  BP(mean): --  RR: 18 (13 Mar 2025 05:05) (18 - 18)  SpO2: 99% (13 Mar 2025 05:05) (98% - 99%)    Parameters below as of 13 Mar 2025 05:05  Patient On (Oxygen Delivery Method): room air        GENERAL: NAD, lying in bed comfortably  HEAD: Atraumatic, normocephalic  EYES: EOMI, PERRLA, conjunctiva and sclera clear  ENT: Moist mucous membranes  NECK: Supple, no JVD  HEART: S1, S2, Regular rate and rhythm, no murmurs, rubs, or gallops  LUNGS: Unlabored respirations, clear to auscultation bilaterally, no crackles, wheezing, or rhonchi  ABDOMEN: Soft, nontender, nondistended, +BS  EXTREMITIES: 2+ peripheral pulses bilaterally. No clubbing, cyanosis, or edema  NERVOUS SYSTEM:  A&Ox3, no focal deficits   SKIN: No rashes or lesions    LABS:                        11.0   7.81  )-----------( 193      ( 13 Mar 2025 06:47 )             31.6     03-13    143  |  108  |  10  ----------------------------<  99  4.1   |  20[L]  |  0.75    Ca    9.4      13 Mar 2025 06:47  Phos  3.7     03-13  Mg     2.0     03-13    TPro  6.8  /  Alb  3.2[L]  /  TBili  0.7  /  DBili  0.2  /  AST  22  /  ALT  27  /  AlkPhos  86  03-13    PTT - ( 11 Mar 2025 12:34 )  PTT:70.6 sec  CARDIAC MARKERS ( 11 Mar 2025 10:55 )  x     / x     / x     / x     / <1.0 ng/mL      Urinalysis Basic - ( 13 Mar 2025 06:47 )    Color: x / Appearance: x / SG: x / pH: x  Gluc: 99 mg/dL / Ketone: x  / Bili: x / Urobili: x   Blood: x / Protein: x / Nitrite: x   Leuk Esterase: x / RBC: x / WBC x   Sq Epi: x / Non Sq Epi: x / Bacteria: x        Culture - Urine (collected 11 Mar 2025 03:42)  Source: Clean Catch Clean Catch (Midstream)  Final Report (12 Mar 2025 05:43):    >=3 organisms. Probable collection contamination.        RADIOLOGY & ADDITIONAL TESTS:  Results Reviewed:   Imaging Personally Reviewed:  Electrocardiogram Personally Reviewed:  Tele: *******************************  Calixto Cazares, PGY-1  Internal Medicine  Contact via [employer] TEAMS    *******************************    PROGRESS NOTE:     Patient is a 60y old  Female who presents with a chief complaint of chest pain (12 Mar 2025 16:19)      INTERVAL EVENTS: No acute overnight events.     SUBJECTIVE: Patient seen and examined at bedside. This morning, the patient is AOX2 to self and place. No acute complaints. Denies fevers, chills, N/V/D, chest pain, SOB, abdominal pain.    MEDICATIONS  (STANDING):  albuterol    90 MICROgram(s) HFA Inhaler 2 Puff(s) Inhalation every 6 hours  amantadine Syrup 100 milliGRAM(s) Oral two times a day  artificial  tears Solution 1 Drop(s) Both EYES four times a day  aspirin  chewable 81 milliGRAM(s) Oral daily  atorvastatin 40 milliGRAM(s) Oral at bedtime  enoxaparin Injectable 80 milliGRAM(s) SubCutaneous every 12 hours  folic acid 1 milliGRAM(s) Oral daily  hydroxyurea 500 milliGRAM(s) Oral two times a day  levETIRAcetam  Solution 500 milliGRAM(s) Enteral Tube two times a day  metoprolol tartrate 12.5 milliGRAM(s) Oral three times a day  pantoprazole   Suspension 40 milliGRAM(s) Oral every 12 hours  tiotropium 2.5 MICROgram(s) Inhaler 2 Puff(s) Inhalation daily    MEDICATIONS  (PRN):  acetaminophen     Tablet .. 650 milliGRAM(s) Oral every 6 hours PRN Temp greater or equal to 38C (100.4F), Moderate Pain (4 - 6)  HYDROmorphone  Injectable 0.5 milliGRAM(s) IV Push every 6 hours PRN Severe Pain (7 - 10)  ondansetron Injectable 4 milliGRAM(s) IV Push every 6 hours PRN Nausea and/or Vomiting      CAPILLARY BLOOD GLUCOSE        I&O's Summary    12 Mar 2025 07:01  -  13 Mar 2025 07:00  --------------------------------------------------------  IN: 0 mL / OUT: 2450 mL / NET: -2450 mL        PHYSICAL EXAM:  Vital Signs Last 24 Hrs  T(C): 37 (13 Mar 2025 05:05), Max: 37.4 (12 Mar 2025 20:20)  T(F): 98.6 (13 Mar 2025 05:05), Max: 99.3 (12 Mar 2025 20:20)  HR: 87 (13 Mar 2025 05:14) (51 - 92)  BP: 122/82 (13 Mar 2025 05:14) (95/58 - 128/86)  BP(mean): --  RR: 18 (13 Mar 2025 05:05) (18 - 18)  SpO2: 99% (13 Mar 2025 05:05) (98% - 99%)    Parameters below as of 13 Mar 2025 05:05  Patient On (Oxygen Delivery Method): room air        GENERAL: NAD  HEAD: Atraumatic, normocephalic  EYES: conjunctiva and sclera clear  HEART: S1, S2, Regular rate and rhythm, no murmurs, rubs, or gallops  LUNGS: Unlabored respirations, clear to auscultation bilaterally, no crackles, wheezing, or rhonchi  ABDOMEN: Soft, nontender, nondistended  EXTREMITIES: no LE edema   NERVOUS SYSTEM:  A&Ox2 self and place    LABS:                        11.0   7.81  )-----------( 193      ( 13 Mar 2025 06:47 )             31.6     03-13    143  |  108  |  10  ----------------------------<  99  4.1   |  20[L]  |  0.75    Ca    9.4      13 Mar 2025 06:47  Phos  3.7     03-13  Mg     2.0     03-13    TPro  6.8  /  Alb  3.2[L]  /  TBili  0.7  /  DBili  0.2  /  AST  22  /  ALT  27  /  AlkPhos  86  03-13    PTT - ( 11 Mar 2025 12:34 )  PTT:70.6 sec  CARDIAC MARKERS ( 11 Mar 2025 10:55 )  x     / x     / x     / x     / <1.0 ng/mL      Urinalysis Basic - ( 13 Mar 2025 06:47 )    Color: x / Appearance: x / SG: x / pH: x  Gluc: 99 mg/dL / Ketone: x  / Bili: x / Urobili: x   Blood: x / Protein: x / Nitrite: x   Leuk Esterase: x / RBC: x / WBC x   Sq Epi: x / Non Sq Epi: x / Bacteria: x        Culture - Urine (collected 11 Mar 2025 03:42)  Source: Clean Catch Clean Catch (Midstream)  Final Report (12 Mar 2025 05:43):    >=3 organisms. Probable collection contamination.        RADIOLOGY & ADDITIONAL TESTS:  Results Reviewed:   Imaging Personally Reviewed:  Electrocardiogram Personally Reviewed:  Tele:

## 2025-03-13 NOTE — PROGRESS NOTE ADULT - PROBLEM SELECTOR PLAN 2
Afebrile, CTH- no CVA, CXR clear, WBC wnl  - Likely from acute PE in the setting of SS crisis  -  UA = neg, urine = contamination   - Lactate 3.0 -> 1.5, WBC wnl, UA neg  - s/p 1/2 NS for possible SS crisis  - pending S and S, if pass, can resume PO diet Afebrile, CTH- no CVA, CXR clear, WBC wnl  - Likely from acute PE in the setting of SS crisis  -  UA = neg, urine = contamination   - Lactate 3.0 -> 1.5, WBC wnl, UA neg  - s/p 1/2 NS for possible SS crisis  - passed S and S on 3/12, resumed PO diet based nutrition recs   - calories counts x 3 days to ensure adequate PO intakes

## 2025-03-13 NOTE — PROGRESS NOTE ADULT - PROBLEM SELECTOR PLAN 8
DVT prophylaxis: transition from lovenox to eliquis 10 BID   Diet: resumed PO diet after passing S& S  Dispo: pending [facility]

## 2025-03-13 NOTE — PROGRESS NOTE ADULT - ATTENDING COMMENTS
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 c/f sickle cell crisis on dilaudid PRN     #PE  Full dose AC lovenox, transitioned to eliquis   Hold off on half-NS since no pain, heme rec no exchange transfusion  Vascular cards and heme recs appreciated  TTE no evidence of overt RV dysfunction    #S/p CVA w Dysphagia  Has PEG in place, feeds tolerated  Speech re-eval, regular diet (DASH), monitor   PM&R consulted--would benefit from acute rehab, dispo planning per JUANA and ARGELIA    DC planning     Rest of plan as above.

## 2025-03-13 NOTE — PROGRESS NOTE ADULT - ASSESSMENT
60F with a history of sickle cell disease, prior acute chest syndrome, bilateral CVA with seizure disorder, and right heart failure requiring VA ECMO (s/p trach and reversal and PEG) presents from a rehab facility with chest pain and altered mental status. Per daughter, the patient had two days of chest pain and worsening confusion, including paranoia about staff harming her, which is atypical for her baseline. She endorsed intermittent, non-pleuritic chest pain for 24 hours, with one episode of vomiting but no fevers, chills, abdominal pain, or urinary symptoms.    In the ED, she remained hemodynamically stable (/84, HR 82, SpO2 98% on RA). CTA chest showed acute segmental PEs in the RUL and RML, and she was started on IV heparin. CT head was negative for acute CVA or mass, and abdominal CT showed a 2mm right renal calculus. Labs showed Hb 10.7, reticulocyte count 11% (prior 7%), and lactate 3.0. Encephalopathy suspected to be multifactorial, possibly hypoxic from PE in the setting of sickle cell crisis.    Vascular cardiology consulted for evaluation of segmental PE.    Assessment & Plan:  Vascular cardiology consulted for segmental PE.  PE Risk Stratification:  sPESI: 1 (sickle cell disease), High risk  PESI: 66,  Class II (Low risk)  Risk classification: Intermediate-low risk PE  No hemodynamic instability, no signs of RV strain on CT    TTE 3/11/25: EF 63% technically difficult study, LV normal size/thicknexx, paradoxical septal motion, RV NWV but grossly normal size/function, no sig valv disease, no pericardial effusion, no changes since 1/25/2025    B/l LE Venous Duplex: No DVT      Recommendations:   - On therapeutic Lovenox 80mg SC q12h; if ok with hematology, can transition to DOAC with run-in per VTE protocol   - BP stable, continuing Metoprolol 12.5mg BID  - Will continue monitoring for hemodynamic stability and signs of RV strain 60F with a history of sickle cell disease, prior acute chest syndrome, bilateral CVA with seizure disorder, and right heart failure requiring VA ECMO (s/p trach and reversal and PEG) presents from a rehab facility with chest pain and altered mental status. Per daughter, the patient had two days of chest pain and worsening confusion, including paranoia about staff harming her, which is atypical for her baseline. She endorsed intermittent, non-pleuritic chest pain for 24 hours, with one episode of vomiting but no fevers, chills, abdominal pain, or urinary symptoms.    In the ED, she remained hemodynamically stable (/84, HR 82, SpO2 98% on RA). CTA chest showed acute segmental PEs in the RUL and RML, and she was started on IV heparin. CT head was negative for acute CVA or mass, and abdominal CT showed a 2mm right renal calculus. Labs showed Hb 10.7, reticulocyte count 11% (prior 7%), and lactate 3.0. Encephalopathy suspected to be multifactorial, possibly hypoxic from PE in the setting of sickle cell crisis.    Vascular cardiology consulted for evaluation of segmental PE.    Assessment & Plan:  Vascular cardiology consulted for segmental PE.  PE Risk Stratification:  sPESI: 1 (sickle cell disease), High risk  PESI: 66,  Class II (Low risk)  Risk classification: Intermediate-low risk PE  No hemodynamic instability, no signs of RV strain on CT    TTE 3/11/25: EF 63% technically difficult study, LV normal size/thickness, paradoxical septal motion, RV NWV but grossly normal size/function, no sig valv disease, no pericardial effusion, no changes since 1/25/2025    B/l LE Venous Duplex: No DVT      Recommendations:   - On therapeutic Lovenox 80mg SC q12h; if ok with hematology, can transition to DOAC with run-in per VTE protocol   - BP stable, continuing Metoprolol 12.5mg BID  - Will continue monitoring for hemodynamic stability and signs of RV strain 60F with a history of sickle cell disease, prior acute chest syndrome, bilateral CVA with seizure disorder, and right heart failure requiring VA ECMO (s/p trach and reversal and PEG) presents from a rehab facility with chest pain and altered mental status. Per daughter, the patient had two days of chest pain and worsening confusion, including paranoia about staff harming her, which is atypical for her baseline. She endorsed intermittent, non-pleuritic chest pain for 24 hours, with one episode of vomiting but no fevers, chills, abdominal pain, or urinary symptoms.    In the ED, she remained hemodynamically stable (/84, HR 82, SpO2 98% on RA). CTA chest showed acute segmental PEs in the RUL and RML, and she was started on IV heparin. CT head was negative for acute CVA or mass, and abdominal CT showed a 2mm right renal calculus. Labs showed Hb 10.7, reticulocyte count 11% (prior 7%), and lactate 3.0. Encephalopathy suspected to be multifactorial, possibly hypoxic from PE in the setting of sickle cell crisis.    Vascular cardiology consulted for evaluation of segmental PE.    Assessment & Plan:  Vascular cardiology consulted for segmental PE.  PE Risk Stratification:  sPESI: 1 (sickle cell disease), High risk  PESI: 66,  Class II (Low risk)  Risk classification: Intermediate-low risk PE  No hemodynamic instability, no signs of RV strain on CT    TTE 3/11/25: EF 63% technically difficult study, LV normal size/thickness, paradoxical septal motion, RV NWV but grossly normal size/function, no sig valv disease, no pericardial effusion, no changes since 1/25/2025    B/l LE Venous Duplex: No DVT      Recommendations:   - On therapeutic Lovenox 80mg SC q12h; if ok with hematology, would start Eliquis 10 mg BID for 7 days, then 5 mg BID per VTE protocol.   - BP stable, continuing Metoprolol 12.5mg BID  - Will continue monitoring for hemodynamic stability and signs of RV strain

## 2025-03-13 NOTE — PROGRESS NOTE ADULT - PROBLEM SELECTOR PLAN 3
Elevated Retic 11% from last admission 7%  - s/p 1/2 NS at 100ml/hr  - c/w Hydrea 500 BID, folate 1mg qd   - Dilaudid 0.5mg q 6 hrs prn for now  - hem/onc c/s, appreciate recs  - check CBC, CMP with indirect bili, LDH, haptoglobin, retic count daily Elevated Retic 11% from last admission 7%  - s/p 1/2 NS at 100ml/hr  - c/w Hydrea 500 BID, folate 1mg qd   - Dilaudid 0.5mg q 6 hrs prn for now  - Hgb electrophoresis showed elevated Hemoglobin S% 31.5% and C% 25.8  - hem/onc c/s, appreciate recs - no need for exchange transfusion   - check CBC, CMP with indirect bili, LDH, haptoglobin, retic count daily

## 2025-03-13 NOTE — PROGRESS NOTE ADULT - SUBJECTIVE AND OBJECTIVE BOX
Patient seen and examined at bedside.    Overnight Events: No acute events overnight. Denies chest pain or SOB    Telemetry:    REVIEW OF SYSTEMS:    [x ] All other systems negative  [ ] Unable to assess ROS due to    Current Meds:  acetaminophen     Tablet .. 650 milliGRAM(s) Oral every 6 hours PRN  albuterol    90 MICROgram(s) HFA Inhaler 2 Puff(s) Inhalation every 6 hours  amantadine Syrup 100 milliGRAM(s) Oral two times a day  artificial  tears Solution 1 Drop(s) Both EYES four times a day  aspirin  chewable 81 milliGRAM(s) Oral daily  atorvastatin 40 milliGRAM(s) Oral at bedtime  enoxaparin Injectable 80 milliGRAM(s) SubCutaneous every 12 hours  folic acid 1 milliGRAM(s) Oral daily  HYDROmorphone  Injectable 0.5 milliGRAM(s) IV Push every 6 hours PRN  hydroxyurea 500 milliGRAM(s) Oral two times a day  levETIRAcetam  Solution 500 milliGRAM(s) Enteral Tube two times a day  metoprolol tartrate 12.5 milliGRAM(s) Oral three times a day  ondansetron Injectable 4 milliGRAM(s) IV Push every 6 hours PRN  pantoprazole   Suspension 40 milliGRAM(s) Oral every 12 hours  tiotropium 2.5 MICROgram(s) Inhaler 2 Puff(s) Inhalation daily      PAST MEDICAL & SURGICAL HISTORY:  Sickle-cell-hemoglobin C disease with crisis      Essential hypertension      Sickle cell disease      HTN (hypertension)      H/O:       H/O abdominoplasty      S/P breast augmentation      S/P           Vitals:  T(F): 98.6 (), Max: 99.3 ()  HR: 87 () (51 - 92)  BP: 122/82 () (95/58 - 128/86)  RR: 18 ()  SpO2: 99% ()  I&O's Summary    12 Mar 2025 07:01  -  13 Mar 2025 07:00  --------------------------------------------------------  IN: 0 mL / OUT: 2450 mL / NET: -2450 mL        Physical Exam:  Appearance: No acute distress; well appearing  Eyes: PERRL, EOMI, pink conjunctiva  HENT: Normal oral mucosa  Cardiovascular: RRR, S1, S2, no murmurs, rubs, or gallops; no edema; no JVD  Respiratory: Clear to auscultation bilaterally  Gastrointestinal: soft, non-tender, non-distended with normal bowel sounds  Musculoskeletal: No clubbing; no joint deformity   Neurologic: Non-focal  Lymphatic: No lymphadenopathy  Psychiatry: AAOx3, mood & affect appropriate  Skin: No rashes, ecchymoses, or cyanosis                          11.0   7.81  )-----------( 193      ( 13 Mar 2025 06:47 )             31.6     -    143  |  108  |  10  ----------------------------<  99  4.1   |  20[L]  |  0.75    Ca    9.4      13 Mar 2025 06:47  Phos  3.7     -  Mg     2.0         TPro  6.8  /  Alb  3.2[L]  /  TBili  0.7  /  DBili  0.2  /  AST  22  /  ALT  27  /  AlkPhos  86  03-13    PTT - ( 11 Mar 2025 12:34 )  PTT:70.6 sec  CARDIAC MARKERS ( 11 Mar 2025 10:55 )  x     / x     / x     / x     / <1.0 ng/mL                 Patient seen and examined at bedside.    Overnight Events: No acute events overnight. Denies chest pain or SOB      REVIEW OF SYSTEMS:    [x ] All other systems negative  [ ] Unable to assess ROS due to    Current Meds:  acetaminophen     Tablet .. 650 milliGRAM(s) Oral every 6 hours PRN  albuterol    90 MICROgram(s) HFA Inhaler 2 Puff(s) Inhalation every 6 hours  amantadine Syrup 100 milliGRAM(s) Oral two times a day  artificial  tears Solution 1 Drop(s) Both EYES four times a day  aspirin  chewable 81 milliGRAM(s) Oral daily  atorvastatin 40 milliGRAM(s) Oral at bedtime  enoxaparin Injectable 80 milliGRAM(s) SubCutaneous every 12 hours  folic acid 1 milliGRAM(s) Oral daily  HYDROmorphone  Injectable 0.5 milliGRAM(s) IV Push every 6 hours PRN  hydroxyurea 500 milliGRAM(s) Oral two times a day  levETIRAcetam  Solution 500 milliGRAM(s) Enteral Tube two times a day  metoprolol tartrate 12.5 milliGRAM(s) Oral three times a day  ondansetron Injectable 4 milliGRAM(s) IV Push every 6 hours PRN  pantoprazole   Suspension 40 milliGRAM(s) Oral every 12 hours  tiotropium 2.5 MICROgram(s) Inhaler 2 Puff(s) Inhalation daily      PAST MEDICAL & SURGICAL HISTORY:  Sickle-cell-hemoglobin C disease with crisis      Essential hypertension      Sickle cell disease      HTN (hypertension)      H/O:       H/O abdominoplasty      S/P breast augmentation      S/P           Vitals:  T(F): 98.6 (), Max: 99.3 ()  HR: 87 () (51 - 92)  BP: 122/82 () (95/58 - 128/86)  RR: 18 ()  SpO2: 99% ()  I&O's Summary    12 Mar 2025 07:01  -  13 Mar 2025 07:00  --------------------------------------------------------  IN: 0 mL / OUT: 2450 mL / NET: -2450 mL        Physical Exam:  Appearance: No acute distress; well appearing  Eyes: PERRL, EOMI, pink conjunctiva  HENT: Normal oral mucosa  Cardiovascular: RRR, S1, S2, no murmurs, rubs, or gallops; no edema; no JVD  Respiratory: Clear to auscultation bilaterally  Gastrointestinal: soft, non-tender, non-distended with normal bowel sounds  Musculoskeletal: No clubbing; no joint deformity   Neurologic: Non-focal  Lymphatic: No lymphadenopathy  Psychiatry: AAOx3, mood & affect appropriate  Skin: No rashes, ecchymoses, or cyanosis                          11.0   7.81  )-----------( 193      ( 13 Mar 2025 06:47 )             31.6     03-13    143  |  108  |  10  ----------------------------<  99  4.1   |  20[L]  |  0.75    Ca    9.4      13 Mar 2025 06:47  Phos  3.7     -13  Mg     2.0     -    TPro  6.8  /  Alb  3.2[L]  /  TBili  0.7  /  DBili  0.2  /  AST  22  /  ALT  27  /  AlkPhos  86  03-13    PTT - ( 11 Mar 2025 12:34 )  PTT:70.6 sec  CARDIAC MARKERS ( 11 Mar 2025 10:55 )  x     / x     / x     / x     / <1.0 ng/mL

## 2025-03-13 NOTE — PROGRESS NOTE ADULT - ASSESSMENT
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 c/f sickle cell crisis on dilaudid PRN

## 2025-03-13 NOTE — PROGRESS NOTE ADULT - PROBLEM SELECTOR PLAN 7
On Regular diet per daughter but has a PEG for few more weeks  - Given AMS, will start PEG feeding for now, S & S eval requested- if pass will start regular food  - started with Javity 1.5cal 20ml/hrs, increase 10ml/hr q 6 hrs to goal 45ml/hr  - Nutrition consult  - aspiration precaution  - pending S and S, if passed, can resume on diet On Regular diet per daughter but has a PEG for few more weeks  - Given AMS, will start PEG feeding for now, S & S eval requested- if pass will start regular food  - started with Javity 1.5cal 20ml/hrs, increase 10ml/hr q 6 hrs to goal 45ml/hr  - Nutrition consult  - aspiration precaution  - passed S and S on 3/12, resumed PO diet based nutrition recs   - calories counts x 3 days to ensure adequate PO intakes

## 2025-03-14 ENCOUNTER — TRANSCRIPTION ENCOUNTER (OUTPATIENT)
Age: 60
End: 2025-03-14

## 2025-03-14 VITALS
RESPIRATION RATE: 18 BRPM | OXYGEN SATURATION: 98 % | DIASTOLIC BLOOD PRESSURE: 80 MMHG | HEART RATE: 93 BPM | TEMPERATURE: 98 F | SYSTOLIC BLOOD PRESSURE: 119 MMHG

## 2025-03-14 LAB
ALBUMIN SERPL ELPH-MCNC: 3.4 G/DL — SIGNIFICANT CHANGE UP (ref 3.3–5)
ALP SERPL-CCNC: 84 U/L — SIGNIFICANT CHANGE UP (ref 40–120)
ALT FLD-CCNC: 38 U/L — SIGNIFICANT CHANGE UP (ref 10–45)
ANION GAP SERPL CALC-SCNC: 13 MMOL/L — SIGNIFICANT CHANGE UP (ref 5–17)
AST SERPL-CCNC: 29 U/L — SIGNIFICANT CHANGE UP (ref 10–40)
BILIRUB DIRECT SERPL-MCNC: 0.2 MG/DL — SIGNIFICANT CHANGE UP (ref 0–0.3)
BILIRUB INDIRECT FLD-MCNC: 0.3 MG/DL — SIGNIFICANT CHANGE UP (ref 0.2–1)
BILIRUB SERPL-MCNC: 0.5 MG/DL — SIGNIFICANT CHANGE UP (ref 0.2–1.2)
BILIRUB SERPL-MCNC: 0.5 MG/DL — SIGNIFICANT CHANGE UP (ref 0.2–1.2)
BUN SERPL-MCNC: 13 MG/DL — SIGNIFICANT CHANGE UP (ref 7–23)
CALCIUM SERPL-MCNC: 9.6 MG/DL — SIGNIFICANT CHANGE UP (ref 8.4–10.5)
CHLORIDE SERPL-SCNC: 106 MMOL/L — SIGNIFICANT CHANGE UP (ref 96–108)
CO2 SERPL-SCNC: 21 MMOL/L — LOW (ref 22–31)
CREAT SERPL-MCNC: 0.78 MG/DL — SIGNIFICANT CHANGE UP (ref 0.5–1.3)
EGFR: 88 ML/MIN/1.73M2 — SIGNIFICANT CHANGE UP
EGFR: 88 ML/MIN/1.73M2 — SIGNIFICANT CHANGE UP
GLUCOSE SERPL-MCNC: 99 MG/DL — SIGNIFICANT CHANGE UP (ref 70–99)
HAPTOGLOB SERPL-MCNC: 207 MG/DL — HIGH (ref 34–200)
HCT VFR BLD CALC: 31.8 % — LOW (ref 34.5–45)
HGB BLD-MCNC: 11.2 G/DL — LOW (ref 11.5–15.5)
LDH SERPL L TO P-CCNC: 188 U/L — SIGNIFICANT CHANGE UP (ref 50–242)
MAGNESIUM SERPL-MCNC: 1.9 MG/DL — SIGNIFICANT CHANGE UP (ref 1.6–2.6)
MCHC RBC-ENTMCNC: 32 PG — SIGNIFICANT CHANGE UP (ref 27–34)
MCHC RBC-ENTMCNC: 35.2 G/DL — SIGNIFICANT CHANGE UP (ref 32–36)
MCV RBC AUTO: 90.9 FL — SIGNIFICANT CHANGE UP (ref 80–100)
NRBC BLD AUTO-RTO: 2 /100 WBCS — HIGH (ref 0–0)
PHOSPHATE SERPL-MCNC: 3.7 MG/DL — SIGNIFICANT CHANGE UP (ref 2.5–4.5)
PLATELET # BLD AUTO: 284 K/UL — SIGNIFICANT CHANGE UP (ref 150–400)
POTASSIUM SERPL-MCNC: 4.1 MMOL/L — SIGNIFICANT CHANGE UP (ref 3.5–5.3)
POTASSIUM SERPL-SCNC: 4.1 MMOL/L — SIGNIFICANT CHANGE UP (ref 3.5–5.3)
PROT SERPL-MCNC: 7.1 G/DL — SIGNIFICANT CHANGE UP (ref 6–8.3)
RBC # BLD: 3.5 M/UL — LOW (ref 3.8–5.2)
RBC # BLD: 3.5 M/UL — LOW (ref 3.8–5.2)
RBC # FLD: 16.2 % — HIGH (ref 10.3–14.5)
RETICS #: 305.9 K/UL — HIGH (ref 25–125)
RETICS/RBC NFR: 8.7 % — HIGH (ref 0.5–2.5)
SODIUM SERPL-SCNC: 140 MMOL/L — SIGNIFICANT CHANGE UP (ref 135–145)
WBC # BLD: 8.84 K/UL — SIGNIFICANT CHANGE UP (ref 3.8–10.5)
WBC # FLD AUTO: 8.84 K/UL — SIGNIFICANT CHANGE UP (ref 3.8–10.5)

## 2025-03-14 PROCEDURE — 74177 CT ABD & PELVIS W/CONTRAST: CPT | Mod: MC

## 2025-03-14 PROCEDURE — 82962 GLUCOSE BLOOD TEST: CPT

## 2025-03-14 PROCEDURE — 93005 ELECTROCARDIOGRAM TRACING: CPT

## 2025-03-14 PROCEDURE — 36415 COLL VENOUS BLD VENIPUNCTURE: CPT

## 2025-03-14 PROCEDURE — 83605 ASSAY OF LACTIC ACID: CPT

## 2025-03-14 PROCEDURE — 93308 TTE F-UP OR LMTD: CPT

## 2025-03-14 PROCEDURE — 97166 OT EVAL MOD COMPLEX 45 MIN: CPT

## 2025-03-14 PROCEDURE — 83540 ASSAY OF IRON: CPT

## 2025-03-14 PROCEDURE — 83735 ASSAY OF MAGNESIUM: CPT

## 2025-03-14 PROCEDURE — 84484 ASSAY OF TROPONIN QUANT: CPT

## 2025-03-14 PROCEDURE — 87086 URINE CULTURE/COLONY COUNT: CPT

## 2025-03-14 PROCEDURE — 85045 AUTOMATED RETICULOCYTE COUNT: CPT

## 2025-03-14 PROCEDURE — 85025 COMPLETE CBC W/AUTO DIFF WBC: CPT

## 2025-03-14 PROCEDURE — 83880 ASSAY OF NATRIURETIC PEPTIDE: CPT

## 2025-03-14 PROCEDURE — 93970 EXTREMITY STUDY: CPT

## 2025-03-14 PROCEDURE — 85014 HEMATOCRIT: CPT

## 2025-03-14 PROCEDURE — 71045 X-RAY EXAM CHEST 1 VIEW: CPT

## 2025-03-14 PROCEDURE — 82330 ASSAY OF CALCIUM: CPT

## 2025-03-14 PROCEDURE — 83010 ASSAY OF HAPTOGLOBIN QUANT: CPT

## 2025-03-14 PROCEDURE — 86901 BLOOD TYPING SEROLOGIC RH(D): CPT

## 2025-03-14 PROCEDURE — 85027 COMPLETE CBC AUTOMATED: CPT

## 2025-03-14 PROCEDURE — 97530 THERAPEUTIC ACTIVITIES: CPT

## 2025-03-14 PROCEDURE — 99232 SBSQ HOSP IP/OBS MODERATE 35: CPT

## 2025-03-14 PROCEDURE — 85730 THROMBOPLASTIN TIME PARTIAL: CPT

## 2025-03-14 PROCEDURE — 81003 URINALYSIS AUTO W/O SCOPE: CPT

## 2025-03-14 PROCEDURE — 82435 ASSAY OF BLOOD CHLORIDE: CPT

## 2025-03-14 PROCEDURE — 97116 GAIT TRAINING THERAPY: CPT

## 2025-03-14 PROCEDURE — 82947 ASSAY GLUCOSE BLOOD QUANT: CPT

## 2025-03-14 PROCEDURE — 86850 RBC ANTIBODY SCREEN: CPT

## 2025-03-14 PROCEDURE — 94640 AIRWAY INHALATION TREATMENT: CPT

## 2025-03-14 PROCEDURE — 96374 THER/PROPH/DIAG INJ IV PUSH: CPT

## 2025-03-14 PROCEDURE — 82553 CREATINE MB FRACTION: CPT

## 2025-03-14 PROCEDURE — 86900 BLOOD TYPING SEROLOGIC ABO: CPT

## 2025-03-14 PROCEDURE — 92610 EVALUATE SWALLOWING FUNCTION: CPT

## 2025-03-14 PROCEDURE — 85610 PROTHROMBIN TIME: CPT

## 2025-03-14 PROCEDURE — 93306 TTE W/DOPPLER COMPLETE: CPT

## 2025-03-14 PROCEDURE — 83020 HEMOGLOBIN ELECTROPHORESIS: CPT

## 2025-03-14 PROCEDURE — 82247 BILIRUBIN TOTAL: CPT

## 2025-03-14 PROCEDURE — 82550 ASSAY OF CK (CPK): CPT

## 2025-03-14 PROCEDURE — 83550 IRON BINDING TEST: CPT

## 2025-03-14 PROCEDURE — 97161 PT EVAL LOW COMPLEX 20 MIN: CPT

## 2025-03-14 PROCEDURE — 70450 CT HEAD/BRAIN W/O DYE: CPT | Mod: MC

## 2025-03-14 PROCEDURE — 99285 EMERGENCY DEPT VISIT HI MDM: CPT

## 2025-03-14 PROCEDURE — 84100 ASSAY OF PHOSPHORUS: CPT

## 2025-03-14 PROCEDURE — 71275 CT ANGIOGRAPHY CHEST: CPT | Mod: MC

## 2025-03-14 PROCEDURE — 82248 BILIRUBIN DIRECT: CPT

## 2025-03-14 PROCEDURE — 0241U: CPT

## 2025-03-14 PROCEDURE — 82803 BLOOD GASES ANY COMBINATION: CPT

## 2025-03-14 PROCEDURE — 84132 ASSAY OF SERUM POTASSIUM: CPT

## 2025-03-14 PROCEDURE — 83690 ASSAY OF LIPASE: CPT

## 2025-03-14 PROCEDURE — 99239 HOSP IP/OBS DSCHRG MGMT >30: CPT | Mod: GC

## 2025-03-14 PROCEDURE — 80053 COMPREHEN METABOLIC PANEL: CPT

## 2025-03-14 PROCEDURE — 93325 DOPPLER ECHO COLOR FLOW MAPG: CPT

## 2025-03-14 PROCEDURE — 85018 HEMOGLOBIN: CPT

## 2025-03-14 PROCEDURE — 93321 DOPPLER ECHO F-UP/LMTD STD: CPT

## 2025-03-14 PROCEDURE — 83615 LACTATE (LD) (LDH) ENZYME: CPT

## 2025-03-14 PROCEDURE — 84295 ASSAY OF SERUM SODIUM: CPT

## 2025-03-14 RX ORDER — FOLIC ACID 1 MG/1
1 TABLET ORAL
Qty: 0 | Refills: 0 | DISCHARGE
Start: 2025-03-14

## 2025-03-14 RX ORDER — APIXABAN 2.5 MG/1
2 TABLET, FILM COATED ORAL
Qty: 0 | Refills: 0 | DISCHARGE
Start: 2025-03-14

## 2025-03-14 RX ORDER — MAGNESIUM SULFATE 500 MG/ML
1 SYRINGE (ML) INJECTION ONCE
Refills: 0 | Status: COMPLETED | OUTPATIENT
Start: 2025-03-14 | End: 2025-03-14

## 2025-03-14 RX ADMIN — LEVETIRACETAM 500 MILLIGRAM(S): 10 INJECTION, SOLUTION INTRAVENOUS at 06:10

## 2025-03-14 RX ADMIN — Medication 100 MILLIGRAM(S): at 06:09

## 2025-03-14 RX ADMIN — HYDROXYUREA 500 MILLIGRAM(S): 500 CAPSULE ORAL at 06:10

## 2025-03-14 RX ADMIN — FOLIC ACID 1 MILLIGRAM(S): 1 TABLET ORAL at 12:30

## 2025-03-14 RX ADMIN — Medication 1 DROP(S): at 06:10

## 2025-03-14 RX ADMIN — APIXABAN 10 MILLIGRAM(S): 2.5 TABLET, FILM COATED ORAL at 06:10

## 2025-03-14 RX ADMIN — Medication 2 PUFF(S): at 12:32

## 2025-03-14 RX ADMIN — Medication 1 DROP(S): at 12:32

## 2025-03-14 RX ADMIN — TIOTROPIUM BROMIDE INHALATION SPRAY 2 PUFF(S): 3.12 SPRAY, METERED RESPIRATORY (INHALATION) at 12:32

## 2025-03-14 RX ADMIN — Medication 100 GRAM(S): at 09:39

## 2025-03-14 RX ADMIN — METOPROLOL SUCCINATE 12.5 MILLIGRAM(S): 50 TABLET, EXTENDED RELEASE ORAL at 06:09

## 2025-03-14 RX ADMIN — Medication 40 MILLIGRAM(S): at 06:09

## 2025-03-14 RX ADMIN — Medication 2 PUFF(S): at 06:09

## 2025-03-14 RX ADMIN — METOPROLOL SUCCINATE 12.5 MILLIGRAM(S): 50 TABLET, EXTENDED RELEASE ORAL at 14:07

## 2025-03-14 RX ADMIN — Medication 81 MILLIGRAM(S): at 12:30

## 2025-03-14 NOTE — OCCUPATIONAL THERAPY INITIAL EVALUATION ADULT - LIVES WITH, PROFILE
Pt transferred from [facility] where she required assist with all ADLs and functional transfers. Pt confused; unable to provide social hx prior to initial hospitalization

## 2025-03-14 NOTE — PROGRESS NOTE ADULT - PROVIDER SPECIALTY LIST ADULT
Vascular Cardiology
Internal Medicine

## 2025-03-14 NOTE — PROGRESS NOTE ADULT - ASSESSMENT
60F with a history of sickle cell disease, prior acute chest syndrome, bilateral CVA with seizure disorder, and right heart failure requiring VA ECMO (s/p trach and reversal and PEG) presents from a rehab facility with chest pain and altered mental status. Per daughter, the patient had two days of chest pain and worsening confusion, including paranoia about staff harming her, which is atypical for her baseline. She endorsed intermittent, non-pleuritic chest pain for 24 hours, with one episode of vomiting but no fevers, chills, abdominal pain, or urinary symptoms.    In the ED, she remained hemodynamically stable (/84, HR 82, SpO2 98% on RA). CTA chest showed acute segmental PEs in the RUL and RML, and she was started on IV heparin. CT head was negative for acute CVA or mass, and abdominal CT showed a 2mm right renal calculus. Labs showed Hb 10.7, reticulocyte count 11% (prior 7%), and lactate 3.0. Encephalopathy suspected to be multifactorial, possibly hypoxic from PE in the setting of sickle cell crisis.    Vascular cardiology consulted for evaluation of segmental PE.    Assessment & Plan:  Vascular cardiology consulted for segmental PE.  PE Risk Stratification:  sPESI: 1 (sickle cell disease), High risk  PESI: 66,  Class II (Low risk)  Risk classification: Intermediate-low risk PE  No hemodynamic instability, no signs of RV strain on CT    TTE 3/11/25: EF 63% technically difficult study, LV normal size/thickness, paradoxical septal motion, RV NWV but grossly normal size/function, no sig valv disease, no pericardial effusion, no changes since 1/25/2025    B/l LE Venous Duplex: No DVT      Recommendations:   -  Eliquis 10 mg BID for 7 days, then 5 mg BID per VTE protocol.   - BP stable, continuing Metoprolol 12.5mg BID  - Will continue monitoring for hemodynamic stability and signs of RV strain  -outpatient followup      Sunil

## 2025-03-14 NOTE — OCCUPATIONAL THERAPY INITIAL EVALUATION ADULT - ADDITIONAL COMMENTS
per chart from previous admission-Patient is single and resides in a house with her daughter and son. Patients house with 2-3 DONNA with a railing. Prior to hospitalization, patient was independent with ADLS. Patient has DME of cane at home that she uses for ambulation and mobility when needed.

## 2025-03-14 NOTE — PROGRESS NOTE ADULT - PROBLEM SELECTOR PLAN 8
DVT prophylaxis: transition from lovenox to eliquis 10 BID   Diet: resumed PO diet after passing S& S  Dispo: pending [facility] DVT prophylaxis: eliquis 10 BID   Diet: resumed PO diet after passing S& S  Dispo: pending [facility]

## 2025-03-14 NOTE — PROGRESS NOTE ADULT - SUBJECTIVE AND OBJECTIVE BOX
Patient seen and examined at bedside.    Overnight Events: No acute events overnight. Denies chest pain or SOB      REVIEW OF SYSTEMS:    [x ] All other systems negative  [ ] Unable to assess ROS due to     MEDICATIONS  (STANDING):  albuterol    90 MICROgram(s) HFA Inhaler 2 Puff(s) Inhalation every 6 hours  amantadine Syrup 100 milliGRAM(s) Oral two times a day  apixaban 10 milliGRAM(s) Oral every 12 hours  artificial  tears Solution 1 Drop(s) Both EYES four times a day  aspirin  chewable 81 milliGRAM(s) Oral daily  atorvastatin 40 milliGRAM(s) Oral at bedtime  folic acid 1 milliGRAM(s) Oral daily  hydroxyurea 500 milliGRAM(s) Oral two times a day  levETIRAcetam  Solution 500 milliGRAM(s) Enteral Tube two times a day  metoprolol tartrate 12.5 milliGRAM(s) Oral three times a day  pantoprazole   Suspension 40 milliGRAM(s) Oral every 12 hours  tiotropium 2.5 MICROgram(s) Inhaler 2 Puff(s) Inhalation daily        PAST MEDICAL & SURGICAL HISTORY:  Sickle-cell-hemoglobin C disease with crisis      Essential hypertension      Sickle cell disease      HTN (hypertension)      H/O:       H/O abdominoplasty      S/P breast augmentation      S/P            ICU Vital Signs Last 24 Hrs  T(C): 36.8 (14 Mar 2025 14:13), Max: 36.8 (13 Mar 2025 18:22)  T(F): 98.2 (14 Mar 2025 14:13), Max: 98.3 (13 Mar 2025 18:22)  HR: 93 (14 Mar 2025 14:13) (78 - 93)  BP: 119/80 (14 Mar 2025 14:13) (112/78 - 124/86)  BP(mean): --  ABP: --  ABP(mean): --  RR: 18 (14 Mar 2025 14:13) (18 - 18)  SpO2: 98% (14 Mar 2025 14:13) (98% - 100%)    O2 Parameters below as of 14 Mar 2025 14:13  Patient On (Oxygen Delivery Method): room air                Physical Exam:  Appearance: No acute distress; well appearing  Eyes: PERRL, EOMI, pink conjunctiva  HENT: Normal oral mucosa  Cardiovascular: RRR, S1, S2, no murmurs, rubs, or gallops; no edema; no JVD  Respiratory: Clear to auscultation bilaterally  Gastrointestinal: soft, non-tender, non-distended with normal bowel sounds  Musculoskeletal: No clubbing; no joint deformity   Neurologic: Non-focal  Lymphatic: No lymphadenopathy  Psychiatry: AAOx3, mood & affect appropriate  Skin: No rashes, ecchymoses, or cyanosis                                             11.2   8.84  )-----------( 284      ( 14 Mar 2025 07:26 )             31.8

## 2025-03-14 NOTE — DISCHARGE NOTE NURSING/CASE MANAGEMENT/SOCIAL WORK - FINANCIAL ASSISTANCE
[facility] provides services at a reduced cost to those who are determined to be eligible through [facility]’s financial assistance program. Information regarding [facility]’s financial assistance program can be found by going to [email or web address] or by calling [phone].

## 2025-03-14 NOTE — DISCHARGE NOTE NURSING/CASE MANAGEMENT/SOCIAL WORK - PATIENT PORTAL LINK FT
You can access the FollowMyHealth Patient Portal offered by [facility] by registering at the following website: [email or web address]. By joining [facility]’s FollowMyHealth portal, you will also be able to view your health information using other applications (apps) compatible with our system.

## 2025-03-14 NOTE — OCCUPATIONAL THERAPY INITIAL EVALUATION ADULT - ADL RETRAINING, OT EVAL
Patient will dress lower body (S), AE as needed in 4 weeks. Patient will dress upper body (I) in 4 weeks

## 2025-03-14 NOTE — PROGRESS NOTE ADULT - ATTENDING COMMENTS
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 c/f sickle cell crisis on dilaudid PRN     #PE  Full dose AC lovenox, transitioned to eliquis   Hold off on half-NS since no pain, heme rec no exchange transfusion  Vascular cards and heme recs appreciated  TTE no evidence of overt RV dysfunction    #S/p CVA w Dysphagia  Has PEG in place, feeds tolerated  Speech re-eval, regular diet (DASH), monitor   PM&R consulted--would benefit from acute rehab, dispo planning per CM and SW    DC planning. DC to [facility] today.    Rest of plan as above. 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 c/f sickle cell crisis on dilaudid PRN     #PE  Full dose AC lovenox, transitioned to eliquis   Hold off on half-NS since no pain, heme rec no exchange transfusion  Vascular cards and heme recs appreciated  TTE no evidence of overt RV dysfunction    #S/p CVA w Dysphagia  Has PEG in place, but passed swallow eval. Able to tolerate PO and eating full meals  Speech re-eval, regular diet (DASH), monitor   PM&R consulted--would benefit from acute rehab, dispo planning per CM and SW    DC planning. DC to [facility] today.    Rest of plan as above.

## 2025-03-14 NOTE — DISCHARGE NOTE NURSING/CASE MANAGEMENT/SOCIAL WORK - NSDCFUADDAPPT_GEN_ALL_CORE_FT
APPTS ARE READY TO BE MADE: [ ] YES    Best Family or Patient Contact (if needed):    Additional Information about above appointments (if needed):    1: Naseem Barber MD structural cardiology within 1 month  2:   3:     Other comments or requests:

## 2025-03-14 NOTE — OCCUPATIONAL THERAPY INITIAL EVALUATION ADULT - NSOTDISCHREC_GEN_A_CORE
If home, home OT for safety, balance, and strengthening to improve independence with ADLs and functional transfers. Assist for all ADLs and functional transfers. DME: shower chair, transport wheelchair/[facility]acute Re[facility]

## 2025-03-14 NOTE — OCCUPATIONAL THERAPY INITIAL EVALUATION ADULT - RANGE OF MOTION EXAMINATION, UPPER EXTREMITY
Left UE Active Assistive ROM was WFL  (within functional limits)/Right UE Active ROM was WNL (within normal limits)

## 2025-03-14 NOTE — PROGRESS NOTE ADULT - PROBLEM SELECTOR PLAN 2
Afebrile, CTH- no CVA, CXR clear, WBC wnl  - Likely from acute PE in the setting of SS crisis  -  UA = neg, urine = contamination   - Lactate 3.0 -> 1.5, WBC wnl, UA neg  - s/p 1/2 NS for possible SS crisis  - passed S and S on 3/12, resumed PO diet based nutrition recs   - calories counts x 3 days to ensure adequate PO intakes Afebrile, CTH- no CVA, CXR clear, WBC wnl  - Likely from acute PE in the setting of SS crisis  -  UA = neg, urine = contamination   - Lactate 3.0 -> 1.5, WBC wnl, UA neg  - s/p 1/2 NS for possible SS crisis  - passed S and S on 3/12, resumed PO diet based nutrition recs   - per mom at bedside, pt has been having adequate PO intakes (finish entire tray for breakfast/lunch/dinner)  - pt now medically clear for dc to [facility]

## 2025-03-14 NOTE — OCCUPATIONAL THERAPY INITIAL EVALUATION ADULT - ORIENTATION, REHAB EVAL
stating current date as April 1989-orientation provided by therapist, pt unable to recall/person/place

## 2025-03-14 NOTE — OCCUPATIONAL THERAPY INITIAL EVALUATION ADULT - PERSONAL SAFETY AND JUDGMENT, REHAB EVAL
decreased safety awareness during transfers; decreased awareness of deficits/impaired/at risk behaviors demonstrated

## 2025-03-14 NOTE — OCCUPATIONAL THERAPY INITIAL EVALUATION ADULT - PERTINENT HX OF CURRENT PROBLEM, REHAB EVAL
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 As per patient's daughter ( by phone) reported she had been c/o chest pain x 2 days and seemed more confused than her baseline - stating people at the facility were trying to harm her which is atypical of patient. Daughter is concerned that patient may be having either a sickle cell crisis or other occult process given she does not present normally given her history of stroke. She is endorsing chest pain x 24 hours- intermittent, non pleuritic in nature. Vomited x1 today. Denies cough, fevers/chills, abd pain or urinary symptoms.  CT abdomen/pelvis-Segmental pulmonary embolism in the right upper lobe and right middle lobe. Nonobstructing right upper pole 0.2 cm renal calculus. Otherwise, no acute pathology in the chest, abdomen, or pelvis to explain patient's symptoms.  CT head 3/11-No acute intracranial hemorrhage, mass effect, or midline shift.

## 2025-03-14 NOTE — DISCHARGE NOTE NURSING/CASE MANAGEMENT/SOCIAL WORK - NSDCVIVACCINE_GEN_ALL_CORE_FT
influenza, injectable, quadrivalent, preservative free; 13-Dec-2022 10:45; Gina Gambino (RN); [employer]; [id number] (Exp. Date: 23-Jun-2023); IntraMuscular; Deltoid Left.; 0.5 milliLiter(s); VIS (VIS Published: 06-Aug-2021, VIS Presented: 13-Dec-2022);

## 2025-03-14 NOTE — PROGRESS NOTE ADULT - SUBJECTIVE AND OBJECTIVE BOX
*******************************  Calixto Cazares, PGY-1  Internal Medicine  Contact via [employer] TEAMS    *******************************    PROGRESS NOTE:     Patient is a 60y old  Female who presents with a chief complaint of chest pain (12 Mar 2025 16:19)      INTERVAL EVENTS: No acute overnight events.     SUBJECTIVE: Patient seen and examined at bedside. This morning, the patient is comfortable and doing well. No acute complaints. Denies fevers, chills, N/V/D, chest pain, SOB, abdominal pain.    MEDICATIONS  (STANDING):  albuterol    90 MICROgram(s) HFA Inhaler 2 Puff(s) Inhalation every 6 hours  amantadine Syrup 100 milliGRAM(s) Oral two times a day  apixaban 10 milliGRAM(s) Oral every 12 hours  artificial  tears Solution 1 Drop(s) Both EYES four times a day  aspirin  chewable 81 milliGRAM(s) Oral daily  atorvastatin 40 milliGRAM(s) Oral at bedtime  folic acid 1 milliGRAM(s) Oral daily  hydroxyurea 500 milliGRAM(s) Oral two times a day  levETIRAcetam  Solution 500 milliGRAM(s) Enteral Tube two times a day  metoprolol tartrate 12.5 milliGRAM(s) Oral three times a day  pantoprazole   Suspension 40 milliGRAM(s) Oral every 12 hours  tiotropium 2.5 MICROgram(s) Inhaler 2 Puff(s) Inhalation daily    MEDICATIONS  (PRN):  acetaminophen     Tablet .. 650 milliGRAM(s) Oral every 6 hours PRN Temp greater or equal to 38C (100.4F), Moderate Pain (4 - 6)  HYDROmorphone  Injectable 0.5 milliGRAM(s) IV Push every 6 hours PRN Severe Pain (7 - 10)  ondansetron Injectable 4 milliGRAM(s) IV Push every 6 hours PRN Nausea and/or Vomiting      CAPILLARY BLOOD GLUCOSE      POCT Blood Glucose.: 113 mg/dL (13 Mar 2025 12:27)    I&O's Summary    13 Mar 2025 07:01  -  14 Mar 2025 07:00  --------------------------------------------------------  IN: 600 mL / OUT: 800 mL / NET: -200 mL        PHYSICAL EXAM:  Vital Signs Last 24 Hrs  T(C): 36.4 (14 Mar 2025 05:04), Max: 37.4 (13 Mar 2025 11:44)  T(F): 97.6 (14 Mar 2025 05:04), Max: 99.3 (13 Mar 2025 11:44)  HR: 79 (14 Mar 2025 05:04) (78 - 92)  BP: 124/86 (14 Mar 2025 05:04) (112/78 - 130/77)  BP(mean): --  RR: 18 (14 Mar 2025 05:04) (18 - 18)  SpO2: 100% (14 Mar 2025 05:04) (99% - 100%)    Parameters below as of 14 Mar 2025 05:04  Patient On (Oxygen Delivery Method): room air        GENERAL: NAD, lying in bed comfortably  HEAD: Atraumatic, normocephalic  EYES: EOMI, PERRLA, conjunctiva and sclera clear  ENT: Moist mucous membranes  NECK: Supple, no JVD  HEART: S1, S2, Regular rate and rhythm, no murmurs, rubs, or gallops  LUNGS: Unlabored respirations, clear to auscultation bilaterally, no crackles, wheezing, or rhonchi  ABDOMEN: Soft, nontender, nondistended, +BS  EXTREMITIES: 2+ peripheral pulses bilaterally. No clubbing, cyanosis, or edema  NERVOUS SYSTEM:  A&Ox3, no focal deficits   SKIN: No rashes or lesions    LABS:                        11.0   7.81  )-----------( 193      ( 13 Mar 2025 06:47 )             31.6     03-13    143  |  108  |  10  ----------------------------<  99  4.1   |  20[L]  |  0.75    Ca    9.4      13 Mar 2025 06:47  Phos  3.7     03-13  Mg     2.0     03-13    TPro  6.8  /  Alb  3.2[L]  /  TBili  0.7  /  DBili  0.2  /  AST  22  /  ALT  27  /  AlkPhos  86  03-13          Urinalysis Basic - ( 13 Mar 2025 06:47 )    Color: x / Appearance: x / SG: x / pH: x  Gluc: 99 mg/dL / Ketone: x  / Bili: x / Urobili: x   Blood: x / Protein: x / Nitrite: x   Leuk Esterase: x / RBC: x / WBC x   Sq Epi: x / Non Sq Epi: x / Bacteria: x          RADIOLOGY & ADDITIONAL TESTS:  Results Reviewed:   Imaging Personally Reviewed:  Electrocardiogram Personally Reviewed:  Tele: *******************************  Calixto Cazares, PGY-1  Internal Medicine  Contact via [employer] TEAMS    *******************************    PROGRESS NOTE:     Patient is a 60y old  Female who presents with a chief complaint of chest pain (12 Mar 2025 16:19)      INTERVAL EVENTS: No acute overnight events.     SUBJECTIVE: Patient seen and examined at bedside. This morning, the patient is eating breakfast. Denies acute complaints including fevers, chills, N/V/D, chest pain, SOB, abdominal pain.    MEDICATIONS  (STANDING):  albuterol    90 MICROgram(s) HFA Inhaler 2 Puff(s) Inhalation every 6 hours  amantadine Syrup 100 milliGRAM(s) Oral two times a day  apixaban 10 milliGRAM(s) Oral every 12 hours  artificial  tears Solution 1 Drop(s) Both EYES four times a day  aspirin  chewable 81 milliGRAM(s) Oral daily  atorvastatin 40 milliGRAM(s) Oral at bedtime  folic acid 1 milliGRAM(s) Oral daily  hydroxyurea 500 milliGRAM(s) Oral two times a day  levETIRAcetam  Solution 500 milliGRAM(s) Enteral Tube two times a day  metoprolol tartrate 12.5 milliGRAM(s) Oral three times a day  pantoprazole   Suspension 40 milliGRAM(s) Oral every 12 hours  tiotropium 2.5 MICROgram(s) Inhaler 2 Puff(s) Inhalation daily    MEDICATIONS  (PRN):  acetaminophen     Tablet .. 650 milliGRAM(s) Oral every 6 hours PRN Temp greater or equal to 38C (100.4F), Moderate Pain (4 - 6)  HYDROmorphone  Injectable 0.5 milliGRAM(s) IV Push every 6 hours PRN Severe Pain (7 - 10)  ondansetron Injectable 4 milliGRAM(s) IV Push every 6 hours PRN Nausea and/or Vomiting      CAPILLARY BLOOD GLUCOSE      POCT Blood Glucose.: 113 mg/dL (13 Mar 2025 12:27)    I&O's Summary    13 Mar 2025 07:01  -  14 Mar 2025 07:00  --------------------------------------------------------  IN: 600 mL / OUT: 800 mL / NET: -200 mL        PHYSICAL EXAM:  Vital Signs Last 24 Hrs  T(C): 36.4 (14 Mar 2025 05:04), Max: 37.4 (13 Mar 2025 11:44)  T(F): 97.6 (14 Mar 2025 05:04), Max: 99.3 (13 Mar 2025 11:44)  HR: 79 (14 Mar 2025 05:04) (78 - 92)  BP: 124/86 (14 Mar 2025 05:04) (112/78 - 130/77)  BP(mean): --  RR: 18 (14 Mar 2025 05:04) (18 - 18)  SpO2: 100% (14 Mar 2025 05:04) (99% - 100%)    Parameters below as of 14 Mar 2025 05:04  Patient On (Oxygen Delivery Method): room air        GENERAL: NAD  HEAD: Atraumatic, normocephalic  EYES: conjunctiva and sclera clear  HEART: S1, S2, Regular rate and rhythm, no murmurs, rubs, or gallops  LUNGS: Unlabored respirations, clear to auscultation bilaterally, no crackles, wheezing, or rhonchi  ABDOMEN: Soft, nontender, nondistended  EXTREMITIES: no LE edema   NERVOUS SYSTEM:  A&Ox2 self and place      LABS:                        11.0   7.81  )-----------( 193      ( 13 Mar 2025 06:47 )             31.6     03-13    143  |  108  |  10  ----------------------------<  99  4.1   |  20[L]  |  0.75    Ca    9.4      13 Mar 2025 06:47  Phos  3.7     03-13  Mg     2.0     03-13    TPro  6.8  /  Alb  3.2[L]  /  TBili  0.7  /  DBili  0.2  /  AST  22  /  ALT  27  /  AlkPhos  86  03-13          Urinalysis Basic - ( 13 Mar 2025 06:47 )    Color: x / Appearance: x / SG: x / pH: x  Gluc: 99 mg/dL / Ketone: x  / Bili: x / Urobili: x   Blood: x / Protein: x / Nitrite: x   Leuk Esterase: x / RBC: x / WBC x   Sq Epi: x / Non Sq Epi: x / Bacteria: x          RADIOLOGY & ADDITIONAL TESTS:  Results Reviewed:   Imaging Personally Reviewed:  Electrocardiogram Personally Reviewed:  Tele:

## 2025-03-14 NOTE — PROGRESS NOTE ADULT - PROBLEM SELECTOR PLAN 5
BP has been stable  - c/w Metoprolol 12.5mg bid via PEG

## 2025-03-14 NOTE — PROGRESS NOTE ADULT - PROBLEM SELECTOR PLAN 3
Elevated Retic 11% from last admission 7%  - s/p 1/2 NS at 100ml/hr  - c/w Hydrea 500 BID, folate 1mg qd   - Dilaudid 0.5mg q 6 hrs prn for now  - Hgb electrophoresis showed elevated Hemoglobin S% 31.5% and C% 25.8  - hem/onc c/s, appreciate recs - no need for exchange transfusion   - check CBC, CMP with indirect bili, LDH, haptoglobin, retic count daily

## 2025-03-14 NOTE — PROGRESS NOTE ADULT - PROBLEM SELECTOR PLAN 6
CTH neg CVA  - c/w ASA and statin

## 2025-03-14 NOTE — PROGRESS NOTE ADULT - PROBLEM SELECTOR PLAN 4
c/w Keppra and Amantadine at home doses via PEG  - seizure precaution

## 2025-03-14 NOTE — PROGRESS NOTE ADULT - PROBLEM SELECTOR PLAN 7
On Regular diet per daughter but has a PEG for few more weeks  - Given AMS, will start PEG feeding for now, S & S eval requested- if pass will start regular food  - started with Javity 1.5cal 20ml/hrs, increase 10ml/hr q 6 hrs to goal 45ml/hr  - Nutrition consult  - aspiration precaution  - passed S and S on 3/12, resumed PO diet based nutrition recs   - calories counts x 3 days to ensure adequate PO intakes On Regular diet per daughter but has a PEG for few more weeks  - Given AMS, will start PEG feeding for now, S & S eval requested- if pass will start regular food  - started with Javity 1.5cal 20ml/hrs, increase 10ml/hr q 6 hrs to goal 45ml/hr  - Nutrition consult  - aspiration precaution  - passed S and S on 3/12, resumed PO diet based nutrition recs   - calories counts   - per mom at bedside, pt has been having adequate PO intakes (finish entire tray for breakfast/lunch/dinner)  - pt now medically clear for dc to [facility]

## 2025-03-14 NOTE — CHART NOTE - NSCHARTNOTEFT_GEN_A_CORE
Pt's mom reports that pt has ate full tray for breakfast, lunch and dinner, showing adequate PO intake without Tube feeds.

## 2025-03-14 NOTE — PROGRESS NOTE ADULT - PROBLEM SELECTOR PLAN 1
CTA chest shows RUL and RML segmental PEs. Hemodynamically stable, /84, HR 82, O2 sat 98% RA  - In Ed started with IV heparin gtt  - c/w Lovenox 80mg sc q 12 hrs, will transition to DOAC on DC   - TTE 3/11- abnormal/paradoxical septal motion c/w conduction delay. LVEF 63%, LVDF indeterminate.   - doppler LE - no DVT in b/l LE   - PERT team consult called ( given SS crisis, recent CVAs) -> vascular surgery following, appreciate recs   - close monitors on vitals  - 3/13 - transitioned to eliquis 10 BID for 7 days and then 5 BID

## 2025-03-14 NOTE — OCCUPATIONAL THERAPY INITIAL EVALUATION ADULT - LEVEL OF INDEPENDENCE: EATING, OT EVAL
pt choosing incorrect tools, using straw to eat eggs. Uses fork correctly when provided to patient. Able to identify items correctly and state purpose/supervision

## 2025-04-21 ENCOUNTER — APPOINTMENT (OUTPATIENT)
Dept: INTERNAL MEDICINE | Facility: CLINIC | Age: 60
End: 2025-04-21

## (undated) DEVICE — SOL NORMOSOL-R PH7.4 1000ML

## (undated) DEVICE — DRSG TEGADERM 6 X 8"

## (undated) DEVICE — DRAPE MAYO STAND 30"

## (undated) DEVICE — FOLEY TRAY 16FR 5CC LTX UMETER CLOSED

## (undated) DEVICE — GLV 8.5 PROTEXIS (WHITE)

## (undated) DEVICE — SUCTION YANKAUER NO CONTROL VENT

## (undated) DEVICE — SUCTION CATH ARGYLE WHISTLE TIP 14FR STRAIGHT PACKED

## (undated) DEVICE — GOWN LG

## (undated) DEVICE — ELCTR BOVIE TIP CLEANER SCRATCH PAD

## (undated) DEVICE — GLV 7 PROTEXIS (WHITE)

## (undated) DEVICE — BLADE SCALPEL SAFETYLOCK #11

## (undated) DEVICE — SUT PROLENE 3-0 36" MH

## (undated) DEVICE — SUT SURGICAL STEEL 6 30" BP-1

## (undated) DEVICE — SUT DOUBLE 6 WIRE STERNAL

## (undated) DEVICE — PACK UNIVERSAL CARDIAC

## (undated) DEVICE — DRAPE INSTRUMENT POUCH 6.75" X 11"

## (undated) DEVICE — WARMING BLANKET FULL UNDERBODY

## (undated) DEVICE — GLV 7.5 PROTEXIS (WHITE)

## (undated) DEVICE — BLADE SCALPEL SAFETYLOCK #10

## (undated) DEVICE — STAPLER SKIN VISI-STAT 35 WIDE

## (undated) DEVICE — MARKING PEN W RULER

## (undated) DEVICE — VENODYNE/SCD SLEEVE CALF MEDIUM

## (undated) DEVICE — POSITIONER CARDIAC BUMP

## (undated) DEVICE — GLV 6.5 PROTEXIS (WHITE)

## (undated) DEVICE — FOLEY TRAY 16FR 5CC LF LUBRISIL ADVANCE TEMP CLOSED

## (undated) DEVICE — SUT STAINLESS STEEL 5 18" SCC

## (undated) DEVICE — TOURNIQUET ESMARK 6"

## (undated) DEVICE — ELCTR BOVIE TIP BLADE INSULATED 2.75" EDGE

## (undated) DEVICE — PACING CABLE A/V TEMP SCREW DOWN 6FT

## (undated) DEVICE — GLV 8 PROTEXIS (WHITE)

## (undated) DEVICE — SUT BOOT STANDARD (ASSORTED) 5 PAIR

## (undated) DEVICE — LAP PAD 18 X 18"

## (undated) DEVICE — SUT PLEDGET PRE PUNCH 4.8 X 9.5 X 1.5 MM

## (undated) DEVICE — STEALTH CLAMP INSERT FIBRA/FIBRA 90MM

## (undated) DEVICE — WOUND IRR SURGIPHOR

## (undated) DEVICE — DRAPE 3/4 SHEET W REINFORCEMENT 56X77"

## (undated) DEVICE — SOL IRR POUR NS 0.9% 500ML

## (undated) DEVICE — ELCTR DEFIB PAD PRO-PADZ W 10FT LEAD WIRES ADULT

## (undated) DEVICE — VESSEL LOOP MAXI-RED  0.120" X 16"

## (undated) DEVICE — ELCTR BOVIE PENCIL HANDPIECE ROCKER SWITCH 15FT

## (undated) DEVICE — MEDICATION LABELS W MARKER

## (undated) DEVICE — VISITEC 4X4

## (undated) DEVICE — SOL IRR POUR H2O 250ML

## (undated) DEVICE — CONNECTOR STRAIGHT 3/8 X 3/8"

## (undated) DEVICE — PACK CARDIAC YELLOW

## (undated) DEVICE — SPECIMEN CONTAINER 100ML

## (undated) DEVICE — POSITIONER FOAM EGG CRATE ULNAR 2PCS (PINK)

## (undated) DEVICE — DRAPE TOWEL BLUE 17" X 24"

## (undated) DEVICE — Device

## (undated) DEVICE — STOPCOCK 4-WAY 2 GANG W SWIVEL MALE LUER LOCK

## (undated) DEVICE — GOWN TRIMAX LG

## (undated) DEVICE — STEALTH CLAMP INSERT FIBRA/FIBRA 60MM

## (undated) DEVICE — WARMING BLANKET DUO-THERM HYPER/HYPOTHERM ADULT

## (undated) DEVICE — VESSEL LOOP MAXI-BLUE 0.120" X 16"

## (undated) DEVICE — DRSG OPSITE 13.75 X 4"

## (undated) DEVICE — SYR ASEPTO

## (undated) DEVICE — SENSOR MYOCARDIAL TEMP 15MM

## (undated) DEVICE — BLADE SCALPEL SAFETYLOCK #15

## (undated) DEVICE — DRAPE 1/2 SHEET 40X57"

## (undated) DEVICE — PREP CHLORAPREP HI-LITE ORANGE 26ML

## (undated) DEVICE — SUT BLUNT SZ 5

## (undated) DEVICE — SAW BLADE STRYKER STERNUM 31MM X 6.27 X .79